# Patient Record
Sex: MALE | Race: WHITE | NOT HISPANIC OR LATINO | Employment: OTHER | URBAN - METROPOLITAN AREA
[De-identification: names, ages, dates, MRNs, and addresses within clinical notes are randomized per-mention and may not be internally consistent; named-entity substitution may affect disease eponyms.]

---

## 2017-01-11 DIAGNOSIS — Z00.01 ENCOUNTER FOR ROUTINE ADULT HEALTH EXAMINATION WITH ABNORMAL FINDINGS: ICD-10-CM

## 2017-01-11 DIAGNOSIS — Z12.11 ENCOUNTER FOR SCREENING FOR MALIGNANT NEOPLASM OF COLON: ICD-10-CM

## 2017-01-11 PROCEDURE — G0328 FECAL BLOOD SCRN IMMUNOASSAY: HCPCS | Performed by: FAMILY MEDICINE

## 2017-01-12 ENCOUNTER — TELEPHONE (OUTPATIENT)
Dept: FAMILY MEDICINE | Facility: CLINIC | Age: 72
End: 2017-01-12
Payer: COMMERCIAL

## 2017-01-12 DIAGNOSIS — Z79.01 LONG TERM CURRENT USE OF ANTICOAGULANT THERAPY: Primary | ICD-10-CM

## 2017-01-12 LAB
HEMOCCULT STL QL IA: NEGATIVE
INR POINT OF CARE: 1.9 (ref 0.86–1.14)

## 2017-01-12 PROCEDURE — 85610 PROTHROMBIN TIME: CPT | Mod: QW | Performed by: FAMILY MEDICINE

## 2017-01-12 PROCEDURE — 99207 ZZC NO CHARGE NURSE ONLY: CPT | Performed by: FAMILY MEDICINE

## 2017-01-12 PROCEDURE — 36416 COLLJ CAPILLARY BLOOD SPEC: CPT | Performed by: FAMILY MEDICINE

## 2017-01-12 NOTE — TELEPHONE ENCOUNTER
"  ANTICOAGULATION FOLLOW-UP CLINIC VISIT    Patient Name:  Feng Wynne  Date:  1/12/2017  Contact Type:  Telephone    SUBJECTIVE:  Bleeding Signs/Symptoms: None  Thromboembolic Signs/Symptoms: None    Medication Changes:  No  Dietary Changes:  Started drinking \"Red Zinger Tea\" since last INR.  Not clear if this is cause for low INR today. Patient will stop drinking the tea and are recheck his INR in 2 week.  Bacterial/Viral Infection:  No    Missed Coumadin Doses:  None  Other Concerns:  No      ASSESSMENT/PLAN:  See: ANTICOAGULATION QIC flow sheet.    INR: 1.9  Plan: 10 mg x 1 (Thurs. 1/12) then resume 10 mg MWF and 5 mg rest of week.  Recheck INR in 2 weeks.  Meli Whitten RN      Dosage adjustment made based on physician directed care plan.    SHAR JAMES            "

## 2017-02-02 ENCOUNTER — TELEPHONE (OUTPATIENT)
Dept: FAMILY MEDICINE | Facility: CLINIC | Age: 72
End: 2017-02-02
Payer: COMMERCIAL

## 2017-02-02 ENCOUNTER — TELEPHONE (OUTPATIENT)
Dept: FAMILY MEDICINE | Facility: CLINIC | Age: 72
End: 2017-02-02

## 2017-02-02 DIAGNOSIS — Z79.01 LONG TERM CURRENT USE OF ANTICOAGULANT THERAPY: Primary | ICD-10-CM

## 2017-02-02 LAB — INR POINT OF CARE: 3.8 (ref 0.86–1.14)

## 2017-02-02 PROCEDURE — 99207 ZZC NO CHARGE NURSE ONLY: CPT | Performed by: FAMILY MEDICINE

## 2017-02-02 PROCEDURE — 85610 PROTHROMBIN TIME: CPT | Mod: QW | Performed by: FAMILY MEDICINE

## 2017-02-02 PROCEDURE — 36416 COLLJ CAPILLARY BLOOD SPEC: CPT | Performed by: FAMILY MEDICINE

## 2017-02-02 NOTE — TELEPHONE ENCOUNTER
ANTICOAGULATION FOLLOW-UP CLINIC VISIT    Patient Name:  Feng Wynne  Date:  2/2/2017  Contact Type:  Telephone    SUBJECTIVE:  Bleeding Signs/Symptoms: None  Thromboembolic Signs/Symptoms: None    Medication Changes:  No  Dietary Changes:  Eating less greens. Will slightly increase intake   Bacterial/Viral Infection:  No    Missed Coumadin Doses:  None  Other Concerns:  No      ASSESSMENT/PLAN:  See: ANTICOAGULATION QIC flow sheet.    INR 3.8  Plan: Continue 10 mg MWF and 5mg ROW = 50 mg/wk. Recheck INR in 3 weeks (going to [place]).  Meli Whitten RN      Dosage adjustment made based on physician directed care plan.    SHAR JAMES

## 2017-02-02 NOTE — TELEPHONE ENCOUNTER
Spoke with patient earlier about today's INR.  Patient interested in Home INR Monitor.  Advised patient to call his insurance ([employer]) to see if his plan will cover machine, supplies.    Patient called back.  [employer] will cover.    Informed patient will fill out order/form for monitor.  Patient leaving for [place] x 3 weeks. He will mely back and let me know when he is back home then will fax order to [facility].  They will call him after they receive order.  Meli Whitten RN

## 2017-02-27 ENCOUNTER — TELEPHONE (OUTPATIENT)
Dept: FAMILY MEDICINE | Facility: CLINIC | Age: 72
End: 2017-02-27

## 2017-02-27 NOTE — TELEPHONE ENCOUNTER
MP  Patient is requesting a home INR Monitor.  Form filled out.  Need your signature.  Please give to me when done.  Thanks, Meli Whitten RN

## 2017-02-27 NOTE — TELEPHONE ENCOUNTER
Reason for Call:  Other call back    Detailed comments: Please call patient about a home test for INR  Was left a message by [employer]    Phone Number Patient can be reached at: Cell number on file:    Telephone Information:   Mobile [phone]       Best Time: anytime    Can we leave a detailed message on this number? YES    Call taken on 2/27/2017 at 8:54 AM by Fausto Sharma

## 2017-02-28 ENCOUNTER — TELEPHONE (OUTPATIENT)
Dept: FAMILY MEDICINE | Facility: CLINIC | Age: 72
End: 2017-02-28

## 2017-02-28 NOTE — TELEPHONE ENCOUNTER
Spoke with Jennifer from [facility].  Gave her patient insurance information.  Meli Whitten RN

## 2017-03-02 ENCOUNTER — TELEPHONE (OUTPATIENT)
Dept: FAMILY MEDICINE | Facility: CLINIC | Age: 72
End: 2017-03-02
Payer: COMMERCIAL

## 2017-03-02 DIAGNOSIS — I48.20 CHRONIC ATRIAL FIBRILLATION (H): ICD-10-CM

## 2017-03-02 DIAGNOSIS — Z79.01 LONG TERM CURRENT USE OF ANTICOAGULANT THERAPY: Primary | ICD-10-CM

## 2017-03-02 DIAGNOSIS — Z95.2 S/P MITRAL VALVE REPLACEMENT: ICD-10-CM

## 2017-03-02 LAB — INR POINT OF CARE: 4.8 (ref 0.86–1.14)

## 2017-03-02 PROCEDURE — 99207 ZZC NO CHARGE NURSE ONLY: CPT | Performed by: FAMILY MEDICINE

## 2017-03-02 PROCEDURE — 36416 COLLJ CAPILLARY BLOOD SPEC: CPT | Performed by: FAMILY MEDICINE

## 2017-03-02 PROCEDURE — 85610 PROTHROMBIN TIME: CPT | Mod: QW | Performed by: FAMILY MEDICINE

## 2017-03-02 NOTE — TELEPHONE ENCOUNTER
ANTICOAGULATION FOLLOW-UP CLINIC VISIT    Patient Name:  Feng Wynne  Date:  3/2/2017  Contact Type:  Telephone. Dose instructions given to patient    SUBJECTIVE:  Bleeding Signs/Symptoms: None  Thromboembolic Signs/Symptoms: None    Medication Changes:  No  Dietary Changes:  On vacation in [place] x 3 weeks. Increased ETOH.  Advised he increase his greens.  Bacterial/Viral Infection:  No    Missed Coumadin Doses:  None  Other Concerns:  No      ASSESSMENT/PLAN:  See: ANTICOAGULATION QIC flow sheet.    INR 4.8  Plan:  Hold x 1 (Th. 3/2) then resume 10 mg MWF and 5 mg rest of week. Recheck INR in 1 week  Meli Whitten RN      Dosage adjustment made based on physician directed care plan.    SHAR JAMES

## 2017-03-09 ENCOUNTER — TELEPHONE (OUTPATIENT)
Dept: FAMILY MEDICINE | Facility: CLINIC | Age: 72
End: 2017-03-09

## 2017-03-09 ENCOUNTER — TELEPHONE (OUTPATIENT)
Dept: FAMILY MEDICINE | Facility: CLINIC | Age: 72
End: 2017-03-09
Payer: COMMERCIAL

## 2017-03-09 DIAGNOSIS — I48.20 CHRONIC ATRIAL FIBRILLATION (H): ICD-10-CM

## 2017-03-09 DIAGNOSIS — Z79.01 LONG TERM CURRENT USE OF ANTICOAGULANT THERAPY: Primary | ICD-10-CM

## 2017-03-09 DIAGNOSIS — Z95.2 S/P MITRAL VALVE REPLACEMENT: ICD-10-CM

## 2017-03-09 LAB — INR POINT OF CARE: 3.4 (ref 0.86–1.14)

## 2017-03-09 PROCEDURE — 36416 COLLJ CAPILLARY BLOOD SPEC: CPT | Performed by: FAMILY MEDICINE

## 2017-03-09 PROCEDURE — 99207 ZZC NO CHARGE NURSE ONLY: CPT | Performed by: FAMILY MEDICINE

## 2017-03-09 PROCEDURE — 85610 PROTHROMBIN TIME: CPT | Mod: QW | Performed by: FAMILY MEDICINE

## 2017-03-09 NOTE — TELEPHONE ENCOUNTER
Reason for Call:  Other appointment    Detailed comments: Patient would like to see if Trevor would take him on  As new patient as well his partner, Jose Washburn  As they both see Dr. Duran but were recommended by bhanu nurse.     Phone Number Patient can be reached at: Home number on file [phone] (home)    Best Time: anytime     Can we leave a detailed message on this number? YES    Call taken on 3/9/2017 at 1:13 PM by Moose Salcido

## 2017-03-09 NOTE — TELEPHONE ENCOUNTER
ANTICOAGULATION FOLLOW-UP CLINIC VISIT    Patient Name:  Feng Wynne  Date:  3/9/2017  Contact Type:  Telephone. Dose instructions given to patient.     SUBJECTIVE:  Bleeding Signs/Symptoms: None  Thromboembolic Signs/Symptoms: None    Medication Changes:  No  Dietary Changes:  No  Bacterial/Viral Infection:  No    Missed Coumadin Doses:  None  Other Concerns:  No      ASSESSMENT/PLAN:  See: ANTICOAGULATION QIC flow sheet.    INR 3.4  Plan: Continue 10 mg MWF and 5 mg rest of week = 50 mg/wk. Recheck INR in 3 weeks.  Meli Whitten RN      Dosage adjustment made based on physician directed care plan.    SHAR JAMES

## 2017-03-24 ENCOUNTER — TRANSFERRED RECORDS (OUTPATIENT)
Dept: HEALTH INFORMATION MANAGEMENT | Facility: CLINIC | Age: 72
End: 2017-03-24

## 2017-03-24 LAB — INR PPP: 3 (ref 2.5–3.5)

## 2017-04-07 ENCOUNTER — TELEPHONE (OUTPATIENT)
Dept: FAMILY MEDICINE | Facility: CLINIC | Age: 72
End: 2017-04-07
Payer: COMMERCIAL

## 2017-04-07 DIAGNOSIS — Z79.01 LONG TERM CURRENT USE OF ANTICOAGULANT THERAPY: Primary | ICD-10-CM

## 2017-04-07 PROCEDURE — 36416 COLLJ CAPILLARY BLOOD SPEC: CPT | Performed by: FAMILY MEDICINE

## 2017-04-07 PROCEDURE — 85610 PROTHROMBIN TIME: CPT | Mod: QW | Performed by: FAMILY MEDICINE

## 2017-04-07 PROCEDURE — 99207 ZZC NO CHARGE NURSE ONLY: CPT | Performed by: FAMILY MEDICINE

## 2017-04-07 NOTE — TELEPHONE ENCOUNTER
INR faxed from [facility] = 3.7  VM left asking patient to call back.  Meli Whitten RN

## 2017-04-10 LAB — INR POINT OF CARE: 3.7 (ref 0.86–1.14)

## 2017-04-10 NOTE — TELEPHONE ENCOUNTER
ANTICOAGULATION FOLLOW-UP CLINIC VISIT    Patient Name:  Feng Wynne  Date:  4/10/2017  Contact Type:  Telephone    SUBJECTIVE:  Bleeding Signs/Symptoms: None  Thromboembolic Signs/Symptoms: None    Medication Changes:  No  Dietary Changes:  had kale then back to spinage  Bacterial/Viral Infection:  No    Missed Coumadin Doses:  None  Other Concerns:  No      ASSESSMENT/PLAN:  See: ANTICOAGULATION QIC flow sheet.    MEDICATION MANAGEMENT  Education Provided:    INR: 3.7  Dose: same 10 MWF and 5 ROW and recheck in 2 weeks   Dose instructions given to pt via phone.  He will call in 2 weeks also with the results    Dosage adjustment made based on physician directed care plan. YESI/SHAR TOM

## 2017-04-11 DIAGNOSIS — I48.20 CHRONIC ATRIAL FIBRILLATION (H): ICD-10-CM

## 2017-04-11 NOTE — TELEPHONE ENCOUNTER
Warfarin     Last Written Prescription Date: 10/04/2016  Last Fill Qty: 180, # refills: 3  Last Office Visit with G, P or [facility] prescribing provider: 10/04/2016  Next 5 appointments (look out 90 days)     May 03, 2017 10:30 AM CDT   [facility] Visit with Enrique [facility] MD   [facility]    [address] [facility] [id number] [phone]                   Date and Result of Last PT/INR:   Lab Results   Component Value Date    INR 3.7 04/10/2017    INR 3 03/24/2017    INR 3.4 03/09/2017    INR 3.00 06/02/2014

## 2017-04-12 RX ORDER — WARFARIN SODIUM 5 MG/1
TABLET ORAL
Qty: 180 TABLET | Refills: 0 | Status: SHIPPED | OUTPATIENT
Start: 2017-04-12 | End: 2017-12-01

## 2017-04-17 ENCOUNTER — TELEPHONE (OUTPATIENT)
Dept: FAMILY MEDICINE | Facility: CLINIC | Age: 72
End: 2017-04-17

## 2017-04-17 NOTE — TELEPHONE ENCOUNTER
Reason for call:  Patient reporting a symptom    Symptom or request: rash on leg right side, has gotten bigger  Thinks it might be ring worm.    Duration (how long have symptoms been present): 3 weeks    Have you been treated for this before? No    Additional comments: wants something for it over the phone.      Phone Number patient can be reached at:  Home number on file [phone] (home)    Best Time:  anytime    Can we leave a detailed message on this number:  YES    Call taken on 4/17/2017 at 7:59 AM by Ritu Beltran

## 2017-04-18 ENCOUNTER — OFFICE VISIT (OUTPATIENT)
Dept: FAMILY MEDICINE | Facility: CLINIC | Age: 72
End: 2017-04-18
Payer: COMMERCIAL

## 2017-04-18 VITALS
HEART RATE: 75 BPM | BODY MASS INDEX: 44.1 KG/M2 | RESPIRATION RATE: 16 BRPM | OXYGEN SATURATION: 97 % | DIASTOLIC BLOOD PRESSURE: 80 MMHG | WEIGHT: 315 LBS | TEMPERATURE: 97.7 F | SYSTOLIC BLOOD PRESSURE: 120 MMHG | HEIGHT: 71 IN

## 2017-04-18 DIAGNOSIS — B36.9 FUNGAL DERMATITIS: Primary | ICD-10-CM

## 2017-04-18 PROCEDURE — 99213 OFFICE O/P EST LOW 20 MIN: CPT | Performed by: FAMILY MEDICINE

## 2017-04-18 NOTE — MR AVS SNAPSHOT
After Visit Summary   4/18/2017    Feng Wynne    MRN: [id number]           Patient Information     Date Of Birth          1945        Visit Information        Provider Department      4/18/2017 11:30 AM Carrillo Duran MD [facility]        Today's Diagnoses     Fungal dermatitis    -  1       Follow-ups after your visit        Your next 10 appointments already scheduled     May 03, 2017 10:30 AM CDT   Office Visit with Enrique Walker MD   [facility] ([facility])    [address] [facility] [id number] [phone]           Bring a current list of meds and any records pertaining to this visit.  For Physicals, please bring immunization records and any forms needing to be filled out.  Please arrive 10 minutes early to complete paperwork.            Jun 06, 2017  7:30 AM CDT   LAB with  LAB   [facility] ([facility])    [address] [place] [id number] [phone]           Patient must bring picture ID.  Patient should be prepared to give a urine specimen  Please do not eat 10-12 hours before your appointment if you are coming in fasting for labs on lipids, cholesterol, or glucose (sugar).  Pregnant women should follow their Care Team instructions. Water with medications is okay. Do not drink coffee or other fluids.   If you have concerns about taking  your medications, please ask at office or if scheduling via Smart Voicemailt, send a message by clicking on Secure Messaging, Message Your Care Team.            Jun 06, 2017  8:00 AM CDT   Ech Complete with UCECHCR2    [facility] ([facility])    [address] [place] [address]  3rd [facility] [id number] [phone]           1.  Please bring or wear a comfortable two-piece outfit. 2.  You may eat, drink and take your normal medicines. 3.  For any questions that cannot be answered, please contact the  "ordering physician            Jun 13, 2017  7:30 AM CDT   (Arrive by 7:15 AM)   Return Visit with Armani Marin MD   [facility] ([facility])    [address] [address] [phone]              Who to contact     If you have questions or need follow up information about today's clinic visit or your schedule please contact [facility] directly at [phone].  Normal or non-critical lab and imaging results will be communicated to you by [employer], letter or phone within 4 business days after the clinic has received the results. If you do not hear from us within 7 days, please contact the clinic through [email or web address] or phone. If you have a critical or abnormal lab result, we will notify you by phone as soon as possible.  Submit refill requests through [employer] or call your pharmacy and they will forward the refill request to us. Please allow 3 business days for your refill to be completed.          Additional Information About Your Visit        [employer] Information     [employer] gives you secure access to your electronic health record. If you see a primary care provider, you can also send messages to your care team and make appointments. If you have questions, please call your primary care clinic.  If you do not have a primary care provider, please call [phone] and they will assist you.        Care EveryWhere ID     This is your Care EveryWhere ID. This could be used by other organizations to access your [facility] medical records  [id number]        Your Vitals Were     Pulse Temperature Respirations Height Pulse Oximetry BMI (Body Mass Index)    75 97.7  F (36.5  C) (Oral) 16 5' 10.5\" (1.791 m) 97% 56.87 kg/m2       Blood Pressure from Last 3 Encounters:   04/18/17 120/80   10/04/16 124/90   11/04/15 124/78    Weight from Last 3 Encounters:   04/18/17 (!) 402 lb (182.3 kg)   10/04/16 (!) 402 lb (182.3 kg)   11/04/15 (!) 413 lb 4.8 oz " (187.5 kg)              Today, you had the following     No orders found for display         Today's Medication Changes          These changes are accurate as of: 4/18/17 12:28 PM.  If you have any questions, ask your nurse or doctor.               These medicines have changed or have updated prescriptions.        Dose/Directions    metroNIDAZOLE 1 % gel   Commonly known as:  METROGEL   This may have changed:    - when to take this  - reasons to take this   Used for:  Rosacea        Apply  topically daily.   Quantity:  60 g   Refills:  11                Primary Care Provider Office Phone # Fax #    Carrillo Duran [phone]       [facility] [address] [address]        Thank you!     Thank you for choosing [facility]  for your care. Our goal is always to provide you with excellent care. Hearing back from our patients is one way we can continue to improve our services. Please take a few minutes to complete the written survey that you may receive in the mail after your visit with us. Thank you!             Your Updated Medication List - Protect others around you: Learn how to safely use, store and throw away your medicines at www.disposemymeds.org.          This list is accurate as of: 4/18/17 12:28 PM.  Always use your most recent med list.                   Brand Name Dispense Instructions for use    * ASPIRIN NOT PRESCRIBED    INTENTIONAL     by Other route continuous prn.       atenolol 25 MG tablet    TENORMIN    180 tablet    Take 1 tablet (25 mg) by mouth 2 times daily       CENTRUM SILVER PO      Take 1 tablet by mouth daily.       clindamycin 300 MG capsule    CLEOCIN    4 Cap    2 pills one hour before dental work       * COMPRESSION STOCKINGS     1 each    1 each daily       * COMPRESSION STOCKINGS     1 each    1 each daily       FISH OIL CONCENTRATE PO      Take  by mouth daily.       lisinopril 40 MG tablet    PRINIVIL/ZESTRIL    90  tablet    Take 1 tablet (40 mg) by mouth daily       metroNIDAZOLE 1 % gel    METROGEL    60 g    Apply  topically daily.       * order for DME     1 Units    SLEEP MATTRESS FOR CHRONIC BACK PAIN DUE TO DISC PROBLEMS       order for DME     1 each    Equipment being ordered: COMPRESSION STOCKINGS, 20-30 mmHg       * warfarin 5 MG tablet    COUMADIN    180 tablet    Take 1-2 tablets daily, varies.       * warfarin 5 MG tablet    COUMADIN    180 tablet    TAKE 1 TO 2 TABLETS BY MOUTH DAILY       * Notice:  This list has 6 medication(s) that are the same as other medications prescribed for you. Read the directions carefully, and ask your doctor or other care provider to review them with you.

## 2017-04-18 NOTE — PROGRESS NOTES
Subjective: Last weekhe noticed a small brown spot on his right shin, a little bit itchy, and it has gradually gotten bigger. He's never had anything like it before .he tried some antibacterial ointment but it seemed to make it worse. He has some venous insufficiency on that side but it's mild.    Objective: There is a very superficial oval lesion about 3 cm around with central clearing, fairly typical for fungus    Assessment and plan: Likely fungal dermatitis, try over-the-counter antifungal, could take 6 weeks to go away. If that fails to work he should see his dermatologist and he has one.

## 2017-04-24 ENCOUNTER — TELEPHONE (OUTPATIENT)
Dept: FAMILY MEDICINE | Facility: CLINIC | Age: 72
End: 2017-04-24
Payer: COMMERCIAL

## 2017-04-24 ENCOUNTER — TELEPHONE (OUTPATIENT)
Dept: FAMILY MEDICINE | Facility: CLINIC | Age: 72
End: 2017-04-24

## 2017-04-24 DIAGNOSIS — Z79.01 LONG TERM CURRENT USE OF ANTICOAGULANT THERAPY: Primary | ICD-10-CM

## 2017-04-24 LAB — INR POINT OF CARE: 3 (ref 0.86–1.14)

## 2017-04-24 PROCEDURE — 99207 ZZC NO CHARGE NURSE ONLY: CPT | Performed by: FAMILY MEDICINE

## 2017-04-24 NOTE — TELEPHONE ENCOUNTER
ANTICOAGULATION FOLLOW-UP CLINIC VISIT    Patient Name:  Feng Wynne  Date:  4/24/2017  Contact Type:  Telephone. Dose instructions left on patient's voice mail.  (Home INR)    SUBJECTIVE:  Bleeding Signs/Symptoms: None  Thromboembolic Signs/Symptoms: None    Medication Changes:  No  Dietary Changes:  No  Bacterial/Viral Infection:  No    Missed Coumadin Doses:  None  Other Concerns:  No      ASSESSMENT/PLAN:  See: ANTICOAGULATION QIC flow sheet.    INR 3.0  Plan: Continue 10 mg MWF and 5 mg rest of week = 50 mg/wk. Recheck INR in 2 weeks.  Meli Whitten RN      Dosage adjustment made based on physician directed care plan.    SHAR JAMES

## 2017-04-24 NOTE — TELEPHONE ENCOUNTER
Reason for Call:  INR    Who is calling?  Pt is calling    Phone number:  Pt had the idea that he had to call you back.    Fax number:      Name of caller: pt is calling    INR Value:  3.0    Are there any other concerns:  No    Can we leave a detailed message on this number? YES    Phone number patient can be reached at: Home number on file [phone] (home)      Call taken on 4/24/2017 at 10:20 AM by Ritu Beltran

## 2017-05-03 ENCOUNTER — OFFICE VISIT (OUTPATIENT)
Dept: FAMILY MEDICINE | Facility: CLINIC | Age: 72
End: 2017-05-03
Payer: COMMERCIAL

## 2017-05-03 VITALS
WEIGHT: 315 LBS | DIASTOLIC BLOOD PRESSURE: 78 MMHG | BODY MASS INDEX: 44.1 KG/M2 | HEIGHT: 71 IN | OXYGEN SATURATION: 99 % | HEART RATE: 74 BPM | TEMPERATURE: 97.2 F | SYSTOLIC BLOOD PRESSURE: 130 MMHG

## 2017-05-03 DIAGNOSIS — E66.01 MORBID OBESITY, UNSPECIFIED OBESITY TYPE (H): ICD-10-CM

## 2017-05-03 DIAGNOSIS — Z95.2 S/P MITRAL VALVE REPLACEMENT: ICD-10-CM

## 2017-05-03 DIAGNOSIS — I48.0 PAROXYSMAL ATRIAL FIBRILLATION (H): Primary | ICD-10-CM

## 2017-05-03 PROCEDURE — 99214 OFFICE O/P EST MOD 30 MIN: CPT | Performed by: INTERNAL MEDICINE

## 2017-05-03 ASSESSMENT — ENCOUNTER SYMPTOMS
BLOOD IN STOOL: 0
COUGH: 0
NAUSEA: 0
FOCAL WEAKNESS: 0
VOMITING: 0
DIZZINESS: 0
HEMATURIA: 0
PND: 0
SENSORY CHANGE: 0
CLAUDICATION: 0
PALPITATIONS: 0
ORTHOPNEA: 0
ABDOMINAL PAIN: 0
SHORTNESS OF BREATH: 0
HEADACHES: 0

## 2017-05-03 NOTE — PATIENT INSTRUCTIONS
Maintain the following meal plan:    Entree - 250 calories per serving  Shake - 150 calories per serving  Fruit or Vegetable - 100 calories per serving    Breakfast - Entree + Fruit or Vegetable  Snack - Shake   Lunch - Entree + Fruit or Vegetable  Snack - Shake   Dinner - Entree + Fruit or Vegetable  Snack - Chocolate    Follow up in 3 months.

## 2017-05-03 NOTE — PROGRESS NOTES
"HPI Comments:   Patient comes in today to establish care.    Overall, his feeling well and does not have any subjective complaints. He reports that he has been doing well with losing weight by changing his eating habits. So far, he said that he has lost 50 pounds and would like to continue losing more.    He has a history of coronary artery disease and atrial fibrillation and underwent mitral valve replacement with a mechanical valve. He is currently on anticoagulation treatment with warfarin. He checks his INR at home. No overt bleeding episodes noted. He denies having chest pain, palpitations, or shortness of breath (other than exertional dyspnea secondary to his morbid obesity).      Past Medical History:   Diagnosis Date     Atrial fibrillation (H)      Coronary artery disease      Hypercholesteremia      Morbid obesity (H)      Unspecified essential hypertension      Past Surgical History:   Procedure Laterality Date     MITRAL VALVE REPLACEMENT  8-    Mitral valve replacement with 31-mm [employer]. Raffi mechanical valve.        Review of Systems   Constitutional: Negative for malaise/fatigue.   Respiratory: Negative for cough and shortness of breath.    Cardiovascular: Negative for chest pain, palpitations, orthopnea, claudication and PND.   Gastrointestinal: Negative for abdominal pain, blood in stool, melena, nausea and vomiting.   Genitourinary: Negative for hematuria.   Neurological: Negative for dizziness, sensory change, focal weakness and headaches.       /78 (BP Location: Left arm, Patient Position: Chair, Cuff Size: Adult Large)  Pulse 74  Temp 97.2  F (36.2  C) (Oral)  Ht 5' 10.5\" (1.791 m)  Wt (!) 393 lb (178.3 kg)  SpO2 99%  BMI 55.59 kg/m2      Physical Exam   Constitutional: He is oriented to person, place, and time. No distress.   Neck: No thyromegaly present.   Cardiovascular: Normal rate and regular rhythm.    (+) Mechanical heart valve click heard left lower parasternal border "   Pulmonary/Chest: Effort normal and breath sounds normal. No respiratory distress.   Neurological: He is alert and oriented to person, place, and time. He has normal reflexes. Coordination normal. GCS score is 15.   Nursing note and vitals reviewed.        ICD-10-CM    1. Paroxysmal atrial fibrillation (H) I48.0  continue current medications    2. S/P mitral valve replacement Z95.2  continue anticoagulation treatment with warfarin    3. Morbid obesity, unspecified obesity type (H) E66.01  see below          Patient Instructions   Maintain the following meal plan:    Entree - 250 calories per serving  Shake - 150 calories per serving  Fruit or Vegetable - 100 calories per serving    Breakfast - Entree + Fruit or Vegetable  Snack - Shake   Lunch - Entree + Fruit or Vegetable  Snack - Shake   Dinner - Entree + Fruit or Vegetable  Snack - Chocolate    Follow up in 3 months.

## 2017-05-03 NOTE — MR AVS SNAPSHOT
After Visit Summary   5/3/2017    Feng Wynne    MRN: [id number]           Patient Information     Date Of Birth          1945        Visit Information        Provider Department      5/3/2017 10:30 AM Enrique Walker MD [facility]        Care Instructions    Maintain the following meal plan:    Entree - 250 calories per serving  Shake - 150 calories per serving  Fruit or Vegetable - 100 calories per serving    Breakfast - Entree + Fruit or Vegetable  Snack - Shake   Lunch - Entree + Fruit or Vegetable  Snack - Shake   Dinner - Entree + Fruit or Vegetable  Snack - Chocolate    Follow up in 3 months.          Follow-ups after your visit        Your next 10 appointments already scheduled     Jun 06, 2017  7:30 AM CDT   LAB with  LAB   [facility] ([place] [facility])    [address] [place] [address] [phone]           Patient must bring picture ID.  Patient should be prepared to give a urine specimen  Please do not eat 10-12 hours before your appointment if you are coming in fasting for labs on lipids, cholesterol, or glucose (sugar).  Pregnant women should follow their Care Team instructions. Water with medications is okay. Do not drink coffee or other fluids.   If you have concerns about taking  your medications, please ask at office or if scheduling via testhub, send a message by clicking on Secure Messaging, Message Your Care Team.            Jun 06, 2017  8:00 AM CDT   Ech Complete with [id number]    [facility] ([place] [facility])    [address] [place] [address] [phone]           1.  Please bring or wear a comfortable two-piece outfit. 2.  You may eat, drink and take your normal medicines. 3.  For any questions that cannot be answered, please contact the ordering physician            Jun 13, 2017  7:30 AM CDT   (Arrive by 7:15 AM)   Return Visit with  "Armani Marin MD   [facility] ([facility] [place]    [address]  3rd Floor  [facility] [address] [phone]              Who to contact     If you have questions or need follow up information about today's clinic visit or your schedule please contact [facility] directly at [phone].  Normal or non-critical lab and imaging results will be communicated to you by MyChart, letter or phone within 4 business days after the clinic has received the results. If you do not hear from us within 7 days, please contact the clinic through [employer] or phone. If you have a critical or abnormal lab result, we will notify you by phone as soon as possible.  Submit refill requests through Sprinklr or call your pharmacy and they will forward the refill request to us. Please allow 3 business days for your refill to be completed.          Additional Information About Your Visit        [employer] Information     Spr[employer] gives you secure access to your electronic health record. If you see a primary care provider, you can also send messages to your care team and make appointments. If you have questions, please call your primary care clinic.  If you do not have a primary care provider, please call [phone] and they will assist you.        Care EveryWhere ID     This is your Care EveryWhere ID. This could be used by other organizations to access your [facility] medical records  [id number]        Your Vitals Were     Pulse Temperature Height Pulse Oximetry BMI (Body Mass Index)       74 97.2  F (36.2  C) (Oral) 5' 10.5\" (1.791 m) 99% 55.59 kg/m2        Blood Pressure from Last 3 Encounters:   05/03/17 130/78   04/18/17 120/80   10/04/16 124/90    Weight from Last 3 Encounters:   05/03/17 (!) 393 lb (178.3 kg)   04/18/17 (!) 402 lb (182.3 kg)   10/04/16 (!) 402 lb (182.3 kg)              Today, you had the following     No orders found for display         Today's Medication Changes "          These changes are accurate as of: 5/3/17 11:01 AM.  If you have any questions, ask your nurse or doctor.               These medicines have changed or have updated prescriptions.        Dose/Directions    metroNIDAZOLE 1 % gel   Commonly known as:  METROGEL   This may have changed:    - when to take this  - reasons to take this   Used for:  Rosacea        Apply  topically daily.   Quantity:  60 g   Refills:  11                Primary Care Provider Office Phone # Fax #    Carrillo Duran [phone]       [facility] [address]  [facility] [address]        Thank you!     Thank you for choosing [facility]  for your care. Our goal is always to provide you with excellent care. Hearing back from our patients is one way we can continue to improve our services. Please take a few minutes to complete the written survey that you may receive in the mail after your visit with us. Thank you!             Your Updated Medication List - Protect others around you: Learn how to safely use, store and throw away your medicines at www.disposemymeds.org.          This list is accurate as of: 5/3/17 11:01 AM.  Always use your most recent med list.                   Brand Name Dispense Instructions for use    * ASPIRIN NOT PRESCRIBED    INTENTIONAL     by Other route continuous prn.       atenolol 25 MG tablet    TENORMIN    180 tablet    Take 1 tablet (25 mg) by mouth 2 times daily       CENTRUM SILVER PO      Take 1 tablet by mouth daily.       clindamycin 300 MG capsule    CLEOCIN    4 Cap    2 pills one hour before dental work       * COMPRESSION STOCKINGS     1 each    1 each daily       * COMPRESSION STOCKINGS     1 each    1 each daily       FISH OIL CONCENTRATE PO      Take  by mouth daily.       lisinopril 40 MG tablet    PRINIVIL/ZESTRIL    90 tablet    Take 1 tablet (40 mg) by mouth daily       metroNIDAZOLE 1 % gel    METROGEL    60 g    Apply  topically daily.        * order for DME     1 Units    SLEEP MATTRESS FOR CHRONIC BACK PAIN DUE TO DISC PROBLEMS       order for DME     1 each    Equipment being ordered: COMPRESSION STOCKINGS, 20-30 mmHg       * warfarin 5 MG tablet    COUMADIN    180 tablet    Take 1-2 tablets daily, varies.       * warfarin 5 MG tablet    COUMADIN    180 tablet    TAKE 1 TO 2 TABLETS BY MOUTH DAILY       * Notice:  This list has 6 medication(s) that are the same as other medications prescribed for you. Read the directions carefully, and ask your doctor or other care provider to review them with you.

## 2017-05-03 NOTE — NURSING NOTE
"Chief Complaint   Patient presents with     Establish Care       Initial /78 (BP Location: Left arm, Patient Position: Chair, Cuff Size: Adult Large)  Pulse 74  Temp 97.2  F (36.2  C) (Oral)  Ht 5' 10.5\" (1.791 m)  Wt (!) 393 lb (178.3 kg)  SpO2 99%  BMI 55.59 kg/m2 Estimated body mass index is 55.59 kg/(m^2) as calculated from the following:    Height as of this encounter: 5' 10.5\" (1.791 m).    Weight as of this encounter: 393 lb (178.3 kg).  Medication Reconciliation: complete   Deirdre Santo- GAMALIEL      "

## 2017-05-05 ENCOUNTER — TELEPHONE (OUTPATIENT)
Dept: FAMILY MEDICINE | Facility: CLINIC | Age: 72
End: 2017-05-05

## 2017-05-05 NOTE — TELEPHONE ENCOUNTER
Patient wanting to know what he can take OTC for allergies.  Has sore throat  Yesterday was driving home with windows down  Felt allergy symptoms when he got home  Is on Warfarin, Lisinopril, and Atenolol  Would like to try Claritin  Reviewed Micromedex, no med-med interactions  Advised pt could try Flonase or Saline Nasal rinse as well  Will try these things and callback if needed.  Neyda CHANDLER RN

## 2017-05-05 NOTE — TELEPHONE ENCOUNTER
Reason for call:  Patient reporting a symptom    Symptom or request: Allergies     Duration (how long have symptoms been present): 05/04/2017    Have you been treated for this before? No    Additional comments: Patient is having sore throat would like medical advice over appt     Phone Number patient can be reached at:  Home number on file [phone] (home)    Best Time:  Anytime     Can we leave a detailed message on this number:  YES    Call taken on 5/5/2017 at 9:24 AM by Moose Salcido

## 2017-05-08 ENCOUNTER — TELEPHONE (OUTPATIENT)
Dept: FAMILY MEDICINE | Facility: CLINIC | Age: 72
End: 2017-05-08

## 2017-05-08 ENCOUNTER — ANTICOAGULATION THERAPY VISIT (OUTPATIENT)
Dept: FAMILY MEDICINE | Facility: CLINIC | Age: 72
End: 2017-05-08
Payer: COMMERCIAL

## 2017-05-08 ENCOUNTER — TRANSFERRED RECORDS (OUTPATIENT)
Dept: HEALTH INFORMATION MANAGEMENT | Facility: CLINIC | Age: 72
End: 2017-05-08

## 2017-05-08 DIAGNOSIS — Z79.01 LONG-TERM (CURRENT) USE OF ANTICOAGULANTS: ICD-10-CM

## 2017-05-08 DIAGNOSIS — Z95.2 HEART VALVE REPLACED: ICD-10-CM

## 2017-05-08 LAB — INR PPP: 2.6 (ref 2.5–3.5)

## 2017-05-08 PROCEDURE — 99207 ZZC NO CHARGE NURSE ONLY: CPT | Performed by: INTERNAL MEDICINE

## 2017-05-08 NOTE — PROGRESS NOTES
ANTICOAGULATION FOLLOW-UP CLINIC VISIT    Patient Name:  Feng Wynne  Date:  5/8/2017  Contact Type:  Telephone    SUBJECTIVE:        OBJECTIVE    INR   Date Value Ref Range Status   05/08/2017 2.6  Final       ASSESSMENT / PLAN  INR assessment THER    Recheck INR In: 2 WEEKS    INR Location Home INR      Anticoagulation Summary as of 5/8/2017     INR goal 2.5-3.0   Today's INR 2.6   Maintenance plan 10 mg (5 mg x 2) on Mon, Wed, Fri; 5 mg (5 mg x 1) all other days   Full instructions 10 mg on Mon, Wed, Fri; 5 mg all other days   Weekly total 50 mg   Plan last modified Love Elliott RN (5/8/2017)   Next INR check 5/22/2017   Priority INR   Target end date     Indications   Long-term (current) use of anticoagulants [Z79.01] [Z79.01]  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 Transferred from [facility] to [place]. Dr. Duran retired and patient is now seeing Dr. Walker.  Patient confirms and agrees to dosing instructions.  Dosing based on [facility] Protocol and Provider directed care plan.      Love Elliott, RN

## 2017-05-08 NOTE — TELEPHONE ENCOUNTER
Spoke with Patient.  He will follow up with Letitia to make sure correct office fax is noted.  INR is 2.6.  See anticoagulation flow sheet for details.  Love Elliott RN

## 2017-05-08 NOTE — TELEPHONE ENCOUNTER
Domenica has not received the Home INR result either.  Left message for patient to give us a call back.  Love Elliott RN

## 2017-05-08 NOTE — TELEPHONE ENCOUNTER
Hi,  Patient established care with Dr. Walker on 5/3 due to Dr. Duran retiring.  He has home monitor and is transferring care to [facility].  Have you guys received his results? .  Meli Whitten RN

## 2017-05-08 NOTE — MR AVS SNAPSHOT
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  Transferred from [facility].  Was seeing Dr. Duran who retired.       May 2017 Details    Sun Mon Tue Wed Thu Fri Sat      1               2               3               4               5               6                 7               8      10 mg   See details      9      5 mg         10      10 mg         11      5 mg         12      10 mg         13      5 mg           14      5 mg         15      10 mg         16      5 mg         17      10 mg         18      5 mg         19      10 mg         20      5 mg           21      5 mg         22            23               24               25               26               27                 28               29               30               31                   Date Details   05/08 This INR check       Date of next INR:  5/22/2017         How to take your warfarin dose     To take:  5 mg Take 1 of the 5 mg tablets.    To take:  10 mg Take 2 of the 5 mg tablets.

## 2017-05-26 LAB — INR PPP: 2.3 (ref 2.5–3.5)

## 2017-05-30 ENCOUNTER — ANTICOAGULATION THERAPY VISIT (OUTPATIENT)
Dept: NURSING | Facility: CLINIC | Age: 72
End: 2017-05-30
Payer: COMMERCIAL

## 2017-05-30 ENCOUNTER — TELEPHONE (OUTPATIENT)
Dept: FAMILY MEDICINE | Facility: CLINIC | Age: 72
End: 2017-05-30

## 2017-05-30 DIAGNOSIS — Z79.01 LONG-TERM (CURRENT) USE OF ANTICOAGULANTS: ICD-10-CM

## 2017-05-30 DIAGNOSIS — Z95.2 HEART VALVE REPLACED: ICD-10-CM

## 2017-05-30 PROCEDURE — 99207 ZZC NO CHARGE NURSE ONLY: CPT | Performed by: INTERNAL MEDICINE

## 2017-05-30 NOTE — TELEPHONE ENCOUNTER
I called patient and spoke with him.   See anticoagulation encounter from 5/30/17    Clara Ybarra RN

## 2017-05-30 NOTE — MR AVS SNAPSHOT
Feng Tomjoleen Wynne   5/30/2017   Anticoagulation Therapy Visit    Description:  72 year old male   Provider:  Enrique Walker MD   Department:  [facility]           INR as of 5/30/2017     Today's INR 2.3! (5/26/2017)      Anticoagulation Summary as of 5/30/2017     INR goal 2.5-3.5   Prior goal 2.5-3.0   Today's INR 2.3! 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]         Contact Numbers     Clinic Number:         May 2017 Details    Sun Mon Tue Wed Thu Fri Sat      1               2               3               4               5               6                 7               8               9               10               11               12               13                 14               15               16               17               18               19               20                 21               22               23               24               25               26               27                 28               29               30      5 mg   See details      31      10 mg             Date Details   05/30 This INR check               How to take your warfarin dose     To take:  5 mg Take 1 of the 5 mg tablets.    To take:  10 mg Take 2 of the 5 mg tablets.           June 2017 Details    Sun Mon Tue Wed Thu Fri Sat         1      5 mg         2      10 mg         3      5 mg           4      5 mg         5            6               7               8               9               10                 11               12               13               14               15               16               17                 18               19               20               21               22               23               24                 25               26               27               28               29                30                 Date Details   No additional details    Date of next INR:  6/5/2017         How to take your warfarin dose     To take:  5 mg Take 1 of the 5 mg tablets.    To take:  10 mg Take 2 of the 5 mg tablets.

## 2017-05-30 NOTE — TELEPHONE ENCOUNTER
Reason for Call:  INR    Who is calling?  Pt is calling. Did not receive anything from Letitia who did  The blood test    Phone number:      Fax number:      Name of caller: Feng the pt    INR Value:  2.3 taken last Friday by Letitia    Are there any other concerns:  No    Can we leave a detailed message on this number? YES    Phone number patient can be reached at: Home number on file [phone] (home)      Call taken on 5/30/2017 at 9:20 AM by Ritu Beltran

## 2017-06-06 ENCOUNTER — TELEPHONE (OUTPATIENT)
Dept: FAMILY MEDICINE | Facility: CLINIC | Age: 72
End: 2017-06-06

## 2017-06-06 ENCOUNTER — RADIANT APPOINTMENT (OUTPATIENT)
Dept: CARDIOLOGY | Facility: CLINIC | Age: 72
End: 2017-06-06

## 2017-06-06 ENCOUNTER — ANTICOAGULATION THERAPY VISIT (OUTPATIENT)
Dept: NURSING | Facility: CLINIC | Age: 72
End: 2017-06-06
Payer: COMMERCIAL

## 2017-06-06 DIAGNOSIS — E78.5 HYPERLIPIDEMIA LDL GOAL <130: ICD-10-CM

## 2017-06-06 DIAGNOSIS — Z95.2 HEART VALVE REPLACED: ICD-10-CM

## 2017-06-06 DIAGNOSIS — Z79.01 LONG-TERM (CURRENT) USE OF ANTICOAGULANTS: ICD-10-CM

## 2017-06-06 DIAGNOSIS — Z95.2 H/O MITRAL VALVE REPLACEMENT: ICD-10-CM

## 2017-06-06 DIAGNOSIS — I48.20 CHRONIC ATRIAL FIBRILLATION (H): Primary | ICD-10-CM

## 2017-06-06 LAB
CHOLEST SERPL-MCNC: 147 MG/DL
HDLC SERPL-MCNC: 47 MG/DL
INR PPP: 2.22 (ref 0.86–1.14)
LDLC SERPL CALC-MCNC: 77 MG/DL
NONHDLC SERPL-MCNC: 100 MG/DL
TRIGL SERPL-MCNC: 115 MG/DL

## 2017-06-06 PROCEDURE — 99207 ZZC NO CHARGE NURSE ONLY: CPT | Performed by: INTERNAL MEDICINE

## 2017-06-06 RX ADMIN — Medication 4 ML: at 08:15

## 2017-06-06 NOTE — TELEPHONE ENCOUNTER
Reason for Call:  Other INR follow up      Detailed comments: Patient called to report INR was low this morning... INR 2.2 done at the [facility] . Should be 2.5    Phone Number Patient can be reached at: Home number on file [phone]  Best Time: anytime     Can we leave a detailed message on this number? YES    Call taken on 6/6/2017 at 12:25 PM by Moose Salcido

## 2017-06-06 NOTE — TELEPHONE ENCOUNTER
Spoke with patient on the phone and provided dosing instruction  See Anticoagulation Encounter 6/6/17.     Clara Ybarra RN

## 2017-06-06 NOTE — MR AVS SNAPSHOT
Feng Wynne   6/6/2017   Anticoagulation Therapy Visit    Description:  72 year old male   Provider:  Enrique Walker MD   Department:  [facility]           INR as of 6/6/2017     Today's INR 2.22!      Anticoagulation Summary as of 6/6/2017     INR goal 2.5-3.5   Today's INR 2.22!   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]         Contact Numbers     Clinic Number:         June 2017 Details    Sun Mon Tue Wed Thu Fri Sat         1               2               3                 4               5               6      10 mg   See details      7      10 mg         8      5 mg         9      10 mg         10      5 mg           11      5 mg         12      10 mg         13            14               15               16               17                 18               19               20               21               22               23               24                 25               26               27               28               29               30                 Date Details   06/06 This INR check       Date of next INR:  6/13/2017         How to take your warfarin dose     To take:  5 mg Take 1 of the 5 mg tablets.    To take:  10 mg Take 2 of the 5 mg tablets.

## 2017-06-06 NOTE — PROGRESS NOTES
ANTICOAGULATION FOLLOW-UP CLINIC VISIT    Patient Name:  Feng Wynne  Date:  6/6/2017  Contact Type:  Telephone/ Spoke with patient on the phone and provided dosing instruction and recheck date.     SUBJECTIVE:     Patient Findings     Positives Change in diet/appetite    Comments Patient reports that he has been eating more greens lately. I explained that if he intends to eat more greens regularly; then an increase in dose would be appropriate. Patient states that he is eating healthier and trying to lose weight as well. Will have patient try doing 4 days of taking 10 mg and then all other days 5 mg. Recheck in 1 week. If INR hits in middle of his range of 2.5-3.5, then may need to change maintenance plan.            OBJECTIVE    INR   Date Value Ref Range Status   06/06/2017 2.22 (H) 0.86 - 1.14 Final       ASSESSMENT / PLAN  INR assessment SUB    Recheck INR In: 1 WEEK    INR Location Outside lab      Anticoagulation Summary as of 6/6/2017     INR goal 2.5-3.5   Today's INR 2.22!   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  Patient reports that he has been eating more greens lately. I explained that if he intends to eat more greens regularly; then an increase in dose would be appropriate.   Patient states that he is eating healthier and trying to lose weight as well.   Will have patient try doing 4 days of taking 10 mg and then all other days 5 mg. Recheck in 1 week. If INR hits in middle of his range of 2.5-3.5, then may need to change maintenance plan.     Clara Ybarra RN

## 2017-06-12 ENCOUNTER — PRE VISIT (OUTPATIENT)
Dept: CARDIOLOGY | Facility: CLINIC | Age: 72
End: 2017-06-12

## 2017-06-12 DIAGNOSIS — Z95.2 HEART VALVE REPLACED: Primary | ICD-10-CM

## 2017-06-12 DIAGNOSIS — I48.91 ATRIAL FIBRILLATION (H): ICD-10-CM

## 2017-06-12 ASSESSMENT — ENCOUNTER SYMPTOMS
NECK PAIN: 0
POOR WOUND HEALING: 0
NAIL CHANGES: 0
MUSCLE CRAMPS: 0
STIFFNESS: 1
BACK PAIN: 0
MUSCLE WEAKNESS: 0
MYALGIAS: 0
SKIN CHANGES: 0
JOINT SWELLING: 0
ARTHRALGIAS: 1

## 2017-06-13 ENCOUNTER — OFFICE VISIT (OUTPATIENT)
Dept: CARDIOLOGY | Facility: CLINIC | Age: 72
End: 2017-06-13
Attending: INTERNAL MEDICINE
Payer: MEDICARE

## 2017-06-13 VITALS
SYSTOLIC BLOOD PRESSURE: 121 MMHG | HEART RATE: 70 BPM | WEIGHT: 315 LBS | HEIGHT: 72 IN | DIASTOLIC BLOOD PRESSURE: 85 MMHG | OXYGEN SATURATION: 93 % | BODY MASS INDEX: 42.66 KG/M2

## 2017-06-13 DIAGNOSIS — R60.9 EDEMA, UNSPECIFIED TYPE: Primary | ICD-10-CM

## 2017-06-13 DIAGNOSIS — I48.21 PERMANENT ATRIAL FIBRILLATION (H): ICD-10-CM

## 2017-06-13 LAB — INR PPP: 2.41 (ref 0.86–1.14)

## 2017-06-13 PROCEDURE — 99214 OFFICE O/P EST MOD 30 MIN: CPT | Mod: ZP | Performed by: INTERNAL MEDICINE

## 2017-06-13 PROCEDURE — 36415 COLL VENOUS BLD VENIPUNCTURE: CPT | Performed by: INTERNAL MEDICINE

## 2017-06-13 PROCEDURE — 93005 ELECTROCARDIOGRAM TRACING: CPT | Mod: ZF

## 2017-06-13 PROCEDURE — 99213 OFFICE O/P EST LOW 20 MIN: CPT

## 2017-06-13 PROCEDURE — 93010 ELECTROCARDIOGRAM REPORT: CPT | Mod: ZP | Performed by: INTERNAL MEDICINE

## 2017-06-13 PROCEDURE — 85610 PROTHROMBIN TIME: CPT | Performed by: INTERNAL MEDICINE

## 2017-06-13 ASSESSMENT — PAIN SCALES - GENERAL: PAINLEVEL: NO PAIN (0)

## 2017-06-13 NOTE — NURSING NOTE
Chief Complaint   Patient presents with     Follow Up For     manage AF & S/P MVR/last visit November 2015/EKG

## 2017-06-13 NOTE — PROGRESS NOTES
CARDIOLOGY CLINIC FOLLOW UP    HPI: Feng Wynne is a 71 yo gentleman, Hx AF and severe MR, s/p MV replacement and MAZE procedure (September 2008), returns to the cardiology clinic for a routine visit.   He had been in good state of health until 04/2007. He noticed increased exertional shortness of breath. He was initially treated for presumed asthma. In 03/2008, he had an episode of left leg pain and he went to [facility] Emergency Room and had an ultrasound which showed no evidence of DVT. While in the ER, he was noted to have an irregular heart rate and EKG showed atrial fibrillation with a ventricular rate of 80 beats per minute. Coronary angiography (June 2008) showed normal coronary arteries. He underwent mitral valve replacement (St. Raffi 31 mm) with MAZE on 8/25/08 at [facility] (Dr. Olivo) without complications. He was transiently in NSR following the surgery, but subsequently reverted to AF. He was discharged on warfarin and atenolol for anticoagulation and rhythm control. He was placed on Amiodarone but developed severe dyspnea and pedal edema, both of which resolved with cessation of the medication. He was subsequently placed on Sotalol and underwent cardioversion without complication (Dec 2008).   The patient has been doing quite well since his last visit. He denies chest pain, dyspnea, ZUNIGA, PND, orthopnea, pedal edema, palpitations, lightheadedness, and syncope.  Weight remains a major issue. His right knee is a problem now and with lots of pain, has sciatica, which limits his walking, plans to start biking. He is a vegetarian,and has been spending time working on weight loss.  He is compliant with his coumadin and other medications.   The patient continues to work as a .    PAST MEDICAL HISTORY:  Past Medical History:   Diagnosis Date     Atrial fibrillation (H)      Coronary artery disease      Hypercholesteremia      Morbid obesity (H)      Unspecified essential hypertension         CURRENT MEDICATIONS:  Current Outpatient Prescriptions   Medication Sig Dispense Refill     warfarin (COUMADIN) 5 MG tablet TAKE 1 TO 2 TABLETS BY MOUTH DAILY 180 tablet 0     lisinopril (PRINIVIL,ZESTRIL) 40 MG tablet Take 1 tablet (40 mg) by mouth daily 90 tablet 3     warfarin (COUMADIN) 5 MG tablet Take 1-2 tablets daily, varies. 180 tablet 3     atenolol (TENORMIN) 25 MG tablet Take 1 tablet (25 mg) by mouth 2 times daily 180 tablet prn     order for DME Equipment being ordered: COMPRESSION STOCKINGS, 20-30 mmHg 1 each 1     COMPRESSION STOCKINGS 1 each daily 1 each 2     COMPRESSION STOCKINGS 1 each daily 1 each 2     Omega-3 Fatty Acids (FISH OIL CONCENTRATE PO) Take  by mouth daily.       metroNIDAZOLE (METROGEL) 1 % gel Apply  topically daily. (Patient taking differently: Apply topically as needed ) 60 g 11     ORDER FOR DME SLEEP MATTRESS FOR CHRONIC BACK PAIN DUE TO DISC PROBLEMS 1 Units 0     Multiple Vitamins-Minerals (CENTRUM SILVER PO) Take 1 tablet by mouth daily.       ASPIRIN NOT PRESCRIBED, INTENTIONAL, by Other route continuous prn.  0     CLINDAMYCIN  MG PO CAPS 2 pills one hour before dental work 4 Cap 2       PAST SURGICAL HISTORY:  Past Surgical History:   Procedure Laterality Date     MITRAL VALVE REPLACEMENT  8-    Mitral valve replacement with 31-mm St. Raffi mechanical valve.        ALLERGIES  Amiodarone; Latex; Penicillins; and Sulfites    FAMILY HX:  Family History   Problem Relation Age of Onset     CEREBROVASCULAR DISEASE Father      DIABETES Paternal Grandmother      CHADACHRISTAL. Brother      CABG X 3 at age 51       SOCIAL HX:  History     Social History     Marital Status: Single     Spouse Name: N/A     Number of Children: 1     Years of Education: N/A     Occupational History     Self employed             Social History Main Topics     Smoking status: Former Smoker     Smokeless tobacco: Never Used      Comment: quit 20+ yrs ago     Alcohol Use: Yes       "Comment: 2 drinks/month     Drug Use: No     Sexual Activity:     Partners: Male     Other Topics Concern     None     Social History Narrative       Answers for HPI/ROS submitted by the patient on 6/12/2017   General Symptoms: No  Skin Symptoms: Yes  HENT Symptoms: No  EYE SYMPTOMS: No  HEART SYMPTOMS: No  LUNG SYMPTOMS: No  INTESTINAL SYMPTOMS: No  URINARY SYMPTOMS: No  REPRODUCTIVE SYMPTOMS: No  SKELETAL SYMPTOMS: Yes  BLOOD SYMPTOMS: No  NERVOUS SYSTEM SYMPTOMS: No  MENTAL HEALTH SYMPTOMS: No  Changes in hair: No  Changes in moles/birth marks: No  Itching: Yes  Rashes: No  Changes in nails: No  Acne: No  Change in facial hair: No  Warts: No  Non-healing sores: No  Scarring: No  Flaking of skin: Yes  Color changes of hands/feet in cold : No  Sun sensitivity: No  Skin thickening: No  Back pain: No  Muscle aches: No  Neck pain: No  Swollen joints: No  Joint pain: Yes  Bone pain: No  Muscle cramps: No  Muscle weakness: No  Joint stiffness: Yes  Bone fracture: No  I reviewed the ROS above.    VITAL SIGNS:  /85 (BP Location: Left arm, Patient Position: Chair, Cuff Size: Adult Large)  Pulse 70  Ht 1.816 m (5' 11.5\")  Wt (!) 179.8 kg (396 lb 6.4 oz)  SpO2 93%  BMI 54.52 kg/m2  Body mass index is 54.52 kg/(m^2).  Wt Readings from Last 2 Encounters:   06/13/17 (!) 179.8 kg (396 lb 6.4 oz)   05/03/17 (!) 178.3 kg (393 lb)       PHYSICAL EXAM  Feng Wynne is a 70 year old male.in no acute distress.  HEENT: Eyes Nonicteric.  Neck: JVP normal.  Carotids +3/3 bilaterally without bruits.  Chest: Ulceration upper sternum, where ECHO probe was used, no evidence of infection.  Lungs: CTA.  Cor: regular. Metallic S1, crisp.  S2 splits physiologically, widened.  No rub or gallop.  PMI in Lf 5th ICS.  Abd: Morbid obesity.  Extremities: +3 edema. Neuro: Grossly intact.  Psych: A&O x 3.  Skin: Brawny changes BLE.    LABS  Recent Labs   Lab Test  05/17/13   1222  12/15/08   0835  10/27/08   0810   WBC  5.1   --   5.6 "   HGB  12.4*  12.6*  10.7*   HCT  38.5*   --   34.6*   PLT  180   --   212     Recent Labs   Lab Test  10/14/13   0930  05/17/13   1222   NA  138  140   POTASSIUM  4.8  4.2   CHLORIDE  104  101   CO2  23  26   GLC  104*  102*   BUN  16  20   CR  0.78  0.71   JOSEPH  9.5  9.1     Recent Labs   Lab Test  06/06/17   0658  11/04/15   0739  09/17/14   0733   CHOL  147  141  152   HDL  47  41  43   LDL  77  85  85   TRIG  115  77  118   CHOLHDLRATIO   --   3.4  3.5     Procedures:  ECG: (6/13/17)   AF with ventricular rate 69.  Incomplete RBBB.  Loss of R waves across the precordial leads suggestive of prior anterolateral MI.  ECG: (9/17/14)   AF with ventricular rate 64.  Incomplete RBBB.  Loss of R waves across the precordial leads suggestive of prior anterolateral MI.    ECHO (6/6/17):  Left ventricular function is normal.The EF is 55-60%.  Global right ventricular function is normal.  A mechanical mitral valve is present.  Doppler interrogation of the mitral valve is normal.   The mean gradient across the mitral valve is 6 mmHg.  Estimated pulmonary artery systolic pressure is 36 mmHg plus right atrial pressure.  The inferior vena cava was dilated at 2.6 cm without respiratory variability,  consistent with increased right atrial pressure of 15 mmHg.  No pericardial effusion is present.    ECHO (9/17/2014)  Interpretation Summary  A bileaflet (32 mm St Raffi) mitral valve is present.Doppler interrogation of the mitral valve is normal. PA systolic pressure 50 mmHg, RA pressure 5-10 mmHg Global right ventricular function is mildly reduced. 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 The absence of IVC dilatation suggests that if present, constriction is mild    ECHO (2/16/2009)  Global and regional left ventricular function is normal with an EF of 55-60%. Mild left ventricular dilation is present. Global right ventricular function is normal. Moderate to severe left atrial  enlargement is present. A mechanical mitral valve is present. Right ventricular systolic pressure is 34mmHg above the right atrial pressure. No pericardial effusion is present.   Coronary angiogram (6/11/2008)  Mitral regurgitation, severe.   Moderate pulmonary hypertension.   Normal coronary arteries.   Mitral Valve Replacement (8/25/2008)  1. Mitral valve replacement with 31 mm St. Raffi mechanical valve.   2. Maze procedure with radiofrequency ablation to treat atrial fibrillation.   3. Placement of left ventricular epicardial pacing leads for postoperative pacing backup.   Cardioversion (10/28/2008)   DC Cardioversion. No KAYE was performed due to adequate anticoagulation. After informed consent was obtained and adequate anticoagulation level was confirmed, the patient was prepped in the usual fashion. Brevitol was used by the Staff Anesthesiologist. The patient failed to return to normal sinus rhythm despite three consecutive attemps with up to 200 J synchronized shock. Oral loading with amiodarone is recommended prior to attempting another cardioversion. Dr. Marin was paged with these results.   Cardioversion (12/15/2008)   PROCEDURE: The patient was brought to the Echocardiography Laboratory in the fasting, nonsedated state. After obtaining informed consent, general anesthesia was provided by members of the Anesthesia Service. Atrial fibrillation was converted to normal sinus rhythm with a single, synchronized, 360-joule biphasic shock via anterior-posterior patches.     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  -- Continue coumadin  -- Recent ECHO shows minimal gradient across mitral valve   -- Repeat ECHO in 2 years  # Atrial fibrillation, Permanent   -- s/p MAZE, remains in afib, on coumadin, rate controlled  # Hypertension  -- he will bring his cuff to calibrate   -- well controlled on lisinopril   # Hypercholesterolemia   -- last LDL  77  -- Hx myalgia and rash with prior statin exposure (multiple)    # LE edema  -- Refer to lymphedema clinic for leg wraps with education      FOLLOW UP:  1 year    Armani Marin MD     Cardiovascular Division    CC  Patient Care Team:  Enrique Walker MD as PCP - General (Internal Medicine)  Armani Marin MD as MD (Cardiology)  SHAR JAMES

## 2017-06-13 NOTE — LETTER
6/13/2017      RE: Feng Wynne  [address]    [facility] [id number]       Dear Colleague,    Thank you for the opportunity to participate in the care of your patient, Feng Wynne, at the [facility] HE[facility]. Please see a copy of my visit note below.    CARDIOLOGY CLINIC FOLLOW UP    HPI: Feng Wynne is a 71 yo gentleman, Hx AF and severe MR, s/p MV replacement and MAZE procedure (September 2008), returns to the cardiology clinic for a routine visit.   He had been in good state of health until 04/2007. He noticed increased exertional shortness of breath. He was initially treated for presumed asthma. In 03/2008, he had an episode of left leg pain and he went to [facility] Emergency Room and had an ultrasound which showed no evidence of DVT. While in the ER, he was noted to have an irregular heart rate and EKG showed atrial fibrillation with a ventricular rate of 80 beats per minute. Coronary angiography (June 2008) showed normal coronary arteries. He underwent mitral valve replacement (St. Raffi 31 mm) with MAZE on 8/25/08 at [facility] (Dr. Olivo) without complications. He was transiently in NSR following the surgery, but subsequently reverted to AF. He was discharged on warfarin and atenolol for anticoagulation and rhythm control. He was placed on Amiodarone but developed severe dyspnea and pedal edema, both of which resolved with cessation of the medication. He was subsequently placed on Sotalol and underwent cardioversion without complication (Dec 2008).   The patient has been doing quite well since his last visit. He denies chest pain, dyspnea, ZUNIGA, PND, orthopnea, pedal edema, palpitations, lightheadedness, and syncope.  Weight remains a major issue. His right knee is a problem now and with lots of pain, has sciatica, which limits his walking, plans to start biking. He is a vegetarian,and has been spending time  working on weight loss.  He is compliant with his coumadin and other medications.   The patient continues to work as a .    PAST MEDICAL HISTORY:  Past Medical History:   Diagnosis Date     Atrial fibrillation (H)      Coronary artery disease      Hypercholesteremia      Morbid obesity (H)      Unspecified essential hypertension        CURRENT MEDICATIONS:  Current Outpatient Prescriptions   Medication Sig Dispense Refill     warfarin (COUMADIN) 5 MG tablet TAKE 1 TO 2 TABLETS BY MOUTH DAILY 180 tablet 0     lisinopril (PRINIVIL,ZESTRIL) 40 MG tablet Take 1 tablet (40 mg) by mouth daily 90 tablet 3     warfarin (COUMADIN) 5 MG tablet Take 1-2 tablets daily, varies. 180 tablet 3     atenolol (TENORMIN) 25 MG tablet Take 1 tablet (25 mg) by mouth 2 times daily 180 tablet prn     order for DME Equipment being ordered: COMPRESSION STOCKINGS, 20-30 mmHg 1 each 1     COMPRESSION STOCKINGS 1 each daily 1 each 2     COMPRESSION STOCKINGS 1 each daily 1 each 2     Omega-3 Fatty Acids (FISH OIL CONCENTRATE PO) Take  by mouth daily.       metroNIDAZOLE (METROGEL) 1 % gel Apply  topically daily. (Patient taking differently: Apply topically as needed ) 60 g 11     ORDER FOR DME SLEEP MATTRESS FOR CHRONIC BACK PAIN DUE TO DISC PROBLEMS 1 Units 0     Multiple Vitamins-Minerals (CENTRUM SILVER PO) Take 1 tablet by mouth daily.       ASPIRIN NOT PRESCRIBED, INTENTIONAL, by Other route continuous prn.  0     CLINDAMYCIN  MG PO CAPS 2 pills one hour before dental work 4 Cap 2       PAST SURGICAL HISTORY:  Past Surgical History:   Procedure Laterality Date     MITRAL VALVE REPLACEMENT  8-    Mitral valve replacement with 31-mm St. Raffi mechanical valve.        ALLERGIES  Amiodarone; Latex; Penicillins; and Sulfites    FAMILY HX:  Family History   Problem Relation Age of Onset     CEREBROVASCULAR DISEASE Father      DIABETES Paternal Grandmother      SINAN. Brother      CABG X 3 at age 51       SOCIAL  "HX:  History     Social History     Marital Status: Single     Spouse Name: N/A     Number of Children: 1     Years of Education: N/A     Occupational History     Self employed             Social History Main Topics     Smoking status: Former Smoker     Smokeless tobacco: Never Used      Comment: quit 20+ yrs ago     Alcohol Use: Yes      Comment: 2 drinks/month     Drug Use: No     Sexual Activity:     Partners: Male     Other Topics Concern     None     Social History Narrative       VITAL SIGNS:  /85 (BP Location: Left arm, Patient Position: Chair, Cuff Size: Adult Large)  Pulse 70  Ht 1.816 m (5' 11.5\")  Wt (!) 179.8 kg (396 lb 6.4 oz)  SpO2 93%  BMI 54.52 kg/m2  Body mass index is 54.52 kg/(m^2).  Wt Readings from Last 2 Encounters:   06/13/17 (!) 179.8 kg (396 lb 6.4 oz)   05/03/17 (!) 178.3 kg (393 lb)       PHYSICAL EXAM  Feng Wynne is a 70 year old male.in no acute distress.  HEENT: Eyes Nonicteric.  Neck: JVP normal.  Carotids +3/3 bilaterally without bruits.  Chest: Ulceration upper sternum, where ECHO probe was used, no evidence of infection.  Lungs: CTA.  Cor: regular. Metallic S1, crisp.  S2 splits physiologically, widened.  No rub or gallop.  PMI in Lf 5th ICS.  Abd: Morbid obesity.  Extremities: +3 edema. Neuro: Grossly intact.  Psych: A&O x 3.  Skin: Brawny changes BLE.    LABS  Recent Labs   Lab Test  05/17/13   1222  12/15/08   0835  10/27/08   0810   WBC  5.1   --   5.6   HGB  12.4*  12.6*  10.7*   HCT  38.5*   --   34.6*   PLT  180   --   212     Recent Labs   Lab Test  10/14/13   0930  05/17/13   1222   NA  138  140   POTASSIUM  4.8  4.2   CHLORIDE  104  101   CO2  23  26   GLC  104*  102*   BUN  16  20   CR  0.78  0.71   JOSEPH  9.5  9.1     Recent Labs   Lab Test  06/06/17   0658  11/04/15   0739  09/17/14   0733   CHOL  147  141  152   HDL  47  41  43   LDL  77  85  85   TRIG  115  77  118   CHOLHDLRATIO   --   3.4  3.5     Procedures:  ECG: (6/13/17)   AF with " ventricular rate 69.  Incomplete RBBB.  Loss of R waves across the precordial leads suggestive of prior anterolateral MI.  ECG: (9/17/14)   AF with ventricular rate 64.  Incomplete RBBB.  Loss of R waves across the precordial leads suggestive of prior anterolateral MI.    ECHO (6/6/17):  Left ventricular function is normal.The EF is 55-60%.  Global right ventricular function is normal.  A mechanical mitral valve is present.  Doppler interrogation of the mitral valve is normal.   The mean gradient across the mitral valve is 6 mmHg.  Estimated pulmonary artery systolic pressure is 36 mmHg plus right atrial pressure.  The inferior vena cava was dilated at 2.6 cm without respiratory variability,  consistent with increased right atrial pressure of 15 mmHg.  No pericardial effusion is present.    ECHO (9/17/2014)  Interpretation Summary  A bileaflet (32 mm St Raffi) mitral valve is present.Doppler interrogation of the mitral valve is normal. PA systolic pressure 50 mmHg, RA pressure 5-10 mmHg Global right ventricular function is mildly reduced. 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 The absence of IVC dilatation suggests that if present, constriction is mild    ECHO (2/16/2009)  Global and regional left ventricular function is normal with an EF of 55-60%. Mild left ventricular dilation is present. Global right ventricular function is normal. Moderate to severe left atrial enlargement is present. A mechanical mitral valve is present. Right ventricular systolic pressure is 34mmHg above the right atrial pressure. No pericardial effusion is present.   Coronary angiogram (6/11/2008)  Mitral regurgitation, severe.   Moderate pulmonary hypertension.   Normal coronary arteries.   Mitral Valve Replacement (8/25/2008)  1. Mitral valve replacement with 31 mm St. Raffi mechanical valve.   2. Maze procedure with radiofrequency ablation to treat atrial fibrillation.   3. Placement  of left ventricular epicardial pacing leads for postoperative pacing backup.   Cardioversion (10/28/2008)   DC Cardioversion. No KAYE was performed due to adequate anticoagulation. After informed consent was obtained and adequate anticoagulation level was confirmed, the patient was prepped in the usual fashion. Brevitol was used by the Staff Anesthesiologist. The patient failed to return to normal sinus rhythm despite three consecutive attemps with up to 200 J synchronized shock. Oral loading with amiodarone is recommended prior to attempting another cardioversion. Dr. Marin was paged with these results.   Cardioversion (12/15/2008)   PROCEDURE: The patient was brought to the Echocardiography Laboratory in the fasting, nonsedated state. After obtaining informed consent, general anesthesia was provided by members of the Anesthesia Service. Atrial fibrillation was converted to normal sinus rhythm with a single, synchronized, 360-joule biphasic shock via anterior-posterior patches.     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  -- Continue coumadin  -- Recent ECHO shows minimal gradient across mitral valve   -- Repeat ECHO in 2 years  # Atrial fibrillation, Permanent   -- s/p MAZE, remains in afib, on coumadin, rate controlled  # Hypertension  -- he will bring his cuff to calibrate   -- well controlled on lisinopril   # Hypercholesterolemia   -- last LDL 77  -- Hx myalgia and rash with prior statin exposure (multiple)    # LE edema  -- Refer to lymphedema clinic for leg wraps with education      FOLLOW UP:  1 year    Armani Marin MD     Cardiovascular Division    CC  Patient Care Team:  Enrique Walker MD as PCP - General (Internal Medicine)  Armani Marin MD as MD (Cardiology)  SHAR JAMES

## 2017-06-13 NOTE — PATIENT INSTRUCTIONS
It was a pleasure to see you in the cardiology clinic today.    If you have any questions, you can reach my nurse, Olivia Roth, at [phone].  Press Option #1 for the [facility], and then press Option #3 for nursing.    Note the new medications: None  Stop the following medications: None    The results from today include: None    Referrals: Lymphedema clinic for swelling in legs      I would like you to follow up with Dr. Marin in 1 year.    Sincerely,      Armani Marin MD

## 2017-06-13 NOTE — MR AVS SNAPSHOT
"              After Visit Summary   6/13/2017    Feng Wynne    MRN: [id number]           Patient Information     Date Of Birth          1945        Visit Information        Provider Department      6/13/2017 7:30 AM Armani Marin MD [facility]        Today's Diagnoses     Edema, unspecified type    -  1    Atrial fibrillation (H)          Care Instructions      It was a pleasure to see you in the cardiology clinic today.    If you have any questions, you can reach my nurse, Olivia Roth, at [phone].  Press Option #1 for the [facility], and then press Option #3 for nursing.    Note the new medications: None  Stop the following medications: None    The results from today include: None    Referrals: Lymphedema clinic for swelling in legs      I would like you to follow up with Dr. Marin in 1 year.    Sincerely,      Armani Marin MD               Follow-ups after your visit        Additional Services     LYMPHEDEMA THERAPY REFERRAL       *This therapy referral will be filtered to a centralized scheduling office at [facility] and the patient will receive a call to schedule an appointment at a [place] location most convenient for them. *   If you have not heard from the scheduling office within 2 business days, please call [phone] for all locations, with the exception of [place], please call [phone].     Treatment: PT or OT Evaluation & Treatment  Special Instructions: Patient needs lower extremity wraps with education.  PT/OT Treatment Diagnosis: Edema    Please be aware that coverage of these services is subject to the terms and limitations of your health insurance plan.  Call member services at your health plan with any benefit or coverage questions.      **Note to Provider:  If you are referring outside of [place] for the therapy appointment, please list the name of the location in the \"special instructions\" above, " "print the referral and give to the patient to schedule the appointment.                  Who to contact     If you have questions or need follow up information about today's clinic visit or your schedule please contact [facility] directly at [phone].  Normal or non-critical lab and imaging results will be communicated to you by [employer], letter or phone within 4 business days after the clinic has received the results. If you do not hear from us within 7 days, please contact the clinic through [employer] or phone. If you have a critical or abnormal lab result, we will notify you by phone as soon as possible.  Submit refill requests through IES or call your pharmacy and they will forward the refill request to us. Please allow 3 business days for your refill to be completed.          Additional Information About Your Visit        IES Information     IES gives you secure access to your electronic health record. If you see a primary care provider, you can also send messages to your care team and make appointments. If you have questions, please call your primary care clinic.  If you do not have a primary care provider, please call [phone] and they will assist you.        Care EveryWhere ID     This is your Care EveryWhere ID. This could be used by other organizations to access your [facility] medical records  [id number]        Your Vitals Were     Pulse Height Pulse Oximetry BMI (Body Mass Index)          70 1.816 m (5' 11.5\") 93% 54.52 kg/m2         Blood Pressure from Last 3 Encounters:   06/13/17 121/85   05/03/17 130/78   04/18/17 120/80    Weight from Last 3 Encounters:   06/13/17 (!) 179.8 kg (396 lb 6.4 oz)   05/03/17 (!) 178.3 kg (393 lb)   04/18/17 (!) 182.3 kg (402 lb)              We Performed the Following     EKG 12-lead, tracing only (Future)     INR     LYMPHEDEMA THERAPY REFERRAL          Today's Medication Changes          These changes are accurate as of: 6/13/17  8:13 AM.  If " you have any questions, ask your nurse or doctor.               These medicines have changed or have updated prescriptions.        Dose/Directions    metroNIDAZOLE 1 % gel   Commonly known as:  METROGEL   This may have changed:    - when to take this  - reasons to take this   Used for:  Rosacea        Apply  topically daily.   Quantity:  60 g   Refills:  11                Primary Care Provider Office Phone # Fax #    Enrique Abhishek Walker [phone]       [address]  [facility] [address]        Thank you!     Thank you for choosing [facility]  for your care. Our goal is always to provide you with excellent care. Hearing back from our patients is one way we can continue to improve our services. Please take a few minutes to complete the written survey that you may receive in the mail after your visit with us. Thank you!             Your Updated Medication List - Protect others around you: Learn how to safely use, store and throw away your medicines at www.disposemymeds.org.          This list is accurate as of: 6/13/17  8:13 AM.  Always use your most recent med list.                   Brand Name Dispense Instructions for use    * ASPIRIN NOT PRESCRIBED    INTENTIONAL     by Other route continuous prn.       atenolol 25 MG tablet    TENORMIN    180 tablet    Take 1 tablet (25 mg) by mouth 2 times daily       CENTRUM SILVER PO      Take 1 tablet by mouth daily.       clindamycin 300 MG capsule    CLEOCIN    4 Cap    2 pills one hour before dental work       * COMPRESSION STOCKINGS     1 each    1 each daily       * COMPRESSION STOCKINGS     1 each    1 each daily       FISH OIL CONCENTRATE PO      Take  by mouth daily.       lisinopril 40 MG tablet    PRINIVIL/ZESTRIL    90 tablet    Take 1 tablet (40 mg) by mouth daily       metroNIDAZOLE 1 % gel    METROGEL    60 g    Apply  topically daily.       * order for DME     1 Units    SLEEP MATTRESS FOR  CHRONIC BACK PAIN DUE TO DISC PROBLEMS       order for DME     1 each    Equipment being ordered: COMPRESSION STOCKINGS, 20-30 mmHg       * warfarin 5 MG tablet    COUMADIN    180 tablet    Take 1-2 tablets daily, varies.       * warfarin 5 MG tablet    COUMADIN    180 tablet    TAKE 1 TO 2 TABLETS BY MOUTH DAILY       * Notice:  This list has 6 medication(s) that are the same as other medications prescribed for you. Read the directions carefully, and ask your doctor or other care provider to review them with you.

## 2017-06-14 LAB — INTERPRETATION ECG - MUSE: NORMAL

## 2017-06-19 ENCOUNTER — TELEPHONE (OUTPATIENT)
Dept: FAMILY MEDICINE | Facility: CLINIC | Age: 72
End: 2017-06-19

## 2017-06-27 ENCOUNTER — ANTICOAGULATION THERAPY VISIT (OUTPATIENT)
Dept: FAMILY MEDICINE | Facility: CLINIC | Age: 72
End: 2017-06-27
Payer: COMMERCIAL

## 2017-06-27 ENCOUNTER — TELEPHONE (OUTPATIENT)
Dept: FAMILY MEDICINE | Facility: CLINIC | Age: 72
End: 2017-06-27

## 2017-06-27 ENCOUNTER — HOSPITAL ENCOUNTER (OUTPATIENT)
Dept: PHYSICAL THERAPY | Facility: CLINIC | Age: 72
Setting detail: THERAPIES SERIES
End: 2017-06-27
Attending: INTERNAL MEDICINE
Payer: MEDICARE

## 2017-06-27 ENCOUNTER — TRANSFERRED RECORDS (OUTPATIENT)
Dept: HEALTH INFORMATION MANAGEMENT | Facility: CLINIC | Age: 72
End: 2017-06-27

## 2017-06-27 DIAGNOSIS — Z79.01 LONG-TERM (CURRENT) USE OF ANTICOAGULANTS: ICD-10-CM

## 2017-06-27 DIAGNOSIS — Z95.2 HEART VALVE REPLACED: ICD-10-CM

## 2017-06-27 LAB — INR PPP: 3.3 (ref 2.5–3.5)

## 2017-06-27 PROCEDURE — G8987 SELF CARE CURRENT STATUS: HCPCS | Mod: GP,CK | Performed by: PHYSICAL THERAPIST

## 2017-06-27 PROCEDURE — 40000449 ZZHC STATISTIC PT VISIT, LYMPHEDEMA: Performed by: PHYSICAL THERAPIST

## 2017-06-27 PROCEDURE — 99207 ZZC NO CHARGE NURSE ONLY: CPT | Performed by: INTERNAL MEDICINE

## 2017-06-27 PROCEDURE — 97535 SELF CARE MNGMENT TRAINING: CPT | Mod: GP | Performed by: PHYSICAL THERAPIST

## 2017-06-27 PROCEDURE — G8988 SELF CARE GOAL STATUS: HCPCS | Mod: GP,CI | Performed by: PHYSICAL THERAPIST

## 2017-06-27 PROCEDURE — 97162 PT EVAL MOD COMPLEX 30 MIN: CPT | Mod: GP | Performed by: PHYSICAL THERAPIST

## 2017-06-27 NOTE — MR AVS SNAPSHOT
Feng Tomjoleen Wynne   6/27/2017   Anticoagulation Therapy Visit    Description:  72 year old male   Provider:  Enrique Walker MD   Department:  [facility]           INR as of 6/27/2017     Today's INR 3.3      Anticoagulation Summary as of 6/27/2017     INR goal 2.5-3.5   Today's INR 3.3   Full instructions 5 mg on Tue, Thu, Sat; 10 mg all other days   Next INR check 7/11/2017    Indications   Long-term (current) use of anticoagulants [Z79.01] [Z79.01]  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] [Z95.2]         Description     [facility] home monitoring      June 2017 Details    Sun Mon Tue Wed Thu Fri Sat         1               2               3                 4               5               6               7               8               9               10                 11               12               13               14               15               16               17                 18               19               20               21               22               23               24                 25               26               27      5 mg   See details      28      10 mg         29      5 mg         30      10 mg           Date Details   06/27 This INR check               How to take your warfarin dose     To take:  5 mg Take 1 of the 5 mg tablets.    To take:  10 mg Take 2 of the 5 mg tablets.           July 2017 Details    Sun Mon Tue Wed Thu Fri Sat           1      5 mg           2      10 mg         3      10 mg         4      5 mg         5      10 mg         6      5 mg         7      10 mg         8      5 mg           9      10 mg         10      10 mg         11            12               13               14               15                 16               17               18               19               20               21               22                 23               24               25               26               27               28               29                  30               31                     Date Details   No additional details    Date of next INR:  7/11/2017         How to take your warfarin dose     To take:  5 mg Take 1 of the 5 mg tablets.    To take:  10 mg Take 2 of the 5 mg tablets.

## 2017-06-27 NOTE — TELEPHONE ENCOUNTER
Left message for patient to return a call to the clinic.  Patient to continue same coumadin dosing and recheck INR in 2 weeks.    See anticoagulation flow sheet for details. Encounter not closed.  Love Elliott RN

## 2017-06-27 NOTE — TELEPHONE ENCOUNTER
Reason for Call:  INR    Who is calling?  Patient calling with his results from home monitor    Phone number:  [phone]     Fax number:  no    Name of caller: Feng Wynne    INR Value:  3.3    Are there any other concerns:  No    Can we leave a detailed message on this number? YES    Phone number patient can be reached at: Home number on file [phone] (home)      Call taken on 6/27/2017 at 11:13 AM by Milton Davis

## 2017-06-27 NOTE — PROGRESS NOTES
ANTICOAGULATION FOLLOW-UP CLINIC VISIT    Patient Name:  Feng Wynne  Date:  6/27/2017  Contact Type:  Telephone/ Patient    SUBJECTIVE:     Patient Findings     Positives No Problem Findings           OBJECTIVE    INR   Date Value Ref [place] Status   06/27/2017 3.3  Final       ASSESSMENT / PLAN  INR assessment THER    Recheck INR In: 2 WEEKS    INR Location Home INR      Anticoagulation Summary as of 6/27/2017     INR goal 2.5-3.5   Today's INR 3.3   Maintenance plan 5 mg (5 mg x 1) on Tue, Thu, Sat; 10 mg (5 mg x 2) all other days   Full instructions 5 mg on Tue, Thu, Sat; 10 mg all other days   Weekly total 55 mg   Plan last modified Love Elliott RN (6/27/2017)   Next INR check 7/11/2017   Priority INR   Target end date     Indications   Long-term (current) use of anticoagulants [Z79.01] [Z79.01]  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  He also states he is a vegetarian. So has increased his dose to 10 mg x 4 days per week.  Maintenance dose schedule updated.  Dosing based on FM Protocol and Provider directed care plan.      Love Elliott, RN

## 2017-06-28 ENCOUNTER — TELEPHONE (OUTPATIENT)
Dept: FAMILY MEDICINE | Facility: CLINIC | Age: 72
End: 2017-06-28

## 2017-06-28 ENCOUNTER — TELEPHONE (OUTPATIENT)
Dept: CARDIOLOGY | Facility: CLINIC | Age: 72
End: 2017-06-28

## 2017-06-28 NOTE — TELEPHONE ENCOUNTER
Patient called need a RX sent to [facility] for support stockings.  Please fax RX to [phone].  If any questions you can reach him @ [phone].

## 2017-06-28 NOTE — TELEPHONE ENCOUNTER
Reason for Call:  Form faxed in     Detailed comments: John from [facility] stated  He faxed the form ( [facility] home INR monitoring physician form) on 6/21  He has not received. He is going to re fax form and Dr. Walker will need to   Review sign and fax completed form back to   [phone]      Spoke with John calling from [facility] home monitoring  ph. [phone] option 4    Call taken on 6/28/2017 at 11:22 AM by Rose Hinds

## 2017-06-29 NOTE — PROGRESS NOTES
[facility]        OUTPATIENT PHYSICAL THERAPY EDEMA EVALUATION  PLAN OF TREATMENT FOR OUTPATIENT REHABILITATION  (COMPLETE FOR INITIAL CLAIMS ONLY)  Patient's Last Name, First Name, Feng Alvarez                           Provider s Name:   [facility] Medical Record No.  [id number]     Start of Care Date:  06/27/17   Onset Date:  6/13/2017   Type:  PT   Medical Diagnosis:  B L/E Lymphedema    Therapy Diagnosis:  B L/E Lymphedema Visits from SOC:  1                                     __________________________________________________________________________________   Plan of Treatment/Functional Goals:    Manual lymph drainage, Gradient compression bandaging, Exercises, Precautions to prevent infection / exacerbation, Education, Home management program development, Other (recommendation for velcro compress garments)        GOALS  1. Goal description: Pt to have reduced swelling B legs 500-750+ml and 1-4cm reduction foot/ankle for easier don of socks/shoes       Target date: 08/25/17  2. Goal description: Pt to be independent w/ daily use of bandaging or alternative to bandage garments and segue to appropriate comrpession socks; pt will need to have lifel long mgmt of Sx to improve swelling , maintain vascular health and reduce risk of infection        Target date: 08/25/17  3.            4.            5.            6.               7.             8.              Treatment frequency: 1 time / week, 2 times / week   Treatment duration: 60 days    Juanita Kaufman, PT                                    I CERTIFY THE NEED FOR THESE SERVICES FURNISHED UNDER        THIS PLAN OF TREATMENT AND WHILE UNDER MY CARE     (Physician co-signature of this document indicates review and certification of the therapy plan).                   Certification date from:  06/27/17       Certification date to: 08/25/17           Referring physician: Armani Marin MD   Initial Assessment  See Epic Evaluation- Start of care: 06/27/17                 Staff Cardiologist: I appreciate the assistance of Juanita Kaufman.  I agree with the plan as documented above.    Armani Marin MD

## 2017-06-29 NOTE — PROGRESS NOTES
06/27/17 1200   Quick Adds   Quick Adds Certification   Rehab Discipline   Discipline PT   Type of Visit   Type of visit Initial Edema Evaluation       present No   General Information   Start of care 06/27/17   Referring physician Armani Marin MD   Orders Evaluate and treat as indicated   Order date 06/13/17   Medical diagnosis Edema    Onset of illness / date of surgery 08/25/08  (mitrap valve replacement- titanim)   Edema onset (pt notes long standing swell of legs)   Affected body parts LLE;RLE   Edema etiology Insidious;Chronic Venous Insufficiency   Edema etiology comments Pt w/ medical Hx including A-fib, severe MR s/p mitral valve replacement 2008, morbid obesity,   Pertinent history of current problem (PT: include personal factors and/or comorbidities that impact the POC; OT: include additional occupational profile info) Pt notes long standing Hx variable swell of legs; notes family Hx of larger persons on fathers side of family. Pt has OA R knee that causes pain w/ difficulty walking. Has medical Hx related to CAD, HTN, obesity, A-fib. 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   Prior treatment Compression garments   Community support Family / friend caregiver   Patient role / employment history Retired  (haridresser)   Psychosocial concerns Other  (unsure how to care for condition)   Living environment Apartment / condo   Current assistive devices 4 wheeled walker;Standard cane   Fall Screening   Fall screen completed by PT   Per patient, fall 2 or more times in past year? No   Per patient, fall with injury in past year? No   Is patient a fall risk? No   Fall screen comments  pt uses rollator for distance walking and is careful w/ ambualtion   System Outcome Measures   Outcome Measures Lymphedema   Lymphedema Life Impact Scale (score range 0-72). A higher score indicates greater impairment. 9   Subjective Report   Patient report of symptoms Pt notes long standing Hx variable swell of legs; notes family Hx of larger persons on fathers side of family. Pt has OA R knee that causes pain w/ difficulty walking. Has medical Hx related to CAD, HTN, obesity, A-fib. 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 Pt notes difficult w/ sock /shoe wear ; unable to get current compression socks on,   Precautions   Precautions Other   Precautions comments mitral valve replacement    Patient / Family Goals   Patient / family goals statement reduce swelling    Pain   Patient currently in pain Yes   Pain location B L/E   Pain rating 2/4 LLIS   Pain description Heaviness;Ache  (skin tightness)   Vital Signs   Vital signs BMI   BMI 54.52   Cognitive Status   Orientation Orientation to person, place and time   Level of consciousness Alert   Follows commands and answers questions 100% of the time   Personal safety and judgement Intact   Memory Intact   Edema Exam / Assessment   Skin condition Pitting;Non-pitting;Venous distention;Hemosiderin deposits;Intact   Skin condition comments 2-3+ swell B L/E diffuse in legs, global hemosiderin stains feet/legs, thicker skin around ankles, pitting adjacent to lower tibial ridge w/ longer rebound time 20+sec   Pitting 2+;3+   Pitting location see above   Scar No   Capillary refill Symmetrical   Dorsal pedal pulse comments difficult to assess due to swelling   Stemmer sign Negative   Ulceration No   Girth Measurements   Girth Measurements Refer to separate girth measurement flowsheet   Volume LE   Right LE (mL) 6828ml  ankle to knee----girth msmt RMTP 28cm, arch 29cm, around malleolus 30cm   Left LE (mL) 6009ml   --girth MTP 30cm, arch 31cm, maleolus 34.5cm   LE volume comparison RLE volume greater than LLE volume  (foot/ ankle L larger thatn R)   % difference 819ml  --B legs swollen   Range of Motion   ROM Other   ROM comments R knee limiti w/ flexion approx 45-60deg due to OA pain   Strength   Strength Other   Strength comments 3+/5 R quad --all otehr mm groups 5/5   Posture   Posture Normal   Bed Mobility   Bed mobility independent--  (pt does need assist w/ shoe /sock donning )   Transfers   Transfers independent   Gait / Locomotion   Gait / Locomotion wide AIDEN w/ reduce WB R L/E   Sensory   Sensory perception Light touch   Vascular Assessment   Vascular Assessment Vascular concerns   Vascular Assessment Comments possible venous insufficiency    Coordination   Coordination Gross motor coordination appropriate   Planned Edema Interventions   Planned edema interventions Manual lymph drainage;Gradient compression bandaging;Exercises;Precautions to prevent infection / exacerbation;Education;Home management program development;Other  (recommendation for velcro compress garments)   Clinical Impression   Criteria for skilled therapeutic intervention met Yes   Therapy diagnosis B L/E Lymphedema   Influenced by the following impairments / conditions Edema;Phlebolymphedema;Stage 3;Stage 2   Functional limitations due to impairments / conditions difficulty walking , donning shoes/socks, at high risk for infection due to swelling    Clinical Presentation Evolving/Changing   Clinical Presentation Rationale Pt notes long standing Hx variable swell of legs; notes family Hx of larger persons on fathers side of family. Pt has OA R knee that causes pain w/ difficulty walking. Has medical Hx related to CAD, HTN, obesity, A-fib. 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 Pt co-morbididtes may contribute to lympehdema proegression; if uncared for - he may be at risk for  infection and tissue changes w/ further functional problems w/ deressing and mobility   Clinical Decision Making (Complexity) Moderate complexity   Treatment frequency 1 time / week;2 times / week   Treatment duration 60 days   Patient / family and/or staff in agreement with plan of care Yes   Risks and benefits of therapy have been explained Yes   Education Assessment   Preferred learning style Listening;Reading;Demonstration;Pictures / video;Other  (participation)   Barriers to learning No barriers   Goals   Edema Eval Goals 1;2   Goal 1   Goal identifier Swelling    Goal description Pt to have reduced swelling B legs 500-750+ml and 1-4cm reduction foot/ankle for easier don of socks/shoes   Target date 08/25/17   Goal 2   Goal identifier Use of compression    Goal description Pt to be independent w/ daily use of bandaging or alternative to bandage garments and segue to appropriate comrpession socks; pt will need to have lifel long mgmt of Sx to improve swelling , maintain vascular health and reduce risk of infection    Target date 08/25/17   Total Evaluation Time   Total evaluation time 50   Certification   Certification date from 06/27/17   Certification date to 08/25/17   Medical Diagnosis B L/E Lymphedema

## 2017-06-30 ENCOUNTER — MEDICAL CORRESPONDENCE (OUTPATIENT)
Dept: HEALTH INFORMATION MANAGEMENT | Facility: CLINIC | Age: 72
End: 2017-06-30

## 2017-07-06 ENCOUNTER — TELEPHONE (OUTPATIENT)
Dept: FAMILY MEDICINE | Facility: CLINIC | Age: 72
End: 2017-07-06

## 2017-07-06 NOTE — TELEPHONE ENCOUNTER
"New completed form received from [facility] for Dr Walker.    Dr Walker signed and dated form.   Form faxed back to [facility] and place in INR file drawer \"Pts with home monitoring\".      Ashlie Tubbs RN, BSN    "

## 2017-07-06 NOTE — TELEPHONE ENCOUNTER
Reason for Call:  Needs a form faxed back     Detailed comments: This company provides the INR home monitoring  Says they have faxed a form for the doctor to review, sign and fax   Back on 06/21 and 06/28.  If another form is needed please call  [phone] select option 4 for John   Otherwise fax form to [phone]      Best Time: anytime    Can we leave a detailed message on this number? YES    Call taken on 7/6/2017 at 9:16 AM by Reyna Cruz

## 2017-07-06 NOTE — TELEPHONE ENCOUNTER
Form received, however listed provider is Dr Duran--previous PCP.    Called [employer] with updated info.   New form to be faxed today to INR clinic--needing signature of PCP.     Ashlie Tubsb RN, BSN

## 2017-07-06 NOTE — TELEPHONE ENCOUNTER
Returned call lto Seferino with [facility] INR monitoring.      He will refax form (has been partially filled out by Letitia)---attn: Ashlie.   INR nurse will review form and have Dr Walker sign and date.      Then will fax back to Letitia today.     Ashlie Tubbs RN, BSN

## 2017-07-11 ENCOUNTER — TRANSFERRED RECORDS (OUTPATIENT)
Dept: HEALTH INFORMATION MANAGEMENT | Facility: CLINIC | Age: 72
End: 2017-07-11

## 2017-07-11 LAB — INR PPP: 3.9 (ref 2.5–3.5)

## 2017-07-11 NOTE — TELEPHONE ENCOUNTER
Reason for Call:  Other call back    Detailed comments: [facility] calling they received our fax the date is wrong on it. They   Need a new one with the proper date on it. Any questions please call.  Phone Number Patient can be reached at: Other phone number:  [phone]    Best Time: anytime     Can we leave a detailed message on this number? YES    Call taken on 7/11/2017 at 4:09 PM by Anahi Kinney

## 2017-07-12 ENCOUNTER — HOSPITAL ENCOUNTER (OUTPATIENT)
Dept: PHYSICAL THERAPY | Facility: CLINIC | Age: 72
Setting detail: THERAPIES SERIES
End: 2017-07-12
Attending: INTERNAL MEDICINE
Payer: MEDICARE

## 2017-07-12 PROCEDURE — 97140 MANUAL THERAPY 1/> REGIONS: CPT | Mod: GP | Performed by: PHYSICAL THERAPIST

## 2017-07-12 PROCEDURE — 40000449 ZZHC STATISTIC PT VISIT, LYMPHEDEMA: Performed by: PHYSICAL THERAPIST

## 2017-07-13 ENCOUNTER — TELEPHONE (OUTPATIENT)
Dept: FAMILY MEDICINE | Facility: CLINIC | Age: 72
End: 2017-07-13

## 2017-07-13 DIAGNOSIS — I48.20 CHRONIC ATRIAL FIBRILLATION (H): ICD-10-CM

## 2017-07-13 RX ORDER — ATENOLOL 50 MG/1
50 TABLET ORAL DAILY
Qty: 30 TABLET | Refills: 1 | Status: SHIPPED | OUTPATIENT
Start: 2017-07-13 | End: 2017-09-15 | Stop reason: ALTCHOICE

## 2017-07-13 NOTE — TELEPHONE ENCOUNTER
Called and Left detailed message with the below information.   Advised callback if needed.     Elaina Stevens RN

## 2017-07-13 NOTE — TELEPHONE ENCOUNTER
Reason for Call:  Medication or medication refill:    Do you use a [facility]?  Name of the pharmacy and phone number for the current request:         [facility] [id number] [place] [address]      Name of the medication requested: atenolol (TENORMIN) 50 MG tablet    Other request: please add to instructions to cut the pill in half.  Pt was told that they do have the medication but only have the 50mg  Tablets.    Can we leave a detailed message on this number? YES    Phone number patient can be reached at: Home number on file [phone] (home)    Best Time: any    Call taken on 7/13/2017 at 9:20 AM by Ritu Beltran

## 2017-07-13 NOTE — TELEPHONE ENCOUNTER
"Note in epic from 7-6-17   Will need to phone Letitia for more information about what is needed.          New completed form received from [facility] for Dr Walker.    Dr Walker signed and dated form.   Form faxed back to [facility] and place in INR file drawer \"Pts with home monitoring\".       Ashlie Tubbs RN, BSN             "

## 2017-07-13 NOTE — TELEPHONE ENCOUNTER
ERx for 50mg Atenolol tablets sent to pharmacy.  Please instruct patient to take the whole tablet once a day in the morning rather than half a tablet twice a day.

## 2017-07-14 NOTE — TELEPHONE ENCOUNTER
This has been resolved, [facility] to move forward with the home monitoring process.  Cata Villafuerte RN

## 2017-07-18 NOTE — TELEPHONE ENCOUNTER
We received another form from [facility] stating that the wrong year is written on Home Monitoring Form  I had Dr. Aaron nash out and hand sign the form again with the corrected year of 2017 instead of 2015.   I faxed back to [facility] at [phone]    I called Jennifer at [facility] regarding another patient's forms and asked that she keep her eye out for the newly faxed form from today for this patient.     Clara Ybarra RN

## 2017-07-25 ENCOUNTER — TELEPHONE (OUTPATIENT)
Dept: FAMILY MEDICINE | Facility: CLINIC | Age: 72
End: 2017-07-25

## 2017-07-25 ENCOUNTER — ANTICOAGULATION THERAPY VISIT (OUTPATIENT)
Dept: FAMILY MEDICINE | Facility: CLINIC | Age: 72
End: 2017-07-25
Payer: COMMERCIAL

## 2017-07-25 ENCOUNTER — TRANSFERRED RECORDS (OUTPATIENT)
Dept: HEALTH INFORMATION MANAGEMENT | Facility: CLINIC | Age: 72
End: 2017-07-25

## 2017-07-25 DIAGNOSIS — Z95.2 HEART VALVE REPLACED: ICD-10-CM

## 2017-07-25 DIAGNOSIS — Z79.01 LONG-TERM (CURRENT) USE OF ANTICOAGULANTS: ICD-10-CM

## 2017-07-25 LAB — INR PPP: 2.8 (ref 2.5–3.5)

## 2017-07-25 PROCEDURE — 99207 ZZC NO CHARGE NURSE ONLY: CPT | Performed by: INTERNAL MEDICINE

## 2017-07-25 NOTE — TELEPHONE ENCOUNTER
Reason for Call:  INR    Who is calling?  laurence    Phone number:  [phone]    Fax number:      Name of caller: laurence    INR Value:  2.8    Are there any other concerns:  Yes:     Can we leave a detailed message on this number? YES    Phone number patient can be reached at: Home number on file [phone] (home)      Call taken on 7/25/2017 at 9:35 AM by Tha Ruth

## 2017-07-25 NOTE — PROGRESS NOTES
ANTICOAGULATION FOLLOW-UP CLINIC VISIT    Patient Name:  Feng Wynne  Date:  7/25/2017  Contact Type:  Telephone/ Feng    SUBJECTIVE:     Patient Findings     Positives No Problem Findings           OBJECTIVE    INR   Date Value Ref Range Status   07/25/2017 2.8  Final       ASSESSMENT / PLAN  INR assessment THER    Recheck INR In: 3 WEEKS    INR Location Home INR      Anticoagulation Summary as of 7/25/2017     INR goal 2.5-3.5   Today's INR 2.8   Maintenance plan 5 mg (5 mg x 1) on Tue, Thu, Sat; 10 mg (5 mg x 2) all other days   Full instructions 5 mg on Tue, Thu, Sat; 10 mg all other days   Weekly total 55 mg   No change documented Love Elliott RN   Plan last modified Love Elliott RN (6/27/2017)   Next INR check 8/15/2017   Priority INR   Target end date     Indications   Long-term (current) use of anticoagulants [Z79.01] [Z79.01]  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  Left detailed message with dosing instructions per his permission. Dosing based on [facility] Protocol and Provider directed care plan.      Love Elliott RN

## 2017-07-25 NOTE — MR AVS SNAPSHOT
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] [Z95.2]         Description     [facility] home monitoring      July 2017 Details    Sun Mon Tue Wed Thu Fri Sat           1                 2               3               4               5               6               7               8                 9               10               11               12               13               14               15                 16               17               18               19               20               21               22                 23               24               25      5 mg   See details      26      10 mg         27      5 mg         28      10 mg         29      5 mg           30      10 mg         31      10 mg               Date Details   07/25 This INR check               How to take your warfarin dose     To take:  5 mg Take 1 of the 5 mg tablets.    To take:  10 mg Take 2 of the 5 mg tablets.           August 2017 Details    Sun Mon Tue Wed Thu Fri Sat       1      5 mg         2      10 mg         3      5 mg         4      10 mg         5      5 mg           6      10 mg         7      10 mg         8      5 mg         9      10 mg         10      5 mg         11      10 mg         12      5 mg           13      10 mg         14      10 mg         15            16               17               18               19                 20               21               22               23               24               25               26                  27               28               29               30               31                  Date Details   No additional details    Date of next INR:  8/15/2017         How to take your warfarin dose     To take:  5 mg Take 1 of the 5 mg tablets.    To take:  10 mg Take 2 of the 5 mg tablets.

## 2017-08-08 ENCOUNTER — HOSPITAL ENCOUNTER (OUTPATIENT)
Dept: PHYSICAL THERAPY | Facility: CLINIC | Age: 72
Setting detail: THERAPIES SERIES
End: 2017-08-08
Attending: INTERNAL MEDICINE
Payer: MEDICARE

## 2017-08-08 PROCEDURE — 40000449 ZZHC STATISTIC PT VISIT, LYMPHEDEMA: Performed by: PHYSICAL THERAPIST

## 2017-08-08 PROCEDURE — 97140 MANUAL THERAPY 1/> REGIONS: CPT | Mod: GP | Performed by: PHYSICAL THERAPIST

## 2017-08-15 ENCOUNTER — HOSPITAL ENCOUNTER (OUTPATIENT)
Dept: PHYSICAL THERAPY | Facility: CLINIC | Age: 72
Setting detail: THERAPIES SERIES
End: 2017-08-15
Attending: INTERNAL MEDICINE
Payer: MEDICARE

## 2017-08-15 PROCEDURE — 40000449 ZZHC STATISTIC PT VISIT, LYMPHEDEMA: Performed by: PHYSICAL THERAPIST

## 2017-08-15 PROCEDURE — 97140 MANUAL THERAPY 1/> REGIONS: CPT | Mod: GP | Performed by: PHYSICAL THERAPIST

## 2017-08-16 ENCOUNTER — TELEPHONE (OUTPATIENT)
Dept: FAMILY MEDICINE | Facility: CLINIC | Age: 72
End: 2017-08-16

## 2017-08-16 NOTE — TELEPHONE ENCOUNTER
Reason for Call:  Medication or medication refill:        Name of the medication requested: Capilary tubes for use with INR home testing    Other request: please call to let pt. Know how he can get these.    Can we leave a detailed message on this number? YES    Phone number patient can be reached at: Home number on file [phone] (home)    Best Time: any time    Call taken on 8/16/2017 at 11:48 AM by Veronique Anderson    .

## 2017-08-16 NOTE — TELEPHONE ENCOUNTER
We do not use capillary tubes of any kind for INR testing, patient does home monitoring so all of the testing supplies he needs are to be from [facility].  LM on his private VM to call [facility] for all testing supplies needed.    Cata Villafuerte RN

## 2017-08-17 ENCOUNTER — ANTICOAGULATION THERAPY VISIT (OUTPATIENT)
Dept: NURSING | Facility: CLINIC | Age: 72
End: 2017-08-17
Payer: COMMERCIAL

## 2017-08-17 ENCOUNTER — TELEPHONE (OUTPATIENT)
Dept: FAMILY MEDICINE | Facility: CLINIC | Age: 72
End: 2017-08-17

## 2017-08-17 DIAGNOSIS — Z95.2 HEART VALVE REPLACED: ICD-10-CM

## 2017-08-17 DIAGNOSIS — Z79.01 LONG-TERM (CURRENT) USE OF ANTICOAGULANTS: ICD-10-CM

## 2017-08-17 LAB — INR PPP: 3.1

## 2017-08-17 PROCEDURE — 99207 ZZC NO CHARGE NURSE ONLY: CPT | Performed by: INTERNAL MEDICINE

## 2017-08-17 NOTE — NURSING NOTE
"Chief Complaint   Patient presents with     Derm Problem     rash right leg     initial /80 (BP Location: Left arm, Cuff Size: Adult Large)  Pulse 75  Temp 97.7  F (36.5  C) (Oral)  Resp 16  Ht 5' 10.5\" (1.791 m)  Wt (!) 402 lb (182.3 kg)  SpO2 97%  BMI 56.87 kg/m2 Estimated body mass index is 56.87 kg/(m^2) as calculated from the following:    Height as of this encounter: 5' 10.5\" (1.791 m).    Weight as of this encounter: 402 lb (182.3 kg).  BP completed using cuff size: large.  L arm      Health Maintenance that is potentially due pending provider review:  NONE    n/a    Aneudy De Jesus ma  " "Chief Complaint   Patient presents with     Pain       Initial BP (!) 135/92  Pulse 102  LMP 07/17/2009 Estimated body mass index is 31.95 kg/(m^2) as calculated from the following:    Height as of 3/31/17: 1.702 m (5' 7\").    Weight as of 3/31/17: 92.5 kg (204 lb).  Medication Reconciliation: denis Singleton CMA (AAMA)      "

## 2017-08-17 NOTE — TELEPHONE ENCOUNTER
Reason for Call:  INR    Who is calling?  laurence    Phone number:  [phone]    Fax number:      Name of caller: laurence     INR Value:  3.1    Are there any other concerns:  Please call with instructions    Can we leave a detailed message on this number? YES    Phone number patient can be reached at: Home number on file [phone] (home)      Call taken on 8/17/2017 at 10:37 AM by Tha Ruth

## 2017-08-17 NOTE — PROGRESS NOTES
ANTICOAGULATION FOLLOW-UP CLINIC VISIT    Patient Name:  Feng Wynne  Date:  8/17/2017  Contact Type:  Telephone/ Call to patient, left detailed message on personal VM with instructions per flow sheet.    SUBJECTIVE:     Patient Findings     Positives No Problem Findings           OBJECTIVE    INR   Date Value Ref Range Status   08/17/2017 3.1  Final       ASSESSMENT / PLAN  INR assessment THER    Recheck INR In: 6 WEEKS    INR Location Clinic      Anticoagulation Summary as of 8/17/2017     INR goal 2.5-3.5   Today's INR 3.1   Maintenance plan 5 mg (5 mg x 1) on Tue, Thu, Sat; 10 mg (5 mg x 2) all other days   Full instructions 5 mg on Tue, Thu, Sat; 10 mg all other days   Weekly total 55 mg   No change documented Cata Villafuerte RN   Plan last modified Love Elliott RN (6/27/2017)   Next INR check 9/28/2017   Priority INR   Target end date     Indications   Long-term (current) use of anticoagulants [Z79.01] [Z79.01]  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    Cata Villafuerte RN

## 2017-08-17 NOTE — MR AVS SNAPSHOT
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      Contact Numbers     Clinic Number:         August 2017 Details    Sun Mon Tue Wed Thu Fri Sat       1               2               3               4               5                 6               7               8               9               10               11               12                 13               14               15               16               17      5 mg   See details      18      10 mg         19      5 mg           20      10 mg         21      10 mg         22      5 mg         23      10 mg         24      5 mg         25      10 mg         26      5 mg           27      10 mg         28      10 mg         29      5 mg         30      10 mg         31      5 mg            Date Details   08/17 This INR check               How to take your warfarin dose     To take:  5 mg Take 1 of the 5 mg tablets.    To take:  10 mg Take 2 of the 5 mg tablets.           September 2017 Details    Sun Mon Tue Wed Thu Fri Sat          1      10 mg         2      5 mg           3      10 mg         4      10 mg         5      5 mg         6      10 mg         7      5 mg         8      10 mg         9      5 mg           10      10 mg         11      10 mg         12      5 mg         13      10 mg         14      5 mg         15      10 mg         16      5 mg           17      10 mg         18      10 mg         19      5 mg         20      10  mg         21      5 mg         22      10 mg         23      5 mg           24      10 mg         25      10 mg         26      5 mg         27      10 mg         28            29               30                Date Details   No additional details    Date of next INR:  9/28/2017         How to take your warfarin dose     To take:  5 mg Take 1 of the 5 mg tablets.    To take:  10 mg Take 2 of the 5 mg tablets.

## 2017-08-22 ENCOUNTER — HOSPITAL ENCOUNTER (OUTPATIENT)
Dept: PHYSICAL THERAPY | Facility: CLINIC | Age: 72
Setting detail: THERAPIES SERIES
End: 2017-08-22
Attending: INTERNAL MEDICINE
Payer: MEDICARE

## 2017-08-22 PROCEDURE — 40000449 ZZHC STATISTIC PT VISIT, LYMPHEDEMA: Performed by: PHYSICAL THERAPIST

## 2017-08-22 PROCEDURE — 97140 MANUAL THERAPY 1/> REGIONS: CPT | Mod: GP | Performed by: PHYSICAL THERAPIST

## 2017-08-29 ENCOUNTER — HOSPITAL ENCOUNTER (OUTPATIENT)
Dept: PHYSICAL THERAPY | Facility: CLINIC | Age: 72
Setting detail: THERAPIES SERIES
End: 2017-08-29
Attending: INTERNAL MEDICINE
Payer: MEDICARE

## 2017-08-29 PROCEDURE — 97140 MANUAL THERAPY 1/> REGIONS: CPT | Mod: GP | Performed by: PHYSICAL THERAPIST

## 2017-08-29 PROCEDURE — 40000449 ZZHC STATISTIC PT VISIT, LYMPHEDEMA: Performed by: PHYSICAL THERAPIST

## 2017-09-01 NOTE — PROGRESS NOTES
[facility]      OUTPATIENT PHYSICAL THERAPY  PLAN OF TREATMENT FOR OUTPATIENT REHABILITATION    Patient's Last Name, First Name, M.I.                YOB: 1945  Feng Wynne                        Provider's Name  [facility] Medical Record No.  [id number]                               Onset Date: 6/13.2017   Start of Care Date: 6/27/2017   Type:     _X_PT   ___OT   ___SLP Medical Diagnosis: lymphedeam B L/E                       PT Diagnosis: lymphedema B L/E      _________________________________________________________________________________  Plan of Treatment: MLD, GCB, develop home prgrogram    Frequency/Duration: 1x/wk for 90 day (pt unable to schedule in Sept due to other appts)     Goals:  Goal Identifier Swelling    Goal Description Pt to have reduced swelling B legs 500-750+ml and 1-4cm reduction foot/ankle for easier don of socks/shoes   Target Date 11/22/17   Date Met      Progress:     Goal Identifier Use of compression    Goal Description Pt to be independent w/ daily use of bandaging or alternative to bandage garments and segue to appropriate comrpession socks; pt will need to have lifel long mgmt of Sx to improve swelling , maintain vascular health and reduce risk of infection    Target Date 11/22/17   Date Met      Progress:     Goal Identifier     Goal Description     Target Date     Date Met      Progress:     Goal Identifier     Goal Description     Target Date     Date Met      Progress:     Goal Identifier     Goal Description     Target Date     Date Met      Progress:     Goal Identifier     Goal Description     Target Date     Date Met      Progress:     Goal Identifier     Goal Description     Target Date     Date Met      Progress:     Goal Identifier     Goal Description     Target Date     Date Met      Progress:     Progress Toward Goals:    Progress this reporting period: see note         Certification date from 8/26/2017 to 11/24/2017.    Juanita Kaufman, PT          I CERTIFY THE NEED FOR THESE SERVICES FURNISHED UNDER        THIS PLAN OF TREATMENT AND WHILE UNDER MY CARE     (Physician co-signature of this document indicates review and certification of the therapy plan).                  Referring Provider: KARL MONTILLA MD
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
## 2017-09-12 ENCOUNTER — ANTICOAGULATION THERAPY VISIT (OUTPATIENT)
Dept: NURSING | Facility: CLINIC | Age: 72
End: 2017-09-12
Payer: COMMERCIAL

## 2017-09-12 ENCOUNTER — TELEPHONE (OUTPATIENT)
Dept: FAMILY MEDICINE | Facility: CLINIC | Age: 72
End: 2017-09-12

## 2017-09-12 DIAGNOSIS — Z95.2 HEART VALVE REPLACED: ICD-10-CM

## 2017-09-12 DIAGNOSIS — Z79.01 LONG-TERM (CURRENT) USE OF ANTICOAGULANTS: ICD-10-CM

## 2017-09-12 LAB — INR PPP: 3 (ref 2.5–3.5)

## 2017-09-12 PROCEDURE — 99207 ZZC NO CHARGE NURSE ONLY: CPT | Performed by: INTERNAL MEDICINE

## 2017-09-12 NOTE — TELEPHONE ENCOUNTER
Reason for Call:  INR    Who is calling? Patient    Phone number:  [phone]      Name of caller: Feng Wynne    INR Value:  3.0    Can we leave a detailed message on this number? YES    Phone number patient can be reached at: Home number on file [phone] (home)      Call taken on 9/12/2017 at 12:23 PM by Fausto Sharma

## 2017-09-12 NOTE — PROGRESS NOTES
ANTICOAGULATION FOLLOW-UP CLINIC VISIT    Patient Name:  Feng Wynne  Date:  9/12/2017  Contact Type:  Telephone/ Spoke with patient on the phone and provided dosing instruction and recheck date.     SUBJECTIVE:     Patient Findings     Positives No Problem Findings           OBJECTIVE    INR   Date Value Ref Range Status   09/12/2017 3.0  Final       ASSESSMENT / PLAN  INR assessment THER    Recheck INR In: 3 WEEKS    INR Location Home INR      Anticoagulation Summary as of 9/12/2017     INR goal 2.5-3.5   Today's INR 3.0   Maintenance plan 5 mg (5 mg x 1) on Tue, Thu, Sat; 10 mg (5 mg x 2) all other days   Full instructions 5 mg on Tue, Thu, Sat; 10 mg all other days   Weekly total 55 mg   No change documented Clara Ybarra RN   Plan last modified Love Elliott RN (6/27/2017)   Next INR check 10/3/2017   Priority INR   Target end date     Indications   Long-term (current) use of anticoagulants [Z79.01] [Z79.01]  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      Clara Ybarra RN

## 2017-09-12 NOTE — MR AVS SNAPSHOT
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]         Contact Numbers     Clinic Number:         September 2017 Details    Sun Mon Tue Wed Thu Fri Sat          1               2                 3               4               5               6               7               8               9                 10               11               12      5 mg   See details      13      10 mg         14      5 mg         15      10 mg         16      5 mg           17      10 mg         18      10 mg         19      5 mg         20      10 mg         21      5 mg         22      10 mg         23      5 mg           24      10 mg         25      10 mg         26      5 mg         27      10 mg         28      5 mg         29      10 mg         30      5 mg          Date Details   09/12 This INR check               How to take your warfarin dose     To take:  5 mg Take 1 of the 5 mg tablets.    To take:  10 mg Take 2 of the 5 mg tablets.           October 2017 Details    Sun Mon Tue Wed Thu Fri Sat     1      10 mg         2      10 mg         3            4               5               6               7                 8               9               10               11               12               13               14                 15               16               17               18               19               20               21                 22               23               24               25               26               27               28                  29               30               31                    Date Details   No additional details    Date of next INR:  10/3/2017         How to take your warfarin dose     To take:  5 mg Take 1 of the 5 mg tablets.    To take:  10 mg Take 2 of the 5 mg tablets.

## 2017-09-12 NOTE — TELEPHONE ENCOUNTER
See Anticoagulation Encounter from 9/12/17  Called patient and provided dosing instruction and recheck date.     Clara Ybarra RN

## 2017-09-15 ENCOUNTER — TELEPHONE (OUTPATIENT)
Dept: FAMILY MEDICINE | Facility: CLINIC | Age: 72
End: 2017-09-15

## 2017-09-15 DIAGNOSIS — I48.20 CHRONIC ATRIAL FIBRILLATION (H): ICD-10-CM

## 2017-09-15 DIAGNOSIS — I10 ESSENTIAL HYPERTENSION: ICD-10-CM

## 2017-09-15 DIAGNOSIS — I48.20 CHRONIC ATRIAL FIBRILLATION (H): Primary | ICD-10-CM

## 2017-09-15 RX ORDER — METOPROLOL SUCCINATE 100 MG/1
100 TABLET, EXTENDED RELEASE ORAL DAILY
Qty: 30 TABLET | Refills: 1 | Status: SHIPPED | OUTPATIENT
Start: 2017-09-15 | End: 2017-11-06

## 2017-09-15 RX ORDER — ATENOLOL 25 MG/1
TABLET ORAL
Start: 2017-09-15

## 2017-09-15 NOTE — TELEPHONE ENCOUNTER
Atenolol refill request  See previous TE  Denied Atenolol request  New Metoprolol Rx sent today 9/15/2017  Neyda CHANDLER RN

## 2017-09-15 NOTE — TELEPHONE ENCOUNTER
ERx for Metoprolol XL sent to patient's pharmacy.  Please schedule patient for follow up in 1-2 weeks since to evaluate response to new medication.  Call doctor if develop any side effects from the medication.

## 2017-09-19 ENCOUNTER — TELEPHONE (OUTPATIENT)
Dept: FAMILY MEDICINE | Facility: CLINIC | Age: 72
End: 2017-09-19

## 2017-09-19 NOTE — TELEPHONE ENCOUNTER
Reason for Call:  Other Medication question    Detailed comments: The patient was taking Atenolol 50 MG total for the day for the past 8 years   He takes 1 in the AM and 1 in the PM   Now he is taking metoprolol (TOPROL-XL) 100 MG 24 hr tablet  Wants to know if this dose is to high     Phone Number Patient can be reached at: Cell number on file:    Telephone Information:   Mobile [phone]       Best Time: anytime    Can we leave a detailed message on this number? YES    Call taken on 9/19/2017 at 9:54 AM by Gabriela Black

## 2017-09-19 NOTE — TELEPHONE ENCOUNTER
I called and left VM for Feng  Verified with Laina Erazo that Atenolol 50 mg daily is equivalent dosing to Toprol  mg 24 hour tablet daily.   Left details in VM to patient explaining this.    Clara Ybarra RN

## 2017-10-06 DIAGNOSIS — I48.20 CHRONIC ATRIAL FIBRILLATION (H): ICD-10-CM

## 2017-10-06 RX ORDER — WARFARIN SODIUM 5 MG/1
TABLET ORAL
Qty: 180 TABLET | Refills: 0 | Status: SHIPPED | OUTPATIENT
Start: 2017-10-06 | End: 2018-04-05

## 2017-10-06 NOTE — TELEPHONE ENCOUNTER
Routing to pt's new PCP Dr Walker  No longer seen here at [place] (Dr Duran retired)  Neyda CHANDLER RN

## 2017-10-06 NOTE — TELEPHONE ENCOUNTER
warfarin (COUMADIN) 5 MG tablet    Last Written Prescription Date: 04/12/17  Last Fill Qty: 180, # refills: 0  Last Office Visit with G, P or [facility] prescribing provider: 05/03/17  Next 5 appointments (look out 90 days)     Oct 19, 2017  9:30 AM CDT   [facility]    [address] [facility] [id number] [phone]                   Date and Result of Last PT/INR:   Lab Results   Component Value Date    INR 3.0 09/12/2017    INR 3.1 08/17/2017

## 2017-10-09 ENCOUNTER — TRANSFERRED RECORDS (OUTPATIENT)
Dept: HEALTH INFORMATION MANAGEMENT | Facility: CLINIC | Age: 72
End: 2017-10-09

## 2017-10-09 LAB — INR PPP: 3.7 (ref 2.5–3.5)

## 2017-10-11 ENCOUNTER — TELEPHONE (OUTPATIENT)
Dept: FAMILY MEDICINE | Facility: CLINIC | Age: 72
End: 2017-10-11

## 2017-10-11 ENCOUNTER — ANTICOAGULATION THERAPY VISIT (OUTPATIENT)
Dept: FAMILY MEDICINE | Facility: CLINIC | Age: 72
End: 2017-10-11
Payer: COMMERCIAL

## 2017-10-11 DIAGNOSIS — Z79.01 LONG-TERM (CURRENT) USE OF ANTICOAGULANTS: ICD-10-CM

## 2017-10-11 DIAGNOSIS — Z95.2 HEART VALVE REPLACED: ICD-10-CM

## 2017-10-11 LAB — INR PPP: 3.7

## 2017-10-11 PROCEDURE — 99207 ZZC NO CHARGE NURSE ONLY: CPT | Performed by: INTERNAL MEDICINE

## 2017-10-11 NOTE — TELEPHONE ENCOUNTER
Reason for Call:  INR    Who is calling?  Patient    Phone number:  [phone]    Name of caller: Feng    INR Value:  3.7    Can we leave a detailed message on this number? YES    Phone number patient can be reached at: Home number on file [phone] (home)      Call taken on 10/11/2017 at 8:47 AM by Fausto Sharma

## 2017-10-11 NOTE — PROGRESS NOTES
ANTICOAGULATION FOLLOW-UP CLINIC VISIT    Patient Name:  Feng Wynne  Date:  10/11/2017  Contact Type:  Telephone/ Left detailed VM for patient    SUBJECTIVE:     Patient Findings     Positives No Problem Findings           OBJECTIVE    INR   Date Value Ref Range Status   10/11/2017 3.7  Final       ASSESSMENT / PLAN  INR assessment SUPRA    Recheck INR In: 2 WEEKS    INR Location Home INR      Anticoagulation Summary as of 10/11/2017     INR goal 2.5-3.5   Today's INR 3.7!   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  Left detailed VM for patient with directions and next INR check, asked that he callback with questions/concerns    Neyda Davis RN

## 2017-10-11 NOTE — MR AVS SNAPSHOT
Feng Tomjoleen Wynne   10/11/2017   Anticoagulation Therapy Visit    Description:  72 year old male   Provider:  Enrique Walker MD   Department:  [facility]           INR as of 10/11/2017     Today's INR 3.7!      Anticoagulation Summary as of 10/11/2017     INR goal 2.5-3.5   Today's INR 3.7!   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] [Z95.2]         October 2017 Details    Sun Mon Tue Wed Thu Fri Sat     1               2               3               4               5               6               7                 8               9               10               11      10 mg   See details      12      5 mg         13      10 mg         14      5 mg           15      10 mg         16      10 mg         17      5 mg         18      10 mg         19      5 mg         20      10 mg         21      5 mg           22      10 mg         23      10 mg         24      5 mg         25            26               27               28                 29               30               31                    Date Details   10/11 This INR check       Date of next INR:  10/25/2017         How to take your warfarin dose     To take:  5 mg Take 1 of the 5 mg tablets.    To take:  10 mg Take 2 of the 5 mg tablets.

## 2017-10-11 NOTE — PROGRESS NOTES
[facility]      OUTPATIENT PHYSICAL THERAPY  PLAN OF TREATMENT FOR OUTPATIENT REHABILITATION    Patient's Last Name, First Name, M.I.                YOB: 1945  Feng Wynne                        Provider's Name  [facility] Medical Record No.  [id number]                               Onset Date: 6/13/2017   Start of Care Date: 6/27/2017   Type:     _X_PT   ___OT   ___SLP Medical Diagnosis: B L/E Lymphedema                       PT Diagnosis: b l/E Lymphedema       _________________________________________________________________________________  Plan of Treatment:  RUI, DAVID, recommendation for compression garments    Frequency/Duration: 1x/wk for 90days     Goals:  Goal Identifier Swelling    Goal Description Pt to have reduced swelling B legs 500-750+ml and 1-4cm reduction foot/ankle for easier don of socks/shoes   Target Date 11/22/17   Date Met      Progress:     Goal Identifier Use of compression    Goal Description Pt to be independent w/ daily use of bandaging or alternative to bandage garments and segue to appropriate comrpession socks; pt will need to have lifel long mgmt of Sx to improve swelling , maintain vascular health and reduce risk of infection    Target Date 11/22/17   Date Met      Progress:     Goal Identifier     Goal Description     Target Date     Date Met      Progress:     Goal Identifier     Goal Description     Target Date     Date Met      Progress:     Goal Identifier     Goal Description     Target Date     Date Met      Progress:     Goal Identifier     Goal Description     Target Date     Date Met      Progress:     Goal Identifier     Goal Description     Target Date     Date Met      Progress:     Goal Identifier     Goal Description     Target Date     Date Met      Progress:     Progress Toward Goals:   Progress this reporting period:  see note        Certification date from 8/26/2017 to 11/23/2017.    Juanita Kaufman, PT          I CERTIFY THE NEED FOR THESE SERVICES FURNISHED UNDER        THIS PLAN OF TREATMENT AND WHILE UNDER MY CARE     (Physician co-signature of this document indicates review and certification of the therapy plan).                  Referring Provider: Armani Marin MD

## 2017-10-19 ENCOUNTER — OFFICE VISIT (OUTPATIENT)
Dept: FAMILY MEDICINE | Facility: CLINIC | Age: 72
End: 2017-10-19
Payer: COMMERCIAL

## 2017-10-19 VITALS
TEMPERATURE: 96.9 F | HEART RATE: 61 BPM | OXYGEN SATURATION: 97 % | HEIGHT: 72 IN | BODY MASS INDEX: 42.66 KG/M2 | WEIGHT: 315 LBS

## 2017-10-19 PROCEDURE — 99214 OFFICE O/P EST MOD 30 MIN: CPT | Performed by: INTERNAL MEDICINE

## 2017-10-19 RX ORDER — TOPIRAMATE 50 MG/1
50 TABLET, FILM COATED ORAL 2 TIMES DAILY
Qty: 180 TABLET | Refills: 1 | Status: SHIPPED | OUTPATIENT
Start: 2017-10-19 | End: 2018-07-26

## 2017-10-19 NOTE — PATIENT INSTRUCTIONS
Strictly count/monitor your calories with every meal/snack every day.    Entree - 250 calories per serving  Shake - 150 calories per serving  Fruit or Vegetable - 100 calories per serving     Breakfast - Entree + Fruit or Vegetable  Snack - Shake   Lunch - Entree + Fruit or Vegetable  Snack - Shake   Dinner - Entree + Fruit or Vegetable  Snack - Chocolate protein bar    Call doctor if you develop any side effects from the medication.    Follow up in 1 month.

## 2017-10-19 NOTE — PROGRESS NOTES
"HPI      SUBJECTIVE:   Feng Wynne is a 72 year old male who presents to clinic today for the following health issues:      Patient is interested in weight management.  Not a candidate for phentermine due to HTN and chronic Afib.      Past Medical History:   Diagnosis Date     Atrial fibrillation (H)      Coronary artery disease      Hypercholesteremia      Morbid obesity (H)      Unspecified essential hypertension        Review of Systems   Constitutional: Negative for malaise/fatigue.   Respiratory: Negative for shortness of breath.    Cardiovascular: Negative for chest pain and palpitations.   Gastrointestinal: Negative for abdominal pain, constipation, diarrhea, nausea and vomiting.   Neurological: Negative for tremors and headaches.   Psychiatric/Behavioral: Negative for depression and substance abuse. The patient is not nervous/anxious and does not have insomnia.        Pulse 61  Temp 96.9  F (36.1  C) (Tympanic)  Ht 5' 11.5\" (1.816 m)  Wt (!) 394 lb (178.7 kg)  SpO2 97%  BMI 54.19 kg/m2      Physical Exam   Constitutional: He is oriented to person, place, and time. No distress.   Neck: No thyromegaly present.   Cardiovascular: Normal rate and regular rhythm.    (+) Mechanical heart valve click heard left lower parasternal border    Pulmonary/Chest: Effort normal and breath sounds normal. No respiratory distress.   Abdominal: Soft. There is no tenderness.   Neurological: He is alert and oriented to person, place, and time. Coordination normal. GCS score is 15.   No tremors   Psychiatric: Mood and affect normal.   Vitals reviewed.        ICD-10-CM    1. BMI 50.0-59.9, adult (H) Z68.43 topiramate (TOPAMAX) 50 MG tablet     **please refer to HPI for status of conditions      Patient Instructions   Strictly count/monitor your calories with every meal/snack every day.    Entree - 250 calories per serving  Shake - 150 calories per serving  Fruit or Vegetable - 100 calories per serving     Breakfast - " Entree + Fruit or Vegetable  Snack - Shake   Lunch - Entree + Fruit or Vegetable  Snack - Shake   Dinner - Entree + Fruit or Vegetable  Snack - Chocolate protein bar    Call doctor if you develop any side effects from the medication.    Follow up in 1 month.      *25 minutes was spent with the patient, more than half of which was spent on counseling on weight management

## 2017-10-19 NOTE — MR AVS SNAPSHOT
After Visit Summary   10/19/2017    Feng Wynne    MRN: [id number]           Patient Information     Date Of Birth          1945        Visit Information        Provider Department      10/19/2017 9:30 AM Enrique Walker MD [facility]        Today's Diagnoses     BMI 50.0-59.9, adult (H)    -  1      Care Instructions    Strictly count/monitor your calories with every meal/snack every day.    Entree - 250 calories per serving  Shake - 150 calories per serving  Fruit or Vegetable - 100 calories per serving     Breakfast - Entree + Fruit or Vegetable  Snack - Shake   Lunch - Entree + Fruit or Vegetable  Snack - Shake   Dinner - Entree + Fruit or Vegetable  Snack - Chocolate protein bar    Call doctor if you develop any side effects from the medication.    Follow up in 1 month.              Follow-ups after your visit        Your next 10 appointments already scheduled     Oct 31, 2017  9:00 AM CDT   Lymphedema Treatment with Juanita Kaufman PT   [facility]    [address] [place] [address] [address]  [facility] [address]   [phone]            Nov 07, 2017  9:00 AM CST   Lymphedema Treatment with Juanita Kaufman PT   [facility] ([facility])    [address] [place] [address] [address]  [facility] [address]   [phone]              Who to contact     If you have questions or need follow up information about today's clinic visit or your schedule please contact [facility] directly at [phone].  Normal or non-critical lab and imaging results will be communicated to you by MyChart, letter or phone within 4 business days after the clinic has received the results. If you do not hear from us within 7 days, please contact the clinic through MyChart or phone. If you have a critical or abnormal lab result, we will notify you by phone as soon as possible.  Submit refill requests through MyChart or call your  "pharmacy and they will forward the refill request to us. Please allow 3 business days for your refill to be completed.          Additional Information About Your Visit        XMPiehart Information     [employer] gives you secure access to your electronic health record. If you see a primary care provider, you can also send messages to your care team and make appointments. If you have questions, please call your primary care clinic.  If you do not have a primary care provider, please call [phone] and they will assist you.        Care EveryWhere ID     This is your Care EveryWhere ID. This could be used by other organizations to access your [place] medical records  [id number]        Your Vitals Were     Pulse Temperature Height Pulse Oximetry BMI (Body Mass Index)       61 96.9  F (36.1  C) (Tympanic) 5' 11.5\" (1.816 m) 97% 54.19 kg/m2        Blood Pressure from Last 3 Encounters:   06/13/17 121/85   05/03/17 130/78   04/18/17 120/80    Weight from Last 3 Encounters:   10/19/17 (!) 394 lb (178.7 kg)   06/13/17 (!) 396 lb 6.4 oz (179.8 kg)   05/03/17 (!) 393 lb (178.3 kg)              Today, you had the following     No orders found for display         Today's Medication Changes          These changes are accurate as of: 10/19/17 10:08 AM.  If you have any questions, ask your nurse or doctor.               Start taking these medicines.        Dose/Directions    topiramate 50 MG tablet   Commonly known as:  TOPAMAX   Used for:  BMI 50.0-59.9, adult (H)   Started by:  Enrique Walker MD        Dose:  50 mg   Take 1 tablet (50 mg) by mouth 2 times daily   Quantity:  180 tablet   Refills:  1         These medicines have changed or have updated prescriptions.        Dose/Directions    metroNIDAZOLE 1 % gel   Commonly known as:  METROGEL   This may have changed:    - when to take this  - reasons to take this   Used for:  Rosacea        Apply  topically daily.   Quantity:  60 g   Refills:  11          "   Where to get your medicines      These medications were sent to [facility] - [place], [place] - [address] [facility] [address]  [address], [place] [address]    Hours:  24-hours Phone:  [phone]     topiramate 50 MG tablet                Primary Care Provider Office Phone # Fax #    Enrique Abhishek Walker [phone] [address] [facility] [address]  [facility] [address]        Equal Access to Services     [facility]: Hadii aad ku hadasho Soomaali, waaxda luqadaha, qaybta kaalmada adeegyada, waxay idiin hayaan adeeg kharash la'aan ah. So [facility] [phone].    ATENCIÓN: Si habla español, tiene a pino disposición servicios gratuitos de asistencia lingüística. Ll[facility] [phone].    We comply with applicable federal civil rights laws and [place] laws. We do not discriminate on the basis of race, color, national origin, age, disability, sex, sexual orientation, or gender identity.            Thank you!     Thank you for choosing [facility]  for your care. Our goal is always to provide you with excellent care. Hearing back from our patients is one way we can continue to improve our services. Please take a few minutes to complete the written survey that you may receive in the mail after your visit with us. Thank you!             Your Updated Medication List - Protect others around you: Learn how to safely use, store and throw away your medicines at www.disposemymeds.org.          This list is accurate as of: 10/19/17 10:08 AM.  Always use your most recent med list.                   Brand Name Dispense Instructions for use Diagnosis    * ASPIRIN NOT PRESCRIBED    INTENTIONAL     by Other route continuous prn.        CENTRUM SILVER PO      Take 1 tablet by mouth daily.    Chronic atrial fibrillation (H), Hypertension goal BP (blood pressure) < 140/90, Hyperlipidemia LDL goal <130, Hypercholesteremia       clindamycin 300 MG capsule    CLEOCIN    4 Cap     2 pills one hour before dental work    S/P mitral valve replacement       * COMPRESSION STOCKINGS     1 each    1 each daily    Swelling of lower extremity       * COMPRESSION STOCKINGS     1 each    1 each daily    Swelling of lower extremity       FISH OIL CONCENTRATE PO      Take  by mouth daily.        lisinopril 40 MG tablet    PRINIVIL/ZESTRIL    90 tablet    Take 1 tablet (40 mg) by mouth daily    Essential hypertension with goal blood pressure less than 140/90       metoprolol 100 MG 24 hr tablet    TOPROL-XL    30 tablet    Take 1 tablet (100 mg) by mouth daily    Chronic atrial fibrillation (H), Essential hypertension       metroNIDAZOLE 1 % gel    METROGEL    60 g    Apply  topically daily.    Rosacea       * order for DME     1 Units    SLEEP MATTRESS FOR CHRONIC BACK PAIN DUE TO DISC PROBLEMS    Lumbar radiculopathy       order for DME     1 each    Equipment being ordered: COMPRESSION STOCKINGS, 20-30 mmHg    Lower extremity edema       topiramate 50 MG tablet    TOPAMAX    180 tablet    Take 1 tablet (50 mg) by mouth 2 times daily    BMI 50.0-59.9, adult (H)       * warfarin 5 MG tablet    COUMADIN    180 tablet    TAKE 1 TO 2 TABLETS BY MOUTH DAILY    Chronic atrial fibrillation (H)       * warfarin 5 MG tablet    COUMADIN    180 tablet    TAKE 1 TO 2 TABLETS BY MOUTH DAILY    Chronic atrial fibrillation (H)       * Notice:  This list has 6 medication(s) that are the same as other medications prescribed for you. Read the directions carefully, and ask your doctor or other care provider to review them with you.

## 2017-10-19 NOTE — NURSING NOTE
"Chief Complaint   Patient presents with     RECHECK     wt loss       Initial Pulse 61  Temp 96.9  F (36.1  C) (Tympanic)  Ht 5' 11.5\" (1.816 m)  Wt (!) 394 lb (178.7 kg)  SpO2 97%  BMI 54.19 kg/m2 Estimated body mass index is 54.19 kg/(m^2) as calculated from the following:    Height as of this encounter: 5' 11.5\" (1.816 m).    Weight as of this encounter: 394 lb (178.7 kg).  Medication Reconciliation: complete   Deirdre Sanchezing- CMA      "

## 2017-10-24 ENCOUNTER — TELEPHONE (OUTPATIENT)
Dept: FAMILY MEDICINE | Facility: CLINIC | Age: 72
End: 2017-10-24

## 2017-10-24 ENCOUNTER — ANTICOAGULATION THERAPY VISIT (OUTPATIENT)
Dept: FAMILY MEDICINE | Facility: CLINIC | Age: 72
End: 2017-10-24
Payer: COMMERCIAL

## 2017-10-24 DIAGNOSIS — Z79.01 LONG-TERM (CURRENT) USE OF ANTICOAGULANTS: ICD-10-CM

## 2017-10-24 DIAGNOSIS — Z95.2 HEART VALVE REPLACED: ICD-10-CM

## 2017-10-24 LAB — INR PPP: 3.1 (ref 2.5–3.5)

## 2017-10-24 PROCEDURE — 99207 ZZC NO CHARGE NURSE ONLY: CPT | Performed by: INTERNAL MEDICINE

## 2017-10-24 NOTE — MR AVS SNAPSHOT
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] [Z95.2]         October 2017 Details    Sun Mon Tue Wed Thu Fri Sat     1               2               3               4               5               6               7                 8               9               10               11               12               13               14                 15               16               17               18               19               20               21                 22               23               24      5 mg   See details      25      10 mg         26      5 mg         27      10 mg         28      5 mg           29      10 mg         30      10 mg         31      5 mg              Date Details   10/24 This INR check               How to take your warfarin dose     To take:  5 mg Take 1 of the 5 mg tablets.    To take:  10 mg Take 2 of the 5 mg tablets.           November 2017 Details    Sun Mon Tue Wed Thu Fri Sat        1      10 mg         2      5 mg         3      10 mg         4      5 mg           5      10 mg         6      10 mg         7      5 mg         8      10 mg         9      5 mg         10      10 mg         11      5 mg           12      10 mg         13            14               15               16               17               18                 19               20               21               22               23               24               25                 26               27               28               29                30                  Date Details   No additional details    Date of next INR:  11/13/2017         How to take your warfarin dose     To take:  5 mg Take 1 of the 5 mg tablets.    To take:  10 mg Take 2 of the 5 mg tablets.

## 2017-10-24 NOTE — PROGRESS NOTES
"  ANTICOAGULATION FOLLOW-UP CLINIC VISIT    Patient Name:  Feng Wynne  Date:  10/24/2017  Contact Type:  Telephone    SUBJECTIVE:     Patient Findings     Positives No Problem Findings           OBJECTIVE    INR   Date Value Ref Range Status   10/24/2017 3.1  Final       ASSESSMENT / PLAN  INR assessment THER    Recheck INR In: 2 WEEKS    INR Location Outside lab      Anticoagulation Summary as of 10/24/2017     INR goal 2.5-3.5   Today's INR 3.1   Maintenance plan 5 mg (5 mg x 1) on Tue, Thu, Sat; 10 mg (5 mg x 2) all other days   Full instructions 5 mg on Tue, Thu, Sat; 10 mg all other days   Weekly total 55 mg   Plan last modified Love Elilott RN (6/27/2017)   Next INR check 11/13/2017   Priority INR   Target end date     Indications   Long-term (current) use of anticoagulants [Z79.01] [Z79.01]  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] [Z95.2]         Anticoagulation Episode Summary     INR check location     Preferred lab     Send INR reminders to  ANTICOAGULATION    Comments [facility]      Anticoagulation Care Providers     Provider Role Specialty Phone number    Enrique Walker Abhishek Granados MD [facility] Internal Medicine [phone]            See the Encounter Report to view Anticoagulation Flowsheet and Dosing Calendar (Go to Encounters tab in chart review, and find the Anticoagulation Therapy Visit)    Dosage adjustment made based on physician directed care plan. Patient reported INR 3.1 today. (not found in Epic. Is this a \"home\" INR, or outside lab?)  Continue same dosing and recheck week of Nov 13.  He can call and make appt for INR nurse schedule at [place] if he wishes.    Late entry:  Fax received from [facility].  Left patient a message to recheck in 3 weeks.  Missy Hoskins RN               "

## 2017-10-24 NOTE — TELEPHONE ENCOUNTER
Detailed message left for patient to continue same dosing and recheck in 2-3 weeks. Where did he have his INR done?  He can come to  [place] if he wishes in future. Can make appt with INR nurse.  Missy Hoskins RN      See anticoagulation therapy encounter.

## 2017-10-24 NOTE — TELEPHONE ENCOUNTER
Is this patient coming to [facility] now? Previous INR's managed by [facility]. Awaiting message from [place] RN.  Missy Hoskins RN

## 2017-10-24 NOTE — TELEPHONE ENCOUNTER
Reason for Call:  INR    Who is calling?  Patient     Phone number:  [phone]    Fax number:  None    Name of caller: Feng    INR Value:  3.1    Are there any other concerns:  No:     Can we leave a detailed message on this number? YES    Phone number patient can be reached at: Home number on file [phone] (home)      Call taken on 10/24/2017 at 12:45 PM by Gabriela Black

## 2017-10-25 NOTE — TELEPHONE ENCOUNTER
Fax received from [facility]. Patient tests INRs per self at home.    Left message to disregard below.  Recheck INR in about 3 weeks.  Missy Hoskins RN

## 2017-10-31 ENCOUNTER — HOSPITAL ENCOUNTER (OUTPATIENT)
Dept: PHYSICAL THERAPY | Facility: CLINIC | Age: 72
Setting detail: THERAPIES SERIES
End: 2017-10-31
Attending: INTERNAL MEDICINE
Payer: MEDICARE

## 2017-10-31 PROCEDURE — 97140 MANUAL THERAPY 1/> REGIONS: CPT | Mod: GP | Performed by: PHYSICAL THERAPIST

## 2017-10-31 PROCEDURE — 40000449 ZZHC STATISTIC PT VISIT, LYMPHEDEMA: Performed by: PHYSICAL THERAPIST

## 2017-11-06 DIAGNOSIS — I10 ESSENTIAL HYPERTENSION: ICD-10-CM

## 2017-11-06 DIAGNOSIS — I48.20 CHRONIC ATRIAL FIBRILLATION (H): ICD-10-CM

## 2017-11-06 RX ORDER — ATENOLOL 25 MG/1
TABLET ORAL
Start: 2017-11-06

## 2017-11-07 ENCOUNTER — HOSPITAL ENCOUNTER (OUTPATIENT)
Dept: PHYSICAL THERAPY | Facility: CLINIC | Age: 72
Setting detail: THERAPIES SERIES
End: 2017-11-07
Attending: INTERNAL MEDICINE
Payer: MEDICARE

## 2017-11-07 PROCEDURE — 40000449 ZZHC STATISTIC PT VISIT, LYMPHEDEMA: Performed by: PHYSICAL THERAPIST

## 2017-11-07 PROCEDURE — 97140 MANUAL THERAPY 1/> REGIONS: CPT | Mod: GP | Performed by: PHYSICAL THERAPIST

## 2017-11-07 RX ORDER — METOPROLOL SUCCINATE 100 MG/1
100 TABLET, EXTENDED RELEASE ORAL DAILY
Qty: 90 TABLET | Refills: 1 | Status: SHIPPED | OUTPATIENT
Start: 2017-11-07 | End: 2018-05-04

## 2017-11-07 NOTE — TELEPHONE ENCOUNTER
Prescription approved per [facility] Refill Protocol.  Neyda CHANDLER RN    Requested Prescriptions   Pending Prescriptions Disp Refills     metoprolol (TOPROL-XL) 100 MG 24 hr tablet 30 tablet 1     Sig: Take 1 tablet (100 mg) by mouth daily    Beta-Blockers Protocol Passed    11/6/2017  6:57 PM       Passed - Blood pressure under 140/90    BP Readings from Last 3 Encounters:   06/13/17 121/85   05/03/17 130/78   04/18/17 120/80                Passed - Patient is age 6 or older       Passed - Recent or future visit with authorizing provider's specialty    Patient had office visit in the last year or has a visit in the next 30 days with authorizing provider.  See chart review.

## 2017-11-14 ENCOUNTER — TELEPHONE (OUTPATIENT)
Dept: FAMILY MEDICINE | Facility: CLINIC | Age: 72
End: 2017-11-14

## 2017-11-14 ENCOUNTER — ANTICOAGULATION THERAPY VISIT (OUTPATIENT)
Dept: FAMILY MEDICINE | Facility: CLINIC | Age: 72
End: 2017-11-14
Payer: COMMERCIAL

## 2017-11-14 DIAGNOSIS — Z95.2 HEART VALVE REPLACED: ICD-10-CM

## 2017-11-14 DIAGNOSIS — Z79.01 LONG-TERM (CURRENT) USE OF ANTICOAGULANTS: Primary | ICD-10-CM

## 2017-11-14 DIAGNOSIS — Z79.01 LONG-TERM (CURRENT) USE OF ANTICOAGULANTS: ICD-10-CM

## 2017-11-14 LAB — INR PPP: 3.6 (ref 2.5–3.5)

## 2017-11-14 PROCEDURE — 99207 ZZC NO CHARGE NURSE ONLY: CPT | Performed by: INTERNAL MEDICINE

## 2017-11-14 NOTE — PROGRESS NOTES
ANTICOAGULATION FOLLOW-UP CLINIC VISIT    Patient Name:  Feng Wynne  Date:  11/14/2017  Contact Type:  Telephone/ Patient - [facility]    SUBJECTIVE:        OBJECTIVE    INR   Date Value Ref Range Status   11/14/2017 3.6  Final       ASSESSMENT / PLAN  INR assessment THER    Recheck INR In: 2 WEEKS    INR Location Home INR      Anticoagulation Summary as of 11/14/2017     INR goal 2.5-3.5   Today's INR 3.6!   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  Dosing based on [facility] Protocol and Provider directed care plan.      Love Elliott RN

## 2017-11-14 NOTE — TELEPHONE ENCOUNTER
Reason for Call:  INR    Who is calling?  patient    Phone number:      Fax number:      Name of caller: patient    INR Value:  3.6     Are there any other concerns:  No    Can we leave a detailed message on this number? YES    Phone number patient can be reached at: Home number on file [phone] (home)      Call taken on 11/14/2017 at 2:38 PM by Milton Davis

## 2017-11-14 NOTE — MR AVS SNAPSHOT
Feng Sergiojoleen Wynne   11/14/2017   Anticoagulation Therapy Visit    Description:  72 year old male   Provider:  Enrique Walker MD   Department:  [facility]           INR as of 11/14/2017     Today's INR 3.6!      Anticoagulation Summary as of 11/14/2017     INR goal 2.5-3.5   Today's INR 3.6!   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] [Z95.2]         Description     [facility]      November 2017 Details    Sun Mon Tue Wed Thu Fri Sat        1               2               3               4                 5               6               7               8               9               10               11                 12               13               14      5 mg   See details      15      10 mg         16      5 mg         17      10 mg         18      5 mg           19      10 mg         20      10 mg         21      5 mg         22      10 mg         23      5 mg         24      10 mg         25      5 mg           26      10 mg         27      10 mg         28            29               30                  Date Details   11/14 This INR check       Date of next INR:  11/28/2017         How to take your warfarin dose     To take:  5 mg Take 1 of the 5 mg tablets.    To take:  10 mg Take 2 of the 5 mg tablets.

## 2017-11-14 NOTE — TELEPHONE ENCOUNTER
To PCP:  Patient's INR Clinic Referral has .  Please see new INR clinic referral above.  Please sign if appropriate.  Thank you.  Love Elliott RN

## 2017-11-16 ASSESSMENT — ENCOUNTER SYMPTOMS
HEADACHES: 0
NERVOUS/ANXIOUS: 0
NAUSEA: 0
INSOMNIA: 0
VOMITING: 0
ABDOMINAL PAIN: 0
CONSTIPATION: 0
DIARRHEA: 0
SHORTNESS OF BREATH: 0
DEPRESSION: 0
TREMORS: 0
PALPITATIONS: 0

## 2017-11-16 ASSESSMENT — LIFESTYLE VARIABLES: SUBSTANCE_ABUSE: 0

## 2017-11-24 DIAGNOSIS — I10 ESSENTIAL HYPERTENSION WITH GOAL BLOOD PRESSURE LESS THAN 140/90: ICD-10-CM

## 2017-11-27 RX ORDER — LISINOPRIL 40 MG/1
TABLET ORAL
Qty: 90 TABLET | Refills: 0 | Status: SHIPPED | OUTPATIENT
Start: 2017-11-27 | End: 2018-02-20

## 2017-11-27 NOTE — TELEPHONE ENCOUNTER
lisinopril      Last Written Prescription Date:  10/4/16  Last Fill Quantity: 90,   # refills: 3  Last Office Visit: 10/19/17  Future Office visit:    Next 5 appointments (look out 90 days)     Dec 01, 2017  9:00 AM CST   Office Visit with Enrique Walker MD   [facility] ([facility])    [address] [address]   [phone]                   Routing refill request to provider for review/approval because:  Last rxd by Dr. Duran who is retired.  Please authorize if appropriate.  Thanks,  Chloé Garrido RN

## 2017-11-28 ENCOUNTER — TRANSFERRED RECORDS (OUTPATIENT)
Dept: HEALTH INFORMATION MANAGEMENT | Facility: CLINIC | Age: 72
End: 2017-11-28

## 2017-11-29 ENCOUNTER — ANTICOAGULATION THERAPY VISIT (OUTPATIENT)
Dept: FAMILY MEDICINE | Facility: CLINIC | Age: 72
End: 2017-11-29
Payer: COMMERCIAL

## 2017-11-29 ENCOUNTER — TELEPHONE (OUTPATIENT)
Dept: FAMILY MEDICINE | Facility: CLINIC | Age: 72
End: 2017-11-29

## 2017-11-29 DIAGNOSIS — Z95.2 HEART VALVE REPLACED: ICD-10-CM

## 2017-11-29 DIAGNOSIS — Z79.01 LONG-TERM (CURRENT) USE OF ANTICOAGULANTS: ICD-10-CM

## 2017-11-29 LAB — INR PPP: 3.3 (ref 2.5–3.5)

## 2017-11-29 PROCEDURE — 99207 ZZC NO CHARGE NURSE ONLY: CPT | Performed by: INTERNAL MEDICINE

## 2017-11-29 NOTE — TELEPHONE ENCOUNTER
Reason for Call:  INR    Who is calling?  Patient    Phone number:  [phone]    Fax number:      Name of caller: Feng Wynne    INR Value:  3.3 by finger stick    Are there any other concerns:  No    Can we leave a detailed message on this number? YES    Phone number patient can be reached at: Home number on file [phone] (home)      Call taken on 11/29/2017 at 9:55 AM by Reyna Cruz

## 2017-11-29 NOTE — MR AVS SNAPSHOT
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] [Z95.2]         November 2017 Details    Sun Mon Tue Wed Thu Fri Sat        1               2               3               4                 5               6               7               8               9               10               11                 12               13               14               15               16               17               18                 19               20               21               22               23               24               25                 26               27               28               29      10 mg   See details      30      5 mg            Date Details   11/29 This INR check               How to take your warfarin dose     To take:  5 mg Take 1 of the 5 mg tablets.    To take:  10 mg Take 2 of the 5 mg tablets.           December 2017 Details    Sun Mon Tue Wed Thu Fri Sat          1      10 mg         2      5 mg           3      10 mg         4      10 mg         5      5 mg         6      10 mg         7      5 mg         8      10 mg         9      5 mg           10      10 mg         11      10 mg         12      5 mg         13      10 mg         14      5 mg         15      10 mg         16      5 mg           17      10 mg         18      10 mg         19      5 mg         20            21               22               23                 24               25               26               27               28               29                30                 31                      Date Details   No additional details    Date of next INR:  12/20/2017         How to take your warfarin dose     To take:  5 mg Take 1 of the 5 mg tablets.    To take:  10 mg Take 2 of the 5 mg tablets.

## 2017-11-29 NOTE — PROGRESS NOTES
ANTICOAGULATION FOLLOW-UP CLINIC VISIT    Patient Name:  Feng Wynne  Date:  11/29/2017  Contact Type:  Telephone    SUBJECTIVE:     Patient Findings     Positives No Problem Findings           OBJECTIVE    INR   Date Value Ref Range Status   11/29/2017 3.3  Final       ASSESSMENT / PLAN  INR assessment THER    Recheck INR In: 3 WEEKS    INR Location Home INR    Billed home INR No      Anticoagulation Summary as of 11/29/2017     INR goal 2.5-3.5   Today's INR 3.3   Maintenance plan 5 mg (5 mg x 1) on Tue, Thu, Sat; 10 mg (5 mg x 2) all other days   Full instructions 5 mg on Tue, Thu, Sat; 10 mg all other days   Weekly total 55 mg   No change documented Missy Hoskins RN   Plan last modified Love Elliott RN (6/27/2017)   Next INR check 12/20/2017   Priority INR   Target end date     Indications   Long-term (current) use of anticoagulants [Z79.01] [Z79.01]  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 Patient called to report his INR result of 3.3.  Continue same dosing and recheck INR in 3 weeks.  (will await fax from [facility])    Missy Hoskins RN

## 2017-12-01 ENCOUNTER — OFFICE VISIT (OUTPATIENT)
Dept: FAMILY MEDICINE | Facility: CLINIC | Age: 72
End: 2017-12-01
Payer: COMMERCIAL

## 2017-12-01 VITALS
BODY MASS INDEX: 42.66 KG/M2 | DIASTOLIC BLOOD PRESSURE: 78 MMHG | SYSTOLIC BLOOD PRESSURE: 132 MMHG | HEIGHT: 72 IN | HEART RATE: 66 BPM | TEMPERATURE: 97.5 F | WEIGHT: 315 LBS | OXYGEN SATURATION: 98 %

## 2017-12-01 DIAGNOSIS — E66.01 MORBID OBESITY DUE TO EXCESS CALORIES (H): Primary | ICD-10-CM

## 2017-12-01 PROCEDURE — 99213 OFFICE O/P EST LOW 20 MIN: CPT | Performed by: INTERNAL MEDICINE

## 2017-12-01 ASSESSMENT — ENCOUNTER SYMPTOMS
NAUSEA: 0
CONSTIPATION: 0
HEADACHES: 0
TREMORS: 0
INSOMNIA: 0
NERVOUS/ANXIOUS: 0
SHORTNESS OF BREATH: 0
ABDOMINAL PAIN: 0
DEPRESSION: 0
DIARRHEA: 0
VOMITING: 0
PALPITATIONS: 0

## 2017-12-01 ASSESSMENT — LIFESTYLE VARIABLES: SUBSTANCE_ABUSE: 0

## 2017-12-01 NOTE — PROGRESS NOTES
"HPI      SUBJECTIVE:   Feng Wynne is a 72 year old male who presents to clinic today for the following health issues:    Chief Complaint   Patient presents with     Weight Check       I last saw the patient at the clinic on 10/19/2017 for weight management.  Advised patient to start a low calorie meal plan and I prescribed him with Topamax 50 mg twice a day.    Since his last clinic visit, he has lost 12 pounds; bringing his total weight loss to 12 pounds since we started his weight management on 10/19/2017.  His BMI has decreased from 54 to 52.    Patient states that he has been consuming lower-calorie foods but is not consistently monitoring his caloric intake.  Consumes high-protein flavored water in between meals which he says definitely helps with controlling the hunger. He actually has not started the Topamax yet.      Past Medical History:   Diagnosis Date     Atrial fibrillation (H)      Coronary artery disease      Hypercholesteremia      Morbid obesity (H)      Unspecified essential hypertension        Review of Systems   Constitutional: Negative for malaise/fatigue.   Respiratory: Negative for shortness of breath.    Cardiovascular: Negative for chest pain and palpitations.   Gastrointestinal: Negative for abdominal pain, constipation, diarrhea, nausea and vomiting.   Neurological: Negative for tremors and headaches.   Psychiatric/Behavioral: Negative for depression and substance abuse. The patient is not nervous/anxious and does not have insomnia.        /78 (Cuff Size: Adult Large)  Pulse 66  Temp 97.5  F (36.4  C) (Oral)  Ht 5' 11.5\" (1.816 m)  Wt (!) 382 lb (173.3 kg)  SpO2 98%  BMI 52.54 kg/m2      Physical Exam   Constitutional: He is oriented to person, place, and time. No distress.   Neck: No thyromegaly present.   Cardiovascular: Normal rate, regular rhythm and normal heart sounds.    Pulmonary/Chest: Effort normal and breath sounds normal. No respiratory distress. "   Abdominal: Soft. There is no tenderness.   Neurological: He is alert and oriented to person, place, and time. Coordination normal. GCS score is 15.   No tremors   Psychiatric: Mood and affect normal.   Vitals reviewed.        ICD-10-CM    1. Morbid obesity due to excess calories (H) E66.01      **please refer to HPI for status of conditions      Patient Instructions   Maintain low fat/calorie diet.    Follow up in 1 month.

## 2017-12-01 NOTE — MR AVS SNAPSHOT
"              After Visit Summary   12/1/2017    Feng Wynne    MRN: [id number]           Patient Information     Date Of Birth          1945        Visit Information        Provider Department      12/1/2017 9:00 AM Enrique Walker MD [facility]        Care Instructions    Maintain low fat/calorie diet.    Follow up in 1 month.            Follow-ups after your visit        Who to contact     If you have questions or need follow up information about today's clinic visit or your schedule please contact [facility] directly at [phone].  Normal or non-critical lab and imaging results will be communicated to you by [facility], letter or phone within 4 business days after the clinic has received the results. If you do not hear from us within 7 days, please contact the clinic through [facility] or phone. If you have a critical or abnormal lab result, we will notify you by phone as soon as possible.  Submit refill requests through [facility] or call your pharmacy and they will forward the refill request to us. Please allow 3 business days for your refill to be completed.          Additional Information About Your Visit        MyChart Information     [facility] gives you secure access to your electronic health record. If you see a primary care provider, you can also send messages to your care team and make appointments. If you have questions, please call your primary care clinic.  If you do not have a primary care provider, please call [phone] and they will assist you.        Care EveryWhere ID     This is your Care EveryWhere ID. This could be used by other organizations to access your [place] medical records  [id number]        Your Vitals Were     Pulse Temperature Height Pulse Oximetry BMI (Body Mass Index)       66 97.5  F (36.4  C) (Oral) 5' 11.5\" (1.816 m) 98% 52.54 kg/m2        Blood Pressure from Last 3 Encounters:   12/01/17 132/78   06/13/17 121/85   05/03/17 " 130/78    Weight from Last 3 Encounters:   12/01/17 (!) 382 lb (173.3 kg)   10/19/17 (!) 394 lb (178.7 kg)   06/13/17 (!) 396 lb 6.4 oz (179.8 kg)              Today, you had the following     No orders found for display         Today's Medication Changes          These changes are accurate as of: 12/1/17  9:12 AM.  If you have any questions, ask your nurse or doctor.               These medicines have changed or have updated prescriptions.        Dose/Directions    ASPIRIN NOT PRESCRIBED   Commonly known as:  INTENTIONAL   This may have changed:  Another medication with the same name was removed. Continue taking this medication, and follow the directions you see here.   Changed by:  Carrillo Duran MD        by Other route continuous prn.   Refills:  0       metroNIDAZOLE 1 % gel   Commonly known as:  METROGEL   This may have changed:    - when to take this  - reasons to take this   Used for:  Rosacea        Apply  topically daily.   Quantity:  60 g   Refills:  11       warfarin 5 MG tablet   Commonly known as:  COUMADIN   This may have changed:  Another medication with the same name was removed. Continue taking this medication, and follow the directions you see here.   Used for:  Chronic atrial fibrillation (H)   Changed by:  Carrillo Duran MD        TAKE 1 TO 2 TABLETS BY MOUTH DAILY   Quantity:  180 tablet   Refills:  0         Stop taking these medicines if you haven't already. Please contact your care team if you have questions.     COMPRESSION STOCKINGS   Stopped by:  Enrique Walker MD                    Primary Care Provider Office Phone # Fax #    Enrique Walker [phone] [address] [facility] [address]  [facility] [address]        Equal Access to Services     [facility]: Hadii aad ku hadasho Soomaali, waaxda luqadaha, qaybta kaalmada lexi, crystal conway. So [facility] [phone].    ATENCIÓN: luiz Mcmahon  a pino disposición servicios gratuitos de asistencia lingüística. Hemalatha alexandra [phone].    We comply with applicable federal civil rights laws and [place] laws. We do not discriminate on the basis of race, color, national origin, age, disability, sex, sexual orientation, or gender identity.            Thank you!     Thank you for choosing [facility]  for your care. Our goal is always to provide you with excellent care. Hearing back from our patients is one way we can continue to improve our services. Please take a few minutes to complete the written survey that you may receive in the mail after your visit with us. Thank you!             Your Updated Medication List - Protect others around you: Learn how to safely use, store and throw away your medicines at www.disposemymeds.org.          This list is accurate as of: 12/1/17  9:12 AM.  Always use your most recent med list.                   Brand Name Dispense Instructions for use Diagnosis    ASPIRIN NOT PRESCRIBED    INTENTIONAL     by Other route continuous prn.        CENTRUM SILVER PO      Take 1 tablet by mouth daily.    Chronic atrial fibrillation (H), Hypertension goal BP (blood pressure) < 140/90, Hyperlipidemia LDL goal <130, Hypercholesteremia       clindamycin 300 MG capsule    CLEOCIN    4 Cap    2 pills one hour before dental work    S/P mitral valve replacement       FISH OIL CONCENTRATE PO      Take  by mouth daily.        lisinopril 40 MG tablet    PRINIVIL/ZESTRIL    90 tablet    TAKE 1 TABLET(40 MG) BY MOUTH DAILY    Essential hypertension with goal blood pressure less than 140/90       metoprolol 100 MG 24 hr tablet    TOPROL-XL    90 tablet    Take 1 tablet (100 mg) by mouth daily    Chronic atrial fibrillation (H), Essential hypertension       metroNIDAZOLE 1 % gel    METROGEL    60 g    Apply  topically daily.    Rosacea       order for DME     1 each    Equipment being ordered: COMPRESSION STOCKINGS, 20-30 mmHg    Lower extremity  edema       topiramate 50 MG tablet    TOPAMAX    180 tablet    Take 1 tablet (50 mg) by mouth 2 times daily    BMI 50.0-59.9, adult (H)       warfarin 5 MG tablet    COUMADIN    180 tablet    TAKE 1 TO 2 TABLETS BY MOUTH DAILY    Chronic atrial fibrillation (H)

## 2017-12-01 NOTE — NURSING NOTE
"Chief Complaint   Patient presents with     Weight Check       Initial /78 (Cuff Size: Adult Large)  Pulse 66  Temp 97.5  F (36.4  C) (Oral)  Ht 5' 11.5\" (1.816 m)  Wt (!) 382 lb (173.3 kg)  SpO2 98%  BMI 52.54 kg/m2 Estimated body mass index is 52.54 kg/(m^2) as calculated from the following:    Height as of this encounter: 5' 11.5\" (1.816 m).    Weight as of this encounter: 382 lb (173.3 kg).  Medication Reconciliation: complete    "

## 2017-12-20 LAB — INR PPP: 3.4

## 2017-12-21 ENCOUNTER — TELEPHONE (OUTPATIENT)
Dept: FAMILY MEDICINE | Facility: CLINIC | Age: 72
End: 2017-12-21

## 2017-12-21 ENCOUNTER — ANTICOAGULATION THERAPY VISIT (OUTPATIENT)
Dept: FAMILY MEDICINE | Facility: CLINIC | Age: 72
End: 2017-12-21
Payer: COMMERCIAL

## 2017-12-21 DIAGNOSIS — Z79.01 LONG-TERM (CURRENT) USE OF ANTICOAGULANTS: ICD-10-CM

## 2017-12-21 DIAGNOSIS — Z95.2 HEART VALVE REPLACED: ICD-10-CM

## 2017-12-21 PROCEDURE — 99207 ZZC NO CHARGE NURSE ONLY: CPT | Performed by: INTERNAL MEDICINE

## 2017-12-21 NOTE — PROGRESS NOTES
ANTICOAGULATION FOLLOW-UP CLINIC VISIT    Patient Name:  Feng Wynne  Date:  12/21/2017  Contact Type:  Telephone/ Pt phoned with Home INR results.     SUBJECTIVE:     Patient Findings     Positives No Problem Findings           OBJECTIVE    INR   Date Value Ref Range Status   12/20/2017 3.4  Final       ASSESSMENT / PLAN  INR assessment THER    Recheck INR In: 3 WEEKS    INR Location Home INR      Anticoagulation Summary as of 12/21/2017     INR goal 2.5-3.5   Today's INR 3.4 (12/20/2017)   Maintenance plan 5 mg (5 mg x 1) on Tue, Thu, Sat; 10 mg (5 mg x 2) all other days   Full instructions 5 mg on Tue, Thu, Sat; 10 mg all other days   Weekly total 55 mg   No change documented Ashlie Tubbs RN   Plan last modified Love Elliott RN (6/27/2017)   Next INR check 1/10/2018   Priority INR   Target end date     Indications   Long-term (current) use of anticoagulants [Z79.01] [Z79.01]  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  Called patient regarding Home INR results.   Patient states he sent yesterday's results to [facility], however fax not yet received in office.  Patient states there has been an ongoing problem with [facility] getting results to correct PCP.  Nurse will contact [facility] today to make sure their records are updated and that patient's current PCP is Dr Walker.      Ashlie Tubbs, RN

## 2017-12-21 NOTE — TELEPHONE ENCOUNTER
Reason for Call:  INR    Who is calling?  Patient called     Phone number:  [phone]    Fax number:  None    Name of caller: Feng    INR Value:  3.4 Yesterday  Are there any other concerns:  No    Can we leave a detailed message on this number? YES    Phone number patient can be reached at: Home number on file [phone] (home)      Call taken on 12/21/2017 at 11:08 AM by Gabriela Black

## 2017-12-21 NOTE — MR AVS SNAPSHOT
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] [Z95.2]         December 2017 Details    Sun Mon Tue Wed Thu Fri Sat          1               2                 3               4               5               6               7               8               9                 10               11               12               13               14               15               16                 17               18               19               20               21      5 mg   See details      22      10 mg         23      5 mg           24      10 mg         25      10 mg         26      5 mg         27      10 mg         28      5 mg         29      10 mg         30      5 mg           31      10 mg                Date Details   12/21 This INR check               How to take your warfarin dose     To take:  5 mg Take 1 of the 5 mg tablets.    To take:  10 mg Take 2 of the 5 mg tablets.           January 2018 Details    Sun Mon Tue Wed Thu Fri Sat      1      10 mg         2      5 mg         3      10 mg         4      5 mg         5      10 mg         6      5 mg           7      10 mg         8      10 mg         9      5 mg         10            11               12               13                 14               15               16               17               18               19               20                 21               22               23               24               25               26               27                  28               29               30               31                   Date Details   No additional details    Date of next INR:  1/10/2018         How to take your warfarin dose     To take:  5 mg Take 1 of the 5 mg tablets.    To take:  10 mg Take 2 of the 5 mg tablets.

## 2018-01-09 ENCOUNTER — ANTICOAGULATION THERAPY VISIT (OUTPATIENT)
Dept: FAMILY MEDICINE | Facility: CLINIC | Age: 73
End: 2018-01-09
Payer: COMMERCIAL

## 2018-01-09 DIAGNOSIS — Z95.2 HEART VALVE REPLACED: ICD-10-CM

## 2018-01-09 DIAGNOSIS — Z79.01 LONG-TERM (CURRENT) USE OF ANTICOAGULANTS: ICD-10-CM

## 2018-01-09 LAB — INR PPP: 3.8

## 2018-01-09 PROCEDURE — 99207 ZZC NO CHARGE NURSE ONLY: CPT | Performed by: INTERNAL MEDICINE

## 2018-01-09 NOTE — MR AVS SNAPSHOT
Feng Sergiojoleen Wynne   1/9/2018   Anticoagulation Therapy Visit    Description:  73 year old male   Provider:  Enrique Walker MD   Department:  [facility]           INR as of 1/9/2018     Today's INR 3.8!      Anticoagulation Summary as of 1/9/2018     INR goal 2.5-3.5   Today's INR 3.8!   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] [Z95.2]         Description     [facility] home monitoring      January 2018 Details    Sun Mon Tue Wed Thu Fri Sat      1               2               3               4               5               6                 7               8               9      5 mg   See details      10      10 mg         11      5 mg         12      10 mg         13      5 mg           14      10 mg         15      10 mg         16      5 mg         17      10 mg         18      5 mg         19      10 mg         20      5 mg           21      10 mg         22      10 mg         23      5 mg         24      10 mg         25      5 mg         26      10 mg         27      5 mg           28      10 mg         29      10 mg         30            31                   Date Details   01/09 This INR check       Date of next INR:  1/30/2018         How to take your warfarin dose     To take:  5 mg Take 1 of the 5 mg tablets.    To take:  10 mg Take 2 of the 5 mg tablets.

## 2018-01-09 NOTE — PROGRESS NOTES
ANTICOAGULATION FOLLOW-UP CLINIC VISIT    Patient Name:  Feng Wynne  Date:  1/9/2018  Contact Type:  Telephone    SUBJECTIVE:        OBJECTIVE    INR   Date Value Ref Range Status   01/09/2018 3.8  Final       ASSESSMENT / PLAN  INR assessment SUPRA    Recheck INR In: 3 WEEKS    INR Location Home INR      Anticoagulation Summary as of 1/9/2018     INR goal 2.5-3.5   Today's INR 3.8!   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 Patient informed of dosing instructions via Mychart.  Dosing based on [facility] Protocol and Provider directed care plan.      Love Elliott, RN

## 2018-01-12 ENCOUNTER — TELEPHONE (OUTPATIENT)
Dept: FAMILY MEDICINE | Facility: CLINIC | Age: 73
End: 2018-01-12

## 2018-01-12 NOTE — TELEPHONE ENCOUNTER
Pt on day 10 of URI sx.   Is improving with homecare measures.  Denies nasal secretions, denies fever.  Has some ongoing cough, congestion, slight wheezing when lying down at night, but this is improving, and not causing SOB.       As no nasal secretions, sx on mend, did advise could monitor at home with warm fluids, sinus rinse/neti pot, otc cough syrup.  Pt will be seen in urgent care if any worsening of wheeze or nasal sx, or will call for OV.  If not improving, will call for visit.    INR: Pt home monitors INR, if sx not improved, will recheck INR next week and call into clinic.    Liana Owens RN

## 2018-01-12 NOTE — TELEPHONE ENCOUNTER
"Reason for call:  Patient reporting a symptom    Symptom or request: Cough, Congestion, \"stuffed head\", no taste, no smell, exhaustion     Duration (how long have symptoms been present): 10    Have you been treated for this before? No    Additional comments: Pt states no fever    Phone Number patient can be reached at:  Cell number on file:    Telephone Information:   Mobile [phone]       Best Time:  any    Can we leave a detailed message on this number:  YES    Call taken on 1/12/2018 at 9:26 AM by Naye Perry  "

## 2018-01-22 ENCOUNTER — TELEPHONE (OUTPATIENT)
Dept: FAMILY MEDICINE | Facility: CLINIC | Age: 73
End: 2018-01-22

## 2018-01-22 NOTE — TELEPHONE ENCOUNTER
Reason for Call:  Question regarding INR results     Detailed comments: Marta is calling to verify Dr. Walker does NOT want any of the new  INR results faxed to him.     Marta states that their chart currently indicates that Dr. Walker does not want new INR results as of now they are only reporting critical value INR results     Spoke with Marta from [facility]   Ph. [phone] Option 4 NO VM          Call taken on 1/22/2018 at 4:23 PM by Rose Hinds

## 2018-01-23 ENCOUNTER — ANTICOAGULATION THERAPY VISIT (OUTPATIENT)
Dept: NURSING | Facility: CLINIC | Age: 73
End: 2018-01-23
Payer: COMMERCIAL

## 2018-01-23 ENCOUNTER — TELEPHONE (OUTPATIENT)
Dept: FAMILY MEDICINE | Facility: CLINIC | Age: 73
End: 2018-01-23

## 2018-01-23 DIAGNOSIS — Z79.01 LONG-TERM (CURRENT) USE OF ANTICOAGULANTS: ICD-10-CM

## 2018-01-23 DIAGNOSIS — Z95.2 HEART VALVE REPLACED: ICD-10-CM

## 2018-01-23 LAB — INR PPP: 4

## 2018-01-23 NOTE — TELEPHONE ENCOUNTER
See anticoagulation encounter 1/23/18  I called patient and left VM to call clinic back to assess if there has been any changes that could be causing INR to elevate.  In my VM I did leave detail on dosing instruction and that I want him to recheck INR in 1 week.    Clara Ybarra RN

## 2018-01-23 NOTE — TELEPHONE ENCOUNTER
Reason for Call:  INR    Who is calling?  Patient    Phone number:  [phone]    Fax number:  n/a    Name of caller: Feng    INR Value:  4.0    Are there any other concerns:  No    Can we leave a detailed message on this number? YES    Phone number patient can be reached at: Home number on file [phone] (home)      Call taken on 1/23/2018 at 11:38 AM by Dawn Joyce

## 2018-01-23 NOTE — PROGRESS NOTES
ANTICOAGULATION FOLLOW-UP CLINIC VISIT    Patient Name:  Feng Wynne  Date:  1/23/2018  Contact Type:  Telephone/ Left detailed VM for patient with dosing instruction and recheck date. Asked that he call clinic back to see if there is any changes that could be causing INR to elevate.     SUBJECTIVE:     Patient Findings     Positives Unexplained INR or factor level change           OBJECTIVE    INR   Date Value Ref Range Status   01/23/2018 4.0  Final       ASSESSMENT / PLAN  INR assessment SUPRA    Recheck INR In: 1 WEEK    INR Location Home INR      Anticoagulation Summary as of 1/23/2018     INR goal 2.5-3.5   Today's INR 4.0!   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  Advised 5 mg dose on Wednesday instead of 10 mg. Then resume normal dose after that.   Recheck in 1 week as INR is SUPRA.     Clara Ybarra, RN

## 2018-01-23 NOTE — TELEPHONE ENCOUNTER
Called Letitia and provided fax number to always fax INR results to [facility].     Clara Ybarra RN

## 2018-01-23 NOTE — PROGRESS NOTES
ANTICOAGULATION FOLLOW-UP CLINIC VISIT    Patient Name:  Feng Wynne  Date:  1/23/2018  Contact Type:  Telephone/ Spoke with patient on the phone and provided dosing instruction and recheck date.     SUBJECTIVE:     Patient Findings     Positives Inflammation    Comments Cold symptoms that started 2 weeks ago.   Was taking Robitussin and had decreased food intake.  He was using netti pot as well.  Cold symptoms are getting better. This could be causing INR to be elevated.           OBJECTIVE    INR   Date Value Ref Range Status   01/23/2018 4.0  Final       ASSESSMENT / PLAN  INR assessment SUPRA    Recheck INR In: 1 WEEK    INR Location Home INR      Anticoagulation Summary as of 1/23/2018     INR goal 2.5-3.5   Today's INR 4.0!   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      Clara Ybarra, RN

## 2018-01-23 NOTE — MR AVS SNAPSHOT
Feng Wynne   1/23/2018   Anticoagulation Therapy Visit    Description:  73 year old male   Provider:  Enrique Walker MD   Department:  [facility]           INR as of 1/23/2018     Today's INR 4.0!      Anticoagulation Summary as of 1/23/2018     INR goal 2.5-3.5   Today's INR 4.0!   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] [Z95.2]         Contact Numbers     Clinic Number:         January 2018 Details    Sun Mon Tue Wed Thu Fri Sat      1               2               3               4               5               6                 7               8               9               10               11               12               13                 14               15               16               17               18               19               20                 21               22               23      5 mg   See details      24      5 mg         25      5 mg         26      10 mg         27      5 mg           28      10 mg         29      10 mg         30            31                   Date Details   01/23 This INR check       Date of next INR:  1/30/2018         How to take your warfarin dose     To take:  5 mg Take 1 of the 5 mg tablets.    To take:  10 mg Take 2 of the 5 mg tablets.

## 2018-01-23 NOTE — TELEPHONE ENCOUNTER
Called patient and spoke with him.   Updated Anticoagulation Encounter.   Patient recovering from cold symptoms.    Clara Ybarra RN

## 2018-01-30 ENCOUNTER — ANTICOAGULATION THERAPY VISIT (OUTPATIENT)
Dept: NURSING | Facility: CLINIC | Age: 73
End: 2018-01-30
Payer: COMMERCIAL

## 2018-01-30 DIAGNOSIS — Z95.2 HEART VALVE REPLACED: ICD-10-CM

## 2018-01-30 DIAGNOSIS — Z79.01 LONG-TERM (CURRENT) USE OF ANTICOAGULANTS: ICD-10-CM

## 2018-01-30 LAB — INR PPP: 3.1

## 2018-01-30 NOTE — PROGRESS NOTES
ANTICOAGULATION FOLLOW-UP CLINIC VISIT    Patient Name:  Feng Wynne  Date:  1/30/2018  Contact Type:  Telephone/ Left detailed VM for patient with dosing instruction and recheck date. Will recheck in 2 week as patient was instructed to resume maintenance plan.     SUBJECTIVE:        OBJECTIVE    INR   Date Value Ref Range Status   01/30/2018 3.1  Final       ASSESSMENT / PLAN  INR assessment THER    Recheck INR In: 1 WEEK    INR Location Home INR      Anticoagulation Summary as of 1/30/2018     INR goal 2.5-3.5   Today's INR 3.1   Maintenance plan 5 mg (5 mg x 1) on Tue, Thu, Sat; 10 mg (5 mg x 2) all other days   Full instructions 5 mg on Tue, Thu, Sat; 10 mg all other days   Weekly total 55 mg   No change documented Clara Ybarra RN   Plan last modified Love Elliott RN (6/27/2017)   Next INR check 2/6/2018   Priority INR   Target end date     Indications   Long-term (current) use of anticoagulants [Z79.01] [Z79.01]  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      Clara Ybarra RN

## 2018-01-30 NOTE — MR AVS SNAPSHOT
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] [Z95.2]         Contact Numbers     Clinic Number:         January 2018 Details    Sun Mon Tue Wed Thu Fri Sat      1               2               3               4               5               6                 7               8               9               10               11               12               13                 14               15               16               17               18               19               20                 21               22               23               24               25               26               27                 28               29               30      5 mg   See details      31      10 mg             Date Details   01/30 This INR check               How to take your warfarin dose     To take:  5 mg Take 1 of the 5 mg tablets.    To take:  10 mg Take 2 of the 5 mg tablets.           February 2018 Details    Sun Mon Tue Wed Thu Fri Sat         1      5 mg         2      10 mg         3      5 mg           4      10 mg         5      10 mg         6            7               8               9               10                 11               12               13               14               15               16               17                 18               19               20               21               22               23               24                 25               26               27               28                   Date Details   No additional  details    Date of next INR:  2/6/2018         How to take your warfarin dose     To take:  5 mg Take 1 of the 5 mg tablets.    To take:  10 mg Take 2 of the 5 mg tablets.

## 2018-02-05 ENCOUNTER — TELEPHONE (OUTPATIENT)
Dept: FAMILY MEDICINE | Facility: CLINIC | Age: 73
End: 2018-02-05

## 2018-02-05 NOTE — TELEPHONE ENCOUNTER
Reason for Call:  Other call back and prescription    Detailed comments: pt is wondering if a nurse can call her back about appt. States not having symptoms but doesn't want to risk coming in on 2/09 for a follow up.. Concerned with Dr. Walker being upset but wants to speak with nurse. I advised that it was his decision and that he can always just reschedule    Phone Number Patient can be reached at: Cell number on file:    Telephone Information:   Mobile [phone]       Best Time: anytime    Can we leave a detailed message on this number? YES    Call taken on 2/5/2018 at 9:32 AM by Christy Yarbrough

## 2018-02-06 NOTE — TELEPHONE ENCOUNTER
Getting over 3 week cold no Fever but a lot of congestion  He has a heart valve and he is very concerned about coming into the clinic   He would like to do a Tele visit if possible but he has no way of weighing his self at home   If a Tele visit is possible the patient can be reached at this number   If he can postpone the weight Management F/U until next month he would feel better about it   Please call with what Dr Walker wants to do    Phone [phone]

## 2018-02-06 NOTE — TELEPHONE ENCOUNTER
To PCP:     Avail to do an Telephone Visit with patient?     Please advise and we can route to  to schedule.     Thank you,     Soo NAILS RN

## 2018-02-07 ENCOUNTER — TELEPHONE (OUTPATIENT)
Dept: FAMILY MEDICINE | Facility: CLINIC | Age: 73
End: 2018-02-07

## 2018-02-07 ENCOUNTER — ANTICOAGULATION THERAPY VISIT (OUTPATIENT)
Dept: FAMILY MEDICINE | Facility: CLINIC | Age: 73
End: 2018-02-07
Payer: COMMERCIAL

## 2018-02-07 DIAGNOSIS — Z95.2 HEART VALVE REPLACED: ICD-10-CM

## 2018-02-07 DIAGNOSIS — Z79.01 LONG-TERM (CURRENT) USE OF ANTICOAGULANTS: ICD-10-CM

## 2018-02-07 LAB — INR PPP: 3.3

## 2018-02-07 PROCEDURE — 99207 ZZC NO CHARGE NURSE ONLY: CPT | Performed by: INTERNAL MEDICINE

## 2018-02-07 NOTE — TELEPHONE ENCOUNTER
Reason for Call:  INR    Who is calling?  Patient: Self Tested.    Phone number:  [phone]      Name of caller:  Feng    INR Value:  3.3    Are there any other concerns:  No    Can we leave a detailed message on this number? Not Applicable    Phone number patient can be reached at: Home number on file [phone] (home)      Call taken on 2/7/2018 at 12:06 PM by Jessie Gates

## 2018-02-07 NOTE — PROGRESS NOTES
ANTICOAGULATION FOLLOW-UP CLINIC VISIT    Patient Name:  Feng Wynne  Date:  2/7/2018  Contact Type:  Telephone/ Left detailed VM for patient with instructions, recheck, etc    SUBJECTIVE:     Patient Findings     Comments See TE from 2/5/2018 - patient currently has URI - waiting to see if PCP will do phone visit           OBJECTIVE    INR   Date Value Ref Range Status   02/07/2018 3.3  Final       ASSESSMENT / PLAN  INR assessment THER    Recheck INR In: 2 WEEKS    INR Location Home INR      Anticoagulation Summary as of 2/7/2018     INR goal 2.5-3.5   Today's INR 3.3   Maintenance plan 5 mg (5 mg x 1) on Tue, Thu, Sat; 10 mg (5 mg x 2) all other days   Full instructions 5 mg on Tue, Thu, Sat; 10 mg all other days   Weekly total 55 mg   No change documented Neyda Davis RN   Plan last modified Love Elliott RN (6/27/2017)   Next INR check 2/21/2018   Priority INR   Target end date     Indications   Long-term (current) use of anticoagulants [Z79.01] [Z79.01]  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    Neyda Davis, ONOFRE

## 2018-02-07 NOTE — MR AVS SNAPSHOT
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] [Z95.2]         February 2018 Details    Sun Mon Tue Wed Thu Fri Sat         1               2               3                 4               5               6               7      10 mg   See details      8      5 mg         9      10 mg         10      5 mg           11      10 mg         12      10 mg         13      5 mg         14      10 mg         15      5 mg         16      10 mg         17      5 mg           18      10 mg         19      10 mg         20      5 mg         21            22               23               24                 25               26               27               28                   Date Details   02/07 This INR check       Date of next INR:  2/21/2018         How to take your warfarin dose     To take:  5 mg Take 1 of the 5 mg tablets.    To take:  10 mg Take 2 of the 5 mg tablets.

## 2018-02-09 ENCOUNTER — VIRTUAL VISIT (OUTPATIENT)
Dept: FAMILY MEDICINE | Facility: CLINIC | Age: 73
End: 2018-02-09
Payer: COMMERCIAL

## 2018-02-09 VITALS — BODY MASS INDEX: 51.57 KG/M2 | WEIGHT: 315 LBS

## 2018-02-09 PROCEDURE — 99441 ZZC PHYSICIAN TELEPHONE EVALUATION 5-10 MIN: CPT | Performed by: INTERNAL MEDICINE

## 2018-02-09 NOTE — MR AVS SNAPSHOT
After Visit Summary   2/9/2018    Feng Wynne    MRN: [id number]           Patient Information     Date Of Birth          1945        Visit Information        Provider Department      2/9/2018 1:00 PM Enrique Walker MD [facility]        Today's Diagnoses     Class 3 obesity with body mass index (BMI) of 50.0 to 59.9 in adult, unspecified obesity type, unspecified whether serious comorbidity present (H)    -  1       Follow-ups after your visit        Who to contact     If you have questions or need follow up information about today's clinic visit or your schedule please contact [facility] directly at [phone].  Normal or non-critical lab and imaging results will be communicated to you by MyChart, letter or phone within 4 business days after the clinic has received the results. If you do not hear from us within 7 days, please contact the clinic through GetThishart or phone. If you have a critical or abnormal lab result, we will notify you by phone as soon as possible.  Submit refill requests through HubPages or call your pharmacy and they will forward the refill request to us. Please allow 3 business days for your refill to be completed.          Additional Information About Your Visit        MyChart Information     HubPages gives you secure access to your electronic health record. If you see a primary care provider, you can also send messages to your care team and make appointments. If you have questions, please call your primary care clinic.  If you do not have a primary care provider, please call [phone] and they will assist you.        Care EveryWhere ID     This is your Care EveryWhere ID. This could be used by other organizations to access your [place] medical records  [id number]        Your Vitals Were     BMI (Body Mass Index)                   51.57 kg/m2            Blood Pressure from Last 3 Encounters:   12/01/17 132/78   06/13/17  121/85   05/03/17 130/78    Weight from Last 3 Encounters:   02/09/18 (!) 375 lb (170.1 kg)   12/01/17 (!) 382 lb (173.3 kg)   10/19/17 (!) 394 lb (178.7 kg)              Today, you had the following     No orders found for display         Today's Medication Changes          These changes are accurate as of 2/9/18  1:13 PM.  If you have any questions, ask your nurse or doctor.               These medicines have changed or have updated prescriptions.        Dose/Directions    metroNIDAZOLE 1 % gel   Commonly known as:  METROGEL   This may have changed:    - when to take this  - reasons to take this   Used for:  Rosacea        Apply  topically daily.   Quantity:  60 g   Refills:  11                Primary Care Provider Office Phone # Fax #    Enrique Abhishek Walker [phone] [address] [facility] [address]  [facility] [address]        Equal Access to Services     ALANA BARNARD AH: Hadii benedict ku hadasho Soomaali, waaxda luqadaha, qaybta kaalmada adeegyada, crystal agosto . So [facility] [phone].    ATENCIÓN: Si sergiola espbrijesh, tiene a pino disposición servicios gratuitos de asistencia lingüística. Llame al [phone].    We comply with applicable federal civil rights laws and [place] laws. We do not discriminate on the basis of race, color, national origin, age, disability, sex, sexual orientation, or gender identity.            Thank you!     Thank you for choosing [facility]  for your care. Our goal is always to provide you with excellent care. Hearing back from our patients is one way we can continue to improve our services. Please take a few minutes to complete the written survey that you may receive in the mail after your visit with us. Thank you!             Your Updated Medication List - Protect others around you: Learn how to safely use, store and throw away your medicines at www.disposemymeds.org.          This list is accurate as of 2/9/18  1:13 PM.   Always use your most recent med list.                   Brand Name Dispense Instructions for use Diagnosis    ASPIRIN NOT PRESCRIBED    INTENTIONAL     by Other route continuous prn.        CENTRUM SILVER PO      Take 1 tablet by mouth daily.    Chronic atrial fibrillation (H), Hypertension goal BP (blood pressure) < 140/90, Hyperlipidemia LDL goal <130, Hypercholesteremia       clindamycin 300 MG capsule    CLEOCIN    4 Cap    2 pills one hour before dental work    S/P mitral valve replacement       FISH OIL CONCENTRATE PO      Take  by mouth daily.        lisinopril 40 MG tablet    PRINIVIL/ZESTRIL    90 tablet    TAKE 1 TABLET(40 MG) BY MOUTH DAILY    Essential hypertension with goal blood pressure less than 140/90       metoprolol succinate 100 MG 24 hr tablet    TOPROL-XL    90 tablet    Take 1 tablet (100 mg) by mouth daily    Chronic atrial fibrillation (H), Essential hypertension       metroNIDAZOLE 1 % gel    METROGEL    60 g    Apply  topically daily.    Rosacea       order for DME     1 each    Equipment being ordered: COMPRESSION STOCKINGS, 20-30 mmHg    Lower extremity edema       topiramate 50 MG tablet    TOPAMAX    180 tablet    Take 1 tablet (50 mg) by mouth 2 times daily    BMI 50.0-59.9, adult (H)       warfarin 5 MG tablet    COUMADIN    180 tablet    TAKE 1 TO 2 TABLETS BY MOUTH DAILY    Chronic atrial fibrillation (H)

## 2018-02-09 NOTE — PROGRESS NOTES
This note documents my telephone visit with Mr. Feng Wynne.  He is hesitant to return to the clinic for follow-up due to widespread community respiratory tract infections.    I last saw the patient at the clinic on 12/01/2017 for weight management.  Advised patient to continue his low calorie meal plan.  He was hesitant to try the Topamax due to listed side effects and still has not taken the medication.     Since his last clinic visit, he has lost 8 pounds; bringing his total weight loss to 20 pounds since we started his weight management on 10/19/2017.  Patient feels that he would've done better were it not for the high calorie holiday meals and going out to celebrate his birthday. He monitors his calorie intake more diligently and weighs himself regularly.    Had 3 weeks of URI but it has resolved.  No fever/chills, persistent cough, or shortness of breath.    Patient advised to continue his low calorie meal plan and calorie monitoring. We have decided to follow up at the clinic once the flu season is over.    Total time spent with the patient over the phone was 6 minutes.      Dr. Enrique Walker

## 2018-02-20 DIAGNOSIS — I10 ESSENTIAL HYPERTENSION WITH GOAL BLOOD PRESSURE LESS THAN 140/90: ICD-10-CM

## 2018-02-21 ENCOUNTER — TELEPHONE (OUTPATIENT)
Dept: FAMILY MEDICINE | Facility: CLINIC | Age: 73
End: 2018-02-21

## 2018-02-21 ENCOUNTER — ANTICOAGULATION THERAPY VISIT (OUTPATIENT)
Dept: FAMILY MEDICINE | Facility: CLINIC | Age: 73
End: 2018-02-21
Payer: COMMERCIAL

## 2018-02-21 DIAGNOSIS — Z79.01 LONG-TERM (CURRENT) USE OF ANTICOAGULANTS: ICD-10-CM

## 2018-02-21 DIAGNOSIS — Z95.2 HEART VALVE REPLACED: ICD-10-CM

## 2018-02-21 LAB — INR PPP: 3.2

## 2018-02-21 PROCEDURE — 99207 ZZC NO CHARGE NURSE ONLY: CPT | Performed by: INTERNAL MEDICINE

## 2018-02-21 RX ORDER — LISINOPRIL 40 MG/1
TABLET ORAL
Qty: 90 TABLET | Refills: 0 | Status: SHIPPED | OUTPATIENT
Start: 2018-02-21 | End: 2018-05-21

## 2018-02-21 NOTE — PROGRESS NOTES
ANTICOAGULATION FOLLOW-UP CLINIC VISIT    Patient Name:  Feng Wynne  Date:  2/21/2018  Contact Type:  Telephone    SUBJECTIVE:     Patient Findings     Positives No Problem Findings           OBJECTIVE    INR   Date Value Ref Range Status   02/21/2018 3.2  Final       ASSESSMENT / PLAN  INR assessment THER    Recheck INR In: 2 WEEKS    INR Location Home INR      Anticoagulation Summary as of 2/21/2018     INR goal 2.5-3.5   Today's INR 3.2   Maintenance plan 5 mg (5 mg x 1) on Tue, Thu, Sat; 10 mg (5 mg x 2) all other days   Full instructions 5 mg on Tue, Thu, Sat; 10 mg all other days   Weekly total 55 mg   No change documented Neyda Davis RN   Plan last modified Love Elliott RN (6/27/2017)   Next INR check 3/7/2018   Priority INR   Target end date     Indications   Long-term (current) use of anticoagulants [Z79.01] [Z79.01]  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  Left detailed VM for patient - continue same dose    Neyda Davis, ONOFRE

## 2018-02-21 NOTE — TELEPHONE ENCOUNTER
"Requested Prescriptions   Pending Prescriptions Disp Refills     lisinopril (PRINIVIL/ZESTRIL) 40 MG tablet  Last Written Prescription Date:  11/27/17  Last Fill Quantity: 90 tablet,  # refills: 0   Last office visit: 12/1/17 with prescribing provider:  Aaron   Future Office Visit:   90 tablet 0     Sig: TAKE 1 TABLET(40 MG) BY MOUTH DAILY    ACE Inhibitors (Including Combos) Protocol Failed    2/20/2018  8:34 PM       Failed - Normal serum creatinine on file in past 12 months    Recent Labs   Lab Test  10/04/16   0937   CR  0.78          Failed - Normal serum potassium on file in past 12 months    Recent Labs   Lab Test  10/04/16   0937   POTASSIUM  4.4          Passed - Blood pressure under 140/90 in past 12 months    BP Readings from Last 3 Encounters:   12/01/17 132/78   06/13/17 121/85   05/03/17 130/78          Passed - Recent or future visit with authorizing provider's specialty    Patient had office visit in the last year or has a visit in the next 30 days with authorizing provider.  See \"Patient Info\" tab in inbasket, or \"Choose Columns\" in Meds & Orders section of the refill encounter.        Passed - Patient is age 18 or older          "

## 2018-02-21 NOTE — TELEPHONE ENCOUNTER
Reason for Call:  INR    Who is calling?  Patient    Phone number:  [phone]    INR Value:  3.2    Are there any other concerns:  No    Can we leave a detailed message on this number? YES    Phone number patient can be reached at: Home number on file [phone] (home)      Call taken on 2/21/2018 at 2:56 PM by Jenny Karimi

## 2018-02-21 NOTE — MR AVS SNAPSHOT
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]         February 2018 Details    Sun Mon Tue Wed Thu Fri Sat         1               2               3                 4               5               6               7               8               9               10                 11               12               13               14               15               16               17                 18               19               20               21      10 mg   See details      22      5 mg         23      10 mg         24      5 mg           25      10 mg         26      10 mg         27      5 mg         28      10 mg             Date Details   02/21 This INR check               How to take your warfarin dose     To take:  5 mg Take 1 of the 5 mg tablets.    To take:  10 mg Take 2 of the 5 mg tablets.           March 2018 Details    Sun Mon Tue Wed Thu Fri Sat         1      5 mg         2      10 mg         3      5 mg           4      10 mg         5      10 mg         6      5 mg         7            8               9               10                 11               12               13               14               15               16               17                 18               19               20               21               22               23               24                 25               26               27               28               29               30               31                Date Details   No additional  details    Date of next INR:  3/7/2018         How to take your warfarin dose     To take:  5 mg Take 1 of the 5 mg tablets.    To take:  10 mg Take 2 of the 5 mg tablets.

## 2018-02-21 NOTE — TELEPHONE ENCOUNTER
Routing refill request to provider for review/approval because:  Labs not current:  Last labs 2016    Please advise as necessary    Magnolia Buckley, RN  [employer]

## 2018-03-07 ENCOUNTER — TELEPHONE (OUTPATIENT)
Dept: FAMILY MEDICINE | Facility: CLINIC | Age: 73
End: 2018-03-07

## 2018-03-07 ENCOUNTER — ANTICOAGULATION THERAPY VISIT (OUTPATIENT)
Dept: FAMILY MEDICINE | Facility: CLINIC | Age: 73
End: 2018-03-07
Payer: COMMERCIAL

## 2018-03-07 LAB — INR PPP: 4.2

## 2018-03-07 NOTE — TELEPHONE ENCOUNTER
Reason for Call:  INR    Who is calling?  Self     Phone number:  [phone]    Name of caller: Feng    INR Value:  4.2    Are there any other concerns:  No    Can we leave a detailed message on this number? YES    Phone number patient can be reached at: Home number on file [phone] (home)      Call taken on 3/7/2018 at 8:37 AM by Rhett Haro

## 2018-03-07 NOTE — PROGRESS NOTES
ANTICOAGULATION FOLLOW-UP CLINIC VISIT    Patient Name:  Feng Wynne  Date:  3/7/2018  Contact Type:  Telephone    SUBJECTIVE:     Patient Findings     Positives No Problem Findings           OBJECTIVE    INR   Date Value Ref Range Status   03/07/2018 4.2  Final       ASSESSMENT / PLAN  INR assessment SUPRA    Recheck INR In: 8 DAYS    INR Location Home INR    Billed home INR No      Anticoagulation Summary as of 3/7/2018     INR goal 2.5-3.5   Today's INR 4.2!   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 Patient reports INR 4.2 via [facility] home monitoring. His range is 2.5-3.5.  Slight decrease today only (5 mg) and recheck in 8 days.  Called patient with dosing.    Missy Hoskins RN

## 2018-03-08 ENCOUNTER — TRANSFERRED RECORDS (OUTPATIENT)
Dept: HEALTH INFORMATION MANAGEMENT | Facility: CLINIC | Age: 73
End: 2018-03-08

## 2018-03-14 ENCOUNTER — ANTICOAGULATION THERAPY VISIT (OUTPATIENT)
Dept: FAMILY MEDICINE | Facility: CLINIC | Age: 73
End: 2018-03-14
Payer: COMMERCIAL

## 2018-03-14 DIAGNOSIS — Z95.2 HEART VALVE REPLACED: ICD-10-CM

## 2018-03-14 DIAGNOSIS — Z79.01 LONG-TERM (CURRENT) USE OF ANTICOAGULANTS: ICD-10-CM

## 2018-03-14 LAB — INR PPP: 3.2

## 2018-03-14 PROCEDURE — 99207 ZZC NO CHARGE NURSE ONLY: CPT | Performed by: INTERNAL MEDICINE

## 2018-03-14 NOTE — PROGRESS NOTES
ANTICOAGULATION FOLLOW-UP CLINIC VISIT    Patient Name:  Feng Wynne  Date:  3/14/2018  Contact Type:  Telephone/ Dose instructions left on patient's home voice mail    SUBJECTIVE:     Patient Findings     Positives No Problem Findings           OBJECTIVE    INR   Date Value Ref Range Status   03/14/2018 3.2  Final       ASSESSMENT / PLAN  INR assessment THER    Recheck INR In: 2 WEEKS    INR Location Home INR      Anticoagulation Summary as of 3/14/2018     INR goal 2.5-3.5   Today's INR 3.2   Maintenance plan 5 mg (5 mg x 1) on Tue, Thu, Sat; 10 mg (5 mg x 2) all other days   Full instructions 5 mg on Tue, Thu, Sat; 10 mg all other days   Weekly total 55 mg   No change documented Martha Whitten RN   Plan last modified Love Elliott RN (6/27/2017)   Next INR check 3/28/2018   Priority INR   Target end date     Indications   Long-term (current) use of anticoagulants [Z79.01] [Z79.01]  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    Martha Whitten RN

## 2018-03-28 ENCOUNTER — TRANSFERRED RECORDS (OUTPATIENT)
Dept: HEALTH INFORMATION MANAGEMENT | Facility: CLINIC | Age: 73
End: 2018-03-28

## 2018-03-28 LAB — INR PPP: 3.4 (ref 2.5–3.5)

## 2018-03-30 ENCOUNTER — ANTICOAGULATION THERAPY VISIT (OUTPATIENT)
Dept: FAMILY MEDICINE | Facility: CLINIC | Age: 73
End: 2018-03-30
Payer: COMMERCIAL

## 2018-03-30 ENCOUNTER — TELEPHONE (OUTPATIENT)
Dept: FAMILY MEDICINE | Facility: CLINIC | Age: 73
End: 2018-03-30

## 2018-03-30 DIAGNOSIS — Z95.2 HEART VALVE REPLACED: ICD-10-CM

## 2018-03-30 DIAGNOSIS — Z79.01 LONG-TERM (CURRENT) USE OF ANTICOAGULANTS: ICD-10-CM

## 2018-03-30 LAB — INR PPP: 3.4

## 2018-03-30 PROCEDURE — 99207 ZZC NO CHARGE NURSE ONLY: CPT | Performed by: INTERNAL MEDICINE

## 2018-03-30 NOTE — TELEPHONE ENCOUNTER
Reason for Call:  INR    Who is calling?  Pt     Phone number:  [phone]    Fax number:      Name of caller: laurence    INR Value:  3.4    Are there any other concerns:  Pt just wanted to report value today no call back needed unless there questions.    Can we leave a detailed message on this number? YES    Phone number patient can be reached at: Home number on file [phone] (home)      Call taken on 3/30/2018 at 8:43 AM by Tha Ruth

## 2018-03-30 NOTE — MR AVS SNAPSHOT
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]         March 2018 Details    Sun Mon Tue Wed Thu Fri Sat         1               2               3                 4               5               6               7               8               9               10                 11               12               13               14               15               16               17                 18               19               20               21               22               23               24                 25               26               27               28               29               30      10 mg   See details      31      5 mg          Date Details   03/30 This INR check               How to take your warfarin dose     To take:  5 mg Take 1 of the 5 mg tablets.    To take:  10 mg Take 2 of the 5 mg tablets.           April 2018 Details    Sun Mon Tue Wed Thu Fri Sat     1      10 mg         2      10 mg         3      5 mg         4      10 mg         5      5 mg         6      10 mg         7      5 mg           8      10 mg         9      10 mg         10      5 mg         11      10 mg         12      5 mg         13            14                 15               16               17               18               19               20               21                 22               23               24               25               26               27               28                 29               30                     Date  Details   No additional details    Date of next INR:  4/13/2018         How to take your warfarin dose     To take:  5 mg Take 1 of the 5 mg tablets.    To take:  10 mg Take 2 of the 5 mg tablets.

## 2018-03-30 NOTE — TELEPHONE ENCOUNTER
See anticoagulation encounter, INR therapeutic.   Recommend continuing same dose and rechecking INR in 2 weeks  Irena ARCOS RN

## 2018-03-30 NOTE — PROGRESS NOTES
ANTICOAGULATION FOLLOW-UP CLINIC VISIT    Patient Name:  Feng Wynne  Date:  3/30/2018  Contact Type:  Telephone    SUBJECTIVE:     Patient Findings     Positives No Problem Findings           OBJECTIVE    INR   Date Value Ref Range Status   03/30/2018 3.4  Final       ASSESSMENT / PLAN  INR assessment THER    Recheck INR In: 2 WEEKS    INR Location Home INR    Billed home INR Yes      Anticoagulation Summary as of 3/30/2018     INR goal 2.5-3.5   Today's INR 3.4   Maintenance plan 5 mg (5 mg x 1) on Tue, Thu, Sat; 10 mg (5 mg x 2) all other days   Full instructions 5 mg on Tue, Thu, Sat; 10 mg all other days   Weekly total 55 mg   No change documented Irena Jaramillo RN   Plan last modified Love Elliott RN (6/27/2017)   Next INR check 4/13/2018   Priority INR   Target end date     Indications   Long-term (current) use of anticoagulants [Z79.01] [Z79.01]  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 No missed/extra warfarin doses. No unusual bruising/bleeding or other changes. Pt will continue same warfarin dose and recheck INR in 2weeks.     Irena Jaramillo RN

## 2018-04-05 DIAGNOSIS — I48.20 CHRONIC ATRIAL FIBRILLATION (H): ICD-10-CM

## 2018-04-05 RX ORDER — WARFARIN SODIUM 5 MG/1
5-10 TABLET ORAL DAILY
Qty: 180 TABLET | Refills: 0 | Status: SHIPPED | OUTPATIENT
Start: 2018-04-05 | End: 2018-11-08

## 2018-04-05 RX ORDER — WARFARIN SODIUM 5 MG/1
TABLET ORAL
Qty: 60 TABLET | Refills: 0 | Status: SHIPPED | OUTPATIENT
Start: 2018-04-05 | End: 2019-11-05

## 2018-04-05 RX ORDER — WARFARIN SODIUM 5 MG/1
TABLET ORAL
Qty: 60 TABLET | Refills: 0 | Status: CANCELLED | OUTPATIENT
Start: 2018-04-05

## 2018-04-05 NOTE — TELEPHONE ENCOUNTER
"Requested Prescriptions   Pending Prescriptions Disp Refills     warfarin (COUMADIN) 5 MG tablet [Pharmacy Med Name: WARFARIN SOD 5MG TABLETS (PEACH)] 180 tablet 0     Sig: TAKE 1 TO 2 TABLETS BY MOUTH DAILY    Vitamin K Antagonists Failed    4/5/2018  3:57 AM       Failed - INR is within goal in the past 6 weeks    Confirm INR is within goal in the past 6 weeks.     Recent Labs   Lab Test 03/30/18   INR  3.4                      Passed - Recent (12 mo) or future (30 days) visit within the authorizing provider's specialty    Patient had office visit in the last 12 months or has a visit in the next 30 days with authorizing provider or within the authorizing provider's specialty.  See \"Patient Info\" tab in inbasket, or \"Choose Columns\" in Meds & Orders section of the refill encounter.           Passed - Patient is 18 years of age or older        "

## 2018-04-05 NOTE — TELEPHONE ENCOUNTER
Prescription approved per [facility] Refill Protocol.  Due for physical May 2018  Neyda CHANDLER RN

## 2018-04-05 NOTE — TELEPHONE ENCOUNTER
warfarin (COUMADIN) 5 MG tablet 60 tablet 0 4/5/2018       Last Written Prescription Date:  4-5-2018  Last Fill Quantity: 60,  # refills: 0   Last office visit: 2/9/2018 with prescribing provider:  Dr. Walker   Future Office Visit:  Unknown    Fax from [facility] stating: Patient requesting 90 day supply    Roya Berry MA

## 2018-04-05 NOTE — TELEPHONE ENCOUNTER
Prescription approved per [facility] Refill Protocol.  5 mg on Tue, Thu, Sat; 10 mg all other days.  Love Elliott RN

## 2018-04-11 ENCOUNTER — TELEPHONE (OUTPATIENT)
Dept: FAMILY MEDICINE | Facility: CLINIC | Age: 73
End: 2018-04-11

## 2018-04-11 ENCOUNTER — ANTICOAGULATION THERAPY VISIT (OUTPATIENT)
Dept: FAMILY MEDICINE | Facility: CLINIC | Age: 73
End: 2018-04-11
Payer: COMMERCIAL

## 2018-04-11 DIAGNOSIS — Z79.01 LONG-TERM (CURRENT) USE OF ANTICOAGULANTS: ICD-10-CM

## 2018-04-11 DIAGNOSIS — Z95.2 HEART VALVE REPLACED: ICD-10-CM

## 2018-04-11 LAB — INR PPP: 1.8

## 2018-04-11 PROCEDURE — 99207 ZZC NO CHARGE NURSE ONLY: CPT | Performed by: INTERNAL MEDICINE

## 2018-04-11 NOTE — MR AVS SNAPSHOT
Feng Tomjoleen Wynne   4/11/2018   Anticoagulation Therapy Visit    Description:  73 year old male   Provider:  Enrique Walker MD   Department:  [facility]           INR as of 4/11/2018     Today's INR 1.8!      Anticoagulation Summary as of 4/11/2018     INR goal 2.5-3.5   Today's INR 1.8!   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]         April 2018 Details    Sun Mon Tue Wed Thu Fri Sat     1               2               3               4               5               6               7                 8               9               10               11      12.5 mg   See details      12      10 mg         13      10 mg         14      5 mg           15      10 mg         16      10 mg         17      5 mg         18            19               20               21                 22               23               24               25               26               27               28                 29               30                     Date Details   04/11 This INR check       Date of next INR:  4/18/2018         How to take your warfarin dose     To take:  5 mg Take 1 of the 5 mg tablets.    To take:  10 mg Take 2 of the 5 mg tablets.    To take:  12.5 mg Take 2.5 of the 5 mg tablets.

## 2018-04-11 NOTE — TELEPHONE ENCOUNTER
Noted; spoke with patient.  Forgot a warfarin dose on Sunday also.  He will follow INR dosing instructions that were given earlier today and recheck one week.  Missy Hoskins RN

## 2018-04-11 NOTE — TELEPHONE ENCOUNTER
Pt calling wanting to make sure we are aware that he took 4 antibiotics this morning     Clindamycin 150 MG

## 2018-04-11 NOTE — TELEPHONE ENCOUNTER
Reason for Call:  INR    Who is calling?  Patient    INR Value:  1.8    Are there any other concerns:  No    Can we leave a detailed message on this number? YES    Phone number patient can be reached at: Home number on file [phone] (home)      Call taken on 4/11/2018 at 2:23 PM by Rhett Haro

## 2018-04-18 ENCOUNTER — ANTICOAGULATION THERAPY VISIT (OUTPATIENT)
Dept: FAMILY MEDICINE | Facility: CLINIC | Age: 73
End: 2018-04-18
Payer: COMMERCIAL

## 2018-04-18 ENCOUNTER — TELEPHONE (OUTPATIENT)
Dept: FAMILY MEDICINE | Facility: CLINIC | Age: 73
End: 2018-04-18

## 2018-04-18 DIAGNOSIS — Z95.2 HEART VALVE REPLACED: ICD-10-CM

## 2018-04-18 DIAGNOSIS — Z79.01 LONG-TERM (CURRENT) USE OF ANTICOAGULANTS: ICD-10-CM

## 2018-04-18 LAB — INR PPP: 3.2

## 2018-04-18 PROCEDURE — 99207 ZZC NO CHARGE NURSE ONLY: CPT | Performed by: INTERNAL MEDICINE

## 2018-04-18 NOTE — TELEPHONE ENCOUNTER
Reason for Call:  INR    Who is calling?  Patient    Phone number:  [phone]    Name of caller: Feng    INR Value:  3.2    Are there any other concerns:  No    Can we leave a detailed message on this number? YES    Phone number patient can be reached at: Home number on file [phone] (home)      Call taken on 4/18/2018 at 8:40 AM by Fausto Sharma

## 2018-04-18 NOTE — PROGRESS NOTES
ANTICOAGULATION FOLLOW-UP CLINIC VISIT    Patient Name:  Feng Wynne  Date:  4/18/2018  Contact Type:  Telephone    SUBJECTIVE:     Patient Findings     Positives No Problem Findings           OBJECTIVE    INR   Date Value Ref Range Status   04/18/2018 3.2  Final       ASSESSMENT / PLAN  INR assessment THER    Recheck INR In: 1 WEEK    INR Location Home INR      Anticoagulation Summary as of 4/18/2018     INR goal 2.5-3.5   Today's INR 3.2   Maintenance plan 5 mg (5 mg x 1) on Tue, Thu, Sat; 10 mg (5 mg x 2) all other days   Full instructions 4/19: 10 mg; Otherwise 5 mg on Tue, Thu, Sat; 10 mg all other days   Weekly total 55 mg   Plan last modified Love Elliott RN (6/27/2017)   Next INR check 4/25/2018   Priority INR   Target end date     Indications   Long-term (current) use of anticoagulants [Z79.01] [Z79.01]  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  Left detailed VM for patient - see calendar for dosing info    Neyda Davis RN

## 2018-04-18 NOTE — MR AVS SNAPSHOT
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]         April 2018 Details    Sun Mon Tue Wed Thu Fri Sat     1               2               3               4               5               6               7                 8               9               10               11               12               13               14                 15               16               17               18      10 mg   See details      19      10 mg         20      10 mg         21      5 mg           22      10 mg         23      10 mg         24      5 mg         25            26               27               28                 29               30                     Date Details   04/18 This INR check       Date of next INR:  4/25/2018         How to take your warfarin dose     To take:  5 mg Take 1 of the 5 mg tablets.    To take:  10 mg Take 2 of the 5 mg tablets.

## 2018-04-25 ENCOUNTER — TELEPHONE (OUTPATIENT)
Dept: FAMILY MEDICINE | Facility: CLINIC | Age: 73
End: 2018-04-25

## 2018-04-25 ENCOUNTER — ANTICOAGULATION THERAPY VISIT (OUTPATIENT)
Dept: FAMILY MEDICINE | Facility: CLINIC | Age: 73
End: 2018-04-25
Payer: COMMERCIAL

## 2018-04-25 DIAGNOSIS — Z95.2 HEART VALVE REPLACED: ICD-10-CM

## 2018-04-25 DIAGNOSIS — Z79.01 LONG-TERM (CURRENT) USE OF ANTICOAGULANTS: ICD-10-CM

## 2018-04-25 LAB — INR PPP: 2.9

## 2018-04-25 PROCEDURE — 99207 ZZC NO CHARGE NURSE ONLY: CPT | Performed by: INTERNAL MEDICINE

## 2018-04-25 NOTE — MR AVS SNAPSHOT
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]         April 2018 Details    Sun Mon Tue Wed Thu Fri Sat     1               2               3               4               5               6               7                 8               9               10               11               12               13               14                 15               16               17               18               19               20               21                 22               23               24               25      10 mg   See details      26      10 mg         27      10 mg         28      5 mg           29      10 mg         30      10 mg               Date Details   04/25 This INR check               How to take your warfarin dose     To take:  5 mg Take 1 of the 5 mg tablets.    To take:  10 mg Take 2 of the 5 mg tablets.           May 2018 Details    Sun Mon Tue Wed Thu Fri Sat       1      5 mg         2            3               4               5                 6               7               8               9               10               11               12                 13               14               15               16               17               18               19                 20               21               22               23               24               25               26                 27               28               29               30               31                  Date Details   No additional details    Date  of next INR:  5/2/2018         How to take your warfarin dose     To take:  5 mg Take 1 of the 5 mg tablets.    To take:  10 mg Take 2 of the 5 mg tablets.

## 2018-04-25 NOTE — TELEPHONE ENCOUNTER
Reason for Call:  INR    Who is calling?  Pt calling    Phone number: [phone]    Name of caller: Feng Wynne    INR Value:  2.9 fingerstick    Are there any other concerns:  No    Can we leave a detailed message on this number? YES    Phone number patient can be reached at: Cell number on file:    Telephone Information:   Mobile [phone]         Call taken on 4/25/2018 at 12:54 PM by Annette Banegas

## 2018-04-25 NOTE — PROGRESS NOTES
ANTICOAGULATION FOLLOW-UP CLINIC VISIT    Patient Name:  Feng Wynne  Date:  4/25/2018  Contact Type:  Telephone/ Dose instructions left on patient's voice mail    SUBJECTIVE:     Patient Findings     Positives No Problem Findings           OBJECTIVE    INR   Date Value Ref Range Status   04/25/2018 2.9  Final       ASSESSMENT / PLAN  INR assessment THER    Recheck INR In: 1 WEEK    INR Location Home INR      Anticoagulation Summary as of 4/25/2018     INR goal 2.5-3.5   Today's INR 2.9   Maintenance plan 5 mg (5 mg x 1) on Tue, Sat; 10 mg (5 mg x 2) all other days   Full instructions 5 mg on Tue, Sat; 10 mg all other days   Weekly total 60 mg   Plan last modified Martha Whitten RN (4/25/2018)   Next INR check 5/2/2018   Priority INR   Target end date     Indications   Long-term (current) use of anticoagulants [Z79.01] [Z79.01]  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    Martha Whitten RN

## 2018-04-26 ENCOUNTER — TELEPHONE (OUTPATIENT)
Dept: FAMILY MEDICINE | Facility: CLINIC | Age: 73
End: 2018-04-26

## 2018-04-26 NOTE — TELEPHONE ENCOUNTER
Returned call to patient.    Reviewed patient's recent INR results and Warfarin dosing recommendations.  Advised patient to take Warfarin as directed yesterday--which is the same dosing as the previous week (INR=2.9).  Will have next INR recheck x 1 wk and will adjust dosing if needed at that time.    Patient stated understanding and agreement with advise/plan.    Ashlie GALE RN,BSN

## 2018-04-26 NOTE — TELEPHONE ENCOUNTER
Reason for Call:  Other call back    Detailed comments: patient got off schedule with INR last week.  He has received 2 messages with different dosing instructions for his INR.  Patient would like a call back with the correct dose.    Please call land line listed/not cell.    Phone Number Patient can be reached at: Home number on file [phone] (home)    Best Time: asap    Can we leave a detailed message on this number? YES    Call taken on 4/26/2018 at 8:51 AM by Veronique Anderson  .

## 2018-05-02 ENCOUNTER — ANTICOAGULATION THERAPY VISIT (OUTPATIENT)
Dept: FAMILY MEDICINE | Facility: CLINIC | Age: 73
End: 2018-05-02
Payer: COMMERCIAL

## 2018-05-02 ENCOUNTER — TELEPHONE (OUTPATIENT)
Dept: FAMILY MEDICINE | Facility: CLINIC | Age: 73
End: 2018-05-02

## 2018-05-02 DIAGNOSIS — Z95.2 HEART VALVE REPLACED: ICD-10-CM

## 2018-05-02 DIAGNOSIS — Z79.01 LONG-TERM (CURRENT) USE OF ANTICOAGULANTS: ICD-10-CM

## 2018-05-02 LAB — INR PPP: 3.9

## 2018-05-02 PROCEDURE — 99207 ZZC NO CHARGE NURSE ONLY: CPT | Performed by: INTERNAL MEDICINE

## 2018-05-02 NOTE — PROGRESS NOTES
ANTICOAGULATION FOLLOW-UP CLINIC VISIT    Patient Name:  Feng Wynne  Date:  5/2/2018  Contact Type:  Telephone    SUBJECTIVE:     Patient Findings     Positives No Problem Findings           OBJECTIVE    INR   Date Value Ref Range Status   05/02/2018 3.9  Final       ASSESSMENT / PLAN  INR assessment SUPRA    Recheck INR In: 1 WEEK    INR Location Home INR      Anticoagulation Summary as of 5/2/2018     INR goal 2.5-3.5   Today's INR 3.9!   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  Left detailed VM for patient that we adjust today's dose only - take 5mg today instead of 10mg   Recheck INR 1 week  Advised he callback if anything going on that caused SUPRA INR    Neyda Davis RN

## 2018-05-02 NOTE — TELEPHONE ENCOUNTER
Reason for Call:  INR    Who is calling?  patient    Phone number:  [phone]    Fax number:      Name of caller: Feng    INR Value:  3.9    Are there any other concerns:  Yes: patient is thinking he may need to change coumadin.  He may not be available by phone much today, so please leave a message at this number.    Can we leave a detailed message on this number? YES    Phone number patient can be reached at: Cell number on file:    Telephone Information:   Mobile [phone]         Call taken on 5/2/2018 at 8:50 AM by Veronique Anderson  .

## 2018-05-02 NOTE — MR AVS SNAPSHOT
Feng Sergiojoleen Wynne   5/2/2018   Anticoagulation Therapy Visit    Description:  73 year old male   Provider:  Enrique Walker MD   Department:  [facility]           INR as of 5/2/2018     Today's INR 3.9!      Anticoagulation Summary as of 5/2/2018     INR goal 2.5-3.5   Today's INR 3.9!   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]         May 2018 Details    Sun Mon Tue Wed Thu Fri Sat       1               2      5 mg   See details      3      10 mg         4      10 mg         5      5 mg           6      10 mg         7      10 mg         8      5 mg         9            10               11               12                 13               14               15               16               17               18               19                 20               21               22               23               24               25               26                 27               28               29               30               31                  Date Details   05/02 This INR check       Date of next INR:  5/9/2018         How to take your warfarin dose     To take:  5 mg Take 1 of the 5 mg tablets.    To take:  10 mg Take 2 of the 5 mg tablets.

## 2018-05-04 DIAGNOSIS — I10 ESSENTIAL HYPERTENSION: ICD-10-CM

## 2018-05-04 DIAGNOSIS — I48.20 CHRONIC ATRIAL FIBRILLATION (H): ICD-10-CM

## 2018-05-04 RX ORDER — METOPROLOL SUCCINATE 100 MG/1
100 TABLET, EXTENDED RELEASE ORAL DAILY
Qty: 90 TABLET | Refills: 1 | Status: SHIPPED | OUTPATIENT
Start: 2018-05-04 | End: 2018-10-29

## 2018-05-04 NOTE — TELEPHONE ENCOUNTER
Prescription approved per [facility] Refill Protocol.  Love Elliott RN

## 2018-05-09 ENCOUNTER — ANTICOAGULATION THERAPY VISIT (OUTPATIENT)
Dept: FAMILY MEDICINE | Facility: CLINIC | Age: 73
End: 2018-05-09
Payer: COMMERCIAL

## 2018-05-09 DIAGNOSIS — Z79.01 LONG-TERM (CURRENT) USE OF ANTICOAGULANTS: ICD-10-CM

## 2018-05-09 DIAGNOSIS — Z95.2 HEART VALVE REPLACED: ICD-10-CM

## 2018-05-09 LAB — INR PPP: 3.4

## 2018-05-09 PROCEDURE — 99207 ZZC NO CHARGE NURSE ONLY: CPT | Performed by: INTERNAL MEDICINE

## 2018-05-09 NOTE — PROGRESS NOTES
ANTICOAGULATION FOLLOW-UP CLINIC VISIT    Patient Name:  Feng Wynne  Date:  5/9/2018  Contact Type:  Telephone/ with pt    SUBJECTIVE:     Patient Findings     Positives No Problem Findings           OBJECTIVE    INR   Date Value Ref Range Status   05/09/2018 3.4  Final       ASSESSMENT / PLAN  INR assessment THER    Recheck INR In: 1 WEEK    INR Location Home INR      Anticoagulation Summary as of 5/9/2018     INR goal 2.5-3.5   Today's INR 3.4   Maintenance plan 5 mg (5 mg x 1) on Mon, Wed, Fri; 10 mg (5 mg x 2) all other days   Full instructions 5 mg on Mon, Wed, Fri; 10 mg all other days   Weekly total 55 mg   Plan last modified Liana Owens RN (5/9/2018)   Next INR check 5/16/2018   Priority INR   Target end date     Indications   Long-term (current) use of anticoagulants [Z79.01] [Z79.01]  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  Pt requested to go back to 5mg tid, which did work this last week, but to have it MWF, already adjusted one 5 mg dose from Tues to Monday.  Pt states this works better and easier to remember.  Scheduled pt for this dosing to encourage consistency, recheck 1 week.    Liana Owens, RN

## 2018-05-09 NOTE — MR AVS SNAPSHOT
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]         May 2018 Details    Sun Mon Tue Wed Thu Fri Sat       1               2               3               4               5                 6               7               8               9      5 mg   See details      10      10 mg         11      5 mg         12      10 mg           13      10 mg         14      5 mg         15      10 mg         16            17               18               19                 20               21               22               23               24               25               26                 27               28               29               30               31                  Date Details   05/09 This INR check       Date of next INR:  5/16/2018         How to take your warfarin dose     To take:  5 mg Take 1 of the 5 mg tablets.    To take:  10 mg Take 2 of the 5 mg tablets.

## 2018-05-11 ENCOUNTER — MYC MEDICAL ADVICE (OUTPATIENT)
Dept: FAMILY MEDICINE | Facility: CLINIC | Age: 73
End: 2018-05-11

## 2018-05-17 ENCOUNTER — TELEPHONE (OUTPATIENT)
Dept: FAMILY MEDICINE | Facility: CLINIC | Age: 73
End: 2018-05-17

## 2018-05-17 ENCOUNTER — ANTICOAGULATION THERAPY VISIT (OUTPATIENT)
Dept: FAMILY MEDICINE | Facility: CLINIC | Age: 73
End: 2018-05-17
Payer: COMMERCIAL

## 2018-05-17 DIAGNOSIS — Z95.2 HEART VALVE REPLACED: ICD-10-CM

## 2018-05-17 DIAGNOSIS — Z79.01 LONG-TERM (CURRENT) USE OF ANTICOAGULANTS: ICD-10-CM

## 2018-05-17 LAB — INR PPP: 3.1

## 2018-05-17 PROCEDURE — 99207 ZZC NO CHARGE NURSE ONLY: CPT | Performed by: INTERNAL MEDICINE

## 2018-05-17 NOTE — TELEPHONE ENCOUNTER
Reason for Call:  INR    Who is calling?  Patient Feng     Phone number:  [phone]    Fax number:  None    Name of caller: Feng    INR Value:  3.1    Are there any other concerns:  No  He said we can just take it because he was driving and we can LVM because he will be driving around all day     Can we leave a detailed message on this number? YES     Phone number patient can be reached at: Home number on file [phone] (home)      Call taken on 5/17/2018 at 10:32 AM by Gabriela Black

## 2018-05-17 NOTE — PROGRESS NOTES
ANTICOAGULATION FOLLOW-UP CLINIC VISIT    Patient Name:  Feng Wynne  Date:  5/17/2018  Contact Type:  Telephone    SUBJECTIVE:     Patient Findings     Positives No Problem Findings           OBJECTIVE    INR   Date Value Ref Range Status   05/17/2018 3.1  Final       ASSESSMENT / PLAN  INR assessment THER    Recheck INR In: 2 WEEKS    INR Location Home INR      Anticoagulation Summary as of 5/17/2018     INR goal 2.5-3.5   Today's INR 3.1   Maintenance plan 5 mg (5 mg x 1) on Mon, Wed, Fri; 10 mg (5 mg x 2) all other days   Full instructions 5 mg on Mon, Wed, Fri; 10 mg all other days   Weekly total 55 mg   Plan last modified Liana Owens RN (5/9/2018)   Next INR check 5/31/2018   Priority INR   Target end date     Indications   Long-term (current) use of anticoagulants [Z79.01] [Z79.01]  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    Liana Owens RN

## 2018-05-21 DIAGNOSIS — I10 ESSENTIAL HYPERTENSION WITH GOAL BLOOD PRESSURE LESS THAN 140/90: ICD-10-CM

## 2018-05-21 NOTE — TELEPHONE ENCOUNTER
"Last Written Prescription Date:  2/21/2018  Last Fill Quantity: 90,  # refills: 0   Last office visit: 2/9/2018 with prescribing provider:     Future Office Visit:      Requested Prescriptions   Pending Prescriptions Disp Refills     lisinopril (PRINIVIL/ZESTRIL) 40 MG tablet 90 tablet 0     Sig: TAKE 1 TABLET(40 MG) BY MOUTH DAILY    ACE Inhibitors (Including Combos) Protocol Failed    5/21/2018 12:06 PM       Failed - Normal serum creatinine on file in past 12 months    Recent Labs   Lab Test  10/04/16   0937   CR  0.78            Failed - Normal serum potassium on file in past 12 months    Recent Labs   Lab Test  10/04/16   0937   POTASSIUM  4.4            Passed - Blood pressure under 140/90 in past 12 months    BP Readings from Last 3 Encounters:   12/01/17 132/78   06/13/17 121/85   05/03/17 130/78                Passed - Recent (12 mo) or future (30 days) visit within the authorizing provider's specialty    Patient had office visit in the last 12 months or has a visit in the next 30 days with authorizing provider or within the authorizing provider's specialty.  See \"Patient Info\" tab in inbasket, or \"Choose Columns\" in Meds & Orders section of the refill encounter.           Passed - Patient is age 18 or older          "

## 2018-05-21 NOTE — TELEPHONE ENCOUNTER
Reason for Call:  Medication or medication refill:    Do you use a [facility]?  Name of the pharmacy and phone number for the current request:  [facility] [address] [facility], [place] - [address] AT [facility] [address]      Name of the medication requested: lisinopril (PRINIVIL/ZESTRIL) 40 MG tablet    Other request: PT has about 5 days remaining     Can we leave a detailed message on this number? YES    Phone number patient can be reached at: Home number on file [phone] (home)    Best Time: any     Call taken on 5/21/2018 at 7:56 AM by Rose Hinds

## 2018-05-22 RX ORDER — LISINOPRIL 40 MG/1
TABLET ORAL
Qty: 90 TABLET | Refills: 0 | Status: SHIPPED | OUTPATIENT
Start: 2018-05-22 | End: 2018-08-17

## 2018-05-31 ENCOUNTER — TELEPHONE (OUTPATIENT)
Dept: FAMILY MEDICINE | Facility: CLINIC | Age: 73
End: 2018-05-31

## 2018-05-31 ENCOUNTER — ANTICOAGULATION THERAPY VISIT (OUTPATIENT)
Dept: FAMILY MEDICINE | Facility: CLINIC | Age: 73
End: 2018-05-31
Payer: COMMERCIAL

## 2018-05-31 DIAGNOSIS — Z95.2 HEART VALVE REPLACED: ICD-10-CM

## 2018-05-31 DIAGNOSIS — Z79.01 LONG-TERM (CURRENT) USE OF ANTICOAGULANTS: ICD-10-CM

## 2018-05-31 LAB — INR PPP: 3.4

## 2018-05-31 PROCEDURE — 99207 ZZC NO CHARGE NURSE ONLY: CPT | Performed by: INTERNAL MEDICINE

## 2018-05-31 NOTE — TELEPHONE ENCOUNTER
Reason for Call:  INR    Who is calling?  Patient    INR Value:  3.4    Are there any other concerns:  No    Can we leave a detailed message on this number? YES    Phone number patient can be reached at: Home number on file [phone] (home)      Call taken on 5/31/2018 at 8:47 AM by Rhett Haro

## 2018-05-31 NOTE — MR AVS SNAPSHOT
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]         May 2018 Details    Sun Mon Tue Wed Thu Fri Sat       1               2               3               4               5                 6               7               8               9               10               11               12                 13               14               15               16               17               18               19                 20               21               22               23               24               25               26                 27               28               29               30               31      10 mg   See details         Date Details   05/31 This INR check               How to take your warfarin dose     To take:  10 mg Take 2 of the 5 mg tablets.           June 2018 Details    Sun Mon Tue Wed Thu Fri Sat          1      5 mg         2      10 mg           3      10 mg         4      5 mg         5      10 mg         6      5 mg         7      10 mg         8      5 mg         9      10 mg           10      10 mg         11      5 mg         12      10 mg         13            14               15               16                 17               18               19               20               21               22               23                 24               25               26               27               28               29               30                Date Details   No additional details    Date of  next INR:  6/13/2018         How to take your warfarin dose     To take:  5 mg Take 1 of the 5 mg tablets.    To take:  10 mg Take 2 of the 5 mg tablets.

## 2018-05-31 NOTE — PROGRESS NOTES
ANTICOAGULATION FOLLOW-UP CLINIC VISIT    Patient Name:  Feng Wynne  Date:  5/31/2018  Contact Type:  Telephone/ Patient called with home INR result    SUBJECTIVE:     Patient Findings     Comments Reason for Call:  INR    Who is calling?  Patient    INR Value:  3.4    Are there any other concerns:  No    Can we leave a detailed message on this number? YES    Phone number patient can be reached at: Home number on file [phone] (home)      Call taken on 5/31/2018 at 8:47 AM by Rhett Haro               OBJECTIVE    INR   Date Value Ref Range Status   05/31/2018 3.4  Final       ASSESSMENT / PLAN  INR assessment THER    Recheck INR In: 2 WEEKS    INR Location Home INR      Anticoagulation Summary as of 5/31/2018     INR goal 2.5-3.5   Today's INR 3.4   Warfarin maintenance plan 5 mg (5 mg x 1) on Mon, Wed, Fri; 10 mg (5 mg x 2) all other days   Full warfarin instructions 5 mg on Mon, Wed, Fri; 10 mg all other days   Weekly warfarin total 55 mg   No change documented Ashlie Tubbs RN   Plan last modified Liana Owens RN (5/9/2018)   Next INR check 6/13/2018   Priority INR   Target end date     Indications   Long-term (current) use of anticoagulants [Z79.01] [Z79.01]  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  Called patient and left detailed message: Warfarin dosing and next INR date.  See flowsheet.       Ashlie Tubbs RN

## 2018-06-13 ENCOUNTER — TELEPHONE (OUTPATIENT)
Dept: FAMILY MEDICINE | Facility: CLINIC | Age: 73
End: 2018-06-13

## 2018-06-13 ENCOUNTER — ANTICOAGULATION THERAPY VISIT (OUTPATIENT)
Dept: FAMILY MEDICINE | Facility: CLINIC | Age: 73
End: 2018-06-13
Payer: COMMERCIAL

## 2018-06-13 DIAGNOSIS — Z79.01 LONG-TERM (CURRENT) USE OF ANTICOAGULANTS: ICD-10-CM

## 2018-06-13 DIAGNOSIS — Z95.2 HEART VALVE REPLACED: ICD-10-CM

## 2018-06-13 LAB — INR PPP: 3.8

## 2018-06-13 PROCEDURE — 99207 ZZC NO CHARGE NURSE ONLY: CPT | Performed by: INTERNAL MEDICINE

## 2018-06-13 NOTE — MR AVS SNAPSHOT
Feng Tomjoleen Wynne   6/13/2018   Anticoagulation Therapy Visit    Description:  73 year old male   Provider:  Enrique Walker MD   Department:  [facility]           INR as of 6/13/2018     Today's INR 3.8!      Anticoagulation Summary as of 6/13/2018     INR goal 2.5-3.5   Today's INR 3.8!   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]         Description     [facility] home monitoring      June 2018 Details    Sun Mon Tue Wed Thu Fri Sat          1               2                 3               4               5               6               7               8               9                 10               11               12               13      5 mg   See details      14      10 mg         15      5 mg         16      10 mg           17      10 mg         18      5 mg         19      10 mg         20      5 mg         21      10 mg         22      5 mg         23      10 mg           24      10 mg         25      5 mg         26      10 mg         27            28               29               30                Date Details   06/13 This INR check       Date of next INR:  6/27/2018         How to take your warfarin dose     To take:  5 mg Take 1 of the 5 mg tablets.    To take:  10 mg Take 2 of the 5 mg tablets.

## 2018-06-13 NOTE — PROGRESS NOTES
ANTICOAGULATION FOLLOW-UP CLINIC VISIT    Patient Name:  Feng Wynne  Date:  6/13/2018  Contact Type:  Telephone    SUBJECTIVE:        OBJECTIVE    INR   Date Value Ref Range Status   06/13/2018 3.8  Final       ASSESSMENT / PLAN  INR assessment SUPRA    Recheck INR In: 2 WEEKS    INR Location Home INR      Anticoagulation Summary as of 6/13/2018     INR goal 2.5-3.5   Today's INR 3.8!   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  No changes overall with Diet, exercise, etc.  Recommended he continue normal dosing and recheck in 2 weeks. Patient agrees with plan. Dosing based on [facility] Protocol and Provider directed care plan.      Love Elliott, RN

## 2018-06-13 NOTE — TELEPHONE ENCOUNTER
Reason for Call:  INR    Who is calling?  Patient    INR Value:  3.8    Are there any other concerns:  No    Can we leave a detailed message on this number? YES    Phone number patient can be reached at: Home number on file [phone] (home)      Call taken on 6/13/2018 at 4:04 PM by Jenny Karimi

## 2018-06-27 ENCOUNTER — TELEPHONE (OUTPATIENT)
Dept: FAMILY MEDICINE | Facility: CLINIC | Age: 73
End: 2018-06-27

## 2018-06-27 ENCOUNTER — ANTICOAGULATION THERAPY VISIT (OUTPATIENT)
Dept: FAMILY MEDICINE | Facility: CLINIC | Age: 73
End: 2018-06-27
Payer: COMMERCIAL

## 2018-06-27 DIAGNOSIS — Z79.01 LONG-TERM (CURRENT) USE OF ANTICOAGULANTS: ICD-10-CM

## 2018-06-27 DIAGNOSIS — Z95.2 HEART VALVE REPLACED: ICD-10-CM

## 2018-06-27 LAB — INR PPP: 3.6

## 2018-06-27 PROCEDURE — 99207 ZZC NO CHARGE NURSE ONLY: CPT | Performed by: INTERNAL MEDICINE

## 2018-06-27 NOTE — MR AVS SNAPSHOT
Feng Tomjoleen Wynne   6/27/2018   Anticoagulation Therapy Visit    Description:  73 year old male   Provider:  Enrique Walker MD   Department:  [facility]           INR as of 6/27/2018     Today's INR 3.6!      Anticoagulation Summary as of 6/27/2018     INR goal 2.5-3.5   Today's INR 3.6!   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]         June 2018 Details    Sun Mon Tue Wed Thu Fri Sat          1               2                 3               4               5               6               7               8               9                 10               11               12               13               14               15               16                 17               18               19               20               21               22               23                 24               25               26               27      5 mg   See details      28      10 mg         29      5 mg         30      10 mg          Date Details   06/27 This INR check               How to take your warfarin dose     To take:  5 mg Take 1 of the 5 mg tablets.    To take:  10 mg Take 2 of the 5 mg tablets.           July 2018 Details    Sun Mon Tue Wed Thu Fri Sat     1      10 mg         2      5 mg         3      10 mg         4      5 mg         5      10 mg         6      5 mg         7      10 mg           8      10 mg         9      5 mg         10      10 mg         11            12               13               14                 15               16               17               18               19               20               21                 22               23               24               25               26               27               28                 29               30               31                     Date Details   No additional details    Date of next INR:  7/11/2018         How to take your warfarin dose     To take:  5 mg Take 1 of the 5 mg tablets.    To take:  10 mg Take 2 of the 5 mg tablets.

## 2018-06-27 NOTE — TELEPHONE ENCOUNTER
Reason for Call:  INR    Who is calling?  Patient     Phone number:  [phone]    Name of caller:  Feng Wynne    INR Value:  3.6    Are there any other concerns:  No    Can we leave a detailed message on this number? YES    Phone number patient can be reached at: Home number on file [phone] (home)    Call taken on 6/27/2018 at 10:39 AM by Iqra Guzmán

## 2018-06-27 NOTE — PROGRESS NOTES
ANTICOAGULATION FOLLOW-UP CLINIC VISIT    Patient Name:  Feng Wynne  Date:  6/27/2018  Contact Type:  Telephone/ Dose instructions given to patient    SUBJECTIVE:     Patient Findings     Positives No Problem Findings           OBJECTIVE    INR   Date Value Ref Range Status   06/27/2018 3.6  Final       ASSESSMENT / PLAN  INR assessment THER    Recheck INR In: 2 WEEKS    INR Location Home INR      Anticoagulation Summary as of 6/27/2018     INR goal 2.5-3.5   Today's INR 3.6!   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    Martha Whitten RN

## 2018-07-11 ENCOUNTER — ANTICOAGULATION THERAPY VISIT (OUTPATIENT)
Dept: FAMILY MEDICINE | Facility: CLINIC | Age: 73
End: 2018-07-11
Payer: COMMERCIAL

## 2018-07-11 ENCOUNTER — TELEPHONE (OUTPATIENT)
Dept: FAMILY MEDICINE | Facility: CLINIC | Age: 73
End: 2018-07-11

## 2018-07-11 DIAGNOSIS — Z79.01 LONG-TERM (CURRENT) USE OF ANTICOAGULANTS: ICD-10-CM

## 2018-07-11 DIAGNOSIS — Z95.2 HEART VALVE REPLACED: ICD-10-CM

## 2018-07-11 LAB — INR PPP: 3.5

## 2018-07-11 PROCEDURE — 99207 ZZC NO CHARGE NURSE ONLY: CPT | Performed by: INTERNAL MEDICINE

## 2018-07-11 NOTE — PROGRESS NOTES
ANTICOAGULATION FOLLOW-UP CLINIC VISIT    Patient Name:  Feng Wynne  Date:  7/11/2018  Contact Type:  Telephone/ Dose instuctions left on patient's voice mail    SUBJECTIVE:     Patient Findings     Positives No Problem Findings           OBJECTIVE    INR   Date Value Ref Range Status   07/11/2018 3.5  Final       ASSESSMENT / PLAN  INR assessment THER    Recheck INR In: 2 WEEKS    INR Location Home INR      Anticoagulation Summary as of 7/11/2018     INR goal 2.5-3.5   Today's INR 3.5   Warfarin maintenance plan 5 mg (5 mg x 1) on Mon, Wed, Fri; 10 mg (5 mg x 2) all other days   Full warfarin instructions 5 mg on Mon, Wed, Fri; 10 mg all other days   Weekly warfarin total 55 mg   No change documented Martha Whitten RN   Plan last modified Liana Owens RN (5/9/2018)   Next INR check 7/25/2018   Priority INR   Target end date     Indications   Long-term (current) use of anticoagulants [Z79.01] [Z79.01]  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    Martha Whitten RN

## 2018-07-11 NOTE — TELEPHONE ENCOUNTER
Reason for Call:  INR    Who is calling?  Pt    Phone number:  [phone]    INR Value:  3.5    Are there any other concerns:  No    Can we leave a detailed message on this number? YES    Phone number patient can be reached at: Home number on file [phone] (home)      Call taken on 7/11/2018 at 9:32 AM by Jenny Karimi

## 2018-07-11 NOTE — MR AVS SNAPSHOT
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] [Z95.2]         July 2018 Details    Sun Mon Tue Wed Thu Fri Sat     1               2               3               4               5               6               7                 8               9               10               11      5 mg   See details      12      10 mg         13      5 mg         14      10 mg           15      10 mg         16      5 mg         17      10 mg         18      5 mg         19      10 mg         20      5 mg         21      10 mg           22      10 mg         23      5 mg         24      10 mg         25            26               27               28                 29               30               31                    Date Details   07/11 This INR check       Date of next INR:  7/25/2018         How to take your warfarin dose     To take:  5 mg Take 1 of the 5 mg tablets.    To take:  10 mg Take 2 of the 5 mg tablets.

## 2018-07-25 ENCOUNTER — TELEPHONE (OUTPATIENT)
Dept: FAMILY MEDICINE | Facility: CLINIC | Age: 73
End: 2018-07-25

## 2018-07-25 ENCOUNTER — ANTICOAGULATION THERAPY VISIT (OUTPATIENT)
Dept: FAMILY MEDICINE | Facility: CLINIC | Age: 73
End: 2018-07-25
Payer: COMMERCIAL

## 2018-07-25 LAB — INR PPP: 3.4

## 2018-07-25 PROCEDURE — 99207 ZZC NO CHARGE NURSE ONLY: CPT | Performed by: INTERNAL MEDICINE

## 2018-07-25 NOTE — PROGRESS NOTES
ANTICOAGULATION FOLLOW-UP CLINIC VISIT    Patient Name:  Feng Wynne  Date:  7/25/2018  Contact Type:  Telephone    SUBJECTIVE:     Patient Findings     Positives No Problem Findings           OBJECTIVE    INR   Date Value Ref Range Status   07/25/2018 3.4  Final       ASSESSMENT / PLAN  INR assessment THER    Recheck INR In: 2 WEEKS    INR Location Home INR    Billed home INR No      Anticoagulation Summary as of 7/25/2018     INR goal 2.5-3.5   Today's INR 3.4   Warfarin maintenance plan 5 mg (5 mg x 1) on Mon, Wed, Fri; 10 mg (5 mg x 2) all other days   Full warfarin instructions 5 mg on Mon, Wed, Fri; 10 mg all other days   Weekly warfarin total 55 mg   Plan last modified Laina Owens RN (5/9/2018)   Next INR check 8/8/2018   Priority INR   Target end date     Indications   Long-term (current) use of anticoagulants [Z79.01] [Z79.01]  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 INR 3.4 per phone message from patient.  Same dosing and recheck in about 2 weeks.    Missy Hoskins RN

## 2018-07-25 NOTE — TELEPHONE ENCOUNTER
See anticoagulation therapy encounter.  Left message for patient to continue same warfarin dosing and recheck in 2 weeks.  Missy Hoskins RN

## 2018-07-25 NOTE — TELEPHONE ENCOUNTER
Reason for Call:  INR    Who is calling?  Patient    Phone number:  [phone]    Name of caller: Feng    INR Value:  3.4    Are there any other concerns:  No    Can we leave a detailed message on this number? YES    Phone number patient can be reached at: Home number on file [phone] (home)      Call taken on 7/25/2018 at 8:53 AM by Fausto Sharma

## 2018-07-25 NOTE — MR AVS SNAPSHOT
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] [Z95.2]         July 2018 Details    Sun Mon Tue Wed Thu Fri Sat     1               2               3               4               5               6               7                 8               9               10               11               12               13               14                 15               16               17               18               19               20               21                 22               23               24               25      5 mg   See details      26      10 mg         27      5 mg         28      10 mg           29      10 mg         30      5 mg         31      10 mg              Date Details   07/25 This INR check               How to take your warfarin dose     To take:  5 mg Take 1 of the 5 mg tablets.    To take:  10 mg Take 2 of the 5 mg tablets.           August 2018 Details    Sun Mon Tue Wed Thu Fri Sat        1      5 mg         2      10 mg         3      5 mg         4      10 mg           5      10 mg         6      5 mg         7      10 mg         8            9               10               11                 12               13               14               15               16               17               18                 19               20               21               22               23               24               25                 26               27               28               29               30                31                 Date Details   No additional details    Date of next INR:  8/8/2018         How to take your warfarin dose     To take:  5 mg Take 1 of the 5 mg tablets.    To take:  10 mg Take 2 of the 5 mg tablets.

## 2018-07-26 ENCOUNTER — OFFICE VISIT (OUTPATIENT)
Dept: FAMILY MEDICINE | Facility: CLINIC | Age: 73
End: 2018-07-26
Payer: COMMERCIAL

## 2018-07-26 VITALS
HEART RATE: 62 BPM | OXYGEN SATURATION: 94 % | DIASTOLIC BLOOD PRESSURE: 80 MMHG | HEIGHT: 72 IN | SYSTOLIC BLOOD PRESSURE: 129 MMHG | WEIGHT: 315 LBS | TEMPERATURE: 98.5 F | BODY MASS INDEX: 42.66 KG/M2

## 2018-07-26 DIAGNOSIS — M25.511 CHRONIC RIGHT SHOULDER PAIN: ICD-10-CM

## 2018-07-26 DIAGNOSIS — Z12.11 COLON CANCER SCREENING: ICD-10-CM

## 2018-07-26 DIAGNOSIS — E66.01 MORBID OBESITY DUE TO EXCESS CALORIES (H): Primary | ICD-10-CM

## 2018-07-26 DIAGNOSIS — G89.29 CHRONIC RIGHT SHOULDER PAIN: ICD-10-CM

## 2018-07-26 PROCEDURE — 99213 OFFICE O/P EST LOW 20 MIN: CPT | Performed by: INTERNAL MEDICINE

## 2018-07-26 ASSESSMENT — ENCOUNTER SYMPTOMS
TREMORS: 0
HEADACHES: 0
PALPITATIONS: 0
DEPRESSION: 0
CONSTIPATION: 0
VOMITING: 0
ABDOMINAL PAIN: 0
NAUSEA: 0
DIARRHEA: 0
SHORTNESS OF BREATH: 0
NERVOUS/ANXIOUS: 0
INSOMNIA: 0

## 2018-07-26 ASSESSMENT — LIFESTYLE VARIABLES: SUBSTANCE_ABUSE: 0

## 2018-07-26 NOTE — PROGRESS NOTES
"HPI    SUBJECTIVE:   Feng Wynne is a 73 year old male who presents to clinic today for the following health issues:      Follow up weight   Shoulder pain -- 2.5 weeks; loss of motion, clicking; has been doing stretches and today it's a bit better, no trauma      I last saw the patient at the clinic on 12/1/2017 for weight management.  Advised patient to continue his low calorie meal plan.     Since his last clinic visit, he has lost 20 pounds; bringing his total weight loss to 32 pounds since we started his weight management on 10/19/2017.  His BMI has decreased from 54 to 49.  Has not tried the Topamax.      Past Medical History:   Diagnosis Date     Atrial fibrillation (H)      Coronary artery disease      Hypercholesteremia      Morbid obesity (H)      Unspecified essential hypertension        Review of Systems   Constitutional: Negative for malaise/fatigue.   Respiratory: Negative for shortness of breath.    Cardiovascular: Negative for chest pain and palpitations.   Gastrointestinal: Negative for abdominal pain, constipation, diarrhea, nausea and vomiting.   Neurological: Negative for tremors and headaches.   Psychiatric/Behavioral: Negative for depression and substance abuse. The patient is not nervous/anxious and does not have insomnia.        /80 (BP Location: Right arm, Cuff Size: Adult Large)  Pulse 62  Temp 98.5  F (36.9  C) (Tympanic)  Ht 5' 11.5\" (1.816 m)  Wt (!) 362 lb (164.2 kg)  SpO2 94%  BMI 49.79 kg/m2      Physical Exam   Constitutional: He is oriented to person, place, and time. No distress.   Neck: No thyromegaly present.   Cardiovascular: Normal rate, regular rhythm and normal heart sounds.    Pulmonary/Chest: Effort normal and breath sounds normal. No respiratory distress.   Abdominal: Soft. There is no tenderness.   Musculoskeletal: He exhibits no tenderness.   Mild to moderate reduction with active abduction of right arm at right shoulder joint due to pain "   Neurological: He is alert and oriented to person, place, and time. Coordination normal. GCS score is 15.   No tremors   Psychiatric: Mood and affect normal.   Vitals reviewed.        ICD-10-CM    1. Morbid obesity due to excess calories (H) E66.01    2. Chronic right shoulder pain M25.511 [facility] PT, HAND, AND CHIROPRACTIC REFERRAL    G89.29    3. Colon cancer screening Z12.11 Fecal colorectal cancer screen FIT       Patient Instructions   Proceed with Physical Therapy if your right shoulder pain persists/worsens.    Strictly count/monitor your calories with every meal/snack every day.    Maintain your low-calorie meal plan.    Monitor your weight once a week and set a weight-loss goal of at least 1-2 pounds per week.    Follow up in January 2019 for your full physical (please come in fasting).

## 2018-07-26 NOTE — PATIENT INSTRUCTIONS
Proceed with Physical Therapy if your right shoulder pain persists/worsens.    Strictly count/monitor your calories with every meal/snack every day.    Maintain your low-calorie meal plan.    Monitor your weight once a week and set a weight-loss goal of at least 1-2 pounds per week.    Follow up in January 2019 for your full physical (please come in fasting).

## 2018-07-26 NOTE — MR AVS SNAPSHOT
After Visit Summary   7/26/2018    Feng Wynne    MRN: [id number]           Patient Information     Date Of Birth          1945        Visit Information        Provider Department      7/26/2018 11:00 AM Enrique Walker MD [facility]        Today's Diagnoses     Chronic right shoulder pain    -  1      Care Instructions    Proceed with Physical Therapy if your right shoulder pain persists/worsens.    Strictly count/monitor your calories with every meal/snack every day.    Maintain your low-calorie meal plan.    Monitor your weight once a week and set a weight-loss goal of at least 1-2 pounds per week.    Follow up in January 2019 for your full physical (please come in fasting).            Follow-ups after your visit        Additional Services     [facility] PT, HAND, AND CHIROPRACTIC REFERRAL       **This order will print in the [facility] Scheduling Office**    Physical Therapy, Hand Therapy and Chiropractic Care are available through:    *[facility]  *[facility]  *[facility]    Call one number to schedule at any of the above locations: [phone].    Your provider has referred you to: Physical Therapy at [facility] or [facility]    Indication/Reason for Referral: Shoulder Pain  Onset of Illness: 3 weeks  Therapy Orders: Evaluate and Treat  Special Programs: as recommended by therapist  Special Request:     Aniceto [facility]      Additional Comments for the Therapist or Chiropractor:     Please be aware that coverage of these services is subject to the terms and limitations of your health insurance plan.  Call member services at your health plan with any benefit or coverage questions.      Please bring the following to your appointment:    *Your personal calendar for scheduling future appointments  *Comfortable clothing                  Who to contact     If you have questions or need follow up information about today's clinic  "visit or your schedule please contact [facility] directly at [phone].  Normal or non-critical lab and imaging results will be communicated to you by [facility] [employer], letter or phone within 4 business days after the clinic has received the results. If you do not hear from us within 7 days, please contact the clinic through [employer] or phone. If you have a critical or abnormal lab result, we will notify you by phone as soon as possible.  Submit refill requests through [email or web address] or call your pharmacy and they will forward the refill request to us. Please allow 3 business days for your refill to be completed.          Additional Information About Your Visit        [facility] Information     [email or web address] gives you secure access to your electronic health record. If you see a primary care provider, you can also send messages to your care team and make appointments. If you have questions, please call your primary care clinic.  If you do not have a primary care provider, please call [phone] and they will assist you.        Care EveryWhere ID     This is your Care EveryWhere ID. This could be used by other organizations to access your [facility] medical records  [id number]        Your Vitals Were     Pulse Temperature Height Pulse Oximetry BMI (Body Mass Index)       62 98.5  F (36.9  C) (Tympanic) 5' 11.5\" (1.816 m) 94% 49.79 kg/m2        Blood Pressure from Last 3 Encounters:   07/26/18 129/80   12/01/17 132/78   06/13/17 121/85    Weight from Last 3 Encounters:   07/26/18 (!) 362 lb (164.2 kg)   02/09/18 (!) 375 lb (170.1 kg)   12/01/17 (!) 382 lb (173.3 kg)              We Performed the Following     [facility] HAND, AND CH[facility]RACTIC REFERRAL          Today's Medication Changes          These changes are accurate as of 7/26/18 11:23 AM.  If you have any questions, ask your nurse or doctor.               These medicines have changed or have updated prescriptions.        Dose/Directions    metroNIDAZOLE 1 % gel   Commonly " known as:  METROGEL   This may have changed:    - when to take this  - reasons to take this   Used for:  Rosacea        Apply  topically daily.   Quantity:  60 g   Refills:  11         Stop taking these medicines if you haven't already. Please contact your care team if you have questions.     topiramate 50 MG tablet   Commonly known as:  TOPAMAX   Stopped by:  Enrique Walker MD                    Primary Care Provider Office Phone # Fax #    Enrique Walker [phone] [address]  [facility] [address]        Equal Access to Services     [facility]: Hadii aad ku hadasho Soomaali, waaxda luqadaha, qaybta kaalmada adeegyada, waxay bradleyin hayaan adearnulfo agosto . So [facility] [phone].    ATENCIÓN: Si habla español, tiene a pino disposición servicios gratuitos de asistencia lingüística. [facility] [phone].    We comply with applicable federal civil rights laws and [place] laws. We do not discriminate on the basis of race, color, national origin, age, disability, sex, sexual orientation, or gender identity.            Thank you!     Thank you for choosing [facility]  for your care. Our goal is always to provide you with excellent care. Hearing back from our patients is one way we can continue to improve our services. Please take a few minutes to complete the written survey that you may receive in the mail after your visit with us. Thank you!             Your Updated Medication List - Protect others around you: Learn how to safely use, store and throw away your medicines at www.disposemymeds.org.          This list is accurate as of 7/26/18 11:23 AM.  Always use your most recent med list.                   Brand Name Dispense Instructions for use Diagnosis    ASPIRIN NOT PRESCRIBED    INTENTIONAL     by Other route continuous prn.        CENTRUM SILVER PO      Take 1 tablet by mouth daily.    Chronic atrial fibrillation (H),  Hypertension goal BP (blood pressure) < 140/90, Hyperlipidemia LDL goal <130, Hypercholesteremia       clindamycin 300 MG capsule    CLEOCIN    4 Cap    2 pills one hour before dental work    S/P mitral valve replacement       FISH OIL CONCENTRATE PO      Take  by mouth daily.        lisinopril 40 MG tablet    PRINIVIL/ZESTRIL    90 tablet    TAKE 1 TABLET(40 MG) BY MOUTH DAILY    Essential hypertension with goal blood pressure less than 140/90       metoprolol succinate 100 MG 24 hr tablet    TOPROL-XL    90 tablet    Take 1 tablet (100 mg) by mouth daily    Chronic atrial fibrillation (H), Essential hypertension       metroNIDAZOLE 1 % gel    METROGEL    60 g    Apply  topically daily.    Rosacea       order for DME     1 each    Equipment being ordered: COMPRESSION STOCKINGS, 20-30 mmHg    Lower extremity edema       * warfarin 5 MG tablet    COUMADIN    60 tablet    TAKE 1 TO 2 TABLETS BY MOUTH DAILY    Chronic atrial fibrillation (H)       * warfarin 5 MG tablet    COUMADIN    180 tablet    Take 1-2 tablets (5-10 mg) by mouth daily (5 mg on Tue, Thu, Sat; 10 mg all other days) or as directed by INR clinic    Chronic atrial fibrillation (H)       * Notice:  This list has 2 medication(s) that are the same as other medications prescribed for you. Read the directions carefully, and ask your doctor or other care provider to review them with you.

## 2018-07-31 PROCEDURE — G0328 FECAL BLOOD SCRN IMMUNOASSAY: HCPCS | Performed by: INTERNAL MEDICINE

## 2018-08-02 DIAGNOSIS — I10 ESSENTIAL HYPERTENSION: ICD-10-CM

## 2018-08-02 DIAGNOSIS — I48.20 CHRONIC ATRIAL FIBRILLATION (H): ICD-10-CM

## 2018-08-02 DIAGNOSIS — Z12.11 COLON CANCER SCREENING: ICD-10-CM

## 2018-08-02 LAB — HEMOCCULT STL QL IA: NEGATIVE

## 2018-08-02 RX ORDER — METOPROLOL SUCCINATE 100 MG/1
100 TABLET, EXTENDED RELEASE ORAL DAILY
Qty: 90 TABLET | Refills: 1 | Status: CANCELLED | OUTPATIENT
Start: 2018-08-02

## 2018-08-02 NOTE — TELEPHONE ENCOUNTER
Last Written Prescription Date:  5/4/18  Last Fill Quantity: 90,  # refills: 1   Last office visit: 7/26/2018 with prescribing provider:     Future Office Visit:    Requested Prescriptions   Pending Prescriptions Disp Refills     metoprolol succinate (TOPROL-XL) 100 MG 24 hr tablet 90 tablet 1     Sig: Take 1 tablet (100 mg) by mouth daily    There is no refill protocol information for this order

## 2018-08-08 ENCOUNTER — TELEPHONE (OUTPATIENT)
Dept: FAMILY MEDICINE | Facility: CLINIC | Age: 73
End: 2018-08-08

## 2018-08-08 ENCOUNTER — ANTICOAGULATION THERAPY VISIT (OUTPATIENT)
Dept: FAMILY MEDICINE | Facility: CLINIC | Age: 73
End: 2018-08-08
Payer: COMMERCIAL

## 2018-08-08 LAB — INR PPP: 4.4

## 2018-08-08 PROCEDURE — 99207 ZZC NO CHARGE NURSE ONLY: CPT | Performed by: INTERNAL MEDICINE

## 2018-08-08 NOTE — MR AVS SNAPSHOT
Feng Tomjoleen Wynne   8/8/2018   Anticoagulation Therapy Visit    Description:  73 year old male   Provider:  Enrique Walker MD   Department:  [facility]           INR as of 8/8/2018     Today's INR 4.4!      Anticoagulation Summary as of 8/8/2018     INR goal 2.5-3.5   Today's INR 4.4!   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] [Z95.2]         August 2018 Details    Sun Mon Tue Wed Thu Fri Sat        1               2               3               4                 5               6               7               8      2.5 mg   See details      9      10 mg         10      5 mg         11      5 mg           12      10 mg         13      5 mg         14      10 mg         15      5 mg         16      10 mg         17      5 mg         18      5 mg           19      10 mg         20      5 mg         21      10 mg         22            23               24               25                 26               27               28               29               30               31                 Date Details   08/08 This INR check       Date of next INR:  8/22/2018         How to take your warfarin dose     To take:  2.5 mg Take 0.5 of a 5 mg tablet.    To take:  5 mg Take 1 of the 5 mg tablets.    To take:  10 mg Take 2 of the 5 mg tablets.

## 2018-08-08 NOTE — TELEPHONE ENCOUNTER
Reason for Call:  INR    Who is calling?  Patient    Phone number:  [phone]      Name of caller: Feng    INR Value:  4.4    Are there any other concerns:  Yes: it has stayed high for quite some time, feels dosage should be changed    Can we leave a detailed message on this number? NO    Phone number patient can be reached at: Home number on file [phone] (home)      Call taken on 8/8/2018 at 12:45 PM by Jessie Gates

## 2018-08-08 NOTE — TELEPHONE ENCOUNTER
Reason for Call:  Other prescription    Detailed comments: pt wants to know if he should lower his Warfarin dose based on recent INR results and the amount of greens that he is eating. He wants to know if he can do 4 days of 5mg or 3 days of 10mg.     Phone Number Patient can be reached at: Cell number on file:    Telephone Information:   Mobile [phone]       Best Time: anytime    Can we leave a detailed message on this number? YES    Call taken on 8/8/2018 at 9:48 AM by Elicia Hernández

## 2018-08-08 NOTE — PROGRESS NOTES
ANTICOAGULATION FOLLOW-UP CLINIC VISIT    Patient Name:  Feng Wynne  Date:  8/8/2018  Contact Type:  Telephone    SUBJECTIVE:        OBJECTIVE    INR   Date Value Ref Range Status   08/08/2018 4.4  Final       ASSESSMENT / PLAN  INR assessment SUPRA    Recheck INR In: 2 WEEKS    INR Location Home INR    Billed home INR No      Anticoagulation Summary as of 8/8/2018     INR goal 2.5-3.5   Today's INR 4.4!   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 INR 4.4 reported by patient/[facility] home monitoring.  He has been consistently on high end of range and wishes to try a weekly dose decrease.  Agree. 2.5 mg today only, then begin 5 mg MWFSa, 10 mg ROW.  Recheck 2 weeks or slightly sooner.  Patient verbalizes understanding.    Missy Hoskins, RN

## 2018-08-09 ENCOUNTER — THERAPY VISIT (OUTPATIENT)
Dept: PHYSICAL THERAPY | Facility: CLINIC | Age: 73
End: 2018-08-09
Payer: MEDICARE

## 2018-08-09 DIAGNOSIS — G89.29 CHRONIC RIGHT SHOULDER PAIN: ICD-10-CM

## 2018-08-09 DIAGNOSIS — M25.511 CHRONIC RIGHT SHOULDER PAIN: ICD-10-CM

## 2018-08-09 PROCEDURE — 97161 PT EVAL LOW COMPLEX 20 MIN: CPT | Mod: GP | Performed by: PHYSICAL THERAPIST

## 2018-08-09 PROCEDURE — 97110 THERAPEUTIC EXERCISES: CPT | Mod: GP | Performed by: PHYSICAL THERAPIST

## 2018-08-09 PROCEDURE — G8987 SELF CARE CURRENT STATUS: HCPCS | Mod: GP | Performed by: PHYSICAL THERAPIST

## 2018-08-09 PROCEDURE — G8988 SELF CARE GOAL STATUS: HCPCS | Mod: GP | Performed by: PHYSICAL THERAPIST

## 2018-08-09 NOTE — LETTER
DEPARTMENT OF HEALTH AND HUMAN SERVICES  [facility] FOR [facility] & MEDICA[facility] SERVICES    PLAN/UPDATED PLAN OF PROGRESS FOR OUTPATIENT REHABILITATION    PATIENTS NAME:  Feng Wynne   : 1945  PROVIDER NUMBER:    [id number]  HICN: [id number]   PROVIDER NAME: [facility]  MEDICAL RECORD NUMBER: [id number]   START OF CARE DATE:  SOC Date: 18   TYPE:  PT  PRIMARY/TREATMENT DIAGNOSIS: (Pertinent Medical Diagnosis)  Chronic right shoulder pain  VISITS FROM START OF CARE:  Rxs Used: 1     [facility] Initial Evaluation  Subjective:  Feng Wynne is a 73 year old male.    Patient s chief complaints: R shoulder pain.    Condition occurred due to no specific cause, notes long history of working as .  Date of Onset: around 18  Location of symptoms is R shoulder over lateral deltoid .  Symptoms other than pain include: weakness, clicking   Quality of pain is aching and frequency is intermittent.    Pain dependence on time of day is: worse in the pm.   Pain rating is: 4/10.    Symptoms are exacerbated by: reaching up, out or behind the back.    Symptoms are relieved by:  none.    Progression of symptoms is that symptoms are:  Overall slightly better than they were.  Imaging/Special tests include: none   Previous treatments include: none.   Patient reports that general health is: good.   Pertinent medical history includes:  OA, overweight, heart problems, implanted device (valve, knee).    Medical allergies includes: latex, penicillin.   Surgical history includes: mitral valve replacement.  Current medications include: cardiac, HBP  Current occupation is: retired  Work status is: retired  Primary job tasks include: none  Barriers include: none  Red flags include: none    Patient's expectations for therapy include: decrease pain, improve strength for use of R arm with reaching.    Objective:  SHOULDER:  Cervical Screen:  WNL          Shoulder:   PROM L PROM R AROM L AROM R MMT L MMT R   Flex  full WNL Pain at 120 5 3+   Abd   WNL Pain at 90 5 3+   Full Can     5 3+   Empty Can     5 3+   IR   WNL WNL 5 5   ER   WNL Pain at 30 4 3+   Ext/IR   T4 T7       Scapulothoraic Rhythm: R scap hike with elevation    Assessment/Plan:    Patient is a 73 year old male with right side shoulder complaints.    Patient has the following significant findings with corresponding treatment plan.                Diagnosis 1:  R shoulder, suspect rotator cuff tear vs tendinopathy (notable weakness, no loss of passive ROM)    Pain -  hot/cold therapy, manual therapy and home program  Decreased ROM/flexibility - manual therapy and therapeutic exercise  Decreased strength - therapeutic exercise and therapeutic activities  Decreased proprioception - neuro re-education and therapeutic activities  Decreased function - therapeutic activities  Impaired posture - neuro re-education    Therapy Evaluation Codes:   1) History comprised of:   Personal factors that impact the plan of care:      None.    Comorbidity factors that impact the plan of care are:      None.     Medications impacting care: None.  2) Examination of Body Systems comprised of:   Body structures and functions that impact the plan of care:      Shoulder.   Activity limitations that impact the plan of care are:      Bathing, Dressing, Lifting, Laying down and reaching.  3) Clinical presentation characteristics are:   Stable/Uncomplicated.  4) Decision-Making    Low complexity using standardized patient assessment instrument and/or measureable assessment of functional outcome.  Cumulative Therapy Evaluation is: Low complexity.    Previous and current functional limitations:  (See Goal Flow Sheet for this information)    Short term and Long term goals: (See Goal Flow Sheet for this information)     Communication ability:  Patient appears to be able to clearly communicate and understand verbal and  "written communication and follow directions correctly.  Treatment Explanation - The following has been discussed with the patient:   RX ordered/plan of care  Anticipated outcomes  Possible risks and side effects  This patient would benefit from PT intervention to resume normal activities.   Rehab potential is good.    Frequency:  1 X week, once daily  Duration:  for 6 weeks  Discharge Plan:  Achieve all LTG.  Independent in home treatment program.  Reach maximal therapeutic benefit.    Please refer to the daily flowsheet for treatment today, total treatment time and time spent performing 1:1 timed codes.     Caregiver Signature/Credentials _____________________________ Date ________       Treating Provider: Jeremiah Steward DPT   I have reviewed and certified the need for these services and plan of treatment while under my care.      PHYSICIAN'S SIGNATURE:   _________________________________________  Date___________   Enrique Lipscomb MD    Certification period:  Beginning of Cert date period: 08/09/18 to  End of Cert period date: 11/06/18     Functional Level Progress Report: Please see attached \"Goal Flow sheet for Functional level.\"    ____X____ Continue Services or       ________ DC Services                Service dates: From  SOC Date: 08/09/18 date to present                         "

## 2018-08-10 NOTE — PROGRESS NOTES
[facility] Initial Evaluation  Subjective:  Feng Wynne is a 73 year old male.    Patient s chief complaints: R shoulder pain.    Condition occurred due to no specific cause, notes long history of working as .  Date of Onset: around 5/9/18  Location of symptoms is R shoulder over lateral deltoid .  Symptoms other than pain include: weakness, clicking   Quality of pain is aching and frequency is intermittent.    Pain dependence on time of day is: worse in the pm.   Pain rating is: 4/10.    Symptoms are exacerbated by: reaching up, out or behind the back.    Symptoms are relieved by:  none.    Progression of symptoms is that symptoms are:  Overall slightly better than they were.  Imaging/Special tests include: none   Previous treatments include: none.   Patient reports that general health is: good.   Pertinent medical history includes:  OA, overweight, heart problems, implanted device (valve, knee).    Medical allergies includes: latex, penicillin.   Surgical history includes: mitral valve replacement.  Current medications include: cardiac, HBP  Current occupation is: retired  Work status is: retired  Primary job tasks include: none  Barriers include: none  Red flags include: none    Patient's expectations for therapy include: decrease pain, improve strength for use of R arm with reaching.    HPI                    Objective:  SHOULDER:    Cervical Screen: WNL    Shoulder:   PROM L PROM R AROM L AROM R MMT L MMT R   Flex  full WNL Pain at 120 5 3+   Abd   WNL Pain at 90 5 3+   Full Can     5 3+   Empty Can     5 3+   IR   WNL WNL 5 5   ER   WNL Pain at 30 4 3+   Ext/IR   T4 T7       Scapulothoraic Rhythm: R scap hike with elevation      System    Physical Exam    General     ROS    Assessment/Plan:    Patient is a 73 year old male with right side shoulder complaints.    Patient has the following significant findings with corresponding treatment plan.                Diagnosis 1:   R shoulder, suspect rotator cuff tear vs tendinopathy (notable weakness, no loss of passive ROM)    Pain -  hot/cold therapy, manual therapy and home program  Decreased ROM/flexibility - manual therapy and therapeutic exercise  Decreased strength - therapeutic exercise and therapeutic activities  Decreased proprioception - neuro re-education and therapeutic activities  Decreased function - therapeutic activities  Impaired posture - neuro re-education    Therapy Evaluation Codes:   1) History comprised of:   Personal factors that impact the plan of care:      None.    Comorbidity factors that impact the plan of care are:      None.     Medications impacting care: None.  2) Examination of Body Systems comprised of:   Body structures and functions that impact the plan of care:      Shoulder.   Activity limitations that impact the plan of care are:      Bathing, Dressing, Lifting, Laying down and reaching.  3) Clinical presentation characteristics are:   Stable/Uncomplicated.  4) Decision-Making    Low complexity using standardized patient assessment instrument and/or measureable assessment of functional outcome.  Cumulative Therapy Evaluation is: Low complexity.    Previous and current functional limitations:  (See Goal Flow Sheet for this information)    Short term and Long term goals: (See Goal Flow Sheet for this information)     Communication ability:  Patient appears to be able to clearly communicate and understand verbal and written communication and follow directions correctly.  Treatment Explanation - The following has been discussed with the patient:   RX ordered/plan of care  Anticipated outcomes  Possible risks and side effects  This patient would benefit from PT intervention to resume normal activities.   Rehab potential is good.    Frequency:  1 X week, once daily  Duration:  for 6 weeks  Discharge Plan:  Achieve all LTG.  Independent in home treatment program.  Reach maximal therapeutic benefit.    Please  refer to the daily flowsheet for treatment today, total treatment time and time spent performing 1:1 timed codes.

## 2018-08-15 ENCOUNTER — TELEPHONE (OUTPATIENT)
Dept: FAMILY MEDICINE | Facility: CLINIC | Age: 73
End: 2018-08-15

## 2018-08-15 ENCOUNTER — ANTICOAGULATION THERAPY VISIT (OUTPATIENT)
Dept: FAMILY MEDICINE | Facility: CLINIC | Age: 73
End: 2018-08-15
Payer: COMMERCIAL

## 2018-08-15 LAB — INR PPP: 2.9

## 2018-08-15 PROCEDURE — 99207 ZZC NO CHARGE NURSE ONLY: CPT | Performed by: INTERNAL MEDICINE

## 2018-08-15 NOTE — TELEPHONE ENCOUNTER
Reason for Call:  Request for results:    Name of test or procedure: INR results 2.9    Date of test of procedure: 8/15/18    Location of the test or procedure: Self tested    OK to leave the result message on voice mail or with a family member? YES    Phone number Patient can be reached at:  Cell number on file:    Telephone Information:   Mobile [phone]       Additional comments: anytime    Call taken on 8/15/2018 at 1:51 PM by Jessie Gates

## 2018-08-15 NOTE — PROGRESS NOTES
ANTICOAGULATION FOLLOW-UP CLINIC VISIT    Patient Name:  Feng Wynne  Date:  8/15/2018  Contact Type:  Telephone    SUBJECTIVE:        OBJECTIVE    INR   Date Value Ref Range Status   08/15/2018 2.9  Final       ASSESSMENT / PLAN  INR assessment THER    Recheck INR In: 2 WEEKS    INR Location Home INR    Billed home INR No      Anticoagulation Summary as of 8/15/2018     INR goal 2.5-3.5   Today's INR 2.9   Warfarin maintenance plan 10 mg (5 mg x 2) on Sun, Tue, Thu; 5 mg (5 mg x 1) all other days   Full warfarin instructions 10 mg on Sun, Tue, Thu; 5 mg all other days   Weekly warfarin total 50 mg   Plan last modified Missy Hoskins RN (8/8/2018)   Next INR check    Priority INR   Target end date     Indications   Long-term (current) use of anticoagulants [Z79.01] [Z79.01]  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] [Z95.2]         Anticoagulation Episode Summary     INR check location     Preferred lab     Send INR reminders to [facility] ANTICOAGULATION    Comments [facility]      Anticoagulation Care Providers     Provider Role Specialty Phone number    Aaron Enriquenorman Granados MD [facility] Internal Medicine [phone]            See the Encounter Report to view Anticoagulation Flowsheet and Dosing Calendar (Go to Encounters tab in chart review, and find the Anticoagulation Therapy Visit)    Dosage adjustment made based on physician directed care plan. INR 2.9 reported by patient/[facility] home monitoring.  See flow sheet for dosing. Called patient with instructions and recheck date in 2 weeks. (he may wish to recheck in one week, which is fine) Left VM with the above information.    Missy Hoskins, ONOFRE

## 2018-08-15 NOTE — MR AVS SNAPSHOT
Feng Tomjoleen Wynne   8/15/2018   Anticoagulation Therapy Visit    Description:  73 year old male   Provider:  Enrique Walker MD   Department:  [facility]           INR as of 8/15/2018     Today's INR 2.9      Anticoagulation Summary as of 8/15/2018     INR goal 2.5-3.5   Today's INR 2.9   Full warfarin instructions 10 mg on Sun, Tue, Thu; 5 mg all other days   Next INR check 8/29/2018    Indications   Long-term (current) use of anticoagulants [Z79.01] [Z79.01]  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] [Z95.2]         August 2018 Details    Sun Mon Tue Wed Thu Fri Sat        1               2               3               4                 5               6               7               8               9               10               11                 12               13               14               15      5 mg   See details      16      10 mg         17      5 mg         18      5 mg           19      10 mg         20      5 mg         21      10 mg         22      5 mg         23      10 mg         24      5 mg         25      5 mg           26      10 mg         27      5 mg         28      10 mg         29            30               31                 Date Details   08/15 This INR check       Date of next INR:  8/29/2018         How to take your warfarin dose     To take:  5 mg Take 1 of the 5 mg tablets.    To take:  10 mg Take 2 of the 5 mg tablets.

## 2018-08-17 DIAGNOSIS — I10 ESSENTIAL HYPERTENSION WITH GOAL BLOOD PRESSURE LESS THAN 140/90: ICD-10-CM

## 2018-08-17 NOTE — TELEPHONE ENCOUNTER
"Lisinopril 40 mg    Last Written Prescription Date:  05/22/18  Last Fill Quantity: 90 tablets,  # refills: 0   Last office visit: 7/26/2018 with prescribing provider:  Aaron   Future Office Visit:      Requested Prescriptions   Pending Prescriptions Disp Refills     lisinopril (PRINIVIL/ZESTRIL) 40 MG tablet 90 tablet 0     Sig: TAKE 1 TABLET(40 MG) BY MOUTH DAILY    ACE Inhibitors (Including Combos) Protocol Failed    8/17/2018  4:10 PM       Failed - Normal serum creatinine on file in past 12 months    Recent Labs   Lab Test  10/04/16   0937   CR  0.78            Failed - Normal serum potassium on file in past 12 months    Recent Labs   Lab Test  10/04/16   0937   POTASSIUM  4.4            Passed - Blood pressure under 140/90 in past 12 months    BP Readings from Last 3 Encounters:   07/26/18 129/80   12/01/17 132/78   06/13/17 121/85                Passed - Recent (12 mo) or future (30 days) visit within the authorizing provider's specialty    Patient had office visit in the last 12 months or has a visit in the next 30 days with authorizing provider or within the authorizing provider's specialty.  See \"Patient Info\" tab in inbasket, or \"Choose Columns\" in Meds & Orders section of the refill encounter.           Passed - Patient is age 18 or older          "

## 2018-08-20 ENCOUNTER — THERAPY VISIT (OUTPATIENT)
Dept: PHYSICAL THERAPY | Facility: CLINIC | Age: 73
End: 2018-08-20
Payer: MEDICARE

## 2018-08-20 DIAGNOSIS — G89.29 CHRONIC RIGHT SHOULDER PAIN: ICD-10-CM

## 2018-08-20 DIAGNOSIS — M25.511 CHRONIC RIGHT SHOULDER PAIN: ICD-10-CM

## 2018-08-20 PROCEDURE — 97110 THERAPEUTIC EXERCISES: CPT | Mod: GP | Performed by: PHYSICAL THERAPIST

## 2018-08-20 RX ORDER — LISINOPRIL 40 MG/1
TABLET ORAL
Qty: 90 TABLET | Refills: 1 | Status: SHIPPED | OUTPATIENT
Start: 2018-08-20 | End: 2019-02-01

## 2018-08-20 NOTE — TELEPHONE ENCOUNTER
Per pcp notes at [facility] this past month, next fasting labs due in  in January.  Prescription approved per [facility] Refill Protocol/pcp notes.  Liana Owens RN

## 2018-08-29 ENCOUNTER — ANTICOAGULATION THERAPY VISIT (OUTPATIENT)
Dept: FAMILY MEDICINE | Facility: CLINIC | Age: 73
End: 2018-08-29
Payer: COMMERCIAL

## 2018-08-29 ENCOUNTER — TELEPHONE (OUTPATIENT)
Dept: FAMILY MEDICINE | Facility: CLINIC | Age: 73
End: 2018-08-29

## 2018-08-29 DIAGNOSIS — Z95.2 HEART VALVE REPLACED: ICD-10-CM

## 2018-08-29 DIAGNOSIS — Z79.01 LONG-TERM (CURRENT) USE OF ANTICOAGULANTS: ICD-10-CM

## 2018-08-29 LAB — INR PPP: 2.8

## 2018-08-29 PROCEDURE — 99207 ZZC NO CHARGE NURSE ONLY: CPT | Performed by: INTERNAL MEDICINE

## 2018-08-29 NOTE — TELEPHONE ENCOUNTER
Reason for Call:  INR    Who is calling?  Patient    Phone number:  [phone]        Name of caller:  Feng    INR Value:  2.8    Are there any other concerns:  No    Can we leave a detailed message on this number? NO    Phone number patient can be reached at: Home number on file [phone] (home)      Call taken on 8/29/2018 at 8:23 AM by Jessie Gates

## 2018-08-29 NOTE — PROGRESS NOTES
ANTICOAGULATION FOLLOW-UP CLINIC VISIT    Patient Name:  Feng Wynne  Date:  8/29/2018  Contact Type:  Telephone    SUBJECTIVE:     Patient Findings     Positives No Problem Findings           OBJECTIVE    INR   Date Value Ref Range Status   08/29/2018 2.8  Final       ASSESSMENT / PLAN  INR assessment THER    Recheck INR In: 2 WEEKS    INR Location Home INR      Anticoagulation Summary as of 8/29/2018     INR goal 2.5-3.5   Today's INR 2.8   Warfarin maintenance plan 10 mg (5 mg x 2) on Sun, Tue, Thu; 5 mg (5 mg x 1) all other days   Full warfarin instructions 10 mg on Sun, Tue, Thu; 5 mg all other days   Weekly warfarin total 50 mg   No change documented Neyda Davis RN   Plan last modified Missy Hoskins RN (8/8/2018)   Next INR check 9/12/2018   Priority INR   Target end date     Indications   Long-term (current) use of anticoagulants [Z79.01] [Z79.01]  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] [Z95.2]         Anticoagulation Episode Summary     INR check location     Preferred lab     Send INR reminders to  ANTICOAGULATION    Comments [facility]      Anticoagulation Care Providers     Provider Role Specialty Phone number    Walker Enrique Granados MD [facility] Internal Medicine [phone]            See the Encounter Report to view Anticoagulation Flowsheet and Dosing Calendar (Go to Encounters tab in chart review, and find the Anticoagulation Therapy Visit)    Dosage adjustment made based on physician directed care plan.    Neyda Davis, ONOFRE

## 2018-08-29 NOTE — MR AVS SNAPSHOT
Feng Tomjoleen Wynne   8/29/2018   Anticoagulation Therapy Visit    Description:  73 year old male   Provider:  Enrique Walker MD   Department:  [facility]           INR as of 8/29/2018     Today's INR 2.8      Anticoagulation Summary as of 8/29/2018     INR goal 2.5-3.5   Today's INR 2.8   Full warfarin instructions 10 mg on Sun, Tue, Thu; 5 mg all other days   Next INR check 9/12/2018    Indications   Long-term (current) use of anticoagulants [Z79.01] [Z79.01]  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] [Z95.2]         August 2018 Details    Sun Mon Tue Wed Thu Fri Sat        1               2               3               4                 5               6               7               8               9               10               11                 12               13               14               15               16               17               18                 19               20               21               22               23               24               25                 26               27               28               29      5 mg   See details      30      10 mg         31      5 mg           Date Details   08/29 This INR check               How to take your warfarin dose     To take:  5 mg Take 1 of the 5 mg tablets.    To take:  10 mg Take 2 of the 5 mg tablets.           September 2018 Details    Sun Mon Tue Wed Thu Fri Sat           1      5 mg           2      10 mg         3      5 mg         4      10 mg         5      5 mg         6      10 mg         7      5 mg         8      5 mg           9      10 mg         10      5 mg         11      10 mg         12            13               14               15                 16               17               18               19               20               21               22                 23               24               25               26               27               28               29                 30                       Date Details   No additional details    Date of next INR:  9/12/2018         How to take your warfarin dose     To take:  5 mg Take 1 of the 5 mg tablets.    To take:  10 mg Take 2 of the 5 mg tablets.

## 2018-09-11 ENCOUNTER — THERAPY VISIT (OUTPATIENT)
Dept: PHYSICAL THERAPY | Facility: CLINIC | Age: 73
End: 2018-09-11
Payer: MEDICARE

## 2018-09-11 DIAGNOSIS — G89.29 CHRONIC RIGHT SHOULDER PAIN: ICD-10-CM

## 2018-09-11 DIAGNOSIS — M25.511 CHRONIC RIGHT SHOULDER PAIN: ICD-10-CM

## 2018-09-11 PROCEDURE — 97110 THERAPEUTIC EXERCISES: CPT | Mod: GP | Performed by: PHYSICAL THERAPIST

## 2018-09-11 PROCEDURE — 97140 MANUAL THERAPY 1/> REGIONS: CPT | Mod: GP | Performed by: PHYSICAL THERAPIST

## 2018-09-12 ENCOUNTER — ANTICOAGULATION THERAPY VISIT (OUTPATIENT)
Dept: NURSING | Facility: CLINIC | Age: 73
End: 2018-09-12
Payer: COMMERCIAL

## 2018-09-12 ENCOUNTER — TELEPHONE (OUTPATIENT)
Dept: FAMILY MEDICINE | Facility: CLINIC | Age: 73
End: 2018-09-12

## 2018-09-12 LAB — INR PPP: 3.6

## 2018-09-12 PROCEDURE — 99207 ZZC NO CHARGE NURSE ONLY: CPT | Performed by: INTERNAL MEDICINE

## 2018-09-12 NOTE — PROGRESS NOTES
ANTICOAGULATION FOLLOW-UP CLINIC VISIT    Patient Name:  Feng Wynne  Date:  9/12/2018  Contact Type:  Telephone/ Left detailed voice mail with direction    SUBJECTIVE:     Patient Findings     Positives No Problem Findings           OBJECTIVE    INR   Date Value Ref Range Status   09/12/2018 3.6  Final       ASSESSMENT / PLAN  INR assessment THER    Recheck INR In: 2 WEEKS    INR Location Home INR      Anticoagulation Summary as of 9/12/2018     INR goal 2.5-3.5   Today's INR 3.6!   Warfarin maintenance plan 10 mg (5 mg x 2) on Sun, Tue, Thu; 5 mg (5 mg x 1) all other days   Full warfarin instructions 10 mg on Sun, Tue, Thu; 5 mg all other days   Weekly warfarin total 50 mg   Plan last modified Missy Hoskins RN (8/8/2018)   Next INR check    Priority INR   Target end date     Indications   Long-term (current) use of anticoagulants [Z79.01] [Z79.01]  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] [Z95.2]         Anticoagulation Episode Summary     INR check location     Preferred lab     Send INR reminders to  ANTICOAGULATION    Comments [facility]      Anticoagulation Care Providers     Provider Role Specialty Phone number    Enrique Walker Abhishek Granados MD Responsible Internal Medicine [phone]            See the Encounter Report to view Anticoagulation Flowsheet and Dosing Calendar (Go to Encounters tab in chart review, and find the Anticoagulation Therapy Visit)    Pt was 3.6 per self test. Pt is therapeutic. Will continue at same dosing and recheck in 2 weeks. Pt advised to contact our clinic if he has any diet,health or activity changes. Pt also advised to let us know if he has increased signs of bleeding or bruising.    Elen Mcdonough RN

## 2018-09-12 NOTE — TELEPHONE ENCOUNTER
Reason for Call:  INR    Who is calling?  Self Report    Phone number:  [phone]    Name of caller: Feng Wynne     INR Value:  3.6 fingerprick     Are there any other concerns:  No    Can we leave a detailed message on this number? YES    Phone number patient can be reached at: Home number on file [phone] (home)      Call taken on 9/12/2018 at 9:13 AM by Annette Banegas

## 2018-09-12 NOTE — MR AVS SNAPSHOT
Feng Tomjoleen Wynne   9/12/2018   Anticoagulation Therapy Visit    Description:  73 year old male   Provider:  Enrique Walker MD   Department:  [facility]           INR as of 9/12/2018     Today's INR 3.6!      Anticoagulation Summary as of 9/12/2018     INR goal 2.5-3.5   Today's INR 3.6!   Full warfarin instructions 10 mg on Sun, Tue, Thu; 5 mg all other days   Next INR check 9/26/2018    Indications   Long-term (current) use of anticoagulants [Z79.01] [Z79.01]  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] [Z95.2]         Contact Numbers     Clinic Number:         September 2018 Details    Sun Mon Tue Wed Thu Fri Sat           1                 2               3               4               5               6               7               8                 9               10               11               12      5 mg   See details      13      10 mg         14      5 mg         15      5 mg           16      10 mg         17      5 mg         18      10 mg         19      5 mg         20      10 mg         21      5 mg         22      5 mg           23      10 mg         24      5 mg         25      10 mg         26            27               28               29                 30                      Date Details   09/12 This INR check       Date of next INR:  9/26/2018         How to take your warfarin dose     To take:  5 mg Take 1 of the 5 mg tablets.    To take:  10 mg Take 2 of the 5 mg tablets.

## 2018-09-21 NOTE — PROGRESS NOTES
"Outpatient Physical Therapy Progress Note and Discharge Note     Patient: Feng Wynne  : 1945    Beginning/End Dates of Reporting Period:  2017 to 2017  Pt was seen in Lymphedema Therapy for 8 Tx consisting of manual lymph drainage, compression bandaging w/ education in home bandaging, education in need for elevation and gentle exercise to reduce Sx, recommendation for compression stockings    Referring Provider: Armani Marin MD    Therapy Diagnosis: Lymphedema B L/E     Client Self Report: Pt continues to have sores drying on legs-- has been abtl ot use velcro wraps again --\"did have such a setback\"    Objective Measurements:  Objective Measure: Swelling   Details: Boggy swell diffuse in leg and ankle w/ improving landmarks B ankles. Hemosiderin stains B legs increased form mid to low leg w/ drying scabs at B ANT lower legs. Softening of leg skin; Volume msmt R leg 5838ml (was 6828ml )  L leg 6075ml (was 6009ml). Pt appears tohave reduction around lower 1/3 leg and ankles; still has diffuse swell in upper calf area. Pt did have swelling around R knee and lower quad  due to injury sustained at [place] .                                    Outcome Measures (most recent score):      Goals:  Goal Identifier Swelling    Goal Description Pt to have reduced swelling B legs 500-750+ml and 1-4cm reduction foot/ankle for easier don of socks/shoes   Target Date 17   Date Met      Progress:Pt has had some reduction of swelling and may continue to make gains w/ use of compression     Goal Identifier Use of compression    Goal Description Pt to be independent w/ daily use of bandaging or alternative to bandage garments and segue to appropriate comrpession socks; pt will need to have lifel long mgmt of Sx to improve swelling , maintain vascular health and reduce risk of infection    Target Date 17   Date Met      Progress:pt appears compliant      Goal Identifier     Goal Description   "   Target Date     Date Met      Progress:     Goal Identifier     Goal Description     Target Date     Date Met      Progress:     Goal Identifier     Goal Description     Target Date     Date Met      Progress:     Goal Identifier     Goal Description     Target Date     Date Met      Progress:     Goal Identifier     Goal Description     Target Date     Date Met      Progress:     Goal Identifier     Goal Description     Target Date     Date Met      Progress:     Progress Toward Goals:   Progress this reporting period: see above      Plan:  Discharge from therapy.    Discharge:    Reason for Discharge: Patient chooses to discontinue therapy.  Pt did not schedule additional therapy     Equipment Issued: bandage supplies    Discharge Plan: Patient to continue home program.

## 2018-09-26 ENCOUNTER — TRANSFERRED RECORDS (OUTPATIENT)
Dept: HEALTH INFORMATION MANAGEMENT | Facility: CLINIC | Age: 73
End: 2018-09-26

## 2018-09-26 ENCOUNTER — TELEPHONE (OUTPATIENT)
Dept: FAMILY MEDICINE | Facility: CLINIC | Age: 73
End: 2018-09-26

## 2018-09-26 NOTE — TELEPHONE ENCOUNTER
Reason for Call:  INR    Who is calling?  PatientFeng    Phone number:  [phone]    Fax number:  na    Name of caller: Feng    INR Value:  2.8    Are there any other concerns:  No    Can we leave a detailed message on this number? YES    Phone number patient can be reached at: Cell number on file:    Telephone Information:   Mobile [phone]         Call taken on 9/26/2018 at 8:40 AM by Gabby Das

## 2018-10-04 ENCOUNTER — TELEPHONE (OUTPATIENT)
Dept: CARDIOLOGY | Facility: CLINIC | Age: 73
End: 2018-10-04

## 2018-10-04 DIAGNOSIS — Z95.2 S/P MITRAL VALVE REPLACEMENT: ICD-10-CM

## 2018-10-04 RX ORDER — CLINDAMYCIN HCL 300 MG
600 CAPSULE ORAL SEE ADMIN INSTRUCTIONS
Qty: 6 CAPSULE | Refills: 1 | Status: SHIPPED | OUTPATIENT
Start: 2018-10-04 | End: 2022-07-15

## 2018-10-04 NOTE — TELEPHONE ENCOUNTER
[facility] Call Center    Phone Message    May a detailed message be left on voicemail: yes    Reason for Call: Medication Refill Request    Has the patient contacted the pharmacy for the refill? Yes   Name of medication being requested: CLINDAMYCIN  MG  Provider who prescribed the medication: Carrillo Duran  Pharmacy: [facility] [address] [place] [address] [address] [place] [facility] [address]  Date medication is needed: As soon as possible    Other: Pt is going to have 3 dental procedures that requires him to take 600 mg of CLINDAMYCIN  MG one hour prior so he would need a refill of 6 tabs.    Action Taken: Message routed to:  [facility] ([facility]

## 2018-10-08 ENCOUNTER — THERAPY VISIT (OUTPATIENT)
Dept: PHYSICAL THERAPY | Facility: CLINIC | Age: 73
End: 2018-10-08
Payer: MEDICARE

## 2018-10-08 DIAGNOSIS — G89.29 CHRONIC RIGHT SHOULDER PAIN: ICD-10-CM

## 2018-10-08 DIAGNOSIS — M25.511 CHRONIC RIGHT SHOULDER PAIN: ICD-10-CM

## 2018-10-08 PROCEDURE — 97110 THERAPEUTIC EXERCISES: CPT | Mod: GP | Performed by: PHYSICAL THERAPIST

## 2018-10-08 PROCEDURE — G8987 SELF CARE CURRENT STATUS: HCPCS | Mod: GP | Performed by: PHYSICAL THERAPIST

## 2018-10-08 PROCEDURE — G8988 SELF CARE GOAL STATUS: HCPCS | Mod: GP | Performed by: PHYSICAL THERAPIST

## 2018-10-08 NOTE — PROGRESS NOTES
Subjective:  HPI                    Objective:  System    Physical Exam    General     ROS    Assessment/Plan:    PROGRESS  REPORT    Progress reporting period is from 8/10/2018 to 10/8/2018.       SUBJECTIVE  Subjective: He reports that his shoulder is doing very well.  Still some pain in certain positions.  He reports functionally he is doing much better.   He feels he is ready to transition to an independent maintenance program.  Current Pain level: 0/10 (7/10 when he occasionally feels it).     Initial Pain level: 4/10.   Changes in function:  Yes (See Goal flowsheet attached for changes in current functional level)  Adverse reaction to treatment or activity: None    OBJECTIVE  Objective: Right Shoulder AROM: flexion 158, Abduction 108, IR to T8, ER 42.    Strength (MMT) R: Flexion: 4/5, Abduction: 3+/5, ER: 4/5, IR: 5/5.       ASSESSMENT/PLAN  Updated problem list and treatment plan: Diagnosis 1:  Right Shoulder Pain  Pain -  hot/cold therapy, self management and home program  Decreased ROM/flexibility - therapeutic exercise and home program  Decreased strength - therapeutic exercise, therapeutic activities and home program  Decreased function - therapeutic activities and home program  Impaired posture - neuro re-education and home program  STG/LTGs have been met or progress has been made towards goals:  Yes (See Goal flow sheet completed today.)  Assessment of Progress: The patient's condition is improving.  Self Management Plans:  Patient has been instructed in a home treatment program.  Patient  has been instructed in self management of symptoms.  I have re-evaluated this patient and find that the nature, scope, duration and intensity of the therapy is appropriate for the medical condition of the patient.  Feng continues to require the following intervention to meet STG and LTG's:  PT    Recommendations:  Patient's function and strength have improved significantly, still has not met long term goal.  Patient  will continue with home program and re-assess in one month's time whether or not to return to physical therapy.      Please refer to the daily flowsheet for treatment today, total treatment time and time spent performing 1:1 timed codes.

## 2018-10-10 ENCOUNTER — TELEPHONE (OUTPATIENT)
Dept: FAMILY MEDICINE | Facility: CLINIC | Age: 73
End: 2018-10-10

## 2018-10-10 ENCOUNTER — ANTICOAGULATION THERAPY VISIT (OUTPATIENT)
Dept: FAMILY MEDICINE | Facility: CLINIC | Age: 73
End: 2018-10-10
Payer: COMMERCIAL

## 2018-10-10 DIAGNOSIS — Z95.2 HEART VALVE REPLACED: ICD-10-CM

## 2018-10-10 LAB — INR PPP: 3.3

## 2018-10-10 PROCEDURE — 99207 ZZC NO CHARGE NURSE ONLY: CPT | Performed by: INTERNAL MEDICINE

## 2018-10-10 NOTE — MR AVS SNAPSHOT
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]         Your next Anticoagulation Clinic appointment(s)     Oct 24, 2018 12:00 PM CDT   Anticoagulation Visit with  [facility] CLINIC   [facility]    [address]  [place] [address]   [phone]              October 2018 Details    Sun Mon Tue Wed Thu Fri Sat      1               2               3               4               5               6                 7               8               9               10      5 mg   See details      11      10 mg         12      5 mg         13      5 mg           14      10 mg         15      5 mg         16      10 mg         17      5 mg         18      10 mg         19      5 mg         20      5 mg           21      10 mg         22      5 mg         23      10 mg         24            25               26               27                 28               29               30               31                   Date Details   10/10 This INR check       Date of next INR:  10/24/2018         How to take your warfarin dose     To take:  5 mg Take 1 of the 5 mg tablets.    To take:  10 mg Take 2 of the 5 mg tablets.

## 2018-10-10 NOTE — TELEPHONE ENCOUNTER
Reason for Call:  INR    Who is calling?  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 YES  OK TO LEAVE DETAILED MESSAGE    Phone number patient can be reached at: Home number on file [phone] (home)      Call taken on 10/10/2018 at 9:02 AM by Iqra Guzmán

## 2018-10-10 NOTE — PROGRESS NOTES
ANTICOAGULATION FOLLOW-UP CLINIC VISIT    Patient Name:  Feng Wynne  Date:  10/10/2018  Contact Type:  Telephone/ Home INR/[facility]    SUBJECTIVE:     Patient Findings     Positives No Problem Findings    Comments Reason for Call:  INR     Who is calling?  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 YES  OK TO LEAVE DETAILED MESSAGE     Phone number patient can be reached at: Home number on file [phone] (home)                 OBJECTIVE    INR   Date Value Ref Range Status   10/10/2018 3.3  Final       ASSESSMENT / PLAN  INR assessment THER    Recheck INR In: 2 WEEKS    INR Location Home INR      Anticoagulation Summary as of 10/10/2018     INR goal 2.5-3.5   Today's INR 3.3   Warfarin maintenance plan 10 mg (5 mg x 2) on Sun, Tue, Thu; 5 mg (5 mg x 1) all other days   Full warfarin instructions 10 mg on Sun, Tue, Thu; 5 mg all other days   Weekly warfarin total 50 mg   Plan last modified Missy Hoskins, RN (8/8/2018)   Next INR check 10/24/2018   Priority INR   Target end date     Indications   Long-term (current) use of anticoagulants [Z79.01] [Z79.01]  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 Will continue same dosing with 10 mg on Sunday, Tuesday, Thursday and 5 mg all the other days. Recheck in 2 weeks. Left detailed voice mail message with  instructions as pt requested. Pt to call if questions or concerns.    Elen Mcdonough RN

## 2018-10-24 ENCOUNTER — TELEPHONE (OUTPATIENT)
Dept: FAMILY MEDICINE | Facility: CLINIC | Age: 73
End: 2018-10-24

## 2018-10-24 ENCOUNTER — ANTICOAGULATION THERAPY VISIT (OUTPATIENT)
Dept: NURSING | Facility: CLINIC | Age: 73
End: 2018-10-24
Payer: COMMERCIAL

## 2018-10-24 DIAGNOSIS — Z95.2 HEART VALVE REPLACED: ICD-10-CM

## 2018-10-24 LAB — INR PPP: 2.5

## 2018-10-24 PROCEDURE — 99207 ZZC NO CHARGE NURSE ONLY: CPT

## 2018-10-24 NOTE — TELEPHONE ENCOUNTER
Reason for Call:  INR    Who is calling?  Nursing Home: pt    Phone number:  [phone]    Fax number:      Name of caller: laurence    INR Value:  2.5    Are there any other concerns:  no    Can we leave a detailed message on this number? NO    Phone number patient can be reached at: Home number on file [phone] (home)      Call taken on 10/24/2018 at 9:11 AM by Tha Ruth

## 2018-10-24 NOTE — PROGRESS NOTES
ANTICOAGULATION FOLLOW-UP CLINIC VISIT    Patient Name:  Feng Wynne  Date:  10/24/2018  Contact Type:  Telephone    SUBJECTIVE:     Patient Findings     Positives No Problem Findings    Comments Reason for Call:  INR     Who is calling?  Nursing Home: pt     Phone number:  [phone]     Fax number:       Name of caller: feng     INR Value:  2.5     Are there any other concerns:  no     Can we leave a detailed message on this number? NO     Phone number patient can be reached at: Home number on file [phone] (home)        Call taken on 10/24/2018 at 9:11 AM by Tha Ruth           OBJECTIVE    INR   Date Value Ref Range Status   10/24/2018 2.5  Final       ASSESSMENT / PLAN  INR assessment THER    Recheck INR In: 2 WEEKS    INR Location Home INR      Anticoagulation Summary as of 10/24/2018     INR goal 2.5-3.5   Today's INR 2.5   Warfarin maintenance plan 10 mg (5 mg x 2) on Sun, Tue, Thu; 5 mg (5 mg x 1) all other days   Full warfarin instructions 10 mg on Sun, Tue, Thu; 5 mg all other days   Weekly warfarin total 50 mg   No change documented Irena Jaramillo, RN   Plan last modified Missy Hoskins, RN (8/8/2018)   Next INR check 11/7/2018   Priority INR   Target end date     Indications   Long-term (current) use of anticoagulants [Z79.01] [Z79.01]  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 No missed/extra warfarin doses. No unusual  bruising/bleeding or other changes. Pt will continue same warfarin dose and recheck INR in 2 weeks, or sooner if concerns.     Pt aware if signs of clotting (pain, tenderness, swelling, color change in leg or arm, SOB) and bleeding occur (blood in stool, urine, large bruising, bleeding gums, nosebleeds) to have INR check sooner. If sx severe report to ER or concerned for stroke call 911. If general questions or concerns arise, call clinic.    Irena Jaramillo RN

## 2018-10-24 NOTE — TELEPHONE ENCOUNTER
Left message asking patient to call back     See [facility] encounter - if no changes would advise continue same warfarin dosing and recheck INR in 2 weeks    Irena ARCOS RN

## 2018-10-24 NOTE — MR AVS SNAPSHOT
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]         Your next Anticoagulation Clinic appointment(s)     Oct 24, 2018 12:00 PM CDT   Anticoagulation Visit with  [facility]UL[facility] CLINIC   [facility]    [address]  [place] [address]   [phone]            Nov 07, 2018 12:00 PM CST   Anticoagulation Visit with  ANTICOAGULATION CLINIC   [facility]    [address]  [place] [address]   [phone]              Contact Numbers     Clinic Number:         October 2018 Details    Sun Mon Tue Wed Thu Fri Sat      1               2               3               4               5               6                 7               8               9               10               11               12               13                 14               15               16               17               18               19               20                 21               22               23               24      5 mg   See details      25      10 mg         26      5 mg         27      5 mg           28      10 mg         29      5 mg         30      10 mg         31      5 mg             Date Details   10/24 This INR check               How to take your warfarin dose     To take:  5 mg Take 1 of the 5 mg tablets.    To take:  10 mg Take 2 of the 5 mg tablets.           November 2018 Details    Sun Mon Tue Wed Thu Fri Sat         1      10 mg         2      5 mg         3       5 mg           4      10 mg         5      5 mg         6      10 mg         7            8               9               10                 11               12               13               14               15               16               17                 18               19               20               21               22               23               24                 25               26               27               28               29               30                 Date Details   No additional details    Date of next INR:  11/7/2018         How to take your warfarin dose     To take:  5 mg Take 1 of the 5 mg tablets.    To take:  10 mg Take 2 of the 5 mg tablets.

## 2018-10-29 DIAGNOSIS — I10 ESSENTIAL HYPERTENSION: ICD-10-CM

## 2018-10-29 DIAGNOSIS — I48.20 CHRONIC ATRIAL FIBRILLATION (H): ICD-10-CM

## 2018-10-30 RX ORDER — METOPROLOL SUCCINATE 100 MG/1
100 TABLET, EXTENDED RELEASE ORAL DAILY
Qty: 90 TABLET | Refills: 1 | Status: SHIPPED | OUTPATIENT
Start: 2018-10-30 | End: 2019-05-02 | Stop reason: ALTCHOICE

## 2018-10-30 NOTE — TELEPHONE ENCOUNTER
"Last Written Prescription Date:  5/04/18  Last Fill Quantity: 90 tablet,  # refills: 1   Last office visit: 7/26/2018 with prescribing provider:  Aaron   Future Office Visit:      Requested Prescriptions   Pending Prescriptions Disp Refills     metoprolol succinate (TOPROL-XL) 100 MG 24 hr tablet 90 tablet 1     Sig: Take 1 tablet (100 mg) by mouth daily    Beta-Blockers Protocol Passed    10/29/2018  7:02 PM       Passed - Blood pressure under 140/90 in past 12 months    BP Readings from Last 3 Encounters:   07/26/18 129/80   12/01/17 132/78   06/13/17 121/85                Passed - Patient is age 6 or older       Passed - Recent (12 mo) or future (30 days) visit within the authorizing provider's specialty    Patient had office visit in the last 12 months or has a visit in the next 30 days with authorizing provider or within the authorizing provider's specialty.  See \"Patient Info\" tab in inbasket, or \"Choose Columns\" in Meds & Orders section of the refill encounter.                "

## 2018-11-08 ENCOUNTER — TELEPHONE (OUTPATIENT)
Dept: FAMILY MEDICINE | Facility: CLINIC | Age: 73
End: 2018-11-08

## 2018-11-08 ENCOUNTER — ANTICOAGULATION THERAPY VISIT (OUTPATIENT)
Dept: FAMILY MEDICINE | Facility: CLINIC | Age: 73
End: 2018-11-08
Payer: COMMERCIAL

## 2018-11-08 DIAGNOSIS — I48.20 CHRONIC ATRIAL FIBRILLATION (H): ICD-10-CM

## 2018-11-08 DIAGNOSIS — Z95.2 HEART VALVE REPLACED: ICD-10-CM

## 2018-11-08 LAB — INR PPP: 2.9

## 2018-11-08 PROCEDURE — 99207 ZZC NO CHARGE NURSE ONLY: CPT | Performed by: INTERNAL MEDICINE

## 2018-11-08 RX ORDER — WARFARIN SODIUM 5 MG/1
TABLET ORAL
Qty: 60 TABLET | Refills: 0 | Status: CANCELLED | OUTPATIENT
Start: 2018-11-08

## 2018-11-08 RX ORDER — WARFARIN SODIUM 5 MG/1
5-10 TABLET ORAL DAILY
Qty: 180 TABLET | Refills: 0 | Status: SHIPPED | OUTPATIENT
Start: 2018-11-08 | End: 2019-03-20

## 2018-11-08 NOTE — TELEPHONE ENCOUNTER
Reason for Call:  INR    Who is calling?  patient    Phone number:  [phone]    Fax number:      Name of caller: Feng    INR Value:  2.9    Are there any other concerns:  No    Can we leave a detailed message on this number? YES    Phone number patient can be reached at: Home number on file [phone] (home)      Call taken on 11/8/2018 at 8:23 AM by Veronique Anderson  .

## 2018-11-08 NOTE — TELEPHONE ENCOUNTER
"Requested Prescriptions   Pending Prescriptions Disp Refills     warfarin (COUMADIN) 5 MG tablet 60 tablet 0    Vitamin K Antagonists Failed    11/8/2018  9:35 AM       Failed - INR is within goal in the past 6 weeks    Confirm INR is within goal in the past 6 weeks.     Recent Labs   Lab Test 11/08/18   INR  2.9                      Passed - Recent (12 mo) or future (30 days) visit within the authorizing provider's specialty    Patient had office visit in the last 12 months or has a visit in the next 30 days with authorizing provider or within the authorizing provider's specialty.  See \"Patient Info\" tab in inbasket, or \"Choose Columns\" in Meds & Orders section of the refill encounter.             Passed - Patient is 18 years of age or older       Last Written Prescription Date:  4/05/18  Last Fill Quantity: 60 tablet,  # refills: 0   Last office visit: 7/26/2018 with prescribing provider:  Aaron   Future Office Visit:        Sig: TAKE 1 TO 2 TABLETS BY MOUTH DAILY   Class: E-Prescribe   Notes to Pharmacy: Due for physical May 2018            warfarin (COUMADIN) 5 MG tablet 180 tablet 0     Sig: Take 1-2 tablets (5-10 mg) by mouth daily (5 mg on Tue, Thu, Sat; 10 mg all other days) or as directed by INR clinic    Vitamin K Antagonists Failed    11/8/2018  9:35 AM       Failed - INR is within goal in the past 6 weeks    Confirm INR is within goal in the past 6 weeks.     Recent Labs   Lab Test 11/08/18   INR  2.9                      Passed - Recent (12 mo) or future (30 days) visit within the authorizing provider's specialty    Patient had office visit in the last 12 months or has a visit in the next 30 days with authorizing provider or within the authorizing provider's specialty.  See \"Patient Info\" tab in inbasket, or \"Choose Columns\" in Meds & Orders section of the refill encounter.             Passed - Patient is 18 years of age or older       Last Written Prescription Date:  4/05/18  Last Fill Quantity: 180 " tablet,  # refills: 0   Last office visit: 7/26/2018 with prescribing provider:  Aaron   Future Office Visit:        Sig - Route: Take 1-2 tablets (5-10 mg) by mouth daily (5 mg on Tue, Thu, Sat; 10 mg all other days) or as directed by INR clinic - Oral   Class: E-Prescribe   Notes to Pharmacy: 5 mg on Tue, Thu, Sat; 10 mg all other days

## 2018-11-08 NOTE — PROGRESS NOTES
ANTICOAGULATION FOLLOW-UP CLINIC VISIT    Patient Name:  Feng Wynne  Date:  11/8/2018  Contact Type:  Telephone/ Pt called with [facility] INR result.  Will confirm with [facility] fax    SUBJECTIVE:     Patient Findings     Comments Reason for Call:  INR    Who is calling?  patient    Phone number:  [phone]    Fax number:      Name of caller: Feng    INR Value:  2.9    Are there any other concerns:  No    Can we leave a detailed message on this number? YES    Phone number patient can be reached at: Home number on file [phone] (home)      Call taken on 11/8/2018 at 8:23 AM by Veronique Anderson  .             OBJECTIVE    INR   Date Value Ref Range Status   11/08/2018 2.9  Final       ASSESSMENT / PLAN  No question data found.  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  Called and left detailed message for patient with Warfarin dosing and next INR date.  See  flowsheet.       Ashlie Tubbs RN

## 2018-11-08 NOTE — MR AVS SNAPSHOT
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]         November 2018 Details    Sun Mon Tue Wed Thu Fri Sat         1               2               3                 4               5               6               7               8      10 mg   See details      9      5 mg         10      5 mg           11      10 mg         12      5 mg         13      10 mg         14      5 mg         15      10 mg         16      5 mg         17      5 mg           18      10 mg         19      5 mg         20      10 mg         21            22               23               24                 25               26               27               28               29               30                 Date Details   11/08 This INR check       Date of next INR:  11/21/2018         How to take your warfarin dose     To take:  5 mg Take 1 of the 5 mg tablets.    To take:  10 mg Take 2 of the 5 mg tablets.

## 2018-11-21 ENCOUNTER — TELEPHONE (OUTPATIENT)
Dept: FAMILY MEDICINE | Facility: CLINIC | Age: 73
End: 2018-11-21

## 2018-11-21 ENCOUNTER — ANTICOAGULATION THERAPY VISIT (OUTPATIENT)
Dept: FAMILY MEDICINE | Facility: CLINIC | Age: 73
End: 2018-11-21
Payer: COMMERCIAL

## 2018-11-21 DIAGNOSIS — Z95.2 S/P MITRAL VALVE REPLACEMENT: Primary | ICD-10-CM

## 2018-11-21 DIAGNOSIS — I48.91 ATRIAL FIBRILLATION (H): ICD-10-CM

## 2018-11-21 DIAGNOSIS — Z95.2 HEART VALVE REPLACED: ICD-10-CM

## 2018-11-21 LAB — INR PPP: 2.7

## 2018-11-21 PROCEDURE — 99207 ZZC NO CHARGE NURSE ONLY: CPT | Performed by: INTERNAL MEDICINE

## 2018-11-21 NOTE — TELEPHONE ENCOUNTER
OV and INR checks up to date. Patient performs INR via home testing.    Annual INR referral pended for PCP review and sign. Please edit/change as needed.    Close when done.    Thank you,  Missy Hoskins RN

## 2018-11-21 NOTE — PROGRESS NOTES
ANTICOAGULATION FOLLOW-UP CLINIC VISIT    Patient Name:  Feng Wynne  Date:  11/21/2018  Contact Type:  Telephone    SUBJECTIVE:     Patient Findings     Positives No Problem Findings           OBJECTIVE    INR   Date Value Ref Range Status   11/21/2018 2.7  Final       ASSESSMENT / PLAN  INR assessment THER    Recheck INR In: 2 WEEKS    INR Location Home INR    Billed home INR No      Anticoagulation Summary as of 11/21/2018     INR goal 2.5-3.5   Today's INR 2.7   Warfarin maintenance plan 10 mg (5 mg x 2) on Sun, Tue, Thu; 5 mg (5 mg x 1) all other days   Full warfarin instructions 10 mg on Sun, Tue, Thu; 5 mg all other days   Weekly warfarin total 50 mg   No change documented Missy Hoskins, ONOFRE   Plan last modified Missy Hoskins RN (8/8/2018)   Next INR check 12/5/2018   Priority INR   Target end date     Indications   Long-term (current) use of anticoagulants [Z79.01] [Z79.01]  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  [facility] INR 2.7.  Left VM for patient to continue same dosing and recheck in 2 weeks.    Missy Hoskins RN

## 2018-11-21 NOTE — MR AVS SNAPSHOT
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]         November 2018 Details    Sun Mon Tue Wed Thu Fri Sat         1               2               3                 4               5               6               7               8               9               10                 11               12               13               14               15               16               17                 18               19               20               21      5 mg   See details      22      10 mg         23      5 mg         24      5 mg           25      10 mg         26      5 mg         27      10 mg         28      5 mg         29      10 mg         30      5 mg           Date Details   11/21 This INR check               How to take your warfarin dose     To take:  5 mg Take 1 of the 5 mg tablets.    To take:  10 mg Take 2 of the 5 mg tablets.           December 2018 Details    Sun Mon Tue Wed Thu Fri Sat           1      5 mg           2      10 mg         3      5 mg         4      10 mg         5      5 mg         6      10 mg         7            8                 9               10               11               12               13               14               15                 16               17               18               19               20               21               22                 23               24               25               26               27               28               29                 30                31                     Date Details   No additional details    Date of next INR:  12/7/2018         How to take your warfarin dose     To take:  5 mg Take 1 of the 5 mg tablets.    To take:  10 mg Take 2 of the 5 mg tablets.

## 2018-11-21 NOTE — TELEPHONE ENCOUNTER
Reason for Call:  INR    Who is calling?  Feng (Patient)    Phone number:  [phone]    Fax number:  n/a    Name of caller: Feng    INR Value:  2.7    Are there any other concerns:  No    Can we leave a detailed message on this number? YES    Phone number patient can be reached at: Home number on file [phone] (home)      Call taken on 11/21/2018 at 8:40 AM by Fausto Sharma

## 2018-12-05 ENCOUNTER — ANTICOAGULATION THERAPY VISIT (OUTPATIENT)
Dept: FAMILY MEDICINE | Facility: CLINIC | Age: 73
End: 2018-12-05
Payer: COMMERCIAL

## 2018-12-05 ENCOUNTER — TELEPHONE (OUTPATIENT)
Dept: FAMILY MEDICINE | Facility: CLINIC | Age: 73
End: 2018-12-05

## 2018-12-05 DIAGNOSIS — Z95.2 HEART VALVE REPLACED: ICD-10-CM

## 2018-12-05 LAB — INR PPP: 3.2

## 2018-12-05 PROCEDURE — 99207 ZZC NO CHARGE NURSE ONLY: CPT | Performed by: INTERNAL MEDICINE

## 2018-12-05 NOTE — PROGRESS NOTES
ANTICOAGULATION FOLLOW-UP CLINIC VISIT    Patient Name:  Feng Wynne  Date:  12/5/2018  Contact Type:  Telephone    SUBJECTIVE:     Patient Findings     Positives No Problem Findings    Comments Reason for Call:  INR     Who is calling?  The pt     Phone number:  [phone]     Fax number:       Name of caller: feng     INR Value:  3.2      Are there any other concerns:  Pt friend wants to know if [place] accepts [employer] cards ?      Can we leave a detailed message on this number? YES     Phone number patient can be reached at: Home number on file [phone] (home)         Call taken on 12/5/2018 at 9:57 AM by Tha Ruth           OBJECTIVE    INR   Date Value Ref Range Status   12/05/2018 3.2  Final       ASSESSMENT / PLAN  INR assessment THER    Recheck INR In: 2 WEEKS    INR Location Home INR      Anticoagulation Summary as of 12/5/2018     INR goal 2.5-3.5   Today's INR 3.2   Warfarin maintenance plan 10 mg (5 mg x 2) on Sun, Tue, Thu; 5 mg (5 mg x 1) all other days   Full warfarin instructions 10 mg on Sun, Tue, Thu; 5 mg all other days   Weekly warfarin total 50 mg   No change documented Irena Jaramillo, ONOFRE   Plan last modified Missy Hoskins, RN (8/8/2018)   Next INR check 12/19/2018   Priority INR   Target end date     Indications   Long-term (current) use of anticoagulants [Z79.01] [Z79.01]  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 Dosage  adjustment made based on physician directed care plan - Pt to continue same warfarin dose and recheck INR in 2 weeks, or sooner if concerns. Pt to let us know if any changes regarding meds, diet, or health.     Pt aware if signs of clotting (pain, tenderness, swelling, color change in leg or arm, SOB) and bleeding occur (blood in stool, urine, large bruising, bleeding gums, nosebleeds) to have INR check sooner. If sx severe report to ER or concerned for stroke call 911. If general questions or concerns arise, call clinic.    Irena Jaramillo RN

## 2018-12-05 NOTE — TELEPHONE ENCOUNTER
Reason for Call:  INR    Who is calling?  The pt    Phone number:  [phone]    Fax number:      Name of caller: laurence    INR Value:  3.2     Are there any other concerns:  Pt friend wants to know if [place] accepts [employer] cards ?     Can we leave a detailed message on this number? YES    Phone number patient can be reached at: Home number on file [phone] (home)      Call taken on 12/5/2018 at 9:57 AM by Tha Ruth

## 2018-12-05 NOTE — TELEPHONE ENCOUNTER
See [facility] encounter   Detailed message left for patient regarding warfarin dosing/INR recheck date  Also [employer] message sent with this information    Irena ARCOS RN

## 2018-12-19 ENCOUNTER — ANTICOAGULATION THERAPY VISIT (OUTPATIENT)
Dept: FAMILY MEDICINE | Facility: CLINIC | Age: 73
End: 2018-12-19
Payer: COMMERCIAL

## 2018-12-19 ENCOUNTER — TELEPHONE (OUTPATIENT)
Dept: FAMILY MEDICINE | Facility: CLINIC | Age: 73
End: 2018-12-19

## 2018-12-19 DIAGNOSIS — Z95.2 HEART VALVE REPLACED: ICD-10-CM

## 2018-12-19 LAB — INR PPP: 3.3

## 2018-12-19 PROCEDURE — 99207 ZZC NO CHARGE NURSE ONLY: CPT | Performed by: INTERNAL MEDICINE

## 2018-12-19 NOTE — TELEPHONE ENCOUNTER
Reason for Call:  INR    Who is calling?  patient    Name of caller: Feng    INR Value:  3.3 fingerstick     Are there any other concerns:  No    Can we leave a detailed message on this number? YES    Phone number patient can be reached at: Home number on file [phone] (home)      Call taken on 12/19/2018 at 8:28 AM by Annette Banegas

## 2018-12-19 NOTE — PROGRESS NOTES
ANTICOAGULATION FOLLOW-UP CLINIC VISIT    Patient Name:  Feng Wynne  Date:  12/19/2018  Contact Type:  Face to Face    SUBJECTIVE:     Patient Findings     Positives:   No Problem Findings    Comments:   Reason for Call:  INR     Who is calling?  patient     Name of caller: Feng     INR Value:  3.3 fingerstick      Are there any other concerns:  No     Can we leave a detailed message on this number? YES     Phone number patient can be reached at: Home number on file [phone] (home)        Call taken on 12/19/2018 at 8:28 AM by Annette Banegas           OBJECTIVE    INR   Date Value Ref Range Status   12/19/2018 3.3  Final       ASSESSMENT / PLAN  INR assessment THER    Recheck INR In: 2 WEEKS    INR Location Home INR      Anticoagulation Summary  As of 12/19/2018    INR goal:   2.5-3.5   TTR:   67.6 % (1.6 y)   INR used for dosing:   3.3 (12/19/2018)   Warfarin maintenance plan:   10 mg (5 mg x 2) every Sun, Tue, Thu; 5 mg (5 mg x 1) all other days   Full warfarin instructions:   10 mg every Sun, Tue, Thu; 5 mg all other days   Weekly warfarin total:   50 mg   No change documented:   Irena Jaramillo RN   Plan last modified:   Missy Hoskins RN (8/8/2018)   Next INR check:   1/2/2019   Priority:   INR   Target end date:       Indications    Long-term (current) use of anticoagulants [Z79.01] [Z79.01]  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 Called and LVM (detailed) and  [facility] and asking patient to call back IF any changes with meds, diet, or health. Pt to continue same warfarin dose and recheck INR in 2 weeks, or sooner if concerns.     Pt aware if signs of clotting (pain, tenderness, swelling, color change in leg or arm, SOB) and bleeding occur (blood in stool, urine, large bruising, bleeding gums, nosebleeds) to have INR check sooner. If sx severe report to ER or concerned for stroke call 911. If general questions or concerns arise, call clinic.    Irena Jaramillo RN

## 2018-12-19 NOTE — TELEPHONE ENCOUNTER
See [facility] encounter     LVM (detailed) with INR/warfarin plan and also sent a [employer] message    Irena ARCOS RN

## 2019-01-02 ENCOUNTER — TELEPHONE (OUTPATIENT)
Dept: FAMILY MEDICINE | Facility: CLINIC | Age: 74
End: 2019-01-02

## 2019-01-02 ENCOUNTER — ANTICOAGULATION THERAPY VISIT (OUTPATIENT)
Dept: FAMILY MEDICINE | Facility: CLINIC | Age: 74
End: 2019-01-02
Payer: COMMERCIAL

## 2019-01-02 DIAGNOSIS — Z95.2 HEART VALVE REPLACED: ICD-10-CM

## 2019-01-02 LAB — INR PPP: 3.7

## 2019-01-02 PROCEDURE — 99207 ZZC NO CHARGE NURSE ONLY: CPT | Performed by: INTERNAL MEDICINE

## 2019-01-02 NOTE — TELEPHONE ENCOUNTER
Reason for Call:  INR    Who is calling?  Patient    Phone number:  [phone]    Fax number:  -    Name of caller: Feng    INR Value:  3.7    Are there any other concerns:  No    Can we leave a detailed message on this number? YES    Phone number patient can be reached at: Home number on file [phone] (home)      Call taken on 1/2/2019 at 11:28 AM by Fausto Sharma

## 2019-01-02 NOTE — PROGRESS NOTES
ANTICOAGULATION FOLLOW-UP CLINIC VISIT    Patient Name:  Feng Wynne  Date:  1/2/2019  Contact Type:  Telephone    SUBJECTIVE:     Patient Findings     Positives:   No Problem Findings    Comments:   Reason for Call:  INR     Who is calling?  Patient     Phone number:  [phone]     Fax number:  -     Name of caller: Feng     INR Value:  3.7     Are there any other concerns:  No     Can we leave a detailed message on this number? YES     Phone number patient can be reached at: Home number on file [phone] (home)        Call taken on 1/2/2019 at 11:28 AM by Fausto Sharma           OBJECTIVE    INR   Date Value Ref Range Status   01/02/2019 3.7  Final       ASSESSMENT / PLAN  INR assessment SUPRA    Recheck INR In: 2 WEEKS    INR Location Home INR      Anticoagulation Summary  As of 1/2/2019    INR goal:   2.5-3.5   TTR:   67.2 % (1.6 y)   INR used for dosing:   3.7! 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 Recheck INR 2 weeks, sooner if concerns. Detailed message left for patient and also sent him a MyChart message regarding INR/warfarin.     Irena Jaramillo RN

## 2019-01-02 NOTE — TELEPHONE ENCOUNTER
See [facility] encounter  Detailed VM left for patient regarding warfarin/INR - also sent him a [employer] message    Irena ARCOS RN

## 2019-01-16 ENCOUNTER — ANTICOAGULATION THERAPY VISIT (OUTPATIENT)
Dept: FAMILY MEDICINE | Facility: CLINIC | Age: 74
End: 2019-01-16

## 2019-01-16 ENCOUNTER — TELEPHONE (OUTPATIENT)
Dept: FAMILY MEDICINE | Facility: CLINIC | Age: 74
End: 2019-01-16

## 2019-01-16 DIAGNOSIS — Z95.2 HEART VALVE REPLACED: ICD-10-CM

## 2019-01-16 LAB — INR PPP: 2.7

## 2019-01-16 PROCEDURE — 99207 ZZC NO CHARGE NURSE ONLY: CPT | Performed by: INTERNAL MEDICINE

## 2019-01-16 NOTE — TELEPHONE ENCOUNTER
Reason for Call:  INR    Who is calling?  pt    Phone number:  [phone]     Fax number:      Name of caller: Feng    INR Value:  2.7    Are there any other concerns:  No    Can we leave a detailed message on this number? YES    Phone number patient can be reached at: Home number on file [phone] (home)      Call taken on 1/16/2019 at 11:26 AM by Milton Davis

## 2019-01-16 NOTE — PROGRESS NOTES
ANTICOAGULATION FOLLOW-UP CLINIC VISIT    Patient Name:  Feng Wynne  Date:  2019  Contact Type:  Telephone    SUBJECTIVE:        OBJECTIVE    INR   Date Value Ref Range Status   2019 2.7  Final       ASSESSMENT / PLAN  INR assessment THER    Recheck INR In: 2 WEEKS    INR Location Home INR      Anticoagulation Summary  As of 2019    INR goal:   2.5-3.5   TTR:   67.5 % (1.7 y)   INR used for dosin.7 (2019)   Warfarin maintenance plan:   10 mg (5 mg x 2) every Sun, Tue, Thu; 5 mg (5 mg x 1) all other days   Full warfarin instructions:   10 mg every Sun, Tue, Thu; 5 mg all other days   Weekly warfarin total:   50 mg   No change documented:   Liana Owens RN   Plan last modified:   Missy Hoskins RN (2018)   Next INR check:   2019   Priority:   INR   Target end date:       Indications    Long-term (current) use of anticoagulants [Z79.01] [Z79.01]  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  Pt aware if signs of clotting (pain, tenderness, swelling, color change in leg or arm, SOB) and bleeding occur (blood in stool, urine, large bruising, bleeding gums, nosebleeds) to have INR check sooner. If sx severe report to ER or concerned for stroke call 911. If general questions or concerns arise, call clinic.         Liana Owens RN

## 2019-01-30 ENCOUNTER — ANTICOAGULATION THERAPY VISIT (OUTPATIENT)
Dept: FAMILY MEDICINE | Facility: CLINIC | Age: 74
End: 2019-01-30

## 2019-01-30 ENCOUNTER — TELEPHONE (OUTPATIENT)
Dept: FAMILY MEDICINE | Facility: CLINIC | Age: 74
End: 2019-01-30

## 2019-01-30 DIAGNOSIS — Z95.2 HEART VALVE REPLACED: ICD-10-CM

## 2019-01-30 LAB — INR PPP: 3

## 2019-01-30 PROCEDURE — 99207 ZZC NO CHARGE NURSE ONLY: CPT | Performed by: INTERNAL MEDICINE

## 2019-01-30 NOTE — TELEPHONE ENCOUNTER
See [facility] encounter  Sent MyChart message and left detailed VM with warfarin/INR instructions    Irena ARCOS RN

## 2019-01-30 NOTE — TELEPHONE ENCOUNTER
Reason for Call:  INR    Who is calling?  Patient    Phone number:  [phone]    Fax number:  -    Name of caller: Fneg    INR Value:  3.0    Are there any other concerns:  No    Can we leave a detailed message on this number? YES    Phone number patient can be reached at: Home number on file [phone] (home)      Call taken on 1/30/2019 at 8:52 AM by Fausto Sharma

## 2019-02-01 DIAGNOSIS — I10 ESSENTIAL HYPERTENSION WITH GOAL BLOOD PRESSURE LESS THAN 140/90: ICD-10-CM

## 2019-02-01 RX ORDER — LISINOPRIL 40 MG/1
TABLET ORAL
Qty: 30 TABLET | Refills: 0 | Status: SHIPPED | OUTPATIENT
Start: 2019-02-01 | End: 2019-03-18

## 2019-02-01 NOTE — TELEPHONE ENCOUNTER
Will refill for 1 month.  Patient was due for physical on January 2019.  Please call and schedule and remind patient to come in fasting.

## 2019-02-01 NOTE — TELEPHONE ENCOUNTER
Routing refill request to provider for review/approval because:  Labs not current:  Cr, K are from 2016.    MARYJANE BanerjeeN, RN  [employer] [facility]

## 2019-02-01 NOTE — TELEPHONE ENCOUNTER
"Pending Prescriptions:                       Disp   Refills    lisinopril (PRINIVIL/ZESTRIL) 40 MG [id number] tab*1            Sig: TAKE 1 TABLET(40 MG) BY MOUTH DAILY    Last Written Prescription Date:  08/20/2018  Last Fill Quantity: 90,  # refills: 1   Last office visit: 7/26/2018 with prescribing provider:     Future Office Visit:    Requested Prescriptions   Pending Prescriptions Disp Refills     lisinopril (PRINIVIL/ZESTRIL) 40 MG tablet 90 tablet 1     Sig: TAKE 1 TABLET(40 MG) BY MOUTH DAILY    ACE Inhibitors (Including Combos) Protocol Failed - 2/1/2019  2:58 PM       Failed - Normal serum creatinine on file in past 12 months    Recent Labs   Lab Test 10/04/16  0937   CR 0.78            Failed - Normal serum potassium on file in past 12 months    Recent Labs   Lab Test 10/04/16  0937   POTASSIUM 4.4            Passed - Blood pressure under 140/90 in past 12 months    BP Readings from Last 3 Encounters:   07/26/18 129/80   12/01/17 132/78   06/13/17 121/85                Passed - Recent (12 mo) or future (30 days) visit within the authorizing provider's specialty    Patient had office visit in the last 12 months or has a visit in the next 30 days with authorizing provider or within the authorizing provider's specialty.  See \"Patient Info\" tab in inbasket, or \"Choose Columns\" in Meds & Orders section of the refill encounter.             Passed - Medication is active on med list       Passed - Patient is age 18 or older          "

## 2019-02-13 ENCOUNTER — ANTICOAGULATION THERAPY VISIT (OUTPATIENT)
Dept: FAMILY MEDICINE | Facility: CLINIC | Age: 74
End: 2019-02-13

## 2019-02-13 ENCOUNTER — TELEPHONE (OUTPATIENT)
Dept: FAMILY MEDICINE | Facility: CLINIC | Age: 74
End: 2019-02-13

## 2019-02-13 DIAGNOSIS — Z95.2 HEART VALVE REPLACED: ICD-10-CM

## 2019-02-13 LAB — INR PPP: 3.1

## 2019-02-13 PROCEDURE — 99207 ZZC NO CHARGE NURSE ONLY: CPT | Performed by: INTERNAL MEDICINE

## 2019-02-13 NOTE — TELEPHONE ENCOUNTER
Reason for Call:  INR    Who is calling?  PT    Phone number:      Fax number:      Name of caller: JESSE    INR Value:  3.1    Are there any other concerns:  No    Can we leave a detailed message on this number? YES    Phone number patient can be reached at: Home number on file [phone] (home)      Call taken on 2/13/2019 at 9:55 AM by Milton Davis

## 2019-02-13 NOTE — PROGRESS NOTES
ANTICOAGULATION FOLLOW-UP CLINIC VISIT    Patient Name:  Feng Wynne  Date:  2/13/2019  Contact Type:  Telephone    SUBJECTIVE:     Patient Findings     Positives:   No Problem Findings           OBJECTIVE    INR   Date Value Ref Range Status   02/13/2019 3.1  Final       ASSESSMENT / PLAN  INR assessment THER    Recheck INR In: 2 WEEKS    INR Location Home INR      Anticoagulation Summary  As of 2/13/2019    INR goal:   2.5-3.5   TTR:   68.9 % (1.7 y)   INR used for dosing:   3.1 (2/13/2019)   Warfarin maintenance plan:   10 mg (5 mg x 2) every Sun, Tue, Thu; 5 mg (5 mg x 1) all other days   Full warfarin instructions:   10 mg every Sun, Tue, Thu; 5 mg all other days   Weekly warfarin total:   50 mg   No change documented:   Liana Owens RN   Plan last modified:   Missy Hoskins RN (8/8/2018)   Next INR check:   2/27/2019   Priority:   INR   Target end date:       Indications    Long-term (current) use of anticoagulants [Z79.01] [Z79.01]  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    Pt aware if signs of clotting (pain, tenderness, swelling, color change in leg or arm, SOB) and bleeding occur (blood in stool, urine, large bruising, bleeding gums, nosebleeds) to have INR check sooner. If sx severe report to ER or concerned for stroke call 911. If general questions or concerns arise, call clinic.         Liana Owens RN

## 2019-02-27 ENCOUNTER — TELEPHONE (OUTPATIENT)
Dept: FAMILY MEDICINE | Facility: CLINIC | Age: 74
End: 2019-02-27

## 2019-02-27 ENCOUNTER — ANTICOAGULATION THERAPY VISIT (OUTPATIENT)
Dept: FAMILY MEDICINE | Facility: CLINIC | Age: 74
End: 2019-02-27

## 2019-02-27 DIAGNOSIS — Z95.2 HEART VALVE REPLACED: ICD-10-CM

## 2019-02-27 LAB — INR PPP: 3.1 (ref 0.9–1.1)

## 2019-02-27 PROCEDURE — 99207 ZZC NO CHARGE NURSE ONLY: CPT | Performed by: INTERNAL MEDICINE

## 2019-02-27 NOTE — PROGRESS NOTES
ANTICOAGULATION FOLLOW-UP CLINIC VISIT    Patient Name:  Feng Wynne  Date:  2/27/2019  Contact Type:  Telephone    SUBJECTIVE:        OBJECTIVE    INR   Date Value Ref Range Status   02/27/2019 3.1  Final       ASSESSMENT / PLAN  INR assessment THER    Recheck INR In: 2 WEEKS    INR Location Home INR      Anticoagulation Summary  As of 2/27/2019    INR goal:   2.5-3.5   TTR:   69.6 % (1.8 y)   INR used for dosing:   3.1 (2/27/2019)   Warfarin maintenance plan:   10 mg (5 mg x 2) every Sun, Tue, Thu; 5 mg (5 mg x 1) all other days   Full warfarin instructions:   10 mg every Sun, Tue, Thu; 5 mg all other days   Weekly warfarin total:   50 mg   No change documented:   Irena Jaramillo RN   Plan last modified:   Missy Hoskins RN (8/8/2018)   Next INR check:   3/13/2019   Priority:   INR   Target end date:       Indications    Long-term (current) use of anticoagulants [Z79.01] [Z79.01]  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 No changes in meds/diet. No missed/extra warfarin doses. No unusual bruising/bleeding or other changes. Pt will continue same warfarin dose and recheck INR in 2 week(s), or sooner if concerns.     Pt aware if signs of clotting (pain, tenderness, swelling, color change in leg or arm, SOB) and bleeding occur (blood in stool, urine, large bruising, bleeding gums, nosebleeds) to have INR check sooner. If sx severe report to ER or concerned for  stroke call 911. If general questions or concerns arise, call clinic.    Irena Jaramillo RN

## 2019-03-11 ASSESSMENT — ACTIVITIES OF DAILY LIVING (ADL): CURRENT_FUNCTION: NO ASSISTANCE NEEDED

## 2019-03-13 ENCOUNTER — ANTICOAGULATION THERAPY VISIT (OUTPATIENT)
Dept: FAMILY MEDICINE | Facility: CLINIC | Age: 74
End: 2019-03-13

## 2019-03-13 ENCOUNTER — TELEPHONE (OUTPATIENT)
Dept: FAMILY MEDICINE | Facility: CLINIC | Age: 74
End: 2019-03-13

## 2019-03-13 LAB — INR PPP: 2.6 (ref 0.9–1.1)

## 2019-03-13 PROCEDURE — 99207 ZZC NO CHARGE NURSE ONLY: CPT | Performed by: INTERNAL MEDICINE

## 2019-03-13 NOTE — TELEPHONE ENCOUNTER
Reason for Call:  INR    Who is calling? Pt    Phone number:  [phone]    Fax number:      Name of caller: Feng    INR Value:  2.6    Are there any other concerns:  No    Can we leave a detailed message on this number? YES    Phone number patient can be reached at: Cell number on file:    Telephone Information:   Mobile [phone]       all taken on 3/13/2019 at 12:49 PM by Galina Hicks

## 2019-03-13 NOTE — PROGRESS NOTES
ANTICOAGULATION FOLLOW-UP CLINIC VISIT    Patient Name:  Feng Wynne  Date:  3/13/2019  Contact Type:  Telephone/ Home INR    SUBJECTIVE:     Patient Findings     Comments:   Reason for Call:  INR     Who is calling? Pt     Phone number:  [phone]     Fax number:       Name of caller: Feng     INR Value:  2.6     Are there any other concerns:  No     Can we leave a detailed message on this number? YES     Phone number patient can be reached at: Cell number on file:    Telephone Information:  Mobile [phone]        all taken on 3/13/2019 at 12:49 PM by Galina Hicks                             OBJECTIVE    INR   Date Value Ref Range Status   2019 2.6  Final       ASSESSMENT / PLAN  INR assessment THER    Recheck INR In: 2 WEEKS    INR Location Home INR      Anticoagulation Summary  As of 3/13/2019    INR goal:   2.5-3.5   TTR:   70.2 % (1.8 y)   INR used for dosin.6 (3/13/2019)   Warfarin maintenance plan:   10 mg (5 mg x 2) every Sun, Tue, Thu; 5 mg (5 mg x 1) all other days   Full warfarin instructions:   10 mg every Sun, Tue, Thu; 5 mg all other days   Weekly warfarin total:   50 mg   No change documented:   Elen Mcdonough RN   Plan last modified:   Missy Hoskins RN (2018)   Next INR check:   3/27/2019   Priority:   INR   Target end date:       Indications    Long-term (current) use of anticoagulants [Z79.01] [Z79.01]  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 Pt contacted by phone and advised to continue current maintenance dose of 10 mg on Sundays, Tuesdays, Thursdays and 5 mg all the other days. Recheck in 2 weeks on or around 3/27/19. Pt denies any health changes. Feng aware if signs of clotting (pain, tenderness, swelling, color change in leg or arm, SOB) and bleeding occur (blood in stool, urine, large bruising, bleeding gums, nosebleeds) to have INR check sooner. If sx severe report to ER or concerned for stroke call 911. If general questions or concerns arise, call clinic.         Elen Mcdonough RN

## 2019-03-14 ENCOUNTER — OFFICE VISIT (OUTPATIENT)
Dept: FAMILY MEDICINE | Facility: CLINIC | Age: 74
End: 2019-03-14
Payer: COMMERCIAL

## 2019-03-14 VITALS
SYSTOLIC BLOOD PRESSURE: 130 MMHG | DIASTOLIC BLOOD PRESSURE: 82 MMHG | TEMPERATURE: 97.4 F | WEIGHT: 315 LBS | OXYGEN SATURATION: 97 % | HEIGHT: 72 IN | HEART RATE: 55 BPM | BODY MASS INDEX: 42.66 KG/M2

## 2019-03-14 DIAGNOSIS — E55.9 VITAMIN D DEFICIENCY: ICD-10-CM

## 2019-03-14 DIAGNOSIS — E78.5 HYPERLIPIDEMIA LDL GOAL <130: ICD-10-CM

## 2019-03-14 DIAGNOSIS — I48.21 PERMANENT ATRIAL FIBRILLATION (H): ICD-10-CM

## 2019-03-14 DIAGNOSIS — R19.09 ABDOMINAL MASS OF OTHER SITE: ICD-10-CM

## 2019-03-14 DIAGNOSIS — Z13.6 SCREENING FOR AAA (ABDOMINAL AORTIC ANEURYSM): ICD-10-CM

## 2019-03-14 DIAGNOSIS — Z00.00 MEDICARE ANNUAL WELLNESS VISIT, SUBSEQUENT: Primary | ICD-10-CM

## 2019-03-14 DIAGNOSIS — Z86.2 HISTORY OF ANEMIA: ICD-10-CM

## 2019-03-14 DIAGNOSIS — E66.01 MORBID OBESITY DUE TO EXCESS CALORIES (H): ICD-10-CM

## 2019-03-14 DIAGNOSIS — I10 ESSENTIAL HYPERTENSION: ICD-10-CM

## 2019-03-14 LAB
ANION GAP SERPL CALCULATED.3IONS-SCNC: 7 MMOL/L (ref 3–14)
BASOPHILS # BLD AUTO: 0 10E9/L (ref 0–0.2)
BASOPHILS NFR BLD AUTO: 0.2 %
BUN SERPL-MCNC: 25 MG/DL (ref 7–30)
CALCIUM SERPL-MCNC: 9.6 MG/DL (ref 8.5–10.1)
CHLORIDE SERPL-SCNC: 108 MMOL/L (ref 94–109)
CHOLEST SERPL-MCNC: 154 MG/DL
CO2 SERPL-SCNC: 25 MMOL/L (ref 20–32)
CREAT SERPL-MCNC: 0.85 MG/DL (ref 0.66–1.25)
DEPRECATED CALCIDIOL+CALCIFEROL SERPL-MC: 41 UG/L (ref 20–75)
DIFFERENTIAL METHOD BLD: ABNORMAL
EOSINOPHIL # BLD AUTO: 0.1 10E9/L (ref 0–0.7)
EOSINOPHIL NFR BLD AUTO: 2.4 %
ERYTHROCYTE [DISTWIDTH] IN BLOOD BY AUTOMATED COUNT: 13.1 % (ref 10–15)
FERRITIN SERPL-MCNC: 65 NG/ML (ref 26–388)
FOLATE SERPL-MCNC: 46 NG/ML
GFR SERPL CREATININE-BSD FRML MDRD: 86 ML/MIN/{1.73_M2}
GLUCOSE SERPL-MCNC: 93 MG/DL (ref 70–99)
HCT VFR BLD AUTO: 39.6 % (ref 40–53)
HDLC SERPL-MCNC: 42 MG/DL
HGB BLD-MCNC: 12.8 G/DL (ref 13.3–17.7)
IRON SATN MFR SERPL: 26 % (ref 15–46)
IRON SERPL-MCNC: 95 UG/DL (ref 35–180)
LDLC SERPL CALC-MCNC: 91 MG/DL
LYMPHOCYTES # BLD AUTO: 1 10E9/L (ref 0.8–5.3)
LYMPHOCYTES NFR BLD AUTO: 21.4 %
MCH RBC QN AUTO: 32.6 PG (ref 26.5–33)
MCHC RBC AUTO-ENTMCNC: 32.3 G/DL (ref 31.5–36.5)
MCV RBC AUTO: 101 FL (ref 78–100)
MONOCYTES # BLD AUTO: 0.5 10E9/L (ref 0–1.3)
MONOCYTES NFR BLD AUTO: 9.8 %
NEUTROPHILS # BLD AUTO: 3.1 10E9/L (ref 1.6–8.3)
NEUTROPHILS NFR BLD AUTO: 66.2 %
NONHDLC SERPL-MCNC: 112 MG/DL
PLATELET # BLD AUTO: 140 10E9/L (ref 150–450)
POTASSIUM SERPL-SCNC: 4.5 MMOL/L (ref 3.4–5.3)
RBC # BLD AUTO: 3.93 10E12/L (ref 4.4–5.9)
SODIUM SERPL-SCNC: 140 MMOL/L (ref 133–144)
TIBC SERPL-MCNC: 360 UG/DL (ref 240–430)
TRIGL SERPL-MCNC: 107 MG/DL
TSH SERPL DL<=0.005 MIU/L-ACNC: 2.39 MU/L (ref 0.4–4)
VIT B12 SERPL-MCNC: 359 PG/ML (ref 193–986)
WBC # BLD AUTO: 4.7 10E9/L (ref 4–11)

## 2019-03-14 PROCEDURE — 82746 ASSAY OF FOLIC ACID SERUM: CPT | Performed by: INTERNAL MEDICINE

## 2019-03-14 PROCEDURE — 36415 COLL VENOUS BLD VENIPUNCTURE: CPT | Performed by: INTERNAL MEDICINE

## 2019-03-14 PROCEDURE — 85025 COMPLETE CBC W/AUTO DIFF WBC: CPT | Performed by: INTERNAL MEDICINE

## 2019-03-14 PROCEDURE — 80061 LIPID PANEL: CPT | Performed by: INTERNAL MEDICINE

## 2019-03-14 PROCEDURE — 99397 PER PM REEVAL EST PAT 65+ YR: CPT | Performed by: INTERNAL MEDICINE

## 2019-03-14 PROCEDURE — 83550 IRON BINDING TEST: CPT | Performed by: INTERNAL MEDICINE

## 2019-03-14 PROCEDURE — 84443 ASSAY THYROID STIM HORMONE: CPT | Performed by: INTERNAL MEDICINE

## 2019-03-14 PROCEDURE — 80048 BASIC METABOLIC PNL TOTAL CA: CPT | Performed by: INTERNAL MEDICINE

## 2019-03-14 PROCEDURE — 83540 ASSAY OF IRON: CPT | Performed by: INTERNAL MEDICINE

## 2019-03-14 PROCEDURE — 82728 ASSAY OF FERRITIN: CPT | Performed by: INTERNAL MEDICINE

## 2019-03-14 PROCEDURE — 82306 VITAMIN D 25 HYDROXY: CPT | Performed by: INTERNAL MEDICINE

## 2019-03-14 PROCEDURE — 82607 VITAMIN B-12: CPT | Performed by: INTERNAL MEDICINE

## 2019-03-14 ASSESSMENT — MIFFLIN-ST. JEOR: SCORE: 2326.36

## 2019-03-14 NOTE — PATIENT INSTRUCTIONS
Proceed with the ultrasound of your abdomen.  [phone]    Monitor your blood pressure once a week and call doctor if:  -- your blood pressure for the top/upper number is greater than 140 or less than 90  -- your blood pressure for the bottom/lower number is greater than 90 or less than 60    Write your blood pressure readings on a small notebook and bring this notebook along with your blood pressure machine to your clinic visits.    Maintain low fat/calorie diet.    Follow up in 6 months.        Preventive Health Recommendations:     See your health care provider every year to    Review health changes.     Discuss preventive care.      Review your medicines if your doctor has prescribed any.    Talk with your health care provider about whether you should have a test to screen for prostate cancer (PSA).    Every 3 years, have a diabetes test (fasting glucose). If you are at risk for diabetes, you should have this test more often.    Every 5 years, have a cholesterol test. Have this test more often if you are at risk for high cholesterol or heart disease.     Every 10 years, have a colonoscopy. Or, have a yearly FIT test (stool test). These exams will check for colon cancer.    Talk to with your health care provider about screening for Abdominal Aortic Aneurysm if you have a family history of AAA or have a history of smoking.  Shots:     Get a flu shot each year.     Get a tetanus shot every 10 years.     Talk to your doctor about your pneumonia vaccines. There are now two you should receive - Pneumovax (PPSV 23) and Prevnar (PCV 13).    Talk to your pharmacist about a shingles vaccine.     Talk to your doctor about the hepatitis B vaccine.  Nutrition:     Eat at least 5 servings of fruits and vegetables each day.     Eat whole-grain bread, whole-wheat pasta and brown rice instead of white grains and rice.     Get adequate Calcium and Vitamin D.   Lifestyle    Exercise for at least 150 minutes a week (30 minutes  a day, 5 days a week). This will help you control your weight and prevent disease.     Limit alcohol to one drink per day.     No smoking.     Wear sunscreen to prevent skin cancer.     See your dentist every six months for an exam and cleaning.     See your eye doctor every 1 to 2 years to screen for conditions such as glaucoma, macular degeneration and cataracts.    Personalized Prevention Plan  You are due for the preventive services outlined below.  Your care team is available to assist you in scheduling these services.  If you have already completed any of these items, please share that information with your care team to update in your medical record.    Health Maintenance Due   Topic Date Due     MARILIA QUESTIONNAIRE 1 YEAR  01/07/1963     Depression Assessment - yearly  01[birth year]07/1963     Discuss Advance Directive Planning  01/07/2001     AORTIC ANEURYSM SCREENING (SYSTEM ASSIGNED)  01/07/2011     Zoster (Shingles) Vaccine (2 of 3) 06/03/2013     Annual Wellness Visit  10/04/2017     INR CLINIC REFERRAL - yearly  10/27/2017     FALL RISK ASSESSMENT  05/03/2018

## 2019-03-14 NOTE — PROGRESS NOTES
"  SUBJECTIVE:   Feng Wynne is a 74 year old male who presents for Preventive Visit.    Patient says that he is feeling great.  He has lost another 20 pounds since our last clinic visit in July of last year.  Total weight loss has been 50 pounds since we started his weight management on 10/19/2017.    Are you in the first 12 months of your Medicare Part B coverage?  No    Physical Health:Answers for HPI/ROS submitted by the patient on 3/11/2019   Annual Exam:  In general, how would you rate your overall physical health?: good  Frequency of exercise:: 4-5 days/week  Do you usually eat at least 4 servings of fruit and vegetables a day, include whole grains & fiber, and avoid regularly eating high fat or \"junk\" foods? : Yes  Taking medications regularly:: Yes  Medication side effects:: None  Activities of Daily Living: no assistance needed  Home safety: no safety concerns identified  Hearing Impairment:: no hearing concerns  In the past 6 months, have you been bothered by leaking of urine?: No  In general, how would you rate your overall mental or emotional health?: excellent  Additional concerns today:: No  Duration of exercise:: 15-30 minutes  PHQ-2 Score: (P) 0    Do you feel safe in your environment? Yes    Do you have a Health Care Directive? Yes: Advance Directive has been received and scanned.    Fall risk: low  Fallen 2 or more times in the past year?: No  Any fall with injury in the past year?: No    Cognitive Screenin) Repeat 3 items (Leader, Season, Table)    2) Clock draw: NORMAL  3) 3 item recall: Recalls 3 objects  Results: 3 items recalled: COGNITIVE IMPAIRMENT LESS LIKELY    Mini-CogTM Copyright [employer]. Licensed by the author for use in [employer]; reprinted with permission (harper@.[facility]). All rights reserved.      Do you have sleep apnea, excessive snoring or daytime drowsiness?: no      Reviewed and updated as needed this visit by clinical staff  Tobacco  Allergies  Meds "  Problems  Med Hx  Surg Hx       Reviewed and updated as needed this visit by Provider  Allergies  Meds  Problems  Med Hx  Surg Hx        Social History     Tobacco Use     Smoking status: Former Smoker     Packs/day: 0.50     Years: 5.00     Pack years: 2.50     Types: Cigarettes     Smokeless tobacco: Never Used     Tobacco comment: quit 20+ yrs ago   Substance Use Topics     Alcohol use: Yes     Alcohol/week: 0.0 oz     Comment: 2 drinks/month                           Current providers sharing in care for this patient include:   Patient Care Team:  Enrique Walker MD as PCP - General (Internal Medicine)  Armani Marin MD as MD (Cardiology)  Enrique Walker MD as Assigned PCP    The following health maintenance items are reviewed in Epic and correct as of today:  Health Maintenance   Topic Date Due     ADVANCE DIRECTIVE PLANNING Q5 YRS  01/07/2001     AORTIC ANEURYSM SCREENING (SYSTEM ASSIGNED)  01/07/2011     ZOSTER IMMUNIZATION (2 of 3) 06/03/2013     MEDICARE ANNUAL WELLNESS VISIT  10/04/2017     OP ANNUAL INR REFERRAL  10/27/2017     FIT Q1 YR  07/31/2019     FALL RISK ASSESSMENT  03/14/2020     MARILIA QUESTIONNAIRE 1 YEAR  03/14/2020     PHQ-9 Q1YR  03/14/2020     DTAP/TDAP/TD IMMUNIZATION (2 - Td) 04/08/2023     LIPID SCREEN Q5 YR MALE (SYSTEM ASSIGNED)  03/14/2024     INFLUENZA VACCINE  Completed     PNEUMOCOCCAL IMMUNIZATION 65+ LOW/MEDIUM RISK  Completed     HEPATITIS C SCREENING  Completed     IPV IMMUNIZATION  Aged Out     MENINGITIS IMMUNIZATION  Aged Out       ROS:  Review of Systems   Constitutional: Negative for chills, fatigue and fever.   HENT: Negative for congestion, ear pain, hearing loss, sore throat and trouble swallowing.    Eyes: Negative for pain and visual disturbance.   Respiratory: Negative for cough and shortness of breath.    Cardiovascular: Negative for chest pain and palpitations.   Gastrointestinal: Negative for abdominal pain, blood  "in stool, constipation, diarrhea, nausea and vomiting.   Genitourinary: Negative for difficulty urinating and testicular pain.   Musculoskeletal: Negative for arthralgias, back pain, myalgias and neck pain.   Skin: Negative for rash.   Neurological: Negative for dizziness, light-headedness, numbness and headaches.   Psychiatric/Behavioral: Negative for sleep disturbance. The patient is not nervous/anxious.        OBJECTIVE:   /82 (BP Location: Right arm, Patient Position: Chair, Cuff Size: Adult Large)   Pulse 55   Temp 97.4  F (36.3  C) (Tympanic)   Ht 1.816 m (5' 11.5\")   Wt (!) 155.1 kg (342 lb)   SpO2 97%   BMI 47.03 kg/m   Estimated body mass index is 47.03 kg/m  as calculated from the following:    Height as of this encounter: 1.816 m (5' 11.5\").    Weight as of this encounter: 155.1 kg (342 lb).  EXAM:   Physical Exam   Constitutional: He is oriented to person, place, and time. No distress.   HENT:   Right Ear: External ear normal.   Left Ear: External ear normal.   Mouth/Throat: Oropharynx is clear and moist.   Eyes: Conjunctivae are normal. Pupils are equal, round, and reactive to light.   Neck: No thyromegaly present.   Cardiovascular: Normal rate, regular rhythm and normal heart sounds.   Pulmonary/Chest: Effort normal and breath sounds normal. No respiratory distress.   Abdominal: Soft. There is no tenderness.   (+) firm mass adjacent/left of the surgical incision at the midline of the lower abdomen   Genitourinary: Penis normal. No discharge found.   Musculoskeletal: Normal range of motion. He exhibits no edema or tenderness.   Lymphadenopathy:     He has no cervical adenopathy.   Neurological: He is alert and oriented to person, place, and time. Coordination normal.   Skin: No rash noted.   Nursing note and vitals reviewed.      ASSESSMENT / PLAN:       ICD-10-CM    1. Medicare annual wellness visit, subsequent Z00.00    2. Abdominal mass -- palpated during exam R19.09 US Abdomen Limited "   3. Morbid obesity due to excess calories (H) E66.01    4. Permanent atrial fibrillation (H) I48.2 TSH with free T4 reflex   5. Essential hypertension I10 Basic metabolic panel   6. Hyperlipidemia LDL goal <130 E78.5 Lipid panel reflex to direct LDL Fasting   7. History of anemia Z86.2 CBC with platelets differential     Ferritin     Folate     Iron and iron binding capacity     Vitamin B12   8. Vitamin D deficiency E55.9 Vitamin D Deficiency   9. Screening for AAA (abdominal aortic aneurysm) Z13.6 US Aorta Medicare AAA Screening       Patient Instructions     Proceed with the ultrasound of your abdomen.  [phone]    Monitor your blood pressure once a week and call doctor if:  -- your blood pressure for the top/upper number is greater than 140 or less than 90  -- your blood pressure for the bottom/lower number is greater than 90 or less than 60    Write your blood pressure readings on a small notebook and bring this notebook along with your blood pressure machine to your clinic visits.    Maintain low fat/calorie diet.    Follow up in 6 months.        Preventive Health Recommendations:     See your health care provider every year to    Review health changes.     Discuss preventive care.      Review your medicines if your doctor has prescribed any.    Talk with your health care provider about whether you should have a test to screen for prostate cancer (PSA).    Every 3 years, have a diabetes test (fasting glucose). If you are at risk for diabetes, you should have this test more often.    Every 5 years, have a cholesterol test. Have this test more often if you are at risk for high cholesterol or heart disease.     Every 10 years, have a colonoscopy. Or, have a yearly FIT test (stool test). These exams will check for colon cancer.    Talk to with your health care provider about screening for Abdominal Aortic Aneurysm if you have a family history of AAA or have a history of smoking.  Shots:     Get a flu shot  each year.     Get a tetanus shot every 10 years.     Talk to your doctor about your pneumonia vaccines. There are now two you should receive - Pneumovax (PPSV 23) and Prevnar (PCV 13).    Talk to your pharmacist about a shingles vaccine.     Talk to your doctor about the hepatitis B vaccine.  Nutrition:     Eat at least 5 servings of fruits and vegetables each day.     Eat whole-grain bread, whole-wheat pasta and brown rice instead of white grains and rice.     Get adequate Calcium and Vitamin D.   Lifestyle    Exercise for at least 150 minutes a week (30 minutes a day, 5 days a week). This will help you control your weight and prevent disease.     Limit alcohol to one drink per day.     No smoking.     Wear sunscreen to prevent skin cancer.     See your dentist every six months for an exam and cleaning.     See your eye doctor every 1 to 2 years to screen for conditions such as glaucoma, macular degeneration and cataracts.    Personalized Prevention Plan  You are due for the preventive services outlined below.  Your care team is available to assist you in scheduling these services.  If you have already completed any of these items, please share that information with your care team to update in your medical record.    Health Maintenance Due   Topic Date Due     MARILIA QUESTIONNAIRE 1 YEAR  01/07/1963     Depression Assessment - yearly  01/07/1963     Discuss Advance Directive Planning  01/07/2001     AORTIC ANEURYSM SCREENING (SYSTEM ASSIGNED)  01/07/2011     Zoster (Shingles) Vaccine (2 of 3) 06/03/2013     Annual Wellness Visit  10/04/2017     INR CLINIC REFERRAL - yearly  10/27/2017     FALL RISK ASSESSMENT  05/03/2018         End of Life Planning:  Patient currently has an advanced directive: No.  I have verified the patient's ablity to prepare an advanced directive/make health care decisions.  Literature was provided to assist patient in preparing an advanced directive.    COUNSELING:  Reviewed preventive health  "counseling, as reflected in patient instructions    BP Readings from Last 1 Encounters:   03/14/19 130/82     Estimated body mass index is 47.03 kg/m  as calculated from the following:    Height as of this encounter: 1.816 m (5' 11.5\").    Weight as of this encounter: 155.1 kg (342 lb).      Weight management plan: Discussed healthy diet and exercise guidelines     reports that he has quit smoking. His smoking use included cigarettes. He has a 2.50 pack-year smoking history. he has never used smokeless tobacco.      Appropriate preventive services were discussed with this patient, including applicable screening as appropriate for cardiovascular disease, diabetes, osteopenia/osteoporosis, and glaucoma.  As appropriate for age/gender, discussed screening for colorectal cancer, prostate cancer, breast cancer, and cervical cancer. Checklist reviewing preventive services available has been given to the patient.    Reviewed patients plan of care and provided an AVS. The Basic Care Plan (routine screening as documented in Health Maintenance) for Feng meets the Care Plan requirement. This Care Plan has been established and reviewed with the Patient.    Counseling Resources:  ATP IV Guidelines  Pooled Cohorts Equation Calculator  Breast Cancer Risk Calculator  FRAX Risk Assessment  ICSI Preventive Guidelines  Dietary Guidelines for Americans, 2010  [employer] MyPlate  ASA Prophylaxis  Lung CA Screening    Enrique Walker MD  [facility]    "

## 2019-03-15 DIAGNOSIS — I10 ESSENTIAL HYPERTENSION WITH GOAL BLOOD PRESSURE LESS THAN 140/90: ICD-10-CM

## 2019-03-16 NOTE — TELEPHONE ENCOUNTER
"Last Written Prescription Date:  2/01/19  Last Fill Quantity: 30 tablet,  # refills: 0   Last office visit: 3/14/2019 with prescribing provider:  Aaron   Future Office Visit:      Requested Prescriptions   Pending Prescriptions Disp Refills     lisinopril (PRINIVIL/ZESTRIL) 40 MG tablet 30 tablet 0     Sig: TAKE 1 TABLET(40 MG) BY MOUTH DAILY.  Please schedule physical exam with Dr. Walker    ACE Inhibitors (Including Combos) Protocol Passed - 3/15/2019  8:08 PM       Passed - Blood pressure under 140/90 in past 12 months    BP Readings from Last 3 Encounters:   03/14/19 130/82   07/26/18 129/80   12/01/17 132/78                Passed - Recent (12 mo) or future (30 days) visit within the authorizing provider's specialty    Patient had office visit in the last 12 months or has a visit in the next 30 days with authorizing provider or within the authorizing provider's specialty.  See \"Patient Info\" tab in inbasket, or \"Choose Columns\" in Meds & Orders section of the refill encounter.             Passed - Medication is active on med list       Passed - Patient is age 18 or older       Passed - Normal serum creatinine on file in past 12 months    Recent Labs   Lab Test 03/14/19  0959   CR 0.85            Passed - Normal serum potassium on file in past 12 months    Recent Labs   Lab Test 03/14/19  0959   POTASSIUM 4.5               "

## 2019-03-18 DIAGNOSIS — I48.20 CHRONIC ATRIAL FIBRILLATION (H): ICD-10-CM

## 2019-03-18 RX ORDER — LISINOPRIL 40 MG/1
TABLET ORAL
Qty: 90 TABLET | Refills: 1 | Status: SHIPPED | OUTPATIENT
Start: 2019-03-18 | End: 2019-09-11

## 2019-03-19 NOTE — TELEPHONE ENCOUNTER
"Last Written Prescription Date:  11/08/18  Last Fill Quantity: 180 tablet,  # refills: 0   Last office visit: 3/14/2019 with prescribing provider:  Aaron   Future Office Visit:      Requested Prescriptions   Pending Prescriptions Disp Refills     warfarin (COUMADIN) 5 MG tablet 180 tablet 0     Sig: Take 1-2 tablets (5-10 mg) by mouth daily (5 mg on Tue, Thu, Sat; 10 mg all other days) or as directed by INR clinic    Vitamin K Antagonists Failed - 3/18/2019  6:11 PM       Failed - INR is within goal in the past 6 weeks    Confirm INR is within goal in the past 6 weeks.     Recent Labs   Lab Test 03/13/19   INR 2.6                      Passed - Recent (12 mo) or future (30 days) visit within the authorizing provider's specialty    Patient had office visit in the last 12 months or has a visit in the next 30 days with authorizing provider or within the authorizing provider's specialty.  See \"Patient Info\" tab in inbasket, or \"Choose Columns\" in Meds & Orders section of the refill encounter.             Passed - Medication is active on med list       Passed - Patient is 18 years of age or older          "

## 2019-03-20 RX ORDER — WARFARIN SODIUM 5 MG/1
5-10 TABLET ORAL DAILY
Qty: 180 TABLET | Refills: 0 | Status: SHIPPED | OUTPATIENT
Start: 2019-03-20 | End: 2019-06-17

## 2019-03-20 NOTE — TELEPHONE ENCOUNTER
Prescription approved per [facility] Refill Protocol.  Neyda CHANDLER RN

## 2019-03-26 ASSESSMENT — ENCOUNTER SYMPTOMS
VOMITING: 0
HEADACHES: 0
MYALGIAS: 0
COUGH: 0
TROUBLE SWALLOWING: 0
SORE THROAT: 0
DIFFICULTY URINATING: 0
SLEEP DISTURBANCE: 0
CONSTIPATION: 0
NERVOUS/ANXIOUS: 0
BACK PAIN: 0
NECK PAIN: 0
NAUSEA: 0
BLOOD IN STOOL: 0
EYE PAIN: 0
PALPITATIONS: 0
FEVER: 0
NUMBNESS: 0
ABDOMINAL PAIN: 0
DIZZINESS: 0
SHORTNESS OF BREATH: 0
FATIGUE: 0
ARTHRALGIAS: 0
LIGHT-HEADEDNESS: 0
DIARRHEA: 0
CHILLS: 0

## 2019-03-28 ENCOUNTER — ANTICOAGULATION THERAPY VISIT (OUTPATIENT)
Dept: FAMILY MEDICINE | Facility: CLINIC | Age: 74
End: 2019-03-28
Payer: COMMERCIAL

## 2019-03-28 DIAGNOSIS — Z95.2 HEART VALVE REPLACED: ICD-10-CM

## 2019-03-28 LAB — INR PPP: 3.1 (ref 0.9–1.1)

## 2019-03-28 PROCEDURE — 99207 ZZC NO CHARGE NURSE ONLY: CPT | Performed by: INTERNAL MEDICINE

## 2019-03-28 NOTE — PROGRESS NOTES
ANTICOAGULATION FOLLOW-UP CLINIC VISIT    Patient Name:  Feng Wynne  Date:  3/28/2019  Contact Type:  Fax recieved from [facility]    SUBJECTIVE:        OBJECTIVE    INR   Date Value Ref Range Status   03/28/2019 3.1  Final       ASSESSMENT / PLAN  INR assessment THER    Recheck INR In: 2 WEEKS    INR Location Home INR      Anticoagulation Summary  As of 3/28/2019    INR goal:   2.5-3.5   TTR:   70.9 % (1.9 y)   INR used for dosing:   3.1 (3/28/2019)   Warfarin maintenance plan:   10 mg (5 mg x 2) every Sun, Tue, Thu; 5 mg (5 mg x 1) all other days   Full warfarin instructions:   10 mg every Sun, Tue, Thu; 5 mg all other days   Weekly warfarin total:   50 mg   No change documented:   Ashlie Tubbs RN   Plan last modified:   Missy Hoskins RN (8/8/2018)   Next INR check:   4/10/2019   Priority:   INR   Target end date:       Indications    Long-term (current) use of anticoagulants [Z79.01] [Z79.01]  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  Left message on secure phone line with  Warfarin dosing and next INR date.  See flowsheet.     Ashlie GALE RN,BSN

## 2019-04-08 ENCOUNTER — HOSPITAL ENCOUNTER (OUTPATIENT)
Dept: ULTRASOUND IMAGING | Facility: CLINIC | Age: 74
Discharge: HOME | End: 2019-04-08
Attending: INTERNAL MEDICINE | Admitting: INTERNAL MEDICINE
Payer: COMMERCIAL

## 2019-04-08 DIAGNOSIS — R19.09 ABDOMINAL MASS OF OTHER SITE: ICD-10-CM

## 2019-04-08 PROCEDURE — 76705 ECHO EXAM OF ABDOMEN: CPT

## 2019-04-10 ENCOUNTER — ANTICOAGULATION THERAPY VISIT (OUTPATIENT)
Dept: FAMILY MEDICINE | Facility: CLINIC | Age: 74
End: 2019-04-10
Payer: COMMERCIAL

## 2019-04-10 LAB — INR PPP: 2.9 (ref 0.9–1.1)

## 2019-04-10 NOTE — PROGRESS NOTES
ANTICOAGULATION FOLLOW-UP CLINIC VISIT    Patient Name:  Feng Wynne  Date:  4/10/2019  Contact Type:  Telephone/ Home INR    SUBJECTIVE:     Patient Findings            OBJECTIVE    INR   Date Value Ref Range Status   04/10/2019 2.9  Final       ASSESSMENT / PLAN  INR assessment THER    Recheck INR In: 2 WEEKS    INR Location Home INR      Anticoagulation Summary  As of 4/10/2019    INR goal:   2.5-3.5   TTR:   71.4 % (1.9 y)   INR used for dosin.9 (4/10/2019)   Warfarin maintenance plan:   10 mg (5 mg x 2) every Sun, Tue, Thu; 5 mg (5 mg x 1) all other days   Full warfarin instructions:   10 mg every Sun, Tue, Thu; 5 mg all other days   Weekly warfarin total:   50 mg   No change documented:   Elen Mcdonough RN   Plan last modified:   Missy Hoskins RN (2018)   Next INR check:   2019   Priority:   INR   Target end date:       Indications    Long-term (current) use of anticoagulants [Z79.01] [Z79.01]  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 Left a detailed voicemail advising pt to continue maintenance dose of 10 mg on Sundays,,  and 5 mg all the other days. Recheck in 2 weeks on or around 19. Pt to call clinic at [phone] if questions or concerns.    Elen Mcdonough, ONOFRE

## 2019-04-24 LAB — INR PPP: 3.1 (ref 0.9–1.1)

## 2019-04-25 ENCOUNTER — ANTICOAGULATION THERAPY VISIT (OUTPATIENT)
Dept: FAMILY MEDICINE | Facility: CLINIC | Age: 74
End: 2019-04-25
Payer: COMMERCIAL

## 2019-04-25 DIAGNOSIS — Z95.2 HEART VALVE REPLACED: ICD-10-CM

## 2019-04-25 PROCEDURE — 99207 ZZC NO CHARGE NURSE ONLY: CPT | Performed by: INTERNAL MEDICINE

## 2019-04-25 NOTE — PROGRESS NOTES
ANTICOAGULATION FOLLOW-UP CLINIC VISIT    Patient Name:  Feng Wynne  Date:  4/25/2019  Contact Type:  Fax from [employer]    SUBJECTIVE:        OBJECTIVE    INR   Date Value Ref Range Status   04/24/2019 3.1  Final       ASSESSMENT / PLAN  INR assessment THER    Recheck INR In: 2 WEEKS    INR Location Home INR      Anticoagulation Summary  As of 4/25/2019    INR goal:   2.5-3.5   TTR:   72.0 % (1.9 y)   INR used for dosing:   3.1 (4/24/2019)   Warfarin maintenance plan:   10 mg (5 mg x 2) every Sun, Tue, Thu; 5 mg (5 mg x 1) all other days   Full warfarin instructions:   10 mg every Sun, Tue, Thu; 5 mg all other days   Weekly warfarin total:   50 mg   No change documented:   Ashlie Tubbs RN   Plan last modified:   Missy Hoskins RN (8/8/2018)   Next INR check:   5/8/2019   Priority:   INR   Target end date:       Indications    Long-term (current) use of anticoagulants [Z79.01] [Z79.01]  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    Called patient with Warfarin dosing and next INR date.  See flowsheet.         Ashlie Tubbs RN

## 2019-04-30 DIAGNOSIS — I48.20 CHRONIC ATRIAL FIBRILLATION (H): ICD-10-CM

## 2019-04-30 NOTE — TELEPHONE ENCOUNTER
Spoke w/ patient:     He used to be on Atenolol 25mg BID, until the shortage- switched to Metoprolol and was never switched back     He would not like to switch BACK to atenolol 25mg BID.     Discontinue Metoprolol Succinate (Toprol-XL) 100mg     To PCP: OK to switch back to Atenolol 25mg BID? (Pended). Please sign, if appropriate and DISCONTINUE Metoprolol off of Medication List.     Thank you,   Soo NAILS RN

## 2019-04-30 NOTE — TELEPHONE ENCOUNTER
Reason for Call:  Medication or medication refill:    Do you use a [facility]?  Name of the pharmacy and phone number for the current request:   Would also like to switch pharma's to:  [facility]   [address]  Ph: [phone]     Name of the medication requested: atenolol (TENORMIN) 25 MG tablet     Other request: Pt is currently taking metoprolol succinate (TOPROL-XL) 100 MG 24 hr tablet. He used to be on atenolol, but it's more expensive. Would like to switch back to atenolol    Can we leave a detailed message on this number? YES    Phone number patient can be reached at: Cell number on file:    Telephone Information:   Mobile [phone]     Best Time: after 11:30am    Call taken on 4/30/2019 at 10:41 AM by Galina Hicks

## 2019-04-30 NOTE — TELEPHONE ENCOUNTER
Rx for Atenolol listed as discontinued 9/15/17, 50mg 1 tab daily    Per below pt requesting Rx for Atenolol 25mg (noted prior to 50mg tabs, pt was taking 25mg BID)     Called to clarify   1) reason for request - did he tolerate Atenolol better or have better BP readings on this?   2) is he requesting 50mg or 25mg tabs?    Irena ARCOS RN

## 2019-05-02 RX ORDER — ATENOLOL 25 MG/1
25 TABLET ORAL
Qty: 180 TABLET | Refills: 1 | Status: SHIPPED | OUTPATIENT
Start: 2019-05-02 | End: 2019-10-11

## 2019-05-08 ENCOUNTER — ANTICOAGULATION THERAPY VISIT (OUTPATIENT)
Dept: FAMILY MEDICINE | Facility: CLINIC | Age: 74
End: 2019-05-08
Payer: COMMERCIAL

## 2019-05-08 DIAGNOSIS — Z95.2 HEART VALVE REPLACED: ICD-10-CM

## 2019-05-08 LAB — INR PPP: 2.9 (ref 0.9–1.1)

## 2019-05-08 PROCEDURE — 99207 ZZC NO CHARGE NURSE ONLY: CPT | Performed by: INTERNAL MEDICINE

## 2019-05-08 NOTE — PROGRESS NOTES
ANTICOAGULATION FOLLOW-UP CLINIC VISIT    Patient Name:  Feng Wynne  Date:  2019  Contact Type:  Telephone/ left vm    SUBJECTIVE:         OBJECTIVE    INR   Date Value Ref Range Status   2019 2.9  Final       ASSESSMENT / PLAN  INR assessment THER    Recheck INR In: 2 WEEKS    [facility]      Anticoagulation Summary  As of 2019    INR goal:   2.5-3.5   TTR:   72.5 % (2 y)   INR used for dosin.9 (2019)   Warfarin maintenance plan:   10 mg (5 mg x 2) every Sun, Tue, Thu; 5 mg (5 mg x 1) all other days   Full warfarin instructions:   10 mg every Sun, Tue, Thu; 5 mg all other days   Weekly warfarin total:   50 mg   No change documented:   Liana Owens RN   Plan last modified:   Missy Hoskins RN (2018)   Next INR check:   2019   Priority:   INR   Target end date:       Indications    Long-term (current) use of anticoagulants [Z79.01] [Z79.01]  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    Liana Owens RN

## 2019-05-10 ENCOUNTER — TELEPHONE (OUTPATIENT)
Dept: FAMILY MEDICINE | Facility: CLINIC | Age: 74
End: 2019-05-10

## 2019-05-10 NOTE — TELEPHONE ENCOUNTER
Patient wondering what Aortic Ultrasound order is checking for. Is this similar to the US Abdomen and what was being checked? Please advise.    Hermes Feliciano CMA on 5/10/2019 at 2:41 PM

## 2019-05-10 NOTE — TELEPHONE ENCOUNTER
Reason for Call:  Other question about US imaging order    Detailed comments:   On 3/14 at Medicare wellness visit  there was imaging order place for US Aorta Medicare AAA Screening.   In the meantime he got a  ULTRASOUND ABDOMEN LIMITED on 4/8.   Now imaging dept is calling him to schedule the US Aorta Medicare AAA Screening.   Patient is asking what this is for, would it be for the same thing that the abdominal ultrasound was for?  Is this something he should be scheduling?   Please advise.     Phone Number Patient can be reached at: Home number on file [phone] (home)    Best Time: any    Can we leave a detailed message on this number? YES  Please leave detailed message    Call taken on 5/10/2019 at 2:32 PM by Iqra Guzmán

## 2019-05-22 ENCOUNTER — TRANSFERRED RECORDS (OUTPATIENT)
Dept: HEALTH INFORMATION MANAGEMENT | Facility: CLINIC | Age: 74
End: 2019-05-22

## 2019-05-22 LAB — INR PPP: 2.8 (ref 0.9–1.1)

## 2019-05-29 ENCOUNTER — HOSPITAL ENCOUNTER (OUTPATIENT)
Dept: ULTRASOUND IMAGING | Facility: CLINIC | Age: 74
Discharge: HOME | End: 2019-05-29
Attending: INTERNAL MEDICINE | Admitting: INTERNAL MEDICINE
Payer: COMMERCIAL

## 2019-05-29 DIAGNOSIS — Z13.6 SCREENING FOR AAA (ABDOMINAL AORTIC ANEURYSM): ICD-10-CM

## 2019-05-29 PROCEDURE — 76706 US ABDL AORTA SCREEN AAA: CPT

## 2019-06-03 ENCOUNTER — TELEPHONE (OUTPATIENT)
Dept: FAMILY MEDICINE | Facility: CLINIC | Age: 74
End: 2019-06-03

## 2019-06-03 NOTE — TELEPHONE ENCOUNTER
Reason for Call:  Other FYI    Detailed comments: patient is changing pharmacies.  He would like his pharmacy on file changed to:    [facility]  [address] [place] [id number]    [phone]    Phone Number Patient can be reached at: Home number on file [phone] (home)    Best Time: anytime    Can we leave a detailed message on this number? YES    Call taken on 6/3/2019 at 10:04 AM by Veronique Anderson  .

## 2019-06-05 ENCOUNTER — ANTICOAGULATION THERAPY VISIT (OUTPATIENT)
Dept: FAMILY MEDICINE | Facility: CLINIC | Age: 74
End: 2019-06-05
Payer: COMMERCIAL

## 2019-06-05 DIAGNOSIS — Z95.2 HEART VALVE REPLACED: ICD-10-CM

## 2019-06-05 LAB — INR PPP: 2.9 (ref 0.8–1.1)

## 2019-06-05 PROCEDURE — 99207 ZZC NO CHARGE NURSE ONLY: CPT | Performed by: INTERNAL MEDICINE

## 2019-06-05 NOTE — PROGRESS NOTES
ANTICOAGULATION FOLLOW-UP CLINIC VISIT    Patient Name:  Feng Wynne  Date:  2019  Contact Type:  Telephone    SUBJECTIVE:  Patient Findings     Positives:   Change in medications (switched from metropolol to atenolol about 2 weeks ago)    Comments:   Pt will continue current dosing.  Discussed management by exception and pt is on board with this, he will call with any changes.        Clinical Outcomes     Negatives:   Major bleeding event, Thromboembolic event, Anticoagulation-related hospital admission, Anticoagulation-related ED visit, Anticoagulation-related fatality    Comments:   Pt will continue current dosing.  Discussed management by exception and pt is on board with this, he will call with any changes.           OBJECTIVE    INR   Date Value Ref Range Status   2019 2.9 (A) 0.8 - 1.1 Final       ASSESSMENT / PLAN  INR assessment THER    Recheck INR In: 2 WEEKS    INR Location Home [facility]R      Anticoagulation Summary  As of 2019    INR goal:   2.5-3.5   TTR:   73.6 % (2 y)   INR used for dosin.9 (2019)   Warfarin maintenance plan:   10 mg (5 mg x 2) every Sun, Tue, Thu; 5 mg (5 mg x 1) all other days   Full warfarin instructions:   10 mg every Sun, Tue, Thu; 5 mg all other days   Weekly warfarin total:   50 mg   Plan last modified:   Missy Hoskins RN (2018)   Next INR check:   2019   Priority:   INR   Target end date:       Indications    Long-term (current) use of anticoagulants [Z79.01] [Z79.01]  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

## 2019-06-17 DIAGNOSIS — I48.20 CHRONIC ATRIAL FIBRILLATION (H): ICD-10-CM

## 2019-06-17 NOTE — TELEPHONE ENCOUNTER
Patient switching to [facility] pharmacy, new Rx for Warfarin 5mg is needed.    Please send with updated dosing instructions.    RT Yanira (R)

## 2019-06-18 RX ORDER — WARFARIN SODIUM 5 MG/1
5-10 TABLET ORAL DAILY
Qty: 180 TABLET | Refills: 0 | Status: SHIPPED | OUTPATIENT
Start: 2019-06-18 | End: 2019-09-11

## 2019-06-18 NOTE — TELEPHONE ENCOUNTER
"warfarin (COUMADIN) 5 MG tablet  Last Written Prescription Date:  3/20/19  Last Fill Quantity: 180,  # refills: 0   Last office visit: 3/14/2019 with prescribing provider:  Aaron    Future Office Visit:      Requested Prescriptions   Pending Prescriptions Disp Refills     warfarin (COUMADIN) 5 MG tablet 180 tablet 0       Vitamin K Antagonists Failed - 6/17/2019  5:33 PM        Failed - INR is within goal in the past 6 weeks     Confirm INR is within goal in the past 6 weeks.     Recent Labs   Lab Test 06/05/19   INR 2.9*                       Passed - Recent (12 mo) or future (30 days) visit within the authorizing provider's specialty     Patient had office visit in the last 12 months or has a visit in the next 30 days with authorizing provider or within the authorizing provider's specialty.  See \"Patient Info\" tab in inbasket, or \"Choose Columns\" in Meds & Orders section of the refill encounter.              Passed - Medication is active on med list        Passed - Patient is 18 years of age or older          "

## 2019-06-19 ENCOUNTER — TELEPHONE (OUTPATIENT)
Dept: FAMILY MEDICINE | Facility: CLINIC | Age: 74
End: 2019-06-19

## 2019-06-19 DIAGNOSIS — Z79.01 LONG TERM CURRENT USE OF ANTICOAGULANT THERAPY: ICD-10-CM

## 2019-06-19 DIAGNOSIS — Z95.2 HEART VALVE REPLACED: ICD-10-CM

## 2019-06-19 LAB — INR PPP: 2.7 (ref 0.8–1.1)

## 2019-06-19 NOTE — TELEPHONE ENCOUNTER
ANTICOAGULATION  MANAGEMENT-Patient Home Monitoring Result    Assessment     Therapeutic INR result of 2.7 . Goal range 2.5-3.5 . Received via fax from [employer] home INR monitoring company.        Previous INR was therapeutic    Feng was last contacted by phone: 6/5/19    Plan     Per home monitoring agreement with patient, patient was NOT contacted regarding therapeutic result today.  Patient is to continue current dose and continue to check INR with home monitor per protocol.  ?

## 2019-07-03 ENCOUNTER — TELEPHONE (OUTPATIENT)
Dept: FAMILY MEDICINE | Facility: CLINIC | Age: 74
End: 2019-07-03

## 2019-07-03 DIAGNOSIS — Z95.2 HEART VALVE REPLACED: ICD-10-CM

## 2019-07-03 DIAGNOSIS — Z79.01 LONG TERM CURRENT USE OF ANTICOAGULANT THERAPY: ICD-10-CM

## 2019-07-03 LAB — INR PPP: 2.6 (ref 0.8–1.1)

## 2019-07-03 NOTE — TELEPHONE ENCOUNTER
ANTICOAGULATION  MANAGEMENT-Patient Home Monitoring Result    Assessment     Therapeutic INR result of 2.6 . Goal range 2.5-3.5. Received via fax from [employer] home INR monitoring company.        Previous INR was therapeutic    Fneg was last contacted by phone: 19    Plan     Per home monitoring agreement with patient, patient was NOT contacted regarding therapeutic result today.  Patient is to continue current dose and continue to check INR with home monitor per protocol.  ?       OBJECTIVE    INR   Date Value Ref Range Status   2019 2.6 (A) 0.8 - 1.1 Final       ASSESSMENT / PLAN      Anticoagulation Summary  As of 7/3/2019    INR goal:   2.5-3.5   TTR:   74.5 % (2.1 y)   INR used for dosin.6 (7/3/2019)   Warfarin maintenance plan:   10 mg (5 mg x 2) every Sun, Tue, Thu; 5 mg (5 mg x 1) all other days   Full warfarin instructions:   10 mg every Sun, Tue, Thu; 5 mg all other days   Weekly warfarin total:   50 mg   Plan last modified:   Missy Hoskins RN (2018)   Next INR check:   2019   Priority:   INR   Target end date:       Indications    Long-term (current) use of anticoagulants [Z79.01] [Z79.01]  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

## 2019-07-17 ENCOUNTER — TELEPHONE (OUTPATIENT)
Dept: FAMILY MEDICINE | Facility: CLINIC | Age: 74
End: 2019-07-17
Payer: COMMERCIAL

## 2019-07-17 DIAGNOSIS — Z95.2 HEART VALVE REPLACED: ICD-10-CM

## 2019-07-17 DIAGNOSIS — Z79.01 LONG TERM CURRENT USE OF ANTICOAGULANT THERAPY: ICD-10-CM

## 2019-07-17 LAB — INR PPP: 2.4 (ref 0.8–1.1)

## 2019-07-17 PROCEDURE — 99207 ZZC NO CHARGE NURSE ONLY: CPT | Performed by: INTERNAL MEDICINE

## 2019-07-17 NOTE — TELEPHONE ENCOUNTER
ANTICOAGULATION FOLLOW-UP CLINIC VISIT    Patient Name:  Feng Wynne  Date:  2019  Contact Type:  Telephone    SUBJECTIVE:  Patient Findings     Positives:   Change in diet/appetite (eating more greens, had a green smoothy with kale, spinach and avacado )    Comments:   Eating larger salads than normal.  Denies S/S bleeding, bruising, clotting.  No extra or missed doses, negative for health and medication changes.  Counseled on portions with greens, and will give a bump in warfarin today since vitamin K load from smoothie not fully peaked yet.        Clinical Outcomes     Comments:   Eating larger salads than normal.  Denies S/S bleeding, bruising, clotting.  No extra or missed doses, negative for health and medication changes.  Counseled on portions with greens, and will give a bump in warfarin today since vitamin K load from smoothie not fully peaked yet.           OBJECTIVE    INR   Date Value Ref Range Status   2019 2.4 (A) 0.8 - 1.1 Final       ASSESSMENT / PLAN      Anticoagulation Summary  As of 2019    INR goal:   2.5-3.5   TTR:   74.1 % (2.2 y)   INR used for dosin.4! 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]             Anticoagulation Episode Summary     INR check location:       Preferred lab:       Send INR reminders to:   DIA LONG    Comments:   [employer] [facility]      Anticoagulation Care Providers     Provider Role Specialty Phone number    Enrique Walker MD Responsible Internal Medicine  [phone]            See the Encounter Report to view Anticoagulation Flowsheet and Dosing Calendar (Go to Encounters tab in chart review, and find the Anticoagulation Therapy Visit)    Discussed food affect on warfarin, Feng verbalized understanding that his food choice/portion size yesterday probably was too much K, discussed going easy on coleslaw he just made yesterday, and giving a bump of warfarin to offset the K load still peaking from diet intake.  Retest in 2 weeks, unless has a large K load in food, then recheck in 1 week.    Petty Inman, [facility]

## 2019-07-31 ENCOUNTER — TELEPHONE (OUTPATIENT)
Dept: FAMILY MEDICINE | Facility: CLINIC | Age: 74
End: 2019-07-31

## 2019-07-31 DIAGNOSIS — Z79.01 LONG TERM CURRENT USE OF ANTICOAGULANT THERAPY: ICD-10-CM

## 2019-07-31 DIAGNOSIS — Z95.2 HEART VALVE REPLACED: ICD-10-CM

## 2019-07-31 LAB — INR PPP: 2.9 (ref 0.8–1.1)

## 2019-07-31 NOTE — TELEPHONE ENCOUNTER
ANTICOAGULATION MANAGEMENT     Patient Name:  Feng Wynne  Date:  2019  Contact Type:  Telephone    SUBJECTIVE:  Missed doses: No     Medication changes:   No     S/S of bleeding or thromboembolism:  No     New Injury or illness:   No     Changes in diet or alcohol consumption:  No     Upcoming surgery, procedure or cardioversion:  No    Additional findings: None     OBJECTIVE    INR   Date Value Ref Range Status   2019 2.9 (A) 0.8 - 1.1 Final       ASSESSMENT / PLAN      Anticoagulation Summary  As of 2019    INR goal:   2.5-3.5   TTR:   74.2 % (2.2 y)   INR used for dosin.9 (2019)   Warfarin maintenance plan:   10 mg (5 mg x 2) every Sun, Tue, Thu; 5 mg (5 mg x 1) all other days   Full warfarin instructions:   10 mg every Sun, Tue, Thu; 5 mg all other days   Weekly warfarin total:   50 mg   No change documented:   Love Elliott RN   Plan last modified:   Missy Hoskins RN (2018)   Next INR check:   2019   Priority:   INR   Target end date:       Indications    Long-term (current) use of anticoagulants [Z79.01] [Z79.01]  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 Goal INR of 2.5-3.5        Warfarin Dosing Instructions: CONTINUE YOUR CURRENT DOSE; 10 mg Sun, Tue, Thur, and 5 mg all other days of the week.      Instructed patient to follow up no later than: 2 weeks; Home INR    Education provided: Kim Toney,  verbalizes understanding and agrees to warfarin dosing plan.    Instructed to call the Anticoagulation Clinic for any changes, questions or concerns.  (#[phone])     Love Elliott, RN  Anticoagulation Nurse - [facility]  ?

## 2019-08-14 ENCOUNTER — TELEPHONE (OUTPATIENT)
Dept: FAMILY MEDICINE | Facility: CLINIC | Age: 74
End: 2019-08-14

## 2019-08-14 DIAGNOSIS — Z79.01 LONG TERM CURRENT USE OF ANTICOAGULANT THERAPY: ICD-10-CM

## 2019-08-14 DIAGNOSIS — Z95.2 HEART VALVE REPLACED: ICD-10-CM

## 2019-08-14 LAB — INR PPP: 2.9 (ref 0.8–1.1)

## 2019-08-14 NOTE — TELEPHONE ENCOUNTER
ANTICOAGULATION MANAGEMENT     Patient Name:  Feng Wynne  Date:  2019  Contact Type:  Telephone    SUBJECTIVE:  Missed doses: No     Medication changes:   No     S/S of bleeding or thromboembolism:  No     New Injury or illness:   No     Changes in diet or alcohol consumption:  No     Upcoming surgery, procedure or cardioversion:  No    Additional findings: NOne     OBJECTIVE    INR   Date Value Ref Range Status   2019 2.9 (A) 0.8 - 1.1 Final       ASSESSMENT / PLAN      Anticoagulation Summary  As of 2019    INR goal:   2.5-3.5   TTR:   74.6 % (2.2 y)   INR used for dosin.9 (2019)   Warfarin maintenance plan:   10 mg (5 mg x 2) every Sun, Tue, Thu; 5 mg (5 mg x 1) all other days   Full warfarin instructions:   10 mg every Sun, Tue, Thu; 5 mg all other days   Weekly warfarin total:   50 mg   No change documented:   Love Elliott RN   Plan last modified:   Missy Hoskins RN (2018)   Next INR check:   2019   Priority:   INR   Target end date:       Indications    Long-term (current) use of anticoagulants [Z79.01] [Z79.01]  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 Goal INR of 2.5-3.5        Warfarin Dosing Instructions: CONTINUE YOUR CURRENT DOSE; 10 mg every Sun, Tue, Thu; 5 mg all other days      Instructed patient to follow up no later than: 2 weeks    Education provided: No    [email or web address] message sent to patient with dosing instructions.     Instructed to call the Anticoagulation Clinic for any changes, questions or concerns. (#[phone])   Love Elliott  RN  Anticoagulation Nurse - [facility]      ?

## 2019-08-28 ENCOUNTER — TELEPHONE (OUTPATIENT)
Dept: FAMILY MEDICINE | Facility: CLINIC | Age: 74
End: 2019-08-28

## 2019-08-28 DIAGNOSIS — Z79.01 LONG TERM CURRENT USE OF ANTICOAGULANT THERAPY: ICD-10-CM

## 2019-08-28 DIAGNOSIS — Z95.2 HEART VALVE REPLACED: ICD-10-CM

## 2019-08-28 LAB — INR PPP: 2.5 (ref 0.8–1.1)

## 2019-08-28 NOTE — TELEPHONE ENCOUNTER
ANTICOAGULATION  MANAGEMENT-Patient Home Monitoring Result    Assessment     Therapeutic INR result of 2.5 . Goal range 2.5-3.5. Received via fax from [employer] home INR monitoring company.        Previous INR was therapeutic    Feng was last contacted by phone: 19    Plan     Per home monitoring agreement with patient, patient was NOT contacted regarding therapeutic result today.  Patient is to continue current dose and continue to check INR with home monitor per protocol.  ?       OBJECTIVE    INR   Date Value Ref Range Status   2019 2.5 (A) 0.8 - 1.1 Final       ASSESSMENT / PLAN      Anticoagulation Summary  As of 2019    INR goal:   2.5-3.5   TTR:   75.1 % (2.3 y)   INR used for dosin.5 (2019)   Warfarin maintenance plan:   10 mg (5 mg x 2) every Sun, Tue, Thu; 5 mg (5 mg x 1) all other days   Full warfarin instructions:   10 mg every Sun, Tue, Thu; 5 mg all other days   Weekly warfarin total:   50 mg   Plan last modified:   Missy Hoskins RN (2018)   Next INR check:   2019   Priority:   INR   Target end date:       Indications    Long-term (current) use of anticoagulants [Z79.01] [Z79.01]  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

## 2019-09-10 DIAGNOSIS — I10 ESSENTIAL HYPERTENSION WITH GOAL BLOOD PRESSURE LESS THAN 140/90: ICD-10-CM

## 2019-09-10 DIAGNOSIS — I48.20 CHRONIC ATRIAL FIBRILLATION (H): ICD-10-CM

## 2019-09-10 NOTE — TELEPHONE ENCOUNTER
"lisinopril (PRINIVIL/ZESTRIL) 40 MG tablet 90 tablet 1 3/18/2019  No   Sig: TAKE 1 TABLET(40 MG) BY MOUTH DAILY     Last Written Prescription Date:  03/18/2019  Last Fill Quantity: 90,  # refills: 1   Last office visit: 3/14/2019 with prescribing provider:     Future Office Visit:      ~~~~~~~~~~~~~~~~~~~~~~~~~~~~~~~~~~~~~    warfarin (COUMADIN) 5 MG tablet 60 tablet 0 4/5/2018  No   Sig: TAKE 1 TO 2 TABLETS BY MOUTH DAILY   Sent to pharmacy as: warfarin (COUMADIN) 5      Last Written Prescription Date:  04/05/2018  Last Fill Quantity: 60,  # refills: 0   Last office visit: 3/14/2019 with prescribing provider:     Future Office Visit:      Requested Prescriptions   Pending Prescriptions Disp Refills     lisinopril (PRINIVIL/ZESTRIL) 40 MG tablet 90 tablet 1     Sig: TAKE 1 TABLET(40 MG) BY MOUTH DAILY.       ACE Inhibitors (Including Combos) Protocol Passed - 9/10/2019 10:59 AM        Passed - Blood pressure under 140/90 in past 12 months     BP Readings from Last 3 Encounters:   03/14/19 130/82   07/26/18 129/80   12/01/17 132/78                 Passed - Recent (12 mo) or future (30 days) visit within the authorizing provider's specialty     Patient had office visit in the last 12 months or has a visit in the next 30 days with authorizing provider or within the authorizing provider's specialty.  See \"Patient Info\" tab in inbasket, or \"Choose Columns\" in Meds & Orders section of the refill encounter.              Passed - Medication is active on med list        Passed - Patient is age 18 or older        Passed - Normal serum creatinine on file in past 12 months     Recent Labs   Lab Test 03/14/19  0959   CR 0.85             Passed - Normal serum potassium on file in past 12 months     Recent Labs   Lab Test 03/14/19  0959   POTASSIUM 4.5             warfarin ANTICOAGULANT (COUMADIN) 5 MG tablet 180 tablet 0     Sig: Take 1-2 tablets (5-10 mg) by mouth daily Or as directed by INR clinic       Vitamin K Antagonists " "Failed - 9/10/2019 10:59 AM        Failed - INR is within goal in the past 6 weeks     Confirm INR is within goal in the past 6 weeks.     Recent Labs   Lab Test 08/28/19   INR 2.5*                       Passed - Recent (12 mo) or future (30 days) visit within the authorizing provider's specialty     Patient had office visit in the last 12 months or has a visit in the next 30 days with authorizing provider or within the authorizing provider's specialty.  See \"Patient Info\" tab in inbasket, or \"Choose Columns\" in Meds & Orders section of the refill encounter.              Passed - Medication is active on med list        Passed - Patient is 18 years of age or older          "

## 2019-09-11 ENCOUNTER — TELEPHONE (OUTPATIENT)
Dept: FAMILY MEDICINE | Facility: CLINIC | Age: 74
End: 2019-09-11

## 2019-09-11 DIAGNOSIS — Z95.2 HEART VALVE REPLACED: ICD-10-CM

## 2019-09-11 DIAGNOSIS — Z79.01 LONG TERM CURRENT USE OF ANTICOAGULANT THERAPY: ICD-10-CM

## 2019-09-11 LAB — INR PPP: 2.6 (ref 0.8–1.1)

## 2019-09-11 RX ORDER — LISINOPRIL 40 MG/1
TABLET ORAL
Qty: 90 TABLET | Refills: 1 | Status: SHIPPED | OUTPATIENT
Start: 2019-09-11 | End: 2020-03-04

## 2019-09-11 RX ORDER — WARFARIN SODIUM 5 MG/1
5-10 TABLET ORAL DAILY
Qty: 180 TABLET | Refills: 1 | Status: SHIPPED | OUTPATIENT
Start: 2019-09-11 | End: 2020-06-30

## 2019-09-11 NOTE — TELEPHONE ENCOUNTER
ANTICOAGULATION  MANAGEMENT-Patient Home Monitoring Result    Assessment     Therapeutic INR result of 2.6 . Goal range 2.0-3.0. Received via fax from [employer] home INR monitoring company.        Previous INR was therapeutic    Feng was last contacted by phone: 2019    Plan     Per home monitoring agreement with patient, patient was NOT contacted regarding therapeutic result today.  Patient is to continue current dose and continue to check INR with home monitor per protocol.  ?       OBJECTIVE    INR   Date Value Ref Range Status   2019 2.6 (A) 0.8 - 1.1 Final       ASSESSMENT / PLAN      Anticoagulation Summary  As of 2019    INR goal:   2.5-3.5   TTR:   75.5 % (2.3 y)   INR used for dosin.6 (2019)   Warfarin maintenance plan:   10 mg (5 mg x 2) every Sun, Tue, Thu; 5 mg (5 mg x 1) all other days   Full warfarin instructions:   10 mg every Sun, Tue, Thu; 5 mg all other days   Weekly warfarin total:   50 mg   No change documented:   Love Elliott RN   Plan last modified:   Missy Hoskins RN (2018)   Next INR check:   2019   Priority:   INR   Target end date:       Indications    Long-term (current) use of anticoagulants [Z79.01] [Z79.01]  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

## 2019-09-13 ENCOUNTER — TELEPHONE (OUTPATIENT)
Dept: FAMILY MEDICINE | Facility: CLINIC | Age: 74
End: 2019-09-13

## 2019-09-13 DIAGNOSIS — Z12.11 COLON CANCER SCREENING: Primary | ICD-10-CM

## 2019-09-13 NOTE — TELEPHONE ENCOUNTER
Reason for Call: Request for an order or referral:    Order or referral being requested: Fit Kit     Date needed: as soon as possible    Has the patient been seen by the PCP for this problem? YES    Additional comments: Pt says that he is due for this test     Phone number Patient can be reached at:  Cell number on file:    Telephone Information:   Mobile [phone]       Best Time:  Any    Can we leave a detailed message on this number?  YES    Call taken on 9/13/2019 at 12:36 PM by Shanice Love

## 2019-09-13 NOTE — TELEPHONE ENCOUNTER
PCP see below - per labs last FIT screen was 7/31/18    Pt asking for orders for another FIT screen    Please advise     Irena ARCOS RN

## 2019-09-18 ENCOUNTER — APPOINTMENT (OUTPATIENT)
Dept: LAB | Facility: CLINIC | Age: 74
End: 2019-09-18
Payer: COMMERCIAL

## 2019-09-20 DIAGNOSIS — Z12.11 COLON CANCER SCREENING: ICD-10-CM

## 2019-09-20 PROCEDURE — 82274 ASSAY TEST FOR BLOOD FECAL: CPT | Performed by: INTERNAL MEDICINE

## 2019-09-25 ENCOUNTER — TELEPHONE (OUTPATIENT)
Dept: FAMILY MEDICINE | Facility: CLINIC | Age: 74
End: 2019-09-25

## 2019-09-25 DIAGNOSIS — Z95.2 HEART VALVE REPLACED: ICD-10-CM

## 2019-09-25 DIAGNOSIS — Z79.01 LONG TERM CURRENT USE OF ANTICOAGULANT THERAPY: ICD-10-CM

## 2019-09-25 LAB — INR PPP: 3.1 (ref 0.8–1.1)

## 2019-09-25 NOTE — TELEPHONE ENCOUNTER
ANTICOAGULATION  MANAGEMENT-Patient Home Monitoring Result    Assessment     Therapeutic INR result of 3.1 . Goal range 2.5-3.5. Received via fax from [employer] home INR monitoring company.        Previous INR was therapeutic    Feng was last contacted by phone: 8/14/2019    Plan     Per home monitoring agreement with patient, patient was NOT contacted regarding therapeutic result today.  Patient is to continue current dose and continue to check INR with home monitor per protocol.  ?       OBJECTIVE    INR   Date Value Ref Range Status   09/25/2019 3.1 (A) 0.8 - 1.1 Final       ASSESSMENT / PLAN      Anticoagulation Summary  As of 9/25/2019    INR goal:   2.5-3.5   TTR:   75.9 % (2.4 y)   INR used for dosing:   3.1 (9/25/2019)   Warfarin maintenance plan:   10 mg (5 mg x 2) every Sun, Tue, Thu; 5 mg (5 mg x 1) all other days   Full warfarin instructions:   10 mg every Sun, Tue, Thu; 5 mg all other days   Weekly warfarin total:   50 mg   No change documented:   Love Elliott RN   Plan last modified:   Missy Hoskins RN (8/8/2018)   Next INR check:   10/9/2019   Priority:   INR   Target end date:       Indications    Long-term (current) use of anticoagulants [Z79.01] [Z79.01]  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

## 2019-09-28 LAB — HEMOCCULT STL QL IA: NEGATIVE

## 2019-10-09 ENCOUNTER — TELEPHONE (OUTPATIENT)
Dept: FAMILY MEDICINE | Facility: CLINIC | Age: 74
End: 2019-10-09

## 2019-10-09 LAB — INR PPP: 3 (ref 0.8–1.1)

## 2019-10-09 NOTE — TELEPHONE ENCOUNTER
ANTICOAGULATION  MANAGEMENT-Patient Home Monitoring Result    Assessment     Therapeutic INR result of 3.0 . Goal range 2.5-3.5. Received via fax from [employer] home INR monitoring company.        Previous INR was therapeutic    Feng was last contacted by phone: 8/14/19    Plan     Per home monitoring agreement with patient, patient was NOT contacted regarding therapeutic result today.  Patient is to continue current dose and continue to check INR with home monitor per protocol.  ?       OBJECTIVE    INR   Date Value Ref Range Status   10/09/2019 3.0 (A) 0.8 - 1.1 Final       ASSESSMENT / PLAN      Anticoagulation Summary  As of 10/9/2019    INR goal:   2.5-3.5   TTR:   76.3 % (2.4 y)   INR used for dosing:   3.0 (10/9/2019)   Warfarin maintenance plan:   10 mg (5 mg x 2) every Sun, Tue, Thu; 5 mg (5 mg x 1) all other days   Full warfarin instructions:   10 mg every Sun, Tue, Thu; 5 mg all other days   Weekly warfarin total:   50 mg   Plan last modified:   Missy Hoskins RN (8/8/2018)   Next INR check:   10/23/2019   Priority:   INR   Target end date:       Indications    Long-term (current) use of anticoagulants [Z79.01] [Z79.01]  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

## 2019-10-10 DIAGNOSIS — I48.20 CHRONIC ATRIAL FIBRILLATION (H): ICD-10-CM

## 2019-10-10 NOTE — TELEPHONE ENCOUNTER
"  atenolol (TENORMIN) 25 MG tablet 180 tablet 1 5/2/2019       Last Written Prescription Date:  05/02/2019  Last Fill Quantity: 180,  # refills: 1   Last office visit: 3/14/2019 with prescribing provider:     Future Office Visit:  Unknown    Requested Prescriptions   Pending Prescriptions Disp Refills     atenolol (TENORMIN) 25 MG tablet 180 tablet 1     Sig: Take 1 tablet (25 mg) by mouth 2 times daily Please schedule follow-up with Dr. Walker for September/October 2019       Beta-Blockers Protocol Passed - 10/10/2019  5:51 PM        Passed - Blood pressure under 140/90 in past 12 months     BP Readings from Last 3 Encounters:   03/14/19 130/82   07/26/18 129/80   12/01/17 132/78                 Passed - Patient is age 6 or older        Passed - Recent (12 mo) or future (30 days) visit within the authorizing provider's specialty     Patient has had an office visit with the authorizing provider or a provider within the authorizing providers department within the previous 12 mos or has a future within next 30 days. See \"Patient Info\" tab in inbasket, or \"Choose Columns\" in Meds & Orders section of the refill encounter.              Passed - Medication is active on med list          "

## 2019-10-11 RX ORDER — ATENOLOL 25 MG/1
25 TABLET ORAL
Qty: 60 TABLET | Refills: 0 | Status: SHIPPED | OUTPATIENT
Start: 2019-10-11 | End: 2019-11-05

## 2019-10-11 NOTE — TELEPHONE ENCOUNTER
Medication is being filled for 1 time refill only due to:  due for apt. Chloé Garrido RN      Return in about 6 months (around 9/14/2019) for Routine/Follow-up Visit

## 2019-10-23 ENCOUNTER — TELEPHONE (OUTPATIENT)
Dept: FAMILY MEDICINE | Facility: CLINIC | Age: 74
End: 2019-10-23

## 2019-10-23 DIAGNOSIS — Z79.01 LONG TERM CURRENT USE OF ANTICOAGULANT THERAPY: ICD-10-CM

## 2019-10-23 DIAGNOSIS — Z95.2 HEART VALVE REPLACED: ICD-10-CM

## 2019-10-23 LAB — INR PPP: 3.3 (ref 0.8–1.1)

## 2019-10-23 NOTE — TELEPHONE ENCOUNTER
ANTICOAGULATION  MANAGEMENT-Patient Home Monitoring Result    Assessment     Therapeutic INR result of 3.3 . Goal range 2.5-3.5. Received via fax from [employer] home INR monitoring company.        Previous INR was therapeutic    Feng was last contacted by phone: 8/14/19    Plan     Per home monitoring agreement with patient, patient was NOT contacted regarding therapeutic result today.  Patient is to continue current dose and continue to check INR with home monitor per protocol.  ?       OBJECTIVE    INR   Date Value Ref Range Status   10/23/2019 3.3 (A) 0.8 - 1.1 Final       ASSESSMENT / PLAN      Anticoagulation Summary  As of 10/23/2019    INR goal:   2.5-3.5   TTR:   76.6 % (2.4 y)   INR used for dosing:   3.3 (10/23/2019)   Warfarin maintenance plan:   10 mg (5 mg x 2) every Sun, Tue, Thu; 5 mg (5 mg x 1) all other days   Full warfarin instructions:   10 mg every Sun, Tue, Thu; 5 mg all other days   Weekly warfarin total:   50 mg   Plan last modified:   Missy Hoskins RN (8/8/2018)   Next INR check:   11/6/2019   Priority:   INR   Target end date:       Indications    Long-term (current) use of anticoagulants [Z79.01] [Z79.01]  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

## 2019-11-05 ENCOUNTER — HEALTH MAINTENANCE LETTER (OUTPATIENT)
Age: 74
End: 2019-11-05

## 2019-11-05 ENCOUNTER — OFFICE VISIT (OUTPATIENT)
Dept: FAMILY MEDICINE | Facility: CLINIC | Age: 74
End: 2019-11-05
Payer: COMMERCIAL

## 2019-11-05 VITALS
DIASTOLIC BLOOD PRESSURE: 74 MMHG | TEMPERATURE: 98.5 F | BODY MASS INDEX: 42.66 KG/M2 | OXYGEN SATURATION: 95 % | HEIGHT: 72 IN | SYSTOLIC BLOOD PRESSURE: 128 MMHG | HEART RATE: 66 BPM | WEIGHT: 315 LBS

## 2019-11-05 DIAGNOSIS — I10 ESSENTIAL HYPERTENSION: ICD-10-CM

## 2019-11-05 DIAGNOSIS — E66.01 MORBID OBESITY (H): Primary | ICD-10-CM

## 2019-11-05 DIAGNOSIS — I48.20 CHRONIC ATRIAL FIBRILLATION (H): ICD-10-CM

## 2019-11-05 DIAGNOSIS — M17.11 PRIMARY OSTEOARTHRITIS OF RIGHT KNEE: ICD-10-CM

## 2019-11-05 PROCEDURE — 99214 OFFICE O/P EST MOD 30 MIN: CPT | Performed by: INTERNAL MEDICINE

## 2019-11-05 ASSESSMENT — ENCOUNTER SYMPTOMS
BLOOD IN STOOL: 0
VOMITING: 0
ABDOMINAL PAIN: 0
DIARRHEA: 0
CONSTIPATION: 0
WEAKNESS: 0
SHORTNESS OF BREATH: 0
LIGHT-HEADEDNESS: 0
NAUSEA: 0
FATIGUE: 0
HEMATURIA: 0
PALPITATIONS: 0
HEADACHES: 0

## 2019-11-05 ASSESSMENT — MIFFLIN-ST. JEOR: SCORE: 2312.75

## 2019-11-05 NOTE — PROGRESS NOTES
"  Patient has lost 3 pounds since our last clinic visit on 3/14/2019.  Total weight loss has been 53 pounds since we started his weight management on 10/19/2017.  Patient admits that he has not been monitoring his calories closely and has been eating out more (although he says he only finishes half of the meal and brings the other half at home for dinner).    Hypertension remains well controlled on atenolol and lisinopril.  He periodically checks his blood pressure at home and it is typically 100-120 systolic and 60-70 diastolic.  He was just recently at his dentist and his blood pressure there was reportedly 110/60s.    He is on anticoagulation treatment with warfarin for atrial fibrillation.  He checks his INR at home.  States that he is typically within range.  No overt bleeding episodes observed.    Complains of chronic right knee pain.  Not adequately relieved by as needed acetaminophen.  Remembers that ibuprofen provided adequate relief but he could not take it anymore due to his warfarin.      Past Medical History:   Diagnosis Date     Arthritis      Atrial fibrillation (H)      Coronary artery disease      Hypercholesteremia      Morbid obesity (H)      Unspecified essential hypertension        Review of Systems   Constitutional: Negative for fatigue.   HENT: Negative for nosebleeds.    Eyes: Negative for visual disturbance.   Respiratory: Negative for shortness of breath.    Cardiovascular: Negative for chest pain and palpitations.   Gastrointestinal: Negative for abdominal pain, blood in stool, constipation, diarrhea, nausea and vomiting.   Genitourinary: Negative for hematuria.   Neurological: Negative for weakness, light-headedness and headaches.       /74 (BP Location: Right arm, Patient Position: Chair, Cuff Size: Adult Regular)   Pulse 66   Temp 98.5  F (36.9  C) (Tympanic)   Ht 1.816 m (5' 11.5\")   Wt (!) 153.8 kg (339 lb)   SpO2 95%   BMI 46.62 kg/m        Physical Exam  Vitals signs " reviewed.   Constitutional:       General: He is not in acute distress.  Neck:      Thyroid: No thyromegaly.   Cardiovascular:      Rate and Rhythm: Normal rate and regular rhythm.      Heart sounds: Normal heart sounds.   Pulmonary:      Effort: Pulmonary effort is normal. No respiratory distress.      Breath sounds: Normal breath sounds.   Neurological:      Mental Status: He is alert and oriented to person, place, and time.      Coordination: Coordination normal.      Comments: No tremors           ICD-10-CM    1. Morbid obesity (H) E66.01 See patient instructions below     2. Chronic atrial fibrillation I48.20 continue anticoagulation treatment   3. Essential hypertension I10 continue lisinopril and atenolol   4. Primary osteoarthritis of right knee M17.11 diclofenac (VOLTAREN) 1 % topical gel       Patient Instructions   Monitor your blood pressure once a week.  Call doctor if:  -- your blood pressure for the top/upper number is greater than 140 or less than 90  -- your blood pressure for the bottom/lower number is greater than 90 or less than 60    Maintain low fat/calorie diet and regular exercise.    Follow up in 6 months for your full physical exam (please come in fasting).

## 2019-11-05 NOTE — PATIENT INSTRUCTIONS
Monitor your blood pressure once a week.  Call doctor if:  -- your blood pressure for the top/upper number is greater than 140 or less than 90  -- your blood pressure for the bottom/lower number is greater than 90 or less than 60    Maintain low fat/calorie diet and regular exercise.    Follow up in 6 months for your full physical exam (please come in fasting).

## 2019-11-05 NOTE — TELEPHONE ENCOUNTER
Patient seen TODAY 11-5-19, medication not refilled  Follow up 6 months, fasting    Veda Dutta, RT (R)

## 2019-11-06 ENCOUNTER — TELEPHONE (OUTPATIENT)
Dept: FAMILY MEDICINE | Facility: CLINIC | Age: 74
End: 2019-11-06

## 2019-11-06 DIAGNOSIS — Z95.2 HEART VALVE REPLACED: ICD-10-CM

## 2019-11-06 DIAGNOSIS — Z79.01 LONG TERM CURRENT USE OF ANTICOAGULANT THERAPY: ICD-10-CM

## 2019-11-06 LAB — INR PPP: 2.9 (ref 0.8–1.1)

## 2019-11-06 RX ORDER — ATENOLOL 25 MG/1
25 TABLET ORAL
Qty: 180 TABLET | Refills: 1 | Status: SHIPPED | OUTPATIENT
Start: 2019-11-06 | End: 2020-04-29

## 2019-11-06 NOTE — TELEPHONE ENCOUNTER
"Requested Prescriptions   Pending Prescriptions Disp Refills     atenolol (TENORMIN) 25 MG tablet 180 tablet 1     Sig: Take 1 tablet (25 mg) by mouth 2 times daily - Fasting physical due May 2020   Last Written Prescription Date:  10/11/2019  Last Fill Quantity: 60,  # refills: 0   Last office visit: 11/5/2019 with prescribing provider:  Aaron   Future Office Visit:        Beta-Blockers Protocol Passed - 11/5/2019  2:09 PM        Passed - Blood pressure under 140/90 in past 12 months     BP Readings from Last 3 Encounters:   11/05/19 128/74   03/14/19 130/82   07/26/18 129/80                 Passed - Patient is age 6 or older        Passed - Recent (12 mo) or future (30 days) visit within the authorizing provider's specialty     Patient has had an office visit with the authorizing provider or a provider within the authorizing providers department within the previous 12 mos or has a future within next 30 days. See \"Patient Info\" tab in inbasket, or \"Choose Columns\" in Meds & Orders section of the refill encounter.              Passed - Medication is active on med list        Filled per [facility] protocol.     SALOME Oneil, RN  [employer] [facility]    "

## 2019-11-06 NOTE — TELEPHONE ENCOUNTER
ANTICOAGULATION  MANAGEMENT-Patient Home Monitoring Result    Assessment     Therapeutic INR result of 2.9 . Goal range 2.5-3.5. Received via fax from [employer] home INR monitoring company.        Previous INR was therapeutic    Feng was last contacted by phone: 19    Plan     Per home monitoring agreement with patient, patient was NOT contacted regarding therapeutic result today.  Patient is to continue current dose and continue to check INR with home monitor per protocol.  ?       OBJECTIVE    INR   Date Value Ref Range Status   2019 2.9 (A) 0.8 - 1.1 Final       ASSESSMENT / PLAN      Anticoagulation Summary  As of 2019    INR goal:   2.5-3.5   TTR:   77.0 % (2.5 y)   INR used for dosin.9 (2019)   Warfarin maintenance plan:   10 mg (5 mg x 2) every Sun, Tue, Thu; 5 mg (5 mg x 1) all other days   Full warfarin instructions:   10 mg every Sun, Tue, Thu; 5 mg all other days   Weekly warfarin total:   50 mg   Plan last modified:   Missy Hoskins RN (2018)   Next INR check:   2019   Priority:   INR   Target end date:       Indications    Long-term (current) use of anticoagulants [Z79.01] [Z79.01]  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

## 2019-11-15 ENCOUNTER — TELEPHONE (OUTPATIENT)
Dept: FAMILY MEDICINE | Facility: CLINIC | Age: 74
End: 2019-11-15

## 2019-11-15 NOTE — TELEPHONE ENCOUNTER
Prior Authorization Retail Medication Request    Medication/Dose: Diclofenac 1% gel  ICD code (if different than what is on RX):  M17.11  Previously Tried and Failed:  None  Rationale:  Patient with osteoarthritis of the right knee requesting medication to help with resolution of pain    [facility]  [facility]  Insurance ID:  [id number]      Pharmacy Information (if different than what is on RX)  Name:  [id number]  Phone:  [phone]

## 2019-11-18 NOTE — TELEPHONE ENCOUNTER
Central Prior Authorization Team   Phone: [phone]    Prior Authorization Approval    Authorization Effective Date: 8/20/2019  Authorization Expiration Date: 11/18/2020  Medication: Diclofenac 1% gel  Approved Dose/Quantity:   Reference #: [id number]   Insurance Company: [employer] [place] - Phone [phone] Fax [phone]  Expected CoPay:       CoPay Card Available:      Foundation Assistance Needed:    Which Pharmacy is filling the prescription (Not needed for infusion/clinic administered): [id number] [address] [place] [address]  Pharmacy Notified: Yes  Patient Notified: Yes, **Instructed pharmacy to notify patient when script is ready to /ship.**

## 2019-11-18 NOTE — TELEPHONE ENCOUNTER
Central Prior Authorization Team   Phone: [phone]    PA Initiation    Medication: Diclofenac 1% gel  Insurance Company: [employer] [place] - Phone [phone] Fax [phone]  Pharmacy Filling the Rx: [id number] - [place], [place] - [address] [place] [address]  Filling Pharmacy Phone: [phone]  Filling Pharmacy Fax: [phone]  Start Date: 11/18/2019

## 2019-11-20 ENCOUNTER — TELEPHONE (OUTPATIENT)
Dept: FAMILY MEDICINE | Facility: CLINIC | Age: 74
End: 2019-11-20

## 2019-11-20 DIAGNOSIS — Z95.2 HEART VALVE REPLACED: ICD-10-CM

## 2019-11-20 DIAGNOSIS — Z79.01 LONG TERM CURRENT USE OF ANTICOAGULANT THERAPY: ICD-10-CM

## 2019-11-20 LAB — INR PPP: 2.7 (ref 0.8–1.1)

## 2019-11-20 NOTE — TELEPHONE ENCOUNTER
ANTICOAGULATION MANAGEMENT     Patient Name:  Feng Wynne  Date:  2019    ASSESSMENT /SUBJECTIVE:      Today's INR result of 2.7 is therapeutic. Goal INR of 2.5-3.5      Previous INR: Therapeutic     PLAN:    Left detailed message for Feng regarding INR result and instructed:     Warfarin Dosing Instructions: Continue your current warfarin dose    Instructed patient to follow up no later than: 2 weeks    Education provided: No    Instructed to call the Anticoagulation Clinic for any changes, questions or concerns. (#[phone])        OBJECTIVE:  INR   Date Value Ref Range Status   2019 2.7 (A) 0.8 - 1.1 Final             Anticoagulation Summary  As of 2019    INR goal:   2.5-3.5   TTR:   77.3 % (2.5 y)   INR used for dosin.7 (2019)   Warfarin maintenance plan:   10 mg (5 mg x 2) every Sun, Tue, Thu; 5 mg (5 mg x 1) all other days   Full warfarin instructions:   10 mg every Sun, Tue, Thu; 5 mg all other days   Weekly warfarin total:   50 mg   Plan last modified:   Missy Hoskins RN (2018)   Next INR check:   2019   Priority:   Maintenance   Target end date:       Indications    Long-term (current) use of anticoagulants [Z79.01] [Z79.01]  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

## 2019-12-04 ENCOUNTER — TELEPHONE (OUTPATIENT)
Dept: FAMILY MEDICINE | Facility: CLINIC | Age: 74
End: 2019-12-04

## 2019-12-04 DIAGNOSIS — Z95.2 HEART VALVE REPLACED: ICD-10-CM

## 2019-12-04 DIAGNOSIS — Z79.01 LONG TERM CURRENT USE OF ANTICOAGULANT THERAPY: ICD-10-CM

## 2019-12-04 LAB — INR PPP: 2.7 (ref 0.8–1.1)

## 2019-12-04 NOTE — TELEPHONE ENCOUNTER
ANTICOAGULATION  MANAGEMENT-Patient Home Monitoring Result    Assessment     Therapeutic INR result of 2.7 . Goal range 2.0-3.0. Received via fax from [employer] home INR monitoring company.        Previous INR was therapeutic    Feng was last contacted by phone: 2019     Plan     Per home monitoring agreement with patient, patient was NOT contacted regarding therapeutic result today.  Patient is to continue current dose and continue to check INR with home monitor per protocol.  ?       OBJECTIVE    INR   Date Value Ref Range Status   2019 2.7 (A) 0.8 - 1.1 Final       ASSESSMENT / PLAN      Anticoagulation Summary  As of 2019    INR goal:   2.5-3.5   TTR:   94.6 % (1 y)   INR used for dosin.7 (2019)   Warfarin maintenance plan:   10 mg (5 mg x 2) every Sun, Tue, Thu; 5 mg (5 mg x 1) all other days   Full warfarin instructions:   10 mg every Sun, Tue, Thu; 5 mg all other days   Weekly warfarin total:   50 mg   Plan last modified:   Missy Hoskins RN (2018)   Next INR check:      Priority:   Maintenance   Target end date:       Indications    Long-term (current) use of anticoagulants [Z79.01] [Z79.01]  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

## 2019-12-19 ENCOUNTER — TELEPHONE (OUTPATIENT)
Dept: FAMILY MEDICINE | Facility: CLINIC | Age: 74
End: 2019-12-19

## 2019-12-19 DIAGNOSIS — Z95.2 HEART VALVE REPLACED: ICD-10-CM

## 2019-12-19 DIAGNOSIS — Z79.01 LONG TERM CURRENT USE OF ANTICOAGULANT THERAPY: ICD-10-CM

## 2019-12-19 LAB — INR PPP: 3.8 (ref 0.9–1.1)

## 2019-12-19 NOTE — TELEPHONE ENCOUNTER
ANTICOAGULATION MANAGEMENT     Patient Name:  Feng Wynne  Date:  12/19/2019    ASSESSMENT /SUBJECTIVE:      Today's INR result of 3.8 is supratherapeutic. Goal INR of 2.5-3.5      Warfarin dose taken: Warfarin taken as previously instructed    Diet: Decreased greens/vitamin K intake may be affecting INR    Medication changes/ interactions: No new medications/supplements affecting INR    Previous INR: Therapeutic     S/S of bleeding or thromboembolism: No    New injury or illness:  No    Upcoming surgery, procedure or cardioversion:  No    Additional findings: Patient changed his Vit K intake from no salads to frozen veggies.  He will go back to more salads or increase veggie intake.  Also  Minor cold symptoms that have cleared.      PLAN:    Spoke with Feng regarding INR result and instructed:     Warfarin Dosing Instructions: Reduce dose of 7.5 mg today then resume normal dosing.    Instructed patient to follow up no later than: 2 weeks    Education provided: Yes: Please watch for increased bruising or bleeding and if noted to be seen right away.      Feng,  verbalizes understanding and agrees to warfarin dosing plan.    Instructed to call the Anticoagulation Clinic for any changes, questions or concerns. (#[phone])        OBJECTIVE:  INR   Date Value Ref Range Status   12/19/2019 3.8 (A) 0.90 - 1.10 Final             Anticoagulation Summary  As of 12/19/2019    INR goal:   2.5-3.5   TTR:   93.5 % (1 y)   INR used for dosing:   3.8! (12/19/2019)   Warfarin maintenance plan:   10 mg (5 mg x 2) every Sun, Tue, Thu; 5 mg (5 mg x 1) all other days   Full warfarin instructions:   12/19: 7.5 mg; Otherwise 10 mg every Sun, Tue, Thu; 5 mg all other days   Weekly warfarin total:   50 mg   Plan last modified:   Missy Hoskins RN (8/8/2018)   Next INR check:   1/2/2020   Priority:   Maintenance   Target end date:       Indications    Long-term (current) use of anticoagulants [Z79.01] [Z79.01]  Heart valve  replaced [Z95.2] [Z95.2]             Anticoagulation Episode Summary     INR check location:       Preferred lab:   EXTERNAL LAB    Send INR reminders to:   DIA MARK    Comments:   [employer] HOME MONITORING Patient      Anticoagulation Care Providers     Provider Role Specialty Phone number    Enrique Walker MD [facility] Internal Medicine [phone]

## 2020-01-01 ENCOUNTER — TRANSFERRED RECORDS (OUTPATIENT)
Dept: HEALTH INFORMATION MANAGEMENT | Facility: CLINIC | Age: 75
End: 2020-01-01

## 2020-01-02 ENCOUNTER — TELEPHONE (OUTPATIENT)
Dept: FAMILY MEDICINE | Facility: CLINIC | Age: 75
End: 2020-01-02

## 2020-01-02 DIAGNOSIS — Z95.2 HEART VALVE REPLACED: ICD-10-CM

## 2020-01-02 DIAGNOSIS — Z79.01 LONG TERM CURRENT USE OF ANTICOAGULANT THERAPY: ICD-10-CM

## 2020-01-02 LAB — INR PPP: 3.5 (ref 0.9–1.1)

## 2020-01-02 NOTE — TELEPHONE ENCOUNTER
Incoming fax from [employer] home monitoring company    Date of INR 1/1/20    INR result 3.5

## 2020-01-02 NOTE — TELEPHONE ENCOUNTER
ANTICOAGULATION MANAGEMENT     Patient Name:  Feng Wynne  Date:  1/2/2020    ASSESSMENT /SUBJECTIVE:      Today's INR result of 3.5 is therapeutic. Goal INR of 2.5-3.5      Warfarin dose taken: Warfarin taken as previously instructed    Diet: No new diet changes affecting INR    Medication changes/ interactions: No new medications/supplements affecting INR    Previous INR: Supratherapeutic     S/S of bleeding or thromboembolism: No    New injury or illness:  No    Upcoming surgery, procedure or cardioversion:  No    Additional findings: None      PLAN:    Left detailed message for Feng regarding INR result and instructed:     Warfarin Dosing Instructions: Continue your current warfarin dose    Instructed patient to follow up no later than: 2 weeks    Education provided: Yes: Watch for increased signs of bruising or bleeding and if noted to be seen right away; notify [facility] of any changes overall such as Diet, Medications, OTCs, etc.    Instructed to call the Anticoagulation Clinic for any changes, questions or concerns. (#[phone])        OBJECTIVE:  INR   Date Value Ref Range Status   01/01/2020 3.5 (A) 0.90 - 1.10 Final             Anticoagulation Summary  As of 1/2/2020    INR goal:   2.5-3.5   TTR:   91.8 % (1 y)   INR used for dosing:   3.5 (1/1/2020)   Warfarin maintenance plan:   10 mg (5 mg x 2) every Sun, Tue, Thu; 5 mg (5 mg x 1) all other days   Full warfarin instructions:   10 mg every Sun, Tue, Thu; 5 mg all other days   Weekly warfarin total:   50 mg   No change documented:   Love Elliott RN   Plan last modified:   Missy Hoskins RN (8/8/2018)   Next INR check:   1/16/2020   Priority:   Maintenance   Target end date:       Indications    Long-term (current) use of anticoagulants [Z79.01] [Z79.01]  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

## 2020-01-16 ENCOUNTER — TRANSFERRED RECORDS (OUTPATIENT)
Dept: HEALTH INFORMATION MANAGEMENT | Facility: CLINIC | Age: 75
End: 2020-01-16

## 2020-01-16 ENCOUNTER — TELEPHONE (OUTPATIENT)
Dept: FAMILY MEDICINE | Facility: CLINIC | Age: 75
End: 2020-01-16

## 2020-01-16 DIAGNOSIS — Z95.2 HEART VALVE REPLACED: ICD-10-CM

## 2020-01-16 DIAGNOSIS — Z79.01 LONG TERM CURRENT USE OF ANTICOAGULANT THERAPY: ICD-10-CM

## 2020-01-16 LAB — INR PPP: 3.6 (ref 0.9–1.1)

## 2020-01-16 NOTE — TELEPHONE ENCOUNTER
ANTICOAGULATION MANAGEMENT     Patient Name:  Feng Wynne  Date:  1/16/2020    ASSESSMENT /SUBJECTIVE:      Today's INR result of 3.6 is supratherapeutic. Goal INR of 2.5-3.5      Warfarin dose taken: Warfarin taken as previously instructed    Diet: increased use of cinnamon and garlic may be affecting diet and INR    Medication changes/ interactions: No new medications/supplements affecting INR    Previous INR: Therapeutic     S/S of bleeding or thromboembolism: No    New injury or illness:  No    Upcoming surgery, procedure or cardioversion:  No    Additional findings: None      PLAN:    Spoke with Feng regarding INR result and instructed:     Warfarin Dosing Instructions: Continue your current warfarin dose    Instructed patient to follow up no later than: 2 weeks    Education provided: Yes: INR significance      Feng verbalizes understanding and agrees to warfarin dosing plan.    Instructed to call the Anticoagulation Clinic for any changes, questions or concerns. (#[phone])        OBJECTIVE:  INR   Date Value Ref Range Status   01/16/2020 3.6 (A) 0.90 - 1.10 Final             Anticoagulation Summary  As of 1/16/2020    INR goal:   2.5-3.5   TTR:   88.5 % (1 y)   INR used for dosing:   3.6! 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

## 2020-01-16 NOTE — TELEPHONE ENCOUNTER
Incoming fax from [email or web address] home monitoring company    Date of INR 01/16/2020    INR result 3.6

## 2020-01-16 NOTE — TELEPHONE ENCOUNTER
Anticoagulation Management    Unable to reach Feng today.    Today's INR result of 3.6 is supratherapeutic (goal INR of 2.5-3.5).  Result received from: Home [employer]    Follow up required to confirm warfarin dose taken and assess for changes    Left message to take 10 mg tonight if no changes and receives message after hours and to call back in the morning, if received during business hours then call back for assessment of changes      Anticoagulation clinic to follow up    Natali Mondragon RN

## 2020-01-29 ENCOUNTER — TRANSFERRED RECORDS (OUTPATIENT)
Dept: HEALTH INFORMATION MANAGEMENT | Facility: CLINIC | Age: 75
End: 2020-01-29

## 2020-01-29 ENCOUNTER — TELEPHONE (OUTPATIENT)
Dept: FAMILY MEDICINE | Facility: CLINIC | Age: 75
End: 2020-01-29

## 2020-01-29 DIAGNOSIS — Z79.01 LONG TERM CURRENT USE OF ANTICOAGULANT THERAPY: ICD-10-CM

## 2020-01-29 DIAGNOSIS — Z95.2 HEART VALVE REPLACED: ICD-10-CM

## 2020-01-29 LAB — INR PPP: 3.2 (ref 0.9–1.1)

## 2020-01-29 NOTE — TELEPHONE ENCOUNTER
ANTICOAGULATION MANAGEMENT     Patient Name:  Feng Wynne  Date:  1/29/2020    ASSESSMENT /SUBJECTIVE:      Today's INR result of 3.2 is therapeutic. Goal INR of 2.5-3.5      Warfarin dose taken: Warfarin taken as previously instructed    Diet: No new diet changes affecting INR    Medication changes/ interactions: No new medications/supplements affecting INR    Previous INR: Supratherapeutic     S/S of bleeding or thromboembolism: No    New injury or illness:  No    Upcoming surgery, procedure or cardioversion:  No    Additional findings: None      PLAN:    Left detailed message for Feng regarding INR result and instructed:     Warfarin Dosing Instructions: Continue your current warfarin dose    Instructed patient to follow up no later than: 2 weeks  Patient to recheck with home meter    Education provided: Yes: Watch for increased signs of bruising or bleeding and if noted to be seen right away      Detailed message left for patient with dosing information.     Instructed to call the Anticoagulation Clinic for any changes, questions or concerns. (#[phone])        OBJECTIVE:  INR   Date Value Ref Range Status   01/29/2020 3.5 (A) 0.90 - 1.10 Final             Anticoagulation Summary  As of 1/29/2020    INR goal:   2.5-3.5   TTR:   84.9 % (1 y)   INR used for dosing:   3.5 (1/29/2020)   Warfarin maintenance plan:   10 mg (5 mg x 2) every Sun, Tue, Thu; 5 mg (5 mg x 1) all other days   Full warfarin instructions:   10 mg every Sun, Tue, Thu; 5 mg all other days   Weekly warfarin total:   50 mg   No change documented:   Love Elliott RN   Plan last modified:   Missy Hoskins RN (8/8/2018)   Next INR check:   2/12/2020   Priority:   Maintenance   Target end date:       Indications    Long-term (current) use of anticoagulants [Z79.01] [Z79.01]  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

## 2020-02-06 ENCOUNTER — TELEPHONE (OUTPATIENT)
Dept: FAMILY MEDICINE | Facility: CLINIC | Age: 75
End: 2020-02-06

## 2020-02-06 DIAGNOSIS — Z79.01 LONG TERM CURRENT USE OF ANTICOAGULANT THERAPY: ICD-10-CM

## 2020-02-06 DIAGNOSIS — Z95.2 HEART VALVE REPLACED: ICD-10-CM

## 2020-02-06 NOTE — TELEPHONE ENCOUNTER
Reason for Call:  Medication Question    Other request: Patient is going to have a Dental Procedure done and would like a  Call back  To discuss how he should be taking his Warfarin     Can we leave a detailed message on this number? YES    Phone number patient can be reached at: Home number on file [phone] (home)    Best Time: anytime    Call taken on 2/6/2020 at 12:57 PM by Fausto Sharma

## 2020-02-06 NOTE — TELEPHONE ENCOUNTER
Patient has been informed of below and agreeable to plan.  He will hold coumadin 3 days and resume dosing night of dental procedure if okay with dentist.  He will check INR on Friday of next week.  Love Elliott RN  Anticoagulation Nurse - [facility] [place]

## 2020-02-06 NOTE — TELEPHONE ENCOUNTER
Patient does not have high risk for venous thromboembolism (SBB7QW1 VASc score is 3) -- no need for bridging.  Can hold Warfarin as requested and resume the day after procedure.

## 2020-02-06 NOTE — TELEPHONE ENCOUNTER
To PCP:  Patient is having 2 teeth pulled on Monday 2/10 (eye teeth with both with prior root canals).    Dentist recommended 2-3 coumadin hold prior.    Are you okay with 2 or 3 day coumadin hold?  Any bridging recommended?    Please advise.  Thank you.  Love Elliott, RN  Anticoagulation Nurse - [facility]

## 2020-02-10 ENCOUNTER — TELEPHONE (OUTPATIENT)
Dept: FAMILY MEDICINE | Facility: CLINIC | Age: 75
End: 2020-02-10

## 2020-02-10 DIAGNOSIS — Z95.2 HEART VALVE REPLACED: ICD-10-CM

## 2020-02-10 DIAGNOSIS — Z79.01 LONG TERM CURRENT USE OF ANTICOAGULANT THERAPY: ICD-10-CM

## 2020-02-10 LAB — INR PPP: 1.6 (ref 0.9–1.1)

## 2020-02-10 NOTE — TELEPHONE ENCOUNTER
Reason for Call:  Other INR    Detailed comments: Feng calling in concerned with his INR--it's at 1.6. He said that he just had surgery for teeth extractions.     Please call back.    Phone Number Patient can be reached at: Cell number on file:    Telephone Information:   Mobile [phone]       Best Time: any    Can we leave a detailed message on this number? YES    Call taken on 2/10/2020 at 3:05 PM by David Emanuel

## 2020-02-10 NOTE — TELEPHONE ENCOUNTER
ANTICOAGULATION MANAGEMENT     Patient Name:  Feng Wynne  Date:  2/10/2020    ASSESSMENT /SUBJECTIVE:      Today's INR result of 1.6 is subtherapeutic. Goal INR of 2.5-3.5      Warfarin dose taken: Warfarin recently held as instructed which may be affecting INR    Diet: No new diet changes affecting INR    Medication changes/ interactions: No new medications/supplements affecting INR    Previous INR: Therapeutic     S/S of bleeding or thromboembolism: No    New injury or illness:  No    Upcoming surgery, procedure or cardioversion:  No    Additional findings: tooth extraction today; intentional hold.  Minor bleeding with procedure. Will resume coumadin tonight.      PLAN:    Spoke with Feng regarding INR result and instructed:     Warfarin Dosing Instructions: 10 mg x 3 days    Instructed patient to follow up no later than: 4 days  Patient to recheck with home meter    Education provided: Yes: watch foe increased signs of bruising or bleeding and if noted to be seen right away.      feng verbalizes understanding and agrees to warfarin dosing plan.    Instructed to call the Anticoagulation Clinic for any changes, questions or concerns. (#[phone])        OBJECTIVE:  INR   Date Value Ref Range Status   02/10/2020 1.6 (A) 0.90 - 1.10 Final             Anticoagulation Summary  As of 2/10/2020    INR goal:   2.5-3.5   TTR:   85.8 % (1 y)   INR used for dosin.6! 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

## 2020-02-13 ENCOUNTER — TRANSFERRED RECORDS (OUTPATIENT)
Dept: HEALTH INFORMATION MANAGEMENT | Facility: CLINIC | Age: 75
End: 2020-02-13

## 2020-02-13 ENCOUNTER — TELEPHONE (OUTPATIENT)
Dept: FAMILY MEDICINE | Facility: CLINIC | Age: 75
End: 2020-02-13

## 2020-02-13 DIAGNOSIS — Z95.2 HEART VALVE REPLACED: ICD-10-CM

## 2020-02-13 DIAGNOSIS — Z79.01 LONG TERM CURRENT USE OF ANTICOAGULANT THERAPY: ICD-10-CM

## 2020-02-13 LAB — INR PPP: 2.3 (ref 0.9–1.1)

## 2020-02-13 NOTE — TELEPHONE ENCOUNTER
ANTICOAGULATION MANAGEMENT     Patient Name:  Feng Wynne  Date:  2020    ASSESSMENT /SUBJECTIVE:      Today's INR result of 2.3 is subtherapeutic. Goal INR of 2.5-3.5      Warfarin dose taken: Warfarin taken as previously instructed    Diet: No new diet changes affecting INR    Medication changes/ interactions: No new medications/supplements affecting INR    Previous INR: Subtherapeutic     S/S of bleeding or thromboembolism: No    New injury or illness:  No    Upcoming surgery, procedure or cardioversion:  No    Additional findings: patient had tooth extraction on Monday with intentional hold, boost dosing for 3 days      PLAN:    Spoke with Feng regarding INR result and instructed:     Warfarin Dosing Instructions: Continue your current warfarin dose of 10 mg on sun/tues/thurs and 5 mg all other days    Instructed patient to follow up no later than: 20  Patient to recheck with home meter    Education provided: Yes: monitor for signs of bleeding      feng verbalizes understanding and agrees to warfarin dosing plan.    Instructed to call the Anticoagulation Clinic for any changes, questions or concerns. (#[phone])        OBJECTIVE:  INR   Date Value Ref Range Status   2020 2.3 (A) 0.90 - 1.10 Final             Anticoagulation Summary  As of 2020    INR goal:   2.5-3.5   TTR:   84.9 % (1 y)   INR used for dosin.3! 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

## 2020-02-13 NOTE — TELEPHONE ENCOUNTER
Patient was called.  See today's encounter 2/13/20 for dosing instruction given.    Natali Mondragon RN  Anticoagulation Clinic

## 2020-02-19 ENCOUNTER — TRANSFERRED RECORDS (OUTPATIENT)
Dept: HEALTH INFORMATION MANAGEMENT | Facility: CLINIC | Age: 75
End: 2020-02-19

## 2020-02-19 ENCOUNTER — TELEPHONE (OUTPATIENT)
Dept: FAMILY MEDICINE | Facility: CLINIC | Age: 75
End: 2020-02-19

## 2020-02-19 DIAGNOSIS — Z95.2 HEART VALVE REPLACED: ICD-10-CM

## 2020-02-19 DIAGNOSIS — Z79.01 LONG TERM CURRENT USE OF ANTICOAGULANT THERAPY: ICD-10-CM

## 2020-02-19 LAB — INR PPP: 2.4 (ref 0.9–1.1)

## 2020-02-19 NOTE — TELEPHONE ENCOUNTER
ANTICOAGULATION MANAGEMENT     Patient Name:  Feng Wynne  Date:  2020    ASSESSMENT /SUBJECTIVE:      Today's INR result of 2.4 is subtherapeutic. Goal INR of 2.5-3.5      Warfarin dose taken: Warfarin taken as previously instructed    Diet: Increased greens/vitamin K intake may be affecting INR    Medication changes/ interactions: No new medications/supplements affecting INR    Previous INR: Subtherapeutic     S/S of bleeding or thromboembolism: No    New injury or illness:  No    Upcoming surgery, procedure or cardioversion:  No    Additional findings: None      PLAN:    Spoke with Feng regarding INR result and instructed:     Warfarin Dosing Instructions: 10 mg today then continue your current warfarin dose of 10 mg on sun/tues/thurs and 5 mg all other days    Instructed patient to follow up no later than: 1 week  Patient to recheck with home meter    Education provided: Yes: consistant with greens      Feng verbalizes understanding and agrees to warfarin dosing plan.    Instructed to call the Anticoagulation Clinic for any changes, questions or concerns. (#[phone])        OBJECTIVE:  INR   Date Value Ref Range Status   2020 2.4 (A) 0.90 - 1.10 Final             Anticoagulation Summary  As of 2020    INR goal:   2.5-3.5   TTR:   83.3 % (1 y)   INR used for dosin.4! 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

## 2020-02-26 ENCOUNTER — TELEPHONE (OUTPATIENT)
Dept: FAMILY MEDICINE | Facility: CLINIC | Age: 75
End: 2020-02-26

## 2020-02-26 DIAGNOSIS — Z95.2 HEART VALVE REPLACED: ICD-10-CM

## 2020-02-26 DIAGNOSIS — Z79.01 LONG TERM CURRENT USE OF ANTICOAGULANT THERAPY: ICD-10-CM

## 2020-02-26 LAB — INR PPP: 4.5 (ref 0.9–1.1)

## 2020-02-26 NOTE — TELEPHONE ENCOUNTER
ANTICOAGULATION MANAGEMENT     Patient Name:  Feng Wynne  Date:  2/26/2020    ASSESSMENT /SUBJECTIVE:      Today's INR result of 4.5 is supratherapeutic. Goal INR of 2.5-3.5      Warfarin dose taken: Warfarin taken as previously instructed    Diet: Decreased greens/vitamin K intake may be affecting INR, usually eats greens every 2-3 days, broccoli and spinach cut out recently due to low INR levels    Medication changes/ interactions: No new medications/supplements affecting INR    Previous INR: Subtherapeutic     S/S of bleeding or thromboembolism: Yes: slight bruising    New injury or illness:  No    Upcoming surgery, procedure or cardioversion:  No    Additional findings: Dentral surgery areas have healed, no inflammation       PLAN:    Spoke with Feng regarding INR result and instructed:     Warfarin Dosing Instructions: take 2.5mg today then continue your current warfarin dose of 10mg Sat/Tue/Thurs, 5mg ROW  Only doing 1/2 hold outside or protocol because Feng plans on having a large spinach salad, ~3 cups = approx 500mcg vitamin K.  Discussed not being overly aggressive in warfarin hold since home meter, plan to consume heavier vitamin K load, and resume previous higher vitamin K diet.       Instructed patient to follow up no later than: 1 week  Patient to recheck with home meter    Education provided: Yes: discussed inflammation and diet effect on INR      Feng verbalizes understanding and agrees to warfarin dosing plan.    Instructed to call the Anticoagulation Clinic for any changes, questions or concerns. (#[phone])      Petty Inman, PharmD [facility]  Anticoagulation Clinical Pharmacist      OBJECTIVE:  INR   Date Value Ref Range Status   02/19/2020 2.4 (A) 0.90 - 1.10 Final

## 2020-03-03 DIAGNOSIS — I10 ESSENTIAL HYPERTENSION WITH GOAL BLOOD PRESSURE LESS THAN 140/90: ICD-10-CM

## 2020-03-03 NOTE — TELEPHONE ENCOUNTER
"Requested Prescriptions   Pending Prescriptions Disp Refills     lisinopril (ZESTRIL) 40 MG tablet 90 tablet 1     Sig: TAKE 1 TABLET(40 MG) BY MOUTH DAILY.  Last Written Prescription Date:  09/11/2019  Last Fill Quantity: 90 Tablet,  # refills: 1   Last office visit: 11/5/2019 with prescribing provider:  Aaron   Future Office Visit:           ACE Inhibitors (Including Combos) Protocol Passed - 3/3/2020 10:43 AM        Passed - Blood pressure under 140/90 in past 12 months     BP Readings from Last 3 Encounters:   11/05/19 128/74   03/14/19 130/82   07/26/18 129/80                 Passed - Recent (12 mo) or future (30 days) visit within the authorizing provider's specialty     Patient has had an office visit with the authorizing provider or a provider within the authorizing providers department within the previous 12 mos or has a future within next 30 days. See \"Patient Info\" tab in inbasket, or \"Choose Columns\" in Meds & Orders section of the refill encounter.              Passed - Medication is active on med list        Passed - Patient is age 18 or older        Passed - Normal serum creatinine on file in past 12 months     Recent Labs   Lab Test 03/14/19  0959   CR 0.85             Passed - Normal serum potassium on file in past 12 months     Recent Labs   Lab Test 03/14/19  0959   POTASSIUM 4.5               "

## 2020-03-04 ENCOUNTER — TRANSFERRED RECORDS (OUTPATIENT)
Dept: HEALTH INFORMATION MANAGEMENT | Facility: CLINIC | Age: 75
End: 2020-03-04

## 2020-03-04 ENCOUNTER — TELEPHONE (OUTPATIENT)
Dept: FAMILY MEDICINE | Facility: CLINIC | Age: 75
End: 2020-03-04

## 2020-03-04 DIAGNOSIS — Z95.2 HEART VALVE REPLACED: ICD-10-CM

## 2020-03-04 DIAGNOSIS — Z79.01 LONG TERM CURRENT USE OF ANTICOAGULANT THERAPY: ICD-10-CM

## 2020-03-04 LAB — INR PPP: 3.9 (ref 0.9–1.1)

## 2020-03-04 RX ORDER — LISINOPRIL 40 MG/1
TABLET ORAL
Qty: 90 TABLET | Refills: 0 | Status: SHIPPED | OUTPATIENT
Start: 2020-03-04 | End: 2020-05-27

## 2020-03-04 NOTE — TELEPHONE ENCOUNTER
Prescription approved per [facility] Refill Protocol.  Neyda CHANDLER RN

## 2020-03-04 NOTE — TELEPHONE ENCOUNTER
ANTICOAGULATION MANAGEMENT     Patient Name:  Feng Wynne  Date:  3/4/2020    ASSESSMENT /SUBJECTIVE:      Today's INR result of 3.9 is supratherapeutic. Goal INR of 2.5-3.5      Warfarin dose taken: Warfarin taken as previously instructed    Diet: Decreased greens/vitamin K intake may be affecting INR, patient is being more consistant with greens. Plans to have dark greens every other day with spinach incorporated into diet    Medication changes/ interactions: No new medications/supplements affecting INR    Previous INR: Supratherapeutic     S/S of bleeding or thromboembolism: No    New injury or illness:  No    Upcoming surgery, procedure or cardioversion:  No    Additional findings: None      PLAN:    Spoke with Feng regarding INR result and instructed:     Warfarin Dosing Instructions: 2.5 mg today then continue your current warfarin dose of 10 mg on Sun/Tues/Thurs and 5 mg all other days    Instructed patient to follow up no later than: 1 week  Patient to recheck with home meter    Education provided: Yes: consistency with greens      Feng verbalizes understanding and agrees to warfarin dosing plan.    Instructed to call the Anticoagulation Clinic for any changes, questions or concerns. (#[phone])        OBJECTIVE:  INR   Date Value Ref Range Status   03/04/2020 3.9 (A) 0.90 - 1.10 Final             Anticoagulation Summary  As of 3/4/2020    INR goal:   2.5-3.5   TTR:   80.4 % (1 y)   INR used for dosing:   3.9! 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

## 2020-03-06 ENCOUNTER — NURSE TRIAGE (OUTPATIENT)
Dept: NURSING | Facility: CLINIC | Age: 75
End: 2020-03-06

## 2020-03-07 NOTE — TELEPHONE ENCOUNTER
He is going to travel to [place] in a few weeks .  He has a 2 big events in the next 2 weeks and is wondering if he should go to those events. Handwashing is the best to protect yourself.  You can also check the CDC website to get the latest up to date information.     Hiral De La Paz RN/ [place] [facility]        Reason for Disposition    Health Information question, no triage required and triager able to answer question    Protocols used: INFORMATION ONLY CALL-A-AH

## 2020-03-11 ENCOUNTER — TRANSFERRED RECORDS (OUTPATIENT)
Dept: HEALTH INFORMATION MANAGEMENT | Facility: CLINIC | Age: 75
End: 2020-03-11

## 2020-03-11 ENCOUNTER — TELEPHONE (OUTPATIENT)
Dept: FAMILY MEDICINE | Facility: CLINIC | Age: 75
End: 2020-03-11

## 2020-03-11 DIAGNOSIS — Z79.01 LONG TERM CURRENT USE OF ANTICOAGULANT THERAPY: ICD-10-CM

## 2020-03-11 DIAGNOSIS — Z95.2 HEART VALVE REPLACED: ICD-10-CM

## 2020-03-11 LAB — INR PPP: 3.5 (ref 0.9–1.1)

## 2020-03-11 NOTE — TELEPHONE ENCOUNTER
ANTICOAGULATION MANAGEMENT     Patient Name:  Feng Wynne  Date:  3/11/2020    ASSESSMENT /SUBJECTIVE:      Today's INR result of 3.5 is therapeutic. Goal INR of 2.5-3.5      Warfarin dose taken: Warfarin taken as previously instructed    Diet: No new diet changes affecting INR    Medication changes/ interactions: No new medications/supplements affecting INR    Previous INR: Supratherapeutic     S/S of bleeding or thromboembolism: No    New injury or illness:  No    Upcoming surgery, procedure or cardioversion:  No    Additional findings: Patient leaving for [place] on Friday 3/13/20 and gone for 10 days      PLAN:    Spoke with Feng regarding INR result and instructed:     Warfarin Dosing Instructions: Change your warfarin dose to 10 mg on Sun/Thurs and 5 mg all other days,  Patient has been high the last 3 checks and still on higher end of goal range, patient seems to be lowering with the half doses on Wednesday so decreased 10% due to this pattern.  Instructed patient to follow up no later than: 2 weeks  Patient to recheck with home meter    Education provided: Yes: leg/foot exercises on plane, get up and walk during flight, drink water during flight.  Patient educated on s/s of bleeding/bruising to monitor for while on vacation.      Feng verbalizes understanding and agrees to warfarin dosing plan.    Instructed to call the Anticoagulation Clinic for any changes, questions or concerns. (#[phone])        OBJECTIVE:  INR   Date Value Ref Range Status   03/11/2020 3.5 (A) 0.90 - 1.10 Final             Anticoagulation Summary  As of 3/11/2020    INR goal:   2.5-3.5   TTR:   78.5 % (1 y)   INR used for dosing:   3.5 (3/11/2020)   Warfarin maintenance plan:   10 mg (5 mg x 2) every Sun, Thu; 5 mg (5 mg x 1) all other days   Full warfarin instructions:   10 mg every Sun, Thu; 5 mg all other days   Weekly warfarin total:   45 mg   Plan last modified:   Natali Mondragon RN (3/11/2020)   Next INR check:    3/25/2020   Priority:   High   Target end date:       Indications    Long-term (current) use of anticoagulants [Z79.01] [Z79.01]  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

## 2020-03-26 ENCOUNTER — TRANSFERRED RECORDS (OUTPATIENT)
Dept: HEALTH INFORMATION MANAGEMENT | Facility: CLINIC | Age: 75
End: 2020-03-26

## 2020-03-26 ENCOUNTER — TELEPHONE (OUTPATIENT)
Dept: FAMILY MEDICINE | Facility: CLINIC | Age: 75
End: 2020-03-26

## 2020-03-26 DIAGNOSIS — Z95.2 HEART VALVE REPLACED: ICD-10-CM

## 2020-03-26 DIAGNOSIS — Z79.01 LONG TERM CURRENT USE OF ANTICOAGULANT THERAPY: ICD-10-CM

## 2020-03-26 LAB — INR PPP: 2.1 (ref 0.9–1.1)

## 2020-03-26 NOTE — TELEPHONE ENCOUNTER
ANTICOAGULATION MANAGEMENT     Patient Name:  Feng Wynne  Date:  3/26/2020    ASSESSMENT /SUBJECTIVE:      Today's INR result of 2.1 is subtherapeutic. Goal INR of 2.0-3.0      Warfarin dose taken: Warfarin taken as previously instructed    Diet: No new diet changes affecting INR    Medication changes/ interactions: No new medications/supplements affecting INR    Previous INR: Therapeutic     S/S of bleeding or thromboembolism: No    New injury or illness:  No    Upcoming surgery, procedure or cardioversion:  No    Additional findings: none      PLAN:    Spoke with Feng regarding INR result and instructed:     Warfarin Dosing Instructions: Change your warfarin dose to 10 mg every Tue, Thu, Sat; 5 mg all other days    Instructed patient to follow up no later than: 2 weeks  Patient to recheck with home meter    Education provided: Yes: signficance of INR result      Feng verbalizes understanding and agrees to warfarin dosing plan.    Instructed to call the Anticoagulation Clinic for any changes, questions or concerns. (#[phone])        OBJECTIVE:  INR   Date Value Ref Range Status   2020 2.1 (A) 0.90 - 1.10 Final             Anticoagulation Summary  As of 3/26/2020    INR goal:   2.5-3.5   TTR:   77.3 % (1 y)   INR used for dosin.1! 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

## 2020-04-08 ENCOUNTER — TELEPHONE (OUTPATIENT)
Dept: FAMILY MEDICINE | Facility: CLINIC | Age: 75
End: 2020-04-08

## 2020-04-08 DIAGNOSIS — Z79.01 LONG TERM CURRENT USE OF ANTICOAGULANT THERAPY: ICD-10-CM

## 2020-04-08 DIAGNOSIS — Z95.2 HEART VALVE REPLACED: ICD-10-CM

## 2020-04-08 LAB — INR PPP: 3.2 (ref 0.9–1.1)

## 2020-04-08 NOTE — TELEPHONE ENCOUNTER
ANTICOAGULATION MANAGEMENT     Patient Name:  Feng Wynne  Date:  4/8/2020    ASSESSMENT /SUBJECTIVE:    Today's INR result of 3.2 is therapeutic. Goal INR of 2.5-3.5      Warfarin dose taken: Warfarin taken differently than instructed, but no impact to total weekly dose    Diet: No new diet changes affecting INR    Medication changes/ interactions: No new medications/supplements affecting INR    Previous INR: Subtherapeutic     S/S of bleeding or thromboembolism: No    New injury or illness: No    Upcoming surgery, procedure or cardioversion: No    Additional findings: None      PLAN:    Spoke with Feng regarding INR result and instructed:     Warfarin Dosing Instructions: Continue your current warfarin dose 10 mg Tue, Thu, Sat, and 5 mg all other days    Instructed patient to follow up no later than: 2 weeks  Patient to recheck with home meter    Education provided: None required      Feng,  verbalizes understanding and agrees to warfarin dosing plan.    Instructed to call the Anticoagulation Clinic for any changes, questions or concerns. (#[phone])        OBJECTIVE:  INR   Date Value Ref Range Status   04/08/2020 3.2 (A) 0.90 - 1.10 Final             Anticoagulation Summary  As of 4/8/2020    INR goal:   2.5-3.5   TTR:   76.0 % (1 y)   INR used for dosing:   3.2 (4/8/2020)   Warfarin maintenance plan:   10 mg (5 mg x 2) every Tue, Thu, Sat; 5 mg (5 mg x 1) all other days   Full warfarin instructions:   10 mg every Tue, Thu, Sat; 5 mg all other days   Weekly warfarin total:   50 mg   No change documented:   Love Elliott RN   Plan last modified:   Huma Cabrera RN (3/26/2020)   Next INR check:   4/22/2020   Priority:   High   Target end date:       Indications    Long-term (current) use of anticoagulants [Z79.01] [Z79.01]  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

## 2020-04-22 ENCOUNTER — TELEPHONE (OUTPATIENT)
Dept: FAMILY MEDICINE | Facility: CLINIC | Age: 75
End: 2020-04-22

## 2020-04-22 ENCOUNTER — TRANSFERRED RECORDS (OUTPATIENT)
Dept: HEALTH INFORMATION MANAGEMENT | Facility: CLINIC | Age: 75
End: 2020-04-22

## 2020-04-22 DIAGNOSIS — Z79.01 LONG TERM CURRENT USE OF ANTICOAGULANT THERAPY: ICD-10-CM

## 2020-04-22 DIAGNOSIS — Z95.2 HEART VALVE REPLACED: ICD-10-CM

## 2020-04-22 LAB — INR PPP: 2.9 (ref 0.9–1.1)

## 2020-04-22 NOTE — TELEPHONE ENCOUNTER
ANTICOAGULATION MANAGEMENT     Patient Name:  Feng Wynne  Date:  2020    ASSESSMENT /SUBJECTIVE:    Today's INR result of 2.9 is therapeutic. Goal INR of 2.5-3.5      Warfarin dose taken: Warfarin taken as previously instructed    Diet: No new diet changes affecting INR    Medication changes/ interactions: No new medications/supplements affecting INR    Previous INR: Therapeutic     S/S of bleeding or thromboembolism: No    New injury or illness: No    Upcoming surgery, procedure or cardioversion: No    Additional findings: None      PLAN:    Spoke with Feng regarding INR result and instructed:     Warfarin Dosing Instructions: Continue your current warfarin dose 10 mg Tue, Thu, Sat, 5 mg all other days    Instructed patient to follow up no later than: 2 weeks  Patient to recheck with home meter    Education provided: Monitoring for bleeding signs and symptoms and Monitoring for clotting signs and symptoms      Feng,  verbalizes understanding and agrees to warfarin dosing plan.    Instructed to call the Anticoagulation Clinic for any changes, questions or concerns. (#[phone])        OBJECTIVE:  INR   Date Value Ref Range Status   2020 2.9 (A) 0.90 - 1.10 Final             Anticoagulation Summary  As of 2020    INR goal:   2.5-3.5   TTR:   76.0 % (1 y)   INR used for dosin.9 (2020)   Warfarin maintenance plan:   10 mg (5 mg x 2) every Tue, Thu, Sat; 5 mg (5 mg x 1) all other days   Full warfarin instructions:   10 mg every Tue, Thu, Sat; 5 mg all other days   Weekly warfarin total:   50 mg   No change documented:   Love Elliott RN   Plan last modified:   Huma Cabrera RN (3/26/2020)   Next INR check:   2020   Priority:   High   Target end date:       Indications    Long-term (current) use of anticoagulants [Z79.01] [Z79.01]  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

## 2020-04-28 DIAGNOSIS — I48.20 CHRONIC ATRIAL FIBRILLATION (H): ICD-10-CM

## 2020-04-29 RX ORDER — ATENOLOL 25 MG/1
25 TABLET ORAL
Qty: 180 TABLET | Refills: 0 | Status: SHIPPED | OUTPATIENT
Start: 2020-04-29 | End: 2020-07-28

## 2020-05-06 ENCOUNTER — TRANSFERRED RECORDS (OUTPATIENT)
Dept: HEALTH INFORMATION MANAGEMENT | Facility: CLINIC | Age: 75
End: 2020-05-06

## 2020-05-06 ENCOUNTER — TELEPHONE (OUTPATIENT)
Dept: FAMILY MEDICINE | Facility: CLINIC | Age: 75
End: 2020-05-06

## 2020-05-06 DIAGNOSIS — Z79.01 LONG TERM CURRENT USE OF ANTICOAGULANT THERAPY: ICD-10-CM

## 2020-05-06 DIAGNOSIS — Z95.2 HEART VALVE REPLACED: ICD-10-CM

## 2020-05-06 LAB — INR PPP: 3.6 (ref 0.9–1.1)

## 2020-05-06 NOTE — TELEPHONE ENCOUNTER
ANTICOAGULATION MANAGEMENT     Patient Name:  Feng Wynne  Date:  5/6/2020    ASSESSMENT /SUBJECTIVE:    Today's INR result of 3.6 is therapeutic. Goal INR of 2.5-3.5      Warfarin dose taken: Warfarin taken as previously instructed    Diet: No new diet changes affecting INR    Medication changes/ interactions: No new medications/supplements affecting INR    Previous INR: Therapeutic     S/S of bleeding or thromboembolism: No    New injury or illness: No    Upcoming surgery, procedure or cardioversion: No    Additional findings: None      PLAN:    Spoke with Feng regarding INR result and instructed:     Warfarin Dosing Instructions: Continue your current warfarin dose 10mg Tuesday, Thursday, Saturday, 5mg AOD    Instructed patient to follow up no later than: 2 weeks  Patient to recheck with home meter    Education provided: Importance of consistent vitamin K intake and Impact of vitamin K foods on INR      Feng verbalizes understanding and agrees to warfarin dosing plan.    Instructed to call the Anticoagulation Clinic for any changes, questions or concerns. (#[phone])        OBJECTIVE:  INR   Date Value Ref Range Status   04/22/2020 2.9 (A) 0.90 - 1.10 Final             Anticoagulation Summary  As of 5/6/2020    INR goal:   2.5-3.5   TTR:   75.0 % (11.7 mo)   INR used for dosing:      Warfarin maintenance plan:   10 mg (5 mg x 2) every Tue, Thu, Sat; 5 mg (5 mg x 1) all other days   Full warfarin instructions:   10 mg every Tue, Thu, Sat; 5 mg all other days   Weekly warfarin total:   50 mg   Plan last modified:   Huma Cabrera RN (3/26/2020)   Next INR check:      Target end date:       Indications    Long-term (current) use of anticoagulants [Z79.01] [Z79.01]  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

## 2020-05-20 ENCOUNTER — TRANSFERRED RECORDS (OUTPATIENT)
Dept: HEALTH INFORMATION MANAGEMENT | Facility: CLINIC | Age: 75
End: 2020-05-20

## 2020-05-20 ENCOUNTER — TELEPHONE (OUTPATIENT)
Dept: FAMILY MEDICINE | Facility: CLINIC | Age: 75
End: 2020-05-20

## 2020-05-20 DIAGNOSIS — Z95.2 HEART VALVE REPLACED: ICD-10-CM

## 2020-05-20 DIAGNOSIS — Z79.01 LONG TERM CURRENT USE OF ANTICOAGULANT THERAPY: ICD-10-CM

## 2020-05-20 LAB — INR PPP: 2.8 (ref 0.9–1.1)

## 2020-05-20 NOTE — TELEPHONE ENCOUNTER
ANTICOAGULATION MANAGEMENT     Patient Name:  Feng Wynne  Date:  2020    ASSESSMENT /SUBJECTIVE:    Today's INR result of 2.8 is therapeutic. Goal INR of 2.5-3.5      Warfarin dose taken: Warfarin taken as previously instructed    Diet: No new diet changes affecting INR    Medication changes/ interactions: No new medications/supplements affecting INR    Previous INR: Therapeutic     S/S of bleeding or thromboembolism: No    New injury or illness: No    Upcoming surgery, procedure or cardioversion: No    Additional findings: None      PLAN:    Left detailed message for Feng regarding INR result and instructed:     Warfarin Dosing Instructions: Continue your current warfarin dose 10 mg Tue, Thu, Sat, 5 mg all other days    Instructed patient to follow up no later than: 2 weeks  Patient to recheck with home meter    Education provided: Monitoring for bleeding signs and symptoms and Monitoring for clotting signs and symptoms      Feng,  verbalizes understanding and agrees to warfarin dosing plan.    Instructed to call the Anticoagulation Clinic for any changes, questions or concerns. (#[phone])        OBJECTIVE:  INR   Date Value Ref Range Status   2020 2.8 (A) 0.90 - 1.10 Final             Anticoagulation Summary  As of 2020    INR goal:   2.5-3.5   TTR:   75.0 % (1 y)   INR used for dosin.8 (2020)   Warfarin maintenance plan:   10 mg (5 mg x 2) every Tue, Thu, Sat; 5 mg (5 mg x 1) all other days   Full warfarin instructions:   10 mg every Tue, Thu, Sat; 5 mg all other days   Weekly warfarin total:   50 mg   Plan last modified:   Huma Cabrera RN (3/26/2020)   Next INR check:   6/3/2020   Priority:   High   Target end date:       Indications    Long-term (current) use of anticoagulants [Z79.01] [Z79.01]  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

## 2020-05-26 DIAGNOSIS — I10 ESSENTIAL HYPERTENSION WITH GOAL BLOOD PRESSURE LESS THAN 140/90: ICD-10-CM

## 2020-05-26 NOTE — TELEPHONE ENCOUNTER
lisinopril (ZESTRIL) 40 MG tablet  90 tablet  0  3/4/2020     Last Written Prescription Date:  03/04/2020  Last Fill Quantity: 90,  # refills: 0   Last office visit: 11/5/2019 with prescribing provider:  aAron   Future Office Visit:  Unknown

## 2020-05-27 RX ORDER — LISINOPRIL 40 MG/1
TABLET ORAL
Qty: 30 TABLET | Refills: 0 | Status: SHIPPED | OUTPATIENT
Start: 2020-05-27 | End: 2020-06-29

## 2020-05-27 NOTE — TELEPHONE ENCOUNTER
"Requested Prescriptions   Pending Prescriptions Disp Refills     lisinopril (ZESTRIL) 40 MG tablet 90 tablet 0     Sig: TAKE 1 TABLET(40 MG) BY MOUTH DAILY.       ACE Inhibitors (Including Combos) Protocol Failed - 5/26/2020 10:17 AM        Failed - Normal serum creatinine on file in past 12 months     Recent Labs   Lab Test 03/14/19  0959   CR 0.85       Ok to refill medication if creatinine is low          Failed - Normal serum potassium on file in past 12 months     Recent Labs   Lab Test 03/14/19  0959   POTASSIUM 4.5             Passed - Blood pressure under 140/90 in past 12 months     BP Readings from Last 3 Encounters:   11/05/19 128/74   03/14/19 130/82   07/26/18 129/80                 Passed - Recent (12 mo) or future (30 days) visit within the authorizing provider's specialty     Patient has had an office visit with the authorizing provider or a provider within the authorizing providers department within the previous 12 mos or has a future within next 30 days. See \"Patient Info\" tab in inbasket, or \"Choose Columns\" in Meds & Orders section of the refill encounter.              Passed - Medication is active on med list        Passed - Patient is age 18 or older             Due for OV, sent [facility] [employer] reminder. Sent 30 day supplly.    "

## 2020-06-02 ENCOUNTER — VIRTUAL VISIT (OUTPATIENT)
Dept: FAMILY MEDICINE | Facility: CLINIC | Age: 75
End: 2020-06-02
Payer: COMMERCIAL

## 2020-06-02 DIAGNOSIS — N48.1 BALANITIS: Primary | ICD-10-CM

## 2020-06-02 PROCEDURE — 99214 OFFICE O/P EST MOD 30 MIN: CPT | Mod: 95 | Performed by: INTERNAL MEDICINE

## 2020-06-02 RX ORDER — KETOCONAZOLE 20 MG/G
CREAM TOPICAL 2 TIMES DAILY
Qty: 15 G | Refills: 1 | Status: SHIPPED | OUTPATIENT
Start: 2020-06-02 | End: 2023-01-01

## 2020-06-02 RX ORDER — TRIAMCINOLONE ACETONIDE 0.25 MG/G
OINTMENT TOPICAL 2 TIMES DAILY
Qty: 15 G | Refills: 0 | Status: SHIPPED | OUTPATIENT
Start: 2020-06-02 | End: 2020-06-15

## 2020-06-02 NOTE — PROGRESS NOTES
"Feng Wynne is a 75 year old male who is being evaluated via a billable video visit.      The patient has been notified of following:     \"This video visit will be conducted via a call between you and your physician/provider. We have found that certain health care needs can be provided without the need for an in-person physical exam.  This service lets us provide the care you need with a video conversation.  If a prescription is necessary we can send it directly to your pharmacy.  If lab work is needed we can place an order for that and you can then stop by our lab to have the test done at a later time.    Video visits are billed at different rates depending on your insurance coverage.  Please reach out to your insurance provider with any questions.    If during the course of the call the physician/provider feels a video visit is not appropriate, you will not be charged for this service.\"    Patient has given verbal consent for Video visit? Yes    How would you like to obtain your AVS? Papihart    Patient would like the video invitation sent by: Text to cell phone: [phone]    Will anyone else be joining your video visit? No     Video-Visit Details    Video Start Time: 9:00pm  Video End Time: 9:25pm    Originating Location (pt. Location): home    Distant Location (provider location):  [facility]     Platform used for Video Visit: [employer] (Am.Well)      Subjective     Feng Wynne is a 75 year old male who presents today via video visit for the following health issues:    HPI     Patient is uncircumcised and has noted that over the past several weeks he is not able to retract his foreskin which is mildly swollen.  Denies severe pain.  No penile discharge or urination symptoms.  He has scheduled a consultation with urology for 6/24/2020.      Review of Systems   Constitutional: Negative for fever.   Gastrointestinal: Negative for abdominal pain, nausea and vomiting. "   Genitourinary: Negative for decreased urine volume, difficulty urinating, discharge, dysuria, hematuria, penile pain, scrotal swelling and testicular pain.         ICD-10-CM    1. Balanitis  N48.1 ketoconazole (NIZORAL) 2 % external cream     triamcinolone (KENALOG) 0.025 % external ointment       Dr. Enrique Walker

## 2020-06-03 ENCOUNTER — ANTICOAGULATION THERAPY VISIT (OUTPATIENT)
Dept: FAMILY MEDICINE | Facility: CLINIC | Age: 75
End: 2020-06-03

## 2020-06-03 ENCOUNTER — TRANSFERRED RECORDS (OUTPATIENT)
Dept: HEALTH INFORMATION MANAGEMENT | Facility: CLINIC | Age: 75
End: 2020-06-03

## 2020-06-03 ENCOUNTER — TELEPHONE (OUTPATIENT)
Dept: FAMILY MEDICINE | Facility: CLINIC | Age: 75
End: 2020-06-03

## 2020-06-03 DIAGNOSIS — Z95.2 HEART VALVE REPLACED: ICD-10-CM

## 2020-06-03 DIAGNOSIS — Z79.01 LONG TERM CURRENT USE OF ANTICOAGULANT THERAPY: ICD-10-CM

## 2020-06-03 LAB — INR PPP: 3.4 (ref 0.9–1.1)

## 2020-06-03 NOTE — TELEPHONE ENCOUNTER
I would suggest that he keeps his appointment until we have tried the topical medications for 1 week -- if with improvement, may cancel apt; if w/o improvement then see Urology

## 2020-06-03 NOTE — TELEPHONE ENCOUNTER
Reason for Call:  Other appointment    Detailed comments: Pt called after having a virtual visit with Dr. Walker yesterday. He wanted to know that since he had this appointment does he need to keep the appointment on 6/24/2020 with his urologist. He is wanting to know this before he cancels it.      Phone Number Patient can be reached at: Cell number on file:    Telephone Information:   Mobile [phone]       Best Time: Any    Can we leave a detailed message on this number? YES    Call taken on 6/3/2020 at 9:06 AM by Shanice Love

## 2020-06-03 NOTE — PROGRESS NOTES
ANTICOAGULATION MANAGEMENT     Patient Name:  Feng Wynne  Date:  6/3/2020    ASSESSMENT /SUBJECTIVE:    Today's INR result of 3.4 is therapeutic. Goal INR of 2.5-3.5      Warfarin dose taken: Warfarin taken as previously instructed    Diet: No new diet changes affecting INR    Medication changes/ interactions: No new medications/supplements affecting INR    Previous INR: Therapeutic     S/S of bleeding or thromboembolism: No    New injury or illness: No    Upcoming surgery, procedure or cardioversion: No    Additional findings: None      PLAN:    Spoke with Feng regarding INR result and instructed:     Warfarin Dosing Instructions: Continue your current warfarin dose 10mg Tue,Thur, Sat & 5mg AOD    Instructed patient to follow up no later than: 2 weeks - patient to check with home meter    Education provided: Importance of notifying clinic for changes in medications, Monitoring for bleeding signs and symptoms, Monitoring for clotting signs and symptoms and When to seek medical attention/emergency care      Feng verbalizes understanding and agrees to warfarin dosing plan.    Instructed to call the Anticoagulation Clinic for any changes, questions or concerns. (#[phone])        OBJECTIVE:  INR   Date Value Ref Range Status   06/03/2020 3.4 (A) 0.90 - 1.10 Final         No question data found.  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

## 2020-06-04 ASSESSMENT — ENCOUNTER SYMPTOMS
ABDOMINAL PAIN: 0
DIFFICULTY URINATING: 0
HEMATURIA: 0
VOMITING: 0
NAUSEA: 0
DYSURIA: 0
FEVER: 0

## 2020-06-04 NOTE — TELEPHONE ENCOUNTER
Dr. Walker.  Informed pt below.  Pt says he is fine with that.    He is wanting to know how to apply the ointment and the cream as they are both bid.  Ok to apply together and do you want cream and then ointment over the top?    Please advise.  Thanks,  Chloé Garrido RN

## 2020-06-15 ENCOUNTER — VIRTUAL VISIT (OUTPATIENT)
Dept: FAMILY MEDICINE | Facility: CLINIC | Age: 75
End: 2020-06-15
Payer: COMMERCIAL

## 2020-06-15 ENCOUNTER — MYC MEDICAL ADVICE (OUTPATIENT)
Dept: FAMILY MEDICINE | Facility: CLINIC | Age: 75
End: 2020-06-15

## 2020-06-15 DIAGNOSIS — N48.1 BALANITIS: Primary | ICD-10-CM

## 2020-06-15 PROCEDURE — 99213 OFFICE O/P EST LOW 20 MIN: CPT | Mod: 95 | Performed by: INTERNAL MEDICINE

## 2020-06-15 RX ORDER — FLUCONAZOLE 150 MG/1
150 TABLET ORAL ONCE
Qty: 1 TABLET | Refills: 0 | Status: SHIPPED | OUTPATIENT
Start: 2020-06-15 | End: 2020-06-15

## 2020-06-15 RX ORDER — MUPIROCIN CALCIUM 20 MG/G
CREAM TOPICAL 3 TIMES DAILY
Qty: 15 G | Refills: 0 | Status: SHIPPED | OUTPATIENT
Start: 2020-06-15 | End: 2020-06-25

## 2020-06-15 NOTE — PROGRESS NOTES
"Feng Wynne is a 75 year old male who is being evaluated via a billable telephone visit.      The patient has been notified of following:     \"This telephone visit will be conducted via a call between you and your physician/provider. We have found that certain health care needs can be provided without the need for a physical exam.  This service lets us provide the care you need with a short phone conversation.  If a prescription is necessary we can send it directly to your pharmacy.  If lab work is needed we can place an order for that and you can then stop by our lab to have the test done at a later time.    Telephone visits are billed at different rates depending on your insurance coverage. During this emergency period, for some insurers they may be billed the same as an in-person visit.  Please reach out to your insurance provider with any questions.    If during the course of the call the physician/provider feels a telephone visit is not appropriate, you will not be charged for this service.\"    Patient has given verbal consent for Telephone visit?  Yes    What phone number would you like to be contacted at? [phone]    How would you like to obtain your AVS? Chelsey Alvarez     Feng Wynne is a 75 year old male who presents via phone visit today for the following health issues:    HPI     Patient visited with me on 6/2/2020 and was given a prescription for triamcinolone cream and ketoconazole cream for treatment of balanitis.    Since his last visit, he has noted improvement of the swelling of the foreskin and can now retract the foreskin up to about half of the glans.  There continues to be residual swelling which has not been improving.  Denies pain or urination symptoms.      Review of Systems   Constitutional: Negative for chills and fever.   Genitourinary: Negative for difficulty urinating, discharge, dysuria, hematuria and penile pain.         ICD-10-CM    1. Balanitis  N48.1 " fluconazole (DIFLUCAN) 150 MG tablet     mupirocin (BACTROBAN) 2 % external cream       Total time spent with the patient over the phone was 11 minutes.      Dr. Enrique Walker

## 2020-06-18 ENCOUNTER — ANTICOAGULATION THERAPY VISIT (OUTPATIENT)
Dept: FAMILY MEDICINE | Facility: CLINIC | Age: 75
End: 2020-06-18

## 2020-06-18 ENCOUNTER — TRANSFERRED RECORDS (OUTPATIENT)
Dept: HEALTH INFORMATION MANAGEMENT | Facility: CLINIC | Age: 75
End: 2020-06-18

## 2020-06-18 DIAGNOSIS — Z79.01 LONG TERM CURRENT USE OF ANTICOAGULANT THERAPY: ICD-10-CM

## 2020-06-18 DIAGNOSIS — Z95.2 HEART VALVE REPLACED: ICD-10-CM

## 2020-06-18 LAB — INR PPP: 3.9 (ref 0.9–1.1)

## 2020-06-18 ASSESSMENT — ENCOUNTER SYMPTOMS
DIFFICULTY URINATING: 0
CHILLS: 0
HEMATURIA: 0
DYSURIA: 0
FEVER: 0

## 2020-06-18 NOTE — PROGRESS NOTES
ANTICOAGULATION MANAGEMENT     Patient Name:  Feng Wynne  Date:  6/18/2020    ASSESSMENT /SUBJECTIVE:    Today's INR result of 3.9 is supratherapeutic. Goal INR of 2.5-3.5      Warfarin dose taken: Warfarin taken as previously instructed    Diet: No new diet changes affecting INR    Medication changes/ interactions: Interaction between fluconazole and warfarin may be affecting INR. He took one dose PO on Mon 6/15 and now is using topical    Previous INR: Therapeutic     S/S of bleeding or thromboembolism: No    New injury or illness: Yes: being treated for balanitis    Upcoming surgery, procedure or cardioversion: No    Additional findings: None      PLAN:    Spoke with Feng regarding INR result and instructed:     Warfarin Dosing Instructions: partial hold today Thur 6/18 then continue your current warfarin dose of      10 mg every Tue, Thu, Sat; 5 mg all other days         Instructed patient to follow up no later than: 2 weeks  Patient to recheck with home meter    Education provided: Target INR goal and significance of current INR result, Potential interaction between warfarin and fluconazole and Monitoring for bleeding signs and symptoms      Feng verbalizes understanding and agrees to warfarin dosing plan.    Instructed to call the Anticoagulation Clinic for any changes, questions or concerns. (#[phone])        OBJECTIVE:  INR   Date Value Ref Range Status   06/18/2020 3.9 (A) 0.90 - 1.10 Final         No question data found.  Anticoagulation Summary  As of 6/18/2020    INR goal:   2.5-3.5   TTR:   71.7 % (1 y)   INR used for dosing:   3.9! 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

## 2020-06-21 LAB — INR PPP: 3.3 (ref 0.9–1.1)

## 2020-06-22 ENCOUNTER — ANTICOAGULATION THERAPY VISIT (OUTPATIENT)
Dept: NURSING | Facility: CLINIC | Age: 75
End: 2020-06-22

## 2020-06-22 ENCOUNTER — TELEPHONE (OUTPATIENT)
Dept: FAMILY MEDICINE | Facility: CLINIC | Age: 75
End: 2020-06-22

## 2020-06-22 DIAGNOSIS — Z95.2 HEART VALVE REPLACED: ICD-10-CM

## 2020-06-22 DIAGNOSIS — Z79.01 LONG TERM CURRENT USE OF ANTICOAGULANT THERAPY: ICD-10-CM

## 2020-06-22 DIAGNOSIS — Z95.2 S/P MITRAL VALVE REPLACEMENT: ICD-10-CM

## 2020-06-22 DIAGNOSIS — Z79.01 LONG TERM CURRENT USE OF ANTICOAGULANT THERAPY: Primary | ICD-10-CM

## 2020-06-22 NOTE — TELEPHONE ENCOUNTER
Has the patient previously taken warfarin? yes  If yes, for what indication? Mitral Valve Replacement    Does the patient have any of the following indications for a higher range of 2.5-3.5:    Mitral position mechanical valve? yes    Palak-Shiley, Ball and Cage or Monoleaflet valve (regardless of position) no    Other (if yes, please explain) no      Shanice Maria RN - BC  [facility]

## 2020-06-22 NOTE — PROGRESS NOTES
Anticoagulation Management    Unable to reach Feng today.    Today's INR result of 3.3 is therapeutic (goal INR of 2.5-3.5).  Result received from: Home Monitor    Follow up required to confirm warfarin dose taken and assess for changes    Left message to continue maintenance dose of 10 mg Tue/Thu/Sat and 5 mg ROW, recheck INR on 7/6/2020     Advised to call back with any questions or if he has had any changes in eating habits, changes, in medications, signs/symptoms of bleeding/clotting, missed warfarin doses or any new infections or injuries at [phone]      Anticoagulation clinic to follow up    Shanice Maria RN

## 2020-06-27 ENCOUNTER — NURSE TRIAGE (OUTPATIENT)
Dept: NURSING | Facility: CLINIC | Age: 75
End: 2020-06-27

## 2020-06-27 DIAGNOSIS — I10 ESSENTIAL HYPERTENSION: Primary | ICD-10-CM

## 2020-06-27 DIAGNOSIS — I10 ESSENTIAL HYPERTENSION WITH GOAL BLOOD PRESSURE LESS THAN 140/90: ICD-10-CM

## 2020-06-27 RX ORDER — LISINOPRIL 40 MG/1
TABLET ORAL
Qty: 30 TABLET | Refills: 0 | Status: CANCELLED | OUTPATIENT
Start: 2020-06-27

## 2020-06-27 NOTE — TELEPHONE ENCOUNTER
"Needs a refill for lisinopril 40 mg daily - Unable to fill per protocol due to over-due labs. He has 6 days of medication left. Please advise - would you like patient to come in for labwork? He had a virtual appointment on 6/15 with Dr. Walker.    Pharmacy: [facility] in [place] -     Capri Washington RN on 6/27/2020 at 4:37 PM    Reason for Disposition    Caller requesting a NON-URGENT new prescription or refill and triager unable to refill per unit policy    Additional Information    Negative: Drug overdose and nurse unable to answer question    Negative: Caller requesting information not related to medicine    Negative: Caller requesting a prescription for Strep throat and has a positive culture result    Negative: Rash while taking a medication or within 3 days of stopping it    Negative: Immunization reaction suspected    Negative: [1] Asthma AND [2] having symptoms of asthma (cough, wheezing, etc)    Negative: MORE THAN A DOUBLE DOSE of a prescription or over-the-counter (OTC) drug    Negative: [1] DOUBLE DOSE (an extra dose or lesser amount) of over-the-counter (OTC) drug AND [2] any symptoms (e.g., dizziness, nausea, pain, sleepiness)    Negative: [1] DOUBLE DOSE (an extra dose or lesser amount) of prescription drug AND [2] any symptoms (e.g., dizziness, nausea, pain, sleepiness)    Negative: Took another person's prescription drug    Negative: [1] DOUBLE DOSE (an extra dose or lesser amount) of prescription drug AND [2] NO symptoms (Exception: a double dose of antibiotics)    Negative: Diabetes drug error or overdose (e.g., insulin or extra dose)    Negative: [1] Request for URGENT new prescription or refill of \"essential\" medication (i.e., likelihood of harm to patient if not taken) AND [2] triager unable to fill per unit policy    Negative: [1] Prescription not at pharmacy AND [2] was prescribed today by PCP    Negative: Pharmacy calling with prescription questions and triager unable to answer question    Negative: " Caller has urgent medication question about med that PCP prescribed and triager unable to answer question    Negative: Caller has NON-URGENT medication question about med that PCP prescribed and triager unable to answer question    Protocols used: MEDICATION QUESTION CALL-A-AH

## 2020-06-29 ENCOUNTER — TELEPHONE (OUTPATIENT)
Dept: FAMILY MEDICINE | Facility: CLINIC | Age: 75
End: 2020-06-29

## 2020-06-29 RX ORDER — LISINOPRIL 40 MG/1
40 TABLET ORAL DAILY
Qty: 90 TABLET | Refills: 0 | Status: SHIPPED | OUTPATIENT
Start: 2020-06-29 | End: 2020-09-29

## 2020-06-29 NOTE — MR AVS SNAPSHOT
Feng Sergiojoleen Wynne   3/14/2017   Anticoagulation Therapy Visit    Description:  73 year old male   Provider:  Enrique Walker MD   Department:  [facility]           INR as of 3/14/2018     Today's INR 3.2      Anticoagulation Summary as of 3/14/2018     INR goal 2.5-3.5   Today's INR 3.2   Full instructions 5 mg on Tue, Thu, Sat; 10 mg all other days   Next INR check 3/28/2018    Indications   Long-term (current) use of anticoagulants [Z79.01] [Z79.01]  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] [Z95.2]         March 2018 Details    Sun Mon Tue Wed Thu Fri Sat         1               2               3                 4               5               6               7               8               9               10                 11               12               13               14      10 mg   See details      15      5 mg         16      10 mg         17      5 mg           18      10 mg         19      10 mg         20      5 mg         21      10 mg         22      5 mg         23      10 mg         24      5 mg           25      10 mg         26      10 mg         27      5 mg         28            29               30               31                Date Details   03/14 This INR check       Date of next INR:  3/28/2018         How to take your warfarin dose     To take:  5 mg Take 1 of the 5 mg tablets.    To take:  10 mg Take 2 of the 5 mg tablets.            Psychological condition is improving with treatment.  Continue current treatment regimen.  Psychological condition  will be reassessed at the next regular appointment.   nausea, tingling

## 2020-06-30 DIAGNOSIS — I48.20 CHRONIC ATRIAL FIBRILLATION (H): ICD-10-CM

## 2020-06-30 RX ORDER — WARFARIN SODIUM 5 MG/1
TABLET ORAL
Qty: 180 TABLET | Refills: 1 | Status: SHIPPED | OUTPATIENT
Start: 2020-06-30 | End: 2020-12-18

## 2020-07-08 ENCOUNTER — TRANSFERRED RECORDS (OUTPATIENT)
Dept: HEALTH INFORMATION MANAGEMENT | Facility: CLINIC | Age: 75
End: 2020-07-08

## 2020-07-08 ENCOUNTER — ANTICOAGULATION THERAPY VISIT (OUTPATIENT)
Dept: FAMILY MEDICINE | Facility: CLINIC | Age: 75
End: 2020-07-08

## 2020-07-08 DIAGNOSIS — Z95.2 HEART VALVE REPLACED: ICD-10-CM

## 2020-07-08 DIAGNOSIS — Z79.01 LONG TERM CURRENT USE OF ANTICOAGULANT THERAPY: ICD-10-CM

## 2020-07-08 DIAGNOSIS — Z95.2 S/P MITRAL VALVE REPLACEMENT: ICD-10-CM

## 2020-07-08 LAB — INR PPP: 3.4 (ref 0.9–1.1)

## 2020-07-08 NOTE — PROGRESS NOTES
ANTICOAGULATION MANAGEMENT     Patient Name:  Feng Wynne  Date:  7/8/2020    ASSESSMENT /SUBJECTIVE:    Today's INR result of 3.4 is therapeutic. Goal INR of 2.5-3.5      Warfarin dose taken: Warfarin taken as previously instructed    Diet: No new diet changes affecting INR    Medication changes/ interactions: No new medications/supplements affecting INR    Previous INR: Therapeutic     S/S of bleeding or thromboembolism: No    New injury or illness: No    Upcoming surgery, procedure or cardioversion: No    Additional findings: None      PLAN:    Spoke with Feng regarding INR result and instructed:     Warfarin Dosing Instructions: Continue your current warfarin dose 10 mg on Tues/Thurs/Sat and 5 mg all other days    Instructed patient to follow up no later than: 2 weeks  Patient to recheck with home meter    Education provided: Patient was educated on manage by  Exception, patient agrees to this. Educated to call if ever has changes with medications (examples given to call with antibiotics or steroid, etc), diet changes, or health.  Patient educated to continue maintenance dose and recheck in two weeks when in range with home meter.      Feng verbalizes understanding and agrees to warfarin dosing plan.    Instructed to call the Anticoagulation Clinic for any changes, questions or concerns. (#[phone])        OBJECTIVE:  INR   Date Value Ref Range Status   07/08/2020 3.4 (A) 0.90 - 1.10 Final         No question data found.  Anticoagulation Summary  As of 7/8/2020    INR goal:   2.5-3.5   TTR:   71.1 % (1 y)   INR used for dosing:   3.4 (7/8/2020)   Warfarin maintenance plan:   10 mg (5 mg x 2) every Tue, Thu, Sat; 5 mg (5 mg x 1) all other days   Full warfarin instructions:   10 mg every Tue, Thu, Sat; 5 mg all other days   Weekly warfarin total:   50 mg   Plan last modified:   Huma Cabrera RN (3/26/2020)   Next INR check:   7/22/2020   Priority:   High   Target end date:   6/22/2021    Indications     Long-term (current) use of anticoagulants [Z79.01] [Z79.01]  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] [Z95.2]  S/P mitral valve replacement [Z95.2]             Anticoagulation Episode Summary     INR check location:       Preferred lab:   EXTERNAL LAB    Send INR reminders to:   DIA MARK    Comments:   [employer] HOME MONITORING Patient, okay  to manage by exception on 7/8/20      Anticoagulation Care Providers     Provider Role Specialty Phone number    Enrique Walker MD Referring Internal Medicine [phone]

## 2020-07-11 NOTE — TELEPHONE ENCOUNTER
Form/order faxed.  EDELMIRA left informing patient.  Letitia will be calling him to set up training.  May take few weeks to get all the paperwork done/machine ordered etc.  Meli Whitten RN     Declined

## 2020-07-22 ENCOUNTER — TELEPHONE (OUTPATIENT)
Dept: FAMILY MEDICINE | Facility: CLINIC | Age: 75
End: 2020-07-22

## 2020-07-22 ENCOUNTER — ANTICOAGULATION THERAPY VISIT (OUTPATIENT)
Dept: NURSING | Facility: CLINIC | Age: 75
End: 2020-07-22
Payer: COMMERCIAL

## 2020-07-22 LAB — INR PPP: 4.2 (ref 0.9–1.1)

## 2020-07-22 PROCEDURE — 99207 ZZC NO CHARGE NURSE ONLY: CPT

## 2020-07-22 NOTE — TELEPHONE ENCOUNTER
Reason for Call:  Request for results:    Name of test or procedure: INR    Date of test of procedure: 07/22  Location of the test or procedure: Patient self tested    OK to leave the result message on voice mail or with a family member? YES    Phone number Patient can be reached at:  Home number on file [phone] (home)    Additional comments: Patient stated INR results 4.2.    Call taken on 7/22/2020 at 3:44 PM by Gabbi Buckley    Transferred to Anticoagulation nurse

## 2020-07-22 NOTE — PROGRESS NOTES
"ANTICOAGULATION FOLLOW-UP CLINIC VISIT    Patient Name:  Feng Wynne  Date:  2020  Contact Type:  Telephone/ Patient    SUBJECTIVE:  Patient Findings     Positives:   Other complaints    Comments:   Pt called and reports that his INR is elevated today on his home monitor. Pt reports fatigue and states that he has been eating greens like \"Rigo\" and requests a reduction in his maintenance dose.        Clinical Outcomes     Comments:   Pt called and reports that his INR is elevated today on his home monitor. Pt reports fatigue and states that he has been eating greens like \"Rigo\" and requests a reduction in his maintenance dose.           OBJECTIVE    Recent labs: (last 7 days)     20   INR 4.2*       ASSESSMENT / PLAN  INR assessment SUPRA    Recheck INR In: 10 DAYS    INR Location Home INR      Anticoagulation Summary  As of 2020    INR goal:   2.5-3.5   TTR:   70.5 % (1 y)   INR used for dosin.2! (2020)   Warfarin maintenance plan:   10 mg (5 mg x 2) every Tue, Sat; 5 mg (5 mg x 1) all other days   Full warfarin instructions:   : 2.5 mg; Otherwise 10 mg every Tue, Sat; 5 mg all other days   Weekly warfarin total:   45 mg   Plan last modified:   Elen Mcdonough RN (2020)   Next INR check:   2020   Priority:   High   Target end date:   2021    Indications    Long-term (current) use of anticoagulants [Z79.01] [Z79.01]  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] [Z95.2]  S/P mitral valve replacement [Z95.2]             Anticoagulation Episode Summary     INR check location:       Preferred lab:   EXTERNAL LAB    Send INR reminders to:   DIA MARK    Comments:   [employer] HOME MONITORING Patient, okay  to manage by exception on 20      Anticoagulation Care Providers     Provider Role Specialty Phone number    Enrqiue Walker MD Referring Internal Medicine [phone]            See the Encounter Report to view Anticoagulation " Flowsheet and Dosing Calendar (Go to Encounters tab in chart review, and find the Anticoagulation Therapy Visit)    Pt INR is 4.2 today. See findings. Pt advised to take 2.5 mg today 7/22/20. Maintenance decreased by 10%. Pt advised to take 10 mg on Tuesdays, Saturdays and 5 mg all the other days. Recheck INR on home monitor in 10 days on or around 7/31/20.  Feng aware if signs of clotting (pain, tenderness, swelling, color change in leg or arm, SOB) and bleeding occur (blood in stool, urine, large bruising, bleeding gums, nosebleeds) to have INR check sooner. If sx severe report to ER or concerned for stroke call 911. If general questions or concerns arise, call clinic.         Elen Mcdonough RN

## 2020-07-27 DIAGNOSIS — I48.20 CHRONIC ATRIAL FIBRILLATION (H): ICD-10-CM

## 2020-07-27 NOTE — TELEPHONE ENCOUNTER
Refill request:    ATENOLOL 25 MG TABLET    Summary: Take 1 tablet (25 mg) by mouth 2 times daily - Fasting physical due July, Disp-180 tablet,R-0, E-Prescribe   Dose, Route, Frequency: 25 mg, Oral, 2 TIMES DAILY.  Start: 4/29/2020  Ord/Sold: 4/29/2020

## 2020-07-28 RX ORDER — ATENOLOL 25 MG/1
25 TABLET ORAL
Qty: 180 TABLET | Refills: 1 | Status: SHIPPED | OUTPATIENT
Start: 2020-07-28 | End: 2020-12-18

## 2020-08-02 ENCOUNTER — TRANSFERRED RECORDS (OUTPATIENT)
Dept: HEALTH INFORMATION MANAGEMENT | Facility: CLINIC | Age: 75
End: 2020-08-02

## 2020-08-02 LAB — INR PPP: 2.9 (ref 0.9–1.1)

## 2020-08-03 ENCOUNTER — ANTICOAGULATION THERAPY VISIT (OUTPATIENT)
Dept: FAMILY MEDICINE | Facility: CLINIC | Age: 75
End: 2020-08-03

## 2020-08-03 DIAGNOSIS — Z95.2 HEART VALVE REPLACED: ICD-10-CM

## 2020-08-03 DIAGNOSIS — Z79.01 LONG TERM CURRENT USE OF ANTICOAGULANT THERAPY: ICD-10-CM

## 2020-08-03 DIAGNOSIS — Z95.2 S/P MITRAL VALVE REPLACEMENT: ICD-10-CM

## 2020-08-03 NOTE — PROGRESS NOTES
Left message for patient to call back anticoag nurse to verify dosing and any changes in regards to INR result from yesterday.

## 2020-08-03 NOTE — PROGRESS NOTES
ANTICOAGULATION MANAGEMENT     Patient Name:  Feng Wynne  Date:  8/3/2020    ASSESSMENT /SUBJECTIVE:    Today's INR result of 2.9 is therapeutic. Goal INR of 2.5-3.5      Warfarin dose taken: Warfarin taken as previously instructed    Diet: No new diet changes affecting INR    Medication changes/ interactions: No new medications/supplements affecting INR    Previous INR: Supratherapeutic     S/S of bleeding or thromboembolism: No    New injury or illness: No    Upcoming surgery, procedure or cardioversion: No    Additional findings: None      PLAN:    Spoke with Feng regarding INR result and instructed:     Warfarin Dosing Instructions: Continue your current warfarin dose 10mg tues/sat and 5mg rest of week    Instructed patient to follow up no later than: 2 weeks  Patient to recheck with home meter    Education provided: Target INR goal and significance of current INR result      Feng verbalizes understanding and agrees to warfarin dosing plan.    Instructed to call the Anticoagulation Clinic for any changes, questions or concerns. (#[phone])        Jeannine Mitchell RN      OBJECTIVE:  Recent labs: (last 7 days)     20   INR 2.9*         No question data found.  Anticoagulation Summary  As of 8/3/2020    INR goal:   2.5-3.5   TTR:   68.8 % (1 y)   INR used for dosin.9 (2020)   Warfarin maintenance plan:   10 mg (5 mg x 2) every Tue, Sat; 5 mg (5 mg x 1) all other days   Full warfarin instructions:   10 mg every Tue, Sat; 5 mg all other days   Weekly warfarin total:   45 mg   Plan last modified:   Elen Mcdonough RN (2020)   Next INR check:   2020   Priority:   High   Target end date:   2021    Indications    Long-term (current) use of anticoagulants [Z79.01] [Z79.01]  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] [Z95.2]  S/P mitral valve replacement [Z95.2]             Anticoagulation Episode Summary     INR check location:       Preferred lab:   EXTERNAL LAB    Send INR reminders  to:   DIA MARK    Comments:   [employer] HOME MONITORING Patient, okay  to manage by exception on 7/8/20      Anticoagulation Care Providers     Provider Role Specialty Phone number    Enrique Walker MD Referring Internal Medicine [phone]

## 2020-08-15 ENCOUNTER — TRANSFERRED RECORDS (OUTPATIENT)
Dept: HEALTH INFORMATION MANAGEMENT | Facility: CLINIC | Age: 75
End: 2020-08-15

## 2020-08-17 ENCOUNTER — DOCUMENTATION ONLY (OUTPATIENT)
Dept: FAMILY MEDICINE | Facility: CLINIC | Age: 75
End: 2020-08-17

## 2020-08-17 DIAGNOSIS — Z95.2 S/P MITRAL VALVE REPLACEMENT: ICD-10-CM

## 2020-08-17 DIAGNOSIS — Z79.01 LONG TERM CURRENT USE OF ANTICOAGULANT THERAPY: ICD-10-CM

## 2020-08-17 DIAGNOSIS — Z95.2 HEART VALVE REPLACED: ICD-10-CM

## 2020-08-17 LAB — INR PPP: 2.8 (ref 0.9–1.1)

## 2020-08-17 NOTE — PROGRESS NOTES
ANTICOAGULATION  MANAGEMENT-Patient Home Monitoring Result    Assessment     Therapeutic INR result of 2.8 . Goal range 2.5-3.5. Received via fax from [employer] home INR monitoring company.        Previous INR was therapeutic    Feng was last contacted by phone: [phone]    Plan     Per home monitoring agreement with patient, patient was NOT contacted regarding therapeutic result today.  Patient is to continue current dose and continue to check INR with home monitor per protocol.  ?       OBJECTIVE    INR   Date Value Ref Range Status   08/15/2020 2.8 (A) 0.90 - 1.10 Final       ASSESSMENT / PLAN  No question data found.  Anticoagulation Summary  As of 2020    INR goal:   2.5-3.5   TTR:   68.7 % (1 y)   INR used for dosin.8 (8/15/2020)   Warfarin maintenance plan:   10 mg (5 mg x 2) every Tue, Sat; 5 mg (5 mg x 1) all other days   Full warfarin instructions:   10 mg every Tue, Sat; 5 mg all other days   Weekly warfarin total:   45 mg   Plan last modified:   Elen Mcdonough RN (2020)   Next INR check:      Priority:   High   Target end date:   2021    Indications    Long-term (current) use of anticoagulants [Z79.01] [Z79.01]  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] [Z95.2]  S/P mitral valve replacement [Z95.2]             Anticoagulation Episode Summary     INR check location:       Preferred lab:   EXTERNAL LAB    Send INR reminders to:   DIA MARK    Comments:   [employer] HOME MONITORING Patient, okay  to manage by exception on 20      Anticoagulation Care Providers     Provider Role Specialty Phone number    Enrique Walker MD Referring Internal Medicine [phone]

## 2020-09-01 ENCOUNTER — ANTICOAGULATION THERAPY VISIT (OUTPATIENT)
Dept: NURSING | Facility: CLINIC | Age: 75
End: 2020-09-01

## 2020-09-01 DIAGNOSIS — Z79.01 LONG TERM CURRENT USE OF ANTICOAGULANT THERAPY: ICD-10-CM

## 2020-09-01 DIAGNOSIS — Z95.2 HEART VALVE REPLACED: ICD-10-CM

## 2020-09-01 DIAGNOSIS — Z95.2 S/P MITRAL VALVE REPLACEMENT: ICD-10-CM

## 2020-09-01 LAB — INR PPP: 2.8 (ref 0.9–1.1)

## 2020-09-01 NOTE — PROGRESS NOTES
ANTICOAGULATION  MANAGEMENT-Patient Home Monitoring Result    Assessment     Therapeutic INR result of 2.8 . Goal range 2.5-3.5. Received via fax from [employer] home INR monitoring company.        Previous INR was therapeutic    Feng was last contacted by phone: 2020    Plan     Per home monitoring agreement with patient, patient was NOT contacted regarding therapeutic result today.  Patient is to continue current dose and continue to check INR with home monitor per protocol.  ?       OBJECTIVE    INR   Date Value Ref Range Status   2020 2.8 (A) 0.90 - 1.10 Final       ASSESSMENT / PLAN  No question data found.  Anticoagulation Summary  As of 2020    INR goal:   2.5-3.5   TTR:   68.9 % (1 y)   INR used for dosin.8 (2020)   Warfarin maintenance plan:   10 mg (5 mg x 2) every Tue, Sat; 5 mg (5 mg x 1) all other days   Full warfarin instructions:   10 mg every Tue, Sat; 5 mg all other days   Weekly warfarin total:   45 mg   No change documented:   Shanice Maria RN   Plan last modified:   Elen Mcdonough RN (2020)   Next INR check:   9/15/2020   Priority:   High   Target end date:   2021    Indications    Long-term (current) use of anticoagulants [Z79.01] [Z79.01]  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] [Z95.2]  S/P mitral valve replacement [Z95.2]             Anticoagulation Episode Summary     INR check location:       Preferred lab:   EXTERNAL LAB    Send INR reminders to:   DIA MARK    Comments:   [employer] HOME MONITORING Patient, okay  to manage by exception on 20      Anticoagulation Care Providers     Provider Role Specialty Phone number    Enrique Walker MD Referring Internal Medicine [phone]          Shanice Maria, RN - [facility]

## 2020-09-16 ENCOUNTER — DOCUMENTATION ONLY (OUTPATIENT)
Dept: FAMILY MEDICINE | Facility: CLINIC | Age: 75
End: 2020-09-16

## 2020-09-16 DIAGNOSIS — Z79.01 LONG TERM CURRENT USE OF ANTICOAGULANT THERAPY: ICD-10-CM

## 2020-09-16 DIAGNOSIS — Z95.2 S/P MITRAL VALVE REPLACEMENT: ICD-10-CM

## 2020-09-16 DIAGNOSIS — Z95.2 HEART VALVE REPLACED: ICD-10-CM

## 2020-09-16 LAB — INR PPP: 2.6 (ref 0.9–1.1)

## 2020-09-16 NOTE — PROGRESS NOTES
ANTICOAGULATION  MANAGEMENT-Patient Home Monitoring Result    Assessment     Therapeutic INR result of 2.6 . Goal range 2.5-3.5. Received via fax from [employer] home INR monitoring company.        Previous INR was therapeutic    Feng was last contacted by phone: 7/2/2020    Plan     Per home monitoring agreement with patient, patient was NOT contacted regarding therapeutic result today.  Patient is to continue current dose and continue to check INR with home monitor per protocol.  ?       OBJECTIVE    INR   Date Value Ref Range Status   09/01/2020 2.8 (A) 0.90 - 1.10 Final       ASSESSMENT / PLAN  No question data found.      Mee Toledo, RN, BSN, PHN

## 2020-09-25 DIAGNOSIS — I10 ESSENTIAL HYPERTENSION WITH GOAL BLOOD PRESSURE LESS THAN 140/90: ICD-10-CM

## 2020-09-28 NOTE — TELEPHONE ENCOUNTER
MyChart response sent to patient to advise due for appt/labs and of Dr. Walker's absence. Recommending to establish with Dr. Lazaro or Dr. Carmine NAILS RN

## 2020-09-29 ENCOUNTER — ANTICOAGULATION THERAPY VISIT (OUTPATIENT)
Dept: FAMILY MEDICINE | Facility: CLINIC | Age: 75
End: 2020-09-29

## 2020-09-29 DIAGNOSIS — Z79.01 LONG TERM CURRENT USE OF ANTICOAGULANT THERAPY: ICD-10-CM

## 2020-09-29 DIAGNOSIS — Z95.2 HEART VALVE REPLACED: ICD-10-CM

## 2020-09-29 DIAGNOSIS — Z95.2 S/P MITRAL VALVE REPLACEMENT: ICD-10-CM

## 2020-09-29 LAB — INR PPP: 2.9 (ref 0.9–1.1)

## 2020-09-29 RX ORDER — LISINOPRIL 40 MG/1
40 TABLET ORAL DAILY
Qty: 90 TABLET | Refills: 0 | Status: SHIPPED | OUTPATIENT
Start: 2020-09-29 | End: 2020-12-16

## 2020-09-29 NOTE — PROGRESS NOTES
ANTICOAGULATION  MANAGEMENT-Patient Home Monitoring Result    Assessment     Therapeutic INR result of 2.9 . Goal range 2.5-3.5. Received via fax from [employer] home INR monitoring company.        Previous INR was therapeutic    Feng was last contacted by phone: 2020    Plan     Per home monitoring agreement with patient, patient was NOT contacted regarding therapeutic result today.  Patient is to continue current dose and continue to check INR with home monitor per protocol.  ?       OBJECTIVE    INR   Date Value Ref Range Status   2020 2.9 (A) 0.90 - 1.10 Final       ASSESSMENT / PLAN  No question data found.  Anticoagulation Summary  As of 2020    INR goal:   2.5-3.5   TTR:   68.9 % (1 y)   INR used for dosin.9 (2020)   Warfarin maintenance plan:   10 mg (5 mg x 2) every Tue, Sat; 5 mg (5 mg x 1) all other days   Full warfarin instructions:   10 mg every Tue, Sat; 5 mg all other days   Weekly warfarin total:   45 mg   Plan last modified:   Elen Mcdonough RN (2020)   Next INR check:   10/13/2020   Priority:   High   Target end date:   2021    Indications    Long-term (current) use of anticoagulants [Z79.01] [Z79.01]  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] [Z95.2]  S/P mitral valve replacement [Z95.2]             Anticoagulation Episode Summary     INR check location:       Preferred lab:   EXTERNAL LAB    Send INR reminders to:   DIA MARK    Comments:   [employer] HOME MONITORING Patient, okay  to manage by exception on 20      Anticoagulation Care Providers     Provider Role Specialty Phone number    Enrique Walker MD Referring Internal Medicine [phone]

## 2020-09-29 NOTE — TELEPHONE ENCOUNTER
Reason for Call:  Other prescription    Detailed comments: pt called in response to [employer] message, He scheduled 10/16 with Dr diaz but does need refill of lisinopril now as he is out.     Phone Number Patient can be reached at: Home number on file [phone] (home)  No call back asked for, unless problem    Best Time:     Can we leave a detailed message on this number? YES    Call taken on 9/29/2020 at 8:39 AM by Iqra Guzmán

## 2020-09-29 NOTE — TELEPHONE ENCOUNTER
Prescription approved per [facility] Refill Protocol.  Neyda CHANDLER RN

## 2020-10-13 ENCOUNTER — ANTICOAGULATION THERAPY VISIT (OUTPATIENT)
Dept: FAMILY MEDICINE | Facility: CLINIC | Age: 75
End: 2020-10-13

## 2020-10-13 DIAGNOSIS — Z95.2 HEART VALVE REPLACED: ICD-10-CM

## 2020-10-13 DIAGNOSIS — Z95.2 S/P MITRAL VALVE REPLACEMENT: ICD-10-CM

## 2020-10-13 DIAGNOSIS — Z79.01 LONG TERM CURRENT USE OF ANTICOAGULANT THERAPY: ICD-10-CM

## 2020-10-13 LAB — INR PPP: 2.5 (ref 0.9–1.1)

## 2020-10-13 NOTE — PROGRESS NOTES
ANTICOAGULATION MANAGEMENT     Patient Name:  Feng Wynne  Date:  10/13/2020    ASSESSMENT /SUBJECTIVE:    Today's INR result of 2.5 is therapeutic. Goal INR of 2.5-3.5      Warfarin dose taken: Warfarin taken as instructed    Diet: No new diet changes affecting INR    Medication changes/ interactions: No new medications/supplements affecting INR    Previous INR: Therapeutic     S/S of bleeding or thromboembolism: No    New injury or illness: No    Upcoming surgery, procedure or cardioversion: No    Additional findings: None      PLAN:    Telephone call with Feng regarding INR result and instructed:     Warfarin Dosing Instructions: Continue your current warfarin dose 10 mg on tues/sat and 5 mg all other days    Instructed patient to follow up no later than: 2 weeks  Patient to recheck with home meter    Education provided: None required      Feng verbalizes understanding and agrees to warfarin dosing plan.    Instructed to call the Anticoagulation Clinic for any changes, questions or concerns. (#[phone])        Natali Mondragon RN      OBJECTIVE:  Recent labs: (last 7 days)     10/13/20   INR 2.5*         No question data found.  Anticoagulation Summary  As of 10/13/2020    INR goal:  2.5-3.5   TTR:  68.9 % (1 y)   INR used for dosin.5 (10/13/2020)   Warfarin maintenance plan:  10 mg (5 mg x 2) every Tue, Sat; 5 mg (5 mg x 1) all other days   Full warfarin instructions:  10 mg every Tue, Sat; 5 mg all other days   Weekly warfarin total:  45 mg   Plan last modified:  Elen Mcdonough RN (2020)   Next INR check:  10/27/2020   Priority:  Maintenance   Target end date:  2021    Indications    Long-term (current) use of anticoagulants [Z79.01] [Z79.01]  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] [Z95.2]  S/P mitral valve replacement [Z95.2]             Anticoagulation Episode Summary     INR check location:      Preferred lab:  EXTERNAL LAB    Send INR reminders to:  DIA MARK    Comments:  [facility]  (formerly [facility] HOME MONITORING Patient, okay  to manage by exception on 7/8/20      Anticoagulation Care Providers     Provider Role Specialty Phone number    Enrique Walker MD Referring Internal Medicine [phone]

## 2020-10-15 NOTE — MR AVS SNAPSHOT
Feng Sergiojoleen Wynne   12/5/2018   Anticoagulation Therapy Visit    Description:  73 year old male   Provider:  Enrique Walker MD   Department:  [facility]           INR as of 12/5/2018     Today's INR 3.2      Anticoagulation Summary as of 12/5/2018     INR goal 2.5-3.5   Today's INR 3.2   Full warfarin instructions 10 mg on Sun, Tue, Thu; 5 mg all other days   Next INR check 12/19/2018    Indications   Long-term (current) use of anticoagulants [Z79.01] [Z79.01]  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] [Z95.2]         December 2018 Details    Sun Mon Tue Wed Thu Fri Sat           1                 2               3               4               5      5 mg   See details      6      10 mg         7      5 mg         8      5 mg           9      10 mg         10      5 mg         11      10 mg         12      5 mg         13      10 mg         14      5 mg         15      5 mg           16      10 mg         17      5 mg         18      10 mg         19            20               21               22                 23               24               25               26               27               28               29                 30               31                     Date Details   12/05 This INR check       Date of next INR:  12/19/2018         How to take your warfarin dose     To take:  5 mg Take 1 of the 5 mg tablets.    To take:  10 mg Take 2 of the 5 mg tablets.            Benefits, risks, and possible complications of procedure explained to patient/caregiver who verbalized understanding and gave written consent. Anticipated Plan (Based On Presumed Biopsy Results): If positive, mohs

## 2020-10-16 ENCOUNTER — OFFICE VISIT (OUTPATIENT)
Dept: FAMILY MEDICINE | Facility: CLINIC | Age: 75
End: 2020-10-16
Payer: COMMERCIAL

## 2020-10-16 VITALS
OXYGEN SATURATION: 97 % | WEIGHT: 315 LBS | DIASTOLIC BLOOD PRESSURE: 78 MMHG | TEMPERATURE: 97.5 F | HEART RATE: 66 BPM | BODY MASS INDEX: 42.66 KG/M2 | SYSTOLIC BLOOD PRESSURE: 133 MMHG | HEIGHT: 72 IN

## 2020-10-16 DIAGNOSIS — I48.20 CHRONIC ATRIAL FIBRILLATION (H): ICD-10-CM

## 2020-10-16 DIAGNOSIS — Z12.11 COLON CANCER SCREENING: ICD-10-CM

## 2020-10-16 DIAGNOSIS — Z95.2 S/P MITRAL VALVE REPLACEMENT: ICD-10-CM

## 2020-10-16 DIAGNOSIS — G89.29 CHRONIC PAIN OF BOTH KNEES: ICD-10-CM

## 2020-10-16 DIAGNOSIS — E78.5 HYPERLIPIDEMIA LDL GOAL <130: ICD-10-CM

## 2020-10-16 DIAGNOSIS — Z76.89 ENCOUNTER TO ESTABLISH CARE WITH NEW DOCTOR: Primary | ICD-10-CM

## 2020-10-16 DIAGNOSIS — E66.01 MORBID OBESITY (H): ICD-10-CM

## 2020-10-16 DIAGNOSIS — M25.561 CHRONIC PAIN OF BOTH KNEES: ICD-10-CM

## 2020-10-16 DIAGNOSIS — M25.562 CHRONIC PAIN OF BOTH KNEES: ICD-10-CM

## 2020-10-16 DIAGNOSIS — Z01.83 BLOOD TYPING ENCOUNTER: ICD-10-CM

## 2020-10-16 DIAGNOSIS — Z79.01 LONG TERM CURRENT USE OF ANTICOAGULANT THERAPY: ICD-10-CM

## 2020-10-16 DIAGNOSIS — I10 ESSENTIAL HYPERTENSION: ICD-10-CM

## 2020-10-16 LAB
ABO + RH BLD: NORMAL
ABO + RH BLD: NORMAL
ALBUMIN SERPL-MCNC: 3.8 G/DL (ref 3.4–5)
ALP SERPL-CCNC: 65 U/L (ref 40–150)
ALT SERPL W P-5'-P-CCNC: 21 U/L (ref 0–70)
ANION GAP SERPL CALCULATED.3IONS-SCNC: 7 MMOL/L (ref 3–14)
AST SERPL W P-5'-P-CCNC: 19 U/L (ref 0–45)
BILIRUB SERPL-MCNC: 0.7 MG/DL (ref 0.2–1.3)
BUN SERPL-MCNC: 24 MG/DL (ref 7–30)
CALCIUM SERPL-MCNC: 9.4 MG/DL (ref 8.5–10.1)
CHLORIDE SERPL-SCNC: 109 MMOL/L (ref 94–109)
CHOLEST SERPL-MCNC: 147 MG/DL
CO2 SERPL-SCNC: 23 MMOL/L (ref 20–32)
CREAT SERPL-MCNC: 0.85 MG/DL (ref 0.66–1.25)
ERYTHROCYTE [DISTWIDTH] IN BLOOD BY AUTOMATED COUNT: 13.3 % (ref 10–15)
GFR SERPL CREATININE-BSD FRML MDRD: 85 ML/MIN/{1.73_M2}
GLUCOSE SERPL-MCNC: 93 MG/DL (ref 70–99)
HCT VFR BLD AUTO: 38.2 % (ref 40–53)
HDLC SERPL-MCNC: 48 MG/DL
HGB BLD-MCNC: 12.4 G/DL (ref 13.3–17.7)
LDLC SERPL CALC-MCNC: 79 MG/DL
MCH RBC QN AUTO: 32.7 PG (ref 26.5–33)
MCHC RBC AUTO-ENTMCNC: 32.5 G/DL (ref 31.5–36.5)
MCV RBC AUTO: 101 FL (ref 78–100)
NONHDLC SERPL-MCNC: 99 MG/DL
PLATELET # BLD AUTO: 142 10E9/L (ref 150–450)
POTASSIUM SERPL-SCNC: 4.6 MMOL/L (ref 3.4–5.3)
PROT SERPL-MCNC: 7.6 G/DL (ref 6.8–8.8)
RBC # BLD AUTO: 3.79 10E12/L (ref 4.4–5.9)
SODIUM SERPL-SCNC: 139 MMOL/L (ref 133–144)
SPECIMEN EXP DATE BLD: NORMAL
TRIGL SERPL-MCNC: 102 MG/DL
WBC # BLD AUTO: 4.3 10E9/L (ref 4–11)

## 2020-10-16 PROCEDURE — 80061 LIPID PANEL: CPT | Performed by: INTERNAL MEDICINE

## 2020-10-16 PROCEDURE — 36415 COLL VENOUS BLD VENIPUNCTURE: CPT | Performed by: INTERNAL MEDICINE

## 2020-10-16 PROCEDURE — 80053 COMPREHEN METABOLIC PANEL: CPT | Performed by: INTERNAL MEDICINE

## 2020-10-16 PROCEDURE — 86901 BLOOD TYPING SEROLOGIC RH(D): CPT | Performed by: INTERNAL MEDICINE

## 2020-10-16 PROCEDURE — 85027 COMPLETE CBC AUTOMATED: CPT | Performed by: INTERNAL MEDICINE

## 2020-10-16 PROCEDURE — 86900 BLOOD TYPING SEROLOGIC ABO: CPT | Performed by: INTERNAL MEDICINE

## 2020-10-16 PROCEDURE — 99215 OFFICE O/P EST HI 40 MIN: CPT | Performed by: INTERNAL MEDICINE

## 2020-10-16 ASSESSMENT — MIFFLIN-ST. JEOR: SCORE: 2262.39

## 2020-10-16 NOTE — PROGRESS NOTES
"Subjective     Feng Wynne is a 75 year old male who presents to clinic today for the following health issues:    HPI         New Patient/Transfer of Care  Patient presenting to [facility] care.  History of mechanical mitral valve replacement; he attributes to \"obesity drugs\" that he used in the past.  Also history of chronic A. fib is maintained on chronic anticoagulation, follows with the Coumadin clinic.  He has history of chronic of morbid obesity was 450 pounds, now he is in the mid 300 range.  He has knee arthritis, he was told he needs knee replacement and he uses a walker for ambulation, he walks twice a day 20 minutes each.  He has history of chronic lymphedema had undergone physical therapy in the past and reports that is not a problem for him anymore.  Denies any chest pain, shortness of breath, orthopnea, difficulty breathing or cough.  Denies any dizziness, lightheadedness, palpitations presyncope or syncope.  He gets a FIT test every year.  Denies any lethargy or fatigue.  No other systemic complaints; he states his blood pressure at home runs in the mid 100 systolic.  He is intolerant to many statins in the past caused rash and myalgias and continues to refuse to take statins.  He is compliant with rest of medications.  Denies any urinary symptoms or prostate problems.  Denies any GI symptoms, he has 2 large abdominal ventral hernias as he attributes to the \"rehab\".   no skin rashes or skin ulcerations. No other systemic complaints. He is on Vegeterian diet.    Review of Systems   Constitutional, HEENT, cardiovascular, pulmonary, GI, , musculoskeletal, neuro, skin, endocrine and psych systems are negative, except as otherwise noted.      Objective    /78 (BP Location: Right arm, Patient Position: Chair, Cuff Size: Adult Large)   Pulse 66   Temp 97.5  F (36.4  C) (Temporal)   Ht 1.816 m (5' 11.5\")   Wt 149.2 kg (329 lb)   SpO2 97%   BMI 45.25 kg/m    Body mass index is 45.25 " kg/m .  Physical Exam   GENERAL: healthy, alert and no distress, obese, pleasant  EYES: Eyes grossly normal to inspection, PERRL and conjunctivae and sclerae normal  NECK: no adenopathy, no asymmetry, masses, or scars and thyroid normal to palpation, no bruits  RESP: lungs clear to auscultation - no rales, rhonchi or wheezes  CV: regular rate and rhythm, normal S1 S2, no S3 or S4, no murmur, positive click, negative rub, no peripheral edema and peripheral pulses +1-2  ABDOMEN: soft, obese nontender, no hepatosplenomegaly, large 2 ventral hernia; mid abdomen and left upper quadrant around 10 cm each, nontender non- incarcerated., no masses and bowel sounds normal  MS: no gross musculoskeletal defects noted, no edema,  Uses walker for ambulation and special boots  SKIN: no suspicious lesions or rashes  NEURO: Normal strength and tone, mentation intact and speech normal  PSYCH: mentation appears normal, affect normal/bright    No results found for this or any previous visit (from the past 24 hour(s)).        Assessment & Plan   Problem List Items Addressed This Visit        Nervous and Auditory    Knee pain       Endocrine    Hyperlipidemia LDL goal <130    Relevant Orders    Lipid panel reflex to direct LDL Fasting (Completed)       Circulatory    Chronic atrial fibrillation (H)    Essential hypertension    Relevant Orders    Comprehensive metabolic panel (BMP + Alb, Alk Phos, ALT, AST, Total. Bili, TP) (Completed)    CBC with platelets (Completed)       Other    S/P mitral valve replacement    Long-term (current) use of anticoagulants [Z79.01]    Relevant Orders    CBC with platelets (Completed)      Other Visit Diagnoses     Encounter to establish care with new doctor    -  Primary    Morbid obesity (H)        Blood typing encounter        Relevant Orders    ABO and Rh (Completed)    Colon cancer screening        Relevant Orders    Fecal colorectal cancer screen FIT      patient advised follow-up with cardiology is  "due for repeat echo, last echo in 2017, good LVF   Continue with Coumadin and follow up with Coumadin clinic  Discussed his ASCVD 10-year score is 24% and it is recommendation that he start on statin which he was refusing and adamant not to take any statins, advised him we can offer ezetimibe 10 mg once daily, would consider, also check his lipid panel today.  Wants to know his blood type group we will check today.  He refuses colonoscopy wants to do the FIT test.  And refused any PSA testing, he wants to check only lipid ,CBC and chemistry and liver enzymes.  He does not want an HbA1c checked.  Hearing and vision is good.  He would consider getting the Shingrix vaccine next week.  He had the flu vaccine last week at [facility].  Rest of immunizations are up-to-date.  All questions answered patient discharged from clinic in stable condition.    BMI:   Estimated body mass index is 45.25 kg/m  as calculated from the following:    Height as of this encounter: 1.816 m (5' 11.5\").    Weight as of this encounter: 149.2 kg (329 lb).   Weight management plan: Discussed healthy diet and exercise guidelines           MEDICATIONS:  Continue current medications without change  CONSULTATION/REFERRAL to follow-up with cardiology  Work on weight loss  Regular exercise  See Patient Instructions  No follow-ups on file.    Paula Lou MD  [facility]    "

## 2020-10-23 ENCOUNTER — TELEPHONE (OUTPATIENT)
Dept: CARDIOLOGY | Facility: CLINIC | Age: 75
End: 2020-10-23

## 2020-10-23 DIAGNOSIS — Z95.2 HEART VALVE REPLACED: Primary | ICD-10-CM

## 2020-10-23 NOTE — TELEPHONE ENCOUNTER
[facility] Call Center    Phone Message    May a detailed message be left on voicemail: no     Reason for Call: Other: Pt, Feng looking for Olivia to call him.  Please call Feng about upcoming appt.  Please call: [phone]     Action Taken: Message routed to:  [facility] ([facility] URology    Travel Screening: Not Applicable

## 2020-10-26 ENCOUNTER — VIRTUAL VISIT (OUTPATIENT)
Dept: CARDIOLOGY | Facility: CLINIC | Age: 75
End: 2020-10-26
Payer: COMMERCIAL

## 2020-10-26 DIAGNOSIS — Z95.2 HEART VALVE REPLACED: Primary | ICD-10-CM

## 2020-10-26 DIAGNOSIS — E78.00 HYPERCHOLESTEROLEMIA: ICD-10-CM

## 2020-10-26 PROCEDURE — 99214 OFFICE O/P EST MOD 30 MIN: CPT | Mod: 95 | Performed by: INTERNAL MEDICINE

## 2020-10-26 RX ORDER — EZETIMIBE 10 MG/1
10 TABLET ORAL DAILY
Qty: 90 TABLET | Refills: 3 | Status: SHIPPED | OUTPATIENT
Start: 2020-10-26 | End: 2022-07-15

## 2020-10-26 NOTE — PROGRESS NOTES
"[facility] CARDIOLOGY FOLLOW UP VIDEO ENCOUNTER:    Feng Wynne is a 75 year old male who is being evaluated via a billable video visit.      The patient has been notified of following:   \"This video visit will be conducted via a call between you and your physician/provider. We have found that certain health care needs can be provided without the need for an in-person physical exam.  This service lets us provide the care you need with a video conversation.  If a prescription is necessary we can send it directly to your pharmacy.  If lab work is needed we can place an order for that and you can then stop by our lab to have the test done at a later time. Video visits are billed at different rates depending on your insurance coverage.  Please reach out to your insurance provider with any questions. If during the course of the call the physician/provider feels a video visit is not appropriate, you will not be charged for this service.\"    Patient has given verbal consent for Video visit? Yes      Video-Visit Details  Type of service:  Video Visit  Video start time: 1:38 PM  Video end time: 2:00 PM  Total video time: 22 min  Originating Location (pt. Location): Home  Distant Location (provider location):  Provider's home  Mode of Communication: Video Conference via Doxy[employer]      HPI: Feng Wynne is a 76 yo gentleman, Hx AF and severe MR, s/p MV replacement and MAZE procedure (September 2008), returns to the cardiology clinic for a routine visit.   He had been in good state of health until 04/2007. He noticed increased exertional shortness of breath. He was initially treated for presumed asthma. In 03/2008, he had an episode of left leg pain and he went to [facility] Emergency Room and had an ultrasound which showed no evidence of DVT. While in the ER, he was noted to have an irregular heart rate and EKG showed atrial fibrillation with a ventricular rate of 80 beats per minute. Coronary angiography (June " 2008) showed normal coronary arteries. He underwent mitral valve replacement (St. Raffi 31 mm) with MAZE on 8/25/08 at [facility] (Dr. Olivo) without complications. He was transiently in NSR following the surgery, but subsequently reverted to AF. He was discharged on warfarin and atenolol for anticoagulation and rhythm control. He was placed on Amiodarone but developed severe dyspnea and pedal edema, both of which resolved with cessation of the medication. He was subsequently placed on Sotalol and underwent cardioversion without complication (Dec 2008).   It has been three years since his last clinic visit.  He retired from being a .  The patient has been doing quite well since his last visit. He denies chest pain, dyspnea, ZUNIGA, PND, orthopnea, palpitations, lightheadedness, and syncope.  The patient has chronic pedal edema for which he uses compression stockings.  Weight remains a major issue. His right knee remains a problem now and he deals with sciatica which limits his walking. He is considering surgery.  He is a vegetarian,and has been spending time working on weight loss.  He is compliant with his coumadin and other medications.     He has not had any bleeding.    PAST MEDICAL HISTORY:  Past Medical History:   Diagnosis Date     Arthritis      Atrial fibrillation (H)      Coronary artery disease      Hypercholesteremia      Morbid obesity (H)      Unspecified essential hypertension        CURRENT MEDICATIONS:  Current Outpatient Medications   Medication Sig     ASPIRIN NOT PRESCRIBED, INTENTIONAL,      atenolol (TENORMIN) 25 MG tablet Take 1 tablet (25 mg) by mouth 2 times daily - Fasting physical due July     clindamycin (CLEOCIN) 300 MG capsule Take 2 capsules (600 mg) by mouth See Admin Instructions 600 mg one hour prior to dental procedure     diclofenac (VOLTAREN) 1 % topical gel Place 4 g onto the skin 4 times daily as needed for moderate pain (knee pain)     ketoconazole (NIZORAL) 2 % external  cream Apply topically 2 times daily     lisinopril (ZESTRIL) 40 MG tablet Take 1 tablet (40 mg) by mouth daily Follow-up with Dr. Walker in summer for your fasting full examination     Multiple Vitamins-Minerals (CENTRUM SILVER PO) Take 1 tablet by mouth daily.     Omega-3 Fatty Acids (FISH OIL CONCENTRATE PO) Take  by mouth daily.     order for DME Equipment being ordered: COMPRESSION STOCKINGS, 20-30 mmHg     warfarin ANTICOAGULANT (COUMADIN) 5 MG tablet Take 2 tablets (10mg) by mouth Tues,Thurs,Sat & 1 tablet (5mg) all other days of the week     No current facility-administered medications for this visit.      PAST SURGICAL HISTORY:  Past Surgical History:   Procedure Laterality Date     MITRAL VALVE REPLACEMENT  8-    Mitral valve replacement with 31-mm St. Raffi mechanical valve.        ALLERGIES  Amiodarone, Latex, Penicillins, and Sulfites    FAMILY HX:  Family History   Problem Relation Age of Onset     Cerebrovascular Disease Father      Diabetes Paternal Grandmother      C.A.D. Brother         CABG X 3 at age 51       SOCIAL HX:  History     Social History     Marital Status: Single     Spouse Name: N/A     Number of Children: 1     Years of Education: N/A     Occupational History     Self employed             Social History Main Topics     Smoking status: Former Smoker     Smokeless tobacco: Never Used      Comment: quit 20+ yrs ago     Alcohol Use: Yes      Comment: 2 drinks/month     Drug Use: No     Sexual Activity:     Partners: Male     Other Topics Concern     None     Social History Narrative       Answers for HPI/ROS submitted by the patient on 6/12/2017   General Symptoms: No  Skin Symptoms: Yes  HENT Symptoms: No  EYE SYMPTOMS: No  HEART SYMPTOMS: No  LUNG SYMPTOMS: No  INTESTINAL SYMPTOMS: No  URINARY SYMPTOMS: No  REPRODUCTIVE SYMPTOMS: No  SKELETAL SYMPTOMS: Yes  BLOOD SYMPTOMS: No  NERVOUS SYSTEM SYMPTOMS: No  MENTAL HEALTH SYMPTOMS: No  Changes in hair: No  Changes in  moles/birth marks: No  Itching: Yes  Rashes: No  Changes in nails: No  Acne: No  Change in facial hair: No  Warts: No  Non-healing sores: No  Scarring: No  Flaking of skin: Yes  Color changes of hands/feet in cold : No  Sun sensitivity: No  Skin thickening: No  Back pain: No  Muscle aches: No  Neck pain: No  Swollen joints: No  Joint pain: Yes  Bone pain: No  Muscle cramps: No  Muscle weakness: No  Joint stiffness: Yes  Bone fracture: No  I reviewed the ROS above.    VITAL SIGNS:  There were no vitals taken for this visit.  There is no height or weight on file to calculate BMI.  Wt Readings from Last 2 Encounters:   10/16/20 149.2 kg (329 lb)   11/05/19 (!) 153.8 kg (339 lb)       PHYSICAL EXAM  GENERAL: Healthy, alert and no distress  EYES: Eyes grossly normal to inspection.  No discharge or erythema, or obvious scleral/conjunctival abnormalities.  RESP: No audible wheeze, cough, or visible cyanosis.  No visible retractions or increased work of breathing.    SKIN: Visible skin clear. No significant rash, abnormal pigmentation or lesions.  NEURO: Cranial nerves grossly intact.  Mentation and speech appropriate for age.  PSYCH: Mentation appears normal, affect normal/bright, judgement and insight intact, normal speech and appearance well-groomed.  The rest of a comprehensive physical examination is deferred due to PHE (public health emergency) video visit restrictions.      LABS  Recent Labs   Lab Test 10/16/20  1058 03/14/19  0959   WBC 4.3 4.7   HGB 12.4* 12.8*   HCT 38.2* 39.6*   * 140*     Recent Labs   Lab Test 10/16/20  1058 03/14/19  0959    140   POTASSIUM 4.6 4.5   CHLORIDE 109 108   CO2 23 25   BUN 24 25   CR 0.85 0.85   GFRESTIMATED 85 86     Recent Labs   Lab Test 10/16/20  1058 03/14/19  0959 11/04/15  0739 11/04/15  0739 09/17/14  0733   CHOL 147 154   < > 141 152   HDL 48 42   < > 41 43   LDL 79 91   < > 85 85   TRIG 102 107   < > 77 118   CHOLHDLRATIO  --   --   --  3.4 3.5    < > =  values in this interval not displayed.         Procedures:  ECG: (6/13/17)   AF with ventricular rate 69.  Incomplete RBBB.  Loss of R waves across the precordial leads suggestive of prior anterolateral MI.  ECG: (9/17/14)   AF with ventricular rate 64.  Incomplete RBBB.  Loss of R waves across the precordial leads suggestive of prior anterolateral MI.    ECHO (6/6/17):  Left ventricular function is normal.The EF is 55-60%.  Global right ventricular function is normal.  A mechanical mitral valve is present.  Doppler interrogation of the mitral valve is normal.   The mean gradient across the mitral valve is 6 mmHg.  Estimated pulmonary artery systolic pressure is 36 mmHg plus right atrial pressure.  The inferior vena cava was dilated at 2.6 cm without respiratory variability, consistent with increased right atrial pressure of 15 mmHg.  No pericardial effusion is present.    ECHO (9/17/2014)  Interpretation Summary  A bileaflet (32 mm St Raffi) mitral valve is present.Doppler interrogation of the mitral valve is normal. PA systolic pressure 50 mmHg, RA pressure 5-10 mmHg Global right ventricular function is mildly reduced. 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 The absence of IVC dilatation suggests that if present, constriction is mild    ECHO (2/16/2009)  Global and regional left ventricular function is normal with an EF of 55-60%. Mild left ventricular dilation is present. Global right ventricular function is normal. Moderate to severe left atrial enlargement is present. A mechanical mitral valve is present. Right ventricular systolic pressure is 34mmHg above the right atrial pressure. No pericardial effusion is present.   Coronary angiogram (6/11/2008)  Mitral regurgitation, severe.   Moderate pulmonary hypertension.   Normal coronary arteries.   Mitral Valve Replacement (8/25/2008)  1. Mitral valve replacement with 31 mm St. Raffi mechanical valve.   2. Maze  procedure with radiofrequency ablation to treat atrial fibrillation.   3. Placement of left ventricular epicardial pacing leads for postoperative pacing backup.   Cardioversion (10/28/2008)   DC Cardioversion. No KAYE was performed due to adequate anticoagulation. After informed consent was obtained and adequate anticoagulation level was confirmed, the patient was prepped in the usual fashion. Brevitol was used by the Staff Anesthesiologist. The patient failed to return to normal sinus rhythm despite three consecutive attemps with up to 200 J synchronized shock. Oral loading with amiodarone is recommended prior to attempting another cardioversion. Dr. Marin was paged with these results.   Cardioversion (12/15/2008)   PROCEDURE: The patient was brought to the Echocardiography Laboratory in the fasting, nonsedated state. After obtaining informed consent, general anesthesia was provided by members of the Anesthesia Service. Atrial fibrillation was converted to normal sinus rhythm with a single, synchronized, 360-joule biphasic shock via anterior-posterior patches.     ASSESSMENT AND PLAN   1. 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 Atrial fibrillation, Permanent   -- s/p MAZE, remains in afib, on coumadin, rate controlled (60s)  3. Hypertension   -- he will bring his cuff to calibrate   -- well controlled on lisinopril   4. Hypercholesterolemia   -- last LDL 79  -- ACC/AHA Risk Score = 26% for CV event in the next 10 years.  -- Hx myalgia and rash with prior statin exposure (multiple)    5. LE edema  -- Referred to lymphedema clinic for leg wraps with education      FOLLOW UP:  1 year    ECHO (5/5/21):  The left ventricle is moderately dilated. LVIDd 6.4cm.  Left ventricular systolic function is normal. The visual ejection fraction is estimated at 55-60%.  Right ventricle is not well visualized,  however it is probably mildly dilated, global systolic function is probably normal.  History of mitral valve replacement with a 32 mm St Raffi mechanical mitral valve in place. Normal function on Doppler interrogation. Mean gradient 4 mmHg at 59 bpm.  The ascending aorta is Borderline dilated. Max diameter of the visualized portion 3.8 cm.  This study was compared to a TTE from 6/6/2017. There has been no significant change in biventricular function. Mean gradient across the prosthetic MV was previously 6 mmHg. LVIDd was previously 6.3cm.    Armani Marin MD     Cardiovascular Division    CC  Patient Care Team:  Enrique Walker MD as PCP - General (Internal Medicine)  Armani Marin MD as MD (Cardiology)  Enrique Walker MD as Assigned PCP  SHAR JAMES

## 2020-10-26 NOTE — PATIENT INSTRUCTIONS
It was a pleasure to see you in the cardiology clinic today.    If you have any questions, you can reach my nurse, Olivia Roth, at [phone].  Press Option #1 for the [facility], and then press Option #f 4 or nursing.    Note the new medications: Zetia 10 mg every day.    Stop the following medications: None    The results from today include: None    Tests ordered today: ECHO.      REPEAT ECHO in 2 YEARS.    I would like you to follow up with Dr. Marin in 1 year.    Sincerely,      Armani Marin MD     [facility]

## 2020-10-26 NOTE — PROGRESS NOTES
"Feng Wynne is a 75 year old male who is being evaluated via a billable video visit.      The patient has been notified of following:     \"This video visit will be conducted via a call between you and your physician/provider. We have found that certain health care needs can be provided without the need for an in-person physical exam.  This service lets us provide the care you need with a video conversation.  If a prescription is necessary we can send it directly to your pharmacy.  If lab work is needed we can place an order for that and you can then stop by our lab to have the test done at a later time.    Video visits are billed at different rates depending on your insurance coverage.  Please reach out to your insurance provider with any questions.    If during the course of the call the physician/provider feels a video visit is not appropriate, you will not be charged for this service.\"    Patient has given verbal consent for Video visit? Yes  How would you like to obtain your AVS? MyChart  If you are dropped from the video visit, the video invite should be resent to: Text to cell phone: [phone]  Will anyone else be joining your video visit? No      Video-Visit Details    Type of service:  Video Visit        "

## 2020-11-03 ENCOUNTER — DOCUMENTATION ONLY (OUTPATIENT)
Dept: NURSING | Facility: CLINIC | Age: 75
End: 2020-11-03

## 2020-11-03 DIAGNOSIS — Z95.2 HEART VALVE REPLACED: ICD-10-CM

## 2020-11-03 DIAGNOSIS — Z12.11 COLON CANCER SCREENING: ICD-10-CM

## 2020-11-03 DIAGNOSIS — Z79.01 LONG TERM CURRENT USE OF ANTICOAGULANT THERAPY: ICD-10-CM

## 2020-11-03 DIAGNOSIS — Z95.2 S/P MITRAL VALVE REPLACEMENT: ICD-10-CM

## 2020-11-03 LAB — INR PPP: 3.2 (ref 0.9–1.1)

## 2020-11-03 PROCEDURE — 82274 ASSAY TEST FOR BLOOD FECAL: CPT | Performed by: INTERNAL MEDICINE

## 2020-11-03 NOTE — PROGRESS NOTES
ANTICOAGULATION  MANAGEMENT-Patient Home Monitoring Result    Assessment     Therapeutic INR result of 3.2 . Goal range 2.5-3.5. Received via fax from [employer] home INR monitoring company.        Previous INR was therapeutic    Feng was last contacted by phone: 10/13    Plan     Per home monitoring agreement with patient, patient was NOT contacted regarding therapeutic result today.  Patient is to continue current dose and continue to check INR with home monitor per protocol.  ?       OBJECTIVE    INR   Date Value Ref Range Status   11/03/2020 3.2 (A) 0.90 - 1.10 Final       ASSESSMENT / PLAN  No question data found.  Anticoagulation Summary  As of 11/3/2020    INR goal:  2.5-3.5   TTR:  68.9 % (1 y)   INR used for dosing:  3.2 (11/3/2020)   Warfarin maintenance plan:  10 mg (5 mg x 2) every Tue, Sat; 5 mg (5 mg x 1) all other days   Full warfarin instructions:  10 mg every Tue, Sat; 5 mg all other days   Weekly warfarin total:  45 mg   No change documented:  Shanice Maria RN   Plan last modified:  Elen Mcdonough RN (7/22/2020)   Next INR check:  11/17/2020   Priority:  Maintenance   Target end date:  6/22/2021    Indications    Long-term (current) use of anticoagulants [Z79.01] [Z79.01]  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] [Z95.2]  S/P mitral valve replacement [Z95.2]             Anticoagulation Episode Summary     INR check location:      Preferred lab:  EXTERNAL LAB    Send INR reminders to:  DIA MARK    Comments:  [employer] HOME MONIT[employer]ING Patient, okay  to manage by exception on 7/8/20      Anticoagulation Care Providers     Provider Role Specialty Phone number    Enrique Walker MD Referring Internal Medicine [phone]          Shanice Maria, RN - [facility]

## 2020-11-06 LAB — HEMOCCULT STL QL IA: NEGATIVE

## 2020-11-17 ENCOUNTER — ANTICOAGULATION THERAPY VISIT (OUTPATIENT)
Dept: FAMILY MEDICINE | Facility: CLINIC | Age: 75
End: 2020-11-17

## 2020-11-17 DIAGNOSIS — Z95.2 HEART VALVE REPLACED: ICD-10-CM

## 2020-11-17 DIAGNOSIS — Z95.2 S/P MITRAL VALVE REPLACEMENT: ICD-10-CM

## 2020-11-17 DIAGNOSIS — Z79.01 LONG TERM CURRENT USE OF ANTICOAGULANT THERAPY: ICD-10-CM

## 2020-11-17 LAB — INR PPP: 3.4 (ref 0.9–1.1)

## 2020-11-17 NOTE — PROGRESS NOTES
ANTICOAGULATION  MANAGEMENT-Patient Home Monitoring Result    Assessment     Therapeutic INR result of 3.4 . Goal range 2.5-3.5. Received via fax from [employer] home INR monitoring company.        Previous INR was therapeutic    Feng was last contacted by phone: 10/13/20    Plan     Per home monitoring agreement with patient, patient was NOT contacted regarding therapeutic result today.  Patient is to continue current dose and continue to check INR with home monitor per protocol.  ?       OBJECTIVE    INR   Date Value Ref Range Status   11/17/2020 3.4 (A) 0.90 - 1.10 Final       ASSESSMENT / PLAN  No question data found.  Anticoagulation Summary  As of 11/17/2020    INR goal:  2.5-3.5   TTR:  68.9 % (1 y)   INR used for dosing:  3.4 (11/17/2020)   Warfarin maintenance plan:  10 mg (5 mg x 2) every Tue, Sat; 5 mg (5 mg x 1) all other days   Full warfarin instructions:  10 mg every Tue, Sat; 5 mg all other days   Weekly warfarin total:  45 mg   No change documented:  Love Elliott RN   Plan last modified:  Elen Mcdonough RN (7/22/2020)   Next INR check:  12/1/2020   Priority:  Maintenance   Target end date:  6/22/2021    Indications    Long-term (current) use of anticoagulants [Z79.01] [Z79.01]  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] [Z95.2]  S/P mitral valve replacement [Z95.2]             Anticoagulation Episode Summary     INR check location:      Preferred lab:  EXTERNAL LAB    Send INR reminders to:  DIA MARK    Comments:  [employer] HOME MONITORING Patient, okay  to manage by exception on 7/8/20      Anticoagulation Care Providers     Provider Role Specialty Phone number    Enrique Walker MD Referring Internal Medicine [phone]

## 2020-11-22 ENCOUNTER — HEALTH MAINTENANCE LETTER (OUTPATIENT)
Age: 75
End: 2020-11-22

## 2020-11-25 ENCOUNTER — TELEPHONE (OUTPATIENT)
Dept: FAMILY MEDICINE | Facility: CLINIC | Age: 75
End: 2020-11-25

## 2020-11-25 NOTE — TELEPHONE ENCOUNTER
Patient called wanting to inform Dr. Lou that he is going to stop taking the Zetia 10MG medication because he is experiencing side effects. Patient said that this has happened in the past with a different cholesterol medication but he taught with being such a small dose this will not happened but since he started medication he has been having itching on legs but mainly muscle weakness. Would like to know providers toughbetty and is willing to do a virtual appointment if needed but that at this time he will keep medication but will stop taking it.     Informed patient would route message to Triage team to address.

## 2020-11-30 ENCOUNTER — TELEPHONE (OUTPATIENT)
Dept: FAMILY MEDICINE | Facility: CLINIC | Age: 75
End: 2020-11-30

## 2020-11-30 NOTE — TELEPHONE ENCOUNTER
I forwarded patient's concerns of treatment of hyperlipidemia to cardiology Dr Marin whom patient had seen recently for any suggestions/advise, as patient's 10 year ASCVD score is elevated at 26% (>5%) which indicates treatment for hyperlipidemia. Patient seems intolerant to both statins and ezetimibe.

## 2020-12-01 ENCOUNTER — TRANSFERRED RECORDS (OUTPATIENT)
Dept: HEALTH INFORMATION MANAGEMENT | Facility: CLINIC | Age: 75
End: 2020-12-01

## 2020-12-01 ENCOUNTER — DOCUMENTATION ONLY (OUTPATIENT)
Dept: NURSING | Facility: CLINIC | Age: 75
End: 2020-12-01

## 2020-12-01 DIAGNOSIS — Z95.2 S/P MITRAL VALVE REPLACEMENT: ICD-10-CM

## 2020-12-01 DIAGNOSIS — Z79.01 LONG TERM CURRENT USE OF ANTICOAGULANT THERAPY: ICD-10-CM

## 2020-12-01 DIAGNOSIS — Z95.2 HEART VALVE REPLACED: ICD-10-CM

## 2020-12-01 LAB — INR PPP: 3 (ref 0.9–1.1)

## 2020-12-01 NOTE — PROGRESS NOTES
ANTICOAGULATION  MANAGEMENT-Patient Home Monitoring Result    Assessment     Therapeutic INR result of 3.0 . Goal range 2.5-3.5. Received via fax from [employer] home INR monitoring company.        Previous INR was therapeutic    Feng was last contacted by phone: 10/13    Plan     Per home monitoring agreement with patient, patient was NOT contacted regarding therapeutic result today.  Patient is to continue current dose and continue to check INR with home monitor per protocol.  ?       OBJECTIVE    INR   Date Value Ref Range Status   12/01/2020 3.0 (A) 0.90 - 1.10 Final       ASSESSMENT / PLAN  No question data found.  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

## 2020-12-01 NOTE — TELEPHONE ENCOUNTER
PCP,    Called pt and he's talked with Dr. Marin about this already. He does not want to be on Rapatha at this time.  He states Dr. Marin agrees that he doesn't need the Rapatha or an echo at this time.    Thank you,  Delvis LE RN

## 2020-12-01 NOTE — TELEPHONE ENCOUNTER
Please advise patient of below Cardiology recommendation regarding hyperlipidemia. DR Marin recommended Rapatha 140 mg subcutaneous q two weeks, but may be difficult to have it approved by insurance as per Dr Marin. Patient to discuss such treatment further with cardiology. Any further questions I am happy to address.  Dr Diaz        RE: common patient intoleratnt to statin ansd zetia  Received: Today  Message Contents   Armani Marin MD Sayegh, Kamil Nadim, MD; Martha Roth RN Kamil,     I would recommend Rapatha 140 mg subcutaneous q two weeks.  He is 74 yo so it may be a challenge to get it covered by insurance.  It will depend on his Medicare plan and secondary insurance.     Armani Marin MD    Previous Messages    ----- Message -----   From: Paula Diaz MD   Sent: 11/30/2020  10:09 AM CST   To: Armani Marin MD, Paula Diaz MD   Subject: common patient intoleratnt to statin ansd ze*     [facility], reviewed your notes  calculated is 10-year atherosclerotic cardiovascular disease score 26% .   Patient is intolerant to statin, I started him on Zetia recently he became intolerant with itching and some myalgia.  Any suggestions?  Thank you for follow-up  Paula diaz

## 2020-12-15 ENCOUNTER — ANTICOAGULATION THERAPY VISIT (OUTPATIENT)
Dept: FAMILY MEDICINE | Facility: CLINIC | Age: 75
End: 2020-12-15

## 2020-12-15 ENCOUNTER — TRANSFERRED RECORDS (OUTPATIENT)
Dept: HEALTH INFORMATION MANAGEMENT | Facility: CLINIC | Age: 75
End: 2020-12-15

## 2020-12-15 DIAGNOSIS — Z79.01 LONG TERM CURRENT USE OF ANTICOAGULANT THERAPY: ICD-10-CM

## 2020-12-15 DIAGNOSIS — Z95.2 S/P MITRAL VALVE REPLACEMENT: ICD-10-CM

## 2020-12-15 DIAGNOSIS — Z95.2 HEART VALVE REPLACED: ICD-10-CM

## 2020-12-15 LAB — INR PPP: 3.2 (ref 0.9–1.1)

## 2020-12-15 NOTE — PROGRESS NOTES
Incoming fax from [employer] home monitoring company     Date of INR 12/15     INR result 3.2

## 2020-12-15 NOTE — PROGRESS NOTES
ANTICOAGULATION  MANAGEMENT-Patient Home Monitoring Result    Assessment     Therapeutic INR result of 3.2 . Goal range 2.5-3.5. Received via fax from [employer] home INR monitoring company.        Previous INR was therapeutic    Feng was last contacted by phone: 10/13    Plan     Per home monitoring agreement with patient, patient was NOT contacted regarding therapeutic result today.  Patient is to continue current dose and continue to check INR with home monitor per protocol.  ?       OBJECTIVE    INR   Date Value Ref Range Status   12/15/2020 3.2 (A) 0.90 - 1.10 Final       ASSESSMENT / PLAN  No question data found.  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

## 2020-12-17 DIAGNOSIS — I48.20 CHRONIC ATRIAL FIBRILLATION (H): ICD-10-CM

## 2020-12-18 RX ORDER — ATENOLOL 25 MG/1
25 TABLET ORAL
Qty: 180 TABLET | Refills: 2 | Status: SHIPPED | OUTPATIENT
Start: 2020-12-18 | End: 2021-09-30

## 2020-12-18 RX ORDER — WARFARIN SODIUM 5 MG/1
TABLET ORAL
Qty: 135 TABLET | Refills: 0 | Status: SHIPPED | OUTPATIENT
Start: 2020-12-18 | End: 2021-03-11

## 2020-12-29 ENCOUNTER — TRANSFERRED RECORDS (OUTPATIENT)
Dept: HEALTH INFORMATION MANAGEMENT | Facility: CLINIC | Age: 75
End: 2020-12-29

## 2020-12-29 ENCOUNTER — DOCUMENTATION ONLY (OUTPATIENT)
Dept: FAMILY MEDICINE | Facility: CLINIC | Age: 75
End: 2020-12-29

## 2020-12-29 DIAGNOSIS — Z79.01 LONG TERM CURRENT USE OF ANTICOAGULANT THERAPY: ICD-10-CM

## 2020-12-29 DIAGNOSIS — Z95.2 HEART VALVE REPLACED: ICD-10-CM

## 2020-12-29 DIAGNOSIS — Z95.2 S/P MITRAL VALVE REPLACEMENT: ICD-10-CM

## 2020-12-29 LAB — INR PPP: 3.4 (ref 0.9–1.1)

## 2020-12-29 NOTE — PROGRESS NOTES
ANTICOAGULATION  MANAGEMENT-Patient Home Monitoring Result    Assessment     Therapeutic INR result of 3.4 . Goal range 2.5-3.5. Received via fax from [employer] home INR monitoring company.        Previous INR was therapeutic    Feng was last contacted by phone: 10/13    Plan     Per home monitoring agreement with patient, patient was NOT contacted regarding therapeutic result today.  Patient is to continue current dose and continue to check INR with home monitor per protocol.  ?       OBJECTIVE    INR   Date Value Ref Range Status   12/29/2020 3.4 (A) 0.90 - 1.10 Final       ASSESSMENT / PLAN  No question data found.  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

## 2021-01-01 NOTE — PROGRESS NOTES
ANTICOAGULATION FOLLOW-UP CLINIC VISIT    Patient Name:  Feng Wynne  Date:  1/30/2019  Contact Type:  Telephone    SUBJECTIVE:     Patient Findings     Positives:   No Problem Findings    Comments:   Reason for Call:  INR     Who is calling?  Patient     Phone number:  [phone]     Fax number:  -     Name of caller: Feng     INR Value:  3.0     Are there any other concerns:  No     Can we leave a detailed message on this number? YES     Phone number patient can be reached at: Home number on file [phone] (home)        Call taken on 1/30/2019 at 8:52 AM by Fausto Sharma             OBJECTIVE    INR   Date Value Ref Range Status   01/30/2019 3.0  Final       ASSESSMENT / PLAN  INR assessment THER    Recheck INR In: 2 WEEKS    INR Location Home INR      Anticoagulation Summary  As of 1/30/2019    INR goal:   2.5-3.5   TTR:   68.2 % (1.7 y)   INR used for dosing:   3.0 (1/30/2019)   Warfarin maintenance plan:   10 mg (5 mg x 2) every Sun, Tue, Thu; 5 mg (5 mg x 1) all other days   Full warfarin instructions:   10 mg every Sun, Tue, Thu; 5 mg all other days   Weekly warfarin total:   50 mg   No change documented:   Irena Jaramillo RN   Plan last modified:   Missy Hoskins RN (8/8/2018)   Next INR check:   2/13/2019   Priority:   INR   Target end date:       Indications    Long-term (current) use of anticoagulants [Z79.01] [Z79.01]  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 Recheck INR 2 weeks, sooner if concerns. Pt does self-monitoring through [employer]. Detailed message and MyChart sent with instructions.     Irena Jaramillo RN                  2021 13:19

## 2021-01-08 ENCOUNTER — TELEPHONE (OUTPATIENT)
Dept: FAMILY MEDICINE | Facility: CLINIC | Age: 76
End: 2021-01-08

## 2021-01-08 DIAGNOSIS — Z95.2 S/P MITRAL VALVE REPLACEMENT: ICD-10-CM

## 2021-01-08 DIAGNOSIS — I48.20 CHRONIC ATRIAL FIBRILLATION (H): Primary | ICD-10-CM

## 2021-01-08 NOTE — TELEPHONE ENCOUNTER
ANTICOAGULATION MANAGEMENT      Feng Wynne due for annual renewal of referral to anticoagulation monitoring. Order pended for your review and signature.      ANTICOAGULATION SUMMARY      Warfarin indication(s)     Atrial fibrillation  Heart Valve Replacement    Heart valve present?  Mechanical MVR        Current goal range   INR: 2.5-3.5     Goal appropriate for indication? Yes, INR 2.5-3.5 appropriate for hx  Mechanical MVR, Mechanical AVR with risk factors or older generation (Palak-Shiley (Tilting disc), Bethea-Dubon (Caged Ball) or Monoleaflet valve)     Current duration of therapy Indefinite/long term therapy   Time in Therapeutic Range (TTR)  (Goal > 60%) 73 %       Office visit with referring provider's group within last year yes on 10/16/2020       Petty Inman RPH

## 2021-01-12 ENCOUNTER — ANTICOAGULATION THERAPY VISIT (OUTPATIENT)
Dept: FAMILY MEDICINE | Facility: CLINIC | Age: 76
End: 2021-01-12

## 2021-01-12 ENCOUNTER — TRANSFERRED RECORDS (OUTPATIENT)
Dept: HEALTH INFORMATION MANAGEMENT | Facility: CLINIC | Age: 76
End: 2021-01-12

## 2021-01-12 DIAGNOSIS — Z95.2 HEART VALVE REPLACED: ICD-10-CM

## 2021-01-12 DIAGNOSIS — Z79.01 LONG TERM CURRENT USE OF ANTICOAGULANT THERAPY: ICD-10-CM

## 2021-01-12 DIAGNOSIS — I48.20 CHRONIC ATRIAL FIBRILLATION (H): ICD-10-CM

## 2021-01-12 DIAGNOSIS — Z95.2 S/P MITRAL VALVE REPLACEMENT: ICD-10-CM

## 2021-01-12 LAB — INR PPP: 2.6 (ref 0.9–1.1)

## 2021-01-12 NOTE — PROGRESS NOTES
ANTICOAGULATION  MANAGEMENT-Patient Home Monitoring Result    Assessment     Therapeutic INR result of 2.6 . Goal range 2.5-3.5. Received via fax from [employer] home INR monitoring company.        Previous INR was therapeutic    Feng was last contacted by phone: 10/13    Plan     Per home monitoring agreement with patient, patient was NOT contacted regarding therapeutic result today.  Patient is to continue current dose and continue to check INR with home monitor per protocol.  ?       OBJECTIVE    INR   Date Value Ref Range Status   2021 2.6 (A) 0.90 - 1.10 Final       ASSESSMENT / PLAN  No question data found.  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

## 2021-01-26 ENCOUNTER — ANTICOAGULATION THERAPY VISIT (OUTPATIENT)
Dept: FAMILY MEDICINE | Facility: CLINIC | Age: 76
End: 2021-01-26

## 2021-01-26 ENCOUNTER — TRANSFERRED RECORDS (OUTPATIENT)
Dept: HEALTH INFORMATION MANAGEMENT | Facility: CLINIC | Age: 76
End: 2021-01-26

## 2021-01-26 DIAGNOSIS — I48.20 CHRONIC ATRIAL FIBRILLATION (H): ICD-10-CM

## 2021-01-26 DIAGNOSIS — Z95.2 S/P MITRAL VALVE REPLACEMENT: ICD-10-CM

## 2021-01-26 DIAGNOSIS — Z79.01 LONG TERM CURRENT USE OF ANTICOAGULANT THERAPY: ICD-10-CM

## 2021-01-26 DIAGNOSIS — Z95.2 HEART VALVE REPLACED: ICD-10-CM

## 2021-01-26 LAB — INR PPP: 2.8 (ref 0.9–1.1)

## 2021-01-26 NOTE — PROGRESS NOTES
ANTICOAGULATION  MANAGEMENT-Patient Home Monitoring Result    Assessment     Therapeutic INR result of 2.8 . Goal range 2.5-3.5. Received via fax from [employer] home INR monitoring company.        Previous INR was therapeutic    Feng was last contacted by phone: 10/13/20    Plan     Per home monitoring agreement with patient, patient was NOT contacted regarding therapeutic result today.  Patient is to continue current dose and continue to check INR with home monitor per protocol.  ?       OBJECTIVE    INR   Date Value Ref Range Status   2021 2.8 (A) 0.90 - 1.10 Final       ASSESSMENT / PLAN  No question data found.  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

## 2021-02-02 ENCOUNTER — IMMUNIZATION (OUTPATIENT)
Dept: NURSING | Facility: CLINIC | Age: 76
End: 2021-02-02
Payer: COMMERCIAL

## 2021-02-02 PROCEDURE — 0001A PR COVID VAC PFIZER DIL RECON 30 MCG/0.3 ML IM: CPT

## 2021-02-02 PROCEDURE — 91300 PR COVID VAC PFIZER DIL RECON 30 MCG/0.3 ML IM: CPT

## 2021-02-09 ENCOUNTER — TRANSFERRED RECORDS (OUTPATIENT)
Dept: HEALTH INFORMATION MANAGEMENT | Facility: CLINIC | Age: 76
End: 2021-02-09

## 2021-02-09 ENCOUNTER — ANTICOAGULATION THERAPY VISIT (OUTPATIENT)
Dept: FAMILY MEDICINE | Facility: CLINIC | Age: 76
End: 2021-02-09

## 2021-02-09 DIAGNOSIS — I48.20 CHRONIC ATRIAL FIBRILLATION (H): ICD-10-CM

## 2021-02-09 DIAGNOSIS — Z95.2 HEART VALVE REPLACED: ICD-10-CM

## 2021-02-09 DIAGNOSIS — Z95.2 S/P MITRAL VALVE REPLACEMENT: ICD-10-CM

## 2021-02-09 DIAGNOSIS — Z79.01 LONG TERM CURRENT USE OF ANTICOAGULANT THERAPY: ICD-10-CM

## 2021-02-09 LAB — INR PPP: 2.7 (ref 0.9–1.1)

## 2021-02-09 NOTE — PROGRESS NOTES
ANTICOAGULATION  MANAGEMENT-Patient Home Monitoring Result    Assessment     Therapeutic INR result of 2.7 . Goal range 2.5-3.5. Received via fax from [employer] home INR monitoring company.        Previous INR was therapeutic    Feng was last contacted by phone: 10/13/20    Plan     Per home monitoring agreement with patient, patient was NOT contacted regarding therapeutic result today.  Patient is to continue current dose and continue to check INR with home monitor per protocol.  ?       OBJECTIVE    INR   Date Value Ref Range Status   2021 2.7 (A) 0.90 - 1.10 Final       ASSESSMENT / PLAN  No question data found.  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

## 2021-02-23 ENCOUNTER — IMMUNIZATION (OUTPATIENT)
Dept: NURSING | Facility: CLINIC | Age: 76
End: 2021-02-23
Attending: INTERNAL MEDICINE
Payer: COMMERCIAL

## 2021-02-23 ENCOUNTER — ANTICOAGULATION THERAPY VISIT (OUTPATIENT)
Dept: FAMILY MEDICINE | Facility: CLINIC | Age: 76
End: 2021-02-23

## 2021-02-23 ENCOUNTER — TRANSFERRED RECORDS (OUTPATIENT)
Dept: HEALTH INFORMATION MANAGEMENT | Facility: CLINIC | Age: 76
End: 2021-02-23

## 2021-02-23 DIAGNOSIS — Z95.2 S/P MITRAL VALVE REPLACEMENT: ICD-10-CM

## 2021-02-23 DIAGNOSIS — Z95.2 HEART VALVE REPLACED: ICD-10-CM

## 2021-02-23 DIAGNOSIS — Z79.01 LONG TERM CURRENT USE OF ANTICOAGULANT THERAPY: ICD-10-CM

## 2021-02-23 DIAGNOSIS — I48.20 CHRONIC ATRIAL FIBRILLATION (H): ICD-10-CM

## 2021-02-23 LAB — INR PPP: 3.1 (ref 0.9–1.1)

## 2021-02-23 PROCEDURE — 0002A PR COVID VAC PFIZER DIL RECON 30 MCG/0.3 ML IM: CPT

## 2021-02-23 PROCEDURE — 91300 PR COVID VAC PFIZER DIL RECON 30 MCG/0.3 ML IM: CPT

## 2021-02-23 NOTE — PROGRESS NOTES
ANTICOAGULATION  MANAGEMENT-Patient Home Monitoring Result    Assessment     Therapeutic INR result of 3.1 . Goal range 2.5-3.5. Received via fax from [employer] home INR monitoring company.        Previous INR was therapeutic    Feng was last contacted by phone: 10/13    Plan     Per home monitoring agreement with patient, patient was NOT contacted regarding therapeutic result today.  Patient is to continue current dose and continue to check INR with home monitor per protocol.  ?       OBJECTIVE    INR   Date Value Ref Range Status   02/23/2021 3.1 (A) 0.90 - 1.10 Final       ASSESSMENT / PLAN  No question data found.  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

## 2021-03-09 ENCOUNTER — ANTICOAGULATION THERAPY VISIT (OUTPATIENT)
Dept: FAMILY MEDICINE | Facility: CLINIC | Age: 76
End: 2021-03-09

## 2021-03-09 ENCOUNTER — TELEPHONE (OUTPATIENT)
Dept: CARDIOLOGY | Facility: CLINIC | Age: 76
End: 2021-03-09

## 2021-03-09 ENCOUNTER — TRANSFERRED RECORDS (OUTPATIENT)
Dept: HEALTH INFORMATION MANAGEMENT | Facility: CLINIC | Age: 76
End: 2021-03-09

## 2021-03-09 DIAGNOSIS — I48.20 CHRONIC ATRIAL FIBRILLATION (H): ICD-10-CM

## 2021-03-09 DIAGNOSIS — Z95.2 HEART VALVE REPLACED: ICD-10-CM

## 2021-03-09 DIAGNOSIS — Z79.01 LONG TERM CURRENT USE OF ANTICOAGULANT THERAPY: ICD-10-CM

## 2021-03-09 DIAGNOSIS — Z95.2 S/P MITRAL VALVE REPLACEMENT: ICD-10-CM

## 2021-03-09 LAB — INR PPP: 2.8 (ref 0.9–1.1)

## 2021-03-09 NOTE — PROGRESS NOTES
ANTICOAGULATION  MANAGEMENT-Patient Home Monitoring Result    Assessment     Therapeutic INR result of 2.8 . Goal range 2.5-3.5. Received via fax from [employer] home INR monitoring company.        Previous INR was therapeutic    Feng was last contacted by phone: 3/9/21    Plan     Per home monitoring agreement with patient, patient was NOT contacted regarding therapeutic result today.  Patient is to continue current dose and continue to check INR with home monitor per protocol.  ?       OBJECTIVE    INR   Date Value Ref Range Status   2021 2.8 (A) 0.90 - 1.10 Final       ASSESSMENT / PLAN  No question data found.  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

## 2021-03-09 NOTE — TELEPHONE ENCOUNTER
[facility] Call Center    Phone Message    May a detailed message be left on voicemail: yes     Reason for Call: Other: Pt would like to speak with Olivia regarding the echocardiogram.     Action Taken: Message routed to:  [facility] cardio    Travel Screening: Not Applicable

## 2021-03-10 DIAGNOSIS — I48.20 CHRONIC ATRIAL FIBRILLATION (H): ICD-10-CM

## 2021-03-10 NOTE — TELEPHONE ENCOUNTER
Warfarin 5 mg tablet    Summary: TAKE 10 MG ON TUES/SAT AND 5 MG ALL OTHER DAYS OR AS DIRECTED BY THE INR CLINIC, Disp-135 tablet, R-0, E-Prescribe  90 day supply accounting for possible boost dosing by INR clinic     Start: 12/18/2020  Ord/Sold: 12/18/2020

## 2021-03-11 RX ORDER — WARFARIN SODIUM 5 MG/1
TABLET ORAL
Qty: 135 TABLET | Refills: 0 | Status: SHIPPED | OUTPATIENT
Start: 2021-03-11 | End: 2021-05-28

## 2021-03-11 NOTE — TELEPHONE ENCOUNTER
[facility]   Thank you,  Ashlie GALE RN,BSN

## 2021-03-23 ENCOUNTER — ANTICOAGULATION THERAPY VISIT (OUTPATIENT)
Dept: FAMILY MEDICINE | Facility: CLINIC | Age: 76
End: 2021-03-23

## 2021-03-23 ENCOUNTER — TRANSFERRED RECORDS (OUTPATIENT)
Dept: HEALTH INFORMATION MANAGEMENT | Facility: CLINIC | Age: 76
End: 2021-03-23

## 2021-03-23 DIAGNOSIS — I48.20 CHRONIC ATRIAL FIBRILLATION (H): ICD-10-CM

## 2021-03-23 DIAGNOSIS — Z95.2 HEART VALVE REPLACED: ICD-10-CM

## 2021-03-23 DIAGNOSIS — Z95.2 S/P MITRAL VALVE REPLACEMENT: ICD-10-CM

## 2021-03-23 DIAGNOSIS — Z79.01 LONG TERM CURRENT USE OF ANTICOAGULANT THERAPY: ICD-10-CM

## 2021-03-23 LAB — INR PPP: 2.8 (ref 0.9–1.1)

## 2021-03-23 NOTE — PROGRESS NOTES
ANTICOAGULATION  MANAGEMENT-Patient Home Monitoring Result    Assessment     Therapeutic INR result of 2.8 . Goal range 2.5-3.5. Received via fax from [employer] home INR monitoring company.        Previous INR was therapeutic    Feng was last contacted by phone: 3/9/21    Plan     Per home monitoring agreement with patient, patient was NOT contacted regarding therapeutic result today.  Patient is to continue current dose and continue to check INR with home monitor per protocol.  ?       OBJECTIVE    INR   Date Value Ref Range Status   2021 2.8 (A) 0.90 - 1.10 Final       ASSESSMENT / PLAN  No question data found.  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

## 2021-04-06 ENCOUNTER — ANTICOAGULATION THERAPY VISIT (OUTPATIENT)
Dept: FAMILY MEDICINE | Facility: CLINIC | Age: 76
End: 2021-04-06

## 2021-04-06 ENCOUNTER — TRANSFERRED RECORDS (OUTPATIENT)
Dept: HEALTH INFORMATION MANAGEMENT | Facility: CLINIC | Age: 76
End: 2021-04-06

## 2021-04-06 DIAGNOSIS — Z95.2 HEART VALVE REPLACED: ICD-10-CM

## 2021-04-06 DIAGNOSIS — Z79.01 LONG TERM CURRENT USE OF ANTICOAGULANT THERAPY: ICD-10-CM

## 2021-04-06 DIAGNOSIS — Z95.2 S/P MITRAL VALVE REPLACEMENT: ICD-10-CM

## 2021-04-06 DIAGNOSIS — I48.20 CHRONIC ATRIAL FIBRILLATION (H): ICD-10-CM

## 2021-04-06 LAB — INR PPP: 3 (ref 0.9–1.1)

## 2021-04-06 NOTE — PROGRESS NOTES
ANTICOAGULATION  MANAGEMENT-Patient Home Monitoring Result    Assessment     Therapeutic INR result of 3.0 . Goal range 2.5-3.5. Received via fax from [employer] home INR monitoring company.        Previous INR was therapeutic    Feng was last contacted by phone: 3/9/21    Plan     Per home monitoring agreement with patient, patient was NOT contacted regarding therapeutic result today.  Patient is to continue current dose and continue to check INR with home monitor per protocol.  ?       OBJECTIVE    INR   Date Value Ref Range Status   04/06/2021 3.0 (A) 0.90 - 1.10 Final       ASSESSMENT / PLAN  No question data found.  Anticoagulation Summary  As of 4/6/2021    INR goal:  2.5-3.5   TTR:  90.2 % (1 y)   INR used for dosing:  3.0 (4/6/2021)   Warfarin maintenance plan:  10 mg (5 mg x 2) every Tue, Sat; 5 mg (5 mg x 1) all other days   Full warfarin instructions:  10 mg every Tue, Sat; 5 mg all other days   Weekly warfarin total:  45 mg   No change documented:  Natali Mondragon RN   Plan last modified:  Elen Mcdonough RN (7/22/2020)   Next INR check:  4/20/2021   Priority:  Maintenance   Target end date:  6/22/2021    Indications    Long-term (current) use of anticoagulants [Z79.01] [Z79.01]  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

## 2021-04-22 ENCOUNTER — ANTICOAGULATION THERAPY VISIT (OUTPATIENT)
Dept: FAMILY MEDICINE | Facility: CLINIC | Age: 76
End: 2021-04-22

## 2021-04-22 ENCOUNTER — TRANSFERRED RECORDS (OUTPATIENT)
Dept: HEALTH INFORMATION MANAGEMENT | Facility: CLINIC | Age: 76
End: 2021-04-22

## 2021-04-22 DIAGNOSIS — Z95.2 HEART VALVE REPLACED: ICD-10-CM

## 2021-04-22 DIAGNOSIS — Z95.2 S/P MITRAL VALVE REPLACEMENT: ICD-10-CM

## 2021-04-22 DIAGNOSIS — I48.20 CHRONIC ATRIAL FIBRILLATION (H): ICD-10-CM

## 2021-04-22 DIAGNOSIS — Z79.01 LONG TERM CURRENT USE OF ANTICOAGULANT THERAPY: ICD-10-CM

## 2021-04-22 LAB — INR PPP: 3.4 (ref 0.9–1.1)

## 2021-04-22 NOTE — PROGRESS NOTES
ANTICOAGULATION  MANAGEMENT-Patient Home Monitoring Result    Assessment     Therapeutic INR result of 3.4 . Goal range 2.5-3.5. Received via fax from [employer] home INR monitoring company.        Previous INR was therapeutic    Feng was last contacted by phone: 3/9/21    Plan     Per home monitoring agreement with patient, patient was NOT contacted regarding therapeutic result today.  Patient is to continue current dose and continue to check INR with home monitor per protocol.  ?       OBJECTIVE    INR   Date Value Ref Range Status   04/22/2021 3.4 (A) 0.90 - 1.10 Final       ASSESSMENT / PLAN  No question data found.  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

## 2021-05-04 ENCOUNTER — DOCUMENTATION ONLY (OUTPATIENT)
Dept: FAMILY MEDICINE | Facility: CLINIC | Age: 76
End: 2021-05-04

## 2021-05-04 ENCOUNTER — TRANSFERRED RECORDS (OUTPATIENT)
Dept: HEALTH INFORMATION MANAGEMENT | Facility: CLINIC | Age: 76
End: 2021-05-04

## 2021-05-04 DIAGNOSIS — Z95.2 HEART VALVE REPLACED: ICD-10-CM

## 2021-05-04 DIAGNOSIS — Z95.2 S/P MITRAL VALVE REPLACEMENT: ICD-10-CM

## 2021-05-04 DIAGNOSIS — I48.20 CHRONIC ATRIAL FIBRILLATION (H): ICD-10-CM

## 2021-05-04 DIAGNOSIS — Z79.01 LONG TERM CURRENT USE OF ANTICOAGULANT THERAPY: ICD-10-CM

## 2021-05-04 LAB — INR PPP: 2.9 (ref 0.9–1.1)

## 2021-05-04 NOTE — PROGRESS NOTES
ANTICOAGULATION  MANAGEMENT-Patient Home Monitoring Result    Assessment     Therapeutic INR result of 2.9 . Goal range 2.5-3.5. Received via fax from [employer] home INR monitoring company.        Previous INR was therapeutic    Feng was last contacted by phone: 3/9/21    Plan     Per home monitoring agreement with patient, patient was NOT contacted regarding therapeutic result today.  Patient is to continue current dose and continue to check INR with home monitor per protocol.  ?       OBJECTIVE    INR   Date Value Ref Range Status   2021 2.9 (A) 0.90 - 1.10 Final       ASSESSMENT / PLAN  No question data found.  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

## 2021-05-05 ENCOUNTER — HOSPITAL ENCOUNTER (OUTPATIENT)
Dept: CARDIOLOGY | Facility: CLINIC | Age: 76
Discharge: HOME | End: 2021-05-05
Attending: INTERNAL MEDICINE | Admitting: INTERNAL MEDICINE
Payer: COMMERCIAL

## 2021-05-05 DIAGNOSIS — M25.511 CHRONIC RIGHT SHOULDER PAIN: Primary | ICD-10-CM

## 2021-05-05 DIAGNOSIS — Z95.2 HEART VALVE REPLACED: ICD-10-CM

## 2021-05-05 DIAGNOSIS — G89.29 CHRONIC RIGHT SHOULDER PAIN: Primary | ICD-10-CM

## 2021-05-05 PROCEDURE — 255N000002 HC RX 255 OP 636: Performed by: INTERNAL MEDICINE

## 2021-05-05 PROCEDURE — 999N000208 ECHOCARDIOGRAM COMPLETE

## 2021-05-05 PROCEDURE — 93306 TTE W/DOPPLER COMPLETE: CPT | Mod: 26 | Performed by: INTERNAL MEDICINE

## 2021-05-05 RX ADMIN — HUMAN ALBUMIN MICROSPHERES AND PERFLUTREN 9 ML: 10; .22 INJECTION, SOLUTION INTRAVENOUS at 11:05

## 2021-05-18 ENCOUNTER — ANTICOAGULATION THERAPY VISIT (OUTPATIENT)
Dept: FAMILY MEDICINE | Facility: CLINIC | Age: 76
End: 2021-05-18

## 2021-05-18 ENCOUNTER — TRANSFERRED RECORDS (OUTPATIENT)
Dept: HEALTH INFORMATION MANAGEMENT | Facility: CLINIC | Age: 76
End: 2021-05-18

## 2021-05-18 DIAGNOSIS — I48.20 CHRONIC ATRIAL FIBRILLATION (H): ICD-10-CM

## 2021-05-18 DIAGNOSIS — Z95.2 S/P MITRAL VALVE REPLACEMENT: ICD-10-CM

## 2021-05-18 DIAGNOSIS — Z79.01 LONG TERM CURRENT USE OF ANTICOAGULANT THERAPY: ICD-10-CM

## 2021-05-18 DIAGNOSIS — Z95.2 HEART VALVE REPLACED: ICD-10-CM

## 2021-05-18 LAB — INR PPP: 3 (ref 0.9–1.1)

## 2021-05-18 NOTE — PROGRESS NOTES
ANTICOAGULATION  MANAGEMENT-Patient Home Monitoring Result    Assessment     Therapeutic INR result of 3.0 . Goal range 2.5-3.5. Received via fax from [employer] home INR monitoring company.        Previous INR was therapeutic    Feng was last contacted by phone: 3/9/21    Plan     Per home monitoring agreement with patient, patient was NOT contacted regarding therapeutic result today.  Patient is to continue current dose and continue to check INR with home monitor per protocol.  ?       OBJECTIVE    INR   Date Value Ref Range Status   05/18/2021 3.0 (A) 0.90 - 1.10 Final       ASSESSMENT / PLAN  No question data found.  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

## 2021-05-28 DIAGNOSIS — I48.20 CHRONIC ATRIAL FIBRILLATION (H): ICD-10-CM

## 2021-05-28 RX ORDER — WARFARIN SODIUM 5 MG/1
TABLET ORAL
Qty: 135 TABLET | Refills: 0 | Status: SHIPPED | OUTPATIENT
Start: 2021-05-28 | End: 2021-08-18

## 2021-06-01 ENCOUNTER — DOCUMENTATION ONLY (OUTPATIENT)
Dept: FAMILY MEDICINE | Facility: CLINIC | Age: 76
End: 2021-06-01

## 2021-06-01 ENCOUNTER — TRANSFERRED RECORDS (OUTPATIENT)
Dept: HEALTH INFORMATION MANAGEMENT | Facility: CLINIC | Age: 76
End: 2021-06-01

## 2021-06-01 DIAGNOSIS — Z95.2 S/P MITRAL VALVE REPLACEMENT: ICD-10-CM

## 2021-06-01 DIAGNOSIS — Z95.2 HEART VALVE REPLACED: ICD-10-CM

## 2021-06-01 DIAGNOSIS — Z79.01 LONG TERM CURRENT USE OF ANTICOAGULANT THERAPY: ICD-10-CM

## 2021-06-01 DIAGNOSIS — I48.20 CHRONIC ATRIAL FIBRILLATION (H): ICD-10-CM

## 2021-06-01 LAB — INR PPP: 2.8 (ref 0.9–1.1)

## 2021-06-01 NOTE — PROGRESS NOTES
ANTICOAGULATION  MANAGEMENT-Patient Home Monitoring Result    Assessment     Therapeutic INR result of 2.8 . Goal range 2.5-3.5. Received via fax from [employer] home INR monitoring company.        Previous INR was therapeutic    Feng was last contacted by phone: 3/9    Plan     Per home monitoring agreement with patient, patient was NOT contacted regarding therapeutic result today.  Patient is to continue current dose and continue to check INR with home monitor per protocol.  ?       OBJECTIVE    INR   Date Value Ref Range Status   2021 2.8 (A) 0.90 - 1.10 Final       ASSESSMENT / PLAN  No question data found.  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

## 2021-06-15 ENCOUNTER — ANTICOAGULATION THERAPY VISIT (OUTPATIENT)
Dept: FAMILY MEDICINE | Facility: CLINIC | Age: 76
End: 2021-06-15

## 2021-06-15 ENCOUNTER — TRANSFERRED RECORDS (OUTPATIENT)
Dept: HEALTH INFORMATION MANAGEMENT | Facility: CLINIC | Age: 76
End: 2021-06-15

## 2021-06-15 DIAGNOSIS — Z79.01 LONG TERM CURRENT USE OF ANTICOAGULANT THERAPY: ICD-10-CM

## 2021-06-15 DIAGNOSIS — Z95.2 HEART VALVE REPLACED: ICD-10-CM

## 2021-06-15 DIAGNOSIS — Z95.2 S/P MITRAL VALVE REPLACEMENT: ICD-10-CM

## 2021-06-15 DIAGNOSIS — I48.20 CHRONIC ATRIAL FIBRILLATION (H): ICD-10-CM

## 2021-06-15 LAB — INR PPP: 2.8 (ref 0.9–1.1)

## 2021-06-15 NOTE — PROGRESS NOTES
ANTICOAGULATION MANAGEMENT     Patient Name:  Feng Wynne  Date:  6/15/2021    ASSESSMENT /SUBJECTIVE:    Today's INR result of 2.8 is therapeutic. Goal INR of 2.5-3.5      Previous INR: Therapeutic       PLAN:    Warfarin Dosing Instructions: Continue your current warfarin dose 10 mg Tue/Sat and 5 mg ROW    Instructed patient to follow up no later than: 2 weeks  Patient to recheck with home meter    Education provided: Please call back if any changes to your diet, medications or how you've been taking warfarin, Target INR goal and significance of current INR result, Importance of notifying clinic for changes in medications; a sooner lab recheck maybe needed., Importance of notifying clinic for diarrhea, nausea/vomiting, reduced intake, and/or illness; a sooner lab recheck maybe needed., Importance of notifying clinic of upcoming surgeries and procedures 2 weeks in advance and Contact [facility]: [phone]  with any changes, questions or concerns.     Detailed voice message left for Feng with dosing instructions and follow up date.     Instructed to call the Anticoagulation Clinic for any changes, questions or concerns. (#[phone])        Shanice Maria RN      OBJECTIVE:  Recent labs: (last 7 days)     06/15/21   INR 2.8*         No question data found.  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

## 2021-06-15 NOTE — PROGRESS NOTES
Incoming fax from [employer] home monitoring company    Date of INR 6/15    INR result 2.8    Due for his 3 month call    Natali Mondragon RN

## 2021-06-29 ENCOUNTER — TRANSFERRED RECORDS (OUTPATIENT)
Dept: HEALTH INFORMATION MANAGEMENT | Facility: CLINIC | Age: 76
End: 2021-06-29

## 2021-06-29 ENCOUNTER — ANTICOAGULATION THERAPY VISIT (OUTPATIENT)
Dept: FAMILY MEDICINE | Facility: CLINIC | Age: 76
End: 2021-06-29

## 2021-06-29 DIAGNOSIS — I48.20 CHRONIC ATRIAL FIBRILLATION (H): ICD-10-CM

## 2021-06-29 DIAGNOSIS — Z79.01 LONG TERM CURRENT USE OF ANTICOAGULANT THERAPY: ICD-10-CM

## 2021-06-29 DIAGNOSIS — Z95.2 HEART VALVE REPLACED: ICD-10-CM

## 2021-06-29 DIAGNOSIS — Z95.2 S/P MITRAL VALVE REPLACEMENT: ICD-10-CM

## 2021-06-29 LAB — INR PPP: 3.2 (ref 0.9–1.1)

## 2021-06-29 NOTE — PROGRESS NOTES
ANTICOAGULATION  MANAGEMENT-Patient Home Monitoring Result    Assessment     Therapeutic INR result of 3.2 . Goal range 2.5-3.5. Received via fax from [employer] home INR monitoring company.        Previous INR was therapeutic    Feng was last contacted by phone: 6/15    Plan     Per home monitoring agreement with patient, patient was NOT contacted regarding therapeutic result today.  Patient is to continue current dose and continue to check INR with home monitor per protocol.  ?       OBJECTIVE    INR   Date Value Ref Range Status   06/29/2021 3.2 (A) 0.90 - 1.10 Final       ASSESSMENT / PLAN  No question data found.  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

## 2021-07-13 ENCOUNTER — ANTICOAGULATION THERAPY VISIT (OUTPATIENT)
Dept: FAMILY MEDICINE | Facility: CLINIC | Age: 76
End: 2021-07-13

## 2021-07-13 ENCOUNTER — TRANSFERRED RECORDS (OUTPATIENT)
Dept: HEALTH INFORMATION MANAGEMENT | Facility: CLINIC | Age: 76
End: 2021-07-13

## 2021-07-13 DIAGNOSIS — Z79.01 LONG TERM CURRENT USE OF ANTICOAGULANT THERAPY: Primary | ICD-10-CM

## 2021-07-13 DIAGNOSIS — Z95.2 S/P MITRAL VALVE REPLACEMENT: ICD-10-CM

## 2021-07-13 DIAGNOSIS — I48.20 CHRONIC ATRIAL FIBRILLATION (H): ICD-10-CM

## 2021-07-13 DIAGNOSIS — Z95.2 HEART VALVE REPLACED: ICD-10-CM

## 2021-07-13 LAB — INR PPP: 3.3 (ref 2–3)

## 2021-07-13 NOTE — PROGRESS NOTES
ANTICOAGULATION  MANAGEMENT-Patient Home Monitoring Result    Assessment     Therapeutic INR result of 3.3 . Goal range 2.5-3.5. Received via fax from [employer] home INR monitoring company.        Previous INR was therapeutic    Feng was last contacted by phone: 7/13    Plan     Per home monitoring agreement with patient, patient was NOT contacted regarding therapeutic result today.  Patient is to continue current dose and continue to check INR with home monitor per protocol.  ?       OBJECTIVE    INR   Date Value Ref Range Status   07/13/2021 3.3  Final       ASSESSMENT / PLAN  No question data found.  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

## 2021-07-27 ENCOUNTER — TRANSFERRED RECORDS (OUTPATIENT)
Dept: HEALTH INFORMATION MANAGEMENT | Facility: CLINIC | Age: 76
End: 2021-07-27

## 2021-07-27 ENCOUNTER — ANTICOAGULATION THERAPY VISIT (OUTPATIENT)
Dept: FAMILY MEDICINE | Facility: CLINIC | Age: 76
End: 2021-07-27

## 2021-07-27 DIAGNOSIS — Z95.2 HEART VALVE REPLACED: ICD-10-CM

## 2021-07-27 DIAGNOSIS — I48.20 CHRONIC ATRIAL FIBRILLATION (H): ICD-10-CM

## 2021-07-27 DIAGNOSIS — Z79.01 LONG TERM CURRENT USE OF ANTICOAGULANT THERAPY: Primary | ICD-10-CM

## 2021-07-27 DIAGNOSIS — Z95.2 S/P MITRAL VALVE REPLACEMENT: ICD-10-CM

## 2021-07-27 LAB — INR (EXTERNAL): 2.9 (ref 2.5–3.5)

## 2021-07-27 NOTE — PROGRESS NOTES
ANTICOAGULATION  MANAGEMENT-Patient Home Monitoring Result    Assessment     Therapeutic INR result of 2.9 . Goal range 2.5-3.5. Received via fax from [employer] home INR monitoring company.        Previous INR was therapeutic    Feng was last contacted by phone: 6/15    Plan     Per home monitoring agreement with patient, patient was NOT contacted regarding therapeutic result today.  Patient is to continue current dose and continue to check INR with home monitor per protocol.  ?       OBJECTIVE    INR (External)   Date Value Ref Range Status   2021 2.9 2.5 - 3.5 Final       ASSESSMENT / PLAN  No question data found.  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

## 2021-08-10 ENCOUNTER — ANTICOAGULATION THERAPY VISIT (OUTPATIENT)
Dept: FAMILY MEDICINE | Facility: CLINIC | Age: 76
End: 2021-08-10

## 2021-08-10 ENCOUNTER — TRANSFERRED RECORDS (OUTPATIENT)
Dept: HEALTH INFORMATION MANAGEMENT | Facility: CLINIC | Age: 76
End: 2021-08-10

## 2021-08-10 DIAGNOSIS — Z95.2 HEART VALVE REPLACED: ICD-10-CM

## 2021-08-10 DIAGNOSIS — I48.20 CHRONIC ATRIAL FIBRILLATION (H): ICD-10-CM

## 2021-08-10 DIAGNOSIS — Z79.01 LONG TERM CURRENT USE OF ANTICOAGULANT THERAPY: Primary | ICD-10-CM

## 2021-08-10 DIAGNOSIS — Z95.2 S/P MITRAL VALVE REPLACEMENT: ICD-10-CM

## 2021-08-10 LAB — INR (EXTERNAL): 2.5 (ref 0.9–1.1)

## 2021-08-10 NOTE — CONFIDENTIAL NOTE
ANTICOAGULATION  MANAGEMENT-Patient Home Monitoring Result    Assessment     Therapeutic INR result of 2.5 . Goal range 2.5-3.5. Received via fax from [employer] home INR monitoring company.        Previous INR was therapeutic    Feng was last contacted by phone: 06/15/2021    Plan     Per home monitoring agreement with patient, patient was NOT contacted regarding therapeutic result today.  Patient is to continue current dose and continue to check INR with home monitor per protocol.  ?       OBJECTIVE    INR (External)   Date Value Ref Range Status   08/10/2021 2.5 (A) 0.9 - 1.1 Final       ASSESSMENT / PLAN  No question data found.  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

## 2021-08-18 DIAGNOSIS — I48.20 CHRONIC ATRIAL FIBRILLATION (H): ICD-10-CM

## 2021-08-18 RX ORDER — WARFARIN SODIUM 5 MG/1
TABLET ORAL
Qty: 135 TABLET | Refills: 1 | Status: SHIPPED | OUTPATIENT
Start: 2021-08-18 | End: 2022-02-04

## 2021-08-18 NOTE — TELEPHONE ENCOUNTER
[facility]   Thank you,  Ashlie GALE RN,BSN

## 2021-08-24 ENCOUNTER — ANTICOAGULATION THERAPY VISIT (OUTPATIENT)
Dept: FAMILY MEDICINE | Facility: CLINIC | Age: 76
End: 2021-08-24

## 2021-08-24 ENCOUNTER — TRANSFERRED RECORDS (OUTPATIENT)
Dept: HEALTH INFORMATION MANAGEMENT | Facility: CLINIC | Age: 76
End: 2021-08-24

## 2021-08-24 DIAGNOSIS — Z95.2 HEART VALVE REPLACED: ICD-10-CM

## 2021-08-24 DIAGNOSIS — Z95.2 S/P MITRAL VALVE REPLACEMENT: ICD-10-CM

## 2021-08-24 DIAGNOSIS — I48.20 CHRONIC ATRIAL FIBRILLATION (H): ICD-10-CM

## 2021-08-24 DIAGNOSIS — Z79.01 LONG TERM CURRENT USE OF ANTICOAGULANT THERAPY: Primary | ICD-10-CM

## 2021-08-24 LAB — INR (EXTERNAL): 2.4 (ref 0.9–1.1)

## 2021-08-24 NOTE — PROGRESS NOTES
ANTICOAGULATION MANAGEMENT     Feng Tomn Sherrell 76 year old male is on warfarin with subtherapeutic INR result. (Goal INR 2.5-3.5)    Recent labs: (last 7 days)     08/24/21  0948   INR 2.4*       ASSESSMENT     Source(s): Patient/Caregiver Call       Warfarin doses taken: Warfarin taken as instructed    Diet: Increased greens/vitamin K in diet; plans to resume previous intake    New illness, injury, or hospitalization: No    Medication/supplement changes: None noted    Previous INR: Therapeutic last 2(+) visits    Additional findings: None     PLAN     Recommended plan for temporary change(s) affecting INR     Dosing Instructions: Continue your current warfarin dose with next INR in 2 weeks       Summary  As of 8/24/2021    Full warfarin instructions:  10 mg every Tue, Sat; 5 mg all other days   Next INR check:  9/7/2021             Telephone call with Feng who verbalizes understanding and agrees to plan    Patient to recheck with home meter    Education provided: Goal range and significance of current result    Plan made per [facility] anticoagulation protocol    Mo Galvez RN  Anticoagulation Clinic  8/24/2021    _______________________________________________________________________     Anticoagulation Episode Summary     Current INR goal:  2.5-3.5   TTR:  96.1 % (1 y)   Target end date:  6/22/2021   Send INR reminders to:  DIA MARK    Indications    Long-term (current) use of anticoagulants [Z79.01] [Z79.01]  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

## 2021-09-03 DIAGNOSIS — I10 ESSENTIAL HYPERTENSION WITH GOAL BLOOD PRESSURE LESS THAN 140/90: ICD-10-CM

## 2021-09-03 RX ORDER — LISINOPRIL 40 MG/1
40 TABLET ORAL DAILY
Qty: 90 TABLET | Refills: 0 | Status: SHIPPED | OUTPATIENT
Start: 2021-09-03 | End: 2021-12-01

## 2021-09-07 ENCOUNTER — ANTICOAGULATION THERAPY VISIT (OUTPATIENT)
Dept: ANTICOAGULATION | Facility: CLINIC | Age: 76
End: 2021-09-07

## 2021-09-07 ENCOUNTER — TRANSFERRED RECORDS (OUTPATIENT)
Dept: HEALTH INFORMATION MANAGEMENT | Facility: CLINIC | Age: 76
End: 2021-09-07

## 2021-09-07 DIAGNOSIS — I48.20 CHRONIC ATRIAL FIBRILLATION (H): ICD-10-CM

## 2021-09-07 DIAGNOSIS — Z95.2 HEART VALVE REPLACED: ICD-10-CM

## 2021-09-07 DIAGNOSIS — Z95.2 S/P MITRAL VALVE REPLACEMENT: ICD-10-CM

## 2021-09-07 DIAGNOSIS — Z79.01 LONG TERM CURRENT USE OF ANTICOAGULANT THERAPY: Primary | ICD-10-CM

## 2021-09-07 LAB — INR (EXTERNAL): 3.1 (ref 2.5–3.5)

## 2021-09-07 NOTE — PROGRESS NOTES
ANTICOAGULATION  MANAGEMENT-Patient Home Monitoring Result    Assessment     Therapeutic INR result of 3.1 . Goal range 2.5-3.5. Received via fax from [employer] home INR monitoring company.        Previous INR was therapeutic    Feng was last contacted by phone: 6/15    Plan     Per home monitoring agreement with patient, patient was NOT contacted regarding therapeutic result today.  Patient is to continue current dose and continue to check INR with home monitor per protocol.  ?       OBJECTIVE    INR (External)   Date Value Ref Range Status   09/07/2021 3.1 (A) 0.9 - 1.1 Final       ASSESSMENT / PLAN  No question data found.  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] [Z95.2]  S/P mitral valve replacement [Z95.2]  Chronic atrial fibrillation (H) [I48.20]             Anticoagulation Episode Summary     INR check location:      Preferred lab:  EXTERNAL LAB    Send INR reminders to:  DIA MARK    Comments:  [employer] HOME MONITORING Patient, okay  to manage by exception on 7/8/20      Anticoagulation Care Providers     Provider Role Specialty Phone number    Enrique Walker MD Referring Internal Medicine [phone]    Paula Lou MD Referring Internal Medicine [phone]          Shanice Maria, RN - [facility]

## 2021-09-07 NOTE — PROGRESS NOTES
Incoming fax from [employer] home monitoring company    Date of INR 9/7/21    INR result 3.1

## 2021-09-19 ENCOUNTER — HEALTH MAINTENANCE LETTER (OUTPATIENT)
Age: 76
End: 2021-09-19

## 2021-09-21 ENCOUNTER — ANTICOAGULATION THERAPY VISIT (OUTPATIENT)
Dept: FAMILY MEDICINE | Facility: CLINIC | Age: 76
End: 2021-09-21

## 2021-09-21 ENCOUNTER — TRANSFERRED RECORDS (OUTPATIENT)
Dept: HEALTH INFORMATION MANAGEMENT | Facility: CLINIC | Age: 76
End: 2021-09-21

## 2021-09-21 DIAGNOSIS — Z95.2 S/P MITRAL VALVE REPLACEMENT: ICD-10-CM

## 2021-09-21 DIAGNOSIS — Z79.01 LONG TERM CURRENT USE OF ANTICOAGULANT THERAPY: Primary | ICD-10-CM

## 2021-09-21 DIAGNOSIS — Z95.2 HEART VALVE REPLACED: ICD-10-CM

## 2021-09-21 DIAGNOSIS — I48.20 CHRONIC ATRIAL FIBRILLATION (H): ICD-10-CM

## 2021-09-21 LAB — INR (EXTERNAL): 2.8 (ref 0.9–1.1)

## 2021-09-21 NOTE — PROGRESS NOTES
ANTICOAGULATION MANAGEMENT     Feng Wynne 76 year old male is on warfarin with therapeutic INR result. (Goal INR 2.5-3.5)    Recent labs: (last 7 days)     09/21/21  1130   INR 2.8*       ASSESSMENT     Source(s): Chart Review and Patient/Caregiver Call       Warfarin doses taken: Warfarin taken as instructed    Diet: No new diet changes identified    New illness, injury, or hospitalization: No    Medication/supplement changes: None noted    Signs or symptoms of bleeding or clotting: No    Previous INR: Therapeutic last 2(+) visits    Additional findings: Manage by exception 3 month check in     PLAN     Recommended plan for no diet, medication or health factor changes affecting INR     Dosing Instructions: Continue your current warfarin dose with next INR in 2 weeks       Summary  As of 9/21/2021    Full warfarin instructions:  10 mg every Tue, Sat; 5 mg all other days   Next INR check:  10/5/2021             Detailed voice message left for Feng with dosing instructions and follow up date.     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

## 2021-09-21 NOTE — PROGRESS NOTES
Incoming fax from [employer] home monitoring company    Date of INR 9/21    INR result 2.8

## 2021-09-29 ENCOUNTER — DOCUMENTATION ONLY (OUTPATIENT)
Dept: FAMILY MEDICINE | Facility: CLINIC | Age: 76
End: 2021-09-29

## 2021-09-29 DIAGNOSIS — I48.20 CHRONIC ATRIAL FIBRILLATION (H): ICD-10-CM

## 2021-09-29 DIAGNOSIS — Z95.2 S/P MITRAL VALVE REPLACEMENT: ICD-10-CM

## 2021-09-29 DIAGNOSIS — I48.20 CHRONIC ATRIAL FIBRILLATION (H): Primary | ICD-10-CM

## 2021-09-29 DIAGNOSIS — Z79.01 LONG TERM CURRENT USE OF ANTICOAGULANT THERAPY: ICD-10-CM

## 2021-09-29 NOTE — PROGRESS NOTES
To PCP:    Please review and sign updated [facility] referral. Patient previous referral states 6/22/21 as therapy end date, this date has passed and is no longer valid.    If you'd like patient to discontinue warfarin at this time, please advise. If patient should continue on warfarin therapy please sign attached updated referral.     Thank you,  Natali Mondragon RN

## 2021-09-30 RX ORDER — ATENOLOL 25 MG/1
25 TABLET ORAL
Qty: 180 TABLET | Refills: 2 | Status: SHIPPED | OUTPATIENT
Start: 2021-09-30 | End: 2022-06-30

## 2021-09-30 NOTE — CONFIDENTIAL NOTE
Please advise patient, he will need to schedule follow up with this provider for continuity of care and further refills, Patient was seen once back in 10/2020. Thank you for follow up

## 2021-09-30 NOTE — PROGRESS NOTES
Natali, Patient has mechanical   Mitral heart  valve. Had heart valve replacement 10 years ago. Please contact the cardiology group Dr. Nicole.     [facility] Triage team   [facility]

## 2021-09-30 NOTE — TELEPHONE ENCOUNTER
Prescription approved per [facility] Refill Protocol.  Liana Huynh RN  [facility]

## 2021-10-05 ENCOUNTER — ANTICOAGULATION THERAPY VISIT (OUTPATIENT)
Dept: FAMILY MEDICINE | Facility: CLINIC | Age: 76
End: 2021-10-05

## 2021-10-05 ENCOUNTER — TRANSFERRED RECORDS (OUTPATIENT)
Dept: HEALTH INFORMATION MANAGEMENT | Facility: CLINIC | Age: 76
End: 2021-10-05

## 2021-10-05 DIAGNOSIS — I48.20 CHRONIC ATRIAL FIBRILLATION (H): ICD-10-CM

## 2021-10-05 DIAGNOSIS — Z95.2 HEART VALVE REPLACED: ICD-10-CM

## 2021-10-05 DIAGNOSIS — Z95.2 S/P MITRAL VALVE REPLACEMENT: ICD-10-CM

## 2021-10-05 DIAGNOSIS — Z79.01 LONG TERM CURRENT USE OF ANTICOAGULANT THERAPY: Primary | ICD-10-CM

## 2021-10-05 LAB — INR (EXTERNAL): 2.6 (ref 2.5–3.5)

## 2021-10-05 NOTE — PROGRESS NOTES
ANTICOAGULATION  MANAGEMENT-Home Monitor Managed by Exception    Feng Wynne 76 year old male is on warfarin with therapeutic INR result. (Goal INR 2.5-3.5)    Recent labs: (last 7 days)     10/05/21  0817   INR 2.6         Previous INR was Therapeutic    Medication, diet, health changes since last INR:chart reviewed; none idientified    Contacted within the last 12 weeks by phone on 9/21      COLTON Toney was NOT contacted regarding therapeutic result today per home monitoring policy manage by exception agreement.   Current warfarin dose is to be continued:     Summary  As of 10/5/2021    Full warfarin instructions:  10 mg every Tue, Sat; 5 mg all other days   Next INR check:             ?   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

## 2021-10-19 ENCOUNTER — ANTICOAGULATION THERAPY VISIT (OUTPATIENT)
Dept: FAMILY MEDICINE | Facility: CLINIC | Age: 76
End: 2021-10-19

## 2021-10-19 ENCOUNTER — TRANSFERRED RECORDS (OUTPATIENT)
Dept: HEALTH INFORMATION MANAGEMENT | Facility: CLINIC | Age: 76
End: 2021-10-19
Payer: COMMERCIAL

## 2021-10-19 DIAGNOSIS — Z95.2 S/P MITRAL VALVE REPLACEMENT: ICD-10-CM

## 2021-10-19 DIAGNOSIS — Z79.01 LONG TERM CURRENT USE OF ANTICOAGULANT THERAPY: Primary | ICD-10-CM

## 2021-10-19 DIAGNOSIS — I48.20 CHRONIC ATRIAL FIBRILLATION (H): ICD-10-CM

## 2021-10-19 DIAGNOSIS — Z95.2 HEART VALVE REPLACED: ICD-10-CM

## 2021-10-19 LAB — INR (EXTERNAL): 2.5 (ref 0.9–1.1)

## 2021-10-19 NOTE — PROGRESS NOTES
ANTICOAGULATION  MANAGEMENT-Home Monitor Managed by Exception    Feng Wynne 76 year old male is on warfarin with therapeutic INR result. (Goal INR 2.5-3.5)    Recent labs: (last 7 days)     10/19/21  1047   INR 2.5*         Previous INR was Therapeutic    Medication, diet, health changes since last INR:chart reviewed; none idientified    Contacted within the last 12 weeks by phone on 09/21/2021      PLAN     Feng was NOT contacted regarding therapeutic result today per home monitoring policy manage by exception agreement.   Current warfarin dose is to be continued:     Summary  As of 10/19/2021    Full warfarin instructions:  10 mg every Tue, Sat; 5 mg all other days   Next INR check:  11/2/2021           ?   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

## 2021-11-02 ENCOUNTER — TRANSFERRED RECORDS (OUTPATIENT)
Dept: HEALTH INFORMATION MANAGEMENT | Facility: CLINIC | Age: 76
End: 2021-11-02
Payer: COMMERCIAL

## 2021-11-02 ENCOUNTER — DOCUMENTATION ONLY (OUTPATIENT)
Dept: FAMILY MEDICINE | Facility: CLINIC | Age: 76
End: 2021-11-02

## 2021-11-02 DIAGNOSIS — I48.20 CHRONIC ATRIAL FIBRILLATION (H): ICD-10-CM

## 2021-11-02 DIAGNOSIS — Z95.2 HEART VALVE REPLACED: ICD-10-CM

## 2021-11-02 DIAGNOSIS — Z95.2 S/P MITRAL VALVE REPLACEMENT: ICD-10-CM

## 2021-11-02 DIAGNOSIS — Z79.01 LONG TERM CURRENT USE OF ANTICOAGULANT THERAPY: Primary | ICD-10-CM

## 2021-11-02 LAB — INR (EXTERNAL): 3 (ref 2.5–3.5)

## 2021-11-02 NOTE — PROGRESS NOTES
ANTICOAGULATION  MANAGEMENT-Home Monitor Managed by Exception    Feng Wynne 76 year old male is on warfarin with therapeutic INR result. (Goal INR 2.5-3.5)    Recent labs: (last 7 days)     11/02/21  0858   INR 3.0         Previous INR was Therapeutic    Medication, diet, health changes since last INR:chart reviewed; none idientified    Contacted within the last 12 weeks by phone on 9/21      COLTON Toney was NOT contacted regarding therapeutic result today per home monitoring policy manage by exception agreement.   Current warfarin dose is to be continued:     Summary  As of 11/2/2021    Full warfarin instructions:  10 mg every Tue, Sat; 5 mg all other days   Next INR check:             ?   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

## 2021-11-16 ENCOUNTER — TRANSFERRED RECORDS (OUTPATIENT)
Dept: HEALTH INFORMATION MANAGEMENT | Facility: CLINIC | Age: 76
End: 2021-11-16
Payer: COMMERCIAL

## 2021-11-16 ENCOUNTER — DOCUMENTATION ONLY (OUTPATIENT)
Dept: FAMILY MEDICINE | Facility: CLINIC | Age: 76
End: 2021-11-16
Payer: COMMERCIAL

## 2021-11-16 DIAGNOSIS — Z95.2 S/P MITRAL VALVE REPLACEMENT: ICD-10-CM

## 2021-11-16 DIAGNOSIS — Z79.01 LONG TERM CURRENT USE OF ANTICOAGULANT THERAPY: Primary | ICD-10-CM

## 2021-11-16 DIAGNOSIS — Z95.2 HEART VALVE REPLACED: ICD-10-CM

## 2021-11-16 DIAGNOSIS — I48.20 CHRONIC ATRIAL FIBRILLATION (H): ICD-10-CM

## 2021-11-16 LAB — INR (EXTERNAL): 2.8 (ref 0.9–1.1)

## 2021-11-16 NOTE — PROGRESS NOTES
ANTICOAGULATION  MANAGEMENT-Home Monitor Managed by Exception    Feng Wynne 76 year old male is on warfarin with therapeutic INR result. (Goal INR 2.5-3.5)    Recent labs: (last 7 days)     11/16/21  1249   INR 2.8*         Previous INR was Therapeutic    Medication, diet, health changes since last INR:chart reviewed; none identified    Contacted within the last 12 weeks by phone on 9/21/21      COLTON Toney was NOT contacted regarding therapeutic result today per home monitoring policy manage by exception agreement.   Current warfarin dose is to be continued:     Summary  As of 11/16/2021    Full warfarin instructions:  10 mg every Tue, Sat; 5 mg all other days   Next INR check:             ?   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

## 2021-11-30 ENCOUNTER — TRANSFERRED RECORDS (OUTPATIENT)
Dept: HEALTH INFORMATION MANAGEMENT | Facility: CLINIC | Age: 76
End: 2021-11-30
Payer: COMMERCIAL

## 2021-11-30 ENCOUNTER — DOCUMENTATION ONLY (OUTPATIENT)
Dept: FAMILY MEDICINE | Facility: CLINIC | Age: 76
End: 2021-11-30
Payer: COMMERCIAL

## 2021-11-30 DIAGNOSIS — I48.20 CHRONIC ATRIAL FIBRILLATION (H): ICD-10-CM

## 2021-11-30 DIAGNOSIS — Z95.2 HEART VALVE REPLACED: ICD-10-CM

## 2021-11-30 DIAGNOSIS — I10 ESSENTIAL HYPERTENSION WITH GOAL BLOOD PRESSURE LESS THAN 140/90: ICD-10-CM

## 2021-11-30 DIAGNOSIS — Z95.2 S/P MITRAL VALVE REPLACEMENT: ICD-10-CM

## 2021-11-30 DIAGNOSIS — Z79.01 LONG TERM CURRENT USE OF ANTICOAGULANT THERAPY: Primary | ICD-10-CM

## 2021-11-30 LAB — INR (EXTERNAL): 3.2 (ref 0.9–1.1)

## 2021-11-30 NOTE — PROGRESS NOTES
ANTICOAGULATION  MANAGEMENT-Home Monitor Managed by Exception    Feng Wynne 76 year old male is on warfarin with therapeutic INR result. (Goal INR 2.5-3.5)    Recent labs: (last 7 days)     11/30/21  0926   INR 3.2*         Previous INR was Therapeutic    Medication, diet, health changes since last INR:chart reviewed; none identified    Contacted within the last 12 weeks by phone on 9/21/21      COLTON Toney was NOT contacted regarding therapeutic result today per home monitoring policy manage by exception agreement.   Current warfarin dose is to be continued:     Summary  As of 11/30/2021    Full warfarin instructions:  10 mg every Tue, Sat; 5 mg all other days   Next INR check:  12/14/2021           ?   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

## 2021-12-01 DIAGNOSIS — E78.5 HYPERLIPIDEMIA LDL GOAL <130: Primary | ICD-10-CM

## 2021-12-01 DIAGNOSIS — I10 ESSENTIAL HYPERTENSION: ICD-10-CM

## 2021-12-01 DIAGNOSIS — I48.21 PERMANENT ATRIAL FIBRILLATION (H): ICD-10-CM

## 2021-12-01 RX ORDER — LISINOPRIL 40 MG/1
40 TABLET ORAL DAILY
Qty: 90 TABLET | Refills: 0 | Status: SHIPPED | OUTPATIENT
Start: 2021-12-01 | End: 2022-02-09

## 2021-12-01 NOTE — TELEPHONE ENCOUNTER
Routing refill request to provider for review/approval because:  Labs out of date:    Potassium   Date Value Ref Range Status   10/16/2020 4.6 3.4 - 5.3 mmol/L Final      Creatinine   Date Value Ref Range Status   10/16/2020 0.85 0.66 - 1.25 mg/dL Final     BP Readings from Last 3 Encounters:   10/16/20 133/78   11/05/19 128/74   03/14/19 130/82      Patient sent message in my chart to make appointment for medication refills.       Eugenia Elizabeth RN  [facility] Triage

## 2021-12-01 NOTE — TELEPHONE ENCOUNTER
Dr. Lou pt called for lisinopril as almost out.  Has not been seen in year, so scheduled for wellness visit in February  Pended 90 days for review to get pt through to visit as fails protocol.  Liana Huynh, ONOFRE  [facility] RN Triage Team

## 2021-12-14 ENCOUNTER — ANTICOAGULATION THERAPY VISIT (OUTPATIENT)
Dept: FAMILY MEDICINE | Facility: CLINIC | Age: 76
End: 2021-12-14
Payer: COMMERCIAL

## 2021-12-14 ENCOUNTER — TRANSFERRED RECORDS (OUTPATIENT)
Dept: HEALTH INFORMATION MANAGEMENT | Facility: CLINIC | Age: 76
End: 2021-12-14
Payer: COMMERCIAL

## 2021-12-14 DIAGNOSIS — Z95.2 HEART VALVE REPLACED: ICD-10-CM

## 2021-12-14 DIAGNOSIS — Z95.2 S/P MITRAL VALVE REPLACEMENT: ICD-10-CM

## 2021-12-14 DIAGNOSIS — Z79.01 LONG TERM CURRENT USE OF ANTICOAGULANT THERAPY: Primary | ICD-10-CM

## 2021-12-14 DIAGNOSIS — I48.20 CHRONIC ATRIAL FIBRILLATION (H): ICD-10-CM

## 2021-12-14 LAB — INR HOME MONITORING: 3.5 (ref 2.5–3.5)

## 2021-12-14 NOTE — PROGRESS NOTES
ANTICOAGULATION MANAGEMENT     Feng Wynne 76 year old male is on warfarin with therapeutic INR result. (Goal INR 2.5-3.5)    Recent labs: (last 7 days)     12/14/21  0000   INR 3.50       ASSESSMENT     Source(s): Chart Review and Patient/Caregiver Call       Warfarin doses taken: Warfarin taken as instructed    Diet: Decreased greens/vitamin K in diet; plans to resume previous intake    New illness, injury, or hospitalization: No    Medication/supplement changes: None noted    Signs or symptoms of bleeding or clotting: No    Previous INR: Therapeutic last 2(+) visits    Additional findings: None     PLAN     Recommended plan for temporary change(s) affecting INR     Dosing Instructions: Continue your current warfarin dose with next INR in 2 weeks       Summary  As of 12/14/2021    Full warfarin instructions:  10 mg every Tue, Sat; 5 mg all other days   Next INR check:               Telephone call with Feng who verbalizes understanding and agrees to plan    Patient to recheck with home meter    Education provided: Please call back if any changes to your diet, medications or how you've been taking warfarin, Importance of consistent vitamin K intake, Impact of vitamin K foods on INR, Vitamin K content of foods and Contact [phone]  with any changes, questions or concerns.     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

## 2021-12-15 DIAGNOSIS — I48.20 CHRONIC ATRIAL FIBRILLATION (H): Primary | ICD-10-CM

## 2021-12-28 ENCOUNTER — ANTICOAGULATION THERAPY VISIT (OUTPATIENT)
Dept: FAMILY MEDICINE | Facility: CLINIC | Age: 76
End: 2021-12-28
Payer: COMMERCIAL

## 2021-12-28 ENCOUNTER — TRANSFERRED RECORDS (OUTPATIENT)
Dept: HEALTH INFORMATION MANAGEMENT | Facility: CLINIC | Age: 76
End: 2021-12-28
Payer: COMMERCIAL

## 2021-12-28 DIAGNOSIS — Z95.2 HEART VALVE REPLACED: ICD-10-CM

## 2021-12-28 DIAGNOSIS — I48.20 CHRONIC ATRIAL FIBRILLATION (H): ICD-10-CM

## 2021-12-28 DIAGNOSIS — Z79.01 LONG TERM CURRENT USE OF ANTICOAGULANT THERAPY: Primary | ICD-10-CM

## 2021-12-28 DIAGNOSIS — Z95.2 S/P MITRAL VALVE REPLACEMENT: ICD-10-CM

## 2021-12-28 LAB — INR HOME MONITORING: 3.2 (ref 2.5–3.5)

## 2021-12-28 NOTE — PROGRESS NOTES
ANTICOAGULATION MANAGEMENT     Feng Wynne 76 year old male is on warfarin with therapeutic INR result. (Goal INR 2.5-3.5)    Recent labs: (last 7 days)     12/28/21  0000   INR 3.20       ASSESSMENT     Source(s): Chart Review and Patient/Caregiver Call       Warfarin doses taken: Warfarin taken as instructed    Diet: No new diet changes identified    New illness, injury, or hospitalization: No    Medication/supplement changes: None noted    Signs or symptoms of bleeding or clotting: No    Previous INR: Therapeutic last 2(+) visits    Additional findings: Resume manage by exception at next check if in range     PLAN     Recommended plan for no diet, medication or health factor changes affecting INR     Dosing Instructions: Continue your current warfarin dose with next INR in 2 weeks       Summary  As of 12/28/2021    Full warfarin instructions:  10 mg every Tue, Sat; 5 mg all other days   Next INR check:  1/11/2022             Telephone call with Feng who verbalizes understanding and agrees to plan    Patient to recheck with home meter    Education provided: Please call back if any changes to your diet, medications or how you've been taking warfarin and Resume manage by exception with home monitor. Continue to submit INR results to home monitor company.You will only be called when your result is out of range. Please call and notify [facility] if new medication started, dose missed, signs or symptoms of bleeding or clotting, or a surgery/procedure is scheduled.    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

## 2022-01-09 ENCOUNTER — HEALTH MAINTENANCE LETTER (OUTPATIENT)
Age: 77
End: 2022-01-09

## 2022-01-11 ENCOUNTER — DOCUMENTATION ONLY (OUTPATIENT)
Dept: FAMILY MEDICINE | Facility: CLINIC | Age: 77
End: 2022-01-11
Payer: COMMERCIAL

## 2022-01-11 ENCOUNTER — TRANSFERRED RECORDS (OUTPATIENT)
Dept: HEALTH INFORMATION MANAGEMENT | Facility: CLINIC | Age: 77
End: 2022-01-11
Payer: COMMERCIAL

## 2022-01-11 DIAGNOSIS — Z95.2 S/P MITRAL VALVE REPLACEMENT: ICD-10-CM

## 2022-01-11 DIAGNOSIS — Z95.2 HEART VALVE REPLACED: ICD-10-CM

## 2022-01-11 DIAGNOSIS — Z79.01 LONG TERM CURRENT USE OF ANTICOAGULANT THERAPY: Primary | ICD-10-CM

## 2022-01-11 DIAGNOSIS — I48.20 CHRONIC ATRIAL FIBRILLATION (H): ICD-10-CM

## 2022-01-11 LAB — INR HOME MONITORING: 3.5 (ref 2.5–3.5)

## 2022-01-11 NOTE — PROGRESS NOTES
ANTICOAGULATION  MANAGEMENT-Home Monitor Managed by Exception    Feng Sergio Wynne 77 year old male is on warfarin with therapeutic INR result. (Goal INR 2.5-3.5)    Recent labs: (last 7 days)     01/11/22  0000   INR 3.50         Previous INR was Therapeutic    Medication, diet, health changes since last INR:chart reviewed; none identified    Contacted within the last 12 weeks by phone on 12/28      PLAN     Feng was NOT contacted regarding therapeutic result today per home monitoring policy manage by exception agreement.   Current warfarin dose is to be continued:     Summary  As of 1/11/2022    Full warfarin instructions:  10 mg every Tue, Sat; 5 mg all other days   Next INR check:             ?   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

## 2022-01-25 ENCOUNTER — DOCUMENTATION ONLY (OUTPATIENT)
Dept: FAMILY MEDICINE | Facility: CLINIC | Age: 77
End: 2022-01-25
Payer: COMMERCIAL

## 2022-01-25 DIAGNOSIS — Z95.2 HEART VALVE REPLACED: ICD-10-CM

## 2022-01-25 DIAGNOSIS — Z79.01 LONG TERM CURRENT USE OF ANTICOAGULANT THERAPY: Primary | ICD-10-CM

## 2022-01-25 DIAGNOSIS — I48.20 CHRONIC ATRIAL FIBRILLATION (H): ICD-10-CM

## 2022-01-25 DIAGNOSIS — Z95.2 S/P MITRAL VALVE REPLACEMENT: ICD-10-CM

## 2022-01-25 LAB — INR HOME MONITORING: 3.1 (ref 2.5–3.5)

## 2022-01-25 NOTE — PROGRESS NOTES
ANTICOAGULATION  MANAGEMENT-Home Monitor Managed by Exception    Feng Wynne 77 year old male is on warfarin with therapeutic INR result. (Goal INR 2.5-3.5)    Recent labs: (last 7 days)     01/25/22  0000   INR 3.10         Previous INR was Therapeutic    Medication, diet, health changes since last INR:chart reviewed; none identified    Contacted within the last 12 weeks by phone on 12/28/21      COLTON Toney was NOT contacted regarding therapeutic result today per home monitoring policy manage by exception agreement.   Current warfarin dose is to be continued:     Summary  As of 1/25/2022    Full warfarin instructions:  10 mg every Tue, Sat; 5 mg all other days   Next INR check:  2/8/2022           ?   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

## 2022-02-04 DIAGNOSIS — I48.20 CHRONIC ATRIAL FIBRILLATION (H): ICD-10-CM

## 2022-02-04 RX ORDER — WARFARIN SODIUM 5 MG/1
TABLET ORAL
Qty: 135 TABLET | Refills: 1 | Status: SHIPPED | OUTPATIENT
Start: 2022-02-04 | End: 2022-04-05

## 2022-02-04 NOTE — TELEPHONE ENCOUNTER
Anticoagulation Monitoring Return Date Recheck   Latest Ref Rng & Units     1/25/2022 2/8/2022      Anticoagulation Monitoring Instructions   Latest Ref Rng & Units    1/25/2022 10 mg every Tue, Sat; 5 mg all other days   Prescription approved per [facility] Refill Protocol.  Love Elliott, RN  Anticoagulation Nurse - [facility]

## 2022-02-04 NOTE — TELEPHONE ENCOUNTER
Last INR 1-    LOV 10- Carmine - tayue for office visit    Appointments in Next Year    Feb 09, 2022 10:30 AM  (Arrive by 10:10 AM)  Annual Wellness Visit with Paula Lou MD  [facility] ([facility] ) [phone]   Mar 15, 2022  9:45 AM  Return Provider Change with Jeannine Gomez MD  [facility] ([facility] ) [phone]        Please update dosing instructions as needed    Veda Dutta RT (R)

## 2022-02-08 ENCOUNTER — DOCUMENTATION ONLY (OUTPATIENT)
Dept: FAMILY MEDICINE | Facility: CLINIC | Age: 77
End: 2022-02-08
Payer: COMMERCIAL

## 2022-02-08 DIAGNOSIS — Z79.01 LONG TERM CURRENT USE OF ANTICOAGULANT THERAPY: Primary | ICD-10-CM

## 2022-02-08 DIAGNOSIS — Z95.2 HEART VALVE REPLACED: ICD-10-CM

## 2022-02-08 DIAGNOSIS — I48.20 CHRONIC ATRIAL FIBRILLATION (H): ICD-10-CM

## 2022-02-08 DIAGNOSIS — Z95.2 S/P MITRAL VALVE REPLACEMENT: ICD-10-CM

## 2022-02-08 LAB — INR HOME MONITORING: 2.9 (ref 2.5–3.5)

## 2022-02-08 NOTE — PROGRESS NOTES
ANTICOAGULATION  MANAGEMENT-Home Monitor Managed by Exception    Feng Wynne 77 year old male is on warfarin with therapeutic INR result. (Goal INR 2.5-3.5)    Recent labs: (last 7 days)     02/08/22  0000   INR 2.90         Previous INR was Therapeutic    Medication, diet, health changes since last INR:chart reviewed; none identified    Contacted within the last 12 weeks by phone on 12/28/21      COLTON Toney was NOT contacted regarding therapeutic result today per home monitoring policy manage by exception agreement.   Current warfarin dose is to be continued:     Summary  As of 2/8/2022    Full warfarin instructions:  10 mg every Tue, Sat; 5 mg all other days   Next INR check:  2/22/2022           ?   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

## 2022-02-09 ENCOUNTER — OFFICE VISIT (OUTPATIENT)
Dept: FAMILY MEDICINE | Facility: CLINIC | Age: 77
End: 2022-02-09
Payer: COMMERCIAL

## 2022-02-09 VITALS
BODY MASS INDEX: 42.66 KG/M2 | RESPIRATION RATE: 19 BRPM | DIASTOLIC BLOOD PRESSURE: 78 MMHG | HEART RATE: 70 BPM | SYSTOLIC BLOOD PRESSURE: 128 MMHG | TEMPERATURE: 97.5 F | HEIGHT: 72 IN | WEIGHT: 315 LBS | OXYGEN SATURATION: 99 %

## 2022-02-09 DIAGNOSIS — I10 ESSENTIAL HYPERTENSION: ICD-10-CM

## 2022-02-09 DIAGNOSIS — G89.29 CHRONIC PAIN OF RIGHT KNEE: ICD-10-CM

## 2022-02-09 DIAGNOSIS — Z12.5 SCREENING FOR PROSTATE CANCER: ICD-10-CM

## 2022-02-09 DIAGNOSIS — E78.5 HYPERLIPIDEMIA LDL GOAL <130: ICD-10-CM

## 2022-02-09 DIAGNOSIS — E66.89 OTHER OBESITY: ICD-10-CM

## 2022-02-09 DIAGNOSIS — Z86.39 H/O VITAMIN D DEFICIENCY: ICD-10-CM

## 2022-02-09 DIAGNOSIS — E66.01 MORBID OBESITY DUE TO EXCESS CALORIES (H): ICD-10-CM

## 2022-02-09 DIAGNOSIS — D64.9 ANEMIA, UNSPECIFIED TYPE: ICD-10-CM

## 2022-02-09 DIAGNOSIS — Z00.00 WELLNESS EXAMINATION: Primary | ICD-10-CM

## 2022-02-09 DIAGNOSIS — M25.561 CHRONIC PAIN OF RIGHT KNEE: ICD-10-CM

## 2022-02-09 DIAGNOSIS — Z12.11 COLON CANCER SCREENING: ICD-10-CM

## 2022-02-09 DIAGNOSIS — I48.20 CHRONIC ATRIAL FIBRILLATION (H): ICD-10-CM

## 2022-02-09 LAB
ERYTHROCYTE [DISTWIDTH] IN BLOOD BY AUTOMATED COUNT: 13.6 % (ref 10–15)
HCT VFR BLD AUTO: 38.7 % (ref 40–53)
HGB BLD-MCNC: 12.3 G/DL (ref 13.3–17.7)
MCH RBC QN AUTO: 32.6 PG (ref 26.5–33)
MCHC RBC AUTO-ENTMCNC: 31.8 G/DL (ref 31.5–36.5)
MCV RBC AUTO: 103 FL (ref 78–100)
PLATELET # BLD AUTO: 138 10E3/UL (ref 150–450)
RBC # BLD AUTO: 3.77 10E6/UL (ref 4.4–5.9)
VIT B12 SERPL-MCNC: 389 PG/ML (ref 193–986)
WBC # BLD AUTO: 4.4 10E3/UL (ref 4–11)

## 2022-02-09 PROCEDURE — G0103 PSA SCREENING: HCPCS | Performed by: INTERNAL MEDICINE

## 2022-02-09 PROCEDURE — 82728 ASSAY OF FERRITIN: CPT | Performed by: INTERNAL MEDICINE

## 2022-02-09 PROCEDURE — 99397 PER PM REEVAL EST PAT 65+ YR: CPT | Performed by: INTERNAL MEDICINE

## 2022-02-09 PROCEDURE — 85027 COMPLETE CBC AUTOMATED: CPT | Performed by: INTERNAL MEDICINE

## 2022-02-09 PROCEDURE — 82306 VITAMIN D 25 HYDROXY: CPT | Performed by: INTERNAL MEDICINE

## 2022-02-09 PROCEDURE — 36415 COLL VENOUS BLD VENIPUNCTURE: CPT | Performed by: INTERNAL MEDICINE

## 2022-02-09 PROCEDURE — 99214 OFFICE O/P EST MOD 30 MIN: CPT | Mod: 25 | Performed by: INTERNAL MEDICINE

## 2022-02-09 PROCEDURE — 82607 VITAMIN B-12: CPT | Performed by: INTERNAL MEDICINE

## 2022-02-09 PROCEDURE — 80061 LIPID PANEL: CPT | Performed by: INTERNAL MEDICINE

## 2022-02-09 PROCEDURE — 80053 COMPREHEN METABOLIC PANEL: CPT | Performed by: INTERNAL MEDICINE

## 2022-02-09 RX ORDER — LISINOPRIL 40 MG/1
40 TABLET ORAL DAILY
Qty: 90 TABLET | Refills: 1 | Status: SHIPPED | OUTPATIENT
Start: 2022-02-09 | End: 2022-08-12

## 2022-02-09 ASSESSMENT — ENCOUNTER SYMPTOMS
FREQUENCY: 0
DYSURIA: 0
COUGH: 0
HEMATOCHEZIA: 0
JOINT SWELLING: 1
DIZZINESS: 0
DIARRHEA: 0
ARTHRALGIAS: 1
MYALGIAS: 1
PALPITATIONS: 0
SORE THROAT: 0
ABDOMINAL PAIN: 0
WEAKNESS: 0
NERVOUS/ANXIOUS: 0
HEARTBURN: 0
HEADACHES: 0
HEMATURIA: 0
FEVER: 0
CHILLS: 0
NAUSEA: 0
CONSTIPATION: 0
SHORTNESS OF BREATH: 0
EYE PAIN: 0
PARESTHESIAS: 0

## 2022-02-09 ASSESSMENT — PAIN SCALES - GENERAL: PAINLEVEL: MODERATE PAIN (5)

## 2022-02-09 ASSESSMENT — ACTIVITIES OF DAILY LIVING (ADL): CURRENT_FUNCTION: HOUSEWORK REQUIRES ASSISTANCE

## 2022-02-09 ASSESSMENT — MIFFLIN-ST. JEOR: SCORE: 2211.57

## 2022-02-09 NOTE — PROGRESS NOTES
"SUBJECTIVE:   Feng Wynne is a 77 year old male who presents for Preventive Visit.      Has myalgia from statin, and breaks, weakness,     Zetia, had tried a rash right away..    Eats raisin...    Stationary bike 30 minutes,     Has horrible sciatica,     Voiding, is good,  without circumcision,     Lost appetite,    No sleep apnea..tested     Like carbs's and spicy food..     Patient has been advised of split billing requirements and indicates understanding: Yes  Are you in the first 12 months of your Medicare coverage?  No    Healthy Habits:     In general, how would you rate your overall health?  Good    Frequency of exercise:  6-7 days/week    Duration of exercise:  30-45 minutes    Do you usually eat at least 4 servings of fruit and vegetables a day, include whole grains    & fiber and avoid regularly eating high fat or \"junk\" foods?  Yes    Taking medications regularly:  Yes    Medication side effects:  None    Ability to successfully perform activities of daily living:  Housework requires assistance    Home Safety:  No safety concerns identified    Hearing Impairment:  No hearing concerns    In the past 6 months, have you been bothered by leaking of urine?  No    In general, how would you rate your overall mental or emotional health?  Excellent      PHQ-2 Total Score: 0    Additional concerns today:  No    Do you feel safe in your environment? Yes    Have you ever done Advance Care Planning? (For example, a Health Directive, POLST, or a discussion with a medical provider or your loved ones about your wishes): Yes, advance care planning is on file.       Fall risk  Fallen 2 or more times in the past year?: No  Any fall with injury in the past year?: No    Cognitive Screening   1) Repeat 3 items (Leader, Season, Table)    2) Clock draw: NORMAL  3) 3 item recall: Recalls 3 objects  Results: 3 items recalled: COGNITIVE IMPAIRMENT LESS LIKELY    Mini-CogTM Copyright S Hayley. Licensed by the author for use " in [facility]; reprinted with permission (socarmen@[facility]). All rights reserved.      Do you have sleep apnea, excessive snoring or daytime drowsiness?: no    Reviewed and updated as needed this visit by clinical staff   Allergies   Problems            Reviewed and updated as needed this visit by Provider     Problems           Social History     Tobacco Use     Smoking status: Former Smoker     Packs/day: 0.50     Years: 5.00     Pack years: 2.50     Types: Cigarettes     Smokeless tobacco: Never Used     Tobacco comment: quit 20+ yrs ago   Substance Use Topics     Alcohol use: Yes     Alcohol/week: 0.0 standard drinks     Comment: 2 drinks/month     If you drink alcohol do you typically have >3 drinks per day or >7 drinks per week? No    Alcohol Use 2/9/2022   Prescreen: >3 drinks/day or >7 drinks/week? No   Prescreen: >3 drinks/day or >7 drinks/week? -           Current providers sharing in care for this patient include:     Patient Care Team:  Paula Lou MD as PCP - General (Internal Medicine)  Armani Marin MD as MD (Cardiology)  Armani Marin MD as Assigned Heart and Vascular Provider  Paula Lou MD as Assigned PCP    The following health maintenance items are reviewed in Epic and correct as of today:  Health Maintenance Due   Topic Date Due     ANNUAL REVIEW OF HM ORDERS  Never done     LUNG CANCER SCREENING  Never done     ZOSTER IMMUNIZATION (2 of 3) 06/03/2013     FALL RISK ASSESSMENT  06/15/2021     Lab work is in process  Labs reviewed in EPIC  BP Readings from Last 3 Encounters:   02/09/22 128/78   10/16/20 133/78   11/05/19 128/74    Wt Readings from Last 3 Encounters:   02/09/22 145.2 kg (320 lb)   10/16/20 149.2 kg (329 lb)   11/05/19 (!) 153.8 kg (339 lb)                  Patient Active Problem List   Diagnosis     Allergic rhinitis     Chronic atrial fibrillation (H)     S/P mitral valve replacement     Hyperlipidemia LDL goal <130     Atrial  fibrillation (H)     Lumbago     Knee pain     Rosacea     Proteinuria     Essential hypertension with goal blood pressure less than 140/90     Morbid obesity due to excess calories (H)     Long-term (current) use of anticoagulants [Z79.01]     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]     Essential hypertension     Chronic right shoulder pain     Past Surgical History:   Procedure Laterality Date     MITRAL VALVE REPLACEMENT  8-    Mitral valve replacement with 31-mm [employer]affi mechanical valve.        Social History     Tobacco Use     Smoking status: Former Smoker     Packs/day: 0.50     Years: 5.00     Pack years: 2.50     Types: Cigarettes     Smokeless tobacco: Never Used     Tobacco comment: quit 20+ yrs ago   Substance Use Topics     Alcohol use: Yes     Alcohol/week: 0.0 standard drinks     Comment: 2 drinks/month     Family History   Problem Relation Age of Onset     Cerebrovascular Disease Father      Diabetes Paternal Grandmother      C.A.D. Brother         CABG X 3 at age 51         Current Outpatient Medications   Medication Sig Dispense Refill     ASPIRIN NOT PRESCRIBED, INTENTIONAL,   0     atenolol (TENORMIN) 25 MG tablet Take 1 tablet (25 mg) by mouth 2 times daily 180 tablet 2     clindamycin (CLEOCIN) 300 MG capsule Take 2 capsules (600 mg) by mouth See Admin Instructions 600 mg one hour prior to dental procedure 6 capsule 1     diclofenac (VOLTAREN) 1 % topical gel Place 4 g onto the skin 4 times daily as needed for moderate pain (knee pain) 100 g 3     ketoconazole (NIZORAL) 2 % external cream Apply topically 2 times daily 15 g 1     lisinopril (ZESTRIL) 40 MG tablet Take 1 tablet (40 mg) by mouth daily 90 tablet 1     Multiple Vitamins-Minerals (CENTRUM SILVER PO) Take 1 tablet by mouth daily.       Omega-3 Fatty Acids (FISH OIL CONCENTRATE PO) Take  by mouth daily.       order for DME Equipment being ordered: COMPRESSION STOCKINGS, 20-30 mmHg 1 each 1     warfarin ANTICOAGULANT (COUMADIN) 5 MG tablet  Take 2 tablets (10 mg) on Tuesdays,Saturdays and take 1 tablet (5 mg) all other days or as directed by the INR clinic 135 tablet 1     ezetimibe (ZETIA) 10 MG tablet Take 1 tablet (10 mg) by mouth daily (Patient not taking: Reported on 2/9/2022) 90 tablet 3     Allergies   Allergen Reactions     Amiodarone Shortness Of Breath     Latex      Pcn [Penicillins]      Sulfites      Zetia [Ezetimibe] Rash     Recent Labs   Lab Test 10/16/20  1058 03/14/19  0959 06/06/17  0658   LDL 79 91 77   HDL 48 42 47   TRIG 102 107 115   ALT 21  --   --    CR 0.85 0.85  --    GFRESTIMATED 85 86  --    GFRESTBLACK >90 >90  --    POTASSIUM 4.6 4.5  --    TSH  --  2.39  --       Pneumonia Vaccine:Adults age 65+ who received Pneumovax (PPSV23) at 65 years or older: Should be given PCV13 > 1 year after their most recent PPSV23        Review of Systems   Constitutional: Negative for chills and fever.   HENT: Positive for congestion. Negative for ear pain, hearing loss and sore throat.    Eyes: Negative for pain and visual disturbance.   Respiratory: Negative for cough and shortness of breath.    Cardiovascular: Negative for chest pain, palpitations and peripheral edema.   Gastrointestinal: Negative for abdominal pain, constipation, diarrhea, heartburn, hematochezia and nausea.   Genitourinary: Negative for dysuria, frequency, genital sores, hematuria, impotence and urgency.   Musculoskeletal: Positive for arthralgias, joint swelling and myalgias.   Skin: Negative for rash.   Neurological: Negative for dizziness, weakness, headaches and paresthesias.   Psychiatric/Behavioral: Positive for mood changes. The patient is not nervous/anxious.      Constitutional, HEENT, cardiovascular, pulmonary, GI, , musculoskeletal, neuro, skin, endocrine and psych systems are negative, except as otherwise noted.    OBJECTIVE:   /78 (BP Location: Left arm, Patient Position: Chair, Cuff Size: Adult Large)   Pulse 70   Temp 97.5  F (36.4  C)  "(Temporal)   Resp 19   Ht 1.816 m (5' 11.5\")   Wt 145.2 kg (320 lb)   SpO2 99%   BMI 44.01 kg/m   Estimated body mass index is 44.01 kg/m  as calculated from the following:    Height as of this encounter: 1.816 m (5' 11.5\").    Weight as of this encounter: 145.2 kg (320 lb).  Physical Exam  GENERAL: healthy, alert and no distress  EYES: Eyes grossly normal to inspection, PERRL and conjunctivae and sclerae normal  HENT: ear canals and TM's normal, nose and mouth without ulcers or lesions  NECK: no adenopathy, no asymmetry, masses, or scars and thyroid normal to palpation  RESP: lungs clear to auscultation - no rales, rhonchi or wheezes  CV: regular rate and rhythm, normal S1 S2, no S3 or S4, no murmur, click or rub, no peripheral edema and peripheral pulses strong  ABDOMEN: soft, nontender, no hepatosplenomegaly, no masses and bowel sounds normal  MS: no gross musculoskeletal defects noted, no edema  SKIN: no suspicious lesions or rashes  NEURO: Normal strength and tone, mentation intact and speech normal  PSYCH: mentation appears normal, affect normal/bright    Diagnostic Test Results:  Labs reviewed in Epic    ASSESSMENT / PLAN:   Feng was seen today for physical.    Diagnoses and all orders for this visit:    Wellness examination    Hyperlipidemia LDL goal <130  -     Lipid panel reflex to direct LDL Fasting    Anemia, unspecified type  -     CBC with platelets  -     Ferritin  -     Vitamin B12    H/O vitamin D deficiency  -     Vitamin D Deficiency    Other obesity   -     Vitamin D Deficiency    Colon cancer screening  -     Fecal colorectal cancer screen (FIT); Future  -     Fecal colorectal cancer screen (FIT)    Chronic atrial fibrillation (H)    Essential hypertension  -     Comprehensive metabolic panel (BMP + Alb, Alk Phos, ALT, AST, Total. Bili, TP)    Chronic pain of right knee    Morbid obesity due to excess calories (H)    Screening for prostate cancer  -     PSA, screen    Other orders  -     " "lisinopril (ZESTRIL) 40 MG tablet; Take 1 tablet (40 mg) by mouth daily      Discussed multiple health concerns today.  Blood pressure seems well controlled.  Refill given for lisinopril.  ,  Remains on chronic anticoagulation.  He has chronic right knee pain bone-on-bone is looking into surgery in future.  His goal is to get below 300 pounds.  He is watching his diet he is more vegetarian.  Discussed healthy diet choices low carbs intake.  Repeat labs today using vitamin D3 supplements daily.  Chemistry panel CBC.  Has mild anemia we will check it B12 ferritin level as well.  Yearly fit test.  Has been always negative.  Declines colonoscopy.  Keep ambulatory as possible.  Uses a walker for ambulation.  He has an appointment upcoming with cardiology  He had concerns about circumcision advised on adequate hygiene and keep area moist with Vaseline using a clean close try to retract the skin over the penis as possible to prevent any adhesions.  Patient reiterated understanding.  Has good voiding stream no problems with the lower urinary tract obstructive symptoms.  We will check PSA screen.    Patient has been advised of split billing requirements and indicates understanding: Yes    COUNSELING:  Reviewed preventive health counseling, as reflected in patient instructions       Regular exercise       Healthy diet/nutrition       Vision screening       Hearing screening       Dental care       Bladder control       Fall risk prevention       Colon cancer screening       Prostate cancer screening    Estimated body mass index is 44.01 kg/m  as calculated from the following:    Height as of this encounter: 1.816 m (5' 11.5\").    Weight as of this encounter: 145.2 kg (320 lb).    Weight management plan: Discussed healthy diet and exercise guidelines    He reports that he has quit smoking. His smoking use included cigarettes. He has a 2.50 pack-year smoking history. He has never used smokeless tobacco.      Appropriate " preventive services were discussed with this patient, including applicable screening as appropriate for cardiovascular disease, diabetes, osteopenia/osteoporosis, and glaucoma.  As appropriate for age/gender, discussed screening for colorectal cancer, prostate cancer, breast cancer, and cervical cancer. Checklist reviewing preventive services available has been given to the patient.    Reviewed patients plan of care and provided an AVS. The Basic Care Plan (routine screening as documented in Health Maintenance) for Feng meets the Care Plan requirement. This Care Plan has been established and reviewed with the Patient.    Counseling Resources:  ATP IV Guidelines  Pooled Cohorts Equation Calculator  Breast Cancer Risk Calculator  Breast Cancer: Medication to Reduce Risk  FRAX Risk Assessment  ICSI Preventive Guidelines  Dietary Guidelines for Americans, 2010  USDA's MyPlate  ASA Prophylaxis  Lung CA Screening    Paula Lou MD  [facility]    Identified Health Risks:

## 2022-02-10 LAB
ALBUMIN SERPL-MCNC: 3.7 G/DL (ref 3.4–5)
ALP SERPL-CCNC: 61 U/L (ref 40–150)
ALT SERPL W P-5'-P-CCNC: 19 U/L (ref 0–70)
ANION GAP SERPL CALCULATED.3IONS-SCNC: 3 MMOL/L (ref 3–14)
AST SERPL W P-5'-P-CCNC: 17 U/L (ref 0–45)
BILIRUB SERPL-MCNC: 0.6 MG/DL (ref 0.2–1.3)
BUN SERPL-MCNC: 30 MG/DL (ref 7–30)
CALCIUM SERPL-MCNC: 9.2 MG/DL (ref 8.5–10.1)
CHLORIDE BLD-SCNC: 107 MMOL/L (ref 94–109)
CHOLEST SERPL-MCNC: 141 MG/DL
CO2 SERPL-SCNC: 27 MMOL/L (ref 20–32)
CREAT SERPL-MCNC: 0.9 MG/DL (ref 0.66–1.25)
DEPRECATED CALCIDIOL+CALCIFEROL SERPL-MC: 44 UG/L (ref 20–75)
FASTING STATUS PATIENT QL REPORTED: YES
FERRITIN SERPL-MCNC: 33 NG/ML (ref 26–388)
GFR SERPL CREATININE-BSD FRML MDRD: 89 ML/MIN/1.73M2
GLUCOSE BLD-MCNC: 96 MG/DL (ref 70–99)
HDLC SERPL-MCNC: 46 MG/DL
LDLC SERPL CALC-MCNC: 78 MG/DL
NONHDLC SERPL-MCNC: 95 MG/DL
POTASSIUM BLD-SCNC: 5 MMOL/L (ref 3.4–5.3)
PROT SERPL-MCNC: 7.3 G/DL (ref 6.8–8.8)
PSA SERPL-MCNC: 2.32 UG/L (ref 0–4)
SODIUM SERPL-SCNC: 137 MMOL/L (ref 133–144)
TRIGL SERPL-MCNC: 85 MG/DL

## 2022-02-11 ENCOUNTER — TELEPHONE (OUTPATIENT)
Dept: FAMILY MEDICINE | Facility: CLINIC | Age: 77
End: 2022-02-11
Payer: COMMERCIAL

## 2022-02-11 NOTE — TELEPHONE ENCOUNTER
Lab - patient called     He accidentally dropped FIT kit in toilet     Asking if new kit can be mailed to him?     Thank you     Irena ARCOS, Triage RN  [facility]

## 2022-02-22 ENCOUNTER — ANTICOAGULATION THERAPY VISIT (OUTPATIENT)
Dept: FAMILY MEDICINE | Facility: CLINIC | Age: 77
End: 2022-02-22
Payer: COMMERCIAL

## 2022-02-22 DIAGNOSIS — Z95.2 S/P MITRAL VALVE REPLACEMENT: ICD-10-CM

## 2022-02-22 DIAGNOSIS — Z79.01 LONG TERM CURRENT USE OF ANTICOAGULANT THERAPY: Primary | ICD-10-CM

## 2022-02-22 DIAGNOSIS — I48.20 CHRONIC ATRIAL FIBRILLATION (H): ICD-10-CM

## 2022-02-22 DIAGNOSIS — Z95.2 HEART VALVE REPLACED: ICD-10-CM

## 2022-02-22 LAB — INR HOME MONITORING: 3.1 (ref 2.5–3.5)

## 2022-02-22 NOTE — PROGRESS NOTES
ANTICOAGULATION  MANAGEMENT-Home Monitor Managed by Exception    Feng Sergio Wynne 77 year old male is on warfarin with therapeutic INR result. (Goal INR 2.5-3.5)    Recent labs: (last 7 days)     02/22/22  0000   INR 3.10         Previous INR was Therapeutic    Medication, diet, health changes since last INR:pt has mild anemia noted from 2/9 office visit/labs; will monitor    Contacted within the last 12 weeks by phone on 12/28      COLTON     Feng was NOT contacted regarding therapeutic result today per home monitoring policy manage by exception agreement.   Current warfarin dose is to be continued:     Summary  As of 2/22/2022    Full warfarin instructions:  10 mg every Tue, Sat; 5 mg all other days   Next INR check:  3/8/2022           ?   Marie Mirza RN  Anticoagulation Clinic  2/22/2022    _______________________________________________________________________     Anticoagulation Episode Summary     Current INR goal:  2.5-3.5   TTR:  95.6 % (1 y)   Target end date:  Indefinite   Send INR reminders to:  DIA MARK    Indications    Long-term (current) use of anticoagulants [Z79.01] [Z79.01]  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] [Z95.2]  S/P mitral valve replacement [Z95.2]  Chronic atrial fibrillation (H) [I48.20]           Comments:  [employer] HOME MONITORING Patient, okay  to manage by exception on 7/8/20         Anticoagulation Care Providers     Provider Role Specialty Phone number    Enrique Walker MD Referring Internal Medicine [phone]    Paula Lou MD Referring Internal Medicine [phone]

## 2022-03-08 ENCOUNTER — DOCUMENTATION ONLY (OUTPATIENT)
Dept: ANTICOAGULATION | Facility: CLINIC | Age: 77
End: 2022-03-08
Payer: COMMERCIAL

## 2022-03-08 DIAGNOSIS — I48.20 CHRONIC ATRIAL FIBRILLATION (H): ICD-10-CM

## 2022-03-08 DIAGNOSIS — Z95.2 S/P MITRAL VALVE REPLACEMENT: ICD-10-CM

## 2022-03-08 DIAGNOSIS — Z95.2 HEART VALVE REPLACED: ICD-10-CM

## 2022-03-08 DIAGNOSIS — Z79.01 LONG TERM CURRENT USE OF ANTICOAGULANT THERAPY: Primary | ICD-10-CM

## 2022-03-08 LAB — INR HOME MONITORING: 2.5 (ref 2.5–3.5)

## 2022-03-08 NOTE — PROGRESS NOTES
ANTICOAGULATION  MANAGEMENT-Home Monitor Managed by Exception    Feng Sergio Wynne 77 year old male is on warfarin with therapeutic INR result. (Goal INR 2.5-3.5)    Recent labs: (last 7 days)     03/08/22  0000   INR 2.50         Previous INR was Therapeutic    Medication, diet, health changes since last INR:chart reviewed; none identified    Contacted within the last 12 weeks by phone on 12/28/21      COLTON Toney was NOT contacted regarding therapeutic result today per home monitoring policy manage by exception agreement.   Current warfarin dose is to be continued:     Summary  As of 3/8/2022    Full warfarin instructions:  10 mg every Tue, Sat; 5 mg all other days   Next INR check:  3/22/2022           ?   Chikis Desir RN  Anticoagulation Clinic  3/8/2022    _______________________________________________________________________     Anticoagulation Episode Summary     Current INR goal:  2.5-3.5   TTR:  95.6 % (1 y)   Target end date:  Indefinite   Send INR reminders to:  DIA MARK    Indications    Long-term (current) use of anticoagulants [Z79.01] [Z79.01]  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] [Z95.2]  S/P mitral valve replacement [Z95.2]  Chronic atrial fibrillation (H) [I48.20]           Comments:  [employer] HOME MONITORING Patient, okay  to manage by exception on 7/8/20         Anticoagulation Care Providers     Provider Role Specialty Phone number    Enrique Walker MD Referring Internal Medicine [phone]    Paula Lou MD Referring Internal Medicine [phone]

## 2022-03-22 ENCOUNTER — DOCUMENTATION ONLY (OUTPATIENT)
Dept: ANTICOAGULATION | Facility: CLINIC | Age: 77
End: 2022-03-22
Payer: COMMERCIAL

## 2022-03-22 DIAGNOSIS — Z95.2 HEART VALVE REPLACED: ICD-10-CM

## 2022-03-22 DIAGNOSIS — Z79.01 LONG TERM CURRENT USE OF ANTICOAGULANT THERAPY: Primary | ICD-10-CM

## 2022-03-22 DIAGNOSIS — I48.20 CHRONIC ATRIAL FIBRILLATION (H): ICD-10-CM

## 2022-03-22 DIAGNOSIS — Z95.2 S/P MITRAL VALVE REPLACEMENT: ICD-10-CM

## 2022-03-22 LAB — INR HOME MONITORING: 3.3 (ref 2.5–3.5)

## 2022-03-22 NOTE — PROGRESS NOTES
ANTICOAGULATION  MANAGEMENT-Home Monitor Managed by Exception    Feng Wynne 77 year old male is on warfarin with therapeutic INR result. (Goal INR 2.5-3.5)    Recent labs: (last 7 days)     03/22/22  0000   INR 3.30         Previous INR was Therapeutic    Medication, diet, health changes since last INR:chart reviewed; none identified    Contacted within the last 12 weeks by phone on 12/28/21          COLTON Toney was NOT contacted regarding therapeutic result today per home monitoring policy manage by exception agreement.   Current warfarin dose is to be continued:     Summary  As of 3/22/2022    Full warfarin instructions:  10 mg every Tue, Sat; 5 mg all other days   Next INR check:  4/5/2022           ?   Natali Mondragon RN  Anticoagulation Clinic  3/22/2022    _______________________________________________________________________     Anticoagulation Episode Summary     Current INR goal:  2.5-3.5   TTR:  95.6 % (1 y)   Target end date:  Indefinite   Send INR reminders to:  DIA MARK    Indications    Long-term (current) use of anticoagulants [Z79.01] [Z79.01]  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] [Z95.2]  S/P mitral valve replacement [Z95.2]  Chronic atrial fibrillation (H) [I48.20]           Comments:  [employer] HOME MONITORING Patient, okay  to manage by exception on 7/8/20         Anticoagulation Care Providers     Provider Role Specialty Phone number    Enrique Walker MD Referring Internal Medicine [phone]    Paula Lou MD Referring Internal Medicine [phone]

## 2022-03-30 PROCEDURE — 82274 ASSAY TEST FOR BLOOD FECAL: CPT | Performed by: INTERNAL MEDICINE

## 2022-04-01 LAB — HEMOCCULT STL QL IA: NEGATIVE

## 2022-04-02 DIAGNOSIS — I48.20 CHRONIC ATRIAL FIBRILLATION (H): ICD-10-CM

## 2022-04-02 NOTE — TELEPHONE ENCOUNTER
Warfarin 5 mg tablet    Summary: Take 2 tablets (10 mg) on Tuesdays,Saturdays and take 1 tablet (5 mg) all other days or as directed by the INR clinic, Disp-135 tablet, R-1, E-Prescribe   Start: 2/4/2022  Ord/Sold: 2/4/2022

## 2022-04-05 ENCOUNTER — ANTICOAGULATION THERAPY VISIT (OUTPATIENT)
Dept: ANTICOAGULATION | Facility: CLINIC | Age: 77
End: 2022-04-05
Payer: COMMERCIAL

## 2022-04-05 DIAGNOSIS — Z95.2 HEART VALVE REPLACED: ICD-10-CM

## 2022-04-05 DIAGNOSIS — I48.20 CHRONIC ATRIAL FIBRILLATION (H): ICD-10-CM

## 2022-04-05 DIAGNOSIS — Z95.2 S/P MITRAL VALVE REPLACEMENT: ICD-10-CM

## 2022-04-05 DIAGNOSIS — Z79.01 LONG TERM CURRENT USE OF ANTICOAGULANT THERAPY: Primary | ICD-10-CM

## 2022-04-05 LAB — INR HOME MONITORING: 2.6 (ref 2.5–3.5)

## 2022-04-05 RX ORDER — WARFARIN SODIUM 5 MG/1
TABLET ORAL
Qty: 135 TABLET | Refills: 1 | Status: SHIPPED | OUTPATIENT
Start: 2022-04-05 | End: 2022-09-30

## 2022-04-05 NOTE — TELEPHONE ENCOUNTER
Last INR today 4/5/22, follow up 2 weeks (not scheduled at time of writing)    Of note: you have referring provider as Dr Walker - he is no longer with [facility] (since Sept 2020)    PCP Dr Paula Lou    LOV 2-9-2022 Carmine for wellness exam    Veda Dutta RT (R)

## 2022-04-05 NOTE — TELEPHONE ENCOUNTER
Anticoagulation Monitoring Return Date Recheck   Latest Ref Rng & Units     4/5/2022 4/19/2022      Anticoagulation Monitoring Instructions   Latest Ref Rng & Units    4/5/2022 10 mg every Tue, Sat; 5 mg all other days   Prescription approved per [facility] Refill Protocol.  Love Elliott, RN  Anticoagulation Nurse - [facility]

## 2022-04-05 NOTE — PROGRESS NOTES
ANTICOAGULATION MANAGEMENT     Feng Tomn Sherrell 77 year old male is on warfarin with therapeutic INR result. (Goal INR 2.5-3.5)    Recent labs: (last 7 days)     04/05/22  0000   INR 2.60       ASSESSMENT       Source(s): Chart Review and Patient/Caregiver Call       Warfarin doses taken: Warfarin taken as instructed    Diet: No new diet changes identified    New illness, injury, or hospitalization: No    Medication/supplement changes: None noted    Signs or symptoms of bleeding or clotting: No    Previous INR: Therapeutic last 2(+) visits    Additional findings: None       PLAN     Recommended plan for no diet, medication or health factor changes affecting INR     Dosing Instructions: continue your current warfarin dose with next INR in 2 weeks       Summary  As of 4/5/2022    Full warfarin instructions:  10 mg every Tue, Sat; 5 mg all other days   Next INR check:  4/19/2022             Telephone call with Feng who verbalizes understanding and agrees to plan    Patient to recheck with home meter    Education provided: Please call back if any changes to your diet, medications or how you've been taking warfarin and Resume manage by exception with home monitor. Continue to submit INR results to home monitor company.You will only be called when your result is out of range. Please call and notify [facility] if new medication started, dose missed, signs or symptoms of bleeding or clotting, or a surgery/procedure is scheduled.    Plan made per [facility] anticoagulation protocol    Gaviota Weston RN  Anticoagulation Clinic  4/5/2022    _______________________________________________________________________     Anticoagulation Episode Summary     Current INR goal:  2.5-3.5   TTR:  95.6 % (1 y)   Target end date:  Indefinite   Send INR reminders to:  DIA MARK    Indications    Long-term (current) use of anticoagulants [Z79.01] [Z79.01]  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] [Z95.2]  S/P mitral valve replacement [Z95.2]  Chronic atrial  fibrillation (H) [I48.20]           Comments:  [employer] HOME MONITORING Patient, okay  to manage by exception on 7/8/20         Anticoagulation Care Providers     Provider Role Specialty Phone number    Enrique Walker MD Referring Internal Medicine [phone]    Paula Lou MD Referring Internal Medicine [phone]

## 2022-04-13 ENCOUNTER — VIRTUAL VISIT (OUTPATIENT)
Dept: FAMILY MEDICINE | Facility: CLINIC | Age: 77
End: 2022-04-13
Payer: COMMERCIAL

## 2022-04-13 DIAGNOSIS — R20.0 NUMBNESS AND TINGLING IN RIGHT HAND: ICD-10-CM

## 2022-04-13 DIAGNOSIS — M54.41 CHRONIC RIGHT-SIDED LOW BACK PAIN WITH RIGHT-SIDED SCIATICA: Primary | ICD-10-CM

## 2022-04-13 DIAGNOSIS — G89.29 CHRONIC RIGHT-SIDED LOW BACK PAIN WITH RIGHT-SIDED SCIATICA: Primary | ICD-10-CM

## 2022-04-13 DIAGNOSIS — R20.2 NUMBNESS AND TINGLING IN RIGHT HAND: ICD-10-CM

## 2022-04-13 PROCEDURE — 99213 OFFICE O/P EST LOW 20 MIN: CPT | Mod: 95 | Performed by: INTERNAL MEDICINE

## 2022-04-13 RX ORDER — GABAPENTIN 300 MG/1
CAPSULE ORAL
Qty: 90 CAPSULE | Refills: 0 | Status: SHIPPED | OUTPATIENT
Start: 2022-04-13 | End: 2022-08-02

## 2022-04-13 NOTE — PROGRESS NOTES
Feng is a 77 year old who is being evaluated via a billable video visit.      How would you like to obtain your AVS? MyChart  If the video visit is dropped, the invitation should be resent by: Text to cell phone: [phone]  Will anyone else be joining your video visit? No      Video Start Time: 2:49 PM    Assessment & Plan   Problem List Items Addressed This Visit        Nervous and Auditory    Lumbago - Primary    Relevant Medications    gabapentin (NEURONTIN) 300 MG capsule    Other Relevant Orders    Pain Management Referral      Other Visit Diagnoses     Numbness and tingling in right hand             Discussed medication side effects.  For low back pain with right-sided sciatica start on gabapentin 300 mg at bedtime increase in 3 days to twice a day and then another 3 days to 3 times a day if well-tolerated.  Referral to pain clinic.  May need a steroid injection and further imaging per our pain clinic.  As for the right hand numbness tingling possible positional related causing some ulnar nerve compression, possible carpal tunnel or cubital tunnel symptoms.  Patient to keep us updated on his symptoms.  if persistent symptoms can order nerve conduction velocity testing          CONSULTATION/REFERRAL to PAIN CLINIC  Work on weight loss  Regular exercise  See Patient Instructions    No follow-ups on file.    Paula Lou MD  [facility]    Kim Toney is a 77 year old who presents for the following health issues     History of Present Illness       Back Pain:  He presents for follow up of back pain. Patient's back pain is a recurring problem.  Location of back pain:  Right lower back, right middle of back, right buttock, right hip and right side of waist  Description of back pain: gnawing and shooting  Back pain spreads: right buttocks, right thigh and right knee    Since patient first noticed back pain, pain is: always present, but gets better and worse  Does back pain interfere  with his job:  Not applicable      He eats 4 or more servings of fruits and vegetables daily.He consumes 0 sweetened beverage(s) daily.He exercises with enough effort to increase his heart rate 20 to 29 minutes per day.  He exercises with enough effort to increase his heart rate 5 days per week.   He is taking medications regularly.       Issue with sciatica,   Had this before,  Need a back xray, goes from hip down to foot, was in theatre, was in pain,   Agrees with Ref dr wen. Med marijuana,   Has been taking tylenol  Denies Any tingling or numbness, in legs,   Right side, gets little tingling Does puzzles sometimes, feels it goes to sleep   Yesterday was 8/10, likes to do stationary bike,   All day long, when sitting is fine, when he gets up has shooting.    Review of Systems   Constitutional, HEENT, cardiovascular, pulmonary, gi and gu systems are negative, except as otherwise noted.      Objective           Vitals:  No vitals were obtained today due to virtual visit.    Physical Exam   GENERAL: Healthy, alert and no distress  EYES: Eyes grossly normal to inspection.  No discharge or erythema, or obvious scleral/conjunctival abnormalities.  RESP: No audible wheeze, cough, or visible cyanosis.  No visible retractions or increased work of breathing.    SKIN: Visible skin clear. No significant rash, abnormal pigmentation or lesions.  NEURO: Cranial nerves grossly intact.  Mentation and speech appropriate for age.  PSYCH: Mentation appears normal, affect normal/bright, judgement and insight intact, normal speech and appearance well-groomed.    Orders Only on 04/05/2022   Component Date Value Ref Range Status     INR HOME MONITORING 04/05/2022 2.60  2.500 - 3.500 Final         Video-Visit Details    Type of service:  Video Visit    Video End Time:3:04 PM    Originating Location (pt. Location): Home    Distant Location (provider location):  [facility]     Platform used for Video Visit: Tom

## 2022-04-14 ENCOUNTER — TELEPHONE (OUTPATIENT)
Dept: PALLIATIVE MEDICINE | Facility: CLINIC | Age: 77
End: 2022-04-14
Payer: COMMERCIAL

## 2022-04-14 NOTE — LETTER
April 14, 2022    Feng Wynne  [address]    [facility] [address]    Dear Akira Toney to the [facility].  We are located at [address] [place] [address] [address] [place] [address]. Your appointment has been scheduled on 5/4/2022 at 9:00a with Joey Pastor MD .    At your first visit, you will meet your team of caregivers who will help you to develop pain management strategies that will last a lifetime. You will meet with our support staff to review your insurance information, and collect your co-payment if required by your insurance company. You will also meet with a medical pain specialist and care coordinator who will assess your pain and develop a plan of care for your successful pain rehabilitation. You should expect to spend approximately 1 hour at your first visit with us. Usually, patients work with us for a period of 6-12 months, and eventually return to their primary doctor once their pain management has stabilized.      To help us make your visit go as smoothly as possible, please bring the following items with you on your visit:       Completed Pain Questionnaire enclosed in this packet.  If you do not bring the completed questionnaire, we may have to reschedule your appointment.    List of any medicines that you are currently taking or have been prescribed    Important NON-[place] medical information such as medical records or tests results (X-rays, or laboratory tests)    Your health insurance card    Financial resources to cover your co-payment or balance due at the time of service (cash, personal check, Visa, and MasterCard are acceptable methods of payment)     Due to the demand for new patient evaluations, you must notify the scheduling department 48 hours (2 days) in advance if you are not able to keep this appointment. Failure to do so could affect your ability to reschedule with our clinic. Please be aware that we will not prescribe  any medications at your first visit.     Please call [phone] with any questions regarding your appointment. We look forward to meeting you and working to address your health care needs.     Sincerely,    [facility] [place]

## 2022-04-14 NOTE — TELEPHONE ENCOUNTER

## 2022-04-21 ENCOUNTER — DOCUMENTATION ONLY (OUTPATIENT)
Dept: ANTICOAGULATION | Facility: CLINIC | Age: 77
End: 2022-04-21
Payer: COMMERCIAL

## 2022-04-21 DIAGNOSIS — Z79.01 LONG TERM CURRENT USE OF ANTICOAGULANT THERAPY: Primary | ICD-10-CM

## 2022-04-21 DIAGNOSIS — Z95.2 S/P MITRAL VALVE REPLACEMENT: ICD-10-CM

## 2022-04-21 DIAGNOSIS — I48.20 CHRONIC ATRIAL FIBRILLATION (H): ICD-10-CM

## 2022-04-21 DIAGNOSIS — Z95.2 HEART VALVE REPLACED: ICD-10-CM

## 2022-04-21 LAB — INR HOME MONITORING: 2.7 (ref 2.5–3.5)

## 2022-04-21 NOTE — PROGRESS NOTES
ANTICOAGULATION  MANAGEMENT-Home Monitor Managed by Exception    Feng Wynne 77 year old male is on warfarin with therapeutic INR result. (Goal INR 2.5-3.5)    Recent labs: (last 7 days)     04/21/22  0000   INR 2.70         Previous INR was Therapeutic    Medication, diet, health changes since last INR:Yes: gabapentin, but not anticipated to affect INR    Contacted within the last 12 weeks by phone on 4/5/22      COLTON Toney was NOT contacted regarding therapeutic result today per home monitoring policy manage by exception agreement.   Current warfarin dose is to be continued:     Summary  As of 4/21/2022    Full warfarin instructions:  10 mg every Tue, Sat; 5 mg all other days   Next INR check:  5/5/2022           ?   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

## 2022-05-03 ENCOUNTER — ANTICOAGULATION THERAPY VISIT (OUTPATIENT)
Dept: ANTICOAGULATION | Facility: CLINIC | Age: 77
End: 2022-05-03
Payer: COMMERCIAL

## 2022-05-03 DIAGNOSIS — Z79.01 LONG TERM CURRENT USE OF ANTICOAGULANT THERAPY: Primary | ICD-10-CM

## 2022-05-03 DIAGNOSIS — Z95.2 S/P MITRAL VALVE REPLACEMENT: ICD-10-CM

## 2022-05-03 DIAGNOSIS — I48.20 CHRONIC ATRIAL FIBRILLATION (H): ICD-10-CM

## 2022-05-03 DIAGNOSIS — Z95.2 HEART VALVE REPLACED: ICD-10-CM

## 2022-05-03 LAB — INR HOME MONITORING: 2.8 (ref 2.5–3.5)

## 2022-05-03 NOTE — PROGRESS NOTES
ANTICOAGULATION  MANAGEMENT-Home Monitor Managed by Exception    Feng Sergio Wynne 77 year old male is on warfarin with therapeutic INR result. (Goal INR 2.5-3.5)    Recent labs: (last 7 days)     05/03/22  0000   INR 2.80         Previous INR was Therapeutic    Medication, diet, health changes since last INR:chart reviewed; none identified    Contacted within the last 12 weeks by phone on 4/5/22      COLTON Toney was NOT contacted regarding therapeutic result today per home monitoring policy manage by exception agreement.   Current warfarin dose is to be continued:     Summary  As of 5/3/2022    Full warfarin instructions:  10 mg every Tue, Sat; 5 mg all other days   Next INR check:  5/17/2022           ?   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

## 2022-05-04 ENCOUNTER — OFFICE VISIT (OUTPATIENT)
Dept: PALLIATIVE MEDICINE | Facility: CLINIC | Age: 77
End: 2022-05-04
Attending: INTERNAL MEDICINE
Payer: COMMERCIAL

## 2022-05-04 VITALS — SYSTOLIC BLOOD PRESSURE: 124 MMHG | HEART RATE: 62 BPM | DIASTOLIC BLOOD PRESSURE: 70 MMHG

## 2022-05-04 DIAGNOSIS — M70.61 TROCHANTERIC BURSITIS OF RIGHT HIP: Primary | ICD-10-CM

## 2022-05-04 DIAGNOSIS — M54.41 CHRONIC RIGHT-SIDED LOW BACK PAIN WITH RIGHT-SIDED SCIATICA: ICD-10-CM

## 2022-05-04 DIAGNOSIS — R29.898 RIGHT LEG WEAKNESS: ICD-10-CM

## 2022-05-04 DIAGNOSIS — M76.01 GLUTEAL TENDINITIS OF RIGHT BUTTOCK: ICD-10-CM

## 2022-05-04 DIAGNOSIS — G89.29 CHRONIC RIGHT-SIDED LOW BACK PAIN WITH RIGHT-SIDED SCIATICA: ICD-10-CM

## 2022-05-04 DIAGNOSIS — M76.31 IT BAND SYNDROME, RIGHT: ICD-10-CM

## 2022-05-04 PROCEDURE — 99204 OFFICE O/P NEW MOD 45 MIN: CPT | Performed by: PHYSICAL MEDICINE & REHABILITATION

## 2022-05-04 ASSESSMENT — PAIN SCALES - GENERAL: PAINLEVEL: SEVERE PAIN (6)

## 2022-05-04 NOTE — NURSING NOTE
PEG Score 5/4/2022   PEG Total Score 4.67           Fartun Irizarry MA  [facility] [place]

## 2022-05-04 NOTE — PATIENT INSTRUCTIONS
I ordered a hip bursa injection, you'll be called to schedule this.  I ordered Physical therapy, you'll be called to schedule this.    Take care,    Joey Pastor DO  [facility]      ----------------------------------------------------------------  Clinic Number:  [phone]   Call with any questions about your care and for scheduling assistance.   Calls are returned Monday through Friday between 8 AM and 4:30 PM. We usually get back to you within 2 business days depending on the issue/request.    If we are prescribing your medications:  For opioid medication refills, call the clinic or send a [employer] message 7 days in advance.  Please include:  Name of requested medication  Name of the pharmacy.  For non-opioid medications, call your pharmacy directly to request a refill. Please allow 3-4 days to be processed.   Per [place] State Law:  All controlled substance prescriptions must be filled within 30 days of being written.    For those controlled substances allowing refills, pickup must occur within 30 days of last fill.      We believe regular attendance is key to your success in our program!    Any time you are unable to keep your appointment we ask that you call us at least 24 hours in advance to cancel.This will allow us to offer the appointment time to another patient.   Multiple missed appointments may lead to dismissal from the clinic.

## 2022-05-04 NOTE — PROGRESS NOTES
[facility] Consultation    Date of visit: 5/3/2022      Assessment:  Feng Wynne is a 77 year old with past medical history including: A. Fib, HLD, CAD, mitral valve replacement, HTN, Obesity who presents for evaluation and treatment of the followin. Right hip and lateral thigh pain: Symptoms began 3 years ago, insidious onset. They have right sided greater trochanter tenderness, gluteal and IT band tenderness on the right. They have chronic right hip flexion weakness which they report has been present for over 10 years. Discussed their pain symptoms are likely due to right GT bursitis and gluteal tendonitis/weakness. Recommended the plan below and they were in agreement.    2. Impaired mobility: Feng reports a long standing history of chronic right sided weakness since injuries sustained in a faulty elevator over 15 years ago. They feel this may have been exacerbated by right knee OA causing them to compensate and place more weight on the left leg.      Plan:  The following recommendations were given to the patient. Diagnosis, treatment options, risks, benefits, and alternatives were discussed, and all questions were answered. The patient expressed understanding of the plan for management.     I am recommending a multidisciplinary treatment plan to help this patient better manage his pain.  This includes:     1. Physical Therapy: referral placed to [facility].  2. Clinical Health Pain Psychologist: coping well, defer.   1. Therapy can help reduce physical and psychosocial triggers or reinforcers of pain by adapting thoughts, feelings and behaviors to reduce symptoms and increase quality of life.  Evidence indicates that the practice of relaxation, meditation, and mindfulness techniques can significantly affect pain levels and overall well being.  3. Self Care Recommendations: Gentle progressive exercise that does not increase pain - gradually increase daily  walking program.  Take mini breaks - 5 minutes of mindfullness a couple times a day.   1. Continue bike exercise.  4. Diagnostic Studies: consider MRI if no improvement.  1. Medication Management: Tylenol 1000mg TID prn  5. Further procedures recommended: right hip bursa injection under XR  6. Follow up: 4 weeks after right hip bursa injection.      DO MIMI Lua [facility]          Reason for consultation:    Feng Wynne is a 77 year old male who is seen in consultation today at the request of his primary care physician, Paula Lou.     Consultation and Evaluation for: chronic pain    Review of [place] Prescription Monitoring Program (): Today I have also reviewed the patient's history of controlled substance use, as provided by [place] licensed pharmacies and prescriber dispensers.     Review of Pain Questionnaire: Please see the [facility] health questionnaire, which the patient completed and reviewed with me in detail.    Review of Electronic Chart: Today I have also reviewed available medical information in the patient's medical record at [facility] ([facility] including relevant provider notes, laboratory work, and imaging.     Feng Wynne has not been seen at a pain clinic in the past.      Chief Complaint:    No chief complaint on file.      Pain history:  Feng Wynne is a 77 year old male who presents for initial evaluation of chief pain complaint of right leg pain.     About 15 years ago he had an accident where he fell while entering an elevator and he caught himself against the elevator with his right arm. Within a couple weeks he began having right sided knee, hip and leg pain. He did PT at [facility] at that time and was doing well.    A year later he was walking out of the elevator and again had a similar incident where he caught himself but his body twisted around. He began having similar symptoms. He went to [facility] for  physical therapy but the pain never resolved but it got better.     He saw Dr. Bhatia in 2012 and had a hip joint injection which was helpful for a while.    He has had right leg pain off and on for about 3 years on the right side. There is pain in the low back that radiates into the right leg. The pain radiates down the side of the right thigh tot he knee. The symptoms have started to affect him more in the past few months. He has been guarding against the pain and putting more pressure on the left.    He was prescribed gabapentin by Dr. Lou a few weeks ago but hasn't tried it yet due to concern for side effects.      Onset: 3 years  Location/Radiation: right lateral thigh  Quality: aching  Severity/Intensity: 7/10 at worst, 4/10 at best, 5/10 on average  Aggravating factors include: walking, bending  Relieving factors include: sitting, sleeping  Red Flags: The patient denies bowel or bladder incontinence, parasthesias, saddle anesthesia, unintentional weight loss, or fever/chills/sweats.         Pain Treatments:  1. Medications:       Current pain medications:  -tylenol       Previous pain medications:  -none  2. Physical Therapy: helpful in the past     TENS unit: nt  3. Pain psychology: nt  4. Surgery: nt  5. Injections: hip injection in 2012 was helpful  6. Alternative Therapies:    Chiropractic: helpful in the past    Acupuncture: nt      Labs:   Lab Results   Component Value Date    WBC 4.4 02/09/2022    WBC 4.3 10/16/2020     Lab Results   Component Value Date    RBC 3.77 02/09/2022    RBC 3.79 10/16/2020     Lab Results   Component Value Date    HGB 12.3 02/09/2022    HGB 12.4 10/16/2020     Lab Results   Component Value Date    HCT 38.7 02/09/2022    HCT 38.2 10/16/2020     Lab Results   Component Value Date     02/09/2022     10/16/2020     Lab Results   Component Value Date    MCH 32.6 02/09/2022    MCH 32.7 10/16/2020     Lab Results   Component Value Date    MCHC 31.8 02/09/2022    MCHC  32.5 10/16/2020     Lab Results   Component Value Date    RDW 13.6 02/09/2022    RDW 13.3 10/16/2020     Lab Results   Component Value Date     02/09/2022     10/16/2020     Last Comprehensive Metabolic Panel:  Sodium   Date Value Ref Range Status   02/09/2022 137 133 - 144 mmol/L Final   10/16/2020 139 133 - 144 mmol/L Final     Potassium   Date Value Ref Range Status   02/09/2022 5.0 3.4 - 5.3 mmol/L Final   10/16/2020 4.6 3.4 - 5.3 mmol/L Final     Chloride   Date Value Ref Range Status   02/09/2022 107 94 - 109 mmol/L Final   10/16/2020 109 94 - 109 mmol/L Final     Carbon Dioxide   Date Value Ref Range Status   10/16/2020 23 20 - 32 mmol/L Final     Carbon Dioxide (CO2)   Date Value Ref Range Status   02/09/2022 27 20 - 32 mmol/L Final     Anion Gap   Date Value Ref Range Status   02/09/2022 3 3 - 14 mmol/L Final   10/16/2020 7 3 - 14 mmol/L Final     Glucose   Date Value Ref Range Status   02/09/2022 96 70 - 99 mg/dL Final   10/16/2020 93 70 - 99 mg/dL Final     Comment:     Fasting specimen     Urea Nitrogen   Date Value Ref Range Status   02/09/2022 30 7 - 30 mg/dL Final   10/16/2020 24 7 - 30 mg/dL Final     Creatinine   Date Value Ref Range Status   02/09/2022 0.90 0.66 - 1.25 mg/dL Final   10/16/2020 0.85 0.66 - 1.25 mg/dL Final     GFR Estimate   Date Value Ref Range Status   02/09/2022 89 >60 mL/min/1.73m2 Final     Comment:     Effective December 21, 2021 eGFRcr in adults is calculated using the 2021 CKD-EPI creatinine equation which includes age and gender (Vladislav et al., NEJM, DOI: 10.1056/JQSJvc0455996)   10/16/2020 85 >60 mL/min/[1.73_m2] Final     Comment:     Non  GFR Calc  Starting 12/18/2018, serum creatinine based estimated GFR (eGFR) will be   calculated using the Chronic Kidney Disease Epidemiology Collaboration   (CKD-EPI) equation.       Calcium   Date Value Ref Range Status   02/09/2022 9.2 8.5 - 10.1 mg/dL Final   10/16/2020 9.4 8.5 - 10.1 mg/dL Final      Bilirubin Total   Date Value Ref Range Status   02/09/2022 0.6 0.2 - 1.3 mg/dL Final   10/16/2020 0.7 0.2 - 1.3 mg/dL Final     Alkaline Phosphatase   Date Value Ref Range Status   02/09/2022 61 40 - 150 U/L Final   10/16/2020 65 40 - 150 U/L Final     ALT   Date Value Ref Range Status   02/09/2022 19 0 - 70 U/L Final   10/16/2020 21 0 - 70 U/L Final     AST   Date Value Ref Range Status   02/09/2022 17 0 - 45 U/L Final   10/16/2020 19 0 - 45 U/L Final                 Past Medical History:  Past Medical History:   Diagnosis Date     Arthritis      Atrial fibrillation (H)      Coronary artery disease      Hypercholesteremia      Morbid obesity (H)      Unspecified essential hypertension        Past Surgical History:  Past Surgical History:   Procedure Laterality Date     MITRAL VALVE REPLACEMENT  8-    Mitral valve replacement with 31-mm [employer]affi mechanical valve.        Medications:  Current Outpatient Medications   Medication Sig Dispense Refill     ASPIRIN NOT PRESCRIBED, INTENTIONAL,   0     atenolol (TENORMIN) 25 MG tablet Take 1 tablet (25 mg) by mouth 2 times daily 180 tablet 2     clindamycin (CLEOCIN) 300 MG capsule Take 2 capsules (600 mg) by mouth See Admin Instructions 600 mg one hour prior to dental procedure 6 capsule 1     diclofenac (VOLTAREN) 1 % topical gel Place 4 g onto the skin 4 times daily as needed for moderate pain (knee pain) 100 g 3     ezetimibe (ZETIA) 10 MG tablet Take 1 tablet (10 mg) by mouth daily (Patient not taking: No sig reported) 90 tablet 3     gabapentin (NEURONTIN) 300 MG capsule Start with one capsule at night, increase to 1 capsule twice a day in 3 days then increase to 1 capsule 3 times a day 90 capsule 0     ketoconazole (NIZORAL) 2 % external cream Apply topically 2 times daily 15 g 1     lisinopril (ZESTRIL) 40 MG tablet Take 1 tablet (40 mg) by mouth daily 90 tablet 1     Multiple Vitamins-Minerals (CENTRUM SILVER PO) Take 1 tablet by mouth daily.        Omega-3 Fatty Acids (FISH OIL CONCENTRATE PO) Take  by mouth daily.       order for DME Equipment being ordered: COMPRESSION STOCKINGS, 20-30 mmHg 1 each 1     warfarin ANTICOAGULANT (COUMADIN) 5 MG tablet Take 2 tablets (10 mg) on Tuesdays,Saturdays and take 1 tablet (5 mg) all other days or as directed by the INR clinic 135 tablet 1       Allergies:     Allergies   Allergen Reactions     Amiodarone Shortness Of Breath     Latex      Pcn [Penicillins]      Sulfites      Zetia [Ezetimibe] Muscle Pain (Myalgia)     Muscle weakness legs       Social History:  Home situation: lives in [facility]  Occupation/Schooling: not working  Tobacco use: denies  Drug use: denies  Alcohol use: occasional      Family history:  Family History   Problem Relation Age of Onset     Cerebrovascular Disease Father      Diabetes Paternal Grandmother      [place].A.SOLA. Brother         CABG X 3 at age 51       Review of Systems:    POSTIVE IN BOLD  GENERAL: fever/chills, fatigue, general unwell feeling, weight gain/loss.  HEAD/EYES:  headache, dizziness, or vision changes.    EARS/NOSE/THROAT:  Nosebleeds, hearing loss, sinus infection, earache, tinnitus.  IMMUNE:  Allergies, cancer, immune deficiency, or infections.  SKIN:  Urticaria, rash, hives  HEME/Lymphatic:   anemia, easy bruising, easy bleeding.  RESPIRATORY:  cough, wheezing, or shortness of breath  CARDIOVASCULAR/Circulation:  Extremity edema, syncope, hypertension, tachycardia, or angina.  GASTROINTESTINAL:  abdominal pain, nausea/emesis, diarrhea, constipation,  hematochezia, or melena.  ENDOCRINE:  Diabetes, steroid use,  thyroid disease or osteoporosis.  MUSCULOSKELETAL: neck pain, back pain, arthralgia, arthritis, or gout.  GENITOURINARY:  frequency, urgency, dysuria, difficulty voiding, hematuria or incontinence.  NEUROLOGIC:  weakness, numbness, paresthesias, seizure, tremor, stroke or memory loss.  PSYCHIATRIC:  depression, anxiety, stress, suicidal thoughts or mood swings.      Physical Exam:  Vitals:    05/04/22 0847   BP: 124/70   Pulse: 62     Exam:  Constitutional: Obese, appears stated age.  HEENT: Head atraumatic, normocephalic. Eyes without conjunctival injection or jaundice. Oropharynx clear.   Skin: No rash, lesions, or petechiae of exposed skin.   Extremities: Peripheral pulses intact.   Psychiatric/mental status: Alert, without lethargy or stupor. Speech fluent. Appropriate affect. Mood normal. Able to follow commands without difficulty.     Musculoskeletal exam:  Gait/Station/Posture: gait is slow and antalgic. Uses a wheeled walker for ambulation.      Lumbar spine:  Range of motion moderately reduced in all planes    Myofascial tenderness:  Right hip bursa, GT, IT band tenderness. Right gluteal tenderness.    SLR: neg  Hilario's maneuver: neg        Neurologic exam:  CN:  Cranial nerves 2-12 are grossly intact  Motor Strength:  5/5 and symmetric within their functional ROM with exception of right hip flexion and right knee extension which were 4/5. Difficult to assess further as patient has severely limited mobility and was unable to get on the exam table even with use of a step stool.        Reflexes:       Patella L4:  R:  1/4 L: 1/4   Achilles S1:  R:  1/4 L: 1/4        Sensory:  (upper and lower extremities):   Light touch: normal       BILLING TIME DOCUMENTATION:   The total TIME spent on this patient on the date of the encounter/appointment was 45 minutes.      TOTAL TIME includes:   Time spent preparing to see the patient (reviewing records and tests)   Time spent face to face (or over the phone) with the patient  Time spent ordering tests, medications, procedures and referrals  Time spent Referring and communicating with other healthcare professionals   Time spent documenting clinical information in Epic     Joey Pastor [facility]

## 2022-05-17 ENCOUNTER — DOCUMENTATION ONLY (OUTPATIENT)
Dept: ANTICOAGULATION | Facility: CLINIC | Age: 77
End: 2022-05-17
Payer: COMMERCIAL

## 2022-05-17 DIAGNOSIS — Z95.2 HEART VALVE REPLACED: ICD-10-CM

## 2022-05-17 DIAGNOSIS — Z79.01 LONG TERM CURRENT USE OF ANTICOAGULANT THERAPY: Primary | ICD-10-CM

## 2022-05-17 DIAGNOSIS — Z95.2 S/P MITRAL VALVE REPLACEMENT: ICD-10-CM

## 2022-05-17 DIAGNOSIS — I48.20 CHRONIC ATRIAL FIBRILLATION (H): ICD-10-CM

## 2022-05-17 LAB — INR HOME MONITORING: 3.1 (ref 2.5–3.5)

## 2022-05-17 NOTE — PROGRESS NOTES
ANTICOAGULATION  MANAGEMENT-Home Monitor Managed by Exception    Feng Wynne 77 year old male is on warfarin with therapeutic INR result. (Goal INR 2.5-3.5)    Recent labs: (last 7 days)     05/17/22  0000   INR 3.10         Previous INR was Therapeutic    Medication, diet, health changes since last INR:chart reviewed; none identified    Contacted within the last 12 weeks by phone on 4/5/22      COLTON Toney was NOT contacted regarding therapeutic result today per home monitoring policy manage by exception agreement.   Current warfarin dose is to be continued:     Summary  As of 5/17/2022    Full warfarin instructions:  10 mg every Tue, Sat; 5 mg all other days   Next INR check:  5/31/2022           ?   Love Elliott RN  Anticoagulation Clinic  5/17/2022    _______________________________________________________________________     Anticoagulation Episode Summary     Current INR goal:  2.5-3.5   TTR:  95.6 % (1 y)   Target end date:  Indefinite   Send INR reminders to:  DIA MARK    Indications    Long-term (current) use of anticoagulants [Z79.01] [Z79.01]  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] [Z95.2]  S/P mitral valve replacement [Z95.2]  Chronic atrial fibrillation (H) [I48.20]           Comments:  [facility] ([facility] HOME MONITORING Patient, okay  to manage by exception on 7/8/20         Anticoagulation Care Providers     Provider Role Specialty Phone number    Enrique Walker MD Referring Internal Medicine [phone]    Paula Lou MD Referring Internal Medicine [phone]

## 2022-05-18 ENCOUNTER — DOCUMENTATION ONLY (OUTPATIENT)
Dept: ANTICOAGULATION | Facility: CLINIC | Age: 77
End: 2022-05-18

## 2022-05-18 ENCOUNTER — HOSPITAL ENCOUNTER (OUTPATIENT)
Facility: CLINIC | Age: 77
Discharge: HOME | End: 2022-05-18
Admitting: PHYSICAL MEDICINE & REHABILITATION
Payer: COMMERCIAL

## 2022-05-18 ENCOUNTER — HOSPITAL ENCOUNTER (OUTPATIENT)
Dept: GENERAL RADIOLOGY | Facility: CLINIC | Age: 77
Discharge: HOME | End: 2022-05-18
Admitting: PHYSICAL MEDICINE & REHABILITATION
Payer: COMMERCIAL

## 2022-05-18 VITALS
DIASTOLIC BLOOD PRESSURE: 72 MMHG | OXYGEN SATURATION: 98 % | RESPIRATION RATE: 16 BRPM | HEART RATE: 55 BPM | SYSTOLIC BLOOD PRESSURE: 139 MMHG

## 2022-05-18 VITALS — SYSTOLIC BLOOD PRESSURE: 119 MMHG | HEART RATE: 50 BPM | DIASTOLIC BLOOD PRESSURE: 74 MMHG | OXYGEN SATURATION: 99 %

## 2022-05-18 DIAGNOSIS — M70.61 TROCHANTERIC BURSITIS OF RIGHT HIP: ICD-10-CM

## 2022-05-18 DIAGNOSIS — M70.71 BURSITIS OF RIGHT HIP, UNSPECIFIED BURSA: ICD-10-CM

## 2022-05-18 PROCEDURE — 250N000009 HC RX 250: Performed by: PHYSICAL MEDICINE & REHABILITATION

## 2022-05-18 PROCEDURE — 20605 DRAIN/INJ JOINT/BURSA W/O US: CPT | Mod: RT | Performed by: PHYSICAL MEDICINE & REHABILITATION

## 2022-05-18 PROCEDURE — 250N000011 HC RX IP 250 OP 636: Performed by: PHYSICAL MEDICINE & REHABILITATION

## 2022-05-18 PROCEDURE — 77002 NEEDLE LOCALIZATION BY XRAY: CPT | Mod: 26 | Performed by: PHYSICAL MEDICINE & REHABILITATION

## 2022-05-18 PROCEDURE — 999N000154 HC STATISTIC RADIOLOGY XRAY, US, CT, MAR, NM

## 2022-05-18 PROCEDURE — 255N000002 HC RX 255 OP 636: Performed by: PHYSICAL MEDICINE & REHABILITATION

## 2022-05-18 PROCEDURE — 77002 NEEDLE LOCALIZATION BY XRAY: CPT

## 2022-05-18 RX ORDER — IOPAMIDOL 408 MG/ML
10 INJECTION, SOLUTION INTRATHECAL ONCE
Status: COMPLETED | OUTPATIENT
Start: 2022-05-18 | End: 2022-05-18

## 2022-05-18 RX ORDER — LIDOCAINE HYDROCHLORIDE 10 MG/ML
5 INJECTION, SOLUTION EPIDURAL; INFILTRATION; INTRACAUDAL; PERINEURAL ONCE
Status: COMPLETED | OUTPATIENT
Start: 2022-05-18 | End: 2022-05-18

## 2022-05-18 RX ORDER — TRIAMCINOLONE ACETONIDE 40 MG/ML
40 INJECTION, SUSPENSION INTRA-ARTICULAR; INTRAMUSCULAR ONCE
Status: COMPLETED | OUTPATIENT
Start: 2022-05-18 | End: 2022-05-18

## 2022-05-18 RX ORDER — BUPIVACAINE HYDROCHLORIDE 5 MG/ML
30 INJECTION, SOLUTION EPIDURAL; INTRACAUDAL ONCE
Status: COMPLETED | OUTPATIENT
Start: 2022-05-18 | End: 2022-05-18

## 2022-05-18 RX ADMIN — LIDOCAINE HYDROCHLORIDE 2 ML: 10 INJECTION, SOLUTION EPIDURAL; INFILTRATION; INTRACAUDAL; PERINEURAL at 13:17

## 2022-05-18 RX ADMIN — BUPIVACAINE HYDROCHLORIDE 4 ML: 5 INJECTION, SOLUTION EPIDURAL; INTRACAUDAL; PERINEURAL at 13:20

## 2022-05-18 RX ADMIN — IOPAMIDOL 0.5 ML: 408 INJECTION, SOLUTION INTRATHECAL at 13:19

## 2022-05-18 RX ADMIN — TRIAMCINOLONE ACETONIDE 40 MG: 40 INJECTION, SUSPENSION INTRA-ARTICULAR; INTRAMUSCULAR at 13:21

## 2022-05-18 NOTE — DISCHARGE INSTRUCTIONS
[facility] Procedure Discharge Instructions    Today you saw:   Dr Joey Pastor    Procedure:  Right  Hip Bursa Injection      Medications used:  Lidocaine  -  Dexamethasone  -  Kenalog      After you go home:    You may resume your normal diet  It is recommended that a responsible adult stay with you for 6 hours if you received sedation       If you received sedation before, during or after your procedure:      For 24 hours -   Relax and take it easy  Do NOT make any important or legal decisions  Do NOT drive or operate machines at home or at work  Do NOT drink alcohol    Care of Puncture Site:    If you have a bandaid on your puncture site, you may remove it the next morning  You may shower   No bath tubs, whirlpools or swimming for 24 hours after your procedure     Activity:    You may go back to normal activity in 24 hours  Avoid strenuous activity for the first 24 hours.  Be cautious when walking. Numbness and/or weakness in the lower extremities may occur for up to 6-8 hours after the procedure due to effect of the numbing medication used.  Do not drive for 6 hours.  The effect of the numbing medication could slow your reflexes.    Medicines:    You may resume all medications  Resume your Warfarin/Coumadin at your regular dose tonight. Follow up with your provider to have your INR rechecked  Resume your Plavix/Clopidogrel and Aspirin in 12 hours  If you are taking a different blood thinner, follow the directions provided by the Pain Clinic RN for restarting the medication  For minor pain, you may use anti-inflammatory medications - (such as Ibuprofen, Aleve, Advil) or Acetaminophen (Tylenol) for pain control if necessary.    Pain:     You may have a mild to moderate increase in pain for several days following the injection.  It may take up to 14 days for the steroid medication to start working although you may feel the effect as early as a few days after the procedure.  You may use  ice packs for 10-15 minutes, 3 to 4 times a day at the injection site for comfort.  Do not use heat to painful areas for 6 to 8 hours.  This will give the numbing medication time to wear off and prevent you from accidentally burning your skin.    Call the Pain Clinic if you experience any of the following:    You have chills or a fever greater than 100 F   Swelling, bleeding, redness, drainage, warmth at the injection site  Progressive weakness or numbness in your legs or arms  If lumbar, call if you have a loss of bowel or bladder function  If cervical, call if you have any unusual headache that is not relieved by Tylenol or other pain medication  Unusual new onset of pain that is not improving    Other Instructions:    If you are diabetic, check your blood sugar more frequently than usual as your blood sugar may be higher than normal for 10-14 days following a steroid injection.  Contact the provider who manages your diabetes to help you control your blood sugar if needed.      If you have questions call:        Pain Clinic @ [phone] during business hours  Monday-Thursday 8 AM-5:30 PM and Friday 8 AM-4:30 PM    Provider Line @ [phone] after hours

## 2022-05-18 NOTE — PROGRESS NOTES
Care Suites Post Procedure Note    Patient Information  Name: Feng Wynne  Age: 77 year old    Post Procedure  Time patient returned to Care Suites: 1325  Concerns/abnormal assessment: None at this time.    Incision site CDI, no swelling.  Denies pain and or numbness at this time.  States pain prior to injection was 5-8/10.    If abnormal assessment, provider notified: N/A  Plan/Other: Per orders.    Gayle Billy RN

## 2022-05-18 NOTE — BRIEF OP NOTE
RADIOLOGY POST PROCEDURE NOTE    Patient name: Feng Wynne  MRN: [id number]  : 1945    Pre-procedure diagnosis: Right hip bursitis  Post-procedure diagnosis: Same    Procedure Date/Time: May 18, 2022  1:14 PM  Procedure: Right hip greater trochanter bursa injection  Estimated blood loss: None  Specimen(s) collected with description: none    The patient tolerated the procedure well with no immediate complications.  Significant findings:none    See imaging dictation for procedural details.    Provider name: Joey Pastor DO  Assistant(s):None

## 2022-05-18 NOTE — PROGRESS NOTES
ANTICOAGULATION  MANAGEMENT: Discharge Review    Feng Wynne chart reviewed for anticoagulation continuity of care    Outpatient surgery/procedure on 5/18/22 for Right hip bursa injection.    Discharge disposition: Home    Results:    Recent labs: (last 7 days)     05/17/22  0000   INR 3.10     Anticoagulation inpatient management:     home regimen continued    Anticoagulation discharge instructions:     Warfarin dosing: Okay to resume warfarin tonight per discharge paperwork   Bridging: No   INR goal change: No      Medication changes affecting anticoagulation: Yes: Administered lidocaine, dexamethasone, kenalog during injection today    Additional factors affecting anticoagulation: No    Plan     No adjustment to anticoagulation plan needed    Patient not contacted    No adjustment to Anticoagulation Calendar was required    Natali Mondragon RN

## 2022-05-18 NOTE — PROGRESS NOTES
"Care Suites Discharge Nursing Note    Patient Information  Name: Feng Wynne  Age: 77 year old    Discharge Education:  Discharge instructions reviewed: Yes  Additional education/resources provided: NA  Patient/patient representative verbalizes understanding: Yes  Patient discharging on new medications: No  Medication education completed: N/A    Discharge Plans:   Discharge location: home  Discharge ride contacted: Yes  Approximate discharge time: 1415    Discharge Criteria:  Discharge criteria met and vital signs stable: Yes.  Able to stand and ambulate with his wheeled walker, limps but states this is his baseline \"I have no knee.\"  Denies pain and or numbness/tingling post injection.     Patient Belongs:  Patient belongings returned to patient: Yes    Gayle Billy RN       "

## 2022-05-18 NOTE — PROGRESS NOTES
[facility] - Procedure Note    Date of Service: 5/18/2022  Procedure performed: Right Greater Trochanteric Bursa Injection  Diagnosis: Right Trochanteric Bursitis  : Joey Pastor DO  Anesthesia: none      Indications:   Feng Wynne is a 77 year old male who is seen for a right hip bursa injection. They have a history of chronic right sided hip pain.  Exam shows tenderness with palpation of the greater trochanter bursa and they have tried conservative treatment including medications and exercises.      Options/alternatives, benefits and risks were discussed with the patient. Questions were answered to his satisfaction and he agrees to proceed. Voluntary informed consent was obtained and signed.     Vitals were reviewed: Yes  /74 (BP Location: Right arm)   Pulse 50   SpO2 99%   Allergies were reviewed:  Yes   Medications were reviewed:  Yes   Pre-procedure pain score: 5-6/10    Procedure:  After getting informed consent, patient was brought into the procedure suite and was placed in a prone position on the procedure table.   A Pause for the Cause was performed.  Patient was prepped and draped in sterile fashion.     The area over the right greater trochanter was palpated, and the tender area was identified, corresponding to the area of the trochanteric bursa.  The area was cleaned with Chloroprep.  A 22-gauge, 1.5-inch needle was inserted, aiming towards the trochanter.  When bone was encountered, the needle was slightly drawn.  A solution containing local anesthesic and steroid was injected.  The needle was removed.  Hemostasis was achieved. Bandaids were placed as appropriate.      In total, 4 ml of 0.5% bupivacaine and 1 ml (40 mg) of kenalog was injected.    Hemostasis was achieved, the area was cleaned, and bandaids were placed when appropriate.  The patient tolerated the procedure well, and was taken to the recovery room.    Images were saved to  PACS.      Pre-procedure pain score: 5-6/10  Post-procedure pain score: 0/10     Assessment/Plan: Feng Wynne is a 77 year old male s/p right greater trochanteric bursa steroid injection today for hip pan.     1. Following today's procedure, the patient was advised to contact the [facility] for any of the following:   Fever, chills, or night sweats   New onset of pain, numbness, or weakness   Any questions/concerns regarding the procedure  If unable to contact the Pain Center, the patient was instructed to go to a local Emergency Room for any complications.   2. The patient should follow-up with the referring provider in 2-4 weeks for post-procedure evaluation.        Joey Pastor DO  [facility] [place]

## 2022-05-31 ENCOUNTER — ANTICOAGULATION THERAPY VISIT (OUTPATIENT)
Dept: ANTICOAGULATION | Facility: CLINIC | Age: 77
End: 2022-05-31
Payer: COMMERCIAL

## 2022-05-31 DIAGNOSIS — Z95.2 HEART VALVE REPLACED: ICD-10-CM

## 2022-05-31 DIAGNOSIS — I48.20 CHRONIC ATRIAL FIBRILLATION (H): ICD-10-CM

## 2022-05-31 DIAGNOSIS — Z79.01 LONG TERM CURRENT USE OF ANTICOAGULANT THERAPY: Primary | ICD-10-CM

## 2022-05-31 DIAGNOSIS — Z95.2 S/P MITRAL VALVE REPLACEMENT: ICD-10-CM

## 2022-05-31 LAB — INR HOME MONITORING: 3 (ref 2.5–3.5)

## 2022-05-31 NOTE — PROGRESS NOTES
ANTICOAGULATION  MANAGEMENT-Home Monitor Managed by Exception    Feng Wynne 77 year old male is on warfarin with therapeutic INR result. (Goal INR 2.5-3.5)    Recent labs: (last 7 days)     05/31/22  0000   INR 3.00         Previous INR was Therapeutic    Medication, diet, health changes since last INR:chart reviewed; none identified    Contacted within the last 12 weeks by phone on 4/5/22      COLTON Toney was NOT contacted regarding therapeutic result today per home monitoring policy manage by exception agreement.   Current warfarin dose is to be continued:     Summary  As of 5/31/2022    Full warfarin instructions:  10 mg every Tue, Sat; 5 mg all other days   Next INR check:  6/14/2022           ?   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

## 2022-06-16 ENCOUNTER — ANTICOAGULATION THERAPY VISIT (OUTPATIENT)
Dept: ANTICOAGULATION | Facility: CLINIC | Age: 77
End: 2022-06-16
Payer: COMMERCIAL

## 2022-06-16 DIAGNOSIS — Z79.01 LONG TERM CURRENT USE OF ANTICOAGULANT THERAPY: Primary | ICD-10-CM

## 2022-06-16 DIAGNOSIS — Z95.2 S/P MITRAL VALVE REPLACEMENT: ICD-10-CM

## 2022-06-16 DIAGNOSIS — I48.20 CHRONIC ATRIAL FIBRILLATION (H): ICD-10-CM

## 2022-06-16 DIAGNOSIS — Z95.2 HEART VALVE REPLACED: ICD-10-CM

## 2022-06-16 LAB — INR HOME MONITORING: 2.2 (ref 2.5–3.5)

## 2022-06-16 NOTE — PROGRESS NOTES
ANTICOAGULATION MANAGEMENT     Feng Wynne 77 year old male is on warfarin with subtherapeutic INR result. (Goal INR 2.5-3.5)    Recent labs: (last 7 days)     06/16/22  0000   INR 2.2*       ASSESSMENT       Source(s): Chart Review and Patient/Caregiver Call       Warfarin doses taken: Warfarin taken as instructed    Diet: Increased greens/vitamin K in diet; plans to resume previous intake    New illness, injury, or hospitalization: No    Medication/supplement changes: None noted    Signs or symptoms of bleeding or clotting: No    Previous INR: Therapeutic last 2(+) visits    Additional findings: None       PLAN     Recommended plan for temporary change(s) affecting INR     Dosing Instructions: booster dose then continue your current warfarin dose with next INR in 2 weeks       Summary  As of 6/16/2022    Full warfarin instructions:  6/16: 7.5 mg; Otherwise 10 mg every Tue, Sat; 5 mg all other days   Next INR check:  6/30/2022             Telephone call with Feng who verbalizes understanding and agrees to plan    Patient to recheck with home meter    Education provided: Importance of consistent vitamin K intake, Impact of vitamin K foods on INR, Goal range and significance of current result and Importance of therapeutic range    Plan made per [facility] anticoagulation protocol    Jean Miller RN  Anticoagulation Clinic  6/16/2022    _______________________________________________________________________     Anticoagulation Episode Summary     Current INR goal:  2.5-3.5   TTR:  93.9 % (1 y)   Target end date:  Indefinite   Send INR reminders to:  DIA MARK    Indications    Long-term (current) use of anticoagulants [Z79.01] [Z79.01]  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

## 2022-06-28 ENCOUNTER — ANTICOAGULATION THERAPY VISIT (OUTPATIENT)
Dept: ANTICOAGULATION | Facility: CLINIC | Age: 77
End: 2022-06-28

## 2022-06-28 DIAGNOSIS — Z95.2 S/P MITRAL VALVE REPLACEMENT: ICD-10-CM

## 2022-06-28 DIAGNOSIS — Z95.2 HEART VALVE REPLACED: ICD-10-CM

## 2022-06-28 DIAGNOSIS — I48.20 CHRONIC ATRIAL FIBRILLATION (H): ICD-10-CM

## 2022-06-28 DIAGNOSIS — Z79.01 LONG TERM CURRENT USE OF ANTICOAGULANT THERAPY: Primary | ICD-10-CM

## 2022-06-28 LAB — INR HOME MONITORING: 2.6 (ref 2.5–3.5)

## 2022-06-28 NOTE — PROGRESS NOTES
ANTICOAGULATION MANAGEMENT     Feng Wynne 77 year old male is on warfarin with therapeutic INR result. (Goal INR 2.5-3.5)    Recent labs: (last 7 days)     06/28/22  0000   INR 2.6       ASSESSMENT       Source(s): Chart Review and Patient/Caregiver Call       Warfarin doses taken: Warfarin taken as instructed    Diet: No new diet changes identified    New illness, injury, or hospitalization: No    Medication/supplement changes: None noted    Signs or symptoms of bleeding or clotting: No    Previous INR: Subtherapeutic    Additional findings: None       PLAN     Recommended plan for no diet, medication or health factor changes affecting INR     Dosing Instructions: continue your current warfarin dose with next INR in 2 weeks       Summary  As of 6/28/2022    Full warfarin instructions:  10 mg every Tue, Sat; 5 mg all other days   Next INR check:  7/12/2022             Telephone call with Feng who verbalizes understanding and agrees to plan    Patient to recheck with home meter    Education provided: Please call back if any changes to your diet, medications or how you've been taking warfarin and Resume manage by exception with home monitor. Continue to submit INR results to home monitor company.You will only be called when your result is out of range. Please call and notify [facility] if new medication started, dose missed, signs or symptoms of bleeding or clotting, or a surgery/procedure is scheduled.    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

## 2022-06-29 ENCOUNTER — OFFICE VISIT (OUTPATIENT)
Dept: PALLIATIVE MEDICINE | Facility: CLINIC | Age: 77
End: 2022-06-29
Payer: COMMERCIAL

## 2022-06-29 VITALS — HEART RATE: 60 BPM | DIASTOLIC BLOOD PRESSURE: 66 MMHG | SYSTOLIC BLOOD PRESSURE: 134 MMHG

## 2022-06-29 DIAGNOSIS — G89.29 CHRONIC PAIN OF RIGHT KNEE: ICD-10-CM

## 2022-06-29 DIAGNOSIS — M54.50 CHRONIC BILATERAL LOW BACK PAIN WITHOUT SCIATICA: Primary | ICD-10-CM

## 2022-06-29 DIAGNOSIS — I48.20 CHRONIC ATRIAL FIBRILLATION (H): ICD-10-CM

## 2022-06-29 DIAGNOSIS — M70.61 TROCHANTERIC BURSITIS OF RIGHT HIP: ICD-10-CM

## 2022-06-29 DIAGNOSIS — G89.29 CHRONIC BILATERAL LOW BACK PAIN WITHOUT SCIATICA: Primary | ICD-10-CM

## 2022-06-29 DIAGNOSIS — M25.561 CHRONIC PAIN OF RIGHT KNEE: ICD-10-CM

## 2022-06-29 DIAGNOSIS — R29.898 RIGHT LEG WEAKNESS: ICD-10-CM

## 2022-06-29 PROCEDURE — 99214 OFFICE O/P EST MOD 30 MIN: CPT | Performed by: PHYSICAL MEDICINE & REHABILITATION

## 2022-06-29 ASSESSMENT — PAIN SCALES - GENERAL: PAINLEVEL: MODERATE PAIN (5)

## 2022-06-29 NOTE — PROGRESS NOTES
[facility]    Date of visit: 2022    Assessment:  Feng Wynne is a 77 year old with past medical history including: A. Fib, HLD, CAD, mitral valve replacement, HTN, Obesity who presents for evaluation and treatment of the followin. Right hip and lateral thigh pain: Symptoms began 3 years ago, insidious onset. They have right sided greater trochanter tenderness, gluteal and IT band tenderness on the right. Significant improvement in pain with right GT bursa injection. They continue to have chronic right hip flexion weakness which they report has been present for over 10 years, unknown etiology, likely deconditioning in setting of prior injuries.    2. Impaired mobility: Feng reports a long standing history of chronic right sided weakness since injuries sustained in a faulty elevator over 15 years ago. They feel this may have been exacerbated by right knee OA causing them to compensate and place more weight on the left leg.    3. Chronic low back pain: Long standing chronic bilateral low back pain. Worse with extension/rotation and bilateral lumbar paraspinal tenderness. No significant radicular features. Unlikely that hip weakness is related to nerve compression. Lumbar MRI ordered today to evaluate further.      Plan:  The following recommendations were given to the patient. Diagnosis, treatment options, risks, benefits, and alternatives were discussed, and all questions were answered. The patient expressed understanding of the plan for management.      I am recommending a multidisciplinary treatment plan to help this patient better manage his pain.  This includes:      1. Physical Therapy: referral placed to [facility].  2. Clinical Health Pain Psychologist: coping well, defer.   3. Self Care Recommendations: Gentle progressive exercise that does not increase pain - gradually increase daily walking program.  Take mini breaks - 5 minutes of mindfullness a  couple times a day.   1. Continue bike exercise.  4. Diagnostic Studies: Lumbar MRI ordered  1. Consider emg to evaluate chronic right hip flexion weakness.  5. Medication Management: Tylenol 1000mg TID prn  6. Further procedures recommended: Right hip bursa injection can be repeated every 3+ months.  1. Consider lumbar facet injections based on MRI results.  7. Follow up: Will follow up with MRI results.        Joey Pastor DO  [facility]       Chief complaint: No chief complaint on file.      Interval history:  Feng Wynne is a 77 year old male last seen by me on 5/18/22.        Since his last visit, Feng Wynne reports:  -They had a hip bursa injection on 5/18/22 and this went well.  They have had significant improvement in their hip pain.    -They have been able to do 30 minutes daily on their bicycle peddle machine.    -Their back pain seems to be the most limiting factor at this time. The pain is across the low back at the waist line.    -They also have right sided knee pain that seems to limit their mobility.    -They also have chronic right sided hip flexion weakness, this has been stable for a long time. They do not remember having consistent radicular symptoms in the past.    Pain scores:  Pain intensity on average is 5 on a scale of 0-10.     Pain Treatments:  1. Medications:       Current pain medications:  -tylenol       Previous pain medications:  -none  2. Physical Therapy: helpful in the past                TENS unit: nt  3. Pain psychology: nt  4. Surgery: nt  5. Injections: hip injection in 2012 was helpful  6. Alternative Therapies:               Chiropractic: helpful in the past               Acupuncture: nt       Side Effects: no side effect    Medications:  Current Outpatient Medications   Medication Sig Dispense Refill     ASPIRIN NOT PRESCRIBED, INTENTIONAL,   0     atenolol (TENORMIN) 25 MG tablet Take 1 tablet (25 mg) by mouth 2 times daily 180  tablet 2     clindamycin (CLEOCIN) 300 MG capsule Take 2 capsules (600 mg) by mouth See Admin Instructions 600 mg one hour prior to dental procedure 6 capsule 1     diclofenac (VOLTAREN) 1 % topical gel Place 4 g onto the skin 4 times daily as needed for moderate pain (knee pain) 100 g 3     ezetimibe (ZETIA) 10 MG tablet Take 1 tablet (10 mg) by mouth daily (Patient not taking: Reported on 5/4/2022) 90 tablet 3     gabapentin (NEURONTIN) 300 MG capsule Start with one capsule at night, increase to 1 capsule twice a day in 3 days then increase to 1 capsule 3 times a day (Patient not taking: Reported on 5/4/2022) 90 capsule 0     ketoconazole (NIZORAL) 2 % external cream Apply topically 2 times daily 15 g 1     lisinopril (ZESTRIL) 40 MG tablet Take 1 tablet (40 mg) by mouth daily 90 tablet 1     Multiple Vitamins-Minerals (CENTRUM SILVER PO) Take 1 tablet by mouth daily.       Omega-3 Fatty Acids (FISH OIL CONCENTRATE PO) Take  by mouth daily.       order for DME Equipment being ordered: COMPRESSION STOCKINGS, 20-30 mmHg 1 each 1     warfarin ANTICOAGULANT (COUMADIN) 5 MG tablet Take 2 tablets (10 mg) on Tuesdays,Saturdays and take 1 tablet (5 mg) all other days or as directed by the INR clinic 135 tablet 1       Medical History: any changes in medical history since they were last seen? No    Review of Systems:  The 14 system ROS was reviewed from the intake questionnaire, and is positive for: back pain, knee pain, arthritis    Physical Exam:  Blood pressure 134/66, pulse 60.  General: NAD, pleasant  Gait: Antalgic, slow, uses a walker  MSK exam: Lumbar ROM is severely reduced in all planes. Pain with ext/rot bilaterally. Paraspinal tenderness in the lower lumbar spine bilaterally. Strength is 2/5 with hip flexion and knee extension on the right. 4/5 with hip flexion and knee extension within very limited range of motion. 5/5 and symmetric otherwise.  Sensation to light touch is smymetric and intact.    BILLING TIME  DOCUMENTATION:   The total TIME spent on this patient on the date of the encounter/appointment was 31 minutes.      TOTAL TIME includes:   Time spent preparing to see the patient (reviewing records and tests)  Time spent face to face (or over the phone) with the patient   Time spent ordering tests, medications, procedures and referrals   Time spent Referring and communicating with other healthcare professionals   Time spent documenting clinical information in Epic         Joey Pastor DO  [facility]  6/29/2022

## 2022-06-29 NOTE — NURSING NOTE
PEG Score 5/4/2022 6/29/2022   PEG Total Score 4.67 5         Fartun Irizarry MA  [facility] [place]

## 2022-06-29 NOTE — PATIENT INSTRUCTIONS
I ordered an MRI of your low back, you'll be called to schedule and me or one of my partners will reach out to you with the results.  Call the number below to schedule a follow up, you'll be scheduled with one of my partners.    Take care,    Joey Pastor,   [facility]      ----------------------------------------------------------------  Clinic Number:  [phone]   Call with any questions about your care and for scheduling assistance.   Calls are returned Monday through Friday between 8 AM and 4:30 PM. We usually get back to you within 2 business days depending on the issue/request.    If we are prescribing your medications:  For opioid medication refills, call the clinic or send a [employer] message 7 days in advance.  Please include:  Name of requested medication  Name of the pharmacy.  For non-opioid medications, call your pharmacy directly to request a refill. Please allow 3-4 days to be processed.   Per [place] State Law:  All controlled substance prescriptions must be filled within 30 days of being written.    For those controlled substances allowing refills, pickup must occur within 30 days of last fill.      We believe regular attendance is key to your success in our program!    Any time you are unable to keep your appointment we ask that you call us at least 24 hours in advance to cancel.This will allow us to offer the appointment time to another patient.   Multiple missed appointments may lead to dismissal from the clinic.

## 2022-06-30 RX ORDER — ATENOLOL 25 MG/1
25 TABLET ORAL
Qty: 180 TABLET | Refills: 0 | Status: SHIPPED | OUTPATIENT
Start: 2022-06-30 | End: 2022-09-26

## 2022-06-30 NOTE — TELEPHONE ENCOUNTER
Prescription approved per [facility] Refill Protocol.  [place] Triage team   -[facility]

## 2022-07-12 ENCOUNTER — DOCUMENTATION ONLY (OUTPATIENT)
Dept: ANTICOAGULATION | Facility: CLINIC | Age: 77
End: 2022-07-12

## 2022-07-12 DIAGNOSIS — Z95.2 S/P MITRAL VALVE REPLACEMENT: ICD-10-CM

## 2022-07-12 DIAGNOSIS — Z95.2 HEART VALVE REPLACED: ICD-10-CM

## 2022-07-12 DIAGNOSIS — Z79.01 LONG TERM CURRENT USE OF ANTICOAGULANT THERAPY: Primary | ICD-10-CM

## 2022-07-12 DIAGNOSIS — I48.20 CHRONIC ATRIAL FIBRILLATION (H): ICD-10-CM

## 2022-07-12 LAB — INR HOME MONITORING: 3.1 (ref 2.5–3.5)

## 2022-07-12 NOTE — PROGRESS NOTES
ANTICOAGULATION  MANAGEMENT-Home Monitor Managed by Exception    Feng Sergio Wynne 77 year old male is on warfarin with therapeutic INR result. (Goal INR 2.5-3.5)    Recent labs: (last 7 days)     07/12/22  0000   INR 3.1         Previous INR was Therapeutic    Medication, diet, health changes since last INR:chart reviewed; none identified    Contacted within the last 12 weeks by phone on 6/28/22      COLTON Toney was NOT contacted regarding therapeutic result today per home monitoring policy manage by exception agreement.   Current warfarin dose is to be continued:     Summary  As of 7/12/2022    Full warfarin instructions:  10 mg every Tue, Sat; 5 mg all other days   Next INR check:  7/26/2022           ?   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

## 2022-07-13 ENCOUNTER — TELEPHONE (OUTPATIENT)
Dept: FAMILY MEDICINE | Facility: CLINIC | Age: 77
End: 2022-07-13

## 2022-07-13 NOTE — TELEPHONE ENCOUNTER
Reason for Call:  Form, our goal is to have forms completed with 72 hours, however, some forms may require a visit or additional information.    Type of letter, form or note:  [employer]    Who is the form from?: Patient    Where did the form come from: form was mailed in    What clinic location was the form placed at?: [facility]    Where the form was placed: Given to physician    What number is listed as a contact on the form?: [phone] ([place])        Additional comments:    Call taken on 7/13/2022 at 7:42 AM by Deja Jorge

## 2022-07-15 ENCOUNTER — OFFICE VISIT (OUTPATIENT)
Dept: CARDIOLOGY | Facility: CLINIC | Age: 77
End: 2022-07-15
Payer: COMMERCIAL

## 2022-07-15 VITALS
DIASTOLIC BLOOD PRESSURE: 85 MMHG | HEART RATE: 64 BPM | HEIGHT: 72 IN | SYSTOLIC BLOOD PRESSURE: 134 MMHG | WEIGHT: 315 LBS | OXYGEN SATURATION: 95 % | BODY MASS INDEX: 42.66 KG/M2

## 2022-07-15 DIAGNOSIS — Z95.2 HEART VALVE REPLACED: Primary | ICD-10-CM

## 2022-07-15 PROCEDURE — 99204 OFFICE O/P NEW MOD 45 MIN: CPT | Performed by: INTERNAL MEDICINE

## 2022-07-15 PROCEDURE — 93000 ELECTROCARDIOGRAM COMPLETE: CPT | Performed by: INTERNAL MEDICINE

## 2022-07-15 NOTE — PATIENT INSTRUCTIONS
Echocardiogram in 1-2 months  If echocardiogram is abnormal then will obtain a cardiac MRI   Follow up with Dr. Gomez in one year

## 2022-07-15 NOTE — PROGRESS NOTES
HPI: This is a 78 yo gentleman, Hx AF and severe MR, s/p MV replacement and MAZE procedure (September 2008) here for cardiology establishment. He had shortness of breath in 2008 and underwent TTE showing severe MR. KAYE demonstrated dilated LV, reduced LV function, pulmonary hypertension and dilated annulus. However he was told possible MR related to weight loss drug. He has had ongoing dilated LV on subsequent echocardiogram. Last echo was in 2021 which showed normal MV prosthetic function. He is on warfarin with adequate INR control and no bleeding. Is vegetarian and lost 100 lbs since his MVR. Also of note needs knee replacement in future for which he is anticipating bridge for OAC. He denies chest pain, SOB. Has mild LEg edema. He is s/p MAZE with no recurrence noted. Coronary angiogram in 2008 was normal, no CAD.    He retired from being a . No smoking. No heavy etoh. Here with his partner. Prior cabin in [employer]. Lives nearby here.     ASSESSMENT/PLAN:    1. Mitral regurgitation s/p St. Raffi MVR on coumadin:  -continue warfarin indefinitely   -will need bridge for any surgery  -echocardiogram due     2. Dilated LV without evidence of congestive heart failure:  -repeat echocardiogram as above, with 3D volumes   -if still dilated then would consider cardiac MRI to evaluate cardiomyopathy. MR was noted to be secondary to dilated LV, and his LV was never formally evaluated beyond coronary angiogram. Unclear mechanism of cardiomyopathy at this time. S/p MAZE so not due to tachy mediated.     3. HTN: on atenolol and lisinopril     4. HLD: cannot tolerate zetia or statin    Follow up in one year, sooner if needed    Jeannine Gomez MD MSC  [facility]         PAST MEDICAL HISTORY  Past Medical History:   Diagnosis Date     Arthritis      Atrial fibrillation (H)      Coronary artery disease      Hypercholesteremia      Morbid obesity (H)      Unspecified essential hypertension        CURRENT  MEDICATIONS  Current Outpatient Medications   Medication Sig Dispense Refill     ASPIRIN NOT PRESCRIBED, INTENTIONAL,   0     atenolol (TENORMIN) 25 MG tablet Take 1 tablet (25 mg) by mouth 2 times daily 180 tablet 0     diclofenac (VOLTAREN) 1 % topical gel Place 4 g onto the skin 4 times daily as needed for moderate pain (knee pain) 100 g 3     ezetimibe (ZETIA) 10 MG tablet Take 1 tablet (10 mg) by mouth daily 90 tablet 3     gabapentin (NEURONTIN) 300 MG capsule Start with one capsule at night, increase to 1 capsule twice a day in 3 days then increase to 1 capsule 3 times a day 90 capsule 0     ketoconazole (NIZORAL) 2 % external cream Apply topically 2 times daily 15 g 1     lisinopril (ZESTRIL) 40 MG tablet Take 1 tablet (40 mg) by mouth daily 90 tablet 1     Multiple Vitamins-Minerals (CENTRUM SILVER PO) Take 1 tablet by mouth daily.       Omega-3 Fatty Acids (FISH OIL CONCENTRATE PO) Take  by mouth daily.       order for DME Equipment being ordered: COMPRESSION STOCKINGS, 20-30 mmHg 1 each 1     warfarin ANTICOAGULANT (COUMADIN) 5 MG tablet Take 2 tablets (10 mg) on Tuesdays,Saturdays and take 1 tablet (5 mg) all other days or as directed by the INR clinic 135 tablet 1     clindamycin (CLEOCIN) 300 MG capsule Take 2 capsules (600 mg) by mouth See Admin Instructions 600 mg one hour prior to dental procedure (Patient not taking: Reported on 7/15/2022) 6 capsule 1       PAST SURGICAL HISTORY:  Past Surgical History:   Procedure Laterality Date     MITRAL VALVE REPLACEMENT  8-    Mitral valve replacement with 31-mm St. Raffi mechanical valve.        ALLERGIES     Allergies   Allergen Reactions     Amiodarone Shortness Of Breath     Latex      Pcn [Penicillins]      Sulfites      Zetia [Ezetimibe] Muscle Pain (Myalgia)     Muscle weakness legs       FAMILY HISTORY  Family History   Problem Relation Age of Onset     Cerebrovascular Disease Father      Diabetes Paternal Grandmother      SINAN. Brother          "CABG X 3 at age 51       SOCIAL HISTORY  Social History     Socioeconomic History     Marital status: Single     Spouse name: Not on file     Number of children: 1     Years of education: Not on file     Highest education level: Not on file   Occupational History     Occupation: Self employed      Comment:     Tobacco Use     Smoking status: Former Smoker     Packs/day: 0.50     Years: 5.00     Pack years: 2.50     Types: Cigarettes     Smokeless tobacco: Never Used     Tobacco comment: quit 20+ yrs ago   Substance and Sexual Activity     Alcohol use: Yes     Alcohol/week: 0.0 standard drinks     Comment: 2 drinks/month     Drug use: No     Sexual activity: Yes     Partners: Male   Other Topics Concern     Parent/sibling w/ CABG, MI or angioplasty before 65F 55M? Yes   Social History Narrative     Not on file     Social Determinants of Health     Financial Resource Strain: Not on file   Food Insecurity: Not on file   Transportation Needs: Not on file   Physical Activity: Not on file   Stress: Not on file   Social Connections: Not on file   Intimate Partner Violence: Not on file   Housing Stability: Not on file       ROS:   Constitutional: No fever, chills, or sweats. No weight gain/loss   ENT: No visual disturbance, ear ache, epistaxis, sore throat  Allergies/Immunologic: Negative  Respiratory: No cough, hemoptysia  Cardiovascular: As per HPI  GI: No nausea, vomiting, hematemesis, melena, or hematochezia  : No urinary frequency, dysuria, or hematuria  Integument: Negative  Psychiatric: Negative  Neuro: Negative  Endocrinology: Negative   Musculoskeletal: Negative  Vascular: No walking impairment, claudication, ischemic rest pain or nonhealing wounds    EXAM:  /85   Pulse 64   Ht 1.816 m (5' 11.5\")   Wt 147.9 kg (326 lb)   SpO2 95%   BMI 44.83 kg/m    In general, the patient is a pleasant male in no apparent distress.    HEENT: NC/AT.  PERRLA.  EOMI.    Neck: No adenopathy.  No thyromegaly. " Carotids +2/2 bilaterally without bruits.  No jugular venous distension.   Heart: RRR. Normal S1, S2 splits physiologically. No murmur, rub, click, or gallop.   Lungs: CTA.  No ronchi, wheezes, rales.    Abdomen: Soft, nontender, nondistended.    Extremities: No clubbing, cyanosis, 1+edema.  Vascular: No bruits are noted.    Labs:  LIPID RESULTS:  Lab Results   Component Value Date    CHOL 141 02/09/2022    CHOL 147 10/16/2020    HDL 46 02/09/2022    HDL 48 10/16/2020    LDL 78 02/09/2022    LDL 79 10/16/2020    TRIG 85 02/09/2022    TRIG 102 10/16/2020    CHOLHDLRATIO 3.4 11/04/2015    NHDL 95 02/09/2022    NHDL 99 10/16/2020       LIVER ENZYME RESULTS:  Lab Results   Component Value Date    AST 17 02/09/2022    AST 19 10/16/2020    ALT 19 02/09/2022    ALT 21 10/16/2020       CBC RESULTS:  Lab Results   Component Value Date    WBC 4.4 02/09/2022    WBC 4.3 10/16/2020    RBC 3.77 (L) 02/09/2022    RBC 3.79 (L) 10/16/2020    HGB 12.3 (L) 02/09/2022    HGB 12.4 (L) 10/16/2020    HCT 38.7 (L) 02/09/2022    HCT 38.2 (L) 10/16/2020     (H) 02/09/2022     (H) 10/16/2020    MCH 32.6 02/09/2022    MCH 32.7 10/16/2020    MCHC 31.8 02/09/2022    MCHC 32.5 10/16/2020    RDW 13.6 02/09/2022    RDW 13.3 10/16/2020     (L) 02/09/2022     (L) 10/16/2020       BMP RESULTS:  Lab Results   Component Value Date     02/09/2022     10/16/2020    POTASSIUM 5.0 02/09/2022    POTASSIUM 4.6 10/16/2020    CHLORIDE 107 02/09/2022    CHLORIDE 109 10/16/2020    CO2 27 02/09/2022    CO2 23 10/16/2020    ANIONGAP 3 02/09/2022    ANIONGAP 7 10/16/2020    GLC 96 02/09/2022    GLC 93 10/16/2020    BUN 30 02/09/2022    BUN 24 10/16/2020    CR 0.90 02/09/2022    CR 0.85 10/16/2020    GFRESTIMATED 89 02/09/2022    GFRESTIMATED 85 10/16/2020    GFRESTBLACK >90 10/16/2020    JOSEPH 9.2 02/09/2022    JOSEPH 9.4 10/16/2020        A1C RESULTS:  No results found for: A1C

## 2022-07-15 NOTE — LETTER
7/15/2022    Paula Lou MD  [address]  [place] [address]    RE: Feng Wynne       Dear Colleague,     I had the pleasure of seeing Feng Wynne in the [facility].      HPI: This is a 76 yo gentleman, Hx AF and severe MR, s/p MV replacement and MAZE procedure (September 2008) here for cardiology establishment. He had shortness of breath in 2008 and underwent TTE showing severe MR. KAYE demonstrated dilated LV, reduced LV function, pulmonary hypertension and dilated annulus. However he was told possible MR related to weight loss drug. He has had ongoing dilated LV on subsequent echocardiogram. Last echo was in 2021 which showed normal MV prosthetic function. He is on warfarin with adequate INR control and no bleeding. Is vegetarian and lost 100 lbs since his MVR. Also of note needs knee replacement in future for which he is anticipating bridge for OAC. He denies chest pain, SOB. Has mild LEg edema. He is s/p MAZE with no recurrence noted. Coronary angiogram in 2008 was normal, no CAD.    He retired from being a . No smoking. No heavy etoh. Here with his partner. Prior cabin in [employer]. Lives nearby here.     ASSESSMENT/PLAN:    1. Mitral regurgitation s/p St. Raffi MVR on coumadin:  -continue warfarin indefinitely   -will need bridge for any surgery  -echocardiogram due     2. Dilated LV without evidence of congestive heart failure:  -repeat echocardiogram as above, with 3D volumes   -if still dilated then would consider cardiac MRI to evaluate cardiomyopathy. MR was noted to be secondary to dilated LV, and his LV was never formally evaluated beyond coronary angiogram. Unclear mechanism of cardiomyopathy at this time. S/p MAZE so not due to tachy mediated.     3. HTN: on atenolol and lisinopril     4. HLD: cannot tolerate zetia or statin    Follow up in one year, sooner if needed    Jeaninne Gomez MD MSC  [facility]         PAST MEDICAL  HISTORY  Past Medical History:   Diagnosis Date     Arthritis      Atrial fibrillation (H)      Coronary artery disease      Hypercholesteremia      Morbid obesity (H)      Unspecified essential hypertension        CURRENT MEDICATIONS  Current Outpatient Medications   Medication Sig Dispense Refill     ASPIRIN NOT PRESCRIBED, INTENTIONAL,   0     atenolol (TENORMIN) 25 MG tablet Take 1 tablet (25 mg) by mouth 2 times daily 180 tablet 0     diclofenac (VOLTAREN) 1 % topical gel Place 4 g onto the skin 4 times daily as needed for moderate pain (knee pain) 100 g 3     ezetimibe (ZETIA) 10 MG tablet Take 1 tablet (10 mg) by mouth daily 90 tablet 3     gabapentin (NEURONTIN) 300 MG capsule Start with one capsule at night, increase to 1 capsule twice a day in 3 days then increase to 1 capsule 3 times a day 90 capsule 0     ketoconazole (NIZORAL) 2 % external cream Apply topically 2 times daily 15 g 1     lisinopril (ZESTRIL) 40 MG tablet Take 1 tablet (40 mg) by mouth daily 90 tablet 1     Multiple Vitamins-Minerals (CENTRUM SILVER PO) Take 1 tablet by mouth daily.       Omega-3 Fatty Acids (FISH OIL CONCENTRATE PO) Take  by mouth daily.       order for DME Equipment being ordered: COMPRESSION STOCKINGS, 20-30 mmHg 1 each 1     warfarin ANTICOAGULANT (COUMADIN) 5 MG tablet Take 2 tablets (10 mg) on Tuesdays,Saturdays and take 1 tablet (5 mg) all other days or as directed by the INR clinic 135 tablet 1     clindamycin (CLEOCIN) 300 MG capsule Take 2 capsules (600 mg) by mouth See Admin Instructions 600 mg one hour prior to dental procedure (Patient not taking: Reported on 7/15/2022) 6 capsule 1       PAST SURGICAL HISTORY:  Past Surgical History:   Procedure Laterality Date     MITRAL VALVE REPLACEMENT  8-    Mitral valve replacement with 31-mm St. Raffi mechanical valve.        ALLERGIES     Allergies   Allergen Reactions     Amiodarone Shortness Of Breath     Latex      Pcn [Penicillins]      Sulfites      Zetia  [Ezetimibe] Muscle Pain (Myalgia)     Muscle weakness legs       FAMILY HISTORY  Family History   Problem Relation Age of Onset     Cerebrovascular Disease Father      Diabetes Paternal Grandmother      C.A.D. Brother         CABG X 3 at age 51       SOCIAL HISTORY  Social History     Socioeconomic History     Marital status: Single     Spouse name: Not on file     Number of children: 1     Years of education: Not on file     Highest education level: Not on file   Occupational History     Occupation: Self employed      Comment:     Tobacco Use     Smoking status: Former Smoker     Packs/day: 0.50     Years: 5.00     Pack years: 2.50     Types: Cigarettes     Smokeless tobacco: Never Used     Tobacco comment: quit 20+ yrs ago   Substance and Sexual Activity     Alcohol use: Yes     Alcohol/week: 0.0 standard drinks     Comment: 2 drinks/month     Drug use: No     Sexual activity: Yes     Partners: Male   Other Topics Concern     Parent/sibling w/ CABG, MI or angioplasty before 65F 55M? Yes   Social History Narrative     Not on file     Social Determinants of Health     Financial Resource Strain: Not on file   Food Insecurity: Not on file   Transportation Needs: Not on file   Physical Activity: Not on file   Stress: Not on file   Social Connections: Not on file   Intimate Partner Violence: Not on file   Housing Stability: Not on file       ROS:   Constitutional: No fever, chills, or sweats. No weight gain/loss   ENT: No visual disturbance, ear ache, epistaxis, sore throat  Allergies/Immunologic: Negative  Respiratory: No cough, hemoptysia  Cardiovascular: As per HPI  GI: No nausea, vomiting, hematemesis, melena, or hematochezia  : No urinary frequency, dysuria, or hematuria  Integument: Negative  Psychiatric: Negative  Neuro: Negative  Endocrinology: Negative   Musculoskeletal: Negative  Vascular: No walking impairment, claudication, ischemic rest pain or nonhealing wounds    EXAM:  /85   Pulse 64  "  Ht 1.816 m (5' 11.5\")   Wt 147.9 kg (326 lb)   SpO2 95%   BMI 44.83 kg/m    In general, the patient is a pleasant male in no apparent distress.    HEENT: NC/AT.  PERRLA.  EOMI.    Neck: No adenopathy.  No thyromegaly. Carotids +2/2 bilaterally without bruits.  No jugular venous distension.   Heart: RRR. Normal S1, S2 splits physiologically. No murmur, rub, click, or gallop.   Lungs: CTA.  No ronchi, wheezes, rales.    Abdomen: Soft, nontender, nondistended.    Extremities: No clubbing, cyanosis, 1+edema.  Vascular: No bruits are noted.    Labs:  LIPID RESULTS:  Lab Results   Component Value Date    CHOL 141 02/09/2022    CHOL 147 10/16/2020    HDL 46 02/09/2022    HDL 48 10/16/2020    LDL 78 02/09/2022    LDL 79 10/16/2020    TRIG 85 02/09/2022    TRIG 102 10/16/2020    CHOLHDLRATIO 3.4 11/04/2015    NHDL 95 02/09/2022    NHDL 99 10/16/2020       LIVER ENZYME RESULTS:  Lab Results   Component Value Date    AST 17 02/09/2022    AST 19 10/16/2020    ALT 19 02/09/2022    ALT 21 10/16/2020       CBC RESULTS:  Lab Results   Component Value Date    WBC 4.4 02/09/2022    WBC 4.3 10/16/2020    RBC 3.77 (L) 02/09/2022    RBC 3.79 (L) 10/16/2020    HGB 12.3 (L) 02/09/2022    HGB 12.4 (L) 10/16/2020    HCT 38.7 (L) 02/09/2022    HCT 38.2 (L) 10/16/2020     (H) 02/09/2022     (H) 10/16/2020    MCH 32.6 02/09/2022    MCH 32.7 10/16/2020    MCHC 31.8 02/09/2022    MCHC 32.5 10/16/2020    RDW 13.6 02/09/2022    RDW 13.3 10/16/2020     (L) 02/09/2022     (L) 10/16/2020       BMP RESULTS:  Lab Results   Component Value Date     02/09/2022     10/16/2020    POTASSIUM 5.0 02/09/2022    POTASSIUM 4.6 10/16/2020    CHLORIDE 107 02/09/2022    CHLORIDE 109 10/16/2020    CO2 27 02/09/2022    CO2 23 10/16/2020    ANIONGAP 3 02/09/2022    ANIONGAP 7 10/16/2020    GLC 96 02/09/2022    GLC 93 10/16/2020    BUN 30 02/09/2022    BUN 24 10/16/2020    CR 0.90 02/09/2022    CR 0.85 10/16/2020    " GFRESTIMATED 89 02/09/2022    GFRESTIMATED 85 10/16/2020    GFRESTBLACK >90 10/16/2020    JOSEPH 9.2 02/09/2022    JOSEPH 9.4 10/16/2020        A1C RESULTS:  No results found for: A1C    Thank you for allowing me to participate in the care of your patient.      Sincerely,     Jeannine Gomez MD   [facility]  cc:   Armani Marin MD  [address] [place], [place] [id number]

## 2022-07-28 ENCOUNTER — DOCUMENTATION ONLY (OUTPATIENT)
Dept: ANTICOAGULATION | Facility: CLINIC | Age: 77
End: 2022-07-28

## 2022-07-28 DIAGNOSIS — Z95.2 HEART VALVE REPLACED: ICD-10-CM

## 2022-07-28 DIAGNOSIS — Z95.2 S/P MITRAL VALVE REPLACEMENT: ICD-10-CM

## 2022-07-28 DIAGNOSIS — Z79.01 LONG TERM CURRENT USE OF ANTICOAGULANT THERAPY: Primary | ICD-10-CM

## 2022-07-28 DIAGNOSIS — I48.20 CHRONIC ATRIAL FIBRILLATION (H): ICD-10-CM

## 2022-07-28 LAB — INR HOME MONITORING: 3 (ref 2.5–3.5)

## 2022-07-28 NOTE — PROGRESS NOTES
ANTICOAGULATION  MANAGEMENT-Home Monitor Managed by Exception    Feng Sergio Wynne 77 year old male is on warfarin with therapeutic INR result. (Goal INR 2.5-3.5)    Recent labs: (last 7 days)     07/28/22  0000   INR 3.0         Previous INR was Therapeutic    Medication, diet, health changes since last INR:chart reviewed; none identified    Contacted within the last 12 weeks by phone on 6/28/22      COLTON Toney was NOT contacted regarding therapeutic result today per home monitoring policy manage by exception agreement.   Current warfarin dose is to be continued:     Summary  As of 7/28/2022    Full warfarin instructions:  10 mg every Tue, Sat; 5 mg all other days   Next INR check:  8/11/2022           ?   Gaviota Weston RN  Anticoagulation Clinic  7/28/2022    _______________________________________________________________________     Anticoagulation Episode Summary     Current INR goal:  2.5-3.5   TTR:  91.5 % (1 y)   Target end date:  Indefinite   Send INR reminders to:  DIA MARK    Indications    Long-term (current) use of anticoagulants [Z79.01] [Z79.01]  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] [Z95.2]  S/P mitral valve replacement [Z95.2]  Chronic atrial fibrillation (H) [I48.20]           Comments:  [employer] HOME MONITORING Patient, okay  to manage by exception on 7/8/20         Anticoagulation Care Providers     Provider Role Specialty Phone number    Enrique Walker MD Referring Internal Medicine [phone]    Paula Lou MD Referring Internal Medicine [phone]

## 2022-08-02 ENCOUNTER — VIRTUAL VISIT (OUTPATIENT)
Dept: FAMILY MEDICINE | Facility: CLINIC | Age: 77
End: 2022-08-02
Payer: COMMERCIAL

## 2022-08-02 DIAGNOSIS — M54.41 CHRONIC RIGHT-SIDED LOW BACK PAIN WITH RIGHT-SIDED SCIATICA: Primary | ICD-10-CM

## 2022-08-02 DIAGNOSIS — G89.29 CHRONIC RIGHT-SIDED LOW BACK PAIN WITH RIGHT-SIDED SCIATICA: Primary | ICD-10-CM

## 2022-08-02 PROCEDURE — 99213 OFFICE O/P EST LOW 20 MIN: CPT | Mod: 95 | Performed by: PHYSICIAN ASSISTANT

## 2022-08-02 NOTE — PROGRESS NOTES
Feng is a 77 year old who is being evaluated via a billable video visit.      How would you like to obtain your AVS? MyChart  If the video visit is dropped, the invitation should be resent by: Text to cell phone: [phone]  Will anyone else be joining your video visit? No      Patient would like to connect via [employer] @[phone]. Patient okay with 1030 or so.        Subjective   Feng is a 77 year old presenting for the following health issues:  Forms    Assessment and Plan:     (M54.41,  G89.29) Chronic right-sided low back pain with right-sided sciatica  (primary encounter diagnosis)  Comment: unable to get in/out of bus due to chronic right-sided weakness and paresthesia  Plan: forms for [employer] mobility filled out today    Juanita Kauffman PA-C        HPI     Forms need to be filled out.   Feng is here for forms to be filled out  He needs [employer] mobility for rides  He previously used public transportation but due to chronic right-sided weakness and paresthesias he cannot get in and out of a bus  He also has trouble getting to and from bus stop  He previously discussed this with his pcp     Review of Systems   See above      Objective           Vitals:  No vitals were obtained today due to virtual visit.    Physical Exam   GENERAL: Healthy, alert and no distress  EYES: Eyes grossly normal to inspection.  No discharge or erythema, or obvious scleral/conjunctival abnormalities.  RESP: No audible wheeze, cough, or visible cyanosis.  No visible retractions or increased work of breathing.    SKIN: Visible skin clear. No significant rash, abnormal pigmentation or lesions.  NEURO: Cranial nerves grossly intact.  Mentation and speech appropriate for age.  PSYCH: Mentation appears normal, affect normal/bright, judgement and insight intact, normal speech and appearance well-groomed.            Video-Visit Details    Video Start Time:11:42    Type of service:  Video Visit    Video End Time:11:59 AM    Originating  Location (pt. Location): Home    Distant Location (provider location):  [facility]     Platform used for Video Visit: Tom    .  Cassy.

## 2022-08-09 ENCOUNTER — DOCUMENTATION ONLY (OUTPATIENT)
Dept: ANTICOAGULATION | Facility: CLINIC | Age: 77
End: 2022-08-09

## 2022-08-09 DIAGNOSIS — Z79.01 LONG TERM CURRENT USE OF ANTICOAGULANT THERAPY: Primary | ICD-10-CM

## 2022-08-09 DIAGNOSIS — I48.20 CHRONIC ATRIAL FIBRILLATION (H): ICD-10-CM

## 2022-08-09 DIAGNOSIS — Z95.2 HEART VALVE REPLACED: ICD-10-CM

## 2022-08-09 DIAGNOSIS — Z95.2 S/P MITRAL VALVE REPLACEMENT: ICD-10-CM

## 2022-08-09 LAB — INR HOME MONITORING: 3.4 (ref 2.5–3.5)

## 2022-08-09 NOTE — PROGRESS NOTES
ANTICOAGULATION  MANAGEMENT-Home Monitor Managed by Exception    Feng Sergio Wynne 77 year old male is on warfarin with therapeutic INR result. (Goal INR 2.5-3.5)    Recent labs: (last 7 days)     08/09/22  0000   INR 3.4         Previous INR was Therapeutic    Medication, diet, health changes since last INR:chart reviewed; none identified    Contacted within the last 12 weeks by phone on 6/26/22      COLTON Toney was NOT contacted regarding therapeutic result today per home monitoring policy manage by exception agreement.   Current warfarin dose is to be continued:     Summary  As of 8/9/2022    Full warfarin instructions:  10 mg every Tue, Sat; 5 mg all other days   Next INR check:  8/23/2022           ?   Gaviota Weston RN  Anticoagulation Clinic  8/9/2022    _______________________________________________________________________     Anticoagulation Episode Summary     Current INR goal:  2.5-3.5   TTR:  91.5 % (1 y)   Target end date:  Indefinite   Send INR reminders to:  DIA MARK    Indications    Long-term (current) use of anticoagulants [Z79.01] [Z79.01]  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] [Z95.2]  S/P mitral valve replacement [Z95.2]  Chronic atrial fibrillation (H) [I48.20]           Comments:  [employer] HOME MONITORING Patient, okay  to manage by exception on 7/8/20         Anticoagulation Care Providers     Provider Role Specialty Phone number    Enrique Walker MD Referring Internal Medicine [phone]    Paula Lou MD Referring Internal Medicine [phone]

## 2022-08-11 DIAGNOSIS — I10 ESSENTIAL HYPERTENSION: Primary | ICD-10-CM

## 2022-08-12 RX ORDER — LISINOPRIL 40 MG/1
40 TABLET ORAL DAILY
Qty: 90 TABLET | Refills: 3 | Status: SHIPPED | OUTPATIENT
Start: 2022-08-12 | End: 2023-08-07

## 2022-08-12 NOTE — TELEPHONE ENCOUNTER
Prescription approved per [facility] Refill Protocol.    May Weinberg, RN BSN MSN  [facility]

## 2022-08-16 ENCOUNTER — ANCILLARY PROCEDURE (OUTPATIENT)
Dept: MRI IMAGING | Facility: CLINIC | Age: 77
End: 2022-08-16
Attending: PHYSICAL MEDICINE & REHABILITATION
Payer: COMMERCIAL

## 2022-08-16 DIAGNOSIS — M54.50 CHRONIC BILATERAL LOW BACK PAIN WITHOUT SCIATICA: ICD-10-CM

## 2022-08-16 DIAGNOSIS — G89.29 CHRONIC BILATERAL LOW BACK PAIN WITHOUT SCIATICA: ICD-10-CM

## 2022-08-16 PROCEDURE — 72148 MRI LUMBAR SPINE W/O DYE: CPT

## 2022-08-23 ENCOUNTER — DOCUMENTATION ONLY (OUTPATIENT)
Dept: ANTICOAGULATION | Facility: CLINIC | Age: 77
End: 2022-08-23

## 2022-08-23 DIAGNOSIS — Z79.01 LONG TERM CURRENT USE OF ANTICOAGULANT THERAPY: Primary | ICD-10-CM

## 2022-08-23 DIAGNOSIS — I48.20 CHRONIC ATRIAL FIBRILLATION (H): ICD-10-CM

## 2022-08-23 DIAGNOSIS — Z95.2 S/P MITRAL VALVE REPLACEMENT: ICD-10-CM

## 2022-08-23 DIAGNOSIS — Z95.2 HEART VALVE REPLACED: ICD-10-CM

## 2022-08-23 LAB — INR HOME MONITORING: 2.8 (ref 2.5–3.5)

## 2022-08-23 NOTE — PROGRESS NOTES
ANTICOAGULATION  MANAGEMENT-Home Monitor Managed by Exception    Feng Wynne 77 year old male is on warfarin with therapeutic INR result. (Goal INR 2.5-3.5)    Recent labs: (last 7 days)     08/23/22  0000   INR 2.8         Previous INR was Therapeutic    Medication, diet, health changes since last INR:chart reviewed; none identified    Contacted within the last 12 weeks by phone on 06/26/2022      COLTON Toney was NOT contacted regarding therapeutic result today per home monitoring policy manage by exception agreement.   Current warfarin dose is to be continued:     Summary  As of 8/23/2022    Full warfarin instructions:  10 mg every Tue, Sat; 5 mg all other days   Next INR check:  9/6/2022           ?   Ami Robertson RN  Anticoagulation Clinic  8/23/2022    _______________________________________________________________________     Anticoagulation Episode Summary     Current INR goal:  2.5-3.5   TTR:  95.0 % (1 y)   Target end date:  Indefinite   Send INR reminders to:  DIA MARK    Indications    Long-term (current) use of anticoagulants [Z79.01] [Z79.01]  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] [Z95.2]  S/P mitral valve replacement [Z95.2]  Chronic atrial fibrillation (H) [I48.20]           Comments:  [employer] HOME MONITORING Patient, okay  to manage by exception on 7/8/20         Anticoagulation Care Providers     Provider Role Specialty Phone number    Enrique Walker MD Referring Internal Medicine [phone]    Paula Lou MD Referring Internal Medicine [phone]

## 2022-08-31 ENCOUNTER — OFFICE VISIT (OUTPATIENT)
Dept: URGENT CARE | Facility: URGENT CARE | Age: 77
End: 2022-08-31
Payer: COMMERCIAL

## 2022-08-31 ENCOUNTER — NURSE TRIAGE (OUTPATIENT)
Dept: FAMILY MEDICINE | Facility: CLINIC | Age: 77
End: 2022-08-31

## 2022-08-31 VITALS
HEART RATE: 64 BPM | DIASTOLIC BLOOD PRESSURE: 85 MMHG | TEMPERATURE: 98.8 F | SYSTOLIC BLOOD PRESSURE: 134 MMHG | OXYGEN SATURATION: 94 %
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
## 2022-08-31 PROCEDURE — 99213 OFFICE O/P EST LOW 20 MIN: CPT | Performed by: PHYSICIAN ASSISTANT

## 2022-08-31 ASSESSMENT — ENCOUNTER SYMPTOMS
COLOR CHANGE: 1
FEVER: 0

## 2022-08-31 NOTE — PROGRESS NOTES
SUBJECTIVE:   Feng Wynne is a 77 year old male presenting with a chief complaint of   Chief Complaint   Patient presents with     Urgent Care     Bug bite swollen an itching left knee       He is an established patient of [place].  Patient presents with complaints of left knee bug bite.  [place]  No fevers, no drainage.  Patient did not actually witness the bite.  Itches.      Treating with ice.        Review of Systems   Constitutional: Negative for fever.   Skin: Positive for color change.   All other systems reviewed and are negative.      Past Medical History:   Diagnosis Date     Arthritis      Atrial fibrillation (H)      Coronary artery disease      Hypercholesteremia      Morbid obesity (H)      Unspecified essential hypertension      Family History   Problem Relation Age of Onset     Cerebrovascular Disease Father      Diabetes Paternal Grandmother      C.A.D. Brother         CABG X 3 at age 51     Current Outpatient Medications   Medication Sig Dispense Refill     ASPIRIN NOT PRESCRIBED, INTENTIONAL,   0     atenolol (TENORMIN) 25 MG tablet Take 1 tablet (25 mg) by mouth 2 times daily 180 tablet 0     diclofenac (VOLTAREN) 1 % topical gel Place 4 g onto the skin 4 times daily as needed for moderate pain (knee pain) 100 g 3     ketoconazole (NIZORAL) 2 % external cream Apply topically 2 times daily 15 g 1     lisinopril (ZESTRIL) 40 MG tablet Take 1 tablet (40 mg) by mouth daily 90 tablet 3     Multiple Vitamins-Minerals (CENTRUM SILVER PO) Take 1 tablet by mouth daily.       Omega-3 Fatty Acids (FISH OIL CONCENTRATE PO) Take  by mouth daily.       order for DME Equipment being ordered: COMPRESSION STOCKINGS, 20-30 mmHg 1 each 1     warfarin ANTICOAGULANT (COUMADIN) 5 MG tablet Take 2 tablets (10 mg) on Tuesdays,Saturdays and take 1 tablet (5 mg) all other days or as directed by the INR clinic 135 tablet 1     Social History     Tobacco Use      Smoking status: Former Smoker     Packs/day: 0.50     Years: 5.00     Pack years: 2.50     Types: Cigarettes     Smokeless tobacco: Never Used     Tobacco comment: quit 20+ yrs ago   Substance Use Topics     Alcohol use: Yes     Alcohol/week: 0.0 standard drinks     Comment: 2 drinks/month       OBJECTIVE  /85 (BP Location: Left arm, Patient Position: Sitting, Cuff Size: Adult Regular)   Pulse 64   Temp 98.8  F (37.1  C) (Oral)   SpO2 94%     Physical Exam  Vitals and nursing note reviewed.   Constitutional:       Appearance: Normal appearance. He is obese.   Eyes:      Extraocular Movements: Extraocular movements intact.      Conjunctiva/sclera: Conjunctivae normal.   Cardiovascular:      Rate and Rhythm: Normal rate.   Skin:     Comments: Left knee, just below patella - 1 cm x 3 cm erythematous spot, mild  Erythema with central scab.  No fluid collection.   Neurological:      General: No focal deficit present.      Mental Status: He is alert.   Psychiatric:         Mood and Affect: Mood normal.         Labs:  No results found for this or any previous visit (from the past 24 hour(s)).    X-Ray was not done.    ASSESSMENT:      ICD-10-CM    1. Bug bite, initial encounter  W57.XXXA         Medical Decision Making:    Differential Diagnosis:  Bug bite    Serious Comorbid Conditions:  Adult:  reviewed    PLAN:  Topical abx daily.  Ice prn.  Discussed reasons to seek immediate medical attention.      Followup:    If not improving or if condition worsens, follow up with your Primary Care Provider, If not improving or if conditions worsens over the next 12-24 hours, go to the Emergency Department    There are no Patient Instructions on file for this visit.

## 2022-08-31 NOTE — TELEPHONE ENCOUNTER
Patient will need to send us photos of are of concern and schedule e-visit to start. Also can schedule virtual video visit in next 24 hours with any available provider if persistent or worsening symptoms, if redness spreading or dark tissue surrounding the bites or any breathing difficulties will need to be seen immediately in UC (or ED with any breathing difficulties). Can he come to UC today to be seen today? He can try benadryl meanwhile.

## 2022-08-31 NOTE — TELEPHONE ENCOUNTER
"CC: Patient calling reporting multiple spider bites     TYPE of SPIDER: unknown, \"It was a small black spider, but I am not sure what kind\"  ONSET: over the weekend   LOCATION: left knee, 2 bites   REDNESS: yes, states \"large area of dark pink around the bites\"  PAIN: denies  ITCHING: moderate itching   SWELLING: \"a little bit of swelling\"   OTHER SYMPTOMS: DENIES difficulty breathing, hives    Triaged per Epic protocol, patient advised to see in office today. Patient is unable to have appointment today per his schedule. Patient states he would prefer a VV - PCP please advise if patient can proceed with VV or should be seen in office instead?     Next VV available on 9/1 and next in person appointment available 9/2 - please advise     Callback [phone] - ok to leave detailed VM     Patient expressed verbal understanding to go to [facility] with new/worsening symptoms in the interim.     Mitch Espitia RN  [facility]    Reason for Disposition    Spider bite(s)    Red or very tender (to touch) area, and started over 24 hours after the bite    Additional Information    Negative: Passed out (i.e., fainted, collapsed and was not responding)    Negative: Wheezing or difficulty breathing    Negative: Hoarseness, cough or tightness in the throat or chest    Negative: Swollen tongue or difficulty swallowing    Negative: Life-threatening reaction (anaphylaxis) in the past to bite from same insect and < 2 hours since bite    Negative: Sounds like a life-threatening emergency to the triager    Negative: Bee sting(s)    Negative: Difficulty breathing or swallowing    Negative: Difficult to awaken or acting confused (e.g., disoriented, slurred speech)    Negative: Pale cold skin and very weak (can't stand)    Negative: Sounds like a life-threatening emergency to the triager    Negative: Not a spider bite    Negative: Black  (or brown ) spider bite and local skin changes    Negative: Abdominal pain, " chest tightness, or other muscle cramps    Negative: Urine is brown, black, or red in color    Negative: Vomiting    Negative: Patient sounds very sick or weak to the triager    Negative: SEVERE bite pain and not improved after 2 hours of pain medicine    Negative: Rash elsewhere on body that developed after spider bite    Negative: Fever and red area    Negative: Fever and area is very tender to touch    Negative: Red streak or red line and length > 2 inches (5 cm)    Protocols used: INSECT BITE-A-OH, SPIDER BITE - NORTH [id number]

## 2022-08-31 NOTE — TELEPHONE ENCOUNTER
Patient Contact     Attempt #: 1     Was call answered?  yes, writer reviewed provider message below, patient states they will come into UC today at [facility] location.         Lydia La RN  [facility]

## 2022-09-08 ENCOUNTER — DOCUMENTATION ONLY (OUTPATIENT)
Dept: ANTICOAGULATION | Facility: CLINIC | Age: 77
End: 2022-09-08

## 2022-09-08 DIAGNOSIS — Z79.01 LONG TERM CURRENT USE OF ANTICOAGULANT THERAPY: Primary | ICD-10-CM

## 2022-09-08 DIAGNOSIS — I48.20 CHRONIC ATRIAL FIBRILLATION (H): ICD-10-CM

## 2022-09-08 DIAGNOSIS — Z95.2 S/P MITRAL VALVE REPLACEMENT: ICD-10-CM

## 2022-09-08 DIAGNOSIS — Z95.2 HEART VALVE REPLACED: ICD-10-CM

## 2022-09-08 LAB
INR HOME MONITORING: 2.4 (ref 2.5–3.5)
INR HOME MONITORING: 2.6 (ref 2.5–3.5)

## 2022-09-08 NOTE — PROGRESS NOTES
ANTICOAGULATION  MANAGEMENT-Home Monitor Managed by Exception    Feng Wynne 77 year old male is on warfarin with therapeutic INR result. (Goal INR 2.5-3.5)    Recent labs: (last 7 days)     09/08/22  0000   INR 2.6         Previous INR was Therapeutic    Medication, diet, health changes since last INR:chart reviewed; none identified    Contacted within the last 12 weeks by phone on 06/26/2022      COLTON Toney was NOT contacted regarding therapeutic result today per home monitoring policy manage by exception agreement.   Current warfarin dose is to be continued:     Summary  As of 9/8/2022    Full warfarin instructions:  10 mg every Tue, Sat; 5 mg all other days   Next INR check:  9/22/2022           ?   Ami Robertson RN  Anticoagulation Clinic  9/8/2022    _______________________________________________________________________     Anticoagulation Episode Summary     Current INR goal:  2.5-3.5   TTR:  95.9 % (1 y)   Target end date:  Indefinite   Send INR reminders to:  DIA MARK    Indications    Long-term (current) use of anticoagulants [Z79.01] [Z79.01]  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] [Z95.2]  S/P mitral valve replacement [Z95.2]  Chronic atrial fibrillation (H) [I48.20]           Comments:  [employer] HOME MONITORING Patient, okay  to manage by exception on 7/8/20         Anticoagulation Care Providers     Provider Role Specialty Phone number    Enrique Walker MD Referring Internal Medicine [phone]    Paula Lou MD Referring Internal Medicine [phone]

## 2022-09-20 ENCOUNTER — DOCUMENTATION ONLY (OUTPATIENT)
Dept: ANTICOAGULATION | Facility: CLINIC | Age: 77
End: 2022-09-20

## 2022-09-20 DIAGNOSIS — I48.20 CHRONIC ATRIAL FIBRILLATION (H): ICD-10-CM

## 2022-09-20 DIAGNOSIS — Z95.2 HEART VALVE REPLACED: ICD-10-CM

## 2022-09-20 DIAGNOSIS — Z95.2 S/P MITRAL VALVE REPLACEMENT: ICD-10-CM

## 2022-09-20 DIAGNOSIS — Z79.01 LONG TERM CURRENT USE OF ANTICOAGULANT THERAPY: Primary | ICD-10-CM

## 2022-09-20 LAB — INR HOME MONITORING: 3.5 (ref 2.5–3.5)

## 2022-09-20 NOTE — PROGRESS NOTES
ANTICOAGULATION  MANAGEMENT-Home Monitor Managed by Exception    Feng Sergio Wynne 77 year old male is on warfarin with therapeutic INR result. (Goal INR 2.5-3.5)    Recent labs: (last 7 days)     09/20/22  0000   INR 3.5         Previous INR was Therapeutic    Medication, diet, health changes since last INR:chart reviewed; none identified    Contacted within the last 12 weeks by phone on 6/26/22      COLTON Toney was NOT contacted regarding therapeutic result today per home monitoring policy manage by exception agreement.   Current warfarin dose is to be continued:     Summary  As of 9/20/2022    Full warfarin instructions:  10 mg every Tue, Sat; 5 mg all other days   Next INR check:  10/4/2022           ?   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

## 2022-09-25 DIAGNOSIS — I48.20 CHRONIC ATRIAL FIBRILLATION (H): ICD-10-CM

## 2022-09-26 RX ORDER — ATENOLOL 25 MG/1
TABLET ORAL
Qty: 180 TABLET | Refills: 0 | Status: SHIPPED | OUTPATIENT
Start: 2022-09-26 | End: 2022-12-29

## 2022-09-26 NOTE — TELEPHONE ENCOUNTER
Prescription approved per [facility] Refill Protocol.    May Weinberg, RN BSN MSN  [facility]

## 2022-09-30 DIAGNOSIS — I48.20 CHRONIC ATRIAL FIBRILLATION (H): ICD-10-CM

## 2022-09-30 RX ORDER — WARFARIN SODIUM 5 MG/1
TABLET ORAL
Qty: 120 TABLET | Refills: 1 | Status: SHIPPED | OUTPATIENT
Start: 2022-09-30 | End: 2023-04-04

## 2022-09-30 NOTE — TELEPHONE ENCOUNTER
Last INR 9-, follow up 10-4-2022    Appointments in Next Year    Oct 18, 2022  1:45 PM  ECHO COMPLETE with [id number]  [facility] ([facility] ) [phone]   Nov 09, 2022  9:00 AM  (Arrive by 8:45 AM)  Return Patient with Celestino Shay MD  [facility] ([facility] ) [phone]        No future INR scheduled, on home monitoring    Of note, care provider listed: Dr Enrique Walker has not been with [facility] since Sept 2020, current PCP is Dr Paula Lou    Please review Rx and update as needed for pharmacy/insurance    Veda Dutta RT (R)

## 2022-09-30 NOTE — TELEPHONE ENCOUNTER
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 Rx instructions and quantity supplied updated to match patient's current dosing plan. Warfarin 90 day supply with 1 refill granted per [facility] protocol     Love Elliott, RN  Anticoagulation Clinic

## 2022-10-05 ENCOUNTER — DOCUMENTATION ONLY (OUTPATIENT)
Dept: ANTICOAGULATION | Facility: CLINIC | Age: 77
End: 2022-10-05

## 2022-10-05 DIAGNOSIS — I48.20 CHRONIC ATRIAL FIBRILLATION (H): Primary | ICD-10-CM

## 2022-10-05 DIAGNOSIS — Z95.2 S/P MITRAL VALVE REPLACEMENT: ICD-10-CM

## 2022-10-06 ENCOUNTER — TELEPHONE (OUTPATIENT)
Dept: FAMILY MEDICINE | Facility: CLINIC | Age: 77
End: 2022-10-06

## 2022-10-06 ENCOUNTER — ANTICOAGULATION THERAPY VISIT (OUTPATIENT)
Dept: ANTICOAGULATION | Facility: CLINIC | Age: 77
End: 2022-10-06

## 2022-10-06 DIAGNOSIS — I48.20 CHRONIC ATRIAL FIBRILLATION (H): ICD-10-CM

## 2022-10-06 DIAGNOSIS — Z79.01 LONG TERM CURRENT USE OF ANTICOAGULANT THERAPY: Primary | ICD-10-CM

## 2022-10-06 DIAGNOSIS — Z95.2 S/P MITRAL VALVE REPLACEMENT: ICD-10-CM

## 2022-10-06 DIAGNOSIS — Z95.2 HEART VALVE REPLACED: ICD-10-CM

## 2022-10-06 LAB — INR HOME MONITORING: 3.1 (ref 2.5–3.5)

## 2022-10-06 NOTE — PROGRESS NOTES
ANTICOAGULATION MANAGEMENT     Feng Wynne 77 year old male is on warfarin with therapeutic INR result. (Goal INR 2.5-3.5)    Recent labs: (last 7 days)     10/06/22  0000   INR 3.1       ASSESSMENT       Source(s): Chart Review and Patient/Caregiver Call       Warfarin doses taken: Warfarin taken as instructed    Diet: No new diet changes identified    New illness, injury, or hospitalization: No    Medication/supplement changes: None noted    Signs or symptoms of bleeding or clotting: No    Previous INR: Therapeutic last 2(+) visits    Additional findings: None       PLAN     Recommended plan for no diet, medication or health factor changes affecting INR     Dosing Instructions: Continue your current warfarin dose with next INR in 2 weeks       Summary  As of 10/6/2022    Full warfarin instructions:  10 mg every Tue, Sat; 5 mg all other days   Next INR check:  10/20/2022             Telephone call with Feng who verbalizes understanding and agrees to plan    Patient to recheck with home meter    Education provided: Please call back if any changes to your diet, medications or how you've been taking warfarin and Resume manage by exception with home monitor. Continue to submit INR results to home monitor company.You will only be called when your result is out of range. Please call and notify [facility] if new medication started, dose missed, signs or symptoms of bleeding or clotting, or a surgery/procedure is scheduled.    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

## 2022-10-06 NOTE — TELEPHONE ENCOUNTER
General Call    Contacts       Type Contact Phone/Fax    10/06/2022 11:04 AM CDT Phone (Incoming) Feng Wynnen (Self) [phone] (H)        Reason for Call:  New provider    What are your questions or concerns:  Patient's provider for hip injections for bursitis moved and was not given a new provider before he left. Patient was scheduled with another provider but didn't like the reviews online and is interested in a recommendation for a new provider. Patient also wants his body looked over by this provider as he is concerned about his knee being bone on bone and possibly causing other issues.    Date of last appointment with provider: n/a    Could we send this information to you in [place] or would you prefer to receive a phone call?:   Patient would prefer a phone call   Okay to leave a detailed message?: Yes at Cell number on file:    Telephone Information:   Mobile [phone]

## 2022-10-10 NOTE — TELEPHONE ENCOUNTER
Patient called back and scheduled with Dr. Espinoza for consultation on right knee plan. Alexandra Bradford RN

## 2022-10-18 ENCOUNTER — ANTICOAGULATION THERAPY VISIT (OUTPATIENT)
Dept: ANTICOAGULATION | Facility: CLINIC | Age: 77
End: 2022-10-18

## 2022-10-18 DIAGNOSIS — Z79.01 LONG TERM CURRENT USE OF ANTICOAGULANT THERAPY: Primary | ICD-10-CM

## 2022-10-18 DIAGNOSIS — I48.20 CHRONIC ATRIAL FIBRILLATION (H): ICD-10-CM

## 2022-10-18 DIAGNOSIS — Z95.2 S/P MITRAL VALVE REPLACEMENT: ICD-10-CM

## 2022-10-18 DIAGNOSIS — Z95.2 HEART VALVE REPLACED: ICD-10-CM

## 2022-10-18 LAB — INR HOME MONITORING: 3.6 (ref 2.5–3.5)

## 2022-10-18 NOTE — PROGRESS NOTES
ANTICOAGULATION MANAGEMENT     Feng Wynne 77 year old male is on warfarin with supratherapeutic INR result. (Goal INR 2.5-3.5)    Recent labs: (last 7 days)     10/18/22  0000   INR 3.6*       ASSESSMENT       Source(s): Chart Review and Patient/Caregiver Call       Warfarin doses taken: Warfarin taken as instructed    Diet: No new diet changes identified    New illness, injury, or hospitalization: No    Medication/supplement changes: None noted    Signs or symptoms of bleeding or clotting: No    Previous INR: Therapeutic last 2(+) visits    Additional findings: None       PLAN     Recommended plan for no diet, medication or health factor changes affecting INR     Dosing Instructions: partial hold then continue your current warfarin dose with next INR in 2 weeks       Summary  As of 10/18/2022    Full warfarin instructions:  10/18: 5 mg; Otherwise 10 mg every Tue, Sat; 5 mg all other days   Next INR check:  11/1/2022             Telephone call with Feng who verbalizes understanding and agrees to plan    Patient to recheck with home meter    Education provided: None required    Plan made per [facility] anticoagulation protocol    Ami Robertson RN  Anticoagulation Clinic  10/18/2022    _______________________________________________________________________     Anticoagulation Episode Summary     Current INR goal:  2.5-3.5   TTR:  95.2 % (1 y)   Target end date:  Indefinite   Send INR reminders to:  [facility] HOME [facility]ITOR[facility]    Indications    Long-term (current) use of anticoagulants [Z79.01] [Z79.01]  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

## 2022-10-24 NOTE — PROGRESS NOTES
"Feng is a 77 year old who is being evaluated via a billable video visit.      How would you like to obtain your AVS? MyChart  If the video visit is dropped, the invitation should be resent by: Text to cell phone: [phone]  Will anyone else be joining your video visit? No        Assessment & Plan     Primary osteoarthritis of right knee  NEW PROBLEM:  -he plans to see [facility]. He also has chronic low back pain.  Unclear if/how much of the pain is primary from the knee vs radiating from the back.  He is on coumadin and thus can't do NSAID.    After discussion we agreed to prescribe a small quantity of hydrocodone.  Risks and side effects are discussed.  - HYDROcodone-acetaminophen (NORCO) 5-325 MG tablet  Dispense: 10 tablet; Refill: 0         BMI:   Estimated body mass index is 44.83 kg/m  as calculated from the following:    Height as of 7/15/22: 1.816 m (5' 11.5\").    Weight as of 7/15/22: 147.9 kg (326 lb).           Return in about 4 weeks (around 11/22/2022) for Follow up.    Chriss Espinoza MD  [facility]    Kim Toney is a 77 year old, presenting for the following health issues:  Knee Pain      HPI   Pt is new to me but not the clinic.   Has chronic pain, R hip and thigh.  Was being managed by the pain clinic but the clinician has left.     He has R knee pain. Was told 'bone on bone' on xrays around 2016.    He did have a low back injection (spinal stenosis), which seemed to help with the back pain but did not have any impact on the R knee.  Pain is worse with weight bearing.      He is on a stationary bike almost an hour a day.     Will be seeing [facility] 1st week of December.          Review of Systems         Objective           Vitals:  No vitals were obtained today due to virtual visit.    Physical Exam   GENERAL: Healthy, alert and no distress  EYES: Eyes grossly normal to inspection.  No discharge or erythema, or obvious scleral/conjunctival abnormalities.  RESP: No audible " wheeze, cough, or visible cyanosis.  No visible retractions or increased work of breathing.    SKIN: Visible skin clear. No significant rash, abnormal pigmentation or lesions.  NEURO: Cranial nerves grossly intact.  Mentation and speech appropriate for age.  PSYCH: Mentation appears normal, affect normal/bright, judgement and insight intact, normal speech and appearance well-groomed.                Video-Visit Details    Video Start Time: 5:02 PM    Type of service:  Video Visit    Video End Time:5:18 PM    Originating Location (pt. Location): Home    Distant Location (provider location):  On-site    Platform used for Video Visit: Tom

## 2022-10-25 ENCOUNTER — VIRTUAL VISIT (OUTPATIENT)
Dept: FAMILY MEDICINE | Facility: CLINIC | Age: 77
End: 2022-10-25
Payer: COMMERCIAL

## 2022-10-25 DIAGNOSIS — M17.11 PRIMARY OSTEOARTHRITIS OF RIGHT KNEE: Primary | ICD-10-CM

## 2022-10-25 PROCEDURE — 99214 OFFICE O/P EST MOD 30 MIN: CPT | Mod: 95 | Performed by: INTERNAL MEDICINE

## 2022-10-25 RX ORDER — HYDROCODONE BITARTRATE AND ACETAMINOPHEN 5; 325 MG/1; MG/1
1 TABLET ORAL EVERY 6 HOURS PRN
Qty: 10 TABLET | Refills: 0 | Status: SHIPPED | OUTPATIENT
Start: 2022-10-25 | End: 2022-10-28

## 2022-10-25 NOTE — PATIENT INSTRUCTIONS
HYDROCODONE:  Ok to use sparingly for knee pain  Watch for sleepiness and constipation.    Keep appointment with [facility]!

## 2022-11-01 ENCOUNTER — ANTICOAGULATION THERAPY VISIT (OUTPATIENT)
Dept: ANTICOAGULATION | Facility: CLINIC | Age: 77
End: 2022-11-01

## 2022-11-01 DIAGNOSIS — Z95.2 S/P MITRAL VALVE REPLACEMENT: ICD-10-CM

## 2022-11-01 DIAGNOSIS — Z79.01 LONG TERM CURRENT USE OF ANTICOAGULANT THERAPY: Primary | ICD-10-CM

## 2022-11-01 DIAGNOSIS — Z95.2 HEART VALVE REPLACED: ICD-10-CM

## 2022-11-01 DIAGNOSIS — I48.20 CHRONIC ATRIAL FIBRILLATION (H): ICD-10-CM

## 2022-11-01 LAB — INR HOME MONITORING: 2.8 (ref 2.5–3.5)

## 2022-11-01 NOTE — PROGRESS NOTES
ANTICOAGULATION MANAGEMENT     Feng Wynne 77 year old male is on warfarin with therapeutic INR result. (Goal INR 2.5-3.5)    Recent labs: (last 7 days)     11/01/22  0000   INR 2.8       ASSESSMENT       Source(s): Chart Review and Patient/Caregiver Call       Warfarin doses taken: Warfarin taken as instructed    Diet: No new diet changes identified    New illness, injury, or hospitalization: No    Medication/supplement changes: None noted    Signs or symptoms of bleeding or clotting: No    Previous INR: Supratherapeutic    Additional findings: None       PLAN     Recommended plan for no diet, medication or health factor changes affecting INR     Dosing Instructions: Continue your current warfarin dose with next INR in 2 weeks       Summary  As of 11/1/2022    Full warfarin instructions:  10 mg every Tue, Sat; 5 mg all other days; Starting 11/1/2022   Next INR check:  11/15/2022             Sent [employer] message with dosing and follow up instructions    Patient to recheck with home meter    Education provided:     Please call back if any changes to your diet, medications or how you've been taking warfarin    Resume manage by exception with home monitor. Continue to submit INR results to home monitor company.You will only be called when your result is out of range. Please call and notify [facility] if new medication started, dose missed, signs or symptoms of bleeding or clotting, or a surgery/procedure is scheduled.    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

## 2022-11-07 ENCOUNTER — TELEPHONE (OUTPATIENT)
Dept: CARDIOLOGY | Facility: CLINIC | Age: 77
End: 2022-11-07

## 2022-11-07 ENCOUNTER — HOSPITAL ENCOUNTER (OUTPATIENT)
Dept: CARDIOLOGY | Facility: CLINIC | Age: 77
Discharge: HOME | End: 2022-11-07
Attending: INTERNAL MEDICINE | Admitting: INTERNAL MEDICINE
Payer: COMMERCIAL

## 2022-11-07 DIAGNOSIS — Z95.2 HEART VALVE REPLACED: Primary | ICD-10-CM

## 2022-11-07 LAB — LVEF ECHO: NORMAL

## 2022-11-07 PROCEDURE — 999N000208 ECHOCARDIOGRAM COMPLETE

## 2022-11-07 PROCEDURE — 93306 TTE W/DOPPLER COMPLETE: CPT | Mod: 26 | Performed by: INTERNAL MEDICINE

## 2022-11-07 PROCEDURE — 255N000002 HC RX 255 OP 636: Performed by: INTERNAL MEDICINE

## 2022-11-07 RX ADMIN — HUMAN ALBUMIN MICROSPHERES AND PERFLUTREN 9 ML: 10; .22 INJECTION, SOLUTION INTRAVENOUS at 11:43

## 2022-11-07 NOTE — TELEPHONE ENCOUNTER
Echo 11/7/22   Interpretation Summary     Status post mechanical mitral valve replacement with 31-mm St Raffi valve for  severe degenerative mitral valve regurgitation, 9/2/2008.  The mechanical mitral valve is well-seated. No abnormal regurgitation.  Satisfactory mean diastolic gradient of 3-5 mmHg (heart rate 40-50 bpm, atrial  fibrillation).  Normal left ventricular size and systolic function. Visually estimated LVEF  55-60%.  The right ventricle is not well visualized. Appears mildly dilated with mildly  decreased systolic function.  Severe biatrial enlargement in the context of atrial fibrillation.  Dilated inferior vena cava.  Technically difficult study.     On the previous study dated 5/5/2021, LV reported as moderately dilated. No  other significant changes.    Will route to Dr. Gomez to review.

## 2022-11-14 NOTE — TELEPHONE ENCOUNTER
Jeannine Gomez MD Rossman, Jacqlyn, RN 12 minutes ago (4:15 PM)     CF  Echocardiogram looks unchanged and LV size improved. Mitral valve is normal. Good news!     Dr. Gomez      Updated patient via [employer] with Dr. Gomez's comments.

## 2022-11-15 ENCOUNTER — ANTICOAGULATION THERAPY VISIT (OUTPATIENT)
Dept: ANTICOAGULATION | Facility: CLINIC | Age: 77
End: 2022-11-15

## 2022-11-15 DIAGNOSIS — Z95.2 S/P MITRAL VALVE REPLACEMENT: ICD-10-CM

## 2022-11-15 DIAGNOSIS — I48.20 CHRONIC ATRIAL FIBRILLATION (H): ICD-10-CM

## 2022-11-15 DIAGNOSIS — Z79.01 LONG TERM CURRENT USE OF ANTICOAGULANT THERAPY: Primary | ICD-10-CM

## 2022-11-15 DIAGNOSIS — Z95.2 HEART VALVE REPLACED: ICD-10-CM

## 2022-11-15 LAB — INR HOME MONITORING: 3.7 (ref 2.5–3.5)

## 2022-11-15 NOTE — PROGRESS NOTES
ANTICOAGULATION MANAGEMENT     Feng Sergio dominic 77 year old male is on warfarin with supratherapeutic INR result. (Goal INR 2.5-3.5)    Recent labs: (last 7 days)     11/15/22  0000   INR 3.7*       ASSESSMENT       Source(s): Chart Review and Patient/Caregiver Call       Warfarin doses taken: Warfarin taken as instructed    Diet: possibly less Vit K foods. We discussed the difference between frozen spinach and fresh, and how an equivalent volume of cooked/frozen spinach would contain more Vit K    New illness, injury, or hospitalization: No    Medication/supplement changes: None noted    Signs or symptoms of bleeding or clotting: No    Previous INR: Therapeutic last visit; previously outside of goal range    Additional findings: None       PLAN     Recommended plan for temporary change(s) affecting INR     Dosing Instructions: Continue your current warfarin dose with next INR in 2 weeks       Summary  As of 11/15/2022    Full warfarin instructions:  11/15: 5 mg; Otherwise 10 mg every Tue, Sat; 5 mg all other days; Starting 11/15/2022   Next INR check:  11/29/2022             Telephone call with Feng who verbalizes understanding and agrees to plan    Patient to recheck with home meter    Education provided:     Goal range and lab monitoring: goal range and significance of current result    Dietary considerations: importance of consistent vitamin K intake, impact of vitamin K foods on INR and vitamin K content of foods    Plan made per [facility] anticoagulation protocol    Florencia Da Silva RN  Anticoagulation Clinic  11/15/2022    _______________________________________________________________________     Anticoagulation Episode Summary     Current INR goal:  2.5-3.5   TTR:  93.8 % (1 y)   Target end date:  Indefinite   Send INR reminders to:  [facility]    Indications    Long-term (current) use of anticoagulants [Z79.01] [Z79.01]  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

## 2022-11-29 ENCOUNTER — ANTICOAGULATION THERAPY VISIT (OUTPATIENT)
Dept: ANTICOAGULATION | Facility: CLINIC | Age: 77
End: 2022-11-29

## 2022-11-29 DIAGNOSIS — Z95.2 HEART VALVE REPLACED: ICD-10-CM

## 2022-11-29 DIAGNOSIS — Z79.01 LONG TERM CURRENT USE OF ANTICOAGULANT THERAPY: Primary | ICD-10-CM

## 2022-11-29 DIAGNOSIS — I48.20 CHRONIC ATRIAL FIBRILLATION (H): ICD-10-CM

## 2022-11-29 DIAGNOSIS — Z95.2 S/P MITRAL VALVE REPLACEMENT: ICD-10-CM

## 2022-11-29 LAB — INR HOME MONITORING: 4.1 (ref 2.5–3.5)

## 2022-11-29 NOTE — PROGRESS NOTES
ANTICOAGULATION MANAGEMENT     Feng Sergio Wynne 77 year old male is on warfarin with supratherapeutic INR result. (Goal INR 2.5-3.5)    Recent labs: (last 7 days)     11/29/22  0000   INR 4.1*       ASSESSMENT       Source(s): Chart Review and Patient/Caregiver Call       Warfarin doses taken: Warfarin taken as instructed    Diet: Pt reports that he stopped drinking coffee a few weeks ago and started drinking black tea. Per [employer] Caffeine and Caffeine Containing Products may diminish the anticoagulant effect of Warfarin.    New illness, injury, or hospitalization: No    Medication/supplement changes: None noted    Signs or symptoms of bleeding or clotting: No    Previous INR: Supratherapeutic    Additional findings: None       PLAN     Recommended plan for ongoing change(s) affecting INR     Dosing Instructions: partial hold then decrease your warfarin dose (11.1% change) with next INR in 1 week       Summary  As of 11/29/2022    Full warfarin instructions:  11/29: 5 mg; Otherwise 7.5 mg every Tue, Sat; 5 mg all other days; Starting 11/29/2022   Next INR check:  12/6/2022             Telephone call with Feng who verbalizes understanding and agrees to plan    Patient to recheck with home meter    Education provided:     Goal range and lab monitoring: goal range and significance of current result and Importance of therapeutic range    Plan made per [facility] anticoagulation protocol    Jean Miller RN  Anticoagulation Clinic  11/29/2022    _______________________________________________________________________     Anticoagulation Episode Summary     Current INR goal:  2.5-3.5   TTR:  90.0 % (1 y)   Target end date:  Indefinite   Send INR reminders to:  [facility]    Indications    Long-term (current) use of anticoagulants [Z79.01] [Z79.01]  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

## 2022-12-06 ENCOUNTER — ANTICOAGULATION THERAPY VISIT (OUTPATIENT)
Dept: ANTICOAGULATION | Facility: CLINIC | Age: 77
End: 2022-12-06

## 2022-12-06 DIAGNOSIS — I48.20 CHRONIC ATRIAL FIBRILLATION (H): ICD-10-CM

## 2022-12-06 DIAGNOSIS — Z95.2 S/P MITRAL VALVE REPLACEMENT: ICD-10-CM

## 2022-12-06 DIAGNOSIS — Z95.2 HEART VALVE REPLACED: ICD-10-CM

## 2022-12-06 DIAGNOSIS — Z79.01 LONG TERM CURRENT USE OF ANTICOAGULANT THERAPY: Primary | ICD-10-CM

## 2022-12-06 LAB — INR HOME MONITORING: 3.2 (ref 2.5–3.5)

## 2022-12-06 NOTE — PROGRESS NOTES
ANTICOAGULATION MANAGEMENT     Feng Wynne 77 year old male is on warfarin with therapeutic INR result. (Goal INR 2.5-3.5)    Recent labs: (last 7 days)     12/06/22  0000   INR 3.2       ASSESSMENT       Source(s): Chart Review and Patient/Caregiver Call       Warfarin doses taken: Warfarin taken as instructed    Diet: No new diet changes identified    New illness, injury, or hospitalization: No    Medication/supplement changes: None noted    Signs or symptoms of bleeding or clotting: No    Previous INR: Supratherapeutic    Additional findings: None       PLAN     Recommended plan for no diet, medication or health factor changes affecting INR     Dosing Instructions: Continue your current warfarin dose with next INR in 2 weeks       Summary  As of 12/6/2022    Full warfarin instructions:  7.5 mg every Tue, Sat; 5 mg all other days; Starting 12/6/2022   Next INR check:  12/20/2022             Telephone call with Feng who verbalizes understanding and agrees to plan    Patient to recheck with home meter    Education provided:     Please call back if any changes to your diet, medications or how you've been taking warfarin    Resume manage by exception with home monitor. Continue to submit INR results to home monitor company.You will only be called when your result is out of range. Please call and notify [facility] if new medication started, dose missed, signs or symptoms of bleeding or clotting, or a surgery/procedure is scheduled.    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

## 2022-12-08 ENCOUNTER — TRANSFERRED RECORDS (OUTPATIENT)
Dept: HEALTH INFORMATION MANAGEMENT | Facility: CLINIC | Age: 77
End: 2022-12-08

## 2022-12-20 ENCOUNTER — DOCUMENTATION ONLY (OUTPATIENT)
Dept: ANTICOAGULATION | Facility: CLINIC | Age: 77
End: 2022-12-20

## 2022-12-20 DIAGNOSIS — Z95.2 S/P MITRAL VALVE REPLACEMENT: ICD-10-CM

## 2022-12-20 DIAGNOSIS — Z95.2 HEART VALVE REPLACED: ICD-10-CM

## 2022-12-20 DIAGNOSIS — I48.20 CHRONIC ATRIAL FIBRILLATION (H): ICD-10-CM

## 2022-12-20 DIAGNOSIS — Z79.01 LONG TERM CURRENT USE OF ANTICOAGULANT THERAPY: Primary | ICD-10-CM

## 2022-12-20 LAB — INR HOME MONITORING: 2.8 (ref 2.5–3.5)

## 2022-12-20 NOTE — PROGRESS NOTES
ANTICOAGULATION  MANAGEMENT-Home Monitor Managed by Exception    Feng Wynne 77 year old male is on warfarin with therapeutic INR result. (Goal INR 2.5-3.5)    Recent labs: (last 7 days)     12/20/22  0000   INR 2.8         Previous INR was Therapeutic    Medication, diet, health changes since last INR:chart reviewed; none identified    Contacted within the last 12 weeks by phone on 12/6/22      [employer] was NOT contacted regarding therapeutic result today per home monitoring policy manage by exception agreement.   Current warfarin dose is to be continued:     Summary  As of 12/20/2022    Full warfarin instructions:  7.5 mg every Tue, Sat; 5 mg all other days; Starting 12/20/2022   Next INR check:  1/3/2023           ?   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

## 2022-12-29 DIAGNOSIS — I48.20 CHRONIC ATRIAL FIBRILLATION (H): ICD-10-CM

## 2022-12-29 RX ORDER — ATENOLOL 25 MG/1
TABLET ORAL
Qty: 180 TABLET | Refills: 0 | Status: SHIPPED | OUTPATIENT
Start: 2022-12-29 | End: 2023-04-04

## 2022-12-29 NOTE — TELEPHONE ENCOUNTER
Prescription approved per [facility] Refill Protocol.  Liana Huynh RN  [facility]

## 2023-01-01 ENCOUNTER — NURSE TRIAGE (OUTPATIENT)
Dept: NURSING | Facility: CLINIC | Age: 78
End: 2023-01-01
Payer: COMMERCIAL

## 2023-01-01 ENCOUNTER — DOCUMENTATION ONLY (OUTPATIENT)
Dept: ANTICOAGULATION | Facility: CLINIC | Age: 78
End: 2023-01-01

## 2023-01-01 ENCOUNTER — NURSE TRIAGE (OUTPATIENT)
Dept: FAMILY MEDICINE | Facility: CLINIC | Age: 78
End: 2023-01-01
Payer: COMMERCIAL

## 2023-01-01 ENCOUNTER — DOCUMENTATION ONLY (OUTPATIENT)
Dept: ANTICOAGULATION | Facility: CLINIC | Age: 78
End: 2023-01-01
Payer: COMMERCIAL

## 2023-01-01 ENCOUNTER — VIRTUAL VISIT (OUTPATIENT)
Dept: FAMILY MEDICINE | Facility: CLINIC | Age: 78
End: 2023-01-01
Payer: COMMERCIAL

## 2023-01-01 ENCOUNTER — ANTICOAGULATION THERAPY VISIT (OUTPATIENT)
Dept: ANTICOAGULATION | Facility: CLINIC | Age: 78
End: 2023-01-01
Payer: COMMERCIAL

## 2023-01-01 ENCOUNTER — OFFICE VISIT (OUTPATIENT)
Dept: URGENT CARE | Facility: URGENT CARE | Age: 78
End: 2023-01-01
Payer: COMMERCIAL

## 2023-01-01 VITALS
OXYGEN SATURATION: 99 % | SYSTOLIC BLOOD PRESSURE: 119 MMHG | HEART RATE: 62 BPM | DIASTOLIC BLOOD PRESSURE: 67 MMHG | TEMPERATURE: 97.6 F

## 2023-01-01 DIAGNOSIS — Z79.01 LONG TERM CURRENT USE OF ANTICOAGULANT THERAPY: Primary | ICD-10-CM

## 2023-01-01 DIAGNOSIS — R60.0 BILATERAL LOWER EXTREMITY EDEMA: ICD-10-CM

## 2023-01-01 DIAGNOSIS — G47.00 INSOMNIA, UNSPECIFIED TYPE: ICD-10-CM

## 2023-01-01 DIAGNOSIS — I48.21 PERMANENT ATRIAL FIBRILLATION (H): ICD-10-CM

## 2023-01-01 DIAGNOSIS — I48.20 CHRONIC ATRIAL FIBRILLATION (H): ICD-10-CM

## 2023-01-01 DIAGNOSIS — E78.5 HYPERLIPIDEMIA LDL GOAL <130: ICD-10-CM

## 2023-01-01 DIAGNOSIS — Z95.2 S/P MITRAL VALVE REPLACEMENT: ICD-10-CM

## 2023-01-01 DIAGNOSIS — Z95.2 HEART VALVE REPLACED: ICD-10-CM

## 2023-01-01 DIAGNOSIS — Z12.5 SCREENING FOR PROSTATE CANCER: ICD-10-CM

## 2023-01-01 DIAGNOSIS — R06.2 WHEEZING: ICD-10-CM

## 2023-01-01 DIAGNOSIS — D50.0 IRON DEFICIENCY ANEMIA DUE TO CHRONIC BLOOD LOSS: Primary | ICD-10-CM

## 2023-01-01 DIAGNOSIS — I10 ESSENTIAL HYPERTENSION WITH GOAL BLOOD PRESSURE LESS THAN 140/90: ICD-10-CM

## 2023-01-01 DIAGNOSIS — J06.9 VIRAL URI WITH COUGH: Primary | ICD-10-CM

## 2023-01-01 LAB
INR HOME MONITORING: 2.3 (ref 2.5–3.5)
INR HOME MONITORING: 3 (ref 2.5–3.5)
INR HOME MONITORING: 3.2 (ref 2.5–3.5)
INR HOME MONITORING: 3.2 (ref 2.5–3.5)

## 2023-01-01 PROCEDURE — 99214 OFFICE O/P EST MOD 30 MIN: CPT | Mod: 95 | Performed by: INTERNAL MEDICINE

## 2023-01-01 PROCEDURE — 99214 OFFICE O/P EST MOD 30 MIN: CPT | Performed by: NURSE PRACTITIONER

## 2023-01-01 RX ORDER — ALBUTEROL SULFATE 90 UG/1
1-2 AEROSOL, METERED RESPIRATORY (INHALATION) EVERY 4 HOURS PRN
Qty: 18 G | Refills: 3 | Status: SHIPPED | OUTPATIENT
Start: 2023-01-01 | End: 2024-01-01

## 2023-01-01 RX ORDER — MIRTAZAPINE 7.5 MG/1
7.5 TABLET, FILM COATED ORAL AT BEDTIME
Qty: 30 TABLET | Refills: 2 | Status: ON HOLD | OUTPATIENT
Start: 2023-01-01 | End: 2024-01-01

## 2023-01-01 RX ORDER — MIRTAZAPINE 7.5 MG/1
7.5 TABLET, FILM COATED ORAL AT BEDTIME
Qty: 90 TABLET | OUTPATIENT
Start: 2023-01-01

## 2023-01-03 ENCOUNTER — DOCUMENTATION ONLY (OUTPATIENT)
Dept: ANTICOAGULATION | Facility: CLINIC | Age: 78
End: 2023-01-03

## 2023-01-03 DIAGNOSIS — Z79.01 LONG TERM CURRENT USE OF ANTICOAGULANT THERAPY: Primary | ICD-10-CM

## 2023-01-03 DIAGNOSIS — Z95.2 HEART VALVE REPLACED: ICD-10-CM

## 2023-01-03 DIAGNOSIS — I48.20 CHRONIC ATRIAL FIBRILLATION (H): ICD-10-CM

## 2023-01-03 DIAGNOSIS — Z95.2 S/P MITRAL VALVE REPLACEMENT: ICD-10-CM

## 2023-01-03 LAB — INR HOME MONITORING: 3.4 (ref 2.5–3.5)

## 2023-01-03 NOTE — PROGRESS NOTES
ANTICOAGULATION  MANAGEMENT-Home Monitor Managed by Exception    Feng Wynne 77 year old male is on warfarin with therapeutic INR result. (Goal INR 2.5-3.5)    Recent labs: (last 7 days)     01/03/23  0000   INR 3.4         Previous INR was Therapeutic    Medication, diet, health changes since last INR:chart reviewed; none identified    Contacted within the last 12 weeks by phone on 12/6/22      [employer] was NOT contacted regarding therapeutic result today per home monitoring policy manage by exception agreement.   Current warfarin dose is to be continued:     Summary  As of 1/3/2023    Full warfarin instructions:  7.5 mg every Tue, Sat; 5 mg all other days   Next INR check:  1/17/2023           ?   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

## 2023-01-17 ENCOUNTER — DOCUMENTATION ONLY (OUTPATIENT)
Dept: ANTICOAGULATION | Facility: CLINIC | Age: 78
End: 2023-01-17

## 2023-01-17 DIAGNOSIS — Z95.2 HEART VALVE REPLACED: ICD-10-CM

## 2023-01-17 DIAGNOSIS — I48.20 CHRONIC ATRIAL FIBRILLATION (H): ICD-10-CM

## 2023-01-17 DIAGNOSIS — Z95.2 S/P MITRAL VALVE REPLACEMENT: ICD-10-CM

## 2023-01-17 DIAGNOSIS — Z79.01 LONG TERM CURRENT USE OF ANTICOAGULANT THERAPY: Primary | ICD-10-CM

## 2023-01-17 LAB — INR HOME MONITORING: 2.6 (ref 2.5–3.5)

## 2023-01-17 NOTE — PROGRESS NOTES
ANTICOAGULATION  MANAGEMENT-Home Monitor Managed by Exception    Feng Sergio Wynne 78 year old male is on warfarin with therapeutic INR result. (Goal INR 2.5-3.5)    Recent labs: (last 7 days)     01/17/23  0000   INR 2.6         Previous INR was Therapeutic    Medication, diet, health changes since last INR:chart reviewed; none identified    Contacted within the last 12 weeks by phone on 12/6/22      COLTON Toney was NOT contacted regarding therapeutic result today per home monitoring policy manage by exception agreement.   Current warfarin dose is to be continued:     Summary  As of 1/17/2023    Full warfarin instructions:  7.5 mg every Tue, Sat; 5 mg all other days   Next INR check:  1/31/2023           ?   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

## 2023-01-31 ENCOUNTER — DOCUMENTATION ONLY (OUTPATIENT)
Dept: ANTICOAGULATION | Facility: CLINIC | Age: 78
End: 2023-01-31
Payer: COMMERCIAL

## 2023-01-31 DIAGNOSIS — Z95.2 HEART VALVE REPLACED: ICD-10-CM

## 2023-01-31 DIAGNOSIS — I48.20 CHRONIC ATRIAL FIBRILLATION (H): ICD-10-CM

## 2023-01-31 DIAGNOSIS — Z95.2 S/P MITRAL VALVE REPLACEMENT: ICD-10-CM

## 2023-01-31 DIAGNOSIS — Z79.01 LONG TERM CURRENT USE OF ANTICOAGULANT THERAPY: Primary | ICD-10-CM

## 2023-01-31 LAB — INR HOME MONITORING: 2.5 (ref 2.5–3.5)

## 2023-01-31 NOTE — PROGRESS NOTES
ANTICOAGULATION  MANAGEMENT-Home Monitor Managed by Exception    Feng Wynne 78 year old male is on warfarin with therapeutic INR result. (Goal INR 2.5-3.5)    Recent labs: (last 7 days)     01/31/23  0000   INR 2.5         Previous INR was Therapeutic    Medication, diet, health changes since last INR:chart reviewed; none identified    Contacted within the last 12 weeks by phone on 12/6/22      [employer] Toney was NOT contacted regarding therapeutic result today per home monitoring policy manage by exception agreement.   Current warfarin dose is to be continued:     Summary  As of 1/31/2023    Full warfarin instructions:  7.5 mg every Tue, Sat; 5 mg all other days   Next INR check:  2/14/2023           ?   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

## 2023-02-14 ENCOUNTER — DOCUMENTATION ONLY (OUTPATIENT)
Dept: ANTICOAGULATION | Facility: CLINIC | Age: 78
End: 2023-02-14
Payer: COMMERCIAL

## 2023-02-14 DIAGNOSIS — Z79.01 LONG TERM CURRENT USE OF ANTICOAGULANT THERAPY: Primary | ICD-10-CM

## 2023-02-14 DIAGNOSIS — I48.20 CHRONIC ATRIAL FIBRILLATION (H): ICD-10-CM

## 2023-02-14 DIAGNOSIS — Z95.2 HEART VALVE REPLACED: ICD-10-CM

## 2023-02-14 DIAGNOSIS — Z95.2 S/P MITRAL VALVE REPLACEMENT: ICD-10-CM

## 2023-02-14 LAB — INR HOME MONITORING: 3 (ref 2.5–3.5)

## 2023-02-14 NOTE — PROGRESS NOTES
ANTICOAGULATION  MANAGEMENT-Home Monitor Managed by Exception    Feng Quezadaregulo Wynne 78 year old male is on warfarin with therapeutic INR result. (Goal INR 2.5-3.5)    Recent labs: (last 7 days)     02/14/23  0000   INR 3.0         Previous INR was Therapeutic    Medication, diet, health changes since last INR:chart reviewed; none identified    Contacted within the last 12 weeks by phone on 12/6/23      [employer]TON Toney was NOT contacted regarding therapeutic result today per home monitoring policy manage by exception agreement.   Current warfarin dose is to be continued:     Summary  As of 2/14/2023    Full warfarin instructions:  7.5 mg every Tue, Sat; 5 mg all other days   Next INR check:  2/28/2023           ?   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

## 2023-02-28 ENCOUNTER — DOCUMENTATION ONLY (OUTPATIENT)
Dept: ANTICOAGULATION | Facility: CLINIC | Age: 78
End: 2023-02-28
Payer: COMMERCIAL

## 2023-02-28 DIAGNOSIS — Z95.2 HEART VALVE REPLACED: ICD-10-CM

## 2023-02-28 DIAGNOSIS — Z79.01 LONG TERM CURRENT USE OF ANTICOAGULANT THERAPY: Primary | ICD-10-CM

## 2023-02-28 DIAGNOSIS — I48.20 CHRONIC ATRIAL FIBRILLATION (H): ICD-10-CM

## 2023-02-28 DIAGNOSIS — Z95.2 S/P MITRAL VALVE REPLACEMENT: ICD-10-CM

## 2023-02-28 LAB — INR HOME MONITORING: 2.5 (ref 2.5–3.5)

## 2023-02-28 NOTE — PROGRESS NOTES
ANTICOAGULATION  MANAGEMENT-Home Monitor Managed by Exception    Feng Sergio Wynne 78 year old male is on warfarin with therapeutic INR result. (Goal INR 2.5-3.5)    Recent labs: (last 7 days)     02/28/23  0000   INR 2.5         Previous INR was Therapeutic    Medication, diet, health changes since last INR:chart reviewed; none identified    Contacted within the last 12 weeks by phone on 12/6/22      [employer] was NOT contacted regarding therapeutic result today per home monitoring policy manage by exception agreement.   Current warfarin dose is to be continued:     Summary  As of 2/28/2023    Full warfarin instructions:  7.5 mg every Tue, Sat; 5 mg all other days   Next INR check:  3/14/2023           ?   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

## 2023-03-14 ENCOUNTER — ANTICOAGULATION THERAPY VISIT (OUTPATIENT)
Dept: ANTICOAGULATION | Facility: CLINIC | Age: 78
End: 2023-03-14
Payer: COMMERCIAL

## 2023-03-14 DIAGNOSIS — Z79.01 LONG TERM CURRENT USE OF ANTICOAGULANT THERAPY: Primary | ICD-10-CM

## 2023-03-14 DIAGNOSIS — Z95.2 HEART VALVE REPLACED: ICD-10-CM

## 2023-03-14 DIAGNOSIS — I48.20 CHRONIC ATRIAL FIBRILLATION (H): ICD-10-CM

## 2023-03-14 DIAGNOSIS — Z95.2 S/P MITRAL VALVE REPLACEMENT: ICD-10-CM

## 2023-03-14 LAB — INR HOME MONITORING: 2.7 (ref 2.5–3.5)

## 2023-03-14 NOTE — PROGRESS NOTES
ANTICOAGULATION MANAGEMENT     Feng Wynne 78 year old male is on warfarin with therapeutic INR result. (Goal INR 2.5-3.5)    Recent labs: (last 7 days)     03/14/23  0000   INR 2.7       ASSESSMENT       Source(s): Chart Review and Patient/Caregiver Call       Warfarin doses taken: Warfarin taken as instructed    Diet: No new diet changes identified    New illness, injury, or hospitalization: No    Medication/supplement changes: None noted    Signs or symptoms of bleeding or clotting: No    Previous INR: Therapeutic last 2(+) visits    Additional findings: Upcoming surgery/procedure knee injections on thursday.         PLAN     Recommended plan for no diet, medication or health factor changes affecting INR     Dosing Instructions: Continue your current warfarin dose with next INR in 2 weeks       Summary  As of 3/14/2023    Full warfarin instructions:  7.5 mg every Tue, Sat; 5 mg all other days   Next INR check:  3/28/2023             Telephone call with Feng who verbalizes understanding and agrees to plan    Patient to recheck with home meter    Education provided:     Please call back if any changes to your diet, medications or how you've been taking warfarin    Resume manage by exception with home monitor. Continue to submit INR results to home monitor company.You will only be called when your result is out of range. Please call and notify [facility] if new medication started, dose missed, signs or symptoms of bleeding or clotting, or a surgery/procedure is scheduled.    Contact [phone]  with any changes, questions or concerns.     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

## 2023-03-16 ENCOUNTER — TRANSFERRED RECORDS (OUTPATIENT)
Dept: HEALTH INFORMATION MANAGEMENT | Facility: CLINIC | Age: 78
End: 2023-03-16
Payer: COMMERCIAL

## 2023-03-29 ENCOUNTER — ANTICOAGULATION THERAPY VISIT (OUTPATIENT)
Dept: ANTICOAGULATION | Facility: CLINIC | Age: 78
End: 2023-03-29
Payer: COMMERCIAL

## 2023-03-29 DIAGNOSIS — I48.20 CHRONIC ATRIAL FIBRILLATION (H): ICD-10-CM

## 2023-03-29 DIAGNOSIS — Z95.2 S/P MITRAL VALVE REPLACEMENT: ICD-10-CM

## 2023-03-29 DIAGNOSIS — Z95.2 HEART VALVE REPLACED: ICD-10-CM

## 2023-03-29 DIAGNOSIS — Z79.01 LONG TERM CURRENT USE OF ANTICOAGULANT THERAPY: Primary | ICD-10-CM

## 2023-03-29 LAB — INR HOME MONITORING: 2.3 (ref 2.5–3.5)

## 2023-03-29 NOTE — PROGRESS NOTES
ANTICOAGULATION MANAGEMENT     Feng Wynne 78 year old male is on warfarin with subtherapeutic INR result. (Goal INR 2.5-3.5)    Recent labs: (last 7 days)     03/29/23  0000   INR 2.3*       ASSESSMENT       Source(s): Chart Review and Patient/Caregiver Call       Warfarin doses taken: Warfarin taken as instructed    Diet: spinach salad and green spaghetti, increased greens; plans to resume normal amount.    New illness, injury, or hospitalization: No    Medication/supplement changes: None noted    Signs or symptoms of bleeding or clotting: No    Previous INR: Therapeutic last 2(+) visits    Additional findings: None         PLAN     Recommended plan for temporary change(s) affecting INR     Dosing Instructions: booster dose then continue your current warfarin dose with next INR in 2 weeks       Summary  As of 3/29/2023    Full warfarin instructions:  3/29: 7.5 mg; Otherwise 7.5 mg every Tue, Sat; 5 mg all other days   Next INR check:  4/12/2023             Telephone call with Feng who verbalizes understanding and agrees to plan    Patient to recheck with home meter    Education provided:     Dietary considerations: importance of consistent vitamin K intake and vitamin K content of foods    Plan made per [facility] anticoagulation protocol    Ami Robertson, RN  Anticoagulation Clinic  3/29/2023    _______________________________________________________________________     Anticoagulation Episode Summary     Current INR goal:  2.5-3.5   TTR:  86.7 % (1 y)   Target end date:  Indefinite   Send INR reminders to:  [employer]    Indications    Long-term (current) use of anticoagulants [Z79.01] [Z79.01]  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

## 2023-04-02 DIAGNOSIS — I48.20 CHRONIC ATRIAL FIBRILLATION (H): ICD-10-CM

## 2023-04-04 RX ORDER — ATENOLOL 25 MG/1
TABLET ORAL
Qty: 180 TABLET | Refills: 0 | Status: SHIPPED | OUTPATIENT
Start: 2023-04-04 | End: 2023-07-03

## 2023-04-04 RX ORDER — WARFARIN SODIUM 5 MG/1
TABLET ORAL
Qty: 96 TABLET | Refills: 1 | Status: SHIPPED | OUTPATIENT
Start: 2023-04-04 | End: 2023-09-21

## 2023-04-04 NOTE — TELEPHONE ENCOUNTER
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]  S/P mitral valve replacement [Z95.2]  Chronic atrial fibrillation (H) [I48.20]             Anticoagulation Care Providers     Provider Role Specialty Phone number    Enrique Walker MD Referring Internal Medicine     Paula Lou MD Referring Internal Medicine [phone]    Leena Jeffery DO Referring Internal Medicine [phone]          Visit with referring provider/group within last year: Yes    [facility] referral signed within last year: Yes    Feng meets all criteria for refill (current [facility] referral, office visit with referring provider/group in last year, lab monitoring up to date or not exceeding 2 weeks overdue). Rx instructions and quantity supplied updated to match patient's current dosing plan. Warfarin 90 day supply with 1 refill granted per [facility] protocol     Gaviota Weston RN  Anticoagulation Clinic

## 2023-04-11 ENCOUNTER — ANTICOAGULATION THERAPY VISIT (OUTPATIENT)
Dept: ANTICOAGULATION | Facility: CLINIC | Age: 78
End: 2023-04-11
Payer: COMMERCIAL

## 2023-04-11 DIAGNOSIS — Z95.2 HEART VALVE REPLACED: ICD-10-CM

## 2023-04-11 DIAGNOSIS — Z95.2 S/P MITRAL VALVE REPLACEMENT: ICD-10-CM

## 2023-04-11 DIAGNOSIS — Z79.01 LONG TERM CURRENT USE OF ANTICOAGULANT THERAPY: Primary | ICD-10-CM

## 2023-04-11 DIAGNOSIS — I48.20 CHRONIC ATRIAL FIBRILLATION (H): ICD-10-CM

## 2023-04-11 LAB — INR HOME MONITORING: 2.5 (ref 2.5–3.5)

## 2023-04-11 NOTE — PROGRESS NOTES
ANTICOAGULATION MANAGEMENT     Feng Wynne 78 year old male is on warfarin with therapeutic INR result. (Goal INR 2.5-3.5)    Recent labs: (last 7 days)     04/11/23  0000   INR 2.5       ASSESSMENT       Source(s): Chart Review    Previous INR was Subtherapeutic    Medication, diet, health changes since last INR chart reviewed; none identified             PLAN     Recommended plan for no diet, medication or health factor changes affecting INR     Dosing Instructions: Continue your current warfarin dose with next INR in 2 weeks       Summary  As of 4/11/2023    Full warfarin instructions:  7.5 mg every Tue, Sat; 5 mg all other days   Next INR check:  4/25/2023             Detailed voice message left for Feng with dosing instructions and follow up date.   Sent [employer] message with dosing and follow up instructions    Patient to recheck with home meter    Education provided:     Please call back if any changes to your diet, medications or how you've been taking warfarin    Resume manage by exception with home monitor. Continue to submit INR results to home monitor company.You will only be called when your result is out of range. Please call and notify [facility] if new medication started, dose missed, signs or symptoms of bleeding or clotting, or a surgery/procedure is scheduled.    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

## 2023-04-15 ENCOUNTER — HEALTH MAINTENANCE LETTER (OUTPATIENT)
Age: 78
End: 2023-04-15

## 2023-04-26 ENCOUNTER — DOCUMENTATION ONLY (OUTPATIENT)
Dept: ANTICOAGULATION | Facility: CLINIC | Age: 78
End: 2023-04-26
Payer: COMMERCIAL

## 2023-04-26 DIAGNOSIS — Z95.2 S/P MITRAL VALVE REPLACEMENT: ICD-10-CM

## 2023-04-26 DIAGNOSIS — Z95.2 HEART VALVE REPLACED: ICD-10-CM

## 2023-04-26 DIAGNOSIS — Z79.01 LONG TERM CURRENT USE OF ANTICOAGULANT THERAPY: Primary | ICD-10-CM

## 2023-04-26 DIAGNOSIS — I48.20 CHRONIC ATRIAL FIBRILLATION (H): ICD-10-CM

## 2023-04-26 LAB — INR HOME MONITORING: 2.5 (ref 2.5–3.5)

## 2023-04-26 NOTE — PROGRESS NOTES
ANTICOAGULATION  MANAGEMENT-Home Monitor Managed by Exception    Feng Wynne 78 year old male is on warfarin with therapeutic INR result. (Goal INR 2.5-3.5)    Recent labs: (last 7 days)     04/26/23  0000   INR 2.5         Previous INR was Therapeutic    Medication, diet, health changes since last INR:chart reviewed; none identified    Contacted within the last 12 weeks by phone on 4/11/23      COLTON Toney was NOT contacted regarding therapeutic result today per home monitoring policy manage by exception agreement.   Current warfarin dose is to be continued:     Summary  As of 4/26/2023    Full warfarin instructions:  7.5 mg every Tue, Sat; 5 mg all other days   Next INR check:  5/10/2023           ?   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

## 2023-05-03 ENCOUNTER — TELEPHONE (OUTPATIENT)
Dept: FAMILY MEDICINE | Facility: CLINIC | Age: 78
End: 2023-05-03
Payer: COMMERCIAL

## 2023-05-03 NOTE — TELEPHONE ENCOUNTER
I spoke with Barbara from [facility]. They supply pt's home monitor supplies. Insurance needs a the form filled out by PCP (who was recently switched to Dr. Lou) and he most recent office visit.   This was sent to his old PCP who no longer works at the clinic, but since pt is technically still a patient through [facility], any procider could still sign for it.     Barbara did  Fax a new form to Dr Lou's office. She will await for the signed form and most recent OV.       Irena Whitaker RN

## 2023-05-03 NOTE — TELEPHONE ENCOUNTER
General Call    Contacts       Type Contact Phone/Fax    05/03/2023 10:45 AM CDT Phone (Incoming) Barbara [phone]     [id number]  [employer] [facility] [employer]        Reason for Call:  Anticoag    What are your questions or concerns:  aBrbara is calling regarding strip and lancets needed as soon as possible for pt for Warfarin home checking. A form was faxed to clinic fax 5/3/23, fax number was clarified with writer.Fax was received back there is no PCP so to refill. Please confirm at the number provided.    Date of last appointment with provider: 4.13.22    Could we send this information to you in [employer] or would you prefer to receive a phone call?:   Patient would prefer a phone call but is not needed  Okay to leave a detailed message?: Yes at Other phone number:  [phone]

## 2023-05-05 NOTE — PROGRESS NOTES
"SUBJECTIVE:   Feng is a 78 year old who presents for Preventive Visit.       Patient presenting for follow-up he is seen [facility] he had shots in his knees that really worked.  He has sciatica on and off is seeing physiatry.  Had little accident in [place] due to an abnormal stress involve, is still trying to exercise.  Biggest issue is congestion in the wintertime now it gets better during the night he feels he gets congestion \"stuffed up\" he is using nasal saline he got a bloody nose.  He is coughing up some phlegm during the day, does mention the congestion is better is more of sensitivity.  He does work on a stationary bike 30 minutes 2 times a day denies any associated chest pain or chest tightness or chest symptoms.  He is not taking statins he developed a rash with statins.  He reports his voiding is good.  He sleeps 6 to 7-hour \"every sleep\" denies history of obstructive sleep apnea  Memory is good.  Vision hearing is good.  He has decreased appetite decrease portion of meals.  Blood pressure seems well controlled.  He reports blood pressure less than 130/80 119/67-75 symptoms and 120 systolic remains on atenolol and lisinopril.  Patient has been advised of split billing requirements and indicates understanding: No  Are you in the first 12 months of your Medicare coverage?  No    Healthy Habits:     In general, how would you rate your overall health?  Fair    Frequency of exercise:  6-7 days/week    Duration of exercise:  45-60 minutes    Do you usually eat at least 4 servings of fruit and vegetables a day, include whole grains    & fiber and avoid regularly eating high fat or \"junk\" foods?  Yes    Taking medications regularly:  Yes (Incomplete)    Medication side effects:  None (Incomplete)    Ability to successfully perform activities of daily living:  No assistance needed (Incomplete)    Home Safety:  No safety concerns identified (Incomplete)    Hearing Impairment:  No hearing concerns (Incomplete)    In the " past 6 months, have you been bothered by leaking of urine?  No (Incomplete)    PHQ-2 Total Score: 0    The 10-year ASCVD risk score (Brenton DK, et al., 2019) is: 32%    Values used to calculate the score:      Age: 78 years      Sex: Male      Is Non- : No      Diabetic: No      Tobacco smoker: No      Systolic Blood Pressure: 134 mmHg      Is BP treated: Yes      HDL Cholesterol: 46 mg/dL      Total Cholesterol: 141 mg/dL    Have you ever done Advance Care Planning? (For example, a Health Directive, POLST, or a discussion with a medical provider or your loved ones about your wishes): Yes, advance care planning is on file.       Fall risk  Fallen 2 or more times in the past year?: No  Any fall with injury in the past year?: No    Cognitive Screening Unable to complete due to virtual visit; need for additional assessment in future face-to-face visit    Do you have sleep apnea, excessive snoring or daytime drowsiness?: no    Reviewed and updated as needed this visit by clinical staff   Tobacco  Allergies  Meds   Med Hx            Reviewed and updated as needed this visit by Provider    Allergies    Med Hx           Social History     Tobacco Use     Smoking status: Former     Packs/day: 0.50     Years: 5.00     Pack years: 2.50     Types: Cigarettes     Smokeless tobacco: Never     Tobacco comments:     quit 20+ yrs ago   Vaping Use     Vaping status: Not on file   Substance Use Topics     Alcohol use: Yes     Alcohol/week: 0.0 standard drinks of alcohol     Comment: 2 drinks/month             5/8/2023    11:17 AM   Alcohol Use   Prescreen: >3 drinks/day or >7 drinks/week? Not Applicable          View : No data to display.              Do you have a current opioid prescription? No  Do you use any other controlled substances or medications that are not prescribed by a provider? None      Current providers sharing in care for this patient include:   Patient Care Team:  Paula Lou,  MD as PCP - General (Internal Medicine)  Armani Marin MD as MD (Cardiology)  Paula Lou MD as Assigned PCP  Jeannien Gomez MD as MD (Cardiovascular Disease)  Jeannine Gomez MD as Assigned Heart and Vascular Provider    The following health maintenance items are reviewed in Epic and correct as of today:  Health Maintenance   Topic Date Due     URINE DRUG SCREEN  Never done     LUNG CANCER SCREENING  Never done     HEPATITIS B IMMUNIZATION (1 of 3 - Risk 3-dose series) Never done     ZOSTER IMMUNIZATION (2 of 3) 06/03/2013     MEDICARE ANNUAL WELLNESS VISIT  03/14/2020     DTAP/TDAP/TD IMMUNIZATION (2 - Td or Tdap) 04/08/2023     FALL RISK ASSESSMENT  05/08/2024     ANNUAL REVIEW OF HM ORDERS  05/09/2024     LIPID  02/09/2027     ADVANCE CARE PLANNING  05/08/2028     HEPATITIS C SCREENING  Completed     PHQ-2 (once per calendar year)  Completed     INFLUENZA VACCINE  Completed     Pneumococcal Vaccine: 65+ Years  Completed     COVID-19 Vaccine  Completed     IPV IMMUNIZATION  Aged Out     MENINGITIS IMMUNIZATION  Aged Out     COLORECTAL CANCER SCREENING  Discontinued     Lab work is in process  Labs reviewed in EPIC  BP Readings from Last 3 Encounters:   08/31/22 134/85   07/15/22 134/85   06/29/22 134/66    Wt Readings from Last 3 Encounters:   07/15/22 147.9 kg (326 lb)   02/09/22 145.2 kg (320 lb)   10/16/20 149.2 kg (329 lb)                  Patient Active Problem List   Diagnosis     Allergic rhinitis     Chronic atrial fibrillation (H)     S/P mitral valve replacement     Hyperlipidemia LDL goal <130     Atrial fibrillation (H)     Lumbago     Knee pain     Rosacea     Proteinuria     Essential hypertension with goal blood pressure less than 140/90     Morbid obesity due to excess calories (H)     Long-term (current) use of anticoagulants [Z79.01]     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]     Essential hypertension     Chronic right shoulder pain     Past Surgical History:   Procedure Laterality  Date     MITRAL VALVE REPLACEMENT  8-    Mitral valve replacement with 31-mm St. Raffi mechanical valve.        Social History     Tobacco Use     Smoking status: Former     Packs/day: 0.50     Years: 5.00     Pack years: 2.50     Types: Cigarettes     Smokeless tobacco: Never     Tobacco comments:     quit 20+ yrs ago   Vaping Use     Vaping status: Not on file   Substance Use Topics     Alcohol use: Yes     Alcohol/week: 0.0 standard drinks of alcohol     Comment: 2 drinks/month     Family History   Problem Relation Age of Onset     Cerebrovascular Disease Father      Diabetes Paternal Grandmother      C.A.D. Brother         CABG X 3 at age 51         Current Outpatient Medications   Medication Sig Dispense Refill     ASPIRIN NOT PRESCRIBED, INTENTIONAL,   0     atenolol (TENORMIN) 25 MG tablet TAKE 1 TABLET BY MOUTH TWICE A  tablet 0     clindamycin (CLEOCIN) 150 MG capsule TAKE 4 CAPS BY MOUTH 1 HOUR PRIOR TO DENTAL APPOINTMENT       diclofenac (VOLTAREN) 1 % topical gel Place 4 g onto the skin 4 times daily as needed for moderate pain (knee pain) 100 g 3     ketoconazole (NIZORAL) 2 % external cream Apply topically 2 times daily 15 g 1     lisinopril (ZESTRIL) 40 MG tablet Take 1 tablet (40 mg) by mouth daily 90 tablet 3     Multiple Vitamins-Minerals (CENTRUM SILVER PO) Take 1 tablet by mouth daily.       Omega-3 Fatty Acids (FISH OIL CONCENTRATE PO) Take  by mouth daily.       order for DME Equipment being ordered: COMPRESSION STOCKINGS, 20-30 mmHg 1 each 1     rosuvastatin (CRESTOR) 5 MG tablet 1 tablet every other day at bedtime, take with coenzyme Q10 100 mg 1 tablet once daily 90 tablet 0     warfarin ANTICOAGULANT (COUMADIN) 5 MG tablet Take 1.5 tablets (7.5 mg) every Tuesday & Saturday, take 1 tablet (5 mg) all other days, or as directed by INR clinic 96 tablet 1     Allergies   Allergen Reactions     Amiodarone Shortness Of Breath     Latex      Pcn [Penicillins]      Sulfites      Zetia  "[Ezetimibe] Muscle Pain (Myalgia)     Muscle weakness legs     Recent Labs   Lab Test 02/09/22  1117 10/16/20  1058 03/14/19  0959   LDL 78 79 91   HDL 46 48 42   TRIG 85 102 107   ALT 19 21  --    CR 0.90 0.85 0.85   GFRESTIMATED 89 85 86   GFRESTBLACK  --  >90 >90   POTASSIUM 5.0 4.6 4.5   TSH  --   --  2.39                Review of Systems  Constitutional, HEENT, cardiovascular, pulmonary, GI, , musculoskeletal, neuro, skin, endocrine and psych systems are negative, except as otherwise noted.    OBJECTIVE:   There were no vitals taken for this visit. Estimated body mass index is 44.83 kg/m  as calculated from the following:    Height as of 7/15/22: 1.816 m (5' 11.5\").    Weight as of 7/15/22: 147.9 kg (326 lb).  Physical Exam  GENERAL: healthy, alert and no distress  EYES: Eyes grossly normal to inspection, PERRL and conjunctivae and sclerae normal  NEURO: Normal strength and tone, mentation intact and speech normal  PSYCH: mentation appears normal, affect normal/bright    Diagnostic Test Results:  Labs reviewed in Epic    ASSESSMENT / PLAN:   Feng was seen today for physical.    Diagnoses and all orders for this visit:    Routine history and physical examination of adult  -     Cancel: Hemoglobin A1c; Future  -     Vitamin B12; Future    Hyperlipidemia LDL goal <70  Comments:  Intolerant to Zetia and statins consider small dose of statins may be a good candidate of PSC K9 inhibitors given his elevated 10-year ASCVD rsik score  Orders:  -     rosuvastatin (CRESTOR) 5 MG tablet; 1 tablet every other day at bedtime, take with coenzyme Q10 100 mg 1 tablet once daily  -     CT Coronary Calcium Scan; Future  -     Lipid panel reflex to direct LDL Fasting; Future    Morbid obesity due to excess calories (H)  Comments:  Refer to [facility], consider visit medications.  Orders:  -     Med Therapy Management Referral    Medically complex patient  Comments:  Refer to [facility]  Orders:  -     Med Therapy Management " Referral    Essential hypertension  Comments:  Blood pressure at goal less than 130/80  Orders:  -     Comprehensive metabolic panel (BMP + Alb, Alk Phos, ALT, AST, Total. Bili, TP); Future  -     CBC with platelets; Future  -     Albumin Random Urine Quantitative with Creat Ratio; Future    S/P mitral valve replacement  Comments:  Status post maze procedure    High risk of cardiac event  Comments:  Coronary calcium scan.  Orders:  -     CT Coronary Calcium Scan; Future    Chronic atrial fibrillation (H)  Comments:  Remains on chronic anticoagulation.  Orders:  -     Comprehensive metabolic panel (BMP + Alb, Alk Phos, ALT, AST, Total. Bili, TP); Future  -     CBC with platelets; Future  -     TSH with free T4 reflex; Future    Encounter for vitamin deficiency screening  -     Vitamin B12; Future    Screening for prostate cancer  -     PSA, screen; Future    Colon cancer screening  Comments:  Fit test  Orders:  -     Fecal colorectal cancer screen (FIT); Future    Low ferritin  -     Ferritin; Future    Screening for diabetes mellitus  -     Cancel: Hemoglobin A1c; Future    Other orders  -     REVIEW OF HEALTH MAINTENANCE PROTOCOL ORDERS      Discussed multiple health concerns today in addition to physical   Patient was last seen by cardiology Dr. PARIKH has history of mitral valve regurgitation, Repeat echo shows normal ejection fraction without evidence of congestive heart failure status post maze procedure.  Continue follow-up yearly with cardiology.  He is intolerant to Zetia and statin we will start low-dose of Crestor to see if he tolerates if not he would be a candidate for probably PSC K9 inhalers.  Hypertension continue to monitor blood pressure call us with blood pressure readings, repeat labs including chemistry panel kidney function test and urine microalbumin, repeat lipid panel  Follow-up with [facility] may benefit from getting GIP/ GLP-1's medication injection to help with weight loss  Continue with  "lifestyle changes, exercise as tolerated.  Continue follow-up with [facility].      Patient has been advised of split billing requirements and indicates understanding: Yes      COUNSELING:  Reviewed preventive health counseling, as reflected in patient instructions       Regular exercise       Healthy diet/nutrition       Vision screening       Hearing screening       Dental care       Bladder control       Fall risk prevention       Alcohol Use        Colon cancer screening       Prostate cancer screening      BMI:   Estimated body mass index is 44.83 kg/m  as calculated from the following:    Height as of 7/15/22: 1.816 m (5' 11.5\").    Weight as of 7/15/22: 147.9 kg (326 lb).   Weight management plan: Discussed healthy diet and exercise guidelines      He reports that he has quit smoking. His smoking use included cigarettes. He has a 2.50 pack-year smoking history. He has never used smokeless tobacco.    This was a virtual video visit  Appropriate preventive services were discussed with this patient, including applicable screening as appropriate for cardiovascular disease, diabetes, osteopenia/osteoporosis, and glaucoma.  As appropriate for age/gender, discussed screening for colorectal cancer, prostate cancer, breast cancer, and cervical cancer. Checklist reviewing preventive services available has been given to the patient.    Reviewed patients plan of care and provided an AVS. The Basic Care Plan (routine screening as documented in Health Maintenance) for Feng meets the Care Plan requirement. This Care Plan has been established and reviewed with the Patient.          Paula Lou MD  [facility]    Identified Health Risks:    I have reviewed Opioid Use Disorder and Substance Use Disorder risk factors and made any needed referrals.     "

## 2023-05-08 ENCOUNTER — VIRTUAL VISIT (OUTPATIENT)
Dept: FAMILY MEDICINE | Facility: CLINIC | Age: 78
End: 2023-05-08
Payer: COMMERCIAL

## 2023-05-08 DIAGNOSIS — I10 ESSENTIAL HYPERTENSION: ICD-10-CM

## 2023-05-08 DIAGNOSIS — R79.0 LOW FERRITIN: ICD-10-CM

## 2023-05-08 DIAGNOSIS — Z12.11 COLON CANCER SCREENING: ICD-10-CM

## 2023-05-08 DIAGNOSIS — Z95.2 S/P MITRAL VALVE REPLACEMENT: ICD-10-CM

## 2023-05-08 DIAGNOSIS — Z78.9 MEDICALLY COMPLEX PATIENT: ICD-10-CM

## 2023-05-08 DIAGNOSIS — Z13.1 SCREENING FOR DIABETES MELLITUS: ICD-10-CM

## 2023-05-08 DIAGNOSIS — E66.01 MORBID OBESITY DUE TO EXCESS CALORIES (H): ICD-10-CM

## 2023-05-08 DIAGNOSIS — Z13.21 ENCOUNTER FOR VITAMIN DEFICIENCY SCREENING: ICD-10-CM

## 2023-05-08 DIAGNOSIS — Z12.5 SCREENING FOR PROSTATE CANCER: ICD-10-CM

## 2023-05-08 DIAGNOSIS — Z91.89 HIGH RISK OF CARDIAC EVENT: ICD-10-CM

## 2023-05-08 DIAGNOSIS — I48.20 CHRONIC ATRIAL FIBRILLATION (H): ICD-10-CM

## 2023-05-08 DIAGNOSIS — Z00.00 ROUTINE HISTORY AND PHYSICAL EXAMINATION OF ADULT: Primary | ICD-10-CM

## 2023-05-08 DIAGNOSIS — E78.5 HYPERLIPIDEMIA LDL GOAL <70: ICD-10-CM

## 2023-05-08 PROCEDURE — G0439 PPPS, SUBSEQ VISIT: HCPCS | Mod: VID | Performed by: INTERNAL MEDICINE

## 2023-05-08 PROCEDURE — 99214 OFFICE O/P EST MOD 30 MIN: CPT | Mod: 25 | Performed by: INTERNAL MEDICINE

## 2023-05-08 RX ORDER — CLINDAMYCIN HCL 150 MG
CAPSULE ORAL
Status: ON HOLD | COMMUNITY
Start: 2022-05-31 | End: 2024-01-01

## 2023-05-08 ASSESSMENT — ACTIVITIES OF DAILY LIVING (ADL): CURRENT_FUNCTION: NO ASSISTANCE NEEDED

## 2023-05-09 RX ORDER — ROSUVASTATIN CALCIUM 5 MG/1
TABLET, COATED ORAL
Qty: 90 TABLET | Refills: 0 | Status: SHIPPED | OUTPATIENT
Start: 2023-05-09 | End: 2023-09-13

## 2023-05-09 NOTE — PATIENT INSTRUCTIONS
Has a history of morbid obesity may benefit from GLP/GLP-1 agonist refer to [facility] to discuss further weight management and obesity medications.  Current calcium scan given his history of hyperlipidemia history of coronary artery disease,  Advised to start on cholesterol medication with Crestor 5 mg every other day.  If he is intolerant to statins he will need probably PSC K9 inhibitors which

## 2023-05-10 ENCOUNTER — TELEPHONE (OUTPATIENT)
Dept: FAMILY MEDICINE | Facility: CLINIC | Age: 78
End: 2023-05-10
Payer: COMMERCIAL

## 2023-05-10 NOTE — TELEPHONE ENCOUNTER
[facility] referral from: [facility] visit (referral by provider)    [facility] referral outreach attempt #1 on May 10, 2023 at 2:44 PM      Outcome: Patient is not interested at this time because what he is doing is working and not wanting to do shots, will route to [facility] Pharmacist/Provider as an FYI. Thank you for the referral.     Use [employer] Part D MAP for the carrier/Plan on the flowsheet    Rosalva Lopez - [facility]

## 2023-05-11 NOTE — TELEPHONE ENCOUNTER
OV notes from the last 12 months (back to 04/2022) were printed and faxed to [employer] [phone]. Copy put in accordion file folder in blue pod

## 2023-05-12 ENCOUNTER — TELEPHONE (OUTPATIENT)
Dept: FAMILY MEDICINE | Facility: CLINIC | Age: 78
End: 2023-05-12
Payer: COMMERCIAL

## 2023-05-12 ENCOUNTER — ANTICOAGULATION THERAPY VISIT (OUTPATIENT)
Dept: ANTICOAGULATION | Facility: CLINIC | Age: 78
End: 2023-05-12
Payer: COMMERCIAL

## 2023-05-12 DIAGNOSIS — Z95.2 HEART VALVE REPLACED: ICD-10-CM

## 2023-05-12 DIAGNOSIS — I48.20 CHRONIC ATRIAL FIBRILLATION (H): ICD-10-CM

## 2023-05-12 DIAGNOSIS — Z95.2 S/P MITRAL VALVE REPLACEMENT: ICD-10-CM

## 2023-05-12 DIAGNOSIS — Z79.01 LONG TERM CURRENT USE OF ANTICOAGULANT THERAPY: Primary | ICD-10-CM

## 2023-05-12 LAB — INR HOME MONITORING: 2.9 (ref 2.5–3.5)

## 2023-05-12 NOTE — PROGRESS NOTES
ANTICOAGULATION MANAGEMENT     Feng Tomn Sherrell 78 year old male is on warfarin with therapeutic INR result. (Goal INR 2.5-3.5)    Recent labs: (last 7 days)     05/12/23  0000   INR 2.9       ASSESSMENT       Source(s): Chart Review and Patient/Caregiver Call       Warfarin doses taken: Warfarin taken as instructed    Diet: No new diet changes identified    Medication/supplement changes: Prescribed Rosuvastatin, has not started. Patient will await next cholesterol lab result and discuss furter with provider.    New illness, injury, or hospitalization: No    Signs or symptoms of bleeding or clotting: No    Previous result: Therapeutic last 2(+) visits    Additional findings: None         PLAN     Recommended plan for no diet, medication or health factor changes affecting INR     Dosing Instructions: Continue your current warfarin dose with next INR in 2 weeks       Summary  As of 5/12/2023    Full warfarin instructions:  7.5 mg every Tue, Sat; 5 mg all other days   Next INR check:  5/26/2023             Telephone call with Feng who agrees to plan and repeated back plan correctly    Patient to recheck with home meter    Education provided:     Please call back if any changes to your diet, medications or how you've been taking warfarin    Goal range and lab monitoring: goal range and significance of current result    Plan made per [facility] anticoagulation protocol    Chikis Desir RN  Anticoagulation Clinic  5/12/2023    _______________________________________________________________________     Anticoagulation Episode Summary     Current INR goal:  2.5-3.5   TTR:  83.2 % (1 y)   Target end date:  Indefinite   Send INR reminders to:  ANTICOAG HOME MONITORING    Indications    Long-term (current) use of anticoagulants [Z79.01] [Z79.01]  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] [Z95.2]  S/P mitral valve replacement [Z95.2]  Chronic atrial fibrillation (H) [I48.20]           Comments:  [employer] HOME MONITORING  Patient, okay  to manage by exception on 7/8/20         Anticoagulation Care Providers     Provider Role Specialty Phone number    WalkerEnrique norman Abhishek Granados MD Referring Internal Medicine     Paula Lou MD Referring Internal Medicine [phone]    Leena Jeffery DO Referring Internal Medicine [phone]

## 2023-05-12 NOTE — TELEPHONE ENCOUNTER
Reason for Call:  Other call back    Detailed comments: Pt is returning the call to the INR nurse. Pls call.    Phone Number Patient can be reached at: Home number on file [phone] (home)    Best Time: any    Can we leave a detailed message on this number? YES    Call taken on 5/12/2023 at 2:52 PM by Ashlie Dooley

## 2023-05-15 ENCOUNTER — TELEPHONE (OUTPATIENT)
Dept: CARDIOLOGY | Facility: CLINIC | Age: 78
End: 2023-05-15
Payer: COMMERCIAL

## 2023-05-15 NOTE — TELEPHONE ENCOUNTER
Health Call Center    Phone Message    May a detailed message be left on voicemail: yes     Reason for Call: Medication Question or concern regarding medication   Prescription Clarification  Name of Medication: rosuvastatin (CRESTOR) 5 MG tablet  Prescribing Provider: Carmine   Pharmacy:    [facility]/PHARMACY #[id number] [facility] [address] [place] [address]   What on the order needs clarification? Patient called requesting to speak with a member of his care team. Patient states he was recently placed on this medication and is a bit concerned. Patient would like to know if it is safe for him to be on, because in the past it has caused break-outs. Please call patient back to further discuss, thank you.    Action Taken: Message routed to:  Other: Cardiology    Travel Screening: Not Applicable     Thank you!  [facility]

## 2023-05-16 NOTE — TELEPHONE ENCOUNTER
Spoke to patient and he stated that his PCP started Rosuvastatin and he is unsure if he really needs it. He has tried other statins in the past and was not able to tolerate them as he got myalgias and leg rash. Pts PCP did order a CT Ca scan to be done as well. Currently patient does not have any diagnosis for CAD and FLP looks WNL. Recommended patient get CT Ca scan done and then can review the result and see what Dr. Gomez thinks but thatll give good information. Pt verbalized understanding and agreed to plan. Pt has team 4 direct number to call to let us know when he has completed the CT Ca scan.

## 2023-05-23 LAB — INR (EXTERNAL): 2.8 (ref 0.9–1.1)

## 2023-05-30 ENCOUNTER — TELEPHONE (OUTPATIENT)
Dept: PHYSICAL MEDICINE AND REHAB | Facility: CLINIC | Age: 78
End: 2023-05-30
Payer: COMMERCIAL

## 2023-05-30 NOTE — TELEPHONE ENCOUNTER
[facility] Call Center    Phone Message    May a detailed message be left on voicemail: yes     Reason for Call: Other: Pt calling to schedule with new PMR dr. because their dr is no longer with here. Pt hoping for [place] location but specified he needs a doctor that will do the injections. Please call back to determine who may be the best provider based on Pt current needs.      Action Taken: Message routed to:  [facility] PMR    Travel Screening: Not Applicable

## 2023-05-31 NOTE — TELEPHONE ENCOUNTER
LVM for pt. To get more clarification of the message. First we need a referral for PM&R so we can review and proceed with scheduling of appropriate.

## 2023-06-05 ENCOUNTER — DOCUMENTATION ONLY (OUTPATIENT)
Dept: ANTICOAGULATION | Facility: CLINIC | Age: 78
End: 2023-06-05
Payer: COMMERCIAL

## 2023-06-05 ENCOUNTER — PRE VISIT (OUTPATIENT)
Dept: NEUROSURGERY | Facility: CLINIC | Age: 78
End: 2023-06-05
Payer: COMMERCIAL

## 2023-06-05 ENCOUNTER — MYC MEDICAL ADVICE (OUTPATIENT)
Dept: ANTICOAGULATION | Facility: CLINIC | Age: 78
End: 2023-06-05
Payer: COMMERCIAL

## 2023-06-05 DIAGNOSIS — Z95.2 S/P MITRAL VALVE REPLACEMENT: ICD-10-CM

## 2023-06-05 DIAGNOSIS — Z79.01 LONG TERM CURRENT USE OF ANTICOAGULANT THERAPY: Primary | ICD-10-CM

## 2023-06-05 DIAGNOSIS — I48.20 CHRONIC ATRIAL FIBRILLATION (H): ICD-10-CM

## 2023-06-05 DIAGNOSIS — Z95.2 HEART VALVE REPLACED: ICD-10-CM

## 2023-06-05 NOTE — TELEPHONE ENCOUNTER
See Doc for ACC.  Love Elliott, RN  Anticoagulation Nurse - [facility], [place]

## 2023-06-05 NOTE — TELEPHONE ENCOUNTER
NEUROSURGERY- NEW PREVISIT PLANNING       Record Status/Location     Referring Provider Referral Paula Lou MD   Diagnosis Referral low back pain, right side sciatica   MRI (HEAD, NECK, SPINE) Pacs Lumbar 8/16/22 [facility]   CT Na    X-ray Na    INJECTION Na    PHYSICAL THERAPY Na    SURGERY Na

## 2023-06-05 NOTE — PROGRESS NOTES
ANTICOAGULATION     Feng Wynne is overdue for INR check.     [employer] message sent     Gaviota Weston RN

## 2023-06-05 NOTE — PROGRESS NOTES
ANTICOAGULATION  MANAGEMENT-Home Monitor Managed by Exception    Feng Wynne 78 year old male is on warfarin with therapeutic INR result. (Goal INR 2.5-3.5)    No results for input(s): INR in the last 168 hours.      Previous INR was Therapeutic    Medication, diet, health changes since last INR:chart reviewed; none identified    Contacted within the last 12 weeks by phone on 5/12/23      PLAN     Feng was NOT contacted regarding therapeutic result today per home monitoring policy manage by exception agreement.   Current warfarin dose is to be continued:     Summary  As of 6/5/2023    Full warfarin instructions:  7.5 mg every Tue, Sat; 5 mg all other days   Next INR check:  6/6/2023           ?   Love Elliott, RN  Anticoagulation Clinic  6/5/2023    _______________________________________________________________________     Anticoagulation Episode Summary     Current INR goal:  2.5-3.5   TTR:  82.5 % (11.7 mo)   Target end date:  Indefinite   Send INR reminders to:  [facility]    Indications    Long-term (current) use of anticoagulants [Z79.01] [Z79.01]  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] [Z95.2]  S/P mitral valve replacement [Z95.2]  Chronic atrial fibrillation (H) [I48.20]           Comments:  [employer] [facility] MONITORING Patient, okay  to manage by exception on 7/8/20         Anticoagulation Care Providers     Provider Role Specialty Phone number    Enrique Walker MD Referring Internal Medicine     Paula Lou MD Referring Internal Medicine [phone]    Leena Jeffery DO Referring Internal Medicine [phone]

## 2023-06-07 ENCOUNTER — DOCUMENTATION ONLY (OUTPATIENT)
Dept: ANTICOAGULATION | Facility: CLINIC | Age: 78
End: 2023-06-07
Payer: COMMERCIAL

## 2023-06-07 DIAGNOSIS — I48.20 CHRONIC ATRIAL FIBRILLATION (H): ICD-10-CM

## 2023-06-07 DIAGNOSIS — Z95.2 S/P MITRAL VALVE REPLACEMENT: ICD-10-CM

## 2023-06-07 DIAGNOSIS — Z95.2 HEART VALVE REPLACED: ICD-10-CM

## 2023-06-07 DIAGNOSIS — Z79.01 LONG TERM CURRENT USE OF ANTICOAGULANT THERAPY: Primary | ICD-10-CM

## 2023-06-07 LAB — INR HOME MONITORING: 2.7 (ref 2.5–3.5)

## 2023-06-07 NOTE — PROGRESS NOTES
ANTICOAGULATION  MANAGEMENT-Home Monitor Managed by Exception    Feng Wynne 78 year old male is on warfarin with therapeutic INR result. (Goal INR 2.5-3.5)    Recent labs: (last 7 days)     06/07/23  0000   INR 2.7         Previous INR was Therapeutic    Medication, diet, health changes since last INR:chart reviewed; none identified    Contacted within the last 12 weeks by phone on 5/12/23      COLTON Toney was NOT contacted regarding therapeutic result today per home monitoring policy manage by exception agreement.   Current warfarin dose is to be continued:     Summary  As of 6/7/2023    Full warfarin instructions:  7.5 mg every Tue, Sat; 5 mg all other days   Next INR check:  6/21/2023           ?   Natali Mondragon RN  Anticoagulation Clinic  6/7/2023    _______________________________________________________________________     Anticoagulation Episode Summary     Current INR goal:  2.5-3.5   TTR:  82.0 % (1 y)   Target end date:  Indefinite   Send INR reminders to:  [facility]    Indications    Long-term (current) use of anticoagulants [Z79.01] [Z79.01]  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] [Z95.2]  S/P mitral valve replacement [Z95.2]  Chronic atrial fibrillation (H) [I48.20]           Comments:  [employer] HOME MONITORING Patient, okay  to manage by exception on 7/8/20         Anticoagulation Care Providers     Provider Role Specialty Phone number    Enrique Walker MD Referring Internal Medicine     Paula Lou MD Referring Internal Medicine [phone]    Leena Jeffery DO Referring Internal Medicine [phone]

## 2023-06-19 ENCOUNTER — OFFICE VISIT (OUTPATIENT)
Dept: NEUROSURGERY | Facility: CLINIC | Age: 78
End: 2023-06-19
Payer: COMMERCIAL

## 2023-06-19 VITALS — DIASTOLIC BLOOD PRESSURE: 74 MMHG | HEART RATE: 63 BPM | SYSTOLIC BLOOD PRESSURE: 120 MMHG | OXYGEN SATURATION: 98 %

## 2023-06-19 DIAGNOSIS — M54.16 LUMBAR RADICULOPATHY: Primary | ICD-10-CM

## 2023-06-19 PROCEDURE — 99214 OFFICE O/P EST MOD 30 MIN: CPT | Performed by: NURSE PRACTITIONER

## 2023-06-19 ASSESSMENT — PAIN SCALES - GENERAL: PAINLEVEL: EXTREME PAIN (8)

## 2023-06-19 NOTE — PATIENT INSTRUCTIONS
Order for lumbar spine MRI. You can call [phone]. I will call with the results and next steps.    Referral to physical therapy. They will call you to schedule.     Please call our clinic if symptoms persist, change, or worsen at any time.    [facility]  [address] [address]  Tel [phone]

## 2023-06-19 NOTE — LETTER
"    6/19/2023         RE: Feng Wynne  [address]  [facility] [address]        Dear Colleague,    Thank you for referring your patient, Feng Wynne, to the [facility]. Please see a copy of my visit note below.    Feng Wynne is a 78 year old male who presents for:  Chief Complaint   Patient presents with     Consult     Low back pain        Initial Vitals:  /74   Pulse 63   SpO2 98%  Estimated body mass index is 44.83 kg/m  as calculated from the following:    Height as of 7/15/22: 5' 11.5\" (1.816 m).    Weight as of 7/15/22: 326 lb (147.9 kg).. There is no height or weight on file to calculate BSA. BP completed using cuff size: large  Extreme Pain (8)    Nursing Comments:     Ondina Sotelo    [facility] Clinic Visit      CC: low back pain, right leg pain     Primary Care Provider: Paula Lou    Reason For Visit:   Self referral       HPI: Feng Wynne is a 78 year old male who presents for evaluation of chronic low back and right leg pain. Symptoms started many years ago and worsened 6 weeks ago after an accident at [employer]. Today, patient reports right sided low back pain that radiates to right lateral leg. Describes the pain as sharp, burning, and aching. Pain is worsened with walking. Patient has tried PT in the past and takes tylenol for pain control. Denies any weakness, numbness, falls, foot drop, saddle anesthesia, or bladder/bowel incontinence. Patient is hoping to try an injection.     Current pain: 8/10     Past Medical History:   Diagnosis Date     Arthritis      Atrial fibrillation (H)      Coronary artery disease      Hypercholesteremia      Morbid obesity (H)      Unspecified essential hypertension        Past Medical History reviewed with patient during visit.    Past Surgical History:   Procedure Laterality Date     MITRAL VALVE REPLACEMENT  8-    Mitral valve replacement with 31-mm " St. Raffi mechanical valve.      Past Surgical History reviewed with patient during visit.    Current Outpatient Medications   Medication     atenolol (TENORMIN) 25 MG tablet     clindamycin (CLEOCIN) 150 MG capsule     lisinopril (ZESTRIL) 40 MG tablet     Multiple Vitamins-Minerals (CENTRUM SILVER PO)     Omega-3 Fatty Acids (FISH OIL CONCENTRATE PO)     warfarin ANTICOAGULANT (COUMADIN) 5 MG tablet     ASPIRIN NOT PRESCRIBED, INTENTIONAL,     diclofenac (VOLTAREN) 1 % topical gel     ketoconazole (NIZORAL) 2 % external cream     order for DME     rosuvastatin (CRESTOR) 5 MG tablet     No current facility-administered medications for this visit.       Allergies   Allergen Reactions     Amiodarone Shortness Of Breath     Latex      Pcn [Penicillins]      Sulfites      Zetia [Ezetimibe] Muscle Pain (Myalgia)     Muscle weakness legs       Social History     Socioeconomic History     Marital status: Single     Spouse name: None     Number of children: 1     Years of education: None     Highest education level: None   Occupational History     Occupation: Self employed      Comment:     Tobacco Use     Smoking status: Former     Packs/day: 0.50     Years: 5.00     Pack years: 2.50     Types: Cigarettes     Smokeless tobacco: Never     Tobacco comments:     quit 20+ yrs ago   Substance and Sexual Activity     Alcohol use: Yes     Alcohol/week: 0.0 standard drinks of alcohol     Comment: 2 drinks/month     Drug use: No     Sexual activity: Yes     Partners: Male   Other Topics Concern     Parent/sibling w/ CABG, MI or angioplasty before 65F 55M? Yes       Family History   Problem Relation Age of Onset     Cerebrovascular Disease Father      Diabetes Paternal Grandmother      C.A.D. Brother         CABG X 3 at age 51       ROS: 10 point ROS neg other than the symptoms noted above in the HPI.    Vital Signs: /74   Pulse 63   SpO2 98%     Neurological Examination:  Awake  Alert  Oriented x 3  Speech  clear    Motor exam:  LLE 5/5  RLE exam limited due to pain, hip flexion 4/5, knee flex/ext 4/5   Bilateral DF/PF 5/5     BLE sensation intact to light touch     Reflexes are 2+ in the patellar and Achilles. There is no clonus.     Musculoskeletal:  Gait: Able to stand from a seated position. Antalgic gait. Ambulates with walker.   Tenderness to palpation of right sided paraspinous muscles.  Negative SI joint tenderness bilaterally.  Negative straight leg raise bilaterally.      Imaging:   EXAM: MR LUMBAR SPINE W/O CONTRAST  LOCATION: [facility]  DATE/TIME: 8/16/2022 10:02 AM                                                                   IMPRESSION:  1.  Moderate spinal canal narrowing at L2-L3.  2.  Mild spinal canal narrowing and mild narrowing of the lateral recesses at L3-L4.  3.  Mild spinal canal narrowing at L1-L2.  4.  Moderate left and mild right neuroforaminal narrowing at L5-S1.  5.  Mild to moderate bilateral neuroforaminal narrowing at L4-L5.  6.  Mild to moderate right and mild left neuroforaminal narrowing at L1-L2.  7.  Modic type II changes at L5-S1.   8.  4 mm degenerative spondylolisthesis of L3 on L4.    Assessment/Plan:   78 year old male who presents for evaluation of chronic right sided low back pain that radiates to right lateral leg, symptoms worsened 6 weeks ago. Lumbar spine MRI from 2022 shows multilevel degenerative changes, L2-3 moderate spinal canal stenosis, multilevel mild to moderate bilateral foraminal stenosis, and Modic type II changes at L5-S1.     - Due to recent worsening symptoms, recommend an updated lumbar spine MRI   - Referral to PT  - Will call patient with results and next steps. Patient is hoping to try an injection pending MRI results.     Advised patient to call our clinic with any questions or concerns. Discussed red flag symptoms and advised to seek medical attention if these develop. Patient voiced understanding and agreement.       Griselda Velez, CNP  [facility]  [address]  [place] [address]  Tel [phone]  Pager [phone]          Again, thank you for allowing me to participate in the care of your patient.        Sincerely,        Griselda Velez, NP

## 2023-06-19 NOTE — PROGRESS NOTES
"Feng Wynne is a 78 year old male who presents for:  Chief Complaint   Patient presents with     Consult     Low back pain        Initial Vitals:  /74   Pulse 63   SpO2 98%  Estimated body mass index is 44.83 kg/m  as calculated from the following:    Height as of 7/15/22: 5' 11.5\" (1.816 m).    Weight as of 7/15/22: 326 lb (147.9 kg).. There is no height or weight on file to calculate BSA. BP completed using cuff size: large  Extreme Pain (8)    Nursing Comments:     Ondina Sotelo  "

## 2023-06-19 NOTE — PROGRESS NOTES
[facility]surgery Clinic Visit      CC: low back pain, right leg pain     Primary Care Provider: Paula Lou    Reason For Visit:   Self referral       HPI: Feng Wynne is a 78 year old male who presents for evaluation of chronic low back and right leg pain. Symptoms started many years ago and worsened 6 weeks ago after an accident at [employer]. Today, patient reports right sided low back pain that radiates to right lateral leg. Describes the pain as sharp, burning, and aching. Pain is worsened with walking. Patient has tried PT in the past and takes tylenol for pain control. Denies any weakness, numbness, falls, foot drop, saddle anesthesia, or bladder/bowel incontinence. Patient is hoping to try an injection.     Current pain: 8/10     Past Medical History:   Diagnosis Date     Arthritis      Atrial fibrillation (H)      Coronary artery disease      Hypercholesteremia      Morbid obesity (H)      Unspecified essential hypertension        Past Medical History reviewed with patient during visit.    Past Surgical History:   Procedure Laterality Date     MITRAL VALVE REPLACEMENT  8-    Mitral valve replacement with 31-mm St. Raffi mechanical valve.      Past Surgical History reviewed with patient during visit.    Current Outpatient Medications   Medication     atenolol (TENORMIN) 25 MG tablet     clindamycin (CLEOCIN) 150 MG capsule     lisinopril (ZESTRIL) 40 MG tablet     Multiple Vitamins-Minerals (CENTRUM SILVER PO)     Omega-3 Fatty Acids (FISH OIL CONCENTRATE PO)     warfarin ANTICOAGULANT (COUMADIN) 5 MG tablet     ASPIRIN NOT PRESCRIBED, INTENTIONAL,     diclofenac (VOLTAREN) 1 % topical gel     ketoconazole (NIZORAL) 2 % external cream     order for DME     rosuvastatin (CRESTOR) 5 MG tablet     No current facility-administered medications for this visit.       Allergies   Allergen Reactions     Amiodarone Shortness Of Breath     Latex      Pcn [Penicillins]      Sulfites       Zetia [Ezetimibe] Muscle Pain (Myalgia)     Muscle weakness legs       Social History     Socioeconomic History     Marital status: Single     Spouse name: None     Number of children: 1     Years of education: None     Highest education level: None   Occupational History     Occupation: Self employed      Comment:     Tobacco Use     Smoking status: Former     Packs/day: 0.50     Years: 5.00     Pack years: 2.50     Types: Cigarettes     Smokeless tobacco: Never     Tobacco comments:     quit 20+ yrs ago   Substance and Sexual Activity     Alcohol use: Yes     Alcohol/week: 0.0 standard drinks of alcohol     Comment: 2 drinks/month     Drug use: No     Sexual activity: Yes     Partners: Male   Other Topics Concern     Parent/sibling w/ CABG, MI or angioplasty before 65F 55M? Yes       Family History   Problem Relation Age of Onset     Cerebrovascular Disease Father      Diabetes Paternal Grandmother      C.A.D. Brother         CABG X 3 at age 51       ROS: 10 point ROS neg other than the symptoms noted above in the HPI.    Vital Signs: /74   Pulse 63   SpO2 98%     Neurological Examination:  Awake  Alert  Oriented x 3  Speech clear    Motor exam:  LLE 5/5  RLE exam limited due to pain, hip flexion 4/5, knee flex/ext 4/5   Bilateral DF/PF 5/5     BLE sensation intact to light touch     Reflexes are 2+ in the patellar and Achilles. There is no clonus.     Musculoskeletal:  Gait: Able to stand from a seated position. Antalgic gait. Ambulates with walker.   Tenderness to palpation of right sided paraspinous muscles.  Negative SI joint tenderness bilaterally.  Negative straight leg raise bilaterally.      Imaging:   EXAM: MR LUMBAR SPINE W/O CONTRAST  LOCATION: [facility]  DATE/TIME: 8/16/2022 10:02 AM                                                                   IMPRESSION:  1.  Moderate spinal canal narrowing at L2-L3.  2.  Mild spinal canal narrowing and mild  narrowing of the lateral recesses at L3-L4.  3.  Mild spinal canal narrowing at L1-L2.  4.  Moderate left and mild right neuroforaminal narrowing at L5-S1.  5.  Mild to moderate bilateral neuroforaminal narrowing at L4-L5.  6.  Mild to moderate right and mild left neuroforaminal narrowing at L1-L2.  7.  Modic type II changes at L5-S1.   8.  4 mm degenerative spondylolisthesis of L3 on L4.    Assessment/Plan:   78 year old male who presents for evaluation of chronic right sided low back pain that radiates to right lateral leg, symptoms worsened 6 weeks ago. Lumbar spine MRI from 2022 shows multilevel degenerative changes, L2-3 moderate spinal canal stenosis, multilevel mild to moderate bilateral foraminal stenosis, and Modic type II changes at L5-S1.     - Due to recent worsening symptoms, recommend an updated lumbar spine MRI   - Referral to PT  - Will call patient with results and next steps. Patient is hoping to try an injection pending MRI results.     Advised patient to call our clinic with any questions or concerns. Discussed red flag symptoms and advised to seek medical attention if these develop. Patient voiced understanding and agreement.      Griselda Velez [facility]  [address] [place] [address]  Tel [phone]  Pager [phone]

## 2023-06-20 ENCOUNTER — DOCUMENTATION ONLY (OUTPATIENT)
Dept: ANTICOAGULATION | Facility: CLINIC | Age: 78
End: 2023-06-20
Payer: COMMERCIAL

## 2023-06-20 DIAGNOSIS — Z79.01 LONG TERM CURRENT USE OF ANTICOAGULANT THERAPY: Primary | ICD-10-CM

## 2023-06-20 DIAGNOSIS — I48.20 CHRONIC ATRIAL FIBRILLATION (H): ICD-10-CM

## 2023-06-20 DIAGNOSIS — Z95.2 S/P MITRAL VALVE REPLACEMENT: ICD-10-CM

## 2023-06-20 DIAGNOSIS — Z95.2 HEART VALVE REPLACED: ICD-10-CM

## 2023-06-20 LAB — INR HOME MONITORING: 3.1 (ref 2.5–3.5)

## 2023-06-20 NOTE — PROGRESS NOTES
ANTICOAGULATION  MANAGEMENT-Home Monitor Managed by Exception    Feng Wynne 78 year old male is on warfarin with therapeutic INR result. (Goal INR 2.5-3.5)    Recent labs: (last 7 days)     06/20/23  0000   INR 3.1         Previous INR was Therapeutic    Medication, diet, health changes since last INR:chart reviewed; none identified    Contacted within the last 12 weeks by phone on 05/12/2023      COLTON Toney was NOT contacted regarding therapeutic result today per home monitoring policy manage by exception agreement.   Current warfarin dose is to be continued:     Summary  As of 6/20/2023    Full warfarin instructions:  7.5 mg every Tue, Sat; 5 mg all other days   Next INR check:  7/5/2023           ?   Ami Robertson RN  Anticoagulation Clinic  6/20/2023    _______________________________________________________________________     Anticoagulation Episode Summary     Current INR goal:  2.5-3.5   TTR:  85.9 % (1 y)   Target end date:  Indefinite   Send INR reminders to:  [facility]    Indications    Long-term (current) use of anticoagulants [Z79.01] [Z79.01]  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] [Z95.2]  S/P mitral valve replacement [Z95.2]  Chronic atrial fibrillation (H) [I48.20]           Comments:  [employer] [facility] MONITORING Patient, okay  to manage by exception on 7/8/20         Anticoagulation Care Providers     Provider Role Specialty Phone number    Enrique Walker MD Referring Internal Medicine     Paula Lou MD Referring Internal Medicine [phone]    Leena Jeffery DO Referring Internal Medicine [phone]

## 2023-06-28 ENCOUNTER — HOSPITAL ENCOUNTER (OUTPATIENT)
Dept: CARDIOLOGY | Facility: CLINIC | Age: 78
Discharge: HOME | End: 2023-06-28
Attending: INTERNAL MEDICINE | Admitting: INTERNAL MEDICINE
Payer: COMMERCIAL

## 2023-06-28 DIAGNOSIS — Z91.89 HIGH RISK OF CARDIAC EVENT: ICD-10-CM

## 2023-06-28 DIAGNOSIS — E78.5 HYPERLIPIDEMIA LDL GOAL <70: ICD-10-CM

## 2023-06-28 PROCEDURE — 75571 CT HRT W/O DYE W/CA TEST: CPT | Mod: 26 | Performed by: INTERNAL MEDICINE

## 2023-06-28 PROCEDURE — 75571 CT HRT W/O DYE W/CA TEST: CPT

## 2023-06-29 ENCOUNTER — TELEPHONE (OUTPATIENT)
Dept: OTHER | Facility: CLINIC | Age: 78
End: 2023-06-29
Payer: COMMERCIAL

## 2023-06-29 NOTE — TELEPHONE ENCOUNTER
[facility]    Who is the name of the provider?:  Self referral   What is the location you see this provider at/preferred location?: [place]  Person calling / Facility: [facility]  Phone number:  [phone]  Nurse call back needed:  ?     Reason for call:  Patient describes leg swelling concerns, leg sores/ulcers. No history of diabetes.  Wanting to be evaluated by vascular.     Pharmacy location:  n/a  Outside Imaging: n/a   Can we leave a detailed message on this number?  YES

## 2023-06-30 NOTE — TELEPHONE ENCOUNTER
Future Appointments   Date Time Provider Department Center   7/18/2023 10:10 AM Dragan Stubbs MD [facility]

## 2023-06-30 NOTE — TELEPHONE ENCOUNTER
Patient needs to be scheduled with any vascular medicine provider at next available.    Appt note: Self referral for leg swelling and pain.    Snow MCMAHON, ONOFRE    [facility]  Vascular [facility]  Office: [phone]  Fax: [phone]         2 weeks

## 2023-07-02 DIAGNOSIS — I48.20 CHRONIC ATRIAL FIBRILLATION (H): ICD-10-CM

## 2023-07-03 RX ORDER — ATENOLOL 25 MG/1
TABLET ORAL
Qty: 180 TABLET | Refills: 2 | Status: SHIPPED | OUTPATIENT
Start: 2023-07-03 | End: 2023-09-13

## 2023-07-04 LAB — INR HOME MONITORING: 2.8 (ref 2.5–3.5)

## 2023-07-05 ENCOUNTER — DOCUMENTATION ONLY (OUTPATIENT)
Dept: ANTICOAGULATION | Facility: CLINIC | Age: 78
End: 2023-07-05
Payer: COMMERCIAL

## 2023-07-05 DIAGNOSIS — Z95.2 S/P MITRAL VALVE REPLACEMENT: ICD-10-CM

## 2023-07-05 DIAGNOSIS — I48.20 CHRONIC ATRIAL FIBRILLATION (H): ICD-10-CM

## 2023-07-05 DIAGNOSIS — Z95.2 HEART VALVE REPLACED: ICD-10-CM

## 2023-07-05 DIAGNOSIS — Z79.01 LONG TERM CURRENT USE OF ANTICOAGULANT THERAPY: Primary | ICD-10-CM

## 2023-07-05 NOTE — PROGRESS NOTES
ANTICOAGULATION  MANAGEMENT-Home Monitor Managed by Exception    Feng Wynne 78 year old male is on warfarin with therapeutic INR result. (Goal INR 2.5-3.5)    Recent labs: (last 7 days)     07/04/23  0000   INR 2.8         Previous INR was Therapeutic    Medication, diet, health changes since last INR:chart reviewed; none identified    Contacted within the last 12 weeks by phone on 5/12/2023      COLTON Toney was NOT contacted regarding therapeutic result today per home monitoring policy manage by exception agreement.   Current warfarin dose is to be continued:     Summary  As of 7/5/2023    Full warfarin instructions:  7.5 mg every Tue, Sat; 5 mg all other days   Next INR check:  7/18/2023           ?   Mee Marshall RN  Anticoagulation Clinic  7/5/2023    _______________________________________________________________________     Anticoagulation Episode Summary     Current INR goal:  2.5-3.5   TTR:  87.2 % (1 y)   Target end date:  Indefinite   Send INR reminders to:  [facility]    Indications    Long-term (current) use of anticoagulants [Z79.01] [Z79.01]  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] [Z95.2]  S/P mitral valve replacement [Z95.2]  Chronic atrial fibrillation (H) [I48.20]           Comments:  [employer] [facility] MONITORING Patient, okay  to manage by exception on 7/8/20         Anticoagulation Care Providers     Provider Role Specialty Phone number    Enrique Walker MD Referring Internal Medicine     Paula Lou MD Referring Internal Medicine [phone]    Leena Jeffery DO Referring Internal Medicine [phone]

## 2023-07-06 ENCOUNTER — TELEPHONE (OUTPATIENT)
Dept: FAMILY MEDICINE | Facility: CLINIC | Age: 78
End: 2023-07-06
Payer: COMMERCIAL

## 2023-07-06 NOTE — TELEPHONE ENCOUNTER
Mailed result letter to patient for recent CT scan    Irena ARCOS, Triage RN  [facility]

## 2023-07-06 NOTE — LETTER
July 6, 2023      Feng Wynne  [address]   [facility] [address]        Dear Sherrell,    We are writing to inform you of your test results.    Attached is your recent CT     If you have any questions or concerns, please call the clinic at the number listed above.       Sincerely,

## 2023-07-13 ENCOUNTER — HOSPITAL ENCOUNTER (OUTPATIENT)
Dept: MRI IMAGING | Facility: CLINIC | Age: 78
Discharge: HOME | End: 2023-07-13
Attending: NURSE PRACTITIONER | Admitting: NURSE PRACTITIONER
Payer: COMMERCIAL

## 2023-07-13 DIAGNOSIS — M54.16 LUMBAR RADICULOPATHY: ICD-10-CM

## 2023-07-13 PROCEDURE — 72148 MRI LUMBAR SPINE W/O DYE: CPT

## 2023-07-17 ENCOUNTER — TRANSFERRED RECORDS (OUTPATIENT)
Dept: HEALTH INFORMATION MANAGEMENT | Facility: CLINIC | Age: 78
End: 2023-07-17
Payer: COMMERCIAL

## 2023-07-18 ENCOUNTER — DOCUMENTATION ONLY (OUTPATIENT)
Dept: ANTICOAGULATION | Facility: CLINIC | Age: 78
End: 2023-07-18
Payer: COMMERCIAL

## 2023-07-18 ENCOUNTER — TELEPHONE (OUTPATIENT)
Dept: NEUROSURGERY | Facility: CLINIC | Age: 78
End: 2023-07-18
Payer: COMMERCIAL

## 2023-07-18 DIAGNOSIS — Z95.2 HEART VALVE REPLACED: ICD-10-CM

## 2023-07-18 DIAGNOSIS — M54.16 LUMBAR RADICULOPATHY: Primary | ICD-10-CM

## 2023-07-18 DIAGNOSIS — Z95.2 S/P MITRAL VALVE REPLACEMENT: ICD-10-CM

## 2023-07-18 DIAGNOSIS — I48.20 CHRONIC ATRIAL FIBRILLATION (H): ICD-10-CM

## 2023-07-18 DIAGNOSIS — Z79.01 LONG TERM CURRENT USE OF ANTICOAGULANT THERAPY: Primary | ICD-10-CM

## 2023-07-18 LAB — INR HOME MONITORING: 3.1 (ref 2.5–3.5)

## 2023-07-18 NOTE — TELEPHONE ENCOUNTER
Called patient to review imaging results as stated below.     MRI OF THE LUMBAR SPINE WITHOUT CONTRAST 7/13/2023 10:38 AM   IMPRESSION:  1. Diffuse degenerative change of the lumbar spine as detailed above.  2. No high-grade spinal canal stenosis of the lumbar spine.  3. Moderate degenerative neural foraminal stenosis bilaterally at  T12-L1, on the left at L1-L2, and bilaterally at L5-S1.     Plan:   - Referral to [facility] for right L5-S1 SETH   - Patient plans to start PT and massage therapy as well   - Advised patient to call our clinic if symptoms persist, change, or worsen. Patient voiced understanding and agreement with this plan.       Griselda Velez, CNP  [facility]  [address] [address]  Tel [phone]  Pager [phone]

## 2023-07-18 NOTE — PROGRESS NOTES
ANTICOAGULATION  MANAGEMENT-Home Monitor Managed by Exception    Feng MADIE Wynne 78 year old male is on warfarin with therapeutic INR result. (Goal INR 2.5-3.5)    Recent labs: (last 7 days)     07/18/23  0000   INR 3.1         Previous INR was Therapeutic    Medication, diet, health changes since last INR:chart reviewed; none identified    Contacted within the last 12 weeks by phone on 5/12/23      COLTON     Feng was NOT contacted regarding therapeutic result today per home monitoring policy manage by exception agreement.   Current warfarin dose is to be continued:     Summary  As of 7/18/2023    Full warfarin instructions:  7.5 mg every Tue, Sat; 5 mg all other days   Next INR check:  8/1/2023           ?   Love Elliott RN  Anticoagulation Clinic  7/18/2023    _______________________________________________________________________     Anticoagulation Episode Summary     Current INR goal:  2.5-3.5   TTR:  87.3 % (1 y)   Target end date:  Indefinite   Send INR reminders to:  [facility] HOME MONITORING    Indications    Long-term (current) use of anticoagulants [Z79.01] [Z79.01]  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] [Z95.2]  S/P mitral valve replacement [Z95.2]  Chronic atrial fibrillation (H) [I48.20]           Comments:  [facility] HOME MONITORING Patient, okay  to manage by exception on 7/8/20         Anticoagulation Care Providers     Provider Role Specialty Phone number    Enrique Walker MD Referring Internal Medicine     Paula Lou MD Referring Internal Medicine [phone]    Leena Jeffery DO Referring Internal Medicine [phone]

## 2023-07-19 ENCOUNTER — TELEPHONE (OUTPATIENT)
Dept: ANTICOAGULATION | Facility: CLINIC | Age: 78
End: 2023-07-19

## 2023-07-19 ENCOUNTER — TELEPHONE (OUTPATIENT)
Dept: PALLIATIVE MEDICINE | Facility: CLINIC | Age: 78
End: 2023-07-19
Payer: COMMERCIAL

## 2023-07-19 DIAGNOSIS — Z95.2 S/P MITRAL VALVE REPLACEMENT: Primary | ICD-10-CM

## 2023-07-19 NOTE — TELEPHONE ENCOUNTER
Procedure plan 07/19/2023 TE sent for PCP review    Rey ClintonD BCACP  Anticoagulation Clinical Pharmacist

## 2023-07-19 NOTE — TELEPHONE ENCOUNTER
Screening Questions for Radiology Injections:    Injection to be done at which interventional clinic site? [facility]    Procedure ordered by     Procedure ordered? right L5-S1 SETH    Transforaminal Cervical SETH - Send to [facility] ([facility]) - No  Community Site providers perform this procedure    What insurance would patient like us to bill for this procedure? BC & Medicare     IF SCHEDULING IN [place] [facility] OR [facility] PLEASE SCHEDULE AT LEAST 7-10 BUSINESS DAYS OUT SO A PA CAN BE OBTAINED    Worker's comp or MVA (motor vehicle accident) -Any injection DO NOT SCHEDULE and route to Ariel Marcos.      [employer] insurance - For SI joint injections, DO NOT SCHEDULE and route to Gayle Kb.       ALL BCBS, [employer] and [employer] CIGNA - DO NOT SCHEDULE and route to Gayle Quiles    MEDICA- facet joint injections, route to Gayle Quiles    Is patient scheduled at [facility]?    If YES, route every encounter to [facility] CARE NAVIGATION POOL [[phone]]    Is an  needed? No     Patient has a  home? (Review Grid) YES: Informed     Any chance of pregnancy? Not Applicable   If YES, do NOT schedule and route to RN pool  - Dr. Cason route to Marie Asif and PM&R Nurse  [[id number]]      Is patient actively being treated for cancer or immunocompromised? No  If YES, do NOT schedule and route to RN pool/ Dr. Cason's Team    Does the patient have a bleeding or clotting disorder? No     If YES, okay to schedule AND route to RN nurse pool/ Dr. Cason's Team     (For any patients with platelet count <100, RN must forward to provider)    Is patient taking any Blood Thinners OR Antiplatelet medication?  Yes - Coumadin    If hold needed, do NOT schedule, route to RN pool/ Dr. Cason's Team    Examples:   o Blood Thinners: (Coumadin, Warfarin, Jantoven, Pradaxa, Xarelto, Eliquis, Edoxaban, Enoxaparin, Lovenox, Heparin, Arixtra, Fondaparinux or Fragmin)  o Antiplatelet Medications: (Plavix,  Brilinta or Effient)     Is patient taking any aspirin products (includes Excedrin and Fiorinal)? No     If more than 325mg/day, OK to schedule; Instruct Pt to decrease to less than 325 mg for 7 days AND route to RN pool/ Dr. Cason's Team     For CERVICAL procedures, hold all aspirin products for 6 days.     Tell Pt that if aspirin product is not held for 6 days, the procedure WILL BE cancelled.     Any allergies to contrast dye, iodine, shellfish, or numbing and steroid medications? No    If YES, schedule and add allergy information to appointment notes AND route to the RN pool/ Dr. Cason's Team    If SETH and Contrast Dye / Iodine Allergy? DO NOT SCHEDULE, route to RN pool/ Dr. Cason's Team    Allergies: Amiodarone, Latex, Pcn [penicillins], Sulfites, and Zetia [ezetimibe]     Does patient have an active infection or treated for one within the past week? No    Is patient currently taking any antibiotics or steroid medications?  No     For patients on chronic, preventative, or prophylactic antibiotics, procedures may be scheduled.     For patients on antibiotics for active or recent infection, schedule 4 days after completed.    For patients on steroid medications, schedule 4 days after completed.     Has the patient had a flu shot or any other vaccinations within the past 7 days? No  If yes, explain that for the vaccine to work best they need to:       wait 1 week before and 1 week after getting any Vaccine    wait 1 week before and 2 weeks after getting Covid Vaccine #2 or BOOSTER    If patient has concerns about the timing, send to RN pool/ Dr. Cason's Team    Does patient have an MRI/CT?  YES: 2023 Include Date and Check Procedure Scheduling Grid to see if required.    Was the MRI/CT done within the last 3 years?  Yes     If no route to RN Pool/ Dr. Cason's Team    If yes, where was the MRI/CT done? [facility]      Refer to PACS Transmissions list for approved external locations and route to RN Pool High  Priority/ Dr. Cason's Team    If MRI was not done at approved external location do NOT schedule and route to RN pool/ Dr. Cason's Team      If patient has an imaging disc, the injection MAY be scheduled but patient must bring disc to appt or appt will be cancelled.    Is patient able to transfer to a procedure table with minimal or no assistance? Yes     If no, do NOT schedule and route to RN Pool/ Dr. Cason's Team    Procedure Specific Instructions:    If celiac plexus block, informed patient NPO for 6 hours and that it is okay to take medications with sips of water, especially blood pressure medications Not Applicable         If this is for a cervical procedure, informed patient that aspirin needs to be held for 6 days.   Not Applicable      Sedation, If Sedation is ordered for any procedure, patient must be NPO for 6 hours prior to procedure Not Applicable      If IV needed:    Do not schedule procedures requiring IV placement in the first appointment of the day or first appointment after lunch. Do NOT schedule at 0745, 0815 or 1245.     Instructed patient to arrive 30 minutes early for IV start if required. (Check Procedure Scheduling Grid)  Not Applicable    Reminders:    If you are started on any steroids or antibiotics between now and your appointment, you must contact us because the procedure may need to be cancelled.  Yes      As a reminder, receiving steroids can decrease your body's ability to fight infection.   Would you still like to move forward with scheduling the injection?  Yes      IV Sedation is not provided for procedures. If oral anti-anxiety medication is needed, the patient should request this from their referring provider.      Instruct patient to arrive as directed prior to the scheduled appointment time:  If IV needed 30 minutes before appointment time       For patients 85 or older we recommend having an adult stay w/ them for the remainder of the day.       If the patient is Diabetic,  remind them to bring their glucometer.      Does the patient have any questions?  NO  Renée Solis  [facility] [place]

## 2023-07-19 NOTE — TELEPHONE ENCOUNTER
Patient is requesting to schedule an injection with the [facility].     This would require the patient to hold:                 Coumadin (Warfarin)         Hold 5 days prior to procedure.  Check INR prior to procedure.  Ok to resume night of procedure, unless directed otherwise by provider or anticoagulation clinic.      We are requesting your approval to hold the medication for this time frame.    Please keep call open and route back to the PAIN NURSE [[id number]] pool, Pt will be scheduled by Pain clinic after hold approved.    PROVIDER REMINDER:  For Coumadin, please also route to Pt's INR clinic     If hold approved, we will contact the patient for scheduling.    Thank you.    Routed to Dr Lou and Anticoagulation Clinic - Home Monitoring      Noreen GUTIÉRREZ RN Care Coordinator  [facility]

## 2023-07-20 RX ORDER — ENOXAPARIN SODIUM 150 MG/ML
0.75 INJECTION SUBCUTANEOUS EVERY 12 HOURS
Qty: 12.8 ML | Refills: 1 | Status: CANCELLED | OUTPATIENT
Start: 2023-07-20

## 2023-07-20 NOTE — TELEPHONE ENCOUNTER
Coumadin hold approved.  Requires lovenox bridge.  See the 7/19/23 [facility] telephone encounter for more information on this.     Nursing to schedule once insurance is approved.    Elizabeth RN-BSN  [facility]   [phone]

## 2023-07-21 NOTE — TELEPHONE ENCOUNTER
Authorization Number: [id number]  Review Date: 7/21/2023 2:05:16 PM  Expiration Date: 9/19/2023  Status: Your case has been Approved.    LISA TO SCHEDULE TFLESI WITH DR. RAMESH CAMEJO    [facility]

## 2023-07-21 NOTE — TELEPHONE ENCOUNTER
PA pending additional information - please specifyif this will be interlaminar or transforminal.        Gayle CAMEJO    [facility]

## 2023-07-24 NOTE — TELEPHONE ENCOUNTER
Called pt and left message to call back.    Note: PA is approved for Dr. Shay at the [facility] location.      ONOFRE Johnson-BSN  [facility]   [phone]

## 2023-08-03 ENCOUNTER — ANTICOAGULATION THERAPY VISIT (OUTPATIENT)
Dept: ANTICOAGULATION | Facility: CLINIC | Age: 78
End: 2023-08-03
Payer: COMMERCIAL

## 2023-08-03 DIAGNOSIS — Z79.01 LONG TERM CURRENT USE OF ANTICOAGULANT THERAPY: Primary | ICD-10-CM

## 2023-08-03 DIAGNOSIS — Z95.2 HEART VALVE REPLACED: ICD-10-CM

## 2023-08-03 DIAGNOSIS — Z95.2 S/P MITRAL VALVE REPLACEMENT: ICD-10-CM

## 2023-08-03 DIAGNOSIS — I48.20 CHRONIC ATRIAL FIBRILLATION (H): ICD-10-CM

## 2023-08-03 LAB — INR HOME MONITORING: 2.4 (ref 2.5–3.5)

## 2023-08-03 NOTE — PROGRESS NOTES
ANTICOAGULATION MANAGEMENT     Feng RAMACHANDRAN Sherrell 78 year old male is on warfarin with subtherapeutic INR result. (Goal INR 2.5-3.5)    Recent labs: (last 7 days)     08/03/23  0000   INR 2.4*       ASSESSMENT     Source(s): Chart Review and Patient/Caregiver Call     Warfarin doses taken: Warfarin taken as instructed  Diet: Increased greens/vitamin K in diet; plans to resume previous intake  Medication/supplement changes: None noted  New illness, injury, or hospitalization: No  Signs or symptoms of bleeding or clotting: No  Previous result: Therapeutic last 2(+) visits  Additional findings: None       PLAN     Recommended plan for temporary change(s) affecting INR     Dosing Instructions: booster dose then continue your current warfarin dose with next INR in 2 weeks       Summary  As of 8/3/2023      Full warfarin instructions:  8/3: 7.5 mg; Otherwise 7.5 mg every Tue, Sat; 5 mg all other days   Next INR check:  8/17/2023               Telephone call with Feng who verbalizes understanding and agrees to plan    Patient to recheck with home meter    Education provided:   Please call back if any changes to your diet, medications or how you've been taking warfarin    Plan made per [facility] anticoagulation protocol    Natali Mondragon RN  Anticoagulation Clinic  8/3/2023    _______________________________________________________________________     Anticoagulation Episode Summary       Current INR goal:  2.5-3.5   TTR:  86.7 % (1 y)   Target end date:  Indefinite   Send INR reminders to:  ANT[facility]AG HOME MONITORING    Indications    Long-term (current) use of anticoagulants [Z79.01] [Z79.01]  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

## 2023-08-06 DIAGNOSIS — I10 ESSENTIAL HYPERTENSION: ICD-10-CM

## 2023-08-07 RX ORDER — LISINOPRIL 40 MG/1
40 TABLET ORAL DAILY
Qty: 90 TABLET | Refills: 1 | Status: SHIPPED | OUTPATIENT
Start: 2023-08-07 | End: 2023-09-13

## 2023-08-16 ENCOUNTER — ANTICOAGULATION THERAPY VISIT (OUTPATIENT)
Dept: ANTICOAGULATION | Facility: CLINIC | Age: 78
End: 2023-08-16
Payer: COMMERCIAL

## 2023-08-16 DIAGNOSIS — Z95.2 S/P MITRAL VALVE REPLACEMENT: ICD-10-CM

## 2023-08-16 DIAGNOSIS — Z79.01 LONG TERM CURRENT USE OF ANTICOAGULANT THERAPY: Primary | ICD-10-CM

## 2023-08-16 DIAGNOSIS — Z95.2 HEART VALVE REPLACED: ICD-10-CM

## 2023-08-16 DIAGNOSIS — I48.20 CHRONIC ATRIAL FIBRILLATION (H): ICD-10-CM

## 2023-08-16 LAB — INR HOME MONITORING: 2.2 (ref 2.5–3.5)

## 2023-08-16 NOTE — TELEPHONE ENCOUNTER
"Spoke with patient who states he has \"cancelled\" injection and does not wish to go forward at this time but will contact when he does wish to proceed.    Magnolia Mckeon RN  [facility]  [phone]    "

## 2023-08-16 NOTE — PROGRESS NOTES
ANTICOAGULATION MANAGEMENT     Feng Wynne 78 year old male is on warfarin with subtherapeutic INR result. (Goal INR 2.5-3.5)    Recent labs: (last 7 days)     08/16/23  0000   INR 2.2*       ASSESSMENT     Source(s): Chart Review and Patient/Caregiver Call     Warfarin doses taken: Warfarin taken as instructed  Diet: Increased greens/vitamin K in diet; ongoing change / eating more diary ( cheese )  Medication/supplement changes: None noted  New illness, injury, or hospitalization: No  Signs or symptoms of bleeding or clotting: No  Previous result: Subtherapeutic  Additional findings: None       PLAN     Recommended plan for ongoing change(s) affecting INR     Dosing Instructions: Increase your warfarin dose (12.5% change) with next INR in 1-2 weeks       Summary  As of 8/16/2023      Full warfarin instructions:  10 mg every Wed, Sat; 5 mg all other days   Next INR check:  8/30/2023               Telephone call with Feng who verbalizes understanding and agrees to plan and who agrees to plan and repeated back plan correctly    Patient to recheck with home meter    Education provided:   Dietary considerations: impact of vitamin K foods on INR and Impact of protein intake on INR     Plan made per [facility] anticoagulation protocol    Gaviota Rivera, RN  Anticoagulation Clinic  8/16/2023    _______________________________________________________________________     Anticoagulation Episode Summary       Current INR goal:  2.5-3.5   TTR:  83.1 % (1 y)   Target end date:  Indefinite   Send INR reminders to:  ANTICOAG HOME MONITORING    Indications    Long-term (current) use of anticoagulants [Z79.01] [Z79.01]  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

## 2023-08-21 NOTE — TELEPHONE ENCOUNTER
Patient opted to cancel plan for SETH.      Petty Inman, PharmD BCACP  Anticoagulation Clinical Pharmacist

## 2023-08-30 ENCOUNTER — DOCUMENTATION ONLY (OUTPATIENT)
Dept: ANTICOAGULATION | Facility: CLINIC | Age: 78
End: 2023-08-30
Payer: COMMERCIAL

## 2023-08-30 ENCOUNTER — ANTICOAGULATION THERAPY VISIT (OUTPATIENT)
Dept: ANTICOAGULATION | Facility: CLINIC | Age: 78
End: 2023-08-30
Payer: COMMERCIAL

## 2023-08-30 DIAGNOSIS — I48.21 PERMANENT ATRIAL FIBRILLATION (H): Primary | ICD-10-CM

## 2023-08-30 DIAGNOSIS — I48.20 CHRONIC ATRIAL FIBRILLATION (H): ICD-10-CM

## 2023-08-30 DIAGNOSIS — Z95.2 HEART VALVE REPLACED: ICD-10-CM

## 2023-08-30 DIAGNOSIS — Z79.01 LONG TERM CURRENT USE OF ANTICOAGULANT THERAPY: Primary | ICD-10-CM

## 2023-08-30 DIAGNOSIS — Z95.2 S/P MITRAL VALVE REPLACEMENT: ICD-10-CM

## 2023-08-30 LAB — INR HOME MONITORING: 2.5 (ref 2.5–3.5)

## 2023-08-30 NOTE — PROGRESS NOTES
ANTICOAGULATION CLINIC REFERRAL RENEWAL REQUEST       An annual renewal order is required for all patients referred to [facility] Anticoagulation [facility].?  Please review and sign the pended referral order for Feng Wynne.       ANTICOAGULATION SUMMARY      Warfarin indication(s)   Atrial Fibrillation and Mechanical MVR    Mechanical heart valve present?  Mechanical MVR       Current goal range   INR: 2.5-3.5     Goal appropriate for indication? Goal INR 2.5-3.5 standard for indication(s) above     Time in Therapeutic Range (TTR)  (Goal > 60%) 79.3%       Office visit with referring provider's group within last year no on 2/9/22 - PCP overdue        Irena Whitaker, RN  [facility] Anticoagulation Clinic

## 2023-08-30 NOTE — PROGRESS NOTES
ANTICOAGULATION MANAGEMENT     Feng RAMACHANDRAN Sherrell 78 year old male is on warfarin with therapeutic INR result. (Goal INR 2.5-3.5)    Recent labs: (last 7 days)     08/30/23  0000   INR 2.5       ASSESSMENT     Source(s): Chart Review and Patient/Caregiver Call     Warfarin doses taken: Warfarin taken as instructed  Diet: No new diet changes identified  Medication/supplement changes: None noted  New illness, injury, or hospitalization: No  Signs or symptoms of bleeding or clotting: No  Previous result: Subtherapeutic  Additional findings: OK to go back to [facility] - (~12 weeks: 11/22/23)       PLAN     Recommended plan for no diet, medication or health factor changes affecting INR     Dosing Instructions: Continue your current warfarin dose with next INR in 2 weeks       Summary  As of 8/30/2023      Full warfarin instructions:  10 mg every Wed, Sat; 5 mg all other days   Next INR check:  9/13/2023               Telephone call with Feng who verbalizes understanding and agrees to plan    Patient to recheck with home meter    Education provided:   Please call back if any changes to your diet, medications or how you've been taking warfarin    Plan made per [facility] anticoagulation protocol      Irena Whitaker, RN  Anticoagulation Clinic  8/30/2023    _______________________________________________________________________     Anticoagulation Episode Summary       Current INR goal:  2.5-3.5   TTR:  79.3 % (1 y)   Target end date:  Indefinite   Send INR reminders to:  ANTICOAG HOME MONITORING    Indications    Long-term (current) use of anticoagulants [Z79.01] [Z79.01]  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

## 2023-09-11 DIAGNOSIS — Z95.2 S/P MITRAL VALVE REPLACEMENT: ICD-10-CM

## 2023-09-11 DIAGNOSIS — I48.20 CHRONIC ATRIAL FIBRILLATION (H): Primary | ICD-10-CM

## 2023-09-13 ENCOUNTER — DOCUMENTATION ONLY (OUTPATIENT)
Dept: ANTICOAGULATION | Facility: CLINIC | Age: 78
End: 2023-09-13

## 2023-09-13 ENCOUNTER — OFFICE VISIT (OUTPATIENT)
Dept: CARDIOLOGY | Facility: CLINIC | Age: 78
End: 2023-09-13
Payer: COMMERCIAL

## 2023-09-13 VITALS
BODY MASS INDEX: 40.5 KG/M2 | HEART RATE: 56 BPM | HEIGHT: 72 IN | SYSTOLIC BLOOD PRESSURE: 138 MMHG | DIASTOLIC BLOOD PRESSURE: 81 MMHG | OXYGEN SATURATION: 100 % | WEIGHT: 299 LBS

## 2023-09-13 DIAGNOSIS — Z95.2 S/P MITRAL VALVE REPLACEMENT: Primary | ICD-10-CM

## 2023-09-13 DIAGNOSIS — I48.20 CHRONIC ATRIAL FIBRILLATION (H): ICD-10-CM

## 2023-09-13 DIAGNOSIS — I48.21 PERMANENT ATRIAL FIBRILLATION (H): ICD-10-CM

## 2023-09-13 DIAGNOSIS — Z79.01 LONG TERM CURRENT USE OF ANTICOAGULANT THERAPY: Primary | ICD-10-CM

## 2023-09-13 DIAGNOSIS — Z95.2 S/P MITRAL VALVE REPLACEMENT: ICD-10-CM

## 2023-09-13 DIAGNOSIS — Z95.2 HEART VALVE REPLACED: ICD-10-CM

## 2023-09-13 DIAGNOSIS — I10 ESSENTIAL HYPERTENSION: ICD-10-CM

## 2023-09-13 LAB — INR HOME MONITORING: 3.1 (ref 2.5–3.5)

## 2023-09-13 PROCEDURE — 99214 OFFICE O/P EST MOD 30 MIN: CPT | Performed by: INTERNAL MEDICINE

## 2023-09-13 RX ORDER — ATENOLOL 25 MG/1
25 TABLET ORAL 2 TIMES DAILY
Qty: 180 TABLET | Refills: 3 | Status: ON HOLD | OUTPATIENT
Start: 2023-09-13 | End: 2024-01-01

## 2023-09-13 RX ORDER — LISINOPRIL 40 MG/1
40 TABLET ORAL DAILY
Qty: 90 TABLET | Refills: 3 | Status: ON HOLD | OUTPATIENT
Start: 2023-09-13 | End: 2024-01-01

## 2023-09-13 NOTE — PROGRESS NOTES
HPI:     This is a 77 yo gentleman, Hx AF and severe MR, s/p MV replacement and MAZE procedure (September 2008) here for cardiology establishment. He had shortness of breath in 2008 and underwent TTE showing severe MR. KAYE demonstrated dilated LV, reduced LV function, pulmonary hypertension and dilated annulus. However he was told possible MR related to weight loss drug. He has had ongoing dilated LV on subsequent echocardiogram. Last echo was in 2021 which showed normal MV prosthetic function. He is on warfarin with adequate INR control and no bleeding. Is vegetarian and lost 100 lbs since his MVR. Also of note needs knee replacement in future for which he is anticipating bridge for OAC. He denies chest pain, SOB. Has mild LEg edema. He is s/p MAZE with no recurrence noted. Coronary angiogram in 2008 was normal, no CAD.     He retired from being a . No smoking. No heavy etoh. Here with his partner. Prior cabin in [facility]. Lives nearby here.     Echocardiogram 11/2022.     Status post mechanical mitral valve replacement with 31-mm St Raffi valve for  severe degenerative mitral valve regurgitation, 9/2/2008.  The mechanical mitral valve is well-seated. No abnormal regurgitation.  Satisfactory mean diastolic gradient of 3-5 mmHg (heart rate 40-50 bpm, atrial  fibrillation).  Normal left ventricular size and systolic function. Visually estimated LVEF  55-60%.  The right ventricle is not well visualized. Appears mildly dilated with mildly  decreased systolic function.  Severe biatrial enlargement in the context of atrial fibrillation.  Dilated inferior vena cava.  Technically difficult study.    He had a calcium score CT that showed some mild calcium in LAD distribution  but only 27th % for age/sex. He has no chest pain or sob. His lipid panel not on statin was essentially normal. He unfortunately had side effects from statins in the past and prefers not to be on one. Denies chest pain or sob. Leg edema  being treated with biotab pumps.        ASSESSMENT/PLAN:     1. Mitral regurgitation s/p St. Raffi MVR on coumadin:  -continue warfarin indefinitely, INR under control  -will need bridge for any surgery  -echocardiogram due      2. HFpEF: leg edema, LV dilated but improved on ACEI/beta blocker  -we considered cardiac MRI to evaluate cardiomyopathy. MR was noted to be secondary to dilated LV, and his LV was never formally evaluated beyond coronary angiogram. Unclear mechanism of cardiomyopathy at this time. S/p MAZE so not due to tachy mediated.      3. HTN: on atenolol and lisinopril      4. HLD: cannot tolerate zetia or statin  -recheck in one year    Asking about changing PCP offices to nearby, referral placed.      Follow up in one year, sooner if needed      Jeannine Gomez MD MSC  [facility]    PAST MEDICAL HISTORY  Past Medical History:   Diagnosis Date    Arthritis     Atrial fibrillation (H)     Coronary artery disease     Hypercholesteremia     Morbid obesity (H)     Unspecified essential hypertension        CURRENT MEDICATIONS  Current Outpatient Medications   Medication Sig Dispense Refill    atenolol (TENORMIN) 25 MG tablet Take 1 tablet (25 mg) by mouth 2 times daily 180 tablet 3    clindamycin (CLEOCIN) 150 MG capsule TAKE 4 CAPS BY MOUTH 1 HOUR PRIOR TO DENTAL APPOINTMENT      lisinopril (ZESTRIL) 40 MG tablet Take 1 tablet (40 mg) by mouth daily 90 tablet 3    Multiple Vitamins-Minerals (CENTRUM SILVER PO) Take 1 tablet by mouth daily.      Omega-3 Fatty Acids (FISH OIL CONCENTRATE PO) Take  by mouth daily.      order for DME Equipment being ordered: COMPRESSION STOCKINGS, 20-30 mmHg 1 each 1    warfarin ANTICOAGULANT (COUMADIN) 5 MG tablet Take 1.5 tablets (7.5 mg) every Tuesday & Saturday, take 1 tablet (5 mg) all other days, or as directed by INR clinic 96 tablet 1    ASPIRIN NOT PRESCRIBED, INTENTIONAL,  (Patient not taking: Reported on 9/13/2023)  0    diclofenac (VOLTAREN) 1 % topical  gel Place 4 g onto the skin 4 times daily as needed for moderate pain (knee pain) (Patient not taking: Reported on 6/19/2023) 100 g 3    ketoconazole (NIZORAL) 2 % external cream Apply topically 2 times daily (Patient not taking: Reported on 6/19/2023) 15 g 1    rosuvastatin (CRESTOR) 5 MG tablet 1 tablet every other day at bedtime, take with coenzyme Q10 100 mg 1 tablet once daily (Patient not taking: Reported on 6/19/2023) 90 tablet 0       PAST SURGICAL HISTORY:  Past Surgical History:   Procedure Laterality Date    MITRAL VALVE REPLACEMENT  8-    Mitral valve replacement with 31-mm St. Raffi mechanical valve.        ALLERGIES     Allergies   Allergen Reactions    Amiodarone Shortness Of Breath    Latex     Pcn [Penicillins]     Sulfites     Zetia [Ezetimibe] Muscle Pain (Myalgia)     Muscle weakness legs       FAMILY HISTORY  Family History   Problem Relation Age of Onset    Cerebrovascular Disease Father     Diabetes Paternal Grandmother     C.A.D. Brother         CABG X 3 at age 51           SOCIAL HISTORY  Social History     Socioeconomic History    Marital status: Single     Spouse name: Not on file    Number of children: 1    Years of education: Not on file    Highest education level: Not on file   Occupational History    Occupation: Self employed      Comment:     Tobacco Use    Smoking status: Former     Packs/day: 0.50     Years: 5.00     Pack years: 2.50     Types: Cigarettes    Smokeless tobacco: Never    Tobacco comments:     quit 20+ yrs ago   Substance and Sexual Activity    Alcohol use: Yes     Alcohol/week: 0.0 standard drinks of alcohol     Comment: 2 drinks/month    Drug use: No    Sexual activity: Yes     Partners: Male   Other Topics Concern    Parent/sibling w/ CABG, MI or angioplasty before 65F 55M? Yes   Social History Narrative    Not on file     Social Determinants of Health     Financial Resource Strain: Not on file   Food Insecurity: Not on file   Transportation Needs:  "Not on file   Physical Activity: Not on file   Stress: Not on file   Social Connections: Not on file   Intimate Partner Violence: Not on file   Housing Stability: Not on file       ROS:   Constitutional: No fever, chills, or sweats. No weight gain/loss   ENT: No visual disturbance, ear ache, epistaxis, sore throat  Allergies/Immunologic: Negative  Respiratory: No cough, hemoptysia  Cardiovascular: As per HPI  GI: No nausea, vomiting, hematemesis, melena, or hematochezia  : No urinary frequency, dysuria, or hematuria  Integument: Negative  Psychiatric: Negative  Neuro: Negative  Endocrinology: Negative   Musculoskeletal: Negative  Vascular: No walking impairment, claudication, ischemic rest pain or nonhealing wounds    EXAM:  /81   Pulse 56   Ht 1.816 m (5' 11.5\")   Wt 135.6 kg (299 lb)   SpO2 100%   BMI 41.12 kg/m    In general, the patient is a pleasant male in no apparent distress.    HEENT: NC/AT.  PERRLA.  EOMI.  Sclerae white, not injected.  Nares clear.  Pharynx without erythema or exudate.  Dentition intact.    Neck: No adenopathy.  No thyromegaly. Carotids +2/2 bilaterally without bruits.  No jugular venous distension.   Heart: RRR. Normal S1, S2 splits physiologically. No murmur, rub, click, or gallop. The PMI is in the 5th ICS in the midclavicular line. There is no heave.    Lungs: CTA.  No ronchi, wheezes, rales.  No dullness to percussion.   Abdomen: Soft, nontender, nondistended. No organomegaly. No AAA.  No bruits.   Extremities: No clubbing, cyanosis, or edema.  No wounds. 3+ edema  Vascular: No bruits are noted.    Labs:  LIPID RESULTS:  Lab Results   Component Value Date    CHOL 141 02/09/2022    CHOL 147 10/16/2020    HDL 46 02/09/2022    HDL 48 10/16/2020    LDL 78 02/09/2022    LDL 79 10/16/2020    TRIG 85 02/09/2022    TRIG 102 10/16/2020    CHOLHDLRATIO 3.4 11/04/2015    NHDL 95 02/09/2022    NHDL 99 10/16/2020       LIVER ENZYME RESULTS:  Lab Results   Component Value Date    AST " 17 02/09/2022    AST 19 10/16/2020    ALT 19 02/09/2022    ALT 21 10/16/2020       CBC RESULTS:  Lab Results   Component Value Date    WBC 4.4 02/09/2022    WBC 4.3 10/16/2020    RBC 3.77 (L) 02/09/2022    RBC 3.79 (L) 10/16/2020    HGB 12.3 (L) 02/09/2022    HGB 12.4 (L) 10/16/2020    HCT 38.7 (L) 02/09/2022    HCT 38.2 (L) 10/16/2020     (H) 02/09/2022     (H) 10/16/2020    MCH 32.6 02/09/2022    MCH 32.7 10/16/2020    MCHC 31.8 02/09/2022    MCHC 32.5 10/16/2020    RDW 13.6 02/09/2022    RDW 13.3 10/16/2020     (L) 02/09/2022     (L) 10/16/2020       BMP RESULTS:  Lab Results   Component Value Date     02/09/2022     10/16/2020    POTASSIUM 5.0 02/09/2022    POTASSIUM 4.6 10/16/2020    CHLORIDE 107 02/09/2022    CHLORIDE 109 10/16/2020    CO2 27 02/09/2022    CO2 23 10/16/2020    ANIONGAP 3 02/09/2022    ANIONGAP 7 10/16/2020    GLC 96 02/09/2022    GLC 93 10/16/2020    BUN 30 02/09/2022    BUN 24 10/16/2020    CR 0.90 02/09/2022    CR 0.85 10/16/2020    GFRESTIMATED 89 02/09/2022    GFRESTIMATED 85 10/16/2020    GFRESTBLACK >90 10/16/2020    JOSEPH 9.2 02/09/2022    JOSEPH 9.4 10/16/2020        A1C RESULTS:  No results found for: A1C

## 2023-09-13 NOTE — PATIENT INSTRUCTIONS
Echocardiogram at earliest convenience   OK to defer statin  Your calcium score showed your score in the 27th percentile (well below average) for age/sex   Continue other medications  Follow up one year with Dr. Gomez

## 2023-09-13 NOTE — PROGRESS NOTES
ANTICOAGULATION  MANAGEMENT-Home Monitor Managed by Exception    Feng Wynne 78 year old male is on warfarin with therapeutic INR result. (Goal INR 2.5-3.5)    Recent labs: (last 7 days)     09/13/23  0000   INR 3.1       Previous INR was Therapeutic  Medication, diet, health changes since last INR:chart reviewed; none identified  Contacted within the last 12 weeks by phone on 08/30/2023      COLTON Toney was NOT contacted regarding therapeutic result today per home monitoring policy manage by exception agreement.   Current warfarin dose is to be continued:     Summary  As of 9/13/2023      Full warfarin instructions:  10 mg every Wed, Sat; 5 mg all other days   Next INR check:  9/27/2023             ?   Mo Galvez RN  Anticoagulation Clinic  9/13/2023    _______________________________________________________________________     Anticoagulation Episode Summary       Current INR goal:  2.5-3.5   TTR:  79.3 % (1 y)   Target end date:  Indefinite   Send INR reminders to:  [facility]    Indications    Long-term (current) use of anticoagulants [Z79.01] [Z79.01]  Heart valve replaced [Z95.2] [Z95.2]  S/P mitral valve replacement [Z95.2]  Chronic atrial fibrillation (H) [I48.20]  Permanent atrial fibrillation (H) [I48.21]             Comments:  [facility]IT[facility] Patient, okay  to manage by exception on 7/8/20             Anticoagulation Care Providers       Provider Role Specialty Phone number    Enrique Walker MD Referring Internal Medicine     CarminePaula MD Referring Internal Medicine [phone]    Leena Jeffery DO Referring Internal Medicine [phone]    Jayme Deng MD Referring Internal Medicine [phone]

## 2023-09-13 NOTE — LETTER
9/13/2023    Paula Lou MD  [address]  [place] [address]    RE: Feng Wynne       Dear Colleague,     I had the pleasure of seeing Feng MADIE Wynne in the [facility].      HPI:     This is a 79 yo gentleman, Hx AF and severe MR, s/p MV replacement and MAZE procedure (September 2008) here for cardiology establishment. He had shortness of breath in 2008 and underwent TTE showing severe MR. KAYE demonstrated dilated LV, reduced LV function, pulmonary hypertension and dilated annulus. However he was told possible MR related to weight loss drug. He has had ongoing dilated LV on subsequent echocardiogram. Last echo was in 2021 which showed normal MV prosthetic function. He is on warfarin with adequate INR control and no bleeding. Is vegetarian and lost 100 lbs since his MVR. Also of note needs knee replacement in future for which he is anticipating bridge for OAC. He denies chest pain, SOB. Has mild LEg edema. He is s/p MAZE with no recurrence noted. Coronary angiogram in 2008 was normal, no CAD.     He retired from being a . No smoking. No heavy etoh. Here with his partner. Prior cabin in [employer]. Lives nearby here.     Echocardiogram 11/2022.     Status post mechanical mitral valve replacement with 31-mm St Raffi valve for  severe degenerative mitral valve regurgitation, 9/2/2008.  The mechanical mitral valve is well-seated. No abnormal regurgitation.  Satisfactory mean diastolic gradient of 3-5 mmHg (heart rate 40-50 bpm, atrial  fibrillation).  Normal left ventricular size and systolic function. Visually estimated LVEF  55-60%.  The right ventricle is not well visualized. Appears mildly dilated with mildly  decreased systolic function.  Severe biatrial enlargement in the context of atrial fibrillation.  Dilated inferior vena cava.  Technically difficult study.    He had a calcium score CT that showed some mild calcium in LAD distribution  but only 27th % for age/sex. He  has no chest pain or sob. His lipid panel not on statin was essentially normal. He unfortunately had side effects from statins in the past and prefers not to be on one. Denies chest pain or sob. Leg edema being treated with biotab pumps.        ASSESSMENT/PLAN:     1. Mitral regurgitation s/p St. Raffi MVR on coumadin:  -continue warfarin indefinitely, INR under control  -will need bridge for any surgery  -echocardiogram due      2. HFpEF: leg edema, LV dilated but improved on ACEI/beta blocker  -we considered cardiac MRI to evaluate cardiomyopathy. MR was noted to be secondary to dilated LV, and his LV was never formally evaluated beyond coronary angiogram. Unclear mechanism of cardiomyopathy at this time. S/p MAZE so not due to tachy mediated.      3. HTN: on atenolol and lisinopril      4. HLD: cannot tolerate zetia or statin  -recheck in one year    Asking about changing PCP offices to nearby, referral placed.      Follow up in one year, sooner if needed      Jeannine Gomez MD MSC  [facility]    PAST MEDICAL HISTORY  Past Medical History:   Diagnosis Date    Arthritis     Atrial fibrillation (H)     Coronary artery disease     Hypercholesteremia     Morbid obesity (H)     Unspecified essential hypertension        CURRENT MEDICATIONS  Current Outpatient Medications   Medication Sig Dispense Refill    atenolol (TENORMIN) 25 MG tablet Take 1 tablet (25 mg) by mouth 2 times daily 180 tablet 3    clindamycin (CLEOCIN) 150 MG capsule TAKE 4 CAPS BY MOUTH 1 HOUR PRIOR TO DENTAL APPOINTMENT      lisinopril (ZESTRIL) 40 MG tablet Take 1 tablet (40 mg) by mouth daily 90 tablet 3    Multiple Vitamins-Minerals (CENTRUM SILVER PO) Take 1 tablet by mouth daily.      Omega-3 Fatty Acids (FISH OIL CONCENTRATE PO) Take  by mouth daily.      order for DME Equipment being ordered: COMPRESSION STOCKINGS, 20-30 mmHg 1 each 1    warfarin ANTICOAGULANT (COUMADIN) 5 MG tablet Take 1.5 tablets (7.5 mg) every Tuesday &  Saturday, take 1 tablet (5 mg) all other days, or as directed by INR clinic 96 tablet 1    ASPIRIN NOT PRESCRIBED, INTENTIONAL,  (Patient not taking: Reported on 9/13/2023)  0    diclofenac (VOLTAREN) 1 % topical gel Place 4 g onto the skin 4 times daily as needed for moderate pain (knee pain) (Patient not taking: Reported on 6/19/2023) 100 g 3    ketoconazole (NIZORAL) 2 % external cream Apply topically 2 times daily (Patient not taking: Reported on 6/19/2023) 15 g 1    rosuvastatin (CRESTOR) 5 MG tablet 1 tablet every other day at bedtime, take with coenzyme Q10 100 mg 1 tablet once daily (Patient not taking: Reported on 6/19/2023) 90 tablet 0       PAST SURGICAL HISTORY:  Past Surgical History:   Procedure Laterality Date    MITRAL VALVE REPLACEMENT  8-    Mitral valve replacement with 31-mm St. Raffi mechanical valve.        ALLERGIES     Allergies   Allergen Reactions    Amiodarone Shortness Of Breath    Latex     Pcn [Penicillins]     Sulfites     Zetia [Ezetimibe] Muscle Pain (Myalgia)     Muscle weakness legs       FAMILY HISTORY  Family History   Problem Relation Age of Onset    Cerebrovascular Disease Father     Diabetes Paternal Grandmother     C.A.D. Brother         CABG X 3 at age 51           SOCIAL HISTORY  Social History     Socioeconomic History    Marital status: Single     Spouse name: Not on file    Number of children: 1    Years of education: Not on file    Highest education level: Not on file   Occupational History    Occupation: Self employed      Comment:     Tobacco Use    Smoking status: Former     Packs/day: 0.50     Years: 5.00     Pack years: 2.50     Types: Cigarettes    Smokeless tobacco: Never    Tobacco comments:     quit 20+ yrs ago   Substance and Sexual Activity    Alcohol use: Yes     Alcohol/week: 0.0 standard drinks of alcohol     Comment: 2 drinks/month    Drug use: No    Sexual activity: Yes     Partners: Male   Other Topics Concern    Parent/sibling w/  "CABG, MI or angioplasty before 65F 55M? Yes   Social History Narrative    Not on file     Social Determinants of Health     Financial Resource Strain: Not on file   Food Insecurity: Not on file   Transportation Needs: Not on file   Physical Activity: Not on file   Stress: Not on file   Social Connections: Not on file   Intimate Partner Violence: Not on file   Housing Stability: Not on file       ROS:   Constitutional: No fever, chills, or sweats. No weight gain/loss   ENT: No visual disturbance, ear ache, epistaxis, sore throat  Allergies/Immunologic: Negative  Respiratory: No cough, hemoptysia  Cardiovascular: As per HPI  GI: No nausea, vomiting, hematemesis, melena, or hematochezia  : No urinary frequency, dysuria, or hematuria  Integument: Negative  Psychiatric: Negative  Neuro: Negative  Endocrinology: Negative   Musculoskeletal: Negative  Vascular: No walking impairment, claudication, ischemic rest pain or nonhealing wounds    EXAM:  /81   Pulse 56   Ht 1.816 m (5' 11.5\")   Wt 135.6 kg (299 lb)   SpO2 100%   BMI 41.12 kg/m    In general, the patient is a pleasant male in no apparent distress.    HEENT: NC/AT.  PERRLA.  EOMI.  Sclerae white, not injected.  Nares clear.  Pharynx without erythema or exudate.  Dentition intact.    Neck: No adenopathy.  No thyromegaly. Carotids +2/2 bilaterally without bruits.  No jugular venous distension.   Heart: RRR. Normal S1, S2 splits physiologically. No murmur, rub, click, or gallop. The PMI is in the 5th ICS in the midclavicular line. There is no heave.    Lungs: CTA.  No ronchi, wheezes, rales.  No dullness to percussion.   Abdomen: Soft, nontender, nondistended. No organomegaly. No AAA.  No bruits.   Extremities: No clubbing, cyanosis, or edema.  No wounds. 3+ edema  Vascular: No bruits are noted.    Labs:  LIPID RESULTS:  Lab Results   Component Value Date    CHOL 141 02/09/2022    CHOL 147 10/16/2020    HDL 46 02/09/2022    HDL 48 10/16/2020    LDL 78 " 02/09/2022    LDL 79 10/16/2020    TRIG 85 02/09/2022    TRIG 102 10/16/2020    CHOLHDLRATIO 3.4 11/04/2015    NHDL 95 02/09/2022    NHDL 99 10/16/2020       LIVER ENZYME RESULTS:  Lab Results   Component Value Date    AST 17 02/09/2022    AST 19 10/16/2020    ALT 19 02/09/2022    ALT 21 10/16/2020       CBC RESULTS:  Lab Results   Component Value Date    WBC 4.4 02/09/2022    WBC 4.3 10/16/2020    RBC 3.77 (L) 02/09/2022    RBC 3.79 (L) 10/16/2020    HGB 12.3 (L) 02/09/2022    HGB 12.4 (L) 10/16/2020    HCT 38.7 (L) 02/09/2022    HCT 38.2 (L) 10/16/2020     (H) 02/09/2022     (H) 10/16/2020    MCH 32.6 02/09/2022    MCH 32.7 10/16/2020    MCHC 31.8 02/09/2022    MCHC 32.5 10/16/2020    RDW 13.6 02/09/2022    RDW 13.3 10/16/2020     (L) 02/09/2022     (L) 10/16/2020       BMP RESULTS:  Lab Results   Component Value Date     02/09/2022     10/16/2020    POTASSIUM 5.0 02/09/2022    POTASSIUM 4.6 10/16/2020    CHLORIDE 107 02/09/2022    CHLORIDE 109 10/16/2020    CO2 27 02/09/2022    CO2 23 10/16/2020    ANIONGAP 3 02/09/2022    ANIONGAP 7 10/16/2020    GLC 96 02/09/2022    GLC 93 10/16/2020    BUN 30 02/09/2022    BUN 24 10/16/2020    CR 0.90 02/09/2022    CR 0.85 10/16/2020    GFRESTIMATED 89 02/09/2022    GFRESTIMATED 85 10/16/2020    GFRESTBLACK >90 10/16/2020    JOSEPH 9.2 02/09/2022    JOSEPH 9.4 10/16/2020        A1C RESULTS:  No results found for: A1C        Thank you for allowing me to participate in the care of your patient.      Sincerely,     Jeannine Gomez MD     [facility]  cc:   No referring provider defined for this encounter.

## 2023-09-21 DIAGNOSIS — I48.20 CHRONIC ATRIAL FIBRILLATION (H): ICD-10-CM

## 2023-09-21 RX ORDER — WARFARIN SODIUM 5 MG/1
TABLET ORAL
Qty: 96 TABLET | Refills: 1 | Status: ON HOLD | OUTPATIENT
Start: 2023-09-21 | End: 2024-01-01

## 2023-09-29 ENCOUNTER — ANTICOAGULATION THERAPY VISIT (OUTPATIENT)
Dept: ANTICOAGULATION | Facility: CLINIC | Age: 78
End: 2023-09-29
Payer: COMMERCIAL

## 2023-09-29 DIAGNOSIS — Z95.2 HEART VALVE REPLACED: ICD-10-CM

## 2023-09-29 DIAGNOSIS — Z79.01 LONG TERM CURRENT USE OF ANTICOAGULANT THERAPY: Primary | ICD-10-CM

## 2023-09-29 DIAGNOSIS — I48.20 CHRONIC ATRIAL FIBRILLATION (H): ICD-10-CM

## 2023-09-29 DIAGNOSIS — I48.21 PERMANENT ATRIAL FIBRILLATION (H): ICD-10-CM

## 2023-09-29 DIAGNOSIS — Z95.2 S/P MITRAL VALVE REPLACEMENT: ICD-10-CM

## 2023-09-29 LAB
INR HOME MONITORING: 2.4 (ref 2.5–3.5)
INR HOME MONITORING: 2.4 (ref 2.5–3.5)

## 2023-09-29 NOTE — PROGRESS NOTES
ANTICOAGULATION MANAGEMENT     Feng Wynne 78 year old male is on warfarin with subtherapeutic INR result. (Goal INR 2.5-3.5)    Recent labs: (last 7 days)     09/29/23  0000   INR 2.4*  2.4*       ASSESSMENT     Source(s): Chart Review and Patient/Caregiver Call     Warfarin doses taken: Missed dose(s) may be affecting INR  Diet: No new diet changes identified  Medication/supplement changes: None noted  New illness, injury, or hospitalization: No  Signs or symptoms of bleeding or clotting: No  Previous result: Therapeutic last 2(+) visits  Additional findings: None       PLAN     Recommended plan for temporary change(s) affecting INR     Dosing Instructions: booster dose then continue your current warfarin dose with next INR in 2 weeks       Summary  As of 9/29/2023      Full warfarin instructions:  9/29: 7.5 mg; Otherwise 10 mg every Wed, Sat; 5 mg all other days   Next INR check:  10/13/2023               Telephone call with Feng who verbalizes understanding and agrees to plan    Patient to recheck with home meter    Education provided:   Contact [phone]  with any changes, questions or concerns.     Plan made per [facility] anticoagulation protocol    Florencia Da Silva RN  Anticoagulation Clinic  9/29/2023    _______________________________________________________________________     Anticoagulation Episode Summary       Current INR goal:  2.5-3.5   TTR:  78.6 % (1 y)   Target end date:  Indefinite   Send INR reminders to:  [facility]    Indications    Long-term (current) use of anticoagulants [Z79.01] [Z79.01]  Heart valve replaced [Z95.2] [Z95.2]  S/P mitral valve replacement [Z95.2]  Chronic atrial fibrillation (H) [I48.20]  Permanent atrial fibrillation (H) [I48.21]             Comments:  [facility]IT[facility] Patient, okay  to manage by exception on 7/8/20             Anticoagulation Care Providers       Provider Role Specialty Phone number    Enrique Walker MD Referring  Internal Medicine     Paula Lou MD Referring Internal Medicine [phone]    Leena Jeffery DO Referring Internal Medicine [phone]    Jayme Deng MD Referring Internal Medicine [phone]

## 2023-10-05 NOTE — TELEPHONE ENCOUNTER
Allergy reviewed/ verified - Prescriber aware; Therapy continued LIZA-PROCEDURAL ANTICOAGULATION  MANAGEMENT    ASSESSMENT     Warfarin interruption plan for SETH on date TBD  .    Indication for Anticoagulation: Atrial Fibrillation and Mechanical MVR    [id number] = 3 (Hypertension and Age >= 75)  31mm [facility] Raffi MVR placed 2008      Liza-Procedure Risk stratification for thromboembolism: high (2022 Chest guidelines)    MVR: 2020 AHA/ACC Management of Valvular Heart disease guidelines suggests bridging is reasonable on individual basis with risk of bleeding weighed against risks of clotting for mechanical MVR patients  AVR/MVR: 2022 Chest Perioperative Management guideline suggests no bridging for mechanical heart valves except select patients at high thromboembolic risk such as with an older-generation mechanical heart valve, MVR with one more more risk factors for thromboembolism, a recent (within 3 months) thromboembolic event, or with prior perioperative thromboembolism     RECOMMENDATION     Pre-Procedure:  Hold warfarin for 5 days, until after procedure   Enoxaparin (Lovenox) 105 mg subq Q 12 hrs (0.75 mg/kg Q 12 hrs for BMI >= 40 kg/m2 per [facility] P&T approved dose adjustment protocol)   Start enoxaparin: day 3 of warfarin hold  Last dose of enoxaparin prior to procedure: AM day prior to procedure  (~24 hours prior to procedure)    Post-Procedure:  Resume warfarin dose if okay with provider doing procedure on night of procedure, PM: 15mg  Resume enoxaparin (Lovenox) ~ 24 hrs post procedure when okay with provider doing procedure. Continue until INR >= 2.0  Recheck INR ~5 days after resuming warfarin   ?     Plan routed to referring provider for approval  ?   Petty Inman [facility]    SUBJECTIVE/OBJECTIVE     Feng Wynne, a 78 year old male    Goal INR Range: 2.5-3.5     Patient bridged in past: Yes: with surgery 2008      Wt Readings from Last 3 Encounters:   07/15/22 147.9 kg (326 lb)   02/09/22 145.2 kg (320 lb)   10/16/20 149.2 kg (329 lb)      Patient  "weight not recorded     Estimated body mass index is 44.83 kg/m  as calculated from the following:    Height as of 7/15/22: 1.816 m (5' 11.5\").    Weight as of 7/15/22: 147.9 kg (326 lb).    Lab Results   Component Value Date    INR 3.1 07/18/2023    INR 2.8 07/04/2023    INR 3.1 06/20/2023     Lab Results   Component Value Date    HGB 12.3 (L) 02/09/2022    HCT 38.7 (L) 02/09/2022     (L) 02/09/2022     Lab Results   Component Value Date    CR 0.90 02/09/2022    CR 0.85 10/16/2020    CR 0.85 03/14/2019     CrCl cannot be calculated (Patient's most recent lab result is older than the maximum 365 days allowed.).  "

## 2023-10-06 ENCOUNTER — VIRTUAL VISIT (OUTPATIENT)
Dept: FAMILY MEDICINE | Facility: CLINIC | Age: 78
End: 2023-10-06
Payer: COMMERCIAL

## 2023-10-06 DIAGNOSIS — I10 ESSENTIAL HYPERTENSION WITH GOAL BLOOD PRESSURE LESS THAN 140/90: Primary | ICD-10-CM

## 2023-10-06 DIAGNOSIS — Z95.2 S/P MITRAL VALVE REPLACEMENT: ICD-10-CM

## 2023-10-06 PROCEDURE — 99213 OFFICE O/P EST LOW 20 MIN: CPT | Mod: VID | Performed by: INTERNAL MEDICINE

## 2023-10-06 NOTE — PROGRESS NOTES
Feng is a 78 year old who is being evaluated via a billable video visit.      How would you like to obtain your AVS? MyChart  If the video visit is dropped, the invitation should be resent by: Chelsey   Will anyone else be joining your video visit? No        Subjective   Feng is a 78 year old, presenting for the following health issues:  Follow Up      HPI     Establishment of primary care   Feng Wynne has been following in the cardiology clinic and doing well.  He would like to schedule a physical next spring.      Review of Systems   Constitutional, HEENT, cardiovascular, pulmonary, GI, , musculoskeletal - following with pain/palliative for right sided radicular pain, neuro, skin, endocrine and psych systems are negative, except as otherwise noted.      Objective    Vitals - Patient Reported  Systolic (Patient Reported): 120  Diastolic (Patient Reported): 82  Pain Score: No Pain (0)      Vitals:  No vitals were obtained today due to virtual visit.    Physical Exam   GENERAL: Healthy, alert and no distress  EYES: Eyes grossly normal to inspection.  No discharge or erythema, or obvious scleral/conjunctival abnormalities.  RESP: No audible wheeze, cough, or visible cyanosis.  No visible retractions or increased work of breathing.    SKIN: Visible skin clear. No significant rash, abnormal pigmentation or lesions.  NEURO: Cranial nerves grossly intact.  Mentation and speech appropriate for age.  PSYCH: Mentation appears normal, affect normal/bright, judgement and insight intact, normal speech and appearance well-groomed.    (I10) Essential hypertension with goal blood pressure less than 140/90  (primary encounter diagnosis)  Comment: Recommend schedule follow up in the spring for physical  Plan:     (Z95.2) S/P mitral valve replacement  Comment: as above   Plan:            Video-Visit Details    Type of service:  Video Visit   Video Start Time: 10:45  AM  Video End Time:10:59 AM    Originating Location (pt.  Location): Home    Distant Location (provider location):  On-site  Platform used for Video Visit: Tom

## 2023-10-11 ENCOUNTER — ANTICOAGULATION THERAPY VISIT (OUTPATIENT)
Dept: ANTICOAGULATION | Facility: CLINIC | Age: 78
End: 2023-10-11
Payer: COMMERCIAL

## 2023-10-11 DIAGNOSIS — Z79.01 LONG TERM CURRENT USE OF ANTICOAGULANT THERAPY: Primary | ICD-10-CM

## 2023-10-11 DIAGNOSIS — I48.20 CHRONIC ATRIAL FIBRILLATION (H): ICD-10-CM

## 2023-10-11 DIAGNOSIS — I48.21 PERMANENT ATRIAL FIBRILLATION (H): ICD-10-CM

## 2023-10-11 DIAGNOSIS — Z95.2 S/P MITRAL VALVE REPLACEMENT: ICD-10-CM

## 2023-10-11 DIAGNOSIS — Z95.2 HEART VALVE REPLACED: ICD-10-CM

## 2023-10-11 LAB — INR HOME MONITORING: 3.2 (ref 2.5–3.5)

## 2023-10-11 NOTE — PROGRESS NOTES
ANTICOAGULATION MANAGEMENT     Feng RAMACHANDRAN Sherrell 78 year old male is on warfarin with therapeutic INR result. (Goal INR 2.5-3.5)    Recent labs: (last 7 days)     10/11/23  0000   INR 3.2       ASSESSMENT     Source(s): Chart Review and Patient/Caregiver Call     Warfarin doses taken: Warfarin taken as instructed  Diet: No new diet changes identified  Medication/supplement changes: None noted  New illness, injury, or hospitalization: No  Signs or symptoms of bleeding or clotting: No  Previous result: Subtherapeutic  Additional findings: None       PLAN     Recommended plan for no diet, medication or health factor changes affecting INR     Dosing Instructions: Continue your current warfarin dose with next INR in 2 weeks       Summary  As of 10/11/2023      Full warfarin instructions:  10 mg every Wed, Sat; 5 mg all other days   Next INR check:  10/25/2023               Telephone call with Feng who verbalizes understanding and agrees to plan    Patient to recheck with home meter    Education provided:   Please call back if any changes to your diet, medications or how you've been taking warfarin    Plan made per [facility] anticoagulation protocol    Natali Mondragon RN  Anticoagulation Clinic  10/11/2023    _______________________________________________________________________     Anticoagulation Episode Summary       Current INR goal:  2.5-3.5   TTR:  78.2% (1 y)   Target end date:  Indefinite   Send INR reminders to:  [facility]    Indications    Long-term (current) use of anticoagulants [Z79.01] [Z79.01]  Heart valve replaced [Z95.2] [Z95.2]  S/P mitral valve replacement [Z95.2]  Chronic atrial fibrillation (H) [I48.20]  Permanent atrial fibrillation (H) [I48.21]             Comments:  [facility] MONITORING Patient, okay  to manage by exception on 7/8/20             Anticoagulation Care Providers       Provider Role Specialty Phone number    Enrique Walker MD Referring Internal Medicine      Paula Lou MD Referring Internal Medicine [phone]    Leena Jeffery MD Referring Internal Medicine [phone]    Jayme Deng MD Referring Internal Medicine [phone]

## 2023-10-24 ENCOUNTER — ANTICOAGULATION THERAPY VISIT (OUTPATIENT)
Dept: ANTICOAGULATION | Facility: CLINIC | Age: 78
End: 2023-10-24
Payer: COMMERCIAL

## 2023-10-24 DIAGNOSIS — Z79.01 LONG TERM CURRENT USE OF ANTICOAGULANT THERAPY: Primary | ICD-10-CM

## 2023-10-24 DIAGNOSIS — Z95.2 HEART VALVE REPLACED: ICD-10-CM

## 2023-10-24 DIAGNOSIS — Z95.2 S/P MITRAL VALVE REPLACEMENT: ICD-10-CM

## 2023-10-24 DIAGNOSIS — I48.20 CHRONIC ATRIAL FIBRILLATION (H): ICD-10-CM

## 2023-10-24 DIAGNOSIS — I48.21 PERMANENT ATRIAL FIBRILLATION (H): ICD-10-CM

## 2023-10-24 LAB — INR HOME MONITORING: 3.6 (ref 2.5–3.5)

## 2023-10-24 NOTE — PROGRESS NOTES
ANTICOAGULATION MANAGEMENT     Feng RAMACHANDRAN Sherrell 78 year old male is on warfarin with supratherapeutic INR result. (Goal INR 2.5-3.5)    Recent labs: (last 7 days)     10/24/23  0000   INR 3.6*       ASSESSMENT     Source(s): Chart Review and Patient/Caregiver Call     Warfarin doses taken: Warfarin taken as instructed  Diet: Decreased greens/vitamin K in diet; plans to resume previous intake  Medication/supplement changes: None noted  New illness, injury, or hospitalization: No  Signs or symptoms of bleeding or clotting: No  Previous result: Therapeutic last visit; previously outside of goal range  Additional findings: None       PLAN     Recommended plan for temporary change(s) affecting INR     Dosing Instructions: Continue your current warfarin dose with next INR in 2 weeks       Summary  As of 10/24/2023      Full warfarin instructions:  10 mg every Wed, Sat; 5 mg all other days   Next INR check:  11/7/2023               Telephone call with Feng who agrees to plan and repeated back plan correctly    Patient to recheck with home meter    Education provided:   Please call back if any changes to your diet, medications or how you've been taking warfarin    Plan made per [facility] anticoagulation protocol    Love Elliott, RN  Anticoagulation Clinic  10/24/2023    _______________________________________________________________________     Anticoagulation Episode Summary       Current INR goal:  2.5-3.5   TTR:  78.5% (1 y)   Target end date:  Indefinite   Send INR reminders to:  [facility]    Indications    Long-term (current) use of anticoagulants [Z79.01] [Z79.01]  Heart valve replaced [Z95.2] [Z95.2]  S/P mitral valve replacement [Z95.2]  Chronic atrial fibrillation (H) [I48.20]  Permanent atrial fibrillation (H) [I48.21]             Comments:  [facility] MONITORING Patient, okay  to manage by exception on 7/8/20             Anticoagulation Care Providers       Provider Role Specialty Phone number     Enrique Walker MD Referring Internal Medicine     CarminePaula MD Referring Internal Medicine [phone]    Leena Jeffery MD Referring Internal Medicine [phone]    Jayme Deng MD Referring Internal Medicine [phone]

## 2023-10-26 ENCOUNTER — TELEPHONE (OUTPATIENT)
Dept: FAMILY MEDICINE | Facility: CLINIC | Age: 78
End: 2023-10-26

## 2023-10-26 DIAGNOSIS — G47.00 INSOMNIA, UNSPECIFIED TYPE: Primary | ICD-10-CM

## 2023-10-26 RX ORDER — MIRTAZAPINE 7.5 MG/1
7.5 TABLET, FILM COATED ORAL AT BEDTIME
Qty: 30 TABLET | Refills: 11 | Status: SHIPPED | OUTPATIENT
Start: 2023-10-26 | End: 2023-01-01

## 2023-10-26 NOTE — TELEPHONE ENCOUNTER
Patient calling, on coumadin for many years, trouble sleeping. Patient wondering if he should take something for sleep? Doent want anything that interacts with counmadin. Any recommendations?     ONOFRE Campbell  [facility]

## 2023-10-26 NOTE — TELEPHONE ENCOUNTER
Would recommend a trial of mirtazapine 7.5 mg before bed.and refer to sleep psychology as best method for treating insomnia is non-pharmacologic   no cross reactivity with warfarin.      Prescription sent to pharmacy    Jayme Deng MD

## 2023-10-27 ENCOUNTER — NURSE TRIAGE (OUTPATIENT)
Dept: FAMILY MEDICINE | Facility: CLINIC | Age: 78
End: 2023-10-27
Payer: COMMERCIAL

## 2023-10-27 NOTE — TELEPHONE ENCOUNTER
"Patient states that he had the Pfizer Covid vaccine, not the flu vaccine, last week. Patient states that he has had no issues with insomnia in his life but states that he struggled with insomnia for two nights.    Patient states that, \"it was two nights, I was going side to side\". Patient states that he is no longer having insomnia.     PCP, please advise:  If PCP has seen insomnia-related vaccine reactions to the most recent Covid vaccine  If PCP recommends the RSV vaccine for the patient. Patient states, \"I have a heart valve, I've read a little about it, maybe that shot isn't good for that\".     Callback: [phone] ok to leave a detailed vm    Betzy Pena RN  -[facility]    "

## 2023-10-27 NOTE — TELEPHONE ENCOUNTER
It may be related to recent vaccination and he could hold off on follow up with sleep psychologist.  I did send in a prescription for mirtazapine as well which he is welcome to use if he prefers    Jayme Deng MD

## 2023-10-27 NOTE — TELEPHONE ENCOUNTER
Reason for Call:  Other call back    Detailed comments: patient called and had a flu shot 1 week ago at [facility].    He since then has had issues with insomnia.    He received a referral forester Knight but forgot to tell the provider Sowmya Cary this as well.    Wondering if this has anything to do with the flu shot?    Please contact patient.  Thank you.    Phone Number Patient can be reached at: Cell number on file:    Telephone Information:   Mobile [phone]       Best Time: any    Can we leave a detailed message on this number? YES    Call taken on 10/27/2023 at 11:36 AM by Chaya Sauer

## 2023-10-27 NOTE — TELEPHONE ENCOUNTER
Called patient regarding PCP message. He verbalized understanding.     Clover Gillis RN on 10/27/2023 at 3:05 PM

## 2023-11-09 NOTE — PROGRESS NOTES
ANTICOAGULATION MANAGEMENT     Feng Wynne 78 year old male is on warfarin with subtherapeutic INR result. (Goal INR 2.5-3.5)    Recent labs: (last 7 days)     11/09/23  0000   INR 2.3*       ASSESSMENT     Source(s): Chart Review and Patient/Caregiver Call     Warfarin doses taken: Warfarin taken differently, but did not change total weekly dose. Forgot to take his 10mg dose last night, intends to make it up tonight. We discussed that it's possible this caused his INR to dip out of range but not 100% certain this is the cause.   Diet:  Possibly increased Vit K a bit above baseline, when last INR was a bit elevated he did increase his intake and is now trying to get to a stable amount again  Medication/supplement changes: None noted  New illness, injury, or hospitalization: No  Signs or symptoms of bleeding or clotting: No  Previous result: Therapeutic last visit; previously outside of goal range  Additional findings: None       PLAN     Recommended plan for temporary change(s) affecting INR     Dosing Instructions: We discussed a possible dose adjustment but as INR is barely low and was recently high with possible temporary factors [facility] is reluctant to adjust. Feng would also like to try to stabilize his diet for the next couple of weeks and continue with same dose. Continue your current warfarin dose with next INR in 2 weeks       Summary  As of 11/9/2023      Full warfarin instructions:  11/9: 10 mg; Otherwise 10 mg every Wed, Sat; 5 mg all other days   Next INR check:  11/23/2023               Telephone call with Feng who verbalizes understanding and agrees to plan    Patient to recheck with home meter    Education provided:   Contact [phone]  with any changes, questions or concerns.     Plan made per [facility] anticoagulation protocol    Florencia Da Silva, RN  Anticoagulation Clinic  11/9/2023    _______________________________________________________________________     Anticoagulation Episode Summary        Current INR goal:  2.5-3.5   TTR:  77.5% (1 y)   Target end date:  Indefinite   Send INR reminders to:  [facility]    Indications    Long-term (current) use of anticoagulants [Z79.01] [Z79.01]  Heart valve replaced [Z95.2] [Z95.2]  S/P mitral valve replacement [Z95.2]  Chronic atrial fibrillation (H) [I48.20]  Permanent atrial fibrillation (H) [I48.21]             Comments:  [employer] [facility] MONITORING Patient, okay  to manage by exception on 7/8/20             Anticoagulation Care Providers       Provider Role Specialty Phone number    Enrique Walker MD Referring Internal Medicine     [facility] Referring Internal Medicine [phone]    Leena Jeffery MD Referring Internal Medicine [phone]    Jayme Deng MD Referring Internal Medicine [phone]

## 2023-11-22 NOTE — PROGRESS NOTES
ANTICOAGULATION MANAGEMENT     Feng Wynne 78 year old male is on warfarin with therapeutic INR result. (Goal INR 2.5-3.5)    Recent labs: (last 7 days)     11/22/23  0000   INR 3.0       ASSESSMENT     Source(s): Chart Review and Patient/Caregiver Call     Warfarin doses taken: Warfarin taken as instructed  Diet: No new diet changes identified  Medication/supplement changes: None noted  New illness, injury, or hospitalization: No  Signs or symptoms of bleeding or clotting: No  Previous result: Subtherapeutic  Additional findings: None       PLAN     Recommended plan for no diet, medication or health factor changes affecting INR     Dosing Instructions: Continue your current warfarin dose with next INR in 2 weeks       Summary  As of 11/22/2023      Full warfarin instructions:  10 mg every Wed, Sat; 5 mg all other days   Next INR check:  12/6/2023               Sent [email or web address] message with dosing and follow up instructions    Patient to recheck with home meter    Education provided:   Please call back if any changes to your diet, medications or how you've been taking warfarin  Resume manage by exception with home monitor. Continue to submit INR results to home monitor company.You will only be called when your result is out of range. Please call and notify [facility] if new medication started, dose missed, signs or symptoms of bleeding or clotting, or a surgery/procedure is scheduled.    Plan made per [facility] anticoagulation protocol    Love Elliott RN  Anticoagulation Clinic  11/22/2023    _______________________________________________________________________     Anticoagulation Episode Summary       Current INR goal:  2.5-3.5   TTR:  79.2% (1 y)   Target end date:  Indefinite   Send INR reminders to:  [facility] MONITOR[facility]    Indications    Long-term (current) use of anticoagulants [Z79.01] [Z79.01]  Heart valve replaced [Z95.2] [Z95.2]  S/P mitral valve replacement [Z95.2]  Chronic atrial fibrillation (H)  [I48.20]  Permanent atrial fibrillation (H) [I48.21]             Comments:  [employer] HOME MONITORING Patient, okay  to manage by exception on 7/8/20             Anticoagulation Care Providers       Provider Role Specialty Phone number    Enrique Walker MD Referring Internal Medicine     [facility] Referring Internal Medicine [phone]    Leena Jeffery MD Referring Internal Medicine [phone]    Jayme Deng MD Referring Internal Medicine [phone]

## 2023-11-27 NOTE — TELEPHONE ENCOUNTER
Nurse Triage SBAR    Is this a 2nd Level Triage? NO    Situation: Patient calling with head congestions and shortness of breath with exertion.  Consent: not needed    Background: Patient has had congestion for the last 2 weeks - phlegm off and on. Using Robitussin    Assessment:   Head and nasal congestion  Coughing phlegm  No sinus pain or pressure  Shortness of breath with exertion  Mild chest tightness  No fever  No sore throat  No ear pain  No headache    Protocol Recommended Disposition:   See HCP within 4 hours (or PCP triage)    Recommendation: Advised patient to go to urgent care. Patient verbalized understanding and agreed with plan. Will go to [facility].    Verónica Pedersen RN [facility] 11/27/2023 7:49 AM    Reason for Disposition   [1] MILD difficulty breathing (e.g., minimal/no SOB at rest, SOB with walking, pulse <100) AND [2] NEW-onset or WORSE than normal   [1] Sinus congestion (pressure, fullness) AND [2] present > 10 days    Additional Information   Negative: SEVERE difficulty breathing (e.g., struggling for each breath, speaks in single words)   Negative: Sounds like a life-threatening emergency to the triager   Negative: [1] Sinus infection AND [2] taking an antibiotic AND [3] symptoms continue   Negative: [1] Difficulty breathing AND [2] not from stuffy nose (e.g., not relieved by cleaning out the nose)   Negative: [1] SEVERE headache AND [2] fever   Negative: [1] Redness or swelling on the cheek, forehead or around the eye AND [2] fever   Negative: Fever > 104 F (40 C)   Negative: Patient sounds very sick or weak to the triager   Negative: [1] SEVERE pain AND [2] not improved 2 hours after pain medicine   Negative: [1] Redness or swelling on the cheek, forehead or around the eye AND [2] no fever   Negative: [1] Fever > 101 F (38.3 C) AND [2] age > 60 years   Negative: [1] Fever > 100.0 F (37.8 C) AND [2] bedridden (e.g., CVA, chronic illness, recovering from surgery)    "Negative: [1] Fever > 100.0 F (37.8 C) AND [2] diabetes mellitus or weak immune system (e.g., HIV positive, cancer chemo, splenectomy, organ transplant, chronic steroids)   Negative: Fever present > 3 days (72 hours)   Negative: [1] Fever returns after gone for over 24 hours AND [2] symptoms worse or not improved   Negative: [1] Sinus pain (not just congestion) AND [2] fever   Negative: Earache   Negative: SEVERE difficulty breathing (e.g., struggling for each breath, speaks in single words)   Negative: [1] Breathing stopped AND [2] hasn't returned   Negative: Choking on something   Negative: Bluish (or gray) lips or face now   Negative: Difficult to awaken or acting confused (e.g., disoriented, slurred speech)   Negative: Passed out (i.e., lost consciousness, collapsed and was not responding)   Negative: Wheezing started suddenly after medicine, an allergic food or bee sting   Negative: Stridor (harsh sound while breathing in)   Negative: Slow, shallow and weak breathing   Negative: Sounds like a life-threatening emergency to the triager   Negative: Chest pain   Negative: [1] Wheezing (high pitched whistling sound) AND [2] previous asthma attacks or use of asthma medicines   Negative: [1] Difficulty breathing AND [2] only present when coughing   Negative: [1] Difficulty breathing AND [2] only from stuffy or runny nose   Negative: [1] Difficulty breathing AND [2] within 14 days of COVID-19 Exposure   Negative: [1] MODERATE difficulty breathing (e.g., speaks in phrases, SOB even at rest, pulse 100-120) AND [2] NEW-onset or WORSE than normal   Negative: Oxygen level (e.g., pulse oximetry) 90 percent or lower   Negative: Wheezing can be heard across the room   Negative: Drooling or spitting out saliva (because can't swallow)   Negative: History of prior \"blood clot\" in leg or lungs (i.e., deep vein thrombosis, pulmonary embolism)   Negative: Fever > 103 F (39.4 C)   Negative: [1] Fever > 101 F (38.3 C) AND [2] age > 60 " "years   Negative: [1] Fever > 100.0 F (37.8 C) AND [2] bedridden (e.g., CVA, chronic illness, recovering from surgery)   Negative: [1] Fever > 100.0 F (37.8 C) AND [2] diabetes mellitus or weak immune system (e.g., HIV positive, cancer chemo, splenectomy, organ transplant, chronic steroids)   Negative: [1] Periods where breathing stops and then resumes normally AND [2] bedridden (e.g., CVA)   Negative: Pregnant or postpartum (from 0 to 6 weeks after delivery)   Negative: Patient sounds very sick or weak to the triager   Negative: History of inherited increased risk of blood clots (e.g., Factor 5 Leiden, Anti-thrombin 3, Protein C or Protein S deficiency, Prothrombin mutation)   Negative: Major surgery in the past month   Negative: Hip or leg fracture (broken bone) in past month (or had cast on leg or ankle in past month)   Negative: Illness requiring prolonged bedrest in past month (e.g., immobilization, long hospital stay)   Negative: Long-distance travel in past month (e.g., car, bus, train, plane; with trip lasting 6 or more hours)   Negative: Cancer treatment in past six months (or has cancer now)   Negative: Extra heartbeats, irregular heart beating, or heart is beating very fast  (i.e., \"palpitations\")    Protocols used: Sinus Pain or Congestion-A-AH, Breathing Difficulty-A-AH    "

## 2023-11-27 NOTE — PATIENT INSTRUCTIONS
Start using your machine for legs again.    Keep appointment for echocardiogram.    Mucinex as needed for cough.

## 2023-11-27 NOTE — PROGRESS NOTES
Chief Complaint   Patient presents with    Nasal Congestion     X 2 weeks, ?maybe lungs also, some SOB at times and feels like heart is beating fast, coughing up some phlegm with small steaks of blood once in a while, he's concerned about pneumonia          ICD-10-CM    1. Viral URI with cough  J06.9       2. Bilateral lower extremity edema  R60.0       Persistent cough but nothing to suggest bacterial infection.  He may continue to use Mucinex or Robitussin as needed for cough.  We did discuss the possibility of pulmonary embolus and completing a D-dimer.  His risk would be minimal as he is therapeutic on warfarin.  In shared decision-making we decided not to collect a D-dimer at this time.  If his shortness of breath worsens he is instructed to go to the emergency room immediately.    Edema is not new for him.  He normally wears a mechanical compression stocking but this broke last week and he has another 1 on order.  He is advised to keep his legs elevated at all times and keep his appointment for echocardiogram.  No suggestions of acute systolic heart failure today as his lungs are clear and no JVD is seen on exam.    33 minutes spent by me on the date of the encounter doing chart review, history and exam, documentation and further activities per the note    Subjective     Feng Wynne is an 78 year old male who presents to clinic today for cough, productive most of the time , comes in spells, sometimes streaked with blood, also nasal congestion for 2 weeks. He is also short of breath, when talking and walking.       ROS: 10 point ROS neg other than the symptoms noted above in the HPI.       Objective    /67 (BP Location: Left arm, Patient Position: Sitting, Cuff Size: Adult Large)   Pulse 62   Temp 97.6  F (36.4  C) (Tympanic)   SpO2 99%   Nurses notes and VS have been reviewed.    Physical Exam       GENERAL APPEARANCE: alert, morbidly obese     EYES: PERRL, EOMI, sclera non-icteric     HENT: oral  exam benign, mucus membranes intact, without ulcers or lesions     NECK: no adenopathy or asymmetry, thyroid normal to palpation     RESP: lungs clear to auscultation - no rales, rhonchi or wheezes, no evidence of increased work of breathing with speaking     CV: regular rates and rhythm, no murmurs, rubs, or gallop, 3+ pitting edema bilaterally from the knees to the toes     MS: Walks with a walker but moves all extremities     SKIN: no suspicious lesions or rashes     NEURO: Normal strength and tone, mentation intact and speech normal     PSYCH: normal thought process; no significant mood disturbance      MIKE Bernal, CNP  [facility] Provider    The use of [employer] dictation services may have been used to construct the content in this note; any grammatical or spelling errors are non-intentional. Please contact the author of this note directly if you are in need of any clarification.

## 2023-12-04 NOTE — PROGRESS NOTES
ANTICOAGULATION  MANAGEMENT-Home Monitor Managed by Exception    Feng Wynne 78 year old male is on warfarin with therapeutic INR result. (Goal INR 2.5-3.5)    Recent labs: (last 7 days)     12/03/23  0000   INR 3.2       Previous INR was Therapeutic  Medication, diet, health changes since last INR:chart reviewed; none identified  Contacted within the last 12 weeks by phone on 11/22/23  Last [facility] referral date: 08/30/2023      COLTON Toney was NOT contacted regarding therapeutic result today per home monitoring policy manage by exception agreement.   Current warfarin dose is to be continued:     Summary  As of 12/4/2023      Full warfarin instructions:  10 mg every Wed, Sat; 5 mg all other days   Next INR check:  12/18/2023             ?   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

## 2023-12-13 NOTE — TELEPHONE ENCOUNTER
Patient already scheduled for follow up having a lingering cough since  visit 11/27   Wanting to know what to do in mean time    Patient has not taken mucinex suggested at  so he will try that    Also recommend humidifier, elevate head at night, honey and cough drops and to call with worsening symptoms   Reason for Disposition   Cough lasts > 3 weeks    Additional Information   Negative: History of gastric reflux and intermittent symptoms of sour taste in mouth and dry cough   Negative: Dry lingering cough and recent cold symptoms (e.g., runny nose, fever)   Negative: Taking an ACE Inhibitor medicine (e.g., benazepril/LOTENSIN, captopril/CAPOTEN, enalapril/VASOTEC, lisinopril/ZESTRIL)   Negative: Chest or rib pain and only occurs while coughing   Negative: Sinus pain or pressure (around cheekbone or eye)   Negative: Nasal discharge and present > 10 days   Negative: Blood-tinged sputum has been coughed up and more than once   Negative: History of asthma or has mild wheezing   Negative: Exposure to TB (Tuberculosis)   Negative: Patient wants to be seen   Negative: SEVERE coughing spells (e.g., whooping sound after coughing, vomiting after coughing)   Negative: Continuous (nonstop) coughing interferes with work or school and no improvement using cough treatment per Care Advice   Negative: Fever present > 3 days (72 hours)   Negative: Coughing up jo-ann-colored sputum   Negative: Change in color of sputum (e.g., from white to yellow-green sputum)   Negative: Increase in amount of sputum   Negative: MILD difficulty breathing (e.g., minimal/no SOB at rest, SOB with walking, pulse < 100) and still present when not coughing  (Exception: No change from usual, chronic shortness of breath.)   Negative: Coughed up blood and > 1 tablespoon (15 ml)  (Exception: Blood-tinged sputum.)   Negative: Fever > 103 F (39.4 C)   Negative: Fever > 101 F (38.3 C) and age > 60 years   Negative: Fever > 100.0 F (37.8 C) and bedridden (e.g.,  CVA, chronic illness, recovering from surgery)   Negative: Fever > 100.0 F (37.8 C) and diabetes mellitus or weak immune system (e.g., HIV positive, cancer chemo, splenectomy, organ transplant, chronic steroids)   Negative: Chest pain is main symptom   Negative: Cough and < 3 weeks duration   Negative: Previous asthma attacks and this feels like an asthma attack   Negative: MODERATE difficulty breathing (e.g., speaks in phrases, SOB even at rest, pulse 100-120) and still present when not coughing   Negative: Chest pain  (Exception: MILD central chest pain, present only when coughing.)   Negative: Increasing difficulty breathing and always has some difficulty breathing   Negative: Patient sounds very sick or weak to the triager   Negative: SEVERE difficulty breathing (e.g., struggling for each breath, speaks in single words)   Negative: Lips or face are bluish now and persists when not coughing   Negative: Sounds like a life-threatening emergency to the triager    Protocols used: Cough - Chronic-A-OH

## 2023-12-20 NOTE — PROGRESS NOTES
Feng is a 78 year old who is being evaluated via a billable video visit.      How would you like to obtain your AVS? MyChart  If the video visit is dropped, the invitation should be resent by: Text to cell phone: [phone]  Will anyone else be joining your video visit? No              Subjective   Feng is a 78 year old, presenting for the following health issues:  ER F/U      HPI     ED/UC Followup:    Facility:  [facility]  Date of visit: 11/27/2023  Reason for visit:Viral URI with Cough   Current Status: Some days are better than other days.     Viral upper respiratory infection   Feng Wynne has had 3 weeks of low energy.  He has not had any fever, no cough, but some congestion.  He has noticed shortness of breath at random times associated with dry mouth.  Notes that symptoms occur after sitting for some time.  He notes that mucinex helped the symptoms of congestion.  Notes no chest pain.  Lung were clear at time of urgent clear.  Notes that son has history of asthma.  He may have some tightness       Review of Systems   Constitutional, HEENT, cardiovascular, pulmonary, GI, , musculoskeletal, neuro, skin, endocrine and psych systems are negative, except as otherwise noted.      Objective           Vitals:  No vitals were obtained today due to virtual visit.    Physical Exam   GENERAL: Healthy, alert and no distress  EYES: Eyes grossly normal to inspection.  No discharge or erythema, or obvious scleral/conjunctival abnormalities.  RESP: No audible wheeze, cough, or visible cyanosis.  No visible retractions or increased work of breathing.    SKIN: Visible skin clear. No significant rash, abnormal pigmentation or lesions.  NEURO: Cranial nerves grossly intact.  Mentation and speech appropriate for age.  PSYCH: Mentation appears normal, affect normal/bright, judgement and insight intact, normal speech and appearance well-groomed.      (D50.0) Iron deficiency anemia due to chronic blood loss  (primary  encounter diagnosis)  Comment: recommend recheck labs and also refer for both colonoscopy and upper endoscopy   Plan: albuterol (PROAIR HFA/PROVENTIL HFA/VENTOLIN         HFA) 108 (90 Base) MCG/ACT inhaler,         Comprehensive metabolic panel, CBC with         platelets, Ferritin, Iron & Iron Binding         Capacity, Adult GI  Referral -         Procedure Only            (I10) Essential hypertension with goal blood pressure less than 140/90  Comment: blood pressure has been table  Plan:     (R06.2) Wheezing  Comment: trial of albuterol sent to pharmacy   Plan: albuterol (PROAIR HFA/PROVENTIL HFA/VENTOLIN         HFA) 108 (90 Base) MCG/ACT inhaler            (E78.5) Hyperlipidemia LDL goal <130  Comment: check fasting lipid panel   Plan: Lipid panel reflex to direct LDL Non-fasting            (Z12.5) Screening for prostate cancer  Comment:   Plan: Prostate Specific Antigen Screen                   Video-Visit Details    Type of service:  Video Visit   Video Start Time:  12:22 PM   Video End Time:12:35 PM    Originating Location (pt. Location): Home    Distant Location (provider location):  On-site  Platform used for Video Visit: Tom

## 2023-12-20 NOTE — PROGRESS NOTES
ANTICOAGULATION  MANAGEMENT-Home Monitor Managed by Exception    Feng Wynne 78 year old male is on warfarin with therapeutic INR result. (Goal INR 2.5-3.5)    Recent labs: (last 7 days)     12/20/23  0000   INR 3.2       Previous INR was Therapeutic  Medication, diet, health changes since last INR:chart reviewed; none identified  Contacted within the last 12 weeks by phone on  11/22/23   Last [facility] referral date: 08/30/2023      COLTON Toney was NOT contacted regarding therapeutic result today per home monitoring policy manage by exception agreement.   Current warfarin dose is to be continued:     Summary  As of 12/20/2023      Full warfarin instructions:  10 mg every Wed, Sat; 5 mg all other days   Next INR check:  1/3/2024             ?   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

## 2024-01-01 ENCOUNTER — TRANSITIONAL CARE UNIT VISIT (OUTPATIENT)
Dept: GERIATRICS | Facility: CLINIC | Age: 79
End: 2024-01-01
Payer: COMMERCIAL

## 2024-01-01 ENCOUNTER — TELEPHONE (OUTPATIENT)
Dept: FAMILY MEDICINE | Facility: CLINIC | Age: 79
End: 2024-01-01
Payer: COMMERCIAL

## 2024-01-01 ENCOUNTER — LAB (OUTPATIENT)
Dept: LAB | Facility: CLINIC | Age: 79
End: 2024-01-01
Payer: COMMERCIAL

## 2024-01-01 ENCOUNTER — HOSPITAL ENCOUNTER (INPATIENT)
Facility: CLINIC | Age: 79
LOS: 9 days | Discharge: HOSPICE | DRG: 813 | End: 2024-10-25
Attending: STUDENT IN AN ORGANIZED HEALTH CARE EDUCATION/TRAINING PROGRAM | Admitting: STUDENT IN AN ORGANIZED HEALTH CARE EDUCATION/TRAINING PROGRAM
Payer: COMMERCIAL

## 2024-01-01 ENCOUNTER — HOSPITAL ENCOUNTER (OUTPATIENT)
Dept: WOUND CARE | Facility: CLINIC | Age: 79
Discharge: HOME | End: 2024-05-09
Attending: SURGERY | Admitting: SURGERY
Payer: COMMERCIAL

## 2024-01-01 ENCOUNTER — TELEPHONE (OUTPATIENT)
Dept: ANTICOAGULATION | Facility: CLINIC | Age: 79
End: 2024-01-01

## 2024-01-01 ENCOUNTER — TELEPHONE (OUTPATIENT)
Dept: WOUND CARE | Facility: CLINIC | Age: 79
End: 2024-01-01
Payer: COMMERCIAL

## 2024-01-01 ENCOUNTER — APPOINTMENT (OUTPATIENT)
Dept: LAB | Facility: CLINIC | Age: 79
End: 2024-01-01
Payer: COMMERCIAL

## 2024-01-01 ENCOUNTER — APPOINTMENT (OUTPATIENT)
Dept: PHYSICAL THERAPY | Facility: CLINIC | Age: 79
DRG: 280 | End: 2024-01-01
Payer: COMMERCIAL

## 2024-01-01 ENCOUNTER — APPOINTMENT (OUTPATIENT)
Dept: PHYSICAL THERAPY | Facility: CLINIC | Age: 79
DRG: 280 | End: 2024-01-01
Attending: INTERNAL MEDICINE
Payer: COMMERCIAL

## 2024-01-01 ENCOUNTER — DOCUMENTATION ONLY (OUTPATIENT)
Dept: ANTICOAGULATION | Facility: CLINIC | Age: 79
End: 2024-01-01

## 2024-01-01 ENCOUNTER — ANTICOAGULATION THERAPY VISIT (OUTPATIENT)
Dept: ANTICOAGULATION | Facility: CLINIC | Age: 79
End: 2024-01-01
Payer: COMMERCIAL

## 2024-01-01 ENCOUNTER — TELEPHONE (OUTPATIENT)
Dept: WOUND CARE | Facility: CLINIC | Age: 79
End: 2024-01-01

## 2024-01-01 ENCOUNTER — TELEPHONE (OUTPATIENT)
Dept: GERIATRICS | Facility: CLINIC | Age: 79
End: 2024-01-01
Payer: COMMERCIAL

## 2024-01-01 ENCOUNTER — HOSPITAL ENCOUNTER (INPATIENT)
Facility: CLINIC | Age: 79
LOS: 7 days | Discharge: HOME HEALTH | DRG: 698 | End: 2024-09-16
Attending: EMERGENCY MEDICINE | Admitting: STUDENT IN AN ORGANIZED HEALTH CARE EDUCATION/TRAINING PROGRAM
Payer: COMMERCIAL

## 2024-01-01 ENCOUNTER — VIRTUAL VISIT (OUTPATIENT)
Dept: FAMILY MEDICINE | Facility: CLINIC | Age: 79
End: 2024-01-01
Payer: COMMERCIAL

## 2024-01-01 ENCOUNTER — HOSPITAL ENCOUNTER (OUTPATIENT)
Dept: WOUND CARE | Facility: CLINIC | Age: 79
Discharge: HOME | End: 2024-05-07
Attending: FAMILY MEDICINE | Admitting: FAMILY MEDICINE
Payer: COMMERCIAL

## 2024-01-01 ENCOUNTER — PATIENT OUTREACH (OUTPATIENT)
Dept: CARE COORDINATION | Facility: CLINIC | Age: 79
End: 2024-01-01

## 2024-01-01 ENCOUNTER — TELEPHONE (OUTPATIENT)
Dept: UROLOGY | Facility: CLINIC | Age: 79
End: 2024-01-01

## 2024-01-01 ENCOUNTER — LAB REQUISITION (OUTPATIENT)
Dept: LAB | Facility: CLINIC | Age: 79
End: 2024-01-01

## 2024-01-01 ENCOUNTER — TELEPHONE (OUTPATIENT)
Dept: FAMILY MEDICINE | Facility: CLINIC | Age: 79
End: 2024-01-01

## 2024-01-01 ENCOUNTER — LAB REQUISITION (OUTPATIENT)
Dept: LAB | Facility: CLINIC | Age: 79
End: 2024-01-01
Payer: COMMERCIAL

## 2024-01-01 ENCOUNTER — NURSE TRIAGE (OUTPATIENT)
Dept: FAMILY MEDICINE | Facility: CLINIC | Age: 79
End: 2024-01-01
Payer: COMMERCIAL

## 2024-01-01 ENCOUNTER — ANTICOAGULATION THERAPY VISIT (OUTPATIENT)
Dept: ANTICOAGULATION | Facility: CLINIC | Age: 79
End: 2024-01-01

## 2024-01-01 ENCOUNTER — HOSPITAL ENCOUNTER (OUTPATIENT)
Age: 79
End: 2024-01-01
Payer: COMMERCIAL

## 2024-01-01 ENCOUNTER — APPOINTMENT (OUTPATIENT)
Dept: OCCUPATIONAL THERAPY | Facility: CLINIC | Age: 79
DRG: 463 | End: 2024-01-01
Payer: COMMERCIAL

## 2024-01-01 ENCOUNTER — APPOINTMENT (OUTPATIENT)
Dept: CT IMAGING | Facility: CLINIC | Age: 79
DRG: 463 | End: 2024-01-01
Attending: INTERNAL MEDICINE
Payer: COMMERCIAL

## 2024-01-01 ENCOUNTER — MEDICAL CORRESPONDENCE (OUTPATIENT)
Dept: HEALTH INFORMATION MANAGEMENT | Facility: CLINIC | Age: 79
End: 2024-01-01
Payer: COMMERCIAL

## 2024-01-01 ENCOUNTER — APPOINTMENT (OUTPATIENT)
Dept: OCCUPATIONAL THERAPY | Facility: CLINIC | Age: 79
DRG: 280 | End: 2024-01-01
Payer: COMMERCIAL

## 2024-01-01 ENCOUNTER — APPOINTMENT (OUTPATIENT)
Dept: SPEECH THERAPY | Facility: CLINIC | Age: 79
DRG: 380 | End: 2024-01-01
Payer: COMMERCIAL

## 2024-01-01 ENCOUNTER — PATIENT OUTREACH (OUTPATIENT)
Dept: CARE COORDINATION | Facility: CLINIC | Age: 79
End: 2024-01-01
Payer: COMMERCIAL

## 2024-01-01 ENCOUNTER — APPOINTMENT (OUTPATIENT)
Dept: SPEECH THERAPY | Facility: CLINIC | Age: 79
DRG: 380 | End: 2024-01-01
Attending: INTERNAL MEDICINE
Payer: COMMERCIAL

## 2024-01-01 ENCOUNTER — APPOINTMENT (OUTPATIENT)
Dept: PHYSICAL THERAPY | Facility: CLINIC | Age: 79
DRG: 380 | End: 2024-01-01
Attending: HOSPITALIST
Payer: COMMERCIAL

## 2024-01-01 ENCOUNTER — APPOINTMENT (OUTPATIENT)
Dept: PHYSICAL THERAPY | Facility: CLINIC | Age: 79
DRG: 380 | End: 2024-01-01
Payer: COMMERCIAL

## 2024-01-01 ENCOUNTER — TELEPHONE (OUTPATIENT)
Dept: ANTICOAGULATION | Facility: CLINIC | Age: 79
End: 2024-01-01
Payer: COMMERCIAL

## 2024-01-01 ENCOUNTER — HEALTH MAINTENANCE LETTER (OUTPATIENT)
Age: 79
End: 2024-01-01

## 2024-01-01 ENCOUNTER — HOSPITAL ENCOUNTER (OUTPATIENT)
Dept: WOUND CARE | Facility: CLINIC | Age: 79
Discharge: HOME | End: 2024-07-03
Attending: FAMILY MEDICINE
Payer: COMMERCIAL

## 2024-01-01 ENCOUNTER — DOCUMENTATION ONLY (OUTPATIENT)
Dept: GERIATRICS | Facility: CLINIC | Age: 79
End: 2024-01-01
Payer: COMMERCIAL

## 2024-01-01 ENCOUNTER — HOSPITAL ENCOUNTER (INPATIENT)
Facility: CLINIC | Age: 79
LOS: 10 days | Discharge: NURSING, REHAB OR LONG-TERM CARE | DRG: 280 | End: 2024-01-19
Attending: EMERGENCY MEDICINE | Admitting: INTERNAL MEDICINE
Payer: COMMERCIAL

## 2024-01-01 ENCOUNTER — HOSPITAL ENCOUNTER (OUTPATIENT)
Dept: WOUND CARE | Facility: CLINIC | Age: 79
Discharge: HOME | End: 2024-04-24
Attending: PHYSICIAN ASSISTANT
Payer: COMMERCIAL

## 2024-01-01 ENCOUNTER — APPOINTMENT (OUTPATIENT)
Dept: GENERAL RADIOLOGY | Facility: CLINIC | Age: 79
DRG: 280 | End: 2024-01-01
Attending: EMERGENCY MEDICINE
Payer: COMMERCIAL

## 2024-01-01 ENCOUNTER — APPOINTMENT (OUTPATIENT)
Dept: OCCUPATIONAL THERAPY | Facility: CLINIC | Age: 79
DRG: 380 | End: 2024-01-01
Attending: HOSPITALIST
Payer: COMMERCIAL

## 2024-01-01 ENCOUNTER — TRANSFERRED RECORDS (OUTPATIENT)
Dept: HEALTH INFORMATION MANAGEMENT | Facility: CLINIC | Age: 79
End: 2024-01-01

## 2024-01-01 ENCOUNTER — DOCUMENTATION ONLY (OUTPATIENT)
Dept: GERIATRICS | Facility: CLINIC | Age: 79
End: 2024-01-01

## 2024-01-01 ENCOUNTER — HOSPITAL ENCOUNTER (OUTPATIENT)
Dept: WOUND CARE | Facility: CLINIC | Age: 79
Discharge: HOME | End: 2024-05-23
Attending: REGISTERED NURSE
Payer: COMMERCIAL

## 2024-01-01 ENCOUNTER — APPOINTMENT (OUTPATIENT)
Dept: GENERAL RADIOLOGY | Facility: CLINIC | Age: 79
DRG: 698 | End: 2024-01-01
Attending: EMERGENCY MEDICINE
Payer: COMMERCIAL

## 2024-01-01 ENCOUNTER — APPOINTMENT (OUTPATIENT)
Dept: OCCUPATIONAL THERAPY | Facility: CLINIC | Age: 79
DRG: 371 | End: 2024-01-01
Payer: COMMERCIAL

## 2024-01-01 ENCOUNTER — APPOINTMENT (OUTPATIENT)
Dept: OCCUPATIONAL THERAPY | Facility: CLINIC | Age: 79
DRG: 280 | End: 2024-01-01
Attending: INTERNAL MEDICINE
Payer: COMMERCIAL

## 2024-01-01 ENCOUNTER — MEDICAL CORRESPONDENCE (OUTPATIENT)
Dept: HEALTH INFORMATION MANAGEMENT | Facility: CLINIC | Age: 79
End: 2024-01-01

## 2024-01-01 ENCOUNTER — APPOINTMENT (OUTPATIENT)
Dept: OCCUPATIONAL THERAPY | Facility: CLINIC | Age: 79
DRG: 380 | End: 2024-01-01
Payer: COMMERCIAL

## 2024-01-01 ENCOUNTER — HOSPITAL ENCOUNTER (INPATIENT)
Facility: CLINIC | Age: 79
LOS: 16 days | Discharge: NURSING, REHAB OR LONG-TERM CARE | DRG: 380 | End: 2024-02-15
Attending: EMERGENCY MEDICINE | Admitting: STUDENT IN AN ORGANIZED HEALTH CARE EDUCATION/TRAINING PROGRAM
Payer: COMMERCIAL

## 2024-01-01 ENCOUNTER — HOSPITAL ENCOUNTER (INPATIENT)
Facility: CLINIC | Age: 79
LOS: 11 days | Discharge: NURSING, REHAB OR LONG-TERM CARE | DRG: 463 | End: 2024-03-29
Attending: EMERGENCY MEDICINE | Admitting: INTERNAL MEDICINE
Payer: COMMERCIAL

## 2024-01-01 ENCOUNTER — APPOINTMENT (OUTPATIENT)
Dept: PHYSICAL THERAPY | Facility: CLINIC | Age: 79
DRG: 813 | End: 2024-01-01
Attending: INTERNAL MEDICINE
Payer: COMMERCIAL

## 2024-01-01 ENCOUNTER — DOCUMENTATION ONLY (OUTPATIENT)
Dept: OTHER | Facility: CLINIC | Age: 79
End: 2024-01-01

## 2024-01-01 ENCOUNTER — APPOINTMENT (OUTPATIENT)
Dept: GENERAL RADIOLOGY | Facility: CLINIC | Age: 79
DRG: 380 | End: 2024-01-01
Attending: INTERNAL MEDICINE
Payer: COMMERCIAL

## 2024-01-01 ENCOUNTER — DOCUMENTATION ONLY (OUTPATIENT)
Dept: ANTICOAGULATION | Facility: CLINIC | Age: 79
End: 2024-01-01
Payer: COMMERCIAL

## 2024-01-01 ENCOUNTER — TELEPHONE (OUTPATIENT)
Dept: INTERNAL MEDICINE | Facility: CLINIC | Age: 79
End: 2024-01-01
Payer: COMMERCIAL

## 2024-01-01 ENCOUNTER — HOSPITAL ENCOUNTER (OUTPATIENT)
Dept: WOUND CARE | Facility: CLINIC | Age: 79
Discharge: HOME | End: 2024-09-05
Attending: SURGERY | Admitting: SURGERY
Payer: COMMERCIAL

## 2024-01-01 ENCOUNTER — ANESTHESIA (OUTPATIENT)
Dept: SURGERY | Facility: CLINIC | Age: 79
DRG: 463 | End: 2024-01-01
Payer: COMMERCIAL

## 2024-01-01 ENCOUNTER — APPOINTMENT (OUTPATIENT)
Dept: PHYSICAL THERAPY | Facility: CLINIC | Age: 79
DRG: 463 | End: 2024-01-01
Attending: SURGERY
Payer: COMMERCIAL

## 2024-01-01 ENCOUNTER — HOSPITAL ENCOUNTER (INPATIENT)
Facility: CLINIC | Age: 79
LOS: 8 days | Discharge: HOME HEALTH | DRG: 371 | End: 2024-07-26
Attending: EMERGENCY MEDICINE | Admitting: INTERNAL MEDICINE
Payer: COMMERCIAL

## 2024-01-01 ENCOUNTER — APPOINTMENT (OUTPATIENT)
Dept: GENERAL RADIOLOGY | Facility: CLINIC | Age: 79
DRG: 280 | End: 2024-01-01
Attending: NURSE PRACTITIONER
Payer: COMMERCIAL

## 2024-01-01 ENCOUNTER — APPOINTMENT (OUTPATIENT)
Dept: CARDIOLOGY | Facility: CLINIC | Age: 79
DRG: 380 | End: 2024-01-01
Attending: PHYSICIAN ASSISTANT
Payer: COMMERCIAL

## 2024-01-01 ENCOUNTER — APPOINTMENT (OUTPATIENT)
Dept: OCCUPATIONAL THERAPY | Facility: CLINIC | Age: 79
DRG: 698 | End: 2024-01-01
Attending: NURSE PRACTITIONER
Payer: COMMERCIAL

## 2024-01-01 ENCOUNTER — APPOINTMENT (OUTPATIENT)
Dept: OCCUPATIONAL THERAPY | Facility: CLINIC | Age: 79
DRG: 371 | End: 2024-01-01
Attending: PHYSICIAN ASSISTANT
Payer: COMMERCIAL

## 2024-01-01 ENCOUNTER — APPOINTMENT (OUTPATIENT)
Dept: GENERAL RADIOLOGY | Facility: CLINIC | Age: 79
DRG: 380 | End: 2024-01-01
Attending: EMERGENCY MEDICINE
Payer: COMMERCIAL

## 2024-01-01 ENCOUNTER — APPOINTMENT (OUTPATIENT)
Dept: SURGERY | Facility: PHYSICIAN GROUP | Age: 79
End: 2024-01-01
Payer: COMMERCIAL

## 2024-01-01 ENCOUNTER — APPOINTMENT (OUTPATIENT)
Dept: MRI IMAGING | Facility: CLINIC | Age: 79
DRG: 463 | End: 2024-01-01
Attending: INTERNAL MEDICINE
Payer: COMMERCIAL

## 2024-01-01 ENCOUNTER — HOSPITAL ENCOUNTER (OUTPATIENT)
Dept: WOUND CARE | Facility: CLINIC | Age: 79
Discharge: HOME | End: 2024-09-26
Attending: SURGERY | Admitting: SURGERY
Payer: COMMERCIAL

## 2024-01-01 ENCOUNTER — APPOINTMENT (OUTPATIENT)
Dept: PHYSICAL THERAPY | Facility: CLINIC | Age: 79
DRG: 463 | End: 2024-01-01
Payer: COMMERCIAL

## 2024-01-01 ENCOUNTER — TELEPHONE (OUTPATIENT)
Dept: CARDIOLOGY | Facility: CLINIC | Age: 79
End: 2024-01-01

## 2024-01-01 ENCOUNTER — APPOINTMENT (OUTPATIENT)
Dept: CARDIOLOGY | Facility: CLINIC | Age: 79
DRG: 280 | End: 2024-01-01
Attending: INTERNAL MEDICINE
Payer: COMMERCIAL

## 2024-01-01 ENCOUNTER — ANESTHESIA EVENT (OUTPATIENT)
Dept: SURGERY | Facility: CLINIC | Age: 79
DRG: 463 | End: 2024-01-01
Payer: COMMERCIAL

## 2024-01-01 ENCOUNTER — HOSPITAL ENCOUNTER (OUTPATIENT)
Dept: WOUND CARE | Facility: CLINIC | Age: 79
Discharge: HOME | End: 2024-04-10
Attending: PHYSICIAN ASSISTANT
Payer: COMMERCIAL

## 2024-01-01 ENCOUNTER — APPOINTMENT (OUTPATIENT)
Dept: ULTRASOUND IMAGING | Facility: CLINIC | Age: 79
DRG: 698 | End: 2024-01-01
Attending: NURSE PRACTITIONER
Payer: COMMERCIAL

## 2024-01-01 ENCOUNTER — OFFICE VISIT (OUTPATIENT)
Dept: CARDIOLOGY | Facility: CLINIC | Age: 79
End: 2024-01-01
Payer: COMMERCIAL

## 2024-01-01 ENCOUNTER — APPOINTMENT (OUTPATIENT)
Dept: OCCUPATIONAL THERAPY | Facility: CLINIC | Age: 79
DRG: 463 | End: 2024-01-01
Attending: SURGERY
Payer: COMMERCIAL

## 2024-01-01 ENCOUNTER — MYC MEDICAL ADVICE (OUTPATIENT)
Dept: FAMILY MEDICINE | Facility: CLINIC | Age: 79
End: 2024-01-01

## 2024-01-01 ENCOUNTER — APPOINTMENT (OUTPATIENT)
Dept: GENERAL RADIOLOGY | Facility: CLINIC | Age: 79
DRG: 280 | End: 2024-01-01
Attending: HOSPITALIST
Payer: COMMERCIAL

## 2024-01-01 ENCOUNTER — APPOINTMENT (OUTPATIENT)
Dept: CARDIOLOGY | Facility: CLINIC | Age: 79
DRG: 698 | End: 2024-01-01
Attending: NURSE PRACTITIONER
Payer: COMMERCIAL

## 2024-01-01 ENCOUNTER — DISCHARGE SUMMARY NURSING HOME (OUTPATIENT)
Dept: GERIATRICS | Facility: CLINIC | Age: 79
End: 2024-01-01
Payer: COMMERCIAL

## 2024-01-01 ENCOUNTER — HOSPITAL ENCOUNTER (OUTPATIENT)
Dept: WOUND CARE | Facility: CLINIC | Age: 79
Discharge: HOME | End: 2024-08-01
Attending: FAMILY MEDICINE | Admitting: SURGERY
Payer: COMMERCIAL

## 2024-01-01 ENCOUNTER — APPOINTMENT (OUTPATIENT)
Dept: GENERAL RADIOLOGY | Facility: CLINIC | Age: 79
DRG: 280 | End: 2024-01-01
Attending: INTERNAL MEDICINE
Payer: COMMERCIAL

## 2024-01-01 ENCOUNTER — APPOINTMENT (OUTPATIENT)
Dept: CT IMAGING | Facility: CLINIC | Age: 79
DRG: 698 | End: 2024-01-01
Attending: NURSE PRACTITIONER
Payer: COMMERCIAL

## 2024-01-01 ENCOUNTER — APPOINTMENT (OUTPATIENT)
Dept: OCCUPATIONAL THERAPY | Facility: CLINIC | Age: 79
DRG: 813 | End: 2024-01-01
Attending: INTERNAL MEDICINE
Payer: COMMERCIAL

## 2024-01-01 ENCOUNTER — HOSPITAL ENCOUNTER (OUTPATIENT)
Dept: WOUND CARE | Facility: CLINIC | Age: 79
Discharge: HOME | End: 2024-04-02
Attending: FAMILY MEDICINE | Admitting: FAMILY MEDICINE
Payer: COMMERCIAL

## 2024-01-01 ENCOUNTER — APPOINTMENT (OUTPATIENT)
Dept: ULTRASOUND IMAGING | Facility: CLINIC | Age: 79
DRG: 280 | End: 2024-01-01
Attending: INTERNAL MEDICINE
Payer: COMMERCIAL

## 2024-01-01 VITALS
OXYGEN SATURATION: 95 % | HEIGHT: 71 IN | WEIGHT: 228.6 LBS | DIASTOLIC BLOOD PRESSURE: 71 MMHG | HEART RATE: 70 BPM | BODY MASS INDEX: 32 KG/M2 | TEMPERATURE: 98 F | RESPIRATION RATE: 18 BRPM | SYSTOLIC BLOOD PRESSURE: 119 MMHG

## 2024-01-01 VITALS
BODY MASS INDEX: 32.06 KG/M2 | DIASTOLIC BLOOD PRESSURE: 72 MMHG | HEIGHT: 71 IN | WEIGHT: 229 LBS | RESPIRATION RATE: 16 BRPM | TEMPERATURE: 97.7 F | HEART RATE: 80 BPM | OXYGEN SATURATION: 94 % | SYSTOLIC BLOOD PRESSURE: 103 MMHG

## 2024-01-01 VITALS
RESPIRATION RATE: 18 BRPM | HEART RATE: 68 BPM | SYSTOLIC BLOOD PRESSURE: 114 MMHG | DIASTOLIC BLOOD PRESSURE: 71 MMHG | HEIGHT: 71 IN | OXYGEN SATURATION: 95 % | TEMPERATURE: 97.6 F | BODY MASS INDEX: 34.41 KG/M2

## 2024-01-01 VITALS
HEIGHT: 74 IN | HEART RATE: 75 BPM | RESPIRATION RATE: 16 BRPM | DIASTOLIC BLOOD PRESSURE: 60 MMHG | WEIGHT: 189.6 LBS | SYSTOLIC BLOOD PRESSURE: 95 MMHG | SYSTOLIC BLOOD PRESSURE: 109 MMHG | HEART RATE: 76 BPM | TEMPERATURE: 97.1 F | TEMPERATURE: 98.6 F | DIASTOLIC BLOOD PRESSURE: 63 MMHG | OXYGEN SATURATION: 97 % | BODY MASS INDEX: 24.33 KG/M2

## 2024-01-01 VITALS
DIASTOLIC BLOOD PRESSURE: 75 MMHG | RESPIRATION RATE: 18 BRPM | WEIGHT: 248 LBS | TEMPERATURE: 98.4 F | OXYGEN SATURATION: 96 % | HEIGHT: 71 IN | SYSTOLIC BLOOD PRESSURE: 127 MMHG | BODY MASS INDEX: 34.72 KG/M2 | HEART RATE: 75 BPM

## 2024-01-01 VITALS
HEIGHT: 71 IN | OXYGEN SATURATION: 93 % | RESPIRATION RATE: 16 BRPM | HEART RATE: 77 BPM | BODY MASS INDEX: 32 KG/M2 | DIASTOLIC BLOOD PRESSURE: 65 MMHG | WEIGHT: 228.6 LBS | SYSTOLIC BLOOD PRESSURE: 112 MMHG | TEMPERATURE: 98 F

## 2024-01-01 VITALS
HEIGHT: 71 IN | OXYGEN SATURATION: 96 % | OXYGEN SATURATION: 99 % | HEART RATE: 73 BPM | DIASTOLIC BLOOD PRESSURE: 65 MMHG | SYSTOLIC BLOOD PRESSURE: 101 MMHG | WEIGHT: 240 LBS | WEIGHT: 228.6 LBS | TEMPERATURE: 97.4 F | BODY MASS INDEX: 32.51 KG/M2 | SYSTOLIC BLOOD PRESSURE: 124 MMHG | HEIGHT: 72 IN | BODY MASS INDEX: 32 KG/M2 | HEART RATE: 79 BPM | DIASTOLIC BLOOD PRESSURE: 64 MMHG | RESPIRATION RATE: 18 BRPM

## 2024-01-01 VITALS
TEMPERATURE: 97.4 F | BODY MASS INDEX: 32 KG/M2 | HEART RATE: 72 BPM | WEIGHT: 228.6 LBS | HEIGHT: 71 IN | OXYGEN SATURATION: 94 % | SYSTOLIC BLOOD PRESSURE: 117 MMHG | RESPIRATION RATE: 18 BRPM | DIASTOLIC BLOOD PRESSURE: 65 MMHG

## 2024-01-01 VITALS
WEIGHT: 259 LBS | TEMPERATURE: 98.2 F | HEART RATE: 61 BPM | DIASTOLIC BLOOD PRESSURE: 73 MMHG | BODY MASS INDEX: 36.26 KG/M2 | SYSTOLIC BLOOD PRESSURE: 118 MMHG | HEIGHT: 71 IN | OXYGEN SATURATION: 97 % | RESPIRATION RATE: 17 BRPM

## 2024-01-01 VITALS
HEART RATE: 77 BPM | BODY MASS INDEX: 37.78 KG/M2 | HEIGHT: 71 IN | RESPIRATION RATE: 18 BRPM | DIASTOLIC BLOOD PRESSURE: 52 MMHG | SYSTOLIC BLOOD PRESSURE: 108 MMHG | WEIGHT: 269.84 LBS | TEMPERATURE: 97.8 F | OXYGEN SATURATION: 97 %

## 2024-01-01 VITALS
RESPIRATION RATE: 18 BRPM | TEMPERATURE: 97.7 F | OXYGEN SATURATION: 94 % | DIASTOLIC BLOOD PRESSURE: 73 MMHG | HEIGHT: 71 IN | HEART RATE: 70 BPM | BODY MASS INDEX: 36.68 KG/M2 | WEIGHT: 262 LBS | SYSTOLIC BLOOD PRESSURE: 123 MMHG

## 2024-01-01 VITALS
HEIGHT: 71 IN | SYSTOLIC BLOOD PRESSURE: 127 MMHG | RESPIRATION RATE: 18 BRPM | BODY MASS INDEX: 35.14 KG/M2 | WEIGHT: 251 LBS | TEMPERATURE: 97.8 F | OXYGEN SATURATION: 91 % | HEART RATE: 63 BPM | DIASTOLIC BLOOD PRESSURE: 73 MMHG

## 2024-01-01 VITALS — WEIGHT: 268 LBS | BODY MASS INDEX: 37.52 KG/M2 | HEIGHT: 71 IN

## 2024-01-01 VITALS
WEIGHT: 259 LBS | DIASTOLIC BLOOD PRESSURE: 73 MMHG | RESPIRATION RATE: 17 BRPM | TEMPERATURE: 98.2 F | OXYGEN SATURATION: 97 % | SYSTOLIC BLOOD PRESSURE: 118 MMHG | BODY MASS INDEX: 36.26 KG/M2 | HEART RATE: 61 BPM | HEIGHT: 71 IN

## 2024-01-01 VITALS
TEMPERATURE: 97.4 F | BODY MASS INDEX: 34.41 KG/M2 | HEART RATE: 68 BPM | DIASTOLIC BLOOD PRESSURE: 69 MMHG | HEIGHT: 71 IN | OXYGEN SATURATION: 96 % | SYSTOLIC BLOOD PRESSURE: 110 MMHG | RESPIRATION RATE: 18 BRPM

## 2024-01-01 VITALS
SYSTOLIC BLOOD PRESSURE: 117 MMHG | RESPIRATION RATE: 18 BRPM | HEIGHT: 71 IN | HEART RATE: 68 BPM | WEIGHT: 268 LBS | OXYGEN SATURATION: 90 % | TEMPERATURE: 98 F | BODY MASS INDEX: 37.52 KG/M2 | DIASTOLIC BLOOD PRESSURE: 74 MMHG

## 2024-01-01 VITALS
DIASTOLIC BLOOD PRESSURE: 78 MMHG | WEIGHT: 315 LBS | OXYGEN SATURATION: 96 % | BODY MASS INDEX: 46.65 KG/M2 | SYSTOLIC BLOOD PRESSURE: 130 MMHG | HEART RATE: 98 BPM | HEIGHT: 69 IN | TEMPERATURE: 97.9 F | RESPIRATION RATE: 18 BRPM

## 2024-01-01 VITALS
TEMPERATURE: 97.7 F | HEART RATE: 72 BPM | RESPIRATION RATE: 16 BRPM | BODY MASS INDEX: 36.36 KG/M2 | HEIGHT: 71 IN | SYSTOLIC BLOOD PRESSURE: 111 MMHG | DIASTOLIC BLOOD PRESSURE: 72 MMHG | WEIGHT: 259.7 LBS | OXYGEN SATURATION: 98 %

## 2024-01-01 VITALS
OXYGEN SATURATION: 97 % | BODY MASS INDEX: 32 KG/M2 | SYSTOLIC BLOOD PRESSURE: 112 MMHG | TEMPERATURE: 97.2 F | DIASTOLIC BLOOD PRESSURE: 72 MMHG | WEIGHT: 228.6 LBS | HEIGHT: 71 IN | HEART RATE: 78 BPM | RESPIRATION RATE: 18 BRPM

## 2024-01-01 VITALS
HEIGHT: 71 IN | HEART RATE: 68 BPM | DIASTOLIC BLOOD PRESSURE: 68 MMHG | RESPIRATION RATE: 18 BRPM | TEMPERATURE: 97.2 F | SYSTOLIC BLOOD PRESSURE: 109 MMHG | BODY MASS INDEX: 37.52 KG/M2 | OXYGEN SATURATION: 91 % | WEIGHT: 268 LBS

## 2024-01-01 VITALS
TEMPERATURE: 98.2 F | DIASTOLIC BLOOD PRESSURE: 54 MMHG | BODY MASS INDEX: 34.58 KG/M2 | RESPIRATION RATE: 17 BRPM | SYSTOLIC BLOOD PRESSURE: 115 MMHG | OXYGEN SATURATION: 95 % | WEIGHT: 247 LBS | HEART RATE: 79 BPM | HEIGHT: 71 IN

## 2024-01-01 VITALS — SYSTOLIC BLOOD PRESSURE: 90 MMHG | TEMPERATURE: 97.7 F | HEART RATE: 74 BPM | DIASTOLIC BLOOD PRESSURE: 60 MMHG

## 2024-01-01 VITALS
BODY MASS INDEX: 32.75 KG/M2 | WEIGHT: 233.9 LBS | SYSTOLIC BLOOD PRESSURE: 130 MMHG | RESPIRATION RATE: 16 BRPM | DIASTOLIC BLOOD PRESSURE: 84 MMHG | HEART RATE: 65 BPM | OXYGEN SATURATION: 97 % | TEMPERATURE: 97.5 F | HEIGHT: 71 IN

## 2024-01-01 VITALS
RESPIRATION RATE: 16 BRPM | BODY MASS INDEX: 26.61 KG/M2 | WEIGHT: 207.23 LBS | DIASTOLIC BLOOD PRESSURE: 64 MMHG | SYSTOLIC BLOOD PRESSURE: 105 MMHG | HEART RATE: 77 BPM | TEMPERATURE: 97.6 F | OXYGEN SATURATION: 95 %

## 2024-01-01 VITALS
RESPIRATION RATE: 18 BRPM | HEART RATE: 76 BPM | DIASTOLIC BLOOD PRESSURE: 62 MMHG | SYSTOLIC BLOOD PRESSURE: 100 MMHG | WEIGHT: 315 LBS | HEIGHT: 71 IN | BODY MASS INDEX: 44.1 KG/M2 | OXYGEN SATURATION: 94 % | TEMPERATURE: 98 F

## 2024-01-01 VITALS
BODY MASS INDEX: 48.55 KG/M2 | SYSTOLIC BLOOD PRESSURE: 129 MMHG | HEART RATE: 81 BPM | DIASTOLIC BLOOD PRESSURE: 68 MMHG | WEIGHT: 315 LBS | RESPIRATION RATE: 18 BRPM | OXYGEN SATURATION: 92 % | TEMPERATURE: 98.3 F

## 2024-01-01 VITALS
HEIGHT: 71 IN | TEMPERATURE: 97.7 F | RESPIRATION RATE: 18 BRPM | SYSTOLIC BLOOD PRESSURE: 102 MMHG | OXYGEN SATURATION: 96 % | BODY MASS INDEX: 32.75 KG/M2 | HEART RATE: 56 BPM | WEIGHT: 233.9 LBS | DIASTOLIC BLOOD PRESSURE: 62 MMHG

## 2024-01-01 VITALS
TEMPERATURE: 97.5 F | OXYGEN SATURATION: 90 % | HEART RATE: 80 BPM | BODY MASS INDEX: 46.21 KG/M2 | WEIGHT: 315 LBS | DIASTOLIC BLOOD PRESSURE: 70 MMHG | RESPIRATION RATE: 18 BRPM | SYSTOLIC BLOOD PRESSURE: 108 MMHG

## 2024-01-01 VITALS
HEART RATE: 72 BPM | DIASTOLIC BLOOD PRESSURE: 76 MMHG | OXYGEN SATURATION: 94 % | TEMPERATURE: 97.6 F | RESPIRATION RATE: 16 BRPM | HEIGHT: 71 IN | SYSTOLIC BLOOD PRESSURE: 117 MMHG | WEIGHT: 233.9 LBS | BODY MASS INDEX: 32.75 KG/M2

## 2024-01-01 VITALS
HEIGHT: 71 IN | OXYGEN SATURATION: 93 % | BODY MASS INDEX: 32 KG/M2 | HEART RATE: 73 BPM | WEIGHT: 228.6 LBS | DIASTOLIC BLOOD PRESSURE: 77 MMHG | SYSTOLIC BLOOD PRESSURE: 129 MMHG | TEMPERATURE: 97.8 F | RESPIRATION RATE: 16 BRPM

## 2024-01-01 VITALS
WEIGHT: 193.12 LBS | BODY MASS INDEX: 26.16 KG/M2 | RESPIRATION RATE: 16 BRPM | HEIGHT: 72 IN | TEMPERATURE: 98.7 F | DIASTOLIC BLOOD PRESSURE: 62 MMHG | OXYGEN SATURATION: 97 % | SYSTOLIC BLOOD PRESSURE: 96 MMHG | HEART RATE: 74 BPM

## 2024-01-01 VITALS
HEART RATE: 75 BPM | SYSTOLIC BLOOD PRESSURE: 110 MMHG | OXYGEN SATURATION: 97 % | HEIGHT: 71 IN | RESPIRATION RATE: 18 BRPM | WEIGHT: 246.7 LBS | DIASTOLIC BLOOD PRESSURE: 69 MMHG | BODY MASS INDEX: 34.54 KG/M2 | TEMPERATURE: 97.8 F

## 2024-01-01 VITALS
BODY MASS INDEX: 33.41 KG/M2 | TEMPERATURE: 96.8 F | RESPIRATION RATE: 16 BRPM | HEIGHT: 72 IN | HEART RATE: 97 BPM | WEIGHT: 246.7 LBS | SYSTOLIC BLOOD PRESSURE: 116 MMHG | DIASTOLIC BLOOD PRESSURE: 68 MMHG

## 2024-01-01 VITALS
TEMPERATURE: 97.3 F | HEART RATE: 77 BPM | RESPIRATION RATE: 18 BRPM | DIASTOLIC BLOOD PRESSURE: 83 MMHG | SYSTOLIC BLOOD PRESSURE: 123 MMHG | HEIGHT: 71 IN | BODY MASS INDEX: 36.26 KG/M2 | OXYGEN SATURATION: 94 % | WEIGHT: 259 LBS

## 2024-01-01 VITALS
WEIGHT: 247.1 LBS | SYSTOLIC BLOOD PRESSURE: 131 MMHG | OXYGEN SATURATION: 97 % | DIASTOLIC BLOOD PRESSURE: 55 MMHG | HEIGHT: 71 IN | RESPIRATION RATE: 16 BRPM | TEMPERATURE: 97.6 F | HEART RATE: 80 BPM | BODY MASS INDEX: 34.59 KG/M2

## 2024-01-01 VITALS — DIASTOLIC BLOOD PRESSURE: 75 MMHG | SYSTOLIC BLOOD PRESSURE: 119 MMHG | HEART RATE: 75 BPM | TEMPERATURE: 98 F

## 2024-01-01 VITALS
HEIGHT: 71 IN | SYSTOLIC BLOOD PRESSURE: 112 MMHG | TEMPERATURE: 97.8 F | WEIGHT: 247 LBS | BODY MASS INDEX: 34.58 KG/M2 | RESPIRATION RATE: 18 BRPM | OXYGEN SATURATION: 96 % | HEART RATE: 85 BPM | DIASTOLIC BLOOD PRESSURE: 73 MMHG

## 2024-01-01 VITALS
HEART RATE: 77 BPM | TEMPERATURE: 96.8 F | SYSTOLIC BLOOD PRESSURE: 108 MMHG | RESPIRATION RATE: 16 BRPM | DIASTOLIC BLOOD PRESSURE: 67 MMHG

## 2024-01-01 VITALS — TEMPERATURE: 97.4 F | HEART RATE: 97 BPM | DIASTOLIC BLOOD PRESSURE: 87 MMHG | SYSTOLIC BLOOD PRESSURE: 127 MMHG

## 2024-01-01 VITALS — TEMPERATURE: 96.8 F | DIASTOLIC BLOOD PRESSURE: 74 MMHG | HEART RATE: 81 BPM | SYSTOLIC BLOOD PRESSURE: 110 MMHG

## 2024-01-01 VITALS
SYSTOLIC BLOOD PRESSURE: 93 MMHG | OXYGEN SATURATION: 95 % | DIASTOLIC BLOOD PRESSURE: 54 MMHG | BODY MASS INDEX: 44.1 KG/M2 | HEIGHT: 71 IN | TEMPERATURE: 98 F | WEIGHT: 315 LBS | HEART RATE: 76 BPM | RESPIRATION RATE: 18 BRPM

## 2024-01-01 VITALS
TEMPERATURE: 97.8 F | OXYGEN SATURATION: 96 % | RESPIRATION RATE: 18 BRPM | SYSTOLIC BLOOD PRESSURE: 109 MMHG | BODY MASS INDEX: 32 KG/M2 | DIASTOLIC BLOOD PRESSURE: 72 MMHG | HEIGHT: 71 IN | HEART RATE: 68 BPM | WEIGHT: 228.6 LBS

## 2024-01-01 VITALS
OXYGEN SATURATION: 94 % | HEIGHT: 71 IN | BODY MASS INDEX: 37.66 KG/M2 | SYSTOLIC BLOOD PRESSURE: 116 MMHG | TEMPERATURE: 98.3 F | RESPIRATION RATE: 18 BRPM | DIASTOLIC BLOOD PRESSURE: 62 MMHG | HEART RATE: 79 BPM | WEIGHT: 269 LBS

## 2024-01-01 DIAGNOSIS — Z95.2 H/O MITRAL VALVE REPLACEMENT WITH MECHANICAL VALVE: ICD-10-CM

## 2024-01-01 DIAGNOSIS — E87.6 HYPOKALEMIA: ICD-10-CM

## 2024-01-01 DIAGNOSIS — K22.11 ULCER OF ESOPHAGUS WITH BLEEDING: ICD-10-CM

## 2024-01-01 DIAGNOSIS — D64.9 ANEMIA, UNSPECIFIED TYPE: ICD-10-CM

## 2024-01-01 DIAGNOSIS — Z95.2 HX OF MITRAL VALVE REPLACEMENT WITH MECHANICAL VALVE: ICD-10-CM

## 2024-01-01 DIAGNOSIS — I50.9 ACUTE ON CHRONIC CONGESTIVE HEART FAILURE, UNSPECIFIED HEART FAILURE TYPE (H): ICD-10-CM

## 2024-01-01 DIAGNOSIS — R53.81 PHYSICAL DECONDITIONING: ICD-10-CM

## 2024-01-01 DIAGNOSIS — L89.154 STAGE IV PRESSURE ULCER OF SACRAL REGION (H): ICD-10-CM

## 2024-01-01 DIAGNOSIS — K59.01 SLOW TRANSIT CONSTIPATION: ICD-10-CM

## 2024-01-01 DIAGNOSIS — Z79.01 LONG TERM CURRENT USE OF ANTICOAGULANT THERAPY: Primary | Chronic | ICD-10-CM

## 2024-01-01 DIAGNOSIS — I48.20 CHRONIC ATRIAL FIBRILLATION (H): Chronic | ICD-10-CM

## 2024-01-01 DIAGNOSIS — A04.6: Primary | ICD-10-CM

## 2024-01-01 DIAGNOSIS — M86.9 OSTEOMYELITIS OF OTHER SITE, UNSPECIFIED TYPE (H): Primary | ICD-10-CM

## 2024-01-01 DIAGNOSIS — G89.29 CHRONIC PAIN OF BOTH KNEES: ICD-10-CM

## 2024-01-01 DIAGNOSIS — J18.9 PNEUMONIA OF RIGHT LUNG DUE TO INFECTIOUS ORGANISM, UNSPECIFIED PART OF LUNG: ICD-10-CM

## 2024-01-01 DIAGNOSIS — E66.09 CLASS 2 OBESITY DUE TO EXCESS CALORIES WITH BODY MASS INDEX (BMI) OF 36.0 TO 36.9 IN ADULT, UNSPECIFIED WHETHER SERIOUS COMORBIDITY PRESENT: ICD-10-CM

## 2024-01-01 DIAGNOSIS — I48.21 PERMANENT ATRIAL FIBRILLATION (H): ICD-10-CM

## 2024-01-01 DIAGNOSIS — E66.01 MORBID OBESITY DUE TO EXCESS CALORIES (H): ICD-10-CM

## 2024-01-01 DIAGNOSIS — Z95.2 HEART VALVE REPLACED: ICD-10-CM

## 2024-01-01 DIAGNOSIS — Z95.2 S/P MITRAL VALVE REPLACEMENT: Chronic | ICD-10-CM

## 2024-01-01 DIAGNOSIS — Z95.2 S/P MITRAL VALVE REPLACEMENT: ICD-10-CM

## 2024-01-01 DIAGNOSIS — I10 ESSENTIAL HYPERTENSION: ICD-10-CM

## 2024-01-01 DIAGNOSIS — T50.905A PILL ESOPHAGITIS: ICD-10-CM

## 2024-01-01 DIAGNOSIS — L89.153 PRESSURE INJURY OF COCCYGEAL REGION, STAGE 3 (H): ICD-10-CM

## 2024-01-01 DIAGNOSIS — J96.01 ACUTE RESPIRATORY FAILURE WITH HYPOXIA (H): ICD-10-CM

## 2024-01-01 DIAGNOSIS — L89.154 STAGE IV PRESSURE ULCER OF SACRAL REGION (H): Primary | ICD-10-CM

## 2024-01-01 DIAGNOSIS — T83.511A URINARY TRACT INFECTION ASSOCIATED WITH INDWELLING URETHRAL CATHETER, INITIAL ENCOUNTER (H): ICD-10-CM

## 2024-01-01 DIAGNOSIS — M46.28 OSTEOMYELITIS OF SACRUM (H): ICD-10-CM

## 2024-01-01 DIAGNOSIS — G93.40 ACUTE ENCEPHALOPATHY: ICD-10-CM

## 2024-01-01 DIAGNOSIS — N18.2 STAGE 2 CHRONIC KIDNEY DISEASE: ICD-10-CM

## 2024-01-01 DIAGNOSIS — G47.34 NOCTURNAL HYPOXIA: ICD-10-CM

## 2024-01-01 DIAGNOSIS — I11.0 HYPERTENSIVE HEART DISEASE WITH HEART FAILURE (H): Primary | ICD-10-CM

## 2024-01-01 DIAGNOSIS — E44.0 MODERATE MALNUTRITION (H): ICD-10-CM

## 2024-01-01 DIAGNOSIS — D62 ACUTE BLOOD LOSS ANEMIA: ICD-10-CM

## 2024-01-01 DIAGNOSIS — I27.20 PULMONARY HYPERTENSION (H): ICD-10-CM

## 2024-01-01 DIAGNOSIS — M86.9 OSTEOMYELITIS OF OTHER SITE, UNSPECIFIED TYPE (H): ICD-10-CM

## 2024-01-01 DIAGNOSIS — I11.0 HYPERTENSIVE HEART DISEASE WITH HEART FAILURE (H): ICD-10-CM

## 2024-01-01 DIAGNOSIS — R79.1 SUPRATHERAPEUTIC INR: ICD-10-CM

## 2024-01-01 DIAGNOSIS — U07.1 COVID-19: ICD-10-CM

## 2024-01-01 DIAGNOSIS — E78.5 DYSLIPIDEMIA: ICD-10-CM

## 2024-01-01 DIAGNOSIS — I50.42 CHRONIC COMBINED SYSTOLIC (CONGESTIVE) AND DIASTOLIC (CONGESTIVE) HEART FAILURE (H): ICD-10-CM

## 2024-01-01 DIAGNOSIS — Z78.9 IMPAIRED MOBILITY AND ACTIVITIES OF DAILY LIVING: ICD-10-CM

## 2024-01-01 DIAGNOSIS — K20.80 PILL ESOPHAGITIS: ICD-10-CM

## 2024-01-01 DIAGNOSIS — K21.9 GASTROESOPHAGEAL REFLUX DISEASE, UNSPECIFIED WHETHER ESOPHAGITIS PRESENT: ICD-10-CM

## 2024-01-01 DIAGNOSIS — D62 ANEMIA DUE TO BLOOD LOSS, ACUTE: ICD-10-CM

## 2024-01-01 DIAGNOSIS — R79.83 ABNORMAL FINDINGS OF BLOOD AMINO-ACID LEVEL: ICD-10-CM

## 2024-01-01 DIAGNOSIS — R60.9 EDEMA, UNSPECIFIED TYPE: ICD-10-CM

## 2024-01-01 DIAGNOSIS — Z79.01 LONG TERM (CURRENT) USE OF ANTICOAGULANTS: ICD-10-CM

## 2024-01-01 DIAGNOSIS — E61.1 IRON DEFICIENCY: ICD-10-CM

## 2024-01-01 DIAGNOSIS — K92.2 UPPER GI BLEED: Primary | ICD-10-CM

## 2024-01-01 DIAGNOSIS — Z51.81 ENCOUNTER FOR THERAPEUTIC DRUG MONITORING: Primary | ICD-10-CM

## 2024-01-01 DIAGNOSIS — Z79.01 LONG TERM CURRENT USE OF ANTICOAGULANT THERAPY: Primary | ICD-10-CM

## 2024-01-01 DIAGNOSIS — D72.819 LEUKOPENIA, UNSPECIFIED TYPE: ICD-10-CM

## 2024-01-01 DIAGNOSIS — L89.150 PRESSURE INJURY OF COCCYGEAL REGION, UNSTAGEABLE (H): Primary | ICD-10-CM

## 2024-01-01 DIAGNOSIS — I50.9 ACUTE ON CHRONIC CONGESTIVE HEART FAILURE, UNSPECIFIED HEART FAILURE TYPE (H): Primary | ICD-10-CM

## 2024-01-01 DIAGNOSIS — I50.33 ACUTE ON CHRONIC HEART FAILURE WITH PRESERVED EJECTION FRACTION (H): ICD-10-CM

## 2024-01-01 DIAGNOSIS — I50.32 CHRONIC HEART FAILURE WITH PRESERVED EJECTION FRACTION (H): ICD-10-CM

## 2024-01-01 DIAGNOSIS — R33.9 URINARY RETENTION: ICD-10-CM

## 2024-01-01 DIAGNOSIS — I50.32 CHRONIC HEART FAILURE WITH PRESERVED EJECTION FRACTION (HFPEF) (H): ICD-10-CM

## 2024-01-01 DIAGNOSIS — Z79.01 CHRONIC ANTICOAGULATION: ICD-10-CM

## 2024-01-01 DIAGNOSIS — Z09 HOSPITAL DISCHARGE FOLLOW-UP: ICD-10-CM

## 2024-01-01 DIAGNOSIS — Z00.01 ENCOUNTER FOR GENERAL ADULT MEDICAL EXAMINATION WITH ABNORMAL FINDINGS: ICD-10-CM

## 2024-01-01 DIAGNOSIS — R19.5 POSITIVE OCCULT STOOL BLOOD TEST: ICD-10-CM

## 2024-01-01 DIAGNOSIS — N18.9 CHRONIC KIDNEY DISEASE, UNSPECIFIED: ICD-10-CM

## 2024-01-01 DIAGNOSIS — R19.7 DIARRHEA, UNSPECIFIED TYPE: ICD-10-CM

## 2024-01-01 DIAGNOSIS — M46.28 OSTEOMYELITIS OF SACRUM (H): Primary | ICD-10-CM

## 2024-01-01 DIAGNOSIS — D64.9 ANEMIA, UNSPECIFIED: ICD-10-CM

## 2024-01-01 DIAGNOSIS — E66.812 CLASS 2 OBESITY DUE TO EXCESS CALORIES WITH BODY MASS INDEX (BMI) OF 36.0 TO 36.9 IN ADULT, UNSPECIFIED WHETHER SERIOUS COMORBIDITY PRESENT: ICD-10-CM

## 2024-01-01 DIAGNOSIS — A04.0 ENTERITIS, ENTEROPATHOGENIC E. COLI: Primary | ICD-10-CM

## 2024-01-01 DIAGNOSIS — M17.0 PRIMARY OSTEOARTHRITIS OF BOTH KNEES: Primary | ICD-10-CM

## 2024-01-01 DIAGNOSIS — N18.2 CHRONIC KIDNEY DISEASE, STAGE 2 (MILD): Chronic | ICD-10-CM

## 2024-01-01 DIAGNOSIS — E87.6 HYPOKALEMIA: Primary | ICD-10-CM

## 2024-01-01 DIAGNOSIS — E66.01 SEVERE OBESITY (BMI 35.0-39.9) WITH COMORBIDITY (H): ICD-10-CM

## 2024-01-01 DIAGNOSIS — R06.02 SHORTNESS OF BREATH: ICD-10-CM

## 2024-01-01 DIAGNOSIS — I50.32 CHRONIC DIASTOLIC (CONGESTIVE) HEART FAILURE (H): ICD-10-CM

## 2024-01-01 DIAGNOSIS — M17.0 PRIMARY OSTEOARTHRITIS OF BOTH KNEES: ICD-10-CM

## 2024-01-01 DIAGNOSIS — E66.01 CLASS 2 SEVERE OBESITY WITH SERIOUS COMORBIDITY AND BODY MASS INDEX (BMI) OF 35.0 TO 35.9 IN ADULT, UNSPECIFIED OBESITY TYPE (H): ICD-10-CM

## 2024-01-01 DIAGNOSIS — I07.1 SEVERE TRICUSPID REGURGITATION: ICD-10-CM

## 2024-01-01 DIAGNOSIS — I95.9 HYPOTENSION, UNSPECIFIED HYPOTENSION TYPE: ICD-10-CM

## 2024-01-01 DIAGNOSIS — Z79.01 LONG TERM CURRENT USE OF ANTICOAGULANT THERAPY: Chronic | ICD-10-CM

## 2024-01-01 DIAGNOSIS — S31.000A WOUND OF SACRAL REGION, INITIAL ENCOUNTER: ICD-10-CM

## 2024-01-01 DIAGNOSIS — H93.8X2 CONGESTION OF LEFT EAR: ICD-10-CM

## 2024-01-01 DIAGNOSIS — I50.9 HEART FAILURE, UNSPECIFIED (H): ICD-10-CM

## 2024-01-01 DIAGNOSIS — I10 ESSENTIAL (PRIMARY) HYPERTENSION: ICD-10-CM

## 2024-01-01 DIAGNOSIS — L89.154 PRESSURE INJURY OF SACRAL REGION, STAGE 4 (H): ICD-10-CM

## 2024-01-01 DIAGNOSIS — E66.811 OBESITY (BMI 30.0-34.9): ICD-10-CM

## 2024-01-01 DIAGNOSIS — L89.154 PRESSURE INJURY OF SACRAL REGION, STAGE 4 (H): Primary | ICD-10-CM

## 2024-01-01 DIAGNOSIS — I48.20 CHRONIC ATRIAL FIBRILLATION (H): ICD-10-CM

## 2024-01-01 DIAGNOSIS — I50.32 CHRONIC DIASTOLIC (CONGESTIVE) HEART FAILURE (H): Chronic | ICD-10-CM

## 2024-01-01 DIAGNOSIS — E44.0 MODERATE PROTEIN-CALORIE MALNUTRITION (H): ICD-10-CM

## 2024-01-01 DIAGNOSIS — R19.5 LOOSE STOOLS: Primary | ICD-10-CM

## 2024-01-01 DIAGNOSIS — N18.2 CKD (CHRONIC KIDNEY DISEASE) STAGE 2, GFR 60-89 ML/MIN: ICD-10-CM

## 2024-01-01 DIAGNOSIS — Z74.09 IMPAIRED MOBILITY AND ACTIVITIES OF DAILY LIVING: ICD-10-CM

## 2024-01-01 DIAGNOSIS — M62.81 MUSCLE WEAKNESS (GENERALIZED): ICD-10-CM

## 2024-01-01 DIAGNOSIS — R79.1 SUPRATHERAPEUTIC INR: Primary | ICD-10-CM

## 2024-01-01 DIAGNOSIS — K59.00 CONSTIPATION, UNSPECIFIED CONSTIPATION TYPE: ICD-10-CM

## 2024-01-01 DIAGNOSIS — E87.1 HYPONATREMIA: ICD-10-CM

## 2024-01-01 DIAGNOSIS — M25.562 CHRONIC PAIN OF BOTH KNEES: ICD-10-CM

## 2024-01-01 DIAGNOSIS — K21.9 GASTROESOPHAGEAL REFLUX DISEASE WITHOUT ESOPHAGITIS: Primary | ICD-10-CM

## 2024-01-01 DIAGNOSIS — I10 ESSENTIAL HYPERTENSION WITH GOAL BLOOD PRESSURE LESS THAN 140/90: ICD-10-CM

## 2024-01-01 DIAGNOSIS — Z11.1 ENCOUNTER FOR SCREENING FOR RESPIRATORY TUBERCULOSIS: ICD-10-CM

## 2024-01-01 DIAGNOSIS — R19.5 DARK STOOLS: ICD-10-CM

## 2024-01-01 DIAGNOSIS — D62 ABLA (ACUTE BLOOD LOSS ANEMIA): Primary | ICD-10-CM

## 2024-01-01 DIAGNOSIS — K92.2 UPPER GI BLEED: ICD-10-CM

## 2024-01-01 DIAGNOSIS — R06.2 WHEEZING: ICD-10-CM

## 2024-01-01 DIAGNOSIS — D69.1 QUALITATIVE PLATELET DEFECTS (H): ICD-10-CM

## 2024-01-01 DIAGNOSIS — I73.00 RAYNAUD'S SYNDROME WITHOUT GANGRENE: ICD-10-CM

## 2024-01-01 DIAGNOSIS — M17.10 ARTHRITIS OF KNEE: ICD-10-CM

## 2024-01-01 DIAGNOSIS — E78.5 HYPERLIPIDEMIA LDL GOAL <130: ICD-10-CM

## 2024-01-01 DIAGNOSIS — I50.33 ACUTE ON CHRONIC DIASTOLIC HEART FAILURE (H): Primary | ICD-10-CM

## 2024-01-01 DIAGNOSIS — E66.811 CLASS 1 OBESITY DUE TO EXCESS CALORIES WITH SERIOUS COMORBIDITY AND BODY MASS INDEX (BMI) OF 34.0 TO 34.9 IN ADULT: ICD-10-CM

## 2024-01-01 DIAGNOSIS — B37.2 CANDIDAL INTERTRIGO: ICD-10-CM

## 2024-01-01 DIAGNOSIS — L89.150 PRESSURE ULCER OF SACRAL REGION, UNSTAGEABLE (H): ICD-10-CM

## 2024-01-01 DIAGNOSIS — R41.89 COGNITIVE IMPAIRMENT: ICD-10-CM

## 2024-01-01 DIAGNOSIS — G89.29 CHRONIC LOW BACK PAIN WITHOUT SCIATICA, UNSPECIFIED BACK PAIN LATERALITY: Chronic | ICD-10-CM

## 2024-01-01 DIAGNOSIS — N17.9 AKI (ACUTE KIDNEY INJURY) (H): ICD-10-CM

## 2024-01-01 DIAGNOSIS — K92.2 GASTROINTESTINAL HEMORRHAGE, UNSPECIFIED GASTROINTESTINAL HEMORRHAGE TYPE: ICD-10-CM

## 2024-01-01 DIAGNOSIS — R00.1 BRADYCARDIA: ICD-10-CM

## 2024-01-01 DIAGNOSIS — I50.33 ACUTE ON CHRONIC DIASTOLIC (CONGESTIVE) HEART FAILURE (H): ICD-10-CM

## 2024-01-01 DIAGNOSIS — N39.0 URINARY TRACT INFECTION WITHOUT HEMATURIA, SITE UNSPECIFIED: Primary | ICD-10-CM

## 2024-01-01 DIAGNOSIS — R09.89 CHEST CONGESTION: ICD-10-CM

## 2024-01-01 DIAGNOSIS — D50.0 IRON DEFICIENCY ANEMIA DUE TO CHRONIC BLOOD LOSS: ICD-10-CM

## 2024-01-01 DIAGNOSIS — I48.91 ATRIAL FIBRILLATION (H): ICD-10-CM

## 2024-01-01 DIAGNOSIS — M25.561 CHRONIC PAIN OF BOTH KNEES: ICD-10-CM

## 2024-01-01 DIAGNOSIS — N39.0 URINARY TRACT INFECTION ASSOCIATED WITH INDWELLING URETHRAL CATHETER, INITIAL ENCOUNTER (H): ICD-10-CM

## 2024-01-01 DIAGNOSIS — I48.20 CHRONIC ATRIAL FIBRILLATION, UNSPECIFIED (H): ICD-10-CM

## 2024-01-01 DIAGNOSIS — E78.2 MIXED HYPERLIPIDEMIA: ICD-10-CM

## 2024-01-01 DIAGNOSIS — R15.9 INCONTINENCE OF FECES, UNSPECIFIED FECAL INCONTINENCE TYPE: ICD-10-CM

## 2024-01-01 DIAGNOSIS — R06.02 SOB (SHORTNESS OF BREATH): ICD-10-CM

## 2024-01-01 DIAGNOSIS — R79.1 ELEVATED INR: ICD-10-CM

## 2024-01-01 DIAGNOSIS — R33.8 BENIGN PROSTATIC HYPERPLASIA WITH URINARY RETENTION: ICD-10-CM

## 2024-01-01 DIAGNOSIS — R53.1 GENERALIZED WEAKNESS: ICD-10-CM

## 2024-01-01 DIAGNOSIS — M46.28 OSTEOMYELITIS OF VERTEBRA, SACRAL AND SACROCOCCYGEAL REGION (H): ICD-10-CM

## 2024-01-01 DIAGNOSIS — E87.3 ALKALOSIS: ICD-10-CM

## 2024-01-01 DIAGNOSIS — Z53.9 DIAGNOSIS NOT YET DEFINED: Primary | ICD-10-CM

## 2024-01-01 DIAGNOSIS — Z79.01 LONG TERM CURRENT USE OF ANTICOAGULANT THERAPY: ICD-10-CM

## 2024-01-01 DIAGNOSIS — Z95.2 S/P MITRAL VALVE REPLACEMENT: Primary | Chronic | ICD-10-CM

## 2024-01-01 DIAGNOSIS — N40.1 BENIGN PROSTATIC HYPERPLASIA WITH URINARY RETENTION: ICD-10-CM

## 2024-01-01 DIAGNOSIS — R19.7 DIARRHEA, UNSPECIFIED TYPE: Primary | ICD-10-CM

## 2024-01-01 DIAGNOSIS — E66.812 CLASS 2 SEVERE OBESITY WITH SERIOUS COMORBIDITY AND BODY MASS INDEX (BMI) OF 35.0 TO 35.9 IN ADULT, UNSPECIFIED OBESITY TYPE (H): ICD-10-CM

## 2024-01-01 DIAGNOSIS — L98.429 SKIN ULCER OF SACRUM, UNSPECIFIED ULCER STAGE (H): ICD-10-CM

## 2024-01-01 DIAGNOSIS — R09.02 HYPOXIA: Primary | ICD-10-CM

## 2024-01-01 DIAGNOSIS — R32 URINARY INCONTINENCE, UNSPECIFIED TYPE: ICD-10-CM

## 2024-01-01 DIAGNOSIS — I48.20 CHRONIC ATRIAL FIBRILLATION (H): Primary | ICD-10-CM

## 2024-01-01 DIAGNOSIS — N18.2 CHRONIC KIDNEY DISEASE, STAGE 2 (MILD): Primary | ICD-10-CM

## 2024-01-01 DIAGNOSIS — E66.09 CLASS 1 OBESITY DUE TO EXCESS CALORIES WITH SERIOUS COMORBIDITY AND BODY MASS INDEX (BMI) OF 34.0 TO 34.9 IN ADULT: ICD-10-CM

## 2024-01-01 DIAGNOSIS — Z12.5 SCREENING FOR PROSTATE CANCER: ICD-10-CM

## 2024-01-01 DIAGNOSIS — I95.1 ORTHOSTATIC HYPOTENSION: ICD-10-CM

## 2024-01-01 DIAGNOSIS — R79.1 ABNORMAL COAGULATION PROFILE: ICD-10-CM

## 2024-01-01 DIAGNOSIS — I25.10 ATHEROSCLEROTIC HEART DISEASE OF NATIVE CORONARY ARTERY WITHOUT ANGINA PECTORIS: ICD-10-CM

## 2024-01-01 DIAGNOSIS — Z51.5 PALLIATIVE CARE PATIENT: Primary | ICD-10-CM

## 2024-01-01 DIAGNOSIS — N17.9 ACUTE KIDNEY INJURY (H): ICD-10-CM

## 2024-01-01 DIAGNOSIS — M62.81 MUSCLE WEAKNESS (GENERALIZED): Chronic | ICD-10-CM

## 2024-01-01 DIAGNOSIS — M54.50 CHRONIC LOW BACK PAIN WITHOUT SCIATICA, UNSPECIFIED BACK PAIN LATERALITY: Chronic | ICD-10-CM

## 2024-01-01 DIAGNOSIS — R09.81 NASAL CONGESTION: ICD-10-CM

## 2024-01-01 LAB
ABO/RH(D): NORMAL
ACANTHOCYTES BLD QL SMEAR: ABNORMAL
ADV 40+41 DNA STL QL NAA+NON-PROBE: NEGATIVE
ADV 40+41 DNA STL QL NAA+NON-PROBE: NEGATIVE
ALBUMIN SERPL BCG-MCNC: 2.4 G/DL (ref 3.5–5.2)
ALBUMIN SERPL BCG-MCNC: 2.6 G/DL (ref 3.5–5.2)
ALBUMIN SERPL BCG-MCNC: 2.7 G/DL (ref 3.5–5.2)
ALBUMIN SERPL BCG-MCNC: 2.8 G/DL (ref 3.5–5.2)
ALBUMIN SERPL BCG-MCNC: 2.8 G/DL (ref 3.5–5.2)
ALBUMIN SERPL BCG-MCNC: 2.9 G/DL (ref 3.5–5.2)
ALBUMIN SERPL BCG-MCNC: 3.1 G/DL (ref 3.5–5.2)
ALBUMIN SERPL BCG-MCNC: 3.1 G/DL (ref 3.5–5.2)
ALBUMIN SERPL BCG-MCNC: 3.2 G/DL (ref 3.5–5.2)
ALBUMIN SERPL BCG-MCNC: 3.3 G/DL (ref 3.5–5.2)
ALBUMIN SERPL BCG-MCNC: 3.3 G/DL (ref 3.5–5.2)
ALBUMIN SERPL BCG-MCNC: 3.5 G/DL (ref 3.5–5.2)
ALBUMIN SERPL BCG-MCNC: 3.5 G/DL (ref 3.5–5.2)
ALBUMIN UR-MCNC: 20 MG/DL
ALBUMIN UR-MCNC: 200 MG/DL
ALBUMIN UR-MCNC: ABNORMAL G/DL
ALBUMIN UR-MCNC: ABNORMAL MG/DL
ALP SERPL-CCNC: 106 U/L (ref 40–150)
ALP SERPL-CCNC: 58 U/L (ref 40–150)
ALP SERPL-CCNC: 58 U/L (ref 40–150)
ALP SERPL-CCNC: 60 U/L (ref 40–150)
ALP SERPL-CCNC: 62 U/L (ref 40–150)
ALP SERPL-CCNC: 77 U/L (ref 40–150)
ALP SERPL-CCNC: 80 U/L (ref 40–150)
ALP SERPL-CCNC: 83 U/L (ref 40–150)
ALP SERPL-CCNC: 83 U/L (ref 40–150)
ALP SERPL-CCNC: 86 U/L (ref 40–150)
ALP SERPL-CCNC: 99 U/L (ref 40–150)
ALT SERPL W P-5'-P-CCNC: 13 U/L (ref 0–70)
ALT SERPL W P-5'-P-CCNC: 15 U/L (ref 0–70)
ALT SERPL W P-5'-P-CCNC: 22 U/L (ref 0–70)
ALT SERPL W P-5'-P-CCNC: 23 U/L (ref 0–70)
ALT SERPL W P-5'-P-CCNC: 5 U/L (ref 0–70)
ALT SERPL W P-5'-P-CCNC: 5 U/L (ref 0–70)
ALT SERPL W P-5'-P-CCNC: 6 U/L (ref 0–70)
ALT SERPL W P-5'-P-CCNC: 9 U/L (ref 0–70)
ALT SERPL W P-5'-P-CCNC: <5 U/L (ref 0–70)
AMMONIA PLAS-SCNC: 36 UMOL/L (ref 16–60)
ANION GAP SERPL CALCULATED.3IONS-SCNC: 10 MMOL/L (ref 7–15)
ANION GAP SERPL CALCULATED.3IONS-SCNC: 11 MMOL/L (ref 7–15)
ANION GAP SERPL CALCULATED.3IONS-SCNC: 12 MMOL/L (ref 7–15)
ANION GAP SERPL CALCULATED.3IONS-SCNC: 13 MMOL/L (ref 7–15)
ANION GAP SERPL CALCULATED.3IONS-SCNC: 13 MMOL/L (ref 7–15)
ANION GAP SERPL CALCULATED.3IONS-SCNC: 14 MMOL/L (ref 7–15)
ANION GAP SERPL CALCULATED.3IONS-SCNC: 16 MMOL/L (ref 7–15)
ANION GAP SERPL CALCULATED.3IONS-SCNC: 19 MMOL/L (ref 7–15)
ANION GAP SERPL CALCULATED.3IONS-SCNC: 2 MMOL/L (ref 7–15)
ANION GAP SERPL CALCULATED.3IONS-SCNC: 2 MMOL/L (ref 7–15)
ANION GAP SERPL CALCULATED.3IONS-SCNC: 24 MMOL/L (ref 7–15)
ANION GAP SERPL CALCULATED.3IONS-SCNC: 3 MMOL/L (ref 7–15)
ANION GAP SERPL CALCULATED.3IONS-SCNC: 4 MMOL/L (ref 7–15)
ANION GAP SERPL CALCULATED.3IONS-SCNC: 4 MMOL/L (ref 7–15)
ANION GAP SERPL CALCULATED.3IONS-SCNC: 5 MMOL/L (ref 7–15)
ANION GAP SERPL CALCULATED.3IONS-SCNC: 6 MMOL/L (ref 7–15)
ANION GAP SERPL CALCULATED.3IONS-SCNC: 7 MMOL/L (ref 7–15)
ANION GAP SERPL CALCULATED.3IONS-SCNC: 8 MMOL/L (ref 7–15)
ANION GAP SERPL CALCULATED.3IONS-SCNC: 9 MMOL/L (ref 7–15)
ANTIBODY SCREEN: NEGATIVE
APPEARANCE UR: ABNORMAL
APTT PPP: 85 SECONDS (ref 22–38)
AST SERPL W P-5'-P-CCNC: 17 U/L (ref 0–45)
AST SERPL W P-5'-P-CCNC: 17 U/L (ref 0–45)
AST SERPL W P-5'-P-CCNC: 18 U/L (ref 0–45)
AST SERPL W P-5'-P-CCNC: 19 U/L (ref 0–45)
AST SERPL W P-5'-P-CCNC: 21 U/L (ref 0–45)
AST SERPL W P-5'-P-CCNC: 21 U/L (ref 0–45)
AST SERPL W P-5'-P-CCNC: 27 U/L (ref 0–45)
AST SERPL W P-5'-P-CCNC: 29 U/L (ref 0–45)
AST SERPL W P-5'-P-CCNC: 36 U/L (ref 0–45)
AST SERPL W P-5'-P-CCNC: 37 U/L (ref 0–45)
AST SERPL W P-5'-P-CCNC: 47 U/L (ref 0–45)
ASTRO TYP 1-8 RNA STL QL NAA+NON-PROBE: NEGATIVE
ASTRO TYP 1-8 RNA STL QL NAA+NON-PROBE: NEGATIVE
ATRIAL RATE - MUSE: 77 BPM
ATRIAL RATE - MUSE: 85 BPM
ATRIAL RATE - MUSE: 88 BPM
ATRIAL RATE - MUSE: NORMAL BPM
AUER BODIES BLD QL SMEAR: ABNORMAL
BACTERIA #/AREA URNS HPF: ABNORMAL /HPF
BACTERIA #/AREA URNS HPF: ABNORMAL /HPF
BACTERIA ABSC ANAEROBE+AEROBE CULT: ABNORMAL
BACTERIA BLD CULT: NO GROWTH
BACTERIA TISS BX CULT: ABNORMAL
BACTERIA TISS BX CULT: ABNORMAL
BACTERIA TISS BX CULT: NORMAL
BACTERIA UR CULT: ABNORMAL
BACTERIA UR CULT: ABNORMAL
BACTERIA UR CULT: NO GROWTH
BACTERIA UR CULT: NO GROWTH
BASE EXCESS BLDV CALC-SCNC: -0.5 MMOL/L (ref -7.7–1.9)
BASE EXCESS BLDV CALC-SCNC: 19 MMOL/L (ref -3–3)
BASE EXCESS BLDV CALC-SCNC: 3.5 MMOL/L (ref -3–3)
BASE EXCESS BLDV CALC-SCNC: 4 MMOL/L (ref -3–3)
BASE EXCESS BLDV CALC-SCNC: 4.6 MMOL/L (ref -3–3)
BASE EXCESS BLDV CALC-SCNC: 7.5 MMOL/L (ref -3–3)
BASO STIPL BLD QL SMEAR: ABNORMAL
BASOPHILS # BLD AUTO: 0 10E3/UL (ref 0–0.2)
BASOPHILS NFR BLD AUTO: 0 %
BASOPHILS NFR BLD AUTO: 1 %
BILIRUB DIRECT SERPL-MCNC: 0.23 MG/DL (ref 0–0.3)
BILIRUB DIRECT SERPL-MCNC: 0.23 MG/DL (ref 0–0.3)
BILIRUB DIRECT SERPL-MCNC: 0.32 MG/DL (ref 0–0.3)
BILIRUB DIRECT SERPL-MCNC: 0.37 MG/DL (ref 0–0.3)
BILIRUB SERPL-MCNC: 0.5 MG/DL
BILIRUB SERPL-MCNC: 0.6 MG/DL
BILIRUB SERPL-MCNC: 0.7 MG/DL
BILIRUB SERPL-MCNC: 0.7 MG/DL
BILIRUB SERPL-MCNC: 1 MG/DL
BILIRUB UR QL STRIP: ABNORMAL
BILIRUB UR QL STRIP: NEGATIVE
BITE CELLS BLD QL SMEAR: ABNORMAL
BLD PROD TYP BPU: NORMAL
BLISTER CELLS BLD QL SMEAR: ABNORMAL
BLOOD COMPONENT TYPE: NORMAL
BUN SERPL-MCNC: 100.3 MG/DL (ref 8–23)
BUN SERPL-MCNC: 100.8 MG/DL (ref 8–23)
BUN SERPL-MCNC: 12.8 MG/DL (ref 8–23)
BUN SERPL-MCNC: 15 MG/DL (ref 8–23)
BUN SERPL-MCNC: 15.3 MG/DL (ref 8–23)
BUN SERPL-MCNC: 15.5 MG/DL (ref 8–23)
BUN SERPL-MCNC: 16.5 MG/DL (ref 8–23)
BUN SERPL-MCNC: 17 MG/DL (ref 8–23)
BUN SERPL-MCNC: 18.1 MG/DL (ref 8–23)
BUN SERPL-MCNC: 18.3 MG/DL (ref 8–23)
BUN SERPL-MCNC: 18.7 MG/DL (ref 8–23)
BUN SERPL-MCNC: 19.4 MG/DL (ref 8–23)
BUN SERPL-MCNC: 19.7 MG/DL (ref 8–23)
BUN SERPL-MCNC: 19.8 MG/DL (ref 8–23)
BUN SERPL-MCNC: 20.7 MG/DL (ref 8–23)
BUN SERPL-MCNC: 21.3 MG/DL (ref 8–23)
BUN SERPL-MCNC: 21.9 MG/DL (ref 8–23)
BUN SERPL-MCNC: 22.2 MG/DL (ref 8–23)
BUN SERPL-MCNC: 22.8 MG/DL (ref 8–23)
BUN SERPL-MCNC: 23.9 MG/DL (ref 8–23)
BUN SERPL-MCNC: 24.5 MG/DL (ref 8–23)
BUN SERPL-MCNC: 25.6 MG/DL (ref 8–23)
BUN SERPL-MCNC: 25.6 MG/DL (ref 8–23)
BUN SERPL-MCNC: 26.1 MG/DL (ref 8–23)
BUN SERPL-MCNC: 26.3 MG/DL (ref 8–23)
BUN SERPL-MCNC: 26.7 MG/DL (ref 8–23)
BUN SERPL-MCNC: 27 MG/DL (ref 8–23)
BUN SERPL-MCNC: 28.7 MG/DL (ref 8–23)
BUN SERPL-MCNC: 29.1 MG/DL (ref 8–23)
BUN SERPL-MCNC: 29.2 MG/DL (ref 8–23)
BUN SERPL-MCNC: 29.7 MG/DL (ref 8–23)
BUN SERPL-MCNC: 30.4 MG/DL (ref 8–23)
BUN SERPL-MCNC: 30.7 MG/DL (ref 8–23)
BUN SERPL-MCNC: 31.6 MG/DL (ref 8–23)
BUN SERPL-MCNC: 32.4 MG/DL (ref 8–23)
BUN SERPL-MCNC: 33.2 MG/DL (ref 8–23)
BUN SERPL-MCNC: 33.2 MG/DL (ref 8–23)
BUN SERPL-MCNC: 33.8 MG/DL (ref 8–23)
BUN SERPL-MCNC: 34.3 MG/DL (ref 8–23)
BUN SERPL-MCNC: 34.5 MG/DL (ref 8–23)
BUN SERPL-MCNC: 34.8 MG/DL (ref 8–23)
BUN SERPL-MCNC: 36.8 MG/DL (ref 8–23)
BUN SERPL-MCNC: 37.9 MG/DL (ref 8–23)
BUN SERPL-MCNC: 38.2 MG/DL (ref 8–23)
BUN SERPL-MCNC: 38.6 MG/DL (ref 8–23)
BUN SERPL-MCNC: 39.9 MG/DL (ref 8–23)
BUN SERPL-MCNC: 40.1 MG/DL (ref 8–23)
BUN SERPL-MCNC: 40.4 MG/DL (ref 8–23)
BUN SERPL-MCNC: 40.5 MG/DL (ref 8–23)
BUN SERPL-MCNC: 41.7 MG/DL (ref 8–23)
BUN SERPL-MCNC: 44.7 MG/DL (ref 8–23)
BUN SERPL-MCNC: 45.9 MG/DL (ref 8–23)
BUN SERPL-MCNC: 46 MG/DL (ref 8–23)
BUN SERPL-MCNC: 47.2 MG/DL (ref 8–23)
BUN SERPL-MCNC: 49.6 MG/DL (ref 8–23)
BUN SERPL-MCNC: 50.3 MG/DL (ref 8–23)
BUN SERPL-MCNC: 50.8 MG/DL (ref 8–23)
BUN SERPL-MCNC: 51.9 MG/DL (ref 8–23)
BUN SERPL-MCNC: 55 MG/DL (ref 8–23)
BUN SERPL-MCNC: 55.9 MG/DL (ref 8–23)
BUN SERPL-MCNC: 57.6 MG/DL (ref 8–23)
BUN SERPL-MCNC: 59.4 MG/DL (ref 8–23)
BUN SERPL-MCNC: 59.4 MG/DL (ref 8–23)
BUN SERPL-MCNC: 60.1 MG/DL (ref 8–23)
BUN SERPL-MCNC: 66.3 MG/DL (ref 8–23)
BUN SERPL-MCNC: 77 MG/DL (ref 8–23)
BUN SERPL-MCNC: 84.7 MG/DL (ref 8–23)
BUN SERPL-MCNC: 92.8 MG/DL (ref 8–23)
BUN SERPL-MCNC: 96.3 MG/DL (ref 8–23)
BUN SERPL-MCNC: 96.8 MG/DL (ref 8–23)
BURR CELLS BLD QL SMEAR: ABNORMAL
C CAYETANENSIS DNA STL QL NAA+NON-PROBE: NEGATIVE
C CAYETANENSIS DNA STL QL NAA+NON-PROBE: NEGATIVE
C DIFF TOX B STL QL: NEGATIVE
CA-I BLD-MCNC: 4.8 MG/DL (ref 4.4–5.2)
CALCIUM SERPL-MCNC: 7.7 MG/DL (ref 8.8–10.4)
CALCIUM SERPL-MCNC: 7.8 MG/DL (ref 8.8–10.4)
CALCIUM SERPL-MCNC: 7.9 MG/DL (ref 8.8–10.4)
CALCIUM SERPL-MCNC: 8.2 MG/DL (ref 8.8–10.2)
CALCIUM SERPL-MCNC: 8.3 MG/DL (ref 8.8–10.2)
CALCIUM SERPL-MCNC: 8.4 MG/DL (ref 8.8–10.2)
CALCIUM SERPL-MCNC: 8.4 MG/DL (ref 8.8–10.4)
CALCIUM SERPL-MCNC: 8.5 MG/DL (ref 8.8–10.2)
CALCIUM SERPL-MCNC: 8.5 MG/DL (ref 8.8–10.2)
CALCIUM SERPL-MCNC: 8.5 MG/DL (ref 8.8–10.4)
CALCIUM SERPL-MCNC: 8.6 MG/DL (ref 8.8–10.2)
CALCIUM SERPL-MCNC: 8.7 MG/DL (ref 8.8–10.2)
CALCIUM SERPL-MCNC: 8.7 MG/DL (ref 8.8–10.4)
CALCIUM SERPL-MCNC: 8.8 MG/DL (ref 8.8–10.2)
CALCIUM SERPL-MCNC: 8.8 MG/DL (ref 8.8–10.4)
CALCIUM SERPL-MCNC: 8.9 MG/DL (ref 8.8–10.2)
CALCIUM SERPL-MCNC: 9 MG/DL (ref 8.8–10.2)
CALCIUM SERPL-MCNC: 9 MG/DL (ref 8.8–10.2)
CALCIUM SERPL-MCNC: 9.1 MG/DL (ref 8.8–10.2)
CALCIUM SERPL-MCNC: 9.2 MG/DL (ref 8.8–10.2)
CALCIUM SERPL-MCNC: 9.2 MG/DL (ref 8.8–10.4)
CALCIUM SERPL-MCNC: 9.3 MG/DL (ref 8.8–10.2)
CALCIUM SERPL-MCNC: 9.4 MG/DL (ref 8.8–10.2)
CALCIUM SERPL-MCNC: 9.5 MG/DL (ref 8.8–10.2)
CALCIUM SERPL-MCNC: 9.5 MG/DL (ref 8.8–10.2)
CALCIUM SERPL-MCNC: 9.6 MG/DL (ref 8.8–10.2)
CAMPYLOBACTER DNA SPEC NAA+PROBE: NEGATIVE
CAMPYLOBACTER DNA SPEC NAA+PROBE: NEGATIVE
CHLORIDE SERPL-SCNC: 101 MMOL/L (ref 98–107)
CHLORIDE SERPL-SCNC: 102 MMOL/L (ref 98–107)
CHLORIDE SERPL-SCNC: 102 MMOL/L (ref 98–107)
CHLORIDE SERPL-SCNC: 104 MMOL/L (ref 98–107)
CHLORIDE SERPL-SCNC: 106 MMOL/L (ref 98–107)
CHLORIDE SERPL-SCNC: 106 MMOL/L (ref 98–107)
CHLORIDE SERPL-SCNC: 107 MMOL/L (ref 98–107)
CHLORIDE SERPL-SCNC: 107 MMOL/L (ref 98–107)
CHLORIDE SERPL-SCNC: 108 MMOL/L (ref 98–107)
CHLORIDE SERPL-SCNC: 108 MMOL/L (ref 98–107)
CHLORIDE SERPL-SCNC: 109 MMOL/L (ref 98–107)
CHLORIDE SERPL-SCNC: 109 MMOL/L (ref 98–107)
CHLORIDE SERPL-SCNC: 110 MMOL/L (ref 98–107)
CHLORIDE SERPL-SCNC: 75 MMOL/L (ref 98–107)
CHLORIDE SERPL-SCNC: 78 MMOL/L (ref 98–107)
CHLORIDE SERPL-SCNC: 82 MMOL/L (ref 98–107)
CHLORIDE SERPL-SCNC: 85 MMOL/L (ref 98–107)
CHLORIDE SERPL-SCNC: 86 MMOL/L (ref 98–107)
CHLORIDE SERPL-SCNC: 88 MMOL/L (ref 98–107)
CHLORIDE SERPL-SCNC: 89 MMOL/L (ref 98–107)
CHLORIDE SERPL-SCNC: 90 MMOL/L (ref 98–107)
CHLORIDE SERPL-SCNC: 91 MMOL/L (ref 98–107)
CHLORIDE SERPL-SCNC: 92 MMOL/L (ref 98–107)
CHLORIDE SERPL-SCNC: 93 MMOL/L (ref 98–107)
CHLORIDE SERPL-SCNC: 94 MMOL/L (ref 98–107)
CHLORIDE SERPL-SCNC: 95 MMOL/L (ref 98–107)
CHLORIDE SERPL-SCNC: 96 MMOL/L (ref 98–107)
CHLORIDE SERPL-SCNC: 97 MMOL/L (ref 98–107)
CHLORIDE SERPL-SCNC: 97 MMOL/L (ref 98–107)
CHLORIDE SERPL-SCNC: 98 MMOL/L (ref 98–107)
CHLORIDE SERPL-SCNC: 99 MMOL/L (ref 98–107)
CHLORIDE UR-SCNC: <20 MMOL/L
CHOLEST SERPL-MCNC: 53 MG/DL
CODING SYSTEM: NORMAL
COLONOSCOPY: NORMAL
COLOR UR AUTO: ABNORMAL
COLOR UR AUTO: ABNORMAL
COLOR UR AUTO: YELLOW
COLOR UR AUTO: YELLOW
CORTIS SERPL-MCNC: 16.4 UG/DL
CORTIS SERPL-MCNC: 26.1 UG/DL
CREAT SERPL-MCNC: 0.52 MG/DL (ref 0.67–1.17)
CREAT SERPL-MCNC: 0.55 MG/DL (ref 0.67–1.17)
CREAT SERPL-MCNC: 0.57 MG/DL (ref 0.67–1.17)
CREAT SERPL-MCNC: 0.57 MG/DL (ref 0.67–1.17)
CREAT SERPL-MCNC: 0.6 MG/DL (ref 0.67–1.17)
CREAT SERPL-MCNC: 0.61 MG/DL (ref 0.67–1.17)
CREAT SERPL-MCNC: 0.66 MG/DL (ref 0.67–1.17)
CREAT SERPL-MCNC: 0.76 MG/DL (ref 0.67–1.17)
CREAT SERPL-MCNC: 0.77 MG/DL (ref 0.67–1.17)
CREAT SERPL-MCNC: 0.77 MG/DL (ref 0.67–1.17)
CREAT SERPL-MCNC: 0.78 MG/DL (ref 0.67–1.17)
CREAT SERPL-MCNC: 0.78 MG/DL (ref 0.67–1.17)
CREAT SERPL-MCNC: 0.79 MG/DL (ref 0.67–1.17)
CREAT SERPL-MCNC: 0.8 MG/DL (ref 0.67–1.17)
CREAT SERPL-MCNC: 0.8 MG/DL (ref 0.67–1.17)
CREAT SERPL-MCNC: 0.82 MG/DL (ref 0.67–1.17)
CREAT SERPL-MCNC: 0.82 MG/DL (ref 0.67–1.17)
CREAT SERPL-MCNC: 0.84 MG/DL (ref 0.67–1.17)
CREAT SERPL-MCNC: 0.85 MG/DL (ref 0.67–1.17)
CREAT SERPL-MCNC: 0.87 MG/DL (ref 0.67–1.17)
CREAT SERPL-MCNC: 0.88 MG/DL (ref 0.67–1.17)
CREAT SERPL-MCNC: 0.89 MG/DL (ref 0.67–1.17)
CREAT SERPL-MCNC: 0.91 MG/DL (ref 0.67–1.17)
CREAT SERPL-MCNC: 0.92 MG/DL (ref 0.67–1.17)
CREAT SERPL-MCNC: 0.92 MG/DL (ref 0.67–1.17)
CREAT SERPL-MCNC: 0.94 MG/DL (ref 0.67–1.17)
CREAT SERPL-MCNC: 0.94 MG/DL (ref 0.67–1.17)
CREAT SERPL-MCNC: 0.95 MG/DL (ref 0.67–1.17)
CREAT SERPL-MCNC: 0.96 MG/DL (ref 0.67–1.17)
CREAT SERPL-MCNC: 0.96 MG/DL (ref 0.67–1.17)
CREAT SERPL-MCNC: 0.97 MG/DL (ref 0.67–1.17)
CREAT SERPL-MCNC: 0.98 MG/DL (ref 0.67–1.17)
CREAT SERPL-MCNC: 0.98 MG/DL (ref 0.67–1.17)
CREAT SERPL-MCNC: 0.99 MG/DL (ref 0.67–1.17)
CREAT SERPL-MCNC: 1 MG/DL (ref 0.67–1.17)
CREAT SERPL-MCNC: 1.01 MG/DL (ref 0.67–1.17)
CREAT SERPL-MCNC: 1.01 MG/DL (ref 0.67–1.17)
CREAT SERPL-MCNC: 1.02 MG/DL (ref 0.67–1.17)
CREAT SERPL-MCNC: 1.04 MG/DL (ref 0.67–1.17)
CREAT SERPL-MCNC: 1.06 MG/DL (ref 0.67–1.17)
CREAT SERPL-MCNC: 1.07 MG/DL (ref 0.67–1.17)
CREAT SERPL-MCNC: 1.11 MG/DL (ref 0.67–1.17)
CREAT SERPL-MCNC: 1.11 MG/DL (ref 0.67–1.17)
CREAT SERPL-MCNC: 1.12 MG/DL (ref 0.67–1.17)
CREAT SERPL-MCNC: 1.13 MG/DL (ref 0.67–1.17)
CREAT SERPL-MCNC: 1.14 MG/DL (ref 0.67–1.17)
CREAT SERPL-MCNC: 1.14 MG/DL (ref 0.67–1.17)
CREAT SERPL-MCNC: 1.15 MG/DL (ref 0.67–1.17)
CREAT SERPL-MCNC: 1.17 MG/DL (ref 0.67–1.17)
CREAT SERPL-MCNC: 1.18 MG/DL (ref 0.67–1.17)
CREAT SERPL-MCNC: 1.25 MG/DL (ref 0.67–1.17)
CREAT SERPL-MCNC: 1.25 MG/DL (ref 0.67–1.17)
CREAT SERPL-MCNC: 1.29 MG/DL (ref 0.67–1.17)
CREAT SERPL-MCNC: 1.37 MG/DL (ref 0.67–1.17)
CREAT SERPL-MCNC: 1.38 MG/DL (ref 0.67–1.17)
CREAT SERPL-MCNC: 1.5 MG/DL (ref 0.67–1.17)
CREAT SERPL-MCNC: 1.59 MG/DL (ref 0.67–1.17)
CREAT SERPL-MCNC: 1.71 MG/DL (ref 0.67–1.17)
CREAT SERPL-MCNC: 1.72 MG/DL (ref 0.67–1.17)
CREAT SERPL-MCNC: 1.76 MG/DL (ref 0.67–1.17)
CREAT SERPL-MCNC: 1.76 MG/DL (ref 0.67–1.17)
CREAT SERPL-MCNC: 1.82 MG/DL (ref 0.67–1.17)
CREAT SERPL-MCNC: 1.83 MG/DL (ref 0.67–1.17)
CREAT SERPL-MCNC: 1.96 MG/DL (ref 0.67–1.17)
CREAT SERPL-MCNC: 2.48 MG/DL (ref 0.67–1.17)
CREAT SERPL-MCNC: 2.83 MG/DL (ref 0.67–1.17)
CREAT SERPL-MCNC: 3.11 MG/DL (ref 0.67–1.17)
CREAT SERPL-MCNC: 3.14 MG/DL (ref 0.67–1.17)
CREAT UR-MCNC: 17.7 MG/DL
CREAT UR-MCNC: 36.4 MG/DL
CREAT UR-MCNC: 73.1 MG/DL
CROSSMATCH: NORMAL
CRP SERPL-MCNC: 103.16 MG/L
CRP SERPL-MCNC: 119.65 MG/L
CRP SERPL-MCNC: 132.55 MG/L
CRP SERPL-MCNC: 152.9 MG/L
CRP SERPL-MCNC: 158.89 MG/L
CRYPTOSP DNA STL QL NAA+NON-PROBE: NEGATIVE
CRYPTOSP DNA STL QL NAA+NON-PROBE: NEGATIVE
DACRYOCYTES BLD QL SMEAR: ABNORMAL
DEPRECATED HCO3 PLAS-SCNC: 18 MMOL/L (ref 22–29)
DEPRECATED HCO3 PLAS-SCNC: 18 MMOL/L (ref 22–29)
DEPRECATED HCO3 PLAS-SCNC: 20 MMOL/L (ref 22–29)
DEPRECATED HCO3 PLAS-SCNC: 21 MMOL/L (ref 22–29)
DEPRECATED HCO3 PLAS-SCNC: 22 MMOL/L (ref 22–29)
DEPRECATED HCO3 PLAS-SCNC: 23 MMOL/L (ref 22–29)
DEPRECATED HCO3 PLAS-SCNC: 23 MMOL/L (ref 22–29)
DEPRECATED HCO3 PLAS-SCNC: 24 MMOL/L (ref 22–29)
DEPRECATED HCO3 PLAS-SCNC: 27 MMOL/L (ref 22–29)
DEPRECATED HCO3 PLAS-SCNC: 28 MMOL/L (ref 22–29)
DEPRECATED HCO3 PLAS-SCNC: 29 MMOL/L (ref 22–29)
DEPRECATED HCO3 PLAS-SCNC: 30 MMOL/L (ref 22–29)
DEPRECATED HCO3 PLAS-SCNC: 31 MMOL/L (ref 22–29)
DEPRECATED HCO3 PLAS-SCNC: 32 MMOL/L (ref 22–29)
DEPRECATED HCO3 PLAS-SCNC: 32 MMOL/L (ref 22–29)
DEPRECATED HCO3 PLAS-SCNC: 33 MMOL/L (ref 22–29)
DEPRECATED HCO3 PLAS-SCNC: 34 MMOL/L (ref 22–29)
DEPRECATED HCO3 PLAS-SCNC: 34 MMOL/L (ref 22–29)
DEPRECATED HCO3 PLAS-SCNC: 35 MMOL/L (ref 22–29)
DEPRECATED HCO3 PLAS-SCNC: 38 MMOL/L (ref 22–29)
DEPRECATED HCO3 PLAS-SCNC: 38 MMOL/L (ref 22–29)
DEPRECATED HCO3 PLAS-SCNC: 39 MMOL/L (ref 22–29)
DEPRECATED HCO3 PLAS-SCNC: 40 MMOL/L (ref 22–29)
DEPRECATED HCO3 PLAS-SCNC: 41 MMOL/L (ref 22–29)
DEPRECATED HCO3 PLAS-SCNC: 42 MMOL/L (ref 22–29)
DEPRECATED HCO3 PLAS-SCNC: 43 MMOL/L (ref 22–29)
DIASTOLIC BLOOD PRESSURE - MUSE: NORMAL MMHG
E COLI O157 DNA STL QL NAA+NON-PROBE: ABNORMAL
E COLI O157 DNA STL QL NAA+NON-PROBE: ABNORMAL
E HISTOLYT DNA STL QL NAA+NON-PROBE: NEGATIVE
E HISTOLYT DNA STL QL NAA+NON-PROBE: NEGATIVE
EAEC ASTA GENE ISLT QL NAA+PROBE: NEGATIVE
EAEC ASTA GENE ISLT QL NAA+PROBE: NEGATIVE
EC STX1+STX2 GENES STL QL NAA+NON-PROBE: NEGATIVE
EC STX1+STX2 GENES STL QL NAA+NON-PROBE: NEGATIVE
EGFRCR SERPLBLD CKD-EPI 2021: 20 ML/MIN/1.73M2
EGFRCR SERPLBLD CKD-EPI 2021: 20 ML/MIN/1.73M2
EGFRCR SERPLBLD CKD-EPI 2021: 22 ML/MIN/1.73M2
EGFRCR SERPLBLD CKD-EPI 2021: 26 ML/MIN/1.73M2
EGFRCR SERPLBLD CKD-EPI 2021: 34 ML/MIN/1.73M2
EGFRCR SERPLBLD CKD-EPI 2021: 37 ML/MIN/1.73M2
EGFRCR SERPLBLD CKD-EPI 2021: 38 ML/MIN/1.73M2
EGFRCR SERPLBLD CKD-EPI 2021: 39 ML/MIN/1.73M2
EGFRCR SERPLBLD CKD-EPI 2021: 39 ML/MIN/1.73M2
EGFRCR SERPLBLD CKD-EPI 2021: 40 ML/MIN/1.73M2
EGFRCR SERPLBLD CKD-EPI 2021: 40 ML/MIN/1.73M2
EGFRCR SERPLBLD CKD-EPI 2021: 44 ML/MIN/1.73M2
EGFRCR SERPLBLD CKD-EPI 2021: 47 ML/MIN/1.73M2
EGFRCR SERPLBLD CKD-EPI 2021: 52 ML/MIN/1.73M2
EGFRCR SERPLBLD CKD-EPI 2021: 53 ML/MIN/1.73M2
EGFRCR SERPLBLD CKD-EPI 2021: 57 ML/MIN/1.73M2
EGFRCR SERPLBLD CKD-EPI 2021: 59 ML/MIN/1.73M2
EGFRCR SERPLBLD CKD-EPI 2021: 59 ML/MIN/1.73M2
EGFRCR SERPLBLD CKD-EPI 2021: 63 ML/MIN/1.73M2
EGFRCR SERPLBLD CKD-EPI 2021: 64 ML/MIN/1.73M2
EGFRCR SERPLBLD CKD-EPI 2021: 65 ML/MIN/1.73M2
EGFRCR SERPLBLD CKD-EPI 2021: 66 ML/MIN/1.73M2
EGFRCR SERPLBLD CKD-EPI 2021: 66 ML/MIN/1.73M2
EGFRCR SERPLBLD CKD-EPI 2021: 67 ML/MIN/1.73M2
EGFRCR SERPLBLD CKD-EPI 2021: 67 ML/MIN/1.73M2
EGFRCR SERPLBLD CKD-EPI 2021: 68 ML/MIN/1.73M2
EGFRCR SERPLBLD CKD-EPI 2021: 68 ML/MIN/1.73M2
EGFRCR SERPLBLD CKD-EPI 2021: 71 ML/MIN/1.73M2
EGFRCR SERPLBLD CKD-EPI 2021: 72 ML/MIN/1.73M2
EGFRCR SERPLBLD CKD-EPI 2021: 73 ML/MIN/1.73M2
EGFRCR SERPLBLD CKD-EPI 2021: 75 ML/MIN/1.73M2
EGFRCR SERPLBLD CKD-EPI 2021: 76 ML/MIN/1.73M2
EGFRCR SERPLBLD CKD-EPI 2021: 76 ML/MIN/1.73M2
EGFRCR SERPLBLD CKD-EPI 2021: 77 ML/MIN/1.73M2
EGFRCR SERPLBLD CKD-EPI 2021: 78 ML/MIN/1.73M2
EGFRCR SERPLBLD CKD-EPI 2021: 79 ML/MIN/1.73M2
EGFRCR SERPLBLD CKD-EPI 2021: 79 ML/MIN/1.73M2
EGFRCR SERPLBLD CKD-EPI 2021: 80 ML/MIN/1.73M2
EGFRCR SERPLBLD CKD-EPI 2021: 81 ML/MIN/1.73M2
EGFRCR SERPLBLD CKD-EPI 2021: 81 ML/MIN/1.73M2
EGFRCR SERPLBLD CKD-EPI 2021: 82 ML/MIN/1.73M2
EGFRCR SERPLBLD CKD-EPI 2021: 83 ML/MIN/1.73M2
EGFRCR SERPLBLD CKD-EPI 2021: 83 ML/MIN/1.73M2
EGFRCR SERPLBLD CKD-EPI 2021: 85 ML/MIN/1.73M2
EGFRCR SERPLBLD CKD-EPI 2021: 85 ML/MIN/1.73M2
EGFRCR SERPLBLD CKD-EPI 2021: 86 ML/MIN/1.73M2
EGFRCR SERPLBLD CKD-EPI 2021: 88 ML/MIN/1.73M2
EGFRCR SERPLBLD CKD-EPI 2021: 89 ML/MIN/1.73M2
EGFRCR SERPLBLD CKD-EPI 2021: 89 ML/MIN/1.73M2
EGFRCR SERPLBLD CKD-EPI 2021: 90 ML/MIN/1.73M2
EGFRCR SERPLBLD CKD-EPI 2021: 90 ML/MIN/1.73M2
EGFRCR SERPLBLD CKD-EPI 2021: >90 ML/MIN/1.73M2
ELLIPTOCYTES BLD QL SMEAR: ABNORMAL
EOSINOPHIL # BLD AUTO: 0 10E3/UL (ref 0–0.7)
EOSINOPHIL # BLD AUTO: 0.1 10E3/UL (ref 0–0.7)
EOSINOPHIL # BLD AUTO: 0.2 10E3/UL (ref 0–0.7)
EOSINOPHIL # BLD AUTO: 0.3 10E3/UL (ref 0–0.7)
EOSINOPHIL # BLD AUTO: 0.3 10E3/UL (ref 0–0.7)
EOSINOPHIL NFR BLD AUTO: 0 %
EOSINOPHIL NFR BLD AUTO: 1 %
EOSINOPHIL NFR BLD AUTO: 10 %
EOSINOPHIL NFR BLD AUTO: 2 %
EOSINOPHIL NFR BLD AUTO: 3 %
EOSINOPHIL NFR BLD AUTO: 4 %
EOSINOPHIL NFR BLD AUTO: 5 %
EOSINOPHIL NFR BLD AUTO: 5 %
EOSINOPHIL NFR BLD AUTO: 8 %
EPEC EAE GENE STL QL NAA+NON-PROBE: POSITIVE
EPEC EAE GENE STL QL NAA+NON-PROBE: POSITIVE
ERYTHROCYTE [DISTWIDTH] IN BLOOD BY AUTOMATED COUNT: 15.1 % (ref 10–15)
ERYTHROCYTE [DISTWIDTH] IN BLOOD BY AUTOMATED COUNT: 15.5 % (ref 10–15)
ERYTHROCYTE [DISTWIDTH] IN BLOOD BY AUTOMATED COUNT: 15.8 % (ref 10–15)
ERYTHROCYTE [DISTWIDTH] IN BLOOD BY AUTOMATED COUNT: 15.9 % (ref 10–15)
ERYTHROCYTE [DISTWIDTH] IN BLOOD BY AUTOMATED COUNT: 16 % (ref 10–15)
ERYTHROCYTE [DISTWIDTH] IN BLOOD BY AUTOMATED COUNT: 16 % (ref 10–15)
ERYTHROCYTE [DISTWIDTH] IN BLOOD BY AUTOMATED COUNT: 16.1 % (ref 10–15)
ERYTHROCYTE [DISTWIDTH] IN BLOOD BY AUTOMATED COUNT: 16.1 % (ref 10–15)
ERYTHROCYTE [DISTWIDTH] IN BLOOD BY AUTOMATED COUNT: 16.3 % (ref 10–15)
ERYTHROCYTE [DISTWIDTH] IN BLOOD BY AUTOMATED COUNT: 16.4 % (ref 10–15)
ERYTHROCYTE [DISTWIDTH] IN BLOOD BY AUTOMATED COUNT: 16.5 % (ref 10–15)
ERYTHROCYTE [DISTWIDTH] IN BLOOD BY AUTOMATED COUNT: 16.6 % (ref 10–15)
ERYTHROCYTE [DISTWIDTH] IN BLOOD BY AUTOMATED COUNT: 16.6 % (ref 10–15)
ERYTHROCYTE [DISTWIDTH] IN BLOOD BY AUTOMATED COUNT: 16.8 % (ref 10–15)
ERYTHROCYTE [DISTWIDTH] IN BLOOD BY AUTOMATED COUNT: 17.1 % (ref 10–15)
ERYTHROCYTE [DISTWIDTH] IN BLOOD BY AUTOMATED COUNT: 17.1 % (ref 10–15)
ERYTHROCYTE [DISTWIDTH] IN BLOOD BY AUTOMATED COUNT: 18.4 % (ref 10–15)
ERYTHROCYTE [DISTWIDTH] IN BLOOD BY AUTOMATED COUNT: 19.2 % (ref 10–15)
ERYTHROCYTE [DISTWIDTH] IN BLOOD BY AUTOMATED COUNT: 19.8 % (ref 10–15)
ERYTHROCYTE [DISTWIDTH] IN BLOOD BY AUTOMATED COUNT: 20.2 % (ref 10–15)
ERYTHROCYTE [DISTWIDTH] IN BLOOD BY AUTOMATED COUNT: 20.3 % (ref 10–15)
ERYTHROCYTE [DISTWIDTH] IN BLOOD BY AUTOMATED COUNT: 20.8 % (ref 10–15)
ERYTHROCYTE [DISTWIDTH] IN BLOOD BY AUTOMATED COUNT: 21.2 % (ref 10–15)
ERYTHROCYTE [DISTWIDTH] IN BLOOD BY AUTOMATED COUNT: 21.5 % (ref 10–15)
ERYTHROCYTE [DISTWIDTH] IN BLOOD BY AUTOMATED COUNT: 21.6 % (ref 10–15)
ERYTHROCYTE [DISTWIDTH] IN BLOOD BY AUTOMATED COUNT: 21.7 % (ref 10–15)
ERYTHROCYTE [DISTWIDTH] IN BLOOD BY AUTOMATED COUNT: 21.8 % (ref 10–15)
ERYTHROCYTE [DISTWIDTH] IN BLOOD BY AUTOMATED COUNT: 22.1 % (ref 10–15)
ERYTHROCYTE [DISTWIDTH] IN BLOOD BY AUTOMATED COUNT: 22.2 % (ref 10–15)
ERYTHROCYTE [DISTWIDTH] IN BLOOD BY AUTOMATED COUNT: 22.2 % (ref 10–15)
ERYTHROCYTE [DISTWIDTH] IN BLOOD BY AUTOMATED COUNT: 22.3 % (ref 10–15)
ERYTHROCYTE [DISTWIDTH] IN BLOOD BY AUTOMATED COUNT: 22.5 % (ref 10–15)
ERYTHROCYTE [DISTWIDTH] IN BLOOD BY AUTOMATED COUNT: 22.5 % (ref 10–15)
ERYTHROCYTE [DISTWIDTH] IN BLOOD BY AUTOMATED COUNT: 22.8 % (ref 10–15)
ERYTHROCYTE [DISTWIDTH] IN BLOOD BY AUTOMATED COUNT: 23.2 % (ref 10–15)
ERYTHROCYTE [DISTWIDTH] IN BLOOD BY AUTOMATED COUNT: 23.4 % (ref 10–15)
ERYTHROCYTE [DISTWIDTH] IN BLOOD BY AUTOMATED COUNT: 23.5 % (ref 10–15)
ERYTHROCYTE [DISTWIDTH] IN BLOOD BY AUTOMATED COUNT: 23.8 % (ref 10–15)
ERYTHROCYTE [DISTWIDTH] IN BLOOD BY AUTOMATED COUNT: 23.9 % (ref 10–15)
ERYTHROCYTE [DISTWIDTH] IN BLOOD BY AUTOMATED COUNT: 24 % (ref 10–15)
ERYTHROCYTE [DISTWIDTH] IN BLOOD BY AUTOMATED COUNT: 24.1 % (ref 10–15)
ERYTHROCYTE [DISTWIDTH] IN BLOOD BY AUTOMATED COUNT: 24.3 % (ref 10–15)
ERYTHROCYTE [DISTWIDTH] IN BLOOD BY AUTOMATED COUNT: 24.5 % (ref 10–15)
ERYTHROCYTE [DISTWIDTH] IN BLOOD BY AUTOMATED COUNT: 24.8 % (ref 10–15)
ERYTHROCYTE [DISTWIDTH] IN BLOOD BY AUTOMATED COUNT: 25.2 % (ref 10–15)
ERYTHROCYTE [DISTWIDTH] IN BLOOD BY AUTOMATED COUNT: 25.2 % (ref 10–15)
ERYTHROCYTE [DISTWIDTH] IN BLOOD BY AUTOMATED COUNT: 26.3 % (ref 10–15)
ERYTHROCYTE [DISTWIDTH] IN BLOOD BY AUTOMATED COUNT: 26.8 % (ref 10–15)
ERYTHROCYTE [DISTWIDTH] IN BLOOD BY AUTOMATED COUNT: 27.6 % (ref 10–15)
ERYTHROCYTE [DISTWIDTH] IN BLOOD BY AUTOMATED COUNT: 27.9 % (ref 10–15)
ERYTHROCYTE [SEDIMENTATION RATE] IN BLOOD BY WESTERGREN METHOD: >140 MM/HR (ref 0–20)
EST. AVERAGE GLUCOSE BLD GHB EST-MCNC: 85 MG/DL
ETEC LTA+ST1A+ST1B TOX ST NAA+NON-PROBE: NEGATIVE
ETEC LTA+ST1A+ST1B TOX ST NAA+NON-PROBE: NEGATIVE
FASTING STATUS PATIENT QL REPORTED: NO
FASTING STATUS PATIENT QL REPORTED: NO
FERRITIN SERPL-MCNC: 187 NG/ML (ref 31–409)
FERRITIN SERPL-MCNC: 21 NG/ML (ref 31–409)
FERRITIN SERPL-MCNC: 249 NG/ML (ref 31–409)
FERRITIN SERPL-MCNC: 351 NG/ML (ref 31–409)
FOLATE SERPL-MCNC: 21 NG/ML (ref 4.6–34.8)
FRACT EXCRET NA UR+SERPL-RTO: 0.4 %
FRACT EXCRET NA UR+SERPL-RTO: 4.1 %
FRAGMENTS BLD QL SMEAR: ABNORMAL
G LAMBLIA DNA STL QL NAA+NON-PROBE: NEGATIVE
G LAMBLIA DNA STL QL NAA+NON-PROBE: NEGATIVE
GAMMA INTERFERON BACKGROUND BLD IA-ACNC: 0.02 IU/ML
GAMMA INTERFERON BACKGROUND BLD IA-ACNC: 0.03 IU/ML
GAMMA INTERFERON BACKGROUND BLD IA-ACNC: 0.03 IU/ML
GLUCOSE BLDC GLUCOMTR-MCNC: 100 MG/DL (ref 70–99)
GLUCOSE BLDC GLUCOMTR-MCNC: 103 MG/DL (ref 70–99)
GLUCOSE BLDC GLUCOMTR-MCNC: 105 MG/DL (ref 70–99)
GLUCOSE BLDC GLUCOMTR-MCNC: 106 MG/DL (ref 70–99)
GLUCOSE BLDC GLUCOMTR-MCNC: 109 MG/DL (ref 70–99)
GLUCOSE BLDC GLUCOMTR-MCNC: 109 MG/DL (ref 70–99)
GLUCOSE BLDC GLUCOMTR-MCNC: 116 MG/DL (ref 70–99)
GLUCOSE BLDC GLUCOMTR-MCNC: 120 MG/DL (ref 70–99)
GLUCOSE BLDC GLUCOMTR-MCNC: 138 MG/DL (ref 70–99)
GLUCOSE BLDC GLUCOMTR-MCNC: 78 MG/DL (ref 70–99)
GLUCOSE BLDC GLUCOMTR-MCNC: 88 MG/DL (ref 70–99)
GLUCOSE BLDC GLUCOMTR-MCNC: 89 MG/DL (ref 70–99)
GLUCOSE BLDC GLUCOMTR-MCNC: 92 MG/DL (ref 70–99)
GLUCOSE BLDC GLUCOMTR-MCNC: 93 MG/DL (ref 70–99)
GLUCOSE BLDC GLUCOMTR-MCNC: 94 MG/DL (ref 70–99)
GLUCOSE BLDC GLUCOMTR-MCNC: 95 MG/DL (ref 70–99)
GLUCOSE BLDC GLUCOMTR-MCNC: 96 MG/DL (ref 70–99)
GLUCOSE BLDC GLUCOMTR-MCNC: 97 MG/DL (ref 70–99)
GLUCOSE BLDC GLUCOMTR-MCNC: 98 MG/DL (ref 70–99)
GLUCOSE BLDC GLUCOMTR-MCNC: 99 MG/DL (ref 70–99)
GLUCOSE BLDC GLUCOMTR-MCNC: 99 MG/DL (ref 70–99)
GLUCOSE SERPL-MCNC: 100 MG/DL (ref 70–99)
GLUCOSE SERPL-MCNC: 100 MG/DL (ref 70–99)
GLUCOSE SERPL-MCNC: 101 MG/DL (ref 70–99)
GLUCOSE SERPL-MCNC: 104 MG/DL (ref 70–99)
GLUCOSE SERPL-MCNC: 105 MG/DL (ref 70–99)
GLUCOSE SERPL-MCNC: 107 MG/DL (ref 70–99)
GLUCOSE SERPL-MCNC: 110 MG/DL (ref 70–99)
GLUCOSE SERPL-MCNC: 111 MG/DL (ref 70–99)
GLUCOSE SERPL-MCNC: 113 MG/DL (ref 70–99)
GLUCOSE SERPL-MCNC: 124 MG/DL (ref 70–99)
GLUCOSE SERPL-MCNC: 125 MG/DL (ref 70–99)
GLUCOSE SERPL-MCNC: 137 MG/DL (ref 70–99)
GLUCOSE SERPL-MCNC: 66 MG/DL (ref 70–99)
GLUCOSE SERPL-MCNC: 74 MG/DL (ref 70–99)
GLUCOSE SERPL-MCNC: 75 MG/DL (ref 70–99)
GLUCOSE SERPL-MCNC: 78 MG/DL (ref 70–99)
GLUCOSE SERPL-MCNC: 79 MG/DL (ref 70–99)
GLUCOSE SERPL-MCNC: 80 MG/DL (ref 70–99)
GLUCOSE SERPL-MCNC: 81 MG/DL (ref 70–99)
GLUCOSE SERPL-MCNC: 83 MG/DL (ref 70–99)
GLUCOSE SERPL-MCNC: 84 MG/DL (ref 70–99)
GLUCOSE SERPL-MCNC: 86 MG/DL (ref 70–99)
GLUCOSE SERPL-MCNC: 87 MG/DL (ref 70–99)
GLUCOSE SERPL-MCNC: 87 MG/DL (ref 70–99)
GLUCOSE SERPL-MCNC: 88 MG/DL (ref 70–99)
GLUCOSE SERPL-MCNC: 89 MG/DL (ref 70–99)
GLUCOSE SERPL-MCNC: 90 MG/DL (ref 70–99)
GLUCOSE SERPL-MCNC: 91 MG/DL (ref 70–99)
GLUCOSE SERPL-MCNC: 92 MG/DL (ref 70–99)
GLUCOSE SERPL-MCNC: 93 MG/DL (ref 70–99)
GLUCOSE SERPL-MCNC: 93 MG/DL (ref 70–99)
GLUCOSE SERPL-MCNC: 94 MG/DL (ref 70–99)
GLUCOSE SERPL-MCNC: 95 MG/DL (ref 70–99)
GLUCOSE SERPL-MCNC: 95 MG/DL (ref 70–99)
GLUCOSE SERPL-MCNC: 96 MG/DL (ref 70–99)
GLUCOSE SERPL-MCNC: 97 MG/DL (ref 70–99)
GLUCOSE SERPL-MCNC: 98 MG/DL (ref 70–99)
GLUCOSE SERPL-MCNC: 99 MG/DL (ref 70–99)
GLUCOSE UR STRIP-MCNC: ABNORMAL MG/DL
GLUCOSE UR STRIP-MCNC: NEGATIVE MG/DL
GRAM STAIN RESULT: ABNORMAL
HAPTOGLOB SERPL-MCNC: 42 MG/DL (ref 30–200)
HAPTOGLOB SERPL-MCNC: <10 MG/DL (ref 30–200)
HBA1C MFR BLD: 4.6 %
HCO3 BLDV-SCNC: 27 MMOL/L (ref 21–28)
HCO3 BLDV-SCNC: 29 MMOL/L (ref 21–28)
HCO3 BLDV-SCNC: 29 MMOL/L (ref 21–28)
HCO3 BLDV-SCNC: 30 MMOL/L (ref 21–28)
HCO3 BLDV-SCNC: 32 MMOL/L (ref 21–28)
HCO3 BLDV-SCNC: 44 MMOL/L (ref 21–28)
HCO3 SERPL-SCNC: 24 MMOL/L (ref 22–29)
HCO3 SERPL-SCNC: 25 MMOL/L (ref 22–29)
HCO3 SERPL-SCNC: 25 MMOL/L (ref 22–29)
HCO3 SERPL-SCNC: 26 MMOL/L (ref 22–29)
HCO3 SERPL-SCNC: 28 MMOL/L (ref 22–29)
HCO3 SERPL-SCNC: 28 MMOL/L (ref 22–29)
HCO3 SERPL-SCNC: 29 MMOL/L (ref 22–29)
HCO3 SERPL-SCNC: 30 MMOL/L (ref 22–29)
HCO3 SERPL-SCNC: 31 MMOL/L (ref 22–29)
HCO3 SERPL-SCNC: 32 MMOL/L (ref 22–29)
HCO3 SERPL-SCNC: 33 MMOL/L (ref 22–29)
HCO3 SERPL-SCNC: 35 MMOL/L (ref 22–29)
HCO3 SERPL-SCNC: 35 MMOL/L (ref 22–29)
HCO3 SERPL-SCNC: 36 MMOL/L (ref 22–29)
HCO3 SERPL-SCNC: 38 MMOL/L (ref 22–29)
HCO3 SERPL-SCNC: 39 MMOL/L (ref 22–29)
HCT VFR BLD AUTO: 16.4 % (ref 40–53)
HCT VFR BLD AUTO: 18 % (ref 40–53)
HCT VFR BLD AUTO: 20.4 % (ref 40–53)
HCT VFR BLD AUTO: 20.9 % (ref 40–53)
HCT VFR BLD AUTO: 21 % (ref 40–53)
HCT VFR BLD AUTO: 21.7 % (ref 40–53)
HCT VFR BLD AUTO: 23.4 % (ref 40–53)
HCT VFR BLD AUTO: 23.9 % (ref 40–53)
HCT VFR BLD AUTO: 23.9 % (ref 40–53)
HCT VFR BLD AUTO: 24 % (ref 40–53)
HCT VFR BLD AUTO: 24 % (ref 40–53)
HCT VFR BLD AUTO: 24.1 % (ref 40–53)
HCT VFR BLD AUTO: 24.2 % (ref 40–53)
HCT VFR BLD AUTO: 24.7 % (ref 40–53)
HCT VFR BLD AUTO: 24.9 % (ref 40–53)
HCT VFR BLD AUTO: 25 % (ref 40–53)
HCT VFR BLD AUTO: 25 % (ref 40–53)
HCT VFR BLD AUTO: 25.1 % (ref 40–53)
HCT VFR BLD AUTO: 25.2 % (ref 40–53)
HCT VFR BLD AUTO: 25.2 % (ref 40–53)
HCT VFR BLD AUTO: 25.3 % (ref 40–53)
HCT VFR BLD AUTO: 25.6 % (ref 40–53)
HCT VFR BLD AUTO: 25.7 % (ref 40–53)
HCT VFR BLD AUTO: 25.7 % (ref 40–53)
HCT VFR BLD AUTO: 25.8 % (ref 40–53)
HCT VFR BLD AUTO: 25.9 % (ref 40–53)
HCT VFR BLD AUTO: 26.2 % (ref 40–53)
HCT VFR BLD AUTO: 26.3 % (ref 40–53)
HCT VFR BLD AUTO: 26.4 % (ref 40–53)
HCT VFR BLD AUTO: 26.5 % (ref 40–53)
HCT VFR BLD AUTO: 26.6 % (ref 40–53)
HCT VFR BLD AUTO: 26.6 % (ref 40–53)
HCT VFR BLD AUTO: 26.8 % (ref 40–53)
HCT VFR BLD AUTO: 27.2 % (ref 40–53)
HCT VFR BLD AUTO: 27.3 % (ref 40–53)
HCT VFR BLD AUTO: 27.6 % (ref 40–53)
HCT VFR BLD AUTO: 28 % (ref 40–53)
HCT VFR BLD AUTO: 28.4 % (ref 40–53)
HCT VFR BLD AUTO: 28.4 % (ref 40–53)
HCT VFR BLD AUTO: 29 % (ref 40–53)
HCT VFR BLD AUTO: 29.1 % (ref 40–53)
HCT VFR BLD AUTO: 29.2 % (ref 40–53)
HCT VFR BLD AUTO: 29.3 % (ref 40–53)
HCT VFR BLD AUTO: 29.3 % (ref 40–53)
HCT VFR BLD AUTO: 29.4 % (ref 40–53)
HCT VFR BLD AUTO: 29.5 % (ref 40–53)
HCT VFR BLD AUTO: 29.6 % (ref 40–53)
HCT VFR BLD AUTO: 30 % (ref 40–53)
HCT VFR BLD AUTO: 30 % (ref 40–53)
HCT VFR BLD AUTO: 30.2 % (ref 40–53)
HCT VFR BLD AUTO: 30.3 % (ref 40–53)
HCT VFR BLD AUTO: 30.3 % (ref 40–53)
HCT VFR BLD AUTO: 30.6 % (ref 40–53)
HCT VFR BLD AUTO: 30.7 % (ref 40–53)
HCT VFR BLD AUTO: 30.8 % (ref 40–53)
HCT VFR BLD AUTO: 31.1 % (ref 40–53)
HCT VFR BLD AUTO: 31.2 % (ref 40–53)
HCT VFR BLD AUTO: 31.2 % (ref 40–53)
HCT VFR BLD AUTO: 32.8 % (ref 40–53)
HCT VFR BLD AUTO: 33 % (ref 40–53)
HCT VFR BLD AUTO: 38.2 % (ref 40–53)
HDLC SERPL-MCNC: 25 MG/DL
HEMOCCULT STL QL: POSITIVE
HEMOCCULT STL QL: POSITIVE
HGB BLD-MCNC: 10.2 G/DL (ref 13.3–17.7)
HGB BLD-MCNC: 10.2 G/DL (ref 13.3–17.7)
HGB BLD-MCNC: 11.2 G/DL (ref 13.3–17.7)
HGB BLD-MCNC: 4.4 G/DL (ref 13.3–17.7)
HGB BLD-MCNC: 4.8 G/DL (ref 13.3–17.7)
HGB BLD-MCNC: 5.8 G/DL (ref 13.3–17.7)
HGB BLD-MCNC: 6 G/DL (ref 13.3–17.7)
HGB BLD-MCNC: 6.1 G/DL (ref 13.3–17.7)
HGB BLD-MCNC: 6.3 G/DL (ref 13.3–17.7)
HGB BLD-MCNC: 6.4 G/DL (ref 13.3–17.7)
HGB BLD-MCNC: 6.7 G/DL (ref 13.3–17.7)
HGB BLD-MCNC: 6.7 G/DL (ref 13.3–17.7)
HGB BLD-MCNC: 6.8 G/DL (ref 13.3–17.7)
HGB BLD-MCNC: 6.9 G/DL (ref 13.3–17.7)
HGB BLD-MCNC: 6.9 G/DL (ref 13.3–17.7)
HGB BLD-MCNC: 7.1 G/DL (ref 13.3–17.7)
HGB BLD-MCNC: 7.2 G/DL (ref 13.3–17.7)
HGB BLD-MCNC: 7.3 G/DL (ref 13.3–17.7)
HGB BLD-MCNC: 7.4 G/DL (ref 13.3–17.7)
HGB BLD-MCNC: 7.5 G/DL (ref 13.3–17.7)
HGB BLD-MCNC: 7.6 G/DL (ref 13.3–17.7)
HGB BLD-MCNC: 7.7 G/DL (ref 13.3–17.7)
HGB BLD-MCNC: 7.8 G/DL (ref 13.3–17.7)
HGB BLD-MCNC: 8 G/DL (ref 13.3–17.7)
HGB BLD-MCNC: 8.1 G/DL (ref 13.3–17.7)
HGB BLD-MCNC: 8.2 G/DL (ref 13.3–17.7)
HGB BLD-MCNC: 8.3 G/DL (ref 13.3–17.7)
HGB BLD-MCNC: 8.4 G/DL (ref 13.3–17.7)
HGB BLD-MCNC: 8.5 G/DL (ref 13.3–17.7)
HGB BLD-MCNC: 8.5 G/DL (ref 13.3–17.7)
HGB BLD-MCNC: 8.6 G/DL (ref 13.3–17.7)
HGB BLD-MCNC: 8.6 G/DL (ref 13.3–17.7)
HGB BLD-MCNC: 8.7 G/DL (ref 13.3–17.7)
HGB BLD-MCNC: 8.8 G/DL (ref 13.3–17.7)
HGB BLD-MCNC: 8.8 G/DL (ref 13.3–17.7)
HGB BLD-MCNC: 9 G/DL (ref 13.3–17.7)
HGB BLD-MCNC: 9.1 G/DL (ref 13.3–17.7)
HGB BLD-MCNC: 9.2 G/DL (ref 13.3–17.7)
HGB BLD-MCNC: 9.2 G/DL (ref 13.3–17.7)
HGB BLD-MCNC: 9.3 G/DL (ref 13.3–17.7)
HGB BLD-MCNC: 9.3 G/DL (ref 13.3–17.7)
HGB BLD-MCNC: 9.4 G/DL (ref 13.3–17.7)
HGB BLD-MCNC: 9.5 G/DL (ref 13.3–17.7)
HGB BLD-MCNC: 9.5 G/DL (ref 13.3–17.7)
HGB BLD-MCNC: 9.6 G/DL (ref 13.3–17.7)
HGB BLD-MCNC: 9.6 G/DL (ref 13.3–17.7)
HGB BLD-MCNC: 9.7 G/DL (ref 13.3–17.7)
HGB BLD-MCNC: 9.7 G/DL (ref 13.3–17.7)
HGB BLD-MCNC: 9.9 G/DL (ref 13.3–17.7)
HGB C CRYSTALS: ABNORMAL
HGB UR QL STRIP: ABNORMAL
HOLD SPECIMEN: NORMAL
HOWELL-JOLLY BOD BLD QL SMEAR: ABNORMAL
HYALINE CASTS: 1 /LPF
HYALINE CASTS: 30 /LPF
IMM GRANULOCYTES # BLD: 0 10E3/UL
IMM GRANULOCYTES # BLD: 0.1 10E3/UL
IMM GRANULOCYTES NFR BLD: 0 %
IMM GRANULOCYTES NFR BLD: 1 %
IMM GRANULOCYTES NFR BLD: 2 %
INR (EXTERNAL): 1.1
INR (EXTERNAL): 1.3 (ref 0.9–1.1)
INR (EXTERNAL): 1.4 (ref 0.9–1.1)
INR (EXTERNAL): 1.6 (ref 0.9–1.1)
INR (EXTERNAL): 2 (ref 0.9–1.1)
INR (EXTERNAL): 2.2 (ref 0.9–1.1)
INR (EXTERNAL): 2.5 (ref 0.9–1.1)
INR (EXTERNAL): 2.7 (ref 0.9–1.1)
INR (EXTERNAL): 2.9 (ref 0.9–1.1)
INR (EXTERNAL): 3.1
INR (EXTERNAL): 3.1 (ref 0.9–1.1)
INR (EXTERNAL): 3.2 (ref 0.9–1.1)
INR (EXTERNAL): 3.3 (ref 0.9–1.1)
INR (EXTERNAL): 3.4 (ref 0.9–1.1)
INR (EXTERNAL): 3.5 (ref 0.9–1.1)
INR (EXTERNAL): 3.7 (ref 0.9–1.1)
INR (EXTERNAL): 3.9 (ref 0.9–1.1)
INR (EXTERNAL): 3.9 (ref 0.9–1.1)
INR (EXTERNAL): 4.1 (ref 0.9–1.1)
INR (EXTERNAL): 4.5 (ref 0.9–1.1)
INR (EXTERNAL): 4.6 (ref 0.9–1.1)
INR (EXTERNAL): 4.9 (ref 0.9–1.1)
INR (EXTERNAL): 4.9 (ref 0.9–1.1)
INR (EXTERNAL): 5.6 (ref 0.9–1.1)
INR (EXTERNAL): 6.2 (ref 0.9–1.1)
INR (EXTERNAL): 6.5 (ref 0.9–1.1)
INR (EXTERNAL): 6.9 (ref 0.9–1.1)
INR (EXTERNAL): 7.3 (ref 0.9–1.1)
INR (EXTERNAL): 7.7 (ref 0.9–1.1)
INR (EXTERNAL): >5 (ref 0.9–1.1)
INR (EXTERNAL): >8 (ref 0.9–1.1)
INR BLD: 3.9 (ref 0.9–1.1)
INR BLD: 7 (ref 0.9–1.1)
INR HOME MONITORING: 3.6 (ref 2.5–3.5)
INR HOME MONITORING: 3.6 (ref 2.5–3.5)
INR PPP: 1.56 (ref 0.85–1.15)
INR PPP: 1.62 (ref 0.85–1.15)
INR PPP: 1.69 (ref 0.85–1.15)
INR PPP: 1.78 (ref 0.85–1.15)
INR PPP: 1.78 (ref 0.85–1.15)
INR PPP: 1.79 (ref 0.85–1.15)
INR PPP: 1.8 (ref 0.85–1.15)
INR PPP: 1.82 (ref 0.85–1.15)
INR PPP: 1.84 (ref 0.85–1.15)
INR PPP: 1.86 (ref 0.85–1.15)
INR PPP: 1.86 (ref 0.85–1.15)
INR PPP: 1.88 (ref 0.85–1.15)
INR PPP: 1.89 (ref 0.85–1.15)
INR PPP: 1.91 (ref 0.85–1.15)
INR PPP: 1.93 (ref 0.85–1.15)
INR PPP: 1.95 (ref 0.85–1.15)
INR PPP: 1.95 (ref 0.85–1.15)
INR PPP: 1.96 (ref 0.85–1.15)
INR PPP: 1.98 (ref 0.85–1.15)
INR PPP: 2 (ref 0.85–1.15)
INR PPP: 2.01 (ref 0.85–1.15)
INR PPP: 2.05 (ref 0.85–1.15)
INR PPP: 2.09 (ref 0.85–1.15)
INR PPP: 2.18 (ref 0.85–1.15)
INR PPP: 2.25 (ref 0.85–1.15)
INR PPP: 2.25 (ref 0.85–1.15)
INR PPP: 2.26 (ref 0.85–1.15)
INR PPP: 2.46 (ref 0.85–1.15)
INR PPP: 2.49 (ref 0.85–1.15)
INR PPP: 2.49 (ref 0.85–1.15)
INR PPP: 2.5 (ref 0.85–1.15)
INR PPP: 2.52 (ref 0.85–1.15)
INR PPP: 2.53 (ref 0.85–1.15)
INR PPP: 2.55 (ref 0.85–1.15)
INR PPP: 2.62 (ref 0.85–1.15)
INR PPP: 2.62 (ref 0.85–1.15)
INR PPP: 2.65 (ref 0.85–1.15)
INR PPP: 2.65 (ref 0.85–1.15)
INR PPP: 2.66 (ref 0.85–1.15)
INR PPP: 2.7 (ref 0.85–1.15)
INR PPP: 2.72 (ref 0.85–1.15)
INR PPP: 2.74 (ref 0.85–1.15)
INR PPP: 2.82 (ref 0.85–1.15)
INR PPP: 2.82 (ref 0.85–1.15)
INR PPP: 2.93 (ref 0.85–1.15)
INR PPP: 2.98 (ref 0.85–1.15)
INR PPP: 3.09 (ref 0.85–1.15)
INR PPP: 3.1 (ref 0.85–1.15)
INR PPP: 3.12 (ref 0.85–1.15)
INR PPP: 3.15 (ref 0.85–1.15)
INR PPP: 3.19 (ref 0.85–1.15)
INR PPP: 3.23 (ref 0.85–1.15)
INR PPP: 3.49 (ref 0.85–1.15)
INR PPP: 3.61 (ref 0.85–1.15)
INR PPP: 3.71 (ref 0.85–1.15)
INR PPP: 3.84 (ref 0.85–1.15)
INR PPP: 3.86 (ref 0.85–1.15)
INR PPP: 3.93 (ref 0.85–1.15)
INR PPP: 4.15 (ref 0.85–1.15)
INR PPP: 4.17 (ref 0.85–1.15)
INR PPP: 4.19 (ref 0.85–1.15)
INR PPP: 4.21 (ref 0.85–1.15)
INR PPP: 4.3 (ref 0.85–1.15)
INR PPP: 4.37 (ref 0.85–1.15)
INR PPP: 4.52 (ref 0.85–1.15)
INR PPP: 4.56 (ref 0.85–1.15)
INR PPP: 4.59 (ref 0.85–1.15)
INR PPP: 4.7 (ref 0.85–1.15)
INR PPP: 4.76 (ref 0.85–1.15)
INR PPP: 5.03 (ref 0.85–1.15)
INR PPP: 5.09 (ref 0.85–1.15)
INR PPP: 5.29 (ref 0.85–1.15)
INR PPP: 5.36 (ref 0.85–1.15)
INR PPP: 5.37 (ref 0.85–1.15)
INR PPP: 6.01 (ref 0.85–1.15)
INR PPP: 6.11 (ref 0.85–1.15)
INR PPP: 6.24 (ref 0.85–1.15)
INR PPP: 6.46 (ref 0.85–1.15)
INR PPP: 6.63 (ref 0.85–1.15)
INR PPP: 7.19 (ref 0.85–1.15)
INR PPP: 7.33 (ref 0.85–1.15)
INR PPP: 7.7 (ref 0.85–1.15)
INR PPP: 8.33 (ref 0.85–1.15)
INTERPRETATION ECG - MUSE: NORMAL
IRON BINDING CAPACITY (ROCHE): 139 UG/DL (ref 240–430)
IRON BINDING CAPACITY (ROCHE): 191 UG/DL (ref 240–430)
IRON BINDING CAPACITY (ROCHE): 435 UG/DL (ref 240–430)
IRON SATN MFR SERPL: 17 % (ref 15–46)
IRON SATN MFR SERPL: 20 % (ref 15–46)
IRON SATN MFR SERPL: 4 % (ref 15–46)
IRON SERPL-MCNC: 17 UG/DL (ref 61–157)
IRON SERPL-MCNC: 23 UG/DL (ref 61–157)
IRON SERPL-MCNC: 39 UG/DL (ref 61–157)
ISSUE DATE AND TIME: NORMAL
KETONES UR STRIP-MCNC: ABNORMAL MG/DL
KETONES UR STRIP-MCNC: NEGATIVE MG/DL
LACTATE BLD-SCNC: 0.9 MMOL/L
LACTATE BLD-SCNC: 1.1 MMOL/L
LACTATE SERPL-SCNC: 0.8 MMOL/L (ref 0.7–2)
LACTATE SERPL-SCNC: 0.8 MMOL/L (ref 0.7–2)
LACTATE SERPL-SCNC: 1 MMOL/L (ref 0.7–2)
LACTATE SERPL-SCNC: 1.6 MMOL/L (ref 0.7–2)
LDH SERPL L TO P-CCNC: 222 U/L (ref 0–250)
LDH SERPL L TO P-CCNC: 296 U/L (ref 0–250)
LDLC SERPL CALC-MCNC: 22 MG/DL
LEUKOCYTE ESTERASE UR QL STRIP: ABNORMAL
LVEF ECHO: NORMAL
LYMPHOCYTES # BLD AUTO: 0.8 10E3/UL (ref 0.8–5.3)
LYMPHOCYTES # BLD AUTO: 0.9 10E3/UL (ref 0.8–5.3)
LYMPHOCYTES # BLD AUTO: 1 10E3/UL (ref 0.8–5.3)
LYMPHOCYTES # BLD AUTO: 1 10E3/UL (ref 0.8–5.3)
LYMPHOCYTES # BLD AUTO: 1.1 10E3/UL (ref 0.8–5.3)
LYMPHOCYTES # BLD AUTO: 1.3 10E3/UL (ref 0.8–5.3)
LYMPHOCYTES # BLD AUTO: 1.4 10E3/UL (ref 0.8–5.3)
LYMPHOCYTES # BLD AUTO: 1.4 10E3/UL (ref 0.8–5.3)
LYMPHOCYTES # BLD AUTO: 1.5 10E3/UL (ref 0.8–5.3)
LYMPHOCYTES NFR BLD AUTO: 15 %
LYMPHOCYTES NFR BLD AUTO: 15 %
LYMPHOCYTES NFR BLD AUTO: 16 %
LYMPHOCYTES NFR BLD AUTO: 17 %
LYMPHOCYTES NFR BLD AUTO: 17 %
LYMPHOCYTES NFR BLD AUTO: 18 %
LYMPHOCYTES NFR BLD AUTO: 18 %
LYMPHOCYTES NFR BLD AUTO: 21 %
LYMPHOCYTES NFR BLD AUTO: 21 %
LYMPHOCYTES NFR BLD AUTO: 22 %
LYMPHOCYTES NFR BLD AUTO: 23 %
LYMPHOCYTES NFR BLD AUTO: 23 %
LYMPHOCYTES NFR BLD AUTO: 24 %
LYMPHOCYTES NFR BLD AUTO: 25 %
LYMPHOCYTES NFR BLD AUTO: 28 %
LYMPHOCYTES NFR BLD AUTO: 31 %
LYMPHOCYTES NFR BLD AUTO: 31 %
M TB IFN-G BLD-IMP: ABNORMAL
M TB IFN-G BLD-IMP: NEGATIVE
M TB IFN-G BLD-IMP: NEGATIVE
M TB IFN-G CD4+ BCKGRND COR BLD-ACNC: 0.1 IU/ML
M TB IFN-G CD4+ BCKGRND COR BLD-ACNC: 1.09 IU/ML
M TB IFN-G CD4+ BCKGRND COR BLD-ACNC: 1.66 IU/ML
MAGNESIUM SERPL-MCNC: 1.4 MG/DL (ref 1.7–2.3)
MAGNESIUM SERPL-MCNC: 1.5 MG/DL (ref 1.7–2.3)
MAGNESIUM SERPL-MCNC: 1.6 MG/DL (ref 1.7–2.3)
MAGNESIUM SERPL-MCNC: 1.7 MG/DL (ref 1.7–2.3)
MAGNESIUM SERPL-MCNC: 1.8 MG/DL (ref 1.7–2.3)
MAGNESIUM SERPL-MCNC: 1.9 MG/DL (ref 1.7–2.3)
MAGNESIUM SERPL-MCNC: 2 MG/DL (ref 1.7–2.3)
MAGNESIUM SERPL-MCNC: 2.1 MG/DL (ref 1.7–2.3)
MAGNESIUM SERPL-MCNC: 2.1 MG/DL (ref 1.7–2.3)
MAGNESIUM SERPL-MCNC: 2.2 MG/DL (ref 1.7–2.3)
MAGNESIUM SERPL-MCNC: 2.3 MG/DL (ref 1.7–2.3)
MAGNESIUM SERPL-MCNC: 2.3 MG/DL (ref 1.7–2.3)
MAGNESIUM SERPL-MCNC: 2.5 MG/DL (ref 1.7–2.3)
MCH RBC QN AUTO: 21.3 PG (ref 26.5–33)
MCH RBC QN AUTO: 21.5 PG (ref 26.5–33)
MCH RBC QN AUTO: 23.6 PG (ref 26.5–33)
MCH RBC QN AUTO: 24.8 PG (ref 26.5–33)
MCH RBC QN AUTO: 24.8 PG (ref 26.5–33)
MCH RBC QN AUTO: 25 PG (ref 26.5–33)
MCH RBC QN AUTO: 25.1 PG (ref 26.5–33)
MCH RBC QN AUTO: 25.5 PG (ref 26.5–33)
MCH RBC QN AUTO: 25.5 PG (ref 26.5–33)
MCH RBC QN AUTO: 25.8 PG (ref 26.5–33)
MCH RBC QN AUTO: 25.9 PG (ref 26.5–33)
MCH RBC QN AUTO: 27.2 PG (ref 26.5–33)
MCH RBC QN AUTO: 27.2 PG (ref 26.5–33)
MCH RBC QN AUTO: 27.4 PG (ref 26.5–33)
MCH RBC QN AUTO: 27.5 PG (ref 26.5–33)
MCH RBC QN AUTO: 27.5 PG (ref 26.5–33)
MCH RBC QN AUTO: 27.6 PG (ref 26.5–33)
MCH RBC QN AUTO: 27.7 PG (ref 26.5–33)
MCH RBC QN AUTO: 27.8 PG (ref 26.5–33)
MCH RBC QN AUTO: 27.9 PG (ref 26.5–33)
MCH RBC QN AUTO: 28 PG (ref 26.5–33)
MCH RBC QN AUTO: 28.2 PG (ref 26.5–33)
MCH RBC QN AUTO: 28.2 PG (ref 26.5–33)
MCH RBC QN AUTO: 28.4 PG (ref 26.5–33)
MCH RBC QN AUTO: 28.4 PG (ref 26.5–33)
MCH RBC QN AUTO: 28.6 PG (ref 26.5–33)
MCH RBC QN AUTO: 28.7 PG (ref 26.5–33)
MCH RBC QN AUTO: 28.9 PG (ref 26.5–33)
MCH RBC QN AUTO: 29 PG (ref 26.5–33)
MCH RBC QN AUTO: 29.1 PG (ref 26.5–33)
MCH RBC QN AUTO: 29.2 PG (ref 26.5–33)
MCH RBC QN AUTO: 29.3 PG (ref 26.5–33)
MCH RBC QN AUTO: 29.3 PG (ref 26.5–33)
MCH RBC QN AUTO: 29.4 PG (ref 26.5–33)
MCH RBC QN AUTO: 29.5 PG (ref 26.5–33)
MCH RBC QN AUTO: 29.7 PG (ref 26.5–33)
MCH RBC QN AUTO: 29.8 PG (ref 26.5–33)
MCH RBC QN AUTO: 29.8 PG (ref 26.5–33)
MCH RBC QN AUTO: 29.9 PG (ref 26.5–33)
MCH RBC QN AUTO: 30 PG (ref 26.5–33)
MCH RBC QN AUTO: 30.4 PG (ref 26.5–33)
MCH RBC QN AUTO: 30.7 PG (ref 26.5–33)
MCH RBC QN AUTO: 30.7 PG (ref 26.5–33)
MCH RBC QN AUTO: 30.8 PG (ref 26.5–33)
MCH RBC QN AUTO: 30.9 PG (ref 26.5–33)
MCH RBC QN AUTO: 31.1 PG (ref 26.5–33)
MCH RBC QN AUTO: 31.3 PG (ref 26.5–33)
MCH RBC QN AUTO: 31.5 PG (ref 26.5–33)
MCH RBC QN AUTO: 31.6 PG (ref 26.5–33)
MCH RBC QN AUTO: 31.6 PG (ref 26.5–33)
MCHC RBC AUTO-ENTMCNC: 26.7 G/DL (ref 31.5–36.5)
MCHC RBC AUTO-ENTMCNC: 26.8 G/DL (ref 31.5–36.5)
MCHC RBC AUTO-ENTMCNC: 28.2 G/DL (ref 31.5–36.5)
MCHC RBC AUTO-ENTMCNC: 28.4 G/DL (ref 31.5–36.5)
MCHC RBC AUTO-ENTMCNC: 28.6 G/DL (ref 31.5–36.5)
MCHC RBC AUTO-ENTMCNC: 28.6 G/DL (ref 31.5–36.5)
MCHC RBC AUTO-ENTMCNC: 28.7 G/DL (ref 31.5–36.5)
MCHC RBC AUTO-ENTMCNC: 28.7 G/DL (ref 31.5–36.5)
MCHC RBC AUTO-ENTMCNC: 28.9 G/DL (ref 31.5–36.5)
MCHC RBC AUTO-ENTMCNC: 28.9 G/DL (ref 31.5–36.5)
MCHC RBC AUTO-ENTMCNC: 29 G/DL (ref 31.5–36.5)
MCHC RBC AUTO-ENTMCNC: 29.1 G/DL (ref 31.5–36.5)
MCHC RBC AUTO-ENTMCNC: 29.3 G/DL (ref 31.5–36.5)
MCHC RBC AUTO-ENTMCNC: 29.3 G/DL (ref 31.5–36.5)
MCHC RBC AUTO-ENTMCNC: 29.6 G/DL (ref 31.5–36.5)
MCHC RBC AUTO-ENTMCNC: 29.7 G/DL (ref 31.5–36.5)
MCHC RBC AUTO-ENTMCNC: 29.8 G/DL (ref 31.5–36.5)
MCHC RBC AUTO-ENTMCNC: 29.9 G/DL (ref 31.5–36.5)
MCHC RBC AUTO-ENTMCNC: 30 G/DL (ref 31.5–36.5)
MCHC RBC AUTO-ENTMCNC: 30.1 G/DL (ref 31.5–36.5)
MCHC RBC AUTO-ENTMCNC: 30.1 G/DL (ref 31.5–36.5)
MCHC RBC AUTO-ENTMCNC: 30.2 G/DL (ref 31.5–36.5)
MCHC RBC AUTO-ENTMCNC: 30.3 G/DL (ref 31.5–36.5)
MCHC RBC AUTO-ENTMCNC: 30.3 G/DL (ref 31.5–36.5)
MCHC RBC AUTO-ENTMCNC: 30.4 G/DL (ref 31.5–36.5)
MCHC RBC AUTO-ENTMCNC: 30.5 G/DL (ref 31.5–36.5)
MCHC RBC AUTO-ENTMCNC: 30.6 G/DL (ref 31.5–36.5)
MCHC RBC AUTO-ENTMCNC: 30.6 G/DL (ref 31.5–36.5)
MCHC RBC AUTO-ENTMCNC: 30.7 G/DL (ref 31.5–36.5)
MCHC RBC AUTO-ENTMCNC: 30.7 G/DL (ref 31.5–36.5)
MCHC RBC AUTO-ENTMCNC: 30.8 G/DL (ref 31.5–36.5)
MCHC RBC AUTO-ENTMCNC: 30.9 G/DL (ref 31.5–36.5)
MCHC RBC AUTO-ENTMCNC: 30.9 G/DL (ref 31.5–36.5)
MCHC RBC AUTO-ENTMCNC: 31 G/DL (ref 31.5–36.5)
MCHC RBC AUTO-ENTMCNC: 31.1 G/DL (ref 31.5–36.5)
MCHC RBC AUTO-ENTMCNC: 31.2 G/DL (ref 31.5–36.5)
MCHC RBC AUTO-ENTMCNC: 31.3 G/DL (ref 31.5–36.5)
MCHC RBC AUTO-ENTMCNC: 31.3 G/DL (ref 31.5–36.5)
MCHC RBC AUTO-ENTMCNC: 31.4 G/DL (ref 31.5–36.5)
MCHC RBC AUTO-ENTMCNC: 31.7 G/DL (ref 31.5–36.5)
MCHC RBC AUTO-ENTMCNC: 31.7 G/DL (ref 31.5–36.5)
MCHC RBC AUTO-ENTMCNC: 32 G/DL (ref 31.5–36.5)
MCHC RBC AUTO-ENTMCNC: 32.1 G/DL (ref 31.5–36.5)
MCHC RBC AUTO-ENTMCNC: 32.1 G/DL (ref 31.5–36.5)
MCHC RBC AUTO-ENTMCNC: 32.8 G/DL (ref 31.5–36.5)
MCHC RBC AUTO-ENTMCNC: 32.9 G/DL (ref 31.5–36.5)
MCHC RBC AUTO-ENTMCNC: 33 G/DL (ref 31.5–36.5)
MCHC RBC AUTO-ENTMCNC: 33.1 G/DL (ref 31.5–36.5)
MCHC RBC AUTO-ENTMCNC: 33.1 G/DL (ref 31.5–36.5)
MCHC RBC AUTO-ENTMCNC: 33.3 G/DL (ref 31.5–36.5)
MCV RBC AUTO: 101 FL (ref 78–100)
MCV RBC AUTO: 102 FL (ref 78–100)
MCV RBC AUTO: 79 FL (ref 78–100)
MCV RBC AUTO: 81 FL (ref 78–100)
MCV RBC AUTO: 81 FL (ref 78–100)
MCV RBC AUTO: 86 FL (ref 78–100)
MCV RBC AUTO: 86 FL (ref 78–100)
MCV RBC AUTO: 87 FL (ref 78–100)
MCV RBC AUTO: 88 FL (ref 78–100)
MCV RBC AUTO: 89 FL (ref 78–100)
MCV RBC AUTO: 90 FL (ref 78–100)
MCV RBC AUTO: 91 FL (ref 78–100)
MCV RBC AUTO: 93 FL (ref 78–100)
MCV RBC AUTO: 94 FL (ref 78–100)
MCV RBC AUTO: 95 FL (ref 78–100)
MCV RBC AUTO: 96 FL (ref 78–100)
MCV RBC AUTO: 97 FL (ref 78–100)
MCV RBC AUTO: 98 FL (ref 78–100)
MCV RBC AUTO: 99 FL (ref 78–100)
MICROALBUMIN UR-MCNC: <12 MG/L
MICROALBUMIN/CREAT UR: NORMAL MG/G{CREAT}
MITOGEN IGNF BCKGRD COR BLD-ACNC: -0.01 IU/ML
MITOGEN IGNF BCKGRD COR BLD-ACNC: 0 IU/ML
MITOGEN IGNF BCKGRD COR BLD-ACNC: 0.01 IU/ML
MONOCYTES # BLD AUTO: 0.3 10E3/UL (ref 0–1.3)
MONOCYTES # BLD AUTO: 0.4 10E3/UL (ref 0–1.3)
MONOCYTES # BLD AUTO: 0.5 10E3/UL (ref 0–1.3)
MONOCYTES # BLD AUTO: 0.6 10E3/UL (ref 0–1.3)
MONOCYTES # BLD AUTO: 0.7 10E3/UL (ref 0–1.3)
MONOCYTES # BLD AUTO: 0.7 10E3/UL (ref 0–1.3)
MONOCYTES NFR BLD AUTO: 10 %
MONOCYTES NFR BLD AUTO: 10 %
MONOCYTES NFR BLD AUTO: 11 %
MONOCYTES NFR BLD AUTO: 12 %
MONOCYTES NFR BLD AUTO: 13 %
MONOCYTES NFR BLD AUTO: 15 %
MONOCYTES NFR BLD AUTO: 15 %
MONOCYTES NFR BLD AUTO: 5 %
MONOCYTES NFR BLD AUTO: 6 %
MONOCYTES NFR BLD AUTO: 6 %
MONOCYTES NFR BLD AUTO: 7 %
MONOCYTES NFR BLD AUTO: 8 %
MONOCYTES NFR BLD AUTO: 8 %
MONOCYTES NFR BLD AUTO: 9 %
MUCOUS THREADS #/AREA URNS LPF: PRESENT /LPF
MUCOUS THREADS #/AREA URNS LPF: PRESENT /LPF
NEUTROPHILS # BLD AUTO: 1.6 10E3/UL (ref 1.6–8.3)
NEUTROPHILS # BLD AUTO: 1.7 10E3/UL (ref 1.6–8.3)
NEUTROPHILS # BLD AUTO: 1.8 10E3/UL (ref 1.6–8.3)
NEUTROPHILS # BLD AUTO: 2.3 10E3/UL (ref 1.6–8.3)
NEUTROPHILS # BLD AUTO: 2.6 10E3/UL (ref 1.6–8.3)
NEUTROPHILS # BLD AUTO: 2.7 10E3/UL (ref 1.6–8.3)
NEUTROPHILS # BLD AUTO: 2.8 10E3/UL (ref 1.6–8.3)
NEUTROPHILS # BLD AUTO: 3.1 10E3/UL (ref 1.6–8.3)
NEUTROPHILS # BLD AUTO: 3.2 10E3/UL (ref 1.6–8.3)
NEUTROPHILS # BLD AUTO: 3.4 10E3/UL (ref 1.6–8.3)
NEUTROPHILS # BLD AUTO: 3.6 10E3/UL (ref 1.6–8.3)
NEUTROPHILS # BLD AUTO: 4 10E3/UL (ref 1.6–8.3)
NEUTROPHILS # BLD AUTO: 4.2 10E3/UL (ref 1.6–8.3)
NEUTROPHILS # BLD AUTO: 4.4 10E3/UL (ref 1.6–8.3)
NEUTROPHILS # BLD AUTO: 4.9 10E3/UL (ref 1.6–8.3)
NEUTROPHILS # BLD AUTO: 5.3 10E3/UL (ref 1.6–8.3)
NEUTROPHILS # BLD AUTO: 6.8 10E3/UL (ref 1.6–8.3)
NEUTROPHILS # BLD AUTO: 6.8 10E3/UL (ref 1.6–8.3)
NEUTROPHILS # BLD AUTO: 7.7 10E3/UL (ref 1.6–8.3)
NEUTROPHILS NFR BLD AUTO: 45 %
NEUTROPHILS NFR BLD AUTO: 49 %
NEUTROPHILS NFR BLD AUTO: 51 %
NEUTROPHILS NFR BLD AUTO: 60 %
NEUTROPHILS NFR BLD AUTO: 60 %
NEUTROPHILS NFR BLD AUTO: 61 %
NEUTROPHILS NFR BLD AUTO: 61 %
NEUTROPHILS NFR BLD AUTO: 62 %
NEUTROPHILS NFR BLD AUTO: 63 %
NEUTROPHILS NFR BLD AUTO: 65 %
NEUTROPHILS NFR BLD AUTO: 66 %
NEUTROPHILS NFR BLD AUTO: 69 %
NEUTROPHILS NFR BLD AUTO: 69 %
NEUTROPHILS NFR BLD AUTO: 70 %
NEUTROPHILS NFR BLD AUTO: 72 %
NEUTROPHILS NFR BLD AUTO: 74 %
NEUTROPHILS NFR BLD AUTO: 77 %
NEUTROPHILS NFR BLD AUTO: 78 %
NEUTROPHILS NFR BLD AUTO: 79 %
NEUTS HYPERSEG BLD QL SMEAR: ABNORMAL
NITRATE UR QL: ABNORMAL
NITRATE UR QL: NEGATIVE
NITRATE UR QL: NEGATIVE
NITRATE UR QL: POSITIVE
NONHDLC SERPL-MCNC: 28 MG/DL
NOROVIRUS GI+II RNA STL QL NAA+NON-PROBE: NEGATIVE
NOROVIRUS GI+II RNA STL QL NAA+NON-PROBE: NEGATIVE
NRBC # BLD AUTO: 0 10E3/UL
NRBC # BLD AUTO: 0.1 10E3/UL
NRBC BLD AUTO-RTO: 0 /100
NRBC BLD AUTO-RTO: 1 /100
NT-PROBNP SERPL-MCNC: 1253 PG/ML (ref 0–1800)
NT-PROBNP SERPL-MCNC: 2149 PG/ML (ref 0–1800)
NT-PROBNP SERPL-MCNC: 8442 PG/ML (ref 0–1800)
O2/TOTAL GAS SETTING VFR VENT: 0 %
O2/TOTAL GAS SETTING VFR VENT: 2 %
O2/TOTAL GAS SETTING VFR VENT: 2 %
O2/TOTAL GAS SETTING VFR VENT: 30 %
OSMOLALITY SERPL: 274 MMOL/KG (ref 280–301)
OSMOLALITY SERPL: 293 MMOL/KG (ref 280–301)
OSMOLALITY UR: 232 MMOL/KG (ref 100–1200)
OSMOLALITY UR: 285 MMOL/KG (ref 100–1200)
OSMOLALITY UR: 296 MMOL/KG (ref 100–1200)
OXYHGB MFR BLDV: 63 % (ref 70–75)
OXYHGB MFR BLDV: 67 % (ref 70–75)
OXYHGB MFR BLDV: 96 % (ref 70–75)
P AXIS - MUSE: NORMAL DEGREES
P SHIGELLOIDES DNA STL QL NAA+NON-PROBE: NEGATIVE
P SHIGELLOIDES DNA STL QL NAA+NON-PROBE: NEGATIVE
PATH REPORT.COMMENTS IMP SPEC: NORMAL
PATH REPORT.FINAL DX SPEC: NORMAL
PATH REPORT.FINAL DX SPEC: NORMAL
PATH REPORT.GROSS SPEC: NORMAL
PATH REPORT.MICROSCOPIC SPEC OTHER STN: NORMAL
PATH REPORT.RELEVANT HX SPEC: NORMAL
PATH REPORT.RELEVANT HX SPEC: NORMAL
PCO2 BLDV: 40 MM HG (ref 40–50)
PCO2 BLDV: 43 MM HG (ref 40–50)
PCO2 BLDV: 44 MM HG (ref 40–50)
PCO2 BLDV: 53 MM HG (ref 40–50)
PCO2 BLDV: 56 MM HG (ref 40–50)
PCO2 BLDV: 56 MM HG (ref 40–50)
PH BLDV: 7.28 [PH] (ref 7.32–7.43)
PH BLDV: 7.34 [PH] (ref 7.32–7.43)
PH BLDV: 7.44 [PH] (ref 7.32–7.43)
PH BLDV: 7.47 [PH] (ref 7.32–7.43)
PH BLDV: 7.47 [PH] (ref 7.32–7.43)
PH BLDV: 7.52 [PH] (ref 7.32–7.43)
PH UR STRIP: 5.5 [PH] (ref 5–7)
PH UR STRIP: 5.5 [PH] (ref 5–7)
PH UR STRIP: 6 [PH] (ref 5–7)
PH UR STRIP: ABNORMAL [PH]
PHOSPHATE SERPL-MCNC: 2.1 MG/DL (ref 2.5–4.5)
PHOSPHATE SERPL-MCNC: 2.1 MG/DL (ref 2.5–4.5)
PHOSPHATE SERPL-MCNC: 2.3 MG/DL (ref 2.5–4.5)
PHOSPHATE SERPL-MCNC: 2.3 MG/DL (ref 2.5–4.5)
PHOSPHATE SERPL-MCNC: 2.4 MG/DL (ref 2.5–4.5)
PHOSPHATE SERPL-MCNC: 2.6 MG/DL (ref 2.5–4.5)
PHOSPHATE SERPL-MCNC: 2.7 MG/DL (ref 2.5–4.5)
PHOSPHATE SERPL-MCNC: 2.7 MG/DL (ref 2.5–4.5)
PHOSPHATE SERPL-MCNC: 2.9 MG/DL (ref 2.5–4.5)
PHOSPHATE SERPL-MCNC: 2.9 MG/DL (ref 2.5–4.5)
PHOSPHATE SERPL-MCNC: 3.7 MG/DL (ref 2.5–4.5)
PHOSPHATE SERPL-MCNC: 4.6 MG/DL (ref 2.5–4.5)
PHOSPHATE SERPL-MCNC: 5.5 MG/DL (ref 2.5–4.5)
PHOSPHATE SERPL-MCNC: 6.4 MG/DL (ref 2.5–4.5)
PLAT MORPH BLD: ABNORMAL
PLAT MORPH BLD: ABNORMAL
PLATELET # BLD AUTO: 139 10E3/UL (ref 150–450)
PLATELET # BLD AUTO: 140 10E3/UL (ref 150–450)
PLATELET # BLD AUTO: 140 10E3/UL (ref 150–450)
PLATELET # BLD AUTO: 142 10E3/UL (ref 150–450)
PLATELET # BLD AUTO: 142 10E3/UL (ref 150–450)
PLATELET # BLD AUTO: 143 10E3/UL (ref 150–450)
PLATELET # BLD AUTO: 144 10E3/UL (ref 150–450)
PLATELET # BLD AUTO: 144 10E3/UL (ref 150–450)
PLATELET # BLD AUTO: 146 10E3/UL (ref 150–450)
PLATELET # BLD AUTO: 146 10E3/UL (ref 150–450)
PLATELET # BLD AUTO: 147 10E3/UL (ref 150–450)
PLATELET # BLD AUTO: 149 10E3/UL (ref 150–450)
PLATELET # BLD AUTO: 152 10E3/UL (ref 150–450)
PLATELET # BLD AUTO: 155 10E3/UL (ref 150–450)
PLATELET # BLD AUTO: 155 10E3/UL (ref 150–450)
PLATELET # BLD AUTO: 157 10E3/UL (ref 150–450)
PLATELET # BLD AUTO: 158 10E3/UL (ref 150–450)
PLATELET # BLD AUTO: 160 10E3/UL (ref 150–450)
PLATELET # BLD AUTO: 162 10E3/UL (ref 150–450)
PLATELET # BLD AUTO: 165 10E3/UL (ref 150–450)
PLATELET # BLD AUTO: 165 10E3/UL (ref 150–450)
PLATELET # BLD AUTO: 166 10E3/UL (ref 150–450)
PLATELET # BLD AUTO: 169 10E3/UL (ref 150–450)
PLATELET # BLD AUTO: 170 10E3/UL (ref 150–450)
PLATELET # BLD AUTO: 172 10E3/UL (ref 150–450)
PLATELET # BLD AUTO: 174 10E3/UL (ref 150–450)
PLATELET # BLD AUTO: 175 10E3/UL (ref 150–450)
PLATELET # BLD AUTO: 177 10E3/UL (ref 150–450)
PLATELET # BLD AUTO: 180 10E3/UL (ref 150–450)
PLATELET # BLD AUTO: 182 10E3/UL (ref 150–450)
PLATELET # BLD AUTO: 186 10E3/UL (ref 150–450)
PLATELET # BLD AUTO: 186 10E3/UL (ref 150–450)
PLATELET # BLD AUTO: 187 10E3/UL (ref 150–450)
PLATELET # BLD AUTO: 188 10E3/UL (ref 150–450)
PLATELET # BLD AUTO: 190 10E3/UL (ref 150–450)
PLATELET # BLD AUTO: 190 10E3/UL (ref 150–450)
PLATELET # BLD AUTO: 191 10E3/UL (ref 150–450)
PLATELET # BLD AUTO: 192 10E3/UL (ref 150–450)
PLATELET # BLD AUTO: 198 10E3/UL (ref 150–450)
PLATELET # BLD AUTO: 202 10E3/UL (ref 150–450)
PLATELET # BLD AUTO: 206 10E3/UL (ref 150–450)
PLATELET # BLD AUTO: 214 10E3/UL (ref 150–450)
PLATELET # BLD AUTO: 216 10E3/UL (ref 150–450)
PLATELET # BLD AUTO: 217 10E3/UL (ref 150–450)
PLATELET # BLD AUTO: 225 10E3/UL (ref 150–450)
PLATELET # BLD AUTO: 225 10E3/UL (ref 150–450)
PLATELET # BLD AUTO: 236 10E3/UL (ref 150–450)
PLATELET # BLD AUTO: 240 10E3/UL (ref 150–450)
PLATELET # BLD AUTO: 245 10E3/UL (ref 150–450)
PLATELET # BLD AUTO: 245 10E3/UL (ref 150–450)
PLATELET # BLD AUTO: 249 10E3/UL (ref 150–450)
PLATELET # BLD AUTO: 254 10E3/UL (ref 150–450)
PLATELET # BLD AUTO: 254 10E3/UL (ref 150–450)
PLATELET # BLD AUTO: 259 10E3/UL (ref 150–450)
PLATELET # BLD AUTO: 264 10E3/UL (ref 150–450)
PLATELET # BLD AUTO: 264 10E3/UL (ref 150–450)
PLATELET # BLD AUTO: 290 10E3/UL (ref 150–450)
PLATELET # BLD AUTO: 303 10E3/UL (ref 150–450)
PLATELET # BLD AUTO: 310 10E3/UL (ref 150–450)
PLATELET # BLD AUTO: 313 10E3/UL (ref 150–450)
PLATELET # BLD AUTO: 313 10E3/UL (ref 150–450)
PLATELET # BLD AUTO: 321 10E3/UL (ref 150–450)
PLATELET # BLD AUTO: 333 10E3/UL (ref 150–450)
PLATELET # BLD AUTO: 337 10E3/UL (ref 150–450)
PLATELET # BLD AUTO: 358 10E3/UL (ref 150–450)
PO2 BLDV: 28 MM HG (ref 25–47)
PO2 BLDV: 32 MM HG (ref 25–47)
PO2 BLDV: 35 MM HG (ref 25–47)
PO2 BLDV: 42 MM HG (ref 25–47)
PO2 BLDV: 49 MM HG (ref 25–47)
PO2 BLDV: 84 MM HG (ref 25–47)
POLYCHROMASIA BLD QL SMEAR: SLIGHT
POTASSIUM SERPL-SCNC: 1.9 MMOL/L (ref 3.4–5.3)
POTASSIUM SERPL-SCNC: 2.9 MMOL/L (ref 3.4–5.3)
POTASSIUM SERPL-SCNC: 3 MMOL/L (ref 3.4–5.3)
POTASSIUM SERPL-SCNC: 3.1 MMOL/L (ref 3.4–5.3)
POTASSIUM SERPL-SCNC: 3.1 MMOL/L (ref 3.4–5.3)
POTASSIUM SERPL-SCNC: 3.2 MMOL/L (ref 3.4–5.3)
POTASSIUM SERPL-SCNC: 3.3 MMOL/L (ref 3.4–5.3)
POTASSIUM SERPL-SCNC: 3.4 MMOL/L (ref 3.4–5.3)
POTASSIUM SERPL-SCNC: 3.5 MMOL/L (ref 3.4–5.3)
POTASSIUM SERPL-SCNC: 3.6 MMOL/L (ref 3.4–5.3)
POTASSIUM SERPL-SCNC: 3.7 MMOL/L (ref 3.4–5.3)
POTASSIUM SERPL-SCNC: 3.8 MMOL/L (ref 3.4–5.3)
POTASSIUM SERPL-SCNC: 3.9 MMOL/L (ref 3.4–5.3)
POTASSIUM SERPL-SCNC: 4 MMOL/L (ref 3.4–5.3)
POTASSIUM SERPL-SCNC: 4.1 MMOL/L (ref 3.4–5.3)
POTASSIUM SERPL-SCNC: 4.2 MMOL/L (ref 3.4–5.3)
POTASSIUM SERPL-SCNC: 4.4 MMOL/L (ref 3.4–5.3)
POTASSIUM SERPL-SCNC: 4.5 MMOL/L (ref 3.4–5.3)
POTASSIUM SERPL-SCNC: 4.6 MMOL/L (ref 3.4–5.3)
POTASSIUM SERPL-SCNC: 4.7 MMOL/L (ref 3.4–5.3)
POTASSIUM SERPL-SCNC: 4.8 MMOL/L (ref 3.4–5.3)
POTASSIUM SERPL-SCNC: 5 MMOL/L (ref 3.4–5.3)
POTASSIUM SERPL-SCNC: 5.3 MMOL/L (ref 3.4–5.3)
POTASSIUM SERPL-SCNC: 5.3 MMOL/L (ref 3.4–5.3)
POTASSIUM SERPL-SCNC: 5.4 MMOL/L (ref 3.4–5.3)
POTASSIUM SERPL-SCNC: 5.5 MMOL/L (ref 3.4–5.3)
POTASSIUM SERPL-SCNC: 5.5 MMOL/L (ref 3.4–5.3)
PR INTERVAL - MUSE: NORMAL MS
PROT SERPL-MCNC: 5.1 G/DL (ref 6.4–8.3)
PROT SERPL-MCNC: 5.2 G/DL (ref 6.4–8.3)
PROT SERPL-MCNC: 5.5 G/DL (ref 6.4–8.3)
PROT SERPL-MCNC: 5.6 G/DL (ref 6.4–8.3)
PROT SERPL-MCNC: 5.7 G/DL (ref 6.4–8.3)
PROT SERPL-MCNC: 5.8 G/DL (ref 6.4–8.3)
PROT SERPL-MCNC: 5.8 G/DL (ref 6.4–8.3)
PROT SERPL-MCNC: 6.4 G/DL (ref 6.4–8.3)
PROT SERPL-MCNC: 6.7 G/DL (ref 6.4–8.3)
PSA SERPL DL<=0.01 NG/ML-MCNC: 1.18 NG/ML (ref 0–6.5)
QRS DURATION - MUSE: 106 MS
QRS DURATION - MUSE: 108 MS
QRS DURATION - MUSE: 108 MS
QRS DURATION - MUSE: 116 MS
QRS DURATION - MUSE: 120 MS
QRS DURATION - MUSE: 128 MS
QRS DURATION - MUSE: 130 MS
QT - MUSE: 390 MS
QT - MUSE: 402 MS
QT - MUSE: 414 MS
QT - MUSE: 414 MS
QT - MUSE: 458 MS
QT - MUSE: 514 MS
QT - MUSE: 530 MS
QTC - MUSE: 424 MS
QTC - MUSE: 438 MS
QTC - MUSE: 447 MS
QTC - MUSE: 449 MS
QTC - MUSE: 449 MS
QTC - MUSE: 466 MS
QTC - MUSE: 490 MS
QUANTIFERON MITOGEN: 0.13 IU/ML
QUANTIFERON MITOGEN: 1.12 IU/ML
QUANTIFERON MITOGEN: 1.68 IU/ML
QUANTIFERON NIL TUBE: 0.02 IU/ML
QUANTIFERON NIL TUBE: 0.03 IU/ML
QUANTIFERON NIL TUBE: 0.03 IU/ML
QUANTIFERON TB1 TUBE: 0.03 IU/ML
QUANTIFERON TB1 TUBE: 0.04 IU/ML
QUANTIFERON TB1 TUBE: 0.04 IU/ML
QUANTIFERON TB2 TUBE: 0.01
QUANTIFERON TB2 TUBE: 0.03
QUANTIFERON TB2 TUBE: 0.04
R AXIS - MUSE: -16 DEGREES
R AXIS - MUSE: -37 DEGREES
R AXIS - MUSE: -42 DEGREES
R AXIS - MUSE: -50 DEGREES
R AXIS - MUSE: -57 DEGREES
R AXIS - MUSE: -71 DEGREES
R AXIS - MUSE: -89 DEGREES
RBC # BLD AUTO: 2.07 10E6/UL (ref 4.4–5.9)
RBC # BLD AUTO: 2.16 10E6/UL (ref 4.4–5.9)
RBC # BLD AUTO: 2.18 10E6/UL (ref 4.4–5.9)
RBC # BLD AUTO: 2.19 10E6/UL (ref 4.4–5.9)
RBC # BLD AUTO: 2.23 10E6/UL (ref 4.4–5.9)
RBC # BLD AUTO: 2.47 10E6/UL (ref 4.4–5.9)
RBC # BLD AUTO: 2.49 10E6/UL (ref 4.4–5.9)
RBC # BLD AUTO: 2.51 10E6/UL (ref 4.4–5.9)
RBC # BLD AUTO: 2.54 10E6/UL (ref 4.4–5.9)
RBC # BLD AUTO: 2.57 10E6/UL (ref 4.4–5.9)
RBC # BLD AUTO: 2.58 10E6/UL (ref 4.4–5.9)
RBC # BLD AUTO: 2.58 10E6/UL (ref 4.4–5.9)
RBC # BLD AUTO: 2.59 10E6/UL (ref 4.4–5.9)
RBC # BLD AUTO: 2.6 10E6/UL (ref 4.4–5.9)
RBC # BLD AUTO: 2.62 10E6/UL (ref 4.4–5.9)
RBC # BLD AUTO: 2.64 10E6/UL (ref 4.4–5.9)
RBC # BLD AUTO: 2.67 10E6/UL (ref 4.4–5.9)
RBC # BLD AUTO: 2.67 10E6/UL (ref 4.4–5.9)
RBC # BLD AUTO: 2.68 10E6/UL (ref 4.4–5.9)
RBC # BLD AUTO: 2.69 10E6/UL (ref 4.4–5.9)
RBC # BLD AUTO: 2.72 10E6/UL (ref 4.4–5.9)
RBC # BLD AUTO: 2.73 10E6/UL (ref 4.4–5.9)
RBC # BLD AUTO: 2.74 10E6/UL (ref 4.4–5.9)
RBC # BLD AUTO: 2.75 10E6/UL (ref 4.4–5.9)
RBC # BLD AUTO: 2.76 10E6/UL (ref 4.4–5.9)
RBC # BLD AUTO: 2.78 10E6/UL (ref 4.4–5.9)
RBC # BLD AUTO: 2.78 10E6/UL (ref 4.4–5.9)
RBC # BLD AUTO: 2.8 10E6/UL (ref 4.4–5.9)
RBC # BLD AUTO: 2.83 10E6/UL (ref 4.4–5.9)
RBC # BLD AUTO: 2.85 10E6/UL (ref 4.4–5.9)
RBC # BLD AUTO: 2.91 10E6/UL (ref 4.4–5.9)
RBC # BLD AUTO: 2.92 10E6/UL (ref 4.4–5.9)
RBC # BLD AUTO: 2.96 10E6/UL (ref 4.4–5.9)
RBC # BLD AUTO: 2.96 10E6/UL (ref 4.4–5.9)
RBC # BLD AUTO: 2.97 10E6/UL (ref 4.4–5.9)
RBC # BLD AUTO: 2.97 10E6/UL (ref 4.4–5.9)
RBC # BLD AUTO: 2.99 10E6/UL (ref 4.4–5.9)
RBC # BLD AUTO: 3.01 10E6/UL (ref 4.4–5.9)
RBC # BLD AUTO: 3.02 10E6/UL (ref 4.4–5.9)
RBC # BLD AUTO: 3.05 10E6/UL (ref 4.4–5.9)
RBC # BLD AUTO: 3.05 10E6/UL (ref 4.4–5.9)
RBC # BLD AUTO: 3.08 10E6/UL (ref 4.4–5.9)
RBC # BLD AUTO: 3.09 10E6/UL (ref 4.4–5.9)
RBC # BLD AUTO: 3.09 10E6/UL (ref 4.4–5.9)
RBC # BLD AUTO: 3.1 10E6/UL (ref 4.4–5.9)
RBC # BLD AUTO: 3.1 10E6/UL (ref 4.4–5.9)
RBC # BLD AUTO: 3.12 10E6/UL (ref 4.4–5.9)
RBC # BLD AUTO: 3.16 10E6/UL (ref 4.4–5.9)
RBC # BLD AUTO: 3.19 10E6/UL (ref 4.4–5.9)
RBC # BLD AUTO: 3.22 10E6/UL (ref 4.4–5.9)
RBC # BLD AUTO: 3.26 10E6/UL (ref 4.4–5.9)
RBC # BLD AUTO: 3.28 10E6/UL (ref 4.4–5.9)
RBC # BLD AUTO: 3.32 10E6/UL (ref 4.4–5.9)
RBC # BLD AUTO: 3.36 10E6/UL (ref 4.4–5.9)
RBC # BLD AUTO: 3.37 10E6/UL (ref 4.4–5.9)
RBC # BLD AUTO: 3.46 10E6/UL (ref 4.4–5.9)
RBC # BLD AUTO: 3.51 10E6/UL (ref 4.4–5.9)
RBC # BLD AUTO: 3.66 10E6/UL (ref 4.4–5.9)
RBC # BLD AUTO: 4.09 10E6/UL (ref 4.4–5.9)
RBC AGGLUT BLD QL: ABNORMAL
RBC MORPH BLD: ABNORMAL
RBC MORPH BLD: ABNORMAL
RBC URINE: 12 /HPF
RBC URINE: 42 /HPF
RBC URINE: >182 /HPF
RBC URINE: >182 /HPF
RETICS # AUTO: 0.1 10E6/UL (ref 0.03–0.1)
RETICS # AUTO: 0.1 10E6/UL (ref 0.03–0.1)
RETICS # AUTO: 0.13 10E6/UL (ref 0.03–0.1)
RETICS/RBC NFR AUTO: 3.8 % (ref 0.5–2)
RETICS/RBC NFR AUTO: 4.1 % (ref 0.5–2)
RETICS/RBC NFR AUTO: 4.9 % (ref 0.5–2)
ROULEAUX BLD QL SMEAR: ABNORMAL
RVA RNA STL QL NAA+NON-PROBE: NEGATIVE
RVA RNA STL QL NAA+NON-PROBE: NEGATIVE
SALMONELLA SP RPOD STL QL NAA+PROBE: NEGATIVE
SALMONELLA SP RPOD STL QL NAA+PROBE: NEGATIVE
SAO2 % BLDV: 64.4 % (ref 70–75)
SAO2 % BLDV: 68.6 % (ref 70–75)
SAO2 % BLDV: 79 % (ref 70–75)
SAO2 % BLDV: 87 % (ref 70–75)
SAO2 % BLDV: 97.6 % (ref 70–75)
SAPO I+II+IV+V RNA STL QL NAA+NON-PROBE: NEGATIVE
SAPO I+II+IV+V RNA STL QL NAA+NON-PROBE: NEGATIVE
SARS-COV-2 RNA RESP QL NAA+PROBE: NEGATIVE
SHIGELLA SP+EIEC IPAH ST NAA+NON-PROBE: NEGATIVE
SHIGELLA SP+EIEC IPAH ST NAA+NON-PROBE: NEGATIVE
SICKLE CELLS BLD QL SMEAR: ABNORMAL
SMUDGE CELLS BLD QL SMEAR: ABNORMAL
SODIUM SERPL-SCNC: 118 MMOL/L (ref 135–145)
SODIUM SERPL-SCNC: 119 MMOL/L (ref 135–145)
SODIUM SERPL-SCNC: 120 MMOL/L (ref 135–145)
SODIUM SERPL-SCNC: 120 MMOL/L (ref 135–145)
SODIUM SERPL-SCNC: 121 MMOL/L (ref 135–145)
SODIUM SERPL-SCNC: 121 MMOL/L (ref 135–145)
SODIUM SERPL-SCNC: 122 MMOL/L (ref 135–145)
SODIUM SERPL-SCNC: 123 MMOL/L (ref 135–145)
SODIUM SERPL-SCNC: 125 MMOL/L (ref 135–145)
SODIUM SERPL-SCNC: 126 MMOL/L (ref 135–145)
SODIUM SERPL-SCNC: 127 MMOL/L (ref 135–145)
SODIUM SERPL-SCNC: 128 MMOL/L (ref 135–145)
SODIUM SERPL-SCNC: 129 MMOL/L (ref 135–145)
SODIUM SERPL-SCNC: 130 MMOL/L (ref 135–145)
SODIUM SERPL-SCNC: 131 MMOL/L (ref 135–145)
SODIUM SERPL-SCNC: 132 MMOL/L (ref 135–145)
SODIUM SERPL-SCNC: 132 MMOL/L (ref 135–145)
SODIUM SERPL-SCNC: 133 MMOL/L (ref 135–145)
SODIUM SERPL-SCNC: 134 MMOL/L (ref 135–145)
SODIUM SERPL-SCNC: 134 MMOL/L (ref 135–145)
SODIUM SERPL-SCNC: 135 MMOL/L (ref 135–145)
SODIUM SERPL-SCNC: 136 MMOL/L (ref 135–145)
SODIUM SERPL-SCNC: 137 MMOL/L (ref 135–145)
SODIUM SERPL-SCNC: 138 MMOL/L (ref 135–145)
SODIUM SERPL-SCNC: 139 MMOL/L (ref 135–145)
SODIUM SERPL-SCNC: 140 MMOL/L (ref 135–145)
SODIUM SERPL-SCNC: 141 MMOL/L (ref 135–145)
SODIUM UR-SCNC: 20 MMOL/L
SODIUM UR-SCNC: 25 MMOL/L
SODIUM UR-SCNC: 61 MMOL/L
SODIUM UR-SCNC: <20 MMOL/L
SODIUM UR-SCNC: <20 MMOL/L
SP GR UR STRIP: 1.01 (ref 1–1.03)
SP GR UR STRIP: ABNORMAL
SPECIMEN EXPIRATION DATE: NORMAL
SPHEROCYTES BLD QL SMEAR: ABNORMAL
SQUAMOUS EPITHELIAL: 15 /HPF
SQUAMOUS EPITHELIAL: <1 /HPF
STOMATOCYTES BLD QL SMEAR: ABNORMAL
SYSTOLIC BLOOD PRESSURE - MUSE: NORMAL MMHG
T AXIS - MUSE: -18 DEGREES
T AXIS - MUSE: -9 DEGREES
T AXIS - MUSE: 107 DEGREES
T AXIS - MUSE: 23 DEGREES
T AXIS - MUSE: 28 DEGREES
T AXIS - MUSE: 35 DEGREES
T AXIS - MUSE: 95 DEGREES
TARGETS BLD QL SMEAR: ABNORMAL
TOXIC GRANULES BLD QL SMEAR: ABNORMAL
TRANSFERRIN SERPL-MCNC: 112 MG/DL (ref 200–360)
TRIGL SERPL-MCNC: 32 MG/DL
TROPONIN T SERPL HS-MCNC: 102 NG/L
TROPONIN T SERPL HS-MCNC: 104 NG/L
TROPONIN T SERPL HS-MCNC: 58 NG/L
TROPONIN T SERPL HS-MCNC: 59 NG/L
TROPONIN T SERPL HS-MCNC: 69 NG/L
TROPONIN T SERPL HS-MCNC: 72 NG/L
TROPONIN T SERPL HS-MCNC: 73 NG/L
TROPONIN T SERPL HS-MCNC: 74 NG/L
TROPONIN T SERPL HS-MCNC: 74 NG/L
TSH SERPL DL<=0.005 MIU/L-ACNC: 2.54 UIU/ML (ref 0.3–4.2)
TSH SERPL DL<=0.005 MIU/L-ACNC: 2.56 UIU/ML (ref 0.3–4.2)
TSH SERPL DL<=0.005 MIU/L-ACNC: 3.27 UIU/ML (ref 0.3–4.2)
TSH SERPL DL<=0.005 MIU/L-ACNC: 3.52 UIU/ML (ref 0.3–4.2)
UFH PPP CHRO-ACNC: 0.15 IU/ML
UFH PPP CHRO-ACNC: 0.16 IU/ML
UFH PPP CHRO-ACNC: 0.16 IU/ML
UFH PPP CHRO-ACNC: 0.17 IU/ML
UFH PPP CHRO-ACNC: 0.19 IU/ML
UFH PPP CHRO-ACNC: 0.23 IU/ML
UFH PPP CHRO-ACNC: 0.24 IU/ML
UFH PPP CHRO-ACNC: 0.26 IU/ML
UFH PPP CHRO-ACNC: 0.3 IU/ML
UFH PPP CHRO-ACNC: 0.3 IU/ML
UFH PPP CHRO-ACNC: 0.31 IU/ML
UFH PPP CHRO-ACNC: 0.31 IU/ML
UFH PPP CHRO-ACNC: 0.32 IU/ML
UFH PPP CHRO-ACNC: 0.33 IU/ML
UFH PPP CHRO-ACNC: 0.34 IU/ML
UFH PPP CHRO-ACNC: 0.37 IU/ML
UFH PPP CHRO-ACNC: 0.38 IU/ML
UFH PPP CHRO-ACNC: 0.38 IU/ML
UFH PPP CHRO-ACNC: 0.4 IU/ML
UFH PPP CHRO-ACNC: 0.43 IU/ML
UFH PPP CHRO-ACNC: 0.43 IU/ML
UFH PPP CHRO-ACNC: 0.45 IU/ML
UFH PPP CHRO-ACNC: 0.46 IU/ML
UFH PPP CHRO-ACNC: 0.46 IU/ML
UFH PPP CHRO-ACNC: 0.48 IU/ML
UFH PPP CHRO-ACNC: 0.57 IU/ML
UFH PPP CHRO-ACNC: 0.69 IU/ML
UFH PPP CHRO-ACNC: <0.1 IU/ML
UNIT ABO/RH: NORMAL
UNIT NUMBER: NORMAL
UNIT STATUS: NORMAL
UNIT TYPE ISBT: 5100
UPPER GI ENDOSCOPY: NORMAL
UROBILINOGEN UR STRIP-MCNC: ABNORMAL MG/DL
UROBILINOGEN UR STRIP-MCNC: NORMAL MG/DL
V CHOLERAE DNA SPEC QL NAA+PROBE: NEGATIVE
V CHOLERAE DNA SPEC QL NAA+PROBE: NEGATIVE
VANCOMYCIN SERPL-MCNC: 18.9 UG/ML
VARIANT LYMPHS BLD QL SMEAR: ABNORMAL
VARIANT LYMPHS BLD QL SMEAR: PRESENT
VENTRICULAR RATE- MUSE: 41 BPM
VENTRICULAR RATE- MUSE: 43 BPM
VENTRICULAR RATE- MUSE: 69 BPM
VENTRICULAR RATE- MUSE: 71 BPM
VENTRICULAR RATE- MUSE: 71 BPM
VENTRICULAR RATE- MUSE: 76 BPM
VENTRICULAR RATE- MUSE: 81 BPM
VIBRIO DNA SPEC NAA+PROBE: NEGATIVE
VIBRIO DNA SPEC NAA+PROBE: NEGATIVE
VIT B12 SERPL-MCNC: 525 PG/ML (ref 232–1245)
VIT B12 SERPL-MCNC: 658 PG/ML (ref 232–1245)
WBC # BLD AUTO: 3 10E3/UL (ref 4–11)
WBC # BLD AUTO: 3 10E3/UL (ref 4–11)
WBC # BLD AUTO: 3.1 10E3/UL (ref 4–11)
WBC # BLD AUTO: 3.2 10E3/UL (ref 4–11)
WBC # BLD AUTO: 3.3 10E3/UL (ref 4–11)
WBC # BLD AUTO: 3.4 10E3/UL (ref 4–11)
WBC # BLD AUTO: 3.4 10E3/UL (ref 4–11)
WBC # BLD AUTO: 3.5 10E3/UL (ref 4–11)
WBC # BLD AUTO: 3.6 10E3/UL (ref 4–11)
WBC # BLD AUTO: 3.7 10E3/UL (ref 4–11)
WBC # BLD AUTO: 3.8 10E3/UL (ref 4–11)
WBC # BLD AUTO: 3.8 10E3/UL (ref 4–11)
WBC # BLD AUTO: 3.9 10E3/UL (ref 4–11)
WBC # BLD AUTO: 4 10E3/UL (ref 4–11)
WBC # BLD AUTO: 4.1 10E3/UL (ref 4–11)
WBC # BLD AUTO: 4.3 10E3/UL (ref 4–11)
WBC # BLD AUTO: 4.4 10E3/UL (ref 4–11)
WBC # BLD AUTO: 4.4 10E3/UL (ref 4–11)
WBC # BLD AUTO: 4.5 10E3/UL (ref 4–11)
WBC # BLD AUTO: 4.5 10E3/UL (ref 4–11)
WBC # BLD AUTO: 4.6 10E3/UL (ref 4–11)
WBC # BLD AUTO: 4.7 10E3/UL (ref 4–11)
WBC # BLD AUTO: 4.8 10E3/UL (ref 4–11)
WBC # BLD AUTO: 4.8 10E3/UL (ref 4–11)
WBC # BLD AUTO: 4.9 10E3/UL (ref 4–11)
WBC # BLD AUTO: 5 10E3/UL (ref 4–11)
WBC # BLD AUTO: 5.1 10E3/UL (ref 4–11)
WBC # BLD AUTO: 5.1 10E3/UL (ref 4–11)
WBC # BLD AUTO: 5.3 10E3/UL (ref 4–11)
WBC # BLD AUTO: 5.3 10E3/UL (ref 4–11)
WBC # BLD AUTO: 5.4 10E3/UL (ref 4–11)
WBC # BLD AUTO: 5.4 10E3/UL (ref 4–11)
WBC # BLD AUTO: 5.6 10E3/UL (ref 4–11)
WBC # BLD AUTO: 5.6 10E3/UL (ref 4–11)
WBC # BLD AUTO: 5.8 10E3/UL (ref 4–11)
WBC # BLD AUTO: 5.9 10E3/UL (ref 4–11)
WBC # BLD AUTO: 6.1 10E3/UL (ref 4–11)
WBC # BLD AUTO: 6.2 10E3/UL (ref 4–11)
WBC # BLD AUTO: 6.4 10E3/UL (ref 4–11)
WBC # BLD AUTO: 6.5 10E3/UL (ref 4–11)
WBC # BLD AUTO: 6.6 10E3/UL (ref 4–11)
WBC # BLD AUTO: 6.6 10E3/UL (ref 4–11)
WBC # BLD AUTO: 6.8 10E3/UL (ref 4–11)
WBC # BLD AUTO: 7.2 10E3/UL (ref 4–11)
WBC # BLD AUTO: 7.3 10E3/UL (ref 4–11)
WBC # BLD AUTO: 8.3 10E3/UL (ref 4–11)
WBC # BLD AUTO: 8.3 10E3/UL (ref 4–11)
WBC # BLD AUTO: 8.8 10E3/UL (ref 4–11)
WBC # BLD AUTO: 9 10E3/UL (ref 4–11)
WBC # BLD AUTO: 9.8 10E3/UL (ref 4–11)
WBC CLUMPS #/AREA URNS HPF: PRESENT /HPF
WBC CLUMPS #/AREA URNS HPF: PRESENT /HPF
WBC URINE: 28 /HPF
WBC URINE: 7 /HPF
WBC URINE: >182 /HPF
WBC URINE: >182 /HPF
Y ENTEROCOL DNA STL QL NAA+PROBE: NEGATIVE
Y ENTEROCOL DNA STL QL NAA+PROBE: POSITIVE
YEAST #/AREA URNS HPF: ABNORMAL /HPF

## 2024-01-01 PROCEDURE — 97165 OT EVAL LOW COMPLEX 30 MIN: CPT | Mod: GO | Performed by: OCCUPATIONAL THERAPIST

## 2024-01-01 PROCEDURE — 99233 SBSQ HOSP IP/OBS HIGH 50: CPT | Performed by: HOSPITALIST

## 2024-01-01 PROCEDURE — 999N000147 HC STATISTIC PT IP EVAL DEFER

## 2024-01-01 PROCEDURE — 250N000013 HC RX MED GY IP 250 OP 250 PS 637: Performed by: INTERNAL MEDICINE

## 2024-01-01 PROCEDURE — 71046 X-RAY EXAM CHEST 2 VIEWS: CPT

## 2024-01-01 PROCEDURE — 99222 1ST HOSP IP/OBS MODERATE 55: CPT | Performed by: INTERNAL MEDICINE

## 2024-01-01 PROCEDURE — 83880 ASSAY OF NATRIURETIC PEPTIDE: CPT | Performed by: EMERGENCY MEDICINE

## 2024-01-01 PROCEDURE — 36415 COLL VENOUS BLD VENIPUNCTURE: CPT | Performed by: INTERNAL MEDICINE

## 2024-01-01 PROCEDURE — 0DJ08ZZ INSPECTION OF UPPER INTESTINAL TRACT, VIA NATURAL OR ARTIFICIAL OPENING ENDOSCOPIC: ICD-10-PCS | Performed by: INTERNAL MEDICINE

## 2024-01-01 PROCEDURE — 250N000011 HC RX IP 250 OP 636: Performed by: INTERNAL MEDICINE

## 2024-01-01 PROCEDURE — 85025 COMPLETE CBC W/AUTO DIFF WBC: CPT | Performed by: HOSPITALIST

## 2024-01-01 PROCEDURE — 85610 PROTHROMBIN TIME: CPT | Performed by: EMERGENCY MEDICINE

## 2024-01-01 PROCEDURE — 258N000003 HC RX IP 258 OP 636: Performed by: INTERNAL MEDICINE

## 2024-01-01 PROCEDURE — 99233 SBSQ HOSP IP/OBS HIGH 50: CPT | Performed by: INTERNAL MEDICINE

## 2024-01-01 PROCEDURE — 99233 SBSQ HOSP IP/OBS HIGH 50: CPT | Mod: 25 | Performed by: INTERNAL MEDICINE

## 2024-01-01 PROCEDURE — 85610 PROTHROMBIN TIME: CPT | Performed by: INTERNAL MEDICINE

## 2024-01-01 PROCEDURE — 82570 ASSAY OF URINE CREATININE: CPT

## 2024-01-01 PROCEDURE — 83735 ASSAY OF MAGNESIUM: CPT | Performed by: STUDENT IN AN ORGANIZED HEALTH CARE EDUCATION/TRAINING PROGRAM

## 2024-01-01 PROCEDURE — 36415 COLL VENOUS BLD VENIPUNCTURE: CPT | Performed by: EMERGENCY MEDICINE

## 2024-01-01 PROCEDURE — 87635 SARS-COV-2 COVID-19 AMP PRB: CPT | Performed by: NURSE PRACTITIONER

## 2024-01-01 PROCEDURE — 250N000013 HC RX MED GY IP 250 OP 250 PS 637: Performed by: STUDENT IN AN ORGANIZED HEALTH CARE EDUCATION/TRAINING PROGRAM

## 2024-01-01 PROCEDURE — 82565 ASSAY OF CREATININE: CPT | Performed by: PHYSICIAN ASSISTANT

## 2024-01-01 PROCEDURE — 85018 HEMOGLOBIN: CPT | Performed by: INTERNAL MEDICINE

## 2024-01-01 PROCEDURE — 93005 ELECTROCARDIOGRAM TRACING: CPT

## 2024-01-01 PROCEDURE — 85027 COMPLETE CBC AUTOMATED: CPT | Performed by: INTERNAL MEDICINE

## 2024-01-01 PROCEDURE — 80048 BASIC METABOLIC PNL TOTAL CA: CPT | Performed by: INTERNAL MEDICINE

## 2024-01-01 PROCEDURE — 272N000063 HC CIRCUIT HUMID FACE/TRACH MSK

## 2024-01-01 PROCEDURE — 84155 ASSAY OF PROTEIN SERUM: CPT | Performed by: STUDENT IN AN ORGANIZED HEALTH CARE EDUCATION/TRAINING PROGRAM

## 2024-01-01 PROCEDURE — 82040 ASSAY OF SERUM ALBUMIN: CPT | Performed by: EMERGENCY MEDICINE

## 2024-01-01 PROCEDURE — 85025 COMPLETE CBC W/AUTO DIFF WBC: CPT | Performed by: INTERNAL MEDICINE

## 2024-01-01 PROCEDURE — 99309 SBSQ NF CARE MODERATE MDM 30: CPT | Performed by: PHYSICIAN ASSISTANT

## 2024-01-01 PROCEDURE — 120N000001 HC R&B MED SURG/OB

## 2024-01-01 PROCEDURE — 99204 OFFICE O/P NEW MOD 45 MIN: CPT | Performed by: FAMILY MEDICINE

## 2024-01-01 PROCEDURE — 85018 HEMOGLOBIN: CPT | Performed by: PHYSICIAN ASSISTANT

## 2024-01-01 PROCEDURE — P9604 ONE-WAY ALLOW PRORATED TRIP: HCPCS | Performed by: PHYSICIAN ASSISTANT

## 2024-01-01 PROCEDURE — 99233 SBSQ HOSP IP/OBS HIGH 50: CPT | Mod: FS | Performed by: PHYSICIAN ASSISTANT

## 2024-01-01 PROCEDURE — 80048 BASIC METABOLIC PNL TOTAL CA: CPT | Performed by: PHYSICIAN ASSISTANT

## 2024-01-01 PROCEDURE — 250N000013 HC RX MED GY IP 250 OP 250 PS 637: Performed by: SURGERY

## 2024-01-01 PROCEDURE — 85018 HEMOGLOBIN: CPT | Performed by: NURSE PRACTITIONER

## 2024-01-01 PROCEDURE — 36415 COLL VENOUS BLD VENIPUNCTURE: CPT | Performed by: NURSE PRACTITIONER

## 2024-01-01 PROCEDURE — 999N000208 ECHOCARDIOGRAM COMPLETE

## 2024-01-01 PROCEDURE — 80053 COMPREHEN METABOLIC PANEL: CPT | Performed by: HOSPITALIST

## 2024-01-01 PROCEDURE — 85610 PROTHROMBIN TIME: CPT | Mod: ORL | Performed by: INTERNAL MEDICINE

## 2024-01-01 PROCEDURE — 250N000011 HC RX IP 250 OP 636: Performed by: PHYSICIAN ASSISTANT

## 2024-01-01 PROCEDURE — 999N000198 HC STATISTIC WOC PT EDUCATION, 16-30 MIN

## 2024-01-01 PROCEDURE — 83735 ASSAY OF MAGNESIUM: CPT | Performed by: INTERNAL MEDICINE

## 2024-01-01 PROCEDURE — 999N000157 HC STATISTIC RCP TIME EA 10 MIN

## 2024-01-01 PROCEDURE — 83615 LACTATE (LD) (LDH) ENZYME: CPT | Performed by: HOSPITALIST

## 2024-01-01 PROCEDURE — 97530 THERAPEUTIC ACTIVITIES: CPT | Mod: GP

## 2024-01-01 PROCEDURE — 99442 PR PHYSICIAN TELEPHONE EVALUATION 11-20 MIN: CPT | Mod: 93 | Performed by: PHYSICIAN ASSISTANT

## 2024-01-01 PROCEDURE — P9016 RBC LEUKOCYTES REDUCED: HCPCS | Performed by: INTERNAL MEDICINE

## 2024-01-01 PROCEDURE — 250N000013 HC RX MED GY IP 250 OP 250 PS 637: Performed by: HOSPITALIST

## 2024-01-01 PROCEDURE — 97530 THERAPEUTIC ACTIVITIES: CPT | Mod: GO | Performed by: REHABILITATION PRACTITIONER

## 2024-01-01 PROCEDURE — 97535 SELF CARE MNGMENT TRAINING: CPT | Mod: GO

## 2024-01-01 PROCEDURE — C9113 INJ PANTOPRAZOLE SODIUM, VIA: HCPCS | Performed by: INTERNAL MEDICINE

## 2024-01-01 PROCEDURE — 85610 PROTHROMBIN TIME: CPT | Performed by: STUDENT IN AN ORGANIZED HEALTH CARE EDUCATION/TRAINING PROGRAM

## 2024-01-01 PROCEDURE — 84295 ASSAY OF SERUM SODIUM: CPT | Performed by: NURSE PRACTITIONER

## 2024-01-01 PROCEDURE — 250N000011 HC RX IP 250 OP 636: Mod: JZ | Performed by: INTERNAL MEDICINE

## 2024-01-01 PROCEDURE — 85014 HEMATOCRIT: CPT | Performed by: NURSE PRACTITIONER

## 2024-01-01 PROCEDURE — 84132 ASSAY OF SERUM POTASSIUM: CPT | Performed by: INTERNAL MEDICINE

## 2024-01-01 PROCEDURE — 85027 COMPLETE CBC AUTOMATED: CPT | Performed by: HOSPITALIST

## 2024-01-01 PROCEDURE — A9585 GADOBUTROL INJECTION: HCPCS | Performed by: INTERNAL MEDICINE

## 2024-01-01 PROCEDURE — 43255 EGD CONTROL BLEEDING ANY: CPT | Performed by: INTERNAL MEDICINE

## 2024-01-01 PROCEDURE — 94660 CPAP INITIATION&MGMT: CPT

## 2024-01-01 PROCEDURE — 99232 SBSQ HOSP IP/OBS MODERATE 35: CPT | Performed by: STUDENT IN AN ORGANIZED HEALTH CARE EDUCATION/TRAINING PROGRAM

## 2024-01-01 PROCEDURE — 99285 EMERGENCY DEPT VISIT HI MDM: CPT | Mod: 25

## 2024-01-01 PROCEDURE — 97602 WOUND(S) CARE NON-SELECTIVE: CPT

## 2024-01-01 PROCEDURE — 99213 OFFICE O/P EST LOW 20 MIN: CPT | Performed by: SURGERY

## 2024-01-01 PROCEDURE — 99223 1ST HOSP IP/OBS HIGH 75: CPT | Performed by: PHYSICIAN ASSISTANT

## 2024-01-01 PROCEDURE — 71045 X-RAY EXAM CHEST 1 VIEW: CPT

## 2024-01-01 PROCEDURE — 87086 URINE CULTURE/COLONY COUNT: CPT | Performed by: INTERNAL MEDICINE

## 2024-01-01 PROCEDURE — 99309 SBSQ NF CARE MODERATE MDM 30: CPT | Performed by: NURSE PRACTITIONER

## 2024-01-01 PROCEDURE — 84484 ASSAY OF TROPONIN QUANT: CPT | Performed by: HOSPITALIST

## 2024-01-01 PROCEDURE — P9603 ONE-WAY ALLOW PRORATED MILES: HCPCS | Performed by: NURSE PRACTITIONER

## 2024-01-01 PROCEDURE — 99214 OFFICE O/P EST MOD 30 MIN: CPT | Mod: 95 | Performed by: INTERNAL MEDICINE

## 2024-01-01 PROCEDURE — 97166 OT EVAL MOD COMPLEX 45 MIN: CPT | Mod: GO | Performed by: OCCUPATIONAL THERAPIST

## 2024-01-01 PROCEDURE — 36415 COLL VENOUS BLD VENIPUNCTURE: CPT | Performed by: PHYSICIAN ASSISTANT

## 2024-01-01 PROCEDURE — 36415 COLL VENOUS BLD VENIPUNCTURE: CPT | Mod: ORL | Performed by: PSYCHIATRY & NEUROLOGY

## 2024-01-01 PROCEDURE — 36415 COLL VENOUS BLD VENIPUNCTURE: CPT | Performed by: HOSPITALIST

## 2024-01-01 PROCEDURE — 83605 ASSAY OF LACTIC ACID: CPT | Performed by: NURSE PRACTITIONER

## 2024-01-01 PROCEDURE — 36415 COLL VENOUS BLD VENIPUNCTURE: CPT | Mod: ORL | Performed by: NURSE PRACTITIONER

## 2024-01-01 PROCEDURE — 85520 HEPARIN ASSAY: CPT | Performed by: INTERNAL MEDICINE

## 2024-01-01 PROCEDURE — 0DJD8ZZ INSPECTION OF LOWER INTESTINAL TRACT, VIA NATURAL OR ARTIFICIAL OPENING ENDOSCOPIC: ICD-10-PCS | Performed by: INTERNAL MEDICINE

## 2024-01-01 PROCEDURE — 250N000011 HC RX IP 250 OP 636: Mod: JZ | Performed by: EMERGENCY MEDICINE

## 2024-01-01 PROCEDURE — 99309 SBSQ NF CARE MODERATE MDM 30: CPT | Performed by: INTERNAL MEDICINE

## 2024-01-01 PROCEDURE — 85520 HEPARIN ASSAY: CPT | Performed by: HOSPITALIST

## 2024-01-01 PROCEDURE — 85610 PROTHROMBIN TIME: CPT | Performed by: SURGERY

## 2024-01-01 PROCEDURE — 83036 HEMOGLOBIN GLYCOSYLATED A1C: CPT | Mod: ORL | Performed by: PSYCHIATRY & NEUROLOGY

## 2024-01-01 PROCEDURE — 99307 SBSQ NF CARE SF MDM 10: CPT | Performed by: PHYSICIAN ASSISTANT

## 2024-01-01 PROCEDURE — 36415 COLL VENOUS BLD VENIPUNCTURE: CPT | Mod: ORL

## 2024-01-01 PROCEDURE — 85610 PROTHROMBIN TIME: CPT | Mod: ORL | Performed by: PSYCHIATRY & NEUROLOGY

## 2024-01-01 PROCEDURE — 84100 ASSAY OF PHOSPHORUS: CPT | Performed by: INTERNAL MEDICINE

## 2024-01-01 PROCEDURE — 85610 PROTHROMBIN TIME: CPT | Performed by: NURSE PRACTITIONER

## 2024-01-01 PROCEDURE — 250N000013 HC RX MED GY IP 250 OP 250 PS 637: Performed by: NURSE PRACTITIONER

## 2024-01-01 PROCEDURE — P9604 ONE-WAY ALLOW PRORATED TRIP: HCPCS | Performed by: NURSE PRACTITIONER

## 2024-01-01 PROCEDURE — 80048 BASIC METABOLIC PNL TOTAL CA: CPT | Performed by: HOSPITALIST

## 2024-01-01 PROCEDURE — 99239 HOSP IP/OBS DSCHRG MGMT >30: CPT | Performed by: HOSPITALIST

## 2024-01-01 PROCEDURE — 999N000141 HC STATISTIC PRE-PROCEDURE NURSING ASSESSMENT: Performed by: SURGERY

## 2024-01-01 PROCEDURE — 99310 SBSQ NF CARE HIGH MDM 45: CPT

## 2024-01-01 PROCEDURE — 99232 SBSQ HOSP IP/OBS MODERATE 35: CPT | Performed by: INTERNAL MEDICINE

## 2024-01-01 PROCEDURE — 999N000197 HC STATISTIC WOC PT EDUCATION, 0-15 MIN

## 2024-01-01 PROCEDURE — 83605 ASSAY OF LACTIC ACID: CPT | Performed by: EMERGENCY MEDICINE

## 2024-01-01 PROCEDURE — 86923 COMPATIBILITY TEST ELECTRIC: CPT | Performed by: EMERGENCY MEDICINE

## 2024-01-01 PROCEDURE — P9040 RBC LEUKOREDUCED IRRADIATED: HCPCS | Performed by: INTERNAL MEDICINE

## 2024-01-01 PROCEDURE — P9047 ALBUMIN (HUMAN), 25%, 50ML: HCPCS | Mod: JZ | Performed by: INTERNAL MEDICINE

## 2024-01-01 PROCEDURE — 99232 SBSQ HOSP IP/OBS MODERATE 35: CPT | Mod: FS | Performed by: PHYSICIAN ASSISTANT

## 2024-01-01 PROCEDURE — 250N000013 HC RX MED GY IP 250 OP 250 PS 637: Performed by: PHYSICIAN ASSISTANT

## 2024-01-01 PROCEDURE — 80069 RENAL FUNCTION PANEL: CPT | Performed by: NURSE PRACTITIONER

## 2024-01-01 PROCEDURE — 83010 ASSAY OF HAPTOGLOBIN QUANT: CPT | Performed by: HOSPITALIST

## 2024-01-01 PROCEDURE — 99214 OFFICE O/P EST MOD 30 MIN: CPT | Performed by: SURGERY

## 2024-01-01 PROCEDURE — 93306 TTE W/DOPPLER COMPLETE: CPT | Mod: 26 | Performed by: INTERNAL MEDICINE

## 2024-01-01 PROCEDURE — 250N000013 HC RX MED GY IP 250 OP 250 PS 637: Performed by: EMERGENCY MEDICINE

## 2024-01-01 PROCEDURE — 82803 BLOOD GASES ANY COMBINATION: CPT | Performed by: NURSE PRACTITIONER

## 2024-01-01 PROCEDURE — 84443 ASSAY THYROID STIM HORMONE: CPT | Performed by: PHYSICIAN ASSISTANT

## 2024-01-01 PROCEDURE — 99291 CRITICAL CARE FIRST HOUR: CPT | Performed by: PHYSICIAN ASSISTANT

## 2024-01-01 PROCEDURE — 86140 C-REACTIVE PROTEIN: CPT | Performed by: INTERNAL MEDICINE

## 2024-01-01 PROCEDURE — 97535 SELF CARE MNGMENT TRAINING: CPT | Mod: GO | Performed by: OCCUPATIONAL THERAPIST

## 2024-01-01 PROCEDURE — 92526 ORAL FUNCTION THERAPY: CPT | Mod: GN | Performed by: SPEECH-LANGUAGE PATHOLOGIST

## 2024-01-01 PROCEDURE — 210N000001 HC R&B IMCU HEART CARE

## 2024-01-01 PROCEDURE — 85610 PROTHROMBIN TIME: CPT | Performed by: HOSPITALIST

## 2024-01-01 PROCEDURE — G0463 HOSPITAL OUTPT CLINIC VISIT: HCPCS | Mod: 25

## 2024-01-01 PROCEDURE — 84132 ASSAY OF SERUM POTASSIUM: CPT | Performed by: STUDENT IN AN ORGANIZED HEALTH CARE EDUCATION/TRAINING PROGRAM

## 2024-01-01 PROCEDURE — 87086 URINE CULTURE/COLONY COUNT: CPT | Performed by: PHYSICIAN ASSISTANT

## 2024-01-01 PROCEDURE — 83550 IRON BINDING TEST: CPT | Performed by: INTERNAL MEDICINE

## 2024-01-01 PROCEDURE — 97530 THERAPEUTIC ACTIVITIES: CPT | Mod: GO | Performed by: OCCUPATIONAL THERAPIST

## 2024-01-01 PROCEDURE — 82607 VITAMIN B-12: CPT | Performed by: STUDENT IN AN ORGANIZED HEALTH CARE EDUCATION/TRAINING PROGRAM

## 2024-01-01 PROCEDURE — G0463 HOSPITAL OUTPT CLINIC VISIT: HCPCS

## 2024-01-01 PROCEDURE — 250N000009 HC RX 250: Performed by: INTERNAL MEDICINE

## 2024-01-01 PROCEDURE — 272N000064 HC CIRCUIT HUMIDITY W/CPAP BIPAP

## 2024-01-01 PROCEDURE — 85610 PROTHROMBIN TIME: CPT | Mod: ORL | Performed by: FAMILY MEDICINE

## 2024-01-01 PROCEDURE — 84100 ASSAY OF PHOSPHORUS: CPT | Performed by: HOSPITALIST

## 2024-01-01 PROCEDURE — 82272 OCCULT BLD FECES 1-3 TESTS: CPT | Performed by: INTERNAL MEDICINE

## 2024-01-01 PROCEDURE — 120N000013 HC R&B IMCU

## 2024-01-01 PROCEDURE — 87493 C DIFF AMPLIFIED PROBE: CPT | Performed by: PHYSICIAN ASSISTANT

## 2024-01-01 PROCEDURE — 80048 BASIC METABOLIC PNL TOTAL CA: CPT | Performed by: STUDENT IN AN ORGANIZED HEALTH CARE EDUCATION/TRAINING PROGRAM

## 2024-01-01 PROCEDURE — 250N000011 HC RX IP 250 OP 636: Performed by: HOSPITALIST

## 2024-01-01 PROCEDURE — 85027 COMPLETE CBC AUTOMATED: CPT | Performed by: STUDENT IN AN ORGANIZED HEALTH CARE EDUCATION/TRAINING PROGRAM

## 2024-01-01 PROCEDURE — 85004 AUTOMATED DIFF WBC COUNT: CPT | Performed by: INTERNAL MEDICINE

## 2024-01-01 PROCEDURE — 70553 MRI BRAIN STEM W/O & W/DYE: CPT

## 2024-01-01 PROCEDURE — 99231 SBSQ HOSP IP/OBS SF/LOW 25: CPT | Performed by: INTERNAL MEDICINE

## 2024-01-01 PROCEDURE — 84295 ASSAY OF SERUM SODIUM: CPT | Performed by: PHYSICIAN ASSISTANT

## 2024-01-01 PROCEDURE — 80048 BASIC METABOLIC PNL TOTAL CA: CPT

## 2024-01-01 PROCEDURE — 99153 MOD SED SAME PHYS/QHP EA: CPT | Performed by: INTERNAL MEDICINE

## 2024-01-01 PROCEDURE — 0JB70ZZ EXCISION OF BACK SUBCUTANEOUS TISSUE AND FASCIA, OPEN APPROACH: ICD-10-PCS | Performed by: SURGERY

## 2024-01-01 PROCEDURE — 82272 OCCULT BLD FECES 1-3 TESTS: CPT | Performed by: EMERGENCY MEDICINE

## 2024-01-01 PROCEDURE — 93010 ELECTROCARDIOGRAM REPORT: CPT | Performed by: INTERNAL MEDICINE

## 2024-01-01 PROCEDURE — 36415 COLL VENOUS BLD VENIPUNCTURE: CPT | Performed by: STUDENT IN AN ORGANIZED HEALTH CARE EDUCATION/TRAINING PROGRAM

## 2024-01-01 PROCEDURE — 82330 ASSAY OF CALCIUM: CPT | Performed by: INTERNAL MEDICINE

## 2024-01-01 PROCEDURE — 85048 AUTOMATED LEUKOCYTE COUNT: CPT | Performed by: INTERNAL MEDICINE

## 2024-01-01 PROCEDURE — 96374 THER/PROPH/DIAG INJ IV PUSH: CPT

## 2024-01-01 PROCEDURE — P9016 RBC LEUKOCYTES REDUCED: HCPCS

## 2024-01-01 PROCEDURE — 710N000009 HC RECOVERY PHASE 1, LEVEL 1, PER MIN: Performed by: SURGERY

## 2024-01-01 PROCEDURE — 72197 MRI PELVIS W/O & W/DYE: CPT

## 2024-01-01 PROCEDURE — 80069 RENAL FUNCTION PANEL: CPT | Performed by: INTERNAL MEDICINE

## 2024-01-01 PROCEDURE — 999N000099 HC STATISTIC MODERATE SEDATION < 10 MIN: Performed by: INTERNAL MEDICINE

## 2024-01-01 PROCEDURE — 82374 ASSAY BLOOD CARBON DIOXIDE: CPT | Performed by: PHYSICIAN ASSISTANT

## 2024-01-01 PROCEDURE — 250N000013 HC RX MED GY IP 250 OP 250 PS 637

## 2024-01-01 PROCEDURE — 85025 COMPLETE CBC W/AUTO DIFF WBC: CPT | Performed by: NURSE PRACTITIONER

## 2024-01-01 PROCEDURE — 258N000003 HC RX IP 258 OP 636: Performed by: PHYSICIAN ASSISTANT

## 2024-01-01 PROCEDURE — 36416 COLLJ CAPILLARY BLOOD SPEC: CPT

## 2024-01-01 PROCEDURE — 84484 ASSAY OF TROPONIN QUANT: CPT | Performed by: EMERGENCY MEDICINE

## 2024-01-01 PROCEDURE — 99310 SBSQ NF CARE HIGH MDM 45: CPT | Performed by: PHYSICIAN ASSISTANT

## 2024-01-01 PROCEDURE — 83735 ASSAY OF MAGNESIUM: CPT | Performed by: HOSPITALIST

## 2024-01-01 PROCEDURE — 99310 SBSQ NF CARE HIGH MDM 45: CPT | Performed by: NURSE PRACTITIONER

## 2024-01-01 PROCEDURE — 99307 SBSQ NF CARE SF MDM 10: CPT

## 2024-01-01 PROCEDURE — 87507 IADNA-DNA/RNA PROBE TQ 12-25: CPT | Mod: GZ

## 2024-01-01 PROCEDURE — 250N000011 HC RX IP 250 OP 636: Performed by: SURGERY

## 2024-01-01 PROCEDURE — 250N000009 HC RX 250: Performed by: STUDENT IN AN ORGANIZED HEALTH CARE EDUCATION/TRAINING PROGRAM

## 2024-01-01 PROCEDURE — 86923 COMPATIBILITY TEST ELECTRIC: CPT | Performed by: HOSPITALIST

## 2024-01-01 PROCEDURE — 99232 SBSQ HOSP IP/OBS MODERATE 35: CPT | Performed by: HOSPITALIST

## 2024-01-01 PROCEDURE — 84132 ASSAY OF SERUM POTASSIUM: CPT | Performed by: HOSPITALIST

## 2024-01-01 PROCEDURE — 272N000001 HC OR GENERAL SUPPLY STERILE: Performed by: SURGERY

## 2024-01-01 PROCEDURE — 250N000025 HC SEVOFLURANE, PER MIN: Performed by: SURGERY

## 2024-01-01 PROCEDURE — 85027 COMPLETE CBC AUTOMATED: CPT | Performed by: PHYSICIAN ASSISTANT

## 2024-01-01 PROCEDURE — 93321 DOPPLER ECHO F-UP/LMTD STD: CPT | Mod: 26 | Performed by: INTERNAL MEDICINE

## 2024-01-01 PROCEDURE — 83605 ASSAY OF LACTIC ACID: CPT | Performed by: STUDENT IN AN ORGANIZED HEALTH CARE EDUCATION/TRAINING PROGRAM

## 2024-01-01 PROCEDURE — 82803 BLOOD GASES ANY COMBINATION: CPT

## 2024-01-01 PROCEDURE — 86900 BLOOD TYPING SEROLOGIC ABO: CPT | Performed by: INTERNAL MEDICINE

## 2024-01-01 PROCEDURE — 87635 SARS-COV-2 COVID-19 AMP PRB: CPT | Performed by: PHYSICIAN ASSISTANT

## 2024-01-01 PROCEDURE — 80048 BASIC METABOLIC PNL TOTAL CA: CPT | Performed by: NURSE PRACTITIONER

## 2024-01-01 PROCEDURE — 85027 COMPLETE CBC AUTOMATED: CPT | Performed by: NURSE PRACTITIONER

## 2024-01-01 PROCEDURE — 87040 BLOOD CULTURE FOR BACTERIA: CPT | Performed by: INTERNAL MEDICINE

## 2024-01-01 PROCEDURE — 250N000011 HC RX IP 250 OP 636: Performed by: NURSE ANESTHETIST, CERTIFIED REGISTERED

## 2024-01-01 PROCEDURE — 99222 1ST HOSP IP/OBS MODERATE 55: CPT | Performed by: SURGERY

## 2024-01-01 PROCEDURE — 43236 UPPR GI SCOPE W/SUBMUC INJ: CPT | Performed by: INTERNAL MEDICINE

## 2024-01-01 PROCEDURE — 82805 BLOOD GASES W/O2 SATURATION: CPT | Performed by: INTERNAL MEDICINE

## 2024-01-01 PROCEDURE — G0500 MOD SEDAT ENDO SERVICE >5YRS: HCPCS | Performed by: INTERNAL MEDICINE

## 2024-01-01 PROCEDURE — 86923 COMPATIBILITY TEST ELECTRIC: CPT | Performed by: INTERNAL MEDICINE

## 2024-01-01 PROCEDURE — 84295 ASSAY OF SERUM SODIUM: CPT | Performed by: INTERNAL MEDICINE

## 2024-01-01 PROCEDURE — 99232 SBSQ HOSP IP/OBS MODERATE 35: CPT | Performed by: PHYSICIAN ASSISTANT

## 2024-01-01 PROCEDURE — 87070 CULTURE OTHR SPECIMN AEROBIC: CPT | Performed by: SURGERY

## 2024-01-01 PROCEDURE — 86900 BLOOD TYPING SEROLOGIC ABO: CPT | Performed by: EMERGENCY MEDICINE

## 2024-01-01 PROCEDURE — 210N000002 HC R&B HEART CARE

## 2024-01-01 PROCEDURE — 43235 EGD DIAGNOSTIC BRUSH WASH: CPT | Performed by: INTERNAL MEDICINE

## 2024-01-01 PROCEDURE — 82248 BILIRUBIN DIRECT: CPT | Performed by: INTERNAL MEDICINE

## 2024-01-01 PROCEDURE — 83735 ASSAY OF MAGNESIUM: CPT | Performed by: NURSE PRACTITIONER

## 2024-01-01 PROCEDURE — 99223 1ST HOSP IP/OBS HIGH 75: CPT | Mod: AI | Performed by: INTERNAL MEDICINE

## 2024-01-01 PROCEDURE — 85045 AUTOMATED RETICULOCYTE COUNT: CPT | Performed by: HOSPITALIST

## 2024-01-01 PROCEDURE — 999N000128 HC STATISTIC PERIPHERAL IV START W/O US GUIDANCE

## 2024-01-01 PROCEDURE — 88112 CYTOPATH CELL ENHANCE TECH: CPT | Mod: TC | Performed by: PHYSICIAN ASSISTANT

## 2024-01-01 PROCEDURE — 85610 PROTHROMBIN TIME: CPT | Performed by: PHYSICIAN ASSISTANT

## 2024-01-01 PROCEDURE — 999N000111 HC STATISTIC OT IP EVAL DEFER

## 2024-01-01 PROCEDURE — 92526 ORAL FUNCTION THERAPY: CPT | Mod: GN

## 2024-01-01 PROCEDURE — 84484 ASSAY OF TROPONIN QUANT: CPT | Performed by: NURSE PRACTITIONER

## 2024-01-01 PROCEDURE — 97161 PT EVAL LOW COMPLEX 20 MIN: CPT | Mod: GP

## 2024-01-01 PROCEDURE — 83935 ASSAY OF URINE OSMOLALITY: CPT | Performed by: STUDENT IN AN ORGANIZED HEALTH CARE EDUCATION/TRAINING PROGRAM

## 2024-01-01 PROCEDURE — 84075 ASSAY ALKALINE PHOSPHATASE: CPT

## 2024-01-01 PROCEDURE — 93325 DOPPLER ECHO COLOR FLOW MAPG: CPT | Mod: 26 | Performed by: INTERNAL MEDICINE

## 2024-01-01 PROCEDURE — 88112 CYTOPATH CELL ENHANCE TECH: CPT | Mod: 26

## 2024-01-01 PROCEDURE — 80048 BASIC METABOLIC PNL TOTAL CA: CPT | Performed by: EMERGENCY MEDICINE

## 2024-01-01 PROCEDURE — 85025 COMPLETE CBC W/AUTO DIFF WBC: CPT | Mod: ORL | Performed by: INTERNAL MEDICINE

## 2024-01-01 PROCEDURE — 85041 AUTOMATED RBC COUNT: CPT | Performed by: HOSPITALIST

## 2024-01-01 PROCEDURE — 85025 COMPLETE CBC W/AUTO DIFF WBC: CPT | Performed by: STUDENT IN AN ORGANIZED HEALTH CARE EDUCATION/TRAINING PROGRAM

## 2024-01-01 PROCEDURE — 99306 1ST NF CARE HIGH MDM 50: CPT | Performed by: INTERNAL MEDICINE

## 2024-01-01 PROCEDURE — 3E0G8GC INTRODUCTION OF OTHER THERAPEUTIC SUBSTANCE INTO UPPER GI, VIA NATURAL OR ARTIFICIAL OPENING ENDOSCOPIC: ICD-10-PCS | Performed by: INTERNAL MEDICINE

## 2024-01-01 PROCEDURE — 82247 BILIRUBIN TOTAL: CPT

## 2024-01-01 PROCEDURE — 80061 LIPID PANEL: CPT

## 2024-01-01 PROCEDURE — 76770 US EXAM ABDO BACK WALL COMP: CPT

## 2024-01-01 PROCEDURE — 85048 AUTOMATED LEUKOCYTE COUNT: CPT | Performed by: EMERGENCY MEDICINE

## 2024-01-01 PROCEDURE — 86481 TB AG RESPONSE T-CELL SUSP: CPT | Performed by: NURSE PRACTITIONER

## 2024-01-01 PROCEDURE — 45378 DIAGNOSTIC COLONOSCOPY: CPT | Performed by: INTERNAL MEDICINE

## 2024-01-01 PROCEDURE — 85041 AUTOMATED RBC COUNT: CPT | Performed by: EMERGENCY MEDICINE

## 2024-01-01 PROCEDURE — 51702 INSERT TEMP BLADDER CATH: CPT

## 2024-01-01 PROCEDURE — 99223 1ST HOSP IP/OBS HIGH 75: CPT | Performed by: STUDENT IN AN ORGANIZED HEALTH CARE EDUCATION/TRAINING PROGRAM

## 2024-01-01 PROCEDURE — C9113 INJ PANTOPRAZOLE SODIUM, VIA: HCPCS | Performed by: EMERGENCY MEDICINE

## 2024-01-01 PROCEDURE — 97530 THERAPEUTIC ACTIVITIES: CPT | Mod: GP | Performed by: PHYSICAL THERAPIST

## 2024-01-01 PROCEDURE — 85048 AUTOMATED LEUKOCYTE COUNT: CPT | Performed by: NURSE PRACTITIONER

## 2024-01-01 PROCEDURE — 94762 N-INVAS EAR/PLS OXIMTRY CONT: CPT

## 2024-01-01 PROCEDURE — 97110 THERAPEUTIC EXERCISES: CPT | Mod: GP | Performed by: PHYSICAL THERAPIST

## 2024-01-01 PROCEDURE — 250N000011 HC RX IP 250 OP 636: Performed by: STUDENT IN AN ORGANIZED HEALTH CARE EDUCATION/TRAINING PROGRAM

## 2024-01-01 PROCEDURE — 82140 ASSAY OF AMMONIA: CPT | Performed by: INTERNAL MEDICINE

## 2024-01-01 PROCEDURE — 250N000011 HC RX IP 250 OP 636: Performed by: EMERGENCY MEDICINE

## 2024-01-01 PROCEDURE — 82728 ASSAY OF FERRITIN: CPT

## 2024-01-01 PROCEDURE — 360N000077 HC SURGERY LEVEL 4, PER MIN: Performed by: SURGERY

## 2024-01-01 PROCEDURE — 97110 THERAPEUTIC EXERCISES: CPT | Mod: GP

## 2024-01-01 PROCEDURE — 85610 PROTHROMBIN TIME: CPT

## 2024-01-01 PROCEDURE — 84300 ASSAY OF URINE SODIUM: CPT | Performed by: STUDENT IN AN ORGANIZED HEALTH CARE EDUCATION/TRAINING PROGRAM

## 2024-01-01 PROCEDURE — 85041 AUTOMATED RBC COUNT: CPT | Performed by: INTERNAL MEDICINE

## 2024-01-01 PROCEDURE — C9113 INJ PANTOPRAZOLE SODIUM, VIA: HCPCS | Performed by: STUDENT IN AN ORGANIZED HEALTH CARE EDUCATION/TRAINING PROGRAM

## 2024-01-01 PROCEDURE — 97110 THERAPEUTIC EXERCISES: CPT | Mod: GO

## 2024-01-01 PROCEDURE — 83735 ASSAY OF MAGNESIUM: CPT | Performed by: PHYSICIAN ASSISTANT

## 2024-01-01 PROCEDURE — 82040 ASSAY OF SERUM ALBUMIN: CPT | Performed by: NURSE PRACTITIONER

## 2024-01-01 PROCEDURE — P9047 ALBUMIN (HUMAN), 25%, 50ML: HCPCS | Performed by: EMERGENCY MEDICINE

## 2024-01-01 PROCEDURE — 85018 HEMOGLOBIN: CPT | Performed by: HOSPITALIST

## 2024-01-01 PROCEDURE — 80048 BASIC METABOLIC PNL TOTAL CA: CPT | Performed by: SURGERY

## 2024-01-01 PROCEDURE — 85610 PROTHROMBIN TIME: CPT | Mod: ORL

## 2024-01-01 PROCEDURE — 87086 URINE CULTURE/COLONY COUNT: CPT | Performed by: NURSE PRACTITIONER

## 2024-01-01 PROCEDURE — 87070 CULTURE OTHR SPECIMN AEROBIC: CPT | Performed by: EMERGENCY MEDICINE

## 2024-01-01 PROCEDURE — 87040 BLOOD CULTURE FOR BACTERIA: CPT | Performed by: STUDENT IN AN ORGANIZED HEALTH CARE EDUCATION/TRAINING PROGRAM

## 2024-01-01 PROCEDURE — 97530 THERAPEUTIC ACTIVITIES: CPT | Mod: GO

## 2024-01-01 PROCEDURE — 258N000003 HC RX IP 258 OP 636: Performed by: EMERGENCY MEDICINE

## 2024-01-01 PROCEDURE — 85018 HEMOGLOBIN: CPT | Performed by: STUDENT IN AN ORGANIZED HEALTH CARE EDUCATION/TRAINING PROGRAM

## 2024-01-01 PROCEDURE — 99214 OFFICE O/P EST MOD 30 MIN: CPT | Performed by: FAMILY MEDICINE

## 2024-01-01 PROCEDURE — 36415 COLL VENOUS BLD VENIPUNCTURE: CPT

## 2024-01-01 PROCEDURE — 82570 ASSAY OF URINE CREATININE: CPT | Performed by: INTERNAL MEDICINE

## 2024-01-01 PROCEDURE — 86140 C-REACTIVE PROTEIN: CPT | Performed by: PHYSICIAN ASSISTANT

## 2024-01-01 PROCEDURE — 85027 COMPLETE CBC AUTOMATED: CPT

## 2024-01-01 PROCEDURE — 87186 SC STD MICRODIL/AGAR DIL: CPT | Performed by: NURSE PRACTITIONER

## 2024-01-01 PROCEDURE — 85027 COMPLETE CBC AUTOMATED: CPT | Performed by: SURGERY

## 2024-01-01 PROCEDURE — 36430 TRANSFUSION BLD/BLD COMPNT: CPT

## 2024-01-01 PROCEDURE — 84100 ASSAY OF PHOSPHORUS: CPT | Performed by: PHYSICIAN ASSISTANT

## 2024-01-01 PROCEDURE — 86140 C-REACTIVE PROTEIN: CPT | Performed by: EMERGENCY MEDICINE

## 2024-01-01 PROCEDURE — 82728 ASSAY OF FERRITIN: CPT | Performed by: INTERNAL MEDICINE

## 2024-01-01 PROCEDURE — 99231 SBSQ HOSP IP/OBS SF/LOW 25: CPT | Performed by: UROLOGY

## 2024-01-01 PROCEDURE — 99214 OFFICE O/P EST MOD 30 MIN: CPT | Performed by: NURSE PRACTITIONER

## 2024-01-01 PROCEDURE — 36415 COLL VENOUS BLD VENIPUNCTURE: CPT | Performed by: SURGERY

## 2024-01-01 PROCEDURE — 99239 HOSP IP/OBS DSCHRG MGMT >30: CPT | Performed by: INTERNAL MEDICINE

## 2024-01-01 PROCEDURE — 83540 ASSAY OF IRON: CPT

## 2024-01-01 PROCEDURE — P9603 ONE-WAY ALLOW PRORATED MILES: HCPCS | Mod: ORL | Performed by: PSYCHIATRY & NEUROLOGY

## 2024-01-01 PROCEDURE — 85610 PROTHROMBIN TIME: CPT | Mod: ORL | Performed by: NURSE PRACTITIONER

## 2024-01-01 PROCEDURE — G0179 MD RECERTIFICATION HHA PT: HCPCS | Performed by: SURGERY

## 2024-01-01 PROCEDURE — G0378 HOSPITAL OBSERVATION PER HR: HCPCS

## 2024-01-01 PROCEDURE — P9016 RBC LEUKOCYTES REDUCED: HCPCS | Performed by: HOSPITALIST

## 2024-01-01 PROCEDURE — 87186 SC STD MICRODIL/AGAR DIL: CPT | Performed by: STUDENT IN AN ORGANIZED HEALTH CARE EDUCATION/TRAINING PROGRAM

## 2024-01-01 PROCEDURE — 81001 URINALYSIS AUTO W/SCOPE: CPT | Performed by: INTERNAL MEDICINE

## 2024-01-01 PROCEDURE — 85027 COMPLETE CBC AUTOMATED: CPT | Mod: ORL | Performed by: NURSE PRACTITIONER

## 2024-01-01 PROCEDURE — 82962 GLUCOSE BLOOD TEST: CPT

## 2024-01-01 PROCEDURE — 272N000104 HC DEVICE CLIP RESOLUTION, EACH: Performed by: INTERNAL MEDICINE

## 2024-01-01 PROCEDURE — 250N000009 HC RX 250: Performed by: EMERGENCY MEDICINE

## 2024-01-01 PROCEDURE — 85730 THROMBOPLASTIN TIME PARTIAL: CPT | Mod: ORL | Performed by: INTERNAL MEDICINE

## 2024-01-01 PROCEDURE — 255N000002 HC RX 255 OP 636: Performed by: INTERNAL MEDICINE

## 2024-01-01 PROCEDURE — 99305 1ST NF CARE MODERATE MDM 35: CPT | Performed by: INTERNAL MEDICINE

## 2024-01-01 PROCEDURE — 258N000003 HC RX IP 258 OP 636: Performed by: STUDENT IN AN ORGANIZED HEALTH CARE EDUCATION/TRAINING PROGRAM

## 2024-01-01 PROCEDURE — P9604 ONE-WAY ALLOW PRORATED TRIP: HCPCS | Mod: ORL | Performed by: INTERNAL MEDICINE

## 2024-01-01 PROCEDURE — P9604 ONE-WAY ALLOW PRORATED TRIP: HCPCS | Mod: ORL | Performed by: NURSE PRACTITIONER

## 2024-01-01 PROCEDURE — 99223 1ST HOSP IP/OBS HIGH 75: CPT | Performed by: INTERNAL MEDICINE

## 2024-01-01 PROCEDURE — 11042 DBRDMT SUBQ TIS 1ST 20SQCM/<: CPT | Performed by: SURGERY

## 2024-01-01 PROCEDURE — 84100 ASSAY OF PHOSPHORUS: CPT | Performed by: STUDENT IN AN ORGANIZED HEALTH CARE EDUCATION/TRAINING PROGRAM

## 2024-01-01 PROCEDURE — 96360 HYDRATION IV INFUSION INIT: CPT

## 2024-01-01 PROCEDURE — 82533 TOTAL CORTISOL: CPT | Performed by: INTERNAL MEDICINE

## 2024-01-01 PROCEDURE — 250N000011 HC RX IP 250 OP 636: Mod: JZ | Performed by: SURGERY

## 2024-01-01 PROCEDURE — 84450 TRANSFERASE (AST) (SGOT): CPT

## 2024-01-01 PROCEDURE — 87507 IADNA-DNA/RNA PROBE TQ 12-25: CPT | Performed by: PHYSICIAN ASSISTANT

## 2024-01-01 PROCEDURE — 83010 ASSAY OF HAPTOGLOBIN QUANT: CPT | Performed by: INTERNAL MEDICINE

## 2024-01-01 PROCEDURE — 80053 COMPREHEN METABOLIC PANEL: CPT | Performed by: PHYSICIAN ASSISTANT

## 2024-01-01 PROCEDURE — 99207 PR NO BILLABLE SERVICE THIS VISIT: CPT | Performed by: NURSE PRACTITIONER

## 2024-01-01 PROCEDURE — 96361 HYDRATE IV INFUSION ADD-ON: CPT

## 2024-01-01 PROCEDURE — 82436 ASSAY OF URINE CHLORIDE: CPT | Performed by: STUDENT IN AN ORGANIZED HEALTH CARE EDUCATION/TRAINING PROGRAM

## 2024-01-01 PROCEDURE — 97165 OT EVAL LOW COMPLEX 30 MIN: CPT | Mod: GO

## 2024-01-01 PROCEDURE — 80053 COMPREHEN METABOLIC PANEL: CPT | Performed by: STUDENT IN AN ORGANIZED HEALTH CARE EDUCATION/TRAINING PROGRAM

## 2024-01-01 PROCEDURE — 36415 COLL VENOUS BLD VENIPUNCTURE: CPT | Mod: ORL | Performed by: INTERNAL MEDICINE

## 2024-01-01 PROCEDURE — 0W3P8ZZ CONTROL BLEEDING IN GASTROINTESTINAL TRACT, VIA NATURAL OR ARTIFICIAL OPENING ENDOSCOPIC: ICD-10-PCS | Performed by: INTERNAL MEDICINE

## 2024-01-01 PROCEDURE — 85045 AUTOMATED RETICULOCYTE COUNT: CPT | Performed by: INTERNAL MEDICINE

## 2024-01-01 PROCEDURE — 82043 UR ALBUMIN QUANTITATIVE: CPT

## 2024-01-01 PROCEDURE — 250N000009 HC RX 250: Performed by: NURSE PRACTITIONER

## 2024-01-01 PROCEDURE — 87205 SMEAR GRAM STAIN: CPT | Performed by: SURGERY

## 2024-01-01 PROCEDURE — 84484 ASSAY OF TROPONIN QUANT: CPT | Performed by: PHYSICIAN ASSISTANT

## 2024-01-01 PROCEDURE — 96375 TX/PRO/DX INJ NEW DRUG ADDON: CPT

## 2024-01-01 PROCEDURE — 80053 COMPREHEN METABOLIC PANEL: CPT | Performed by: EMERGENCY MEDICINE

## 2024-01-01 PROCEDURE — 999N000040 HC STATISTIC CONSULT NO CHARGE VASC ACCESS

## 2024-01-01 PROCEDURE — 85025 COMPLETE CBC W/AUTO DIFF WBC: CPT

## 2024-01-01 PROCEDURE — 84443 ASSAY THYROID STIM HORMONE: CPT | Performed by: INTERNAL MEDICINE

## 2024-01-01 PROCEDURE — 99223 1ST HOSP IP/OBS HIGH 75: CPT | Mod: AI | Performed by: STUDENT IN AN ORGANIZED HEALTH CARE EDUCATION/TRAINING PROGRAM

## 2024-01-01 PROCEDURE — 83010 ASSAY OF HAPTOGLOBIN QUANT: CPT | Performed by: PHYSICIAN ASSISTANT

## 2024-01-01 PROCEDURE — 250N000011 HC RX IP 250 OP 636: Mod: JZ

## 2024-01-01 PROCEDURE — 82607 VITAMIN B-12: CPT | Performed by: INTERNAL MEDICINE

## 2024-01-01 PROCEDURE — 87075 CULTR BACTERIA EXCEPT BLOOD: CPT | Performed by: SURGERY

## 2024-01-01 PROCEDURE — 84460 ALANINE AMINO (ALT) (SGPT): CPT

## 2024-01-01 PROCEDURE — 86481 TB AG RESPONSE T-CELL SUSP: CPT | Mod: ORL | Performed by: INTERNAL MEDICINE

## 2024-01-01 PROCEDURE — 84466 ASSAY OF TRANSFERRIN: CPT | Performed by: INTERNAL MEDICINE

## 2024-01-01 PROCEDURE — 83930 ASSAY OF BLOOD OSMOLALITY: CPT | Performed by: PHYSICIAN ASSISTANT

## 2024-01-01 PROCEDURE — 74176 CT ABD & PELVIS W/O CONTRAST: CPT

## 2024-01-01 PROCEDURE — 80076 HEPATIC FUNCTION PANEL: CPT | Performed by: EMERGENCY MEDICINE

## 2024-01-01 PROCEDURE — 96365 THER/PROPH/DIAG IV INF INIT: CPT

## 2024-01-01 PROCEDURE — P9603 ONE-WAY ALLOW PRORATED MILES: HCPCS | Mod: ORL

## 2024-01-01 PROCEDURE — 84300 ASSAY OF URINE SODIUM: CPT | Performed by: INTERNAL MEDICINE

## 2024-01-01 PROCEDURE — 81001 URINALYSIS AUTO W/SCOPE: CPT | Performed by: NURSE PRACTITIONER

## 2024-01-01 PROCEDURE — 84300 ASSAY OF URINE SODIUM: CPT | Performed by: PHYSICIAN ASSISTANT

## 2024-01-01 PROCEDURE — 258N000003 HC RX IP 258 OP 636: Performed by: SURGERY

## 2024-01-01 PROCEDURE — 99496 TRANSJ CARE MGMT HIGH F2F 7D: CPT | Mod: 95 | Performed by: INTERNAL MEDICINE

## 2024-01-01 PROCEDURE — 84300 ASSAY OF URINE SODIUM: CPT | Performed by: NURSE PRACTITIONER

## 2024-01-01 PROCEDURE — 92610 EVALUATE SWALLOWING FUNCTION: CPT | Mod: GN | Performed by: SPEECH-LANGUAGE PATHOLOGIST

## 2024-01-01 PROCEDURE — 83540 ASSAY OF IRON: CPT | Performed by: INTERNAL MEDICINE

## 2024-01-01 PROCEDURE — 85045 AUTOMATED RETICULOCYTE COUNT: CPT | Performed by: PHYSICIAN ASSISTANT

## 2024-01-01 PROCEDURE — 85060 BLOOD SMEAR INTERPRETATION: CPT | Performed by: PATHOLOGY

## 2024-01-01 PROCEDURE — 20610 DRAIN/INJ JOINT/BURSA W/O US: CPT | Mod: 50 | Performed by: NURSE PRACTITIONER

## 2024-01-01 PROCEDURE — 85652 RBC SED RATE AUTOMATED: CPT | Performed by: EMERGENCY MEDICINE

## 2024-01-01 PROCEDURE — 99233 SBSQ HOSP IP/OBS HIGH 50: CPT | Performed by: STUDENT IN AN ORGANIZED HEALTH CARE EDUCATION/TRAINING PROGRAM

## 2024-01-01 PROCEDURE — P9016 RBC LEUKOCYTES REDUCED: HCPCS | Performed by: EMERGENCY MEDICINE

## 2024-01-01 PROCEDURE — 87040 BLOOD CULTURE FOR BACTERIA: CPT | Performed by: NURSE PRACTITIONER

## 2024-01-01 PROCEDURE — 97166 OT EVAL MOD COMPLEX 45 MIN: CPT | Mod: GO

## 2024-01-01 PROCEDURE — 85520 HEPARIN ASSAY: CPT | Performed by: SURGERY

## 2024-01-01 PROCEDURE — 258N000003 HC RX IP 258 OP 636: Performed by: NURSE ANESTHETIST, CERTIFIED REGISTERED

## 2024-01-01 PROCEDURE — 255N000002 HC RX 255 OP 636: Performed by: NURSE PRACTITIONER

## 2024-01-01 PROCEDURE — 99222 1ST HOSP IP/OBS MODERATE 55: CPT | Performed by: PHYSICIAN ASSISTANT

## 2024-01-01 PROCEDURE — 70450 CT HEAD/BRAIN W/O DYE: CPT

## 2024-01-01 PROCEDURE — 93325 DOPPLER ECHO COLOR FLOW MAPG: CPT

## 2024-01-01 PROCEDURE — 84155 ASSAY OF PROTEIN SERUM: CPT

## 2024-01-01 PROCEDURE — 71045 X-RAY EXAM CHEST 1 VIEW: CPT | Mod: 77

## 2024-01-01 PROCEDURE — 83930 ASSAY OF BLOOD OSMOLALITY: CPT | Performed by: STUDENT IN AN ORGANIZED HEALTH CARE EDUCATION/TRAINING PROGRAM

## 2024-01-01 PROCEDURE — 250N000009 HC RX 250: Performed by: SURGERY

## 2024-01-01 PROCEDURE — 81001 URINALYSIS AUTO W/SCOPE: CPT | Performed by: STUDENT IN AN ORGANIZED HEALTH CARE EDUCATION/TRAINING PROGRAM

## 2024-01-01 PROCEDURE — 85027 COMPLETE CBC AUTOMATED: CPT | Performed by: EMERGENCY MEDICINE

## 2024-01-01 PROCEDURE — 87493 C DIFF AMPLIFIED PROBE: CPT | Performed by: STUDENT IN AN ORGANIZED HEALTH CARE EDUCATION/TRAINING PROGRAM

## 2024-01-01 PROCEDURE — 258N000003 HC RX IP 258 OP 636: Performed by: ANESTHESIOLOGY

## 2024-01-01 PROCEDURE — P9604 ONE-WAY ALLOW PRORATED TRIP: HCPCS | Mod: ORL | Performed by: FAMILY MEDICINE

## 2024-01-01 PROCEDURE — 97162 PT EVAL MOD COMPLEX 30 MIN: CPT | Mod: GP

## 2024-01-01 PROCEDURE — 250N000009 HC RX 250: Performed by: NURSE ANESTHETIST, CERTIFIED REGISTERED

## 2024-01-01 PROCEDURE — 370N000017 HC ANESTHESIA TECHNICAL FEE, PER MIN: Performed by: SURGERY

## 2024-01-01 PROCEDURE — 80053 COMPREHEN METABOLIC PANEL: CPT | Performed by: NURSE PRACTITIONER

## 2024-01-01 PROCEDURE — 99305 1ST NF CARE MODERATE MDM 35: CPT | Performed by: PHYSICIAN ASSISTANT

## 2024-01-01 PROCEDURE — 93308 TTE F-UP OR LMTD: CPT | Mod: 26 | Performed by: INTERNAL MEDICINE

## 2024-01-01 PROCEDURE — 83615 LACTATE (LD) (LDH) ENZYME: CPT | Performed by: INTERNAL MEDICINE

## 2024-01-01 PROCEDURE — 72197 MRI PELVIS W/O & W/DYE: CPT | Mod: 26 | Performed by: RADIOLOGY

## 2024-01-01 PROCEDURE — 83935 ASSAY OF URINE OSMOLALITY: CPT | Performed by: PHYSICIAN ASSISTANT

## 2024-01-01 PROCEDURE — 80053 COMPREHEN METABOLIC PANEL: CPT | Performed by: INTERNAL MEDICINE

## 2024-01-01 PROCEDURE — 99221 1ST HOSP IP/OBS SF/LOW 40: CPT | Performed by: SURGERY

## 2024-01-01 PROCEDURE — 85014 HEMATOCRIT: CPT | Performed by: INTERNAL MEDICINE

## 2024-01-01 PROCEDURE — 97140 MANUAL THERAPY 1/> REGIONS: CPT | Mod: GP

## 2024-01-01 PROCEDURE — 80202 ASSAY OF VANCOMYCIN: CPT | Performed by: INTERNAL MEDICINE

## 2024-01-01 PROCEDURE — 82040 ASSAY OF SERUM ALBUMIN: CPT | Performed by: INTERNAL MEDICINE

## 2024-01-01 PROCEDURE — 99207 PR MD CERTIFICATION HHA PATIENT: CPT | Performed by: INTERNAL MEDICINE

## 2024-01-01 PROCEDURE — 99309 SBSQ NF CARE MODERATE MDM 30: CPT

## 2024-01-01 PROCEDURE — 82374 ASSAY BLOOD CARBON DIOXIDE: CPT | Performed by: HOSPITALIST

## 2024-01-01 PROCEDURE — 87493 C DIFF AMPLIFIED PROBE: CPT | Mod: 59

## 2024-01-01 PROCEDURE — 99316 NF DSCHRG MGMT 30 MIN+: CPT | Performed by: PHYSICIAN ASSISTANT

## 2024-01-01 PROCEDURE — 82746 ASSAY OF FOLIC ACID SERUM: CPT | Performed by: INTERNAL MEDICINE

## 2024-01-01 PROCEDURE — 99207 PR NO CHARGE TRIAGED PS: CPT | Performed by: NURSE PRACTITIONER

## 2024-01-01 PROCEDURE — 83605 ASSAY OF LACTIC ACID: CPT | Performed by: INTERNAL MEDICINE

## 2024-01-01 PROCEDURE — 36415 COLL VENOUS BLD VENIPUNCTURE: CPT | Mod: ORL | Performed by: FAMILY MEDICINE

## 2024-01-01 PROCEDURE — 86140 C-REACTIVE PROTEIN: CPT | Performed by: NURSE PRACTITIONER

## 2024-01-01 PROCEDURE — P9047 ALBUMIN (HUMAN), 25%, 50ML: HCPCS | Performed by: INTERNAL MEDICINE

## 2024-01-01 PROCEDURE — 80048 BASIC METABOLIC PNL TOTAL CA: CPT | Mod: ORL | Performed by: NURSE PRACTITIONER

## 2024-01-01 PROCEDURE — 99291 CRITICAL CARE FIRST HOUR: CPT | Performed by: NURSE PRACTITIONER

## 2024-01-01 PROCEDURE — 83550 IRON BINDING TEST: CPT

## 2024-01-01 PROCEDURE — 0T9B70Z DRAINAGE OF BLADDER WITH DRAINAGE DEVICE, VIA NATURAL OR ARTIFICIAL OPENING: ICD-10-PCS | Performed by: UROLOGY

## 2024-01-01 PROCEDURE — 99223 1ST HOSP IP/OBS HIGH 75: CPT | Performed by: NURSE PRACTITIONER

## 2024-01-01 PROCEDURE — G0103 PSA SCREENING: HCPCS

## 2024-01-01 PROCEDURE — 93005 ELECTROCARDIOGRAM TRACING: CPT | Mod: 76

## 2024-01-01 RX ORDER — ALBUMIN (HUMAN) 12.5 G/50ML
25 SOLUTION INTRAVENOUS ONCE
Status: COMPLETED | OUTPATIENT
Start: 2024-01-01 | End: 2024-01-01

## 2024-01-01 RX ORDER — FUROSEMIDE 10 MG/ML
20 INJECTION INTRAMUSCULAR; INTRAVENOUS ONCE
Status: COMPLETED | OUTPATIENT
Start: 2024-01-01 | End: 2024-01-01

## 2024-01-01 RX ORDER — DEXAMETHASONE SODIUM PHOSPHATE 4 MG/ML
INJECTION, SOLUTION INTRA-ARTICULAR; INTRALESIONAL; INTRAMUSCULAR; INTRAVENOUS; SOFT TISSUE PRN
Status: DISCONTINUED | OUTPATIENT
Start: 2024-01-01 | End: 2024-01-01

## 2024-01-01 RX ORDER — PROCHLORPERAZINE 25 MG
12.5 SUPPOSITORY, RECTAL RECTAL EVERY 12 HOURS PRN
Status: DISCONTINUED | OUTPATIENT
Start: 2024-01-01 | End: 2024-01-01 | Stop reason: HOSPADM

## 2024-01-01 RX ORDER — WARFARIN SODIUM 2 MG/1
4 TABLET ORAL
Status: COMPLETED | OUTPATIENT
Start: 2024-01-01 | End: 2024-01-01

## 2024-01-01 RX ORDER — FUROSEMIDE 10 MG/ML
60 INJECTION INTRAMUSCULAR; INTRAVENOUS ONCE
Status: COMPLETED | OUTPATIENT
Start: 2024-01-01 | End: 2024-01-01

## 2024-01-01 RX ORDER — FUROSEMIDE 10 MG/ML
60 INJECTION INTRAMUSCULAR; INTRAVENOUS
Status: COMPLETED | OUTPATIENT
Start: 2024-01-01 | End: 2024-01-01

## 2024-01-01 RX ORDER — NITROGLYCERIN 0.4 MG/1
0.4 TABLET SUBLINGUAL EVERY 5 MIN PRN
Status: DISCONTINUED | OUTPATIENT
Start: 2024-01-01 | End: 2024-01-01 | Stop reason: HOSPADM

## 2024-01-01 RX ORDER — METHYLPREDNISOLONE SODIUM SUCCINATE 125 MG/2ML
125 INJECTION, POWDER, LYOPHILIZED, FOR SOLUTION INTRAMUSCULAR; INTRAVENOUS
Status: DISCONTINUED | OUTPATIENT
Start: 2024-01-01 | End: 2024-01-01 | Stop reason: HOSPADM

## 2024-01-01 RX ORDER — POTASSIUM CHLORIDE 1500 MG/1
20 TABLET, EXTENDED RELEASE ORAL ONCE
Status: DISCONTINUED | OUTPATIENT
Start: 2024-01-01 | End: 2024-01-01

## 2024-01-01 RX ORDER — HYDROMORPHONE HYDROCHLORIDE 4 MG/1
2 TABLET ORAL EVERY 6 HOURS PRN
Qty: 30 TABLET | Refills: 0 | Status: SHIPPED | OUTPATIENT
Start: 2024-01-01 | End: 2024-01-01

## 2024-01-01 RX ORDER — WARFARIN SODIUM 5 MG/1
5 TABLET ORAL
Status: COMPLETED | OUTPATIENT
Start: 2024-01-01 | End: 2024-01-01

## 2024-01-01 RX ORDER — ALBUTEROL SULFATE 90 UG/1
1-2 AEROSOL, METERED RESPIRATORY (INHALATION) EVERY 4 HOURS PRN
Qty: 18 G | Refills: 3 | Status: SHIPPED | OUTPATIENT
Start: 2024-01-01

## 2024-01-01 RX ORDER — LISINOPRIL 5 MG/1
5 TABLET ORAL DAILY
DISCHARGE
Start: 2024-01-01 | End: 2024-01-01

## 2024-01-01 RX ORDER — POTASSIUM CHLORIDE 1500 MG/1
40 TABLET, EXTENDED RELEASE ORAL ONCE
Status: COMPLETED | OUTPATIENT
Start: 2024-01-01 | End: 2024-01-01

## 2024-01-01 RX ORDER — NALOXONE HYDROCHLORIDE 0.4 MG/ML
0.1 INJECTION, SOLUTION INTRAMUSCULAR; INTRAVENOUS; SUBCUTANEOUS
Status: DISCONTINUED | OUTPATIENT
Start: 2024-01-01 | End: 2024-01-01 | Stop reason: HOSPADM

## 2024-01-01 RX ORDER — GUAIFENESIN 600 MG/1
600 TABLET, EXTENDED RELEASE ORAL 2 TIMES DAILY
Status: ON HOLD | COMMUNITY
End: 2024-01-01

## 2024-01-01 RX ORDER — OXYCODONE HYDROCHLORIDE 5 MG/1
5 TABLET ORAL EVERY 4 HOURS PRN
Status: DISCONTINUED | OUTPATIENT
Start: 2024-01-01 | End: 2024-01-01

## 2024-01-01 RX ORDER — HEPARIN SODIUM 10000 [USP'U]/100ML
0-5000 INJECTION, SOLUTION INTRAVENOUS CONTINUOUS
Status: DISCONTINUED | OUTPATIENT
Start: 2024-01-01 | End: 2024-01-01

## 2024-01-01 RX ORDER — MAGNESIUM SULFATE HEPTAHYDRATE 40 MG/ML
4 INJECTION, SOLUTION INTRAVENOUS ONCE
Qty: 100 ML | Refills: 0 | Status: COMPLETED | OUTPATIENT
Start: 2024-01-01 | End: 2024-01-01

## 2024-01-01 RX ORDER — CALCIUM CARBONATE 500 MG/1
500 TABLET, CHEWABLE ORAL 3 TIMES DAILY PRN
Status: DISCONTINUED | OUTPATIENT
Start: 2024-01-01 | End: 2024-01-01

## 2024-01-01 RX ORDER — LIDOCAINE 40 MG/G
CREAM TOPICAL
Status: DISCONTINUED | OUTPATIENT
Start: 2024-01-01 | End: 2024-01-01 | Stop reason: HOSPADM

## 2024-01-01 RX ORDER — FUROSEMIDE 40 MG
40 TABLET ORAL DAILY
Status: DISCONTINUED | OUTPATIENT
Start: 2024-01-01 | End: 2024-01-01 | Stop reason: HOSPADM

## 2024-01-01 RX ORDER — CALCIUM CARBONATE 500 MG/1
1000 TABLET, CHEWABLE ORAL 4 TIMES DAILY PRN
Status: DISCONTINUED | OUTPATIENT
Start: 2024-01-01 | End: 2024-01-01 | Stop reason: HOSPADM

## 2024-01-01 RX ORDER — POLYETHYLENE GLYCOL 3350 17 G/17G
238 POWDER, FOR SOLUTION ORAL ONCE
Status: COMPLETED | OUTPATIENT
Start: 2024-01-01 | End: 2024-01-01

## 2024-01-01 RX ORDER — POTASSIUM CHLORIDE 7.45 MG/ML
10 INJECTION INTRAVENOUS
Status: COMPLETED | OUTPATIENT
Start: 2024-01-01 | End: 2024-01-01

## 2024-01-01 RX ORDER — PROCHLORPERAZINE MALEATE 5 MG
5 TABLET ORAL EVERY 6 HOURS PRN
Status: DISCONTINUED | OUTPATIENT
Start: 2024-01-01 | End: 2024-01-01 | Stop reason: HOSPADM

## 2024-01-01 RX ORDER — FUROSEMIDE 10 MG/ML
60 INJECTION INTRAMUSCULAR; INTRAVENOUS ONCE
Status: DISCONTINUED | OUTPATIENT
Start: 2024-01-01 | End: 2024-01-01

## 2024-01-01 RX ORDER — BISACODYL 10 MG
10 SUPPOSITORY, RECTAL RECTAL DAILY PRN
COMMUNITY
Start: 2024-01-01

## 2024-01-01 RX ORDER — TORSEMIDE 20 MG/1
20 TABLET ORAL DAILY
Qty: 30 TABLET | Refills: 1 | OUTPATIENT
Start: 2024-01-01

## 2024-01-01 RX ORDER — ONDANSETRON 2 MG/ML
4 INJECTION INTRAMUSCULAR; INTRAVENOUS EVERY 6 HOURS PRN
Status: DISCONTINUED | OUTPATIENT
Start: 2024-01-01 | End: 2024-01-01 | Stop reason: HOSPADM

## 2024-01-01 RX ORDER — PANTOPRAZOLE SODIUM 40 MG/1
40 TABLET, DELAYED RELEASE ORAL
Status: DISCONTINUED | OUTPATIENT
Start: 2024-01-01 | End: 2024-01-01 | Stop reason: HOSPADM

## 2024-01-01 RX ORDER — AMOXICILLIN 250 MG
1 CAPSULE ORAL 2 TIMES DAILY PRN
Status: DISCONTINUED | OUTPATIENT
Start: 2024-01-01 | End: 2024-01-01 | Stop reason: HOSPADM

## 2024-01-01 RX ORDER — ONDANSETRON 4 MG/1
4 TABLET, ORALLY DISINTEGRATING ORAL EVERY 6 HOURS PRN
Status: DISCONTINUED | OUTPATIENT
Start: 2024-01-01 | End: 2024-01-01 | Stop reason: HOSPADM

## 2024-01-01 RX ORDER — NALOXONE HYDROCHLORIDE 0.4 MG/ML
0.2 INJECTION, SOLUTION INTRAMUSCULAR; INTRAVENOUS; SUBCUTANEOUS
Status: DISCONTINUED | OUTPATIENT
Start: 2024-01-01 | End: 2024-01-01 | Stop reason: HOSPADM

## 2024-01-01 RX ORDER — GADOBUTROL 604.72 MG/ML
11 INJECTION INTRAVENOUS ONCE
Status: COMPLETED | OUTPATIENT
Start: 2024-01-01 | End: 2024-01-01

## 2024-01-01 RX ORDER — WARFARIN SODIUM 2 MG/1
2 TABLET ORAL
Status: COMPLETED | OUTPATIENT
Start: 2024-01-01 | End: 2024-01-01

## 2024-01-01 RX ORDER — DIPHENHYDRAMINE HCL 12.5MG/5ML
12.5 LIQUID (ML) ORAL EVERY 6 HOURS PRN
Status: DISCONTINUED | OUTPATIENT
Start: 2024-01-01 | End: 2024-01-01 | Stop reason: HOSPADM

## 2024-01-01 RX ORDER — NALOXONE HYDROCHLORIDE 0.4 MG/ML
0.4 INJECTION, SOLUTION INTRAMUSCULAR; INTRAVENOUS; SUBCUTANEOUS
Status: DISCONTINUED | OUTPATIENT
Start: 2024-01-01 | End: 2024-01-01 | Stop reason: HOSPADM

## 2024-01-01 RX ORDER — WARFARIN SODIUM 2.5 MG/1
2.5 TABLET ORAL ONCE
Status: COMPLETED | OUTPATIENT
Start: 2024-01-01 | End: 2024-01-01

## 2024-01-01 RX ORDER — DUTASTERIDE 0.5 MG/1
0.5 CAPSULE, LIQUID FILLED ORAL DAILY
Qty: 30 CAPSULE | Refills: 0 | Status: SHIPPED | OUTPATIENT
Start: 2024-01-01

## 2024-01-01 RX ORDER — WARFARIN SODIUM 5 MG/1
5 TABLET ORAL DAILY
DISCHARGE
Start: 2024-01-01 | End: 2024-01-01 | Stop reason: DRUGHIGH

## 2024-01-01 RX ORDER — SODIUM CHLORIDE 9 MG/ML
1000 INJECTION, SOLUTION INTRAVENOUS CONTINUOUS
Status: ACTIVE | OUTPATIENT
Start: 2024-01-01 | End: 2024-01-01

## 2024-01-01 RX ORDER — BISACODYL 10 MG
10 SUPPOSITORY, RECTAL RECTAL DAILY PRN
Status: DISCONTINUED | OUTPATIENT
Start: 2024-01-01 | End: 2024-01-01 | Stop reason: HOSPADM

## 2024-01-01 RX ORDER — HYDROMORPHONE HCL IN WATER/PF 6 MG/30 ML
0.2 PATIENT CONTROLLED ANALGESIA SYRINGE INTRAVENOUS
Status: DISCONTINUED | OUTPATIENT
Start: 2024-01-01 | End: 2024-01-01 | Stop reason: HOSPADM

## 2024-01-01 RX ORDER — NYSTATIN 100000 U/G
CREAM TOPICAL 2 TIMES DAILY
COMMUNITY
Start: 2024-01-01 | End: 2024-01-01

## 2024-01-01 RX ORDER — TORSEMIDE 20 MG/1
20 TABLET ORAL DAILY
Status: DISCONTINUED | OUTPATIENT
Start: 2024-01-01 | End: 2024-01-01 | Stop reason: HOSPADM

## 2024-01-01 RX ORDER — LABETALOL HYDROCHLORIDE 5 MG/ML
10 INJECTION, SOLUTION INTRAVENOUS
Status: DISCONTINUED | OUTPATIENT
Start: 2024-01-01 | End: 2024-01-01 | Stop reason: HOSPADM

## 2024-01-01 RX ORDER — FERROUS SULFATE 325(65) MG
325 TABLET ORAL DAILY
Status: DISCONTINUED | OUTPATIENT
Start: 2024-01-01 | End: 2024-01-01 | Stop reason: HOSPADM

## 2024-01-01 RX ORDER — TORSEMIDE 20 MG/1
20 TABLET ORAL DAILY
Status: DISCONTINUED | OUTPATIENT
Start: 2024-01-01 | End: 2024-01-01

## 2024-01-01 RX ORDER — FUROSEMIDE 10 MG/ML
40 INJECTION INTRAMUSCULAR; INTRAVENOUS EVERY 8 HOURS
Status: COMPLETED | OUTPATIENT
Start: 2024-01-01 | End: 2024-01-01

## 2024-01-01 RX ORDER — WARFARIN SODIUM 4 MG/1
4 TABLET ORAL
Status: COMPLETED | OUTPATIENT
Start: 2024-01-01 | End: 2024-01-01

## 2024-01-01 RX ORDER — POTASSIUM CHLORIDE 20MEQ/15ML
40 LIQUID (ML) ORAL ONCE
Status: COMPLETED | OUTPATIENT
Start: 2024-01-01 | End: 2024-01-01

## 2024-01-01 RX ORDER — MAGNESIUM SULFATE HEPTAHYDRATE 40 MG/ML
2 INJECTION, SOLUTION INTRAVENOUS ONCE
Status: COMPLETED | OUTPATIENT
Start: 2024-01-01 | End: 2024-01-01

## 2024-01-01 RX ORDER — DIMENHYDRINATE 50 MG/ML
25 INJECTION, SOLUTION INTRAMUSCULAR; INTRAVENOUS
Status: DISCONTINUED | OUTPATIENT
Start: 2024-01-01 | End: 2024-01-01 | Stop reason: HOSPADM

## 2024-01-01 RX ORDER — ACETAMINOPHEN 650 MG/1
650 SUPPOSITORY RECTAL EVERY 4 HOURS PRN
Status: DISCONTINUED | OUTPATIENT
Start: 2024-01-01 | End: 2024-01-01 | Stop reason: HOSPADM

## 2024-01-01 RX ORDER — SENNOSIDES 25 MG/1
2 TABLET, FILM COATED ORAL DAILY
Status: DISCONTINUED | OUTPATIENT
Start: 2024-01-01 | End: 2024-01-01 | Stop reason: HOSPADM

## 2024-01-01 RX ORDER — CEFTRIAXONE 2 G/1
2 INJECTION, POWDER, FOR SOLUTION INTRAMUSCULAR; INTRAVENOUS ONCE
Status: COMPLETED | OUTPATIENT
Start: 2024-01-01 | End: 2024-01-01

## 2024-01-01 RX ORDER — PROPOFOL 10 MG/ML
INJECTION, EMULSION INTRAVENOUS PRN
Status: DISCONTINUED | OUTPATIENT
Start: 2024-01-01 | End: 2024-01-01

## 2024-01-01 RX ORDER — POLYETHYLENE GLYCOL 3350 17 G/17G
17 POWDER, FOR SOLUTION ORAL DAILY
Qty: 510 G | Refills: 0 | Status: SHIPPED | OUTPATIENT
Start: 2024-01-01

## 2024-01-01 RX ORDER — ACETAMINOPHEN 500 MG
1000 TABLET ORAL
Status: SHIPPED
Start: 2024-01-01

## 2024-01-01 RX ORDER — LORAZEPAM 0.5 MG/1
0.5 TABLET ORAL EVERY 4 HOURS PRN
Qty: 8 TABLET | Refills: 0 | Status: SHIPPED | OUTPATIENT
Start: 2024-01-01

## 2024-01-01 RX ORDER — POTASSIUM CHLORIDE 1500 MG/1
20 TABLET, EXTENDED RELEASE ORAL DAILY
Qty: 30 TABLET | Refills: 0 | Status: SHIPPED | OUTPATIENT
Start: 2024-01-01

## 2024-01-01 RX ORDER — SUCRALFATE ORAL 1 G/10ML
1 SUSPENSION ORAL
Status: DISCONTINUED | OUTPATIENT
Start: 2024-01-01 | End: 2024-01-01

## 2024-01-01 RX ORDER — ENOXAPARIN SODIUM 100 MG/ML
100 INJECTION SUBCUTANEOUS EVERY 12 HOURS
Qty: 28 ML | Refills: 1 | Status: SHIPPED | OUTPATIENT
Start: 2024-01-01 | End: 2024-01-01

## 2024-01-01 RX ORDER — HALOPERIDOL 5 MG/ML
1 INJECTION INTRAMUSCULAR
Status: DISCONTINUED | OUTPATIENT
Start: 2024-01-01 | End: 2024-01-01 | Stop reason: HOSPADM

## 2024-01-01 RX ORDER — HYDROMORPHONE HYDROCHLORIDE 1 MG/ML
0.3 INJECTION, SOLUTION INTRAMUSCULAR; INTRAVENOUS; SUBCUTANEOUS
Status: DISCONTINUED | OUTPATIENT
Start: 2024-01-01 | End: 2024-01-01 | Stop reason: HOSPADM

## 2024-01-01 RX ORDER — FERROUS SULFATE 325(65) MG
325 TABLET ORAL DAILY
Qty: 90 TABLET | Refills: 1 | OUTPATIENT
Start: 2024-01-01

## 2024-01-01 RX ORDER — AMOXICILLIN 250 MG
1 CAPSULE ORAL AT BEDTIME
Status: DISCONTINUED | OUTPATIENT
Start: 2024-01-01 | End: 2024-01-01 | Stop reason: HOSPADM

## 2024-01-01 RX ORDER — SODIUM CHLORIDE 9 MG/ML
INJECTION, SOLUTION INTRAVENOUS CONTINUOUS
Status: DISCONTINUED | OUTPATIENT
Start: 2024-01-01 | End: 2024-01-01

## 2024-01-01 RX ORDER — ACETAMINOPHEN 325 MG/1
650 TABLET ORAL EVERY 4 HOURS PRN
Status: DISCONTINUED | OUTPATIENT
Start: 2024-01-01 | End: 2024-01-01 | Stop reason: HOSPADM

## 2024-01-01 RX ORDER — NYSTATIN 100000 [USP'U]/G
POWDER TOPICAL 2 TIMES DAILY PRN
Status: DISCONTINUED | OUTPATIENT
Start: 2024-01-01 | End: 2024-01-01 | Stop reason: HOSPADM

## 2024-01-01 RX ORDER — POTASSIUM CHLORIDE 20MEQ/15ML
20 LIQUID (ML) ORAL ONCE
Status: COMPLETED | OUTPATIENT
Start: 2024-01-01 | End: 2024-01-01

## 2024-01-01 RX ORDER — FUROSEMIDE 10 MG/ML
40 INJECTION INTRAMUSCULAR; INTRAVENOUS 3 TIMES DAILY
Status: DISCONTINUED | OUTPATIENT
Start: 2024-01-01 | End: 2024-01-01

## 2024-01-01 RX ORDER — TORSEMIDE 20 MG/1
20 TABLET ORAL DAILY
Qty: 30 TABLET | Refills: 1 | Status: SHIPPED | OUTPATIENT
Start: 2024-01-01 | End: 2024-01-01

## 2024-01-01 RX ORDER — MIDODRINE HYDROCHLORIDE 10 MG/1
10 TABLET ORAL
Status: DISCONTINUED | OUTPATIENT
Start: 2024-01-01 | End: 2024-01-01

## 2024-01-01 RX ORDER — SUCRALFATE ORAL 1 G/10ML
1 SUSPENSION ORAL
Status: DISCONTINUED | OUTPATIENT
Start: 2024-01-01 | End: 2024-01-01 | Stop reason: HOSPADM

## 2024-01-01 RX ORDER — POLYETHYLENE GLYCOL 3350 17 G/17G
17 POWDER, FOR SOLUTION ORAL DAILY PRN
Status: DISCONTINUED | OUTPATIENT
Start: 2024-01-01 | End: 2024-01-01 | Stop reason: HOSPADM

## 2024-01-01 RX ORDER — MAGNESIUM SULFATE HEPTAHYDRATE 40 MG/ML
4 INJECTION, SOLUTION INTRAVENOUS ONCE
Status: COMPLETED | OUTPATIENT
Start: 2024-01-01 | End: 2024-01-01

## 2024-01-01 RX ORDER — HYDROXYZINE HYDROCHLORIDE 50 MG/1
50 TABLET, FILM COATED ORAL EVERY 6 HOURS PRN
Status: DISCONTINUED | OUTPATIENT
Start: 2024-01-01 | End: 2024-01-01 | Stop reason: HOSPADM

## 2024-01-01 RX ORDER — HYDROMORPHONE HYDROCHLORIDE 1 MG/ML
0.5 INJECTION, SOLUTION INTRAMUSCULAR; INTRAVENOUS; SUBCUTANEOUS EVERY 30 MIN PRN
Status: DISCONTINUED | OUTPATIENT
Start: 2024-01-01 | End: 2024-01-01

## 2024-01-01 RX ORDER — POTASSIUM CHLORIDE 1.5 G/1.58G
40 POWDER, FOR SOLUTION ORAL ONCE
Status: COMPLETED | OUTPATIENT
Start: 2024-01-01 | End: 2024-01-01

## 2024-01-01 RX ORDER — ONDANSETRON 2 MG/ML
INJECTION INTRAMUSCULAR; INTRAVENOUS PRN
Status: DISCONTINUED | OUTPATIENT
Start: 2024-01-01 | End: 2024-01-01

## 2024-01-01 RX ORDER — WARFARIN SODIUM 3 MG/1
TABLET ORAL DAILY
Status: ON HOLD | COMMUNITY
End: 2024-01-01

## 2024-01-01 RX ORDER — HYDROMORPHONE HYDROCHLORIDE 2 MG/1
2 TABLET ORAL EVERY 6 HOURS PRN
Status: DISCONTINUED | OUTPATIENT
Start: 2024-01-01 | End: 2024-01-01 | Stop reason: HOSPADM

## 2024-01-01 RX ORDER — PANTOPRAZOLE SODIUM 40 MG/1
40 TABLET, DELAYED RELEASE ORAL
Status: DISCONTINUED | OUTPATIENT
Start: 2024-01-01 | End: 2024-01-01

## 2024-01-01 RX ORDER — DIPHENHYDRAMINE HYDROCHLORIDE 50 MG/ML
50 INJECTION INTRAMUSCULAR; INTRAVENOUS
Status: DISCONTINUED | OUTPATIENT
Start: 2024-01-01 | End: 2024-01-01 | Stop reason: HOSPADM

## 2024-01-01 RX ORDER — WARFARIN SODIUM 1 MG/1
TABLET ORAL
Status: SHIPPED
Start: 2024-01-01 | End: 2024-01-01

## 2024-01-01 RX ORDER — WARFARIN SODIUM 3 MG/1
3 TABLET ORAL DAILY
Qty: 7 TABLET | Refills: 0 | DISCHARGE
Start: 2024-01-01 | End: 2024-01-01

## 2024-01-01 RX ORDER — FUROSEMIDE 10 MG/ML
40 INJECTION INTRAMUSCULAR; INTRAVENOUS EVERY 12 HOURS
Status: DISCONTINUED | OUTPATIENT
Start: 2024-01-01 | End: 2024-01-01

## 2024-01-01 RX ORDER — WARFARIN SODIUM 2.5 MG/1
2.5 TABLET ORAL
Status: COMPLETED | OUTPATIENT
Start: 2024-01-01 | End: 2024-01-01

## 2024-01-01 RX ORDER — MAGNESIUM OXIDE 400 MG/1
400 TABLET ORAL EVERY 4 HOURS
Status: COMPLETED | OUTPATIENT
Start: 2024-01-01 | End: 2024-01-01

## 2024-01-01 RX ORDER — POTASSIUM CHLORIDE 20MEQ/15ML
10 LIQUID (ML) ORAL ONCE
Status: COMPLETED | OUTPATIENT
Start: 2024-01-01 | End: 2024-01-01

## 2024-01-01 RX ORDER — AMOXICILLIN 250 MG/1
250 CAPSULE ORAL EVERY 8 HOURS SCHEDULED
Status: DISCONTINUED | OUTPATIENT
Start: 2024-01-01 | End: 2024-01-01

## 2024-01-01 RX ORDER — POTASSIUM CHLORIDE 1.5 G/1.58G
20 POWDER, FOR SOLUTION ORAL ONCE
Status: COMPLETED | OUTPATIENT
Start: 2024-01-01 | End: 2024-01-01

## 2024-01-01 RX ORDER — WARFARIN SODIUM 1 MG/1
1 TABLET ORAL
Status: COMPLETED | OUTPATIENT
Start: 2024-01-01 | End: 2024-01-01

## 2024-01-01 RX ORDER — ALBUTEROL SULFATE 90 UG/1
1 AEROSOL, METERED RESPIRATORY (INHALATION) EVERY 4 HOURS PRN
Status: DISCONTINUED | OUTPATIENT
Start: 2024-01-01 | End: 2024-01-01 | Stop reason: HOSPADM

## 2024-01-01 RX ORDER — LIDOCAINE HYDROCHLORIDE 20 MG/ML
JELLY TOPICAL ONCE
Status: COMPLETED | OUTPATIENT
Start: 2024-01-01 | End: 2024-01-01

## 2024-01-01 RX ORDER — ACETAMINOPHEN 325 MG/1
975 TABLET ORAL ONCE
Status: DISCONTINUED | OUTPATIENT
Start: 2024-01-01 | End: 2024-01-01 | Stop reason: HOSPADM

## 2024-01-01 RX ORDER — CEFUROXIME AXETIL 500 MG/1
500 TABLET ORAL 2 TIMES DAILY
Status: ON HOLD | COMMUNITY
Start: 2024-01-01 | End: 2024-01-01

## 2024-01-01 RX ORDER — HYDROMORPHONE HYDROCHLORIDE 1 MG/ML
0.3 INJECTION, SOLUTION INTRAMUSCULAR; INTRAVENOUS; SUBCUTANEOUS ONCE
Status: COMPLETED | OUTPATIENT
Start: 2024-01-01 | End: 2024-01-01

## 2024-01-01 RX ORDER — POTASSIUM CHLORIDE 20 MEQ/15ML
20 SOLUTION ORAL DAILY
DISCHARGE
Start: 2024-01-01 | End: 2024-01-01

## 2024-01-01 RX ORDER — SUCRALFATE ORAL 1 G/10ML
1 SUSPENSION ORAL
COMMUNITY
End: 2024-01-01

## 2024-01-01 RX ORDER — SODIUM CHLORIDE, SODIUM LACTATE, POTASSIUM CHLORIDE, CALCIUM CHLORIDE 600; 310; 30; 20 MG/100ML; MG/100ML; MG/100ML; MG/100ML
INJECTION, SOLUTION INTRAVENOUS CONTINUOUS
Status: DISCONTINUED | OUTPATIENT
Start: 2024-01-01 | End: 2024-01-01 | Stop reason: HOSPADM

## 2024-01-01 RX ORDER — SENNOSIDES 8.6 MG
1 TABLET ORAL 2 TIMES DAILY PRN
Status: DISCONTINUED | OUTPATIENT
Start: 2024-01-01 | End: 2024-01-01 | Stop reason: HOSPADM

## 2024-01-01 RX ORDER — MIDODRINE HYDROCHLORIDE 5 MG/1
5 TABLET ORAL ONCE
Status: COMPLETED | OUTPATIENT
Start: 2024-01-01 | End: 2024-01-01

## 2024-01-01 RX ORDER — POTASSIUM CHLORIDE 1500 MG/1
20 TABLET, EXTENDED RELEASE ORAL 2 TIMES DAILY
Qty: 30 TABLET | Refills: 0 | Status: SHIPPED | OUTPATIENT
Start: 2024-01-01 | End: 2024-01-01

## 2024-01-01 RX ORDER — HALOPERIDOL 0.5 MG/1
1 TABLET ORAL EVERY 4 HOURS PRN
Qty: 10 TABLET | Refills: 0 | Status: SHIPPED | OUTPATIENT
Start: 2024-01-01

## 2024-01-01 RX ORDER — WARFARIN SODIUM 5 MG/1
5 TABLET ORAL
Status: DISCONTINUED | OUTPATIENT
Start: 2024-01-01 | End: 2024-01-01

## 2024-01-01 RX ORDER — HYDROMORPHONE HCL IN WATER/PF 6 MG/30 ML
0.4 PATIENT CONTROLLED ANALGESIA SYRINGE INTRAVENOUS EVERY 5 MIN PRN
Status: DISCONTINUED | OUTPATIENT
Start: 2024-01-01 | End: 2024-01-01 | Stop reason: HOSPADM

## 2024-01-01 RX ORDER — METHYLDOPA/HYDROCHLOROTHIAZIDE 250MG-15MG
2 TABLET ORAL DAILY
COMMUNITY

## 2024-01-01 RX ORDER — HYDRALAZINE HYDROCHLORIDE 20 MG/ML
10 INJECTION INTRAMUSCULAR; INTRAVENOUS EVERY 4 HOURS PRN
Status: DISCONTINUED | OUTPATIENT
Start: 2024-01-01 | End: 2024-01-01 | Stop reason: HOSPADM

## 2024-01-01 RX ORDER — ALBUTEROL SULFATE 90 UG/1
2 AEROSOL, METERED RESPIRATORY (INHALATION) EVERY 4 HOURS PRN
Status: DISCONTINUED | OUTPATIENT
Start: 2024-01-01 | End: 2024-01-01 | Stop reason: HOSPADM

## 2024-01-01 RX ORDER — BIOTIN 10 MG
2 TABLET ORAL DAILY
Status: DISCONTINUED | OUTPATIENT
Start: 2024-01-01 | End: 2024-01-01 | Stop reason: HOSPADM

## 2024-01-01 RX ORDER — DIPHENHYDRAMINE HYDROCHLORIDE 50 MG/ML
50 INJECTION INTRAMUSCULAR; INTRAVENOUS
Status: DISCONTINUED | OUTPATIENT
Start: 2024-01-01 | End: 2024-01-01

## 2024-01-01 RX ORDER — DIPHENHYDRAMINE HCL 25 MG
25 CAPSULE ORAL ONCE
Status: COMPLETED | OUTPATIENT
Start: 2024-01-01 | End: 2024-01-01

## 2024-01-01 RX ORDER — GLYCOPYRROLATE 0.2 MG/ML
INJECTION, SOLUTION INTRAMUSCULAR; INTRAVENOUS PRN
Status: DISCONTINUED | OUTPATIENT
Start: 2024-01-01 | End: 2024-01-01

## 2024-01-01 RX ORDER — SODIUM CHLORIDE 9 MG/ML
1000 INJECTION, SOLUTION INTRAVENOUS CONTINUOUS
Status: DISCONTINUED | OUTPATIENT
Start: 2024-01-01 | End: 2024-01-01

## 2024-01-01 RX ORDER — CEFUROXIME AXETIL 500 MG/1
500 TABLET ORAL 2 TIMES DAILY
DISCHARGE
Start: 2024-01-01 | End: 2024-01-01

## 2024-01-01 RX ORDER — BISACODYL 5 MG
20 TABLET, DELAYED RELEASE (ENTERIC COATED) ORAL ONCE
Status: COMPLETED | OUTPATIENT
Start: 2024-01-01 | End: 2024-01-01

## 2024-01-01 RX ORDER — FLUTICASONE PROPIONATE 50 MCG
2 SPRAY, SUSPENSION (ML) NASAL DAILY
COMMUNITY
End: 2024-01-01

## 2024-01-01 RX ORDER — WARFARIN SODIUM 5 MG/1
5 TABLET ORAL
Status: DISCONTINUED | OUTPATIENT
Start: 2024-01-01 | End: 2024-01-01 | Stop reason: HOSPADM

## 2024-01-01 RX ORDER — ACETAMINOPHEN 500 MG
1000 TABLET ORAL 3 TIMES DAILY
Status: DISCONTINUED | OUTPATIENT
Start: 2024-01-01 | End: 2024-01-01 | Stop reason: HOSPADM

## 2024-01-01 RX ORDER — HYDRALAZINE HYDROCHLORIDE 20 MG/ML
2.5-5 INJECTION INTRAMUSCULAR; INTRAVENOUS EVERY 10 MIN PRN
Status: DISCONTINUED | OUTPATIENT
Start: 2024-01-01 | End: 2024-01-01 | Stop reason: HOSPADM

## 2024-01-01 RX ORDER — AMOXICILLIN 250 MG
2 CAPSULE ORAL 2 TIMES DAILY PRN
Status: DISCONTINUED | OUTPATIENT
Start: 2024-01-01 | End: 2024-01-01 | Stop reason: HOSPADM

## 2024-01-01 RX ORDER — MIRTAZAPINE 7.5 MG/1
7.5 TABLET, FILM COATED ORAL AT BEDTIME
Qty: 30 TABLET | Refills: 0 | DISCHARGE
Start: 2024-01-01

## 2024-01-01 RX ORDER — ONDANSETRON 4 MG/1
4 TABLET, ORALLY DISINTEGRATING ORAL EVERY 30 MIN PRN
Status: DISCONTINUED | OUTPATIENT
Start: 2024-01-01 | End: 2024-01-01 | Stop reason: HOSPADM

## 2024-01-01 RX ORDER — HYDRALAZINE HYDROCHLORIDE 10 MG/1
10 TABLET, FILM COATED ORAL EVERY 4 HOURS PRN
Status: DISCONTINUED | OUTPATIENT
Start: 2024-01-01 | End: 2024-01-01 | Stop reason: HOSPADM

## 2024-01-01 RX ORDER — LOPERAMIDE HYDROCHLORIDE 2 MG/1
2 CAPSULE ORAL 4 TIMES DAILY PRN
Status: DISCONTINUED | OUTPATIENT
Start: 2024-01-01 | End: 2024-01-01 | Stop reason: HOSPADM

## 2024-01-01 RX ORDER — POTASSIUM CHLORIDE 20MEQ/15ML
20 LIQUID (ML) ORAL 2 TIMES DAILY
COMMUNITY
End: 2024-01-01

## 2024-01-01 RX ORDER — LIDOCAINE 40 MG/G
CREAM TOPICAL
Status: DISCONTINUED | OUTPATIENT
Start: 2024-01-01 | End: 2024-01-01

## 2024-01-01 RX ORDER — LOPERAMIDE HYDROCHLORIDE 2 MG/1
2 CAPSULE ORAL 4 TIMES DAILY PRN
DISCHARGE
Start: 2024-01-01

## 2024-01-01 RX ORDER — CALCIUM CARBONATE 500 MG/1
1 TABLET, CHEWABLE ORAL 3 TIMES DAILY PRN
DISCHARGE
Start: 2024-01-01 | End: 2024-01-01

## 2024-01-01 RX ORDER — PHENOBARBITAL, HYOSCYAMINE SULFATE, ATROPINE SULFATE, SCOPOLAMINE HYDROBROMIDE .0194; .1037; 16.2; .0065 MG/5ML; MG/5ML; MG/5ML; MG/5ML
5 ELIXIR ORAL ONCE
Status: DISCONTINUED | OUTPATIENT
Start: 2024-01-01 | End: 2024-01-01

## 2024-01-01 RX ORDER — PANTOPRAZOLE SODIUM 40 MG/1
40 TABLET, DELAYED RELEASE ORAL
Qty: 60 TABLET | Refills: 1 | Status: SHIPPED | OUTPATIENT
Start: 2024-01-01

## 2024-01-01 RX ORDER — POTASSIUM CHLORIDE 20MEQ/15ML
20 LIQUID (ML) ORAL DAILY
Status: DISCONTINUED | OUTPATIENT
Start: 2024-01-01 | End: 2024-01-01 | Stop reason: HOSPADM

## 2024-01-01 RX ORDER — ACETAMINOPHEN 325 MG/1
650 TABLET ORAL EVERY 6 HOURS PRN
Status: SHIPPED
Start: 2024-01-01 | End: 2024-01-01

## 2024-01-01 RX ORDER — LACTULOSE 10 G/15ML
10 SOLUTION ORAL ONCE
Status: COMPLETED | OUTPATIENT
Start: 2024-01-01 | End: 2024-01-01

## 2024-01-01 RX ORDER — POTASSIUM CHLORIDE 1.5 G/1.58G
20 POWDER, FOR SOLUTION ORAL
Qty: 10 PACKET | Refills: 1 | Status: SHIPPED | OUTPATIENT
Start: 2024-01-01 | End: 2024-01-01

## 2024-01-01 RX ORDER — FUROSEMIDE 10 MG/ML
40 INJECTION INTRAMUSCULAR; INTRAVENOUS ONCE
Status: COMPLETED | OUTPATIENT
Start: 2024-01-01 | End: 2024-01-01

## 2024-01-01 RX ORDER — CEFUROXIME AXETIL 500 MG/1
500 TABLET ORAL 2 TIMES DAILY
Status: COMPLETED | OUTPATIENT
Start: 2024-01-01 | End: 2024-01-01

## 2024-01-01 RX ORDER — CIPROFLOXACIN 500 MG/1
500 TABLET, FILM COATED ORAL EVERY 12 HOURS SCHEDULED
Status: DISCONTINUED | OUTPATIENT
Start: 2024-01-01 | End: 2024-01-01 | Stop reason: HOSPADM

## 2024-01-01 RX ORDER — MULTIVITAMIN,THERAPEUTIC
1 TABLET ORAL DAILY
Status: DISCONTINUED | OUTPATIENT
Start: 2024-01-01 | End: 2024-01-01

## 2024-01-01 RX ORDER — MAGNESIUM CARB/ALUMINUM HYDROX 105-160MG
296 TABLET,CHEWABLE ORAL ONCE
Status: COMPLETED | OUTPATIENT
Start: 2024-01-01 | End: 2024-01-01

## 2024-01-01 RX ORDER — WARFARIN SODIUM 3 MG/1
3 TABLET ORAL
Status: DISCONTINUED | OUTPATIENT
Start: 2024-01-01 | End: 2024-01-01 | Stop reason: HOSPADM

## 2024-01-01 RX ORDER — NALOXONE HYDROCHLORIDE 0.4 MG/ML
INJECTION, SOLUTION INTRAMUSCULAR; INTRAVENOUS; SUBCUTANEOUS
Status: COMPLETED
Start: 2024-01-01 | End: 2024-01-01

## 2024-01-01 RX ORDER — METHYLPREDNISOLONE SODIUM SUCCINATE 125 MG/2ML
125 INJECTION, POWDER, LYOPHILIZED, FOR SOLUTION INTRAMUSCULAR; INTRAVENOUS
Status: DISCONTINUED | OUTPATIENT
Start: 2024-01-01 | End: 2024-01-01

## 2024-01-01 RX ORDER — CEFAZOLIN SODIUM 1 G/50ML
2500 SOLUTION INTRAVENOUS ONCE
Status: COMPLETED | OUTPATIENT
Start: 2024-01-01 | End: 2024-01-01

## 2024-01-01 RX ORDER — KETOROLAC TROMETHAMINE 15 MG/ML
15 INJECTION, SOLUTION INTRAMUSCULAR; INTRAVENOUS EVERY 6 HOURS PRN
Status: DISPENSED | OUTPATIENT
Start: 2024-01-01 | End: 2024-01-01

## 2024-01-01 RX ORDER — CARBOXYMETHYLCELLULOSE SODIUM 5 MG/ML
1 SOLUTION/ DROPS OPHTHALMIC 4 TIMES DAILY PRN
Status: DISCONTINUED | OUTPATIENT
Start: 2024-01-01 | End: 2024-01-01 | Stop reason: HOSPADM

## 2024-01-01 RX ORDER — NYSTATIN 100000 [USP'U]/G
POWDER TOPICAL 2 TIMES DAILY PRN
COMMUNITY

## 2024-01-01 RX ORDER — BUPIVACAINE HYDROCHLORIDE AND EPINEPHRINE 2.5; 5 MG/ML; UG/ML
30 INJECTION, SOLUTION EPIDURAL; INFILTRATION; INTRACAUDAL; PERINEURAL ONCE
Status: DISCONTINUED | OUTPATIENT
Start: 2024-01-01 | End: 2024-01-01 | Stop reason: HOSPADM

## 2024-01-01 RX ORDER — TORSEMIDE 20 MG/1
20 TABLET ORAL DAILY
Qty: 90 TABLET | Refills: 2 | Status: ON HOLD | OUTPATIENT
Start: 2024-01-01 | End: 2024-01-01

## 2024-01-01 RX ORDER — MULTIVITAMIN,THERAPEUTIC
1 TABLET ORAL DAILY
Status: DISCONTINUED | OUTPATIENT
Start: 2024-01-01 | End: 2024-01-01 | Stop reason: HOSPADM

## 2024-01-01 RX ORDER — FERROUS SULFATE 325(65) MG
325 TABLET ORAL
Status: DISCONTINUED | OUTPATIENT
Start: 2024-01-01 | End: 2024-01-01 | Stop reason: HOSPADM

## 2024-01-01 RX ORDER — METRONIDAZOLE 500 MG/100ML
500 INJECTION, SOLUTION INTRAVENOUS EVERY 12 HOURS SCHEDULED
Status: DISCONTINUED | OUTPATIENT
Start: 2024-01-01 | End: 2024-01-01

## 2024-01-01 RX ORDER — MIDODRINE HYDROCHLORIDE 5 MG/1
15 TABLET ORAL
Status: DISCONTINUED | OUTPATIENT
Start: 2024-01-01 | End: 2024-01-01 | Stop reason: HOSPADM

## 2024-01-01 RX ORDER — DOXYCYCLINE 100 MG/1
100 CAPSULE ORAL 2 TIMES DAILY
Status: COMPLETED | OUTPATIENT
Start: 2024-01-01 | End: 2024-01-01

## 2024-01-01 RX ORDER — FLUMAZENIL 0.1 MG/ML
0.2 INJECTION, SOLUTION INTRAVENOUS
Status: ACTIVE | OUTPATIENT
Start: 2024-01-01 | End: 2024-01-01

## 2024-01-01 RX ORDER — MIRTAZAPINE 7.5 MG/1
7.5 TABLET, FILM COATED ORAL AT BEDTIME
Status: DISCONTINUED | OUTPATIENT
Start: 2024-01-01 | End: 2024-01-01 | Stop reason: HOSPADM

## 2024-01-01 RX ORDER — MULTIPLE VITAMINS W/ MINERALS TAB 9MG-400MCG
1 TAB ORAL DAILY
Status: DISCONTINUED | OUTPATIENT
Start: 2024-01-01 | End: 2024-01-01 | Stop reason: HOSPADM

## 2024-01-01 RX ORDER — HYDROMORPHONE HYDROCHLORIDE 1 MG/ML
0.5 INJECTION, SOLUTION INTRAMUSCULAR; INTRAVENOUS; SUBCUTANEOUS ONCE
Status: COMPLETED | OUTPATIENT
Start: 2024-01-01 | End: 2024-01-01

## 2024-01-01 RX ORDER — LIDOCAINE HYDROCHLORIDE 40 MG/ML
SOLUTION TOPICAL 2 TIMES DAILY PRN
Qty: 100 ML | Refills: 2 | Status: SHIPPED | OUTPATIENT
Start: 2024-01-01

## 2024-01-01 RX ORDER — HYDROMORPHONE HYDROCHLORIDE 4 MG/1
2 TABLET ORAL EVERY 6 HOURS PRN
Qty: 30 TABLET | Refills: 0 | Status: SHIPPED | OUTPATIENT
Start: 2024-01-01

## 2024-01-01 RX ORDER — FUROSEMIDE 10 MG/ML
60 INJECTION INTRAMUSCULAR; INTRAVENOUS EVERY 12 HOURS
Status: DISCONTINUED | OUTPATIENT
Start: 2024-01-01 | End: 2024-01-01

## 2024-01-01 RX ORDER — BIOTIN 10 MG
2 TABLET ORAL DAILY
COMMUNITY

## 2024-01-01 RX ORDER — WARFARIN SODIUM 3 MG/1
3 TABLET ORAL
Status: COMPLETED | OUTPATIENT
Start: 2024-01-01 | End: 2024-01-01

## 2024-01-01 RX ORDER — ALBUTEROL SULFATE 0.83 MG/ML
2.5 SOLUTION RESPIRATORY (INHALATION)
Status: DISCONTINUED | OUTPATIENT
Start: 2024-01-01 | End: 2024-01-01 | Stop reason: HOSPADM

## 2024-01-01 RX ORDER — NYSTATIN 100000 [USP'U]/G
POWDER TOPICAL 2 TIMES DAILY
Status: DISCONTINUED | OUTPATIENT
Start: 2024-01-01 | End: 2024-01-01

## 2024-01-01 RX ORDER — CIPROFLOXACIN 500 MG/1
500 TABLET, FILM COATED ORAL EVERY 12 HOURS
DISCHARGE
Start: 2024-01-01 | End: 2024-01-01

## 2024-01-01 RX ORDER — ACETAMINOPHEN 325 MG/1
650 TABLET ORAL EVERY 6 HOURS PRN
Status: DISCONTINUED | OUTPATIENT
Start: 2024-01-01 | End: 2024-01-01

## 2024-01-01 RX ORDER — POTASSIUM CHLORIDE 1.5 G/1.58G
20 POWDER, FOR SOLUTION ORAL 2 TIMES DAILY
COMMUNITY
End: 2024-01-01

## 2024-01-01 RX ORDER — ONDANSETRON 2 MG/ML
4 INJECTION INTRAMUSCULAR; INTRAVENOUS EVERY 30 MIN PRN
Status: DISCONTINUED | OUTPATIENT
Start: 2024-01-01 | End: 2024-01-01 | Stop reason: HOSPADM

## 2024-01-01 RX ORDER — FERROUS SULFATE 325(65) MG
325 TABLET ORAL DAILY
DISCHARGE
Start: 2024-01-01 | End: 2024-01-01

## 2024-01-01 RX ORDER — WARFARIN SODIUM 5 MG/1
5 TABLET ORAL ONCE
Status: COMPLETED | OUTPATIENT
Start: 2024-01-01 | End: 2024-01-01

## 2024-01-01 RX ORDER — PANTOPRAZOLE SODIUM 40 MG/1
40 TABLET, DELAYED RELEASE ORAL
Qty: 60 TABLET | Refills: 1 | OUTPATIENT
Start: 2024-01-01

## 2024-01-01 RX ORDER — FENTANYL CITRATE 50 UG/ML
INJECTION, SOLUTION INTRAMUSCULAR; INTRAVENOUS PRN
Status: DISCONTINUED | OUTPATIENT
Start: 2024-01-01 | End: 2024-01-01 | Stop reason: HOSPADM

## 2024-01-01 RX ORDER — POLYETHYLENE GLYCOL 3350 17 G/17G
17 POWDER, FOR SOLUTION ORAL 2 TIMES DAILY
Status: DISCONTINUED | OUTPATIENT
Start: 2024-01-01 | End: 2024-01-01 | Stop reason: HOSPADM

## 2024-01-01 RX ORDER — HYDROMORPHONE HYDROCHLORIDE 1 MG/ML
0.5 INJECTION, SOLUTION INTRAMUSCULAR; INTRAVENOUS; SUBCUTANEOUS
Status: DISCONTINUED | OUTPATIENT
Start: 2024-01-01 | End: 2024-01-01 | Stop reason: HOSPADM

## 2024-01-01 RX ORDER — FUROSEMIDE 20 MG
20 TABLET ORAL ONCE
Status: COMPLETED | OUTPATIENT
Start: 2024-01-01 | End: 2024-01-01

## 2024-01-01 RX ORDER — KETOROLAC TROMETHAMINE 30 MG/ML
INJECTION, SOLUTION INTRAMUSCULAR; INTRAVENOUS PRN
Status: DISCONTINUED | OUTPATIENT
Start: 2024-01-01 | End: 2024-01-01

## 2024-01-01 RX ORDER — WARFARIN SODIUM 3 MG/1
TABLET ORAL
Qty: 90 TABLET | Refills: 1 | Status: ON HOLD | OUTPATIENT
Start: 2024-01-01 | End: 2024-01-01

## 2024-01-01 RX ORDER — DIAZEPAM 10 MG/2ML
2.5 INJECTION, SOLUTION INTRAMUSCULAR; INTRAVENOUS
Status: DISCONTINUED | OUTPATIENT
Start: 2024-01-01 | End: 2024-01-01 | Stop reason: HOSPADM

## 2024-01-01 RX ORDER — FENTANYL CITRATE 50 UG/ML
INJECTION, SOLUTION INTRAMUSCULAR; INTRAVENOUS PRN
Status: DISCONTINUED | OUTPATIENT
Start: 2024-01-01 | End: 2024-01-01

## 2024-01-01 RX ORDER — ACETAMINOPHEN 325 MG/1
975 TABLET ORAL EVERY 8 HOURS
Status: DISCONTINUED | OUTPATIENT
Start: 2024-01-01 | End: 2024-01-01

## 2024-01-01 RX ORDER — FUROSEMIDE 10 MG/ML
40 INJECTION INTRAMUSCULAR; INTRAVENOUS EVERY 8 HOURS
Status: DISCONTINUED | OUTPATIENT
Start: 2024-01-01 | End: 2024-01-01

## 2024-01-01 RX ORDER — HYDROMORPHONE HYDROCHLORIDE 2 MG/1
2 TABLET ORAL EVERY 4 HOURS PRN
Qty: 10 TABLET | Refills: 0 | Status: SHIPPED | OUTPATIENT
Start: 2024-01-01 | End: 2024-01-01

## 2024-01-01 RX ORDER — POTASSIUM CHLORIDE 1500 MG/1
40 TABLET, EXTENDED RELEASE ORAL
Status: COMPLETED | OUTPATIENT
Start: 2024-01-01 | End: 2024-01-01

## 2024-01-01 RX ORDER — MIDODRINE HYDROCHLORIDE 10 MG/1
10 TABLET ORAL
DISCHARGE
Start: 2024-01-01

## 2024-01-01 RX ORDER — FERROUS SULFATE 325(65) MG
325 TABLET ORAL
Qty: 90 TABLET | Refills: 3 | Status: SHIPPED | OUTPATIENT
Start: 2024-01-01

## 2024-01-01 RX ORDER — SODIUM CHLORIDE 9 MG/ML
INJECTION, SOLUTION INTRAVENOUS CONTINUOUS
Status: ACTIVE | OUTPATIENT
Start: 2024-01-01 | End: 2024-01-01

## 2024-01-01 RX ORDER — MIDODRINE HYDROCHLORIDE 5 MG/1
5 TABLET ORAL
Status: DISCONTINUED | OUTPATIENT
Start: 2024-01-01 | End: 2024-01-01

## 2024-01-01 RX ORDER — POTASSIUM CHLORIDE 7.45 MG/ML
10 INJECTION INTRAVENOUS ONCE
Status: COMPLETED | OUTPATIENT
Start: 2024-01-01 | End: 2024-01-01

## 2024-01-01 RX ORDER — CALCIUM CARBONATE 500 MG/1
1 TABLET, CHEWABLE ORAL 2 TIMES DAILY
COMMUNITY
Start: 2024-01-01

## 2024-01-01 RX ORDER — ALBUMIN (HUMAN) 12.5 G/50ML
12.5 SOLUTION INTRAVENOUS EVERY 8 HOURS
Status: COMPLETED | OUTPATIENT
Start: 2024-01-01 | End: 2024-01-01

## 2024-01-01 RX ORDER — FENTANYL CITRATE 50 UG/ML
25 INJECTION, SOLUTION INTRAMUSCULAR; INTRAVENOUS EVERY 5 MIN PRN
Status: DISCONTINUED | OUTPATIENT
Start: 2024-01-01 | End: 2024-01-01 | Stop reason: HOSPADM

## 2024-01-01 RX ORDER — CEFTRIAXONE 1 G/1
1 INJECTION, POWDER, FOR SOLUTION INTRAMUSCULAR; INTRAVENOUS EVERY 24 HOURS
Status: DISCONTINUED | OUTPATIENT
Start: 2024-01-01 | End: 2024-01-01

## 2024-01-01 RX ORDER — FUROSEMIDE 40 MG
40 TABLET ORAL ONCE
Status: COMPLETED | OUTPATIENT
Start: 2024-01-01 | End: 2024-01-01

## 2024-01-01 RX ORDER — BUPIVACAINE HYDROCHLORIDE AND EPINEPHRINE 2.5; 5 MG/ML; UG/ML
INJECTION, SOLUTION INFILTRATION; PERINEURAL PRN
Status: DISCONTINUED | OUTPATIENT
Start: 2024-01-01 | End: 2024-01-01

## 2024-01-01 RX ORDER — DOXYCYCLINE HYCLATE 20 MG
100 TABLET ORAL 2 TIMES DAILY
Status: DISCONTINUED | OUTPATIENT
Start: 2024-01-01 | End: 2024-01-01

## 2024-01-01 RX ORDER — TORSEMIDE 20 MG/1
20 TABLET ORAL DAILY
Qty: 30 TABLET | Refills: 0 | Status: SHIPPED | OUTPATIENT
Start: 2024-01-01

## 2024-01-01 RX ORDER — MIDODRINE HYDROCHLORIDE 5 MG/1
10 TABLET ORAL
Status: DISCONTINUED | OUTPATIENT
Start: 2024-01-01 | End: 2024-01-01 | Stop reason: HOSPADM

## 2024-01-01 RX ORDER — TAMSULOSIN HYDROCHLORIDE 0.4 MG/1
0.4 CAPSULE ORAL EVERY EVENING
Status: DISCONTINUED | OUTPATIENT
Start: 2024-01-01 | End: 2024-01-01

## 2024-01-01 RX ORDER — MORPHINE SULFATE 2 MG/ML
2 INJECTION, SOLUTION INTRAMUSCULAR; INTRAVENOUS ONCE
Status: COMPLETED | OUTPATIENT
Start: 2024-01-01 | End: 2024-01-01

## 2024-01-01 RX ORDER — LORAZEPAM 2 MG/ML
0.5 INJECTION INTRAMUSCULAR ONCE
Status: COMPLETED | OUTPATIENT
Start: 2024-01-01 | End: 2024-01-01

## 2024-01-01 RX ORDER — FERROUS SULFATE 325(65) MG
325 TABLET ORAL DAILY
Qty: 30 TABLET | Refills: 0 | Status: SHIPPED | OUTPATIENT
Start: 2024-01-01 | End: 2024-01-01

## 2024-01-01 RX ORDER — MAGNESIUM HYDROXIDE/ALUMINUM HYDROXICE/SIMETHICONE 120; 1200; 1200 MG/30ML; MG/30ML; MG/30ML
30 SUSPENSION ORAL ONCE
Status: COMPLETED | OUTPATIENT
Start: 2024-01-01 | End: 2024-01-01

## 2024-01-01 RX ORDER — POLYETHYLENE GLYCOL 3350 17 G/17G
17 POWDER, FOR SOLUTION ORAL DAILY PRN
COMMUNITY
End: 2024-01-01

## 2024-01-01 RX ORDER — WARFARIN SODIUM 3 MG/1
TABLET ORAL
Qty: 60 TABLET | Refills: 1 | Status: SHIPPED | OUTPATIENT
Start: 2024-01-01

## 2024-01-01 RX ORDER — AZITHROMYCIN 250 MG/1
500 TABLET, FILM COATED ORAL 2 TIMES DAILY
Status: COMPLETED | OUTPATIENT
Start: 2024-01-01 | End: 2024-01-01

## 2024-01-01 RX ORDER — CALCIUM CARBONATE 500 MG/1
1000 TABLET, CHEWABLE ORAL 4 TIMES DAILY PRN
Status: DISCONTINUED | OUTPATIENT
Start: 2024-01-01 | End: 2024-01-01 | Stop reason: DRUGHIGH

## 2024-01-01 RX ORDER — ALBUTEROL SULFATE 90 UG/1
1 AEROSOL, METERED RESPIRATORY (INHALATION) EVERY 4 HOURS PRN
COMMUNITY
End: 2024-01-01

## 2024-01-01 RX ORDER — FUROSEMIDE 10 MG/ML
20 INJECTION INTRAMUSCULAR; INTRAVENOUS ONCE
Status: DISCONTINUED | OUTPATIENT
Start: 2024-01-01 | End: 2024-01-01

## 2024-01-01 RX ORDER — MIDODRINE HYDROCHLORIDE 10 MG/1
10 TABLET ORAL
Qty: 90 TABLET | Refills: 0 | Status: SHIPPED | OUTPATIENT
Start: 2024-01-01 | End: 2024-01-01

## 2024-01-01 RX ORDER — WARFARIN SODIUM 3 MG/1
TABLET ORAL
Status: SHIPPED
Start: 2024-01-01 | End: 2024-01-01

## 2024-01-01 RX ORDER — PANTOPRAZOLE SODIUM 40 MG/1
40 TABLET, DELAYED RELEASE ORAL
DISCHARGE
Start: 2024-01-01 | End: 2024-01-01

## 2024-01-01 RX ORDER — POLYETHYLENE GLYCOL 3350 17 G/17G
17 POWDER, FOR SOLUTION ORAL 2 TIMES DAILY PRN
Status: DISCONTINUED | OUTPATIENT
Start: 2024-01-01 | End: 2024-01-01 | Stop reason: HOSPADM

## 2024-01-01 RX ORDER — SENNOSIDES 8.6 MG
1 TABLET ORAL 2 TIMES DAILY PRN
COMMUNITY
End: 2024-01-01

## 2024-01-01 RX ORDER — HYDROMORPHONE HYDROCHLORIDE 2 MG/1
2 TABLET ORAL EVERY 4 HOURS PRN
Status: DISCONTINUED | OUTPATIENT
Start: 2024-01-01 | End: 2024-01-01 | Stop reason: HOSPADM

## 2024-01-01 RX ORDER — HYDROMORPHONE HYDROCHLORIDE 2 MG/1
2 TABLET ORAL ONCE
Status: COMPLETED | OUTPATIENT
Start: 2024-01-01 | End: 2024-01-01

## 2024-01-01 RX ORDER — ACETAMINOPHEN 500 MG
1000 TABLET ORAL 3 TIMES DAILY
Status: ON HOLD
Start: 2024-01-01 | End: 2024-01-01

## 2024-01-01 RX ORDER — DUTASTERIDE 0.5 MG/1
0.5 CAPSULE, LIQUID FILLED ORAL DAILY
Status: DISCONTINUED | OUTPATIENT
Start: 2024-01-01 | End: 2024-01-01 | Stop reason: HOSPADM

## 2024-01-01 RX ORDER — FENTANYL CITRATE 50 UG/ML
50 INJECTION, SOLUTION INTRAMUSCULAR; INTRAVENOUS EVERY 5 MIN PRN
Status: DISCONTINUED | OUTPATIENT
Start: 2024-01-01 | End: 2024-01-01 | Stop reason: HOSPADM

## 2024-01-01 RX ORDER — HYDROMORPHONE HCL IN WATER/PF 6 MG/30 ML
0.2 PATIENT CONTROLLED ANALGESIA SYRINGE INTRAVENOUS EVERY 5 MIN PRN
Status: DISCONTINUED | OUTPATIENT
Start: 2024-01-01 | End: 2024-01-01 | Stop reason: HOSPADM

## 2024-01-01 RX ORDER — MORPHINE SULFATE 2 MG/ML
2 INJECTION, SOLUTION INTRAMUSCULAR; INTRAVENOUS EVERY 4 HOURS PRN
Status: DISCONTINUED | OUTPATIENT
Start: 2024-01-01 | End: 2024-01-01

## 2024-01-01 RX ORDER — HYDROMORPHONE HYDROCHLORIDE 4 MG/1
2 TABLET ORAL EVERY 6 HOURS PRN
Qty: 10 TABLET | Refills: 0 | Status: SHIPPED | OUTPATIENT
Start: 2024-01-01 | End: 2024-01-01

## 2024-01-01 RX ORDER — POTASSIUM CHLORIDE 1500 MG/1
20 TABLET, EXTENDED RELEASE ORAL DAILY
Status: ON HOLD | COMMUNITY
Start: 2024-01-01 | End: 2024-01-01

## 2024-01-01 RX ORDER — PANTOPRAZOLE SODIUM 40 MG/1
40 TABLET, DELAYED RELEASE ORAL
Qty: 180 TABLET | Refills: 1 | OUTPATIENT
Start: 2024-01-01

## 2024-01-01 RX ORDER — POTASSIUM CHLORIDE 1500 MG/1
20 TABLET, EXTENDED RELEASE ORAL ONCE
Status: COMPLETED | OUTPATIENT
Start: 2024-01-01 | End: 2024-01-01

## 2024-01-01 RX ORDER — SENNOSIDES 8.6 MG
1 TABLET ORAL AT BEDTIME
Status: ON HOLD | COMMUNITY
End: 2024-01-01

## 2024-01-01 RX ORDER — EPINEPHRINE 0.1 MG/ML
INJECTION INTRAVENOUS PRN
Status: DISCONTINUED | OUTPATIENT
Start: 2024-01-01 | End: 2024-01-01 | Stop reason: HOSPADM

## 2024-01-01 RX ORDER — HYDROXYZINE HYDROCHLORIDE 25 MG/1
25 TABLET, FILM COATED ORAL EVERY 6 HOURS PRN
Status: DISCONTINUED | OUTPATIENT
Start: 2024-01-01 | End: 2024-01-01 | Stop reason: HOSPADM

## 2024-01-01 RX ORDER — DOXYCYCLINE HYCLATE 100 MG
100 TABLET ORAL 2 TIMES DAILY
Status: ON HOLD | COMMUNITY
Start: 2024-01-01 | End: 2024-01-01

## 2024-01-01 RX ORDER — SUCRALFATE ORAL 1 G/10ML
1 SUSPENSION ORAL
DISCHARGE
Start: 2024-01-01 | End: 2024-01-01

## 2024-01-01 RX ORDER — ACETAMINOPHEN 325 MG/1
975 TABLET ORAL 3 TIMES DAILY
Status: DISCONTINUED | OUTPATIENT
Start: 2024-01-01 | End: 2024-01-01 | Stop reason: HOSPADM

## 2024-01-01 RX ORDER — DIPHENHYDRAMINE HYDROCHLORIDE 50 MG/ML
12.5 INJECTION INTRAMUSCULAR; INTRAVENOUS EVERY 6 HOURS PRN
Status: DISCONTINUED | OUTPATIENT
Start: 2024-01-01 | End: 2024-01-01 | Stop reason: HOSPADM

## 2024-01-01 RX ORDER — CEFTRIAXONE 2 G/1
2 INJECTION, POWDER, FOR SOLUTION INTRAMUSCULAR; INTRAVENOUS EVERY 24 HOURS
Status: DISCONTINUED | OUTPATIENT
Start: 2024-01-01 | End: 2024-01-01 | Stop reason: HOSPADM

## 2024-01-01 RX ORDER — FUROSEMIDE 10 MG/ML
40 INJECTION INTRAMUSCULAR; INTRAVENOUS
Status: DISCONTINUED | OUTPATIENT
Start: 2024-01-01 | End: 2024-01-01

## 2024-01-01 RX ORDER — FERROUS SULFATE 325(65) MG
325 TABLET ORAL DAILY
Qty: 30 TABLET | Refills: 0 | OUTPATIENT
Start: 2024-01-01

## 2024-01-01 RX ORDER — WARFARIN SODIUM 7.5 MG/1
7.5 TABLET ORAL
Status: COMPLETED | OUTPATIENT
Start: 2024-01-01 | End: 2024-01-01

## 2024-01-01 RX ORDER — CEFEPIME HYDROCHLORIDE 2 G/1
2 INJECTION, POWDER, FOR SOLUTION INTRAVENOUS EVERY 8 HOURS
Status: DISCONTINUED | OUTPATIENT
Start: 2024-01-01 | End: 2024-01-01

## 2024-01-01 RX ORDER — LIDOCAINE HYDROCHLORIDE 20 MG/ML
INJECTION, SOLUTION INFILTRATION; PERINEURAL PRN
Status: DISCONTINUED | OUTPATIENT
Start: 2024-01-01 | End: 2024-01-01

## 2024-01-01 RX ORDER — CALCIUM CARBONATE 500 MG/1
500 TABLET, CHEWABLE ORAL 3 TIMES DAILY PRN
Status: DISCONTINUED | OUTPATIENT
Start: 2024-01-01 | End: 2024-01-01 | Stop reason: HOSPADM

## 2024-01-01 RX ORDER — CARBOXYMETHYLCELLULOSE SODIUM 5 MG/ML
1 SOLUTION/ DROPS OPHTHALMIC
Status: DISCONTINUED | OUTPATIENT
Start: 2024-01-01 | End: 2024-01-01 | Stop reason: HOSPADM

## 2024-01-01 RX ORDER — HYDROMORPHONE HYDROCHLORIDE 2 MG/1
4 TABLET ORAL EVERY 4 HOURS PRN
Status: DISCONTINUED | OUTPATIENT
Start: 2024-01-01 | End: 2024-01-01 | Stop reason: HOSPADM

## 2024-01-01 RX ORDER — BISACODYL 10 MG
10 SUPPOSITORY, RECTAL RECTAL DAILY PRN
COMMUNITY
End: 2024-01-01

## 2024-01-01 RX ORDER — POTASSIUM CHLORIDE 1.5 G/1.58G
20 POWDER, FOR SOLUTION ORAL 2 TIMES DAILY
Status: SHIPPED
Start: 2024-01-01 | End: 2024-01-01

## 2024-01-01 RX ORDER — ETOMIDATE 2 MG/ML
INJECTION INTRAVENOUS PRN
Status: DISCONTINUED | OUTPATIENT
Start: 2024-01-01 | End: 2024-01-01

## 2024-01-01 RX ORDER — MEROPENEM 1 G/1
1 INJECTION, POWDER, FOR SOLUTION INTRAVENOUS EVERY 12 HOURS
Status: DISCONTINUED | OUTPATIENT
Start: 2024-01-01 | End: 2024-01-01

## 2024-01-01 RX ORDER — ALBUTEROL SULFATE 0.83 MG/ML
2.5 SOLUTION RESPIRATORY (INHALATION) EVERY 4 HOURS PRN
Status: DISCONTINUED | OUTPATIENT
Start: 2024-01-01 | End: 2024-01-01 | Stop reason: HOSPADM

## 2024-01-01 RX ORDER — POTASSIUM CHLORIDE 1500 MG/1
40 TABLET, EXTENDED RELEASE ORAL ONCE
Status: DISCONTINUED | OUTPATIENT
Start: 2024-01-01 | End: 2024-01-01

## 2024-01-01 RX ORDER — TORSEMIDE 20 MG/1
20 TABLET ORAL DAILY
DISCHARGE
Start: 2024-01-01 | End: 2024-01-01

## 2024-01-01 RX ORDER — CIPROFLOXACIN 500 MG/1
500 TABLET, FILM COATED ORAL 2 TIMES DAILY
Qty: 10 TABLET | Refills: 0 | Status: SHIPPED | OUTPATIENT
Start: 2024-01-01 | End: 2024-01-01

## 2024-01-01 RX ORDER — WARFARIN SODIUM 1 MG/1
1 TABLET ORAL
Status: DISCONTINUED | OUTPATIENT
Start: 2024-01-01 | End: 2024-01-01 | Stop reason: DRUGHIGH

## 2024-01-01 RX ORDER — ACETAMINOPHEN 500 MG
1000 TABLET ORAL EVERY 6 HOURS PRN
DISCHARGE
Start: 2024-01-01

## 2024-01-01 RX ORDER — FUROSEMIDE 40 MG
40 TABLET ORAL DAILY
Status: ON HOLD | COMMUNITY
Start: 2024-01-01 | End: 2024-01-01

## 2024-01-01 RX ORDER — WARFARIN SODIUM 3 MG/1
TABLET ORAL
Qty: 180 TABLET | Refills: 0 | Status: SHIPPED | OUTPATIENT
Start: 2024-01-01

## 2024-01-01 RX ORDER — WARFARIN SODIUM 1 MG/1
TABLET ORAL
Qty: 60 TABLET | Refills: 1 | Status: ON HOLD | OUTPATIENT
Start: 2024-01-01 | End: 2024-01-01

## 2024-01-01 RX ORDER — SODIUM CHLORIDE, SODIUM LACTATE, POTASSIUM CHLORIDE, CALCIUM CHLORIDE 600; 310; 30; 20 MG/100ML; MG/100ML; MG/100ML; MG/100ML
INJECTION, SOLUTION INTRAVENOUS CONTINUOUS
Status: DISCONTINUED | OUTPATIENT
Start: 2024-01-01 | End: 2024-01-01

## 2024-01-01 RX ORDER — EPHEDRINE SULFATE 50 MG/ML
INJECTION, SOLUTION INTRAMUSCULAR; INTRAVENOUS; SUBCUTANEOUS PRN
Status: DISCONTINUED | OUTPATIENT
Start: 2024-01-01 | End: 2024-01-01

## 2024-01-01 RX ORDER — MEROPENEM 1 G/1
1 INJECTION, POWDER, FOR SOLUTION INTRAVENOUS EVERY 8 HOURS
Status: DISCONTINUED | OUTPATIENT
Start: 2024-01-01 | End: 2024-01-01

## 2024-01-01 RX ORDER — CEFTRIAXONE 1 G/1
1 INJECTION, POWDER, FOR SOLUTION INTRAMUSCULAR; INTRAVENOUS ONCE
Status: COMPLETED | OUTPATIENT
Start: 2024-01-01 | End: 2024-01-01

## 2024-01-01 RX ADMIN — Medication 1 TABLET: at 13:46

## 2024-01-01 RX ADMIN — DOXYCYCLINE HYCLATE 100 MG: 100 CAPSULE ORAL at 08:45

## 2024-01-01 RX ADMIN — FERROUS SULFATE TAB 325 MG (65 MG ELEMENTAL FE) 325 MG: 325 (65 FE) TAB at 08:48

## 2024-01-01 RX ADMIN — WARFARIN SODIUM 4 MG: 2 TABLET ORAL at 17:53

## 2024-01-01 RX ADMIN — MULTIVITAMIN TABLET 1 TABLET: TABLET at 09:34

## 2024-01-01 RX ADMIN — MICONAZOLE NITRATE: 20 POWDER TOPICAL at 08:52

## 2024-01-01 RX ADMIN — PANTOPRAZOLE SODIUM 40 MG: 40 INJECTION, POWDER, FOR SOLUTION INTRAVENOUS at 10:53

## 2024-01-01 RX ADMIN — HEPARIN SODIUM 1350 UNITS/HR: 10000 INJECTION, SOLUTION INTRAVENOUS at 02:30

## 2024-01-01 RX ADMIN — ALBUMIN HUMAN 12.5 G: 0.25 SOLUTION INTRAVENOUS at 21:36

## 2024-01-01 RX ADMIN — CEFTRIAXONE SODIUM 1 G: 1 INJECTION, POWDER, FOR SOLUTION INTRAMUSCULAR; INTRAVENOUS at 14:07

## 2024-01-01 RX ADMIN — SUCRALFATE 1 G: 1 SUSPENSION ORAL at 17:26

## 2024-01-01 RX ADMIN — POTASSIUM CHLORIDE 20 MEQ: 20 SOLUTION ORAL at 15:53

## 2024-01-01 RX ADMIN — HEPARIN SODIUM 1300 UNITS/HR: 10000 INJECTION, SOLUTION INTRAVENOUS at 12:59

## 2024-01-01 RX ADMIN — MIDODRINE HYDROCHLORIDE 10 MG: 5 TABLET ORAL at 19:16

## 2024-01-01 RX ADMIN — HYDROMORPHONE HYDROCHLORIDE 0.3 MG: 1 INJECTION, SOLUTION INTRAMUSCULAR; INTRAVENOUS; SUBCUTANEOUS at 10:25

## 2024-01-01 RX ADMIN — MAGNESIUM SULFATE HEPTAHYDRATE 2 G: 40 INJECTION, SOLUTION INTRAVENOUS at 14:52

## 2024-01-01 RX ADMIN — MIDODRINE HYDROCHLORIDE 10 MG: 5 TABLET ORAL at 09:48

## 2024-01-01 RX ADMIN — WARFARIN SODIUM 1.5 MG: 3 TABLET ORAL at 19:16

## 2024-01-01 RX ADMIN — PANTOPRAZOLE SODIUM 40 MG: 40 TABLET, DELAYED RELEASE ORAL at 09:23

## 2024-01-01 RX ADMIN — KETOROLAC TROMETHAMINE 15 MG: 15 INJECTION, SOLUTION INTRAMUSCULAR; INTRAVENOUS at 12:35

## 2024-01-01 RX ADMIN — WARFARIN SODIUM 5 MG: 5 TABLET ORAL at 11:19

## 2024-01-01 RX ADMIN — BE HEALTH MAGNESIUM CITRATE ORAL SOLUTION - CHERRY 296 ML: 1.75 LIQUID ORAL at 11:05

## 2024-01-01 RX ADMIN — HEPARIN SODIUM 1300 UNITS/HR: 10000 INJECTION, SOLUTION INTRAVENOUS at 23:29

## 2024-01-01 RX ADMIN — ACETAMINOPHEN 975 MG: 325 TABLET, FILM COATED ORAL at 12:15

## 2024-01-01 RX ADMIN — ACETAMINOPHEN 1000 MG: 500 TABLET, FILM COATED ORAL at 14:04

## 2024-01-01 RX ADMIN — SUCRALFATE 1 G: 1 SUSPENSION ORAL at 22:34

## 2024-01-01 RX ADMIN — FUROSEMIDE 20 MG: 10 INJECTION, SOLUTION INTRAMUSCULAR; INTRAVENOUS at 12:57

## 2024-01-01 RX ADMIN — POTASSIUM CHLORIDE 10 MEQ: 7.46 INJECTION, SOLUTION INTRAVENOUS at 19:03

## 2024-01-01 RX ADMIN — HEPARIN SODIUM 1300 UNITS/HR: 10000 INJECTION, SOLUTION INTRAVENOUS at 16:38

## 2024-01-01 RX ADMIN — Medication 1 TABLET: at 08:09

## 2024-01-01 RX ADMIN — POTASSIUM CHLORIDE 20 MEQ: 20 SOLUTION ORAL at 16:43

## 2024-01-01 RX ADMIN — SUCRALFATE 1 G: 1 SUSPENSION ORAL at 08:51

## 2024-01-01 RX ADMIN — ACETAMINOPHEN 1000 MG: 500 TABLET, FILM COATED ORAL at 20:24

## 2024-01-01 RX ADMIN — FERROUS SULFATE TAB 325 MG (65 MG ELEMENTAL FE) 325 MG: 325 (65 FE) TAB at 09:49

## 2024-01-01 RX ADMIN — GLYCOPYRROLATE 0.2 MG: 0.2 INJECTION, SOLUTION INTRAMUSCULAR; INTRAVENOUS at 11:13

## 2024-01-01 RX ADMIN — POTASSIUM CHLORIDE 10 MEQ: 7.46 INJECTION, SOLUTION INTRAVENOUS at 02:12

## 2024-01-01 RX ADMIN — LACTULOSE 10 G: 10 SOLUTION ORAL at 22:50

## 2024-01-01 RX ADMIN — POTASSIUM CHLORIDE 40 MEQ: 1.5 POWDER, FOR SOLUTION ORAL at 03:21

## 2024-01-01 RX ADMIN — POTASSIUM CHLORIDE 10 MEQ: 7.46 INJECTION, SOLUTION INTRAVENOUS at 05:12

## 2024-01-01 RX ADMIN — PANTOPRAZOLE SODIUM 40 MG: 40 TABLET, DELAYED RELEASE ORAL at 08:00

## 2024-01-01 RX ADMIN — PANTOPRAZOLE SODIUM 40 MG: 40 INJECTION, POWDER, FOR SOLUTION INTRAVENOUS at 08:40

## 2024-01-01 RX ADMIN — SUCRALFATE 1 G: 1 SUSPENSION ORAL at 08:42

## 2024-01-01 RX ADMIN — PANTOPRAZOLE SODIUM 40 MG: 40 TABLET, DELAYED RELEASE ORAL at 06:36

## 2024-01-01 RX ADMIN — CEFUROXIME AXETIL 500 MG: 500 TABLET ORAL at 20:09

## 2024-01-01 RX ADMIN — HEPARIN SODIUM 1300 UNITS/HR: 10000 INJECTION, SOLUTION INTRAVENOUS at 20:42

## 2024-01-01 RX ADMIN — SENNOSIDES 2 CHEW TAB: 25 TABLET, FILM COATED ORAL at 15:08

## 2024-01-01 RX ADMIN — SUCRALFATE 1 G: 1 SUSPENSION ORAL at 21:12

## 2024-01-01 RX ADMIN — SODIUM CHLORIDE 1000 ML: 9 INJECTION, SOLUTION INTRAVENOUS at 11:11

## 2024-01-01 RX ADMIN — ACETAMINOPHEN 1000 MG: 500 TABLET, FILM COATED ORAL at 13:30

## 2024-01-01 RX ADMIN — PANTOPRAZOLE SODIUM 40 MG: 40 TABLET, DELAYED RELEASE ORAL at 06:52

## 2024-01-01 RX ADMIN — CEFUROXIME AXETIL 500 MG: 500 TABLET ORAL at 20:37

## 2024-01-01 RX ADMIN — SUCRALFATE 1 G: 1 SUSPENSION ORAL at 21:30

## 2024-01-01 RX ADMIN — PANTOPRAZOLE SODIUM 40 MG: 40 INJECTION, POWDER, FOR SOLUTION INTRAVENOUS at 20:10

## 2024-01-01 RX ADMIN — MULTIVITAMIN TABLET 1 TABLET: TABLET at 09:24

## 2024-01-01 RX ADMIN — PANTOPRAZOLE SODIUM 40 MG: 40 TABLET, DELAYED RELEASE ORAL at 05:51

## 2024-01-01 RX ADMIN — WARFARIN SODIUM 2.5 MG: 2.5 TABLET ORAL at 18:34

## 2024-01-01 RX ADMIN — AMOXICILLIN 250 MG: 250 CAPSULE ORAL at 06:00

## 2024-01-01 RX ADMIN — FERROUS SULFATE TAB 325 MG (65 MG ELEMENTAL FE) 325 MG: 325 (65 FE) TAB at 08:36

## 2024-01-01 RX ADMIN — FERROUS SULFATE TAB 325 MG (65 MG ELEMENTAL FE) 325 MG: 325 (65 FE) TAB at 08:46

## 2024-01-01 RX ADMIN — SENNOSIDES 2 CHEW TAB: 25 TABLET, FILM COATED ORAL at 08:47

## 2024-01-01 RX ADMIN — PANTOPRAZOLE SODIUM 40 MG: 40 INJECTION, POWDER, FOR SOLUTION INTRAVENOUS at 07:42

## 2024-01-01 RX ADMIN — METRONIDAZOLE 500 MG: 500 INJECTION, SOLUTION INTRAVENOUS at 20:48

## 2024-01-01 RX ADMIN — FUROSEMIDE 40 MG: 10 INJECTION, SOLUTION INTRAMUSCULAR; INTRAVENOUS at 21:00

## 2024-01-01 RX ADMIN — LIDOCAINE HYDROCHLORIDE: 20 JELLY TOPICAL at 15:00

## 2024-01-01 RX ADMIN — SUCRALFATE 1 G: 1 SUSPENSION ORAL at 08:44

## 2024-01-01 RX ADMIN — CEFTRIAXONE 2 G: 2 INJECTION, POWDER, FOR SOLUTION INTRAMUSCULAR; INTRAVENOUS at 19:26

## 2024-01-01 RX ADMIN — MEROPENEM 1 G: 1 INJECTION, POWDER, FOR SOLUTION INTRAVENOUS at 18:34

## 2024-01-01 RX ADMIN — THERA TABS 1 TABLET: TAB at 08:31

## 2024-01-01 RX ADMIN — PANTOPRAZOLE SODIUM 40 MG: 40 TABLET, DELAYED RELEASE ORAL at 16:55

## 2024-01-01 RX ADMIN — SENNOSIDES AND DOCUSATE SODIUM 1 TABLET: 50; 8.6 TABLET ORAL at 23:19

## 2024-01-01 RX ADMIN — TORSEMIDE 20 MG: 20 TABLET ORAL at 08:40

## 2024-01-01 RX ADMIN — MIDODRINE HYDROCHLORIDE 15 MG: 5 TABLET ORAL at 08:42

## 2024-01-01 RX ADMIN — HEPARIN SODIUM 1200 UNITS/HR: 10000 INJECTION, SOLUTION INTRAVENOUS at 10:07

## 2024-01-01 RX ADMIN — SODIUM CHLORIDE 500 ML: 9 INJECTION, SOLUTION INTRAVENOUS at 09:15

## 2024-01-01 RX ADMIN — MIDODRINE HYDROCHLORIDE 15 MG: 5 TABLET ORAL at 17:24

## 2024-01-01 RX ADMIN — WARFARIN SODIUM 3 MG: 3 TABLET ORAL at 17:30

## 2024-01-01 RX ADMIN — MICONAZOLE NITRATE: 20 POWDER TOPICAL at 20:42

## 2024-01-01 RX ADMIN — POTASSIUM CHLORIDE 40 MEQ: 1500 TABLET, EXTENDED RELEASE ORAL at 21:16

## 2024-01-01 RX ADMIN — ALBUMIN HUMAN 12.5 G: 0.25 SOLUTION INTRAVENOUS at 05:19

## 2024-01-01 RX ADMIN — MIDODRINE HYDROCHLORIDE 10 MG: 5 TABLET ORAL at 12:31

## 2024-01-01 RX ADMIN — MICONAZOLE NITRATE: 2 POWDER TOPICAL at 21:58

## 2024-01-01 RX ADMIN — POTASSIUM CHLORIDE 10 MEQ: 7.46 INJECTION, SOLUTION INTRAVENOUS at 03:06

## 2024-01-01 RX ADMIN — MICONAZOLE NITRATE: 20 POWDER TOPICAL at 12:02

## 2024-01-01 RX ADMIN — PANTOPRAZOLE SODIUM 40 MG: 40 TABLET, DELAYED RELEASE ORAL at 08:07

## 2024-01-01 RX ADMIN — ACETAMINOPHEN 650 MG: 325 TABLET, FILM COATED ORAL at 01:24

## 2024-01-01 RX ADMIN — PANTOPRAZOLE SODIUM 40 MG: 40 TABLET, DELAYED RELEASE ORAL at 05:45

## 2024-01-01 RX ADMIN — HYDROMORPHONE HYDROCHLORIDE 2 MG: 2 TABLET ORAL at 09:58

## 2024-01-01 RX ADMIN — DIPHENHYDRAMINE HYDROCHLORIDE 25 MG: 25 CAPSULE ORAL at 13:38

## 2024-01-01 RX ADMIN — PROPOFOL 50 MG: 10 INJECTION, EMULSION INTRAVENOUS at 11:13

## 2024-01-01 RX ADMIN — WARFARIN SODIUM 5 MG: 5 TABLET ORAL at 18:10

## 2024-01-01 RX ADMIN — MICONAZOLE NITRATE: 2 POWDER TOPICAL at 22:09

## 2024-01-01 RX ADMIN — POTASSIUM CHLORIDE 40 MEQ: 1.5 POWDER, FOR SOLUTION ORAL at 12:39

## 2024-01-01 RX ADMIN — DUTASTERIDE 0.5 MG: 0.5 CAPSULE, LIQUID FILLED ORAL at 09:49

## 2024-01-01 RX ADMIN — HUMAN ALBUMIN MICROSPHERES AND PERFLUTREN 9 ML: 10; .22 INJECTION, SOLUTION INTRAVENOUS at 08:26

## 2024-01-01 RX ADMIN — SUCRALFATE 1 G: 1 SUSPENSION ORAL at 22:38

## 2024-01-01 RX ADMIN — SODIUM CHLORIDE 500 ML: 9 INJECTION, SOLUTION INTRAVENOUS at 13:22

## 2024-01-01 RX ADMIN — PANTOPRAZOLE SODIUM 40 MG: 40 TABLET, DELAYED RELEASE ORAL at 16:43

## 2024-01-01 RX ADMIN — CALCIUM CARBONATE (ANTACID) CHEW TAB 500 MG 500 MG: 500 CHEW TAB at 11:08

## 2024-01-01 RX ADMIN — MICONAZOLE NITRATE: 2 POWDER TOPICAL at 20:49

## 2024-01-01 RX ADMIN — MIDODRINE HYDROCHLORIDE 10 MG: 5 TABLET ORAL at 08:01

## 2024-01-01 RX ADMIN — PANTOPRAZOLE SODIUM 40 MG: 40 TABLET, DELAYED RELEASE ORAL at 18:01

## 2024-01-01 RX ADMIN — SUCRALFATE 1 G: 1 SUSPENSION ORAL at 08:17

## 2024-01-01 RX ADMIN — CEFEPIME 2 G: 2 INJECTION, POWDER, FOR SOLUTION INTRAVENOUS at 01:40

## 2024-01-01 RX ADMIN — FERROUS SULFATE TAB 325 MG (65 MG ELEMENTAL FE) 325 MG: 325 (65 FE) TAB at 08:42

## 2024-01-01 RX ADMIN — PANTOPRAZOLE SODIUM 40 MG: 40 INJECTION, POWDER, FOR SOLUTION INTRAVENOUS at 08:41

## 2024-01-01 RX ADMIN — FUROSEMIDE 40 MG: 10 INJECTION, SOLUTION INTRAMUSCULAR; INTRAVENOUS at 12:17

## 2024-01-01 RX ADMIN — MAGNESIUM SULFATE IN WATER FOR 4 G: 40 INJECTION INTRAVENOUS at 10:50

## 2024-01-01 RX ADMIN — PANTOPRAZOLE SODIUM 40 MG: 40 TABLET, DELAYED RELEASE ORAL at 17:25

## 2024-01-01 RX ADMIN — POTASSIUM CHLORIDE 10 MEQ: 7.46 INJECTION, SOLUTION INTRAVENOUS at 12:41

## 2024-01-01 RX ADMIN — HEPARIN SODIUM 1200 UNITS/HR: 10000 INJECTION, SOLUTION INTRAVENOUS at 17:58

## 2024-01-01 RX ADMIN — MICONAZOLE NITRATE: 2 POWDER TOPICAL at 21:32

## 2024-01-01 RX ADMIN — HEPARIN SODIUM 1350 UNITS/HR: 10000 INJECTION, SOLUTION INTRAVENOUS at 10:48

## 2024-01-01 RX ADMIN — TORSEMIDE 20 MG: 20 TABLET ORAL at 16:31

## 2024-01-01 RX ADMIN — MAGNESIUM SULFATE HEPTAHYDRATE 4 G: 40 INJECTION, SOLUTION INTRAVENOUS at 14:50

## 2024-01-01 RX ADMIN — WARFARIN SODIUM 4 MG: 2 TABLET ORAL at 17:23

## 2024-01-01 RX ADMIN — MORPHINE SULFATE 2 MG: 2 INJECTION, SOLUTION INTRAMUSCULAR; INTRAVENOUS at 21:54

## 2024-01-01 RX ADMIN — ACETAMINOPHEN 650 MG: 325 TABLET, FILM COATED ORAL at 14:25

## 2024-01-01 RX ADMIN — IRON SUCROSE 300 MG: 20 INJECTION, SOLUTION INTRAVENOUS at 10:05

## 2024-01-01 RX ADMIN — SUCRALFATE 1 G: 1 SUSPENSION ORAL at 08:40

## 2024-01-01 RX ADMIN — POLYETHYLENE GLYCOL 3350 17 G: 17 POWDER, FOR SOLUTION ORAL at 08:19

## 2024-01-01 RX ADMIN — KETOROLAC TROMETHAMINE 15 MG: 15 INJECTION, SOLUTION INTRAMUSCULAR; INTRAVENOUS at 06:57

## 2024-01-01 RX ADMIN — PANTOPRAZOLE SODIUM 40 MG: 40 INJECTION, POWDER, FOR SOLUTION INTRAVENOUS at 20:39

## 2024-01-01 RX ADMIN — HEPARIN SODIUM 1200 UNITS/HR: 10000 INJECTION, SOLUTION INTRAVENOUS at 12:14

## 2024-01-01 RX ADMIN — IRON SUCROSE 300 MG: 20 INJECTION, SOLUTION INTRAVENOUS at 10:01

## 2024-01-01 RX ADMIN — WARFARIN SODIUM 5 MG: 5 TABLET ORAL at 17:53

## 2024-01-01 RX ADMIN — PANTOPRAZOLE SODIUM 40 MG: 40 INJECTION, POWDER, FOR SOLUTION INTRAVENOUS at 20:49

## 2024-01-01 RX ADMIN — FUROSEMIDE 40 MG: 10 INJECTION, SOLUTION INTRAMUSCULAR; INTRAVENOUS at 14:41

## 2024-01-01 RX ADMIN — IRON SUCROSE 300 MG: 20 INJECTION, SOLUTION INTRAVENOUS at 10:57

## 2024-01-01 RX ADMIN — DUTASTERIDE 0.5 MG: 0.5 CAPSULE, LIQUID FILLED ORAL at 14:25

## 2024-01-01 RX ADMIN — PANTOPRAZOLE SODIUM 40 MG: 40 TABLET, DELAYED RELEASE ORAL at 08:39

## 2024-01-01 RX ADMIN — SODIUM CHLORIDE: 9 INJECTION, SOLUTION INTRAVENOUS at 23:21

## 2024-01-01 RX ADMIN — MIRTAZAPINE 7.5 MG: 7.5 TABLET, FILM COATED ORAL at 21:59

## 2024-01-01 RX ADMIN — PHYTONADIONE 5 MG: 10 INJECTION, EMULSION INTRAMUSCULAR; INTRAVENOUS; SUBCUTANEOUS at 16:56

## 2024-01-01 RX ADMIN — IRON SUCROSE 300 MG: 20 INJECTION, SOLUTION INTRAVENOUS at 12:40

## 2024-01-01 RX ADMIN — FUROSEMIDE 40 MG: 10 INJECTION, SOLUTION INTRAMUSCULAR; INTRAVENOUS at 05:15

## 2024-01-01 RX ADMIN — HEPARIN SODIUM 1350 UNITS/HR: 10000 INJECTION, SOLUTION INTRAVENOUS at 20:47

## 2024-01-01 RX ADMIN — PANTOPRAZOLE SODIUM 40 MG: 40 TABLET, DELAYED RELEASE ORAL at 17:24

## 2024-01-01 RX ADMIN — PANTOPRAZOLE SODIUM 40 MG: 40 TABLET, DELAYED RELEASE ORAL at 06:31

## 2024-01-01 RX ADMIN — MIDODRINE HYDROCHLORIDE 10 MG: 5 TABLET ORAL at 08:28

## 2024-01-01 RX ADMIN — PANTOPRAZOLE SODIUM 40 MG: 40 TABLET, DELAYED RELEASE ORAL at 16:45

## 2024-01-01 RX ADMIN — PANTOPRAZOLE SODIUM 40 MG: 40 TABLET, DELAYED RELEASE ORAL at 16:36

## 2024-01-01 RX ADMIN — Medication 400 MG: at 09:48

## 2024-01-01 RX ADMIN — MIDODRINE HYDROCHLORIDE 5 MG: 5 TABLET ORAL at 15:10

## 2024-01-01 RX ADMIN — ROCURONIUM BROMIDE 5 MG: 50 INJECTION, SOLUTION INTRAVENOUS at 11:13

## 2024-01-01 RX ADMIN — KETOROLAC TROMETHAMINE 15 MG: 30 INJECTION, SOLUTION INTRAMUSCULAR at 11:13

## 2024-01-01 RX ADMIN — WARFARIN SODIUM 2.5 MG: 2.5 TABLET ORAL at 18:04

## 2024-01-01 RX ADMIN — FERROUS SULFATE TAB 325 MG (65 MG ELEMENTAL FE) 325 MG: 325 (65 FE) TAB at 09:24

## 2024-01-01 RX ADMIN — PANTOPRAZOLE SODIUM 40 MG: 40 TABLET, DELAYED RELEASE ORAL at 18:41

## 2024-01-01 RX ADMIN — HYDROMORPHONE HYDROCHLORIDE 2 MG: 2 TABLET ORAL at 19:11

## 2024-01-01 RX ADMIN — Medication 10 MG: at 11:27

## 2024-01-01 RX ADMIN — DUTASTERIDE 0.5 MG: 0.5 CAPSULE, LIQUID FILLED ORAL at 08:42

## 2024-01-01 RX ADMIN — MIDODRINE HYDROCHLORIDE 15 MG: 5 TABLET ORAL at 08:06

## 2024-01-01 RX ADMIN — SODIUM CHLORIDE, POTASSIUM CHLORIDE, SODIUM LACTATE AND CALCIUM CHLORIDE: 600; 310; 30; 20 INJECTION, SOLUTION INTRAVENOUS at 11:09

## 2024-01-01 RX ADMIN — FUROSEMIDE 40 MG: 40 TABLET ORAL at 09:19

## 2024-01-01 RX ADMIN — MIDODRINE HYDROCHLORIDE 10 MG: 5 TABLET ORAL at 09:17

## 2024-01-01 RX ADMIN — ACETAMINOPHEN 650 MG: 325 TABLET, FILM COATED ORAL at 05:54

## 2024-01-01 RX ADMIN — MIRTAZAPINE 7.5 MG: 7.5 TABLET, FILM COATED ORAL at 22:07

## 2024-01-01 RX ADMIN — MICONAZOLE NITRATE: 2 POWDER TOPICAL at 20:44

## 2024-01-01 RX ADMIN — Medication 1 TABLET: at 09:48

## 2024-01-01 RX ADMIN — PANTOPRAZOLE SODIUM 40 MG: 40 TABLET, DELAYED RELEASE ORAL at 08:31

## 2024-01-01 RX ADMIN — PANTOPRAZOLE SODIUM 40 MG: 40 TABLET, DELAYED RELEASE ORAL at 08:09

## 2024-01-01 RX ADMIN — MICONAZOLE NITRATE: 2 POWDER TOPICAL at 09:49

## 2024-01-01 RX ADMIN — WARFARIN SODIUM 1.5 MG: 3 TABLET ORAL at 17:57

## 2024-01-01 RX ADMIN — DUTASTERIDE 0.5 MG: 0.5 CAPSULE, LIQUID FILLED ORAL at 08:29

## 2024-01-01 RX ADMIN — CIPROFLOXACIN 500 MG: 500 TABLET ORAL at 20:08

## 2024-01-01 RX ADMIN — ALUMINUM HYDROXIDE, MAGNESIUM HYDROXIDE, AND SIMETHICONE 30 ML: 200; 200; 20 SUSPENSION ORAL at 00:27

## 2024-01-01 RX ADMIN — ALBUMIN HUMAN 12.5 G: 0.25 SOLUTION INTRAVENOUS at 13:19

## 2024-01-01 RX ADMIN — FUROSEMIDE 40 MG: 10 INJECTION, SOLUTION INTRAMUSCULAR; INTRAVENOUS at 05:51

## 2024-01-01 RX ADMIN — CEFEPIME 2 G: 2 INJECTION, POWDER, FOR SOLUTION INTRAVENOUS at 10:24

## 2024-01-01 RX ADMIN — HYDROMORPHONE HYDROCHLORIDE 2 MG: 2 TABLET ORAL at 12:35

## 2024-01-01 RX ADMIN — Medication 1 TABLET: at 10:09

## 2024-01-01 RX ADMIN — GADOBUTROL 11 ML: 604.72 INJECTION INTRAVENOUS at 15:08

## 2024-01-01 RX ADMIN — FUROSEMIDE 40 MG: 10 INJECTION, SOLUTION INTRAMUSCULAR; INTRAVENOUS at 04:51

## 2024-01-01 RX ADMIN — KETOROLAC TROMETHAMINE 15 MG: 15 INJECTION, SOLUTION INTRAMUSCULAR; INTRAVENOUS at 12:56

## 2024-01-01 RX ADMIN — SODIUM CHLORIDE: 9 INJECTION, SOLUTION INTRAVENOUS at 08:17

## 2024-01-01 RX ADMIN — TORSEMIDE 20 MG: 20 TABLET ORAL at 10:19

## 2024-01-01 RX ADMIN — PSYLLIUM HUSK 1 PACKET: 3.4 POWDER ORAL at 08:06

## 2024-01-01 RX ADMIN — PANTOPRAZOLE SODIUM 40 MG: 40 TABLET, DELAYED RELEASE ORAL at 16:19

## 2024-01-01 RX ADMIN — SUCRALFATE 1 G: 1 SUSPENSION ORAL at 21:20

## 2024-01-01 RX ADMIN — WARFARIN SODIUM 1 MG: 1 TABLET ORAL at 16:51

## 2024-01-01 RX ADMIN — ACETAMINOPHEN 975 MG: 325 TABLET, FILM COATED ORAL at 20:42

## 2024-01-01 RX ADMIN — PANTOPRAZOLE SODIUM 40 MG: 40 INJECTION, POWDER, FOR SOLUTION INTRAVENOUS at 09:42

## 2024-01-01 RX ADMIN — MIDODRINE HYDROCHLORIDE 15 MG: 5 TABLET ORAL at 09:26

## 2024-01-01 RX ADMIN — POLYETHYLENE GLYCOL 3350 17 G: 17 POWDER, FOR SOLUTION ORAL at 22:24

## 2024-01-01 RX ADMIN — WARFARIN SODIUM 2 MG: 2 TABLET ORAL at 17:45

## 2024-01-01 RX ADMIN — POLYETHYLENE GLYCOL 3350 17 G: 17 POWDER, FOR SOLUTION ORAL at 08:27

## 2024-01-01 RX ADMIN — FUROSEMIDE 40 MG: 10 INJECTION, SOLUTION INTRAMUSCULAR; INTRAVENOUS at 08:40

## 2024-01-01 RX ADMIN — SUCRALFATE 1 G: 1 SUSPENSION ORAL at 09:30

## 2024-01-01 RX ADMIN — PHYTONADIONE 2.5 MG: 10 INJECTION, EMULSION INTRAMUSCULAR; INTRAVENOUS; SUBCUTANEOUS at 15:55

## 2024-01-01 RX ADMIN — SODIUM CHLORIDE 1000 ML: 9 INJECTION, SOLUTION INTRAVENOUS at 16:40

## 2024-01-01 RX ADMIN — PANTOPRAZOLE SODIUM 40 MG: 40 TABLET, DELAYED RELEASE ORAL at 15:46

## 2024-01-01 RX ADMIN — FERROUS SULFATE TAB 325 MG (65 MG ELEMENTAL FE) 325 MG: 325 (65 FE) TAB at 11:49

## 2024-01-01 RX ADMIN — ALBUMIN HUMAN 12.5 G: 0.25 SOLUTION INTRAVENOUS at 13:04

## 2024-01-01 RX ADMIN — POTASSIUM CHLORIDE 40 MEQ: 1.5 POWDER, FOR SOLUTION ORAL at 08:11

## 2024-01-01 RX ADMIN — POLYETHYLENE GLYCOL 3350 17 G: 17 POWDER, FOR SOLUTION ORAL at 10:14

## 2024-01-01 RX ADMIN — Medication 200 MG: at 11:13

## 2024-01-01 RX ADMIN — POTASSIUM CHLORIDE 10 MEQ: 10 INJECTION, SOLUTION INTRAVENOUS at 15:28

## 2024-01-01 RX ADMIN — MICONAZOLE NITRATE: 2 POWDER TOPICAL at 09:16

## 2024-01-01 RX ADMIN — PANTOPRAZOLE SODIUM 40 MG: 40 TABLET, DELAYED RELEASE ORAL at 15:45

## 2024-01-01 RX ADMIN — SUCRALFATE 1 G: 1 SUSPENSION ORAL at 11:35

## 2024-01-01 RX ADMIN — MICONAZOLE NITRATE: 2 POWDER TOPICAL at 21:01

## 2024-01-01 RX ADMIN — PANTOPRAZOLE SODIUM 40 MG: 40 TABLET, DELAYED RELEASE ORAL at 15:53

## 2024-01-01 RX ADMIN — ACETAMINOPHEN 975 MG: 325 TABLET ORAL at 08:29

## 2024-01-01 RX ADMIN — PANTOPRAZOLE SODIUM 40 MG: 40 INJECTION, POWDER, FOR SOLUTION INTRAVENOUS at 08:34

## 2024-01-01 RX ADMIN — PANTOPRAZOLE SODIUM 40 MG: 40 INJECTION, POWDER, FOR SOLUTION INTRAVENOUS at 12:43

## 2024-01-01 RX ADMIN — CEFUROXIME AXETIL 500 MG: 500 TABLET ORAL at 08:45

## 2024-01-01 RX ADMIN — WARFARIN SODIUM 3 MG: 2 TABLET ORAL at 17:53

## 2024-01-01 RX ADMIN — MIDODRINE HYDROCHLORIDE 10 MG: 10 TABLET ORAL at 17:36

## 2024-01-01 RX ADMIN — PANTOPRAZOLE SODIUM 40 MG: 40 INJECTION, POWDER, FOR SOLUTION INTRAVENOUS at 20:18

## 2024-01-01 RX ADMIN — DUTASTERIDE 0.5 MG: 0.5 CAPSULE, LIQUID FILLED ORAL at 08:20

## 2024-01-01 RX ADMIN — PANTOPRAZOLE SODIUM 40 MG: 40 INJECTION, POWDER, FOR SOLUTION INTRAVENOUS at 08:18

## 2024-01-01 RX ADMIN — PANTOPRAZOLE SODIUM 40 MG: 40 INJECTION, POWDER, FOR SOLUTION INTRAVENOUS at 20:42

## 2024-01-01 RX ADMIN — PANTOPRAZOLE SODIUM 40 MG: 40 TABLET, DELAYED RELEASE ORAL at 06:49

## 2024-01-01 RX ADMIN — WARFARIN SODIUM 4 MG: 4 TABLET ORAL at 20:45

## 2024-01-01 RX ADMIN — PANTOPRAZOLE SODIUM 40 MG: 40 TABLET, DELAYED RELEASE ORAL at 17:53

## 2024-01-01 RX ADMIN — POTASSIUM CHLORIDE 10 MEQ: 7.46 INJECTION, SOLUTION INTRAVENOUS at 04:07

## 2024-01-01 RX ADMIN — Medication 10 MG: at 11:20

## 2024-01-01 RX ADMIN — PANTOPRAZOLE SODIUM 40 MG: 40 INJECTION, POWDER, FOR SOLUTION INTRAVENOUS at 21:04

## 2024-01-01 RX ADMIN — FUROSEMIDE 40 MG: 10 INJECTION, SOLUTION INTRAMUSCULAR; INTRAVENOUS at 21:23

## 2024-01-01 RX ADMIN — MIDODRINE HYDROCHLORIDE 10 MG: 5 TABLET ORAL at 13:12

## 2024-01-01 RX ADMIN — MICONAZOLE NITRATE: 2 POWDER TOPICAL at 08:26

## 2024-01-01 RX ADMIN — PANTOPRAZOLE SODIUM 40 MG: 40 INJECTION, POWDER, FOR SOLUTION INTRAVENOUS at 09:31

## 2024-01-01 RX ADMIN — CEFTRIAXONE SODIUM 1 G: 1 INJECTION, POWDER, FOR SOLUTION INTRAMUSCULAR; INTRAVENOUS at 22:13

## 2024-01-01 RX ADMIN — SODIUM CHLORIDE, POTASSIUM CHLORIDE, SODIUM LACTATE AND CALCIUM CHLORIDE 500 ML: 600; 310; 30; 20 INJECTION, SOLUTION INTRAVENOUS at 00:17

## 2024-01-01 RX ADMIN — CEFEPIME 2 G: 2 INJECTION, POWDER, FOR SOLUTION INTRAVENOUS at 17:50

## 2024-01-01 RX ADMIN — SUCRALFATE 1 G: 1 SUSPENSION ORAL at 11:51

## 2024-01-01 RX ADMIN — VANCOMYCIN HYDROCHLORIDE 1250 MG: 10 INJECTION, POWDER, LYOPHILIZED, FOR SOLUTION INTRAVENOUS at 22:35

## 2024-01-01 RX ADMIN — MEROPENEM 1 G: 1 INJECTION, POWDER, FOR SOLUTION INTRAVENOUS at 23:29

## 2024-01-01 RX ADMIN — PANTOPRAZOLE SODIUM 40 MG: 40 TABLET, DELAYED RELEASE ORAL at 17:23

## 2024-01-01 RX ADMIN — PANTOPRAZOLE SODIUM 40 MG: 40 INJECTION, POWDER, FOR SOLUTION INTRAVENOUS at 09:03

## 2024-01-01 RX ADMIN — SENNOSIDES 2 CHEW TAB: 25 TABLET, FILM COATED ORAL at 09:09

## 2024-01-01 RX ADMIN — METRONIDAZOLE 500 MG: 500 INJECTION, SOLUTION INTRAVENOUS at 20:34

## 2024-01-01 RX ADMIN — AMOXICILLIN AND CLAVULANATE POTASSIUM 1 TABLET: 875; 125 TABLET, FILM COATED ORAL at 08:59

## 2024-01-01 RX ADMIN — FUROSEMIDE 40 MG: 10 INJECTION, SOLUTION INTRAMUSCULAR; INTRAVENOUS at 10:13

## 2024-01-01 RX ADMIN — SUCRALFATE 1 G: 1 SUSPENSION ORAL at 21:28

## 2024-01-01 RX ADMIN — Medication 400 MG: at 13:04

## 2024-01-01 RX ADMIN — MICONAZOLE NITRATE: 2 POWDER TOPICAL at 20:53

## 2024-01-01 RX ADMIN — SUCRALFATE 1 G: 1 SUSPENSION ORAL at 17:23

## 2024-01-01 RX ADMIN — PANTOPRAZOLE SODIUM 40 MG: 40 TABLET, DELAYED RELEASE ORAL at 08:05

## 2024-01-01 RX ADMIN — SENNOSIDES AND DOCUSATE SODIUM 1 TABLET: 50; 8.6 TABLET ORAL at 22:24

## 2024-01-01 RX ADMIN — DOXYCYCLINE HYCLATE 100 MG: 100 CAPSULE ORAL at 21:00

## 2024-01-01 RX ADMIN — FERROUS SULFATE TAB 325 MG (65 MG ELEMENTAL FE) 325 MG: 325 (65 FE) TAB at 08:51

## 2024-01-01 RX ADMIN — SODIUM CHLORIDE 500 ML: 9 INJECTION, SOLUTION INTRAVENOUS at 16:52

## 2024-01-01 RX ADMIN — POTASSIUM & SODIUM PHOSPHATES POWDER PACK 280-160-250 MG 1 PACKET: 280-160-250 PACK at 10:59

## 2024-01-01 RX ADMIN — MICONAZOLE NITRATE: 2 POWDER TOPICAL at 08:51

## 2024-01-01 RX ADMIN — POTASSIUM CHLORIDE 20 MEQ: 1.5 POWDER, FOR SOLUTION ORAL at 09:48

## 2024-01-01 RX ADMIN — TORSEMIDE 20 MG: 20 TABLET ORAL at 08:31

## 2024-01-01 RX ADMIN — SUCRALFATE 1 G: 1 SUSPENSION ORAL at 10:55

## 2024-01-01 RX ADMIN — FUROSEMIDE 40 MG: 40 TABLET ORAL at 09:26

## 2024-01-01 RX ADMIN — MIDODRINE HYDROCHLORIDE 15 MG: 5 TABLET ORAL at 12:41

## 2024-01-01 RX ADMIN — PANTOPRAZOLE SODIUM 40 MG: 40 TABLET, DELAYED RELEASE ORAL at 09:42

## 2024-01-01 RX ADMIN — MIDODRINE HYDROCHLORIDE 15 MG: 5 TABLET ORAL at 13:09

## 2024-01-01 RX ADMIN — PANTOPRAZOLE SODIUM 40 MG: 40 TABLET, DELAYED RELEASE ORAL at 08:36

## 2024-01-01 RX ADMIN — CEFEPIME 2 G: 2 INJECTION, POWDER, FOR SOLUTION INTRAVENOUS at 16:48

## 2024-01-01 RX ADMIN — FUROSEMIDE 40 MG: 10 INJECTION, SOLUTION INTRAMUSCULAR; INTRAVENOUS at 20:38

## 2024-01-01 RX ADMIN — PANTOPRAZOLE SODIUM 40 MG: 40 TABLET, DELAYED RELEASE ORAL at 16:24

## 2024-01-01 RX ADMIN — SUCRALFATE 1 G: 1 SUSPENSION ORAL at 21:44

## 2024-01-01 RX ADMIN — MIDODRINE HYDROCHLORIDE 10 MG: 5 TABLET ORAL at 13:08

## 2024-01-01 RX ADMIN — PANTOPRAZOLE SODIUM 40 MG: 40 INJECTION, POWDER, FOR SOLUTION INTRAVENOUS at 09:50

## 2024-01-01 RX ADMIN — FUROSEMIDE 40 MG: 10 INJECTION, SOLUTION INTRAMUSCULAR; INTRAVENOUS at 17:51

## 2024-01-01 RX ADMIN — HYDROMORPHONE HYDROCHLORIDE 2 MG: 2 TABLET ORAL at 02:12

## 2024-01-01 RX ADMIN — MICONAZOLE NITRATE: 20 POWDER TOPICAL at 08:13

## 2024-01-01 RX ADMIN — ACETAMINOPHEN 1000 MG: 500 TABLET, FILM COATED ORAL at 20:32

## 2024-01-01 RX ADMIN — SUCRALFATE 1 G: 1 SUSPENSION ORAL at 16:25

## 2024-01-01 RX ADMIN — ALBUMIN HUMAN 12.5 G: 0.25 SOLUTION INTRAVENOUS at 04:00

## 2024-01-01 RX ADMIN — SODIUM CHLORIDE 1000 ML: 9 INJECTION, SOLUTION INTRAVENOUS at 21:57

## 2024-01-01 RX ADMIN — SUCRALFATE 1 G: 1 SUSPENSION ORAL at 12:48

## 2024-01-01 RX ADMIN — SUCRALFATE 1 G: 1 SUSPENSION ORAL at 10:00

## 2024-01-01 RX ADMIN — CEFEPIME 2 G: 2 INJECTION, POWDER, FOR SOLUTION INTRAVENOUS at 23:01

## 2024-01-01 RX ADMIN — MICONAZOLE NITRATE: 2 POWDER TOPICAL at 08:00

## 2024-01-01 RX ADMIN — POTASSIUM & SODIUM PHOSPHATES POWDER PACK 280-160-250 MG 1 PACKET: 280-160-250 PACK at 16:22

## 2024-01-01 RX ADMIN — MIDODRINE HYDROCHLORIDE 10 MG: 10 TABLET ORAL at 13:20

## 2024-01-01 RX ADMIN — POTASSIUM CHLORIDE 10 MEQ: 10 INJECTION, SOLUTION INTRAVENOUS at 13:02

## 2024-01-01 RX ADMIN — MIDODRINE HYDROCHLORIDE 5 MG: 5 TABLET ORAL at 09:07

## 2024-01-01 RX ADMIN — POTASSIUM CHLORIDE 20 MEQ: 1.5 POWDER, FOR SOLUTION ORAL at 13:12

## 2024-01-01 RX ADMIN — PANTOPRAZOLE SODIUM 40 MG: 40 INJECTION, POWDER, FOR SOLUTION INTRAVENOUS at 08:51

## 2024-01-01 RX ADMIN — HEPARIN SODIUM 1500 UNITS/HR: 10000 INJECTION, SOLUTION INTRAVENOUS at 08:04

## 2024-01-01 RX ADMIN — CEFUROXIME AXETIL 500 MG: 500 TABLET ORAL at 08:19

## 2024-01-01 RX ADMIN — SUCRALFATE 1 G: 1 SUSPENSION ORAL at 17:16

## 2024-01-01 RX ADMIN — Medication 400 MG: at 13:46

## 2024-01-01 RX ADMIN — Medication 5 MG: at 11:34

## 2024-01-01 RX ADMIN — MEROPENEM 1 G: 1 INJECTION, POWDER, FOR SOLUTION INTRAVENOUS at 23:25

## 2024-01-01 RX ADMIN — SUCRALFATE 1 G: 1 SUSPENSION ORAL at 11:02

## 2024-01-01 RX ADMIN — MICONAZOLE NITRATE: 2 POWDER TOPICAL at 21:30

## 2024-01-01 RX ADMIN — SODIUM CHLORIDE, POTASSIUM CHLORIDE, SODIUM LACTATE AND CALCIUM CHLORIDE 100 ML/HR: 600; 310; 30; 20 INJECTION, SOLUTION INTRAVENOUS at 08:08

## 2024-01-01 RX ADMIN — POTASSIUM & SODIUM PHOSPHATES POWDER PACK 280-160-250 MG 1 PACKET: 280-160-250 PACK at 17:53

## 2024-01-01 RX ADMIN — FUROSEMIDE 40 MG: 10 INJECTION, SOLUTION INTRAMUSCULAR; INTRAVENOUS at 05:47

## 2024-01-01 RX ADMIN — FERROUS SULFATE TAB 325 MG (65 MG ELEMENTAL FE) 325 MG: 325 (65 FE) TAB at 09:34

## 2024-01-01 RX ADMIN — SUCRALFATE 1 G: 1 SUSPENSION ORAL at 11:32

## 2024-01-01 RX ADMIN — Medication 400 MG: at 14:01

## 2024-01-01 RX ADMIN — LIDOCAINE HYDROCHLORIDE 30 MG: 20 INJECTION, SOLUTION INFILTRATION; PERINEURAL at 11:13

## 2024-01-01 RX ADMIN — FERROUS SULFATE TAB 325 MG (65 MG ELEMENTAL FE) 325 MG: 325 (65 FE) TAB at 07:59

## 2024-01-01 RX ADMIN — PANTOPRAZOLE SODIUM 40 MG: 40 TABLET, DELAYED RELEASE ORAL at 16:58

## 2024-01-01 RX ADMIN — SENNOSIDES AND DOCUSATE SODIUM 1 TABLET: 50; 8.6 TABLET ORAL at 14:59

## 2024-01-01 RX ADMIN — ACETAMINOPHEN 975 MG: 325 TABLET, FILM COATED ORAL at 04:24

## 2024-01-01 RX ADMIN — DOXYCYCLINE HYCLATE 100 MG: 100 CAPSULE ORAL at 20:09

## 2024-01-01 RX ADMIN — FENTANYL CITRATE 200 MCG: 50 INJECTION INTRAMUSCULAR; INTRAVENOUS at 11:13

## 2024-01-01 RX ADMIN — POTASSIUM CHLORIDE 40 MEQ: 1500 TABLET, EXTENDED RELEASE ORAL at 14:00

## 2024-01-01 RX ADMIN — PANTOPRAZOLE SODIUM 40 MG: 40 TABLET, DELAYED RELEASE ORAL at 06:57

## 2024-01-01 RX ADMIN — MEROPENEM 1 G: 1 INJECTION, POWDER, FOR SOLUTION INTRAVENOUS at 18:45

## 2024-01-01 RX ADMIN — PANTOPRAZOLE SODIUM 40 MG: 40 TABLET, DELAYED RELEASE ORAL at 06:30

## 2024-01-01 RX ADMIN — ACETAMINOPHEN 1000 MG: 500 TABLET, FILM COATED ORAL at 08:29

## 2024-01-01 RX ADMIN — PSYLLIUM HUSK 1 PACKET: 3.4 POWDER ORAL at 08:57

## 2024-01-01 RX ADMIN — MIDODRINE HYDROCHLORIDE 5 MG: 5 TABLET ORAL at 18:56

## 2024-01-01 RX ADMIN — ACETAMINOPHEN 1000 MG: 500 TABLET, FILM COATED ORAL at 15:05

## 2024-01-01 RX ADMIN — MICONAZOLE NITRATE: 2 POWDER TOPICAL at 21:22

## 2024-01-01 RX ADMIN — MAGNESIUM SULFATE HEPTAHYDRATE 2 G: 40 INJECTION, SOLUTION INTRAVENOUS at 21:16

## 2024-01-01 RX ADMIN — FUROSEMIDE 60 MG: 10 INJECTION, SOLUTION INTRAMUSCULAR; INTRAVENOUS at 21:03

## 2024-01-01 RX ADMIN — FUROSEMIDE 40 MG: 10 INJECTION, SOLUTION INTRAMUSCULAR; INTRAVENOUS at 14:34

## 2024-01-01 RX ADMIN — MEROPENEM 1 G: 1 INJECTION, POWDER, FOR SOLUTION INTRAVENOUS at 06:30

## 2024-01-01 RX ADMIN — PANTOPRAZOLE SODIUM 40 MG: 40 TABLET, DELAYED RELEASE ORAL at 06:32

## 2024-01-01 RX ADMIN — THERA TABS 1 TABLET: TAB at 08:07

## 2024-01-01 RX ADMIN — SUCRALFATE 1 G: 1 SUSPENSION ORAL at 16:34

## 2024-01-01 RX ADMIN — PANTOPRAZOLE SODIUM 40 MG: 40 INJECTION, POWDER, FOR SOLUTION INTRAVENOUS at 09:26

## 2024-01-01 RX ADMIN — CIPROFLOXACIN 500 MG: 500 TABLET ORAL at 08:59

## 2024-01-01 RX ADMIN — MIDODRINE HYDROCHLORIDE 15 MG: 5 TABLET ORAL at 08:29

## 2024-01-01 RX ADMIN — PANTOPRAZOLE SODIUM 40 MG: 40 TABLET, DELAYED RELEASE ORAL at 08:43

## 2024-01-01 RX ADMIN — SUCRALFATE 1 G: 1 SUSPENSION ORAL at 17:49

## 2024-01-01 RX ADMIN — HEPARIN SODIUM 1500 UNITS/HR: 10000 INJECTION, SOLUTION INTRAVENOUS at 05:35

## 2024-01-01 RX ADMIN — SUCRALFATE 1 G: 1 SUSPENSION ORAL at 08:46

## 2024-01-01 RX ADMIN — MIDODRINE HYDROCHLORIDE 10 MG: 5 TABLET ORAL at 13:46

## 2024-01-01 RX ADMIN — ACETAMINOPHEN 975 MG: 325 TABLET, FILM COATED ORAL at 11:31

## 2024-01-01 RX ADMIN — MICONAZOLE NITRATE: 20 POWDER TOPICAL at 20:34

## 2024-01-01 RX ADMIN — PANTOPRAZOLE SODIUM 40 MG: 40 INJECTION, POWDER, FOR SOLUTION INTRAVENOUS at 20:36

## 2024-01-01 RX ADMIN — SUCRALFATE 1 G: 1 SUSPENSION ORAL at 16:32

## 2024-01-01 RX ADMIN — POTASSIUM CHLORIDE 40 MEQ: 1.5 POWDER, FOR SOLUTION ORAL at 08:19

## 2024-01-01 RX ADMIN — Medication 1 TABLET: at 08:28

## 2024-01-01 RX ADMIN — SUCRALFATE 1 G: 1 SUSPENSION ORAL at 17:30

## 2024-01-01 RX ADMIN — FERROUS SULFATE TAB 325 MG (65 MG ELEMENTAL FE) 325 MG: 325 (65 FE) TAB at 08:31

## 2024-01-01 RX ADMIN — SUCRALFATE 1 G: 1 SUSPENSION ORAL at 20:37

## 2024-01-01 RX ADMIN — MEROPENEM 1 G: 1 INJECTION, POWDER, FOR SOLUTION INTRAVENOUS at 06:47

## 2024-01-01 RX ADMIN — HYDROMORPHONE HYDROCHLORIDE 0.5 MG: 1 INJECTION, SOLUTION INTRAMUSCULAR; INTRAVENOUS; SUBCUTANEOUS at 15:54

## 2024-01-01 RX ADMIN — MIDODRINE HYDROCHLORIDE 10 MG: 5 TABLET ORAL at 10:09

## 2024-01-01 RX ADMIN — AMOXICILLIN 250 MG: 250 CAPSULE ORAL at 22:19

## 2024-01-01 RX ADMIN — CEFEPIME 2 G: 2 INJECTION, POWDER, FOR SOLUTION INTRAVENOUS at 00:25

## 2024-01-01 RX ADMIN — CEFUROXIME AXETIL 500 MG: 500 TABLET ORAL at 21:00

## 2024-01-01 RX ADMIN — MIDODRINE HYDROCHLORIDE 10 MG: 5 TABLET ORAL at 18:16

## 2024-01-01 RX ADMIN — SENNOSIDES 2 CHEW TAB: 25 TABLET, FILM COATED ORAL at 08:52

## 2024-01-01 RX ADMIN — CEFTRIAXONE SODIUM 2 G: 2 INJECTION, POWDER, FOR SOLUTION INTRAMUSCULAR; INTRAVENOUS at 08:32

## 2024-01-01 RX ADMIN — PANTOPRAZOLE SODIUM 40 MG: 40 TABLET, DELAYED RELEASE ORAL at 16:31

## 2024-01-01 RX ADMIN — POTASSIUM CHLORIDE 20 MEQ: 1500 TABLET, EXTENDED RELEASE ORAL at 09:30

## 2024-01-01 RX ADMIN — PANTOPRAZOLE SODIUM 40 MG: 40 INJECTION, POWDER, FOR SOLUTION INTRAVENOUS at 22:25

## 2024-01-01 RX ADMIN — PANTOPRAZOLE SODIUM 40 MG: 40 INJECTION, POWDER, FOR SOLUTION INTRAVENOUS at 21:12

## 2024-01-01 RX ADMIN — PANTOPRAZOLE SODIUM 40 MG: 40 INJECTION, POWDER, FOR SOLUTION INTRAVENOUS at 09:51

## 2024-01-01 RX ADMIN — MEROPENEM 1 G: 1 INJECTION, POWDER, FOR SOLUTION INTRAVENOUS at 15:20

## 2024-01-01 RX ADMIN — WARFARIN SODIUM 1.5 MG: 3 TABLET ORAL at 18:10

## 2024-01-01 RX ADMIN — CEFEPIME 2 G: 2 INJECTION, POWDER, FOR SOLUTION INTRAVENOUS at 01:07

## 2024-01-01 RX ADMIN — MIDODRINE HYDROCHLORIDE 10 MG: 5 TABLET ORAL at 12:35

## 2024-01-01 RX ADMIN — ACETAMINOPHEN 975 MG: 325 TABLET, FILM COATED ORAL at 21:12

## 2024-01-01 RX ADMIN — CEFTRIAXONE SODIUM 2 G: 2 INJECTION, POWDER, FOR SOLUTION INTRAMUSCULAR; INTRAVENOUS at 08:39

## 2024-01-01 RX ADMIN — CEFEPIME 2 G: 2 INJECTION, POWDER, FOR SOLUTION INTRAVENOUS at 16:46

## 2024-01-01 RX ADMIN — MICONAZOLE NITRATE: 20 POWDER TOPICAL at 20:24

## 2024-01-01 RX ADMIN — SUCRALFATE 1 G: 1 SUSPENSION ORAL at 11:59

## 2024-01-01 RX ADMIN — ACETAMINOPHEN 975 MG: 325 TABLET, FILM COATED ORAL at 12:47

## 2024-01-01 RX ADMIN — IRON SUCROSE 300 MG: 20 INJECTION, SOLUTION INTRAVENOUS at 08:40

## 2024-01-01 RX ADMIN — HEPARIN SODIUM 1300 UNITS/HR: 10000 INJECTION, SOLUTION INTRAVENOUS at 00:17

## 2024-01-01 RX ADMIN — GADOBUTROL 11 ML: 604.72 INJECTION INTRAVENOUS at 08:47

## 2024-01-01 RX ADMIN — FERROUS SULFATE TAB 325 MG (65 MG ELEMENTAL FE) 325 MG: 325 (65 FE) TAB at 08:07

## 2024-01-01 RX ADMIN — WARFARIN SODIUM 7.5 MG: 7.5 TABLET ORAL at 18:09

## 2024-01-01 RX ADMIN — PANTOPRAZOLE SODIUM 40 MG: 40 INJECTION, POWDER, FOR SOLUTION INTRAVENOUS at 20:08

## 2024-01-01 RX ADMIN — ACETAMINOPHEN 650 MG: 325 TABLET, FILM COATED ORAL at 08:58

## 2024-01-01 RX ADMIN — PANTOPRAZOLE SODIUM 40 MG: 40 TABLET, DELAYED RELEASE ORAL at 16:38

## 2024-01-01 RX ADMIN — AMOXICILLIN 250 MG: 250 CAPSULE ORAL at 14:46

## 2024-01-01 RX ADMIN — MORPHINE SULFATE 2 MG: 2 INJECTION, SOLUTION INTRAMUSCULAR; INTRAVENOUS at 14:35

## 2024-01-01 RX ADMIN — ALBUMIN HUMAN 12.5 G: 0.25 SOLUTION INTRAVENOUS at 20:16

## 2024-01-01 RX ADMIN — Medication 400 MG: at 18:01

## 2024-01-01 RX ADMIN — HYDROXYZINE HYDROCHLORIDE 25 MG: 25 TABLET, FILM COATED ORAL at 14:25

## 2024-01-01 RX ADMIN — MULTIVITAMIN TABLET 1 TABLET: TABLET at 09:23

## 2024-01-01 RX ADMIN — POTASSIUM CHLORIDE 10 MEQ: 7.46 INJECTION, SOLUTION INTRAVENOUS at 16:40

## 2024-01-01 RX ADMIN — SUCRALFATE 1 G: 1 SUSPENSION ORAL at 08:13

## 2024-01-01 RX ADMIN — SUCRALFATE 1 G: 1 SUSPENSION ORAL at 18:57

## 2024-01-01 RX ADMIN — FUROSEMIDE 40 MG: 10 INJECTION, SOLUTION INTRAMUSCULAR; INTRAVENOUS at 17:45

## 2024-01-01 RX ADMIN — MICONAZOLE NITRATE: 20 POWDER TOPICAL at 08:47

## 2024-01-01 RX ADMIN — FERROUS SULFATE TAB 325 MG (65 MG ELEMENTAL FE) 325 MG: 325 (65 FE) TAB at 08:50

## 2024-01-01 RX ADMIN — MIDODRINE HYDROCHLORIDE 10 MG: 5 TABLET ORAL at 17:45

## 2024-01-01 RX ADMIN — FUROSEMIDE 40 MG: 40 TABLET ORAL at 12:41

## 2024-01-01 RX ADMIN — MIDODRINE HYDROCHLORIDE 10 MG: 5 TABLET ORAL at 17:52

## 2024-01-01 RX ADMIN — DUTASTERIDE 0.5 MG: 0.5 CAPSULE, LIQUID FILLED ORAL at 08:00

## 2024-01-01 RX ADMIN — DUTASTERIDE 0.5 MG: 0.5 CAPSULE, LIQUID FILLED ORAL at 08:31

## 2024-01-01 RX ADMIN — POLYETHYLENE GLYCOL 3350 17 G: 17 POWDER, FOR SOLUTION ORAL at 08:05

## 2024-01-01 RX ADMIN — HEPARIN SODIUM 1200 UNITS/HR: 10000 INJECTION, SOLUTION INTRAVENOUS at 13:54

## 2024-01-01 RX ADMIN — HEPARIN SODIUM 1300 UNITS/HR: 10000 INJECTION, SOLUTION INTRAVENOUS at 06:08

## 2024-01-01 RX ADMIN — PANTOPRAZOLE SODIUM 40 MG: 40 TABLET, DELAYED RELEASE ORAL at 06:34

## 2024-01-01 RX ADMIN — POTASSIUM CHLORIDE 40 MEQ: 20 SOLUTION ORAL at 12:25

## 2024-01-01 RX ADMIN — ACETAMINOPHEN 650 MG: 325 TABLET, FILM COATED ORAL at 20:08

## 2024-01-01 RX ADMIN — MICONAZOLE NITRATE: 20 POWDER TOPICAL at 20:14

## 2024-01-01 RX ADMIN — HEPARIN SODIUM 1150 UNITS/HR: 10000 INJECTION, SOLUTION INTRAVENOUS at 02:31

## 2024-01-01 RX ADMIN — WARFARIN SODIUM 1 MG: 1 TABLET ORAL at 17:10

## 2024-01-01 RX ADMIN — WARFARIN SODIUM 2.5 MG: 2.5 TABLET ORAL at 22:11

## 2024-01-01 RX ADMIN — PANTOPRAZOLE SODIUM 40 MG: 40 TABLET, DELAYED RELEASE ORAL at 16:29

## 2024-01-01 RX ADMIN — PANTOPRAZOLE SODIUM 40 MG: 40 TABLET, DELAYED RELEASE ORAL at 16:46

## 2024-01-01 RX ADMIN — WARFARIN SODIUM 1.5 MG: 3 TABLET ORAL at 18:41

## 2024-01-01 RX ADMIN — WARFARIN SODIUM 2 MG: 2 TABLET ORAL at 18:57

## 2024-01-01 RX ADMIN — CEFUROXIME AXETIL 500 MG: 500 TABLET ORAL at 10:59

## 2024-01-01 RX ADMIN — FERROUS SULFATE TAB 325 MG (65 MG ELEMENTAL FE) 325 MG: 325 (65 FE) TAB at 08:29

## 2024-01-01 RX ADMIN — MICONAZOLE NITRATE: 2 POWDER TOPICAL at 20:20

## 2024-01-01 RX ADMIN — LOPERAMIDE HYDROCHLORIDE 2 MG: 2 CAPSULE ORAL at 17:45

## 2024-01-01 RX ADMIN — MEROPENEM 1 G: 1 INJECTION, POWDER, FOR SOLUTION INTRAVENOUS at 18:15

## 2024-01-01 RX ADMIN — FUROSEMIDE 60 MG: 10 INJECTION, SOLUTION INTRAMUSCULAR; INTRAVENOUS at 21:21

## 2024-01-01 RX ADMIN — ACETAMINOPHEN 1000 MG: 500 TABLET, FILM COATED ORAL at 19:45

## 2024-01-01 RX ADMIN — HEPARIN SODIUM 1200 UNITS/HR: 10000 INJECTION, SOLUTION INTRAVENOUS at 06:09

## 2024-01-01 RX ADMIN — PANTOPRAZOLE SODIUM 40 MG: 40 TABLET, DELAYED RELEASE ORAL at 16:07

## 2024-01-01 RX ADMIN — PANTOPRAZOLE SODIUM 40 MG: 40 INJECTION, POWDER, FOR SOLUTION INTRAVENOUS at 19:47

## 2024-01-01 RX ADMIN — POTASSIUM CHLORIDE 20 MEQ: 20 SOLUTION ORAL at 01:11

## 2024-01-01 RX ADMIN — MIDODRINE HYDROCHLORIDE 15 MG: 5 TABLET ORAL at 12:08

## 2024-01-01 RX ADMIN — PANTOPRAZOLE SODIUM 40 MG: 40 INJECTION, POWDER, FOR SOLUTION INTRAVENOUS at 08:46

## 2024-01-01 RX ADMIN — FUROSEMIDE 60 MG: 10 INJECTION, SOLUTION INTRAMUSCULAR; INTRAVENOUS at 18:19

## 2024-01-01 RX ADMIN — METRONIDAZOLE 500 MG: 500 INJECTION, SOLUTION INTRAVENOUS at 13:18

## 2024-01-01 RX ADMIN — PANTOPRAZOLE SODIUM 40 MG: 40 INJECTION, POWDER, FOR SOLUTION INTRAVENOUS at 08:45

## 2024-01-01 RX ADMIN — SUCRALFATE 1 G: 1 SUSPENSION ORAL at 22:09

## 2024-01-01 RX ADMIN — NALOXONE HYDROCHLORIDE 0.16 MG: 0.4 INJECTION, SOLUTION INTRAMUSCULAR; INTRAVENOUS; SUBCUTANEOUS at 17:46

## 2024-01-01 RX ADMIN — MIRTAZAPINE 7.5 MG: 7.5 TABLET, FILM COATED ORAL at 20:45

## 2024-01-01 RX ADMIN — SUCRALFATE 1 G: 1 SUSPENSION ORAL at 00:16

## 2024-01-01 RX ADMIN — FERROUS SULFATE TAB 325 MG (65 MG ELEMENTAL FE) 325 MG: 325 (65 FE) TAB at 09:07

## 2024-01-01 RX ADMIN — DUTASTERIDE 0.5 MG: 0.5 CAPSULE, LIQUID FILLED ORAL at 08:12

## 2024-01-01 RX ADMIN — PANTOPRAZOLE SODIUM 40 MG: 40 INJECTION, POWDER, FOR SOLUTION INTRAVENOUS at 08:00

## 2024-01-01 RX ADMIN — PHYTONADIONE 1 MG: 10 INJECTION, EMULSION INTRAMUSCULAR; INTRAVENOUS; SUBCUTANEOUS at 12:43

## 2024-01-01 RX ADMIN — MEROPENEM 1 G: 1 INJECTION, POWDER, FOR SOLUTION INTRAVENOUS at 06:57

## 2024-01-01 RX ADMIN — TORSEMIDE 20 MG: 20 TABLET ORAL at 07:59

## 2024-01-01 RX ADMIN — ACETAMINOPHEN 1000 MG: 500 TABLET, FILM COATED ORAL at 09:34

## 2024-01-01 RX ADMIN — SUCRALFATE 1 G: 1 SUSPENSION ORAL at 21:23

## 2024-01-01 RX ADMIN — SENNOSIDES 2 CHEW TAB: 25 TABLET, FILM COATED ORAL at 10:01

## 2024-01-01 RX ADMIN — PSYLLIUM HUSK 1 PACKET: 3.4 POWDER ORAL at 08:51

## 2024-01-01 RX ADMIN — PANTOPRAZOLE SODIUM 40 MG: 40 TABLET, DELAYED RELEASE ORAL at 17:49

## 2024-01-01 RX ADMIN — WARFARIN SODIUM 4 MG: 2 TABLET ORAL at 18:45

## 2024-01-01 RX ADMIN — MICONAZOLE NITRATE: 20 POWDER TOPICAL at 08:42

## 2024-01-01 RX ADMIN — MIRTAZAPINE 7.5 MG: 7.5 TABLET, FILM COATED ORAL at 22:25

## 2024-01-01 RX ADMIN — SUCRALFATE 1 G: 1 SUSPENSION ORAL at 22:01

## 2024-01-01 RX ADMIN — PANTOPRAZOLE SODIUM 40 MG: 40 TABLET, DELAYED RELEASE ORAL at 09:53

## 2024-01-01 RX ADMIN — ALBUMIN HUMAN 12.5 G: 0.25 SOLUTION INTRAVENOUS at 20:29

## 2024-01-01 RX ADMIN — DEXAMETHASONE SODIUM PHOSPHATE 4 MG: 4 INJECTION, SOLUTION INTRA-ARTICULAR; INTRALESIONAL; INTRAMUSCULAR; INTRAVENOUS; SOFT TISSUE at 11:13

## 2024-01-01 RX ADMIN — PANTOPRAZOLE SODIUM 40 MG: 40 TABLET, DELAYED RELEASE ORAL at 11:48

## 2024-01-01 RX ADMIN — MIDODRINE HYDROCHLORIDE 10 MG: 5 TABLET ORAL at 18:01

## 2024-01-01 RX ADMIN — MIRTAZAPINE 7.5 MG: 7.5 TABLET, FILM COATED ORAL at 00:00

## 2024-01-01 RX ADMIN — SUCRALFATE 1 G: 1 SUSPENSION ORAL at 16:35

## 2024-01-01 RX ADMIN — SUCRALFATE 1 G: 1 SUSPENSION ORAL at 12:47

## 2024-01-01 RX ADMIN — POTASSIUM CHLORIDE 10 MEQ: 7.46 INJECTION, SOLUTION INTRAVENOUS at 14:49

## 2024-01-01 RX ADMIN — ETOMIDATE 12 MG: 2 INJECTION, SOLUTION INTRAVENOUS at 11:13

## 2024-01-01 RX ADMIN — MIRTAZAPINE 7.5 MG: 7.5 TABLET, FILM COATED ORAL at 21:20

## 2024-01-01 RX ADMIN — DOXYCYCLINE HYCLATE 100 MG: 100 CAPSULE ORAL at 08:19

## 2024-01-01 RX ADMIN — PANTOPRAZOLE SODIUM 40 MG: 40 TABLET, DELAYED RELEASE ORAL at 08:28

## 2024-01-01 RX ADMIN — HYDROMORPHONE HYDROCHLORIDE 2 MG: 2 TABLET ORAL at 19:50

## 2024-01-01 RX ADMIN — Medication 60 ML: at 16:44

## 2024-01-01 RX ADMIN — HYDROMORPHONE HYDROCHLORIDE 2 MG: 2 TABLET ORAL at 17:57

## 2024-01-01 RX ADMIN — MULTIVITAMIN TABLET 1 TABLET: TABLET at 11:48

## 2024-01-01 RX ADMIN — ACETAMINOPHEN 650 MG: 325 TABLET, FILM COATED ORAL at 04:33

## 2024-01-01 RX ADMIN — SODIUM CHLORIDE: 9 INJECTION, SOLUTION INTRAVENOUS at 10:53

## 2024-01-01 RX ADMIN — ACETAMINOPHEN 1000 MG: 500 TABLET, FILM COATED ORAL at 13:47

## 2024-01-01 RX ADMIN — PANTOPRAZOLE SODIUM 40 MG: 40 TABLET, DELAYED RELEASE ORAL at 05:54

## 2024-01-01 RX ADMIN — PANTOPRAZOLE SODIUM 40 MG: 40 INJECTION, POWDER, FOR SOLUTION INTRAVENOUS at 21:30

## 2024-01-01 RX ADMIN — MIDODRINE HYDROCHLORIDE 10 MG: 5 TABLET ORAL at 18:10

## 2024-01-01 RX ADMIN — MICONAZOLE NITRATE: 20 POWDER TOPICAL at 09:26

## 2024-01-01 RX ADMIN — IRON SUCROSE 300 MG: 20 INJECTION, SOLUTION INTRAVENOUS at 12:56

## 2024-01-01 RX ADMIN — CEFEPIME 2 G: 2 INJECTION, POWDER, FOR SOLUTION INTRAVENOUS at 01:21

## 2024-01-01 RX ADMIN — MICONAZOLE NITRATE: 2 POWDER TOPICAL at 21:20

## 2024-01-01 RX ADMIN — METRONIDAZOLE 500 MG: 500 INJECTION, SOLUTION INTRAVENOUS at 10:26

## 2024-01-01 RX ADMIN — POTASSIUM CHLORIDE 20 MEQ: 20 SOLUTION ORAL at 07:58

## 2024-01-01 RX ADMIN — POTASSIUM & SODIUM PHOSPHATES POWDER PACK 280-160-250 MG 1 PACKET: 280-160-250 PACK at 20:38

## 2024-01-01 RX ADMIN — MAGNESIUM SULFATE IN WATER FOR 4 G: 40 INJECTION INTRAVENOUS at 18:31

## 2024-01-01 RX ADMIN — PANTOPRAZOLE SODIUM 40 MG: 40 TABLET, DELAYED RELEASE ORAL at 05:52

## 2024-01-01 RX ADMIN — DUTASTERIDE 0.5 MG: 0.5 CAPSULE, LIQUID FILLED ORAL at 08:36

## 2024-01-01 RX ADMIN — ALBUMIN HUMAN 12.5 G: 0.25 SOLUTION INTRAVENOUS at 22:23

## 2024-01-01 RX ADMIN — PANTOPRAZOLE SODIUM 40 MG: 40 TABLET, DELAYED RELEASE ORAL at 06:58

## 2024-01-01 RX ADMIN — HYDROMORPHONE HYDROCHLORIDE 0.2 MG: 0.2 INJECTION, SOLUTION INTRAMUSCULAR; INTRAVENOUS; SUBCUTANEOUS at 09:50

## 2024-01-01 RX ADMIN — PANTOPRAZOLE SODIUM 40 MG: 40 INJECTION, POWDER, FOR SOLUTION INTRAVENOUS at 20:44

## 2024-01-01 RX ADMIN — FERROUS SULFATE TAB 325 MG (65 MG ELEMENTAL FE) 325 MG: 325 (65 FE) TAB at 08:40

## 2024-01-01 RX ADMIN — ACETAMINOPHEN 1000 MG: 500 TABLET, FILM COATED ORAL at 13:09

## 2024-01-01 RX ADMIN — SUCRALFATE 1 G: 1 SUSPENSION ORAL at 17:13

## 2024-01-01 RX ADMIN — POTASSIUM CHLORIDE 20 MEQ: 20 SOLUTION ORAL at 08:07

## 2024-01-01 RX ADMIN — POTASSIUM CHLORIDE 10 MEQ: 10 INJECTION, SOLUTION INTRAVENOUS at 14:13

## 2024-01-01 RX ADMIN — SUCRALFATE 1 G: 1 SUSPENSION ORAL at 16:48

## 2024-01-01 RX ADMIN — PANTOPRAZOLE SODIUM 40 MG: 40 INJECTION, POWDER, FOR SOLUTION INTRAVENOUS at 21:38

## 2024-01-01 RX ADMIN — CEFEPIME 2 G: 2 INJECTION, POWDER, FOR SOLUTION INTRAVENOUS at 08:40

## 2024-01-01 RX ADMIN — ONDANSETRON 4 MG: 2 INJECTION INTRAMUSCULAR; INTRAVENOUS at 11:13

## 2024-01-01 RX ADMIN — SUCRALFATE 1 G: 1 SUSPENSION ORAL at 16:50

## 2024-01-01 RX ADMIN — MEROPENEM 1 G: 1 INJECTION, POWDER, FOR SOLUTION INTRAVENOUS at 06:00

## 2024-01-01 RX ADMIN — ALBUMIN HUMAN 12.5 G: 0.25 SOLUTION INTRAVENOUS at 21:52

## 2024-01-01 RX ADMIN — MIDODRINE HYDROCHLORIDE 10 MG: 5 TABLET ORAL at 08:09

## 2024-01-01 RX ADMIN — METRONIDAZOLE 500 MG: 500 INJECTION, SOLUTION INTRAVENOUS at 09:52

## 2024-01-01 RX ADMIN — PANTOPRAZOLE SODIUM 40 MG: 40 INJECTION, POWDER, FOR SOLUTION INTRAVENOUS at 08:05

## 2024-01-01 RX ADMIN — ACETAMINOPHEN 1000 MG: 500 TABLET, FILM COATED ORAL at 10:31

## 2024-01-01 RX ADMIN — POTASSIUM CHLORIDE 40 MEQ: 1.5 POWDER, FOR SOLUTION ORAL at 20:36

## 2024-01-01 RX ADMIN — SUCRALFATE 1 G: 1 SUSPENSION ORAL at 21:13

## 2024-01-01 RX ADMIN — FAMOTIDINE 20 MG: 10 INJECTION, SOLUTION INTRAVENOUS at 22:22

## 2024-01-01 RX ADMIN — PSYLLIUM HUSK 1 PACKET: 3.4 POWDER ORAL at 08:58

## 2024-01-01 RX ADMIN — CEFTRIAXONE SODIUM 1 G: 1 INJECTION, POWDER, FOR SOLUTION INTRAMUSCULAR; INTRAVENOUS at 11:25

## 2024-01-01 RX ADMIN — HEPARIN SODIUM 1200 UNITS/HR: 10000 INJECTION, SOLUTION INTRAVENOUS at 04:09

## 2024-01-01 RX ADMIN — PANTOPRAZOLE SODIUM 40 MG: 40 TABLET, DELAYED RELEASE ORAL at 16:04

## 2024-01-01 RX ADMIN — FERROUS SULFATE TAB 325 MG (65 MG ELEMENTAL FE) 325 MG: 325 (65 FE) TAB at 09:17

## 2024-01-01 RX ADMIN — FUROSEMIDE 40 MG: 10 INJECTION, SOLUTION INTRAMUSCULAR; INTRAVENOUS at 12:00

## 2024-01-01 RX ADMIN — POTASSIUM & SODIUM PHOSPHATES POWDER PACK 280-160-250 MG 1 PACKET: 280-160-250 PACK at 20:06

## 2024-01-01 RX ADMIN — ACETAMINOPHEN 650 MG: 325 TABLET, FILM COATED ORAL at 20:36

## 2024-01-01 RX ADMIN — PANTOPRAZOLE SODIUM 40 MG: 40 INJECTION, POWDER, FOR SOLUTION INTRAVENOUS at 08:08

## 2024-01-01 RX ADMIN — FERROUS SULFATE TAB 325 MG (65 MG ELEMENTAL FE) 325 MG: 325 (65 FE) TAB at 08:13

## 2024-01-01 RX ADMIN — POTASSIUM CHLORIDE 40 MEQ: 20 SOLUTION ORAL at 13:05

## 2024-01-01 RX ADMIN — POLYETHYLENE GLYCOL 3350 238 G: 17 POWDER, FOR SOLUTION ORAL at 14:55

## 2024-01-01 RX ADMIN — PANTOPRAZOLE SODIUM 40 MG: 40 TABLET, DELAYED RELEASE ORAL at 16:21

## 2024-01-01 RX ADMIN — MICONAZOLE NITRATE: 2 POWDER TOPICAL at 20:19

## 2024-01-01 RX ADMIN — MICONAZOLE NITRATE: 20 POWDER TOPICAL at 20:15

## 2024-01-01 RX ADMIN — DUTASTERIDE 0.5 MG: 0.5 CAPSULE, LIQUID FILLED ORAL at 08:06

## 2024-01-01 RX ADMIN — SUCRALFATE 1 G: 1 SUSPENSION ORAL at 12:51

## 2024-01-01 RX ADMIN — CEFEPIME 2 G: 2 INJECTION, POWDER, FOR SOLUTION INTRAVENOUS at 08:23

## 2024-01-01 RX ADMIN — MIDODRINE HYDROCHLORIDE 10 MG: 5 TABLET ORAL at 14:12

## 2024-01-01 RX ADMIN — POTASSIUM CHLORIDE 10 MEQ: 7.46 INJECTION, SOLUTION INTRAVENOUS at 13:45

## 2024-01-01 RX ADMIN — PHYTONADIONE 1 MG: 10 INJECTION, EMULSION INTRAMUSCULAR; INTRAVENOUS; SUBCUTANEOUS at 00:12

## 2024-01-01 RX ADMIN — VANCOMYCIN HYDROCHLORIDE 1500 MG: 10 INJECTION, POWDER, LYOPHILIZED, FOR SOLUTION INTRAVENOUS at 23:16

## 2024-01-01 RX ADMIN — DOXYCYCLINE HYCLATE 100 MG: 100 CAPSULE ORAL at 21:06

## 2024-01-01 RX ADMIN — POTASSIUM CHLORIDE 40 MEQ: 1500 TABLET, EXTENDED RELEASE ORAL at 09:31

## 2024-01-01 RX ADMIN — PANTOPRAZOLE SODIUM 40 MG: 40 INJECTION, POWDER, FOR SOLUTION INTRAVENOUS at 08:59

## 2024-01-01 RX ADMIN — MIDODRINE HYDROCHLORIDE 15 MG: 5 TABLET ORAL at 12:02

## 2024-01-01 RX ADMIN — HYDROMORPHONE HYDROCHLORIDE 2 MG: 2 TABLET ORAL at 09:40

## 2024-01-01 RX ADMIN — ALBUMIN HUMAN 12.5 G: 0.25 SOLUTION INTRAVENOUS at 05:45

## 2024-01-01 RX ADMIN — Medication 400 MG: at 08:30

## 2024-01-01 RX ADMIN — FUROSEMIDE 40 MG: 10 INJECTION, SOLUTION INTRAMUSCULAR; INTRAVENOUS at 22:55

## 2024-01-01 RX ADMIN — FERROUS SULFATE TAB 325 MG (65 MG ELEMENTAL FE) 325 MG: 325 (65 FE) TAB at 10:31

## 2024-01-01 RX ADMIN — TAMSULOSIN HYDROCHLORIDE 0.4 MG: 0.4 CAPSULE ORAL at 23:19

## 2024-01-01 RX ADMIN — TORSEMIDE 20 MG: 20 TABLET ORAL at 08:07

## 2024-01-01 RX ADMIN — SUCRALFATE 1 G: 1 SUSPENSION ORAL at 07:58

## 2024-01-01 RX ADMIN — ONDANSETRON 4 MG: 2 INJECTION INTRAMUSCULAR; INTRAVENOUS at 23:36

## 2024-01-01 RX ADMIN — FUROSEMIDE 20 MG: 10 INJECTION, SOLUTION INTRAMUSCULAR; INTRAVENOUS at 09:22

## 2024-01-01 RX ADMIN — FUROSEMIDE 40 MG: 10 INJECTION, SOLUTION INTRAMUSCULAR; INTRAVENOUS at 17:57

## 2024-01-01 RX ADMIN — MAGNESIUM SULFATE IN WATER FOR 4 G: 40 INJECTION INTRAVENOUS at 00:44

## 2024-01-01 RX ADMIN — METRONIDAZOLE 500 MG: 500 INJECTION, SOLUTION INTRAVENOUS at 20:14

## 2024-01-01 RX ADMIN — SUCRALFATE 1 G: 1 SUSPENSION ORAL at 08:07

## 2024-01-01 RX ADMIN — ACETAMINOPHEN 650 MG: 325 TABLET, FILM COATED ORAL at 14:20

## 2024-01-01 RX ADMIN — SODIUM CHLORIDE: 9 INJECTION, SOLUTION INTRAVENOUS at 10:23

## 2024-01-01 RX ADMIN — Medication 1 TABLET: at 08:00

## 2024-01-01 RX ADMIN — DUTASTERIDE 0.5 MG: 0.5 CAPSULE, LIQUID FILLED ORAL at 08:09

## 2024-01-01 RX ADMIN — FUROSEMIDE 40 MG: 10 INJECTION, SOLUTION INTRAMUSCULAR; INTRAVENOUS at 22:05

## 2024-01-01 RX ADMIN — FUROSEMIDE 20 MG: 20 TABLET ORAL at 13:05

## 2024-01-01 RX ADMIN — FUROSEMIDE 40 MG: 10 INJECTION, SOLUTION INTRAMUSCULAR; INTRAVENOUS at 13:59

## 2024-01-01 RX ADMIN — MULTIVITAMIN TABLET: TABLET at 10:31

## 2024-01-01 RX ADMIN — WARFARIN SODIUM 1 MG: 1 TABLET ORAL at 18:43

## 2024-01-01 RX ADMIN — PANTOPRAZOLE SODIUM 40 MG: 40 INJECTION, POWDER, FOR SOLUTION INTRAVENOUS at 09:29

## 2024-01-01 RX ADMIN — DOXYCYCLINE HYCLATE 100 MG: 100 CAPSULE ORAL at 20:38

## 2024-01-01 RX ADMIN — FERROUS SULFATE TAB 325 MG (65 MG ELEMENTAL FE) 325 MG: 325 (65 FE) TAB at 10:00

## 2024-01-01 RX ADMIN — SODIUM CHLORIDE 250 ML: 9 INJECTION, SOLUTION INTRAVENOUS at 15:00

## 2024-01-01 RX ADMIN — FUROSEMIDE 40 MG: 10 INJECTION, SOLUTION INTRAMUSCULAR; INTRAVENOUS at 15:00

## 2024-01-01 RX ADMIN — HYDROMORPHONE HYDROCHLORIDE 0.5 MG: 1 INJECTION, SOLUTION INTRAMUSCULAR; INTRAVENOUS; SUBCUTANEOUS at 18:43

## 2024-01-01 RX ADMIN — IRON SUCROSE 300 MG: 20 INJECTION, SOLUTION INTRAVENOUS at 09:45

## 2024-01-01 RX ADMIN — FUROSEMIDE 40 MG: 10 INJECTION, SOLUTION INTRAMUSCULAR; INTRAVENOUS at 06:32

## 2024-01-01 RX ADMIN — POLYETHYLENE GLYCOL 3350 17 G: 17 POWDER, FOR SOLUTION ORAL at 23:20

## 2024-01-01 RX ADMIN — FERROUS SULFATE TAB 325 MG (65 MG ELEMENTAL FE) 325 MG: 325 (65 FE) TAB at 10:19

## 2024-01-01 RX ADMIN — SODIUM CHLORIDE 500 ML: 9 INJECTION, SOLUTION INTRAVENOUS at 11:30

## 2024-01-01 RX ADMIN — SENNOSIDES AND DOCUSATE SODIUM 1 TABLET: 50; 8.6 TABLET ORAL at 21:36

## 2024-01-01 RX ADMIN — FUROSEMIDE 40 MG: 10 INJECTION, SOLUTION INTRAMUSCULAR; INTRAVENOUS at 10:49

## 2024-01-01 RX ADMIN — FERROUS SULFATE TAB 325 MG (65 MG ELEMENTAL FE) 325 MG: 325 (65 FE) TAB at 08:59

## 2024-01-01 RX ADMIN — POTASSIUM CHLORIDE 40 MEQ: 1.5 POWDER, FOR SOLUTION ORAL at 10:04

## 2024-01-01 RX ADMIN — MICONAZOLE NITRATE: 20 POWDER TOPICAL at 08:22

## 2024-01-01 RX ADMIN — MIDODRINE HYDROCHLORIDE 10 MG: 5 TABLET ORAL at 08:05

## 2024-01-01 RX ADMIN — Medication 1 MG: at 01:24

## 2024-01-01 RX ADMIN — CEFTRIAXONE SODIUM 2 G: 2 INJECTION, POWDER, FOR SOLUTION INTRAMUSCULAR; INTRAVENOUS at 08:43

## 2024-01-01 RX ADMIN — THERA TABS 1 TABLET: TAB at 10:19

## 2024-01-01 RX ADMIN — PHYTONADIONE 2.5 MG: 10 INJECTION, EMULSION INTRAMUSCULAR; INTRAVENOUS; SUBCUTANEOUS at 17:57

## 2024-01-01 RX ADMIN — MIDODRINE HYDROCHLORIDE 10 MG: 5 TABLET ORAL at 17:53

## 2024-01-01 RX ADMIN — POTASSIUM CHLORIDE 10 MEQ: 7.46 INJECTION, SOLUTION INTRAVENOUS at 07:15

## 2024-01-01 RX ADMIN — PANTOPRAZOLE SODIUM 40 MG: 40 TABLET, DELAYED RELEASE ORAL at 06:48

## 2024-01-01 RX ADMIN — FERROUS SULFATE TAB 325 MG (65 MG ELEMENTAL FE) 325 MG: 325 (65 FE) TAB at 09:30

## 2024-01-01 RX ADMIN — SUCRALFATE 1 G: 1 SUSPENSION ORAL at 17:09

## 2024-01-01 RX ADMIN — MICONAZOLE NITRATE: 20 POWDER TOPICAL at 20:16

## 2024-01-01 RX ADMIN — MIDODRINE HYDROCHLORIDE 10 MG: 5 TABLET ORAL at 09:42

## 2024-01-01 RX ADMIN — PANTOPRAZOLE SODIUM 40 MG: 40 INJECTION, POWDER, FOR SOLUTION INTRAVENOUS at 21:19

## 2024-01-01 RX ADMIN — PANTOPRAZOLE SODIUM 40 MG: 40 INJECTION, POWDER, FOR SOLUTION INTRAVENOUS at 22:01

## 2024-01-01 RX ADMIN — MICONAZOLE NITRATE: 20 POWDER TOPICAL at 21:33

## 2024-01-01 RX ADMIN — MIDODRINE HYDROCHLORIDE 10 MG: 5 TABLET ORAL at 14:01

## 2024-01-01 RX ADMIN — MIDODRINE HYDROCHLORIDE 10 MG: 5 TABLET ORAL at 12:51

## 2024-01-01 RX ADMIN — POTASSIUM CHLORIDE 40 MEQ: 20 SOLUTION ORAL at 16:19

## 2024-01-01 RX ADMIN — CEFUROXIME AXETIL 500 MG: 500 TABLET ORAL at 21:06

## 2024-01-01 RX ADMIN — PANTOPRAZOLE SODIUM 40 MG: 40 TABLET, DELAYED RELEASE ORAL at 17:45

## 2024-01-01 RX ADMIN — POTASSIUM CHLORIDE 10 MEQ: 7.46 INJECTION, SOLUTION INTRAVENOUS at 17:56

## 2024-01-01 RX ADMIN — MICONAZOLE NITRATE: 2 POWDER TOPICAL at 21:09

## 2024-01-01 RX ADMIN — HYDROMORPHONE HYDROCHLORIDE 2 MG: 2 TABLET ORAL at 20:18

## 2024-01-01 RX ADMIN — ALBUMIN HUMAN 25 G: 0.25 SOLUTION INTRAVENOUS at 18:53

## 2024-01-01 RX ADMIN — FERROUS SULFATE TAB 325 MG (65 MG ELEMENTAL FE) 325 MG: 325 (65 FE) TAB at 08:00

## 2024-01-01 RX ADMIN — POTASSIUM CHLORIDE 40 MEQ: 1500 TABLET, EXTENDED RELEASE ORAL at 21:36

## 2024-01-01 RX ADMIN — PSYLLIUM HUSK 1 PACKET: 3.4 POWDER ORAL at 08:46

## 2024-01-01 RX ADMIN — MIDODRINE HYDROCHLORIDE 10 MG: 5 TABLET ORAL at 09:56

## 2024-01-01 RX ADMIN — DUTASTERIDE 0.5 MG: 0.5 CAPSULE, LIQUID FILLED ORAL at 09:46

## 2024-01-01 RX ADMIN — NOREPINEPHRINE BITARTRATE 12.8 MCG: 1 INJECTION, SOLUTION, CONCENTRATE INTRAVENOUS at 11:30

## 2024-01-01 RX ADMIN — FUROSEMIDE 20 MG: 10 INJECTION, SOLUTION INTRAMUSCULAR; INTRAVENOUS at 09:26

## 2024-01-01 RX ADMIN — PANTOPRAZOLE SODIUM 40 MG: 40 INJECTION, POWDER, FOR SOLUTION INTRAVENOUS at 21:22

## 2024-01-01 RX ADMIN — MICONAZOLE NITRATE: 2 POWDER TOPICAL at 10:14

## 2024-01-01 RX ADMIN — FUROSEMIDE 60 MG: 10 INJECTION, SOLUTION INTRAMUSCULAR; INTRAVENOUS at 10:52

## 2024-01-01 RX ADMIN — MICONAZOLE NITRATE: 20 POWDER TOPICAL at 21:12

## 2024-01-01 RX ADMIN — VANCOMYCIN HYDROCHLORIDE 1500 MG: 10 INJECTION, POWDER, LYOPHILIZED, FOR SOLUTION INTRAVENOUS at 20:54

## 2024-01-01 RX ADMIN — ALBUMIN HUMAN 12.5 G: 0.25 SOLUTION INTRAVENOUS at 14:23

## 2024-01-01 RX ADMIN — ACETAMINOPHEN 1000 MG: 500 TABLET, FILM COATED ORAL at 09:23

## 2024-01-01 RX ADMIN — Medication 1 TABLET: at 09:42

## 2024-01-01 RX ADMIN — HEPARIN SODIUM 1300 UNITS/HR: 10000 INJECTION, SOLUTION INTRAVENOUS at 00:38

## 2024-01-01 RX ADMIN — HEPARIN SODIUM 1350 UNITS/HR: 10000 INJECTION, SOLUTION INTRAVENOUS at 01:03

## 2024-01-01 RX ADMIN — SUCRALFATE 1 G: 1 SUSPENSION ORAL at 21:35

## 2024-01-01 RX ADMIN — MIDODRINE HYDROCHLORIDE 10 MG: 10 TABLET ORAL at 08:15

## 2024-01-01 RX ADMIN — POTASSIUM & SODIUM PHOSPHATES POWDER PACK 280-160-250 MG 1 PACKET: 280-160-250 PACK at 16:19

## 2024-01-01 RX ADMIN — PANTOPRAZOLE SODIUM 40 MG: 40 TABLET, DELAYED RELEASE ORAL at 16:02

## 2024-01-01 RX ADMIN — Medication 1 TABLET: at 09:16

## 2024-01-01 RX ADMIN — POTASSIUM CHLORIDE 40 MEQ: 20 SOLUTION ORAL at 14:48

## 2024-01-01 RX ADMIN — ACETAMINOPHEN 1000 MG: 500 TABLET, FILM COATED ORAL at 20:06

## 2024-01-01 RX ADMIN — HYDROMORPHONE HYDROCHLORIDE 0.2 MG: 0.2 INJECTION, SOLUTION INTRAMUSCULAR; INTRAVENOUS; SUBCUTANEOUS at 10:57

## 2024-01-01 RX ADMIN — POTASSIUM & SODIUM PHOSPHATES POWDER PACK 280-160-250 MG 1 PACKET: 280-160-250 PACK at 11:20

## 2024-01-01 RX ADMIN — SUCRALFATE 1 G: 1 SUSPENSION ORAL at 11:19

## 2024-01-01 RX ADMIN — Medication 60 ML: at 14:46

## 2024-01-01 RX ADMIN — WARFARIN SODIUM 1.5 MG: 3 TABLET ORAL at 18:16

## 2024-01-01 RX ADMIN — CEFTRIAXONE SODIUM 1 G: 1 INJECTION, POWDER, FOR SOLUTION INTRAMUSCULAR; INTRAVENOUS at 12:02

## 2024-01-01 RX ADMIN — ALBUMIN HUMAN 12.5 G: 0.25 SOLUTION INTRAVENOUS at 11:46

## 2024-01-01 RX ADMIN — MIDODRINE HYDROCHLORIDE 10 MG: 5 TABLET ORAL at 12:55

## 2024-01-01 RX ADMIN — WARFARIN SODIUM 5 MG: 5 TABLET ORAL at 17:32

## 2024-01-01 RX ADMIN — POTASSIUM & SODIUM PHOSPHATES POWDER PACK 280-160-250 MG 1 PACKET: 280-160-250 PACK at 13:47

## 2024-01-01 RX ADMIN — PANTOPRAZOLE SODIUM 40 MG: 40 TABLET, DELAYED RELEASE ORAL at 16:39

## 2024-01-01 RX ADMIN — ACETAMINOPHEN 1000 MG: 500 TABLET, FILM COATED ORAL at 11:48

## 2024-01-01 RX ADMIN — Medication 1 TABLET: at 09:56

## 2024-01-01 RX ADMIN — PSYLLIUM HUSK 1 PACKET: 3.4 POWDER ORAL at 10:03

## 2024-01-01 RX ADMIN — SUCRALFATE 1 G: 1 SUSPENSION ORAL at 22:19

## 2024-01-01 RX ADMIN — WARFARIN SODIUM 4 MG: 2 TABLET ORAL at 19:02

## 2024-01-01 RX ADMIN — HEPARIN SODIUM 1500 UNITS/HR: 10000 INJECTION, SOLUTION INTRAVENOUS at 08:55

## 2024-01-01 RX ADMIN — SUCRALFATE 1 G: 1 SUSPENSION ORAL at 08:58

## 2024-01-01 RX ADMIN — SUCRALFATE 1 G: 1 SUSPENSION ORAL at 12:15

## 2024-01-01 RX ADMIN — ALBUMIN HUMAN 12.5 G: 0.25 SOLUTION INTRAVENOUS at 05:44

## 2024-01-01 RX ADMIN — HYDROMORPHONE HYDROCHLORIDE 0.2 MG: 0.2 INJECTION, SOLUTION INTRAMUSCULAR; INTRAVENOUS; SUBCUTANEOUS at 06:52

## 2024-01-01 RX ADMIN — ALBUMIN HUMAN 12.5 G: 0.25 SOLUTION INTRAVENOUS at 12:41

## 2024-01-01 RX ADMIN — SUCRALFATE 1 G: 1 SUSPENSION ORAL at 06:45

## 2024-01-01 RX ADMIN — WARFARIN SODIUM 4 MG: 4 TABLET ORAL at 18:36

## 2024-01-01 RX ADMIN — SODIUM CHLORIDE 500 ML: 9 INJECTION, SOLUTION INTRAVENOUS at 21:08

## 2024-01-01 RX ADMIN — PANTOPRAZOLE SODIUM 40 MG: 40 TABLET, DELAYED RELEASE ORAL at 08:44

## 2024-01-01 RX ADMIN — PANTOPRAZOLE SODIUM 40 MG: 40 TABLET, DELAYED RELEASE ORAL at 09:16

## 2024-01-01 RX ADMIN — MEROPENEM 1 G: 1 INJECTION, POWDER, FOR SOLUTION INTRAVENOUS at 14:46

## 2024-01-01 RX ADMIN — POTASSIUM CHLORIDE 10 MEQ: 7.46 INJECTION, SOLUTION INTRAVENOUS at 06:14

## 2024-01-01 RX ADMIN — ACETAMINOPHEN 650 MG: 325 TABLET, FILM COATED ORAL at 20:16

## 2024-01-01 RX ADMIN — ACETAMINOPHEN 650 MG: 325 TABLET, FILM COATED ORAL at 11:54

## 2024-01-01 RX ADMIN — PANTOPRAZOLE SODIUM 40 MG: 40 TABLET, DELAYED RELEASE ORAL at 10:09

## 2024-01-01 RX ADMIN — FERROUS SULFATE TAB 325 MG (65 MG ELEMENTAL FE) 325 MG: 325 (65 FE) TAB at 10:09

## 2024-01-01 RX ADMIN — HEPARIN SODIUM 1500 UNITS/HR: 10000 INJECTION, SOLUTION INTRAVENOUS at 23:36

## 2024-01-01 RX ADMIN — PANTOPRAZOLE SODIUM 40 MG: 40 INJECTION, POWDER, FOR SOLUTION INTRAVENOUS at 20:45

## 2024-01-01 RX ADMIN — POTASSIUM CHLORIDE 10 MEQ: 10 INJECTION, SOLUTION INTRAVENOUS at 10:50

## 2024-01-01 RX ADMIN — MICONAZOLE NITRATE: 20 POWDER TOPICAL at 08:33

## 2024-01-01 RX ADMIN — SENNOSIDES 2 CHEW TAB: 25 TABLET, FILM COATED ORAL at 08:46

## 2024-01-01 RX ADMIN — METRONIDAZOLE 500 MG: 500 INJECTION, SOLUTION INTRAVENOUS at 10:29

## 2024-01-01 RX ADMIN — FUROSEMIDE 60 MG: 10 INJECTION, SOLUTION INTRAMUSCULAR; INTRAVENOUS at 21:47

## 2024-01-01 RX ADMIN — FERROUS SULFATE TAB 325 MG (65 MG ELEMENTAL FE) 325 MG: 325 (65 FE) TAB at 09:23

## 2024-01-01 RX ADMIN — AZITHROMYCIN DIHYDRATE 500 MG: 250 TABLET ORAL at 13:48

## 2024-01-01 RX ADMIN — HYDROMORPHONE HYDROCHLORIDE 2 MG: 2 TABLET ORAL at 12:46

## 2024-01-01 RX ADMIN — HYDROMORPHONE HYDROCHLORIDE 2 MG: 2 TABLET ORAL at 22:07

## 2024-01-01 RX ADMIN — MICONAZOLE NITRATE: 2 POWDER TOPICAL at 08:42

## 2024-01-01 RX ADMIN — MIRTAZAPINE 7.5 MG: 7.5 TABLET, FILM COATED ORAL at 21:50

## 2024-01-01 RX ADMIN — ACETAMINOPHEN 1000 MG: 500 TABLET, FILM COATED ORAL at 20:38

## 2024-01-01 RX ADMIN — FUROSEMIDE 40 MG: 10 INJECTION, SOLUTION INTRAMUSCULAR; INTRAVENOUS at 22:08

## 2024-01-01 RX ADMIN — SENNOSIDES AND DOCUSATE SODIUM 1 TABLET: 50; 8.6 TABLET ORAL at 02:13

## 2024-01-01 RX ADMIN — PANTOPRAZOLE SODIUM 40 MG: 40 TABLET, DELAYED RELEASE ORAL at 09:56

## 2024-01-01 RX ADMIN — PANTOPRAZOLE SODIUM 40 MG: 40 TABLET, DELAYED RELEASE ORAL at 06:33

## 2024-01-01 RX ADMIN — WARFARIN SODIUM 3.5 MG: 3 TABLET ORAL at 17:13

## 2024-01-01 RX ADMIN — ACETAMINOPHEN 975 MG: 325 TABLET ORAL at 13:05

## 2024-01-01 RX ADMIN — MAGNESIUM SULFATE IN WATER FOR 4 G: 40 INJECTION INTRAVENOUS at 14:24

## 2024-01-01 RX ADMIN — POTASSIUM CHLORIDE 10 MEQ: 10 INJECTION, SOLUTION INTRAVENOUS at 09:46

## 2024-01-01 RX ADMIN — POTASSIUM & SODIUM PHOSPHATES POWDER PACK 280-160-250 MG 1 PACKET: 280-160-250 PACK at 21:44

## 2024-01-01 RX ADMIN — DUTASTERIDE 0.5 MG: 0.5 CAPSULE, LIQUID FILLED ORAL at 10:09

## 2024-01-01 RX ADMIN — Medication 1 MG: at 21:41

## 2024-01-01 RX ADMIN — VANCOMYCIN HYDROCHLORIDE 2500 MG: 5 INJECTION, POWDER, LYOPHILIZED, FOR SOLUTION INTRAVENOUS at 20:40

## 2024-01-01 RX ADMIN — MIDODRINE HYDROCHLORIDE 15 MG: 5 TABLET ORAL at 11:43

## 2024-01-01 RX ADMIN — MICONAZOLE NITRATE: 2 POWDER TOPICAL at 11:54

## 2024-01-01 RX ADMIN — LIDOCAINE HYDROCHLORIDE: 20 JELLY TOPICAL at 20:15

## 2024-01-01 RX ADMIN — PANTOPRAZOLE SODIUM 40 MG: 40 INJECTION, POWDER, FOR SOLUTION INTRAVENOUS at 20:16

## 2024-01-01 RX ADMIN — PANTOPRAZOLE SODIUM 40 MG: 40 INJECTION, POWDER, FOR SOLUTION INTRAVENOUS at 09:16

## 2024-01-01 RX ADMIN — SODIUM CHLORIDE: 9 INJECTION, SOLUTION INTRAVENOUS at 13:49

## 2024-01-01 RX ADMIN — AZITHROMYCIN DIHYDRATE 500 MG: 250 TABLET ORAL at 20:24

## 2024-01-01 RX ADMIN — FERROUS SULFATE TAB 325 MG (65 MG ELEMENTAL FE) 325 MG: 325 (65 FE) TAB at 08:38

## 2024-01-01 RX ADMIN — ALBUMIN HUMAN 12.5 G: 0.25 SOLUTION INTRAVENOUS at 05:17

## 2024-01-01 RX ADMIN — WARFARIN SODIUM 5 MG: 5 TABLET ORAL at 17:24

## 2024-01-01 RX ADMIN — SUCRALFATE 1 G: 1 SUSPENSION ORAL at 07:55

## 2024-01-01 RX ADMIN — MIRTAZAPINE 7.5 MG: 7.5 TABLET, FILM COATED ORAL at 22:17

## 2024-01-01 RX ADMIN — MIDODRINE HYDROCHLORIDE 15 MG: 5 TABLET ORAL at 17:10

## 2024-01-01 RX ADMIN — POTASSIUM CHLORIDE 10 MEQ: 20 SOLUTION ORAL at 08:44

## 2024-01-01 RX ADMIN — MIDODRINE HYDROCHLORIDE 10 MG: 10 TABLET ORAL at 08:20

## 2024-01-01 RX ADMIN — PSYLLIUM HUSK 1 PACKET: 3.4 POWDER ORAL at 10:22

## 2024-01-01 RX ADMIN — MIDODRINE HYDROCHLORIDE 10 MG: 5 TABLET ORAL at 16:39

## 2024-01-01 RX ADMIN — MICONAZOLE NITRATE: 2 POWDER TOPICAL at 09:32

## 2024-01-01 RX ADMIN — DICLOFENAC 4 G: 10 GEL TOPICAL at 19:28

## 2024-01-01 RX ADMIN — BISACODYL 10 MG: 10 SUPPOSITORY RECTAL at 18:08

## 2024-01-01 RX ADMIN — SUCRALFATE 1 G: 1 SUSPENSION ORAL at 13:02

## 2024-01-01 RX ADMIN — PANTOPRAZOLE SODIUM 40 MG: 40 INJECTION, POWDER, FOR SOLUTION INTRAVENOUS at 08:13

## 2024-01-01 RX ADMIN — PANTOPRAZOLE SODIUM 40 MG: 40 INJECTION, POWDER, FOR SOLUTION INTRAVENOUS at 21:01

## 2024-01-01 RX ADMIN — SUCRALFATE 1 G: 1 SUSPENSION ORAL at 09:32

## 2024-01-01 RX ADMIN — HYDROMORPHONE HYDROCHLORIDE 2 MG: 2 TABLET ORAL at 16:55

## 2024-01-01 RX ADMIN — SODIUM CHLORIDE 500 ML: 9 INJECTION, SOLUTION INTRAVENOUS at 17:04

## 2024-01-01 RX ADMIN — POTASSIUM CHLORIDE 20 MEQ: 20 SOLUTION ORAL at 08:43

## 2024-01-01 RX ADMIN — POTASSIUM CHLORIDE 40 MEQ: 1500 TABLET, EXTENDED RELEASE ORAL at 08:40

## 2024-01-01 RX ADMIN — WARFARIN SODIUM 3 MG: 2 TABLET ORAL at 17:51

## 2024-01-01 RX ADMIN — MICONAZOLE NITRATE: 2 POWDER TOPICAL at 22:01

## 2024-01-01 RX ADMIN — POTASSIUM CHLORIDE 10 MEQ: 10 INJECTION, SOLUTION INTRAVENOUS at 11:49

## 2024-01-01 RX ADMIN — PANTOPRAZOLE SODIUM 40 MG: 40 INJECTION, POWDER, FOR SOLUTION INTRAVENOUS at 20:23

## 2024-01-01 RX ADMIN — DUTASTERIDE 0.5 MG: 0.5 CAPSULE, LIQUID FILLED ORAL at 09:17

## 2024-01-01 RX ADMIN — PANTOPRAZOLE SODIUM 40 MG: 40 TABLET, DELAYED RELEASE ORAL at 16:48

## 2024-01-01 RX ADMIN — FUROSEMIDE 60 MG: 10 INJECTION, SOLUTION INTRAMUSCULAR; INTRAVENOUS at 12:16

## 2024-01-01 RX ADMIN — CALCIUM CARBONATE (ANTACID) CHEW TAB 500 MG 500 MG: 500 CHEW TAB at 21:13

## 2024-01-01 RX ADMIN — CEFEPIME 2 G: 2 INJECTION, POWDER, FOR SOLUTION INTRAVENOUS at 05:54

## 2024-01-01 RX ADMIN — PANTOPRAZOLE SODIUM 40 MG: 40 INJECTION, POWDER, FOR SOLUTION INTRAVENOUS at 10:03

## 2024-01-01 RX ADMIN — MIDODRINE HYDROCHLORIDE 15 MG: 5 TABLET ORAL at 18:00

## 2024-01-01 RX ADMIN — PANTOPRAZOLE SODIUM 40 MG: 40 INJECTION, POWDER, FOR SOLUTION INTRAVENOUS at 20:15

## 2024-01-01 RX ADMIN — MICONAZOLE NITRATE: 2 POWDER TOPICAL at 12:00

## 2024-01-01 RX ADMIN — POTASSIUM CHLORIDE 20 MEQ: 20 SOLUTION ORAL at 08:17

## 2024-01-01 RX ADMIN — BISACODYL 20 MG: 5 TABLET, COATED ORAL at 14:54

## 2024-01-01 RX ADMIN — Medication 1 SPRAY: at 11:08

## 2024-01-01 RX ADMIN — MICONAZOLE NITRATE: 20 POWDER TOPICAL at 10:04

## 2024-01-01 RX ADMIN — MULTIVITAMIN TABLET 1 TABLET: TABLET at 08:48

## 2024-01-01 RX ADMIN — HEPARIN SODIUM 1500 UNITS/HR: 10000 INJECTION, SOLUTION INTRAVENOUS at 23:46

## 2024-01-01 RX ADMIN — FERROUS SULFATE TAB 325 MG (65 MG ELEMENTAL FE) 325 MG: 325 (65 FE) TAB at 09:32

## 2024-01-01 RX ADMIN — IRON SUCROSE 300 MG: 20 INJECTION, SOLUTION INTRAVENOUS at 13:49

## 2024-01-01 RX ADMIN — CEFTRIAXONE SODIUM 1 G: 1 INJECTION, POWDER, FOR SOLUTION INTRAMUSCULAR; INTRAVENOUS at 13:19

## 2024-01-01 RX ADMIN — ACETAMINOPHEN 650 MG: 325 TABLET, FILM COATED ORAL at 03:59

## 2024-01-01 RX ADMIN — HYDROMORPHONE HYDROCHLORIDE 0.2 MG: 0.2 INJECTION, SOLUTION INTRAMUSCULAR; INTRAVENOUS; SUBCUTANEOUS at 14:28

## 2024-01-01 RX ADMIN — HEPARIN SODIUM 1350 UNITS/HR: 10000 INJECTION, SOLUTION INTRAVENOUS at 05:45

## 2024-01-01 RX ADMIN — SUCRALFATE 1 G: 1 SUSPENSION ORAL at 14:00

## 2024-01-01 RX ADMIN — MULTIVITAMIN TABLET 1 TABLET: TABLET at 08:29

## 2024-01-01 RX ADMIN — LORAZEPAM 0.5 MG: 2 INJECTION, SOLUTION INTRAMUSCULAR; INTRAVENOUS at 14:25

## 2024-01-01 RX ADMIN — MEROPENEM 1 G: 1 INJECTION, POWDER, FOR SOLUTION INTRAVENOUS at 06:52

## 2024-01-01 RX ADMIN — POTASSIUM CHLORIDE 10 MEQ: 7.46 INJECTION, SOLUTION INTRAVENOUS at 20:12

## 2024-01-01 RX ADMIN — PANTOPRAZOLE SODIUM 40 MG: 40 INJECTION, POWDER, FOR SOLUTION INTRAVENOUS at 08:31

## 2024-01-01 RX ADMIN — AMOXICILLIN AND CLAVULANATE POTASSIUM 1 TABLET: 875; 125 TABLET, FILM COATED ORAL at 20:08

## 2024-01-01 RX ADMIN — WARFARIN SODIUM 1 MG: 1 TABLET ORAL at 18:00

## 2024-01-01 RX ADMIN — ACETAMINOPHEN 1000 MG: 500 TABLET, FILM COATED ORAL at 09:24

## 2024-01-01 RX ADMIN — VANCOMYCIN HYDROCHLORIDE 1250 MG: 10 INJECTION, POWDER, LYOPHILIZED, FOR SOLUTION INTRAVENOUS at 21:35

## 2024-01-01 RX ADMIN — DOXYCYCLINE HYCLATE 100 MG: 100 CAPSULE ORAL at 10:59

## 2024-01-01 RX ADMIN — PANTOPRAZOLE SODIUM 40 MG: 40 TABLET, DELAYED RELEASE ORAL at 08:20

## 2024-01-01 RX ADMIN — SODIUM CHLORIDE 1000 ML: 9 INJECTION, SOLUTION INTRAVENOUS at 14:59

## 2024-01-01 RX ADMIN — POTASSIUM & SODIUM PHOSPHATES POWDER PACK 280-160-250 MG 1 PACKET: 280-160-250 PACK at 14:37

## 2024-01-01 RX ADMIN — POTASSIUM CHLORIDE 40 MEQ: 1.5 POWDER, FOR SOLUTION ORAL at 00:53

## 2024-01-01 RX ADMIN — SUCRALFATE 1 G: 1 SUSPENSION ORAL at 16:51

## 2024-01-01 RX ADMIN — PANTOPRAZOLE SODIUM 40 MG: 40 INJECTION, POWDER, FOR SOLUTION INTRAVENOUS at 21:06

## 2024-01-01 RX ADMIN — DUTASTERIDE 0.5 MG: 0.5 CAPSULE, LIQUID FILLED ORAL at 10:00

## 2024-01-01 RX ADMIN — SODIUM CHLORIDE 500 ML: 9 INJECTION, SOLUTION INTRAVENOUS at 17:57

## 2024-01-01 RX ADMIN — POTASSIUM CHLORIDE 20 MEQ: 20 SOLUTION ORAL at 12:47

## 2024-01-01 RX ADMIN — HEPARIN SODIUM 1500 UNITS/HR: 10000 INJECTION, SOLUTION INTRAVENOUS at 15:06

## 2024-01-01 RX ADMIN — MICONAZOLE NITRATE: 20 POWDER TOPICAL at 08:46

## 2024-01-01 RX ADMIN — SUCRALFATE 1 G: 1 SUSPENSION ORAL at 22:05

## 2024-01-01 RX ADMIN — DUTASTERIDE 0.5 MG: 0.5 CAPSULE, LIQUID FILLED ORAL at 09:55

## 2024-01-01 RX ADMIN — PANTOPRAZOLE SODIUM 40 MG: 40 TABLET, DELAYED RELEASE ORAL at 06:50

## 2024-01-01 RX ADMIN — HUMAN ALBUMIN MICROSPHERES AND PERFLUTREN 9 ML: 10; .22 INJECTION, SOLUTION INTRAVENOUS at 08:51

## 2024-01-01 RX ADMIN — CEFEPIME 2 G: 2 INJECTION, POWDER, FOR SOLUTION INTRAVENOUS at 09:24

## 2024-01-01 RX ADMIN — HYDROMORPHONE HYDROCHLORIDE 2 MG: 2 TABLET ORAL at 08:36

## 2024-01-01 RX ADMIN — MULTIVITAMIN TABLET 1 TABLET: TABLET at 09:07

## 2024-01-01 RX ADMIN — WARFARIN SODIUM 1 MG: 1 TABLET ORAL at 17:45

## 2024-01-01 RX ADMIN — MICONAZOLE NITRATE: 2 POWDER TOPICAL at 08:36

## 2024-01-01 RX ADMIN — CEFTRIAXONE SODIUM 2 G: 2 INJECTION, POWDER, FOR SOLUTION INTRAMUSCULAR; INTRAVENOUS at 09:50

## 2024-01-01 RX ADMIN — ACETAMINOPHEN 1000 MG: 500 TABLET, FILM COATED ORAL at 16:08

## 2024-01-01 RX ADMIN — NOREPINEPHRINE BITARTRATE 6.4 MCG: 1 INJECTION, SOLUTION, CONCENTRATE INTRAVENOUS at 11:26

## 2024-01-01 SDOH — HEALTH STABILITY: PHYSICAL HEALTH: ON AVERAGE, HOW MANY MINUTES DO YOU ENGAGE IN EXERCISE AT THIS LEVEL?: 0 MIN

## 2024-01-01 SDOH — HEALTH STABILITY: PHYSICAL HEALTH: ON AVERAGE, HOW MANY DAYS PER WEEK DO YOU ENGAGE IN MODERATE TO STRENUOUS EXERCISE (LIKE A BRISK WALK)?: 0 DAYS

## 2024-01-01 ASSESSMENT — ACTIVITIES OF DAILY LIVING (ADL)
ADLS_ACUITY_SCORE: 59
ADLS_ACUITY_SCORE: 42
ADLS_ACUITY_SCORE: 37
ADLS_ACUITY_SCORE: 65
ADLS_ACUITY_SCORE: 56
ADLS_ACUITY_SCORE: 49
ADLS_ACUITY_SCORE: 55
ADLS_ACUITY_SCORE: 64
TOILETING: 0-->INDEPENDENT
ADLS_ACUITY_SCORE: 64
ADLS_ACUITY_SCORE: 39
ADLS_ACUITY_SCORE: 52
ADLS_ACUITY_SCORE: 49
ADLS_ACUITY_SCORE: 56
ADLS_ACUITY_SCORE: 58
ADLS_ACUITY_SCORE: 61
ADLS_ACUITY_SCORE: 51
ADLS_ACUITY_SCORE: 46
ADLS_ACUITY_SCORE: 38
ADLS_ACUITY_SCORE: 47
ADLS_ACUITY_SCORE: 56
ADLS_ACUITY_SCORE: 38
ADLS_ACUITY_SCORE: 43
ADLS_ACUITY_SCORE: 59
ADLS_ACUITY_SCORE: 56
ADLS_ACUITY_SCORE: 64
ADLS_ACUITY_SCORE: 56
ADLS_ACUITY_SCORE: 56
ADLS_ACUITY_SCORE: 39
ADLS_ACUITY_SCORE: 56
ADLS_ACUITY_SCORE: 60
ADLS_ACUITY_SCORE: 65
ADLS_ACUITY_SCORE: 66
ADLS_ACUITY_SCORE: 49
ADLS_ACUITY_SCORE: 54
ADLS_ACUITY_SCORE: 55
ADLS_ACUITY_SCORE: 64
ADLS_ACUITY_SCORE: 47
ADLS_ACUITY_SCORE: 52
ADLS_ACUITY_SCORE: 64
ADLS_ACUITY_SCORE: 47
ADLS_ACUITY_SCORE: 66
ADLS_ACUITY_SCORE: 54
ADLS_ACUITY_SCORE: 57
ADLS_ACUITY_SCORE: 56
ADLS_ACUITY_SCORE: 55
ADLS_ACUITY_SCORE: 56
ADLS_ACUITY_SCORE: 39
ADLS_ACUITY_SCORE: 45
ADLS_ACUITY_SCORE: 64
ADLS_ACUITY_SCORE: 64
ADLS_ACUITY_SCORE: 46
ADLS_ACUITY_SCORE: 42
ADLS_ACUITY_SCORE: 57
ADLS_ACUITY_SCORE: 48
ADLS_ACUITY_SCORE: 65
ADLS_ACUITY_SCORE: 48
ADLS_ACUITY_SCORE: 0
ADLS_ACUITY_SCORE: 63
ADLS_ACUITY_SCORE: 49
ADLS_ACUITY_SCORE: 51
ADLS_ACUITY_SCORE: 47
ADLS_ACUITY_SCORE: 59
ADLS_ACUITY_SCORE: 39
ADLS_ACUITY_SCORE: 65
ADLS_ACUITY_SCORE: 47
ADLS_ACUITY_SCORE: 39
ADLS_ACUITY_SCORE: 47
ADLS_ACUITY_SCORE: 58
ADLS_ACUITY_SCORE: 0
ADLS_ACUITY_SCORE: 42
ADLS_ACUITY_SCORE: 64
ADLS_ACUITY_SCORE: 47
ADLS_ACUITY_SCORE: 46
ADLS_ACUITY_SCORE: 46
ADLS_ACUITY_SCORE: 64
ADLS_ACUITY_SCORE: 51
ADLS_ACUITY_SCORE: 64
ADLS_ACUITY_SCORE: 50
ADLS_ACUITY_SCORE: 52
ADLS_ACUITY_SCORE: 39
ADLS_ACUITY_SCORE: 49
ADLS_ACUITY_SCORE: 58
ADLS_ACUITY_SCORE: 47
ADLS_ACUITY_SCORE: 54
ADLS_ACUITY_SCORE: 66
ADLS_ACUITY_SCORE: 40
ADLS_ACUITY_SCORE: 66
ADLS_ACUITY_SCORE: 58
ADLS_ACUITY_SCORE: 54
ADLS_ACUITY_SCORE: 38
ADLS_ACUITY_SCORE: 64
ADLS_ACUITY_SCORE: 65
ADLS_ACUITY_SCORE: 56
ADLS_ACUITY_SCORE: 47
ADLS_ACUITY_SCORE: 66
ADLS_ACUITY_SCORE: 56
ADLS_ACUITY_SCORE: 39
ADLS_ACUITY_SCORE: 65
ADLS_ACUITY_SCORE: 44
ADLS_ACUITY_SCORE: 52
ADLS_ACUITY_SCORE: 56
ADLS_ACUITY_SCORE: 0
ADLS_ACUITY_SCORE: 64
ADLS_ACUITY_SCORE: 46
ADLS_ACUITY_SCORE: 40
ADLS_ACUITY_SCORE: 48
ADLS_ACUITY_SCORE: 64
ADLS_ACUITY_SCORE: 58
ADLS_ACUITY_SCORE: 55
ADLS_ACUITY_SCORE: 0
ADLS_ACUITY_SCORE: 66
ADLS_ACUITY_SCORE: 67
ADLS_ACUITY_SCORE: 47
ADLS_ACUITY_SCORE: 0
ADLS_ACUITY_SCORE: 56
ADLS_ACUITY_SCORE: 61
ADLS_ACUITY_SCORE: 55
ADLS_ACUITY_SCORE: 39
ADLS_ACUITY_SCORE: 63
WEAR_GLASSES_OR_BLIND: NO
ADLS_ACUITY_SCORE: 60
ADLS_ACUITY_SCORE: 67
ADLS_ACUITY_SCORE: 45
ADLS_ACUITY_SCORE: 58
ADLS_ACUITY_SCORE: 47
ADLS_ACUITY_SCORE: 42
ADLS_ACUITY_SCORE: 0
ADLS_ACUITY_SCORE: 64
ADLS_ACUITY_SCORE: 66
ADLS_ACUITY_SCORE: 55
ADLS_ACUITY_SCORE: 46
ADLS_ACUITY_SCORE: 46
ADLS_ACUITY_SCORE: 65
ADLS_ACUITY_SCORE: 41
ADLS_ACUITY_SCORE: 44
ADLS_ACUITY_SCORE: 49
ADLS_ACUITY_SCORE: 56
ADLS_ACUITY_SCORE: 43
TOILETING: 2-->COMPLETELY DEPENDENT
DEPENDENT_IADLS:: CLEANING
ADLS_ACUITY_SCORE: 39
ADLS_ACUITY_SCORE: 30
ADLS_ACUITY_SCORE: 56
ADLS_ACUITY_SCORE: 64
ADLS_ACUITY_SCORE: 49
ADLS_ACUITY_SCORE: 0
ADLS_ACUITY_SCORE: 38
ADLS_ACUITY_SCORE: 58
ADLS_ACUITY_SCORE: 55
ADLS_ACUITY_SCORE: 39
ADLS_ACUITY_SCORE: 43
ADLS_ACUITY_SCORE: 66
ADLS_ACUITY_SCORE: 62
ADLS_ACUITY_SCORE: 56
ADLS_ACUITY_SCORE: 61
ADLS_ACUITY_SCORE: 47
ADLS_ACUITY_SCORE: 46
ADLS_ACUITY_SCORE: 56
ADLS_ACUITY_SCORE: 64
ADLS_ACUITY_SCORE: 62
ADLS_ACUITY_SCORE: 58
ADLS_ACUITY_SCORE: 55
DOING_ERRANDS_INDEPENDENTLY_DIFFICULTY: YES
ADLS_ACUITY_SCORE: 48
ADLS_ACUITY_SCORE: 55
ADLS_ACUITY_SCORE: 41
ADLS_ACUITY_SCORE: 47
ADLS_ACUITY_SCORE: 64
DEPENDENT_IADLS:: CLEANING;COOKING;LAUNDRY;SHOPPING;MEDICATION MANAGEMENT;TRANSPORTATION;MEAL PREPARATION
ADLS_ACUITY_SCORE: 47
ADLS_ACUITY_SCORE: 40
ADLS_ACUITY_SCORE: 35
ADLS_ACUITY_SCORE: 66
ADLS_ACUITY_SCORE: 55
ADLS_ACUITY_SCORE: 41
ADLS_ACUITY_SCORE: 55
ADLS_ACUITY_SCORE: 67
ADLS_ACUITY_SCORE: 64
ADLS_ACUITY_SCORE: 46
ADLS_ACUITY_SCORE: 46
ADLS_ACUITY_SCORE: 44
ADLS_ACUITY_SCORE: 65
ADLS_ACUITY_SCORE: 42
ADLS_ACUITY_SCORE: 43
ADLS_ACUITY_SCORE: 47
ADLS_ACUITY_SCORE: 0
ADLS_ACUITY_SCORE: 57
ADLS_ACUITY_SCORE: 65
ADLS_ACUITY_SCORE: 64
ADLS_ACUITY_SCORE: 48
ADLS_ACUITY_SCORE: 43
ADLS_ACUITY_SCORE: 0
ADLS_ACUITY_SCORE: 56
ADLS_ACUITY_SCORE: 65
TOILETING_ASSISTANCE: TOILETING DIFFICULTY, DEPENDENT
ADLS_ACUITY_SCORE: 45
ADLS_ACUITY_SCORE: 66
ADLS_ACUITY_SCORE: 46
ADLS_ACUITY_SCORE: 52
ADLS_ACUITY_SCORE: 43
ADLS_ACUITY_SCORE: 54
ADLS_ACUITY_SCORE: 55
ADLS_ACUITY_SCORE: 39
ADLS_ACUITY_SCORE: 59
ADLS_ACUITY_SCORE: 45
ADLS_ACUITY_SCORE: 40
ADLS_ACUITY_SCORE: 60
ADLS_ACUITY_SCORE: 43
ADLS_ACUITY_SCORE: 65
ADLS_ACUITY_SCORE: 44
ADLS_ACUITY_SCORE: 39
ADLS_ACUITY_SCORE: 0
ADLS_ACUITY_SCORE: 66
ADLS_ACUITY_SCORE: 41
ADLS_ACUITY_SCORE: 47
ADLS_ACUITY_SCORE: 55
ADLS_ACUITY_SCORE: 54
ADLS_ACUITY_SCORE: 50
ADLS_ACUITY_SCORE: 67
ADLS_ACUITY_SCORE: 64
ADLS_ACUITY_SCORE: 55
ADLS_ACUITY_SCORE: 68
ADLS_ACUITY_SCORE: 57
ADLS_ACUITY_SCORE: 57
ADLS_ACUITY_SCORE: 47
ADLS_ACUITY_SCORE: 58
ADLS_ACUITY_SCORE: 67
ADLS_ACUITY_SCORE: 41
ADLS_ACUITY_SCORE: 47
ADLS_ACUITY_SCORE: 56
ADLS_ACUITY_SCORE: 66
ADLS_ACUITY_SCORE: 66
ADLS_ACUITY_SCORE: 56
ADLS_ACUITY_SCORE: 67
ADLS_ACUITY_SCORE: 41
ADLS_ACUITY_SCORE: 49
ADLS_ACUITY_SCORE: 46
ADLS_ACUITY_SCORE: 39
ADLS_ACUITY_SCORE: 39
ADLS_ACUITY_SCORE: 58
ADLS_ACUITY_SCORE: 64
ADLS_ACUITY_SCORE: 51
ADLS_ACUITY_SCORE: 47
ADLS_ACUITY_SCORE: 47
ADLS_ACUITY_SCORE: 67
ADLS_ACUITY_SCORE: 41
ADLS_ACUITY_SCORE: 45
ADLS_ACUITY_SCORE: 52
ADLS_ACUITY_SCORE: 58
ADLS_ACUITY_SCORE: 47
ADLS_ACUITY_SCORE: 55
ADLS_ACUITY_SCORE: 57
ADLS_ACUITY_SCORE: 57
ADLS_ACUITY_SCORE: 58
ADLS_ACUITY_SCORE: 58
ADLS_ACUITY_SCORE: 57
ADLS_ACUITY_SCORE: 56
ADLS_ACUITY_SCORE: 48
ADLS_ACUITY_SCORE: 66
ADLS_ACUITY_SCORE: 64
ADLS_ACUITY_SCORE: 39
ADLS_ACUITY_SCORE: 39
ADLS_ACUITY_SCORE: 63
ADLS_ACUITY_SCORE: 59
ADLS_ACUITY_SCORE: 39
ADLS_ACUITY_SCORE: 0
ADLS_ACUITY_SCORE: 65
ADLS_ACUITY_SCORE: 39
ADLS_ACUITY_SCORE: 35
ADLS_ACUITY_SCORE: 39
ADLS_ACUITY_SCORE: 46
ADLS_ACUITY_SCORE: 68
ADLS_ACUITY_SCORE: 41
ADLS_ACUITY_SCORE: 54
ADLS_ACUITY_SCORE: 45
ADLS_ACUITY_SCORE: 41
ADLS_ACUITY_SCORE: 61
ADLS_ACUITY_SCORE: 68
ADLS_ACUITY_SCORE: 0
ADLS_ACUITY_SCORE: 55
ADLS_ACUITY_SCORE: 59
ADLS_ACUITY_SCORE: 46
ADLS_ACUITY_SCORE: 49
ADLS_ACUITY_SCORE: 0
ADLS_ACUITY_SCORE: 56
ADLS_ACUITY_SCORE: 64
ADLS_ACUITY_SCORE: 53
ADLS_ACUITY_SCORE: 54
ADLS_ACUITY_SCORE: 0
ADLS_ACUITY_SCORE: 55
ADLS_ACUITY_SCORE: 47
ADLS_ACUITY_SCORE: 40
ADLS_ACUITY_SCORE: 47
ADLS_ACUITY_SCORE: 60
ADLS_ACUITY_SCORE: 60
ADLS_ACUITY_SCORE: 63
ADLS_ACUITY_SCORE: 38
ADLS_ACUITY_SCORE: 64
ADLS_ACUITY_SCORE: 0
ADLS_ACUITY_SCORE: 41
ADLS_ACUITY_SCORE: 67
ADLS_ACUITY_SCORE: 43
ADLS_ACUITY_SCORE: 63
ADLS_ACUITY_SCORE: 55
ADLS_ACUITY_SCORE: 39
ADLS_ACUITY_SCORE: 64
ADLS_ACUITY_SCORE: 42
ADLS_ACUITY_SCORE: 43
ADLS_ACUITY_SCORE: 52
ADLS_ACUITY_SCORE: 54
ADLS_ACUITY_SCORE: 44
ADLS_ACUITY_SCORE: 64
ADLS_ACUITY_SCORE: 49
ADLS_ACUITY_SCORE: 39
ADLS_ACUITY_SCORE: 54
ADLS_ACUITY_SCORE: 48
ADLS_ACUITY_SCORE: 47
ADLS_ACUITY_SCORE: 0
ADLS_ACUITY_SCORE: 47
ADLS_ACUITY_SCORE: 47
ADLS_ACUITY_SCORE: 56
ADLS_ACUITY_SCORE: 55
ADLS_ACUITY_SCORE: 58
ADLS_ACUITY_SCORE: 57
ADLS_ACUITY_SCORE: 56
ADLS_ACUITY_SCORE: 52
ADLS_ACUITY_SCORE: 48
ADLS_ACUITY_SCORE: 57
ADLS_ACUITY_SCORE: 0
ADLS_ACUITY_SCORE: 56
ADLS_ACUITY_SCORE: 66
ADLS_ACUITY_SCORE: 40
DIFFICULTY_COMMUNICATING: NO
ADLS_ACUITY_SCORE: 68
ADLS_ACUITY_SCORE: 64
ADLS_ACUITY_SCORE: 42
ADLS_ACUITY_SCORE: 49
ADLS_ACUITY_SCORE: 51
ADLS_ACUITY_SCORE: 39
ADLS_ACUITY_SCORE: 64
ADLS_ACUITY_SCORE: 65
ADLS_ACUITY_SCORE: 47
ADLS_ACUITY_SCORE: 46
ADLS_ACUITY_SCORE: 40
DRESSING/BATHING: BATHING DIFFICULTY, ASSISTANCE 1 PERSON
ADLS_ACUITY_SCORE: 40
ADLS_ACUITY_SCORE: 43
ADLS_ACUITY_SCORE: 43
ADLS_ACUITY_SCORE: 53
ADLS_ACUITY_SCORE: 39
ADLS_ACUITY_SCORE: 39
ADLS_ACUITY_SCORE: 47
ADLS_ACUITY_SCORE: 42
ADLS_ACUITY_SCORE: 0
ADLS_ACUITY_SCORE: 64
ADLS_ACUITY_SCORE: 64
ADLS_ACUITY_SCORE: 55
ADLS_ACUITY_SCORE: 38
ADLS_ACUITY_SCORE: 39
ADLS_ACUITY_SCORE: 52
ADLS_ACUITY_SCORE: 40
ADLS_ACUITY_SCORE: 46
ADLS_ACUITY_SCORE: 39
ADLS_ACUITY_SCORE: 66
ADLS_ACUITY_SCORE: 57
ADLS_ACUITY_SCORE: 63
ADLS_ACUITY_SCORE: 0
ADLS_ACUITY_SCORE: 55
ADLS_ACUITY_SCORE: 47
ADLS_ACUITY_SCORE: 44
ADLS_ACUITY_SCORE: 46
ADLS_ACUITY_SCORE: 39
ADLS_ACUITY_SCORE: 47
ADLS_ACUITY_SCORE: 56
ADLS_ACUITY_SCORE: 49
ADLS_ACUITY_SCORE: 43
ADLS_ACUITY_SCORE: 44
ADLS_ACUITY_SCORE: 65
ADLS_ACUITY_SCORE: 47
ADLS_ACUITY_SCORE: 47
ADLS_ACUITY_SCORE: 35
ADLS_ACUITY_SCORE: 55
ADLS_ACUITY_SCORE: 46
ADLS_ACUITY_SCORE: 47
ADLS_ACUITY_SCORE: 48
ADLS_ACUITY_SCORE: 41
ADLS_ACUITY_SCORE: 47
ADLS_ACUITY_SCORE: 40
ADLS_ACUITY_SCORE: 69
ADLS_ACUITY_SCORE: 55
ADLS_ACUITY_SCORE: 39
ADLS_ACUITY_SCORE: 41
ADLS_ACUITY_SCORE: 56
ADLS_ACUITY_SCORE: 38
ADLS_ACUITY_SCORE: 56
ADLS_ACUITY_SCORE: 46
ADLS_ACUITY_SCORE: 59
ADLS_ACUITY_SCORE: 41
ADLS_ACUITY_SCORE: 38
ADLS_ACUITY_SCORE: 0
ADLS_ACUITY_SCORE: 48
ADLS_ACUITY_SCORE: 66
ADLS_ACUITY_SCORE: 46
ADLS_ACUITY_SCORE: 52
ADLS_ACUITY_SCORE: 56
ADLS_ACUITY_SCORE: 47
ADLS_ACUITY_SCORE: 59
ADLS_ACUITY_SCORE: 64
ADLS_ACUITY_SCORE: 56
ADLS_ACUITY_SCORE: 39
ADLS_ACUITY_SCORE: 50
ADLS_ACUITY_SCORE: 43
ADLS_ACUITY_SCORE: 59
ADLS_ACUITY_SCORE: 55
ADLS_ACUITY_SCORE: 63
ADLS_ACUITY_SCORE: 67
ADLS_ACUITY_SCORE: 55
ADLS_ACUITY_SCORE: 39
ADLS_ACUITY_SCORE: 63
ADLS_ACUITY_SCORE: 55
ADLS_ACUITY_SCORE: 57
ADLS_ACUITY_SCORE: 49
ADLS_ACUITY_SCORE: 46
ADLS_ACUITY_SCORE: 43
ADLS_ACUITY_SCORE: 55
ADLS_ACUITY_SCORE: 49
ADLS_ACUITY_SCORE: 0
ADLS_ACUITY_SCORE: 0
ADLS_ACUITY_SCORE: 67
ADLS_ACUITY_SCORE: 67
ADLS_ACUITY_SCORE: 55
ADLS_ACUITY_SCORE: 49
ADLS_ACUITY_SCORE: 59
ADLS_ACUITY_SCORE: 0
ADLS_ACUITY_SCORE: 66
ADLS_ACUITY_SCORE: 49
ADLS_ACUITY_SCORE: 43
ADLS_ACUITY_SCORE: 42
ADLS_ACUITY_SCORE: 48
ADLS_ACUITY_SCORE: 49
ADLS_ACUITY_SCORE: 42
ADLS_ACUITY_SCORE: 40
ADLS_ACUITY_SCORE: 54
ADLS_ACUITY_SCORE: 39
ADLS_ACUITY_SCORE: 66
ADLS_ACUITY_SCORE: 56
ADLS_ACUITY_SCORE: 55
ADLS_ACUITY_SCORE: 54
ADLS_ACUITY_SCORE: 66
ADLS_ACUITY_SCORE: 49
ADLS_ACUITY_SCORE: 57
DRESSING/BATHING_DIFFICULTY: YES
ADLS_ACUITY_SCORE: 55
ADLS_ACUITY_SCORE: 56
ADLS_ACUITY_SCORE: 65
CONCENTRATING,_REMEMBERING_OR_MAKING_DECISIONS_DIFFICULTY: YES
ADLS_ACUITY_SCORE: 64
ADLS_ACUITY_SCORE: 63
ADLS_ACUITY_SCORE: 43
ADLS_ACUITY_SCORE: 46
ADLS_ACUITY_SCORE: 67
ADLS_ACUITY_SCORE: 37
ADLS_ACUITY_SCORE: 66
TOILETING_ASSISTANCE: TOILETING DIFFICULTY, ASSISTANCE 1 PERSON
ADLS_ACUITY_SCORE: 56
ADLS_ACUITY_SCORE: 59
ADLS_ACUITY_SCORE: 63
ADLS_ACUITY_SCORE: 56
ADLS_ACUITY_SCORE: 55
ADLS_ACUITY_SCORE: 54
ADLS_ACUITY_SCORE: 55
ADLS_ACUITY_SCORE: 41
ADLS_ACUITY_SCORE: 52
ADLS_ACUITY_SCORE: 48
ADLS_ACUITY_SCORE: 48
ADLS_ACUITY_SCORE: 39
ADLS_ACUITY_SCORE: 49
ADLS_ACUITY_SCORE: 40
ADLS_ACUITY_SCORE: 66
ADLS_ACUITY_SCORE: 39
ADLS_ACUITY_SCORE: 39
ADLS_ACUITY_SCORE: 0
ADLS_ACUITY_SCORE: 65
ADLS_ACUITY_SCORE: 55
ADLS_ACUITY_SCORE: 45
ADLS_ACUITY_SCORE: 49
ADLS_ACUITY_SCORE: 0
ADLS_ACUITY_SCORE: 49
ADLS_ACUITY_SCORE: 52
ADLS_ACUITY_SCORE: 47
ADLS_ACUITY_SCORE: 0
ADLS_ACUITY_SCORE: 47
ADLS_ACUITY_SCORE: 64
ADLS_ACUITY_SCORE: 53
ADLS_ACUITY_SCORE: 61
ADLS_ACUITY_SCORE: 46
ADLS_ACUITY_SCORE: 59
ADLS_ACUITY_SCORE: 0
ADLS_ACUITY_SCORE: 52
ADLS_ACUITY_SCORE: 66
ADLS_ACUITY_SCORE: 68
ADLS_ACUITY_SCORE: 66
ADLS_ACUITY_SCORE: 53
ADLS_ACUITY_SCORE: 66
ADLS_ACUITY_SCORE: 39
ADLS_ACUITY_SCORE: 48
ADLS_ACUITY_SCORE: 55
ADLS_ACUITY_SCORE: 65
ADLS_ACUITY_SCORE: 52
ADLS_ACUITY_SCORE: 65
ADLS_ACUITY_SCORE: 65
ADLS_ACUITY_SCORE: 66
ADLS_ACUITY_SCORE: 64
ADLS_ACUITY_SCORE: 39
ADLS_ACUITY_SCORE: 61
ADLS_ACUITY_SCORE: 46
ADLS_ACUITY_SCORE: 60
ADLS_ACUITY_SCORE: 39
ADLS_ACUITY_SCORE: 56
ADLS_ACUITY_SCORE: 55
ADLS_ACUITY_SCORE: 55
ADLS_ACUITY_SCORE: 65
DRESSING/BATHING_DIFFICULTY: YES
ADLS_ACUITY_SCORE: 43
ADLS_ACUITY_SCORE: 58
ADLS_ACUITY_SCORE: 65
ADLS_ACUITY_SCORE: 64
DRESSING/BATHING: DRESSING DIFFICULTY, DEPENDENT;BATHING DIFFICULTY, DEPENDENT
ADLS_ACUITY_SCORE: 55
ADLS_ACUITY_SCORE: 57
ADLS_ACUITY_SCORE: 64
ADLS_ACUITY_SCORE: 47
ADLS_ACUITY_SCORE: 55
ADLS_ACUITY_SCORE: 64
ADLS_ACUITY_SCORE: 70
ADLS_ACUITY_SCORE: 45
ADLS_ACUITY_SCORE: 45
ADLS_ACUITY_SCORE: 54
ADLS_ACUITY_SCORE: 44
ADLS_ACUITY_SCORE: 56
ADLS_ACUITY_SCORE: 63
ADLS_ACUITY_SCORE: 66
ADLS_ACUITY_SCORE: 64
ADLS_ACUITY_SCORE: 55
ADLS_ACUITY_SCORE: 48
ADLS_ACUITY_SCORE: 57
ADLS_ACUITY_SCORE: 64
ADLS_ACUITY_SCORE: 0
ADLS_ACUITY_SCORE: 64
ADLS_ACUITY_SCORE: 51
ADLS_ACUITY_SCORE: 55
ADLS_ACUITY_SCORE: 52
ADLS_ACUITY_SCORE: 54
ADLS_ACUITY_SCORE: 53
ADLS_ACUITY_SCORE: 42
ADLS_ACUITY_SCORE: 56
ADLS_ACUITY_SCORE: 70
ADLS_ACUITY_SCORE: 66
ADLS_ACUITY_SCORE: 0
ADLS_ACUITY_SCORE: 60
ADLS_ACUITY_SCORE: 0
ADLS_ACUITY_SCORE: 66
DOING_ERRANDS_INDEPENDENTLY_DIFFICULTY: YES
ADLS_ACUITY_SCORE: 65
ADLS_ACUITY_SCORE: 35
ADLS_ACUITY_SCORE: 64
ADLS_ACUITY_SCORE: 47
ADLS_ACUITY_SCORE: 46
ADLS_ACUITY_SCORE: 40
ADLS_ACUITY_SCORE: 56
ADLS_ACUITY_SCORE: 64
ADLS_ACUITY_SCORE: 55
ADLS_ACUITY_SCORE: 57
ADLS_ACUITY_SCORE: 41
ADLS_ACUITY_SCORE: 46
ADLS_ACUITY_SCORE: 58
ADLS_ACUITY_SCORE: 55
ADLS_ACUITY_SCORE: 47
ADLS_ACUITY_SCORE: 39
ADLS_ACUITY_SCORE: 0
ADLS_ACUITY_SCORE: 54
ADLS_ACUITY_SCORE: 61
ADLS_ACUITY_SCORE: 53
ADLS_ACUITY_SCORE: 56
ADLS_ACUITY_SCORE: 0
ADLS_ACUITY_SCORE: 48
ADLS_ACUITY_SCORE: 57
ADLS_ACUITY_SCORE: 64
TOILETING: 2-->COMPLETELY DEPENDENT (NOT DEVELOPMENTALLY APPROPRIATE)
ADLS_ACUITY_SCORE: 43
ADLS_ACUITY_SCORE: 66
ADLS_ACUITY_SCORE: 49
ADLS_ACUITY_SCORE: 39
ADLS_ACUITY_SCORE: 39
ADLS_ACUITY_SCORE: 66
ADLS_ACUITY_SCORE: 39
ADLS_ACUITY_SCORE: 55
ADLS_ACUITY_SCORE: 39
ADLS_ACUITY_SCORE: 64
ADLS_ACUITY_SCORE: 57
ADLS_ACUITY_SCORE: 48
ADLS_ACUITY_SCORE: 37
ADLS_ACUITY_SCORE: 55
ADLS_ACUITY_SCORE: 66
ADLS_ACUITY_SCORE: 48
ADLS_ACUITY_SCORE: 46
ADLS_ACUITY_SCORE: 56
ADLS_ACUITY_SCORE: 64
ADLS_ACUITY_SCORE: 63
ADLS_ACUITY_SCORE: 46
ADLS_ACUITY_SCORE: 49
ADLS_ACUITY_SCORE: 47
ADLS_ACUITY_SCORE: 53
ADLS_ACUITY_SCORE: 47
ADLS_ACUITY_SCORE: 40
ADLS_ACUITY_SCORE: 54
ADLS_ACUITY_SCORE: 41
ADLS_ACUITY_SCORE: 0
ADLS_ACUITY_SCORE: 38
ADLS_ACUITY_SCORE: 47
ADLS_ACUITY_SCORE: 46
ADLS_ACUITY_SCORE: 0
ADLS_ACUITY_SCORE: 55
ADLS_ACUITY_SCORE: 46
ADLS_ACUITY_SCORE: 52
ADLS_ACUITY_SCORE: 49
ADLS_ACUITY_SCORE: 66
ADLS_ACUITY_SCORE: 46
ADLS_ACUITY_SCORE: 56
ADLS_ACUITY_SCORE: 43
ADLS_ACUITY_SCORE: 0
ADLS_ACUITY_SCORE: 47
ADLS_ACUITY_SCORE: 66
ADLS_ACUITY_SCORE: 52
ADLS_ACUITY_SCORE: 55
ADLS_ACUITY_SCORE: 40
ADLS_ACUITY_SCORE: 52
ADLS_ACUITY_SCORE: 43
ADLS_ACUITY_SCORE: 66
ADLS_ACUITY_SCORE: 54
ADLS_ACUITY_SCORE: 53
ADLS_ACUITY_SCORE: 54
ADLS_ACUITY_SCORE: 39
ADLS_ACUITY_SCORE: 56
ADLS_ACUITY_SCORE: 64
ADLS_ACUITY_SCORE: 54
ADLS_ACUITY_SCORE: 56
ADLS_ACUITY_SCORE: 46
ADLS_ACUITY_SCORE: 65
ADLS_ACUITY_SCORE: 43
ADLS_ACUITY_SCORE: 64
ADLS_ACUITY_SCORE: 47
ADLS_ACUITY_SCORE: 54
ADLS_ACUITY_SCORE: 66
ADLS_ACUITY_SCORE: 41
ADLS_ACUITY_SCORE: 38
ADLS_ACUITY_SCORE: 70
ADLS_ACUITY_SCORE: 66
ADLS_ACUITY_SCORE: 64
ADLS_ACUITY_SCORE: 56
ADLS_ACUITY_SCORE: 65
ADLS_ACUITY_SCORE: 42
ADLS_ACUITY_SCORE: 56
ADLS_ACUITY_SCORE: 51
ADLS_ACUITY_SCORE: 65
ADLS_ACUITY_SCORE: 54
ADLS_ACUITY_SCORE: 50
ADLS_ACUITY_SCORE: 54
ADLS_ACUITY_SCORE: 57
ADLS_ACUITY_SCORE: 39
ADLS_ACUITY_SCORE: 38
ADLS_ACUITY_SCORE: 49
ADLS_ACUITY_SCORE: 43
ADLS_ACUITY_SCORE: 66
ADLS_ACUITY_SCORE: 64
ADLS_ACUITY_SCORE: 58
ADLS_ACUITY_SCORE: 47
ADLS_ACUITY_SCORE: 47
ADLS_ACUITY_SCORE: 66
ADLS_ACUITY_SCORE: 46
ADLS_ACUITY_SCORE: 35
ADLS_ACUITY_SCORE: 40
ADLS_ACUITY_SCORE: 55
ADLS_ACUITY_SCORE: 0
ADLS_ACUITY_SCORE: 58
ADLS_ACUITY_SCORE: 0
ADLS_ACUITY_SCORE: 66
ADLS_ACUITY_SCORE: 37
ADLS_ACUITY_SCORE: 55
ADLS_ACUITY_SCORE: 56
ADLS_ACUITY_SCORE: 0
ADLS_ACUITY_SCORE: 64
ADLS_ACUITY_SCORE: 56
DIFFICULTY_EATING/SWALLOWING: YES
ADLS_ACUITY_SCORE: 43
ADLS_ACUITY_SCORE: 42
ADLS_ACUITY_SCORE: 56
ADLS_ACUITY_SCORE: 49
ADLS_ACUITY_SCORE: 46
ADLS_ACUITY_SCORE: 44
ADLS_ACUITY_SCORE: 70
ADLS_ACUITY_SCORE: 47
ADLS_ACUITY_SCORE: 69
ADLS_ACUITY_SCORE: 58
ADLS_ACUITY_SCORE: 47
ADLS_ACUITY_SCORE: 54
ADLS_ACUITY_SCORE: 65
ADLS_ACUITY_SCORE: 64
FALL_HISTORY_WITHIN_LAST_SIX_MONTHS: NO
ADLS_ACUITY_SCORE: 40
ADLS_ACUITY_SCORE: 66
ADLS_ACUITY_SCORE: 54
ADLS_ACUITY_SCORE: 65
ADLS_ACUITY_SCORE: 64
ADLS_ACUITY_SCORE: 39
ADLS_ACUITY_SCORE: 43
ADLS_ACUITY_SCORE: 47
ADLS_ACUITY_SCORE: 52
ADLS_ACUITY_SCORE: 60
ADLS_ACUITY_SCORE: 56
ADLS_ACUITY_SCORE: 39
ADLS_ACUITY_SCORE: 47
ADLS_ACUITY_SCORE: 56
ADLS_ACUITY_SCORE: 45
ADLS_ACUITY_SCORE: 47
ADLS_ACUITY_SCORE: 39
ADLS_ACUITY_SCORE: 63
ADLS_ACUITY_SCORE: 40
ADLS_ACUITY_SCORE: 49
ADLS_ACUITY_SCORE: 66
ADLS_ACUITY_SCORE: 0
ADLS_ACUITY_SCORE: 64
ADLS_ACUITY_SCORE: 43
ADLS_ACUITY_SCORE: 55
ADLS_ACUITY_SCORE: 56
ADLS_ACUITY_SCORE: 45
ADLS_ACUITY_SCORE: 64
ADLS_ACUITY_SCORE: 56
ADLS_ACUITY_SCORE: 66
ADLS_ACUITY_SCORE: 66
ADLS_ACUITY_SCORE: 51
ADLS_ACUITY_SCORE: 54
ADLS_ACUITY_SCORE: 64
EATING/SWALLOWING: SWALLOWING SOLID FOOD
ADLS_ACUITY_SCORE: 56
DEPENDENT_IADLS:: CLEANING
ADLS_ACUITY_SCORE: 66
ADLS_ACUITY_SCORE: 51
TOILETING_ISSUES: YES
ADLS_ACUITY_SCORE: 45
ADLS_ACUITY_SCORE: 64
ADLS_ACUITY_SCORE: 35
ADLS_ACUITY_SCORE: 35
ADLS_ACUITY_SCORE: 57
ADLS_ACUITY_SCORE: 64
ADLS_ACUITY_SCORE: 55
ADLS_ACUITY_SCORE: 55
ADLS_ACUITY_SCORE: 56
ADLS_ACUITY_SCORE: 65
ADLS_ACUITY_SCORE: 56
ADLS_ACUITY_SCORE: 65
ADLS_ACUITY_SCORE: 43
ADLS_ACUITY_SCORE: 50
ADLS_ACUITY_SCORE: 55
ADLS_ACUITY_SCORE: 64
ADLS_ACUITY_SCORE: 0
ADLS_ACUITY_SCORE: 56
ADLS_ACUITY_SCORE: 39
ADLS_ACUITY_SCORE: 47
ADLS_ACUITY_SCORE: 64
ADLS_ACUITY_SCORE: 64
ADLS_ACUITY_SCORE: 46
ADLS_ACUITY_SCORE: 40
ADLS_ACUITY_SCORE: 55
ADLS_ACUITY_SCORE: 49
ADLS_ACUITY_SCORE: 43
ADLS_ACUITY_SCORE: 56
ADLS_ACUITY_SCORE: 39
ADLS_ACUITY_SCORE: 39
ADLS_ACUITY_SCORE: 64
ADLS_ACUITY_SCORE: 60
ADLS_ACUITY_SCORE: 64
ADLS_ACUITY_SCORE: 53
ADLS_ACUITY_SCORE: 47
ADLS_ACUITY_SCORE: 44
ADLS_ACUITY_SCORE: 64
ADLS_ACUITY_SCORE: 35
ADLS_ACUITY_SCORE: 56
ADLS_ACUITY_SCORE: 53
ADLS_ACUITY_SCORE: 55
ADLS_ACUITY_SCORE: 62
ADLS_ACUITY_SCORE: 64
ADLS_ACUITY_SCORE: 0
ADLS_ACUITY_SCORE: 52
ADLS_ACUITY_SCORE: 0
TOILETING: 0-->INDEPENDENT
ADLS_ACUITY_SCORE: 47
ADLS_ACUITY_SCORE: 68
ADLS_ACUITY_SCORE: 47
ADLS_ACUITY_SCORE: 41
ADLS_ACUITY_SCORE: 47
ADLS_ACUITY_SCORE: 64
ADLS_ACUITY_SCORE: 64
ADLS_ACUITY_SCORE: 47
ADLS_ACUITY_SCORE: 46
ADLS_ACUITY_SCORE: 56
ADLS_ACUITY_SCORE: 61
ADLS_ACUITY_SCORE: 55
ADLS_ACUITY_SCORE: 39
ADLS_ACUITY_SCORE: 51
ADLS_ACUITY_SCORE: 60
ADLS_ACUITY_SCORE: 39
ADLS_ACUITY_SCORE: 61
CHANGE_IN_FUNCTIONAL_STATUS_SINCE_ONSET_OF_CURRENT_ILLNESS/INJURY: YES
ADLS_ACUITY_SCORE: 64
ADLS_ACUITY_SCORE: 43
ADLS_ACUITY_SCORE: 46
ADLS_ACUITY_SCORE: 67
ADLS_ACUITY_SCORE: 48
ADLS_ACUITY_SCORE: 55
ADLS_ACUITY_SCORE: 40
ADLS_ACUITY_SCORE: 49
ADLS_ACUITY_SCORE: 64
ADLS_ACUITY_SCORE: 52
ADLS_ACUITY_SCORE: 55
ADLS_ACUITY_SCORE: 40
ADLS_ACUITY_SCORE: 47
ADLS_ACUITY_SCORE: 68
ADLS_ACUITY_SCORE: 63
ADLS_ACUITY_SCORE: 57
ADLS_ACUITY_SCORE: 65
ADLS_ACUITY_SCORE: 55
ADLS_ACUITY_SCORE: 57
ADLS_ACUITY_SCORE: 65
ADLS_ACUITY_SCORE: 52
ADLS_ACUITY_SCORE: 43
ADLS_ACUITY_SCORE: 64
ADLS_ACUITY_SCORE: 58
ADLS_ACUITY_SCORE: 47
ADLS_ACUITY_SCORE: 66
ADLS_ACUITY_SCORE: 66
ADLS_ACUITY_SCORE: 63
ADLS_ACUITY_SCORE: 49
ADLS_ACUITY_SCORE: 42
ADLS_ACUITY_SCORE: 43
ADLS_ACUITY_SCORE: 49
ADLS_ACUITY_SCORE: 55
ADLS_ACUITY_SCORE: 38
ADLS_ACUITY_SCORE: 64
ADLS_ACUITY_SCORE: 61
ADLS_ACUITY_SCORE: 52
ADLS_ACUITY_SCORE: 64
ADLS_ACUITY_SCORE: 57
ADLS_ACUITY_SCORE: 70
WALKING_OR_CLIMBING_STAIRS_DIFFICULTY: YES
ADLS_ACUITY_SCORE: 66
ADLS_ACUITY_SCORE: 56
ADLS_ACUITY_SCORE: 45
ADLS_ACUITY_SCORE: 46
ADLS_ACUITY_SCORE: 64
ADLS_ACUITY_SCORE: 46
ADLS_ACUITY_SCORE: 64
ADLS_ACUITY_SCORE: 57
ADLS_ACUITY_SCORE: 52
ADLS_ACUITY_SCORE: 0
ADLS_ACUITY_SCORE: 66
ADLS_ACUITY_SCORE: 54
ADLS_ACUITY_SCORE: 54
ADLS_ACUITY_SCORE: 61
ADLS_ACUITY_SCORE: 40
ADLS_ACUITY_SCORE: 65
ADLS_ACUITY_SCORE: 45
ADLS_ACUITY_SCORE: 55
ADLS_ACUITY_SCORE: 43
ADLS_ACUITY_SCORE: 0
ADLS_ACUITY_SCORE: 0
ADLS_ACUITY_SCORE: 46
ADLS_ACUITY_SCORE: 0
ADLS_ACUITY_SCORE: 66
ADLS_ACUITY_SCORE: 64
ADLS_ACUITY_SCORE: 37
ADLS_ACUITY_SCORE: 35
ADLS_ACUITY_SCORE: 66
ADLS_ACUITY_SCORE: 35
ADLS_ACUITY_SCORE: 43
ADLS_ACUITY_SCORE: 47
ADLS_ACUITY_SCORE: 47
ADLS_ACUITY_SCORE: 55
ADLS_ACUITY_SCORE: 63
ADLS_ACUITY_SCORE: 65
ADLS_ACUITY_SCORE: 0
ADLS_ACUITY_SCORE: 52
ADLS_ACUITY_SCORE: 46
ADLS_ACUITY_SCORE: 61
ADLS_ACUITY_SCORE: 44
ADLS_ACUITY_SCORE: 67
ADLS_ACUITY_SCORE: 57
ADLS_ACUITY_SCORE: 54
ADLS_ACUITY_SCORE: 46
ADLS_ACUITY_SCORE: 0
ADLS_ACUITY_SCORE: 0
ADLS_ACUITY_SCORE: 46
ADLS_ACUITY_SCORE: 43
ADLS_ACUITY_SCORE: 51
ADLS_ACUITY_SCORE: 39
ADLS_ACUITY_SCORE: 60
ADLS_ACUITY_SCORE: 63
ADLS_ACUITY_SCORE: 55
ADLS_ACUITY_SCORE: 65
ADLS_ACUITY_SCORE: 52
ADLS_ACUITY_SCORE: 54
ADLS_ACUITY_SCORE: 46
ADLS_ACUITY_SCORE: 49
ADLS_ACUITY_SCORE: 0
ADLS_ACUITY_SCORE: 47
ADLS_ACUITY_SCORE: 49
ADLS_ACUITY_SCORE: 56
ADLS_ACUITY_SCORE: 57
ADLS_ACUITY_SCORE: 52
ADLS_ACUITY_SCORE: 56
ADLS_ACUITY_SCORE: 56
ADLS_ACUITY_SCORE: 47
ADLS_ACUITY_SCORE: 58
ADLS_ACUITY_SCORE: 47
ADLS_ACUITY_SCORE: 39
ADLS_ACUITY_SCORE: 49
TOILETING_ISSUES: YES
ADLS_ACUITY_SCORE: 49
ADLS_ACUITY_SCORE: 55
ADLS_ACUITY_SCORE: 52
ADLS_ACUITY_SCORE: 56
ADLS_ACUITY_SCORE: 47
DEPENDENT_IADLS:: CLEANING
ADLS_ACUITY_SCORE: 43
ADLS_ACUITY_SCORE: 39
ADLS_ACUITY_SCORE: 41
ADLS_ACUITY_SCORE: 48
ADLS_ACUITY_SCORE: 46
ADLS_ACUITY_SCORE: 64
ADLS_ACUITY_SCORE: 66
ADLS_ACUITY_SCORE: 39
ADLS_ACUITY_SCORE: 56
ADLS_ACUITY_SCORE: 37
ADLS_ACUITY_SCORE: 43
ADLS_ACUITY_SCORE: 56
ADLS_ACUITY_SCORE: 0
ADLS_ACUITY_SCORE: 39
ADLS_ACUITY_SCORE: 46
ADLS_ACUITY_SCORE: 56
ADLS_ACUITY_SCORE: 43
ADLS_ACUITY_SCORE: 61
ADLS_ACUITY_SCORE: 30
ADLS_ACUITY_SCORE: 47
ADLS_ACUITY_SCORE: 55
ADLS_ACUITY_SCORE: 0
ADLS_ACUITY_SCORE: 51
ADLS_ACUITY_SCORE: 64
ADLS_ACUITY_SCORE: 58
ADLS_ACUITY_SCORE: 57
ADLS_ACUITY_SCORE: 64
ADLS_ACUITY_SCORE: 55
ADLS_ACUITY_SCORE: 55
ADLS_ACUITY_SCORE: 56
ADLS_ACUITY_SCORE: 55
ADLS_ACUITY_SCORE: 37
ADLS_ACUITY_SCORE: 64
ADLS_ACUITY_SCORE: 39
ADLS_ACUITY_SCORE: 39
ADLS_ACUITY_SCORE: 58
ADLS_ACUITY_SCORE: 49
ADLS_ACUITY_SCORE: 47
ADLS_ACUITY_SCORE: 52
ADLS_ACUITY_SCORE: 61
ADLS_ACUITY_SCORE: 55
ADLS_ACUITY_SCORE: 47
ADLS_ACUITY_SCORE: 57
ADLS_ACUITY_SCORE: 41
ADLS_ACUITY_SCORE: 58
ADLS_ACUITY_SCORE: 56
ADLS_ACUITY_SCORE: 64
ADLS_ACUITY_SCORE: 41
ADLS_ACUITY_SCORE: 56
ADLS_ACUITY_SCORE: 49
ADLS_ACUITY_SCORE: 58
ADLS_ACUITY_SCORE: 39
ADLS_ACUITY_SCORE: 43
ADLS_ACUITY_SCORE: 64
ADLS_ACUITY_SCORE: 58
ADLS_ACUITY_SCORE: 55
ADLS_ACUITY_SCORE: 0
ADLS_ACUITY_SCORE: 52
ADLS_ACUITY_SCORE: 66
ADLS_ACUITY_SCORE: 51
ADLS_ACUITY_SCORE: 55
ADLS_ACUITY_SCORE: 49
ADLS_ACUITY_SCORE: 39
ADLS_ACUITY_SCORE: 47
ADLS_ACUITY_SCORE: 0
ADLS_ACUITY_SCORE: 65
ADLS_ACUITY_SCORE: 65
ADLS_ACUITY_SCORE: 52
ADLS_ACUITY_SCORE: 32
ADLS_ACUITY_SCORE: 46
ADLS_ACUITY_SCORE: 68
HEARING_DIFFICULTY_OR_DEAF: NO
ADLS_ACUITY_SCORE: 55
ADLS_ACUITY_SCORE: 64
ADLS_ACUITY_SCORE: 55
ADLS_ACUITY_SCORE: 0
ADLS_ACUITY_SCORE: 43
ADLS_ACUITY_SCORE: 46
ADLS_ACUITY_SCORE: 56
ADLS_ACUITY_SCORE: 66
ADLS_ACUITY_SCORE: 39
ADLS_ACUITY_SCORE: 65
ADLS_ACUITY_SCORE: 54
ADLS_ACUITY_SCORE: 64
ADLS_ACUITY_SCORE: 52
ADLS_ACUITY_SCORE: 40
ADLS_ACUITY_SCORE: 0
ADLS_ACUITY_SCORE: 38
ADLS_ACUITY_SCORE: 39
ADLS_ACUITY_SCORE: 66
ADLS_ACUITY_SCORE: 46
ADLS_ACUITY_SCORE: 44
ADLS_ACUITY_SCORE: 56
ADLS_ACUITY_SCORE: 46
ADLS_ACUITY_SCORE: 56
ADLS_ACUITY_SCORE: 65
ADLS_ACUITY_SCORE: 64
ADLS_ACUITY_SCORE: 40
ADLS_ACUITY_SCORE: 60
ADLS_ACUITY_SCORE: 42
ADLS_ACUITY_SCORE: 56
ADLS_ACUITY_SCORE: 58
ADLS_ACUITY_SCORE: 66
ADLS_ACUITY_SCORE: 56
ADLS_ACUITY_SCORE: 57
ADLS_ACUITY_SCORE: 39
ADLS_ACUITY_SCORE: 56
ADLS_ACUITY_SCORE: 0
ADLS_ACUITY_SCORE: 61
ADLS_ACUITY_SCORE: 64
ADLS_ACUITY_SCORE: 54
ADLS_ACUITY_SCORE: 41
ADLS_ACUITY_SCORE: 45
ADLS_ACUITY_SCORE: 66
ADLS_ACUITY_SCORE: 64
ADLS_ACUITY_SCORE: 63
ADLS_ACUITY_SCORE: 43
ADLS_ACUITY_SCORE: 47
ADLS_ACUITY_SCORE: 52
ADLS_ACUITY_SCORE: 48
ADLS_ACUITY_SCORE: 57
ADLS_ACUITY_SCORE: 41
ADLS_ACUITY_SCORE: 57
PREVIOUS_RESPONSIBILITIES: MEAL PREP;HOUSEKEEPING;MEDICATION MANAGEMENT;FINANCES;DRIVING
ADLS_ACUITY_SCORE: 46
ADLS_ACUITY_SCORE: 56
ADLS_ACUITY_SCORE: 0
ADLS_ACUITY_SCORE: 64
ADLS_ACUITY_SCORE: 48
ADLS_ACUITY_SCORE: 39
ADLS_ACUITY_SCORE: 56
ADLS_ACUITY_SCORE: 54
ADLS_ACUITY_SCORE: 56
ADLS_ACUITY_SCORE: 66
ADLS_ACUITY_SCORE: 43
ADLS_ACUITY_SCORE: 55
ADLS_ACUITY_SCORE: 64
ADLS_ACUITY_SCORE: 40
ADLS_ACUITY_SCORE: 56
ADLS_ACUITY_SCORE: 61
ADLS_ACUITY_SCORE: 56
ADLS_ACUITY_SCORE: 56
ADLS_ACUITY_SCORE: 59
ADLS_ACUITY_SCORE: 64
ADLS_ACUITY_SCORE: 43
ADLS_ACUITY_SCORE: 48
ADLS_ACUITY_SCORE: 56
ADLS_ACUITY_SCORE: 57
ADLS_ACUITY_SCORE: 47
ADLS_ACUITY_SCORE: 0
ADLS_ACUITY_SCORE: 52
ADLS_ACUITY_SCORE: 42
ADLS_ACUITY_SCORE: 64
ADLS_ACUITY_SCORE: 66
ADLS_ACUITY_SCORE: 47
ADLS_ACUITY_SCORE: 66
ADLS_ACUITY_SCORE: 43
ADLS_ACUITY_SCORE: 39
ADLS_ACUITY_SCORE: 46
ADLS_ACUITY_SCORE: 65
ADLS_ACUITY_SCORE: 56
ADLS_ACUITY_SCORE: 43
ADLS_ACUITY_SCORE: 48
ADLS_ACUITY_SCORE: 46
ADLS_ACUITY_SCORE: 46
ADLS_ACUITY_SCORE: 47
ADLS_ACUITY_SCORE: 54
ADLS_ACUITY_SCORE: 43
ADLS_ACUITY_SCORE: 54
WALKING_OR_CLIMBING_STAIRS: AMBULATION DIFFICULTY, DEPENDENT;TRANSFERRING DIFFICULTY, DEPENDENT;STAIR CLIMBING DIFFICULTY, DEPENDENT
ADLS_ACUITY_SCORE: 69
ADLS_ACUITY_SCORE: 46
ADLS_ACUITY_SCORE: 38
ADLS_ACUITY_SCORE: 50
ADLS_ACUITY_SCORE: 56
ADLS_ACUITY_SCORE: 66
ADLS_ACUITY_SCORE: 66
ADLS_ACUITY_SCORE: 56
ADLS_ACUITY_SCORE: 43
ADLS_ACUITY_SCORE: 56
ADLS_ACUITY_SCORE: 56
ADLS_ACUITY_SCORE: 51
ADLS_ACUITY_SCORE: 0
ADLS_ACUITY_SCORE: 46
ADLS_ACUITY_SCORE: 41
ADLS_ACUITY_SCORE: 53
ADLS_ACUITY_SCORE: 49
ADLS_ACUITY_SCORE: 53
ADLS_ACUITY_SCORE: 48
ADLS_ACUITY_SCORE: 46
ADLS_ACUITY_SCORE: 60
ADLS_ACUITY_SCORE: 59
ADLS_ACUITY_SCORE: 47
ADLS_ACUITY_SCORE: 58
ADLS_ACUITY_SCORE: 60
ADLS_ACUITY_SCORE: 52
ADLS_ACUITY_SCORE: 45
ADLS_ACUITY_SCORE: 39
ADLS_ACUITY_SCORE: 58
ADLS_ACUITY_SCORE: 47
ADLS_ACUITY_SCORE: 55
ADLS_ACUITY_SCORE: 63
ADLS_ACUITY_SCORE: 47
ADLS_ACUITY_SCORE: 44
ADLS_ACUITY_SCORE: 56
ADLS_ACUITY_SCORE: 65

## 2024-01-01 ASSESSMENT — NEW YORK HEART ASSOCIATION (NYHA) CLASSIFICATION: NYHA FUNCTIONAL CLASS: II

## 2024-01-01 ASSESSMENT — COLUMBIA-SUICIDE SEVERITY RATING SCALE - C-SSRS
1. IN THE PAST MONTH, HAVE YOU WISHED YOU WERE DEAD OR WISHED YOU COULD GO TO SLEEP AND NOT WAKE UP?: NO
2. HAVE YOU ACTUALLY HAD ANY THOUGHTS OF KILLING YOURSELF IN THE PAST MONTH?: NO
6. HAVE YOU EVER DONE ANYTHING, STARTED TO DO ANYTHING, OR PREPARED TO DO ANYTHING TO END YOUR LIFE?: NO

## 2024-01-05 NOTE — TELEPHONE ENCOUNTER
I would not recommend obtaining the RSV vaccination while still battling respiratory infection.  Symptoms have not improved I would recommend he come in for team office visit next week if available

## 2024-01-05 NOTE — TELEPHONE ENCOUNTER
Called patient regarding PCP message below. He verbalized understanding. Scheduled appointment. Writer did advise he go to UC if symptoms worsen over the weekend. Patient verbalized understanding.     Appointments in Next Year      Jan 09, 2024 10:00 AM  LAB with CS [facility]  [facility] ([facility] ) [phone]     Jan 09, 2024 10:30 AM  (Arrive by 10:10 AM)  Provider Visit with Juanita Kauffman PA-C  [facility] ([facility] ) [phone]     Mar 06, 2024 10:00 AM  (Arrive by 9:40 AM)  Annual Wellness Visit with Jayme Deng MD  [facility] ([facility] ) [phone]

## 2024-01-05 NOTE — PROGRESS NOTES
ANTICOAGULATION MANAGEMENT     Feng MADIE Wynne 78 year old male is on warfarin with supratherapeutic INR result. (Goal INR 2.5-3.5)    Recent labs: (last 7 days)     01/05/24  0000   INR 3.6*       ASSESSMENT     Source(s): Chart Review and Patient/Caregiver Call     Warfarin doses taken: Warfarin taken as instructed  Diet: Increased greens/vitamin K in diet; ongoing change  Medication/supplement changes: None noted  New illness, injury, or hospitalization: No  Signs or symptoms of bleeding or clotting: Yes: shortness of breath with exertion ( see TE with PCP on 1/5/24 encounter) per patient he is scheduled for OV on Tuesday - advised to go to ED for worsening of symptoms.  Previous result: Therapeutic last 2(+) visits  Additional findings: None       PLAN     Recommended plan for temporary change(s) affecting INR     Dosing Instructions: Continue your current warfarin dose with next INR in 1 week   per patient he will have inr checked on Tuesday with other labs to be drawn that day and will reassess INR and dose adjustment will be done if it is still high.    Summary  As of 1/5/2024      Full warfarin instructions:  10 mg every Wed, Sat; 5 mg all other days   Next INR check:  1/12/2024               Telephone call with Feng who verbalizes understanding and agrees to plan    Patient to recheck with home meter    Education provided:   Symptom monitoring: monitoring for bleeding signs and symptoms, monitoring for clotting signs and symptoms, and when to seek medical attention/emergency care  Contact [phone]  with any changes, questions or concerns.     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

## 2024-01-05 NOTE — TELEPHONE ENCOUNTER
"Patient calling reporting no changes in symptoms, particularly SOB with exertion, from VV with PCP on 12/20/23.     Patient wondering if SOB could be attributable to RSV, stating that \"it's the only thing I can think of\". Patient is also wondering if he can have RSV vaccine while still sick, or if he should wait.    Patient is planning to have labs done on 01/09/24 as recommended by PCP on 12/20/23 VV, but states that 01/09/24 is the soonest he can get a ride to the clinic.    PCP, please advise on patient's questions above. Patient has labs scheduled 01/09/24.    Callback: [phone] ok to leave a detailed vm    Betzy Pena RN  -[facility]  "

## 2024-01-09 PROBLEM — R00.1 BRADYCARDIA: Status: ACTIVE | Noted: 2024-01-01

## 2024-01-09 PROBLEM — R06.02 SOB (SHORTNESS OF BREATH): Status: ACTIVE | Noted: 2024-01-01

## 2024-01-09 PROBLEM — R79.1 SUPRATHERAPEUTIC INR: Status: ACTIVE | Noted: 2024-01-01

## 2024-01-09 PROBLEM — D64.9 ANEMIA, UNSPECIFIED TYPE: Status: ACTIVE | Noted: 2024-01-01

## 2024-01-09 NOTE — ED NOTES
Bed: ST02  Expected date:   Expected time:   Means of arrival:   Comments:  Domenica - 79 M jose hypotension now

## 2024-01-09 NOTE — ED NOTES
[facility]  ED Nurse Handoff Report    ED Chief complaint: Shortness of Breath, Hypotension, Bradycardia, and Abnormal Labs      ED Diagnosis:   Final diagnoses:   Anemia, unspecified type   Supratherapeutic INR   SOB (shortness of breath)   Bradycardia   Chronic atrial fibrillation (H)       Code Status: Not on file    Allergies:   Allergies   Allergen Reactions    Amiodarone Shortness Of Breath    Latex     Pcn [Penicillins]     Sulfites     Zetia [Ezetimibe] Muscle Pain (Myalgia)     Muscle weakness legs       Patient Story: Pt presents to the ED with hypotension and bradycardia, pt found to have an INR above 7 and a HGB of 4.8.    Focused Assessment:  Pt BPs 80/40.  HR in the 30s-40s.  Pt alert and oriented x4, reports ongoing SOB for several days but denies pain.  In house INR 5.37 Hgb 4.4.  Pt given 1 mg vitamin K PO.  Pt getting 1/2 unit of PRBC.  Pt also given 500 mls NS IV.   at bedside.  Plans for admission.     Treatments and/or interventions provided: See above  Patient's response to treatments and/or interventions: See above    To be done/followed up on inpatient unit:  NA    Does this patient have any cognitive concerns?:  None    Activity level - Baseline/Home:  Independent  Activity Level - Current:   Unknown    Patient's Preferred language: English   Needed?: No    Isolation: None  Infection: Not Applicable  Patient tested for COVID 19 prior to admission: NO  Bariatric?: Yes    Vital Signs:   Vitals:    01/09/24 1149 01/09/24 1230 01/09/24 1253 01/09/24 1305   BP:  92/72  (!) 88/43   Pulse:  (!) 40  (!) 45   Resp:  27  18   Temp: 96.8  F (36  C)  96.8  F (36  C) (!) 96.6  F (35.9  C)   TempSrc: Temporal   Temporal   SpO2:  98%  99%       Cardiac Rhythm:     Was the PSS-3 completed:   Yes  What interventions are required if any?               Family Comments:  at bedside  OBS brochure/video discussed/provided to patient/family: N/A              Name of person  given brochure if not patient: NA              Relationship to patient: NA    For the majority of the shift this patient's behavior was Green.   Behavioral interventions performed were NA.    ED NURSE PHONE NUMBER: RN 10 in ED

## 2024-01-09 NOTE — ED TRIAGE NOTES
Pt presents to the ED from clinic with hypotension and bradycardia.  Pt comes with low Hgb and high INR.     Triage Assessment (Adult)       Row Name 01/09/24 1145          Triage Assessment    Airway WDL WDL        Respiratory WDL    Respiratory WDL WDL        Skin Circulation/Temperature WDL    Skin Circulation/Temperature WDL WDL        Cardiac WDL    Cardiac WDL X  see note        Peripheral/Neurovascular WDL    Peripheral Neurovascular WDL WDL        Cognitive/Neuro/Behavioral WDL    Cognitive/Neuro/Behavioral WDL WDL

## 2024-01-09 NOTE — H&P
[facility]    History and Physical - Hospitalist Service       Date of Admission:  1/9/2024    Assessment & Plan      Feng Wynne is a 79 year old male with history of mechanical mitral valve replacement for severe mitral regurgitation and MAZE procedure in 2008, on chronic anticoagulation with warfarin, pulmonary hypertension, chronic diastolic CHF with preserved EF, previous dilated LV likely due to mitral regurgitation, subsequent improvement with ACE inhibitor and beta-blockers, hypertension, hyperlipidemia, intolerant to statins and Zetia, who presents to ER on 1/9/2024 due to 3 months of progressive shortness of breath and finding of severe anemia.    He is also noted to have sinus bradycardia with heart rate in 30s, ENRIQUE, mild hypokalemia    Subacute normocytic anemia, hemoglobin 4.4, likely GI bleed due to anticoagulation  -Has been symptomatic with dyspnea on exertion for 3 months.  Hemoglobin was 12.3 and will be 2022 and now down to 4.4.  Denies any melena, hematochezia, hematemesis.  He is on anticoagulation with Coumadin with elevated INR of 5.37.  -Vital signs okay.  -3 units of PRBCs transfused in ER  -Suspect GI bleed due to anticoagulation.  Undergoes FIT testing regularly and has been negative.  No previous EGDs or colonoscopies  -Monitor hemoglobin every 8 hours.  Transfuse to keep hemoglobin over 7  -clear liquid diet for now.  N.p.o. from 5 AM on 1/10  -Consult [facility]  -Was administered 1 mg vitamin K in ER  -Iron studies pending  -Platelets at baseline, bilirubin normal.  Do not suspect hemolysis      Dyspnea on exertion  -Most likely secondary to severe anemia.  Bradycardia may be contributing.  May have mild CHF as well.    Paroxysmal atrial fibrillation  Bradycardia, heart rate in 30s  -No history of syncope.    -Hold atenolol.  Consult cardiology.    - Monitor on telemetry  -obtain cardiac echo    Acute kidney injury, likely prerenal  Mild hyperkalemia,  potassium 5.5  - creatinine of 1.83, up from 0.9, elevated BUN of 60, mild hyperkalemia of 5.5  -Expected to improve after 2 units of PRBCs  -monitor.  Hold lisinopril    On chronic anticoagulation with warfarin for mechanical mitral valve  Supratherapeutic INR  -Received 1 mg vitamin K in ER.  Since he is not actively bleeding, INR was not fully reversed  -Hold Coumadin.  Monitor INR    Chronic diastolic CHF with preserved EF  - proBNP 2149.  - Chest x-ray showed mild enlargement of cardiac silhouette, prosthetic mitral valve, mild bibasilar atelectasis, no pulmonary edema or pleural effusion.  -Last echo 11/2022 showed well-seated mechanical mitral valve, no abnormal regurgitation, normal LV size and function with EF 55-60%, mildly dilated RV with mildly reduced systolic function, severe biatrial enlargement.  -Obtain cardiac echo  -Not on diuretics at baseline      Type II MI due to severe anemia  - High sensitive troponin mildly elevated at 73 with flat trend with subsequent troponin 69.    -No chest pain or concerning EKG changes  -Had clean coronaries on angiogram in 2008  -Likely type II MI due to severe anemia  -Monitor on telemetry and obtain echo    S/p mechanical mitral valve replacement for severe mitral regurgitation and MAZE procedure in 2008, on chronic anticoagulation with warfarin  -Previous echo 11/2022 showed well-seated mechanical mitral valve with no abnormal regurgitation   -Hold anticoagulation for now due to concern of GI bleed     Hyperlipidemia  Intolerant to statins and Zetia  -Lipid panel obtained in clinic, currently pending                Diet:  Clear liquid diet  DVT Prophylaxis: Warfarin  Stubbs Catheter: Not present  Lines: None     Cardiac Monitoring: None  Code Status:  DNR/DNI    Clinically Significant Risk Factors Present on Admission        # Hyperkalemia: Highest K = 5.5 mmol/L in last 2 days, will monitor as appropriate        # Drug Induced Coagulation Defect: home medication  "list includes an anticoagulant medication    # Hypertension: Noted on problem list      # Severe Obesity: Estimated body mass index is 41.12 kg/m  as calculated from the following:    Height as of an earlier encounter on 1/9/24: 1.816 m (5' 11.5\").    Weight as of an earlier encounter on 1/9/24: 135.6 kg (299 lb).              Disposition Plan      Expected Discharge Date: 01/11/2024                  Mary Rivers MD  Hospitalist Service  [facility]  Securely message with [employer] (more info)  Text page via [facility] Paging/Directory     ______________________________________________________________________    Chief Complaint     Shortness of breath    History is obtained from the patient    History of Present Illness     Feng Wynne is a 79 year old male with history of mechanical mitral valve replacement for severe mitral regurgitation and maze procedure in 2008, on chronic anticoagulation with warfarin, pulmonary hypertension, chronic diastolic CHF with preserved EF, previous dilated LV likely due to mitral regurgitation, with subsequent improvement with ACE inhibitor and beta-blockers, hypertension, hyperlipidemia, intolerant to statins and Zetia, who presents to ER on 1/9/2024 due to 3 months of progressive shortness of breath and finding of severe anemia.    Patient reports he has been feeling short of breath with exertion for 3 months.  He feels okay at rest but when he tries to ambulate he becomes short of breath to the point that he has now quit ambulating.  He denies any dizziness, denies any nausea or vomiting, no abdominal pain, denies any melena or hematochezia.    On arrival, initial blood pressure was normal but subsequently declined into 90s.  Oxygen saturation 97% on room air.    Heart rate in 30s with EKG showing sinus bradycardia.  No history of syncope    Labs showed- hemoglobin of 4.8, down from 12.3 (2/2022).     Iron studies-ferritin, iron and iron binding capacity were " obtained in clinic are pending.    BMP showed ENRIQUE with creatinine of 1.83, up from 0.9, elevated BUN of 60, mild hyperkalemia of 5.5    LFTs including bilirubin is normal    He was transfused 2 units of PRBCs in ER    High sensitive troponin mildly elevated at 73 with flat trend with subsequent troponin 69.  proBNP 2149.    Chest x-ray showed mild enlargement of cardiac silhouette, prosthetic mitral valve, mild bibasilar atelectasis, no pulmonary edema or pleural effusion.          Past Medical History    Past Medical History:   Diagnosis Date    Arthritis     Atrial fibrillation (H)     Coronary artery disease     Hypercholesteremia     Morbid obesity (H)     Unspecified essential hypertension        Past Surgical History   Past Surgical History:   Procedure Laterality Date    MITRAL VALVE REPLACEMENT  8-    Mitral valve replacement with 31-mm [employer] mechanical valve.        Prior to Admission Medications   Prior to Admission Medications   Prescriptions Last Dose Informant Patient Reported? Taking?   Multiple Vitamins-Minerals (CENTRUM SILVER PO) 1/8/2024 Self Yes Yes   Sig: Take 1 tablet by mouth daily.   Omega-3 Fatty Acids (FISH OIL CONCENTRATE PO) 1/8/2024 Self Yes Yes   Sig: Take 1 capsule by mouth daily   albuterol (PROAIR HFA/PROVENTIL HFA/VENTOLIN HFA) 108 (90 Base) MCG/ACT inhaler  at PRN Self No Yes   Sig: Inhale 1-2 puffs into the lungs every 4 hours as needed for shortness of breath, wheezing or cough   atenolol (TENORMIN) 25 MG tablet 1/9/2024 at AM Self No Yes   Sig: Take 1 tablet (25 mg) by mouth 2 times daily   clindamycin (CLEOCIN) 150 MG capsule  at Ppx Self Yes No   Sig: TAKE 4 CAPS BY MOUTH 1 HOUR PRIOR TO DENTAL APPOINTMENT   lisinopril (ZESTRIL) 40 MG tablet 1/8/2024 at PM Self No Yes   Sig: Take 1 tablet (40 mg) by mouth daily   mirtazapine (REMERON) 7.5 MG tablet  Self No No   Sig: Take 1 tablet (7.5 mg) by mouth at bedtime   order for DME  Self No No   Sig: Equipment being ordered:  COMPRESSION STOCKINGS, 20-30 mmHg   warfarin ANTICOAGULANT (COUMADIN) 5 MG tablet 1/8/2024 at PM Self No Yes   Sig: TAKE ONE & ONE-HALF TAB (7.5MG) EACH TUE & SAT. TAKE 1 TAB (5MG) ALL OTHER DAYS OR AS DIRECTED   Patient taking differently: -  For MVR  INR goal 2.5-3.5  10 mg on Wednesday and Saturday  5 mg on all other days      Facility-Administered Medications: None           Physical Exam   Vital Signs: Temp: 96.8  F (36  C) Temp src: Temporal BP: 95/55 Pulse: (!) 40   Resp: 20 SpO2: 99 %      Weight: 0 lbs 0 oz    Constitutional - alert, resting in bed, appears comfortable  Head - normocephalic, atraumatic  ENT - normal eye lids and lashes, no conjunctival hyperemia, no icterus, extraocular movements are normal, normal nose, no discharge, moist oral mucosa, no ulcers or exudates, normal external ear  Neck - no thyromegaly or lymphadenopathy.   CV -regular rhythm, bradycardia, mechanical mitral valve sound, 1+ edema   Pulmonary - lungs are clear to auscultation bilaterally, no wheezing or rhonchi  GI -large ventral hernia, abdomen is soft, non distended, non tender, bowel sounds are present, no organomegaly  Neurological - alert and oriented, normal speech, no focal deficits  Musculoskeletal - no joint erythema or swelling, ROM is ok          Medical Decision Making       75 MINUTES SPENT BY ME on the date of service doing chart review, history, exam, documentation & further activities per the note.      Data     I have personally reviewed the following data over the past 24 hrs:    4.4  \   4.4 (LL)   / 158     140 110 (H) 60.1 (H) /  100 (H)   5.5 (H) 24 1.83 (H) \     ALT: 23 AST: 36 AP: 62 TBILI: 0.7   ALB: 3.5 TOT PROTEIN: 5.7 (L) LIPASE: N/A     Trop: 69 (H) BNP: 2,149 (H)     INR:  5.37 (HH) PTT:  N/A   D-dimer:  N/A Fibrinogen:  N/A     Ferritin:  21 (L) % Retic:  N/A LDH:  N/A       Imaging results reviewed over the past 24 hrs:   Recent Results (from the past 24 hour(s))   XR Chest Port 1 View     Narrative    XR CHEST PORT 1 VIEW 1/9/2024 12:02 PM    HISTORY: SOB    COMPARISON: 6/28/2023      Impression    IMPRESSION: Marked enlargement of the cardiac silhouette. Median  sternotomy and mitral valvular prosthesis. Mild bibasilar atelectasis.  No pulmonary edema, pleural effusion or pneumothorax.    KESHA BROTHERS MD         SYSTEM ID:  [id number]

## 2024-01-09 NOTE — ED NOTES
DATE/TIME OF CALL RECEIVED FROM LAB:  01/09/24 at 12:44 PM   LAB TEST:  INR 5.37 Hgb 4.4  LAB VALUE:  See above  PROVIDER NOTIFIED?: Yes  PROVIDER NAME: Dr Reveles  DATE/TIME LAB VALUE REPORTED TO PROVIDER: 1244  MECHANISM OF PROVIDER NOTIFICATION: Face-To-Face  PROVIDER RESPONSE: MD aware, continue with blood and vitamin K.

## 2024-01-09 NOTE — PHARMACY-ADMISSION MEDICATION HISTORY
Pharmacist Admission Medication History    Admission medication history is complete. The information provided in this note is only as accurate as the sources available at the time of the update.    Information Source(s): Patient via in-person        Changes made to PTA medication list:  Added: None  Deleted: None  Changed: None    Medication Affordability:       Allergies reviewed with patient and updates made in EHR: yes    Medication History Completed By: Theron Caho RPH 1/9/2024 1:39 PM    PTA Med List   Medication Sig Last Dose    albuterol (PROAIR HFA/PROVENTIL HFA/VENTOLIN HFA) 108 (90 Base) MCG/ACT inhaler Inhale 1-2 puffs into the lungs every 4 hours as needed for shortness of breath, wheezing or cough  at PRN    atenolol (TENORMIN) 25 MG tablet Take 1 tablet (25 mg) by mouth 2 times daily 1/9/2024 at AM    lisinopril (ZESTRIL) 40 MG tablet Take 1 tablet (40 mg) by mouth daily 1/8/2024 at PM    Multiple Vitamins-Minerals (CENTRUM SILVER PO) Take 1 tablet by mouth daily. 1/8/2024    Omega-3 Fatty Acids (FISH OIL CONCENTRATE PO) Take 1 capsule by mouth daily 1/8/2024    warfarin ANTICOAGULANT (COUMADIN) 5 MG tablet TAKE ONE & ONE-HALF TAB (7.5MG) EACH TUE & SAT. TAKE 1 TAB (5MG) ALL OTHER DAYS OR AS DIRECTED (Patient taking differently: -  For MVR  INR goal 2.5-3.5  10 mg on Wednesday and Saturday  5 mg on all other days) 1/8/2024 at PM

## 2024-01-09 NOTE — ED PROVIDER NOTES
History     Chief Complaint:  Shortness of Breath, Hypotension, Bradycardia, and Abnormal Labs    HPI   Feng Wynne is a 79 year old male with a history of artrial fibrillation on Coumadin, hypertension, CAD, and iron deficiency anemia who presents via EMS from urgent care for evaluation of shortness of breath. Per EMS, the patient had an appointment for his shortness of breath at urgent care but was told to come to the emergency room when his heart rate was in the high 30's to low 40's and his systolic blood pressure was in the 80s. They also stated that his blood sugar was 143. The patient states he has had shortness of breath for the past 3 months. He claims he has had a productive cough that is occasionally bloody. He also reports that he does have a little bit of diarrhea. Today his legs are more swollen than normal.  He does not report any falls or injuries recently. He also denies fever, chills,  dizziness, nausea, vomiting, myalgia, abdominal pain, chest pain, and pleuritic pain. He reports he normally dose not have a low heart rate.     Independent Historian:    EMS - They report as noted above.    Review of External Notes:  I looked at the laboratory report form urgent care which shows hemoglobin for 4.8 and a INR of 7.0.     Medications:   Proair HFA    Tenormin   Cleocin   Zestril  Remeron   Coumadin     Past Medical History:    Arthritis   Arterial fibrillation   Coronary artery disease  Morbid obesity   Hypertension   Hypertension  Iron deficiency anemia   Insomnia    Past Surgical History:    Mitral Valve Replacement      Physical Exam   Patient Vitals for the past 24 hrs:   BP Temp Temp src Pulse Resp SpO2   01/09/24 1438 92/62 -- -- -- -- --   01/09/24 1437 -- 96.8  F (36  C) Temporal (!) 37 18 --   01/09/24 1430 102/56 -- -- (!) 35 17 98 %   01/09/24 1427 93/69 97.1  F (36.2  C) -- (!) 37 19 --   01/09/24 1415 93/69 96.8  F (36  C) -- 99 18 --   01/09/24 1410 -- -- -- (!) 39 17 100 %   01/09/24  1403 -- -- -- (!) 39 17 98 %   01/09/24 1355 -- -- -- (!) 36 24 100 %   01/09/24 1343 -- -- -- (!) 38 18 99 %   01/09/24 1340 -- -- -- (!) 37 15 99 %   01/09/24 1336 -- -- -- (!) 37 17 99 %   01/09/24 1334 -- -- -- (!) 34 17 --   01/09/24 1330 (!) 83/47 -- -- (!) 39 15 --   01/09/24 1305 (!) 88/43 (!) 96.6  F (35.9  C) Temporal (!) 45 18 99 %   01/09/24 1253 -- 96.8  F (36  C) -- -- -- --   01/09/24 1230 92/72 -- -- (!) 40 27 98 %   01/09/24 1149 -- 96.8  F (36  C) Temporal -- -- --   01/09/24 1144 (!) 138/111 -- -- (!) 39 18 100 %        Physical Exam  Constitutional: Vital signs reviewed as above  General: Alert, pleasant  HEENT: Moist mucous membranes  Eyes: Pupils are equal, round, and reactive to light.   Neck: Normal range of motion  Cardiovascular: bradycardic rate, Regular rhythm and normal heart sounds.  No MRG  Pulmonary/Chest: Effort normal and breath sounds normal. No respiratory distress. Patient has no wheezes. Patient has no rales.   Gastrointestinal: Soft. Positive bowel sounds. No MRG. Large ventral hernia that is easily reducible and non tender.  Musculoskeletal/Extremities: Full ROM. Lymphedema to the bilateral lower extremities    Endo: No pitting edema  Neurological: Alert, no focal deficits.  Skin: Skin is warm and dry.   Psychiatric: Pleasant     Emergency Department Course   ECG #1  ECG taken at 1141, ECG read at 1145  Atrial fibrillation with slow ventricular response with premature ventricular or aberrantly conducted complexes  Low voltage QRS  Possible anterolateral infarct, age undetermined    Rate 43 bpm. CA interval * ms. QRS duration 108 ms. QT/QTc 530/447 ms. P-R-T axes * -16 -9.    ECG #2  ECG taken at 1340, ECG read at 1350  Arterial fibrillation with slow ventricular response  Left axis deviation   Low voltage QRS   Inferior infract, age undetermined  Possible;le anterolateral infract, age undetermined   No change as compared to prior, dated 1/9/24.  Rate 41 bpm. CA interval * ms.  QRS duration 106 ms. QT/QTc 414/424 ms. P-R-T axes * -37 -18.     Imaging:  XR Chest Port 1 View   Final Result   IMPRESSION: Marked enlargement of the cardiac silhouette. Median   sternotomy and mitral valvular prosthesis. Mild bibasilar atelectasis.   No pulmonary edema, pleural effusion or pneumothorax.      KESHA BROTHERS MD            SYSTEM ID:  [id number]        Report per radiology    Laboratory:  Labs Ordered and Resulted from Time of ED Arrival to Time of ED Departure   INR - Abnormal       Result Value    INR 5.37 (*)    COMPREHENSIVE METABOLIC PANEL - Abnormal    Sodium 140      Potassium 5.5 (*)     Carbon Dioxide (CO2) 24      Anion Gap 6 (*)     Urea Nitrogen 60.1 (*)     Creatinine 1.83 (*)     GFR Estimate 37 (*)     Calcium 8.7 (*)     Chloride 110 (*)     Glucose 100 (*)     Alkaline Phosphatase 62      AST 36      ALT 23      Protein Total 5.7 (*)     Albumin 3.5      Bilirubin Total 0.7     TROPONIN T, HIGH SENSITIVITY - Abnormal    Troponin T, High Sensitivity 73 (*)    NT PROBNP INPATIENT - Abnormal    N terminal Pro BNP Inpatient 2,149 (*)    CBC WITH PLATELETS AND DIFFERENTIAL - Abnormal    WBC Count 4.4      RBC Count 2.07 (*)     Hemoglobin 4.4 (*)     Hematocrit 16.4 (*)     MCV 79      MCH 21.3 (*)     MCHC 26.8 (*)     RDW 20.3 (*)     Platelet Count 158      % Neutrophils 62      % Lymphocytes 23      % Monocytes 13      % Eosinophils 1      % Basophils 0      % Immature Granulocytes 1      NRBCs per 100 WBC 1 (*)     Absolute Neutrophils 2.8      Absolute Lymphocytes 1.0      Absolute Monocytes 0.6      Absolute Eosinophils 0.0      Absolute Basophils 0.0      Absolute Immature Granulocytes 0.0      Absolute NRBCs 0.1     TROPONIN T, HIGH SENSITIVITY - Abnormal    Troponin T, High Sensitivity 69 (*)    TYPE AND SCREEN, ADULT    ABO/RH(D) O POS      Antibody Screen Negative      SPECIMEN EXPIRATION DATE [id number]     PREPARE RED BLOOD CELLS (UNIT)    Blood Component Type Red Blood  Cells      Product Code X7638U66      Unit Status Transfused      Unit Number [id number]      CROSSMATCH Compatible      CODING SYSTEM [id number]      ISSUE DATE AND TIME [id number]      UNIT ABO/RH O+      UNIT TYPE ISBT 5100     PREPARE RED BLOOD CELLS (UNIT)    Blood Component Type Red Blood Cells      Product Code K5609U75      Unit Status Transfused      Unit Number [id number]      CROSSMATCH Compatible      CODING SYSTEM [id number]      ISSUE DATE AND TIME 93414479500888      UNIT ABO/RH O+      UNIT TYPE ISBT 5100     PREPARE RED BLOOD CELLS (UNIT)   TRANSFUSE RED BLOOD CELLS (UNIT)   TRANSFUSE RED BLOOD CELLS (UNIT)   ABO/RH TYPE AND SCREEN      Emergency Department Course & Assessments:     Interventions:  Medications   phytonadione (MEPHYTON/VITAMIN K) 1 MG/ML oral solution 1 mg (1 mg Oral $Given 1/9/24 1243)   sodium chloride 0.9% BOLUS 500 mL (500 mLs Intravenous $New Bag 1/9/24 1322)     Assessments:  1134 I obtained history and examined the patient as noted above.  1148 I rechecked and updated the patient.   1341 I obtained history and examined the patient as noted above.     Independent Interpretation (X-rays, CTs, rhythm strip):  Chest x-ray is consistent with enlarged cardiac silhouette but no obvious active CHF, infiltrate or other acute findings    Consultations/Discussion of Management or Tests:  1312 I spoke with Dr. Rivers of the hospitalitis service regarding admission        Social Determinants of Health affecting care:  None      Disposition:  The patient was admitted to the hospital under the care of Dr. Rivers.     Impression & Plan    Medical Decision Making:  Patient presents after being seen at clinic for longstanding shortness of breath.  He really has no other symptoms with this.  He was markedly bradycardic here and slightly hypotensive.  He had labs done earlier today which she had not yet seen the results of and his hemoglobin was found to be 4.8 and his INR is found to be 6.   He has not had any blood in his stools or urine.  In fact he has no bleeding anywhere.  He is in longstanding A-fib and his EKG shows A-fib with slow ventricular response around 40.  No signs of complete heart block.  We discussed transfusion as his hemoglobin is 4.8 and 2 units been ordered.  His hemoglobin here actually came back at 4.4.  We also discussed bringing the INR down to therapeutic range.  Given protocol he got 1 of oral vitamin K which helped again closer to his 2-1/2-3-1/2 goal.  The rest of his lab work here was essentially unremarkable.  His troponins went from 73-69.  Again this is likely demand ischemia given his anemia.  His BNP is slightly elevated but again no obvious effusion or sonia CHF on x-ray.  He did have some drops in his blood pressure to the 80s over 40s.  He was given half a liter of fluids and the blood work was started.  His pressures have now been in the 90s to 110s consistently.  His heart rate remains in the 35-45 range.  Repeat EKG continues to show A-fib with slow ventricular response and no other concerning findings.  He is asymptomatic with these heart rates and blood pressure readings.  He will be brought into the hospital service for continued hydration and transfusion.    Critical care time exclusive of procedures was 45 minutes    Diagnosis:    ICD-10-CM    1. Anemia, unspecified type  D64.9       2. Supratherapeutic INR  R79.1       3. SOB (shortness of breath)  R06.02       4. Bradycardia  R00.1       5. Chronic atrial fibrillation (H)  I48.20            Scribe Disclosure:  IMalena, am serving as a scribe at 1:14 PM on 1/9/2024 to document services personally performed by Yusuf Reveles MD based on my observations and the provider's statements to me.    Scribe Disclosure:  ITracey, am serving as a scribe  at 1:15 PM on 1/9/2024 to document services personally performed by Yusuf Reveles MD based on my observations and the  provider's statements to me.    1/9/2024   Yusuf Reveles MD Walters, Brent Aaron, MD  01/09/24 1533

## 2024-01-10 NOTE — PROGRESS NOTES
Orientation: A&O x4    Vitals/Tele: BP soft, Tele SB    IV Access/drains: 2 PIV SL    Diet: Clear liquid diet until 0500, now NPO    Mobility: Bedrest overnight, pt very weak    GI/: Continent    Wound/Skin: Skin intact, blanchable redness on sacrum, mepilex in place    Consults: GI, cardiology    Possibly EGD today      See Flow sheets for assessment

## 2024-01-10 NOTE — PLAN OF CARE
Goal Outcome Evaluation: Pt is A&Ox4, denies pain, HR in 30-40s slow A-Fib, and soft BP, INR and HGB results pending, two units of RBC given in ER, on clear liquid diet, Mepilex on coccyx for protection. Protonix 40 mg IV given. Plan to keep NPO at 0500 for GI work up.       Plan of Care Reviewed With: patient, family    Overall Patient Progress: no changeOverall Patient Progress: no change

## 2024-01-10 NOTE — CONSULTS
[facility] Inpatient Consultation Note    Feng Wynne MRN# [id number]   Age: 79 year old YOB: 1945     Date of Admission:  1/9/2024    Reason for consult: Urinary Retention, Difficult Stubbs catheter placement        Requesting physician: Dr. Rivers                 History of Present Illness:   80 yo male with PMH of MV replacement on chronic anticoagulation, diastolic CHF, PH, HTN admitted with shortness of breath and anemia (4.4 on admission).    The patient is currently admitted for chronic shortness of breath. Found to have a hemoglobin of 4.4 on admission with supratherapeutic INR of 5.37. Likely cause of anemia thought to be due to GI bleeding. Additionally with ENRIQUE on admission 1.83 (baseline 0.9). Some improvement of renal function with blood transfusion, to 1.76.     Noted while here to have some difficultly voiding and having small outputs. Bladder scanner for 580 ml today. Nursing attempted straight catheterization, however was unsuccessful. Renal ultrasound obtained today shows that the left kidney and bladder are unremarkable. Difficult to visulize right kidney due to body habitus.     The patient notes that at baseline her feels like he empties his bladder when he is at home.           Past Medical History:     Past Medical History:   Diagnosis Date    Arthritis     Atrial fibrillation (H)     Coronary artery disease     Hypercholesteremia     Morbid obesity (H)     Unspecified essential hypertension              Past Surgical History:     Past Surgical History:   Procedure Laterality Date    MITRAL VALVE REPLACEMENT  8-    Mitral valve replacement with 31-mm St. Raffi mechanical valve.              Social History:     Social History     Socioeconomic History    Marital status: Single     Spouse name: Not on file    Number of children: 1    Years of education: Not on file    Highest education level: Not on file   Occupational History    Occupation: Self employed       Comment:     Tobacco Use    Smoking status: Former     Packs/day: 0.50     Years: 5.00     Additional pack years: 0.00     Total pack years: 2.50     Types: Cigarettes    Smokeless tobacco: Never    Tobacco comments:     quit 20+ yrs ago   Vaping Use    Vaping Use: Never used   Substance and Sexual Activity    Alcohol use: Yes     Comment: 1 drink per month    Drug use: No    Sexual activity: Yes     Partners: Male   Other Topics Concern    Parent/sibling w/ CABG, MI or angioplasty before 65F 55M? Yes   Social History Narrative    Not on file     Social Determinants of Health     Financial Resource Strain: Not on file   Food Insecurity: Not on file   Transportation Needs: Not on file   Physical Activity: Not on file   Stress: Not on file   Social Connections: Not on file   Interpersonal Safety: Not on file   Housing Stability: Not on file             Family History:     Family History   Problem Relation Age of Onset    Cerebrovascular Disease Father     Diabetes Paternal Grandmother     C.A.D. Brother         CABG X 3 at age 51             Immunizations:     Immunization History   Administered Date(s) Administered    COVID-19 12+ (2023-24) (Pfizer) 10/24/2023    COVID-19 Bivalent 12+ (Pfizer) 10/31/2022    COVID-19 MONOVALENT 12+ (Pfizer) 02/02/2021, 02/23/2021, 10/16/2021    Influenza (H1N1) 12/07/2009    Influenza (High Dose) 3 valent vaccine 11/17/2014, 11/09/2015, 10/04/2016, 09/28/2017, 10/01/2018, 10/11/2019, 10/14/2022    Influenza (IIV3) PF 11/12/2007, 10/20/2008, 09/25/2009, 10/04/2010, 10/21/2011, 11/02/2012, 09/23/2013    Influenza Vaccine 65+ (FLUAD) 09/26/2020, 09/15/2021, 11/02/2022, 09/21/2023    Influenza Vaccine 65+ (Fluzone HD) 10/10/2020    Pneumo Conj 13-V (2010&after) 09/14/2015    Pneumococcal 23 valent 04/08/2013    TDAP Vaccine (Adacel) 04/08/2013    Zoster recombinant adjuvanted (SHINGRIX) 05/23/2023, 09/07/2023    Zoster vaccine, live 04/08/2013             Allergies:      Allergies   Allergen Reactions    Amiodarone Shortness Of Breath    Pcn [Penicillins] Shortness Of Breath     Rxn occurred in childhood     Latex     Sulfites     Zetia [Ezetimibe] Muscle Pain (Myalgia)     Muscle weakness legs             Medications:     Current Facility-Administered Medications   Medication    acetaminophen (TYLENOL) tablet 650 mg    Or    acetaminophen (TYLENOL) Suppository 650 mg    albuterol (PROVENTIL) neb solution 2.5 mg    calcium carbonate (TUMS) chewable tablet 1,000 mg    lidocaine (LMX4) cream    lidocaine 1 % 0.1-1 mL    mirtazapine (REMERON) tablet TABS 7.5 mg    nitroGLYcerin (NITROSTAT) sublingual tablet 0.4 mg    ondansetron (ZOFRAN ODT) ODT tab 4 mg    Or    ondansetron (ZOFRAN) injection 4 mg    pantoprazole (PROTONIX) IV push injection 40 mg    Patient is already receiving anticoagulation with heparin, enoxaparin (LOVENOX), warfarin (COUMADIN)  or other anticoagulant medication    prochlorperazine (COMPAZINE) injection 5 mg    Or    prochlorperazine (COMPAZINE) tablet 5 mg    Or    prochlorperazine (COMPAZINE) suppository 12.5 mg    senna-docusate (SENOKOT-S/PERICOLACE) 8.6-50 MG per tablet 1 tablet    Or    senna-docusate (SENOKOT-S/PERICOLACE) 8.6-50 MG per tablet 2 tablet    sodium chloride (PF) 0.9% PF flush 3 mL    sodium chloride (PF) 0.9% PF flush 3 mL    sodium chloride (PF) 0.9% PF flush 3 mL    sodium chloride (PF) 0.9% PF flush 3 mL             Review of Systems:   Comprehensive review of systems from the Admission note dated 1/10/24 at [facility] was reviewed with no changes except per HPI.     Examination:  /46 (BP Location: Left arm)   Pulse 50   Temp 98  F (36.7  C) (Oral)   Resp 19   Wt 135.9 kg (299 lb 8 oz)   SpO2 99%   BMI 41.19 kg/m    General: Alert and oriented, no distress  HEENT: Face symmetric, mucous membranes moist and pink  Eyes: No scleral icterus  Neck: Symmetric  Chest wall: Symmetric  Respiratory: Breathing  unlabored, no audible wheezing  Cardiac: Extremities warm and well perfused, no edema  Abdomen: soft, non tender, non distended; no rebound, guarding or peritoneal signs  Back: No CVA or flank tenderness  : mild scrotal edema, buried penis, uncircumcised. No scrotal erythema, no testicular tenderness bilaterally.               Data:     Lab Results   Component Value Date    WBC 4.4 01/10/2024    WBC 4.3 10/16/2020     Lab Results   Component Value Date    RBC 2.67 01/10/2024    RBC 3.79 10/16/2020     Lab Results   Component Value Date    HGB 7.2 01/10/2024    HGB 12.4 10/16/2020     Lab Results   Component Value Date    HCT 21.7 01/10/2024    HCT 38.2 10/16/2020     Lab Results   Component Value Date     01/10/2024     10/16/2020     Creatinine   Date Value Ref Range Status   01/10/2024 1.76 (H) 0.67 - 1.17 mg/dL Final   10/16/2020 0.85 0.66 - 1.25 mg/dL Final   ]  Lab Results   Component Value Date    BUN 59.4 01/10/2024    BUN 30 02/09/2022    BUN 24 10/16/2020     PROCEDURE:   Patient's penis was prepped with betadine solution. Lidocaine lubrication with Urojet was inserted into the urethra and allowed to take effect. A 16 Citizen of [place] coude was copiously lubricated then inserted and advanced without difficulty into the bladder. Yellow colored urine returned.  The catheter balloon was then inflated with 10 cc of sterile saline and pulled back where it seated well against the bladder wall.  The catheter was connected to gravity drainage.  Approximately 500 cc of yellow colored urine returned in total. The catheter was then secured to patient's thigh with stat-lock.  Patient appeared to tolerate procedure well.      Imaging:  Renal US:  IMPRESSION:  1.  Left kidney and urinary bladder are unremarkable. No left  collecting system dilatation.  2.  Significantly limited evaluation of the right kidney due to poor  acoustic windows/body habitus.     Impression:  78 yo male with urinary retention, difficult  branch catheter placement     Plan:  -Difficult branch catheter placement done at bedside with Dr. Light.  -Would discharge home with branch catheter and plan for follow up in office in 3 weeks once more ambulatory for trial of void in the office. Scheduled for 1/30/24 at 8:30 am at our [place] location. AVS updated.   -Nursing to provide branch education and materials to go home with      Patient seen today with Dr. Light.     Urology will signs off at this time.       Yo Draper PA-C  [facility] ([facility])  [phone]  [employer] ([email or web address])   Text Page (7:30am to 4:30pm)

## 2024-01-10 NOTE — PROGRESS NOTES
[facility]    Hospitalist Progress Note    Assessment & Plan     Date of Admission:  1/9/2024        Assessment & Plan  Feng Wynne is a 79 year old male with history of mechanical mitral valve replacement for severe mitral regurgitation and MAZE procedure in 2008, on chronic anticoagulation with warfarin, pulmonary hypertension, chronic diastolic CHF with preserved EF, previous dilated LV likely due to mitral regurgitation, subsequent improvement with ACE inhibitor and beta-blockers, hypertension, hyperlipidemia, intolerant to statins and Zetia, who presents to ER on 1/9/2024 due to 3 months of progressive shortness of breath and finding of severe anemia.     He is also noted to have sinus bradycardia with heart rate in 30s, ENRIQUE, mild hypokalemia     Subacute normocytic anemia, hemoglobin 4.4, likely GI bleed due to anticoagulation  Acute blood loss anemia,   Iron deficiency anemia  -Has been symptomatic with dyspnea on exertion for 3 months.  Hemoglobin was 12.3 and will be 2022 and now down to 4.4.  Denies any melena, hematochezia, hematemesis.   - He is on anticoagulation with Coumadin with elevated INR of 5.37.  -3 units of PRBCs transfused in ER  -given Vitamin K 1 mg po X 1 on 1/9 at noon and 1mg X 1 midnight/early am of 1/10.   -Suspect GI bleed due to anticoagulation.  Undergoes FIT testing regularly and has been negative.  No previous EGDs or colonoscopies    Today, sbp stable in 90/-. HR 30-40, asymptomatic.   Receiving 4th unit blood now for Hb 6 range.   Seen by GI and diet advanced to full liquids  INR down to 3.8 from 7 on admission   Iron sat 4%, tibc 435, ferritin 21. Iron level 17 consistent with AKIRA  Bilirubin nl  -troponin 73--> 69   Sbp up slightly with 4th unit blood. No orthostatic sxs  Having normal stools.   No hx bloody stools  Benign abd exam        Plan;   -let INR drift down to 1.5 or less and discuss bridging with Gi.   -iv protonix bid.   - transfuse to if  Hb <7.   -Monitor hemoglobin every 8 hours.  Transfuse to keep hemoglobin over 7  -full liquid diet for now.    -Consult [facility]  -unable to perform egd/colonoscopy until INR <1.5  -npo midnight.        Dyspnea on exertion  -Most likely secondary to severe anemia.  Bradycardia may be contributing.  May have mild CHF as well.  pCXR shows bilateral pleural effusions. No airspace disease   -nl sats. Not needing oxygen      Paroxysmal atrial fibrillation  Bradycardia, heart rate in 30s  -No history of syncope.        -Hold atenolol.  on bber for rate control, runs in 40s periodically.   -with ENRIQUE, atenolol may not yet be cleared.   -Consulted cardiology.  following    - Monitor on telemetry     Acute kidney injury, likely prerenal  Mild hyperkalemia, potassium 5.5  - creatinine of 1.83, up from 0.9, elevated BUN of 60, mild hyperkalemia of 5.5  -Expected to improve after 2 units of PRBCs  -monitor.   -Cr down slightly post 3 units blood on admisison  - 4th unit blood am 1/10.       Plan;    Hold lisinopril  -check K at noon, am bmp.   -renal US  - pvr now, 500cc. Will recheck pvr, straight cath X 1 if >400cc, then bladder management protocol     Follow up K 5.5.  had transfusion which may be keeping elevated. No acidosis.   Will follow overnight. Branch placed and may help with renal function      Urinary retention  Pvr 500 range.   RN unable to place straight cath  [facility] consulted.   Branch placed.   Difficult branch insertion bedside, placed by [facility]  - per urology, discharge with branch catheter. plan for follow up [facility] office in 3 weeks office for trial of void.  Scheduled for 1/30/24 at 8:30 am at our [place] location. AVS updated.        On chronic anticoagulation with warfarin for mechanical mitral valve  Supratherapeutic INR  -Received 1 mg vitamin K in ER.  Since he is not actively bleeding, INR was not fully reversed  -Echo this admission:   1. The left ventricle is mildly dilated. The visual  ejection fraction is  estimated at 55%.  2. The right ventricle is moderately dilated. The right ventricular systolic  function is borderline reduced.  3. s/p 31mm St Raffi mechanical MVR. Mean 5mmHg @ HR 50.  4. There is moderate to mod-severe (2-3+) tricuspid regurgitation. The right  ventricular systolic pressure is approximated at 37mmHg plus the right atrial  pressure.    -received 2 doses of vitamin K 1mg each on 1/9 and early am of 1/10. INR down to 3.8     Echo 11/2022 showed EF 55%, mild RV dysfunction, MVR with mean 4mmHg, trace  TR.    -cardiology following   -Hold Coumadin.  Monitor INR  -INR needs to be 1.5 or less for endoscopy      Chronic diastolic CHF with preserved EF  - proBNP 2149.  - Chest x-ray showed mild enlargement of cardiac silhouette, prosthetic mitral valve, mild bibasilar atelectasis, no pulmonary edema or pleural effusion.  -Last echo 11/2022 showed well-seated mechanical mitral valve, no abnormal regurgitation, normal LV size and function with EF 55-60%, mildly dilated RV with mildly reduced systolic function, severe biatrial enlargement.  -Obtain cardiac echo  -Not on diuretics at baseline        Type II MI due to severe anemia  - High sensitive troponin mildly elevated at 73 with flat trend with subsequent troponin 69.    -No chest pain or concerning EKG changes  -Had clean coronaries on angiogram in 2008  -Likely type II MI due to severe anemia  -Monitor on telemetry  - echo nl. No WMA     S/p mechanical mitral valve replacement for severe mitral regurgitation and MAZE procedure in 2008, on chronic anticoagulation with warfarin  -Previous echo 11/2022 showed well-seated mechanical mitral valve with no abnormal regurgitation   -Hold anticoagulation for now due to concern of GI bleed   -see discussion above.      Hyperlipidemia  Intolerant to statins and Zetia  -Lipid panel obtained in clinic,   TG 32, LDL 22,                     Diet:  full liquid diet per GI,   DVT Prophylaxis:  "Warfarin  Stubbs Catheter: Not present  Lines: None     Cardiac Monitoring: None  Code Status:  DNR/DNI        Clinically Significant Risk Factors Present on Admission []Expand by Default         # Hyperkalemia: Highest K = 5.5 mmol/L in last 2 days, will monitor as appropriate        # Drug Induced Coagulation Defect: home medication list includes an anticoagulant medication    # Hypertension: Noted on problem list      # Severe Obesity: Estimated body mass index is 41.12 kg/m  as calculated from the following:    Height as of an earlier encounter on 1/9/24: 1.816 m (5' 11.5\").    Weight as of an earlier encounter on 1/9/24: 135.6 kg (299 lb).                     Disposition Plan     Expected Discharge Date: 01/11/2024              Complex decision making, >55 min on care coordination with gi, rn, pt, chart review, exam, charting, order entyr,             Edu Castro MD, MD  Text Page  (7am to 6pm)  Interval History   Seen by GI and cardiology   HR 30s,   INR down   Diet advanced per GI.   Sbp 90s/- stable   No orthostatic sxs  Nl stool today. No bloody stool hx    No renal colic  Hx of weak urinary stream as in hospital      -Data reviewed today: I reviewed all new labs and imaging results over the last 24 hours. I personally reviewed labs and imaging since admisison      Physical Exam   Temp: 98.4  F (36.9  C) Temp src: Oral BP: 99/54 Pulse: (!) 37   Resp: 29 SpO2: 98 % O2 Device: None (Room air)    Vitals:    01/10/24 0600 01/10/24 0728   Weight: 135 kg (297 lb 9.9 oz) 135.9 kg (299 lb 8 oz)     Vital Signs with Ranges  Temp:  [96.6  F (35.9  C)-98.9  F (37.2  C)] 98.4  F (36.9  C)  Pulse:  [34-99] 37  Resp:  [10-32] 29  BP: ()/() 99/54  SpO2:  [93 %-100 %] 98 %  I/O last 3 completed shifts:  In: 585   Out: 200 [Urine:200]    Constitutional: Up in bed, nad  Respiratory: Slightly decreased bibasilar  Breathing easily   Cardiovascular: RRR no r/g/m. [place] prosthetic S1 apex  GI: large reducible " hernia, chronic. Central incision. NT, ND  Skin/Integumen: BLE chronic lymphedema.   Neuro: nl speech and mentation  Psych: nl affect      Medications    - MEDICATION INSTRUCTIONS -        mirtazapine  7.5 mg Oral At Bedtime    pantoprazole  40 mg Intravenous Q12H    sodium chloride (PF)  3 mL Intracatheter Q8H    sodium chloride (PF)  3 mL Intracatheter Q8H       Data   Recent Labs   Lab 01/10/24  0605 01/09/24  2225 01/09/24  2053 01/09/24  1157 01/09/24  1027   WBC 4.4  --   --  4.4 5.1   HGB 6.3* 5.8*  --  4.4* 4.8*   MCV 81  --   --  79 81   *  --   --  158 177   INR 3.84*  --  4.52* 5.37* 7.0*     --   --  140 141   POTASSIUM 5.4*  --   --  5.5* 5.4*   CHLORIDE 110*  --   --  110* 110*   CO2 24  --   --  24 18*   BUN 59.4*  --   --  60.1* 59.4*   CR 1.76*  --   --  1.83* 1.82*   ANIONGAP 5*  --   --  6* 13   JOSEPH 8.6*  --   --  8.7* 8.8   GLC 86  --   --  100* 98   ALBUMIN  --   --   --  3.5 3.5   PROTTOTAL  --   --   --  5.7* 5.8*   BILITOTAL  --   --   --  0.7 0.7   ALKPHOS  --   --   --  62 60   ALT  --   --   --  23 22   AST  --   --   --  36 37       Imaging:   Recent Results (from the past 24 hour(s))   XR Chest Port 1 View    Narrative    XR CHEST PORT 1 VIEW 1/9/2024 12:02 PM    HISTORY: SOB    COMPARISON: 6/28/2023      Impression    IMPRESSION: Marked enlargement of the cardiac silhouette. Median  sternotomy and mitral valvular prosthesis. Mild bibasilar atelectasis.  No pulmonary edema, pleural effusion or pneumothorax.    KSEHA BROTHERS MD         SYSTEM ID:  [id number]   XR Chest Port 1 View    Narrative    EXAM: XR CHEST PORT 1 VIEW  LOCATION: [facility]  DATE: 1/9/2024    INDICATION: Shortness of breath.  COMPARISON: 01/09/2024      Impression    IMPRESSION:     Redemonstrated median sternotomy wires and cardiac prosthetic valve.    Unchanged trace bilateral pleural effusions and bibasilar atelectasis. No new focal airspace disease. No pneumothorax.    Stable  cardiomegaly. Aortic calcifications.   Echocardiogram Complete   Result Value    LVEF  55%    Narrative    [id number]  [id number]  [id number]  [id number]^BRYAN^HERNANDEZ     [facility]  Echocardiography Laboratory  [address] [place] [id number]     Name: JESSE CABRAL  MRN: [id number]  : 1945  Study Date: 01/10/2024 07:55 AM  Age: 79 yrs  Gender: Male  Patient Location: [facility]  Reason For Study: Bradycardia - Sinus  Ordering Physician: HERNANDEZ ADAMS  Referring Physician: HERNANDEZ ADAMS  Performed By: Giovanny Coyle     BSA: 2.5 m2  Height: 71 in  Weight: 300 lb  HR: 45  BP: 95/40 mmHg  ______________________________________________________________________________  Procedure  Complete Echo Adult. Optison (NDC #[id number]) given intravenously.  ______________________________________________________________________________  Interpretation Summary     1. The left ventricle is mildly dilated. The visual ejection fraction is  estimated at 55%.  2. The right ventricle is moderately dilated. The right ventricular systolic  function is borderline reduced.  3. s/p 31mm St Raffi mechanical MVR. Mean 5mmHg @ HR 50.  4. There is moderate to mod-severe (2-3+) tricuspid regurgitation. The right  ventricular systolic pressure is approximated at 37mmHg plus the right atrial  pressure.     Echo 2022 showed EF 55%, mild RV dysfunction, MVR with mean 4mmHg, trace  TR.  ______________________________________________________________________________  Left Ventricle  The left ventricle is mildly dilated. There is normal left ventricular wall  thickness. The visual ejection fraction is estimated at 55%. Diastolic  function not assessed due to presence of prosthetic mitral valve. Septal  motion is consistent with post-operative state.     Right Ventricle  The right ventricle is moderately dilated. The right ventricular systolic  function is borderline reduced.     Atria  The left atrium is severely dilated.  There is no atrial shunt seen.     Mitral Valve  There is a mechanical mitral valve. s/p 31mm St Raffi mechanical MVR. Mean  5mmHg @ HR 50.     Tricuspid Valve  There is moderate to mod-severe (2-3+) tricuspid regurgitation. The right  ventricular systolic pressure is approximated at 37mmHg plus the right atrial  pressure.     Aortic Valve  The aortic valve is normal in structure and function.     Pulmonic Valve  The pulmonic valve is normal in structure and function.     Vessels  Mild dilation of ascending aorta. Dilation of the inferior vena cava is  present with abnormal respiratory variation in diameter.     Pericardium  There is no pericardial effusion.     Rhythm  The rhythm was atrial fibrillation.  ______________________________________________________________________________  MMode/2D Measurements & Calculations  IVSd: 0.91 cm     LVIDd: 6.2 cm  LVIDs: 3.7 cm  LVPWd: 0.95 cm  FS: 40.0 %  LV mass(C)d: 235.3 grams  LV mass(C)dI: 93.9 grams/m2  Ao root diam: 3.3 cm  LA dimension: 5.4 cm  asc Aorta Diam: 3.9 cm  LA/Ao: 1.7  LVOT diam: 2.2 cm  LVOT area: 3.6 cm2  Ao root diam index Ht(cm/m): 1.8  Ao root diam index BSA (cm/m2): 1.3  Asc Ao diam index BSA (cm/m2): 1.6  Asc Ao diam index Ht(cm/m): 2.2  LA Volume (BP): 154.0 ml     LA Volume Index (BP): 61.4 ml/m2  RV Base: 6.2 cm  RWT: 0.31  TAPSE: 2.0 cm     Doppler Measurements & Calculations  MV E max jose: 185.5 cm/sec  MV max P.3 mmHg  MV mean P.3 mmHg  MV V2 VTI: 79.1 cm  MVA(VTI): 1.2 cm2  MV dec slope: 445.5 cm/sec2  MV dec time: 0.42 sec  Ao V2 max: 179.0 cm/sec  Ao max P.0 mmHg  Ao V2 mean: 112.5 cm/sec  Ao mean P.3 mmHg  Ao V2 VTI: 42.2 cm  KEITH(I,D): 2.2 cm2  KEITH(V,D): 2.4 cm2  LV V1 max P.6 mmHg  LV V1 max: 116.3 cm/sec  LV V1 VTI: 25.5 cm  SV(LVOT): 93.1 ml  SI(LVOT): 37.2 ml/m2  PA acc time: 0.12 sec  TR max jose: 301.1 cm/sec  TR max P.7 mmHg  AV Jose Ratio (DI): 0.65     KEITH Index (cm2/m2): 0.88  E/E' av.1  Lateral E/e':  12.2  [facility] E/e': 20.1   S Jose: 10.8 cm/sec     ______________________________________________________________________________  Report approved by: Danyelle Mills 01/10/2024 09:11 AM

## 2024-01-10 NOTE — CONSULTS
Care Management Initial Consult    General Information  Assessment completed with: Patient, Spouse or significant other,    Type of CM/SW Visit: Initial Assessment    Primary Care Provider verified and updated as needed: Yes   Readmission within the last 30 days: no previous admission in last 30 days      Reason for Consult: discharge planning  Advance Care Planning: Advance Care Planning Reviewed: present on chart, verified with patient          Communication Assessment  Patient's communication style: spoken language (English or Bilingual)             Cognitive  Cognitive/Neuro/Behavioral: WDL                      Living Environment:   People in home: significant other     Current living Arrangements: apartment      Able to return to prior arrangements: yes       Family/Social Support:  Care provided by: self  Provides care for: no one  Marital Status: Lives with Significant Other  Significant Other, Children          Description of Support System: Supportive, Involved         Current Resources:   Patient receiving home care services: No     Community Resources: None  Equipment currently used at home:    Supplies currently used at home: None    Employment/Financial:  Employment Status: retired        Financial Concerns: none   Referral to Financial Worker: No       Does the patient's insurance plan have a 3 day qualifying hospital stay waiver?  Yes     Which insurance plan 3 day waiver is available? Alternative insurance waiver    Will the waiver be used for post-acute placement? No    Lifestyle & Psychosocial Needs:  Social Determinants of Health     Food Insecurity: Not on file   Depression: Not at risk (10/6/2023)    PHQ-2     PHQ-2 Score: 0   Housing Stability: Not on file   Tobacco Use: Medium Risk (1/9/2024)    Patient History     Smoking Tobacco Use: Former     Smokeless Tobacco Use: Never     Passive Exposure: Not on file   Financial Resource Strain: Not on file   Alcohol Use: Not on file   Transportation  Needs: Not on file   Physical Activity: Not on file   Interpersonal Safety: Not on file   Stress: Not on file   Social Connections: Not on file       Functional Status:  Prior to admission patient needed assistance:   Dependent ADLs:: Ambulation-walker  Dependent IADLs:: Cleaning       Mental Health Status:          Chemical Dependency Status:                Values/Beliefs:  Spiritual, Cultural Beliefs, Jehovah's witness Practices, Values that affect care: no               Additional Information:  Writer met with patient and his partner Jose and introduced self and role.  Patient lives in an apartment with Jose on the 1st floor with elevator in building and underground parking.  Pt uses a walker at baseline and is able to bathe and dress self.  Patient states that he does have a cleaning service that comes in, but he is able to do cleaning and cooking.  Patient states that he has some sort of pneumatic pump lymphedema sleeves that he says helps a lot with his lymphedema.      Patient is very interested in having homecare at discharge (RN/PT/OT).  Writer will make referral to [facility] hub and continue to follow and assist with a safe discharge plan.    Cecelia Decker RN Care Coordinator  [facility]  [phone]

## 2024-01-10 NOTE — CONSULTS
[facility]  Gastroenterology Consultation         Feng Wynne  [address]   [facility] [address]  79 year old male    Admission Date/Time: 1/9/2024  Primary Care Provider: Jayme Deng  Referring / Attending Physician:  Dr. Mary Rivers    We were asked to see the patient in consultation by Dr. Mary Rivers for evaluation of  anemia likely due to GI bleed.      CC: SOB and weakness    HPI:  Feng Wynne is a 79 year old male who has a history of mechanical mitral valve replacement for severe mitral regurgitation and maze procedure in 2008, on chronic anticoagulation with warfarin, pulmonary hypertension, chronic diastolic CHF with preserved EF, previous dilated LV likely due to mitral regurgitation, with subsequent improvement with ACE inhibitor and beta-blockers, hypertension, hyperlipidemia, intolerant to statins and Zetia, who presents to ER on 1/9/2024 due to 3 months of progressive shortness of breath and finding of severe anemia.     Patient reports he has been feeling short of breath with exertion for 3 months and had noted his INR was high in 5-7 range and reports unable to get a hold of PCP so called a friend whom is a MD and due to SOB was started on doxycycline around Thanksgiving time and also had them drop warfarin dose. This seemed to temporarily work but noted shortness of breath continued and ambulation became difficult due to weakness. His at home INR checks were around 7 - 3 weeks ago and patient self adjusted his warfarin with no significant improvement. He felt so week he presented to ED and found to have a hemoglobin of 4.8 and bradycardic with HR in 30s and pressures in 90s. He feels okay at rest but when he tries to ambulate he becomes short of breath to the point that he has now quit ambulating.  He denies any dizziness, denies any nausea or vomiting, no abdominal pain, denies any melena or hematochezia.    Denies prior EGD or colonoscopy  and does FIT testing due to use of warfarin. Has had no prior known GI bleed. Denies symptoms of GERD. Stools today were brown without blood or melena.    ROS: A comprehensive ten point review of systems was negative aside from those in mentioned in the HPI.      PAST MED HX:  I have reviewed this patient's medical history and updated it with pertinent information if needed.   Past Medical History:   Diagnosis Date    Arthritis     Atrial fibrillation (H)     Coronary artery disease     Hypercholesteremia     Morbid obesity (H)     Unspecified essential hypertension        MEDICATIONS:   Prior to Admission Medications   Prescriptions Last Dose Informant Patient Reported? Taking?   Multiple Vitamins-Minerals (CENTRUM SILVER PO) 1/8/2024 Self Yes Yes   Sig: Take 1 tablet by mouth daily.   Omega-3 Fatty Acids (FISH OIL CONCENTRATE PO) 1/8/2024 Self Yes Yes   Sig: Take 1 capsule by mouth daily   albuterol (PROAIR HFA/PROVENTIL HFA/VENTOLIN HFA) 108 (90 Base) MCG/ACT inhaler  at PRN Self No Yes   Sig: Inhale 1-2 puffs into the lungs every 4 hours as needed for shortness of breath, wheezing or cough   atenolol (TENORMIN) 25 MG tablet 1/9/2024 at AM Self No Yes   Sig: Take 1 tablet (25 mg) by mouth 2 times daily   clindamycin (CLEOCIN) 150 MG capsule  at Ppx Self Yes No   Sig: TAKE 4 CAPS BY MOUTH 1 HOUR PRIOR TO DENTAL APPOINTMENT   lisinopril (ZESTRIL) 40 MG tablet 1/8/2024 at PM Self No Yes   Sig: Take 1 tablet (40 mg) by mouth daily   mirtazapine (REMERON) 7.5 MG tablet  Self No No   Sig: Take 1 tablet (7.5 mg) by mouth at bedtime   order for DME  Self No No   Sig: Equipment being ordered: COMPRESSION STOCKINGS, 20-30 mmHg   warfarin ANTICOAGULANT (COUMADIN) 5 MG tablet 1/8/2024 at PM Self No Yes   Sig: TAKE ONE & ONE-HALF TAB (7.5MG) EACH TUE & SAT. TAKE 1 TAB (5MG) ALL OTHER DAYS OR AS DIRECTED   Patient taking differently: -  For MVR  INR goal 2.5-3.5  10 mg on Wednesday and Saturday  5 mg on all other days       Facility-Administered Medications: None       ALLERGIES:   Allergies   Allergen Reactions    Amiodarone Shortness Of Breath    Pcn [Penicillins] Shortness Of Breath     Rxn occurred in childhood     Latex     Sulfites     Zetia [Ezetimibe] Muscle Pain (Myalgia)     Muscle weakness legs       SOCIAL HISTORY:  Social History     Tobacco Use    Smoking status: Former     Packs/day: 0.50     Years: 5.00     Additional pack years: 0.00     Total pack years: 2.50     Types: Cigarettes    Smokeless tobacco: Never    Tobacco comments:     quit 20+ yrs ago   Vaping Use    Vaping Use: Never used   Substance Use Topics    Alcohol use: Yes     Comment: 1 drink per month    Drug use: No       FAMILY HISTORY:  Family History   Problem Relation Age of Onset    Cerebrovascular Disease Father     Diabetes Paternal Grandmother     C.A.D. Brother         CABG X 3 at age 51       PHYSICAL EXAM:   General  alert, oriented and comfortable  Vital Signs with Ranges  Temp: 97.8  F (36.6  C) Temp src: Axillary BP: 106/53 Pulse: (!) 44   Resp: 27 SpO2: 99 % O2 Device: None (Room air)    I/O last 3 completed shifts:  In: 585   Out: 200 [Urine:200]    Constitutional: healthy, alert, and no distress   Cardiovascular: negative, PMI normal. No lifts, heaves, or thrills. RRR. No murmurs, clicks gallops or rub  Respiratory: negative, Percussion normal. Good diaphragmatic excursion. Lungs clear  Abdomen: Abdomen soft, non-tender. BS normal. No masses, organomegaly          ADDITIONAL COMMENTS:   I reviewed the patient's new clinical lab test results.   Recent Labs   Lab Test 01/10/24  0605 01/09/24  2225 01/09/24 2053 01/09/24 1157 01/09/24  1027   WBC 4.4  --   --  4.4 5.1   HGB 6.3* 5.8*  --  4.4* 4.8*   MCV 81  --   --  79 81   *  --   --  158 177   INR 3.84*  --  4.52* 5.37* 7.0*     Recent Labs   Lab Test 01/10/24  0605 01/09/24 1157 01/09/24  1027   POTASSIUM 5.4* 5.5* 5.4*   CHLORIDE 110* 110* 110*   CO2 24 24 18*   BUN 59.4* 60.1*  59.4*   ANIONGAP 5* 6* 13     Recent Labs   Lab Test 01/09/24  1157 01/09/24  1027 02/09/22  1117   ALBUMIN 3.5 3.5 3.7   BILITOTAL 0.7 0.7 0.6   ALT 23 22 19   AST 36 37 17       I reviewed the patient's new imaging results.        CONSULTATION ASSESSMENT AND PLAN:    Feng Wynne is a 79 year old male with history of mechanical mitral valve replacement for severe mitral regurgitation and MAZE procedure in 2008, on chronic anticoagulation with warfarin amongst others whom presented with acute anemia and supratherapeutic INR.    Supratherapeutic INR  Acute blood loss anemia  Hemoglobin 4.8->5.8->6.3  INR 5.37->4.52->3.84  Likely has GI mucosal oozing due to supratherapeutic INR  No prior EGD or colonoscpoy noted in chart  Bradycardic with -110, on room air    - IV pantoprazole 40 mg BID  - INR in a.m. and check serial hemoglobin and transfuse to keep above 7  - Unable to perform EGD/colonoscopy until INR below 1.5  - start full liquid diet and advance as tolerated  - NPO at midnight        [facility].  Office: [phone]  Cell : [phone] (Dr. Olvera)  Cell: [phone] (Cheryl Waters PA-C)

## 2024-01-10 NOTE — PLAN OF CARE
Goal Outcome Evaluation:      Plan of Care Reviewed With: patient, family      Patient is total care 2 to 4 people assist with lift, tolerated full liquids, blood pressure is soft got 1 unit off blood this morning, denies chest pain, RA, Heart rate is slow A-fib 30 to 50'S, patient voiding small amounts bladder scan for over 500 cc, unable straight cath patient is not circumcised entry to penis is very small. Waiting for Urology.

## 2024-01-10 NOTE — PROGRESS NOTES
Lab called with Hgb 5.8, the writer released 1 unit of RBC per conditional order to give if HGB <7. Dr. Hampton paged via [facility], and ordered [facility] status.

## 2024-01-10 NOTE — CONSULTS
[facility]    Cardiology Consult Note- Cardiology    Date of Admission:  1/9/2024  Reason for Consult: Bradycardia and bleeding with mechanical mitral valve  Orders.  Note    History of Present Illness   Feng Wynne is a 79 year old male admitted on 1/9/2024. He is a patient my partner Dr. laura    He has a history mitral valve regurgitation and atrial fibrillation in 2018 he underwent mitral valve replacement and maze procedure with Dr. Olivo    He is on home INR program through Dr. Hydrocodone and an independent company he is in chronic atrial fibrillation..  He is on Tenormin for rate control with atrial fibs and is in relatively stable with his Coumadin and INR tests.  Looking back apparently has been running in the 40s periodically I asked him if they changed the medication he states that is different colors so it is possible that either he is getting 2 different generic versions of COVID and or perhaps the doses been wrong and should probably check those pills to make sure that he is getting the correct dose.  He states he been taking the Tenormin as scheduled with no mistakes.  Is no recent flu illnesses no antibiotics there is nothing in his clinical history that suggest why all of a sudden his INR would go out of control and why his heart rate would drop too much it sounds like too much medication but we do not know if it has something else or not    The issue here is he is now with blood hemoglobin of 4 he states he did not notice any change in the color of his stool no abdominal pain.  Of note he is a very large man is lost over 100 pounds and still overweight but he states his weight has been stable last several months     He notes no shortness of breath coming in the hospital hospital doctor notes it has been there for 3 months I do not get that history only that he is little short of breath recently he was noted to have hemoglobin of 4.4 he is currently being transfused  I did look at his iron levels that are consistent with iron deficiency anemia which would tend to rule out hemolysis as a cause for bone marrow problem as a cause of this marked anemia.  He has noted some weakness or tiredness that may be related to the heart rate he was not aware that his heart rate was running low however he told me that one of his doctors thought he should come off the Tenormin and patient did not know why that statement was made so I am suspicious that someone previously noted some relative bradycardia.  He said no syncope     Assessment & Plan: HVSL    at this time I will hold Tenormin  Will watch heart rate determine if he needs any Tenormin in the future or if she needs a pacemaker for low heart rates.  Atrial fibs chronic    Will get an echocardiogram to assess the murmur and hearing I think it is an aortic outflow murmur not a mechanical MR murmur.  He will need a GI consult and IV iron and blood transfusion    He would be interesting to have his pills brought in so we can actually look at them and make sure the pills he is taking as prescribed I will check TSH also because the bradycardia    Consult time 1hr           Jeremiah Watson MD  ______________________________________________________________________    Past Medical History    Past Medical History:   Diagnosis Date    Arthritis     Atrial fibrillation (H)     Coronary artery disease     Hypercholesteremia     Morbid obesity (H)     Unspecified essential hypertension    Regurgitation with severe MR, morbid obesity with significant weight loss in the last year or so has had a 4 cm drop    Past Surgical History   Past Surgical History:   Procedure Laterality Date    MITRAL VALVE REPLACEMENT  8-    Mitral valve replacement with 31-mm [employer]i mechanical valve.        Family History   Family History   Problem Relation Age of Onset    Cerebrovascular Disease Father     Diabetes Paternal Grandmother     C.A.SOLA. Brother         CABG  X 3 at age 51        Social History   Social History     Socioeconomic History    Marital status: Single     Spouse name: None    Number of children: 1    Years of education: None    Highest education level: None   Occupational History    Occupation: Self employed      Comment:     Tobacco Use    Smoking status: Former     Packs/day: 0.50     Years: 5.00     Additional pack years: 0.00     Total pack years: 2.50     Types: Cigarettes    Smokeless tobacco: Never    Tobacco comments:     quit 20+ yrs ago   Vaping Use    Vaping Use: Never used   Substance and Sexual Activity    Alcohol use: Yes     Comment: 1 drink per month    Drug use: No    Sexual activity: Yes     Partners: Male   Other Topics Concern    Parent/sibling w/ CABG, MI or angioplasty before 65F 55M? Yes        Medications   Medications Prior to Admission   Medication Sig Dispense Refill Last Dose    albuterol (PROAIR HFA/PROVENTIL HFA/VENTOLIN HFA) 108 (90 Base) MCG/ACT inhaler Inhale 1-2 puffs into the lungs every 4 hours as needed for shortness of breath, wheezing or cough 18 g 3  at PRN    atenolol (TENORMIN) 25 MG tablet Take 1 tablet (25 mg) by mouth 2 times daily 180 tablet 3 1/9/2024 at AM    lisinopril (ZESTRIL) 40 MG tablet Take 1 tablet (40 mg) by mouth daily 90 tablet 3 1/8/2024 at PM    Multiple Vitamins-Minerals (CENTRUM SILVER PO) Take 1 tablet by mouth daily.   1/8/2024    Omega-3 Fatty Acids (FISH OIL CONCENTRATE PO) Take 1 capsule by mouth daily   1/8/2024    warfarin ANTICOAGULANT (COUMADIN) 5 MG tablet TAKE ONE & ONE-HALF TAB (7.5MG) EACH TUE & SAT. TAKE 1 TAB (5MG) ALL OTHER DAYS OR AS DIRECTED (Patient taking differently: -  For MVR  INR goal 2.5-3.5  10 mg on Wednesday and Saturday  5 mg on all other days) 96 tablet 1 1/8/2024 at PM    clindamycin (CLEOCIN) 150 MG capsule TAKE 4 CAPS BY MOUTH 1 HOUR PRIOR TO DENTAL APPOINTMENT    at Ppx    mirtazapine (REMERON) 7.5 MG tablet Take 1 tablet (7.5 mg) by mouth at bedtime 30  tablet 2     order for DME Equipment being ordered: COMPRESSION STOCKINGS, 20-30 mmHg 1 each 1        Allergies    Allergies   Allergen Reactions    Amiodarone Shortness Of Breath    Pcn [Penicillins] Shortness Of Breath     Rxn occurred in childhood     Latex     Sulfites     Zetia [Ezetimibe] Muscle Pain (Myalgia)     Muscle weakness legs        Review of Systems    Skin: No new skin problems negative  Eyes: Negative  ENT: Respiratory:No prior respiratory problems    Cardiovascular: Previous Maze procedure atrial fibrillation mitral valve irritation  Gastroenterology: Significant weight loss volitional  Genitourinary: Negative  Musculoskeletal: Negative  Neurologic: No stroke negative  Psychiatric:   Heme/Lymph/Imm: Negative negative negative feels well no acute distress no rash normal  Endocrine:   Vitals: BP 97/52   Pulse (!) 45   Temp 97.7  F (36.5  C)   Resp 16   Wt 135.9 kg (299 lb 8 oz)   SpO2 98%   BMI 41.19 kg/m    Constitutional: Patient feels well no distress  Skin: No rash normal  Head: \nl  Eyes: Normal  Lymph: no adenopathy  ENT: normal  Neck: normal carotid upstroke no thyromegaly  Respiratory: clear bilat  Cardiac: gr 2-3 diego, good mech click, jose irreg  GI: ventral hernia from cvs incision-odd down to abd, hernia reducible  No hsm, nl bs  Extremities and Muscular Skeletal: mild edema  Neurological: non focal   Psych: nl    Medical Decision Making         Data     I have personally reviewed the following data over the past 24 hrs:    4.4  \   6.3 (LL)   / 144 (L)     139 110 (H) 59.4 (H) /  86   5.4 (H) 24 1.76 (H) \     ALT: 23 AST: 36 AP: 62 TBILI: 0.7   ALB: 3.5 TOT PROTEIN: 5.7 (L) LIPASE: N/A     Trop: 69 (H) BNP: 2,149 (H)     INR:  3.84 (H) PTT:  N/A   D-dimer:  N/A Fibrinogen:  N/A     Ferritin:  21 (L) % Retic:  N/A LDH:  N/A         Imaging results reviewed over the past 24 hrs:   Recent Results (from the past 24 hour(s))   XR Chest Port 1 View    Narrative    XR CHEST PORT 1 VIEW  1/9/2024 12:02 PM    HISTORY: SOB    COMPARISON: 6/28/2023      Impression    IMPRESSION: Marked enlargement of the cardiac silhouette. Median  sternotomy and mitral valvular prosthesis. Mild bibasilar atelectasis.  No pulmonary edema, pleural effusion or pneumothorax.    KESHA BROTHERS MD         SYSTEM ID:  [id number]   XR Chest Port 1 View    Narrative    EXAM: XR CHEST PORT 1 VIEW  LOCATION: [facility]  DATE: 1/9/2024    INDICATION: Shortness of breath.  COMPARISON: 01/09/2024      Impression    IMPRESSION:     Redemonstrated median sternotomy wires and cardiac prosthetic valve.    Unchanged trace bilateral pleural effusions and bibasilar atelectasis. No new focal airspace disease. No pneumothorax.    Stable cardiomegaly. Aortic calcifications.

## 2024-01-11 NOTE — PLAN OF CARE
Problem: Adult Inpatient Plan of Care  Goal: Plan of Care Review  Description: The Plan of Care Review/Shift note should be completed every shift.  The Outcome Evaluation is a brief statement about your assessment that the patient is improving, declining, or no change.  This information will be displayed automatically on your shift  note.  Outcome: Progressing  Flowsheets (Taken 1/11/2024 0615)  Outcome Evaluation: Alert and oriented. SR to SB on monitor. Hypotensive-improved with unit of PRBCs overnight. Urology placed branch remains in place.  Plan of Care Reviewed With: patient  Overall Patient Progress: improving  Goal: Absence of Hospital-Acquired Illness or Injury  Intervention: Identify and Manage Fall Risk  Recent Flowsheet Documentation  Taken 1/11/2024 0400 by Katie Putnam RN  Safety Promotion/Fall Prevention: activity supervised  Taken 1/11/2024 0000 by Katie Putnam RN  Safety Promotion/Fall Prevention: activity supervised  Intervention: Prevent Skin Injury  Recent Flowsheet Documentation  Taken 1/11/2024 0400 by Katie Putnam RN  Body Position: weight shifting  Skin Protection: incontinence pads utilized  Device Skin Pressure Protection: adhesive use limited  Taken 1/11/2024 0000 by Katie Putnam RN  Body Position: weight shifting  Skin Protection: incontinence pads utilized  Device Skin Pressure Protection: adhesive use limited  Intervention: Prevent and Manage VTE (Venous Thromboembolism) Risk  Recent Flowsheet Documentation  Taken 1/11/2024 0400 by Katie Putnam RN  VTE Prevention/Management: SCDs (sequential compression devices) off  Taken 1/11/2024 0000 by Katie Putnam RN  VTE Prevention/Management: SCDs (sequential compression devices) off  Intervention: Prevent Infection  Recent Flowsheet Documentation  Taken 1/11/2024 0400 by Katie Putnam RN  Infection Prevention: rest/sleep  promoted  Taken 1/11/2024 0000 by Katie Putnam RN  Infection Prevention: rest/sleep promoted  Goal: Optimal Comfort and Wellbeing  Intervention: Provide Person-Centered Care  Recent Flowsheet Documentation  Taken 1/11/2024 0400 by Katie Putnam RN  Trust Relationship/Rapport: care explained  Taken 1/11/2024 0000 by Katie Putnam RN  Trust Relationship/Rapport: care explained   Goal Outcome Evaluation:      Plan of Care Reviewed With: patient    Overall Patient Progress: improvingOverall Patient Progress: improving    Outcome Evaluation: Alert and oriented. SR to SB on monitor. Hypotensive-improved with unit of PRBCs overnight. Urology placed branch remains in place.

## 2024-01-11 NOTE — PROGRESS NOTES
[facility]  Gastroenterology Progress Note     Feng Wynne MRN# [id number]   YOB: 1945 Age: 79 year old          Assessment and Plan:   Feng Wynne is a 79 year old male with history of mechanical mitral valve replacement for severe mitral regurgitation and MAZE procedure in 2008, on chronic anticoagulation with warfarin amongst others whom presented with acute anemia and supratherapeutic INR.     Supratherapeutic INR  Acute blood loss anemia  Hemoglobin 4.8 on admission. 6.7 this a.m. has had multiple PRBC transfusions  INR 5.37->4.52->3.84->2.81  Likely has GI mucosal oozing due to supratherapeutic INR  No prior EGD or colonoscpoy noted in chart  Bradycardic with -110, on room air     - IV pantoprazole 40 mg BID  - INR in a.m. and check serial hemoglobin and transfuse to keep above 7  - Unable to perform EGD/colonoscopy until INR below 1.5  - soft diet  - NPO at midnight                 Interval History:     no new complaints and doing well; no cp, sob, n/v/d, or abd pain.              Review of Systems:     C: NEGATIVE for fever, chills, change in weight  E/M: NEGATIVE for ear, mouth and throat problems  R: NEGATIVE for significant cough or SOB  CV: NEGATIVE for chest pain, palpitations or peripheral edema             Medications:   I have reviewed this patient's current medications   mirtazapine  7.5 mg Oral At Bedtime    pantoprazole  40 mg Intravenous Q12H    sodium chloride (PF)  3 mL Intracatheter Q8H    sodium chloride (PF)  3 mL Intracatheter Q8H                  Physical Exam:   Vitals were reviewed  Vital Signs with Ranges  Temp:  [98  F (36.7  C)-99.6  F (37.6  C)] 99.6  F (37.6  C)  Pulse:  [37-63] 43  Resp:  [16-29] 16  BP: ()/(39-64) 109/64  SpO2:  [92 %-100 %] 98 %  I/O last 3 completed shifts:  In: 1381 [P.O.:690; I.V.:6]  Out: 1025 [Urine:1025]  Constitutional: healthy, alert, and no distress   Cardiovascular: negative, PMI normal. No lifts,  heaves, or thrills. RRR. No murmurs, clicks gallops or rub  Respiratory: negative, Percussion normal. Good diaphragmatic excursion. Lungs clear  Abdomen: Abdomen soft, non-tender. BS normal. No masses, organomegaly           Data:   I reviewed the patient's new clinical lab test results.   Recent Labs   Lab Test 01/11/24  0610 01/10/24  2244 01/10/24  1422 01/10/24  0605 01/09/24  2053 01/09/24  1157 01/09/24  1027   WBC  --   --   --  4.4  --  4.4 5.1   HGB 6.7* 6.7* 7.2* 6.3*   < > 4.4* 4.8*   MCV  --   --   --  81  --  79 81   PLT  --   --   --  144*  --  158 177   INR 2.82* 3.12*  --  3.84*   < > 5.37* 7.0*    < > = values in this interval not displayed.     Recent Labs   Lab Test 01/11/24  0610 01/10/24  2244 01/10/24  1422 01/10/24  0605 01/09/24  1157   POTASSIUM 5.4* 5.3 5.5* 5.4* 5.5*   CHLORIDE 110*  --   --  110* 110*   CO2 18*  --   --  24 24   BUN 57.6*  --   --  59.4* 60.1*   ANIONGAP 8  --   --  5* 6*     Recent Labs   Lab Test 01/09/24 1157 01/09/24 1027 02/09/22  1117   ALBUMIN 3.5 3.5 3.7   BILITOTAL 0.7 0.7 0.6   ALT 23 22 19   AST 36 37 17       I reviewed the patient's new imaging results.    All laboratory data reviewed  All imaging studies reviewed by me.    Cheryl Waters PA-C,  1/11/2024  [facility]  Office : [phone]  Cell: [phone] (Dr. Olvera)  Cell: [phone] (Cheryl Waters PA-C)

## 2024-01-11 NOTE — PROGRESS NOTES
01/11/24 1333   Appointment Info   Signing Clinician's Name / Credentials (OT) Claudio Ann, OTR/L   Living Environment   People in Home significant other   Current Living Arrangements apartment   Home Accessibility no concerns  (on the first floor; has elevator to garage)   Transportation Anticipated family or friend will provide  (Pt typically drives)   Living Environment Comments Pt reported chronic pain in RUE/RLE from an accident 20 years ago.   Self-Care   Usual Activity Tolerance good   Current Activity Tolerance poor   Equipment Currently Used at Home shower chair;raised toilet seat;walker, rolling;grab bar, tub/shower;grab bar, toilet;cane, straight  (Has an adjustable bed)   Fall history within last six months no   Activity/Exercise/Self-Care Comment independent in all ADLs at baseline. Uses a 4WW at baseline.   Instrumental Activities of Daily Living (IADL)   Previous Responsibilities meal prep;housekeeping;medication management;finances;driving   IADL Comments Has hired assist for home management tasks.   General Information   Onset of Illness/Injury or Date of Surgery 01/09/24   Referring Physician Edu Castro MD   Patient/Family Therapy Goal Statement (OT) Home   Additional Occupational Profile Info/Pertinent History of Current Problem PEr chart: Feng Wynne is a 79 year old male with history of mechanical mitral valve replacement for severe mitral regurgitation and MAZE procedure in 2008, on chronic anticoagulation with warfarin, pulmonary hypertension, chronic diastolic CHF with preserved EF, previous dilated LV likely due to mitral regurgitation, subsequent improvement with ACE inhibitor and beta-blockers, hypertension, hyperlipidemia, intolerant to statins and Zetia, who presents to ER on 1/9/2024 due to 3 months of progressive shortness of breath and finding of severe anemia. See chart for details.   Cognitive Status Examination   Orientation Status orientation to person, place and  time   Safety Deficit at risk behavior observed;awareness of need for assistance;insight into deficits/self-awareness   Sensory   Sensory Comments Pt reported decreased sensation in RUE And RLE at baseline.   Pain Assessment   Patient Currently in Pain Yes, see Vital Sign flowsheet   Range of Motion Comprehensive   Comment, General Range of Motion Limited ROM and strength in BUE's. RUE shoulder flexion ~35 degrees.   Bed Mobility   Bed Mobility sit-supine;supine-sit   Supine-Sit [place] (Bed Mobility) maximum assist (25% patient effort);2 person assist   Transfers   Transfers sit-stand transfer;toilet transfer   Sit-Stand Transfer   Sit-Stand [place] (Transfers) dependent (less than 25% patient effort)  (Per clinical judgement)   Toilet Transfer   Type (Toilet Transfer) sit-stand;stand-sit   [place] Level (Toilet Transfer) dependent (less than 25% patient effort)  (Per clinical judgement)   Activities of Daily Living   BADL Assessment/Intervention lower body dressing   Lower Body Dressing Assessment/Training   [place] Level (Lower Body Dressing) dependent (less than 25% patient effort)  (Per clinical judgement)   Clinical Impression   Criteria for Skilled Therapeutic Interventions Met (OT) Yes, treatment indicated   OT Diagnosis Decreased independence in I/ADLs   Influenced by the following impairments Decreased independence in I/ADLs   OT Problem List-Impairments impacting ADL problems related to;activity tolerance impaired;balance;cognition;range of motion (ROM);sensation;strength;pain   Assessment of Occupational Performance 3-5 Performance Deficits   Identified Performance Deficits Decreased independence in I/ADLs (Dressing, bathing, toileting, meal preparation)   Planned Therapy Interventions (OT) ADL retraining;IADL retraining;cognition;strengthening;ROM;transfer training;home program guidelines;progressive activity/exercise   Clinical Decision Making Complexity (OT) problem focused  assessment/low complexity   Risk & Benefits of therapy have been explained care plan/treatment goals reviewed;evaluation/treatment results reviewed;risks/benefits reviewed;patient   OT Total Evaluation Time   OT Eval, Moderate Complexity Minutes (14148) 7   OT Goals   Therapy Frequency (OT) 5 times/week   OT Predicted Duration/Target Date for Goal Attainment 01/17/24   OT Goals Hygiene/Grooming;Upper Body Dressing;Lower Body Dressing;Toilet Transfer/Toileting;Cognition;OT Goal 1   OT: Hygiene/Grooming minimal assist   OT: Upper Body Dressing Minimal assist   OT: Lower Body Dressing Minimal assist;using adaptive equipment   OT: Toilet Transfer/Toileting Minimal assist  (BSC/toilet)   OT: Cognitive Patient/caregiver will verbalize understanding of cognitive assessment results/recommendations as needed for safe discharge planning   OT: Goal 1 Pt will verbalize understanding of 3 learned energy conservation techniques, in order to increase IND  in I/ADLs.   Interventions   Interventions Quick Adds Self-Care/Home Management   Self-Care/Home Management   Self-Care/Home Mgmt/ADL, Compensatory, Meal Prep Minutes (52659) 38   Symptoms Noted During/After Treatment (Meal Preparation/Planning Training) fatigue;increased pain   Treatment Detail/Skilled Intervention Session included set up and monitoring vitals throughout session. Co-treated a portion of the session with IP PT. Supine to sit EOB with maximum assist of 2 and significant extra time, in preparation for EOB ADLs. Pt demonstrated significant difficulty transferring to the EOB and reported increase in pain in RLE. Unable to safely progress pt to sitting EOB for ADLs at this time. Pt requesting to return to bed. Assist of 2 to boost. Alarm on.   OT Discharge Planning   OT Plan Co treat with PT - EOB ADLS (may need overhead lift to EOB?).   OT Discharge Recommendation (DC Rec) Transitional Care Facility   OT Rationale for DC Rec At baseline, pt independent in all ADLs  and uses a 4WW for mobility. Pt is significantly below baseline in I/ADLs. Pt limited by decreased strength and activity tolerance, and pain. Skilled OT in TCU to address safety and IND in I/ADLs.   OT Brief overview of current status Ax2 to EOB   Total Session Time   Timed Code Treatment Minutes 38   Total Session Time (sum of timed and untimed services) 45

## 2024-01-11 NOTE — PROVIDER NOTIFICATION
Lab called with HGB 6.8, Dr Hampton was vocera texted with result and the writer need order to prepare RBC for HGB <7. Awaiting order.

## 2024-01-11 NOTE — PROGRESS NOTES
[facility]    Hospitalist Progress Note    Assessment & Plan     Date of Admission:  1/9/2024        Assessment & Plan  Feng yWnne is a 79 year old male with history of mechanical mitral valve replacement for severe mitral regurgitation and MAZE procedure in 2008, on chronic anticoagulation with warfarin, pulmonary hypertension, chronic diastolic CHF with preserved EF, previous dilated LV likely due to mitral regurgitation, subsequent improvement with ACE inhibitor and beta-blockers, hypertension, hyperlipidemia, intolerant to statins and Zetia, who presents to ER on 1/9/2024 due to 3 months of progressive shortness of breath and finding of severe anemia.     He is also noted to have sinus bradycardia with heart rate in 30s, ENRIQUE, mild hypokalemia     Subacute normocytic anemia, hemoglobin 4.4, likely GI bleed due to anticoagulation  Acute blood loss anemia,   Iron deficiency anemia  -Has been symptomatic with dyspnea on exertion for 3 months.  Hemoglobin was 12.3 and will be 2022 and now down to 4.4.  Denies any melena, hematochezia, hematemesis.   - He is on anticoagulation with Coumadin with elevated INR of 5.37.  -3 units of PRBCs transfused in ER  -given Vitamin K 1 mg po X 1 on 1/9 at noon and 1mg X 1 midnight/early am of 1/10.   -Suspect GI bleed due to anticoagulation.  Undergoes FIT testing regularly and has been negative.  No previous EGDs or colonoscopies    1/10: sbp stable in 90/-. HR 30-40, asymptomatic.   Receiving 4th unit blood now for Hb 6 range.   Seen by GI and diet advanced to full liquids  INR down to 3.8 from 7 on admission   Iron sat 4%, tibc 435, ferritin 21. Iron level 17 consistent with AKIRA  Bilirubin nl  -troponin 73--> 69   Sbp up slightly with 4th unit blood. No orthostatic sxs  Having normal stools.   No hx bloody stools  Benign abd exam    1/11: no symptoms  Hb again down into 6 range.   -receiving 5th unit blood today   Suspect slow oozing from gi tract  per GI.   INR down to 2.8.           Plan;   -avoid further Vitamin K per cardiology as may make pt hypercoaguable, allow INR to drift down per discussion with cardiology   -follow for need of repeat vit k level   -IV iron this stay  -let INR drift down to 1.5, npo midnight. Diet today per gi  -no need for bridging heparin gtt per cardiology once INR below usual inr goal  -iv protonix bid.   - transfuse to if Hb <7.   -Monitor hemoglobin every 8 hours.  Transfuse to keep hemoglobin over 7  -Consult [facility] follow up  -unable to perform egd/colonoscopy until INR <1.5  -npo midnight.        Dyspnea on exertion  -Most likely secondary to severe anemia.  Bradycardia may be contributing.  May have mild CHF as well.  pCXR shows bilateral pleural effusions. No airspace disease   -nl sats. Not needing oxygen      Paroxysmal atrial fibrillation  Bradycardia, heart rate in 30s  -No history of syncope.      -stop atenolol.  on bber for rate control, runs in 40s periodically.   -with ENRIQUE, atenolol may not yet be cleared.   -Consulted cardiology.  following  - Monitor on telemetry     Acute kidney injury, likely prerenal  Mild hyperkalemia, potassium 5.5  Nonagma 1/11  - creatinine of 1.83, up from 0.9 2 years ago, elevated BUN of 60, mild hyperkalemia of 5.5  -unclear what recent baseline Cr is.   -Expected to improve after 2 units of PRBCs  -Renal US  1.  Left kidney and urinary bladder are unremarkable. No left  collecting system dilatation.  2.  Significantly limited evaluation of the right kidney due to poor  acoustic windows/body habitus.   -Cr down slightly post 3 units blood on admisison  - 4th unit blood am 1/10.   -branch placed for urinary retention 500+ ml by urology   -needing another unit blood am 1/11- 5th unit blood   -Cr stable. Nonagma today. K 5.4   - elevated K may still be from blood transfusions  Now mild acidosis may be contributing         Plan;   -follow for need of bicarb fluids.   -daily bmp    -Holding lisinopril  -check K at 1400, am bmp.   -branch placed 1/10  -outpatient renal referral  - outpatient bmp  -pcp follow up   -will give 500cc NS bolus after blood    Addendum; 1900   500cc bolus given after afternoon bmp ,bmp reviewed  Hb is up to 7 range.   Will given 2nd 500cc fluid bolus over 4 hours.   pCXR without clear pneumonia or chf.       Urinary retention  Pvr 500 range.   RN unable to place straight cath  [facility] consulted.   Branch placed 1/10   Difficult branch insertion bedside, placed by [facility]    - per urology, discharge with branch catheter. plan for follow up [facility] office in 3 weeks office for trial of void.  Scheduled for 1/30/24 at 8:30 am at our [place] location. AVS updated.        On chronic anticoagulation with warfarin for mechanical mitral valve  Supratherapeutic INR  -Received 1 mg vitamin K in ER.  Since he is not actively bleeding, INR was not fully reversed  -Echo this admission:   1. The left ventricle is mildly dilated. The visual ejection fraction is  estimated at 55%.  2. The right ventricle is moderately dilated. The right ventricular systolic  function is borderline reduced.  3. s/p 31mm St Raffi mechanical MVR. Mean 5mmHg @ HR 50.  4. There is moderate to mod-severe (2-3+) tricuspid regurgitation. The right  ventricular systolic pressure is approximated at 37mmHg plus the right atrial  pressure.    -received 2 doses of vitamin K 1mg each on 1/9 and early am of 1/10. INR down to 3.8     Echo 11/2022 showed EF 55%, mild RV dysfunction, MVR with mean 4mmHg, trace  TR.    -cardiology following   -Hold Coumadin.  Monitor INR daily   -INR needs to be 1.5 or less for endoscopy   -no need for heparin gtt bridge per cardiology once INR below goal  -avoid further Vitamin K per cardiology as may make pt hypercoaguable      Chronic diastolic CHF with preserved EF  - proBNP 2149.  - Chest x-ray showed mild enlargement of cardiac silhouette, prosthetic mitral valve, mild  bibasilar atelectasis, no pulmonary edema or pleural effusion.  -Last echo 11/2022 showed well-seated mechanical mitral valve, no abnormal regurgitation, normal LV size and function with EF 55-60%, mildly dilated RV with mildly reduced systolic function, severe biatrial enlargement.  -Echo: this admission:   1. The left ventricle is mildly dilated. The visual ejection fraction is  estimated at 55%.  2. The right ventricle is moderately dilated. The right ventricular systolic  function is borderline reduced.  3. s/p 31mm St Raffi mechanical MVR. Mean 5mmHg @ HR 50.  4. There is moderate to mod-severe (2-3+) tricuspid regurgitation. The right  ventricular systolic pressure is approximated at 37mmHg plus the right atrial  pressure.     Echo 11/2022 showed EF 55%, mild RV dysfunction, MVR with mean 4mmHg, trace  TR.  -Not on diuretics at baseline        Type II MI due to severe anemia  - High sensitive troponin mildly elevated at 73 with flat trend with subsequent troponin 69.    -No chest pain or concerning EKG changes  -Had clean coronaries on angiogram in 2008  -Likely type II MI due to severe anemia  -Monitor on telemetry  - echo nl. No WMA     S/p mechanical mitral valve replacement for severe mitral regurgitation and MAZE procedure in 2008, on chronic anticoagulation with warfarin  -Previous echo 11/2022 showed well-seated mechanical mitral valve with no abnormal regurgitation   -Hold anticoagulation for now due to concern of GI bleed   -ok to allow INR to drift down to 1.5 without heparin gtt bridge per discussion with cardiology 1/11.   -see discussion above.   -avoid further Vitamin K per cardiology as may make pt hypercoaguable   Let INR drift down     Hyperlipidemia  Intolerant to statins and Zetia  -Lipid panel obtained in clinic,   TG 32, LDL 22,                     Diet:  soft diet per GI, npo midnight nightly until INR 1.5.   DVT Prophylaxis: Warfarin- holding   Stubbs Catheter: placed for urinary retention  "by urology 1/10. Outpatient urology follow up     Lines: None     Cardiac Monitoring: None  Code Status:  DNR/DNI        Clinically Significant Risk Factors Present on Admission []Expand by Default         # Hyperkalemia: Highest K = 5.5 mmol/L in last 2 days, will monitor as appropriate        # Drug Induced Coagulation Defect: home medication list includes an anticoagulant medication    # Hypertension: Noted on problem list      # Severe Obesity: Estimated body mass index is 41.12 kg/m  as calculated from the following:    Height as of an earlier encounter on 1/9/24: 1.816 m (5' 11.5\").    Weight as of an earlier encounter on 1/9/24: 135.6 kg (299 lb).                     Disposition Plan- anticipate discharge home but may need TCU.   PT, OT consult  >2 days in hospital. TBD, likely early next week pending INR and egd/colonoscopy       moderate decision making, >35 min on care coordination with gi, rn, cardiology pt, chart review, exam, charting,             Edu Castro MD, MD  Text Page  (7am to 6pm)  Interval History   HR stable 40-50s.   No complaints   Stubbs placed by urology yest afternoon  No cp or sob.       -Data reviewed today: I reviewed all new labs and imaging results over the last 24 hours. I personally reviewed labs and imaging since admisison      Physical Exam   Temp: 99.6  F (37.6  C) Temp src: Oral BP: 109/64 Pulse: (!) 43   Resp: 16 SpO2: 98 % O2 Device: None (Room air) Oxygen Delivery: 1 LPM  Vitals:    01/10/24 0600 01/10/24 0728 01/11/24 0400   Weight: 135 kg (297 lb 9.9 oz) 135.9 kg (299 lb 8 oz) 135.6 kg (298 lb 14.4 oz)     Vital Signs with Ranges  Temp:  [98  F (36.7  C)-99.6  F (37.6  C)] 99.6  F (37.6  C)  Pulse:  [37-63] 43  Resp:  [16-29] 16  BP: ()/(39-64) 109/64  SpO2:  [92 %-100 %] 98 %  I/O last 3 completed shifts:  In: 1381 [P.O.:690; I.V.:6]  Out: 1025 [Urine:1025]    Constitutional:  in bed, nad  Respiratory: Slightly decreased bibasilar with slightly " crackles   Breathing easily   Cardiovascular: RRR no r/g/m. [place] prosthetic S1 apex  GI: large reducible hernia, chronic. Central incision. NT, ND  - branch placed 1/10. Clear urine   Skin/Integumen: BLE chronic lymphedema.   Neuro: nl speech and mentation  Psych: nl affect      Medications    - MEDICATION INSTRUCTIONS -        mirtazapine  7.5 mg Oral At Bedtime    pantoprazole  40 mg Intravenous Q12H    sodium chloride (PF)  3 mL Intracatheter Q8H    sodium chloride (PF)  3 mL Intracatheter Q8H       Data   Recent Labs   Lab 01/11/24  0610 01/10/24  2244 01/10/24  1422 01/10/24  0605 01/09/24  2053 01/09/24  1157 01/09/24  1027   WBC  --   --   --  4.4  --  4.4 5.1   HGB 6.7* 6.7* 7.2* 6.3*   < > 4.4* 4.8*   MCV  --   --   --  81  --  79 81   PLT  --   --   --  144*  --  158 177   INR 2.82* 3.12*  --  3.84*   < > 5.37* 7.0*     --   --  139  --  140 141   POTASSIUM 5.4* 5.3 5.5* 5.4*  --  5.5* 5.4*   CHLORIDE 110*  --   --  110*  --  110* 110*   CO2 18*  --   --  24  --  24 18*   BUN 57.6*  --   --  59.4*  --  60.1* 59.4*   CR 1.71*  --   --  1.76*  --  1.83* 1.82*   ANIONGAP 8  --   --  5*  --  6* 13   JOSEPH 8.3*  --   --  8.6*  --  8.7* 8.8   GLC 89  --   --  86  --  100* 98   ALBUMIN  --   --   --   --   --  3.5 3.5   PROTTOTAL  --   --   --   --   --  5.7* 5.8*   BILITOTAL  --   --   --   --   --  0.7 0.7   ALKPHOS  --   --   --   --   --  62 60   ALT  --   --   --   --   --  23 22   AST  --   --   --   --   --  36 37    < > = values in this interval not displayed.       Imaging:   Recent Results (from the past 24 hour(s))   US Renal Complete Non-Vascular    Narrative    US RENAL COMPLETE NON-VASCULAR 1/10/2024 1:05 PM    CLINICAL HISTORY: ENRIQUE, urinary retention, rule out hydronephrosis.    TECHNIQUE: Routine bilateral renal and bladder ultrasound.    COMPARISON: Abdominal radiograph 4/8/2019    FINDINGS:    RIGHT KIDNEY: Significantly limited due to body habitus and poor  acoustic windows, approximately  measures 10.3 x 6.5 x 6.9 cm.     LEFT KIDNEY: 11.8 x 5.9 x 6.3 cm. Normal echogenicity without  hydronephrosis or masses.    BLADDER: Unremarkable.      Impression    IMPRESSION:  1.  Left kidney and urinary bladder are unremarkable. No left  collecting system dilatation.  2.  Significantly limited evaluation of the right kidney due to poor  acoustic windows/body habitus.     SHIELA BOLDEN MD         SYSTEM ID:  [id number]

## 2024-01-11 NOTE — PLAN OF CARE
Goal Outcome Evaluation:      Plan of Care Reviewed With: patient, family      Patient unable to turn or sit up or lift his legs on his own, need 2-3 assist with turns, Stubbs is patent low output dark pérez encored to drink more water. Family at bedside, Blood pressure is soft, gave one unit off blood waiting for hemoglobin results. Full liquid diet tolerated OK. Continue to monitor.

## 2024-01-11 NOTE — PROGRESS NOTES
[facility]  Cardiology Progress Note         Assessment and Plan:       Chronic atrial fibrillation (H)    Bradycardia    SOB (shortness of breath)    Supratherapeutic INR    Anemia, unspecified type  Hyperkalemia  MV surgery  TR      HR up to 50  off tenormin   will watch and see if needs pacer  K elevated    on off chance anemia is hemolysis I will check haptoglobin  (lysis could inc K)  Will need GI consult  New TR  will repeat echo after Hb issue resolved               Interval History:     doing well; no cp, sob, n/v/d, or abd pain.              Medications:   Scheduled Meds:    mirtazapine  7.5 mg Oral At Bedtime    pantoprazole  40 mg Intravenous Q12H    sodium chloride (PF)  3 mL Intracatheter Q8H    sodium chloride (PF)  3 mL Intracatheter Q8H     PRN Meds: acetaminophen **OR** acetaminophen, albuterol, calcium carbonate, lidocaine 4%, lidocaine (buffered or not buffered), nitroGLYcerin, ondansetron **OR** ondansetron, - MEDICATION INSTRUCTIONS -, prochlorperazine **OR** prochlorperazine **OR** prochlorperazine, senna-docusate **OR** senna-docusate, sodium chloride (PF), sodium chloride (PF)         Physical Exam:   Blood pressure 109/64, pulse (!) 43, temperature 99.6  F (37.6  C), temperature source Oral, resp. rate 16, weight 135.6 kg (298 lb 14.4 oz), SpO2 98%.  Vitals:    01/10/24 0600 01/10/24 0728 24 0400   Weight: 135 kg (297 lb 9.9 oz) 135.9 kg (299 lb 8 oz) 135.6 kg (298 lb 14.4 oz)       Intake/Output Summary (Last 24 hours) at 2024 0943  Last data filed at 2024 0805  Gross per 24 hour   Intake 1388 ml   Output 1025 ml   Net 363 ml           Vital Sign Ranges  Temperature Temp  Av.9  F (37.2  C)  Min: 98  F (36.7  C)  Max: 99.6  F (37.6  C)   Blood pressure Systolic (24hrs), Av , Min:85 , Max:109        Diastolic (24hrs), Av, Min:39, Max:64      Pulse Pulse  Av.6  Min: 37  Max: 63   Respirations Resp  Av.8  Min: 16  Max: 32   Pulse oximetry  "SpO2  Av.2 %  Min: 92 %  Max: 100 %             Constitutional: Awake, alert, cooperative, no apparent distress         Skin: No rash, petechia, cyanosis       Neck: No thyromegaly or adenopathy       Lungs: clear       Cardiovasc: Afib with syst murmur       Abdomen: Normal bowel sounds, soft, non-distended, non-tender, no hepatomegaly       Neuro: Alert and oriented x3, non focal exam       Extremities: No edema       Other:             Data:     Recent Labs   Lab Test 01/11/24  0610 01/10/24  2244 01/10/24  1422 01/10/24  0605 24  1157   WBC  --   --   --  4.4  --  4.4   HGB 6.7* 6.7*   < > 6.3*   < > 4.4*   MCV  --   --   --  81  --  79   PLT  --   --   --  144*  --  158   INR 2.82* 3.12*  --  3.84*   < > 5.37*    < > = values in this interval not displayed.      Recent Labs   Lab Test 01/11/24  0610 01/10/24  2244 01/10/24  1422 01/10/24  0605     --   --  139   POTASSIUM 5.4* 5.3   < > 5.4*   CHLORIDE 110*  --   --  110*   BUN 57.6*  --   --  59.4*   CR 1.71*  --   --  1.76*    < > = values in this interval not displayed.     Recent Labs   Lab 24  0610 01/10/24  0624  11524  1027   GLC 89 86 100* 98     Recent Labs   Lab Test 24  1157 24  1027   ALT 23 22   AST 36 37     No components found for: \"TROPONINIES\"    Lab Results   Component Value Date    CHOL 53 2024    CHOL 147 10/16/2020     Lab Results   Component Value Date    HDL 25 2024    HDL 48 10/16/2020     Lab Results   Component Value Date    LDL 22 2024    LDL 79 10/16/2020     Lab Results   Component Value Date    TRIG 32 2024    TRIG 102 10/16/2020     Lab Results   Component Value Date    CHOLHDLRATIO 3.4 2015          TSH   Date Value Ref Range Status   2019 2.39 0.40 - 4.00 mU/L Final       "

## 2024-01-12 NOTE — PLAN OF CARE
Goal Outcome Evaluation:  Neuro- A&OX4, forgetful   Most Recent Vitals- Temp: 99.2  F (37.3  C) Temp src: Oral BP: 97/51 Pulse: 51   Resp: 16 SpO2: 96 % O2 Device: None (Room air)   Tele/Cardiac- slow A-fib HR 30-50'S   Resp- RA  Activity- Lift assist of 3, refuses turning and repositioning   Pain- generalized aches with movement   Drips- NS 125cc/hr   Drains/Tubes- WDL  Skin- Brusing on bilateral arms   GI/- Stubbs with low urine out put   Aggression Color- Green  COVID status- Negative  Plan- con't to monitor Hgb & INR. GI following.  [facility] [place]-     Katt Sánchez RN

## 2024-01-12 NOTE — PLAN OF CARE
Goal Outcome Evaluation:  Neuro- A&OX4  Most Recent Vitals- Temp: 99.2  F (37.3  C) Temp src: Oral BP: 97/51 Pulse: 51   Resp: 16 SpO2: 96 % O2 Device: None (Room air)   Tele/Cardiac- SR  Resp- RA  Activity- Bed rest until 0100  Pain- Headache, prn tylenol and cold pack given  Drips- none   Drains/Tubes- [id number]  Skin- post angio Right groin site CDI, cms intact   GI/- Urinal   Aggression Color- Green  COVID status- Negative  Plan- Discharge home tomorrow.  [facility] [place]-     Katt Sánchez RN

## 2024-01-12 NOTE — PROGRESS NOTES
"[facility]  Cardiology Progress Note         Assessment and Plan:       Chronic atrial fibrillation (H)    Bradycardia    SOB (shortness of breath)    Supratherapeutic INR    Anemia, unspecified type  Hyperkalemia  MVR -sjm mech valve  TR      HR off atenolol is 50-70  likely wont need pacer  Weights are way off, mild edema, creat stable--will stop the iv NS 100cc/hr  pt is eating  I prefer we NOT reverse the warfarin due to afib and mech MVR--inr too high for GI scope today  Hb low but stable---haptoglobin normal so unlikely the anemia is hemolysis and is most likely GI    New TR  will repeat echo after Hb issue resolved    Soft wheeze today--pt says this is typical with his allergies--pt denies sob, cp  Weights unreliable, I stopped IV fluids but we will watch for \"cardiac asthma\" ie wheeze from chf  I will give iv lasix x1 due to NS and all the blood transfusions he had and will reweigh pt               Interval History:     doing well; no cp, sob, n/v/d, or abd pain.              Medications:   Scheduled Meds:    mirtazapine  7.5 mg Oral At Bedtime    pantoprazole  40 mg Intravenous Q12H    sodium chloride (PF)  3 mL Intracatheter Q8H    sodium chloride (PF)  3 mL Intracatheter Q8H     PRN Meds: acetaminophen **OR** acetaminophen, albuterol, calcium carbonate, lidocaine 4%, lidocaine (buffered or not buffered), nitroGLYcerin, ondansetron **OR** ondansetron, - MEDICATION INSTRUCTIONS -, prochlorperazine **OR** prochlorperazine **OR** prochlorperazine, senna-docusate **OR** senna-docusate, sodium chloride (PF), sodium chloride (PF)         Physical Exam:   Blood pressure 112/64, pulse 58, temperature 98.3  F (36.8  C), temperature source Axillary, resp. rate 16, weight 119.3 kg (263 lb), SpO2 94%.  Vitals:    01/10/24 0600 01/10/24 0728 01/11/24 0400 01/12/24 0600   Weight: 135 kg (297 lb 9.9 oz) 135.9 kg (299 lb 8 oz) 135.6 kg (298 lb 14.4 oz) 119.3 kg (263 lb)       Intake/Output Summary (Last " "24 hours) at 2024 0943  Last data filed at 2024 0805  Gross per 24 hour   Intake 1388 ml   Output 1025 ml   Net 363 ml           Vital Sign Ranges  Temperature Temp  Av.9  F (37.2  C)  Min: 98  F (36.7  C)  Max: 99.6  F (37.6  C)   Blood pressure Systolic (24hrs), Av , Min:85 , Max:109        Diastolic (24hrs), Av, Min:39, Max:64      Pulse Pulse  Av.6  Min: 37  Max: 63   Respirations Resp  Av.8  Min: 16  Max: 32   Pulse oximetry SpO2  Av.2 %  Min: 92 %  Max: 100 %             Constitutional: Awake, alert, cooperative, no apparent distress         Skin: No rash, petechia, cyanosis       Neck: No thyromegaly or adenopathy       Lungs: Soft wheeze       Cardiovasc: Afib with syst murmur mvr       Abdomen: Normal bowel sounds, soft, non-distended, non-tender, no hepatomegaly       Neuro: Alert and oriented x3, non focal exam       Extremities: Mild anemia       Other:             Data:     Recent Labs   Lab Test 24  0624  0019 24  1628 01/10/24  1422 01/10/24  0605 24  1157   WBC  --   --  5.4  --  4.4  --  4.4   HGB 7.5* 7.7* 7.7*   < > 6.3*   < > 4.4*   MCV  --   --   --   --  81  --  79   PLT  --   --   --   --  144*  --  158   INR 2.49*  --  2.50*   < > 3.84*   < > 5.37*    < > = values in this interval not displayed.      Recent Labs   Lab Test 24  162    137   POTASSIUM 5.3 5.4*   CHLORIDE 109* 108*   BUN 55.0* 55.9*   CR 1.72* 1.76*     Recent Labs   Lab 24  0619 24  1628 24  0610 01/10/24  0605   GLC 97 124* 89 86     Recent Labs   Lab Test 24  1157 24  1027   ALT 23 22   AST 36 37     No components found for: \"TROPONINIES\"    Lab Results   Component Value Date    CHOL 53 2024    CHOL 147 10/16/2020     Lab Results   Component Value Date    HDL 25 2024    HDL 48 10/16/2020     Lab Results   Component Value Date    LDL 22 2024    LDL 79 10/16/2020     Lab " Results   Component Value Date    TRIG 32 01/09/2024    TRIG 102 10/16/2020     Lab Results   Component Value Date    CHOLHDLRATIO 3.4 11/04/2015          TSH   Date Value Ref Range Status   03/14/2019 2.39 0.40 - 4.00 mU/L Final

## 2024-01-12 NOTE — PROGRESS NOTES
[facility]    Hospitalist Progress Note    Assessment & Plan     Date of Admission:  1/9/2024        Assessment & Plan  Feng Wynne is a 79 year old male with history of mechanical mitral valve replacement for severe mitral regurgitation and MAZE procedure in 2008, on chronic anticoagulation with warfarin, pulmonary hypertension, chronic diastolic CHF with preserved EF, previous dilated LV likely due to mitral regurgitation, subsequent improvement with ACE inhibitor and beta-blockers, hypertension, hyperlipidemia, intolerant to statins and Zetia, who presents to ER on 1/9/2024 due to 3 months of progressive shortness of breath and finding of severe anemia.     He is also noted to have sinus bradycardia with heart rate in 30s, ENRIQUE, mild hypokalemia     Subacute normocytic anemia, hemoglobin 4.4, likely GI bleed due to anticoagulation  Acute blood loss anemia,   Iron deficiency anemia  -Has been symptomatic with dyspnea on exertion for 3 months.  Hemoglobin was 12.3 and will be 2022 and now down to 4.4.  Denies any melena, hematochezia, hematemesis.   - He is on anticoagulation with Coumadin with elevated INR of 5.37.  -3 units of PRBCs transfused in ER  -given Vitamin K 1 mg po X 1 on 1/9 at noon and 1mg X 1 midnight/early am of 1/10.   -Suspect GI bleed due to anticoagulation.  Undergoes FIT testing regularly and has been negative.  No previous EGDs or colonoscopies    1/10: sbp stable in 90/-. HR 30-40, asymptomatic.   Receiving 4th unit blood now for Hb 6 range.   Seen by GI and diet advanced to full liquids  INR down to 3.8 from 7 on admission   Iron sat 4%, tibc 435, ferritin 21. Iron level 17 consistent with AKIRA  Bilirubin nl  -troponin 73--> 69   Sbp up slightly with 4th unit blood. No orthostatic sxs  Having normal stools.   No hx bloody stools  Benign abd exam    1/11: no symptoms  Hb again down into 6 range.   -receiving 5th unit blood   Suspect slow oozing from gi tract per GI.    INR down to 2.8.   Given fluid bolus for ENRIQUE and low nl sbp       1/12.   Hb up to 7 range and stable. First day not to drift down to 6 range  - sbp is up with fluids and blood  - INR remains 2.4 range.   -haptoglobin nl. Nl bili  -+ stool guaic  -Cr unchanged   - some mild wheezing on exam today, given lasix by cardiology  No sob.           Plan;   -IMC for another 1-2 days  -check ionized calcium am   - 1600Hb, am cbc  -avoid further Vitamin K per cardiology as may make pt hypercoaguable, allow INR to drift down per discussion with cardiology   -follow for need of repeat vit k level   -IV iron course started 1/12  -let INR drift down to 1.5, npo midnight. Diet today per gi  -no need for bridging heparin gtt per cardiology once INR below usual inr goal  -iv protonix bid.   - transfuse to if Hb <7.   -Monitor hemoglobin every 8 hours.  Transfuse to keep hemoglobin over 7  -Consult [facility] follow up  -unable to perform egd/colonoscopy until INR <1.5  -npo midnight.       S/p mechanical mitral valve replacement for severe mitral regurgitation and MAZE procedure in 2008, on chronic anticoagulation with warfarin  -Previous echo 11/2022 showed well-seated mechanical mitral valve with no abnormal regurgitation   -Hold anticoagulation for now due to concern of GI bleed   -ok to allow INR to drift down to 1.5 without heparin gtt bridge per discussion with cardiology 1/11.   -see discussion above.   -avoid further Vitamin K per cardiology as may make pt hypercoaguable   Let INR drift down     On chronic anticoagulation with warfarin for mechanical mitral valve  Supratherapeutic INR  -Received 1 mg vitamin K in ER.  Since he is not actively bleeding, INR was not fully reversed  -Echo this admission:   1. The left ventricle is mildly dilated. The visual ejection fraction is  estimated at 55%.  2. The right ventricle is moderately dilated. The right ventricular systolic  function is borderline reduced.  3. s/p 31mm St Raffi  mechanical MVR. Mean 5mmHg @ HR 50.  4. There is moderate to mod-severe (2-3+) tricuspid regurgitation. The right  ventricular systolic pressure is approximated at 37mmHg plus the right atrial  pressure.    -received 2 doses of vitamin K 1mg each on 1/9 and early am of 1/10.   -Echo 11/2022 showed EF 55%, mild RV dysfunction, MVR with mean 4mmHg, trace  TR.    INR 2.49 today.     Plan:   -daily INR, allowing to drift down per cardiology   -cardiology following   -Hold Coumadin.  Monitor INR daily   -INR needs to be 1.5 or less for endoscopy   -no need for heparin gtt bridge per cardiology once INR below goal  -avoid further Vitamin K per cardiology as may make pt hypercoaguable      Paroxysmal atrial fibrillation  Bradycardia, heart rate in 30s  -No history of syncope.      -stop atenolol.  on bber for rate control, runs in 40s periodically.   -with ENRIQUE, atenolol may not yet be cleared.   -HR 50-60s now  -Consulted cardiology.  following  - Monitor on telemetry     Acute kidney injury, likely prerenal  Mild hyperkalemia, potassium 5.5  Nonagma 1/11  - creatinine of 1.83, up from 0.9 on 2/6/22 with K of 5.0- last bmp prior to hospitalization  , elevated BUN of 60, mild hyperkalemia of 5.5  -unclear what recent baseline Cr is.   -Expected to improve after 2 units of PRBCs  -Renal US  1.  Left kidney and urinary bladder are unremarkable. No left  collecting system dilatation.  2.  Significantly limited evaluation of the right kidney due to poor  acoustic windows/body habitus.   -Cr down slightly post 3 units blood on admisison  - 4th unit blood am 1/10.   -branch placed for urinary retention 500+ ml by urology   -needing another unit blood am 1/11- 5th unit blood   - elevated K may still be from blood transfusions  -haptoglobin wnl   Now mild acidosis may be contributing   - 5th blood transfusion 1/11 with fluid bolus and maint fluids started.     1/12. Tolerating blood and fluids, 1L bolus, maint fluids,   - sbp is up  \  Cr stable 1.7. no improvement with bld transfusionas and fluid bolus, maint fluids.   K 5.3. fena 0.4.   625 ml UO yest.   425 UO today.  Before lasix.  Mild wheezing on exam this am with cardiology, given lasix 20mg IV    - suspect pt is at baseline renal function  -jvp seems up and lungs clear. No sob    Plan;   -stop fluids  - will check response to lasix today  - may need further lasix for edema  -daily bmp   -Holding lisinopril  -branch placed 1/10  -outpatient renal referral  - outpatient bmp  -pcp follow up       Urinary retention  Pvr 500 range.   RN unable to place straight cath  [facility] consulted.   Branch placed 1/10   Difficult branch insertion bedside, placed by [facility]    - per urology, discharge with branch catheter. plan for follow up [facility] office in 3 weeks office for trial of void.  Scheduled for 1/30/24 at 8:30 am at our [place] location. AVS updated.     Dyspnea on exertion  -Most likely secondary to severe anemia.  Bradycardia may be contributing.  May have mild CHF as well.  pCXR shows bilateral pleural effusions. No airspace disease   -nl sats. Not needing oxygen           Chronic diastolic CHF with preserved EF  - proBNP 2149.  - Chest x-ray showed mild enlargement of cardiac silhouette, prosthetic mitral valve, mild bibasilar atelectasis, no pulmonary edema or pleural effusion.  -Last echo 11/2022 showed well-seated mechanical mitral valve, no abnormal regurgitation, normal LV size and function with EF 55-60%, mildly dilated RV with mildly reduced systolic function, severe biatrial enlargement.  -Echo: this admission:   1. The left ventricle is mildly dilated. The visual ejection fraction is  estimated at 55%.  2. The right ventricle is moderately dilated. The right ventricular systolic  function is borderline reduced.  3. s/p 31mm St Raffi mechanical MVR. Mean 5mmHg @ HR 50.  4. There is moderate to mod-severe (2-3+) tricuspid regurgitation. The right  ventricular systolic pressure  "is approximated at 37mmHg plus the right atrial  pressure.     Echo 11/2022 showed EF 55%, mild RV dysfunction, MVR with mean 4mmHg, trace  TR.  -Not on diuretics at baseline  -tolerating bld transfusion and fluids   - 1/11, given 5 th unit blood, 1L fliud bolus over coursed of day for low Hb and low nl sbp and ENRIQUE  - no improvement in renal fx  -jvp up on 1/12  Suspect Cr is baseline  - mild wheezing on exam with cardiology, given lasix 20mg IV  - follow response  - am labs  - may now change to daily diuretic Rx if Hb remains stable          Type II MI due to severe anemia  - High sensitive troponin mildly elevated at 73 with flat trend with subsequent troponin 69.    -No chest pain or concerning EKG changes  -Had clean coronaries on angiogram in 2008  -Likely type II MI due to severe anemia  -Monitor on telemetry  - echo nl. No WMA        Hyperlipidemia  Intolerant to statins and Zetia  -Lipid panel obtained in clinic,   TG 32, LDL 22,       BLE edema  Chronic. Use lymphedema wraps at home  PT consult for lymphedema wraps  Now that pt appears clinically stable. Hb stable will likely start daily duiretic Rx.   Follow               Diet:  soft diet per GI, npo midnight nightly until INR 1.5.   DVT Prophylaxis: Warfarin- holding   Stubbs Catheter: placed for urinary retention by urology 1/10. Outpatient urology follow up     Lines: None     Cardiac Monitoring: None  Code Status:  DNR/DNI        Clinically Significant Risk Factors Present on Admission []Expand by Default         # Hyperkalemia: Highest K = 5.5 mmol/L in last 2 days, will monitor as appropriate        # Drug Induced Coagulation Defect: home medication list includes an anticoagulant medication    # Hypertension: Noted on problem list      # Severe Obesity: Estimated body mass index is 41.12 kg/m  as calculated from the following:    Height as of an earlier encounter on 1/9/24: 1.816 m (5' 11.5\").    Weight as of an earlier encounter on 1/9/24: 135.6 kg " (299 lb).                     Disposition Plan- anticipate discharge home but may need TCU.   PT, OT consult  >2 days in hospital. TBD, likely early next week pending INR and egd/colonoscopy       moderate decision making, >35 min on care coordination with rn, pt, chart review, exam, charting,             Edu Castro MD, MD  Text Page  (7am to 6pm)  Interval History   Given fluid bolus yest and maint fluids started given ENRIQUE   Sbp is up.   Hb up to 7 range and stable.   INR still elevated at 2.49.   Some mild desat this am  Wheezing per cardiology exam ths am. Given lasix  No sob overnight.           -Data reviewed today: I reviewed all new labs and imaging results over the last 24 hours. I personally reviewed labs and imaging since admisison      Physical Exam   Temp: 98.3  F (36.8  C) Temp src: Axillary BP: 112/64 Pulse: 58   Resp: 16 SpO2: 94 % O2 Device: None (Room air)    Vitals:    01/10/24 0728 01/11/24 0400 01/12/24 0600   Weight: 135.9 kg (299 lb 8 oz) 135.6 kg (298 lb 14.4 oz) 119.3 kg (263 lb)     Vital Signs with Ranges  Temp:  [98  F (36.7  C)-99.6  F (37.6  C)] 98.3  F (36.8  C)  Pulse:  [42-75] 58  Resp:  [16-18] 16  BP: ()/(36-71) 112/64  SpO2:  [81 %-100 %] 94 %  I/O last 3 completed shifts:  In: 1240 [P.O.:900; I.V.:20]  Out: 850 [Urine:850]    Constitutional:  in bed, nad  Neck: jvp seems up  Respiratory: Slightly decreased bibasilar with slightly crackles   Breathing easily   Cardiovascular: RRR no r/g/m. [place] prosthetic S1 apex  GI: large reducible hernia, chronic. Central incision. NT, ND  - branch placed 1/10. Clear urine   Skin/Integumen: BLE chronic lymphedema.   Neuro: nl speech and mentation  Psych: nl affect      Medications    lactated ringers 100 mL/hr (01/12/24 0808)    - MEDICATION INSTRUCTIONS -        mirtazapine  7.5 mg Oral At Bedtime    pantoprazole  40 mg Intravenous Q12H    sodium chloride (PF)  3 mL Intracatheter Q8H    sodium chloride (PF)  3 mL  Intracatheter Q8H       Data   Recent Labs   Lab 01/12/24  0619 01/12/24  0019 01/11/24  1628 01/11/24  0610 01/10/24  1422 01/10/24  0605 01/09/24  2053 01/09/24  1157 01/09/24  1027   WBC  --   --  5.4  --   --  4.4  --  4.4 5.1   HGB 7.5* 7.7* 7.7* 6.7*   < > 6.3*   < > 4.4* 4.8*   MCV  --   --   --   --   --  81  --  79 81   PLT  --   --   --   --   --  144*  --  158 177   INR 2.49*  --  2.50* 2.82*   < > 3.84*   < > 5.37* 7.0*     --  137 136  --  139  --  140 141   POTASSIUM 5.3  --  5.4* 5.4*   < > 5.4*  --  5.5* 5.4*   CHLORIDE 109*  --  108* 110*  --  110*  --  110* 110*   CO2 23  --  22 18*  --  24  --  24 18*   BUN 55.0*  --  55.9* 57.6*  --  59.4*  --  60.1* 59.4*   CR 1.72*  --  1.76* 1.71*  --  1.76*  --  1.83* 1.82*   ANIONGAP 5*  --  7 8  --  5*  --  6* 13   JOSEPH 8.4*  --  8.6* 8.3*  --  8.6*  --  8.7* 8.8   GLC 97  --  124* 89  --  86  --  100* 98   ALBUMIN  --   --   --   --   --   --   --  3.5 3.5   PROTTOTAL  --   --   --   --   --   --   --  5.7* 5.8*   BILITOTAL  --   --   --   --   --   --   --  0.7 0.7   ALKPHOS  --   --   --   --   --   --   --  62 60   ALT  --   --   --   --   --   --   --  23 22   AST  --   --   --   --   --   --   --  36 37    < > = values in this interval not displayed.       Imaging:   Recent Results (from the past 24 hour(s))   XR Chest Port 1 View    Narrative    XR CHEST PORT 1 VIEW   1/11/2024 4:12 PM     HISTORY: coughing up yellow bloody sputum. evaluate for infiltrate.    COMPARISON: Chest radiograph 1/9/2024      Impression    IMPRESSION: Stable enlargement of the cardiac silhouette status post  median sternotomy and valve replacement. Persistent pulmonary vascular  congestion without sonia interstitial edema or airspace consolidation.  Possible small left pleural effusion. No pneumothorax. Bones are  unchanged.    VERN GIL MD         SYSTEM ID:  [id number]

## 2024-01-12 NOTE — PLAN OF CARE
Goal Outcome Evaluation: Progressing     Reason for Admission: SOB    Evaluation of Goal:   Patient is A&Ox 4. Vitals are stable (patient's hypotension improved following second fluid bolus) on RA. Tele afib CVR/bradycardia. Patient denies pain. Patient is up with assist of 3-4 with lift; patient refusing repositioning overnight. Sleep Quality moderate. Patient scoring green on the Aggression Stoplight Tool. Pt calm and cooperative with cares, able to make needs known, call light within reach, bed alarm on for safety. Patient is NPO for EGD or colonoscopy. Discharge disposition is pending.     Dona Hernandez RN on 1/12/2024 at 6:56 AM

## 2024-01-12 NOTE — PLAN OF CARE
Goal Outcome Evaluation:      Plan of Care Reviewed With: patient, family      Patient is total care need 3 assist to turn, VSS, RA, denies pain, family at bedside, patient unable to stand on the scale. Patient is lifted off bed, bed was zeroed with green lift sheet and 4 pillows no blankets.  Patient is tolerating cardiac diet, got Iron IV infiltrated right lower arm continues cold packs for comfort, new IV placed per IV tear continue to monitor with infusions this is 4 IV infiltrated on the patient, edema wear was placed on by PT.

## 2024-01-12 NOTE — PROGRESS NOTES
Care Management Follow Up    Length of Stay (days): 3    Expected Discharge Date: 01/13/2024     Concerns to be Addressed: discharge planning     Patient plan of care discussed at interdisciplinary rounds: Yes    Anticipated Discharge Disposition: Home, Home Care     Anticipated Discharge Services: None  Anticipated Discharge DME: None    Patient/family educated on Medicare website which has current facility and service quality ratings:    Education Provided on the Discharge Plan: Yes  Patient/Family in Agreement with the Plan: yes    Referrals Placed by CM/SW: Homecare  Private pay costs discussed: Not applicable    Additional Information:  Writer spoke with patient regarding choices for transitional care. Patient stated that prior he could move independently with a walker. Writer stated that patient is not at that baseline and PT/OT have recommended patient does transitional care as it would not be a safe discharge home at this time. Patient was given list and writer will come back to the room later for choices.     Writer got a call back from patient's significant other regarding choices. Patient's top choice would be [facility].     Carlos Mcfarland [facility]  Care Management

## 2024-01-13 NOTE — PROGRESS NOTES
Chart Check (no charge):    1/13/24- INR still elevated at 2.25, hemoglobin stable at 7.6.   - NO plans for EGD until under INR under 1.5.     MARY Silverman PA-C  [facility]  Office: [phone]  Cell: [phone]

## 2024-01-13 NOTE — PROGRESS NOTES
[facility]    Cardiology Consultation - Progress Note     Date of Admission:  1/9/2024  Date of Service: 01/13/24    Reason for Consult   Reason for consult: mechanical valve    History of Present Illness   Feng Wynne is a 79 year old male who was admitted on 1/9/2024 for GI bleed in the setting of supra therapeutic INR. Cardiology is consulted for anticoagulation management given mechanical valves and atrial fibrillation.    No events overnight. Vital signs stable. Labs show improved creatinine of 1.5 (from 1.72), Hgb stable at 7.7. INR 2.25.     He received one dose of lasix yesterday, net negative 1.1L. Weight today pending.    Assessment & Plan   # Acute blood loss anemia secondary to GI bleed in the setting of supra therapeutic INR  # Status post mechanical mitral valve replacement and MAZE, 2008  # Chronic atrial fibrillation  # Chronic anticoagulation with warfarin  # Bradycardia, improved with stopping atenolol  # Preserved LV function, EF 55%  # Tricuspid regurgitation, moderate-severe by TTE 1/10/24    Mr. Wynne is doing well this morning. His breathing is much improved since receiving furosemide yesterday, but he still has wheezes on exam and mild peripheral edema. We agree to repeat the dose today. His INR continues to drift while awaiting endoscopy.     Plan:  Continue to allow INR to drift - avoid reversal if clinically stable given mechanical mitral valve.  Furosemide 20 mg IV x1.   Will continue to follow.    May Mendez MD    Primary Care Physician   Jayme Deng MD    Patient Active Problem List   Diagnosis    Allergic rhinitis    Chronic atrial fibrillation (H)    S/P mitral valve replacement    Hyperlipidemia LDL goal <130    Atrial fibrillation (H)    Lumbago    Knee pain    Rosacea    Proteinuria    Essential hypertension with goal blood pressure less than 140/90    Morbid obesity due to excess calories (H)    Long-term (current) use of  anticoagulants [Z79.01]    Heart valve replaced [Z95.2]    Essential hypertension    Chronic right shoulder pain    Permanent atrial fibrillation (H)    Bradycardia    SOB (shortness of breath)    Supratherapeutic INR    Anemia, unspecified type       Past Medical History   I have reviewed this patient's medical history and updated it with pertinent information if needed.   Past Medical History:   Diagnosis Date    Arthritis     Atrial fibrillation (H)     Coronary artery disease     Hypercholesteremia     Morbid obesity (H)     Unspecified essential hypertension        Past Surgical History   I have reviewed this patient's surgical history and updated it with pertinent information if needed.  Past Surgical History:   Procedure Laterality Date    MITRAL VALVE REPLACEMENT  8-    Mitral valve replacement with 31-mm [employer] mechanical valve.        Prior to Admission Medications   Prior to Admission Medications   Prescriptions Last Dose Informant Patient Reported? Taking?   Multiple Vitamins-Minerals (CENTRUM SILVER PO) 1/8/2024 Self Yes Yes   Sig: Take 1 tablet by mouth daily.   Omega-3 Fatty Acids (FISH OIL CONCENTRATE PO) 1/8/2024 Self Yes Yes   Sig: Take 1 capsule by mouth daily   albuterol (PROAIR HFA/PROVENTIL HFA/VENTOLIN HFA) 108 (90 Base) MCG/ACT inhaler  at PRN Self No Yes   Sig: Inhale 1-2 puffs into the lungs every 4 hours as needed for shortness of breath, wheezing or cough   atenolol (TENORMIN) 25 MG tablet 1/9/2024 at AM Self No Yes   Sig: Take 1 tablet (25 mg) by mouth 2 times daily   clindamycin (CLEOCIN) 150 MG capsule  at Ppx Self Yes No   Sig: TAKE 4 CAPS BY MOUTH 1 HOUR PRIOR TO DENTAL APPOINTMENT   lisinopril (ZESTRIL) 40 MG tablet 1/8/2024 at PM Self No Yes   Sig: Take 1 tablet (40 mg) by mouth daily   mirtazapine (REMERON) 7.5 MG tablet  Self No No   Sig: Take 1 tablet (7.5 mg) by mouth at bedtime   order for DME  Self No No   Sig: Equipment being ordered: COMPRESSION STOCKINGS, 20-30  mmHg   warfarin ANTICOAGULANT (COUMADIN) 5 MG tablet 1/8/2024 at PM Self No Yes   Sig: TAKE ONE & ONE-HALF TAB (7.5MG) EACH TUE & SAT. TAKE 1 TAB (5MG) ALL OTHER DAYS OR AS DIRECTED   Patient taking differently: -  For MVR  INR goal 2.5-3.5  10 mg on Wednesday and Saturday  5 mg on all other days      Facility-Administered Medications: None     Current Facility-Administered Medications   Medication Dose Route Frequency    iron sucrose  300 mg Intravenous Q72H    mirtazapine  7.5 mg Oral At Bedtime    pantoprazole  40 mg Intravenous Q12H    sodium chloride (PF)  3 mL Intracatheter Q8H     Current Facility-Administered Medications   Medication Last Rate    - MEDICATION INSTRUCTIONS -       Allergies   Allergies   Allergen Reactions    Amiodarone Shortness Of Breath    Pcn [Penicillins] Shortness Of Breath     Rxn occurred in childhood     Latex     Sulfites     Zetia [Ezetimibe] Muscle Pain (Myalgia)     Muscle weakness legs       Social History    reports that he has quit smoking. His smoking use included cigarettes. He has a 2.5 pack-year smoking history. He has never used smokeless tobacco. He reports current alcohol use. He reports that he does not use drugs.    Family History   Family History   Problem Relation Age of Onset    Cerebrovascular Disease Father     Diabetes Paternal Grandmother     C.A.D. Brother         CABG X 3 at age 51       Review of Systems   The comprehensive Review of Systems is negative other than noted in the HPI or here.     Physical Exam   Vital Signs with Ranges  Temp:  [97.2  F (36.2  C)-98.4  F (36.9  C)] 98.1  F (36.7  C)  Pulse:  [47-68] 68  Resp:  [15-18] 18  BP: ()/(42-67) 100/52  SpO2:  [84 %-97 %] 92 %  Wt Readings from Last 4 Encounters:   01/12/24 (!) 164.2 kg (362 lb)   01/09/24 135.6 kg (299 lb)   09/13/23 135.6 kg (299 lb)   07/15/22 147.9 kg (326 lb)     I/O last 3 completed shifts:  In: 1589 [P.O.:1200; I.V.:389]  Out: 2720 [Urine:2720]      Vitals: /52 (BP  Location: Left arm)   Pulse 68   Temp 98.1  F (36.7  C) (Oral)   Resp 18   Wt (!) 164.2 kg (362 lb)   SpO2 92%   BMI 49.79 kg/m      General: Alert, oriented, in no acute distress  HEENT: PERRLA, mucous membranes moist  Neck: Normal JVP  CV: Irregular, without murmur. Crisp click heard best in the axilla.   Respiratory: Soft wheezes throughout.  GI: Normoactive bowel sounds; soft, non-tender abdomen  Neuro: No focal deficits appreciated  Extremities: Mild peripheral edema bilaterally    Recent Labs   Lab 01/13/24  0607 01/12/24  1722 01/12/24  0619 01/12/24  0019 01/11/24  1628 01/10/24  1422 01/10/24  0605 01/09/24  2053 01/09/24  1157 01/09/24  1027   WBC 4.8  --   --   --  5.4  --  4.4  --  4.4 5.1   HGB 7.7* 7.6* 7.5*   < > 7.7*   < > 6.3*   < > 4.4* 4.8*   MCV 86  --   --   --   --   --  81  --  79 81   *  --   --   --   --   --  144*  --  158 177   INR 2.25*  --  2.49*  --  2.50*   < > 3.84*   < > 5.37* 7.0*     --  137  --  137   < > 139  --  140 141   POTASSIUM 5.0  --  5.3  --  5.4*   < > 5.4*  --  5.5* 5.4*   CHLORIDE 109*  --  109*  --  108*   < > 110*  --  110* 110*   CO2 20*  --  23  --  22   < > 24  --  24 18*   BUN 50.8*  --  55.0*  --  55.9*   < > 59.4*  --  60.1* 59.4*   CR 1.50*  --  1.72*  --  1.76*   < > 1.76*  --  1.83* 1.82*   GFRESTIMATED 47*  --  40*  --  39*   < > 39*  --  37* 38*   ANIONGAP 9  --  5*  --  7   < > 5*  --  6* 13   JOSEPH 8.5*  --  8.4*  --  8.6*   < > 8.6*  --  8.7* 8.8   GLC 96  --  97  --  124*   < > 86  --  100* 98   ALBUMIN  --   --   --   --   --   --   --   --  3.5 3.5   PROTTOTAL  --   --   --   --   --   --   --   --  5.7* 5.8*   BILITOTAL  --   --   --   --   --   --   --   --  0.7 0.7   ALKPHOS  --   --   --   --   --   --   --   --  62 60   ALT  --   --   --   --   --   --   --   --  23 22   AST  --   --   --   --   --   --   --   --  36 37    < > = values in this interval not displayed.     Recent Labs   Lab Test 01/09/24  1027 02/09/22  1117  "06/06/17  0658 11/04/15  0739   CHOL 53 141   < > 141   HDL 25* 46   < > 41   LDL 22 78   < > 85   TRIG 32 85   < > 77   CHOLHDLRATIO  --   --   --  3.4    < > = values in this interval not displayed.     Recent Labs   Lab 01/13/24  0607 01/12/24  1722 01/12/24  0619 01/12/24  0019 01/11/24  1628 01/10/24  1422 01/10/24  0605 01/09/24  2225 01/09/24  1157 01/09/24  1027   WBC 4.8  --   --   --  5.4  --  4.4  --  4.4 5.1   HGB 7.7* 7.6* 7.5*   < > 7.7*   < > 6.3*   < > 4.4* 4.8*   HCT 26.6*  --   --   --   --   --  21.7*  --  16.4* 18.0*   MCV 86  --   --   --   --   --  81  --  79 81   *  --   --   --   --   --  144*  --  158 177   IRON  --   --   --   --   --   --   --   --   --  17*   IRONSAT  --   --   --   --   --   --   --   --   --  4*   FEB  --   --   --   --   --   --   --   --   --  435*   COURTNEY  --   --   --   --   --   --   --   --   --  21*    < > = values in this interval not displayed.     No results for input(s): \"PH\", \"PHV\", \"PO2\", \"PO2V\", \"SAT\", \"PCO2\", \"PCO2V\", \"HCO3\", \"HCO3V\" in the last 168 hours.  Recent Labs   Lab 01/09/24  1157   NTBNPI 2,149*     No results for input(s): \"DD\" in the last 168 hours.  No results for input(s): \"SED\", \"CRP\" in the last 168 hours.  Recent Labs   Lab 01/13/24  0607 01/10/24  0605 01/09/24  1157   * 144* 158     No results for input(s): \"TSH\" in the last 168 hours.  No results for input(s): \"COLOR\", \"APPEARANCE\", \"URINEGLC\", \"URINEBILI\", \"URINEKETONE\", \"SG\", \"UBLD\", \"URINEPH\", \"PROTEIN\", \"UROBILINOGEN\", \"NITRITE\", \"LEUKEST\", \"RBCU\", \"WBCU\" in the last 168 hours.    Imaging:  Recent Results (from the past 48 hour(s))   XR Chest Port 1 View    Narrative    XR CHEST PORT 1 VIEW   1/11/2024 4:12 PM     HISTORY: coughing up yellow bloody sputum. evaluate for infiltrate.    COMPARISON: Chest radiograph 1/9/2024      Impression    IMPRESSION: Stable enlargement of the cardiac silhouette status post  median sternotomy and valve replacement. Persistent pulmonary " vascular  congestion without sonia interstitial edema or airspace consolidation.  Possible small left pleural effusion. No pneumothorax. Bones are  unchanged.    VERN GIL MD         SYSTEM ID:  [id number]         Medical Decision Making     60 MINUTES SPENT BY ME on the date of service doing chart review, history, exam, documentation & further activities per the note.

## 2024-01-13 NOTE — PROGRESS NOTES
Pt A&Ox3 is total care need 3 assist to turn, VSS, RA, sleeping most of the evening. Assist with 2-3 with lift. Denies pain at rest with movement is very sensitive. Patient is tolerating cardiac diet, got Iron IV infiltrated right lower arm, cold packs for comfort as need, new IV placed per IV tear continue to monitor with infusions this is 4 IV infiltrated on the patient, edema wear was placed on by PT.  LBM today. Stubbs in place. Hgb stable.

## 2024-01-13 NOTE — PLAN OF CARE
Vital signs stable on RA. Alert and oriented x 4. Lung sounds diminished. Bowel sounds audile, flatus yes. Denied pain. Up assist x 1. Tolerating low fat Na diet. CMS intact. Hgb is 7.6

## 2024-01-13 NOTE — PROGRESS NOTES
[facility]    Hospitalist Progress Note    Assessment & Plan     Date of Admission:  1/9/2024        Assessment & Plan  Feng Wynne is a 79 year old male with history of mechanical mitral valve replacement for severe mitral regurgitation and MAZE procedure in 2008, on chronic anticoagulation with warfarin, pulmonary hypertension, chronic diastolic CHF with preserved EF, previous dilated LV likely due to mitral regurgitation, subsequent improvement with ACE inhibitor and beta-blockers, hypertension, hyperlipidemia, intolerant to statins and Zetia, who presents to ER on 1/9/2024 due to 3 months of progressive shortness of breath and finding of severe anemia.     He is also noted to have sinus bradycardia with heart rate in 30s, ENRIQUE, mild hypokalemia     Subacute normocytic anemia, hemoglobin 4.4, likely GI bleed due to anticoagulation  Acute blood loss anemia,   Iron deficiency anemia  -Has been symptomatic with dyspnea on exertion for 3 months.  Hemoglobin was 12.3 and will be 2022 and now down to 4.4.  Denies any melena, hematochezia, hematemesis.   - He is on anticoagulation with Coumadin with elevated INR of 5.37.  -3 units of PRBCs transfused in ER  -given Vitamin K 1 mg po X 1 on 1/9 at noon and 1mg X 1 midnight/early am of 1/10.   -Suspect GI bleed due to anticoagulation.  Undergoes FIT testing regularly and has been negative.  No previous EGDs or colonoscopies    1/10: sbp stable in 90/-. HR 30-40, asymptomatic.   Receiving 4th unit blood now for Hb 6 range.   Seen by GI and diet advanced to full liquids  INR down to 3.8 from 7 on admission   Iron sat 4%, tibc 435, ferritin 21. Iron level 17 consistent with AKIRA  Bilirubin nl  -troponin 73--> 69   Sbp up slightly with 4th unit blood. No orthostatic sxs  Having normal stools.   No hx bloody stools  Benign abd exam    1/11: no symptoms  Hb again down into 6 range.   -receiving 5th unit blood   Suspect slow oozing from gi tract per GI.    INR down to 2.8.   Given fluid bolus for ENRIQUE and low nl sbp       1/12.   Hb up to 7 range and stable. First day not to drift down to 6 range  - sbp is up with fluids and blood  - INR remains 2.4 range.   -haptoglobin nl. Nl bili  -+ stool guaic  -Cr unchanged   - some mild wheezing on exam today, given lasix by cardiology  No sob.     1/13  Low nl sbp following lasix diureis. Cr improved  Hb stable 7 range  IV infusion q3 days X 3 started 1/12  Nl ionized calcium           Plan;   -daily INR   - am cbc  -avoid further Vitamin K per cardiology as may make pt hypercoaguable, allow INR to drift down per discussion with cardiology   -IV iron course started 1/12  -let INR drift down to 1.5,   -no need for bridging heparin gtt per cardiology once INR below usual inr goal  -iv protonix bid.   - transfuse to if Hb <7.   -Monitor hemoglobin every 8 hours.  Transfuse to keep hemoglobin over 7  -Consult [facility] follow up  -unable to perform egd/colonoscopy until INR <1.5      S/p mechanical mitral valve replacement for severe mitral regurgitation and MAZE procedure in 2008, on chronic anticoagulation with warfarin  -Previous echo 11/2022 showed well-seated mechanical mitral valve with no abnormal regurgitation   -Hold anticoagulation for now due to concern of GI bleed   -ok to allow INR to drift down to 1.5 without heparin gtt bridge per discussion with cardiology 1/11.   -see discussion above.   -avoid further Vitamin K per cardiology as may make pt hypercoaguable   Let INR drift down     On chronic anticoagulation with warfarin for mechanical mitral valve  Supratherapeutic INR  -Received 1 mg vitamin K in ER.  Since he is not actively bleeding, INR was not fully reversed  -Echo this admission:   1. The left ventricle is mildly dilated. The visual ejection fraction is  estimated at 55%.  2. The right ventricle is moderately dilated. The right ventricular systolic  function is borderline reduced.  3. s/p 31mm St Raffi  mechanical MVR. Mean 5mmHg @ HR 50.  4. There is moderate to mod-severe (2-3+) tricuspid regurgitation. The right  ventricular systolic pressure is approximated at 37mmHg plus the right atrial  pressure.    -received 2 doses of vitamin K 1mg each on 1/9 and early am of 1/10.   -Echo 11/2022 showed EF 55%, mild RV dysfunction, MVR with mean 4mmHg, trace  TR.    INR 2.2 today.     Anticipate INR will be at goal of <1.5 by Monday am     Plan:   -daily INR, allowing to drift down per cardiology   -cardiology following   -Hold Coumadin.  Monitor INR daily   -INR needs to be 1.5 or less for endoscopy   -no need for heparin gtt bridge per cardiology once INR below goal  -avoid further Vitamin K per cardiology as may make pt hypercoaguable      Paroxysmal atrial fibrillation  Bradycardia, heart rate in 30s  -No history of syncope.      -stop atenolol.  on bber for rate control, runs in 40s periodically.   -with ENRIQUE, atenolol may not yet be cleared.   -HR 50-60s now consistently. Was in 30-40s on admission as pt present on atenolol and ENRIQUE vs ckd   - atenolol renally cleared, suspect out of system at this point   -Consulted cardiology.  following  - Monitor on telemetry  -avoid AV node blocking agents     Acute kidney injury, likely prerenal  Mild hyperkalemia, potassium 5.5  Nonagma 1/11  - creatinine of 1.83, up from 0.9 on 2/6/22 with K of 5.0- last bmp prior to hospitalization  , elevated BUN of 60, mild hyperkalemia of 5.5  -unclear what recent baseline Cr is.   -Expected to improve after 2 units of PRBCs  -Renal US  1.  Left kidney and urinary bladder are unremarkable. No left  collecting system dilatation.  2.  Significantly limited evaluation of the right kidney due to poor  acoustic windows/body habitus.   -Cr down slightly post 3 units blood on admisison  - 4th unit blood am 1/10.   -branch placed for urinary retention 500+ ml by urology   -needing another unit blood am 1/11- 5th unit blood   - elevated K may still  be from blood transfusions  -haptoglobin wnl   Now mild acidosis may be contributing   - 5th blood transfusion 1/11 with fluid bolus and maint fluids started.     1/12. Tolerating blood and fluids, 1L bolus, maint fluids,   - sbp is up \  Cr stable 1.7. no improvement with bld transfusionas and fluid bolus, maint fluids.   K 5.3. fena 0.4.   625 ml UO yest.   425 UO today.  Before lasix.  Mild wheezing on exam this am with cardiology, given lasix 20mg IV    - suspect pt is at baseline renal function  -jvp seems up and lungs clear. No sob  -fluids stopped     1/13,   Cr down. Good UO.     Plan;   - will ultimately need further lasix for edema BLE  -daily bmp   -Holding lisinopril  -branch placed 1/10  -outpatient renal referral  - outpatient bmp  -pcp follow up       Urinary retention  Pvr 500 range.   RN unable to place straight cath  [facility] consulted.   Branch placed 1/10/24  Difficult branch insertion bedside, placed by [facility]     - per urology, discharge with branch catheter. plan for follow up [facility] office in 3 weeks office for trial of void.  Scheduled for 1/30/24 at 8:30 am at our [place] location. AVS updated.     Dyspnea on exertion  -Most likely secondary to severe anemia.  Bradycardia may be contributing.  May have mild CHF as well.  pCXR shows bilateral pleural effusions. No airspace disease   -nl sats. Not needing oxygen           Chronic diastolic CHF with preserved EF  - proBNP 2149.  - Chest x-ray showed mild enlargement of cardiac silhouette, prosthetic mitral valve, mild bibasilar atelectasis, no pulmonary edema or pleural effusion.  -Last echo 11/2022 showed well-seated mechanical mitral valve, no abnormal regurgitation, normal LV size and function with EF 55-60%, mildly dilated RV with mildly reduced systolic function, severe biatrial enlargement.  -Echo: this admission:   1. The left ventricle is mildly dilated. The visual ejection fraction is  estimated at 55%.  2. The right ventricle is  moderately dilated. The right ventricular systolic  function is borderline reduced.  3. s/p 31mm St Raffi mechanical MVR. Mean 5mmHg @ HR 50.  4. There is moderate to mod-severe (2-3+) tricuspid regurgitation. The right  ventricular systolic pressure is approximated at 37mmHg plus the right atrial  pressure.     Echo 11/2022 showed EF 55%, mild RV dysfunction, MVR with mean 4mmHg, trace  TR.  -Not on diuretics at baseline  -tolerating bld transfusion and fluids   - 1/11, given 5 th unit blood, 1L fliud bolus over coursed of day for low Hb and low nl sbp and ENRIQUE  - no improvement in renal fx  1/12  -jvp up on 1/12  Suspect Cr is baseline  - mild wheezing on exam with cardiology, given lasix 20mg IV  1700cc UO  yest  1400 ml UO today so for  I/O. 0    Today.   Breathing feels better today following diuresis    Plan; am bmp  Ultimately needs diuresis once bp allows given BLE edema  Daily wt as able  I/O            Type II MI due to severe anemia  - High sensitive troponin mildly elevated at 73 with flat trend with subsequent troponin 69.    -No chest pain or concerning EKG changes  -Had clean coronaries on angiogram in 2008  -Likely type II MI due to severe anemia  -Monitor on telemetry  - echo nl. No WMA        Hyperlipidemia  Intolerant to statins and Zetia  -Lipid panel obtained in clinic,   TG 32, LDL 22,       BLE edema  Chronic. Use lymphedema wraps at home  PT consult for lymphedema wraps  Now that pt appears clinically stable. Hb stable will likely start daily duiretic Rx.   Follow     Pulmonary  Cough with slight blood streaking, better  Afebrile.   Follow.               Diet:  soft diet per GI,    DVT Prophylaxis: Warfarin- holding   Stubbs Catheter: placed for urinary retention by urology 1/10. Outpatient urology follow up     Lines: None     Cardiac Monitoring: None  Code Status:  DNR/DNI        Clinically Significant Risk Factors Present on Admission []Expand by Default         # Hyperkalemia: Highest K =  "5.5 mmol/L in last 2 days, will monitor as appropriate        # Drug Induced Coagulation Defect: home medication list includes an anticoagulant medication    # Hypertension: Noted on problem list      # Severe Obesity: Estimated body mass index is 41.12 kg/m  as calculated from the following:    Height as of an earlier encounter on 1/9/24: 1.816 m (5' 11.5\").    Weight as of an earlier encounter on 1/9/24: 135.6 kg (299 lb).                     Disposition Plan- anticipate discharge home but may need TCU.   PT, OT consult  >2 days in hospital. TBD, likely early next week pending INR and egd/colonoscopy       moderate decision making, >35 min on care coordination with rn, pt, chart review, exam, charting,             Edu Castro MD, MD  Text Page  (7am to 6pm)  Interval History    Hb stable 7 range  Sbp low nl and stable   Accurate wt obtained yesterday  Cr is down today, did get lasix after some possibly cardiac wheezing yest following fluid bolus and transfusion day before for ENRIQUE, low nl sbp and gi bleed.   Inr down    Breathing feels better today.     Stood side of bed yesterday for wt.           -Data reviewed today: I reviewed all new labs and imaging results over the last 24 hours. I personally reviewed labs and imaging since admisison      Physical Exam   Temp: 98.1  F (36.7  C) Temp src: Oral BP: 100/52 Pulse: 68   Resp: 18 SpO2: 92 % O2 Device: None (Room air)    Vitals:    01/12/24 0600 01/12/24 0924 01/12/24 1030   Weight: 119.3 kg (263 lb) (!) 164.2 kg (362 lb) (!) 164.2 kg (362 lb)     Vital Signs with Ranges  Temp:  [97.2  F (36.2  C)-98.4  F (36.9  C)] 98.1  F (36.7  C)  Pulse:  [47-68] 68  Resp:  [15-18] 18  BP: ()/(42-67) 100/52  SpO2:  [84 %-97 %] 92 %  I/O last 3 completed shifts:  In: 1589 [P.O.:1200; I.V.:389]  Out: 2720 [Urine:2720]    Constitutional:  in bed, nad  Neck: jvp seems up  Respiratory: Slightly decreased bibasilar , no wheezing  Breathing easily "   Cardiovascular: RRR no r/g/m. Benson prosthetic S1 apex  GI: large reducible hernia, chronic. Central incision. NT, ND  - branch placed 1/10. Clear urine   Skin/Integumen: BLE chronic lymphedema.   Neuro: nl speech and mentation  Psych: nl affect      Medications    - MEDICATION INSTRUCTIONS -        iron sucrose  300 mg Intravenous Q72H    mirtazapine  7.5 mg Oral At Bedtime    pantoprazole  40 mg Intravenous Q12H    sodium chloride (PF)  3 mL Intracatheter Q8H       Data   Recent Labs   Lab 01/13/24  0607 01/12/24  1722 01/12/24  0619 01/12/24  0019 01/11/24  1628 01/10/24  1422 01/10/24  0605 01/09/24  2053 01/09/24  1157 01/09/24  1027   WBC 4.8  --   --   --  5.4  --  4.4  --  4.4 5.1   HGB 7.7* 7.6* 7.5*   < > 7.7*   < > 6.3*   < > 4.4* 4.8*   MCV 86  --   --   --   --   --  81  --  79 81   *  --   --   --   --   --  144*  --  158 177   INR 2.25*  --  2.49*  --  2.50*   < > 3.84*   < > 5.37* 7.0*     --  137  --  137   < > 139  --  140 141   POTASSIUM 5.0  --  5.3  --  5.4*   < > 5.4*  --  5.5* 5.4*   CHLORIDE 109*  --  109*  --  108*   < > 110*  --  110* 110*   CO2 20*  --  23  --  22   < > 24  --  24 18*   BUN 50.8*  --  55.0*  --  55.9*   < > 59.4*  --  60.1* 59.4*   CR 1.50*  --  1.72*  --  1.76*   < > 1.76*  --  1.83* 1.82*   ANIONGAP 9  --  5*  --  7   < > 5*  --  6* 13   JOSEPH 8.5*  --  8.4*  --  8.6*   < > 8.6*  --  8.7* 8.8   GLC 96  --  97  --  124*   < > 86  --  100* 98   ALBUMIN  --   --   --   --   --   --   --   --  3.5 3.5   PROTTOTAL  --   --   --   --   --   --   --   --  5.7* 5.8*   BILITOTAL  --   --   --   --   --   --   --   --  0.7 0.7   ALKPHOS  --   --   --   --   --   --   --   --  62 60   ALT  --   --   --   --   --   --   --   --  23 22   AST  --   --   --   --   --   --   --   --  36 37    < > = values in this interval not displayed.       Imaging:   No results found for this or any previous visit (from the past 24 hour(s)).

## 2024-01-14 NOTE — PLAN OF CARE
A&OX4, 2 L NC applied, other VSS, Tele- Afib CVR, assist of 2-3 with care, on cardiac diet. Stubbs in place, turned and repositioned as pt allowed.

## 2024-01-14 NOTE — PROGRESS NOTES
[facility]    Hospitalist Progress Note    Assessment & Plan     Date of Admission:  1/9/2024        Assessment & Plan  Feng Wynne is a 79 year old male with history of mechanical mitral valve replacement for severe mitral regurgitation and MAZE procedure in 2008, on chronic anticoagulation with warfarin, pulmonary hypertension, chronic diastolic CHF with preserved EF, previous dilated LV likely due to mitral regurgitation, subsequent improvement with ACE inhibitor and beta-blockers, hypertension, hyperlipidemia, intolerant to statins and Zetia, who presents to ER on 1/9/2024 due to 3 months of progressive shortness of breath and finding of severe anemia.     He is also noted to have sinus bradycardia with heart rate in 30s, ENRIQUE, mild hypokalemia     Subacute normocytic anemia, hemoglobin 4.4, likely GI bleed due to anticoagulation  Acute blood loss anemia,   Iron deficiency anemia  -Has been symptomatic with dyspnea on exertion for 3 months.  Hemoglobin was 12.3 and will be 2022 and now down to 4.4.  Denies any melena, hematochezia, hematemesis.   - He is on anticoagulation with Coumadin with elevated INR of 5.37.  -3 units of PRBCs transfused in ER, 2 units in hospital. Last transfusion 1/11,   -given Vitamin K 1 mg po X 1 on 1/9 at noon and 1mg X 1 midnight/early am of 1/10.   -Suspect GI bleed due to anticoagulation.  Undergoes FIT testing regularly and has been negative.  No previous EGDs or colonoscopies    -Iron sat 4%, tibc 435, ferritin 21. Iron level 17 consistent with AKIRA  Nl haptoglobin and bilirubin  -received IV iron Rx   Bilirubin nl  -troponin 73--> 69   -Having normal stools. No hx bloody stools  -Benign abd exam  -pt had low nl sbp during initial days of hospital stay. Treated with blood transfusions, 5 total, vitamin K X 2 and fluids for low nl sbp and ENRIQUE  -followed by cardiology and not recommend further vitamin K given risk for hypercoag event with  valve.    -INR drifting down daily  - [facility] GI following.   - EGD/Colonoscopy once INR <1.5   -Hb now consistently remaining above 7 range  -INR down to 1.9 today.           Plan;   -given INR now at 1.9 will start clears and GI prep with rectal tube. Npo midnight for EGD and colonosocopy Monday/1/15  -daily INR   - am cbc  -avoid further Vitamin K per cardiology as may make pt hypercoaguable, allow INR to drift down per discussion with cardiology   -IV iron course started 1/12  -let INR drift down to 1.5,   -no need for bridging heparin gtt per cardiology once INR below usual inr goal  -iv protonix bid.   - transfuse to if Hb <7.   --Consult [facility] GI follow up  -unable to perform egd/colonoscopy until INR <1.5    -resume anticoagulation per discussion with GI following endoscopy       S/p mechanical mitral valve replacement for severe mitral regurgitation and MAZE procedure in 2008, on chronic anticoagulation with warfarin  -Previous echo 11/2022 showed well-seated mechanical mitral valve with no abnormal regurgitation   -Hold anticoagulation for now due to concern of GI bleed   -ok to allow INR to drift down to 1.5 without heparin gtt bridge per discussion with cardiology 1/11.   -see discussion above.   -avoid further Vitamin K per cardiology as may make pt hypercoaguable   Let INR drift down     On chronic anticoagulation with warfarin for mechanical mitral valve  Supratherapeutic INR  -Received 1 mg vitamin K in ER.  Since he is not actively bleeding, INR was not fully reversed  -Echo this admission:   1. The left ventricle is mildly dilated. The visual ejection fraction is  estimated at 55%.  2. The right ventricle is moderately dilated. The right ventricular systolic  function is borderline reduced.  3. s/p 31mm St Raffi mechanical MVR. Mean 5mmHg @ HR 50.  4. There is moderate to mod-severe (2-3+) tricuspid regurgitation. The right  ventricular systolic pressure is approximated at 37mmHg plus the right  atrial  pressure.    -received 2 doses of vitamin K 1mg each on 1/9 and early am of 1/10.   -Echo 11/2022 showed EF 55%, mild RV dysfunction, MVR with mean 4mmHg, trace  TR.    INR 1.9 today.     Anticipate INR will be at goal of <1.5 by Monday am     Plan:   -endoscopy tomorrow. See plan above  -daily INR, allowing to drift down per cardiology   -cardiology following   -Hold Coumadin.  Monitor INR daily   -INR needs to be 1.5 or less for endoscopy   -no need for heparin gtt bridge per cardiology once INR below goal  -avoid further Vitamin K per cardiology as may make pt hypercoaguable      Paroxysmal atrial fibrillation  Bradycardia, heart rate in 30s  -No history of syncope.      -stop atenolol.  on bber for rate control, runs in 40s periodically.   -with ENRIQUE, atenolol may not yet be cleared.   -HR 50-60s now consistently. Was in 30-40s on admission as pt present on atenolol and ENRIQUE vs ckd   - atenolol renally cleared, suspect out of system at this point   -Consulted cardiology.  following  - Monitor on telemetry  -avoid AV node blocking agents     Acute kidney injury, likely prerenal  Mild hyperkalemia, potassium 5.5  Nonagma 1/11  - creatinine of 1.83, up from 0.9 on 2/6/22 with K of 5.0- last bmp prior to hospitalization  , elevated BUN of 60, mild hyperkalemia of 5.5  -unclear what recent baseline Cr is.   -Renal US  1.  Left kidney and urinary bladder are unremarkable. No left  collecting system dilatation.  2.  Significantly limited evaluation of the right kidney due to poor  acoustic windows/body habitus.     -branch placed for urinary retention 500+ ml by urology   -needing another unit blood am 1/11- 5th unit blood   -no improvement in renal function with blood transfusions and fluids  - blood transfusions may have contributed to hyperkalemia  - volume up overall but anemia and low nl sbp on presentation - treated for intravasc volume depletions  - pt developed some sob, cardiac wheezing and elevated jvp  on 1/12 and initiated daily IV lasix with excellent UO.   - subsequent improvement in renal function with diuresis    Today. Wheezing. Elevated jvp, normotensive. Hb is up.   5L UO with 20mg IV lasix on 1/13.   Cr down to 1.2 today.     Plan;   - lasix 20mg IV X 1 and reassess daily   -ongoing daily diuresis,  -cardiology following   -daily bmp   -Holding lisinopril  -branch placed 1/10  -outpatient renal referral  - outpatient bmp  -pcp follow up       Urinary retention  Pvr 500 range.   RN unable to place straight cath  [facility] consulted.   Branch placed 1/10/24  Difficult branch insertion bedside, placed by [facility]     - per urology, discharge with branch catheter. plan for follow up [facility] office in 3 weeks office for trial of void.  Scheduled for 1/30/24 at 8:30 am at our [place] location. AVS updated.     Dyspnea on exertion  -Most likely secondary to severe anemia.  Bradycardia may be contributing.  May have mild CHF as well.  pCXR shows bilateral pleural effusions. No airspace disease   -nl sats. Not needing oxygen   -bradycardia resolved with stopping bber            Chronic diastolic CHF with preserved EF  - proBNP 2149.  - Chest x-ray showed mild enlargement of cardiac silhouette, prosthetic mitral valve, mild bibasilar atelectasis, no pulmonary edema or pleural effusion.  -Last echo 11/2022 showed well-seated mechanical mitral valve, no abnormal regurgitation, normal LV size and function with EF 55-60%, mildly dilated RV with mildly reduced systolic function, severe biatrial enlargement.  -Echo: this admission:   1. The left ventricle is mildly dilated. The visual ejection fraction is  estimated at 55%.  2. The right ventricle is moderately dilated. The right ventricular systolic  function is borderline reduced.  3. s/p 31mm St Raffi mechanical MVR. Mean 5mmHg @ HR 50.  4. There is moderate to mod-severe (2-3+) tricuspid regurgitation. The right  ventricular systolic pressure is approximated at 37mmHg  "plus the right atrial  pressure.     Echo 11/2022 showed EF 55%, mild RV dysfunction, MVR with mean 4mmHg, trace  TR.  -Not on diuretics at baseline  -tolerating bld transfusion and fluids   -subsequently developed cardiac wheezing and signs chf.   - fluids stopped, initiated on daily IV low dose lasix with excellent UO.   - cardiology following   - breathing improved    Today. Wheezing. Elevated jvp 5L UO with 20mg iv lasix yest    Plan;   -lasix 20mg IV X 1 today, reassess daily   -cardiology following   -am bmp  -Daily wt as able  I/O            Type II MI due to severe anemia  - High sensitive troponin mildly elevated at 73 with flat trend with subsequent troponin 69.    -No chest pain or concerning EKG changes  -Had clean coronaries on angiogram in 2008  -Likely type II MI due to severe anemia  -Monitor on telemetry  - echo nl. No WMA        Hyperlipidemia  Intolerant to statins and Zetia  -Lipid panel obtained in clinic,   TG 32, LDL 22,       BLE edema  Chronic. Use lymphedema wraps at home  PT consult for lymphedema wraps  diuresis   Follow     Pulmonary  Cough with slight blood streaking, better  Afebrile.   Follow.   Seems resolved.               Diet:  soft diet per GI,--> change to clears today for endoscopy tomorrow/Monday    DVT Prophylaxis: Warfarin- holding     Stubbs Catheter: placed for urinary retention by urology 1/10. Outpatient urology follow up     Lines: None     Cardiac Monitoring: None  Code Status:  DNR/DNI        Clinically Significant Risk Factors Present on Admission []Expand by Default         # Hyperkalemia: Highest K = 5.5 mmol/L in last 2 days, will monitor as appropriate        # Drug Induced Coagulation Defect: home medication list includes an anticoagulant medication    # Hypertension: Noted on problem list      # Severe Obesity: Estimated body mass index is 41.12 kg/m  as calculated from the following:    Height as of an earlier encounter on 1/9/24: 1.816 m (5' 11.5\").    Weight " as of an earlier encounter on 1/9/24: 135.6 kg (299 lb).                     Disposition Plan- anticipate discharge home but may need TCU.   PT, OT consult  >2 days in hospital. TBD, likely early next week pending INR and egd/colonoscopy       moderate decision making, >35 min on care coordination with rn, GI and cardiology, pt, chart review, exam, charting,             Edu Castro MD, MD  Text Page  (7am to 6pm)  Interval History    Cr down with diuresis  5L UO with lasix 20mg IV  No cp or sob.   Hb up  INR down   Sbp wnl          -Data reviewed today: I reviewed all new labs and imaging results over the last 24 hours. I personally reviewed labs and imaging since admisison      Physical Exam   Temp: 98.6  F (37  C) Temp src: Oral BP: 108/57 Pulse: 69   Resp: 16 SpO2: 96 % O2 Device: Nasal cannula Oxygen Delivery: 2 LPM  Vitals:    01/12/24 0924 01/12/24 1030 01/14/24 0800   Weight: (!) 164.2 kg (362 lb) (!) 164.2 kg (362 lb) (!) 160.9 kg (354 lb 11.2 oz)     Vital Signs with Ranges  Temp:  [97.6  F (36.4  C)-98.6  F (37  C)] 98.6  F (37  C)  Pulse:  [64-82] 69  Resp:  [16-19] 16  BP: (106-137)/(45-82) 108/57  SpO2:  [83 %-99 %] 96 %  I/O last 3 completed shifts:  In: 450 [P.O.:450]  Out: 5950 [Urine:5950]    Constitutional:  in bed, nad  Neck: jvp elevated  Respiratory: decreased bibasilar , no wheezing  Breathing easily   Cardiovascular: RRR no r/g/m. Young prosthetic S1 apex  GI: large reducible hernia, chronic. Central incision. NT, ND  - branch placed 1/10. Clear urine   Skin/Integumen: BLE chronic lymphedema.   Neuro: nl speech and mentation  Psych: nl affect      Medications    - MEDICATION INSTRUCTIONS -        iron sucrose  300 mg Intravenous Q72H    mirtazapine  7.5 mg Oral At Bedtime    pantoprazole  40 mg Intravenous Q12H    sodium chloride (PF)  3 mL Intracatheter Q8H       Data   Recent Labs   Lab 01/14/24  0555 01/13/24  0607 01/12/24  1722 01/12/24  0619 01/12/24  0019  01/11/24  1628 01/10/24  1422 01/10/24  0605 01/09/24  2053 01/09/24  1157 01/09/24  1027   WBC 4.6 4.8  --   --   --  5.4  --  4.4  --  4.4 5.1   HGB 8.4* 7.7* 7.6* 7.5*   < > 7.7*   < > 6.3*   < > 4.4* 4.8*   MCV 86 86  --   --   --   --   --  81  --  79 81   * 140*  --   --   --   --   --  144*  --  158 177   INR 1.91* 2.25*  --  2.49*  --  2.50*   < > 3.84*   < > 5.37* 7.0*    138  --  137  --  137   < > 139  --  140 141   POTASSIUM 4.7 5.0  --  5.3  --  5.4*   < > 5.4*  --  5.5* 5.4*   CHLORIDE 107 109*  --  109*  --  108*   < > 110*  --  110* 110*   CO2 21* 20*  --  23  --  22   < > 24  --  24 18*   BUN 40.1* 50.8*  --  55.0*  --  55.9*   < > 59.4*  --  60.1* 59.4*   CR 1.29* 1.50*  --  1.72*  --  1.76*   < > 1.76*  --  1.83* 1.82*   ANIONGAP 9 9  --  5*  --  7   < > 5*  --  6* 13   JOSEPH 8.8 8.5*  --  8.4*  --  8.6*   < > 8.6*  --  8.7* 8.8   GLC 99 96  --  97  --  124*   < > 86  --  100* 98   ALBUMIN  --   --   --   --   --   --   --   --   --  3.5 3.5   PROTTOTAL  --   --   --   --   --   --   --   --   --  5.7* 5.8*   BILITOTAL  --   --   --   --   --   --   --   --   --  0.7 0.7   ALKPHOS  --   --   --   --   --   --   --   --   --  62 60   ALT  --   --   --   --   --   --   --   --   --  23 22   AST  --   --   --   --   --   --   --   --   --  36 37    < > = values in this interval not displayed.       Imaging:   No results found for this or any previous visit (from the past 24 hour(s)).

## 2024-01-14 NOTE — PROGRESS NOTES
[facility]    Cardiology Consultation - Progress Note     Date of Admission:  1/9/2024  Date of Service: 01/14/24    Reason for Consult   Reason for consult: mechanical valve     History of Present Illness   Feng Wynne is a 79 year old male who was admitted on 1/9/2024 for GI bleed in the setting of supra therapeutic INR. Cardiology is consulted for anticoagulation management given mechanical valves and atrial fibrillation.     No events overnight. Vital signs stable. He was net negative 5L yesterday with furosemide 20 mg IV x1. Labs today show improved creatinine 1.29 (from 1.5). Hemoglobin is stable at 8.4. INR is 1.91.    Assessment & Plan   # Acute blood loss anemia secondary to GI bleed in the setting of supra therapeutic INR  # Status post mechanical mitral valve replacement and MAZE, 2008  # Chronic atrial fibrillation  # Chronic anticoagulation with warfarin  # Bradycardia, improved with stopping atenolol  # Preserved LV function, EF 55%  # Tricuspid regurgitation, moderate-severe by TTE 1/10/24    Mr. Wynne is a very pleasant 79-year-old gentleman admitted with GI bleed. Cardiology is following for anticoagulation management in the setting of mechanical MVR.     His INR continues to drift and he may be below 1.5 tomorrow for EGD/colonoscopy. We will continue to let the INR drift, and resume anticoagulation as soon as feasible after his endoscopy. He remains wheezy on exam today and I discussed lasix dosing with the hospitalist today.     Plan:  Continue to allow INR to drift - avoid reversal if clinically stable given mechanical mitral valve.  Agree with furosemide 20 mg IV x1.   Will continue to follow.    May Mendez MD    Primary Care Physician   Jayme Deng MD    Patient Active Problem List   Diagnosis    Allergic rhinitis    Chronic atrial fibrillation (H)    S/P mitral valve replacement    Hyperlipidemia LDL goal <130    Atrial fibrillation (H)     Lumbago    Knee pain    Rosacea    Proteinuria    Essential hypertension with goal blood pressure less than 140/90    Morbid obesity due to excess calories (H)    Long-term (current) use of anticoagulants [Z79.01]    Heart valve replaced [Z95.2]    Essential hypertension    Chronic right shoulder pain    Permanent atrial fibrillation (H)    Bradycardia    SOB (shortness of breath)    Supratherapeutic INR    Anemia, unspecified type       Past Medical History   I have reviewed this patient's medical history and updated it with pertinent information if needed.   Past Medical History:   Diagnosis Date    Arthritis     Atrial fibrillation (H)     Coronary artery disease     Hypercholesteremia     Morbid obesity (H)     Unspecified essential hypertension        Past Surgical History   I have reviewed this patient's surgical history and updated it with pertinent information if needed.  Past Surgical History:   Procedure Laterality Date    MITRAL VALVE REPLACEMENT  8-    Mitral valve replacement with 31-mm St. Raffi mechanical valve.        Prior to Admission Medications   Prior to Admission Medications   Prescriptions Last Dose Informant Patient Reported? Taking?   Multiple Vitamins-Minerals (CENTRUM SILVER PO) 1/8/2024 Self Yes Yes   Sig: Take 1 tablet by mouth daily.   Omega-3 Fatty Acids (FISH OIL CONCENTRATE PO) 1/8/2024 Self Yes Yes   Sig: Take 1 capsule by mouth daily   albuterol (PROAIR HFA/PROVENTIL HFA/VENTOLIN HFA) 108 (90 Base) MCG/ACT inhaler  at PRN Self No Yes   Sig: Inhale 1-2 puffs into the lungs every 4 hours as needed for shortness of breath, wheezing or cough   atenolol (TENORMIN) 25 MG tablet 1/9/2024 at AM Self No Yes   Sig: Take 1 tablet (25 mg) by mouth 2 times daily   clindamycin (CLEOCIN) 150 MG capsule  at Ppx Self Yes No   Sig: TAKE 4 CAPS BY MOUTH 1 HOUR PRIOR TO DENTAL APPOINTMENT   lisinopril (ZESTRIL) 40 MG tablet 1/8/2024 at PM Self No Yes   Sig: Take 1 tablet (40 mg) by mouth daily    mirtazapine (REMERON) 7.5 MG tablet  Self No No   Sig: Take 1 tablet (7.5 mg) by mouth at bedtime   order for DME  Self No No   Sig: Equipment being ordered: COMPRESSION STOCKINGS, 20-30 mmHg   warfarin ANTICOAGULANT (COUMADIN) 5 MG tablet 1/8/2024 at PM Self No Yes   Sig: TAKE ONE & ONE-HALF TAB (7.5MG) EACH TUE & SAT. TAKE 1 TAB (5MG) ALL OTHER DAYS OR AS DIRECTED   Patient taking differently: -  For MVR  INR goal 2.5-3.5  10 mg on Wednesday and Saturday  5 mg on all other days      Facility-Administered Medications: None     Current Facility-Administered Medications   Medication Dose Route Frequency    furosemide  20 mg Intravenous Once    iron sucrose  300 mg Intravenous Q72H    mirtazapine  7.5 mg Oral At Bedtime    pantoprazole  40 mg Intravenous Q12H    sodium chloride (PF)  3 mL Intracatheter Q8H     Current Facility-Administered Medications   Medication Last Rate    - MEDICATION INSTRUCTIONS -       Allergies   Allergies   Allergen Reactions    Amiodarone Shortness Of Breath    Pcn [Penicillins] Shortness Of Breath     Rxn occurred in childhood     Latex     Sulfites     Zetia [Ezetimibe] Muscle Pain (Myalgia)     Muscle weakness legs       Social History    reports that he has quit smoking. His smoking use included cigarettes. He has a 2.5 pack-year smoking history. He has never used smokeless tobacco. He reports current alcohol use. He reports that he does not use drugs.    Family History   Family History   Problem Relation Age of Onset    Cerebrovascular Disease Father     Diabetes Paternal Grandmother     C.A.D. Brother         CABG X 3 at age 51       Review of Systems   The comprehensive Review of Systems is negative other than noted in the HPI or here.     Physical Exam   Vital Signs with Ranges  Temp:  [97.6  F (36.4  C)-98.6  F (37  C)] 98.6  F (37  C)  Pulse:  [59-82] 82  Resp:  [16-19] 16  BP: (106-137)/(45-82) 120/67  SpO2:  [83 %-99 %] 98 %  Wt Readings from Last 4 Encounters:   01/12/24 (!)  164.2 kg (362 lb)   01/09/24 135.6 kg (299 lb)   09/13/23 135.6 kg (299 lb)   07/15/22 147.9 kg (326 lb)     I/O last 3 completed shifts:  In: 450 [P.O.:450]  Out: 4250 [Urine:4250]      Vitals: /67 (BP Location: Left arm, Patient Position: Semi-Calderon's, Cuff Size: Adult Regular)   Pulse 82   Temp 98.6  F (37  C) (Oral)   Resp 16   Wt (!) 164.2 kg (362 lb)   SpO2 98%   BMI 49.79 kg/m      General: Alert, oriented, in no acute distress  HEENT: PERRLA, mucous membranes moist  Neck: JVP 3-4 cm above the clavicle when at 45 degrees.  CV: Regular rate and rhythm without murmur  Respiratory: Soft wheezes throughout.  GI: Normoactive bowel sounds; soft, non-tender abdomen  Neuro: No focal deficits appreciated  Extremities: Mild peripheral edema bilaterally    Recent Labs   Lab 01/14/24  0555 01/13/24  0607 01/12/24  1722 01/12/24  0619 01/12/24  0019 01/11/24  1628 01/10/24  1422 01/10/24  0605 01/09/24  2053 01/09/24  1157 01/09/24  1027   WBC 4.6 4.8  --   --   --  5.4  --  4.4  --  4.4 5.1   HGB 8.4* 7.7* 7.6* 7.5*   < > 7.7*   < > 6.3*   < > 4.4* 4.8*   MCV 86 86  --   --   --   --   --  81  --  79 81   * 140*  --   --   --   --   --  144*  --  158 177   INR 1.91* 2.25*  --  2.49*  --  2.50*   < > 3.84*   < > 5.37* 7.0*    138  --  137  --  137   < > 139  --  140 141   POTASSIUM 4.7 5.0  --  5.3  --  5.4*   < > 5.4*  --  5.5* 5.4*   CHLORIDE 107 109*  --  109*  --  108*   < > 110*  --  110* 110*   CO2 21* 20*  --  23  --  22   < > 24  --  24 18*   BUN 40.1* 50.8*  --  55.0*  --  55.9*   < > 59.4*  --  60.1* 59.4*   CR 1.29* 1.50*  --  1.72*  --  1.76*   < > 1.76*  --  1.83* 1.82*   GFRESTIMATED 57* 47*  --  40*  --  39*   < > 39*  --  37* 38*   ANIONGAP 9 9  --  5*  --  7   < > 5*  --  6* 13   JOSEPH 8.8 8.5*  --  8.4*  --  8.6*   < > 8.6*  --  8.7* 8.8   GLC 99 96  --  97  --  124*   < > 86  --  100* 98   ALBUMIN  --   --   --   --   --   --   --   --   --  3.5 3.5   PROTTOTAL  --   --   --   --  "  --   --   --   --   --  5.7* 5.8*   BILITOTAL  --   --   --   --   --   --   --   --   --  0.7 0.7   ALKPHOS  --   --   --   --   --   --   --   --   --  62 60   ALT  --   --   --   --   --   --   --   --   --  23 22   AST  --   --   --   --   --   --   --   --   --  36 37    < > = values in this interval not displayed.     Recent Labs   Lab Test 01/09/24  1027 02/09/22  1117 06/06/17  0658 11/04/15  0739   CHOL 53 141   < > 141   HDL 25* 46   < > 41   LDL 22 78   < > 85   TRIG 32 85   < > 77   CHOLHDLRATIO  --   --   --  3.4    < > = values in this interval not displayed.     Recent Labs   Lab 01/14/24  0555 01/13/24  0607 01/12/24  1722 01/12/24  0019 01/11/24  1628 01/10/24  1422 01/10/24  0605 01/09/24  1157 01/09/24  1027   WBC 4.6 4.8  --   --  5.4  --  4.4   < > 5.1   HGB 8.4* 7.7* 7.6*   < > 7.7*   < > 6.3*   < > 4.8*   HCT 29.0* 26.6*  --   --   --   --  21.7*   < > 18.0*   MCV 86 86  --   --   --   --  81   < > 81   * 140*  --   --   --   --  144*   < > 177   IRON  --   --   --   --   --   --   --   --  17*   IRONSAT  --   --   --   --   --   --   --   --  4*   FEB  --   --   --   --   --   --   --   --  435*   COURTNEY  --   --   --   --   --   --   --   --  21*    < > = values in this interval not displayed.     No results for input(s): \"PH\", \"PHV\", \"PO2\", \"PO2V\", \"SAT\", \"PCO2\", \"PCO2V\", \"HCO3\", \"HCO3V\" in the last 168 hours.  Recent Labs   Lab 01/09/24  1157   NTBNPI 2,149*     No results for input(s): \"DD\" in the last 168 hours.  No results for input(s): \"SED\", \"CRP\" in the last 168 hours.  Recent Labs   Lab 01/14/24  0555 01/13/24  0607 01/10/24  0605   * 140* 144*     No results for input(s): \"TSH\" in the last 168 hours.  No results for input(s): \"COLOR\", \"APPEARANCE\", \"URINEGLC\", \"URINEBILI\", \"URINEKETONE\", \"SG\", \"UBLD\", \"URINEPH\", \"PROTEIN\", \"UROBILINOGEN\", \"NITRITE\", \"LEUKEST\", \"RBCU\", \"WBCU\" in the last 168 hours.    Imaging:  No results found for this or any previous visit (from the " past 48 hour(s)).      Medical Decision Making     60 MINUTES SPENT BY ME on the date of service doing chart review, history, exam, documentation & further activities per the note.

## 2024-01-14 NOTE — PLAN OF CARE
Goal Outcome Evaluation:       No change, bp better, Lasix given 1 time dose, with good result.     Denies pain  T/R q2hrs, he refuses cares, refused sit in chair today,   VSS, he is on RA, denies any SOB.  BM today.- small.   EGD when INR <1.5.  Afib CVR.

## 2024-01-14 NOTE — PROGRESS NOTES
PROGRESS NOTE    ASSESSMENT AND PLAN: Feng Wynne is a 79 year old male with history of mechanical mitral valve replacement for severe mitral regurgitation and MAZE procedure in 2008, on chronic anticoagulation with warfarin amongst others whom presented with acute anemia and supratherapeutic INR.     Supratherapeutic INR  Acute blood loss anemia  Hemoglobin 4.8 on admission. 6.7 this a.m. has had multiple PRBC transfusions  INR 5.37->4.52->3.84->2.81  Likely has GI mucosal oozing due to supratherapeutic INR  No prior EGD or colonoscpoy noted in chart  Bradycardic with -110, on room air     - continue V pantoprazole 40 mg BID  - Daily INR   - Daily hemoglobin and transfuse to keep above 7  - Unable to perform EGD/colonoscopy until INR below 1.5  - clear liquid diet today  - Colon prep to start at noon  - NPO at midnight    INTERIM VISIT: (admission day 5): Patient denies nausea, vomiting, abdominal pain, chest pain, shortness of breath, melena or hematochezia overnight. I discussed with patient that we will plan to do EGD and colonoscopy tomorrow afternoon and he is agreeable. Hemoglobin is stable at 8.4 this AM.     PHYSICAL EXAM:  Vital Signs: Temp: 98.6  F (37  C) Temp src: Oral BP: 120/59 Pulse: 75   Resp: 16 SpO2: 98 % O2 Device: Nasal cannula Oxygen Delivery: 2 LPM  Weight: 362 lbs 0 oz    Constitutional:  Awake, alert, cooperative, no apparent distress  GI: Normal bowel sounds, soft, non-distended, non-tender    Recent Labs   Lab Test 01/14/24  0555 01/13/24  0607   POTASSIUM 4.7 5.0   CHLORIDE 107 109*   BUN 40.1* 50.8*       Recent Labs   Lab Test 01/14/24  0555 01/13/24  0607   INR 1.91* 2.25*   WBC 4.6 4.8   HGB 8.4* 7.7*   * 140*     Principal Problem:    Chronic atrial fibrillation (H)  Active Problems:    Bradycardia    SOB (shortness of breath)    Supratherapeutic INR    Anemia, unspecified type      ADDITIONAL COMMENTS:    I reviewed all medications, new labs and imaging results over  the last 24 hours.   I discussed the patient's progress with Dr. Olvera and attending RN.    MARY Silverman, [facility]   [phone] Office  [phone] Cell

## 2024-01-14 NOTE — PROGRESS NOTES
[id number]:  A&O pleasant cooperative. Bright personality. Encouraging repositioning, turning with bedside lift assist 2-3 staff. Pt c/o upper mid back pain and coccyx pain, mepilex applied and pt repositioned to side. Stubbs. Tylenol given for pain. Tele afib CVR. Had small BM at 1900.

## 2024-01-15 NOTE — CODE/RAPID RESPONSE
[facility]    RRT Note  1/15/2024   Time Called: 1711    Code Status: No CPR- Do NOT Intubate    I was called to evaluate Feng Wynne, who is a 79 year old male who was admitted on 1/9/2024 for dyspnea and severe anemia. PMH includes mechanical mitral valve replacement for severe mitral regurgitation and MAZE procedure in 2008, on chronic anticoagulation with warfarin, pulmonary hypertension, chronic diastolic CHF with preserved EF, previous dilated LV likely due to mitral regurgitation, subsequent improvement with ACE inhibitor and beta-blockers, hypertension, hyperlipidemia, intolerant to statins and Zetia.     Due to his severe anemia and anticoagulation, patient underwent EGD and colonoscopy 1/15/2024, return to  at approximately 1500, patient was asleep, in no distress at this time.  Remained hemodynamically stable.  At approximately 1700, the patient was persistently lethargic, hospitalist was called and recommended RRT for additional assessment.    Assessment & Plan     Acute Hypercapnic Respiratory Acidosis 2/2 Obesity Hypoventilation syndrome and recent sedation  Altered Mental Status: on my arrival, this is a gentleman who is semi upright (~45 deg), asleep, mouth breathing, does not respond to voice, moans to noxious stimuli or pain. Hemodynamically stable, /s70s, HR 83, NSR, 98% oln 2L NC, rectal temp 97  F.  Patient was satting 100% on 2 L, oxygen titrated off.  Appears to be abdominal breathing, respiratory rate approximately 12.  Given his sedated appearance and recent procedure including 100 mics of fentanyl, 3 mg IV Versed, the patient was reversed with Narcan 0.16 IV, without any response or improvement in mentation.  Glucose 88.    An ABG was attempted x 3, however unable to be obtained.  Labs obtained including VBG.  The patient appears to be slightly jaundiced on exam, will add on hepatic panel to labs.    INTERVENTIONS:  -rectal temp 97  -STAT ABG (will  switch to VBG due to collection issues) VBG 7.28, pC02 56, Hc03 27  -CXR portable   -labs:CMP, CBC, lactic   -Narcan 0.16 IV to assess response   - if blood gas is normal, would proceed with CTA, CT w/o contrast to eval for intracranial abnormality   - UA  - BiPAP as needed order set placed  - If patient becomes increasingly hypoxic, would favor lasix 20 mg IV x 1 given CXR findings, but will start with using NIPPV and resassess in 2 hours     Given the patient's clinical exam, working diagnosis is acute hypercapnic respiratory failure 2/2 obesity hypoventilation syndrome in the setting of recent sedation.  -While the patient is a poor BiPAP candidate, he is DNI and will likely improve/respond to NIPPV, will trial with close 1:1 RN care.    Goals of Care   Confirmed to be DNR/DNI    At the conclusion of this RRT patient is awake, alert, and back at baseline. Family and patient updated at the bedside. He is hemodynamically stable and will remain on current unit. Plan to rest on BiPAP x 1 hour, then sleep with it on if patient allows.       MIKE [facility]  Securely message with the Vocera Web Console (learn more here)  Text page via [employer] Paging/Directory        Physical Exam   Vital Signs with Ranges:  Temp:  [97.3  F (36.3  C)-98.3  F (36.8  C)] 97.3  F (36.3  C)  Pulse:  [60-82] 78  Resp:  [7-25] 15  BP: ()/(41-70) 104/62  SpO2:  [87 %-100 %] 96 %  I/O last 3 completed shifts:  In: 540 [P.O.:240; I.V.:300]  Out: 3350 [Urine:3350]              Physical Exam  Vitals and nursing note reviewed.   Constitutional:       Appearance: He is morbidly obese. He is ill-appearing.      Interventions: Face mask in place.   Cardiovascular:      Rate and Rhythm: Normal rate and regular rhythm.      Heart sounds: No murmur heard.  Pulmonary:      Effort: Bradypnea, accessory muscle usage and prolonged expiration present. No respiratory distress.      Breath sounds: Decreased air  "movement present.   Abdominal:      General: Abdomen is protuberant.      Palpations: Abdomen is soft.      Tenderness: There is no abdominal tenderness.      Hernia: A hernia is present.   Genitourinary:     Comments: Stubbs in place   Skin:     General: Skin is warm and dry.   Neurological:      Mental Status: He is lethargic.   Psychiatric:         Behavior: Behavior is cooperative.          Data     IMAGING: (X-ray/CT/MRI)   No results found for this or any previous visit (from the past 24 hour(s)).    CBC with Diff:  Recent Labs   Lab Test 01/15/24  0600   WBC 4.8   HGB 8.0*   MCV 87   *   INR 1.86*      No results found for: \"RETICABSCT\"  No results found for: \"RETP\"    Comprehensive Metabolic Panel:  Recent Labs   Lab 01/15/24  0600 01/10/24  0605 01/09/24  1157      < > 140   POTASSIUM 4.4   < > 5.5*   CHLORIDE 110*   < > 110*   CO2 24   < > 24   ANIONGAP 7   < > 6*   *   < > 100*   BUN 29.2*   < > 60.1*   CR 1.01   < > 1.83*   GFRESTIMATED 76   < > 37*   JOSEPH 8.6*   < > 8.7*   PROTTOTAL  --   --  5.7*   ALBUMIN  --   --  3.5   BILITOTAL  --   --  0.7   ALKPHOS  --   --  62   AST  --   --  36   ALT  --   --  23    < > = values in this interval not displayed.         Time Spent on this Encounter     Medical Decision Making              CRITICAL CARE TIME  I spent 70 minutes of critical care time on the unit/floor managing the care of Feng Wynne. Upon evaluation, this patient had a high probability of imminent or life-threatening deterioration due to acute hypercapnic respiratory failure which required my direct attention, intervention, and personal management. 100% of my time was spent at the bedside counseling the patient and/or coordinating care regarding services listed in this note.    "

## 2024-01-15 NOTE — PROGRESS NOTES
[facility]  Gastroenterology Progress Note     Feng Wynne MRN# [id number]   YOB: 1945 Age: 79 year old          Assessment and Plan:     Feng Wynne is a 79 year old male with history of mechanical mitral valve replacement for severe mitral regurgitation and MAZE procedure in 2008, on chronic anticoagulation with warfarin amongst others whom presented with acute anemia and supratherapeutic INR.     Supratherapeutic INR  Acute blood loss anemia  Hemoglobin 4.8 on admission. 8.9 this a.m. has had multiple PRBC transfusions  INR 5.37->4.52->3.84->2.810->1.86  Likely has GI mucosal oozing due to supratherapeutic INR  No prior EGD or colonoscpoy noted in chart  Bradycardic with -110, on 4 LPM overnight down to 2 this a.m.     - continue V pantoprazole 40 mg BID  - Daily INR   - Daily hemoglobin and transfuse to keep above 7  - EGD/colonoscopy today  -  Magnesium citrate as stools not clear yet           Interval History:     no new complaints, doing well, and doing well; no cp, sob, n/v/d, or abd pain. Finished colon prep stools are loose and brown.               Review of Systems:     C: NEGATIVE for fever, chills, change in weight  E/M: NEGATIVE for ear, mouth and throat problems  R: NEGATIVE for significant cough or SOB  CV: NEGATIVE for chest pain, palpitations or peripheral edema             Medications:   I have reviewed this patient's current medications   iron sucrose  300 mg Intravenous Q72H    mirtazapine  7.5 mg Oral At Bedtime    pantoprazole  40 mg Intravenous Q12H    sodium chloride (PF)  3 mL Intracatheter Q8H                  Physical Exam:   Vitals were reviewed  Vital Signs with Ranges  Temp:  [97.4  F (36.3  C)-98.3  F (36.8  C)] 97.4  F (36.3  C)  Pulse:  [69-82] 72  Resp:  [18-20] 18  BP: ()/(41-76) 103/48  SpO2:  [87 %-96 %] 90 %  I/O last 3 completed shifts:  In: 0   Out: 5050 [Urine:5050]  Constitutional: healthy, alert, and no distress    Cardiovascular: negative, PMI normal. No lifts, heaves, or thrills. RRR. No murmurs, clicks gallops or rub  Respiratory: negative, Percussion normal. Good diaphragmatic excursion. Lungs clear  Abdomen: Abdomen soft, non-tender. BS normal. No masses, organomegaly           Data:   I reviewed the patient's new clinical lab test results.   Recent Labs   Lab Test 01/15/24  0600 01/14/24  0555 01/13/24  0607   WBC 4.8 4.6 4.8   HGB 8.0* 8.4* 7.7*   MCV 87 86 86   * 147* 140*   INR 1.86* 1.91* 2.25*     Recent Labs   Lab Test 01/15/24  0600 01/14/24  0555 01/13/24  0607   POTASSIUM 4.4 4.7 5.0   CHLORIDE 110* 107 109*   CO2 24 21* 20*   BUN 29.2* 40.1* 50.8*   ANIONGAP 7 9 9     Recent Labs   Lab Test 01/09/24  1157 01/09/24  1027 02/09/22  1117   ALBUMIN 3.5 3.5 3.7   BILITOTAL 0.7 0.7 0.6   ALT 23 22 19   AST 36 37 17       I reviewed the patient's new imaging results.    All laboratory data reviewed  All imaging studies reviewed by me.    Cheryl Waters PA-C,  1/15/2024  [facility]  Office : [phone]  Cell: [phone] (Dr. Olvera)  Cell: [phone] (Cheryl Waters PA-C)

## 2024-01-15 NOTE — PROGRESS NOTES
[facility]  Hospitalist Progress Note        Blayne Ruano MD   01/15/2024        Interval History:        - G.I. following, plan for EGD/colonoscopy 1/15/24,  when INR<1.5 (letting INR drift down); on presentation INR supratherapeutic at 5.37; INR 1/15/24---->1.86 but GI okay to proceed  - getting intermittent IV diuresis per cardiology; plan to resume anticoagulation as soon as feasible after endoscopy  - rate controlled in 70s to 80s  - renal function improved with creatinine 1.83----> 1.01  - hemoglobin stable around 7 to 8 (last blood transfusion on 1/11/4); also ordered for IV Sucrose q 72 hrs X 3 doses (2nd dose given 1/15)      Addendum:  - discussed with Dr Olvera, EGD and colonoscopy revealed no active bleed; plan for outpatient capsule endoscopy  - GI okay to resume anticoagulation; will have pharmacy dose Warfarin (1/15)  - mechanical soft diet           Assessment and Plan:        Feng Wynne is a 79 year old morbidly obese male with PMH of mechanical mitral valve replacement for severe mitral regurgitation and MAZE procedure in 2008, on chronic anticoagulation with warfarin, pulmonary hypertension, chronic diastolic CHF with preserved EF, previous dilated LV likely due to mitral regurgitation, subsequent improvement with ACE inhibitor and beta-blockers, hypertension, hyperlipidemia, intolerant to statins and Zetia, who presented to ER on 1/9/2024 with 2- 3 months of progressive shortness of breath and noted with severe anemia. On admission, also noted with sinus bradycardia with heart rate in 30s, ENRIQUE, mild hypokalemia. Admitted inpatient 1/9/24.     Subacute normocytic anemia, hemoglobin 4.4, likely GI bleed due to anticoagulation  Acute blood loss anemia,   Iron deficiency anemia  - symptomatic severe anemia in the setting of supratherapeutic INR, chronic anticoagulation  - last hemoglobin PTA from 2022 was around 12  - on admission hemoglobin 4.8, INR 5.37; with no  suggestion of active bleed  - iron studies on admission noted with iron deficiency (Iron sat 4%, tibc 435, ferritin 21. Iron level 17)    - G.I. following, plan for EGD/colonoscopy 1/15/24,  when INR<1.5 (letting INR drift down); on presentation INR supratherapeutic at 5.37; INR 1/15/24---->1.86 but GI okay to proceed  - continue IV Protonix BID  - hemoglobin stabilizing around 7 to 8 (got about 5 units PRBC since admission; last blood transfusion on 1/11/4); also ordered for IV Sucrose q 72 hrs X 3 doses (2nd dose given 1/15)  -monitor hemoglobin and transfuse PRN for Hb<7    Supratherapeutic INR  H/o mechanical mitral valve replacement for severe mitral regurgitation  s/p MAZE procedure in 2008  Chronic anticoagulation with warfarin  likely type II non-STEMI, in the setting of severe anemia  pulmonary hypertension  chronic diastolic CHF with preserved EF  Hypertension  Hyperlipidemia (intolerant to statin)  - on presentation INR supratherapeutic at 5.37; was given 2 doses of vitamin K on presentation; cardiology subsequently suggested delete INR drift down without Vit K  - INR 1/15/24---->1.86  - will plan to resume Coumadin after EGD/colonoscopy if okay with G.I.  - no need for bridging heparin gtt per cardiology once INR below usual inr goal  - not on any diuretics PTA  - ECHO from 1/10/24 noted LVEF 55%; s/p 31mm [facility] [employer] mechanical MVR. Mean 5mmHg @ HR 50; moderate to mod-severe (2-3+) tricuspid regurgitation  -serial troponin with no significant trend 73---69  - getting intermittent IV diuresis per cardiology     Paroxysmal atrial fibrillation  Bradycardia, heart rate in 30s  - No history of syncope  - PTA atenolol discontinued ; HR stable in 70s--80s  - ECHO as noted above  - cardiology following; monitor on telemetry  - anticoagulation discussion as above     Acute kidney injury, likely prerenal  Mild hyperkalemia, potassium 5.5  Non-anion gap metabolic acidosis  - on admission creatinine of 1.83, up from  "0.9 on 2/6/22 with K of 5.0- last bmp prior to hospitalization; unclear recent baseline  - Renal US 1/10/24 Left kidney and urinary bladder are unremarkable. No left  collecting system dilatation. Significantly limited evaluation of the right kidney due to poor acoustic windows/body habitus.  - branch placed for urinary retention 500+ ml by urology   -renal function improved with intermittent IV diuresis (last given 1/14)  - creatinine 1.83----> 1.01; will monitor BMP intermittently  - holding PTA lisinopril      Urinary retention  - Pvr 500 range; RN unable to place straight cath  - [facility] consulted and branch placed 1/10/24  - per urology, discharge with branch catheter. plan for follow up [facility] office in 3 weeks office for trial of void.  Scheduled for 1/30/24 at 8:30 am at our [place] location. AVS updated.      Hyperlipidemia  Intolerant to statins and Zetia  -Lipid panel obtained in clinic,   TG 32, LDL 22,      BLE edema  Chronic. Use lymphedema wraps at home  PT consult for lymphedema wraps  diuresis   Follow       DVT Prophylaxis: Warfarin- holding; anticoagulation discussion as above     Branch Catheter: placed for urinary retention by urology 1/10. Outpatient urology follow up     Code Status:  DNR/DNI    Diet: NPO per Anesthesia Guidelines for Procedure/Surgery Except for: Meds      Disposition:   -likely 2 to 3 days pending clinical stability; endoscopy workup per G.I.; G.I./cardiology clearance  -will need to resume anticoagulation and assure stability  -PT/OT rec TCU placement  - following for disposition    Clinically Significant Risk Factors                 # Coagulation Defect: INR = 1.86 (Ref range: 0.85 - 1.15) and/or PTT = N/A, will monitor for bleeding        # Hypertension: Noted on problem list          # Severe Obesity: Estimated body mass index is 48.55 kg/m  as calculated from the following:    Height as of an earlier encounter on 1/9/24: 1.816 m (5' 11.5\").    Weight as of " this encounter: 160.1 kg (353 lb).      # Financial/Environmental Concerns: none            Page Me (7 am to 6 pm)    Cable discussed with patient, nursing and G.I.  High medical complexity  total time spent today 60 minutes              Physical Exam:      Blood pressure 103/48, pulse 80, temperature 98.3  F (36.8  C), temperature source Oral, resp. rate 20, weight (!) 160.1 kg (353 lb), SpO2 90%.  Vitals:    01/12/24 1030 01/14/24 0800 01/15/24 0638   Weight: (!) 164.2 kg (362 lb) (!) 160.9 kg (354 lb 11.2 oz) (!) 160.1 kg (353 lb)     Vital Signs with Ranges  Temp:  [98.3  F (36.8  C)] 98.3  F (36.8  C)  Pulse:  [69-82] 80  Resp:  [18-20] 20  BP: ()/(41-76) 103/48  SpO2:  [87 %-97 %] 90 %  I/O's Last 24 hours  I/O last 3 completed shifts:  In: -   Out: 5050 [Urine:5050]    Constitutional: Alert, awake and oriented X 3; resting comfortably in no apparent distress; morbidly obese       Oral cavity: Moist mucosa   Cardiovascular: Normal s1 s2, irregularly irregular rate and rhythm   Lungs: Mild b/l wheezes +; no crepitations   Abdomen: Soft, nt, nd, no guarding, rigidity or rebound; BS +   LE : Mild b/l chronic lypmhedema   Musculoskeletal/Neuro Power 5/5 in all extremities; No focal neurological deficits noted   Psychiatry: normal mood and affect                Medications:         iron sucrose  300 mg Intravenous Q72H    mirtazapine  7.5 mg Oral At Bedtime    pantoprazole  40 mg Intravenous Q12H    sodium chloride (PF)  3 mL Intracatheter Q8H     PRN Meds: acetaminophen **OR** acetaminophen, albuterol, calcium carbonate, diphenhydrAMINE, EPINEPHrine, famotidine, lidocaine 4%, lidocaine (buffered or not buffered), methylPREDNISolone, nitroGLYcerin, ondansetron **OR** ondansetron, - MEDICATION INSTRUCTIONS -, prochlorperazine **OR** prochlorperazine **OR** prochlorperazine, senna-docusate **OR** senna-docusate, sodium chloride (PF)         Data:      All new lab and imaging data was reviewed.   Recent Labs  "  Lab Test 01/15/24  0600 01/14/24  0555 01/13/24  0607   WBC 4.8 4.6 4.8   HGB 8.0* 8.4* 7.7*   MCV 87 86 86   * 147* 140*   INR 1.86* 1.91* 2.25*      Recent Labs   Lab Test 01/15/24  0600 01/14/24  0555 01/13/24  0607    137 138   POTASSIUM 4.4 4.7 5.0   CHLORIDE 110* 107 109*   CO2 24 21* 20*   BUN 29.2* 40.1* 50.8*   CR 1.01 1.29* 1.50*   ANIONGAP 7 9 9   JOSEPH 8.6* 8.8 8.5*   * 99 96     No lab results found.    Invalid input(s): \"TROP\", \"TROPONINIES\"      "

## 2024-01-15 NOTE — PLAN OF CARE
Goal Outcome Evaluation:  A&O x4, VSS on 2L/NC, desat to low 80s on RA. Tele Afib CVR. ZUNIGA, denies SOB at rest. Denies CP. Had a copious amount of loose brown stool this shift. Hgb 8.0 (yesterday 8.3). INR 1.86(yesterday 8.6) GI saw yesterday. NPO, goal is for INR < 1.5 for EGD & colonoscopy. Rectal tube not effective overnight-removed. Noted no skin breakdown. Turned & repositioned q 2hrs. 2 assisted with lift. Awaits rounding MD. Will continue to monitor.

## 2024-01-15 NOTE — PHARMACY-ANTICOAGULATION SERVICE
Clinical Pharmacy - Warfarin Dosing Consult     Pharmacy has been consulted to manage this patient s warfarin therapy.  Indication: Mechanical Mitral Valve Replacement  Therapy Goal: INR 2.5-3.5  Warfarin Prior to Admission: Yes  Warfarin PTA Regimen: 10 mg Wed/Sat, 5 mg ROW  Significant drug interactions: methylprednisolone  Recent documented change in oral intake/nutrition: Unknown  Dose Comments: 4 mg    INR   Date Value Ref Range Status   01/15/2024 1.86 (H) 0.85 - 1.15 Final   01/14/2024 1.91 (H) 0.85 - 1.15 Final     Recommend warfarin 4 mg today.  Pharmacy will monitor Feng Wynne daily and order warfarin doses to achieve specified goal.      Please contact pharmacy as soon as possible if the warfarin needs to be held for a procedure or if the warfarin goals change.      Raven Ferguson, PharmD, BCPS

## 2024-01-15 NOTE — PROGRESS NOTES
Care Management Follow Up    Length of Stay (days): 6    Expected Discharge Date: 01/17/2024     Concerns to be Addressed: discharge planning     Patient plan of care discussed at interdisciplinary rounds: Yes    Anticipated Discharge Disposition: TCU     Anticipated Discharge Services: None  Anticipated Discharge DME: None    Patient/family educated on Medicare website which has current facility and service quality ratings:    Education Provided on the Discharge Plan: Yes  Patient/Family in Agreement with the Plan: yes    Referrals Placed by CM/SW: Homecare  Private pay costs discussed: Not applicable    Additional Information:  [facility] Team is following up on pending referrals.     Referrals that have been sent have a central liaison ([facility]) [facility] Team emailed liaison asking if they can accept patient for placement. [facility] is reviewing referral.     GILL Ortiz, LGSW   Social Work   [facility]

## 2024-01-15 NOTE — PLAN OF CARE
Goal Outcome Evaluation:         AORX 3,v/s stable,has been needing O2 today, drops his sats to the mid 80s on RA.  Stubbs with good urine output.  Plan is EGD and colonoscopy in am. Bowel prep started. Rectal tube placed for ease of toileting, avoid skin breakdown and infection.   Afib CVR..

## 2024-01-15 NOTE — PLAN OF CARE
Neuro: A/O x4  CV/Rhythm: Afib w/ cvr  Resp/02: LS dim, few scattered crackles, intermittent exp wheeze when laying flat, on 1-4L NC.  Desats with sleep.  GI/Diet: Multiple loose BM's (stool more clear post mag citrate given today) - pt post bowel prep for procedure today, NPO   : branch patent, good UO  Skin/Incisions/Sites: blanchable redness on coccyx, and abd/groin folds. Turned and repositioned q2 hrs  Pulses/CMS: +2 radial, weak pedal  Edema: +2 LE up through hips/flank  Activity/Falls Risk: Turn/repo Q2 hrs  Lines/Drains/IVs: SL  Labs/BGM: K 4.4, Cr 1.01, Hgb 8.0, INR 1.86  Test/Procedures: plan for egd/colonoscopy today  VS/Pain: VSS, pt denies pain, but does c/o of leg tenderness when moving and rolling in bed  DC Plan: SW/PT/OT following may need TCU  Other:

## 2024-01-16 NOTE — PLAN OF CARE
Goal Outcome Evaluation:  [id number]  Neuro- x4  Most Recent Vitals- /61   Pulse 78   Temp 98.7  F (37.1  C) (Oral)   Resp 17   Wt (!) 160.1 kg (353 lb)   SpO2 97%   BMI 48.55 kg/m    Tele/Cardiac- Afib CVR   Resp- BiPAP overnight w/ sleep  Activity- 2A lift - turn/ reposition as pt tolerates   Pain- denies   Skin- scattered bruising and dependent edema  GI/- Indwelling branch in place   Labs pending   Diet: Mechanical/Dental Soft Diet    Plan- Capsule study outpatient, TCU at discharge- referrals sent

## 2024-01-16 NOTE — PROGRESS NOTES
[facility]  Gastroenterology Progress Note     Feng Wynne MRN# [id number]   YOB: 1945 Age: 79 year old          Assessment and Plan:     Feng Wynne is a 79 year old male with history of mechanical mitral valve replacement for severe mitral regurgitation and MAZE procedure in 2008, on chronic anticoagulation with warfarin amongst others whom presented with acute anemia and supratherapeutic INR.     Supratherapeutic INR  Acute blood loss anemia  Hemoglobin stable around 8-9  INR 5.37 on presentation  1/15 EGD unremarkable  1/15 colonoscopy noted extensive amounts of copious quantities of stool in entire colon. Lavage was performed resulting in fair visualization. Internal hemorrhoids  Likely had GI mucosal oozing due to supratherapeutic INR    - ok to restart warfarin  - outpatient capsule endoscopy    RRT  CHF  Chest xray new CHF suggested with cardiomegaly and central vascular congestion and subtle peripheral edema  On CPAP overnight         Interval History:     RRT last evening post sedation for hypercapnic respiratory failure.               Review of Systems:     C: NEGATIVE for fever, chills, change in weight  E/M: NEGATIVE for ear, mouth and throat problems  R: NEGATIVE for significant cough or SOB  CV: NEGATIVE for chest pain, palpitations or peripheral edema             Medications:   I have reviewed this patient's current medications   cefTRIAXone  2 g Intravenous Q24H    iron sucrose  300 mg Intravenous Q72H    mirtazapine  7.5 mg Oral At Bedtime    pantoprazole  40 mg Intravenous Q12H    sodium chloride (PF)  3 mL Intracatheter Q8H    Warfarin Therapy Reminder  1 each Oral See Admin Instructions                  Physical Exam:   Vitals were reviewed  Vital Signs with Ranges  Temp:  [97.3  F (36.3  C)-98.9  F (37.2  C)] 98.7  F (37.1  C)  Pulse:  [] 78  Resp:  [7-25] 17  BP: ()/(49-84) 116/61  FiO2 (%):  [30 %] 30 %  SpO2:  [87 %-100 %] 97 %  I/O  last 3 completed shifts:  In: 780 [P.O.:480; I.V.:300]  Out: 2200 [Urine:2200]  Constitutional: healthy, alert, and no distress   Cardiovascular: negative, PMI normal. No lifts, heaves, or thrills. RRR. No murmurs, clicks gallops or rub  Respiratory: negative, Percussion normal. Good diaphragmatic excursion. Lungs clear  Abdomen: Abdomen soft, non-tender. BS normal. No masses, organomegaly           Data:   I reviewed the patient's new clinical lab test results.   Recent Labs   Lab Test 01/16/24  0557 01/15/24  1815 01/15/24  0600 01/14/24  0555   WBC  --  4.9 4.8 4.6   HGB 8.0* 8.6* 8.0* 8.4*   MCV  --  89 87 86   PLT  --  142* 149* 147*   INR 1.95*  --  1.86* 1.91*     Recent Labs   Lab Test 01/15/24  1815 01/15/24  0600 01/14/24  0555   POTASSIUM 4.4 4.4 4.7   CHLORIDE 107 110* 107   CO2 23 24 21*   BUN 24.5* 29.2* 40.1*   ANIONGAP 8 7 9     Recent Labs   Lab Test 01/15/24  2210 01/15/24  1815 01/09/24  1157 01/09/24  1027   ALBUMIN  --  3.1* 3.5 3.5   BILITOTAL  --  1.0 0.7 0.7   ALT  --  13 23 22   AST  --  29 36 37   PROTEIN Trace*  --   --   --        I reviewed the patient's new imaging results.    All laboratory data reviewed  All imaging studies reviewed by me.    Cheryl Waters PA-C,  1/15/2024  [facility]  Office : [phone]  Cell: [phone] (Dr. Olvera)  Cell: [phone] (Cheryl Waters PA-C)

## 2024-01-16 NOTE — PLAN OF CARE
Neuro: A/O x4  CV/Rhythm: Afib w/ cvr  Resp/02: LS clear, dim at bases on RA  GI/Diet: incontinent of stool x1 today  : branch patent, good UO  Skin/Incisions/Sites: park LE, scattered bruises on anand arms, blanchable coccyx mepilex in place  Pulses/CMS: +2 radial  Edema: LE +2   Activity/Falls Risk: Turned and repo q2hrs in bed, did work with therapy today  Lines/Drains/IVs: SL, On IV abx  Labs/BGM: Cr .92, Hgb 8.0  VS/Pain: VSS, pt denies any c/o of pain.  Does periodically moan with repositioning  DC Plan: TCU at discharge.  Other:

## 2024-01-16 NOTE — PLAN OF CARE
RRT called for lethargy and difficulty to arouse s/p ~ 2 hrs after returning from EGD/Colonoscopy. See House SACHI note.

## 2024-01-16 NOTE — PROGRESS NOTES
RT called to bedside to place pt on BiPAP. Pt was placed on BiPAP 10/5 30%  with SpO2 in the mid 90's. Skin intact under oxygen device. Alarm volume set at 10 on the BiPAP.  Will cont to monitor.  1/15/2024  Yaneth Cardenas, RT

## 2024-01-16 NOTE — PROGRESS NOTES
Brief Cardiology Note Chart Review  Pt: Feng Wynne    1945     Assessment & Plan  # Acute blood loss anemia secondary to GI bleed in the setting of supra therapeutic INR  # Status post mechanical mitral valve replacement and MAZE,   # Chronic atrial fibrillation  # Chronic anticoagulation with warfarin  # Bradycardia, improved with stopping atenolol  # Preserved LV function, EF 55%  # Tricuspid regurgitation, moderate-severe by TTE 1/10/24    No further recommendations from a cardiac standpoint.  A/C resumption at the direction of GI team.  Cardiology will sign off.    MIKE Torres, CNP   3:42 PM 2024   [facility]  Pager: [phone]

## 2024-01-16 NOTE — PROVIDER NOTIFICATION
MD Notification    Notified Person: MD    Notified Person Name: Bindu    Notification Date/Time: 1/15/2024 17:05    Notification Interaction: [employer] messaging    Purpose of Notification: Pt is still very lethargic s/p EGD + colonoscopy. VSS, however very difficult to arouse. Can we get some blood gasses ordered?    Orders Received: Please call an RRT or have the [facility] come by to assess    Comments:

## 2024-01-16 NOTE — PROGRESS NOTES
[facility]  Hospitalist Progress Note        Blayne Ruano MD   01/16/2024        Interval History:        - Completed EGD/colonoscopy 1/15/24  - discussed with Dr Olvera, EGD and colonoscopy revealed no active bleed; had poor colonoscopy prep with note of coupious stool in entire colon, lavage was performed ; GI plan for outpatient capsule endoscopy  - GI okayed to resume anticoagulation; pharmacy to dose Warfarin (1/15)  - will switch IV Protonix to PO BID (1/16/24)  - post procedure was very lethargic and was evaluated by house SACHI (see notes); was given Naloxone without significant response; had a trial of BiPAP with significant improvement in mentation  -UA 1/15 noted grossly abnormal with positive nitrite, large leukocyte esterase and >182 WBC--- will start Rocephin daily (1/16/24) pending urine cultures  - SW following for disposition to TCU         Assessment and Plan:        Feng Wynne is a 79 year old morbidly obese male with PMH of mechanical mitral valve replacement for severe mitral regurgitation and MAZE procedure in 2008, on chronic anticoagulation with warfarin, pulmonary hypertension, chronic diastolic CHF with preserved EF, previous dilated LV likely due to mitral regurgitation, subsequent improvement with ACE inhibitor and beta-blockers, hypertension, hyperlipidemia, intolerant to statins and Zetia, who presented to ER on 1/9/2024 with 2- 3 months of progressive shortness of breath and noted with severe anemia. On admission, also noted with sinus bradycardia with heart rate in 30s, ENRIQUE, mild hypokalemia. Admitted inpatient 1/9/24.     Subacute normocytic anemia, hemoglobin 4.4, likely GI bleed due to anticoagulation, s/p EGD/colonoscopy 1/15/24  Acute blood loss anemia,   Iron deficiency anemia  - symptomatic severe anemia in the setting of supratherapeutic INR, chronic anticoagulation  - last hemoglobin PTA from 2022 was around 12  - on admission hemoglobin 4.8, INR  5.37; with no suggestion of active bleed  - iron studies on admission noted with iron deficiency (Iron sat 4%, tibc 435, ferritin 21. Iron level 17)    - G.I. following, had EGD/colonoscopy 1/15/24 at INR 1.86  - EGD and colonoscopy revealed no active bleed; had poor colonoscopy prep with note of coupious stool in entire colon, lavage was performed ; GI plan for outpatient capsule endoscopy  - GI okayed to resume anticoagulation; pharmacy to dose Warfarin (1/15)  - will switch IV Protonix to PO BID (1/16/24)  - hemoglobin stabilizing around 7 to 8 (got about 5 units PRBC since admission; last blood transfusion on 1/11/4); also ordered for IV Sucrose q 72 hrs X 3 doses (2nd dose given 1/15)  - monitor hemoglobin and transfuse PRN for Hb<7    Acute metabolic encephalopathy  likely UTI  - post procedure was very lethargic and was evaluated by house SACHI (see notes); was given Naloxone without significant response; had a trial of BiPAP with significant improvement in mentation  - UA 1/15 noted grossly abnormal with positive nitrite, large leukocyte esterase and >182 WBC--- will start Rocephin daily (1/16/24) pending urine cultures    Supratherapeutic INR  H/o mechanical mitral valve replacement for severe mitral regurgitation  s/p MAZE procedure in 2008  Chronic anticoagulation with warfarin  likely type II non-STEMI, in the setting of severe anemia  pulmonary hypertension  chronic diastolic CHF with preserved EF  Hypertension  Hyperlipidemia (intolerant to statin)  - on presentation INR supratherapeutic at 5.37; was given 2 doses of vitamin K on presentation; cardiology subsequently suggested delete INR drift down without Vit K  - INR 1/15/24---->1.86  - resumed Coumadin 1/15/24 after EGD/colonoscopy   - no need for bridging heparin gtt per cardiology once INR below usual inr goal  - not on any diuretics PTA  - ECHO from 1/10/24 noted LVEF 55%; s/p 31mm [facility] [employer] mechanical MVR. Mean 5mmHg @ HR 50; moderate to mod-severe  (2-3+) tricuspid regurgitation  - serial troponin with no significant trend 73---69  - getting intermittent IV diuresis per cardiology     Paroxysmal atrial fibrillation  Bradycardia, heart rate in 30s  - No history of syncope  - PTA atenolol discontinued ; HR stable in 70s--80s  - ECHO as noted above  - cardiology following; monitor on telemetry  - anticoagulation discussion as above     Acute kidney injury, likely prerenal  Mild hyperkalemia, potassium 5.5  Non-anion gap metabolic acidosis  - on admission creatinine of 1.83, up from 0.9 on 2/6/22 with K of 5.0- last bmp prior to hospitalization; unclear recent baseline  - Renal US 1/10/24 Left kidney and urinary bladder are unremarkable. No left  collecting system dilatation. Significantly limited evaluation of the right kidney due to poor acoustic windows/body habitus.  - branch placed for urinary retention 500+ ml by urology   -renal function improved with intermittent IV diuresis (last given 1/14)  - creatinine 1.83----> 1.01---0.92 (1/15); will monitor BMP intermittently  - holding PTA lisinopril      Urinary retention  - Pvr 500 range; RN unable to place straight cath  - [facility] consulted and branch placed 1/10/24  - per urology, discharge with branch catheter. plan for follow up [facility] office in 3 weeks office for trial of void.  Scheduled for 1/30/24 at 8:30 am at our [place] location. AVS updated.      Hyperlipidemia  Intolerant to statins and Zetia  -Lipid panel obtained in clinic,   TG 32, LDL 22,      BLE edema  Chronic. Use lymphedema wraps at home  PT consult for lymphedema wraps  diuresis   Follow       DVT Prophylaxis: resumed Warfarin 1/15/24     Branch Catheter: placed for urinary retention by urology 1/10. Outpatient urology follow up     Code Status:  DNR/DNI    Diet: Mechanical/Dental Soft Diet      Disposition:   - likely 1-2 days pending clinical stability on therapeutic INR  -PT/OT rec TCU placement  - following for  "disposition    Clinically Significant Risk Factors              # Hypoalbuminemia: Lowest albumin = 3.1 g/dL at 1/15/2024  6:15 PM, will monitor as appropriate           # Hypertension: Noted on problem list          # Severe Obesity: Estimated body mass index is 48.55 kg/m  as calculated from the following:    Height as of an earlier encounter on 1/9/24: 1.816 m (5' 11.5\").    Weight as of this encounter: 160.1 kg (353 lb).        # Financial/Environmental Concerns: none            Page Me (7 am to 6 pm)    Cable discussed with patient, nursing and G.I.; also discussed with house SACHI  Later also updated patient's family including son and partner.  High medical complexity    Total time spent today 55 minutes              Physical Exam:      Blood pressure 116/61, pulse 78, temperature 98.7  F (37.1  C), temperature source Oral, resp. rate 17, weight (!) 160.1 kg (353 lb), SpO2 97%.  Vitals:    01/12/24 1030 01/14/24 0800 01/15/24 0638   Weight: (!) 164.2 kg (362 lb) (!) 160.9 kg (354 lb 11.2 oz) (!) 160.1 kg (353 lb)     Vital Signs with Ranges  Temp:  [97.3  F (36.3  C)-98.9  F (37.2  C)] 98.7  F (37.1  C)  Pulse:  [] 78  Resp:  [7-25] 17  BP: ()/(49-84) 116/61  FiO2 (%):  [30 %] 30 %  SpO2:  [87 %-100 %] 97 %  I/O's Last 24 hours  I/O last 3 completed shifts:  In: 780 [P.O.:480; I.V.:300]  Out: 2200 [Urine:2200]    Constitutional: Alert, awake and oriented ; resting comfortably in no apparent distress; morbidly obese; Bipap on       Oral cavity: Moist mucosa   Cardiovascular: Normal s1 s2, irregularly irregular rate and rhythm   Lungs: Mild b/l wheezes +; no crepitations   Abdomen: Soft, nt, nd, no guarding, rigidity or rebound; BS +   LE : Mild b/l chronic lypmhedema   Musculoskeletal/Neuro Power 5/5 in all extremities; No focal neurological deficits noted   Psychiatry: normal mood and affect                Medications:         iron sucrose  300 mg Intravenous Q72H    mirtazapine  7.5 mg Oral At " "Bedtime    pantoprazole  40 mg Intravenous Q12H    sodium chloride (PF)  3 mL Intracatheter Q8H    Warfarin Therapy Reminder  1 each Oral See Admin Instructions     PRN Meds: acetaminophen **OR** acetaminophen, albuterol, calcium carbonate, artificial tears, diphenhydrAMINE, EPINEPHrine, famotidine, lidocaine 4%, lidocaine (buffered or not buffered), methylPREDNISolone, nitroGLYcerin, - MEDICATION INSTRUCTIONS -, ondansetron **OR** ondansetron, - MEDICATION INSTRUCTIONS -, - MEDICATION INSTRUCTIONS -, prochlorperazine **OR** prochlorperazine **OR** prochlorperazine, senna-docusate **OR** senna-docusate, sodium chloride (PF)         Data:      All new lab and imaging data was reviewed.   Recent Labs   Lab Test 01/16/24  0557 01/15/24  1815 01/15/24  0600 01/14/24  0555   WBC  --  4.9 4.8 4.6   HGB 8.0* 8.6* 8.0* 8.4*   MCV  --  89 87 86   PLT  --  142* 149* 147*   INR 1.95*  --  1.86* 1.91*      Recent Labs   Lab Test 01/15/24  1815 01/15/24  1718 01/15/24  0600 01/14/24  0555     --  141 137   POTASSIUM 4.4  --  4.4 4.7   CHLORIDE 107  --  110* 107   CO2 23  --  24 21*   BUN 24.5*  --  29.2* 40.1*   CR 0.92  --  1.01 1.29*   ANIONGAP 8  --  7 9   JOSEPH 9.0  --  8.6* 8.8   GLC 92 88 100* 99     No lab results found.    Invalid input(s): \"TROP\", \"TROPONINIES\"      "

## 2024-01-16 NOTE — PLAN OF CARE
A&O x 4. Patient denies pain. VSS, on RA/BiPAP while sleeping. Up with lift assist, turn and reposition q2hr. Tele: A fib CVR w/ PVCs. 2+ BLE dependent edema. New IV placed by vascular access team.     Pt had EGD/Colonoscopy done with unremarkable findings. Plan to do a video capsule endoscopy per GI.     RRT activated d/t persistent lethargy and minimal response with sternal rubbing. VBGs, Cxray, Narcan - done. BiPAP for at least one hour. Significant other updated on the situation and returned to the hospital. 1:1 RN care. Pt became responsive around 1900, see RRT note. Per House SACHI, low threshold to reach out to them again with any concerns.    BiPAP placed at 1800 and tolerated well for about 1.5 hours. Removed for pt to eat and take evening medications. BiPAP back in place at 2130.    Plan for BiPAP while sleeping as tolerated and TCU when clinically stable. Pt and significant other have been updated.

## 2024-01-17 NOTE — PROGRESS NOTES
Care Management Follow Up    Length of Stay (days): 8    Expected Discharge Date: 01/19/2024     Concerns to be Addressed: discharge planning     Patient plan of care discussed at interdisciplinary rounds: Yes    Anticipated Discharge Disposition: Transitional Care, Skilled Nursing Facility     Anticipated Discharge Services: None  Anticipated Discharge DME: None    Patient/family educated on Medicare website which has current facility and service quality ratings:    Education Provided on the Discharge Plan: Yes  Patient/Family in Agreement with the Plan: yes    Referrals Placed by CM/SW: Post Acute Facilities  Private pay costs discussed: Not applicable    Additional Information:  [facility] Team is following up on pending referrals.    Message sent to [facility] to inquire about bed availability at [facility], [facility], [facility], [facility].    Note in the chart from [facility] that they need updated notes. Writer sent updated notes to [facility] via [employer].     Addendum 1101: call received from Innocent with admissions at [facility]. Innocent stated after reviewing referral with nurse manager, they need to decline because they cannot meet patient's weight status and Bipap needs.     Addendum 1600: VM received from patient's partner asking for an update on discharge planning. Writer explained that [facility] has declined and explained she has not heard from other locations and writer needs to send out more referrals. Jose explained his hope for patient to be able to stay close to [facility] but confirmed ratings are more important then location. Writer discussed other options and referrals sent out to [facility] [place], [facility], [facility]     GILL Ortiz, DIONY   Social Work   [facility]

## 2024-01-17 NOTE — PLAN OF CARE
Neuro-  PT is alert and oriented times 4  Turns q 2.  MIR very weak  CV- A-fib controlled rate  70's  BP WNL  Resp- L/S are clear  on room air  SpO2 >90%  GI/Gu-  mech soft diet   branch intact  with good output  S/S following for discharge will need TCU                    Problem: Adult Inpatient Plan of Care  Goal: Plan of Care Review  Description: The Plan of Care Review/Shift note should be completed every shift.  The Outcome Evaluation is a brief statement about your assessment that the patient is improving, declining, or no change.  This information will be displayed automatically on your shift  note.  Flowsheets (Taken 1/16/2024 1841)  Plan of Care Reviewed With:   patient   spouse  Overall Patient Progress: improving   Goal Outcome Evaluation:      Plan of Care Reviewed With: patient, spouse    Overall Patient Progress: improvingOverall Patient Progress: improving

## 2024-01-17 NOTE — PROGRESS NOTES
[facility]  Hospitalist Progress Note        Ko Huber MD   01/17/2024        Interval History:          Patient seen and evaluated in his room today, partner at bedside.  Overall feeling well.  Denies any fever chills nausea vomiting abdominal pain dysuria materia constipation diarrhea.  No melena or hematochezia or hematemesis at this time.    Still requiring supplemental oxygen, does not use oxygen at baseline.    No other significant event overnight      - Completed EGD/colonoscopy 1/15/24  - discussed with Dr Olvera, EGD and colonoscopy revealed no active bleed; had poor colonoscopy prep with note of coupious stool in entire colon, lavage was performed ; GI plan for outpatient capsule endoscopy  - GI okayed to resume anticoagulation; pharmacy to dose Warfarin (1/15)  - will switch IV Protonix to PO BID (1/16/24)  - post procedure was very lethargic and was evaluated by house SACHI (see notes); was given Naloxone without significant response; had a trial of BiPAP with significant improvement in mentation  -UA 1/15 noted grossly abnormal with positive nitrite, large leukocyte esterase and >182 WBC--- will start Rocephin daily (1/16/24) pending urine cultures  - SW following for disposition to TCU         Assessment and Plan:        Feng Wynne is a 79 year old morbidly obese male with PMH of mechanical mitral valve replacement for severe mitral regurgitation and MAZE procedure in 2008, on chronic anticoagulation with warfarin, pulmonary hypertension, chronic diastolic CHF with preserved EF, previous dilated LV likely due to mitral regurgitation, subsequent improvement with ACE inhibitor and beta-blockers, hypertension, hyperlipidemia, intolerant to statins and Zetia, who presented to ER on 1/9/2024 with 2- 3 months of progressive shortness of breath and noted with severe anemia. On admission, also noted with sinus bradycardia with heart rate in 30s, ENRIQUE, mild hypokalemia. Admitted inpatient  1/9/24.     Subacute normocytic anemia, hemoglobin 4.4, likely GI bleed due to anticoagulation, s/p EGD/colonoscopy 1/15/24  Acute blood loss anemia,   Iron deficiency anemia  - symptomatic severe anemia in the setting of supratherapeutic INR, chronic anticoagulation  - last hemoglobin PTA from 2022 was around 12  - on admission hemoglobin 4.8, INR 5.37; with no suggestion of active bleed  - iron studies on admission noted with iron deficiency (Iron sat 4%, tibc 435, ferritin 21. Iron level 17)    - G.I. following, had EGD/colonoscopy 1/15/24 at INR 1.86  - EGD and colonoscopy revealed no active bleed; had poor colonoscopy prep with note of coupious stool in entire colon, lavage was performed ; GI plan for outpatient capsule endoscopy  - GI okayed to resume anticoagulation; pharmacy to dose Warfarin (1/15)  Target INR is 2.5-3.5, subtherapeutic at this time, will start him on IV heparin for bridging  -IV Protonix switch to oral Protonix p.o. twice daily.  Agree with  Hemoglobin has been stable at this time.  Status post about 5 units of packed RBC transfusion    Acute metabolic encephalopathy: Resolved  E. coli UTI  - post procedure was very lethargic and was evaluated by house SACHI (see notes); was given Naloxone without significant response; had a trial of BiPAP with significant improvement in mentation  - UA 1/15 noted grossly abnormal with positive nitrite, large leukocyte esterase and >182 WBC--- started on IV ceftriaxone 2 g daily on 1/16/2024.  Urine cultures positive for E. coli we will continue with that.    Supratherapeutic INR  H/o mechanical mitral valve replacement for severe mitral regurgitation  s/p MAZE procedure in 2008  Chronic anticoagulation with warfarin  likely type II non-STEMI, in the setting of severe anemia  pulmonary hypertension  chronic diastolic CHF with preserved EF  Hypertension  Hyperlipidemia (intolerant to statin)  - on presentation INR supratherapeutic at 5.37; was given 2 doses of  vitamin K on presentation; cardiology subsequently suggested delete INR drift down without Vit K  - INR 1/15/24---->1.86  - resumed Coumadin 1/15/24 after EGD/colonoscopy   -Patient has mechanical mitral valve, his INR is subtherapeutic for the last 2 days, I will start him on IV heparin for bridging till his INR 2.5 or above.  Pharmacy to dose his Coumadin  - not on any diuretics PTA  - ECHO from 1/10/24 noted LVEF 55%; s/p 31mm St Raffi mechanical MVR. Mean 5mmHg @ HR 50; moderate to mod-severe (2-3+) tricuspid regurgitation  - serial troponin with no significant trend 73---69  - getting intermittent IV diuresis per cardiology     Paroxysmal atrial fibrillation  Bradycardia, heart rate in 30s  - No history of syncope  - PTA atenolol discontinued ; HR stable in 70s--80s  - ECHO as noted above  -Cardiology were consulted and following now signed off  - anticoagulation discussion as above     Acute kidney injury, likely prerenal:  Mild hyperkalemia, potassium 5.5 on his  Non-anion gap metabolic acidosis  - on admission creatinine of 1.83, up from 0.9 on 2/6/22 with K of 5.0- last bmp prior to hospitalization; unclear recent baseline  - Renal US 1/10/24 Left kidney and urinary bladder are unremarkable. No left  collecting system dilatation. Significantly limited evaluation of the right kidney due to poor acoustic windows/body habitus.  - branch placed for urinary retention 500+ ml by urology   -renal function improved with intermittent IV diuresis (last given 1/14)  - creatinine 1.83----> 1.01---0.92 (1/15); will monitor BMP intermittently  - holding PTA lisinopril      Urinary retention  Status post Branch catheter placement  - Pvr 500 range; RN unable to place straight cath  - [facility] consulted and branch placed 1/10/24  - per urology, discharge with branch catheter. plan for follow up [facility] office in 3 weeks office for trial of void.  Scheduled for 1/30/24 at 8:30 am at our [place] location. AVS updated.     "  Hyperlipidemia  Intolerant to statins and Zetia  -Lipid panel obtained in clinic,   TG 32, LDL 22,      BLE edema  Chronic. Use lymphedema wraps at home  PT consult for lymphedema wraps  diuresis   Follow       DVT Prophylaxis: resumed Warfarin 1/15/24     Stubbs Catheter: placed for urinary retention by urology 1/10. Outpatient urology follow up     Code Status:  DNR/DNI    Diet: Low Saturated Fat Na <2400 mg      Disposition:   -In 1 to 2 days once INR is therapeutic and hemoglobin remained stable.  He will likely need TCU as well.  -PT/OT rec TCU placement  - following for disposition    Clinically Significant Risk Factors              # Hypoalbuminemia: Lowest albumin = 3.1 g/dL at 1/15/2024  6:15 PM, will monitor as appropriate           # Hypertension: Noted on problem list          # Severe Obesity: Estimated body mass index is 48.55 kg/m  as calculated from the following:    Height as of an earlier encounter on 1/9/24: 1.816 m (5' 11.5\").    Weight as of this encounter: 160.1 kg (353 lb).        # Financial/Environmental Concerns: none            Ko Huber MD  [facility]  Contact information available via [facility] Paging/Directory                Physical Exam:      Blood pressure 121/64, pulse 92, temperature 98.9  F (37.2  C), temperature source Axillary, resp. rate 16, weight (!) 160.1 kg (353 lb), SpO2 (!) 78%.  Vitals:    01/14/24 0800 01/15/24 0638 01/17/24 0430   Weight: (!) 160.9 kg (354 lb 11.2 oz) (!) 160.1 kg (353 lb) (!) 160.1 kg (353 lb)     Vital Signs with Ranges  Temp:  [98.2  F (36.8  C)-98.9  F (37.2  C)] 98.9  F (37.2  C)  Pulse:  [76-92] 92  Resp:  [16] 16  BP: (100-121)/(51-70) 121/64  SpO2:  [78 %-100 %] 78 %  I/O's Last 24 hours  I/O last 3 completed shifts:  In: 460 [P.O.:460]  Out: 1250 [Urine:650; Stool:600]    Constitutional: Alert, awake and oriented ; resting comfortably in no apparent distress; morbidly obese; Bipap on       Oral cavity: " Moist mucosa   Cardiovascular: Normal s1 s2, irregularly irregular rate and rhythm   Lungs: Mild b/l wheezes +; no crepitations   Abdomen: Soft, nt, nd, no guarding, rigidity or rebound; BS +   LE : Mild b/l chronic lypmhedema   Musculoskeletal/Neuro Power 5/5 in all extremities; No focal neurological deficits noted   Psychiatry: normal mood and affect                Medications:         cefTRIAXone  2 g Intravenous Q24H    iron sucrose  300 mg Intravenous Q72H    mirtazapine  7.5 mg Oral At Bedtime    pantoprazole  40 mg Oral BID AC    sodium chloride (PF)  3 mL Intracatheter Q8H    warfarin ANTICOAGULANT  5 mg Oral ONCE at 18:00    Warfarin Therapy Reminder  1 each Oral See Admin Instructions     PRN Meds: acetaminophen **OR** acetaminophen, albuterol, calcium carbonate, artificial tears, diphenhydrAMINE, EPINEPHrine, famotidine, lidocaine 4%, lidocaine (buffered or not buffered), methylPREDNISolone, nitroGLYcerin, - MEDICATION INSTRUCTIONS -, ondansetron **OR** ondansetron, - MEDICATION INSTRUCTIONS -, - MEDICATION INSTRUCTIONS -, prochlorperazine **OR** prochlorperazine **OR** prochlorperazine, senna-docusate **OR** senna-docusate, sodium chloride (PF)         Data:      All new lab and imaging data was reviewed.   Recent Labs   Lab Test 01/17/24  1004 01/17/24  0628 01/16/24  0557 01/15/24  1815 01/15/24  0600   WBC 4.5 5.0  --  4.9 4.8   HGB 8.5* 8.3* 8.0* 8.6* 8.0*   MCV 90 90  --  89 87   * 140*  --  142* 149*   INR  --  1.88* 1.95*  --  1.86*      Recent Labs   Lab Test 01/17/24  0628 01/16/24  1710 01/15/24  1815 01/15/24  1718 01/15/24  0600     --  138  --  141   POTASSIUM 4.6  --  4.4  --  4.4   CHLORIDE 108*  --  107  --  110*   CO2 30*  --  23  --  24   BUN 18.1  --  24.5*  --  29.2*   CR 0.82  --  0.92  --  1.01   ANIONGAP 2*  --  8  --  7   JOSEPH 8.7*  --  9.0  --  8.6*   * 105* 92   < > 100*    < > = values in this interval not displayed.     No lab results found.    Invalid  "input(s): \"TROP\", \"TROPONINIES\"      "

## 2024-01-17 NOTE — PLAN OF CARE
A&Ox4. VSS. Tele: afib CVR. 2-3L NC overnight, intermittent <88%. No CP/SOB reported. 1 loose stool - assist of 2+ with turns. Refusing repositioning. Stubbs cath ordered by urology. Adequate UO. [place] BLE.   Plan: continue TCU discharge plans

## 2024-01-18 NOTE — PROGRESS NOTES
Care Management Follow Up    Length of Stay (days): 9    Expected Discharge Date: 01/19/2024     Concerns to be Addressed: discharge planning     Patient plan of care discussed at interdisciplinary rounds: Yes    Anticipated Discharge Disposition: Transitional Care, Skilled Nursing Facility     Anticipated Discharge Services: None  Anticipated Discharge DME: None    Patient/family educated on Medicare website which has current facility and service quality ratings:    Education Provided on the Discharge Plan: Yes  Patient/Family in Agreement with the Plan: yes    Referrals Placed by CM/SW: Post Acute Facilities  Private pay costs discussed: Not applicable    Additional Information:  Call placed to Maria R at [place]. She will review referral again and determine if they can meet patient's needs.     Message received from admissions with [facility] confirming that they can meet patient's needs and maybe able to take patient pending bed availability.     Addendum 1515: Message received from [facility] that they can take patient tomorrow. Writer will follow up with  admissions tomorrow to determine when bed is available tomorrow      Jackie Zarate, GILL, LGSW   Social Work   [facility]

## 2024-01-18 NOTE — PROGRESS NOTES
[facility]  Hospitalist Progress Note        Ko Huber MD   01/18/2024        Interval History:          Patient seen and evaluated today, feeling much better, denies any fever chills chest pain shortness breath dizziness lightheadedness.  He did not have BM this morning which was not melanotic does not have any  blood.    He did use BiPAP overnight but only for couple of hours.    No other significant event overnight           Assessment and Plan:        Feng Wynne is a 79 year old morbidly obese male with PMH of mechanical mitral valve replacement for severe mitral regurgitation and MAZE procedure in 2008, on chronic anticoagulation with warfarin, pulmonary hypertension, chronic diastolic CHF with preserved EF, previous dilated LV likely due to mitral regurgitation, subsequent improvement with ACE inhibitor and beta-blockers, hypertension, hyperlipidemia, intolerant to statins and Zetia, who presented to ER on 1/9/2024 with 2- 3 months of progressive shortness of breath and noted with severe anemia. On admission, also noted with sinus bradycardia with heart rate in 30s, ENRIQUE, mild hypokalemia. Admitted inpatient 1/9/24.     Subacute normocytic anemia, hemoglobin 4.4, likely GI bleed due to anticoagulation, s/p EGD/colonoscopy 1/15/24  Acute blood loss anemia,   Iron deficiency anemia  - symptomatic severe anemia in the setting of supratherapeutic INR, chronic anticoagulation  - last hemoglobin PTA from 2022 was around 12  - on admission hemoglobin 4.8, INR 5.37; with no suggestion of active bleed  - iron studies on admission noted with iron deficiency (Iron sat 4%, tibc 435, ferritin 21. Iron level 17)    - G.I. following, had EGD/colonoscopy 1/15/24 at INR 1.86  - EGD and colonoscopy revealed no active bleed; had poor colonoscopy prep with note of coupious stool in entire colon, lavage was performed ; GI plan for outpatient capsule endoscopy  - GI okayed to resume anticoagulation; pharmacy  to dose Warfarin (1/15)  Target INR is 2.5-3.5, subtherapeutic at this time, will start him on IV heparin for bridging  -IV Protonix switch to oral Protonix p.o. twice daily.  Agree with  Hemoglobin has been stable at this time.  Status post about 5 units of packed RBC transfusion  Hemoglobin overall remained stable at this time    Acute metabolic encephalopathy: Resolved  E. coli UTI  - post procedure was very lethargic and was evaluated by house SACHI (see notes); was given Naloxone without significant response; had a trial of BiPAP with significant improvement in mentation  - UA 1/15 noted grossly abnormal with positive nitrite, large leukocyte esterase and >182 WBC--- started on IV ceftriaxone 2 g daily on 1/16/2024.  Urine cultures positive for E. coli we will continue with that.    Supratherapeutic INR: Resolved  H/o mechanical mitral valve replacement for severe mitral regurgitation  s/p MAZE procedure in 2008  Chronic anticoagulation with warfarin  likely type II non-STEMI, in the setting of severe anemia  pulmonary hypertension  chronic diastolic CHF with preserved EF  Acute hypoxic respiratory failure  Hypertension  Hyperlipidemia (intolerant to statin)  - on presentation INR supratherapeutic at 5.37; was given 2 doses of vitamin K on presentation; cardiology subsequently suggested delete INR drift down without Vit K  - INR 1/15/24---->1.86  - resumed Coumadin 1/15/24 after EGD/colonoscopy   -Patient has mechanical mitral valve, his INR is subtherapeutic for the last 2 days, I will start him on IV heparin for bridging till his INR 2.5 or above.  Pharmacy to dose his Coumadin  - not on any diuretics PTA  - ECHO from 1/10/24 noted LVEF 55%; s/p 31mm St Raffi mechanical MVR. Mean 5mmHg @ HR 50; moderate to mod-severe (2-3+) tricuspid regurgitation  - serial troponin with no significant trend 73---69    Most likely has underlying sleep apnea, will try to wean him off from oxygen today.  Will do overnight  desaturation study with VBG at bedtime and in the morning.       Paroxysmal atrial fibrillation  Bradycardia, heart rate in 30s  - No history of syncope  - PTA atenolol discontinued ; HR stable in 70s--80s  - ECHO as noted above  -Cardiology were consulted and following now signed off  - anticoagulation discussion as above     Acute kidney injury, likely prerenal: Resolved  Mild hyperkalemia, potassium 5.5: Resolved  Non-anion gap metabolic acidosis: Resolved  - on admission creatinine of 1.83, up from 0.9 on 2/6/22 with K of 5.0- last bmp prior to hospitalization; unclear recent baseline  - Renal US 1/10/24 Left kidney and urinary bladder are unremarkable. No left  collecting system dilatation. Significantly limited evaluation of the right kidney due to poor acoustic windows/body habitus.  - branch placed for urinary retention 500+ ml by urology   -renal function improved with intermittent IV diuresis (last given 1/14)  - creatinine 1.83----> 1.01---0.92 (1/15); will monitor BMP intermittently  - holding PTA lisinopril      Urinary retention  Status post Branch catheter placement  - Pvr 500 range; RN unable to place straight cath  - [facility] consulted and branch placed 1/10/24  - per urology, discharge with branch catheter. plan for follow up [facility] office in 3 weeks office for trial of void.  Scheduled for 1/30/24 at 8:30 am at our [place] location. AVS updated.      Hyperlipidemia  Intolerant to statins and Zetia  -Lipid panel obtained in clinic,   TG 32, LDL 22,      BLE edema: Improved  Chronic. Use lymphedema wraps at home  PT consult for lymphedema wraps  diuresis   Follow       DVT Prophylaxis: resumed Warfarin 1/15/24     Branch Catheter: placed for urinary retention by urology 1/10. Outpatient urology follow up     Code Status:  DNR/DNI    Diet: Low Saturated Fat Na <2400 mg      Disposition:   -In 1 to 2 days once INR is therapeutic and hemoglobin remained stable.  He will likely need TCU as well.  -PT/OT  "rec TCU placement  - following for disposition    Clinically Significant Risk Factors              # Hypoalbuminemia: Lowest albumin = 2.9 g/dL at 1/18/2024  6:25 AM, will monitor as appropriate           # Hypertension: Noted on problem list          # Severe Obesity: Estimated body mass index is 48.55 kg/m  as calculated from the following:    Height as of an earlier encounter on 1/9/24: 1.816 m (5' 11.5\").    Weight as of this encounter: 160.1 kg (352 lb 15.7 oz).        # Financial/Environmental Concerns: none            Ko Huber MD  [facility]  Contact information available via [facility] Paging/Directory                Physical Exam:      Blood pressure 126/67, pulse 76, temperature 97.8  F (36.6  C), temperature source Oral, resp. rate 16, weight (!) 160.1 kg (352 lb 15.7 oz), SpO2 98%.  Vitals:    01/15/24 0638 01/17/24 0430 01/18/24 0635   Weight: (!) 160.1 kg (353 lb) (!) 160.1 kg (353 lb) (!) 160.1 kg (352 lb 15.7 oz)     Vital Signs with Ranges  Temp:  [97.8  F (36.6  C)-98.6  F (37  C)] 97.8  F (36.6  C)  Pulse:  [71-84] 76  Resp:  [16-18] 16  BP: (110-126)/(53-82) 126/67  SpO2:  [92 %-100 %] 98 %  I/O's Last 24 hours  I/O last 3 completed shifts:  In: 120 [P.O.:120]  Out: 700 [Urine:700]    Constitutional: Alert, awake and oriented ; resting comfortably in no apparent distress; morbidly obese; Bipap on       Oral cavity: Moist mucosa   Cardiovascular: Normal s1 s2, irregularly irregular rate and rhythm   Lungs: Mild b/l wheezes +; no crepitations   Abdomen: Soft, nt, nd, no guarding, rigidity or rebound; BS +   LE : Mild b/l chronic lypmhedema   Musculoskeletal/Neuro Power 5/5 in all extremities; No focal neurological deficits noted   Psychiatry: normal mood and affect                Medications:         cefTRIAXone  2 g Intravenous Q24H    mirtazapine  7.5 mg Oral At Bedtime    pantoprazole  40 mg Oral BID AC    potassium & sodium phosphates  1 packet Oral 4x " "Daily    sodium chloride (PF)  3 mL Intracatheter Q8H    warfarin ANTICOAGULANT  5 mg Oral ONCE at 18:00    Warfarin Therapy Reminder  1 each Oral See Admin Instructions     PRN Meds: acetaminophen **OR** acetaminophen, albuterol, calcium carbonate, artificial tears, diphenhydrAMINE, EPINEPHrine, famotidine, lidocaine 4%, lidocaine (buffered or not buffered), methylPREDNISolone, nitroGLYcerin, - MEDICATION INSTRUCTIONS -, ondansetron **OR** ondansetron, - MEDICATION INSTRUCTIONS -, - MEDICATION INSTRUCTIONS -, prochlorperazine **OR** prochlorperazine **OR** prochlorperazine, senna-docusate **OR** senna-docusate, sodium chloride (PF)         Data:      All new lab and imaging data was reviewed.   Recent Labs   Lab Test 01/18/24  0625 01/17/24  1004 01/17/24  0628 01/16/24  0557   WBC 3.9* 4.5 5.0  --    HGB 8.0* 8.5* 8.3* 8.0*   MCV 88 90 90  --    * 144* 140*  --    INR 2.26*  --  1.88* 1.95*      Recent Labs   Lab Test 01/18/24  0625 01/17/24  0628 01/16/24  1710 01/15/24  1815    140  --  138   POTASSIUM 4.4 4.6  --  4.4   CHLORIDE 106 108*  --  107   CO2 28 30*  --  23   BUN 15.0 18.1  --  24.5*   CR 0.77 0.82  --  0.92   ANIONGAP 6* 2*  --  8   JOSEPH 8.9 8.7*  --  9.0   GLC 97 107* 105* 92     No lab results found.    Invalid input(s): \"TROP\", \"TROPONINIES\"      "

## 2024-01-18 NOTE — PLAN OF CARE
Neuro- A&Ox4  Most Recent Vitals- Temp: 98.6  F (37  C) Temp src: Oral BP: 121/67 Pulse: 84   Resp: 16 SpO2: 95 % O2 Device: Nasal cannula   Tele/Cardiac- A fib CVR, denies CP  Resp- Continues on 1L via NC while awake, BIPAP while sleeping, LS diminished, denies SOB  Activity- Ax2 turn and repo  Pain- denies  Drips- Heparin  Drains/Tubes- PIV, Stubbs  Skin- Protective Mepilex on Coccyx, +2 BLE edema,   GI/- WDL  Aggression Color- Green  Plan- Bridge Coumadin with Heparin gtt, planning to discharge to TCU when appropriate   Misc- NA    Tracy Taveras RN

## 2024-01-19 NOTE — PROGRESS NOTES
Overnight Oximtery     Overnight Oximtery Test was set-up on Feng Wynne  1945      Date and Time: 01/8/24 @ 2225    Patient was on RA    Patient's SpO2 was 93%    [place] overnight oximetry test was completed @ 0522 , 1/19/24    Patient was on 2 LNC    Patient's SpO2 was 98%.        RT Emy.........................1/19/2024

## 2024-01-19 NOTE — PROGRESS NOTES
Care Management Discharge Note    Discharge Date: 01/19/2024       Discharge Disposition: Transitional Care, Skilled Nursing Facility    Discharge Services: None    Discharge DME: None    Discharge Transportation: [facility]  Private pay costs discussed: transportation costs    Does the patient's insurance plan have a 3 day qualifying hospital stay waiver?  Yes     Which insurance plan 3 day waiver is available? Alternative insurance waiver    Will the waiver be used for post-acute placement? Yes    PAS Confirmation Code:  [id number]  Patient/family educated on Medicare website which has current facility and service quality ratings:      Education Provided on the Discharge Plan: Yes  Persons Notified of Discharge Plans: Patient, significant other, nurse, [facility]  Patient/Family in Agreement with the Plan: yes    Handoff Referral Completed: Yes    Additional Information:  Discharge to [facility] via [facility] at [phone]. Discharge orders have been faxed. Patient agreeable and bedside RN updated.     Jackie Zarate, GILL, LGSW   Social Work   [facility]

## 2024-01-19 NOTE — PROGRESS NOTES
Care Management Follow Up    Length of Stay (days): 10    Expected Discharge Date: 01/19/2024     Concerns to be Addressed: discharge planning     Patient plan of care discussed at interdisciplinary rounds: Yes    Anticipated Discharge Disposition: Transitional Care, Skilled Nursing Facility     Anticipated Discharge Services: None  Anticipated Discharge DME: None    Patient/family educated on Medicare website which has current facility and service quality ratings:    Education Provided on the Discharge Plan: Yes  Patient/Family in Agreement with the Plan: yes    Referrals Placed by CM/SW: Post Acute Facilities  Private pay costs discussed: Not applicable    Additional Information:  Message sent to [facility] admissions asking what time they can take patient. PAS completed.    PAS-RR    D: Per DHS regulation, SW completed and submitted PAS-RR to [place] [employer] Direct Connect via the [employer] Line.  PAS-RR confirmation # is : [id number]    I: SW spoke with patient and they are aware a PAS-RR has been submitted.  SW reviewed with patient that they may be contacted for a follow up appointment within 10 days of hospital discharge if their SNF stay is < 30 days.  Contact information for [facility] was also provided.    A: patient verbalized understanding.    P: Further questions may be directed to [facility] at #[phone], option #4 for PAS-RR staff.       Call received from Xiomara with [facility] confirming that they can accept patient today in a shared room or a private room of $40 a day. Writer met with patient at bedside to offer choice. Patient confirmed he would like the shared room at [facility] since it is close to his home and he knows people who work there. Patient shared he will call and update Jose himself. Writer paged MD for orders. Writer called Xiomara at [facility] and confirmed shared room for today. Writer explained she is waiting to hear from the MD on if patient is  medically ready but will keep her updated. Xiomara confirmed that if patient is not ready today they can accept patient tomorrow.     Addendum 1030: Orders received from MD and faxed via DOD to [facility]. Writer called and spoke with Xiomara. She confirmed as long as orders and auth is received by [facility] patient can come anytime. Writer called [email or web address] to complete prior auth.    Case Id: [id number]  Auth ID: [id number]  Auth Dates: 1/19/24-1/25/24      Spoke with bedside RN, Edel who confirmed patient needs a stretcher (total care, no trunk support). Writer updated patient at bedside regarding the plan. Patient aware stretcher will be set up for discharge and that we will update him on time. Patient updated on potential out of pocket cost for stretcher transport. Writer called [facility] and spoke to Savana. Stretcher ride arranged for [id number]. Writer called Xiomara with [facility] who confirmed ride. Xiomara confirmed that they have a Larry bed for patient. Writer updated bedside Rn who will update patient and significant other at bedside.     Jackie Zarate, MSW, SW   Social Work   [facility]

## 2024-01-19 NOTE — PLAN OF CARE
Neuro- A&Ox4  Most Recent Vitals- Temp: 97.8  F (36.6  C) Temp src: Oral BP: 125/60 Pulse: 81   Resp: 18 SpO2: 95 % O2 Device: None (Room air)   Tele/Cardiac- Afib CVR  Resp- LS dim on RA  Activity- lift for transfers  Pain- denies  Drips- hep  Drains/Tubes- PIV  Skin- scattered bruising  GI/- branch for voiding  Aggression Color- Green  COVID status- Negative  Plan- TCU at discharge  Misc- overnight oximetry study with abg's NOC and AM    Carlee Castellon, RN Goal Outcome Evaluation:

## 2024-01-19 NOTE — PLAN OF CARE
Neuro- A&Ox4  Most Recent Vitals- Temp: 97.8  F (36.6  C) Temp src: Oral BP: 122/70 Pulse: 89   Resp: 18 SpO2: 100 % O2 Device: Oxymask   Tele/Cardiac- A fib CVR, denies CP  Resp- Stable RA while awake, placed on 2L via oxymask overnight for apneic spells. Overnight O2 study done, denies SOB, LS diminihsed   Activity- AX2 turn and repo [id number] repositioning at times  Pain- denies  Drips- Heparin  Drains/Tubes- PIV, branch  Skin- +2 BE edema, protective mepilex on coccyx  GI/- WDLx Branch in place, loose watery stools  Aggression Color- Green  Plan- likely discharge to TCU today  Misc- NA    Tracy Taveras, RN

## 2024-01-19 NOTE — PLAN OF CARE
Occupational Therapy Discharge Summary    Reason for therapy discharge:    Discharged to transitional care facility.    Progress towards therapy goal(s). See goals on Care Plan in [facility] electronic health record for goal details.  Goals not met.  Barriers to achieving goals:   discharge from facility.    Therapy recommendation(s):    Continued therapy is recommended.  Rationale/Recommendations:  To address safety and IND in I/ADLs.

## 2024-01-19 NOTE — DISCHARGE SUMMARY
[facility]    Discharge Summary  Hospitalist    Date of Admission:  1/9/2024  Date of Discharge:  1/19/2024  Discharging Provider: Ko Huber MD, MD  Date of Service (when I saw the patient): 01/19/24    Discharge Diagnoses   Please refer below    History of Present Illness   Feng Wynne is an 79 year old male who presented with shortness of breath    Hospital Course   Feng Wynne is a 79 year old morbidly obese male with PMH of mechanical mitral valve replacement for severe mitral regurgitation and MAZE procedure in 2008, on chronic anticoagulation with warfarin, pulmonary hypertension, chronic diastolic CHF with preserved EF, previous dilated LV likely due to mitral regurgitation, subsequent improvement with ACE inhibitor and beta-blockers, hypertension, hyperlipidemia, intolerant to statins and Zetia, who presented to ER on 1/9/2024 with 2- 3 months of progressive shortness of breath and noted with severe anemia. On admission, also noted with sinus bradycardia with heart rate in 30s, ENRIQUE, mild hypokalemia. Admitted inpatient 1/9/24.    Final discharge diagnoses and hospital course     Profound anemia with hemoglobin 4.4  Possible GI bleed due to anticoagulation, s/p EGD/colonoscopy 1/15/24  Acute blood loss anemia,   Iron deficiency anemia  - symptomatic severe anemia in the setting of supratherapeutic INR, chronic anticoagulation  - last hemoglobin PTA from 2022 was around 12  - on admission hemoglobin 4.8, INR 5.37; with no suggestion of active bleed  - iron studies on admission noted with iron deficiency (Iron sat 4%, tibc 435, ferritin 21. Iron level 17)     - G.I. following, had EGD/colonoscopy 1/15/24 at INR 1.86  - EGD and colonoscopy revealed no active bleed; had poor colonoscopy prep with note of coupious stool in entire colon, lavage was performed ; GI plan for outpatient capsule endoscopy  - GI okayed to resume anticoagulation; pharmacy to dose Warfarin (1/15)  Target  INR is 2.5-3.5, subtherapeutic at this time, will start him on IV heparin for bridging  -IV Protonix switch to oral Protonix p.o. twice daily.  Agree with  Hemoglobin has been stable at this time.  Status post about 5 units of packed RBC transfusion  Hemoglobin overall remained stable at this time    At this time INR is therapeutic, no active bleeding hemoglobin remained stable.  Continue Protonix 40 twice daily, follow-up with GI as outpatient for capsule endoscopy.     Acute metabolic encephalopathy: Resolved  E. coli UTI  Nocturnal hypoxia  Possible obstructive sleep apnea  - post procedure was very lethargic and was evaluated by house SACHI (see notes); was given Naloxone without significant response; had a trial of BiPAP with significant improvement in mentation  - UA 1/15 noted grossly abnormal with positive nitrite, large leukocyte esterase and >182 WBC--- started on IV ceftriaxone 2 g daily on 1/16/2024.  Urine cultures positive for E. coli, switch ceftriaxone to oral cefuroxime to complete the course.  He most likely has underlying sleep apnea, we did a desaturation study overnight he did desaturated significantly up to 50% on room air  He will be benefit from 2 L of oxygen at nighttime when asleep, recommend to have outpatient sleep study done, likely have sleep apnea and will benefit from CPAP/BiPAP.     Supratherapeutic INR: Resolved  H/o mechanical mitral valve replacement for severe mitral regurgitation  s/p MAZE procedure in 2008  Chronic anticoagulation with warfarin  likely type II non-STEMI, in the setting of severe anemia  pulmonary hypertension  chronic diastolic CHF with preserved EF  Acute hypoxic respiratory failure: Resolved  Hypertension  Hyperlipidemia (intolerant to statin)  - on presentation INR supratherapeutic at 5.37; was given 2 doses of vitamin K on presentation; cardiology subsequently suggested delete INR drift down without Vit K  - INR 1/15/24---->1.86  - resumed Coumadin 1/15/24  after EGD/colonoscopy   -Patient has mechanical mitral valve, his INR is subtherapeutic for the last 2 days, I did start him on IV heparin for bridging till his INR 2.5 or above.  Pharmacy to dose his Coumadin  - not on any diuretics PTA  - ECHO from 1/10/24 noted LVEF 55%; s/p 31mm St Raffi mechanical MVR. Mean 5mmHg @ HR 50; moderate to mod-severe (2-3+) tricuspid regurgitation  - serial troponin with no significant trend 73---69    Stop IV heparin, INR is therapeutic at this time.  He is currently on room air during the day, he will need 2 L of oxygen at night as he does have desaturation while sleeping.  Most likely has underlying sleep apnea and need outpatient sleep study.  You will be discharged on 2 L of oxygen at night.        Paroxysmal atrial fibrillation  Bradycardia, heart rate in 30s  - No history of syncope  - PTA atenolol discontinued ; HR stable in 70s--80s  - ECHO as noted above  -Cardiology were consulted and following now signed off  - anticoagulation discussion as above    Discontinue atenolol on discharge.     Acute kidney injury, likely prerenal: Resolved  Mild hyperkalemia, potassium 5.5: Resolved  Non-anion gap metabolic acidosis: Resolved  - on admission creatinine of 1.83, up from 0.9 on 2/6/22 with K of 5.0- last bmp prior to hospitalization; unclear recent baseline  - Renal US 1/10/24 Left kidney and urinary bladder are unremarkable. No left  collecting system dilatation. Significantly limited evaluation of the right kidney due to poor acoustic windows/body habitus.  - branch placed for urinary retention 500+ ml by urology   -renal function improved with intermittent IV diuresis (last given 1/14)  - creatinine 1.83----> 1.01---0.92 (1/15); will monitor BMP intermittently  -He was on lisinopril 40 mg at home, his blood pressure is stable kidney functions improved, I will decrease the dose of lisinopril to 5 mg at discharge.     Urinary retention  Status post Branch catheter placement  - Pvr  500 range; RN unable to place straight cath  - [facility] consulted and branch placed 1/10/24  - per urology, discharge with branch catheter. plan for follow up [facility] office in 3 weeks office for trial of void.  Scheduled for 1/30/24 at 8:30 am at our [place] location. AVS updated.      Hyperlipidemia  Intolerant to statins and Zetia  -Lipid panel obtained in clinic,   TG 32, LDL 22,      BLE edema: Improved  Chronic. Use lymphedema wraps at home  PT consult for lymphedema wraps  diuresis   Follow       DVT Prophylaxis: resumed Warfarin 1/15/24     Branch Catheter: placed for urinary retention by urology  Outpatient urology follow up      Code Status:  DNR/DNI     Diet: Low Saturated Fat Na <2400 mg      Disposition:   He will be discharged to TCU in stable and improved condition.     Ko Huber MD, MD    Significant Results and Procedures       Pending Results   These results will be followed up by PCP  Unresulted Labs Ordered in the Past 30 Days of this Admission       Date and Time Order Name Status Description    1/10/2024 11:57 PM CONDITIONAL Prepare red blood cells (unit) Preliminary             Code Status   DNR / DNI       Primary Care Physician   Jayme eDng MD    Physical Exam   Temp: 98.3  F (36.8  C) Temp src: Oral BP: 117/66 Pulse: 84   Resp: 18 SpO2: 94 % O2 Device: Oxymask Oxygen Delivery: 2 LPM  Vitals:    01/15/24 0638 01/17/24 0430 01/18/24 0635   Weight: (!) 160.1 kg (353 lb) (!) 160.1 kg (353 lb) (!) 160.1 kg (352 lb 15.7 oz)     Vital Signs with Ranges  Temp:  [97.8  F (36.6  C)-98.3  F (36.8  C)] 98.3  F (36.8  C)  Pulse:  [76-89] 84  Resp:  [18] 18  BP: (113-129)/(56-70) 117/66  SpO2:  [90 %-100 %] 94 %  I/O last 3 completed shifts:  In: -   Out: 1150 [Urine:1150]    Constitutional: awake, alert, cooperative, no apparent distress, and appears stated age  Eyes: Lids and lashes normal, pupils equal, round and reactive to light, extra ocular muscles intact, sclera clear,  conjunctiva normal  Respiratory: No increased work of breathing, good air exchange, clear to auscultation bilaterally, no crackles or wheezing  Cardiovascular: Normal apical impulse, regular rate and rhythm, normal S1 and S2, no S3 or S4, and no murmur noted  GI: No scars, normal bowel sounds, soft, non-distended, non-tender, no masses palpated, no hepatosplenomegally  Neurologic: No focal deficit    Discharge Disposition   Discharged to short-term care facility  Condition at discharge: Stable    Consultations This Hospital Stay   PHARMACY TO DOSE PHYTONADIONE  PHARMACY IP CONSULT  PHARMACY IP CONSULT  CARDIOLOGY IP CONSULT  PHYSICAL THERAPY ADULT IP CONSULT  GASTROENTEROLOGY IP CONSULT  CARE MANAGEMENT / SOCIAL WORK IP CONSULT  PHYSICAL THERAPY ADULT IP CONSULT  OCCUPATIONAL THERAPY ADULT IP CONSULT  VASCULAR ACCESS ADULT IP CONSULT  UROLOGY IP CONSULT  VASCULAR ACCESS ADULT IP CONSULT  LYMPHEDEMA THERAPY IP CONSULT  VASCULAR ACCESS ADULT IP CONSULT  PHARMACY TO DOSE WARFARIN  VASCULAR ACCESS ADULT IP CONSULT  PHARMACY IP CONSULT  VASCULAR ACCESS ADULT IP CONSULT  PHYSICAL THERAPY ADULT IP CONSULT  OCCUPATIONAL THERAPY ADULT IP CONSULT    Time Spent on this Encounter   IKo MD, personally saw the patient today and spent greater than 30 minutes discharging this patient.    Discharge Orders      Follow Up ([facility])    Follow up with [facility] on 1/30/24 at 8:30 am at our [place] office to have your branch catheter removed and for trial of void.    [facility]  [address] [place] [address] [id number] [phone]     General info for SNF    Length of Stay Estimate: Short Term Care: Estimated # of Days <30  Condition at Discharge: Stable  Level of care:skilled   Rehabilitation Potential: Good  Admission H&P remains valid and up-to-date: Yes  Recent Chemotherapy: N/A  Use Nursing Home Standing Orders: Yes     Mantoux instructions    Give two-step Mantoux (PPD) Per Facility Policy Yes      Follow Up and recommended labs and tests    Follow up with [facility] physician.  The following labs/tests are recommended: cbc,bmp.  Follow up with Dr. Olvera in [facility] in 1-2  weeks.  Capsule endoscopy     Reason for your hospital stay    Anemia, supra therapeutic INR, ? GI bleed     Intake and output    Every shift     Glucose monitor nursing POCT    Before meals and at bedtime     Daily weights    Call Provider for weight gain of more than 2 pounds per day or 5 pounds per week.     Activity - Up with assistive device     Activity - Up with nursing assistance     No CPR- Do NOT Intubate     Physical Therapy Adult Consult    Evaluate and treat as clinically indicated.    Reason:  weakness     Occupational Therapy Adult Consult    Evaluate and treat as clinically indicated.    Reason:  weakness     Oxygen (SNF/TCU) Discharge     Fall precautions     Diet    Follow this diet upon discharge: Orders Placed This Encounter      Low Saturated Fat Na <2400 mg     Discharge Medications   Current Discharge Medication List        START taking these medications    Details   cefuroxime (CEFTIN) 500 MG tablet Take 1 tablet (500 mg) by mouth 2 times daily for 3 days    Associated Diagnoses: Urinary tract infection without hematuria, site unspecified      pantoprazole (PROTONIX) 40 MG EC tablet Take 1 tablet (40 mg) by mouth 2 times daily (before meals)    Associated Diagnoses: Gastrointestinal hemorrhage, unspecified gastrointestinal hemorrhage type           CONTINUE these medications which have CHANGED    Details   lisinopril (ZESTRIL) 5 MG tablet Take 1 tablet (5 mg) by mouth daily    Associated Diagnoses: Essential hypertension           CONTINUE these medications which have NOT CHANGED    Details   albuterol (PROAIR HFA/PROVENTIL HFA/VENTOLIN HFA) 108 (90 Base) MCG/ACT inhaler Inhale 1-2 puffs into the lungs every 4 hours as needed for shortness of breath, wheezing or cough  Qty: 18 g, Refills: 3    Comments:  Pharmacy may dispense brand covered by insurance (Proair, or proventil or ventolin or generic albuterol inhaler)  Associated Diagnoses: Iron deficiency anemia due to chronic blood loss; Wheezing      Multiple Vitamins-Minerals (CENTRUM SILVER PO) Take 1 tablet by mouth daily.    Associated Diagnoses: Chronic atrial fibrillation (H); Hypertension goal BP (blood pressure) < 140/90; Hyperlipidemia LDL goal <130; Hypercholesteremia      Omega-3 Fatty Acids (FISH OIL CONCENTRATE PO) Take 1 capsule by mouth daily      warfarin ANTICOAGULANT (COUMADIN) 5 MG tablet TAKE ONE & ONE-HALF TAB (7.5MG) EACH TUE & SAT. TAKE 1 TAB (5MG) ALL OTHER DAYS OR AS DIRECTED  Qty: 96 tablet, Refills: 1    Associated Diagnoses: Chronic atrial fibrillation (H)      clindamycin (CLEOCIN) 150 MG capsule TAKE 4 CAPS BY MOUTH 1 HOUR PRIOR TO DENTAL APPOINTMENT      mirtazapine (REMERON) 7.5 MG tablet Take 1 tablet (7.5 mg) by mouth at bedtime  Qty: 30 tablet, Refills: 2    Associated Diagnoses: Insomnia, unspecified type      order for DME Equipment being ordered: COMPRESSION STOCKINGS, 20-30 mmHg  Qty: 1 each, Refills: 1    Associated Diagnoses: Lower extremity edema           STOP taking these medications       atenolol (TENORMIN) 25 MG tablet Comments:   Reason for Stopping:             Allergies   Allergies   Allergen Reactions    Amiodarone Shortness Of Breath    Pcn [Penicillins] Shortness Of Breath     Rxn occurred in childhood     Statins Muscle Pain (Myalgia) and Hives    Latex     Zetia [Ezetimibe] Muscle Pain (Myalgia)     Muscle weakness legs     Data   Most Recent 3 CBC's:  Recent Labs   Lab Test 01/19/24  0607 01/18/24  0625 01/17/24  1004   WBC 3.5* 3.9* 4.5   HGB 8.0* 8.0* 8.5*   MCV 89 88 90   * 146* 144*      Most Recent 3 BMP's:  Recent Labs   Lab Test 01/19/24  0607 01/18/24  0625 01/17/24  0628    140 140   POTASSIUM 4.2 4.4 4.6   CHLORIDE 106 106 108*   CO2 29 28 30*   BUN 12.8 15.0 18.1   CR 0.77 0.77 0.82    ANIONGAP 6* 6* 2*   JOSEPH 8.8 8.9 8.7*   GLC 98 97 107*     Most Recent 2 LFT's:  Recent Labs   Lab Test 01/15/24  1815 01/09/24  1157   AST 29 36   ALT 13 23   ALKPHOS 58 62   BILITOTAL 1.0 0.7     Most Recent INR's and Anticoagulation Dosing History:  Anticoagulation Dose History  More data exists         Latest Ref Rng & Units 1/13/2024 1/14/2024 1/15/2024 1/16/2024 1/17/2024 1/18/2024 1/19/2024   Recent Dosing and Labs   warfarin ANTICOAGULANT (COUMADIN) 4 MG tablet - - - 4 mg, $Given       BiPAP trial 4 mg, $Given - - -   warfarin ANTICOAGULANT (COUMADIN) 5 MG tablet - - - - - 5 mg, $Given       scanner not working 5 mg, $Given -   INR 0.85 - 1.15 2.25  1.91  1.86  1.95  1.88  2.26  2.66      Most Recent 3 Troponin's:No lab results found.  Most Recent Cholesterol Panel:  Recent Labs   Lab Test 01/09/24  1027   CHOL 53   LDL 22   HDL 25*   TRIG 32     Most Recent 6 Bacteria Isolates From Any Culture (See EPIC Reports for Culture Details):No lab results found.  Most Recent TSH, T4 and A1c Labs:  Recent Labs   Lab Test 03/14/19  0959   TSH 2.39     Results for orders placed or performed during the hospital encounter of 01/09/24   XR Chest Port 1 View    Narrative    XR CHEST PORT 1 VIEW 1/9/2024 12:02 PM    HISTORY: SOB    COMPARISON: 6/28/2023      Impression    IMPRESSION: Marked enlargement of the cardiac silhouette. Median  sternotomy and mitral valvular prosthesis. Mild bibasilar atelectasis.  No pulmonary edema, pleural effusion or pneumothorax.    KESHA BROTHERS MD         SYSTEM ID:  [id number]   XR Chest Port 1 View    Narrative    EXAM: XR CHEST PORT 1 VIEW  LOCATION: [facility]  DATE: 1/9/2024    INDICATION: Shortness of breath.  COMPARISON: 01/09/2024      Impression    IMPRESSION:     Redemonstrated median sternotomy wires and cardiac prosthetic valve.    Unchanged trace bilateral pleural effusions and bibasilar atelectasis. No new focal airspace disease. No  pneumothorax.    Stable cardiomegaly. Aortic calcifications.   US Renal Complete Non-Vascular    Narrative    US RENAL COMPLETE NON-VASCULAR 1/10/2024 1:05 PM    CLINICAL HISTORY: ENRIQUE, urinary retention, rule out hydronephrosis.    TECHNIQUE: Routine bilateral renal and bladder ultrasound.    COMPARISON: Abdominal radiograph 4/8/2019    FINDINGS:    RIGHT KIDNEY: Significantly limited due to body habitus and poor  acoustic windows, approximately measures 10.3 x 6.5 x 6.9 cm.     LEFT KIDNEY: 11.8 x 5.9 x 6.3 cm. Normal echogenicity without  hydronephrosis or masses.    BLADDER: Unremarkable.      Impression    IMPRESSION:  1.  Left kidney and urinary bladder are unremarkable. No left  collecting system dilatation.  2.  Significantly limited evaluation of the right kidney due to poor  acoustic windows/body habitus.     SHIELA BOLDEN MD         SYSTEM ID:  [id number]   XR Chest Port 1 View    Narrative    XR CHEST PORT 1 VIEW   1/11/2024 4:12 PM     HISTORY: coughing up yellow bloody sputum. evaluate for infiltrate.    COMPARISON: Chest radiograph 1/9/2024      Impression    IMPRESSION: Stable enlargement of the cardiac silhouette status post  median sternotomy and valve replacement. Persistent pulmonary vascular  congestion without sonia interstitial edema or airspace consolidation.  Possible small left pleural effusion. No pneumothorax. Bones are  unchanged.    VERN GIL MD         SYSTEM ID:  [id number]   XR Chest Port 1 View    Narrative    EXAM: XR CHEST PORT 1 VIEW  LOCATION: [facility]  DATE: 1/15/2024    INDICATION: RRT possible aspiration, lethargy  COMPARISON: 01/11/2024      Impression    IMPRESSION: New CHF is suggested with cardiomegaly, central vascular congestion, and subtle peripheral edema. No pneumothorax. No definite pleural effusion. Prior sternotomy and mitral valve repair.   Echocardiogram Complete     Value    LVEF  55%    Narrative     [id number]  [id number]  [id number]^BRYAN^HERNANDEZ     [facility]  Echocardiography Laboratory  [address] [place] [address]     Name: JESSE CABRAL  MRN: [id number]  : 1945  Study Date: 01/10/2024 07:55 AM  Age: 79 yrs  Gender: Male  Patient Location: [facility]  Reason For Study: Bradycardia - Sinus  Ordering Physician: HERNANDEZ ADAMS  Referring Physician: HERNANDEZ ADAMS  Performed By: Giovanny Coyle     BSA: 2.5 m2  Height: 71 in  Weight: 300 lb  HR: 45  BP: 95/40 mmHg  ______________________________________________________________________________  Procedure  Complete Echo Adult. Optison (NDC #[id number]) given intravenously.  ______________________________________________________________________________  Interpretation Summary     1. The left ventricle is mildly dilated. The visual ejection fraction is  estimated at 55%.  2. The right ventricle is moderately dilated. The right ventricular systolic  function is borderline reduced.  3. s/p 31mm St Raffi mechanical MVR. Mean 5mmHg @ HR 50.  4. There is moderate to mod-severe (2-3+) tricuspid regurgitation. The right  ventricular systolic pressure is approximated at 37mmHg plus the right atrial  pressure.     Echo 2022 showed EF 55%, mild RV dysfunction, MVR with mean 4mmHg, trace  TR.  ______________________________________________________________________________  Left Ventricle  The left ventricle is mildly dilated. There is normal left ventricular wall  thickness. The visual ejection fraction is estimated at 55%. Diastolic  function not assessed due to presence of prosthetic mitral valve. Septal  motion is consistent with post-operative state.     Right Ventricle  The right ventricle is moderately dilated. The right ventricular systolic  function is borderline reduced.     Atria  The left atrium is severely dilated. There is no atrial shunt seen.     Mitral Valve  There is a mechanical mitral valve. s/p 31mm St Raffi  mechanical MVR. Mean  5mmHg @ HR 50.     Tricuspid Valve  There is moderate to mod-severe (2-3+) tricuspid regurgitation. The right  ventricular systolic pressure is approximated at 37mmHg plus the right atrial  pressure.     Aortic Valve  The aortic valve is normal in structure and function.     Pulmonic Valve  The pulmonic valve is normal in structure and function.     Vessels  Mild dilation of ascending aorta. Dilation of the inferior vena cava is  present with abnormal respiratory variation in diameter.     Pericardium  There is no pericardial effusion.     Rhythm  The rhythm was atrial fibrillation.  ______________________________________________________________________________  MMode/2D Measurements & Calculations  IVSd: 0.91 cm     LVIDd: 6.2 cm  LVIDs: 3.7 cm  LVPWd: 0.95 cm  FS: 40.0 %  LV mass(C)d: 235.3 grams  LV mass(C)dI: 93.9 grams/m2  Ao root diam: 3.3 cm  LA dimension: 5.4 cm  asc Aorta Diam: 3.9 cm  LA/Ao: 1.7  LVOT diam: 2.2 cm  LVOT area: 3.6 cm2  Ao root diam index Ht(cm/m): 1.8  Ao root diam index BSA (cm/m2): 1.3  Asc Ao diam index BSA (cm/m2): 1.6  Asc Ao diam index Ht(cm/m): 2.2  LA Volume (BP): 154.0 ml     LA Volume Index (BP): 61.4 ml/m2  RV Base: 6.2 cm  RWT: 0.31  TAPSE: 2.0 cm     Doppler Measurements & Calculations  MV E max jose: 185.5 cm/sec  MV max P.3 mmHg  MV mean P.3 mmHg  MV V2 VTI: 79.1 cm  MVA(VTI): 1.2 cm2  MV dec slope: 445.5 cm/sec2  MV dec time: 0.42 sec  Ao V2 max: 179.0 cm/sec  Ao max P.0 mmHg  Ao V2 mean: 112.5 cm/sec  Ao mean P.3 mmHg  Ao V2 VTI: 42.2 cm  KEITH(I,D): 2.2 cm2  KEITH(V,D): 2.4 cm2  LV V1 max P.6 mmHg  LV V1 max: 116.3 cm/sec  LV V1 VTI: 25.5 cm  SV(LVOT): 93.1 ml  SI(LVOT): 37.2 ml/m2  PA acc time: 0.12 sec  TR max jose: 301.1 cm/sec  TR max P.7 mmHg  AV Jose Ratio (DI): 0.65     KEITH Index (cm2/m2): 0.88  E/E' av.1  Lateral E/e': 12.2  Medial E/e': 20.1  RV S Jose: 10.8 cm/sec      ______________________________________________________________________________  Report approved by: Danyelle Mills 01/10/2024 09:11 AM           *Note: Due to a large number of results and/or encounters for the requested time period, some results have not been displayed. A complete set of results can be found in Results Review.     Most Recent 3 CBC's:  Recent Labs   Lab Test 01/19/24  0607 01/18/24  0625 01/17/24  1004   WBC 3.5* 3.9* 4.5   HGB 8.0* 8.0* 8.5*   MCV 89 88 90   * 146* 144*     Most Recent 3 BMP's:  Recent Labs   Lab Test 01/19/24  0607 01/18/24 0625 01/17/24 0628    140 140   POTASSIUM 4.2 4.4 4.6   CHLORIDE 106 106 108*   CO2 29 28 30*   BUN 12.8 15.0 18.1   CR 0.77 0.77 0.82   ANIONGAP 6* 6* 2*   JOSEPH 8.8 8.9 8.7*   GLC 98 97 107*     Most Recent 2 LFT's:  Recent Labs   Lab Test 01/15/24  1815 01/09/24  1157   AST 29 36   ALT 13 23   ALKPHOS 58 62   BILITOTAL 1.0 0.7     Most Recent 3 INR's:  Recent Labs   Lab Test 01/19/24  0607 01/18/24  0625 01/17/24 0628   INR 2.66* 2.26* 1.88*

## 2024-01-20 NOTE — PLAN OF CARE
VSS. Tele: A fib CVR. Denies any pain. Turn and repo Q 2 hrs, mepliex on coccyx. Enoc remains in for discharge. IVs out. Belongings packed. TCU packet sent with EMS. Pt left the floor around 1815.

## 2024-01-22 NOTE — PROGRESS NOTES
Medication correction for 1/15: Incorrect dose charted for fentanyl during procedure. The 1425 dose of Fentanyl was not given. Pt received a total of 100 mcg of fentanyl that day, not 150mcg.

## 2024-01-22 NOTE — LETTER
[facility]   To:   [facility]          Please give to facility    From:   Yaneth Davila RN  Care Coordinator   [facility]   P: [phone]  [email or web address]   Patient Name:  Feng Wynne YOB: 1945   Admit date: 1/19/2024      *Information Needed:  Please contact me when the patient will discharge (or if they will move to long term care)- include the discharge date, disposition, and main diagnosis   If the patient is discharged with home care services, please provide the name of the agency    Also- Please inform me if a care conference is being held.   Phone, Fax or Email with information      Yaneth Davila RN, BSN, PHN  Primary Care / Care Coordinator   [facility]  E-mail [email or web address]   [phone]                Thank you

## 2024-01-22 NOTE — PROGRESS NOTES
[facility] GERIATRICS    PRIMARY CARE PROVIDER AND CLINIC:  Jayme Deng MD, MD, [address] / [place] [address]  Chief Complaint   Patient presents with    Hospital F/U      [place] Medical Record Number:  [id number]  Place of Service where encounter took place:  [facility] ([facility]) [[id number]]    Feng Wynne is a 79 year old morbidly obese male with PMH of mechanical mitral valve replacement for severe mitral regurgitation and MAZE procedure in 2008, pulmonary hypertension, chronic diastolic CHF with preserved EF. He presented to [facility] ER on 1/9/2024 with 2- 3 months of progressive shortness of breath found to have severe anemia with HGB down to 4.4 in which he received 5 units of PRBC. EGD without bleed, colonoscopy unrevealing as there was copious stool in bowel so pill capsule study recommended. Low HGB thought to be due to elevated INR.  heart rate in 30s, beta blocker stopped and bradycardia resolved. E coli UTI which was treated prior to discharge. He was admitted to [facility] 1/9/24 to 1/19/24, admitted to [facility] for ongoing rehab and nursing needs.     Feng was visited today well in his room, partner present for the visit as well.  Today Feng reports that he has been feeling unwell for the past few months which started with upper respiratory illness with gradual weakening and increased shortness of breath.  He has not noted any bloody or black stool here however when he was coughing at home he noted that he had blood in his sputum.  He feels intermittently short of breath.  Continues to have a cough that is intermittently productive.  He has not been doing the incentive spirometer but we did that during her visit today which allowed him to cough up a moderate amount of sputum.  He denies wheezing.  He continues to have oxygen in place 2 L per nasal cannula.  At home he is on room air.  He is open to getting a CPAP or BiPAP after he has a sleep study.    He has had a  poor appetite throughout hospitalization and here.  He eats a vegetarian diet.  Has not had a bowel movement since Friday, he is passing gas no reflux or nausea.  Has a large hernia which she has had for 20 years which has never bothered him.    He currently lives in an apartment with his partner and is independent with all cares.  Does not use a walker or wheelchair for ambulation.  He is hopeful to get back to that but does feel weak at this time.    CODE STATUS/ADVANCE DIRECTIVES DISCUSSION:  No CPR- Do NOT Intubate    ALLERGIES:   Allergies   Allergen Reactions    Amiodarone Shortness Of Breath    Pcn [Penicillins] Shortness Of Breath     Rxn occurred in childhood     Statins Muscle Pain (Myalgia) and Hives    Amiodarone     Latex     Zetia [Ezetimibe] Muscle Pain (Myalgia)     Muscle weakness legs      PAST MEDICAL HISTORY:   Past Medical History:   Diagnosis Date    Arthritis     Atrial fibrillation (H)     Coronary artery disease     Hypercholesteremia     Morbid obesity (H)     Unspecified essential hypertension       PAST SURGICAL HISTORY:   has a past surgical history that includes mitral valve replacement (8-); Esophagoscopy, gastroscopy, duodenoscopy (EGD), combined (N/A, 1/15/2024); and Colonoscopy (N/A, 1/15/2024).  FAMILY HISTORY: family history includes C.A.D. in his brother; Cerebrovascular Disease in his father; Diabetes in his paternal grandmother.  SOCIAL HISTORY:   reports that he has quit smoking. His smoking use included cigarettes. He has a 2.5 pack-year smoking history. He has never used smokeless tobacco. He reports current alcohol use. He reports that he does not use drugs.  Patient's living condition: lives with partner    Post Discharge Medication Reconciliation Status:   MED REC REQUIRED  Post Medication Reconciliation Status: discharge medications reconciled, continue medications without change       Current Outpatient Medications   Medication Sig    albuterol (PROAIR  HFA/PROVENTIL HFA/VENTOLIN HFA) 108 (90 Base) MCG/ACT inhaler Inhale 1-2 puffs into the lungs every 4 hours as needed for shortness of breath, wheezing or cough    cefuroxime (CEFTIN) 500 MG tablet Take 1 tablet (500 mg) by mouth 2 times daily for 3 days    clindamycin (CLEOCIN) 150 MG capsule TAKE 4 CAPS BY MOUTH 1 HOUR PRIOR TO DENTAL APPOINTMENT    lisinopril (ZESTRIL) 5 MG tablet Take 1 tablet (5 mg) by mouth daily    Multiple Vitamins-Minerals (CENTRUM SILVER PO) Take 1 tablet by mouth daily.    Omega-3 Fatty Acids (FISH OIL CONCENTRATE PO) Take 1 capsule by mouth daily    order for DME Equipment being ordered: COMPRESSION STOCKINGS, 20-30 mmHg    pantoprazole (PROTONIX) 40 MG EC tablet Take 1 tablet (40 mg) by mouth 2 times daily (before meals)    warfarin ANTICOAGULANT (COUMADIN) 5 MG tablet TAKE ONE & ONE-HALF TAB (7.5MG) EACH TUE & SAT. TAKE 1 TAB (5MG) ALL OTHER DAYS OR AS DIRECTED (Patient taking differently: -  For MVR  INR goal 2.5-3.5  10 mg on Wednesday and Saturday  5 mg on all other days)     No current facility-administered medications for this visit.       ROS:  10 point ROS of systems including Constitutional, Eyes, Respiratory, Cardiovascular, Gastroenterology, Genitourinary, Integumentary, Musculoskeletal, Psychiatric were all negative except for pertinent positives noted in my HPI.    Vitals:  /70   Pulse 80   Temp 97.5  F (36.4  C)   Resp 18   Wt (!) 152.4 kg (336 lb)   SpO2 90%   BMI 46.21 kg/m    Exam:  GENERAL APPEARANCE:  Alert, in no distress, pleasant, cooperative, oriented x 4  EYES: no discharge or mattering on lids or lashes noted  ENT:  moist mucous membranes, hearing acuity intact, wears glasses  NECK: supple, symmetrical  RESP: no respiratory distress, Lung sounds clear, patient is on 2 Liters per nasal cannula  CV:  rate and rhythm regular, no murmur. Edema difficult to discern due to body habitus in bilateral lower extremities. VASCULAR: warm extremities without open  areas.  ABDOMEN: obese, large hernia, normal bowel sounds, soft, nontender.  M/S:   Gait and station: currently dependent with all cares, no tenderness or swelling of the joints; able to move all extremities   SKIN:  Inspection and palpation of skin and subcutaneous tissue: skin warm, dry and intact without rashes  NEURO: no facial asymmetry, no speech deficits and able to follow directions, moves all extremities symmetrically  PSYCH:  insight and judgement intact, memory intact, affect and mood normal    Lab/Diagnostic data:  CBC RESULTS:   Recent Labs   Lab Test 01/19/24  0607 01/18/24  0625   WBC 3.5* 3.9*   RBC 3.10* 3.22*   HGB 8.0* 8.0*   HCT 27.6* 28.4*   MCV 89 88   MCH 25.8* 24.8*   MCHC 29.0* 28.2*   RDW 24.5* 24.0*   * 146*       Last Basic Metabolic Panel:  Recent Labs   Lab Test 01/19/24  0607 01/18/24  0625    140   POTASSIUM 4.2 4.4   CHLORIDE 106 106   JOSEPH 8.8 8.9   CO2 29 28   BUN 12.8 15.0   CR 0.77 0.77   GLC 98 97       Liver Function Studies -   Recent Labs   Lab Test 01/19/24  0607 01/18/24  0625 01/15/24  1815 01/09/24  1157   PROTTOTAL  --   --  5.5* 5.7*   ALBUMIN 2.9* 2.9* 3.1* 3.5   BILITOTAL  --   --  1.0 0.7   ALKPHOS  --   --  58 62   AST  --   --  29 36   ALT  --   --  13 23       TSH   Date Value Ref Range Status   03/14/2019 2.39 0.40 - 4.00 mU/L Final   04/03/2008 2.76 0.4 - 5.0 mU/L Final       ASSESSMENT/PLAN:  Acute blood loss anemia,   Iron deficiency anemia  Symptomatic severe anemia in the setting of supratherapeutic INR, chronic anticoagulation. Received 5 units of PRBC, HGB 8.0 at discharge. EGD and colonoscopy revealed no active bleed; had poor colonoscopy prep with note of coupious stool in entire colon, lavage was performed   --GI plan for outpatient capsule endoscopy  --will check HGB later this week.   --Continue Protonix 40 twice daily       H/o mechanical mitral valve replacement for severe mitral regurgitation  s/p MAZE procedure in 2008. Chronic  anticoagulation with warfarin. Supratherapuetic INR while in the hospital.INR was 3.0 today which is in range.   --continue with coumadin 5 mg daily and recheck INR on Thursday.   --goal INR is 2.5-3.5    pulmonary hypertension  chronic diastolic CHF with preserved EF  ECHO from 1/10/24 noted LVEF 55%; s/p 31mm St Raffi mechanical MVR. Mean 5mmHg @ HR 50; moderate to mod-severe (2-3+) tricuspid regurgitation. Has lower extremity edema in which lymph should follow here at the TCU.   --monitor fluid status.     Nocturnal hypoxia  obesity  Most likely has underlying sleep apnea which is likely related to obesity. Had saturations down in the 50s at night while in the hospital.   --will needd need outpatient sleep study.  -- on 2 L of oxygen at night.     Paroxysmal atrial fibrillation  Previously on atenolol which was stopped while in the hospital. HR has been controlled here thus far.   --continue to monitor HR, restart BB if needed.      Urinary retention  Status post Branch catheter placement  - per urology, discharge with branch catheter. plan for follow up [place] [facility] office in 3 weeks office for trial of void.  Scheduled for 1/30/24. Will have staff call over to urology asking if we can do TOV here at the TCU.      Hyperlipidemia  Intolerant to statins and Zetia     BLE edema  Chronic. Use lymphedema wraps at home  --PT consult for lymphedema wraps    Physical deconditioning  Secondary to recent hospitalization and underlying medical conditions.   --ongoing PT/OT for strengthening.       Total time spent with patient visit at the skilled nursing facility was 65 minutes including patient visit and review of past records.     Electronically signed by:  MIKE Angulo CNP

## 2024-01-22 NOTE — PLAN OF CARE
Physical Therapy Discharge Summary    Reason for therapy discharge:    Discharged to transitional care facility.    Progress towards therapy goal(s). See goals on Care Plan in [facility] electronic health record for goal details.  Goals partially met.  Barriers to achieving goals:   discharge on same date as initial evaluation.    Therapy recommendation(s):    Continued therapy is recommended.  Rationale/Recommendations:  To further increase independence with mobility.

## 2024-01-22 NOTE — PROGRESS NOTES
ANTICOAGULATION  MANAGEMENT: Discharge Review    Feng Wynne chart reviewed for anticoagulation continuity of care    Hospital Admission on 1/9/24-1/19/24 for urinary tract infection.    Discharge disposition: TCU  [facility]  Skilled Nursing  [address] [address] [phone]    Results:    Recent labs: (last 7 days)     01/16/24  0557 01/17/24  0628 01/17/24  1554 01/18/24  0313 01/18/24  0625 01/18/24  1113 01/18/24  2036 01/19/24  0607   INR 1.95* 1.88*  --   --  2.26*  --   --  2.66*   AAUFH  --   --  0.16 0.19  --  0.38 0.46 0.46     Anticoagulation inpatient management:     Patient was reversed with Phytonadione 1 mg on 1/10/24, then warfarin was held through 1/14/24. Patient was bridged with IV heparin 1/17/24 and 1/18/24. Warfarin was restarted on 1/15/24. When warfarin was restarted, patient received less warfarin than maintenance dose.     Anticoagulation discharge instructions:     Warfarin dosing: home regimen continued   Bridging: No   INR goal change: No      Medication changes affecting anticoagulation: Yes: Protonix & Ceftin can increase INR    Additional factors affecting anticoagulation: No     PLAN     No adjustment to anticoagulation plan needed    ACC will resume monitoring upon discharge from TCU    Anticoagulation Calendar updated    Gaviota Weston RN

## 2024-01-22 NOTE — PROGRESS NOTES
Clinic Care Coordination Contact  Care Coordination Transition Communication    Clinical Data: Patient was hospitalized at [facility] from 1/9/2024 to 1/19/2024 with diagnosis of A. Fib, CHF, ENRIQUE, HTN.     Assessment: Patient has transitioned to TCU/ARU for short term rehabilitation:    Facility Name: [facility]   Transition Communication:  Notified facility of Primary Care- Care Coordination support via Epic fax.    Plan: Care Coordinator will await notification from facility staff informing of patient's discharge plans/needs. Care Coordinator will review chart and outreach to facility staff every 4 weeks and as needed.      Yaneth Davila RN, BSN, PHN  Primary Care / Care Coordinator   [facility]  E-mail [email or web address]   [phone]

## 2024-01-22 NOTE — LETTER
1/22/2024        RE: Feng Wynne  [address]  [place] [address]        [facility] GERIATRICS    PRIMARY CARE PROVIDER AND CLINIC:  Jayme Deng MD, MD, [address] / [place] [address]  Chief Complaint   Patient presents with     Hospital F/U      [place] Medical Record Number:  [id number]  Place of Service where encounter took place:  [facility] ([facility]) [[id number]]    Feng Wynne is a 79 year old morbidly obese male with PMH of mechanical mitral valve replacement for severe mitral regurgitation and MAZE procedure in 2008, pulmonary hypertension, chronic diastolic CHF with preserved EF. He presented to [facility] ER on 1/9/2024 with 2- 3 months of progressive shortness of breath found to have severe anemia with HGB down to 4.4 in which he received 5 units of PRBC. EGD without bleed, colonoscopy unrevealing as there was copious stool in bowel so pill capsule study recommended. Low HGB thought to be due to elevated INR.  heart rate in 30s, beta blocker stopped and bradycardia resolved. E coli UTI which was treated prior to discharge. He was admitted to [facility] 1/9/24 to 1/19/24, admitted to [facility] for ongoing rehab and nursing needs.     Feng was visited today well in his room, partner present for the visit as well.  Today Feng reports that he has been feeling unwell for the past few months which started with upper respiratory illness with gradual weakening and increased shortness of breath.  He has not noted any bloody or black stool here however when he was coughing at home he noted that he had blood in his sputum.  He feels intermittently short of breath.  Continues to have a cough that is intermittently productive.  He has not been doing the incentive spirometer but we did that during her visit today which allowed him to cough up a moderate amount of sputum.  He denies wheezing.  He continues to have oxygen in place 2 L per nasal cannula.  At home he is on  room air.  He is open to getting a CPAP or BiPAP after he has a sleep study.    He has had a poor appetite throughout hospitalization and here.  He eats a vegetarian diet.  Has not had a bowel movement since Friday, he is passing gas no reflux or nausea.  Has a large hernia which she has had for 20 years which has never bothered him.    He currently lives in an apartment with his partner and is independent with all cares.  Does not use a walker or wheelchair for ambulation.  He is hopeful to get back to that but does feel weak at this time.    CODE STATUS/ADVANCE DIRECTIVES DISCUSSION:  No CPR- Do NOT Intubate    ALLERGIES:   Allergies   Allergen Reactions     Amiodarone Shortness Of Breath     Pcn [Penicillins] Shortness Of Breath     Rxn occurred in childhood      Statins Muscle Pain (Myalgia) and Hives     Amiodarone      Latex      Zetia [Ezetimibe] Muscle Pain (Myalgia)     Muscle weakness legs      PAST MEDICAL HISTORY:   Past Medical History:   Diagnosis Date     Arthritis      Atrial fibrillation (H)      Coronary artery disease      Hypercholesteremia      Morbid obesity (H)      Unspecified essential hypertension       PAST SURGICAL HISTORY:   has a past surgical history that includes mitral valve replacement (8-); Esophagoscopy, gastroscopy, duodenoscopy (EGD), combined (N/A, 1/15/2024); and Colonoscopy (N/A, 1/15/2024).  FAMILY HISTORY: family history includes C.A.D. in his brother; Cerebrovascular Disease in his father; Diabetes in his paternal grandmother.  SOCIAL HISTORY:   reports that he has quit smoking. His smoking use included cigarettes. He has a 2.5 pack-year smoking history. He has never used smokeless tobacco. He reports current alcohol use. He reports that he does not use drugs.  Patient's living condition: lives with partner    Post Discharge Medication Reconciliation Status:   MED REC REQUIRED  Post Medication Reconciliation Status: discharge medications reconciled, continue  medications without change       Current Outpatient Medications   Medication Sig     albuterol (PROAIR HFA/PROVENTIL HFA/VENTOLIN HFA) 108 (90 Base) MCG/ACT inhaler Inhale 1-2 puffs into the lungs every 4 hours as needed for shortness of breath, wheezing or cough     cefuroxime (CEFTIN) 500 MG tablet Take 1 tablet (500 mg) by mouth 2 times daily for 3 days     clindamycin (CLEOCIN) 150 MG capsule TAKE 4 CAPS BY MOUTH 1 HOUR PRIOR TO DENTAL APPOINTMENT     lisinopril (ZESTRIL) 5 MG tablet Take 1 tablet (5 mg) by mouth daily     Multiple Vitamins-Minerals (CENTRUM SILVER PO) Take 1 tablet by mouth daily.     Omega-3 Fatty Acids (FISH OIL CONCENTRATE PO) Take 1 capsule by mouth daily     order for DME Equipment being ordered: COMPRESSION STOCKINGS, 20-30 mmHg     pantoprazole (PROTONIX) 40 MG EC tablet Take 1 tablet (40 mg) by mouth 2 times daily (before meals)     warfarin ANTICOAGULANT (COUMADIN) 5 MG tablet TAKE ONE & ONE-HALF TAB (7.5MG) EACH TUE & SAT. TAKE 1 TAB (5MG) ALL OTHER DAYS OR AS DIRECTED (Patient taking differently: -  For MVR  INR goal 2.5-3.5  10 mg on Wednesday and Saturday  5 mg on all other days)     No current facility-administered medications for this visit.       ROS:  10 point ROS of systems including Constitutional, Eyes, Respiratory, Cardiovascular, Gastroenterology, Genitourinary, Integumentary, Musculoskeletal, Psychiatric were all negative except for pertinent positives noted in my HPI.    Vitals:  /70   Pulse 80   Temp 97.5  F (36.4  C)   Resp 18   Wt (!) 152.4 kg (336 lb)   SpO2 90%   BMI 46.21 kg/m    Exam:  GENERAL APPEARANCE:  Alert, in no distress, pleasant, cooperative, oriented x 4  EYES: no discharge or mattering on lids or lashes noted  ENT:  moist mucous membranes, hearing acuity intact, wears glasses  NECK: supple, symmetrical  RESP: no respiratory distress, Lung sounds clear, patient is on 2 Liters per nasal cannula  CV:  rate and rhythm regular, no murmur. Edema  difficult to discern due to body habitus in bilateral lower extremities. VASCULAR: warm extremities without open areas.  ABDOMEN: obese, large hernia, normal bowel sounds, soft, nontender.  M/S:   Gait and station: currently dependent with all cares, no tenderness or swelling of the joints; able to move all extremities   SKIN:  Inspection and palpation of skin and subcutaneous tissue: skin warm, dry and intact without rashes  NEURO: no facial asymmetry, no speech deficits and able to follow directions, moves all extremities symmetrically  PSYCH:  insight and judgement intact, memory intact, affect and mood normal    Lab/Diagnostic data:  CBC RESULTS:   Recent Labs   Lab Test 01/19/24  0607 01/18/24  0625   WBC 3.5* 3.9*   RBC 3.10* 3.22*   HGB 8.0* 8.0*   HCT 27.6* 28.4*   MCV 89 88   MCH 25.8* 24.8*   MCHC 29.0* 28.2*   RDW 24.5* 24.0*   * 146*       Last Basic Metabolic Panel:  Recent Labs   Lab Test 01/19/24  0607 01/18/24  0625    140   POTASSIUM 4.2 4.4   CHLORIDE 106 106   JOSEPH 8.8 8.9   CO2 29 28   BUN 12.8 15.0   CR 0.77 0.77   GLC 98 97       Liver Function Studies -   Recent Labs   Lab Test 01/19/24  0607 01/18/24  0625 01/15/24  1815 01/09/24  1157   PROTTOTAL  --   --  5.5* 5.7*   ALBUMIN 2.9* 2.9* 3.1* 3.5   BILITOTAL  --   --  1.0 0.7   ALKPHOS  --   --  58 62   AST  --   --  29 36   ALT  --   --  13 23       TSH   Date Value Ref Range Status   03/14/2019 2.39 0.40 - 4.00 mU/L Final   04/03/2008 2.76 0.4 - 5.0 mU/L Final       ASSESSMENT/PLAN:  Acute blood loss anemia,   Iron deficiency anemia  Symptomatic severe anemia in the setting of supratherapeutic INR, chronic anticoagulation. Received 5 units of PRBC, HGB 8.0 at discharge. EGD and colonoscopy revealed no active bleed; had poor colonoscopy prep with note of coupious stool in entire colon, lavage was performed   --GI plan for outpatient capsule endoscopy  --will check HGB later this week.   --Continue Protonix 40 twice daily       H/o  mechanical mitral valve replacement for severe mitral regurgitation  s/p MAZE procedure in 2008. Chronic anticoagulation with warfarin. Supratherapuetic INR while in the hospital.INR was 3.0 today which is in range.   --continue with coumadin 5 mg daily and recheck INR on Thursday.   --goal INR is 2.5-3.5    pulmonary hypertension  chronic diastolic CHF with preserved EF  ECHO from 1/10/24 noted LVEF 55%; s/p 31mm St Raffi mechanical MVR. Mean 5mmHg @ HR 50; moderate to mod-severe (2-3+) tricuspid regurgitation. Has lower extremity edema in which lymph should follow here at the TCU.   --monitor fluid status.     Nocturnal hypoxia  obesity  Most likely has underlying sleep apnea which is likely related to obesity. Had saturations down in the 50s at night while in the hospital.   --will needd need outpatient sleep study.  -- on 2 L of oxygen at night.     Paroxysmal atrial fibrillation  Previously on atenolol which was stopped while in the hospital. HR has been controlled here thus far.   --continue to monitor HR, restart BB if needed.      Urinary retention  Status post Branch catheter placement  - per urology, discharge with branch catheter. plan for follow up [place] [facility] office in 3 weeks office for trial of void.  Scheduled for 1/30/24. Will have staff call over to urology asking if we can do TOV here at the TCU.      Hyperlipidemia  Intolerant to statins and Zetia     BLE edema  Chronic. Use lymphedema wraps at home  --PT consult for lymphedema wraps    Physical deconditioning  Secondary to recent hospitalization and underlying medical conditions.   --ongoing PT/OT for strengthening.       Total time spent with patient visit at the skilled nursing facility was 65 minutes including patient visit and review of past records.     Electronically signed by:  MIKE Angulo CNP                   Sincerely,        MIKE Angulo CNP

## 2024-01-23 PROBLEM — I11.0 HYPERTENSIVE HEART DISEASE WITH HEART FAILURE (H): Status: ACTIVE | Noted: 2024-01-01

## 2024-01-25 NOTE — PROGRESS NOTES
"[facility] GERIATRICS    Chief Complaint   Patient presents with    RECHECK     HPI:  Feng Wynne is a 79 year old  (1945), who is being seen today for an episodic care visit at: [facility] ([facility]) [[id number]].     Feng was visited today while in his room resting in bed.  He reports that he feels well but continues to have a cough with productive sputum.  No chest pain.  No fever, body aches or chills.  No discomfort related to the Stubbs catheter.  He is eating fair but appetite has not been present.  He has been sleeping well.  Unfortunately he has not been out of bed this week is or is not a wheelchair that was appropriate sizing for him, he is not strong enough to sit up at the edge of the bed.  Thankfully they were able to get a wheelchair for him so they can get him out of bed.    Allergies, and PMH/PSH reviewed in EPIC today.  REVIEW OF SYSTEMS:  10 point ROS of systems including Constitutional, Eyes, Respiratory, Cardiovascular, Gastroenterology, Genitourinary, Integumentary, Musculoskeletal, Psychiatric were all negative except for pertinent positives noted in my HPI.    Objective:   /62   Pulse 76   Temp 98  F (36.7  C)   Resp 18   Ht 1.803 m (5' 11\")   Wt (!) 153 kg (337 lb 6.4 oz)   SpO2 94%   BMI 47.06 kg/m    GENERAL APPEARANCE:  Alert, in no distress, pleasant, cooperative, oriented x 4  EYES: no discharge or mattering on lids or lashes noted  ENT:  moist mucous membranes, hearing acuity intact, wears glasses  NECK: supple, symmetrical  RESP: no respiratory distress, Lung sounds clear, patient is on 2 Liters per nasal cannula  CV:  rate and rhythm regular, no murmur. Edema difficult to discern due to body habitus in bilateral lower extremities. VASCULAR: warm extremities without open areas.  ABDOMEN: obese, large hernia, normal bowel sounds, soft, nontender.  M/S:   Gait and station: currently dependent with all cares, no tenderness or swelling of the joints; able to " move all extremities   SKIN:  Inspection and palpation of skin and subcutaneous tissue: redness in all abdominal and groin folds  NEURO: no facial asymmetry, no speech deficits and able to follow directions, moves all extremities symmetrically  PSYCH:  insight and judgement intact, memory intact, affect and mood normal    CBC RESULTS:   Recent Labs   Lab Test 01/19/24  0607 01/18/24  0625   WBC 3.5* 3.9*   RBC 3.10* 3.22*   HGB 8.0* 8.0*   HCT 27.6* 28.4*   MCV 89 88   MCH 25.8* 24.8*   MCHC 29.0* 28.2*   RDW 24.5* 24.0*   * 146*       Last Basic Metabolic Panel:  Recent Labs   Lab Test 01/26/24  0945 01/19/24  0607    141   POTASSIUM 4.8 4.2   CHLORIDE 104 106   JOSEPH 9.3 8.8   CO2 34* 29   BUN 18.1 12.8   CR 0.78 0.77   * 98       Liver Function Studies -   Recent Labs   Lab Test 01/19/24  0607 01/18/24  0625 01/15/24  1815 01/09/24  1157   PROTTOTAL  --   --  5.5* 5.7*   ALBUMIN 2.9* 2.9* 3.1* 3.5   BILITOTAL  --   --  1.0 0.7   ALKPHOS  --   --  58 62   AST  --   --  29 36   ALT  --   --  13 23       TSH   Date Value Ref Range Status   03/14/2019 2.39 0.40 - 4.00 mU/L Final   04/03/2008 2.76 0.4 - 5.0 mU/L Final       Assessment/Plan:  Acute blood loss anemia,   Iron deficiency anemia  Symptomatic severe anemia in the setting of supratherapeutic INR, chronic anticoagulation. Received 5 units of PRBC, HGB 8.0 at discharge. EGD and colonoscopy revealed no active bleed; had poor colonoscopy prep with note of coupious stool in entire colon, lavage was performed   --GI plan for outpatient capsule endoscopy  --will check HGB tomorrow.    --Continue Protonix 40 twice daily       H/o mechanical mitral valve replacement for severe mitral regurgitation  s/p MAZE procedure in 2008. Chronic anticoagulation with warfarin. Supratherapuetic INR while in the hospital.INR was 4.6 today which is out of goal but unclear if the handheld machine is accurate.   --will hold coumadin tonight and recheck INR tomorrow  via phlebotomy.   --goal INR is 2.5-3.5    pulmonary hypertension  chronic diastolic CHF with preserved EF  ECHO from 1/10/24 noted LVEF 55%; s/p 31mm St Raffi mechanical MVR. Mean 5mmHg @ HR 50; moderate to mod-severe (2-3+) tricuspid. regurgitation.   --monitor fluid status.     Nocturnal hypoxia  obesity  Most likely has underlying sleep apnea which is likely related to obesity. Had saturations down in the 50s at night while in the hospital.   --will need need outpatient sleep study.  -- on 2 L of oxygen at night.     Paroxysmal atrial fibrillation  Previously on atenolol which was stopped while in the hospital. HR has been controlled here thus far.   --continue to monitor HR, restart BB if needed.     Chest congestion  Has been on oxygen during the day since his admission.  He was able to be weaned to room air today.  He does have a lot of congestion so we will start Mucinex.   -- Mucinex 600 mg extended release by mouth twice daily  --Aggressive incentive spirometer use  --He is at risk for pneumonia given that he has not been out of bed     Candidal intertrigo  In groin folds, abdominal folds, and on thighs.  This is quite uncomfortable for call.  -- Will start nystatin cream to those areas twice daily until healed  -- Staff will also place Interdry in the folds    Urinary retention  Status post Branch catheter placement  - per urology, discharge with branch catheter. plan for follow up [facility] office in 3 weeks office for trial of void.  Scheduled for 1/30/24. Will have staff call over to urology asking if we can do TOV here at the TCU.      Hyperlipidemia  Intolerant to statins and Zetia     BLE edema  Chronic. Use lymphedema wraps at home  --PT consult for lymphedema wraps which they do not feel is appropriate at this time.  --partner brought in home compression device which he is looking forward to using.     Physical deconditioning  Secondary to recent hospitalization and underlying medical conditions.    --ongoing PT/OT for strengthening.     MED REC REQUIRED  Post Medication Reconciliation Status: discharge medications reconciled and changed, per note/orders      Electronically signed by: MIKE Angulo CNP

## 2024-01-25 NOTE — LETTER
"    1/25/2024        RE: Feng Wynne  [address]  [facility] [address]        [facility]    Chief Complaint   Patient presents with     RECHECK     HPI:  Feng Wynne is a 79 year old  (1945), who is being seen today for an episodic care visit at: [facility] ([facility]) [[id number]].     Feng was visited today while in his room resting in bed.  He reports that he feels well but continues to have a cough with productive sputum.  No chest pain.  No fever, body aches or chills.  No discomfort related to the Stubbs catheter.  He is eating fair but appetite has not been present.  He has been sleeping well.  Unfortunately he has not been out of bed this week is or is not a wheelchair that was appropriate sizing for him, he is not strong enough to sit up at the edge of the bed.  Thankfully they were able to get a wheelchair for him so they can get him out of bed.    Allergies, and PMH/PSH reviewed in [facility] today.  REVIEW OF SYSTEMS:  10 point ROS of systems including Constitutional, Eyes, Respiratory, Cardiovascular, Gastroenterology, Genitourinary, Integumentary, Musculoskeletal, Psychiatric were all negative except for pertinent positives noted in my HPI.    Objective:   /62   Pulse 76   Temp 98  F (36.7  C)   Resp 18   Ht 1.803 m (5' 11\")   Wt (!) 153 kg (337 lb 6.4 oz)   SpO2 94%   BMI 47.06 kg/m    GENERAL APPEARANCE:  Alert, in no distress, pleasant, cooperative, oriented x 4  EYES: no discharge or mattering on lids or lashes noted  ENT:  moist mucous membranes, hearing acuity intact, wears glasses  NECK: supple, symmetrical  RESP: no respiratory distress, Lung sounds clear, patient is on 2 Liters per nasal cannula  CV:  rate and rhythm regular, no murmur. Edema difficult to discern due to body habitus in bilateral lower extremities. VASCULAR: warm extremities without open areas.  ABDOMEN: obese, large hernia, normal bowel sounds, soft, nontender.  M/S:   Gait and " station: currently dependent with all cares, no tenderness or swelling of the joints; able to move all extremities   SKIN:  Inspection and palpation of skin and subcutaneous tissue: redness in all abdominal and groin folds  NEURO: no facial asymmetry, no speech deficits and able to follow directions, moves all extremities symmetrically  PSYCH:  insight and judgement intact, memory intact, affect and mood normal    CBC RESULTS:   Recent Labs   Lab Test 01/19/24  0607 01/18/24  0625   WBC 3.5* 3.9*   RBC 3.10* 3.22*   HGB 8.0* 8.0*   HCT 27.6* 28.4*   MCV 89 88   MCH 25.8* 24.8*   MCHC 29.0* 28.2*   RDW 24.5* 24.0*   * 146*       Last Basic Metabolic Panel:  Recent Labs   Lab Test 01/26/24  0945 01/19/24  0607    141   POTASSIUM 4.8 4.2   CHLORIDE 104 106   JOSEPH 9.3 8.8   CO2 34* 29   BUN 18.1 12.8   CR 0.78 0.77   * 98       Liver Function Studies -   Recent Labs   Lab Test 01/19/24  0607 01/18/24  0625 01/15/24  1815 01/09/24  1157   PROTTOTAL  --   --  5.5* 5.7*   ALBUMIN 2.9* 2.9* 3.1* 3.5   BILITOTAL  --   --  1.0 0.7   ALKPHOS  --   --  58 62   AST  --   --  29 36   ALT  --   --  13 23       TSH   Date Value Ref Range Status   03/14/2019 2.39 0.40 - 4.00 mU/L Final   04/03/2008 2.76 0.4 - 5.0 mU/L Final       Assessment/Plan:  Acute blood loss anemia,   Iron deficiency anemia  Symptomatic severe anemia in the setting of supratherapeutic INR, chronic anticoagulation. Received 5 units of PRBC, HGB 8.0 at discharge. EGD and colonoscopy revealed no active bleed; had poor colonoscopy prep with note of coupious stool in entire colon, lavage was performed   --GI plan for outpatient capsule endoscopy  --will check HGB tomorrow.    --Continue Protonix 40 twice daily       H/o mechanical mitral valve replacement for severe mitral regurgitation  s/p MAZE procedure in 2008. Chronic anticoagulation with warfarin. Supratherapuetic INR while in the hospital.INR was 4.6 today which is out of goal but unclear  if the handheld machine is accurate.   --will hold coumadin tonight and recheck INR tomorrow via phlebotomy.   --goal INR is 2.5-3.5    pulmonary hypertension  chronic diastolic CHF with preserved EF  ECHO from 1/10/24 noted LVEF 55%; s/p 31mm St Raffi mechanical MVR. Mean 5mmHg @ HR 50; moderate to mod-severe (2-3+) tricuspid. regurgitation.   --monitor fluid status.     Nocturnal hypoxia  obesity  Most likely has underlying sleep apnea which is likely related to obesity. Had saturations down in the 50s at night while in the hospital.   --will need need outpatient sleep study.  -- on 2 L of oxygen at night.     Paroxysmal atrial fibrillation  Previously on atenolol which was stopped while in the hospital. HR has been controlled here thus far.   --continue to monitor HR, restart BB if needed.     Chest congestion  Has been on oxygen during the day since his admission.  He was able to be weaned to room air today.  He does have a lot of congestion so we will start Mucinex.   -- Mucinex 600 mg extended release by mouth twice daily  --Aggressive incentive spirometer use  --He is at risk for pneumonia given that he has not been out of bed     Candidal intertrigo  In groin folds, abdominal folds, and on thighs.  This is quite uncomfortable for call.  -- Will start nystatin cream to those areas twice daily until healed  -- Staff will also place Interdry in the folds    Urinary retention  Status post Branch catheter placement  - per urology, discharge with branch catheter. plan for follow up [facility] office in 3 weeks office for trial of void.  Scheduled for 1/30/24. Will have staff call over to urology asking if we can do TOV here at the TCU.      Hyperlipidemia  Intolerant to statins and Zetia     BLE edema  Chronic. Use lymphedema wraps at home  --PT consult for lymphedema wraps which they do not feel is appropriate at this time.  --partner brought in home compression device which he is looking forward to using.      Physical deconditioning  Secondary to recent hospitalization and underlying medical conditions.   --ongoing PT/OT for strengthening.     MED REC REQUIRED  Post Medication Reconciliation Status: discharge medications reconciled and changed, per note/orders      Electronically signed by: MIKE Angulo CNP         Sincerely,        MIKE Anuglo CNP

## 2024-01-26 NOTE — PROGRESS NOTES
"Addendum 1630: had Dr. Washington review xray and looks like an extensive right sided pneumonia. Will give 2 g IM rocephin asap tonight followed by ceftin 500 mg BID x 7 days and doxycycline 100 mg PO BID x 7 days. Staff report that he is on 2 liter so of oxgyen 96%. Stable vitals. No resp distress, Feng reports he \"feels fine\". Will monitor closely and will need to do INRs on Saturday and Sunday as abx could increase his INR  MIKE Angulo CNP      [facility] GERIATRICS    Chief Complaint   Patient presents with    RECHECK     HPI:  Feng Wynne is a 79 year old  (1945), who is being seen today for an episodic care visit at: [facility] ([facility]) [[id number]]. Today's concern is:    Feng was visited today while in his room resting in bed.  Nurse reports that she attempted to wean his oxygen today and his sats went to 82% therefore she reapplied 2 L and he came back up to 92%.  Per Feng he does feel short of breath when he attempts to exert himself but in comparison to where he was prior to his hospitalization he feels that his breathing is improved.  Denies chest pain. He continues to cough and has thick secretions and reports that he had a little bit of blood in his sputum while working with therapy.  While on room air working with therapy his saturations were 82 to 84% and he appeared more confused to the therapist.      Allergies, and PMH/PSH reviewed in EPIC today.  REVIEW OF SYSTEMS:  10 point ROS of systems including Constitutional, Eyes, Respiratory, Cardiovascular, Gastroenterology, Genitourinary, Integumentary, Musculoskeletal, Psychiatric were all negative except for pertinent positives noted in my HPI.    Objective:   /78   Pulse 98   Temp 97.9  F (36.6  C)   Resp 18   Ht 1.753 m (5' 9\")   Wt 143.8 kg (317 lb)   SpO2 96%   BMI 46.81 kg/m    GENERAL APPEARANCE:  Alert, in no distress, pleasant, cooperative, oriented x 4  EYES: no discharge or mattering on lids or lashes " noted  ENT:  moist mucous membranes, hearing acuity intact, wears glasses  NECK: supple, symmetrical  RESP: no respiratory distress, Lung sounds clear, patient is on 1.5 Liters per nasal cannula  CV:  rate and rhythm regular, no murmur. Edema difficult to discern due to body habitus in bilateral lower extremities. VASCULAR: warm extremities without open areas.  ABDOMEN: obese, large hernia, normal bowel sounds, soft, nontender.  M/S:   Gait and station: currently dependent with all cares, no tenderness or swelling of the joints; able to move all extremities   : clear urine  SKIN:  Inspection and palpation of skin and subcutaneous tissue: skin warm, dry and intact without rashes  NEURO: no facial asymmetry, no speech deficits and able to follow directions, moves all extremities symmetrically  PSYCH:  insight and judgement intact, memory intact, affect and mood normal    CBC RESULTS:   Recent Labs   Lab Test 01/19/24  0607 01/18/24  0625   WBC 3.5* 3.9*   RBC 3.10* 3.22*   HGB 8.0* 8.0*   HCT 27.6* 28.4*   MCV 89 88   MCH 25.8* 24.8*   MCHC 29.0* 28.2*   RDW 24.5* 24.0*   * 146*       Last Basic Metabolic Panel:  Recent Labs   Lab Test 01/26/24  0945 01/19/24  0607    141   POTASSIUM 4.8 4.2   CHLORIDE 104 106   JOSEPH 9.3 8.8   CO2 34* 29   BUN 18.1 12.8   CR 0.78 0.77   * 98       Liver Function Studies -   Recent Labs   Lab Test 01/19/24  0607 01/18/24  0625 01/15/24  1815 01/09/24  1157   PROTTOTAL  --   --  5.5* 5.7*   ALBUMIN 2.9* 2.9* 3.1* 3.5   BILITOTAL  --   --  1.0 0.7   ALKPHOS  --   --  58 62   AST  --   --  29 36   ALT  --   --  13 23       TSH   Date Value Ref Range Status   03/14/2019 2.39 0.40 - 4.00 mU/L Final   04/03/2008 2.76 0.4 - 5.0 mU/L Final     Assessment/Plan:  Hypoxia  Saturations of 82% on room air today.  Looking back at facility charting it looks like he has been on oxygen during the day since he admitted here to [facility].  Yesterday he was on room air for  portion of the day and was saturating in the low 90s.  He could have developed a pneumonia as he spends the majority of his time in the bed, also may be atelectasis.  He has been completing the incentive spirometer regularly.  -- Will get a chest x-ray AP lateral view ASAP today to rule out pneumonia  -- A CBC was ordered yesterday but unfortunately not drawn therefore ordered again today but do not see results of this so we will reach out to staff to determine if the CBC was indeed drawn.  -- Consider IM Rocephin today if chest x-ray cannot be done until later this evening    Acute blood loss anemia,   Iron deficiency anemia  Symptomatic severe anemia in the setting of supratherapeutic INR, chronic anticoagulation. Received 5 units of PRBC, HGB 8.0 at discharge. EGD and colonoscopy revealed no active bleed; had poor colonoscopy prep with note of coupious stool in entire colon. CBC was ordered for 1.25 but not drawn and unfortunately it does not appear it was drawn today either.   --GI plan for outpatient capsule endoscopy  --will check HGB asap  --Continue Protonix 40 twice daily       H/o mechanical mitral valve replacement for severe mitral regurgitation  s/p MAZE procedure in 2008. Chronic anticoagulation with warfarin. Supratherapuetic INR while in the hospital. INR was 3.0 on 1/22 for which he received 5 mg of coumadin then was eleavted to 4.6 via fingerstick yesterday so he did not get coumadin last night. Today INR is 3.6 slighlty out of range so will hold tonight and recheck INR via fingerstick tomorrow since it is a weekend. Suspect that his INR increase if he has an underlying infection or if we need to start abx.    --goal INR is 2.5-3.5    pulmonary hypertension  chronic diastolic CHF with preserved EF  ECHO from 1/10/24 noted LVEF 55%; s/p 31mm St Raffi mechanical MVR. Mean 5mmHg, moderate to mod-severe (2-3+) tricuspid regurgitation. Weights are down 5 lbs, difficult to determine fluid status due to  body habitus. Chest xray will show if pulmonary edema is contributing to ongoing oxygen use.   --monitor fluid status.     Nocturnal hypoxia  obesity  Most likely has underlying sleep apnea which is likely related to obesity. Had saturations down in the 50s at night while in the hospital.  He is currently wearing 2 L of oxygen at nighttime but has slightly elevated CO2 level today so would really benefit from CPAP.  --will needd need outpatient sleep study.  -- on 2 L of oxygen at night.     Paroxysmal atrial fibrillation  Previously on atenolol which was stopped while in the hospital. HR has been controlled here thus far in the 70s  --continue to monitor HR, restart BB if needed.      Urinary retention  Status post Branch catheter placement  - per urology, discharge with branch catheter. plan for follow up [facility] office in 3 weeks office for trial of void.  Scheduled for 1/30/24. Will have staff call over to urology asking if we can do TOV here at the TCU.   -- No signs or symptoms of infected urine today    Physical deconditioning  Secondary to recent hospitalization and underlying medical conditions.   --ongoing PT/OT for strengthening.     MED REC REQUIRED  Post Medication Reconciliation Status: discharge medications reconciled, continue medications without change      Electronically signed by: MIKE Angulo CNP

## 2024-01-26 NOTE — LETTER
"    1/26/2024        RE: Feng Wynne  [address]  [facility] [address]        [facility]    Chief Complaint   Patient presents with     RECHECK     HPI:  Feng Wynne is a 79 year old  (1945), who is being seen today for an episodic care visit at: [facility] ([facility]) [[id number]]. Today's concern is:    Feng was visited today while in his room resting in bed.  Nurse reports that she attempted to wean his oxygen today and his sats went to 82% therefore she reapplied 2 L and he came back up to 92%.  Per Feng he does feel short of breath when he attempts to exert himself but in comparison to where he was prior to his hospitalization he feels that his breathing is improved.  Denies chest pain. He continues to cough and has thick secretions and reports that he had a little bit of blood in his sputum while working with therapy.  While on room air working with therapy his saturations were 82 to 84% and he appeared more confused to the therapist.      Allergies, and PMH/PSH reviewed in EPIC today.  REVIEW OF SYSTEMS:  10 point ROS of systems including Constitutional, Eyes, Respiratory, Cardiovascular, Gastroenterology, Genitourinary, Integumentary, Musculoskeletal, Psychiatric were all negative except for pertinent positives noted in my HPI.    Objective:   /78   Pulse 98   Temp 97.9  F (36.6  C)   Resp 18   Ht 1.753 m (5' 9\")   Wt 143.8 kg (317 lb)   SpO2 96%   BMI 46.81 kg/m    GENERAL APPEARANCE:  Alert, in no distress, pleasant, cooperative, oriented x 4  EYES: no discharge or mattering on lids or lashes noted  ENT:  moist mucous membranes, hearing acuity intact, wears glasses  NECK: supple, symmetrical  RESP: no respiratory distress, Lung sounds clear, patient is on 1.5 Liters per nasal cannula  CV:  rate and rhythm regular, no murmur. Edema difficult to discern due to body habitus in bilateral lower extremities. VASCULAR: warm extremities without open " areas.  ABDOMEN: obese, large hernia, normal bowel sounds, soft, nontender.  M/S:   Gait and station: currently dependent with all cares, no tenderness or swelling of the joints; able to move all extremities   : clear urine  SKIN:  Inspection and palpation of skin and subcutaneous tissue: skin warm, dry and intact without rashes  NEURO: no facial asymmetry, no speech deficits and able to follow directions, moves all extremities symmetrically  PSYCH:  insight and judgement intact, memory intact, affect and mood normal    CBC RESULTS:   Recent Labs   Lab Test 01/19/24  0607 01/18/24  0625   WBC 3.5* 3.9*   RBC 3.10* 3.22*   HGB 8.0* 8.0*   HCT 27.6* 28.4*   MCV 89 88   MCH 25.8* 24.8*   MCHC 29.0* 28.2*   RDW 24.5* 24.0*   * 146*       Last Basic Metabolic Panel:  Recent Labs   Lab Test 01/26/24  0945 01/19/24  0607    141   POTASSIUM 4.8 4.2   CHLORIDE 104 106   JOSEPH 9.3 8.8   CO2 34* 29   BUN 18.1 12.8   CR 0.78 0.77   * 98       Liver Function Studies -   Recent Labs   Lab Test 01/19/24  0607 01/18/24  0625 01/15/24  1815 01/09/24  1157   PROTTOTAL  --   --  5.5* 5.7*   ALBUMIN 2.9* 2.9* 3.1* 3.5   BILITOTAL  --   --  1.0 0.7   ALKPHOS  --   --  58 62   AST  --   --  29 36   ALT  --   --  13 23       TSH   Date Value Ref Range Status   03/14/2019 2.39 0.40 - 4.00 mU/L Final   04/03/2008 2.76 0.4 - 5.0 mU/L Final     Assessment/Plan:  Hypoxia  Saturations of 82% on room air today.  Looking back at facility charting it looks like he has been on oxygen during the day since he admitted here to [facility].  Yesterday he was on room air for portion of the day and was saturating in the low 90s.  He could have developed a pneumonia as he spends the majority of his time in the bed, also may be atelectasis.  He has been completing the incentive spirometer regularly.  -- Will get a chest x-ray AP lateral view ASAP today to rule out pneumonia  -- A CBC was ordered yesterday but unfortunately not drawn  therefore ordered again today but do not see results of this so we will reach out to staff to determine if the CBC was indeed drawn.  -- Consider IM Rocephin today if chest x-ray cannot be done until later this evening    Acute blood loss anemia,   Iron deficiency anemia  Symptomatic severe anemia in the setting of supratherapeutic INR, chronic anticoagulation. Received 5 units of PRBC, HGB 8.0 at discharge. EGD and colonoscopy revealed no active bleed; had poor colonoscopy prep with note of coupious stool in entire colon. CBC was ordered for 1.25 but not drawn and unfortunately it does not appear it was drawn today either.   --GI plan for outpatient capsule endoscopy  --will check HGB asap  --Continue Protonix 40 twice daily       H/o mechanical mitral valve replacement for severe mitral regurgitation  s/p MAZE procedure in 2008. Chronic anticoagulation with warfarin. Supratherapuetic INR while in the hospital. INR was 3.0 on 1/22 for which he received 5 mg of coumadin then was eleavted to 4.6 via fingerstick yesterday so he did not get coumadin last night. Today INR is 3.6 slighlty out of range so will hold tonight and recheck INR via fingerstick tomorrow since it is a weekend. Suspect that his INR increase if he has an underlying infection or if we need to start abx.    --goal INR is 2.5-3.5    pulmonary hypertension  chronic diastolic CHF with preserved EF  ECHO from 1/10/24 noted LVEF 55%; s/p 31mm St Raffi mechanical MVR. Mean 5mmHg, moderate to mod-severe (2-3+) tricuspid regurgitation. Weights are down 5 lbs, difficult to determine fluid status due to body habitus. Chest xray will show if pulmonary edema is contributing to ongoing oxygen use.   --monitor fluid status.     Nocturnal hypoxia  obesity  Most likely has underlying sleep apnea which is likely related to obesity. Had saturations down in the 50s at night while in the hospital.  He is currently wearing 2 L of oxygen at nighttime but has slightly  elevated CO2 level today so would really benefit from CPAP.  --will needd need outpatient sleep study.  -- on 2 L of oxygen at night.     Paroxysmal atrial fibrillation  Previously on atenolol which was stopped while in the hospital. HR has been controlled here thus far in the 70s  --continue to monitor HR, restart BB if needed.      Urinary retention  Status post Branch catheter placement  - per urology, discharge with branch catheter. plan for follow up [facility] office in 3 weeks office for trial of void.  Scheduled for 1/30/24. Will have staff call over to urology asking if we can do TOV here at the TCU.   -- No signs or symptoms of infected urine today    Physical deconditioning  Secondary to recent hospitalization and underlying medical conditions.   --ongoing PT/OT for strengthening.     MED REC REQUIRED  Post Medication Reconciliation Status: discharge medications reconciled, continue medications without change      Electronically signed by: MIKE Angulo CNP         Sincerely,        MIKE Angulo CNP

## 2024-01-28 NOTE — PROGRESS NOTES
Patients INR today 2.6. previously elevated and Coumadin held. Last noted dose I can see was 5mg on 1/22. Nursing can't locate any orders for Coumadin in patients TCU chart.    Orders  Coumadin 2.5mg today  INR tomorrow    Dr Shannon MCKEON DNP

## 2024-01-28 NOTE — TELEPHONE ENCOUNTER
[facility] TELEPHONE ENCOUNTER    Chief Complaint   Patient presents with    INR RESULTS       Feng Wynne is a 79 year old  (1945),Nurse called today to report: Originally called tonight after 2130 to report an INR reading, however they reported an INR of 3.61 that was done yesterday thinking it was today's instead. I asked them to complete a stat fingerstick INR now per orders and to call back with results for further orders.     Per chart review: Chronic anticoagulation with warfarin. Supratherapuetic INR while in the hospital. INR was 3.0 on 1/22 for which he received 5 mg of coumadin then was elevated to 4.6 via fingerstick 1/25/24 so he did not get coumadin. INR on 1/26/24 was 3.6 slighlty out of range so coumadin was held with plan to recheck 1/27/24.--goal INR is 2.5-3.5. Currently on antibiotic course.     INR via fingerstick by Registered Nurse tonight 2210=3.6    ASSESSMENT/PLAN    HOLD coumadin tonight and recheck INR tomorrow fingerstick 1/28/24    Electronically signed by:   MIKE Garay CNP

## 2024-01-29 NOTE — PROGRESS NOTES
"[facility]    Chief Complaint   Patient presents with    RECHECK     HPI:  Feng Wynne is a 79 year old  (1945), who is being seen today for an episodic care visit at: [facility] ([facility]) [[id number]]. Today's concern is:    Feng was visited today while in his room resting in bed.  He reports that his breathing does not feel different from last week.  However, he reports that his sputum is now becoming more clear.  No body aches, chest pain.  He does get quite short of breath when the head of bed is laid flat and he feels like he \"loses my voice\".  His appetite is poor and he asks for a nutritional supplement.  He is looking forward to getting up and out of bed and would like to sit in the wheelchair today.  Has a Stubbs catheter in without discomfort.  Having bowel movements which she reports are clear in color but did have a small regular BM today.    Allergies, and PMH/PSH reviewed in EPIC today.  REVIEW OF SYSTEMS:  10 point ROS of systems including Constitutional, Eyes, Respiratory, Cardiovascular, Gastroenterology, Genitourinary, Integumentary, Musculoskeletal, Psychiatric were all negative except for pertinent positives noted in my HPI.    Objective:   BP 93/54   Pulse 76   Temp 98  F (36.7  C)   Resp 18   Ht 1.803 m (5' 11\")   Wt (!) 155.3 kg (342 lb 6.4 oz)   SpO2 95%   BMI 47.76 kg/m    GENERAL APPEARANCE:  Alert, in no distress, pleasant, cooperative, oriented x 4  EYES: no discharge or mattering on lids or lashes noted  ENT:  moist mucous membranes, hearing acuity intact, wears glasses  NECK: supple, symmetrical  RESP: no respiratory distress, Lung sounds clear, patient is on 2 Liters per nasal cannula  CV:  rate and rhythm regular, no murmur. Edema 1-2+ in arms and in bilateral lower extremities. VASCULAR: warm extremities without open areas.  ABDOMEN: obese, large hernia, normal bowel sounds, soft, nontender.  M/S:   Gait and station: currently dependent with all " cares, no tenderness or swelling of the joints; able to move all extremities   : clear urine  SKIN:  Inspection and palpation of skin and subcutaneous tissue: skin warm, dry and intact without rashes  NEURO: no facial asymmetry, no speech deficits and able to follow directions, moves all extremities symmetrically  PSYCH:  insight and judgement intact, memory intact, affect and mood normal    CBC RESULTS:   Recent Labs   Lab Test 01/19/24  0607 01/18/24  0625   WBC 3.5* 3.9*   RBC 3.10* 3.22*   HGB 8.0* 8.0*   HCT 27.6* 28.4*   MCV 89 88   MCH 25.8* 24.8*   MCHC 29.0* 28.2*   RDW 24.5* 24.0*   * 146*       Last Basic Metabolic Panel:  Recent Labs   Lab Test 01/26/24  0945 01/19/24  0607    141   POTASSIUM 4.8 4.2   CHLORIDE 104 106   JOSEPH 9.3 8.8   CO2 34* 29   BUN 18.1 12.8   CR 0.78 0.77   * 98       Liver Function Studies -   Recent Labs   Lab Test 01/19/24  0607 01/18/24  0625 01/15/24  1815 01/09/24  1157   PROTTOTAL  --   --  5.5* 5.7*   ALBUMIN 2.9* 2.9* 3.1* 3.5   BILITOTAL  --   --  1.0 0.7   ALKPHOS  --   --  58 62   AST  --   --  29 36   ALT  --   --  13 23       TSH   Date Value Ref Range Status   03/14/2019 2.39 0.40 - 4.00 mU/L Final   04/03/2008 2.76 0.4 - 5.0 mU/L Final     Assessment/Plan:  pulmonary hypertension  chronic diastolic CHF with preserved EF  Acute exacerbation. ECHO from 1/10/24 noted LVEF 55%; s/p 31mm St Raffi mechanical MVR. Mean 5mmHg, moderate to mod-severe (2-3+) tricuspid regurgitation.  Chest x-ray with moderate heart failure therefore we will treat with Lasix.  He does have edema in his arms as well, difficult to trend weights but it looks like his weights are up by 4 pounds since admission.  --Start furosemide 40 mg daily for the next 3 mornings.  Will also give 20 mEq of potassium x 3 morning. Suspect that he will need to remain on lasix for longer than 3 days.   --BMP on 2/1/24.  --monitor fluid status and trend weights     Pneumonia  Saturations of 82% on  room air last week. Chest xray reviewed with Dr. Washington and appeared to have right sided pneumonia.  He received 2 g of Rocephin IM on Friday and was started on Ceftin and doxycycline Saturday 1/27.  He continues to be on oxygen during the day and feels short of breath with cares.  Mild confusion that he had last week has resolved and he reports that his sputum is becoming more clear.   --Continue with doxycycline and Ceftin for 7-day course may need to extend for a 10-day course  -- Continue with oxygen as needed to keep saturations over 90%.  Hopeful that his symptoms will continue to improve with the use of antibiotics and Lasix as noted above.    Acute blood loss anemia,   Iron deficiency anemia  Symptomatic severe anemia in the setting of supratherapeutic INR, chronic anticoagulation. Received 5 units of PRBC, HGB 8.0 at discharge. EGD and colonoscopy revealed no active bleed; had poor colonoscopy prep with note of coupious stool in entire colon. CBC was ordered for 1.25 but not drawn and unfortunately it does not appear it was drawn as ordered on 1/26 or 1/27.  --GI plan for outpatient capsule endoscopy  --will check HGB on 1/30/24  --Continue Protonix 40 twice daily       H/o mechanical mitral valve replacement for severe mitral regurgitation  s/p MAZE procedure in 2008. Chronic anticoagulation with warfarin. Supratherapuetic INR while in the hospital. INR was 2.7 today, He has only had 2.5 mg of coumadin since Wednesday evening.   --Coumadin 2.5 mg on Monday Tuesday with a recheck of INR on Wednesday  --goal INR is 2.5-3.5    Nocturnal hypoxia  obesity  Most likely has underlying sleep apnea which is likely related to obesity. Had saturations down in the 50s at night while in the hospital.  He is currently wearing 2 L of oxygen at nighttime but has slightly elevated CO2 level today so would really benefit from CPAP.  --will need need outpatient sleep study.  -- on 2 L of oxygen at night.     Paroxysmal atrial  fibrillation  Previously on atenolol which was stopped while in the hospital. HR has been controlled here thus far in the 70-80s  --continue to monitor HR, restart BB if needed.      Urinary retention  Status post Stubbs catheter placement  He was due for a urology appointment tomorrow, 1/30/24 but this was canceled as we can do the trial of void here.  Given that we are doing Lasix for the next few days will leave the catheter in place as he is quite deconditioned and it would be difficult for him to use the urinal and/or be changed frequently.    Physical deconditioning  Secondary to recent hospitalization and underlying medical conditions.   --ongoing PT/OT for strengthening.     MED REC REQUIRED  Post Medication Reconciliation Status: discharge medications reconciled, continue medications without change      Electronically signed by: MIKE Angulo CNP

## 2024-01-29 NOTE — LETTER
"    1/29/2024        RE: Feng Wynne  [address]  [facility] [id number]        M [facility]IATR[facility]    Chief Complaint   Patient presents with     RECHECK     HPI:  Feng Wynne is a 79 year old  (1945), who is being seen today for an episodic care visit at: [facility] ([facility]) [[id number]]. Today's concern is:    Fneg was visited today while in his room resting in bed.  He reports that his breathing does not feel different from last week.  However, he reports that his sputum is now becoming more clear.  No body aches, chest pain.  He does get quite short of breath when the head of bed is laid flat and he feels like he \"loses my voice\".  His appetite is poor and he asks for a nutritional supplement.  He is looking forward to getting up and out of bed and would like to sit in the wheelchair today.  Has a Stubbs catheter in without discomfort.  Having bowel movements which she reports are clear in color but did have a small regular BM today.    Allergies, and PMH/PSH reviewed in EPIC today.  REVIEW OF SYSTEMS:  10 point ROS of systems including Constitutional, Eyes, Respiratory, Cardiovascular, Gastroenterology, Genitourinary, Integumentary, Musculoskeletal, Psychiatric were all negative except for pertinent positives noted in my HPI.    Objective:   BP 93/54   Pulse 76   Temp 98  F (36.7  C)   Resp 18   Ht 1.803 m (5' 11\")   Wt (!) 155.3 kg (342 lb 6.4 oz)   SpO2 95%   BMI 47.76 kg/m    GENERAL APPEARANCE:  Alert, in no distress, pleasant, cooperative, oriented x 4  EYES: no discharge or mattering on lids or lashes noted  ENT:  moist mucous membranes, hearing acuity intact, wears glasses  NECK: supple, symmetrical  RESP: no respiratory distress, Lung sounds clear, patient is on 2 Liters per nasal cannula  CV:  rate and rhythm regular, no murmur. Edema 1-2+ in arms and in bilateral lower extremities. VASCULAR: warm extremities without open areas.  ABDOMEN: obese, large hernia, " normal bowel sounds, soft, nontender.  M/S:   Gait and station: currently dependent with all cares, no tenderness or swelling of the joints; able to move all extremities   : clear urine  SKIN:  Inspection and palpation of skin and subcutaneous tissue: skin warm, dry and intact without rashes  NEURO: no facial asymmetry, no speech deficits and able to follow directions, moves all extremities symmetrically  PSYCH:  insight and judgement intact, memory intact, affect and mood normal    CBC RESULTS:   Recent Labs   Lab Test 01/19/24  0607 01/18/24  0625   WBC 3.5* 3.9*   RBC 3.10* 3.22*   HGB 8.0* 8.0*   HCT 27.6* 28.4*   MCV 89 88   MCH 25.8* 24.8*   MCHC 29.0* 28.2*   RDW 24.5* 24.0*   * 146*       Last Basic Metabolic Panel:  Recent Labs   Lab Test 01/26/24  0945 01/19/24  0607    141   POTASSIUM 4.8 4.2   CHLORIDE 104 106   JOSEPH 9.3 8.8   CO2 34* 29   BUN 18.1 12.8   CR 0.78 0.77   * 98       Liver Function Studies -   Recent Labs   Lab Test 01/19/24  0607 01/18/24  0625 01/15/24  1815 01/09/24  1157   PROTTOTAL  --   --  5.5* 5.7*   ALBUMIN 2.9* 2.9* 3.1* 3.5   BILITOTAL  --   --  1.0 0.7   ALKPHOS  --   --  58 62   AST  --   --  29 36   ALT  --   --  13 23       TSH   Date Value Ref Range Status   03/14/2019 2.39 0.40 - 4.00 mU/L Final   04/03/2008 2.76 0.4 - 5.0 mU/L Final     Assessment/Plan:  pulmonary hypertension  chronic diastolic CHF with preserved EF  Acute exacerbation. ECHO from 1/10/24 noted LVEF 55%; s/p 31mm St Raffi mechanical MVR. Mean 5mmHg, moderate to mod-severe (2-3+) tricuspid regurgitation.  Chest x-ray with moderate heart failure therefore we will treat with Lasix.  He does have edema in his arms as well, difficult to trend weights but it looks like his weights are up by 4 pounds since admission.  --Start furosemide 40 mg daily for the next 3 mornings.  Will also give 20 mEq of potassium x 3 morning. Suspect that he will need to remain on lasix for longer than 3 days.    --BMP on 2/1/24.  --monitor fluid status and trend weights     Pneumonia  Saturations of 82% on room air last week. Chest xray reviewed with Dr. Washington and appeared to have right sided pneumonia.  He received 2 g of Rocephin IM on Friday and was started on Ceftin and doxycycline Saturday 1/27.  He continues to be on oxygen during the day and feels short of breath with cares.  Mild confusion that he had last week has resolved and he reports that his sputum is becoming more clear.   --Continue with doxycycline and Ceftin for 7-day course may need to extend for a 10-day course  -- Continue with oxygen as needed to keep saturations over 90%.  Hopeful that his symptoms will continue to improve with the use of antibiotics and Lasix as noted above.    Acute blood loss anemia,   Iron deficiency anemia  Symptomatic severe anemia in the setting of supratherapeutic INR, chronic anticoagulation. Received 5 units of PRBC, HGB 8.0 at discharge. EGD and colonoscopy revealed no active bleed; had poor colonoscopy prep with note of coupious stool in entire colon. CBC was ordered for 1.25 but not drawn and unfortunately it does not appear it was drawn as ordered on 1/26 or 1/27.  --GI plan for outpatient capsule endoscopy  --will check HGB on 1/30/24  --Continue Protonix 40 twice daily       H/o mechanical mitral valve replacement for severe mitral regurgitation  s/p MAZE procedure in 2008. Chronic anticoagulation with warfarin. Supratherapuetic INR while in the hospital. INR was 2.7 today, He has only had 2.5 mg of coumadin since Wednesday evening.   --Coumadin 2.5 mg on Monday Tuesday with a recheck of INR on Wednesday  --goal INR is 2.5-3.5    Nocturnal hypoxia  obesity  Most likely has underlying sleep apnea which is likely related to obesity. Had saturations down in the 50s at night while in the hospital.  He is currently wearing 2 L of oxygen at nighttime but has slightly elevated CO2 level today so would really benefit from  CPAP.  --will need need outpatient sleep study.  -- on 2 L of oxygen at night.     Paroxysmal atrial fibrillation  Previously on atenolol which was stopped while in the hospital. HR has been controlled here thus far in the 70-80s  --continue to monitor HR, restart BB if needed.      Urinary retention  Status post Stubbs catheter placement  He was due for a urology appointment tomorrow, 1/30/24 but this was canceled as we can do the trial of void here.  Given that we are doing Lasix for the next few days will leave the catheter in place as he is quite deconditioned and it would be difficult for him to use the urinal and/or be changed frequently.    Physical deconditioning  Secondary to recent hospitalization and underlying medical conditions.   --ongoing PT/OT for strengthening.     MED REC REQUIRED  Post Medication Reconciliation Status: discharge medications reconciled, continue medications without change      Electronically signed by: MIKE Angulo CNP           Sincerely,        MIKE Angulo CNP

## 2024-01-30 PROBLEM — I50.9 ACUTE ON CHRONIC CONGESTIVE HEART FAILURE, UNSPECIFIED HEART FAILURE TYPE (H): Status: ACTIVE | Noted: 2024-01-01

## 2024-01-30 PROBLEM — R19.5 POSITIVE OCCULT STOOL BLOOD TEST: Status: ACTIVE | Noted: 2024-01-01

## 2024-01-30 PROBLEM — D62 ANEMIA DUE TO BLOOD LOSS, ACUTE: Status: ACTIVE | Noted: 2024-01-01

## 2024-01-30 PROBLEM — R06.02 SHORTNESS OF BREATH: Status: ACTIVE | Noted: 2024-01-01

## 2024-01-30 NOTE — PLAN OF CARE
Orientation: A&Ox4  Activity: A2-3 turn and repo  Diet/BS Checks: clears  Tele:    IV Access/Drains: PIV SL  Pain Management: denies  Abnormal VS/Results: awaiting hemoglobin recheck  Bowel/Bladder: branch patent, uses bedpan for stool  Skin/Wounds: wound on coccyx  Consults: GI, cardiology, WOC  D/C Disposition: pending  Other Info:

## 2024-01-30 NOTE — PROGRESS NOTES
RECEIVING UNIT ED HANDOFF REVIEW    ED Nurse Handoff Report was reviewed by: Gaviota Lao RN on January 30, 2024 at 1:59 PM

## 2024-01-30 NOTE — ED NOTES
[facility]  ED Nurse Handoff Report    ED Chief complaint: Abnormal Labs      ED Diagnosis:   Final diagnoses:   Acute on chronic congestive heart failure, unspecified heart failure type (H)   Anemia due to blood loss, acute   Positive occult stool blood test   Shortness of breath       Code Status: DNR / DNI    Allergies:   Allergies   Allergen Reactions    Amiodarone Shortness Of Breath    Pcn [Penicillins] Shortness Of Breath     Rxn occurred in childhood     Statins Muscle Pain (Myalgia) and Hives    Amiodarone     Latex     Zetia [Ezetimibe] Muscle Pain (Myalgia)     Muscle weakness legs       Patient Story:   79 year old male who presents to the ED for a low hemoglobin value of 6. Patient reports he has had more trouble breathing since his pneumonia 3 months ago. Reports shortness of breath when ambulating and states he has not been very active recently. Reports recent weight gain and increased leg swelling. Reports he is usually on supplemental oxygen at night and yesterday his SpO2 was 81% last night. Patient reports he has had a Stubbs catheter for a while. Positive hemoccult today    Focused Assessment:    Neuro: Alert, oriented x 4  Respiratory:Clear lung sounds, on room air   Cardiology:  Hx of valve replacement, on coumadin   Gastrointestinal: soft, large, obese  Genitourinary/Renal:  Stubbs in place draining clear yellow urine- 40 mg lasix given  Musculoskeletal:   Skin: reports he has a bedsore over the sacral area which occurred while at his TCU  Lines: 18 left FA    Labs Ordered and Resulted from Time of ED Arrival to Time of ED Departure   INR - Abnormal       Result Value    INR 1.98 (*)    BASIC METABOLIC PANEL - Abnormal    Sodium 136      Potassium 4.5      Chloride 99      Carbon Dioxide (CO2) 33 (*)     Anion Gap 4 (*)     Urea Nitrogen 19.4      Creatinine 0.85      GFR Estimate 89      Calcium 9.5      Glucose 94     TROPONIN T, HIGH SENSITIVITY - Abnormal    Troponin T, High  Sensitivity 58 (*)    OCCULT BLOOD STOOL - Abnormal    Occult Blood Positive (*)    CBC WITH PLATELETS AND DIFFERENTIAL - Abnormal    WBC Count 3.4 (*)     RBC Count 2.51 (*)     Hemoglobin 6.9 (*)     Hematocrit 24.1 (*)     MCV 96      MCH 27.5      MCHC 28.6 (*)     RDW 27.9 (*)     Platelet Count 190      % Neutrophils 49      % Lymphocytes 28      % Monocytes 11      % Eosinophils 10      % Basophils 1      % Immature Granulocytes 1      NRBCs per 100 WBC 0      Absolute Neutrophils 1.7      Absolute Lymphocytes 0.9      Absolute Monocytes 0.4      Absolute Eosinophils 0.3      Absolute Basophils 0.0      Absolute Immature Granulocytes 0.0      Absolute NRBCs 0.0     NT PROBNP INPATIENT - Normal    N terminal Pro BNP Inpatient 1,253     TYPE AND SCREEN, ADULT    ABO/RH(D) O POS      Antibody Screen Negative      SPECIMEN EXPIRATION DATE [id number]     PREPARE RED BLOOD CELLS (UNIT)    Blood Component Type Red Blood Cells      Product Code [id number]      Unit Status Transfused      Unit Number [id number]      CROSSMATCH Compatible      CODING SYSTEM [id number]      ISSUE DATE AND TIME [id number]      UNIT ABO/RH O+      UNIT TYPE ISBT 5100     PREPARE RED BLOOD CELLS (UNIT)   TRANSFUSE RED BLOOD CELLS (UNIT)   ABO/RH TYPE AND SCREEN        XR Chest 2 Views   Final Result   IMPRESSION: Improved left lateral patchy opacity. Increased right   perihilar hazy opacity suggestive of worsening pulmonary edema.   Similar vascular congestion and cardiomegaly. No new focal   consolidation. Possible trace right pleural effusion, however the   lateral view is limited due to overlapping soft tissue. Median   sternotomy and cardiac valve prosthesis.      SHIELA BOLDEN MD            SYSTEM ID:  [id number]            Treatments and/or interventions provided:      Medications   furosemide (LASIX) injection 40 mg (40 mg Intravenous $Given 1/30/24 1217)   pantoprazole (PROTONIX) IV push injection 40 mg (40 mg  "Intravenous $Given 1/30/24 1243)        Patient's response to treatments and/or interventions:   Resting comfortably    To be done/followed up on inpatient unit:    See any in-patient orders    Does this patient have any cognitive concerns?: Short term memory loss    Activity level - Baseline/Home:    Stand with assist x2    Activity Level - Current:     Total Care    Patient's Preferred language: English     Needed?: No    Isolation: None  Infection: Not Applicable  Patient tested for COVID 19 prior to admission: NO    Bariatric?: Yes    Vital Signs:   Vitals:    01/30/24 1159 01/30/24 1245 01/30/24 1252 01/30/24 1300   BP: 100/54 102/42  98/57   Pulse: 70 74  71   Resp: 16 16  18   Temp:  97.8  F (36.6  C)  98.3  F (36.8  C)   TempSrc:  Oral  Oral   SpO2: 93% 94%  100%   Weight:   145.2 kg (320 lb)    Height:  1.803 m (5' 11\")         Cardiac Rhythm:     Was the PSS-3 completed:   Yes  What interventions are required if any?               Family Comments: at bedside, supportive  OBS brochure/video discussed/provided to patient/family: Yes              Name of person given brochure if not patient:              Relationship to patient:    For the majority of the shift this patient's behavior was Green.   Behavioral interventions performed were     ED NURSE PHONE NUMBER: *[phone]          "

## 2024-01-30 NOTE — H&P
[facility]    History and Physical - Hospitalist Service       Date of Admission:  1/30/2024    Assessment & Plan      Feng Wynne is a 79 year old male with past medical history significant for mechanical mitral valve replacement for severe mitral regurgitation and MAZE procedure in 2008, on chronic anticoagulation with warfarin, pulmonary hypertension, chronic diastolic CHF with preserved EF, previous dilated LV likely due to mitral regurgitation, subsequent improvement with ACE inhibitor and beta-blockers, hypertension, hyperlipidemia,and morbid obesity admitted on 1/30/2024 with acute on chronic anemia.     Acute on chronic anemia  Iron deficiency anemia   Presumed GI bleed   * Pt had an admission from 1/9-1/19 for similar. At that time hgb was down to 4.4. EGD and colonoscopy revealed no active bleed; had poor colonoscopy prep with note of coupious stool in entire colon, lavage was performed. GI recommended outpatient capsule endoscopy which has not occurred.   * Discharge hemoglobin was 8.0.   * He returns today for worsening anemia with hemoglobin of 6.0. Occult blood is positive.   * Transfused 1 unit of PRBCs in the ED (1/30)  * Of note, pt follows a vegetarian diet - discussed importance of iron and B12 supplementation going forward.   - Admit to inpatient   - Serial hemoglobin   - Transfuse for Hgb <7.   - Continue protonix   - GI consultation   - Hold anticoagulation for now     Chronic diastolic CHF with preserved EF, with possible acute exacerbation  Pulmonary Hypertension  ECHO from 1/10/24 noted LVEF 55%; s/p 31mm St Raffi mechanical MVR. Mean 5mmHg, moderate to mod-severe (2-3+) tricuspid regurgitation   * Pt reports Shortness of breath, increased cough, recent weight gain and increased leg swelling.   * He was seen by the NP at the TCU on 1/29 who planned to start lasix 40mg daily for three days - likely only received one dose.  * On admission BNP is within normal limits  1,253. CXR showed increased right perihilar hazy patchy opacities suggestive of worsening pulmonary edema. Vascular congestion and cardiomegaly. Trace R pleural effusion.   * He is notably very edematous in bilateral upper and lower extremities   * He was given 40mg IV lasix in the ED (1/30)  - Cardiac monitoring, continuous oxymetry   - Continue with 40mg IV lasix BID for now   - Monitor I/Os and daily weights   - Will defer repeat TTE at this time to cardiology   - Cardiology consult     Recent Pneumonia   CXR obtained on 1/26 which per the read suggested CHF, but ultimately the provider ended up treating for pneumonia. . Received 2g of IM Rocephin on 1/26 and was started on Ceftin and Doxycycline on 1/27.   * CXR on 1/30 does show a lateral patchy opacity on the left. And findings of CHF (see above)   - Continue with planned course of Ceftin and Doxycycline   - Incentive spirometry     Mild troponin elevation   Troponin is elevated to 58 on admission. Suspect demand ischemia from acute anemia, CHF, pneumonia etc. Looks like he had troponin elevation during his last admission as well (73-69).   - Trend troponin   - Cardiac monitoring   - Cardiology consult as noted above    Hx of mechanical mitral valve replacement for severe mitral regurgitation   S/p MAZE procedure (2008)   Chronic anticoagulation with warfarin   Subtherapeutic INR (1.98)   Will have to hold warfarin in the short term with concern for acute bleed.   - Once cleared from a bleed standpoint, would then recommend initiation of heparin as a bridge back to warfarin.   - Goal INR is 2.5-3.5   - Cardiology consult as noted above     Paroxysmal atrial fibrillation   Not currently on any rate controlling mediations   - Anticoagulation as noted above       HLD  Intolerant to statins and Zetia     Urinary retention s/p Branch Catheter   Pt was supposed to have an appointment with urology on 1/30 for TOV   - Continue branch for now while getting IV diuresis  "  - Recommend TOV prior to discharge from this hospitalization       Diet: Clears for now  DVT Prophylaxis: Warfarin  Stubbs Catheter: PRESENT, indication:    Lines: None     Cardiac Monitoring: None  Code Status: Full Code - discussed     Clinically Significant Risk Factors Present on Admission               # Drug Induced Coagulation Defect: home medication list includes an anticoagulant medication    # Hypertension: Noted on problem list  # Acute heart failure with preserved ejection fraction: heart failure noted on problem list, last echo with EF >50%, and receiving IV diuretics     # Severe Obesity: Estimated body mass index is 44.63 kg/m  as calculated from the following:    Height as of this encounter: 1.803 m (5' 11\").    Weight as of this encounter: 145.2 kg (320 lb).         # Financial/Environmental Concerns:           Disposition Plan      Expected Discharge Date: 02/01/2024                  Yolanda Roach MD  Hospitalist Service  [facility]  Securely message with [employer] (more info)  Text page via [employer] Paging/Directory     ______________________________________________________________________    Chief Complaint   Low hemoglobin    History is obtained from the patient    History of Present Illness   Feng Wynne is a 79 year old male who presents to the ED with low hemoglobin.       Pt had an admission from 1/9-1/19 for similar. At that time hgb was down to 4.4. EGD and colonoscopy revealed no active bleed; had poor colonoscopy prep with note of coupious stool in entire colon, lavage was performed. GI recommended outpatient capsule endoscopy which has not occurred.     Since that time, he has been having some shortness of breath, cough and sputum production. He ws diagnosed with pneumonia on 1/26. Review of the CXR read also suggests volume overload at that time. He was started on yadira diuretic on 1/29.    His hemoglobin was rechecked omn 1/29 and was found to be quite low " to   6.0 so was sent to the ED.     The patient denies any hematochezia or melena.     Past Medical History    Past Medical History:   Diagnosis Date    Acute on chronic congestive heart failure, unspecified heart failure type (H) 1/30/2024    Arthritis     Atrial fibrillation (H)     Coronary artery disease     Hypercholesteremia     Morbid obesity (H)     Unspecified essential hypertension        Past Surgical History   Past Surgical History:   Procedure Laterality Date    COLONOSCOPY N/A 1/15/2024    Procedure: Colonoscopy;  Surgeon: Osbaldo Olvera MD;  Location:  GI    ESOPHAGOSCOPY, GASTROSCOPY, DUODENOSCOPY (EGD), COMBINED N/A 1/15/2024    Procedure: Esophagoscopy, gastroscopy, duodenoscopy (EGD), combined;  Surgeon: Osbaldo Olvera MD;  Location:  GI    MITRAL VALVE REPLACEMENT  8-    Mitral valve replacement with 31-mm St. Raffi mechanical valve.        Prior to Admission Medications   Prior to Admission Medications   Prescriptions Last Dose Informant Patient Reported? Taking?   Multiple Vitamins-Minerals (CENTRUM SILVER PO) 1/30/2024 at 0800 [facility] Yes Yes   Sig: Take 1 tablet by mouth daily.   Omega-3 Fatty Acids (FISH OIL CONCENTRATE PO) ON HOLD at since 1/20 Nursing Home Yes No   Sig: Take 1 capsule by mouth daily   albuterol (PROAIR HFA/PROVENTIL HFA/VENTOLIN HFA) 108 (90 Base) MCG/ACT inhaler prn Nursing Home No Yes   Sig: Inhale 1-2 puffs into the lungs every 4 hours as needed for shortness of breath, wheezing or cough   Patient taking differently: Inhale 2 puffs into the lungs every 4 hours as needed for shortness of breath, wheezing or cough   bisacodyl (DULCOLAX) 10 MG suppository prn Nursing Home Yes Yes   Sig: Place 10 mg rectally daily as needed for constipation Every 3 days if no BM   cefuroxime (CEFTIN) 500 MG tablet 1/30/2024 at 0800 [facility] Yes Yes   Sig: Take 500 mg by mouth 2 times daily For 7 days; pneumonia   clindamycin (CLEOCIN) 150 MG capsule prn  Nursing Home Yes Yes   Sig: TAKE 4 CAPS BY MOUTH 1 HOUR PRIOR TO DENTAL APPOINTMENT   doxycycline hyclate (VIBRA-TABS) 100 MG tablet 1/29/2024 at 0800 Nursing Home Yes Yes   Sig: Take 100 mg by mouth 2 times daily For 7 days; pneumonia   furosemide (LASIX) 40 MG tablet 1/29/2024 at 1200 Nursing Home Yes Yes   Sig: Take 40 mg by mouth daily x3 days   guaiFENesin (MUCINEX) 600 MG 12 hr tablet 1/30/2024 at am Nursing Home Yes Yes   Sig: Take 600 mg by mouth 2 times daily   nystatin (MYCOSTATIN) 362360 UNIT/GM external powder 1/30/2024 at am Nursing Home Yes Yes   Sig: Apply topically 2 times daily Groin folds   order for DME   No No   Sig: Equipment being ordered: COMPRESSION STOCKINGS, 20-30 mmHg   pantoprazole (PROTONIX) 40 MG EC tablet 1/30/2024 at 0700 Nursing Home No Yes   Sig: Take 1 tablet (40 mg) by mouth 2 times daily (before meals)   polyethylene glycol (MIRALAX) 17 GM/Dose powder 1/29/2024 at am Nursing Home Yes Yes   Sig: Take 17 g by mouth daily   potassium chloride ER (K-TAB) 20 MEQ CR tablet 1/29/2024 at 1200 [facility] Yes Yes   Sig: Take 20 mEq by mouth daily x3 days; to be given with furosemide   sennosides (SENOKOT) 8.6 MG tablet prn Nursing Home Yes Yes   Sig: Take 1 tablet by mouth 2 times daily as needed for constipation   sennosides (SENOKOT) 8.6 MG tablet 1/29/2024 at  Nursing Home Yes Yes   Sig: Take 1 tablet by mouth at bedtime   warfarin ANTICOAGULANT (COUMADIN) 5 MG tablet 1/29/2024 at 2.5 mg Nursing Home No Yes   Sig: TAKE ONE & ONE-HALF TAB (7.5MG) EACH TUE & SAT. TAKE 1 TAB (5MG) ALL OTHER DAYS OR AS DIRECTED   Patient taking differently: For [facility] MVR  INR goal 2.5-3.5   -  1/19 5 mg  1/20 7.5 mg  1/21-24 5 mg  1/25-27 hold (no order placed in MAR on 1/26, clarified w/ facility)  1/28 2.5 mg  1/29 2.5 mg      Facility-Administered Medications: None        Review of Systems    The 10 point Review of Systems is negative other than noted in the HPI or here.     Physical Exam   Vital  Signs: Temp: 98.3  F (36.8  C) Temp src: Oral BP: 105/51 Pulse: 69   Resp: 17 SpO2: 96 % O2 Device: Nasal cannula Oxygen Delivery: 1 LPM  Weight: 320 lbs 0 oz    Constitutional: Awake, alert, cooperative, no apparent distress.  Eyes: Conjunctiva and pupils examined and normal.  HEENT: Moist mucous membranes, normal dentition.  Respiratory: Clear to auscultation bilaterally, no crackles or wheezing.  Cardiovascular: Regular rate and rhythm, normal S1 and S2,mechanical valve  GI: Soft, non-distended, non-tender, normal bowel sounds. Notable large ventral hernia   Skin: No rashes, no cyanosis, marked bilateral upper and lower extremity pitting edema.   Musculoskeletal: No joint swelling, erythema or tenderness.  Neurologic: Cranial nerves 2-12 intact, normal strength and sensation.  Psychiatric: Alert, oriented to person, place and time, no obvious anxiety or depression. Seems a little confused at time. Some word finding issues.     Medical Decision Making       75 MINUTES SPENT BY ME on the date of service doing chart review, history, exam, documentation & further activities per the note.      Data     I have personally reviewed the following data over the past 24 hrs:    3.4 (L)  \   6.9 (LL)   / 190     136 99 19.4 /  94   4.5 33 (H) 0.85 \     Trop: 58 (H) BNP: 1,253     INR:  1.98 (H) PTT:  N/A   D-dimer:  N/A Fibrinogen:  N/A       Imaging results reviewed over the past 24 hrs:   Recent Results (from the past 24 hour(s))   XR Chest 2 Views    Narrative    CHEST TWO VIEWS January 30, 2024 11:04 AM     HISTORY: Shortness of breath.    COMPARISON: Chest radiograph 1/15/2024.       Impression    IMPRESSION: Improved left lateral patchy opacity. Increased right  perihilar hazy opacity suggestive of worsening pulmonary edema.  Similar vascular congestion and cardiomegaly. No new focal  consolidation. Possible trace right pleural effusion, however the  lateral view is limited due to overlapping soft tissue.  Median  sternotomy and cardiac valve prosthesis.    SHIELA BOLDEN MD         SYSTEM ID:  [id number]

## 2024-01-30 NOTE — ED PROVIDER NOTES
"  History     Chief Complaint:  Abnormal Labs       The history is provided by the patient.      Feng Wynne is a 79 year old male with a history of coronary artery disease who presents to the ED for a low hemoglobin value of 6. Patient reports he has had more trouble breathing since his pneumonia 3 months ago. Reports no recent change in his shortness of breath. Reports productive cough with thick, bloody sputum. Reports shortness of breath when ambulating and states he has not been very active recently. Reports recent weight gain and increased leg swelling. Reports he is usually on supplemental oxygen at night and yesterday his SpO2 was 81% last night. Denies fevers, headache, abdominal pain, melena, hematochezia, hematemesis, lightheadedness or dizziness. Patient reports he has had a Stubbs catheter for a while. Reports his right side has always been more swollen than the left.      Independent Historian:   None - Patient Only    Review of External Notes:   I reviewed patient's TCU visit note from 01/29/24, his lab results as well as his chronic medical issues. Chronically anticoagulated.  Today his hemoglobin is 6.       Medications:    Clindamycin  Protonix  Warfarin  Ceftin  Vibra-tabs  Lasix    Past Medical History:    Arthritis  Afib  Coronary artery disease  Hyperlipidemia  Morbid obesity   Hypertension    Past Surgical History:    Colonoscopy  EGD  Mitral valve replacement    Physical Exam   Patient Vitals for the past 24 hrs:   BP Temp Temp src Pulse Resp SpO2 Height   01/30/24 1014 108/54 98.6  F (37  C) Oral 83 20 94 % 1.803 m (5' 11\")        Physical Exam  General: Resting on the bed.  Head: No obvious trauma to head.  Ears, Nose, Throat:  External ears normal.  Nose normal.    Eyes:  Conjunctivae clear.  Pupils are equal, round, and reactive.   Neck: Normal range of motion.  Neck supple.   CV: Regular rate and rhythm. +murmurs.      Respiratory: Effort normal and breath sounds normal.  No wheezing or " crackles.   Gastrointestinal: Soft.  No distension. There is no tenderness.    Musculoskeletal: Normal range of motion.  Non tender extremities to palpations.  Lower and upper extremity edema appreciable   Neuro: Alert. Moving all extremities appropriately.  Normal speech.    Skin: Skin is warm and dry.  No rash noted.     Emergency Department Course   ECG  ECG taken at 1032, ECG read at 1033  Atrial fibrillation. Low voltage QRS. Left anterior fascicular block. Possible lateral infarct, age undetermined. Cannot rule out inferior infarct (masked by fascicular block?), age undetermined. Abnormal ECG.    No significant change as compared to prior, dated 01/09/24.  Rate 76 bpm. AK interval * ms. QRS duration 108 ms. QT/QTc 390/438 ms. P-R-T axes * -50 28.     Imaging:  XR Chest 2 Views   Final Result   IMPRESSION: Improved left lateral patchy opacity. Increased right   perihilar hazy opacity suggestive of worsening pulmonary edema.   Similar vascular congestion and cardiomegaly. No new focal   consolidation. Possible trace right pleural effusion, however the   lateral view is limited due to overlapping soft tissue. Median   sternotomy and cardiac valve prosthesis.      SHIELA BOLDEN MD            SYSTEM ID:  [id number]         Laboratory:  Labs Ordered and Resulted from Time of ED Arrival to Time of ED Departure   INR - Abnormal       Result Value    INR 1.98 (*)    BASIC METABOLIC PANEL - Abnormal    Sodium 136      Potassium 4.5      Chloride 99      Carbon Dioxide (CO2) 33 (*)     Anion Gap 4 (*)     Urea Nitrogen 19.4      Creatinine 0.85      GFR Estimate 89      Calcium 9.5      Glucose 94     TROPONIN T, HIGH SENSITIVITY - Abnormal    Troponin T, High Sensitivity 58 (*)    OCCULT BLOOD STOOL - Abnormal    Occult Blood Positive (*)    CBC WITH PLATELETS AND DIFFERENTIAL - Abnormal    WBC Count 3.4 (*)     RBC Count 2.51 (*)     Hemoglobin 6.9 (*)     Hematocrit 24.1 (*)     MCV 96      MCH 27.5      MCHC  28.6 (*)     RDW 27.9 (*)     Platelet Count 190      % Neutrophils 49      % Lymphocytes 28      % Monocytes 11      % Eosinophils 10      % Basophils 1      % Immature Granulocytes 1      NRBCs per 100 WBC 0      Absolute Neutrophils 1.7      Absolute Lymphocytes 0.9      Absolute Monocytes 0.4      Absolute Eosinophils 0.3      Absolute Basophils 0.0      Absolute Immature Granulocytes 0.0      Absolute NRBCs 0.0     NT PROBNP INPATIENT - Normal    N terminal Pro BNP Inpatient 1,253     TYPE AND SCREEN, ADULT    ABO/RH(D) O POS      Antibody Screen Negative      SPECIMEN EXPIRATION DATE [id number]     ABO/RH TYPE AND SCREEN      Emergency Department Course & Assessments:       Interventions:  Medications - No data to display       Independent Interpretation (X-rays, CTs, rhythm strip):  Chest XR shows pulmonary edema.     Assessments/Consultations/Discussion of Management or Tests:     ED Course as of 24 1703   Tue 2024   1010 I obtained history and examined the patient as noted above.     1200 I rechecked the patient and explained findings. We discussed plan for admission and patient is in agreement with plan.        1214 I spoke with Dr. Roach, of the hospitalist team, regarding the patient. They will accept the patient for admission.      Social Determinants of Health affecting care:   None    Disposition:  The patient was admitted to the hospital under the care of Dr. Roach.     Impression & Plan        Medical Decision Makin-year-old male presents to the emergency department with abnormal labs.  Vitals are reassuring.  Broad differential was pursued including not limited to anemia, electrolyte, metabolic, renal dysfunction, GI bleed, ACS, arrhythmia, dehydration, acute and chronic disease, CHF, etc.  CBC does show marked anemia 6.9 this is acute on chronic process at this time.  No significant leukocytosis.  BMP without acute electrolyte, metabolic or renal dysfunction.  INR  slightly subtherapeutic at 1.98.  He has a mechanical valve.  Do not think that he is appropriate for reversal.  His BNP is mildly elevated suggestive of CHF especially with patient's chest x-ray showing pulmonary edema.  Patient also notably has extremity edema.  I think suspect acute CHF suspicion in setting of anemia.  Occult is positive although stool was not grossly melanotic on exam or bloody.  Protonix given.  Patient has had a recent workup for anemia without any concerning findings.  This may be acute on chronic disease.  Regardless plan for transfusion, diuresis and admission.  Discussed with hospitalist who graciously accepted      Diagnosis:    ICD-10-CM    1. Acute on chronic congestive heart failure, unspecified heart failure type (H)  I50.9       2. Anemia due to blood loss, acute  D62       3. Positive occult stool blood test  R19.5       4. Shortness of breath  R06.02            Discharge Medications:  New Prescriptions    No medications on file          Scribe Disclosure:  Mila GUTIÉRREZ, am serving as a scribe at 10:21 AM on 1/30/2024 to document services personally performed by Samantha Schultz MD based on my observations and the provider's statements to me.   1/30/2024   Samantha Schultz MD Bennett, Jennifer L, MD  01/30/24 8336

## 2024-01-30 NOTE — ED TRIAGE NOTES
Patient BIBA from [facility] TCU with low hgb. Hemoglobin was 6.0, patient denies any symptoms. BG for 187.

## 2024-01-30 NOTE — PHARMACY-ADMISSION MEDICATION HISTORY
Pharmacist Admission Medication History    Admission medication history is complete. The information provided in this note is only as accurate as the sources available at the time of the update.    Information Source(s): Facility ([facility]) medication list/MAR via phone (called to clarify warfarin dosing)    Pertinent Information:   Furosemide 40 mg daily x3 days plus potassium chloride 20 mEq daily x3 days ordered starting 24.  Lisinopril 5 mg daily ordered from -, then discontinued. Did not add to his list.  Fish oil is currently on hold since 24.    Recent antimicrobials:  On 24, patient was prescribed cefuroxime axetil 500 mg for UTI with instructions of 500 mg BID x3 days. Patient completed the course of antibiotics.   On 24, patient was prescribed ceftriaxone 2 g once, then cefuroxime axetil 500 mg and doxycycline 100 mg for pneumonia with instructions of ceftriaxone IM once, then cefuroxime 500 mg BID and doxycycline 100 mg BID x7 days. Patient is currently taking the course of antibiotics.     Warfarin:   Patient is taking warfarin for the indication of mechanical mitral valve replacement with an INR goal of 2.5-3.5.   Current dosin/19 5 mg   7.5 mg  - 5 mg  - doses held  - 2.5 mg  Last dose (2.5 mg) was taken 24 at .    Changes made to PTA medication list:  Added: bisacodyl, cefuroxime, doxycycline, furosemide, Miralax, guaifenesin, nystatin powder, potassium, senna (scheduled + prn)  Deleted: nystatin cream  Changed: updated warfarin (see above)    Allergies reviewed with patient and updates made in EHR: no    Medication History Completed By: Edel Alexander [facility] 2024 11:06 AM    Prior to Admission medications    Medication Sig Last Dose Taking? Auth Provider Long Term End Date   albuterol (PROAIR HFA/PROVENTIL HFA/VENTOLIN HFA) 108 (90 Base) MCG/ACT inhaler Inhale 1-2 puffs into the lungs every 4 hours as needed for  shortness of breath, wheezing or cough  Patient taking differently: Inhale 2 puffs into the lungs every 4 hours as needed for shortness of breath, wheezing or cough prn Yes Jayme Deng MD Yes    bisacodyl (DULCOLAX) 10 MG suppository Place 10 mg rectally daily as needed for constipation Every 3 days if no BM prn Yes Unknown, Entered By History     cefuroxime (CEFTIN) 500 MG tablet Take 500 mg by mouth 2 times daily For 7 days; pneumonia 1/30/2024 at 0800 Yes Unknown, Entered By History     clindamycin (CLEOCIN) 150 MG capsule TAKE 4 CAPS BY MOUTH 1 HOUR PRIOR TO DENTAL APPOINTMENT prn Yes Reported, Patient     doxycycline hyclate (VIBRA-TABS) 100 MG tablet Take 100 mg by mouth 2 times daily For 7 days; pneumonia 1/29/2024 at 0800 Yes Unknown, Entered By History     furosemide (LASIX) 40 MG tablet Take 40 mg by mouth daily x3 days 1/29/2024 at 1200 Yes Unknown, Entered By History  1/31/24   guaiFENesin (MUCINEX) 600 MG 12 hr tablet Take 600 mg by mouth 2 times daily 1/30/2024 at am Yes Unknown, Entered By History     Multiple Vitamins-Minerals (CENTRUM SILVER PO) Take 1 tablet by mouth daily. 1/30/2024 at 0800 Yes Reported, Patient     nystatin (MYCOSTATIN) 499612 UNIT/GM external powder Apply topically 2 times daily Groin folds 1/30/2024 at am Yes Unknown, Entered By History     pantoprazole (PROTONIX) 40 MG EC tablet Take 1 tablet (40 mg) by mouth 2 times daily (before meals) 1/30/2024 at 0700 Yes Ko Huber MD     polyethylene glycol (MIRALAX) 17 GM/Dose powder Take 17 g by mouth daily 1/29/2024 at am Yes Unknown, Entered By History     potassium chloride ER (K-TAB) 20 MEQ CR tablet Take 20 mEq by mouth daily x3 days; to be given with furosemide 1/29/2024 at 1200 Yes Unknown, Entered By History  1/31/24   sennosides (SENOKOT) 8.6 MG tablet Take 1 tablet by mouth 2 times daily as needed for constipation prn Yes Unknown, Entered By History     sennosides (SENOKOT) 8.6 MG tablet Take 1 tablet by  mouth at bedtime 1/29/2024 at hs Yes Unknown, Entered By History     warfarin ANTICOAGULANT (COUMADIN) 5 MG tablet TAKE ONE & ONE-HALF TAB (7.5MG) EACH TUE & SAT. TAKE 1 TAB (5MG) ALL OTHER DAYS OR AS DIRECTED  Patient taking differently: For [facility] MVR  INR goal 2.5-3.5   -  1/19 5 mg  1/20 7.5 mg  1/21-24 5 mg  1/25-27 hold (no order placed in MAR on 1/26, clarified w/ facility)  1/28 2.5 mg  1/29 2.5 mg 1/29/2024 at 2.5 mg Yes Crystal Erickson MD Yes    Omega-3 Fatty Acids (FISH OIL CONCENTRATE PO) Take 1 capsule by mouth daily ON HOLD at since 1/20  Reported, Patient     order for DME Equipment being ordered: COMPRESSION STOCKINGS, 20-30 mmHg   Armani Marin MD

## 2024-01-31 NOTE — PLAN OF CARE
Goal Outcome Evaluation:  1/30/24  2300-0730H    Orientation: A/Ox4  Activity: A2-3 lift T/R as pt allows  Diet/BS Checks: Clears  Tele:  Afib CVR  IV Access/Drains: L PIV SL  Pain Management: denies pain  Abnormal VS/Results: VSS, 02 at 2NC, K 4.5, Mag-1.4, Hgb 7.4  Bowel/Bladder: Stubbs in place with adequate UO, 1x BM this shift  Skin/Wounds: BUE/BLE edema,moist groin with mild rashes-interdry in place  Consults: GI/Cardio/WOC  D/C Disposition: pending  Other Info: On K, Mag protocol-Magnesium 1.4, replacement done, rechecks at 0623H, BMP/CBC/INR-AM draws

## 2024-01-31 NOTE — CONSULTS
[facility]    Cardiology Consultation     Date of Admission:  1/30/2024    Assessment & Plan   Feng Wynne is a 79 year old male who was admitted on 1/30/2024.    Severe symptomatic anemia, uncertain etiology, suspect GI bleed (though culprit not yet identified)  Acute on chronic HFpEF, moderate-severely volume overloaded at present  Status post mechanical MVR and maze in 2008  Chronic atrial fibrillation, previously bradycardic (improved since stopping beta-blocker)  Chronic anticoagulation with warfarin  Moderate to severe TR      It was a pleasure to speak with Feng and his partner at the bedside today.  From a cardiac perspective, we want him to be anticoagulated as soon as possible due to the significantly elevated risk of thrombosis with a mechanical mitral valve.  That being said, he clearly has ongoing severe anemia issues which do not appear to be related to hemolysis and are probably due to as yet unidentified GI bleeding source.  Accordingly, I would recommend holding his Coumadin for now but would strongly recommend an expeditious inpatient evaluation for GI bleed if at all possible, rather than a plan for outpatient capsule endoscopy, which would put him at an unacceptably high risk for potential catastrophic thrombosis if we need to hold his Coumadin for that long.    In the meantime, he clearly is significantly volume overloaded at this point, so we will continue to diurese him.  His current IV diuretics seem to be working well, so we will continue this for now.      Thank you for the interesting consult.  Cardiology will continue to follow.        High complexity     Modesto Nichols MD    Primary Care Physician   Jayme Deng MD    Reason for Consult   Reason for consult: I was asked by Dr. Rea to evaluate this patient for HF, mechanical MVR.    History of Present Illness   Feng Wynne is a 79 year old male who presents with acute on chronic anemia.  He  was recently hospitalized from 1/9 through 1/19 for severe anemia, and had undergone workup for this (see H&P) without definite culprit lesion identified.  His home Coumadin for his mechanical mitral valve was held during his prior admission but was then restarted.  Given the recurrent drop in hemoglobin this has once again been held.  In addition, he also has had increasing weight, shortness of breath, and lower extremity swelling, which has been slowly worsening for quite some time.    Chest x-ray shows bilateral opacities consistent with pulmonary edema.  NT proBNP was 1253.  Renal function normal.  Admission hemoglobin was 6.0.  I also reviewed the remainder of his labs since admission.  Admission EKG showed atrial fibrillation at 76 bpm.  Echo from 1/10/2024 showed low normal LVEF around 55% with borderline reduction in RV systolic function, 2-3+ TR, and a well-functioning mechanical mitral valve with a mean gradient of 5 mmHg at a heart rate of 50.    Past Medical History   Past Medical History:   Diagnosis Date    Acute on chronic congestive heart failure, unspecified heart failure type (H) 1/30/2024    Arthritis     Atrial fibrillation (H)     Coronary artery disease     Hypercholesteremia     Morbid obesity (H)     Unspecified essential hypertension          Past Surgical History   Past Surgical History:   Procedure Laterality Date    COLONOSCOPY N/A 1/15/2024    Procedure: Colonoscopy;  Surgeon: Osbaldo Olvera MD;  Location:  GI    ESOPHAGOSCOPY, GASTROSCOPY, DUODENOSCOPY (EGD), COMBINED N/A 1/15/2024    Procedure: Esophagoscopy, gastroscopy, duodenoscopy (EGD), combined;  Surgeon: Osbaldo Olvera MD;  Location:  GI    MITRAL VALVE REPLACEMENT  8-    Mitral valve replacement with 31-mm [employer] mechanical valve.          Prior to Admission Medications   Prior to Admission Medications   Prescriptions Last Dose Informant Patient Reported? Taking?   Multiple Vitamins-Minerals  (CENTRUM SILVER PO) 1/30/2024 at [facility] Yes Yes   Sig: Take 1 tablet by mouth daily.   Omega-3 Fatty Acids (FISH OIL CONCENTRATE PO) ON HOLD at since 1/20 Nursing Home Yes No   Sig: Take 1 capsule by mouth daily   albuterol (PROAIR HFA/PROVENTIL HFA/VENTOLIN HFA) 108 (90 Base) MCG/ACT inhaler prn Nursing Home No Yes   Sig: Inhale 1-2 puffs into the lungs every 4 hours as needed for shortness of breath, wheezing or cough   Patient taking differently: Inhale 2 puffs into the lungs every 4 hours as needed for shortness of breath, wheezing or cough   bisacodyl (DULCOLAX) 10 MG suppository prn Nursing Home Yes Yes   Sig: Place 10 mg rectally daily as needed for constipation Every 3 days if no BM   cefuroxime (CEFTIN) 500 MG tablet 1/30/2024 at [facility] Yes Yes   Sig: Take 500 mg by mouth 2 times daily For 7 days; pneumonia   clindamycin (CLEOCIN) 150 MG capsule prn Nursing Home Yes Yes   Sig: TAKE 4 CAPS BY MOUTH 1 HOUR PRIOR TO DENTAL APPOINTMENT   doxycycline hyclate (VIBRA-TABS) 100 MG tablet 1/29/2024 at 0800 [facility] Yes Yes   Sig: Take 100 mg by mouth 2 times daily For 7 days; pneumonia   furosemide (LASIX) 40 MG tablet 1/29/2024 at 1200 [facility] Yes Yes   Sig: Take 40 mg by mouth daily x3 days   guaiFENesin (MUCINEX) 600 MG 12 hr tablet 1/30/2024 at am [facility] Yes Yes   Sig: Take 600 mg by mouth 2 times daily   nystatin (MYCOSTATIN) 964123 UNIT/GM external powder 1/30/2024 at am Nursing Home Yes Yes   Sig: Apply topically 2 times daily Groin folds   order for DME   No No   Sig: Equipment being ordered: COMPRESSION STOCKINGS, 20-30 mmHg   pantoprazole (PROTONIX) 40 MG EC tablet 1/30/2024 at 0700 [facility] No Yes   Sig: Take 1 tablet (40 mg) by mouth 2 times daily (before meals)   polyethylene glycol (MIRALAX) 17 GM/Dose powder 1/29/2024 at am Nursing Home Yes Yes   Sig: Take 17 g by mouth daily   potassium chloride ER (K-TAB) 20 MEQ CR tablet 1/29/2024 at 1200 Nursing Home Yes  Yes   Sig: Take 20 mEq by mouth daily x3 days; to be given with furosemide   sennosides (SENOKOT) 8.6 MG tablet prn Nursing Home Yes Yes   Sig: Take 1 tablet by mouth 2 times daily as needed for constipation   sennosides (SENOKOT) 8.6 MG tablet 1/29/2024 at  Nursing Home Yes Yes   Sig: Take 1 tablet by mouth at bedtime   warfarin ANTICOAGULANT (COUMADIN) 5 MG tablet 1/29/2024 at 2.5 mg Nursing Home No Yes   Sig: TAKE ONE & ONE-HALF TAB (7.5MG) EACH TUE & SAT. TAKE 1 TAB (5MG) ALL OTHER DAYS OR AS DIRECTED   Patient taking differently: For [facility] MVR  INR goal 2.5-3.5   -  1/19 5 mg  1/20 7.5 mg  1/21-24 5 mg  1/25-27 hold (no order placed in MAR on 1/26, clarified w/ facility)  1/28 2.5 mg  1/29 2.5 mg      Facility-Administered Medications: None     Current Facility-Administered Medications   Medication Dose Route Frequency    cefuroxime  500 mg Oral BID    doxycycline hyclate  100 mg Oral BID    furosemide  40 mg Intravenous Q12H    miconazole   Topical BID    pantoprazole  40 mg Intravenous BID    sodium chloride (PF)  3 mL Intracatheter Q8H     Current Facility-Administered Medications   Medication Last Rate    - MEDICATION INSTRUCTIONS -       Allergies   Allergies   Allergen Reactions    Amiodarone Shortness Of Breath    Pcn [Penicillins] Shortness Of Breath     Rxn occurred in childhood     Statins Muscle Pain (Myalgia) and Hives    Amiodarone     Latex     Zetia [Ezetimibe] Muscle Pain (Myalgia)     Muscle weakness legs       Social History    reports that he has quit smoking. His smoking use included cigarettes. He has a 2.5 pack-year smoking history. He has never used smokeless tobacco. He reports current alcohol use. He reports that he does not use drugs.      Family History   I have reviewed this patient's family history and updated it with pertinent information if needed.  Family History   Problem Relation Age of Onset    Cerebrovascular Disease Father     Diabetes Paternal Grandmother     SHAYY  "Brother         CABG X 3 at age 51          Review of Systems   A comprehensive review of system was performed and is negative other than that noted in the HPI or here.     Physical Exam   Vital Signs with Ranges  Temp:  [98.3  F (36.8  C)-98.5  F (36.9  C)] 98.5  F (36.9  C)  Pulse:  [66-75] 67  Resp:  [18-20] 18  BP: (109-120)/(51-60) 119/51  SpO2:  [90 %-98 %] 97 %  Wt Readings from Last 4 Encounters:   01/30/24 145.2 kg (320 lb)   01/29/24 (!) 155.3 kg (342 lb 6.4 oz)   01/26/24 143.8 kg (317 lb)   01/25/24 (!) 153 kg (337 lb 6.4 oz)     I/O last 3 completed shifts:  In: 300   Out: 6050 [Urine:6050]      Vitals: /51 (BP Location: Right arm)   Pulse 67   Temp 98.5  F (36.9  C) (Oral)   Resp 18   Ht 1.803 m (5' 11\")   Wt 145.2 kg (320 lb)   SpO2 97%   BMI 44.63 kg/m      Physical Exam:   General - Alert and oriented to time place and person in no acute distress  Eyes - No scleral icterus  HEENT - Neck supple, moist mucous membranes  Cardiovascular -irregularly irregular, normal rate, mechanical S1, faint systolic murmur  Extremities - There is 2-3+ bilateral lower extremity edema  Respiratory -mild diffuse crackles  Skin - No pallor or cyanosis  Gastrointestinal - Non tender and non distended without rebound or guarding  Psych - Appropriate affect   Neurological - No gross motor neurological focal deficits    No lab results found in last 7 days.    Invalid input(s): \"TROPONINIES\"    Recent Labs   Lab 01/31/24  1620 01/31/24  0728 01/30/24  1850 01/30/24  1048 01/30/24  0545 01/30/24  0545 01/26/24  0945   WBC  --  3.2*  --  3.4*  --  3.4*  --    HGB 7.1* 7.3* 7.4* 6.9*   < > 6.0*  --    MCV  --  95  --  96  --  96  --    PLT  --  188  --  190  --  172  --    INR  --  1.84*  --  1.98*  --   --  3.61*   NA  --  137  --  136  --   --  140   POTASSIUM  --  4.2  --  4.5  --   --  4.8   CHLORIDE  --  98  --  99  --   --  104   CO2  --  35*  --  33*  --   --  34*   BUN  --  18.3  --  19.4  --   --  18.1   CR " " --  0.91  --  0.85  --   --  0.78   GFRESTIMATED  --  86  --  89  --   --  >90   ANIONGAP  --  4*  --  4*  --   --  2*   JOSEPH  --  9.4  --  9.5  --   --  9.3   GLC  --  88  --  94  --   --  110*    < > = values in this interval not displayed.     Recent Labs   Lab Test 01/09/24  1027 02/09/22  1117   CHOL 53 141   HDL 25* 46   LDL 22 78   TRIG 32 85     Recent Labs   Lab 01/31/24  1620 01/31/24  0728 01/30/24  1850 01/30/24  1048 01/30/24  0545   WBC  --  3.2*  --  3.4* 3.4*   HGB 7.1* 7.3* 7.4* 6.9* 6.0*   HCT  --  25.1*  --  24.1* 21.0*   MCV  --  95  --  96 96   PLT  --  188  --  190 172   B12  --   --  658  --   --      No results for input(s): \"PH\", \"PHV\", \"PO2\", \"PO2V\", \"SAT\", \"PCO2\", \"PCO2V\", \"HCO3\", \"HCO3V\" in the last 168 hours.  Recent Labs   Lab 01/30/24  1048   NTBNPI 1,253     No results for input(s): \"DD\" in the last 168 hours.  No results for input(s): \"SED\", \"CRP\" in the last 168 hours.  Recent Labs   Lab 01/31/24  0728 01/30/24  1048 01/30/24  0545    190 172     No results for input(s): \"TSH\" in the last 168 hours.  No results for input(s): \"COLOR\", \"APPEARANCE\", \"URINEGLC\", \"URINEBILI\", \"URINEKETONE\", \"SG\", \"UBLD\", \"URINEPH\", \"PROTEIN\", \"UROBILINOGEN\", \"NITRITE\", \"LEUKEST\", \"RBCU\", \"WBCU\" in the last 168 hours.    Imaging:  Recent Results (from the past 48 hour(s))   XR Chest 2 Views    Narrative    CHEST TWO VIEWS January 30, 2024 11:04 AM     HISTORY: Shortness of breath.    COMPARISON: Chest radiograph 1/15/2024.       Impression    IMPRESSION: Improved left lateral patchy opacity. Increased right  perihilar hazy opacity suggestive of worsening pulmonary edema.  Similar vascular congestion and cardiomegaly. No new focal  consolidation. Possible trace right pleural effusion, however the  lateral view is limited due to overlapping soft tissue. Median  sternotomy and cardiac valve prosthesis.    SHIELA BOLDEN MD         SYSTEM ID:  [id number]       Echo:  No results found for this or " "any previous visit (from the past 4320 hour(s)).    Clinically Significant Risk Factors            # Hypomagnesemia: Lowest Mg = 1.4 mg/dL in last 2 days, will replace as needed    # Coagulation Defect: INR = 1.84 (Ref range: 0.85 - 1.15) and/or PTT = N/A, will monitor for bleeding    # Hypertension: Noted on problem list  # Acute heart failure with preserved ejection fraction: heart failure noted on problem list, last echo with EF >50%, and receiving IV diuretics       # Severe Obesity: Estimated body mass index is 44.63 kg/m  as calculated from the following:    Height as of this encounter: 1.803 m (5' 11\").    Weight as of this encounter: 145.2 kg (320 lb)., PRESENT ON ADMISSION     # Financial/Environmental Concerns:          Cardiac Arrhythmia: Atrial fibrillation: Paroxysmal  Heart Valve Replacement  Diastolic acute                                Pulmonary Heart Disease (Pulmonary hypertension or Cor pulmonale): Pulmonary Hypertension, unspecified    Chronic Fatigue and Other Debilities: Age-related physical debility  Chronic fatigue, unspecified  Other reduced mobility    "

## 2024-01-31 NOTE — CONSULTS
[facility]  Gastroenterology Consultation         Feng Wynne  [address]   [facility] [address]  79 year old male    Admission Date/Time: 1/30/2024  Primary Care Provider: Jayme Deng  Referring / Attending Physician:  Dr. Rea    We were asked to see the patient in consultation by Dr. Rea for evaluation of acute on chronic anemia recurrent acute on chronic anemia.      CC: Recurrent anemia    HPI:  Feng Wynne is a 79 year old male who was recently hospitalized with history of significant anemia patient has been chronically anticoagulated due to mitral valve with Coumadin patient had supratherapeutic INR patient was monitored patient was evaluated with upper GI endoscopy and colonoscopy both of those were unremarkable on January 15.  Patient has past medical history significant for Maze procedure mechanical mitral valve congestive heart failure significant peripheral edema morbid obesity hyperlipidemia.  Patient was discharged with hemoglobin of 8 patient was sent back again with hemoglobin of 6 patient was transfused patient's hemoglobin has been in the range of 7 patient does not have any clinical signs of GI bleed denied any history of melena nausea vomiting or any other GI symptoms.  Patient was admitted again now patient anticoagulation was held patient was asked to be reevaluated by GI.  After last endoscopy patient was recommended to be evaluated further with capsule endoscopy which was never done.  Patient is laying in bed patient is having some shortness of breath patient has signs of some congestive heart failure otherwise no other significant systemic complaint patient is currently being transfused.  Patient is denying any other new significant systemic complaints.    ROS: A comprehensive ten point review of systems was negative aside from those in mentioned in the HPI.      PAST MED HX:  I have reviewed this patient's medical history and updated  it with pertinent information if needed.   Past Medical History:   Diagnosis Date    Acute on chronic congestive heart failure, unspecified heart failure type (H) 1/30/2024    Arthritis     Atrial fibrillation (H)     Coronary artery disease     Hypercholesteremia     Morbid obesity (H)     Unspecified essential hypertension        MEDICATIONS:   Prior to Admission Medications   Prescriptions Last Dose Informant Patient Reported? Taking?   Multiple Vitamins-Minerals (CENTRUM SILVER PO) 1/30/2024 at 0800 care home Yes Yes   Sig: Take 1 tablet by mouth daily.   Omega-3 Fatty Acids (FISH OIL CONCENTRATE PO) ON HOLD at since 1/20 [facility] [place] Yes No   Sig: Take 1 capsule by mouth daily   albuterol (PROAIR HFA/PROVENTIL HFA/VENTOLIN HFA) 108 (90 Base) MCG/ACT inhaler prn Nursing Home No Yes   Sig: Inhale 1-2 puffs into the lungs every 4 hours as needed for shortness of breath, wheezing or cough   Patient taking differently: Inhale 2 puffs into the lungs every 4 hours as needed for shortness of breath, wheezing or cough   bisacodyl (DULCOLAX) 10 MG suppository prn Nursing Home Yes Yes   Sig: Place 10 mg rectally daily as needed for constipation Every 3 days if no BM   cefuroxime (CEFTIN) 500 MG tablet 1/30/2024 at 0800 care home Yes Yes   Sig: Take 500 mg by mouth 2 times daily For 7 days; pneumonia   clindamycin (CLEOCIN) 150 MG capsule prn Nursing Home Yes Yes   Sig: TAKE 4 CAPS BY MOUTH 1 HOUR PRIOR TO DENTAL APPOINTMENT   doxycycline hyclate (VIBRA-TABS) 100 MG tablet 1/29/2024 at 0800 [facility] Home Yes Yes   Sig: Take 100 mg by mouth 2 times daily For 7 days; pneumonia   furosemide (LASIX) 40 MG tablet 1/29/2024 at 1200 Nursing Home Yes Yes   Sig: Take 40 mg by mouth daily x3 days   guaiFENesin (MUCINEX) 600 MG 12 hr tablet 1/30/2024 at am Nursing Home Yes Yes   Sig: Take 600 mg by mouth 2 times daily   nystatin (MYCOSTATIN) 121967 UNIT/GM external powder 1/30/2024 at am Nursing Home Yes Yes   Sig: Apply  topically 2 times daily Groin folds   order for DME   No No   Sig: Equipment being ordered: COMPRESSION STOCKINGS, 20-30 mmHg   pantoprazole (PROTONIX) 40 MG EC tablet 1/30/2024 at 0700 Nursing Home No Yes   Sig: Take 1 tablet (40 mg) by mouth 2 times daily (before meals)   polyethylene glycol (MIRALAX) 17 GM/Dose powder 1/29/2024 at am Nursing Home Yes Yes   Sig: Take 17 g by mouth daily   potassium chloride ER (K-TAB) 20 MEQ CR tablet 1/29/2024 at 1200 Nursing Home Yes Yes   Sig: Take 20 mEq by mouth daily x3 days; to be given with furosemide   sennosides (SENOKOT) 8.6 MG tablet prn Nursing Home Yes Yes   Sig: Take 1 tablet by mouth 2 times daily as needed for constipation   sennosides (SENOKOT) 8.6 MG tablet 1/29/2024 at hs Nursing Home Yes Yes   Sig: Take 1 tablet by mouth at bedtime   warfarin ANTICOAGULANT (COUMADIN) 5 MG tablet 1/29/2024 at 2.5 mg Nursing Home No Yes   Sig: TAKE ONE & ONE-HALF TAB (7.5MG) EACH TUE & SAT. TAKE 1 TAB (5MG) ALL OTHER DAYS OR AS DIRECTED   Patient taking differently: For [facility] MVR  INR goal 2.5-3.5   -  1/19 5 mg  1/20 7.5 mg  1/21-24 5 mg  1/25-27 hold (no order placed in MAR on 1/26, clarified w/ facility)  1/28 2.5 mg  1/29 2.5 mg      Facility-Administered Medications: None       ALLERGIES:   Allergies   Allergen Reactions    Amiodarone Shortness Of Breath    Pcn [Penicillins] Shortness Of Breath     Rxn occurred in childhood     Statins Muscle Pain (Myalgia) and Hives    Amiodarone     Latex     Zetia [Ezetimibe] Muscle Pain (Myalgia)     Muscle weakness legs       SOCIAL HISTORY:  Social History     Tobacco Use    Smoking status: Former     Packs/day: 0.50     Years: 5.00     Additional pack years: 0.00     Total pack years: 2.50     Types: Cigarettes    Smokeless tobacco: Never    Tobacco comments:     quit 20+ yrs ago   Vaping Use    Vaping Use: Never used   Substance Use Topics    Alcohol use: Yes     Comment: 1 drink per month    Drug use: No       FAMILY  HISTORY:  Family History   Problem Relation Age of Onset    Cerebrovascular Disease Father     Diabetes Paternal Grandmother     SINAN. Brother         CABG X 3 at age 51       PHYSICAL EXAM:   General awake alert oriented comfortable  Vital Signs with Ranges  Temp: 98.5  F (36.9  C) Temp src: Oral BP: 119/51 Pulse: 67   Resp: 18 SpO2: 97 % O2 Device: Nasal cannula Oxygen Delivery: 2.5 LPM  I/O last 3 completed shifts:  In: 300   Out: 6050 [Urine:6050]    Cardiovascular: negative, PMI normal. No lifts, heaves, or thrills. RRR. No murmurs, clicks gallops or rub  Respiratory: negative, Percussion normal. Good diaphragmatic excursion. Lungs clear  Neck: Neck supple. No adenopathy. Thyroid symmetric, normal size,, Carotids without bruits.  Abdomen: Abdomen soft, non-tender. BS normal. No masses, organomegaly  SKIN: no suspicious lesions or rashes          ADDITIONAL COMMENTS:   I reviewed the patient's new clinical lab test results.   Recent Labs   Lab Test 01/31/24  1620 01/31/24  0728 01/30/24  1850 01/30/24  1048 01/30/24  0545 01/26/24  0945   WBC  --  3.2*  --  3.4* 3.4*  --    HGB 7.1* 7.3* 7.4* 6.9* 6.0*  --    MCV  --  95  --  96 96  --    PLT  --  188  --  190 172  --    INR  --  1.84*  --  1.98*  --  3.61*     Recent Labs   Lab Test 01/31/24  0728 01/30/24  1048 01/26/24  0945   POTASSIUM 4.2 4.5 4.8   CHLORIDE 98 99 104   CO2 35* 33* 34*   BUN 18.3 19.4 18.1   ANIONGAP 4* 4* 2*     Recent Labs   Lab Test 01/19/24  0607 01/18/24  0625 01/15/24  2210 01/15/24  1815 01/09/24  1157 01/09/24  1027   ALBUMIN 2.9* 2.9*  --  3.1* 3.5 3.5   BILITOTAL  --   --   --  1.0 0.7 0.7   ALT  --   --   --  13 23 22   AST  --   --   --  29 36 37   PROTEIN  --   --  Trace*  --   --   --        I reviewed the patient's new imaging results.        CONSULTATION ASSESSMENT AND PLAN:    Principal Problem:  79-year-old gentleman with history of significant symptomatic anemia along with multiple cardiac problem congestive heart failure  peripheral edema fluid overload who is readmitted due to shortness of breath patient is noticed to have dropped hemoglobin to 6 from 8 patient is denying any GI signs or symptoms of blood loss.  At this point I will recommend the patient to continue on supportive care IV Protonix serial hemoglobin transfuse.  Patient would benefit from further evaluation with video capsule endoscopy there is no plan for any endoscopic intervention patient had a recent upper GI and colonoscopy was done those were both unremarkable there is no clinical signs of bleeding.  If further workup needed consider further evaluation and hematology regarding other causes of possible anemia.  Finding discussed in detail with hospitalist team I will encourage to continue on his anticoagulation as per cardiology or internal medicine team and schedule him for video capsule endoscopy.  Patient can continue on soft diet.  GI will continue to follow along.  If patient develop any signs of overt GI bleeding patient would benefit from repeat endoscopic intervention at that time.            Osbaldo Olvera MD, FACP  [facility].  Office: [phone]  Cell : [phone]      [facility]  Ph: [phone] Fax: [phone]                      Just received consult  Feng Wynne is a 79 year old male with past medical history significant for mechanical mitral valve replacement for severe mitral regurgitation and MAZE procedure in 2008, on chronic anticoagulation with warfarin, pulmonary hypertension, chronic diastolic CHF with preserved EF, previous dilated LV likely due to mitral regurgitation, subsequent improvement with ACE inhibitor and beta-blockers, hypertension, hyperlipidemia,and morbid obesity admitted on 1/30/2024 with acute on chronic anemia.     Acute on chronic anemia  Iron deficiency anemia   Presumed GI bleed   * Pt had an admission from 1/9-1/19 for similar. At that time hgb was down to 4.4. EGD  and colonoscopy revealed no active bleed; had poor colonoscopy prep with note of coupious stool in entire colon, lavage was performed. GI recommended outpatient capsule endoscopy  * Discharge hemoglobin was 8.0.   * He returns today for worsening anemia with hemoglobin of 6.0. Occult blood is positive.   Hemoglobin now stable in mid 7 range with no signs of active bleeding    - given recent negative EGD and colonoscopy no indication for repeat without obvious signs of bleeding  - Hemoglobin stable  - watch overnight  - regular diet  - outpatient capsule  - hold warfarin if ok with cardiology. Otherwise restart  - consider transfusing to get Hgb above 8 to allow for trend down in order to capsule endoscopy in next week.    [facility]  [phone]

## 2024-01-31 NOTE — PROGRESS NOTES
SW: Writer reached out to [facility] and talked with Slim. Patient does not have a bed hold.     KASSANDRA Jorgensen

## 2024-01-31 NOTE — CONSULTS
"[facility] Nurse Inpatient Assessment     Consulted for: Wound coccyx     Summary: patient with POA evolving deep tissue pressure injury to buttock, initial visit 1/31     Patient History (according to provider note(s):      \"79 year old male with past medical history significant for mechanical mitral valve replacement for severe mitral regurgitation and MAZE procedure in 2008, on chronic anticoagulation with warfarin, pulmonary hypertension, chronic diastolic CHF with preserved EF, previous dilated LV likely due to mitral regurgitation, subsequent improvement with ACE inhibitor and beta-blockers, hypertension, hyperlipidemia,and morbid obesity admitted on 1/30/2024 with acute on chronic anemia. \"    Assessment:      Areas visualized during today's visit: Focused: and Sacrum/coccyx    Wound location: buttock, coccyx    Last photo: 1/31 photo did not save   Wound due to: Pressure Injury POA deep tissue injury   Wound history/plan of care: found with sacral mepilex   Wound base:  non-blanchable, erythema, maroon, purple, and dermis     Palpation of the wound bed: boggy and textured        Drainage: small     Description of drainage: serosanguinous     Measurements (length x width x depth, in cm): 3  x 4  x  0.1 cm      Tunneling: N/A     Undermining: N/A  Periwound skin: Superficial erosion and fragile, distant periwound skin intact       Color: normal and consistent with surrounding tissue      Temperature: normal   Odor: none  Pain: moderate, pain related to right side more than wound itself   Pain interventions prior to dressing change: patient tolerated well  Treatment goal: Heal   STATUS: initial assessment and evolving  Supplies ordered: at bedside, supplies stored on unit, and discussed with patient        Treatment Plan:       Wound care  EVERY SHIFT        Comments: Location: buttock  Care: provided qshift by primary RN  1. Cleanse with each incontinence episode with evelio " "cleanser and soft dry wipes (patient has intermittent fecal incontinence and requires assistance, patient discussed at [facility] consult he is hesitant to ask for help)  2. Apply skin prep (sure prep or 3M no sting skin barrier wipes) to wound and periwound skin, let dry for ~30 seconds  3. Cover with sacral mepilex, or with two 4x4\" mepilex at the same time to cover all the damaged skin. Ok to use each mepilex up to 5 days. Ok to lift mepilex for routine assessments of the skin and reapply          Skin care precautions  EFFECTIVE NOW        Comments: Pressure Injury Prevention (PIP) Plan:  If patient is declining pressure injury prevention interventions: Explore reason why and address patient's concerns, Educate on pressure injury risk and prevention intervention(s), If patient is still declining, document \"informed refusal\" , and Ensure Care team is aware ( provider, charge nurse, etc)  Mattress: Follow bed algorithm, reassess daily and order specialty mattress, if indicated.  HOB: Maintain at or below 30 degrees, unless contraindicated  Repositioning in bed: Every 1-2 hours , Left/right positioning; avoid supine, and Raise foot of bed prior to raising head of bed, to reduce patient sliding down (shear)  Heels: Keep elevated off mattress and Pillows under calves  Protective Dressing: Sacral Mepilex for prevention (#380371),  especially for the agitated patient  Positioning Equipment: Z-Theo positioner (#830902-medium or #[id number]) to help maintain side lying position.  Chair positioning: Chair cushion (#[id number]) , Assist patient to reposition hourly, and Do NOT use a donut for sitting (this increases pressure to smaller area and creates a higher potential for injury)    If patient has a buttock pressure injury, or high risk for PI use chair cushion or SPS.  Moisture Management: Perineal cleansing /protection: Follow Incontinence Protocol, Avoid brief in bed, Clean and dry skin folds with bathing , and Moisturize dry " skin  Under Devices: Inspect skin under all medical devices during skin inspection , Ensure tubes are stabilized without tension, and Ensure patient is not lying on medical devices or equipment when repositioned  Ask provider to discontinue device when no longer needed.          Nutrition Services Adult IP Consult  ONE TIME     Complete     References:    Medical Nutrition Therapy policy and MNT protocol   Provider: (Not yet assigned)   Question Answer Comment   Reason for Consult: RN Consult - specify Reason    Reason for Consult: wound healing support needs with evolving pressure injury to buttock            Orders: Written    RECOMMEND PRIMARY TEAM ORDER: None, at this time  Education provided: importance of repositioning, plan of care, Infection prevention , Moisture management, Hygiene, and Off-loading pressure  Discussed plan of care with: Patient, Family, Nurse, and Physician  [facility] nurse follow-up plan: weekly  Notify WOC if wound(s) deteriorate.  Nursing to notify the Provider(s) and re-consult the [facility] Nurse if new skin concern.    DATA:     Current support surface: Standard  Low air loss (PRICILA pump, Isolibrium, Pulsate, skin guard, etc)  Containment of urine/stool: Incontinence Protocol, Incontinent pad in bed, and Indwelling catheter  BMI: Body mass index is 44.63 kg/m .   Active diet order: Orders Placed This Encounter      Regular Diet Adult     Output: I/O last 3 completed shifts:  In: 300   Out: 5250 [Urine:5250]     Labs:   Recent Labs   Lab 01/31/24  0728   HGB 7.3*   INR 1.84*   WBC 3.2*     Pressure injury risk assessment:   Sensory Perception: 4-->no impairment  Moisture: 3-->occasionally moist  Activity: 1-->bedfast  Mobility: 2-->very limited  Nutrition: 2-->probably inadequate  Friction and Shear: 1-->problem  Roscoe Score: 13    Jyoti CWOCN   1st choice: Securely message with [employer] ([employer] [facility] [employer])   (2nd option: [facility] Office Phone [phone], messages checked periodically Mon-Fri  8a-4p)

## 2024-01-31 NOTE — PROGRESS NOTES
Notified provider about indwelling branch catheter discussed removal or continued need.    Did provider choose to remove indwelling branch catheter? Yes    Provider's branch indication for keeping indwelling branch catheter: Retention.    Is there an order for indwelling branch catheter? Yes    *If there is a plan to keep branch catheter in place at discharge daily notification with provider is not necessary, but please add a notation in the treatment team sticky note that the patient will be discharging with the catheter.

## 2024-01-31 NOTE — PLAN OF CARE
Goal Outcome Evaluation:    Orientation: A&Ox4, some confusion/forgetfulness  Activity: A2-3 T/R  Diet/BS Checks: clear liquid  Tele: A-fib  IV Access/Drains: L PIV SL  Pain Management: denies  Abnormal VS/Results: VSS on O2 NC @ 1L. Hgb 7.4  Bowel/Bladder: Stubbs in place, adequate output. Bedpan for BM  Skin/Wounds: Wound on coccyx, WOC consult ordered  Consults: GI, Cardiology, WOC  D/C Disposition: Plan pending

## 2024-01-31 NOTE — DISCHARGE INSTRUCTIONS
"  Wound care  EVERY SHIFT        Comments: Location: buttock  Care: provided qshift   1. Cleanse with each incontinence episode with incontinence cleanser and soft dry wipes (patient has intermittent fecal incontinence and requires assistance, patient discussed at WOC consult he is hesitant to ask for help)  2. Apply skin prep (sure prep or 3M no sting skin barrier wipes, or like product) to wound and periwound skin, let dry for ~30 seconds  3. Cover with sacral mepilex, or with two 4x4\" mepilex at the same time to cover all the damaged skin. Ok to use each mepilex up to 5 days. Ok to lift mepilex for routine assessments of the skin and reapply (or like product, 5 layer silicone foam)           Skin care precautions  EFFECTIVE NOW        Comments: Pressure Injury Prevention (PIP) Plan:  If patient is declining pressure injury prevention interventions: Explore reason why and address patient's concerns, Educate on pressure injury risk and prevention intervention(s), If patient is still declining, document \"informed refusal\" , and Ensure Care team is aware ( provider, charge nurse, etc)  Mattress: patient with a partial thickness pressure injury to buttock, needs low air loss mattress rated for pressure injury treatment   Head of bed : Maintain at or below 30 degrees, unless contraindicated  Repositioning in bed: Every 1-2 hours , Left/right positioning; avoid supine, and Raise foot of bed prior to raising head of bed, to reduce patient sliding down (shear)  Heels: Keep elevated off mattress and Pillows under calves  Protective Dressing: Sacral Mepilex for prevention  Chair positioning: use a wheelchair Chair cushion when seated, Assist patient to reposition hourly, and Do NOT use a donut for sitting (this increases pressure to smaller area and creates a higher potential for injury)    Moisture Management: Perineal cleansing /protection: Follow Incontinence Protocol, Avoid brief in bed, Clean and dry skin folds with " bathing , and Moisturize dry skin  Under Devices: Inspect skin under all medical devices during skin inspection , Ensure tubes are stabilized without tension, and Ensure patient is not lying on medical devices or equipment when repositioned

## 2024-01-31 NOTE — PROVIDER NOTIFICATION
MD Notification    Notified Person: MD    Notified Person Name: Dr Angulo    Notification Date/Time: 1/31/2024 4:21 PM     Notification Interaction: am-com    Purpose of Notification:   Are you rounding for FSH? If so, the hospitalist is wondering if you are planning to see the pt today as there is a consult in for cardiology.    Orders Received: Dr. Angulo not consulted, try Dr. Nichols. Dr. Nichols paged at 4:23 PM     Comments:

## 2024-01-31 NOTE — PLAN OF CARE
Orientation: A/Ox4  Activity: A2 lift, turn and repo  Diet/BS Checks: Clears  Tele:  Afib  IV Access/Drains: L PIV SL  Pain Management: denies pain  Abnormal VS/Results: VSS on 2L  Bowel/Bladder: Stubbs in place with good UOP. BM  Skin/Wounds: BUE/BLE edema  Consults: GI/Cardio/WOC  D/C Disposition: pending  Other Info: [employer] protocols in for 2/1 AM

## 2024-01-31 NOTE — CONSULTS
Care Management Initial Consult    General Information  Assessment completed with: Patient, Spouse or significant other, Patient and partner Jose  Type of CM/SW Visit: Initial Assessment    Primary Care Provider verified and updated as needed: Yes   Readmission within the last 30 days:        Reason for Consult: discharge planning  Advance Care Planning: Advance Care Planning Reviewed: present on chart          Communication Assessment  Patient's communication style: spoken language (English or Bilingual)             Cognitive  Cognitive/Neuro/Behavioral: WDL  Level of Consciousness: alert  Arousal Level: opens eyes spontaneously  Orientation: oriented x 4  Mood/Behavior: calm, cooperative  Best Language: 0 - No aphasia  Speech: clear, spontaneous, logical    Living Environment:   People in home: spouse  Jose  Current living Arrangements: apartment      Able to return to prior arrangements: yes       Family/Social Support:  Care provided by: self, spouse/significant other  Provides care for: no one, unable/limited ability to care for self  Marital Status: Lives with Significant Other  Partner, Children    Jose     Description of Support System: Supportive, Involved    Support Assessment: Adequate family and caregiver support, Adequate social supports    Current Resources:   Patient receiving home care services:       Community Resources:    Equipment currently used at home:    Supplies currently used at home:      Employment/Financial:  Employment Status: disabled        Financial Concerns:             Does the patient's insurance plan have a 3 day qualifying hospital stay waiver?  No    Lifestyle & Psychosocial Needs:  Social Determinants of Health     Food Insecurity: Not on file   Depression: Not at risk (10/6/2023)    PHQ-2     PHQ-2 Score: 0   Housing Stability: Not on file   Tobacco Use: Medium Risk (1/9/2024)    Patient History     Smoking Tobacco Use: Former     Smokeless Tobacco Use: Never     Passive  Exposure: Not on file   Financial Resource Strain: Not on file   Alcohol Use: Not on file   Transportation Needs: Not on file   Physical Activity: Not on file   Interpersonal Safety: Not on file   Stress: Not on file   Social Connections: Not on file       Functional Status:  Prior to admission patient needed assistance:              Mental Health Status:          Chemical Dependency Status:                Values/Beliefs:  Spiritual, Cultural Beliefs, [employer] Practices, Values that affect care:                 Additional Information:  Writer received consult for discharge planning. Per H&P, patient  is a 79 year old male with past medical history significant for mechanical mitral valve replacement for severe mitral regurgitation and MAZE procedure in 2008, on chronic anticoagulation with warfarin, pulmonary hypertension, chronic diastolic CHF with preserved EF, previous dilated LV likely due to mitral regurgitation, subsequent improvement with ACE inhibitor and beta-blockers, hypertension, hyperlipidemia,and morbid obesity admitted on 1/30/2024 with acute on chronic anemia.     Writer met with patient and Partner Jose khan. Jose states that he  does not want patient returning to [facility] as they were not equipped to deal with his nerve damage. Jose states that they would like referrals sent to [facility], [facility], and [facility].     Writer sent referrals via [facility].     Writer talked with Slim at [facility] who states that they would be willing to re-look at patient but would need to make sure they have the proper equipment     KASSANDRA Jorgensen

## 2024-01-31 NOTE — UTILIZATION REVIEW
"[facility]     Admission Status; Secondary Review Determination     Admission Date: 1/30/2024 10:06 AM      Under the authority of the Utilization Management Committee, the utilization review process indicated a secondary review on the above patient.  The review outcome is based on review of the medical records, discussions with staff, and applying clinical experience noted on the date of the review.          (x) Observation Status Appropriate - This patient does not meet hospital inpatient criteria and is placed in observation status. If this patient's primary payer is Medicare and was admitted as an inpatient, Condition Code 44 should be used and patient status changed to \"observation\".     RATIONALE FOR DETERMINATION   79-year-old male with a history of mechanical mitral valve replacement, chronic anticoagulation, diastolic CHF, recent GI bleeding, and morbid obesity was admitted on 1/30 with worsening anemia.  Hemoglobin at the time of recent discharge was 8.0.  Hemoglobin at the time of admission yesterday was 6.0.  Hemoccult is positive.  The patient required a blood transfusion.  Serial hemoglobins have been ordered and GI has again been consulted.  There is concern for CHF exacerbation so he was given IV diuretics.  Anticoagulation is on hold for now.  Hemoglobin has now remained stable in the 7.0-7.5 range.  For now the patient will be observed for bleeding with serial labs.  At the time of this review the patient does not meet medical necessity for inpatient hospitalization and observation status is recommended.    The severity of illness, intensity of service provided, expected LOS and risk for adverse outcome make the care appropriate for further observation; however, doesn't meet criteria for hospital inpatient admission.  Dr Rea was paged and notified of this determination.        The information on this document is developed by the utilization review team in order for the business " office to ensure compliance.  This only denotes the appropriateness of proper admission status and does not reflect the quality of care rendered.         The definitions of Inpatient Status and Observation Status used in making the determination above are those provided in the CMS Coverage Manual, Chapter 1 and Chapter 6, section 70.4.      Sincerely,     Michael Hammer DO MPH   Physician Advisor  Utilization Review  [facility]

## 2024-01-31 NOTE — PHARMACY-ANTICOAGULATION SERVICE
Clinical Pharmacy - Warfarin Dosing Consult     Pharmacy has been consulted to manage this patient s warfarin therapy.  Indication: Mechanical Mitral Valve Replacement  Therapy Goal: INR 2.5-3.5  Warfarin Prior to Admission: Yes  Warfarin PTA Regimen: Dose variable since last discharge on 1/17 - Recent dosing between 2.5 and 5 mg and days when dose was held  Significant drug interactions: Doxycline  Recent documented change in oral intake/nutrition: Unknown    INR   Date Value Ref Range Status   01/31/2024 1.84 (H) 0.85 - 1.15 Final   01/30/2024 1.98 (H) 0.85 - 1.15 Final       Recommend warfarin 2.5 mg today.  Pharmacy will monitor [place] daily and order warfarin doses to achieve specified goal.      Please contact pharmacy as soon as possible if the warfarin needs to be held for a procedure or if the warfarin goals change.

## 2024-01-31 NOTE — PROGRESS NOTES
Medicine Progress Note - Hospitalist Service    Date of Admission:  1/30/2024    Assessment & Plan     Feng Wynne is a 79 year old male with past medical history significant for mechanical mitral valve replacement for severe mitral regurgitation and MAZE procedure in 2008, on chronic anticoagulation with warfarin, pulmonary hypertension, chronic diastolic CHF with preserved EF, previous dilated LV likely due to mitral regurgitation, subsequent improvement with ACE inhibitor and beta-blockers, hypertension, hyperlipidemia,and morbid obesity admitted on 1/30/2024 with acute on chronic anemia.      Acute on chronic anemia  Iron deficiency anemia   Presumed GI bleed   - Pt had an admission from 1/9-1/19 for similar. At that time hgb was down to 4.4. EGD and colonoscopy revealed no active bleed; had poor colonoscopy prep with note of coupious stool in entire colon, lavage was performed. GI recommended outpatient capsule endoscopy which has not occurred.   - Discharge hemoglobin was 8.0.  On 1/19/2024  -Hemoglobin on admit is 6 on 1/30/2020 and INR was 1.98  -FOBT positive  -Status one unit of blood on 1/30/2020  -Coumadin held on admission and GI team consulted  -Hemoglobin closely monitored and is 7.3   -continue Protonix  -Discussed with the GI team and they recommended that given negative recent EGD and colonoscopy there is no indication for repeat scans without obvious signs of bleeding and start him on a regular diet, monitor hemoglobin and outpatient capsule endoscopy and consider giving another unit of blood to get hemoglobin above 8 to allow for trend down in order to do capsule endoscopy in the next week  -Will check stat hemoglobin this evening and if it is less than 8 then we will give him a unit of blood  -They also recommended to hold warfarin if okay with cardiology but can be otherwise restarted     Chronic diastolic CHF with preserved EF, with possible acute  exacerbation  Pulmonary Hypertension  -ECHO from 1/10/24 noted LVEF 55%; s/p 31mm St Raffi mechanical MVR. Mean 5mmHg, moderate to mod-severe (2-3+) tricuspid regurgitation   -On admission pt reported Shortness of breath, increased cough, recent weight gain and increased leg swelling.   - He was seen by the NP at the TCU on 1/29 who planned to start lasix 40mg daily for three days - likely only received one dose.  - On admission BNP is within normal limits 1,253.   -CXR on 1/30/24 showed increased right perihilar hazy patchy opacities suggestive of worsening pulmonary edema. Vascular congestion and cardiomegaly. Trace R pleural effusion.   -As per admitting physician patient was significantly edematous on admission and was started on 40 IV twice daily  -Intake and output reviewed and he is -4.9 L  -Renal function seems to be stable  -Patient is currently on 2 L of oxygen  -Cardiology team consulted     Recent Pneumonia   -CXR obtained on 1/26 which per the read suggested CHF, but ultimately the provider ended up treating for pneumonia. . Received 2g of IM Rocephin on 1/26 and was started on Ceftin and Doxycycline on 1/27.   -CXR on 1/30 does show a lateral patchy opacity on the left. And findings of CHF (see above)   - Continue with planned course of Ceftin and Doxycycline   - Incentive spirometry      Mild troponin elevation   -Troponin is elevated to 58 on admission.   -Suspect demand ischemia from acute anemia, CHF, pneumonia etc  -he had troponin elevation during his last admission as well (73-69).   -Continue to monitor on telemetry and cardiology is consulted    Hx of mechanical mitral valve replacement for severe mitral regurgitation   S/p MAZE procedure (2008)   Chronic anticoagulation with warfarin   Subtherapeutic INR (1.98)   -Will have to hold warfarin in the short term with concern for acute bleed.   - Once cleared from a bleed standpoint, would then recommend initiation of heparin as a bridge back to  "warfarin.   - Goal INR is 2.5-3.5   --INR today is 1.84  - Cardiology consult as noted above     Hypomagnesemia-resolved       Paroxysmal atrial fibrillation   -Not currently on any rate controlling mediations   - Anticoagulation plan as noted above         HLD  -Intolerant to statins and Zetia      Urinary retention s/p Branch Catheter   -Pt was supposed to have an appointment with urology on 1/30 for TOV   - Continue branch for now while getting IV diuresis   - will TOV prior to discharge from this hospitalization           Diet: Combination Diet Clear Liquid    DVT Prophylaxis: Pneumatic Compression Devices  Branch Catheter: PRESENT, indication: Retention  Lines: None     Cardiac Monitoring: ACTIVE order. Indication: Acute decompensated heart failure (48 hours)  Code Status: Full Code      Clinically Significant Risk Factors Present on Admission            # Hypomagnesemia: Lowest Mg = 1.4 mg/dL in last 2 days, will replace as needed    # Drug Induced Coagulation Defect: home medication list includes an anticoagulant medication    # Hypertension: Noted on problem list  # Acute heart failure with preserved ejection fraction: heart failure noted on problem list, last echo with EF >50%, and receiving IV diuretics     # Severe Obesity: Estimated body mass index is 44.63 kg/m  as calculated from the following:    Height as of this encounter: 1.803 m (5' 11\").    Weight as of this encounter: 145.2 kg (320 lb).       # Financial/Environmental Concerns:           Disposition Plan     Expected Discharge Date: 02/01/2024                    Cindy Rea MD  Hospitalist Service  [facility]  Securely message with [employer] (more info)  Text page via [employer] Paging/Directory   ______________________________________________________________________    Interval History     Seen today and discussed with patient and he mentioned that he was on oxygen 2 L before he came to the hospital.  I also discussed with his " partner and son.  Patient denies any fever or chills.  He denies any nausea or vomiting.  He denies any melena or hematochezia.    Discussed with his nurse    Physical Exam   Vital Signs: Temp: 98.3  F (36.8  C) Temp src: Oral BP: 109/55 Pulse: 66   Resp: 18 SpO2: 98 % O2 Device: Nasal cannula Oxygen Delivery: 2 LPM  Weight: 320 lbs 0 oz        General: Patient appears comfortable and in no acute distress.   Respiratory: Lungs are clear to auscultation bilaterally with no wheeze or crackles   Cardiovascular: Regular rate , S1 and S2 normal with no murmer or rubs or gallops  Abdomen:   soft , non tender , non distended and bowel sound present   Skin: No skin rashes or lesions to inspection or palpation.  Neurologic: Higher functions are within normal limits. No obvious defects in speech, language and memory. No facial droop  Musculoskeletal: Normal Range of motion over upper and lower extremities bilaterally   Psychiatric: cooperative      Medical Decision Making       Time spent in care of patient is 50 minutes and I reviewed his labs including CBC, basic metabolic panel, troponin, BNP, x-ray of the chest, EKG and discussed plan of care in detail with the patient, nursing staff, discussed with GI team and treatment plan was formulated and patient had questions which were answered to satisfaction.  I also discussed with his son and partner      Data     I have personally reviewed the following data over the past 24 hrs:    3.2 (L)  \   7.3 (L)   / 188     137 98 18.3 /  88   4.2 35 (H) 0.91 \     Trop: 58 (H) BNP: 1,253     INR:  1.84 (H) PTT:  N/A   D-dimer:  N/A Fibrinogen:  N/A

## 2024-02-01 NOTE — PROGRESS NOTES
[facility]  Gastroenterology Progress Note     Feng Wynne MRN# [id number]   YOB: 1945 Age: 79 year old          Assessment and Plan:     Feng Wynne is a 79 year old male with past medical history significant for mechanical mitral valve replacement for severe mitral regurgitation and MAZE procedure in 2008, on chronic anticoagulation with warfarin, pulmonary hypertension, chronic diastolic CHF with preserved EF, previous dilated LV likely due to mitral regurgitation, subsequent improvement with ACE inhibitor and beta-blockers, hypertension, hyperlipidemia,and morbid obesity admitted on 1/30/2024 with acute on chronic anemia.     Anemia  Epistaxis  Pt had an admission from 1/9-1/19 for similar. At that time hgb was down to 4.4. EGD and colonoscopy revealed no active bleed; had poor colonoscopy prep with note of coupious stool in entire colon, lavage was performed. EGD was negative recommended outpatient capsule endoscopy  Discharge hemoglobin was 8.0.   He returns for worsening anemia with hemoglobin of 6.0. Occult blood is positive.   Hemoglobin now stable in mid 7 range with no signs of active GI bleed, no melena, hematemesis, or hematochezia  Does report at least 3 months for epistaxis with large clots before and now ongoing blood tinged and some small clots every few days- certainly a possible source of blood loss     - given recent negative EGD and colonoscopy- no indication for repeat without obvious signs of bleeding  - Hemoglobin stable and improved to 7.4  - regular diet  - outpatient capsule  - continue warfarin per cardiology or alternative  - consider ENT consult                  Interval History:     no new complaints and doing well; no cp, sob, n/v/d, or abd pain.              Review of Systems:     C: NEGATIVE for fever, chills, change in weight  E/M: NEGATIVE for ear, mouth and throat problems  R: NEGATIVE for significant cough or SOB  CV: NEGATIVE for  chest pain, palpitations or peripheral edema             Medications:   I have reviewed this patient's current medications   cefuroxime  500 mg Oral BID    doxycycline hyclate  100 mg Oral BID    furosemide  40 mg Intravenous Q12H    miconazole   Topical BID    pantoprazole  40 mg Intravenous BID    sodium chloride (PF)  3 mL Intracatheter Q8H    Warfarin Therapy Reminder  1 each Oral See Admin Instructions                  Physical Exam:   Vitals were reviewed  Vital Signs with Ranges  Temp:  [98.4  F (36.9  C)-99.2  F (37.3  C)] 98.4  F (36.9  C)  Pulse:  [67-84] 80  Resp:  [18-20] 18  BP: (101-124)/(48-62) 115/62  SpO2:  [95 %-98 %] 95 %  I/O last 3 completed shifts:  In: 433.33 [P.O.:120]  Out: 4275 [Urine:4275]  Constitutional: healthy, alert, and no distress   Cardiovascular: negative, PMI normal. No lifts, heaves, or thrills. RRR. No murmurs, clicks gallops or rub  Respiratory: negative, Percussion normal. Good diaphragmatic excursion. Lungs clear  Abdomen: Abdomen soft, non-tender. BS normal. No masses, organomegaly             Data:   I reviewed the patient's new clinical lab test results.   Recent Labs   Lab Test 02/01/24  0703 01/31/24  1853 01/31/24  1620 01/31/24  0728 01/30/24  1850 01/30/24  1048   WBC 3.6* 3.3*  --  3.2*  --  3.4*   HGB 7.4* 7.1* 7.1* 7.3*   < > 6.9*   MCV 95 95  --  95  --  96    169  --  188  --  190   INR 1.78*  --   --  1.84*  --  1.98*    < > = values in this interval not displayed.     Recent Labs   Lab Test 02/01/24  0703 01/31/24  0728 01/30/24  1048   POTASSIUM 4.5 4.2 4.5   CHLORIDE 98 98 99   CO2 38* 35* 33*   BUN 16.5 18.3 19.4   ANIONGAP 3* 4* 4*     Recent Labs   Lab Test 01/19/24  0607 01/18/24  0625 01/15/24  2210 01/15/24  1815 01/09/24  1157 01/09/24  1027   ALBUMIN 2.9* 2.9*  --  3.1* 3.5 3.5   BILITOTAL  --   --   --  1.0 0.7 0.7   ALT  --   --   --  13 23 22   AST  --   --   --  29 36 37   PROTEIN  --   --  Trace*  --   --   --        I reviewed the  patient's new imaging results.    All laboratory data reviewed  All imaging studies reviewed by me.    Cheryl Waters PA-C,  2/1/2024  [facility]  Office : [phone]  Cell: [phone] (Dr. Olvera)  Cell: [phone] (Cheryl Waters PA-C)

## 2024-02-01 NOTE — PLAN OF CARE
Orientation: A/Ox4  Activity: A2 lift, turn and repo  Diet/BS Checks: reg  Tele:  Afib  IV Access/Drains: L PIV running heparin @ 1350  Pain Management: denies pain  Abnormal VS/Results: VSS on 2L O2 NC  Bowel/Bladder: Stubbs in place with good UOP. 1 small BM  Skin/Wounds: BUE/BLE edema, wound on coccyx wound cares done 1/31  Consults: GI/Cardio/WOC  D/C Disposition: pending  Other Info: Mag [employer] K protocols in for 2/1 AM, heparin running at 1350. 1 unit of blood given, vitals stable, recheck @ 0600.

## 2024-02-01 NOTE — PROGRESS NOTES
[facility]    Medicine Progress Note - Hospitalist Service    Date of Admission:  1/30/2024    Assessment & Plan     Feng Wynne is a 79 year old male with past medical history significant for mechanical mitral valve replacement for severe mitral regurgitation and MAZE procedure in 2008, on chronic anticoagulation with warfarin, pulmonary hypertension, chronic diastolic CHF with preserved EF, previous dilated LV likely due to mitral regurgitation, subsequent improvement with ACE inhibitor and beta-blockers, hypertension, hyperlipidemia,and morbid obesity admitted on 1/30/2024 with acute on chronic anemia.      Acute on chronic anemia  Iron deficiency anemia   epistaxis  - Pt had an admission from 1/9-1/19 for similar. At that time hgb was down to 4.4. EGD and colonoscopy revealed no active bleed; had poor colonoscopy prep with note of coupious stool in entire colon, lavage was performed. GI recommended outpatient capsule endoscopy which has not occurred.   - Discharge hemoglobin was 8.0.  On 1/19/2024  -Hemoglobin on admit was 6 on 1/30/2020 and INR was 1.98  -FOBT positive  -Initially on admit Coumadin was held and GI and cardiology team was consulted  -Per GI team given that the recent EGD and colonoscopy were negative and no active bleeding they recommended outpatient capsule endoscopy and the patient received another unit of blood on p.m. of 1/31/2024  -Hemoglobin is 7.8   -Patient has clearly stated that he has been having episodes of nosebleed and he has noticed clotting of blood and has not told anyone  -Will continue to monitor hemoglobin daily and ENT is consulted  -Other differential diagnoses can include hemolysis and have ordered LDH which is mildly elevated and haptoglobin and peripheral smear is pending.  The rationale is that patient has mechanical mitral valve and he has been subtherapeutic for a while  -Monitor hemoglobin daily      Chronic diastolic CHF with preserved  EF, with possible acute exacerbation  Pulmonary Hypertension  -ECHO from 1/10/24 noted LVEF 55%; s/p 31mm St Raffi mechanical MVR. Mean 5mmHg, moderate to mod-severe (2-3+) tricuspid regurgitation   -On admission pt reported Shortness of breath, increased cough, recent weight gain and increased leg swelling.   - He was seen by the NP at the TCU on 1/29 who planned to start lasix 40mg daily for three days - likely only received one dose.  - On admission BNP is within normal limits 1,253.   -CXR on 1/30/24 showed increased right perihilar hazy patchy opacities suggestive of worsening pulmonary edema. Vascular congestion and cardiomegaly. Trace R pleural effusion.   -Patient was started on IV diuresis and cardiology is following the patient and continue with IV diuresis per cardiology  -Repeat echo per cardiology     Recent Pneumonia   -CXR obtained on 1/26 which per the read suggested CHF, but ultimately the provider ended up treating for pneumonia. . Received 2g of IM Rocephin on 1/26 and was started on Ceftin and Doxycycline on 1/27.   -CXR on 1/30 does show a lateral patchy opacity on the left. And findings of CHF (see above)   - Continue with planned course of Ceftin and Doxycycline and completed  - Incentive spirometry      Mild troponin elevation   -Troponin is elevated to 58 on admission.   -Suspect demand ischemia from acute anemia, CHF, pneumonia etc  -he had troponin elevation during his last admission as well (73-69).   -Continue to monitor on telemetry and cardiology is consulted    Hx of mechanical mitral valve replacement for severe mitral regurgitation   S/p MAZE procedure (2008)   Chronic anticoagulation with warfarin   Subtherapeutic INR (1.98)   -Initially on admission his INR was subtherapeutic at 1.98 and Coumadin was held but he was restarted back on heparin and Coumadin on p.m. of 1/31 .   - Goal INR is 2.5-3.5   --INR today is 1.78  - Cardiology consult as noted above  "    Hypomagnesemia  -Magnesium today is 1.5 and continue with replacement       Paroxysmal atrial fibrillation   -Not currently on any rate controlling mediations   - Anticoagulation plan as noted above         HLD  -Intolerant to statins and Zetia      Urinary retention s/p Branch Catheter   -Pt was supposed to have an appointment with urology on 1/30 for TOV   - Continue branch for now while getting IV diuresis   - will TOV prior to discharge from this hospitalization           Diet: Regular Diet Adult    DVT Prophylaxis: Pneumatic Compression Devices  Branch Catheter: PRESENT, indication: Retention  Lines: None     Cardiac Monitoring: ACTIVE order. Indication: Acute decompensated heart failure (48 hours)  Code Status: Full Code      Clinically Significant Risk Factors            # Hypomagnesemia: Lowest Mg = 1.4 mg/dL in last 2 days, will replace as needed         # Hypertension: Noted on problem list  # Acute heart failure with preserved ejection fraction: heart failure noted on problem list, last echo with EF >50%, and receiving IV diuretics       # Severe Obesity: Estimated body mass index is 45.81 kg/m  as calculated from the following:    Height as of this encounter: 1.803 m (5' 11\").    Weight as of this encounter: 149 kg (328 lb 7.8 oz)., PRESENT ON ADMISSION       # Financial/Environmental Concerns:           Disposition Plan      Expected Discharge Date: 02/03/2024      Destination: inpatient rehabilitation facility  Discharge Comments: Diuresis and pending hemoglobin stability            Cindy Rea MD  Hospitalist Service  [facility]  Securely message with [employer] (more info)  Text page via [employer] Paging/Directory   ______________________________________________________________________    Interval History      seen today and patient was in the room he mentioned that he has been having lots relatively daily basis.  He denies any other melena or hematochezia.  He feels that his " breathing is much better since we have started him on diuresis.  I did discuss the patient that we will have opinion from ENT and consult was placed.    Discussed with his nurse    Physical Exam   Vital Signs: Temp: 98.4  F (36.9  C) Temp src: Oral BP: 115/62 Pulse: 80   Resp: 18 SpO2: 95 % O2 Device: Nasal cannula Oxygen Delivery: 2 LPM  Weight: 328 lbs 7.77 oz        General: Patient appears comfortable and in no acute distress.   Respiratory: Lungs are clear to auscultation bilaterally with no wheeze or crackles   Cardiovascular: Regular rate , S1 and S2 normal with no murmer or rubs or gallops  Abdomen:   soft , non tender , non distended and bowel sound present   Skin: No skin rashes or lesions to inspection or palpation.  Neurologic:No facial droop  Musculoskeletal: Normal Range of motion over upper and lower extremities bilaterally   Psychiatric: cooperative      Medical Decision Making       Time spent in care of patient is 45 minutes and I reviewed his labs including CBC, basic metabolic panel and discussed plan of care in detail with the patient, nursing staff, discussed with GI team and reviewed note from cardiology      Data     I have personally reviewed the following data over the past 24 hrs:    3.6 (L)  \   7.4 (L)   / 170     139 98 16.5 /  99   4.5 38 (H) 0.92 \     Trop: N/A BNP: N/A     INR:  1.78 (H) PTT:  N/A   D-dimer:  N/A Fibrinogen:  N/A

## 2024-02-01 NOTE — CONSULTS
"BRIEF NUTRITION ASSESSMENT      REASON FOR ASSESSMENT:  Feng Wynne is a 79 year old male seen by Registered Dietitian for RN Consult - wound healing support needs with evolving pressure injury to buttock       CURRENT DIET AND INTAKE:  Diet:  Regular              Chart reviewed  1/31: WOCN  Consulted for: Wound coccyx   Summary: patient with POA evolving deep tissue pressure injury to buttock, initial visit 1/31     Visited with pt and spouse, Jose  Pt tells me that he is a vegetarian - loves nuts, beans, eggs, dairy  Tolerating po      ANTHROPOMETRICS:  Height: 5' 11\"  Weight:(2/1) 149 kg /  328 lbs 7.77 oz  Body mass index is 45.81 kg/m .   Weight Status: Obesity Grade III BMI >40  IBW:  78.2 kg    Estimated Needs: Dosing Wt 78.2 kg (IBW)  115-140 gm pro (1.5-1.8 gm/kg IBW) - wound healing    LABS:  Labs noted    MALNUTRITION:  Patient does not meet two of the criteria necessary for diagnosing malnutrition.       NUTRITION INTERVENTION:  Nutrition Diagnosis:  No nutrition diagnosis at this time.    Implementation:  Nutrition Education ---> Discussed importance of good nutrition, emphasizing protein, for wound healing. Reviewed daily protein needs.  Told pt goal is to order protein food at each meal  Sent pt samples of GelateinPLUS (20 gm pro) and Ensure MAX (30 gm pro) to try this afternoon  Strongly encouraged pt to consume at least 2 supplements/day - he is very receptive to this and motivated to get enough protein.  He will order supplements himself     FOLLOW UP/MONITORING:   Will re-evaluate in 7 - 10 days, or sooner, if re-consulted.        "

## 2024-02-01 NOTE — PROGRESS NOTES
ENT    We were consulted today for nosebleeds in this anticoagulated patient with history of mitral valve replacement. The patient is admitted with chronic anemia exacerbation.    Discussion with the staff reveals that the patient has not been witnessed to have any nosebleeds on this admission and reports a history of blowing out old clots on and off for the past four months, but no sonia nosebleeds reported.    ENT consultation is not deemed needed in this situation, as no packing or cautery is necessary. Recommend improved nasal hygiene with nasal saline gel/spray application, air humidification, avoidance of nasal cannula and reducing coagulopathy if medically safe. The patient can follow-up with ENT as an outpatient upon discharge if the problem continues. Call ENT if any additional questions or problems.

## 2024-02-01 NOTE — PROGRESS NOTES
MD Notification    Notified Person: MD    Notified Person Name: dr. le    Notification Date/Time: 1/31 2055    Notification Interaction:  pt has fluid overload with edematous legs, Lasix have been held today due to soft BP. Can I have some parameters to know if I should give the Lasix this evening? thanks!          Purpose of Notification:    Orders Received: give lasix, hold for SBP<100    Comments:

## 2024-02-01 NOTE — PROGRESS NOTES
Reason for admission: acute on chronic anemia.   Mental Status: Ox4  Activity: Ax2 CL  Diet: Regular  Pain: Denies  /GI: Incontinent  Tele/Restraints/Iso: NA  LDA: Indwelling FC to urine bag. PIV infusing with hep gtt at 1200.   Expected D/C Date: TBD  Other Info: Blood transfusion ongoing, plan to keep hgb >8mg/dl. Per GI, they recommended that given negative recent EGD and colonoscopy there is no indication for repeat scans without obvious signs of bleeding. Plan for OP capsule endoscopy. Cardiology consulted and ok with restarting warfarin. BLE edema, receiving IV lasix BID. Currently on 2L of O2. Furosemide this evening not given bybthe writer d/t BP went down to 104/58 and will reassess again later tonight. Foam dressing on the coccyx area w/ PI CDI.

## 2024-02-01 NOTE — PLAN OF CARE
"Nursing shift [id number]  Orientation: A/Ox4  Admitted with SOB, Increased weakness and acute on chronic anemia of unknown origin  Remains on Heparin for A-Fib  and past MV replacement. Has had 2 consecutive Heparin 10A levels now that are within goal range. Next Heparin 1OA level is tomorrow,  GI reportedly told Hospitalist that pt has had nasal  bleeds and recommended ENT consult. ENT phoned writer questioning the nasal bleeding of which writer did not know anything about since it wasn't mentioned at all in the H&P or previous nurse shift reports. There has been no witnessed visible bleeding since admission.. When I then questioned pt of his \"Nasal bleeding\" he told me that he \"needs to use kleenex only once a day to blow out a small blood clot\" and that this \"daily blood clot from nose\" issue has been going on for months. Pr denies and blood dripping from nose or any other orifice. Writer explained this all to ENT Dr. Genao and to Hospitalist Dr Rea    Activity; Needs turn and repo via assist or 2/Lift. Very poor mobility of low extremities - unable to turn self. Stayed in bed this shift.  Diet/BS Checks: Tolerating reg diet. Needs tray set up  Tele:  Afib CVR  IV Access/Drains: L PIV running Heparin @ 1350 units/hr  Lungs: Clear posteriorly and anteriorly. No SOB at rest. Has O2 at 2LPM via nc  Labs: Anemia Hgb 7.4 shift start then 7.8 this afternoon., Pt is on both Mag & K protocols. K+ WNL next lab draw is tomorrow am. Mg+ level low at 1.5. Magnesium replacement infusing.  Pain Management: denies pain exception RLE sore with movement- refused offer of pain meds  Abnormal VS/Results: VSS on 2L O2 NC  Bowel/Bladder: Chronic Stubbs in place with good UOP (1750 ml this shift). Inc of BM x2 blackish brown stools this shift of which writer asked next shift nurse to notify GI of.    Skin/Wounds: BUE/BLE edema, Has flat open abrasion appearing wounds on left buttuck and coccyx area. Wound care done and " dressing changed this am with exception that writer added a Mepilex border dressing to left buttock in addition to scheduled Sacral Mepilex over sacrum/coccyx area due to sacral mepilex was not large enough to all cover both wound areas. .   Consults: GI/Cardio/WOC/ENT  D/C Disposition: Discharge to TCU when medically stable. Was living at [facility] but pt/family requesting a different facility. SW following

## 2024-02-01 NOTE — PROGRESS NOTES
Chat update    Discuss with the Cardiology team in person and also with Dr. Olvera from the G.I[facility] this evening and as per G.I. prospective, they are OK with anticoagulation. They do plan to do outpatient capsule and endoscopy as according to them recent colonoscopy and EGD did not show any evidence of bleed. Will give him a unit of blood to keep him around eight. Discussed with Cardiology and G.I[facility] and as there is no indication from  G.I. team to hold Coumadin we will start the same. Also per Cardiology will start him on heparin Bridge. This was discussed with patient and partner, and the nurse and the are in agreement with plan of care.

## 2024-02-01 NOTE — PROGRESS NOTES
[facility]  Cardiology Progress Note    Date of Service (when I saw the patient): 02/01/2024  Primary Cardiologist: Dr. Gomez       Interval History:   No new issues.     ----------------------------------------------------------------------------------------    Assessment:  Feng Wynne is a 79 year old male who was admitted on 1/30/2024.     # Severe anemia   -- Hgb svitlana ~4, improved to 7-8  -- GI consulted and EGD/colonoscopy inconclusive; possible epistaxis history? - ENT consulted  -- Labs notable for low haptoglobin and high LDH raising suspicion for possible hemolysis in the setting of known mechanical aortic valve     # Acute on Chronic HFpEF, secondary to above   -- responding well to IV diuresis which he will likely require for several days; renal fxn remains nl     # S/P MVR in 2008 with mechanical valve  -- S/p 31 mm St Raffi   -- on heparin currently; pta regimen includes warfarin     # Chronic A Fib   -- hx of MAZE procedure at the time of AVR  -- hx of bradycardia which resolved with discontinuation of BB    # Moderate-Severe TR  -- likely functional in the setting of volume overload     ----------------------------------------------------------------------------------------    Plan:  -- Continue with heparin and IV diuresis   -- Limited echocardiogram today for assessment of aortic valve   -- Cardiology will follow      ----------------------------------------------------------------------------------------  Physical Exam   Temp: 98.4  F (36.9  C) Temp src: Oral BP: 115/62 Pulse: 80   Resp: 18 SpO2: 95 % O2 Device: Nasal cannula Oxygen Delivery: 2 LPM  Vitals:    01/30/24 1252 02/01/24 0625   Weight: 145.2 kg (320 lb) 149 kg (328 lb 7.8 oz)       GEN:  NAD. Oxygen per nasal cannula. Spouse at bedside.   HEENT: Mucous membranes moist.  NECK:  Unable to assess JVP.  C/V:  Regular rate and rhythm with 2/6 JE along LSB.   RESP: Crackles at the bases bilaterally.   GI:  Abdomen soft, nontender, nondistended.    EXTREM: 2+ pitting LE edema up to his thighs.   NEURO: Alert and oriented, cooperative.   PSYCH: Normal affect.  SKIN: Warm and dry.   VASC: 2+ radial and dorsalis pedis pulses bilaterally.      Medications    heparin 1,350 Units/hr (02/01/24 0103)    - MEDICATION INSTRUCTIONS -        cefuroxime  500 mg Oral BID    doxycycline hyclate  100 mg Oral BID    furosemide  40 mg Intravenous Q12H    magnesium sulfate  4 g Intravenous Once    miconazole   Topical BID    pantoprazole  40 mg Intravenous BID    sodium chloride (PF)  3 mL Intracatheter Q8H    Warfarin Therapy Reminder  1 each Oral See Admin Instructions       Data   Results for orders placed or performed during the hospital encounter of 01/30/24 (from the past 24 hour(s))   Hemoglobin   Result Value Ref Range    Hemoglobin 7.1 (L) 13.3 - 17.7 g/dL   Prepare red blood cells (unit)   Result Value Ref Range    Blood Component Type Red Blood Cells     Product Code [id number]     Unit Status Transfused     Unit Number [id number]     CROSSMATCH Compatible     CODING SYSTEM [id number]     ISSUE DATE AND TIME [id number]     UNIT ABO/RH O+     UNIT TYPE ISBT 5100    CBC with platelets   Result Value Ref Range    WBC Count 3.3 (L) 4.0 - 11.0 10e3/uL    RBC Count 2.47 (L) 4.40 - 5.90 10e6/uL    Hemoglobin 7.1 (L) 13.3 - 17.7 g/dL    Hematocrit 23.4 (L) 40.0 - 53.0 %    MCV 95 78 - 100 fL    MCH 28.7 26.5 - 33.0 pg    MCHC 30.3 (L) 31.5 - 36.5 g/dL    RDW 26.3 (H) 10.0 - 15.0 %    Platelet Count 169 150 - 450 10e3/uL   Heparin Unfractionated Anti Xa Level   Result Value Ref Range    Anti Xa Unfractionated Heparin 0.15 For Reference Range, See Comment IU/mL    Narrative    Therapeutic Range: UFH: 0.25-0.50 IU/mL for low intensity dosing,  0.30-0.70 IU/mL for high intensity dosing DVT and PE.  This test is not validated for other direct factor X inhibitors (e.g. rivaroxaban, apixaban, edoxaban, betrixaban, fondaparinux) and should  not be used for monitoring of other medications.   Magnesium   Result Value Ref Range    Magnesium 1.5 (L) 1.7 - 2.3 mg/dL   CBC with platelets   Result Value Ref Range    WBC Count 3.6 (L) 4.0 - 11.0 10e3/uL    RBC Count 2.62 (L) 4.40 - 5.90 10e6/uL    Hemoglobin 7.4 (L) 13.3 - 17.7 g/dL    Hematocrit 25.0 (L) 40.0 - 53.0 %    MCV 95 78 - 100 fL    MCH 28.2 26.5 - 33.0 pg    MCHC 29.6 (L) 31.5 - 36.5 g/dL    RDW 25.2 (H) 10.0 - 15.0 %    Platelet Count 170 150 - 450 10e3/uL   Basic metabolic panel   Result Value Ref Range    Sodium 139 135 - 145 mmol/L    Potassium 4.5 3.4 - 5.3 mmol/L    Chloride 98 98 - 107 mmol/L    Carbon Dioxide (CO2) 38 (H) 22 - 29 mmol/L    Anion Gap 3 (L) 7 - 15 mmol/L    Urea Nitrogen 16.5 8.0 - 23.0 mg/dL    Creatinine 0.92 0.67 - 1.17 mg/dL    GFR Estimate 85 >60 mL/min/1.73m2    Calcium 9.3 8.8 - 10.2 mg/dL    Glucose 99 70 - 99 mg/dL   INR   Result Value Ref Range    INR 1.78 (H) 0.85 - 1.15   Heparin Unfractionated Anti Xa Level   Result Value Ref Range    Anti Xa Unfractionated Heparin 0.40 For Reference Range, See Comment IU/mL    Narrative    Therapeutic Range: UFH: 0.25-0.50 IU/mL for low intensity dosing,  0.30-0.70 IU/mL for high intensity dosing DVT and PE.  This test is not validated for other direct factor X inhibitors (e.g. rivaroxaban, apixaban, edoxaban, betrixaban, fondaparinux) and should not be used for monitoring of other medications.   CBC with platelets differential *Canceled*    Narrative    The following orders were created for panel order CBC with platelets differential.  Procedure                               Abnormality         Status                     ---------                               -----------         ------                     CBC with platelets and d...[[id number]]                                                   Please view results for these tests on the individual orders.   CBC with platelets differential *Canceled*    Narrative    The following  orders were created for panel order CBC with platelets differential.  Procedure                               Abnormality         Status                     ---------                               -----------         ------                       Please view results for these tests on the individual orders.     *Note: Due to a large number of results and/or encounters for the requested time period, some results have not been displayed. A complete set of results can be found in Results Review.     Recent Labs   Lab 02/01/24  0703 01/31/24  1853 01/31/24  1620 01/31/24  0728 01/30/24  1850 01/30/24  1048   WBC 3.6* 3.3*  --  3.2*  --  3.4*   HGB 7.4* 7.1* 7.1* 7.3*   < > 6.9*   MCV 95 95  --  95  --  96    169  --  188  --  190   INR 1.78*  --   --  1.84*  --  1.98*     --   --  137  --  136   POTASSIUM 4.5  --   --  4.2  --  4.5   CHLORIDE 98  --   --  98  --  99   CO2 38*  --   --  35*  --  33*   BUN 16.5  --   --  18.3  --  19.4   CR 0.92  --   --  0.91  --  0.85   ANIONGAP 3*  --   --  4*  --  4*   JOSEPH 9.3  --   --  9.4  --  9.5   GLC 99  --   --  88  --  94    < > = values in this interval not displayed.       No results found for this or any previous visit (from the past 24 hour(s)).    Grace Hernandez PA-C   2/1/2024  Pager: [phone]

## 2024-02-01 NOTE — PROGRESS NOTES
Care Management Follow Up    Length of Stay (days): 1    Expected Discharge Date: 02/02/2024     Concerns to be Addressed: discharge planning     Patient plan of care discussed at interdisciplinary rounds: Yes    Anticipated Discharge Disposition: Skilled Nursing Facility     Anticipated Discharge Services: None  Anticipated Discharge DME: None    Patient/family educated on Medicare website which has current facility and service quality ratings: yes  Education Provided on the Discharge Plan: Yes  Patient/Family in Agreement with the Plan: yes    Referrals Placed by CM/SW: Post Acute Facilities  Private pay costs discussed: Not applicable    Additional Information:  SW spoke with [facility] and informed they are considering accepting patient but wouldn't be able to accept until Monday.     KASSANDRA Cortez    [facility]

## 2024-02-01 NOTE — UTILIZATION REVIEW
[facility] Utilization Review  Admission Status; Secondary Review Determination     Admission Date: 1/30/2024 10:06 AM      Under the authority of the Utilization Management Committee, the utilization review process indicated a secondary review on the above patient.  The review outcome is based on review of the medical records, discussions with staff, and applying clinical experience noted on the date of the review.        (X)      Inpatient Status Appropriate - This patient's medical care is consistent with medical management for inpatient care and reasonable inpatient medical practice.          RATIONALE FOR DETERMINATION   79-year-old male with history of chronic A-fib, moderate to severe TR, HFpEF, admitted with severe anemia with hemoglobin down to 4, improved to 7-8, EGD/colonoscopy inconclusive, concern for epistaxis, ENT team consulted.  Possible hemolysis with known mechanical aortic valve, patient also found to have acute on chronic HFpEF, responding well to IV diuresis, also started on heparin drip for aortic mechanical valve, plan for echocardiogram.  Patient requires ongoing hemoglobin checks, creatinine monitoring, transfusion with initiating heparin bridge to therapeutic INR, also needs ongoing IV diuresis for heart failure exacerbation, with anticipated length of stay more than 2 midnight and ongoing interventions requiring close monitoring in the hospital, recommend change to inpatient status, communicated to Dr. Rea      The severity of illness, intensity of service provided, expected LOS and risk for adverse outcome make the care complex, high risk and appropriate for hospital admission.The patient requires hospital based medical care which is anticipated to require a stay of 2 or more midnights.        The information on this document is developed by the utilization review team in order for the business office to ensure compliance.  This only denotes the appropriateness of proper  admission status and does not reflect the quality of care rendered.              Sincerely,       Chris Matos MD  Physician Advisor  Utilization Review-[place]    Phone: [phone]

## 2024-02-02 NOTE — PROGRESS NOTES
[facility]    Medicine Progress Note - Hospitalist Service    Date of Admission:  1/30/2024    Assessment & Plan     Feng Wynne is a 79 year old male with past medical history significant for mechanical mitral valve replacement for severe mitral regurgitation and MAZE procedure in 2008, on chronic anticoagulation with warfarin, pulmonary hypertension, chronic diastolic CHF with preserved EF, previous dilated LV likely due to mitral regurgitation, subsequent improvement with ACE inhibitor and beta-blockers, hypertension, hyperlipidemia,and morbid obesity admitted on 1/30/2024 with acute on chronic anemia.      Acute on chronic anemia  Iron deficiency anemia   epistaxis  - Pt had an admission from 1/9-1/19 for similar. At that time hgb was down to 4.4. EGD and colonoscopy revealed no active bleed; had poor colonoscopy prep with note of coupious stool in entire colon, lavage was performed. GI recommended outpatient capsule endoscopy which has not occurred.   - Discharge hemoglobin was 8.0.  On 1/19/2024  -Hemoglobin on admit was 6 on 1/30/2020 and INR was 1.98  -FOBT positive  -Initially on admit Coumadin was held and GI and cardiology team was consulted  -Hemoglobin has been monitored and he is status 2 units of blood since admission  -Patient did mention that he has history of epistaxis and some clots and I had put a consult to the ENT team and given that he is not actively bleeding they indicated that consult is not indicated which seems reasonable can try to improve humidification, reduce coagulopathy if medically possible  -Differentials are broad and can include GI bleed, epistaxis, hemolysis  -LDH is only mildly elevated at 296 and haptoglobin is less than 10  -Repeat echo on 2/1/2024 showed valve seems stable  -Hemoglobin on 2/2/2024 is 7.6  --Plan per GI is outpatient capsule endoscopy  -Continue to monitor hemoglobin daily    Chronic diastolic CHF with preserved EF, with possible  acute exacerbation  Moderate pulmonary Hypertension  -ECHO from 1/10/24 noted LVEF 55%; s/p 31mm St Raffi mechanical MVR. Mean 5mmHg, moderate to mod-severe (2-3+) tricuspid regurgitation   -On admission pt reported Shortness of breath, increased cough, recent weight gain and increased leg swelling.   - He was seen by the NP at the TCU on 1/29 who planned to start lasix 40mg daily for three days - likely only received one dose.  - On admission BNP is within normal limits 1,253.   -CXR on 1/30/24 showed increased right perihilar hazy patchy opacities suggestive of worsening pulmonary edema. Vascular congestion and cardiomegaly. Trace R pleural effusion.   -Echo on 2/1/2024 shows EF of 60 to 65%, RV is moderately dilated, moderately decreased RV systolic function and flattened septum consistent with volume overload, moderate pulmonary hypertension, severe tricuspid regurgitation and status 31 mm [place] Raffi mechanical mitral valve and no pericardial effusion  -Cardiology is on board and appreciate input and continue with IV diuresis     Recent Pneumonia   -CXR obtained on 1/26 which per the read suggested CHF, but ultimately the provider ended up treating for pneumonia. . Received 2g of IM Rocephin on 1/26 and was started on Ceftin and Doxycycline on 1/27.   -CXR on 1/30 does show a lateral patchy opacity on the left. And findings of CHF (see above)   - Continue with planned course of Ceftin and Doxycycline and completed  - Incentive spirometry      Mild troponin elevation   -Troponin is elevated to 58 on admission.   -Suspect demand ischemia from acute anemia, CHF, pneumonia etc  -he had troponin elevation during his last admission as well (73-69).   -Continue to monitor on telemetry and cardiology is consulted    Hx of mechanical mitral valve replacement for severe mitral regurgitation   S/p MAZE procedure (2008)   Chronic anticoagulation with warfarin   Subtherapeutic INR (1.98)   -Initially on admission his INR was  "subtherapeutic at 1.98 and Coumadin was held but he was restarted back on heparin and Coumadin on p.m. of 1/31 .   - Goal INR is 2.5-3.5   --INR today is 1. 6 9  -Continue with heparin drip and pharmacy to dose Coumadin  - Cardiology consult as noted above     Hypomagnesemia-resolved       Paroxysmal atrial fibrillation   -Not currently on any rate controlling mediations   - Anticoagulation plan as noted above         HLD  -Intolerant to statins and Zetia      Urinary retention s/p Branch Catheter   -Pt was supposed to have an appointment with urology on 1/30 for TOV   - Continue branch for now while getting IV diuresis   - will TOV prior to discharge from this hospitalization           Diet: Regular Diet Adult  Snacks/Supplements Adult: Other; may order supplements prn; With Meals    DVT Prophylaxis: Pneumatic Compression Devices  Branch Catheter: PRESENT, indication: Retention  Lines: None     Cardiac Monitoring: ACTIVE order. Indication: Acute decompensated heart failure (48 hours)  Code Status: Full Code      Clinically Significant Risk Factors            # Hypomagnesemia: Lowest Mg = 1.5 mg/dL in last 2 days, will replace as needed         # Hypertension: Noted on problem list  # Acute heart failure with preserved ejection fraction: heart failure noted on problem list, last echo with EF >50%, and receiving IV diuretics       # Severe Obesity: Estimated body mass index is 45.29 kg/m  as calculated from the following:    Height as of this encounter: 1.803 m (5' 11\").    Weight as of this encounter: 147.3 kg (324 lb 11.8 oz)., PRESENT ON ADMISSION       # Financial/Environmental Concerns:           Disposition Plan      Expected Discharge Date: 02/05/2024      Destination: inpatient rehabilitation facility  Discharge Comments: Diuresis and pending hemoglobin stability            Cindy Rea MD  Hospitalist Service  [facility]  Securely message with [employer] (more info)  Text page via [employer] " Roberto/Alexa   ______________________________________________________________________    Interval History     Seen today and he was laying in the bed and denied any evidence of epistaxis, nausea, vomiting and breathing is decent.  He does think that the diuresis is helping.  Discussed with nursing staff about holding parameters for diuretics.  Denies any melena or epistaxis.    Discussed with his nurse    Physical Exam   Vital Signs: Temp: 98.3  F (36.8  C) Temp src: Axillary BP: 105/50 Pulse: 68   Resp: 17 SpO2: 96 % O2 Device: Nasal cannula Oxygen Delivery: 3 LPM  Weight: 324 lbs 11.8 oz        General: Patient appears comfortable and in no acute distress.   Respiratory: Lungs are clear to auscultation bilaterally with no wheeze or crackles   Cardiovascular: Regular rate , S1 and S2 normal with no murmer or rubs or gallops  Abdomen:   soft , non tender , non distended and bowel sound present   Skin: No skin rashes or lesions to inspection or palpation.  Neurologic:No facial droop  Musculoskeletal: Normal Range of motion over upper and lower extremities bilaterally   Psychiatric: cooperative      Medical Decision Making       Time spent in care of patient is 46 minutes and I reviewed his labs including CBC, basic metabolic panel and discussed plan of care in detail with the patient, nursing staff,      Data     I have personally reviewed the following data over the past 24 hrs:    3.1 (L)  \   7.6 (L)   / 186     137 95 (L) 15.5 /  89   3.8 39 (H) 0.94 \     INR:  1.69 (H) PTT:  N/A   D-dimer:  N/A Fibrinogen:  N/A

## 2024-02-02 NOTE — PROGRESS NOTES
MD Notification    Notified Person: MD    Notified Person Name: dr. Chinchilla     Notification Date/Time: 2/2/24, 0520    Notification Interaction:     pt blood pressure 101/48, has scheduled Lasix's should I give or hold?       Purpose of Notification:    Orders Received: hold    Comments:

## 2024-02-02 NOTE — PLAN OF CARE
Goal Outcome Evaluation:     [id number]    Orientation: A/Ox4  Activity: A2 with lift, T/R Q2H  Diet/BS Checks: Regular  Tele:  Afib  IV Access/Drains: Heparin infusing at 1350 units/hr  Pain Management: denies  Abnormal VS/Results: VSS, K and Mag protocols WNL  Bowel/Bladder: Chronic branch  Skin/Wounds: Sacral wound, dressing changed  Consults: GI, Cardiology  D/C Disposition: pending  Other Info: Hep Xa in range, monitor AM check. Per MD will transfuse Hgb 7 and below

## 2024-02-02 NOTE — PROGRESS NOTES
[facility]  Gastroenterology Progress Note     Feng Wynne MRN# [id number]   YOB: 1945 Age: 79 year old          Assessment and Plan:     Feng Wynne is a 79 year old male with past medical history significant for mechanical mitral valve replacement for severe mitral regurgitation and MAZE procedure in 2008, on chronic anticoagulation with warfarin, pulmonary hypertension, chronic diastolic CHF with preserved EF, previous dilated LV likely due to mitral regurgitation, subsequent improvement with ACE inhibitor and beta-blockers, hypertension, hyperlipidemia,and morbid obesity admitted on 1/30/2024 with acute on chronic anemia.     Anemia  Epistaxis  Pt had an admission from 1/9-1/19 for similar. At that time hgb was down to 4.4. EGD and colonoscopy revealed no active bleed; had poor colonoscopy prep with note of coupious stool in entire colon, lavage was performed. EGD was negative recommended outpatient capsule endoscopy  Discharge hemoglobin was 8.0.   He returns for worsening anemia with hemoglobin of 6.0. Occult blood is positive.   Hemoglobin now stable in mid 7 range with no signs of active GI bleed, no melena, hematemesis, or hematochezia  Does report at least 3 months for epistaxis with large clots before and now ongoing blood tinged and some small clots every few days- certainly a possible source of blood loss     - given recent negative EGD and colonoscopy- no indication for repeat without obvious signs of bleeding  - Hemoglobin stable a at mid 7  - regular diet  - outpatient capsule  - continue warfarin per cardiology or alternative  - consider ENT consult                  Interval History:     no new complaints and doing well; no cp, sob, n/v/d, or abd pain.              Review of Systems:     C: NEGATIVE for fever, chills, change in weight  E/M: NEGATIVE for ear, mouth and throat problems  R: NEGATIVE for significant cough or SOB  CV: NEGATIVE for chest pain,  palpitations or peripheral edema             Medications:   I have reviewed this patient's current medications   cefuroxime  500 mg Oral BID    doxycycline hyclate  100 mg Oral BID    furosemide  40 mg Intravenous Q12H    miconazole   Topical BID    pantoprazole  40 mg Intravenous BID    sodium chloride (PF)  3 mL Intracatheter Q8H    Warfarin Therapy Reminder  1 each Oral See Admin Instructions                  Physical Exam:   Vitals were reviewed  Vital Signs with Ranges  Temp:  [98.3  F (36.8  C)-98.8  F (37.1  C)] 98.3  F (36.8  C)  Pulse:  [61-78] 68  Resp:  [16-18] 17  BP: (101-138)/(48-60) 105/50  SpO2:  [92 %-97 %] 96 %  I/O last 3 completed shifts:  In: 1000 [P.O.:1000]  Out: 3300 [Urine:3300]  Constitutional: healthy, alert, and no distress   Cardiovascular: negative, PMI normal. No lifts, heaves, or thrills. RRR. No murmurs, clicks gallops or rub  Respiratory: negative, Percussion normal. Good diaphragmatic excursion. Lungs clear  Abdomen: Abdomen soft, non-tender. BS normal. No masses, organomegaly             Data:   I reviewed the patient's new clinical lab test results.   Recent Labs   Lab Test 02/02/24  0805 02/01/24  1115 02/01/24  0703 01/31/24  1620 01/31/24  0728   WBC 3.1* 3.5* 3.6*   < > 3.2*   HGB 7.6* 7.8* 7.4*   < > 7.3*   MCV 95 95 95   < > 95    191 170   < > 188   INR 1.69*  --  1.78*  --  1.84*    < > = values in this interval not displayed.     Recent Labs   Lab Test 02/02/24  0805 02/01/24  0703 01/31/24  0728   POTASSIUM 3.8 4.5 4.2   CHLORIDE 95* 98 98   CO2 39* 38* 35*   BUN 15.5 16.5 18.3   ANIONGAP 3* 3* 4*     Recent Labs   Lab Test 01/19/24  0607 01/18/24  0625 01/15/24  2210 01/15/24  1815 01/09/24  1157 01/09/24  1027   ALBUMIN 2.9* 2.9*  --  3.1* 3.5 3.5   BILITOTAL  --   --   --  1.0 0.7 0.7   ALT  --   --   --  13 23 22   AST  --   --   --  29 36 37   PROTEIN  --   --  Trace*  --   --   --        I reviewed the patient's new imaging results.    All laboratory data  reviewed  All imaging studies reviewed by me.    Cheryl Waters PA-C,  2/1/2024  [facility]  Office : [phone]  Cell: [phone] (Dr. Olvera)  Cell: [phone] (Cheryl Waters PA-C)

## 2024-02-02 NOTE — PLAN OF CARE
Goal Outcome Evaluation:    Shift: 1174-3604   Summary: Primary Diagnosis: acute chronic anemia  Has past medical hx of: A-Fib. Also had  Mitral valve replacement 2008 -has been on anticoags    Orientation: AO X4  Pain: Reported no pain   Vitals/Tele: VSS 2L NC  IV Access/drains: 2x L PIV; 1x SL; 1x infusing heparain at 1350 units/hr   Diet: Regular   Mobility: A2 w/Lift  GI/: Chronic Stubbs in place; No BM this shift  Wound/Skin: BUE/BLE edema; Mepilex in place over Buttock and Sacrum/Coccyx   Consults: GI/Cardio/WOC/ENT  Discharge Plan: Discharge to TCU when medically stable. Was [facility] at [facility] but pt/family requesting a different facility. SW following  Additional Info: Morning RN Jessie called after shift change to inform writer that patient had two dark stools, and was concerned that GI was unaware. Writer called a left message with May PRYOR to pass along message.       See Flow sheets for assessment

## 2024-02-02 NOTE — PROGRESS NOTES
Care Management Follow Up    Length of Stay (days): 3    Expected Discharge Date: 02/05/2024     Concerns to be Addressed: discharge planning     Patient plan of care discussed at interdisciplinary rounds: Yes    Anticipated Discharge Disposition: Skilled Nursing Facility     Anticipated Discharge Services: None  Anticipated Discharge DME: None    Patient/family educated on Medicare website which has current facility and service quality ratings: yes  Education Provided on the Discharge Plan: Yes  Patient/Family in Agreement with the Plan: yes    Referrals Placed by CM/SW: Post Acute Facilities  Private pay costs discussed: Not applicable    Additional Information:  Writer called and spoke with Dianna at [facility]. They have declined patient due to being too clinically complex for their current census.Writer LM with Marni at [facility]

## 2024-02-02 NOTE — PROGRESS NOTES
[facility]  Cardiology Progress Note    Date of Service (when I saw the patient): 02/02/2024  Primary Cardiologist: Dr. Gomez       Interval History:   Reports 2 dark stools on current admission. No other concerns. Continues to have significant peripheral edema and still requires supplemental oxygen.     ----------------------------------------------------------------------------------------    Assessment:  Feng Wynne is a 79 year old male who was admitted on 1/30/2024.      # Severe anemia   -- Hgb svitlana ~4, improved to 7-8  -- GI consulted and EGD/colonoscopy inconclusive; possible epistaxis history? - ENT consulted  -- Labs notable for low haptoglobin and high LDH raising suspicion for possible hemolysis in the setting of known mechanical mitral valve - limited echo repeated which showed normal mitral mean gradient at HR in the 60's- regurgitation assessment could not be made due to acoustic shadowing  -- blood smear ordered and pending at this time  -- patient does report 2 new episodes of dark stools- nurse made aware      # Acute on Chronic HFpEF, secondary to above   -- responding well to IV diuresis which he will likely require for several days; renal fxn remains nl   -- echo showed significant RV volume overload  -- IV lasix was held this AM due to soft BP     # S/P MVR in 2008 with mechanical valve  -- S/p 31 mm St Raffi- repeat echo 2/1 showed normal mitral valve function but regurgitation assessment could not be made due to acoustic shadowing  -- on heparin currently; pta regimen includes warfarin      # Chronic A Fib   -- hx of MAZE procedure at the time of MVR  -- hx of bradycardia which resolved with discontinuation of BB     # Moderate-Severe TR  -- likely functional in the setting of volume overload     ----------------------------------------------------------------------------------------    Plan:  -- Continue with IV lasix therapy as patient is volume overloaded  (placed parameters to hold it only if SBP less than 100 mmHg)  -- Cardiology will follow    ----------------------------------------------------------------------------------------  Physical Exam   Temp: 98.3  F (36.8  C) Temp src: Axillary BP: 105/50 Pulse: 68   Resp: 17 SpO2: 96 % O2 Device: Nasal cannula Oxygen Delivery: 3 LPM  Vitals:    01/30/24 1252 02/01/24 0625 02/02/24 0615   Weight: 145.2 kg (320 lb) 149 kg (328 lb 7.8 oz) 147.3 kg (324 lb 11.8 oz)     GEN:  NAD. Morbid obesity. Spouse at bedside.  HEENT: Mucous membranes moist.  NECK:  Unable to assess JVP.  C/V:  Regular rate and rhythm with 2/6 holosystolic murmur along LSB.  RESP: CTA, anand  GI: Abdomen soft, nontender, nondistended.    EXTREM: 3+ pitting LE edema up to his thighs.   NEURO: Alert and oriented, cooperative.   PSYCH: Normal affect.  SKIN: Warm and dry.   VASC: 2+ radial and dorsalis pedis pulses bilaterally.      Medications    heparin 1,350 Units/hr (02/02/24 0230)    - MEDICATION INSTRUCTIONS -        cefuroxime  500 mg Oral BID    doxycycline hyclate  100 mg Oral BID    furosemide  40 mg Intravenous Q12H    miconazole   Topical BID    pantoprazole  40 mg Intravenous BID    sodium chloride (PF)  3 mL Intracatheter Q8H    warfarin ANTICOAGULANT  5 mg Oral ONCE at 18:00    Warfarin Therapy Reminder  1 each Oral See Admin Instructions       Data   Results for orders placed or performed during the hospital encounter of 01/30/24 (from the past 24 hour(s))   Heparin Unfractionated Anti Xa Level   Result Value Ref Range    Anti Xa Unfractionated Heparin 0.34 For Reference Range, See Comment IU/mL    Narrative    Therapeutic Range: UFH: 0.25-0.50 IU/mL for low intensity dosing,  0.30-0.70 IU/mL for high intensity dosing DVT and PE.  This test is not validated for other direct factor X inhibitors (e.g. rivaroxaban, apixaban, edoxaban, betrixaban, fondaparinux) and should not be used for monitoring of other medications.   Echocardiogram Limited    Result Value Ref Range    LVEF  60-65%     [facility]    [id number]  [id number]  [id number]  [id number]^PAULINE^MADDISON     [facility]  Echocardiography Laboratory  [address] [place] [address]     Name: JESSE CABRAL  MRN: [id number]  : 1945  Study Date: 2024 02:14 PM  Age: 79 yrs  Gender: Male  Patient Location: [facility]  Reason For Study: Mitral Valve Replacement  Ordering Physician: MADDISON CARPENTER  Referring Physician: Jayme Deng  Performed By: Sedrick Pappas RDCS     BSA: 2.6 m2  Height: 71 in  Weight: 328 lb  HR: 83  BP: 115/53 mmHg  ______________________________________________________________________________  Procedure  Limited Portable Echo Adult.  ______________________________________________________________________________  Interpretation Summary     Left ventricular systolic function is normal.  The visual ejection fraction is 60-65%.  The right ventricle is moderately dilated.  Moderately decreased right ventricular systolic function  Flattened septum is consistent with RV pressure/volume overload. Moderate (46-  55mmHg) pulmonary hypertension is present.  There is severe (4+) tricuspid regurgitation.  s/p 31mm St Raffi mechanical MVR. Mean 6.3 mmHg @ HR 64 bpm. Cannot visualize  MR due to acoustic shadowing.  There is no pericardial effusion.  Rhythm is atrial fibrillation.     Compared to prior study dated 1/10/2024, degree of TR is similar in  appearance. The mean gradient across the mitral prosthesis is slightly higher  but at higher heart rates. Overall findings appear similar.  ______________________________________________________________________________  Left Ventricle  The left ventricle is normal in size. There is normal left ventricular wall  thickness. Left ventricular systolic function is normal. The visual ejection  fraction is 60-65%. Diastolic function not assessed due to presence of  prosthetic mitral valve. Flattened septum is  consistent with RV  pressure/volume overload.     Right Ventricle  The right ventricle is moderately dilated. Moderately decreased right  ventricular systolic function.     Atria  The left atrium is severely dilated. The right atrium is severely dilated.     Mitral Valve  The mean mitral valve gradient is 6.3 mmHg. s/p 31mm St Raffi mechanical MVR.  Mean 6.3 mmHg @ HR 64 bpm.     Tricuspid Valve  The tricuspid valve is normal in structure and function. There is severe (4+)  tricuspid regurgitation. The right ventricular systolic pressure is  approximated at 40mmHg plus the right atrial pressure. Moderate (46-55mmHg)  pulmonary hypertension is present.     Aortic Valve  The aortic valve is trileaflet.     Pulmonic Valve  The pulmonic valve is not well visualized.     Vessels  The aortic root is normal size. Inferior vena cava not well visualized for  estimation of right atrial pressure.     Pericardium  There is no pericardial effusion.     Rhythm  The rhythm was atrial fibrillation.  ______________________________________________________________________________  Doppler Measurements & Calculations  MV max P.1 mmHg  MV mean P.3 mmHg  MV V2 VTI: 50.1 cm  TR max prema: 316.8 cm/sec  TR max P.2 mmHg     ______________________________________________________________________________  Report approved by: Danyelle Jhaveri 2024 04:04 PM         Magnesium   Result Value Ref Range    Magnesium 2.2 1.7 - 2.3 mg/dL   INR   Result Value Ref Range    INR 1.69 (H) 0.85 - 1.15   CBC with platelets   Result Value Ref Range    WBC Count 3.1 (L) 4.0 - 11.0 10e3/uL    RBC Count 2.73 (L) 4.40 - 5.90 10e6/uL    Hemoglobin 7.6 (L) 13.3 - 17.7 g/dL    Hematocrit 25.9 (L) 40.0 - 53.0 %    MCV 95 78 - 100 fL    MCH 27.8 26.5 - 33.0 pg    MCHC 29.3 (L) 31.5 - 36.5 g/dL    RDW 24.8 (H) 10.0 - 15.0 %    Platelet Count 186 150 - 450 10e3/uL   Basic metabolic panel   Result Value Ref Range    Sodium 137 135 - 145 mmol/L     Potassium 3.8 3.4 - 5.3 mmol/L    Chloride 95 (L) 98 - 107 mmol/L    Carbon Dioxide (CO2) 39 (H) 22 - 29 mmol/L    Anion Gap 3 (L) 7 - 15 mmol/L    Urea Nitrogen 15.5 8.0 - 23.0 mg/dL    Creatinine 0.94 0.67 - 1.17 mg/dL    GFR Estimate 83 >60 mL/min/1.73m2    Calcium 9.2 8.8 - 10.2 mg/dL    Glucose 89 70 - 99 mg/dL   Heparin Unfractionated Anti Xa Level   Result Value Ref Range    Anti Xa Unfractionated Heparin 0.31 For Reference Range, See Comment IU/mL    Narrative    Therapeutic Range: UFH: 0.25-0.50 IU/mL for low intensity dosing,  0.30-0.70 IU/mL for high intensity dosing DVT and PE.  This test is not validated for other direct factor X inhibitors (e.g. rivaroxaban, apixaban, edoxaban, betrixaban, fondaparinux) and should not be used for monitoring of other medications.   Magnesium   Result Value Ref Range    Magnesium 1.7 1.7 - 2.3 mg/dL   CONDITIONAL Prepare red blood cells (unit)   Result Value Ref Range    Blood Component Type Red Blood Cells     Product Code [id number]     Unit Status Not used     Unit Number [id number]     CROSSMATCH Compatible     CODING SYSTEM [id number]     UNIT ABO/RH O+     UNIT TYPE ISBT 5100      *Note: Due to a large number of results and/or encounters for the requested time period, some results have not been displayed. A complete set of results can be found in Results Review.         Grace Hernandez PA-C   2/2/2024  Pager: [phone]

## 2024-02-02 NOTE — PLAN OF CARE
Orientation: A/Ox4  Activity: A2 lift, turn and repo  Diet/BS Checks: reg  Tele:  Afib  IV Access/Drains: L PIV running heparin @ 1350  Pain Management: denies pain  Abnormal VS/Results: VSS on 2L O2 NC  Bowel/Bladder: Stubbs in place with good UOP.no BM this shift  Skin/Wounds: BUE/BLE edema, wound on coccyx wound cares done 2/1  Consults: GI/Cardio/WOC  D/C Disposition: pending  Other Info: Mag & K protocols in for 2/2 AM, heparin running at 1350. Morning lasix not given, paged out and told to hold due to soft bp. Plan for pt to discharge to TCU when medically stable. 2/1 nurse reported 2 dark stools and concerned GI was unaware. Message left with May PRYOR to pass along message.

## 2024-02-02 NOTE — PROGRESS NOTES
02/01/24 1635   Appointment Info   Signing Clinician's Name / Credentials (PT) Fahad Zamora, PT, DPT   Living Environment   People in Home significant other   Current Living Arrangements apartment   Home Accessibility no concerns   Transportation Anticipated health plan transportation   Living Environment Comments Pt lives in 1st floor apartment, has elevator access from basement garage. [facility] from [facility] to TCU.   Self-Care   Usual Activity Tolerance good   Current Activity Tolerance poor   Equipment Currently Used at Home shower chair;raised toilet seat;walker, rolling;grab bar, tub/shower;grab bar, toilet;cane, straight  (adustable bed)   Fall history within last six months no   Activity/Exercise/Self-Care Comment Pt reports he is independent at baseline, uses 4WW or cane for ambulation, has pneumatic pumps for lymphedema management, also exercises using pedal bike. Was at TCU prior to this hospitalization, was just getting to the point where he could stand at TCU.   General Information   Onset of Illness/Injury or Date of Surgery 01/30/24   Referring Physician Cindy Rea MD   Patient/Family Therapy Goals Statement (PT) Patient would like to get better   Pertinent History of Current Problem (include personal factors and/or comorbidities that impact the POC) 79 year old male with past medical history significant for mechanical mitral valve replacement for severe mitral regurgitation and MAZE procedure in 2008, on chronic anticoagulation with warfarin, pulmonary hypertension, chronic diastolic CHF with preserved EF, previous dilated LV likely due to mitral regurgitation, subsequent improvement with ACE inhibitor and beta-blockers, hypertension, hyperlipidemia,and morbid obesity admitted on 1/30/2024 with acute on chronic anemia   Existing Precautions/Restrictions fall;oxygen therapy device and L/min   General Observations 3L supplemental O2 via NC   Cognition   Affect/Mental Status  "(Cognition) WFL   Orientation Status (Cognition) oriented x 4   Follows Commands (Cognition) WFL   Pain Assessment   Patient Currently in Pain Yes, see Vital Sign flowsheet  (generalized pain throughout, especially on R side)   Range of Motion (ROM)   ROM Comment R shoulder ROM restricted, patient reports \"frozen shoulder\"   Strength (Manual Muscle Testing)   Strength Comments BLE weakness (RLE more impaired than LLE)   Bed Mobility   Comment, (Bed Mobility) max assist supine to sit transfer, unable to complete   Transfers   Comment, (Transfers) unable to perform   Gait/Stairs (Locomotion)   [place] Level (Gait) not tested   Comment, (Gait/Stairs) unable to perform   Balance   Balance Comments impaired balance   Sensory Examination   Sensory Perception patient reports no sensory changes   Clinical Impression   Criteria for Skilled Therapeutic Intervention Yes, treatment indicated   PT Diagnosis (PT) impaired mobility   Influenced by the following impairments weakness, reduced activity toloerance, impaired balance.   Functional limitations due to impairments impaired functional mobility, impaired gait, transfers, bed mobility, stair navigation   Clinical Presentation (PT Evaluation Complexity) stable   Clinical Presentation Rationale clinical judgement   Clinical Decision Making (Complexity) low complexity   Planned Therapy Interventions (PT) balance training;bed mobility training;gait training;home exercise program;patient/family education;strengthening;transfer training;progressive activity/exercise;neuromuscular re-education   Risk & Benefits of therapy have been explained evaluation/treatment results reviewed;care plan/treatment goals reviewed;risks/benefits reviewed;current/potential barriers reviewed;participants voiced agreement with care plan;participants included;patient;spouse/significant other   PT Total Evaluation Time   PT Eval, Low Complexity Minutes (46063) 12   Physical Therapy Goals   PT " Frequency 5x/week   PT Predicted Duration/Target Date for Goal Attainment 02/08/24   PT Goals Bed Mobility;Transfers;Gait   PT: Bed Mobility Minimal assist;Supine to/from sit;Rolling   PT: Transfers Minimal assist;Sit to/from stand;Bed to/from chair;Assistive device   PT: Gait Minimal assist;Rolling walker;50 feet   Interventions   Interventions Quick Adds Therapeutic Activity;Therapeutic Procedure   Therapeutic Procedure/Exercise   Ther. Procedure: strength, endurance, ROM, flexibillity Minutes (90742) 12   Symptoms Noted During/After Treatment increased pain;fatigue   Treatment Detail/Skilled Intervention Patient performing LE exercises with assistance from PT x5-8e including: ankle pumps, quad sets, heel slides, glute sets, hip abduction, SAQ. Verbal and tactile cues utilized throughout to ensure proper form and technique.   Therapeutic Activity   Therapeutic Activities: dynamic activities to improve functional performance Minutes (42292) 14   Symptoms Noted During/After Treatment Fatigue;Increased pain   Treatment Detail/Skilled Intervention Patient supine in bed at beginning of session, agreeable to participate in PT. On 3L supplemental O2 via NC throughout session. Reporting weakness and increased pain at baseline on RUE/RLE. HOB elevated, agreeable to attempt supine to sit transfer. Patient moving LE's to with max assist x1, reporting increased R sided pain. No rehab aides available at time of attempt, assist x2 required to safely mobilize with patient. Returning back to supine with max assist x1 to position LE's into bed. Patient agreeable to further mobility tomorrow. Supine in bed at end of session with call light next to him and bed alarm on.   PT Discharge Planning   PT Plan progress supine<>sit transfers with assist x2, sitting EOB, LE exercises   PT Discharge Recommendation (DC Rec) Transitional Care Facility   PT Rationale for DC Rec Patient significantly below baseline functinoal mobility. Requiring  assist x2 for mobility, ceiling lift for bed<>chair transfers. Unable to stand or ambulate at this time. Recommend discharge to TCU for improvement of strength, activity tolerance, and independence with functional mobility   PT Brief overview of current status assist x2 and ceiling lift for bed<>chair transfers   Total Session Time   Timed Code Treatment Minutes 26   Total Session Time (sum of timed and untimed services) 38

## 2024-02-02 NOTE — PLAN OF CARE
[id number]    Orientation: A&Ox4    Activity: Ax2 lift     Diet/BS Checks: Regular    Tele: Afib    IV Access/Drains: x2 RPIV CDI, upper SL, lower infusing heparin at 1350 unit(s)/hr. Next Hep 10 A in AM.     Pain Management: Repositioning     Abnormal VS/Results: VSS on 2 L O2 NC. Hgb 7.6, transfuse <7. K and Mag WDL, rechecks in AM.     Bowel/Bladder: Stubbs patent with good urine OP, no BM this shift     Skin/Wounds: Sacral dressing CDI     Consults: Cardiology/GI/PT/SW    D/C Disposition: Pending     Other Info: Pt on IV lasix

## 2024-02-03 NOTE — PROGRESS NOTES
Chat update  I saw him again later in the day, and Cardiology team was present and discussed with  and his haptoglobin is low, and LDH is elevated concerning for hemolysis and peripheral smear pending. echo reviewed with cardiology and we will continue to monitor if his hemoglobin continues to drop further then on Monday he may need or further work up. Will consult hematology. Transfers for hemoglobin less than seven. Ying t is OK with plan of care.

## 2024-02-03 NOTE — CONSULTS
[facility] PHYSICIANS  MEDICAL ONCOLOGY    Consult note    Reason for consultation: anemia    Referring Provider: Cindy Rea MD     HISTORY OF PRESENTING ILLNESS  Pleasant 80 yo man with hx of acute on chronic heart failure, MR replacement with mechanical valve in 2008, mod-severe TR and a fibb admitted with shortness of breath.    He had reported a hx of dark stools and had hem + occult. A EGD/colonoscopy was done that the EGD was unremarkable and colonoscopy was with a poor prep but no overt abnormalities noted. Pt reports a multi week hx of waking up in the morning with a blood clot in his nose the size of his index finger (ie 1 cm) but no sonia bleeding. No other obvious blood loss. He was found to be profoundly iron deficient during his January hospitalization and received 3 doses of venofer 300mg each. He otherwise does not recall any iron replacement. Peripheral smear was sent 2/1 and has not been read.     His friend present notes his legs are severely larger then his baseline.     PAST MEDICAL HISTORY  Active Ambulatory Problems     Diagnosis Date Noted    Allergic rhinitis 11/17/2007    Chronic atrial fibrillation (H) 12/29/2008    S/P mitral valve replacement 03/18/2009    Hyperlipidemia LDL goal <130 07/12/2011    Atrial fibrillation (H)     Lumbago 02/27/2012    Knee pain 02/27/2012    Rosacea 04/08/2013    Proteinuria 04/09/2013    Essential hypertension with goal blood pressure less than 140/90 10/04/2016    Morbid obesity due to excess calories (H) 10/04/2016    Long-term (current) use of anticoagulants [Z79.01] 05/08/2017    Heart valve replaced [Z95.2] 05/08/2017    Essential hypertension 09/15/2017    Chronic right shoulder pain 08/09/2018    Permanent atrial fibrillation (H) 08/30/2023    Bradycardia 01/09/2024    SOB (shortness of breath) 01/09/2024    Supratherapeutic INR 01/09/2024    Anemia, unspecified type 01/09/2024    Hypertensive heart disease with heart failure (H)  01/23/2024     Resolved Ambulatory Problems     Diagnosis Date Noted    Lumbar radiculopathy 03/09/2012    Hip pain 03/09/2012     Past Medical History:   Diagnosis Date    Acute on chronic congestive heart failure, unspecified heart failure type (H) 1/30/2024    Arthritis     Coronary artery disease     Hypercholesteremia     Morbid obesity (H)     Unspecified essential hypertension           CURRENT OUTPATIENT MEDICATIONS  Current Facility-Administered Medications   Medication    acetaminophen (TYLENOL) tablet 650 mg    Or    acetaminophen (TYLENOL) Suppository 650 mg    albuterol (PROVENTIL HFA/VENTOLIN HFA) inhaler    calcium carbonate (TUMS) chewable tablet 1,000 mg    ferrous sulfate (FEROSUL) tablet 325 mg    furosemide (LASIX) injection 40 mg    heparin infusion 25,000 units in D5W 250 mL ANTICOAGULANT    lidocaine (LMX4) cream    lidocaine 1 % 0.1-1 mL    magnesium sulfate 4 g in 100 mL sterile water intermittent infusion    miconazole (MICATIN) 2 % powder    ondansetron (ZOFRAN ODT) ODT tab 4 mg    Or    ondansetron (ZOFRAN) injection 4 mg    pantoprazole (PROTONIX) EC tablet 40 mg    Patient is already receiving anticoagulation with heparin, enoxaparin (LOVENOX), warfarin (COUMADIN)  or other anticoagulant medication    senna-docusate (SENOKOT-S/PERICOLACE) 8.6-50 MG per tablet 1 tablet    Or    senna-docusate (SENOKOT-S/PERICOLACE) 8.6-50 MG per tablet 2 tablet    sodium chloride (PF) 0.9% PF flush 3 mL    sodium chloride (PF) 0.9% PF flush 3 mL    warfarin ANTICOAGULANT (COUMADIN) tablet 5 mg    Warfarin Dose Required Daily - Pharmacist Managed        ALLERGIES     Allergies   Allergen Reactions    Amiodarone Shortness Of Breath    Pcn [Penicillins] Shortness Of Breath     Rxn occurred in childhood     Statins Muscle Pain (Myalgia) and Hives    Amiodarone     Latex     Zetia [Ezetimibe] Muscle Pain (Myalgia)     Muscle weakness legs        PHYSICAL EXAM  B/P: 102/47, T: 99.5, P: 77, R: 16  Wt Readings  from Last 3 Encounters:   02/03/24 143.4 kg (316 lb 2.2 oz)   01/29/24 (!) 155.3 kg (342 lb 6.4 oz)   01/26/24 143.8 kg (317 lb)       Physical Exam  Constitutional:       Appearance: Normal appearance.   Eyes:      Extraocular Movements: Extraocular movements intact.      Conjunctiva/sclera: Conjunctivae normal.      Pupils: Pupils are equal, round, and reactive to light.   Cardiovascular:      Rate and Rhythm: Rhythm irregular.   Pulmonary:      Effort: Pulmonary effort is normal.      Breath sounds: Normal breath sounds.   Abdominal:      Palpations: Abdomen is soft.   Skin:     General: Skin is warm.   Neurological:      General: No focal deficit present.      Mental Status: He is alert and oriented to person, place, and time. Mental status is at baseline.   Psychiatric:         Mood and Affect: Mood normal.         Behavior: Behavior normal.         Thought Content: Thought content normal.         Massive lower extremity edema.     LABORATORY AND IMAGING STUDIES  Recent Labs   Lab Test 02/03/24  0741 02/02/24  0805 02/01/24  0703 01/31/24  0728 01/30/24  1048   * 137 139 137 136   POTASSIUM 3.6 3.8 4.5 4.2 4.5   CHLORIDE 91* 95* 98 98 99   CO2 38* 39* 38* 35* 33*   ANIONGAP 5* 3* 3* 4* 4*   BUN 15.3 15.5 16.5 18.3 19.4   CR 0.95 0.94 0.92 0.91 0.85   GLC 95 89 99 88 94   JOSEPH 8.8 9.2 9.3 9.4 9.5     Recent Labs   Lab Test 02/03/24  0741 02/02/24  0805 02/01/24  1618 02/01/24  0703 01/31/24  0728 01/30/24  2341 01/19/24  0607 01/18/24  0625   MAG 1.4* 1.7 2.2 1.5* 1.8   < >  --   --    PHOS  --   --   --   --   --   --  2.6 2.1*    < > = values in this interval not displayed.     Recent Labs   Lab Test 02/03/24  0741 02/02/24  0805 02/01/24  1115 02/01/24  0703 01/31/24  1853 01/30/24  1850 01/30/24  1048 01/30/24  0545 01/19/24  0607 01/18/24  0625 01/17/24  1004 01/17/24  0628   WBC 3.2* 3.1* 3.5* 3.6* 3.3*   < > 3.4*   < > 3.5* 3.9*   < > 5.0   HGB 7.1* 7.6* 7.8* 7.4* 7.1*   < > 6.9*   < > 8.0* 8.0*   <  "> 8.3*    186 191 170 169   < > 190   < > 143* 146*   < > 140*   MCV 94 95 95 95 95   < > 96   < > 89 88   < > 90   NEUTROPHIL  --   --  45  --   --   --  49  --  51 60  --  69    < > = values in this interval not displayed.     Recent Labs   Lab Test 24  1850 24  0607 24  0625 01/15/24  1815 24  1157 24  1027   BILITOTAL  --   --   --  1.0 0.7 0.7   ALKPHOS  --   --   --  58 62 60   ALT  --   --   --  13 23 22   AST  --   --   --  29 36 37   ALBUMIN  --  2.9* 2.9* 3.1* 3.5 3.5   *  --   --   --   --   --      TSH   Date Value Ref Range Status   2019 2.39 0.40 - 4.00 mU/L Final   2008 2.76 0.4 - 5.0 mU/L Final     No results for input(s): \"CEA\" in the last 47782 hours.  Results for orders placed or performed during the hospital encounter of 24   XR Chest 2 Views    Narrative    CHEST TWO VIEWS 2024 11:04 AM     HISTORY: Shortness of breath.    COMPARISON: Chest radiograph 1/15/2024.       Impression    IMPRESSION: Improved left lateral patchy opacity. Increased right  perihilar hazy opacity suggestive of worsening pulmonary edema.  Similar vascular congestion and cardiomegaly. No new focal  consolidation. Possible trace right pleural effusion, however the  lateral view is limited due to overlapping soft tissue. Median  sternotomy and cardiac valve prosthesis.    SHIELA BOLDEN MD         SYSTEM ID:  [id number]   Echocardiogram Limited     Value    LVEF  60-65%    Narrative    [id number]  [id number]  [id number]  [id number]^PAULINE^[facility]  Echocardiography Laboratory  [address] [place] [id number]     Name: JESSE CABRAL  MRN: [id number]  : 1945  Study Date: 2024 02:14 PM  Age: 79 yrs  Gender: Male  Patient Location: [facility]  Reason For Study: Mitral Valve Replacement  Ordering Physician: MADDISON CARPENTER  Referring Physician: Jayme Deng  Performed By: Sedrick Pappas RDCS   "   BSA: 2.6 m2  Height: 71 in  Weight: 328 lb  HR: 83  BP: 115/53 mmHg  ______________________________________________________________________________  Procedure  Limited Portable Echo Adult.  ______________________________________________________________________________  Interpretation Summary     Left ventricular systolic function is normal.  The visual ejection fraction is 60-65%.  The right ventricle is moderately dilated.  Moderately decreased right ventricular systolic function  Flattened septum is consistent with RV pressure/volume overload. Moderate (46-  55mmHg) pulmonary hypertension is present.  There is severe (4+) tricuspid regurgitation.  s/p 31mm St Raffi mechanical MVR. Mean 6.3 mmHg @ HR 64 bpm. Cannot visualize  MR due to acoustic shadowing.  There is no pericardial effusion.  Rhythm is atrial fibrillation.     Compared to prior study dated 1/10/2024, degree of TR is similar in  appearance. The mean gradient across the mitral prosthesis is slightly higher  but at higher heart rates. Overall findings appear similar.  ______________________________________________________________________________  Left Ventricle  The left ventricle is normal in size. There is normal left ventricular wall  thickness. Left ventricular systolic function is normal. The visual ejection  fraction is 60-65%. Diastolic function not assessed due to presence of  prosthetic mitral valve. Flattened septum is consistent with RV  pressure/volume overload.     Right Ventricle  The right ventricle is moderately dilated. Moderately decreased right  ventricular systolic function.     Atria  The left atrium is severely dilated. The right atrium is severely dilated.     Mitral Valve  The mean mitral valve gradient is 6.3 mmHg. s/p 31mm St Raffi mechanical MVR.  Mean 6.3 mmHg @ HR 64 bpm.     Tricuspid Valve  The tricuspid valve is normal in structure and function. There is severe (4+)  tricuspid regurgitation. The right ventricular systolic  pressure is  approximated at 40mmHg plus the right atrial pressure. Moderate (46-55mmHg)  pulmonary hypertension is present.     Aortic Valve  The aortic valve is trileaflet.     Pulmonic Valve  The pulmonic valve is not well visualized.     Vessels  The aortic root is normal size. Inferior vena cava not well visualized for  estimation of right atrial pressure.     Pericardium  There is no pericardial effusion.     Rhythm  The rhythm was atrial fibrillation.  ______________________________________________________________________________  Doppler Measurements & Calculations  MV max P.1 mmHg  MV mean P.3 mmHg  MV V2 VTI: 50.1 cm  TR max prema: 316.8 cm/sec  TR max P.2 mmHg     ______________________________________________________________________________  Report approved by: Danyelel Jhaveri 2024 04:04 PM           *Note: Due to a large number of results and/or encounters for the requested time period, some results have not been displayed. A complete set of results can be found in Results Review.        ASSESSMENT  AND RECOMMENDATIONS:    Acute on chronic anemia  Iron deficiency anemia   epistaxis  - Pt had an admission from - for similar. At that time hgb was down to 4.4. EGD and colonoscopy revealed no active bleed; had poor colonoscopy prep with note of coupious stool in entire colon, lavage was performed. GI recommended outpatient capsule endoscopy which has not occurred.   - Discharge hemoglobin was 8.0.  On 2024  -Hemoglobin on admit was 6 on 2020 and INR was 1.98  -FOBT positive  -Initially on admit Coumadin was held and GI and cardiology team was consulted  -Hemoglobin has been monitored and he is status 2 units of blood since admission  -Patient did mention that he has history of epistaxis and some clots and seems reasonable can try to improve humidification, reduce coagulopathy if medically possible  -Differentials are broad and can include GI bleed, epistaxis,  hemolysis  -LDH is only mildly elevated at 296 and haptoglobin is less than 10  --Plan per GI is outpatient capsule endoscopy  -Continue to monitor hemoglobin daily  - I will check his ferritin but given his recent ( probably underdosed) iron infusion this may be be completely reliable.   Given his minimally elevated LDH and valve issues I suspect he has a low grade hemolysis. This along with what appears to be some degree of GI bleeding given hem+ stool and nasal clots is likely causing his iron def. We are still awaiting his peripheral smear read from pathology sent 2/1. This in addition to volume overload with some degree of CHF. I will check bilirubin/liver function today. He likely needs additional doses of venofer which I can write for tomorrow. Agree with outpt capsule study.    Acute on chronic heart failure per cardiology  -diuresis per cards    MR replacement with MR    TR severe to mod      Marie Martines MD on 2/3/2024 at 11:53 AM

## 2024-02-03 NOTE — PROGRESS NOTES
Notified provider about indwelling branch catheter discussed removal or continued need.    Did provider choose to remove indwelling branch catheter? No    Provider's branch indication for keeping indwelling branch catheter: Retention.    Is there an order for indwelling branch catheter? Yes    *If there is a plan to keep branch catheter in place at discharge daily notification with provider is not necessary, but please add a notation in the treatment team sticky note that the patient will be discharging with the catheter.

## 2024-02-03 NOTE — PLAN OF CARE
Goal Outcome Evaluation:    Orientation: AO x4, pleasant, lethargic but easy to arouse  Activity: A2+lift, T/R Q2hrs  Diet/BS Checks: Reg  Tele:  Afib CVR  IV Access/Drains: PIV inf Hep gtt @13.5ml/hr, next Hep Xa AM draw  Pain Management: Denies  Abnormal VS/Results: VSS on 2L NC. K and Mg protocol, redraws in AM.  Bowel/Bladder: Stubbs in place w/ good UOP, small BM x1 on bedpan  Skin/Wounds: PI to coccyx  Consults: GI, Card  D/C Disposition: Pending  Other Info: No acute events this shift.

## 2024-02-03 NOTE — PROGRESS NOTES
[facility]  Cardiology Progress Note    Date of Service (when I saw the patient): 02/03/2024  Primary Cardiologist: Dr. Gomez    Assessment:  Feng Wynne is a 79 year old male who was admitted on 1/30/2024 with symptomatic anemia of unclear etiology. He was subsequently found to be volume overloaded and intiatiated on IV diuresis. He has pulmonary hypertension with severe RV enlargement and remains overloaded on examination today.     Regarding his anemia it is unclear how much hemolysis from his mechanical valve is playing a role, but I think it would be unlikely to be the sole reason his hgb dropped to 4. Further evaluation for alternative etiologies is underway.      # Severe anemia   # Acute on Chronic HFpEF  # S/P MVR in 2008 with mechanical valve on chronic anticoagulation with INR goal 2.5-3.5  -- S/p 31 mm St Raffi- repeat echo 2/1 showed normal mitral valve function but regurgitation assessment could not be made due to acoustic shadowing  -- on heparin currently; pta regimen includes warfarin   # Chronic A Fib   -- hx of MAZE procedure at the time of MVR  -- hx of bradycardia which resolved with discontinuation of BB  # Moderate-Severe TR    Plan:  -Increase IV furosemide to 60mg IV BID, close monitoring of electrolytes  -Continue heparin until warfarin therapeutic (pharmacy to dose)  -Agree with hematology consult for further recommendations regarding anemia  ----------------------------------------------------------------------------------------  Physical Exam   Temp: 98.4  F (36.9  C) Temp src: Oral BP: 103/46 Pulse: 72   Resp: 16 SpO2: 94 % O2 Device: Nasal cannula Oxygen Delivery: 2 LPM  Vitals:    02/01/24 0625 02/02/24 0615 02/03/24 0639   Weight: 149 kg (328 lb 7.8 oz) 147.3 kg (324 lb 11.8 oz) 143.4 kg (316 lb 2.2 oz)     GEN:  NAD. Morbid obesity. Spouse at bedside.  NECK:  JVP elevated with large v wave  C/V: RV heave.  Regular rate and rhythm, crisp mechanical s1  with 2/6 holosystolic murmur along LLSB, not heard well at apex. No diastolic heart sounds.   EXTREM: pitting LE edema   NEURO: Alert and oriented, cooperative.   PSYCH: Normal affect.        Medications    heparin 1,350 Units/hr (02/02/24 2047)    - MEDICATION INSTRUCTIONS -        ferrous sulfate  325 mg Oral Daily    furosemide  40 mg Intravenous Q12H    miconazole   Topical BID    pantoprazole  40 mg Oral BID AC    sodium chloride (PF)  3 mL Intracatheter Q8H    Warfarin Therapy Reminder  1 each Oral See Admin Instructions       Data   Results for orders placed or performed during the hospital encounter of 01/30/24 (from the past 24 hour(s))   INR   Result Value Ref Range    INR 1.69 (H) 0.85 - 1.15   CBC with platelets   Result Value Ref Range    WBC Count 3.1 (L) 4.0 - 11.0 10e3/uL    RBC Count 2.73 (L) 4.40 - 5.90 10e6/uL    Hemoglobin 7.6 (L) 13.3 - 17.7 g/dL    Hematocrit 25.9 (L) 40.0 - 53.0 %    MCV 95 78 - 100 fL    MCH 27.8 26.5 - 33.0 pg    MCHC 29.3 (L) 31.5 - 36.5 g/dL    RDW 24.8 (H) 10.0 - 15.0 %    Platelet Count 186 150 - 450 10e3/uL   Basic metabolic panel   Result Value Ref Range    Sodium 137 135 - 145 mmol/L    Potassium 3.8 3.4 - 5.3 mmol/L    Chloride 95 (L) 98 - 107 mmol/L    Carbon Dioxide (CO2) 39 (H) 22 - 29 mmol/L    Anion Gap 3 (L) 7 - 15 mmol/L    Urea Nitrogen 15.5 8.0 - 23.0 mg/dL    Creatinine 0.94 0.67 - 1.17 mg/dL    GFR Estimate 83 >60 mL/min/1.73m2    Calcium 9.2 8.8 - 10.2 mg/dL    Glucose 89 70 - 99 mg/dL   Heparin Unfractionated Anti Xa Level   Result Value Ref Range    Anti Xa Unfractionated Heparin 0.31 For Reference Range, See Comment IU/mL    Narrative    Therapeutic Range: UFH: 0.25-0.50 IU/mL for low intensity dosing,  0.30-0.70 IU/mL for high intensity dosing DVT and PE.  This test is not validated for other direct factor X inhibitors (e.g. rivaroxaban, apixaban, edoxaban, betrixaban, fondaparinux) and should not be used for monitoring of other medications.    Magnesium   Result Value Ref Range    Magnesium 1.7 1.7 - 2.3 mg/dL   CONDITIONAL Prepare red blood cells (unit)   Result Value Ref Range    Blood Component Type Red Blood Cells     Product Code N3832G16     Unit Status Not used     Unit Number [id number]     CROSSMATCH Compatible     CODING SYSTEM [id number]     UNIT ABO/RH O+     UNIT TYPE ISBT 5100      *Note: Due to a large number of results and/or encounters for the requested time period, some results have not been displayed. A complete set of results can be found in Results Review.

## 2024-02-03 NOTE — PLAN OF CARE
2/2-2/3  [id number]  Orientation: AOX4  Activity: x2 with Lift, T/R q2h   Diet/BS Checks: Regular   Tele:  A Fib - CVR  IV Access/Drains: L PIV Infusing Heparin  @ 13.5 ml/hr, L PIV SL,   Pain Management: Denies  Abnormal VS/Results: VSS on 2L NC, K and Mg protocol, redraws in AM.  Bowel/Bladder: Continent Bowel, Stubbs in place, with Good UOP   Skin/Wounds: PI on Coccyx   Consults: GI, Card,   D/C Disposition: Pending,

## 2024-02-03 NOTE — PROGRESS NOTES
[facility]    Medicine Progress Note - Hospitalist Service    Date of Admission:  1/30/2024    Assessment & Plan     Feng Wynne is a 79 year old male with past medical history significant for mechanical mitral valve replacement for severe mitral regurgitation and MAZE procedure in 2008, on chronic anticoagulation with warfarin, pulmonary hypertension, chronic diastolic CHF with preserved EF, previous dilated LV likely due to mitral regurgitation, subsequent improvement with ACE inhibitor and beta-blockers, hypertension, hyperlipidemia,and morbid obesity admitted on 1/30/2024 with acute on chronic anemia.      Acute on chronic anemia  Iron deficiency anemia   epistaxis  - Pt had an admission from 1/9-1/19 for similar. At that time hgb was down to 4.4. EGD and colonoscopy revealed no active bleed; had poor colonoscopy prep with note of coupious stool in entire colon, lavage was performed. GI recommended outpatient capsule endoscopy which has not occurred.   - Discharge hemoglobin was 8.0.  On 1/19/2024  -Hemoglobin on admit was 6 on 1/30/2020 and INR was 1.98  -FOBT positive  -Initially on admit Coumadin was held and GI and cardiology team was consulted  -Hemoglobin has been monitored and he is status 2 units of blood since admission  -Patient did mention that he has history of epistaxis and some clots and I had put a consult to the ENT team and given that he is not actively bleeding they indicated that consult is not indicated which seems reasonable can try to improve humidification, reduce coagulopathy if medically possible  -Differentials are broad and can include GI bleed, epistaxis, hemolysis(given that he is a valve INR has been subtherapeutic)  -LDH is only mildly elevated at 296 and haptoglobin is less than 10  -Repeat echo on 2/1/2024 showed valve seems stable  -Hemoglobin on 2/3/2024 is 7.1  -Peripheral smear result is pending  --Plan per GI is outpatient capsule  endoscopy  -Hematology team will be consulted today  -Continue to monitor hemoglobin every 12 hours and transfuse for hemoglobin less than 7    Chronic diastolic CHF with preserved EF, with possible acute exacerbation  Moderate pulmonary Hypertension  -ECHO from 1/10/24 noted LVEF 55%; s/p 31mm [facility] Raffi mechanical MVR. Mean 5mmHg, moderate to mod-severe (2-3+) tricuspid regurgitation   -On admission pt reported Shortness of breath, increased cough, recent weight gain and increased leg swelling.   - He was seen by the NP at the TCU on 1/29 who planned to start lasix 40mg daily for three days - likely only received one dose.  - On admission BNP is within normal limits 1,253.   -CXR on 1/30/24 showed increased right perihilar hazy patchy opacities suggestive of worsening pulmonary edema. Vascular congestion and cardiomegaly. Trace R pleural effusion.   -Echo on 2/1/2024 shows EF of 60 to 65%, RV is moderately dilated, moderately decreased RV systolic function and flattened septum consistent with volume overload, moderate pulmonary hypertension, severe tricuspid regurgitation and status 31 mm [place] Raffi mechanical mitral valve and no pericardial effusion  -Continue with IV diuresis per cardiology  -Also as per discussion with cardiology if patient continues to have concerns for hemolysis then they may plan for valve fluoroscopy plus minus KAYE early next week when he approaches euvolemia  -Diuresis per cardiology     Recent Pneumonia   -CXR obtained on 1/26 which per the read suggested CHF, but ultimately the provider ended up treating for pneumonia. . Received 2g of IM Rocephin on 1/26 and was started on Ceftin and Doxycycline on 1/27.   -CXR on 1/30 does show a lateral patchy opacity on the left. And findings of CHF (see above)   - Continue with planned course of Ceftin and Doxycycline   - Incentive spirometry      Mild troponin elevation   -Troponin is elevated to 58 on admission.   -Suspect demand ischemia from acute  "anemia, CHF, pneumonia etc  -he had troponin elevation during his last admission as well (73-69).   -Continue to monitor on telemetry and cardiology is consulted    Hx of mechanical mitral valve replacement for severe mitral regurgitation   S/p MAZE procedure (2008)   Chronic anticoagulation with warfarin   Subtherapeutic INR (1.98)   -Initially on admission his INR was subtherapeutic at 1.98 and Coumadin was held but he was restarted back on heparin and Coumadin on p.m. of 1/31 .   - Goal INR is 2.5-3.5   --INR today is 1.80  -Continue with heparin drip and pharmacy to dose Coumadin  - Cardiology consult as noted above     Hypomagnesemia-resolved       Paroxysmal atrial fibrillation   -Not currently on any rate controlling mediations   - Anticoagulation plan as noted above         HLD  -Intolerant to statins and Zetia      Urinary retention s/p Branch Catheter   -Pt was supposed to have an appointment with urology on 1/30 for TOV   - Continue branch for now while getting IV diuresis   - will TOV prior to discharge from this hospitalization           Diet: Regular Diet Adult  Snacks/Supplements Adult: Other; may order supplements prn; With Meals    DVT Prophylaxis: Pneumatic Compression Devices  Branch Catheter: PRESENT, indication: Retention  Lines: None     Cardiac Monitoring: ACTIVE order. Indication: Acute decompensated heart failure (48 hours)  Code Status: Full Code      Clinically Significant Risk Factors            # Hypomagnesemia: Lowest Mg = 1.4 mg/dL in last 2 days, will replace as needed         # Hypertension: Noted on problem list  # Acute heart failure with preserved ejection fraction: heart failure noted on problem list, last echo with EF >50%, and receiving IV diuretics       # Severe Obesity: Estimated body mass index is 44.09 kg/m  as calculated from the following:    Height as of this encounter: 1.803 m (5' 11\").    Weight as of this encounter: 143.4 kg (316 lb 2.2 oz)., PRESENT ON ADMISSION   "     # Financial/Environmental Concerns:           Disposition Plan     Expected Discharge Date: 02/05/2024      Destination: inpatient rehabilitation facility  Discharge Comments: Diuresis and pending hemoglobin stability            Cindy Rea MD  Hospitalist Service  [facility]  Securely message with [employer] (more info)  Text page via [employer] Paging/Directory   ______________________________________________________________________    Interval History     Seen today and Feng was laying in the bed and mentioned that breathing is okay.  He did mention that he had very tiny small clot in his nose yesterday.  He denies any sonia epistaxis.  No evidence of any melena or hematemesis or hematochezia.    Discussed with his nurse and the patient    Physical Exam   Vital Signs: Temp: 99.5  F (37.5  C) Temp src: Oral BP: 102/47 Pulse: 77   Resp: 16 SpO2: 94 % O2 Device: Nasal cannula Oxygen Delivery: 2 LPM  Weight: 316 lbs 2.23 oz        General: Patient appears comfortable and in no acute distress.   Respiratory: Lungs are clear to auscultation bilaterally with no wheeze or crackles   Cardiovascular: Regular rate , S1 and S2 normal with no murmer or rubs or gallops  Abdomen:   soft , non tender , non distended and bowel sound present   Skin: No skin rashes or lesions to inspection or palpation.  Neurologic:No facial droop  Musculoskeletal: Normal Range of motion over upper and lower extremities bilaterally   Psychiatric: cooperative      Medical Decision Making       Time spent in care of patient is 46 minutes and I reviewed his labs including CBC, basic metabolic panel and discussed plan of care in detail with the patient, nursing staff,      Data     I have personally reviewed the following data over the past 24 hrs:    3.2 (L)  \   7.1 (L)   / 157     134 (L) 91 (L) 15.3 /  95   3.6 38 (H) 0.95 \     INR:  1.80 (H) PTT:  N/A   D-dimer:  N/A Fibrinogen:  N/A

## 2024-02-04 NOTE — PROGRESS NOTES
[facility]    Medicine Progress Note - Hospitalist Service    Date of Admission:  1/30/2024    Assessment & Plan     Feng Wynne is a 79 year old male with past medical history significant for mechanical mitral valve replacement for severe mitral regurgitation and MAZE procedure in 2008, on chronic anticoagulation with warfarin, pulmonary hypertension, chronic diastolic CHF with preserved EF, previous dilated LV likely due to mitral regurgitation, subsequent improvement with ACE inhibitor and beta-blockers, hypertension, hyperlipidemia,and morbid obesity admitted on 1/30/2024 with acute on chronic anemia.     Patient has been recently admitted to [facility] from 1/9/1/19 and at that hospital stay his hemoglobin dropped to 4.4 and EGD and colonoscopy did not reveal any active bleed and patient as outpatient has been at TCU and he was being treated for pneumonia and was on antibiotics when he came to the hospital and presented to the ED as his hemoglobin outpatient was found to be 6.  His anticoagulation including Coumadin was held on admit and GI and cardiology team were consulted.  FOBT was positive    Patient was being followed by GI and they recommended that he should have outpatient capsule endoscopy as there is no active bleeding.  Patient during the course of hospitalization has received 2 units of blood so far and did mention that he does get epistaxis(not much sonia blood but clots) and consult to ENT was placed but given that he has no active bleeding they have no role to play and recommended better nasal humidification and see if coagulopathy can be reversed.  Patient had LDH which is mildly elevated and haptoglobin which is low and given that he has mechanical mitral valve and has been subtherapeutic there is concern for low-grade hemolysis.  Hematology and oncology have been consulted and peripheral smear has been ordered but results are pending.  Over the weekend  cardiology did mention that if his hemoglobin continues to drop then they will try to have a look at his valves with fluoroscopy plus minus KAYE early next week.  Hematology team did order IV Venofer and agree with outpatient capsule endoscopy.    Also on admit patient was found to have acute exacerbation of his chronic diastolic heart failure and cardiology team has been diuresing him with good response.       Acute on chronic anemia  Iron deficiency anemia   Epistaxis( nasal clots)  - Pt had an admission from 1/9-1/19 for similar. At that time hgb was down to 4.4. EGD and colonoscopy revealed no active bleed; had poor colonoscopy prep with note of coupious stool in entire colon, lavage was performed. GI recommended outpatient capsule endoscopy which has not occurred.   - Discharge hemoglobin was 8.0.  On 1/19/2024  -Hemoglobin on admit was 6 on 1/30/2020 and INR was 1.98  -FOBT positive  -Initially on admit Coumadin was held and GI and cardiology team was consulted  -Hemoglobin has been monitored and he is status 2 units of blood since admission  -Patient did mention that he has history of epistaxis and some clots and I had put a consult to the ENT team and given that he is not actively bleeding they indicated that consult is not indicated which seems reasonable can try to improve humidification, reduce coagulopathy if medically possible  -Differentials are broad and can include GI bleed, epistaxis, hemolysis(given that he is a valve INR has been subtherapeutic)  -LDH is only mildly elevated at 296 and haptoglobin is less than 10 which does indicate hemolysis  -Repeat echo on 2/1/2024 showed valve seems stable  -Peripheral smear result is pending  --Plan per GI is outpatient capsule endoscopy  -Hematology team has been consulted and they do indicate that he has low-grade hemolysis and ferritin check on 2/3/2024 was 187 and hepatic panel was normal and they have ordered iron infusion for the patient and if his  workup for the GI bleed is negative and iron is replaced then bone marrow biopsy at some point of time  -Cardiology will likely pursue valve fluoroscopy plus minus KAYE early next week if he continues to drop per my discussion with Dr. Nichols on 2/2/2024  -Hemoglobin  is 7.2  -Continue to monitor hemoglobin every 12 hours and transfuse for hemoglobin less than 7    Acute on chronic diastolic CHF with preserved EF  Acute respiratory failure due to above  Moderate pulmonary Hypertension  -ECHO from 1/10/24 noted LVEF 55%; s/p 31mm [facility] Raffi mechanical MVR. Mean 5mmHg, moderate to mod-severe (2-3+) tricuspid regurgitation   -On admission pt reported Shortness of breath, increased cough, recent weight gain and increased leg swelling.   - He was seen by the NP at the TCU on 1/29 who planned to start lasix 40mg daily for three days - likely only received one dose.  - On admission BNP is within normal limits 1,253.   -CXR on 1/30/24 showed increased right perihilar hazy patchy opacities suggestive of worsening pulmonary edema. Vascular congestion and cardiomegaly. Trace R pleural effusion.   -Echo on 2/1/2024 shows EF of 60 to 65%, RV is moderately dilated, moderately decreased RV systolic function and flattened septum consistent with volume overload, moderate pulmonary hypertension, severe tricuspid regurgitation and status 31 mm [place] Raffi mechanical mitral valve and no pericardial effusion  -- [id number] L since admission  -Continue with IV diuresis per cardiology  -Also as per discussion with cardiology if patient continues to have concerns for hemolysis then they may plan for valve fluoroscopy plus minus KAYE early next week when he approaches euvolemia  -Diuresis per cardiology      Mild troponin elevation   -Troponin is elevated to 58 on admission.   -Suspect demand ischemia from acute anemia, CHF, pneumonia etc  -he had troponin elevation during his last admission as well (73-69).   -Continue to monitor on telemetry  and cardiology is on board and appreciated    Hx of mechanical mitral valve replacement for severe mitral regurgitation   S/p MAZE procedure (2008)   Chronic anticoagulation with warfarin   Subtherapeutic INR    -Initially on admission his INR was subtherapeutic at 1.98 and Coumadin was held but he was restarted back on heparin and Coumadin on p.m. of 1/31 .   - Goal INR is 2.5-3.5   --INR  is 1.82  -Continue with heparin drip and pharmacy to dose Coumadin      Recent Pneumonia   -CXR obtained on 1/26 which per the read suggested CHF, but ultimately the provider ended up treating for pneumonia. . Received 2g of IM Rocephin on 1/26 and was started on Ceftin and Doxycycline on 1/27.   -CXR on 1/30 does show a lateral patchy opacity on the left. And findings of CHF (see above)   -Patient during the hospital stay has completed the planned course of Ceftin and Doxycycline which was started as outpatient  - Incentive spirometry     Hypomagnesemia  -Magnesium levels do fluctuate and he is on replacement protocol and magnesium level is 1.6       Paroxysmal atrial fibrillation   -Not currently on any rate controlling mediations   - Anticoagulation plan as noted above         HLD  -Intolerant to statins and Zetia      Urinary retention s/p Branch Catheter   -Pt was supposed to have an appointment with urology on 1/30 for TOV   - Continue branch for now while getting IV diuresis   - will TOV prior to discharge from this hospitalization     Disposition  He will need to go back to transitional care facility when medically stable and will get to know early in the week          Diet: Regular Diet Adult  Snacks/Supplements Adult: Other; may order supplements prn; With Meals    DVT Prophylaxis: Pneumatic Compression Devices  Branch Catheter: PRESENT, indication: Retention  Lines: None     Cardiac Monitoring: ACTIVE order. Indication: Acute decompensated heart failure (48 hours)  Code Status: Full Code      Clinically Significant Risk  "Factors            # Hypomagnesemia: Lowest Mg = 1.4 mg/dL in last 2 days, will replace as needed   # Hypoalbuminemia: Lowest albumin = 2.7 g/dL at 2/3/2024  7:41 AM, will monitor as appropriate       # Hypertension: Noted on problem list  # Acute heart failure with preserved ejection fraction: heart failure noted on problem list, last echo with EF >50%, and receiving IV diuretics       # Severe Obesity: Estimated body mass index is 43.79 kg/m  as calculated from the following:    Height as of this encounter: 1.803 m (5' 11\").    Weight as of this encounter: 142.4 kg (313 lb 15 oz).        # Financial/Environmental Concerns:           Disposition Plan     Expected Discharge Date: 02/05/2024      Destination: inpatient rehabilitation facility  Discharge Comments: Diuresis and pending hemoglobin stability            Cindy Rea MD  Hospitalist Service  [facility]  Securely message with [employer] (more info)  Text page via [employer] Paging/Directory     I am off service from tomorrow morning and his care will be taken over by hospital medicine team  ______________________________________________________________________    Interval History     Seen today and he mentioned that he has not had any solid stools but liquid stool and wondering if he can get something for constipation and it was ordered.  No melena, hematemesis or hematochezia and no sonia epistaxis or any nasal clots.  Discussed with patient about plan from hematology regarding IV iron.  Does mention that he has stuffy nose and [place] was ordered and I discussed with nursing staff to see if nasal humidification can be done and they will reach out to respiratory team.  He did mention that he gets belching at times and Tums were ordered for him    Discussed with his nurse and the patient    Physical Exam   Vital Signs: Temp: 99  F (37.2  C) Temp src: Oral BP: 119/57 Pulse: 68   Resp: 16 SpO2: 97 % O2 Device: Nasal cannula Oxygen " Delivery: 2 LPM  Weight: 313 lbs 14.96 oz        General:aox3  Respiratory: ctla  Cardiovascular: Regular rate , S1 and S2 normal with no murmer or rubs or gallops and lateral lower extremity edema  Abdomen:   soft , non tender , non distended and bowel sound present   Neurologic:No facial droop  Musculoskeletal: Normal Range of motion over upper and lower extremities bilaterally   Psychiatric: cooperative      Medical Decision Making       Time spent in care of patient is 46 minutes and I reviewed his labs including CBC, basic metabolic panel and discussed plan of care in detail with the patient, nursing staff,      Data     I have personally reviewed the following data over the past 24 hrs:    3.8 (L)  \   7.2 (L)   / 174     136 91 (L) 18.1 /  98   3.6 40 (H) 0.97 \     ALT: N/A AST: N/A AP: N/A TBILI: N/A   ALB: N/A TOT PROTEIN: N/A LIPASE: N/A     INR:  1.82 (H) PTT:  N/A   D-dimer:  N/A Fibrinogen:  N/A     Ferritin:  N/A % Retic:  N/A LDH:  N/A

## 2024-02-04 NOTE — PLAN OF CARE
Goal Outcome Evaluation:    [id number]    Orientation: A/Ox4  Activity: A2 with lift, T/R Q2H  Diet/BS Checks: Regular   Tele:  Afib CVR  IV Access/Drains: 2 PIV, one infusing Heparin at 1500 units/hr. Second PIV SL  Pain Management: Denies  Abnormal VS/Results: VSS on 2L O2. HepXa 0.23 this AM - bolus given and rate increased. Afternoon HepXa within range at 0.34, next check @ 2230. Mag low at 1.4, replacement given and recheck WNL  Bowel/Bladder: Branch in place. PRN senna given 1x per request  Skin/Wounds: Scattered bruises. Sacral mepi changed today   Consults: Hematology, Cardiology  D/C Disposition: Pending  Other Info: Hem/Onc consulted, see note. Hgb 7.1 this morning - MD aware, per orders transfuse 7 and below (see MD note 2/2/24). Hgb recheck scheduled for 1900. Lasix increased per cardiology, good UOP from branch

## 2024-02-04 NOTE — PROGRESS NOTES
[facility]  Cardiology Progress Note    Date of Service (when I saw the patient): 02/04/2024  Primary Cardiologist: Dr. Gomez    Assessment:  Feng Wynne is a 79 year old male who was admitted on 1/30/2024 with symptomatic anemia of unclear etiology. His examination is challenging given his obesity, but his jugular venous pressure remains elevated which makes me suspect he remains volume overloaded. We will continue IV diuresis today and evaluate for transition to oral medications within the next 48 hrs.      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 on chronic anticoagulation with INR goal 2.5-3.5  -- S/p 31 mm St Raffi- repeat echo 2/1 showed normal mitral valve function but regurgitation assessment could not be made due to acoustic shadowing  -- on heparin currently; pta regimen includes warfarin   # Chronic A Fib   -- hx of MAZE procedure at the time of MVR  -- hx of bradycardia which resolved with discontinuation of BB  # Moderate-Severe TR    Plan:  -Increase IV furosemide to 60mg IV BID, close monitoring of electrolytes  -Continue heparin until warfarin therapeutic (pharmacy to dose)  -Agree with hematology consult for further recommendations regarding anemia  ----------------------------------------------------------------------------------------  Physical Exam   Temp: 99  F (37.2  C) Temp src: Oral BP: 119/57 Pulse: 68   Resp: 16 SpO2: 97 % O2 Device: Nasal cannula Oxygen Delivery: 2 LPM  Vitals:    02/02/24 0615 02/03/24 0639 02/04/24 0632   Weight: 147.3 kg (324 lb 11.8 oz) 143.4 kg (316 lb 2.2 oz) 142.4 kg (313 lb 15 oz)     GEN:  NAD. Morbid obesity. Spouse at bedside.  NECK:  JVP elevated with large v wave  C/V: RV heave.  Regular rate and rhythm, crisp mechanical s1 with 2/6 holosystolic murmur along LLSB, not heard well at apex. No  diastolic heart sounds.   EXTREM: pitting LE edema   NEURO: Alert and oriented, cooperative.   PSYCH: Normal affect.        Medications    heparin 1,500 Units/hr (02/04/24 0804)    - MEDICATION INSTRUCTIONS -        ferrous sulfate  325 mg Oral Daily    furosemide  60 mg Intravenous Q12H    iron sucrose  300 mg Intravenous Q72H    miconazole   Topical BID    pantoprazole  40 mg Oral BID AC    sodium chloride (PF)  3 mL Intracatheter Q8H    warfarin ANTICOAGULANT  7.5 mg Oral ONCE at 18:00    Warfarin Therapy Reminder  1 each Oral See Admin Instructions       Data   Results for orders placed or performed during the hospital encounter of 01/30/24 (from the past 24 hour(s))   Heparin Unfractionated Anti Xa Level   Result Value Ref Range    Anti Xa Unfractionated Heparin 0.34 For Reference Range, See Comment IU/mL    Narrative    Therapeutic Range: UFH: 0.25-0.50 IU/mL for low intensity dosing,  0.30-0.70 IU/mL for high intensity dosing DVT and PE.  This test is not validated for other direct factor X inhibitors (e.g. rivaroxaban, apixaban, edoxaban, betrixaban, fondaparinux) and should not be used for monitoring of other medications.   Magnesium   Result Value Ref Range    Magnesium 1.9 1.7 - 2.3 mg/dL   Heparin Unfractionated Anti Xa Level   Result Value Ref Range    Anti Xa Unfractionated Heparin 0.38 For Reference Range, See Comment IU/mL    Narrative    Therapeutic Range: UFH: 0.25-0.50 IU/mL for low intensity dosing,  0.30-0.70 IU/mL for high intensity dosing DVT and PE.  This test is not validated for other direct factor X inhibitors (e.g. rivaroxaban, apixaban, edoxaban, betrixaban, fondaparinux) and should not be used for monitoring of other medications.   Hemoglobin   Result Value Ref Range    Hemoglobin 7.2 (L) 13.3 - 17.7 g/dL   INR   Result Value Ref Range    INR 1.82 (H) 0.85 - 1.15   CBC with platelets   Result Value Ref Range    WBC Count 3.8 (L) 4.0 - 11.0 10e3/uL    RBC Count 2.49 (L) 4.40 - 5.90  10e6/uL    Hemoglobin 7.2 (L) 13.3 - 17.7 g/dL    Hematocrit 23.9 (L) 40.0 - 53.0 %    MCV 96 78 - 100 fL    MCH 28.9 26.5 - 33.0 pg    MCHC 30.1 (L) 31.5 - 36.5 g/dL    RDW 24.1 (H) 10.0 - 15.0 %    Platelet Count 174 150 - 450 10e3/uL   Basic metabolic panel   Result Value Ref Range    Sodium 136 135 - 145 mmol/L    Potassium 3.6 3.4 - 5.3 mmol/L    Chloride 91 (L) 98 - 107 mmol/L    Carbon Dioxide (CO2) 40 (H) 22 - 29 mmol/L    Anion Gap 5 (L) 7 - 15 mmol/L    Urea Nitrogen 18.1 8.0 - 23.0 mg/dL    Creatinine 0.97 0.67 - 1.17 mg/dL    GFR Estimate 80 >60 mL/min/1.73m2    Calcium 8.9 8.8 - 10.2 mg/dL    Glucose 98 70 - 99 mg/dL   Magnesium   Result Value Ref Range    Magnesium 1.6 (L) 1.7 - 2.3 mg/dL   Heparin Unfractionated Anti Xa Level   Result Value Ref Range    Anti Xa Unfractionated Heparin 0.30 For Reference Range, See Comment IU/mL    Narrative    Therapeutic Range: UFH: 0.25-0.50 IU/mL for low intensity dosing,  0.30-0.70 IU/mL for high intensity dosing DVT and PE.  This test is not validated for other direct factor X inhibitors (e.g. rivaroxaban, apixaban, edoxaban, betrixaban, fondaparinux) and should not be used for monitoring of other medications.     *Note: Due to a large number of results and/or encounters for the requested time period, some results have not been displayed. A complete set of results can be found in Results Review.

## 2024-02-04 NOTE — PLAN OF CARE
Goal Outcome Evaluation:    Orientation: AO x4, pleasant, lethargic but easy to arouse  Activity: A2+lift, T/R Q2hrs, refused at times  Diet/BS Checks: Reg  Tele:  Afib CVR  IV Access/Drains: PIV inf Hep gtt @ 15ml/hr, 2200 Hep Xa within range, next Hep Xa AM draw  Pain Management: Denies  Abnormal VS/Results: VSS on 2L NC. K and Mg protocol, redraws in AM. Hgb 7.2, transfuse <7  Bowel/Bladder: Stubbs in place w/ good UOP, clear/watery BM x2  Skin/Wounds: PI to coccyx, mepilex in place  Consults: GI, Card  D/C Disposition: Pending  Other Info: No acute events this shift

## 2024-02-04 NOTE — PROGRESS NOTES
[facility]YSICIANS  MEDICAL ONCOLOGY  Progress note    Reason for consultation: anemia  Todays date 02/04/24     HISTORY OF PRESENTING ILLNESS  Pleasant 78 yo man with hx of acute on chronic heart failure, MR replacement with mechanical valve in 2008, mod-severe TR and a fibb admitted with shortness of breath.    He had reported a hx of dark stools and had hem + occult. A EGD/colonoscopy was done that the EGD was unremarkable and colonoscopy was with a poor prep but no overt abnormalities noted. Pt reports a multi week hx of waking up in the morning with a blood clot in his nose the size of his index finger (ie 1 cm) but no sonia bleeding. No other obvious blood loss. He was found to be profoundly iron deficient during his January hospitalization and received 3 doses of venofer 300mg each. He otherwise does not recall any iron replacement. Peripheral smear was sent 2/1 and has not been read.   He also has some volume overload and is being diuresed.    Today  Doing well. A couple of liquid stools no blood or melena. No n/v/c. No new pains. No other complaints.     PAST MEDICAL HISTORY  Active Ambulatory Problems     Diagnosis Date Noted    Allergic rhinitis 11/17/2007    Chronic atrial fibrillation (H) 12/29/2008    S/P mitral valve replacement 03/18/2009    Hyperlipidemia LDL goal <130 07/12/2011    Atrial fibrillation (H)     Lumbago 02/27/2012    Knee pain 02/27/2012    Rosacea 04/08/2013    Proteinuria 04/09/2013    Essential hypertension with goal blood pressure less than 140/90 10/04/2016    Morbid obesity due to excess calories (H) 10/04/2016    Long-term (current) use of anticoagulants [Z79.01] 05/08/2017    Heart valve replaced [Z95.2] 05/08/2017    Essential hypertension 09/15/2017    Chronic right shoulder pain 08/09/2018    Permanent atrial fibrillation (H) 08/30/2023    Bradycardia 01/09/2024    SOB (shortness of breath) 01/09/2024    Supratherapeutic INR 01/09/2024    Anemia, unspecified  type 01/09/2024    Hypertensive heart disease with heart failure (H) 01/23/2024     Resolved Ambulatory Problems     Diagnosis Date Noted    Lumbar radiculopathy 03/09/2012    Hip pain 03/09/2012     Past Medical History:   Diagnosis Date    Acute on chronic congestive heart failure, unspecified heart failure type (H) 1/30/2024    Arthritis     Coronary artery disease     Hypercholesteremia     Morbid obesity (H)     Unspecified essential hypertension           CURRENT OUTPATIENT MEDICATIONS  Current Facility-Administered Medications   Medication    acetaminophen (TYLENOL) tablet 650 mg    Or    acetaminophen (TYLENOL) Suppository 650 mg    albuterol (PROVENTIL HFA/VENTOLIN HFA) inhaler    calcium carbonate (TUMS) chewable tablet 500 mg    ferrous sulfate (FEROSUL) tablet 325 mg    furosemide (LASIX) injection 60 mg    heparin infusion 25,000 units in D5W 250 mL ANTICOAGULANT    lidocaine (LMX4) cream    lidocaine 1 % 0.1-1 mL    miconazole (MICATIN) 2 % powder    ondansetron (ZOFRAN ODT) ODT tab 4 mg    Or    ondansetron (ZOFRAN) injection 4 mg    pantoprazole (PROTONIX) EC tablet 40 mg    Patient is already receiving anticoagulation with heparin, enoxaparin (LOVENOX), warfarin (COUMADIN)  or other anticoagulant medication    polyethylene glycol (MIRALAX) Packet 17 g    senna-docusate (SENOKOT-S/PERICOLACE) 8.6-50 MG per tablet 1 tablet    Or    senna-docusate (SENOKOT-S/PERICOLACE) 8.6-50 MG per tablet 2 tablet    sodium chloride (OCEAN) 0.65 % nasal spray 1 spray    sodium chloride (PF) 0.9% PF flush 3 mL    sodium chloride (PF) 0.9% PF flush 3 mL    warfarin ANTICOAGULANT (COUMADIN) tablet 7.5 mg    Warfarin Dose Required Daily - Pharmacist Managed        ALLERGIES     Allergies   Allergen Reactions    Amiodarone Shortness Of Breath    Pcn [Penicillins] Shortness Of Breath     Rxn occurred in childhood     Statins Muscle Pain (Myalgia) and Hives    Amiodarone     Latex     Zetia [Ezetimibe] Muscle Pain (Myalgia)      Muscle weakness legs        PHYSICAL EXAM  B/P: 102/47, T: 99.5, P: 77, R: 16  Wt Readings from Last 3 Encounters:   02/04/24 142.4 kg (313 lb 15 oz)   01/29/24 (!) 155.3 kg (342 lb 6.4 oz)   01/26/24 143.8 kg (317 lb)       Physical Exam  Constitutional:       Appearance: Normal appearance.   Eyes:      Extraocular Movements: Extraocular movements intact.      Conjunctiva/sclera: Conjunctivae normal.      Pupils: Pupils are equal, round, and reactive to light.   Pulmonary:      Effort: Pulmonary effort is normal.   Neurological:      General: No focal deficit present.      Mental Status: He is alert and oriented to person, place, and time. Mental status is at baseline.   Psychiatric:         Mood and Affect: Mood normal.         Behavior: Behavior normal.         Thought Content: Thought content normal.        lower extremity edema slightly improved.     LABORATORY AND IMAGING STUDIES  Recent Labs   Lab Test 02/04/24  0828 02/03/24  0741 02/02/24  0805 02/01/24  0703 01/31/24  0728    134* 137 139 137   POTASSIUM 3.6 3.6 3.8 4.5 4.2   CHLORIDE 91* 91* 95* 98 98   CO2 40* 38* 39* 38* 35*   ANIONGAP 5* 5* 3* 3* 4*   BUN 18.1 15.3 15.5 16.5 18.3   CR 0.97 0.95 0.94 0.92 0.91   GLC 98 95 89 99 88   JOSEPH 8.9 8.8 9.2 9.3 9.4     Recent Labs   Lab Test 02/04/24  0828 02/03/24  1519 02/03/24  0741 02/02/24  0805 02/01/24  1618 01/30/24  2341 01/19/24  0607 01/18/24  0625   MAG 1.6* 1.9 1.4* 1.7 2.2   < >  --   --    PHOS  --   --   --   --   --   --  2.6 2.1*    < > = values in this interval not displayed.     Recent Labs   Lab Test 02/04/24  0828 02/03/24  2314 02/03/24  0741 02/02/24  0805 02/01/24  1115 02/01/24  0703 01/30/24  1850 01/30/24  1048 01/30/24  0545 01/19/24  0607 01/18/24  0625 01/17/24  1004 01/17/24  0628   WBC 3.8*  --  3.2* 3.1* 3.5* 3.6*   < > 3.4*   < > 3.5* 3.9*   < > 5.0   HGB 7.2* 7.2* 7.1* 7.6* 7.8* 7.4*   < > 6.9*   < > 8.0* 8.0*   < > 8.3*     --  157 186 191 170   < > 190   < >  "143* 146*   < > 140*   MCV 96  --  94 95 95 95   < > 96   < > 89 88   < > 90   NEUTROPHIL  --   --   --   --  45  --   --  49  --  51 60  --  69    < > = values in this interval not displayed.     Recent Labs   Lab Test 24  0741 24  1850 24  0607 24  0625 01/15/24  1815 24  1157   BILITOTAL 0.6  --   --   --  1.0 0.7   ALKPHOS 58  --   --   --  58 62   ALT 9  --   --   --  13 23   AST 27  --   --   --  29 36   ALBUMIN 2.7*  --  2.9* 2.9* 3.1* 3.5   LDH  --  296*  --   --   --   --      TSH   Date Value Ref Range Status   2019 2.39 0.40 - 4.00 mU/L Final   2008 2.76 0.4 - 5.0 mU/L Final     No results for input(s): \"CEA\" in the last 03134 hours.  Results for orders placed or performed during the hospital encounter of 24   XR Chest 2 Views    Narrative    CHEST TWO VIEWS 2024 11:04 AM     HISTORY: Shortness of breath.    COMPARISON: Chest radiograph 1/15/2024.       Impression    IMPRESSION: Improved left lateral patchy opacity. Increased right  perihilar hazy opacity suggestive of worsening pulmonary edema.  Similar vascular congestion and cardiomegaly. No new focal  consolidation. Possible trace right pleural effusion, however the  lateral view is limited due to overlapping soft tissue. Median  sternotomy and cardiac valve prosthesis.    SHIELA BOLDEN MD         SYSTEM ID:  [id number]   Echocardiogram Limited     Value    LVEF  60-65%    Narrative    [id number]  [id number]  [id number]  [id number]^[email or web address]^MADDISON     [facility]  Echocardiography Laboratory  [address] [place], [place] [address]     Name: JESSE CABRAL  MRN: [id number]  : 1945  Study Date: 2024 02:14 PM  Age: 79 yrs  Gender: Male  Patient Location: [facility]  Reason For Study: Mitral Valve Replacement  Ordering Physician: MADDISON CARPENTER  Referring Physician: Jayme Deng  Performed By: Sedrick Pappas RDCS     BSA: 2.6 m2  Height: 71 in  Weight: " 328 lb  HR: 83  BP: 115/53 mmHg  ______________________________________________________________________________  Procedure  Limited Portable Echo Adult.  ______________________________________________________________________________  Interpretation Summary     Left ventricular systolic function is normal.  The visual ejection fraction is 60-65%.  The right ventricle is moderately dilated.  Moderately decreased right ventricular systolic function  Flattened septum is consistent with RV pressure/volume overload. Moderate (46-  55mmHg) pulmonary hypertension is present.  There is severe (4+) tricuspid regurgitation.  s/p 31mm St Raffi mechanical MVR. Mean 6.3 mmHg @ HR 64 bpm. Cannot visualize  MR due to acoustic shadowing.  There is no pericardial effusion.  Rhythm is atrial fibrillation.     Compared to prior study dated 1/10/2024, degree of TR is similar in  appearance. The mean gradient across the mitral prosthesis is slightly higher  but at higher heart rates. Overall findings appear similar.  ______________________________________________________________________________  Left Ventricle  The left ventricle is normal in size. There is normal left ventricular wall  thickness. Left ventricular systolic function is normal. The visual ejection  fraction is 60-65%. Diastolic function not assessed due to presence of  prosthetic mitral valve. Flattened septum is consistent with RV  pressure/volume overload.     Right Ventricle  The right ventricle is moderately dilated. Moderately decreased right  ventricular systolic function.     Atria  The left atrium is severely dilated. The right atrium is severely dilated.     Mitral Valve  The mean mitral valve gradient is 6.3 mmHg. s/p 31mm St Raffi mechanical MVR.  Mean 6.3 mmHg @ HR 64 bpm.     Tricuspid Valve  The tricuspid valve is normal in structure and function. There is severe (4+)  tricuspid regurgitation. The right ventricular systolic pressure is  approximated at 40mmHg  plus the right atrial pressure. Moderate (46-55mmHg)  pulmonary hypertension is present.     Aortic Valve  The aortic valve is trileaflet.     Pulmonic Valve  The pulmonic valve is not well visualized.     Vessels  The aortic root is normal size. Inferior vena cava not well visualized for  estimation of right atrial pressure.     Pericardium  There is no pericardial effusion.     Rhythm  The rhythm was atrial fibrillation.  ______________________________________________________________________________  Doppler Measurements & Calculations  MV max P.1 mmHg  MV mean P.3 mmHg  MV V2 VTI: 50.1 cm  TR max prema: 316.8 cm/sec  TR max P.2 mmHg     ______________________________________________________________________________  Report approved by: Danyelle Jhaveri 2024 04:04 PM           *Note: Due to a large number of results and/or encounters for the requested time period, some results have not been displayed. A complete set of results can be found in Results Review.     Ferritin 187 up from 21 3 weeks ago.       ASSESSMENT  AND RECOMMENDATIONS:    Acute on chronic anemia  Iron deficiency anemia   - Pt had an admission from - for similar. At that time hgb was down to 4.4. EGD and colonoscopy revealed no active bleed; had poor colonoscopy prep with note of coupious stool in entire colon, lavage was performed. GI recommended outpatient capsule endoscopy which has not occurred.   - Discharge hemoglobin was 8.0.  On 2024  -Hemoglobin on admit was 6 on 2020 and INR was 1.98  -FOBT positive  -Initially on admit Coumadin was held and GI and cardiology team was consulted  -Hemoglobin has been monitored and he is status 2 units of blood since admission  -Patient did mention that he has history of epistaxis and some small clots and seems reasonable can try to improve humidification, reduce coagulopathy if medically possible  -Differentials are broad and can include GI bleed, epistaxis,  hemolysis  -LDH is only mildly elevated at 296 and haptoglobin is less than 10  --Plan per GI is outpatient capsule endoscopy  -Continue to monitor hemoglobin daily  - his ferritin is proved after his 3 doses of venofer but is still early to be rechecking. I will order 2 more doses of venofer.  Given his minimally elevated LDH and valve issues I suspect he has a low grade hemolysis. This along with what appears to be some degree of GI bleeding given hem+ stool and nasal clots is likely causing his iron def. We are still awaiting his peripheral smear read from pathology sent 2/1. This in addition to volume overload with some degree of CHF. I will check bilirubin/liver function today. He likely needs additional doses of venofer which I can write for tomorrow. Agree with outpt capsule study.  If his iron becomes completely replaced, has no obvious bleeding he may eventually need an outpt bone marrow bx.    Acute on chronic heart failure per cardiology  -diuresis per cards    MR replacement with MR    TR severe to mod      Marie Martines MD on 2/4/2024 at 10:27 AM

## 2024-02-05 NOTE — SIGNIFICANT EVENT
Significant Event Note    Time of event: 12:13 AM February 5, 2024    Description of event:  Persistant Heart bearn with blood tinged spitting     Plan:  -EKG  -GI Cocktail      Jose Chinchilla MD

## 2024-02-05 NOTE — PROGRESS NOTES
[facility]  Cardiology Progress Note    Date of Service (when I saw the patient): 02/05/2024  Primary Cardiologist: Dr. Gomez       Interval History:   Feels tired today. Reports no lightheadedness.     ----------------------------------------------------------------------------------------    Assessment:  Feng Wynne is a 79 year old male who was admitted on 1/30/2024.      # Severe anemia   -- Hgb svitlana ~4 earlier this month; improved to 7-8 but recurrent dip to 6 today requiring tranfusion  -- GI consulted and EGD/colonoscopy inconclusive; possible epistaxis as well as recurrent black stool?   -- Labs notable for low haptoglobin and high LDH raising suspicion for possible hemolysis in the setting of known mechanical mitral valve - limited echo repeated which showed normal mitral mean gradient at HR in the 60's- regurgitation assessment could not be made due to acoustic shadowing  -- blood smear shows no morphology to suggest hemolysis  -- patient does report 2 new episodes of dark stools- nurse made aware      # Acute on Chronic HFpEF, secondary to above   -- responding well to IV diuresis which he will likely require for several days; renal fxn remains nl   -- echo showed significant RV volume overload  -- IV lasix was held this AM due to soft BP but he has no reports of lightheadedness      # S/P MVR in 2008 with mechanical valve  -- S/p 31 mm St Raffi- repeat echo 2/1 showed normal mitral valve function but regurgitation assessment could not be made due to acoustic shadowing  -- on heparin currently; pta regimen includes warfarin      # Chronic A Fib   -- hx of MAZE procedure at the time of MVR  -- hx of bradycardia which resolved with discontinuation of BB     # Moderate-Severe TR  -- likely functional in the setting of volume overload     ----------------------------------------------------------------------------------------    Plan:  -- Continue with IV diuresis   --  Echocardiogram images and blood morphology not concerning for hemolysis- agree with considering repeat GI work up given ongoing recurrent anemia; appreciate hematology's input as well   -- if no clear source on repeat GI work up and heme's findings are inconclusive, we can consider KAYE imaging for further evaluation  -- Cardiology will follow     ----------------------------------------------------------------------------------------  Physical Exam   Temp: 99  F (37.2  C) Temp src: Oral BP: 101/45 Pulse: 71   Resp: 16 SpO2: 98 % O2 Device: Nasal cannula Oxygen Delivery: 2 LPM  Vitals:    02/03/24 0639 02/04/24 0632 02/05/24 0610   Weight: 143.4 kg (316 lb 2.2 oz) 142.4 kg (313 lb 15 oz) 142.3 kg (313 lb 11.4 oz)       GEN:  NAD. Oxygen on nasal cannula.  HEENT: Mucous membranes moist.  NECK:  Unable to assess JVP.  C/V:  Regular rate and rhythm; crisp valve sound noted   RESP: CTA, anand  GI: Abdomen soft, nontender, nondistended.    EXTREM: 2+ pitting LE edema.   NEURO: Alert and oriented, cooperative.   PSYCH: Normal affect.  SKIN: Warm and dry.   VASC: 2+ radial and dorsalis pedis pulses bilaterally.      Medications    [Held by provider] heparin Stopped (02/05/24 1213)    - MEDICATION INSTRUCTIONS -        ferrous sulfate  325 mg Oral Daily    furosemide  60 mg Intravenous Q12H    iron sucrose  300 mg Intravenous Q72H    magnesium sulfate  4 g Intravenous Once    miconazole   Topical BID    pantoprazole  40 mg Oral BID AC    sodium chloride (PF)  3 mL Intracatheter Q8H    [Held by provider] warfarin ANTICOAGULANT  5 mg Oral ONCE at 18:00    Warfarin Therapy Reminder  1 each Oral See Admin Instructions       Data   Reviewed.      Grace Hernandez PA-C   2/5/2024  Pager: [phone]

## 2024-02-05 NOTE — PLAN OF CARE
Goal Outcome Evaluation:    [id number]    Orientation: A/Ox4  Activity: A2 with lift, T/R Q2H- up in chair for part of day  Diet/BS Checks: Regular   Tele:  Afib CVR  IV Access/Drains: 2 PIV, one infusing Heparin at 1500 units/hr. Second PIV SL  Pain Management: Denies  Abnormal VS/Results: VSS on 2L O2. HepXa within range, monitor AM recheck - Mag and K replacements WNL.  Bowel/Bladder: Branch in place.  Skin/Wounds: Scattered bruises. Sacral mepi in place  Consults: Hematology, Cardiology  D/C Disposition: Pending  Other Info: Continues with IV lasix - possible transition to oral in next few days. Hgb 7.1, per orders transfuse 7 and below (see MD note 2/2/24). Received dose of venofer this afternoon. Continues to have good UOP from branch. Pharmacy dosing warfarin

## 2024-02-05 NOTE — PROGRESS NOTES
Notified provider about indwelling branch catheter discussed removal or continued need.    Did provider choose to remove indwelling branch catheter? NO    Provider's branch indication for keeping indwelling branch catheter: Indication for continued use: Retention    Is there an order for indwelling branch catheter? YES    *If there is a plan to keep branch catheter in place at discharge daily notification with provider is not necessary, but please add a notation in the treatment team sticky note that the patient will be discharging with the catheter.

## 2024-02-05 NOTE — PROGRESS NOTES
"CLINICAL SWALLOW EVALUATION     02/05/24 1027   Appointment Info   Signing Clinician's Name / Credentials (SLP) [facility]   General Information   Onset of Illness/Injury or Date of Surgery 01/30/24   Referring Physician Anival Mohan MD   Patient/Family Therapy Goal Statement (SLP) To determine cause of pain and reflux after swallowing.   Pertinent History of Current Problem Per provider note: \"Feng Wynne is a 79 year old male with past medical history significant for mechanical mitral valve replacement for severe mitral regurgitation and MAZE procedure in 2008, on chronic anticoagulation with warfarin, pulmonary hypertension, chronic diastolic CHF with preserved EF, hypertension, hyperlipidemia,and morbid obesity admitted on 1/30/2024 with acute on chronic anemia.   Patient was recently admitted to [facility] from 1/9/24-1/19/24 with anemia with a hemoglobin of 4.4.  EGD and colonoscopy did not reveal any active bleeding.  He was discharged to TCU and sent back to the emergency room after his outpatient hemoglobin came back at 6. GI had recommended outpatient capsule endoscopy, which has not been completed yet. Patient did mention that he has history of epistaxis and some clots initiated.    -ENT consulted, however given that he was not actively bleeding definitively reasonable to to try humidification and monitor clinically. CXR on 1/30/24 showed increased right perihilar hazy patchy opacities suggestive of worsening pulmonary edema. Vascular congestion and cardiomegaly. Trace R pleural effusion. Patient had quite a rough night with persistent heartburn and belching  -Recent EGD did not show any evidence of esophagitis/gastritis.\"   General Observations Patient reported burning sensation in throat especially at night. Patient reported coughing with foods and liquids but not able to describe further. Patient reported elevates HOB at night.   Type of Evaluation   Type of Evaluation " Swallow Evaluation   Oral Motor   Oral Musculature generally intact   Structural Abnormalities none present   Mucosal Quality adequate   Dentition (Oral Motor)   Dentition (Oral Motor) natural dentition;adequate dentition;dental appliance/dentures   Dental Appliance/Denture (Oral Motor) upper;dentures, full   Facial Symmetry (Oral Motor)   Facial Symmetry (Oral Motor) WNL   Lip Function (Oral Motor)   Lip Range of Motion (Oral Motor) WNL   Lip Strength (Oral Motor) WNL   Tongue Function (Oral Motor)   Tongue Strength (Oral Motor) WNL   Tongue Coordination/Speed (Oral Motor) WNL   Tongue ROM (Oral Motor) WNL   Jaw Function (Oral Motor)   Jaw Function (Oral Motor) WNL   Cough/Swallow/Gag Reflex (Oral Motor)   Soft Palate/Velum (Oral Motor) WNL   Volitional Throat Clear/Cough (Oral Motor) WNL   Vocal Quality/Secretion Management (Oral Motor)   Vocal Quality (Oral Motor) WFL   Secretion Management (Oral Motor) WNL   General Swallowing Observations   Past History of Dysphagia None per EMR review. Patient not able to recall when symptoms started.   Respiratory Support nasal cannula  (2L)   Current Diet/Method of Nutritional Intake (General Swallowing Observations, NIS) regular diet;thin liquids (level 0)   Swallowing Evaluation Clinical swallow evaluation   Clinical Swallow Evaluation   Feeding Assistance set up only required   Clinical Swallow Evaluation Textures Trialed thin liquids;pureed;solid foods   Clinical Swallow Eval: Thin Liquid Texture Trial   Mode of Presentation, Thin Liquids spoon;cup;straw;fed by clinician;self-fed   Volume of Liquid or Food Presented 1 ice cip; 2-3 oz thin water   Oral Phase of Swallow delayed AP movement  (oral holding at times)   Pharyngeal Phase of Swallow regurgitation of food/liquids   Diagnostic Statement No overt aspiration signs when swallowing. Note odynophagia with wincing due to pain. Note delayed possible regurgitation after swallowing intermittently.   Clinical Swallow  "Evaluation: Puree Solid Texture Trial   Mode of Presentation, Puree spoon;self-fed   Volume of Puree Presented 3 bites applesauce   Oral Phase, Puree WFL   Pharyngeal Phase, Puree   (no overt aspiration signs)   Clinical Swallow Evaluation: Solid Food Texture Trial   Mode of Presentation self-fed   Volume Presented 1 bite irais cracker   Oral Phase WFL   Pharyngeal Phase   (no overt aspiration signs)   Diagnostic Statement Patient reported cracker sticking in \"teeth\" and his \"shoulders\".   Esophageal Phase of Swallow   Patient reports or presents with symptoms of esophageal dysphagia Yes   Esophageal comments Note frequent burping after oral intake and intermittent regurgitation.   Swallowing Recommendations   Diet Consistency Recommendations full liquid diet;thin liquids (level 0)   Supervision Level for Intake close supervision needed   Mode of Delivery Recommendations bolus size, small;slow rate of intake   Monitoring/Assistance Required (Eating/Swallowing) stop eating activities when fatigue is present;monitor for cough or change in vocal quality with intake   Recommended Feeding/Eating Techniques (Swallow Eval) maintain upright posture during/after eating for 30 minutes;provide 6 smaller meals throughout day;set-up and prepare tray   Medication Administration Recommendations, Swallowing (SLP) One at a time   Instrumental Assessment Recommendations instrumental evaluation not recommended at this time   Comment, Swallowing Recommendations Patient may benefit from esophagram to further assess esophageal motility when swallowing.   General Therapy Interventions   Planned Therapy Interventions Dysphagia Treatment   Dysphagia treatment Modified diet education;Instruction of safe swallow strategies   Clinical Impression   Criteria for Skilled Therapeutic Interventions Met (SLP Eval) Yes, treatment indicated   SLP Diagnosis Dysphagia   Risks & Benefits of therapy have been explained evaluation/treatment results " reviewed;care plan/treatment goals reviewed;risks/benefits reviewed;current/potential barriers reviewed;participants voiced agreement with care plan;participants included;patient   Clinical Impression Comments Suspected esophageal dysphagia based on clinical swallow evaluation in setting of generalized weakness and recent left pneumonia. Oral [facility] exam unremarkable except for upper dentures in place. Patient reports throat pain and acid reflux symptoms. Patient assessed with limited amount of thin water, applesauce and irais cracker. No overt aspiration signs occurred with limited intake. Note odynophagia and frequent burping and intermittent regurgitation after limited intake. Patient symptoms appear more esophageal in nature and patient may benefit from esophagram to further assess esophageal motility when swallowing if patient able to tolerate exam. Recommend change diet to full liquid diet (thin IDDSI 0) given swallow strategies and reflux precautions (fully upright position, small bites/sips, slow pace, smaller meals more often, remain upright after meals, elevate head of bed 4-6 inches). SLP to follow for short-course for dysphagia and strategy training.   SLP Total Evaluation Time   Eval: oral/pharyngeal swallow function, clinical swallow Minutes (72001) 12   SLP Goals   Therapy Frequency (SLP Eval) 5 times/week   SLP Predicted Duration/Target Date for Goal Attainment 03/11/24   SLP Goals Swallow       Interventions   Interventions Quick Adds Swallowing Dysfunction   Swallowing Dysfunction &/or Oral Function for Feeding   Treatment of Swallowing Dysfunction &/or Oral Function for Feeding Minutes (69129) 8   Symptoms Noted During/After Treatment Pain increased  (with swallowing)   Treatment Detail/Skilled Intervention Provided instruction about diet modifications, swallow strategies and reflux precautions. Patient practiced taking small single sips by straw.   SLP Discharge Planning       SLP Discharge  Recommendation Transitional Care Facility   SLP Rationale for DC Rec Below baseline for swallow function.   SLP Brief overview of current status  Patient symptoms appear more esophageal in nature and patient may benefit from esophagram to further assess esophageal motility when swallowing if patient able to tolerate exam. Recommend change diet to full liquid diet (thin IDDSI 0) given swallow strategies and reflux precautions (fully upright position, small bites/sips, slow pace, smaller meals more often, remain upright after meals, elevate head of bed 4-6 inches). SLP to follow for short-course for dysphagia and strategy training.   Total Session Time   Total Session Time (sum of timed and untimed services) 20

## 2024-02-05 NOTE — PROVIDER NOTIFICATION
MD Notification    Notified Person: MD    Notified Person Name: allen    Notification Date/Time: 2/5 1200    Notification Interaction: phone call    Purpose of Notification: 25 mL of bloody, bright throw up    Orders Received: n/a    Comments: yee bloom MD Notification    Notified Person: MD    Notified Person Name: merle    Notification Date/Time: 2/5 1205    Notification Interaction: vocera     Purpose of Notification: Hi, patient threw up 25 mL of bright red blood. Updated GI. Continue heparin drip? Would need to be stopped for four hours before a scope    Orders Received: hold heparin, keep NPO, call to see if they can scope him around 4    Comments:

## 2024-02-05 NOTE — PROGRESS NOTES
Orientation: A&Ox4  Activity: 2A lift, turn/repo q2h  Diet/BS Checks: NPO  Tele:  Afib CVR  IV Access/Drains: 2 piv's to left arm, both SL  Pain Management: reports discomfort to esophageal area  Abnormal VS/Results: Hgb 6.1, 1 unit of blood transfused. Soft BP's, 2L NC  Bowel/Bladder: branch in place for retention, no BM today  Skin/Wounds: wound to coccyx, mepilex in place. Mag 1.4, replace this evening. K+ recheck in AM, no replacement needed  Consults: hem/onc, GI, cards  D/C Disposition: pending clinical improvement  Other Info: EGD completed and patient transferred to [facility]      Transfer Note    Patient transferred to [facility] via cart accompanied by RN.  Report called to RN. Discussed plan of care with patient and family.  Reviewed belongings checklist - checklist and belongings sent with patient: Yes

## 2024-02-05 NOTE — PLAN OF CARE
Goal Outcome Evaluation:    Orientation: AO x4, pleasant  Activity: A2+lift, T/R Q2hrs, refuses at times  Diet/BS Checks: Reg  Tele:  Afib CVR  IV Access/Drains: PIV inf Hep gtt @ 15ml/hr, next Hep Xa AM draw  Pain Management: Denies  Abnormal VS/Results: VSS on 3L NC. K and Mg protocol, redraws in AM. Hgb 7.2, transfuse <7  Bowel/Bladder: Stubbs in place w/ good UOP, no BM overnight  Skin/Wounds: PI to coccyx, mepilex in place  Consults: GI, Card  D/C Disposition: Pending  Other Info: Pt c/o epigastric burning at HS, spitting up blood-tinged secretions d/t nausea and burning. PRN tums, pepcid and zofran given w/ no relief. MD ordered EKG and GI cocktail w/ ultimate relief. HOB elevated overnight.

## 2024-02-05 NOTE — PROVIDER NOTIFICATION
MD Notification    Notified Person: MD    Notified Person Name: lissa    Notification Date/Time: 2/5 0905    Notification Interaction: vocera    Purpose of Notification: Critical hgb 6.1. Looks like the conditional order for blood transfusion was cancelled. Night RN said patient was spitting up blood tinged sputum overnight.     Orders Received: infuse 1 unit PRBC    Comments:

## 2024-02-05 NOTE — PROGRESS NOTES
Heme      Patient seen and examined by me today.  Overall he is feeling quite well.  His hemoglobin however has dropped to 6.1 today with a white cell count of 4.7 and platelets of 165.  Creatinine is 1.04 and INR is 1.96.  He is on chronic anticoagulation with warfarin because of a mechanical mitral valve.    This morning he reports no bleeding episodes.  He did have a small nosebleed yesterday.  He also had some streaky blood in his mucus when he coughed today but very minor  I have reordered hemolysis labs on him today to see if that is getting worse  He has seen GI today and and has had a recent negative EGD and colonoscopy.  He was due to have an outpatient capsule study.  He could get another EGD done in the hospital if he continues to drop his hemoglobin.    I have spoken with the hospitalist today with regard to the complexity of his situation.    The plan is to do serial hemoglobin tests on him and transfuse for less than 7.      Review of systems is otherwise unremarkable.    On physical exam:    Vital signs are stable patient is afebrile  He is alert and orientated x 3 he is pale  Respiratory exam normal  Cardiovascular exam he is got a mechanical valve clicking no murmurs  GI exam normal  Legs without tenderness or edema      Data review:    I reviewed his CBC and his hemoglobin today is 6.1, INR 1.96  Potassium 3.8  Albumin 2.7  Liver function test normal      Impression and plan:      Acute on chronic anemia:  Patient was admitted for similar problems from 1/9-1/19 2024  There was no evidence of GI bleed found  The differential of his hemoglobin includes that of iron deficiency due to bleeding, mild hemolysis.  He is now on Venofer    His peripheral blood smear showed a marked normochromic normocytic anemia with increased Red cell regeneration.  There was no evidence of hemolysis on his peripheral smear    We will check his TSH as recommended by the blood bank to make sure that is not a contributing  factor and we will follow his hospital course.  Case has been discussed with the hospitalist Dr. Mohan    Time spent 40 minutes      Roque Short md

## 2024-02-05 NOTE — PROGRESS NOTES
[facility]    Medicine Progress Note - Hospitalist Service        Date of Admission:  1/30/2024 10:06 AM    Assessment & Plan:   Feng Wynne is a 79 year old male with past medical history significant for mechanical mitral valve replacement for severe mitral regurgitation and MAZE procedure in 2008, on chronic anticoagulation with warfarin, pulmonary hypertension, chronic diastolic CHF with preserved EF, hypertension, hyperlipidemia,and morbid obesity admitted on 1/30/2024 with acute on chronic anemia.   Patient was recently admitted to [facility] from 1/9/24-1/19/24 with anemia with a hemoglobin of 4.4.  EGD and colonoscopy did not reveal any active bleeding.  He was discharged to TCU and sent back to the emergency room after his outpatient hemoglobin came back at 6.     Acute on chronic anemia of unclear etiology  Iron deficiency anemia   -Previously hospitalized between 1/9/2024-1/19/2024 for similar drop in hemoglobin  -EGD and colonoscopy did not reveal any active bleeding at that time.    -GI had recommended outpatient capsule endoscopy, which has not been completed yet.  -Discharge hemoglobin on 1/19/2024 was 8.0, he presented with a hemoglobin of 6 on 1/30/2024  -FOBT positive  -Initially on admit Coumadin was held and GI and cardiology team was consulted  -he is status posttreatment of PRBC transfusion since admission.  -Differentials are broad and can include GI bleed, epistaxis, hemolysis(given that he is a valve INR has been subtherapeutic)  -Peripheral smear showed marked normochromic, normocytic anemia with increased red cell regeneration without morphologic evidence of hemolysis  -Hematology also following, they felt that he likely had a low-grade hemolysis  -Continue IV iron infusion per hematology  -Hemoglobin dropped to 6.1 again this morning  -Transfuse 1 unit of PRBC today  -Cardiology following, likely will proceed with KAYE early next week if he continues to drop  hemoglobin per previous hospitalist discussion with cardiology team.    Epistaxis  -Patient did mention that he has history of epistaxis and some clots initiated.    -ENT consulted, however given that he was not actively bleeding definitively reasonable to to try humidification and monitor clinically.      Acute on chronic diastolic CHF with preserved EF  Acute respiratory failure due to above  Moderate pulmonary Hypertension  -ECHO from 1/10/24 noted LVEF 55%; s/p 31mm [place] Raffi mechanical MVR. Mean 5mmHg, moderate to mod-severe (2-3+) tricuspid regurgitation   -On admission pt reported Shortness of breath, increased cough, recent weight gain and increased leg swelling.   -On admission BNP is within normal limits 1,253.   -CXR on 1/30/24 showed increased right perihilar hazy patchy opacities suggestive of worsening pulmonary edema. Vascular congestion and cardiomegaly. Trace R pleural effusion.   -Echo on 2/1/2024 shows EF of 60 to 65%, RV is moderately dilated, moderately decreased RV systolic function and flattened septum consistent with volume overload, moderate pulmonary hypertension, severe tricuspid regurgitation and status 31 mm [place]i mechanical mitral valve and no pericardial effusion  -Continues on Lasix 60 mg IV twice daily  -Defer diuretics to cardiology  -Net -19 L since admission  -As discussed above, if there is ongoing concerns for hemolysis hemolysis then they may plan for valve fluoroscopy plus minus KAYE early next week when he approaches euvolemia  -Await cardiology reevaluation today.      Mild troponin elevation   -Troponin  elevated to 58 on admission.   -Suspect demand ischemia from acute anemia, CHF, pneumonia etc  -he had troponin elevation during his last admission as well (73-69).   -Continue to monitor on telemetry     Hx of mechanical mitral valve replacement for severe mitral regurgitation   S/p MAZE procedure (2008)   Chronic anticoagulation with warfarin   Paroxysmal atrial  fibrillation  Subtherapeutic INR    -Initially on admission his INR was subtherapeutic at 1.98 and Coumadin was held but he was restarted back on heparin and Coumadin on p.m. of 1/31 .   -Goal INR is 2.5-3.5   -INR  is 1.96 today  -Continue with heparin drip and pharmacy to dose Coumadin     Epigastric pain with belching  -Patient had quite a rough night with persistent heartburn and belching  -Recent EGD did not show any evidence of esophagitis/gastritis  -Improved with GI cocktail  -Continues on Protonix 40 mg twice a day  -Will discuss with GI if we need to rescoped him, especially with his recent course of doxycycline      Recent Pneumonia   -CXR obtained on 1/26 as outpatient which per the read suggested CHF, but ultimately the provider ended up treating for pneumonia. Received 2g of IM Rocephin on 1/26 and was started on Ceftin and Doxycycline on 1/27.   -Patient completed, previously planned course of Ceftin and doxycycline this hospitalization on 2/2/2024     Hypomagnesemia  -Replace per protocol      HLD  -Intolerant to statins and Zetia      Urinary retention s/p Branch Catheter   -Pt was supposed to have an appointment with urology on 1/30 for TOV   -Continue branch for now while getting IV diuresis   -will TOV prior to discharge from this hospitalization once he is clinically more stable     Diet: Regular Diet Adult  Snacks/Supplements Adult: Other; may order supplements prn; With Meals     DVT Prophylaxis: Warfarin   Branch Catheter: PRESENT, indication: Retention  Code Status: Full Code     Disposition Plan      Expected Discharge Date: 02/06/2024      Destination: inpatient rehabilitation facility  Discharge Comments: Diuresis and pending hemoglobin stability      Entered: Anival Mohan MD 02/05/2024, 9:42 AM        Clinically Significant Risk Factors            # Hypomagnesemia: Lowest Mg = 1.6 mg/dL in last 2 days, will replace as needed   # Hypoalbuminemia: Lowest albumin = 2.7 g/dL at 2/3/2024   "7:41 AM, will monitor as appropriate     # Hypertension: Noted on problem list  # Acute heart failure with preserved ejection fraction: heart failure noted on problem list, last echo with EF >50%, and receiving IV diuretics       # Severe Obesity: Estimated body mass index is 43.75 kg/m  as calculated from the following:    Height as of this encounter: 1.803 m (5' 11\").    Weight as of this encounter: 142.3 kg (313 lb 11.4 oz).      # Financial/Environmental Concerns:            The patient's care was discussed with the Bedside Nurse and Patient.    Medical Decision Making       **CLEAR ALL SELECTIONS**      Labs/Imaging Reviewed:  See Information above and Data section below  Time SPENT BY ME on the date of service doing chart review, history, exam, documentation & further activities per the note:  52 MINUTES    Chart documentation was completed, in part, with [employer] voice-recognition software. Even though reviewed, some grammatical, spelling, and word errors may remain.    Anival Mohan MD  Hospitalist Service  [facility]  Text Page 7AM-6PM  Securely message with the Voc[employer] Web Console (learn more here)  Text page via [employer] Paging/Directory    ______________________________________________________________________    Interval History   Care assumed this morning.  Extensive chart review done.  Complicated medical history with second admission for acute anemia of unclear etiology.  Patient had a rough night overnight with heartburn and some blood-tinged sputum.  This subsequently improved with GI cocktail.  He denies nausea or vomiting.  Very good diuretic response to IV Lasix.  Hemoglobin dropped to 6.1 again today.      Data reviewed today: I reviewed all medications, new labs and imaging results over the last 24 hours. I personally reviewed no images or EKG's today.    Physical Exam   Vital signs:  Temp: 98.7  F (37.1  C) Temp src: Oral BP: 100/49 Pulse: 81   Resp: 18 SpO2: 93 % O2 " "Device: Nasal cannula Oxygen Delivery: 2 LPM Height: 180.3 cm (5' 11\") Weight: 142.3 kg (313 lb 11.4 oz)  Estimated body mass index is 43.75 kg/m  as calculated from the following:    Height as of this encounter: 1.803 m (5' 11\").    Weight as of this encounter: 142.3 kg (313 lb 11.4 oz).      Wt Readings from Last 2 Encounters:   02/05/24 142.3 kg (313 lb 11.4 oz)   01/29/24 (!) 155.3 kg (342 lb 6.4 oz)       Gen: AAOX3, NAD, comfortable  HEENT: Supple neck, moist oral mucosa, no pallor  Resp: Diminished air entry at the bases, normal effort of breathing.  CVS: RRR, no murmur  Abd/GI: Soft, non-tender. BS- normoactive.    Skin: Warm, dry no rashes  MSK: Lymphedema+  Neuro- CN- intact. No focal deficits.        Data   Recent Labs   Lab 02/05/24  0800 02/04/24  0828 02/03/24  2314 02/03/24  0741   WBC 4.7 3.8*  --  3.2*   HGB 6.1* 7.2* 7.2* 7.1*   MCV 94 96  --  94    174  --  157   INR 1.96* 1.82*  --  1.80*    136  --  134*   POTASSIUM 3.8 3.6  --  3.6   CHLORIDE 90* 91*  --  91*   CO2 40* 40*  --  38*   BUN 37.9* 18.1  --  15.3   CR 1.04 0.97  --  0.95   ANIONGAP 5* 5*  --  5*   JOSEPH 8.6* 8.9  --  8.8   * 98  --  95   ALBUMIN  --   --   --  2.7*   PROTTOTAL  --   --   --  5.1*   BILITOTAL  --   --   --  0.6   ALKPHOS  --   --   --  58   ALT  --   --   --  9   AST  --   --   --  27       No results found for this or any previous visit (from the past 24 hour(s)).  Medications    heparin 1,500 Units/hr (02/05/24 0744)    - MEDICATION INSTRUCTIONS -        ferrous sulfate  325 mg Oral Daily    furosemide  60 mg Intravenous Q12H    iron sucrose  300 mg Intravenous Q72H    miconazole   Topical BID    pantoprazole  40 mg Oral BID AC    sodium chloride (PF)  3 mL Intracatheter Q8H    Warfarin Therapy Reminder  1 each Oral See Admin Instructions                  "

## 2024-02-05 NOTE — PROGRESS NOTES
[facility]  Gastroenterology Progress Note     Feng Wynne MRN# [id number]   YOB: 1945 Age: 79 year old          Assessment and Plan:     Feng Wynne is a 79 year old male with past medical history significant for mechanical mitral valve replacement for severe mitral regurgitation and MAZE procedure in 2008, on chronic anticoagulation with warfarin, pulmonary hypertension, chronic diastolic CHF with preserved EF, previous dilated LV likely due to mitral regurgitation, subsequent improvement with ACE inhibitor and beta-blockers, hypertension, hyperlipidemia,and morbid obesity admitted on 1/30/2024 with acute on chronic anemia.     Anemia  Epistaxis  Pt had an admission from 1/9-1/19 for similar. At that time hgb was down to 4.4. EGD and colonoscopy revealed no active bleed; had poor colonoscopy prep with note of coupious stool in entire colon, lavage was performed. EGD was negative recommended outpatient capsule endoscopy  Discharge hemoglobin was 8.0.   He returns for worsening anemia with hemoglobin of 6.0. Occult blood is positive.   Hemoglobin now stable in mid 7 range with no signs of active GI bleed, no melena, hematemesis, or hematochezia  Does report at least 3 months for epistaxis with large clots before and now ongoing blood tinged and some small clots every few days- certainly a possible source of blood loss  Coughed or vomited up bright red blood overnight. History is poor but seems likely coughed up blood     - given recent negative EGD and colonoscopy- less likely chance of GI bleed  - Hemoglobin 6.1 from 7.2  - NPO  - on heparin and could consider scope if held for 4 hours  - Defer to cardiology on recommendations  - serial hemoglobin and transfuse <7  - outpatient capsule                    Interval History:     Feeling find. Coughed up blood              Review of Systems:     C: NEGATIVE for fever, chills, change in weight  E/M: NEGATIVE for ear, mouth and  throat problems  R: NEGATIVE for significant cough or SOB  CV: NEGATIVE for chest pain, palpitations or peripheral edema             Medications:   I have reviewed this patient's current medications   ferrous sulfate  325 mg Oral Daily    furosemide  60 mg Intravenous Q12H    iron sucrose  300 mg Intravenous Q72H    miconazole   Topical BID    pantoprazole  40 mg Oral BID AC    sodium chloride (PF)  3 mL Intracatheter Q8H    Warfarin Therapy Reminder  1 each Oral See Admin Instructions                  Physical Exam:   Vitals were reviewed  Vital Signs with Ranges  Temp:  [98.6  F (37  C)-100.3  F (37.9  C)] 98.7  F (37.1  C)  Pulse:  [70-81] 81  Resp:  [18] 18  BP: ()/(49-62) 100/49  SpO2:  [93 %-96 %] 93 %  I/O last 3 completed shifts:  In: -   Out: 3325 [Urine:3325]  Constitutional: healthy, alert, and no distress   Cardiovascular: negative, PMI normal. No lifts, heaves, or thrills. RRR. No murmurs, clicks gallops or rub  Respiratory: negative, Percussion normal. Good diaphragmatic excursion. Lungs clear  Abdomen: Abdomen soft, non-tender. BS normal. No masses, organomegaly             Data:   I reviewed the patient's new clinical lab test results.   Recent Labs   Lab Test 02/05/24  0800 02/04/24  0828 02/03/24  2314 02/03/24  0741   WBC 4.7 3.8*  --  3.2*   HGB 6.1* 7.2* 7.2* 7.1*   MCV 94 96  --  94    174  --  157   INR 1.96* 1.82*  --  1.80*     Recent Labs   Lab Test 02/05/24  0800 02/04/24  0828 02/03/24  0741   POTASSIUM 3.8 3.6 3.6   CHLORIDE 90* 91* 91*   CO2 40* 40* 38*   BUN 37.9* 18.1 15.3   ANIONGAP 5* 5* 5*     Recent Labs   Lab Test 02/03/24  0741 01/19/24  0607 01/18/24  0625 01/15/24  2210 01/15/24  1815 01/09/24  1157   ALBUMIN 2.7* 2.9* 2.9*  --  3.1* 3.5   BILITOTAL 0.6  --   --   --  1.0 0.7   ALT 9  --   --   --  13 23   AST 27  --   --   --  29 36   PROTEIN  --   --   --  Trace*  --   --        I reviewed the patient's new imaging results.    All laboratory data reviewed  All  imaging studies reviewed by me.    Cheryl Waters PA-C,  2/1/2024  [facility]  Office : [phone]  Cell: [phone] (Dr. Olvera)  Cell: [phone] (Cheryl Waters PA-C)

## 2024-02-05 NOTE — PROVIDER NOTIFICATION
MD Notification    Notified Person: MD    Notified Person Name: Darlyn    Notification Date/Time: 02/04/24 10:06 PM    Notification Interaction: voccherri page    Purpose of Notification: Hello, pt c/o of significant heartburn (burning, can't lay flat, nauseous). I gave him PRN tums and that hasn't helped. He has PRN pepcid, but the indication isn't for heartburn. Is it okay to try that?    Orders Received: Can use IV pepcid for heartburn    Notification:  Hi again, we tried the IV pepcid w/ no relief. He continues to have epigastric burning and reports an increase in saliva and is spitting up blood-tinged secretions bc he is nauseated if he swallows it. He's currently on a Hep gtt. Is there anything else you would recommend?    Order: Try IV zofran    Paged Dr Chinchilla @ 0010    Hello, pt has been having significant epigastric heartburn unrelieved by PRN tums, IV pepcid, and zofran. He reports an increase in saliva and is spitting up blood-tinged secretions d/t the burning. He's currently on a Hep gtt. Can we try a GI cocktail?    Order: EKG and GI cocktail

## 2024-02-05 NOTE — PROGRESS NOTES
Addendum:  Patient underwent EGD moments ago.  Findings updated to me by Dr. Olvera.  Patient was found to have ulcer with adherent clot in the mid esophagus, suspected due to pill esophagitis from recent doxycycline use.    Plan:  -Change Protonix to 40 mg IV twice daily  -Carafate 1 g 4 times daily  -N.p.o.  -Serial hemoglobin every 6 hours  -Transfer to [facility], low threshold to transfer to ICU with any recurrence of bleeding  -Dr. Olvera recommends holding anticoagulation at a minimum until tomorrow morning.  Reassess the situation after discussion with cardiology tomorrow.  -Hold heparin drip and Coumadin tonight.  -Signout given to house team and charge nurse at station 88.

## 2024-02-06 NOTE — PROGRESS NOTES
"[facility]  WO Nurse Inpatient Assessment     Consulted for: Wound coccyx     Summary: patient with POA evolving deep tissue pressure injury to buttock, initial visit 1/31     Attempted follow up assessement of buttock pressure injury, Patient involved in RRT at time of attempt, hubert with RN, [facility] to follow up 2/7/24    Patient History (according to provider note(s):      \"79 year old male with past medical history significant for mechanical mitral valve replacement for severe mitral regurgitation and MAZE procedure in 2008, on chronic anticoagulation with warfarin, pulmonary hypertension, chronic diastolic CHF with preserved EF, previous dilated LV likely due to mitral regurgitation, subsequent improvement with ACE inhibitor and beta-blockers, hypertension, hyperlipidemia,and morbid obesity admitted on 1/30/2024 with acute on chronic anemia. \"    Assessment:      Areas visualized during today's visit: Focused: and Sacrum/coccyx    Wound location: buttock, coccyx    Last photo: 1/31 photo did not save   Wound due to: Pressure Injury POA deep tissue injury   Wound history/plan of care: found with sacral mepilex   Wound base:  non-blanchable, erythema, maroon, purple, and dermis     Palpation of the wound bed: boggy and textured        Drainage: small     Description of drainage: serosanguinous     Measurements (length x width x depth, in cm): 3  x 4  x  0.1 cm      Tunneling: N/A     Undermining: N/A  Periwound skin: Superficial erosion and fragile, distant periwound skin intact       Color: normal and consistent with surrounding tissue      Temperature: normal   Odor: none  Pain: moderate, pain related to right side more than wound itself   Pain interventions prior to dressing change: patient tolerated well  Treatment goal: Heal   STATUS: initial assessment and evolving  Supplies ordered: at bedside, supplies stored on unit, and discussed with patient        Treatment Plan:       Wound " "care  EVERY SHIFT        Comments: Location: buttock  Care: provided qshift by primary RN  1. Cleanse with each incontinence episode with evelio cleanser and soft dry wipes (patient has intermittent fecal incontinence and requires assistance, patient discussed at [facility] consult he is hesitant to ask for help)  2. Apply skin prep (sure prep or 3M no sting skin barrier wipes) to wound and periwound skin, let dry for ~30 seconds  3. Cover with sacral mepilex, or with two 4x4\" mepilex at the same time to cover all the damaged skin. Ok to use each mepilex up to 5 days. Ok to lift mepilex for routine assessments of the skin and reapply          Skin care precautions  EFFECTIVE NOW        Comments: Pressure Injury Prevention (PIP) Plan:  If patient is declining pressure injury prevention interventions: Explore reason why and address patient's concerns, Educate on pressure injury risk and prevention intervention(s), If patient is still declining, document \"informed refusal\" , and Ensure Care team is aware ( provider, charge nurse, etc)  Mattress: Follow bed algorithm, reassess daily and order specialty mattress, if indicated.  HOB: Maintain at or below 30 degrees, unless contraindicated  Repositioning in bed: Every 1-2 hours , Left/right positioning; avoid supine, and Raise foot of bed prior to raising head of bed, to reduce patient sliding down (shear)  Heels: Keep elevated off mattress and Pillows under calves  Protective Dressing: Sacral Mepilex for prevention (#[id number]),  especially for the agitated patient  Positioning Equipment: Z-Theo positioner (#[id number] or #[id number]) to help maintain side lying position.  Chair positioning: Chair cushion (#[id number]) , Assist patient to reposition hourly, and Do NOT use a donut for sitting (this increases pressure to smaller area and creates a higher potential for injury)    If patient has a buttock pressure injury, or high risk for PI use chair cushion or SPS.  Moisture Management: " Perineal cleansing /protection: Follow Incontinence Protocol, Avoid brief in bed, Clean and dry skin folds with bathing , and Moisturize dry skin  Under Devices: Inspect skin under all medical devices during skin inspection , Ensure tubes are stabilized without tension, and Ensure patient is not lying on medical devices or equipment when repositioned  Ask provider to discontinue device when no longer needed.          Nutrition Services Adult IP Consult  ONE TIME     Complete     References:    Medical Nutrition Therapy policy and MNT protocol   Provider: (Not yet assigned)   Question Answer Comment   Reason for Consult: RN Consult - specify Reason    Reason for Consult: wound healing support needs with evolving pressure injury to buttock            Orders: Written    RECOMMEND PRIMARY TEAM ORDER: None, at this time  Education provided: importance of repositioning, plan of care, Infection prevention , Moisture management, Hygiene, and Off-loading pressure  Discussed plan of care with: Patient, Family, Nurse, and Physician  WOC nurse follow-up plan: weekly  Notify WOC if wound(s) deteriorate.  Nursing to notify the Provider(s) and re-consult the WOC Nurse if new skin concern.    DATA:     Current support surface: Standard  Low air loss (PRICILA pump, Isolibrium, Pulsate, skin guard, etc)  Containment of urine/stool: Incontinence Protocol, Incontinent pad in bed, and Indwelling catheter  BMI: Body mass index is 41.66 kg/m .   Active diet order: Orders Placed This Encounter      NPO for Medical/Clinical Reasons Except for: Meds, Ice Chips     Output: I/O last 3 completed shifts:  In: -   Out: 1875 [Urine:1875]     Labs:   Recent Labs   Lab 02/06/24  0521 02/03/24  2314 02/03/24  0741   ALBUMIN  --   --  2.7*   HGB 6.4*   < > 7.1*   INR 2.49*   < > 1.80*   WBC 7.2   < > 3.2*    < > = values in this interval not displayed.     Pressure injury risk assessment:   Sensory Perception: 4-->no impairment  Moisture: 3-->occasionally  moist  Activity: 1-->bedfast  Mobility: 2-->very limited  Nutrition: 3-->adequate  Friction and Shear: 1-->problem  Roscoe Score: 14    Jyoti BENJAMINOCN   1st choice: Securely message with [employer] ([facility] [employer] [facility])   (2nd option: [facility] Office Phone [phone], messages checked periodically Mon-Fri 8a-4p)       Yes

## 2024-02-06 NOTE — PROGRESS NOTES
Rapid Response Team Note    Assessment   In assessment a rapid response was called on Feng Wynne due to hypotension.      Tracy Carrero, RT    Hospital Course   Admission Diagnosis:   Shortness of breath [R06.02]  Positive occult stool blood test [R19.5]  Anemia due to blood loss, acute [D62]  Acute on chronic congestive heart failure, unspecified heart failure type (H) [I50.9]    Physical Exam   Temp: 99  F (37.2  C) Temp  Min: 98.5  F (36.9  C)  Max: 100.2  F (37.9  C)  Resp: 22 Resp  Min: 12  Max: 33  SpO2: 96 % SpO2  Min: 83 %  Max: 100 %  Pulse: 73 Pulse  Min: 60  Max: 100    No data recorded  BP: (!) 85/41 Systolic (24hrs), Av , Min:85 , Max:148   Diastolic (24hrs), Av, Min:30, Max:70         Exam:   General: SpO2 91% on 2L NC  Mental Status: baseline mental status.    No respiratory needs at this time.    show

## 2024-02-06 NOTE — PROGRESS NOTES
[facility]    Medicine Progress Note - Hospitalist Service        Date of Admission:  1/30/2024 10:06 AM    Assessment & Plan:   Feng Wynne is a 79 year old male with past medical history significant for mechanical mitral valve replacement for severe mitral regurgitation and MAZE procedure in 2008, on chronic anticoagulation with warfarin, pulmonary hypertension, chronic diastolic CHF with preserved EF, hypertension, hyperlipidemia,and morbid obesity admitted on 1/30/2024 with acute on chronic anemia.   Patient was recently admitted to [facility] from 1/9/24-1/19/24 with anemia with a hemoglobin of 4.4.  EGD and colonoscopy did not reveal any active bleeding.  He was discharged to TCU and sent back to the emergency room after his outpatient hemoglobin came back at 6.     Acute blood loss on chronic anemia.  Severe pill esophagitis with active bleeding ulcer and visible vessel s/p cauterization and endoclips placement on 2/5/2024.  Iron deficiency anemia.  -Previously hospitalized between 1/9/2024-1/19/2024 for similar drop in hemoglobin  -EGD and colonoscopy at that time did not reveal any active bleeding at that time.    -GI had recommended outpatient capsule endoscopy, which has not been completed yet.  -Discharge hemoglobin on 1/19/2024 was 8.0, he presented with a hemoglobin of 6 on 1/30/2024  -FOBT positive   -Peripheral smear showed marked normochromic, normocytic anemia with increased red cell regeneration without morphologic evidence of hemolysis  -Initially the etiology of his acute drop in hemoglobin was unclear however patient underwent upper GI endoscopy yesterday with Dr. Olvera which revealed severe pill esophagitis with active bleeding ulcer and visible vessels.  There was clotted blood in the middle third of the esophagus and the entire stomach.   -S/p 5 units of PRBC altogether thus far including 1 unit for hemoglobin of 6.4 earlier this morning  -Hematology also  following  -Continue Protonix 40 mg IV twice a day  -Carafate 1 g 4 times daily  -Continue IMC care  -blood pressure slightly soft this morning  -Normal saline 500 cc bolus x 1  -Will transfuse additional unit of PRBC this morning  -Discussed with cardiology team, given his mechanical valve, no plan to reverse anticoagulation thus far.  Hold further doses of heparin and Coumadin at least today.  Await formal evaluation by cardiology to see how long we could potentially hold anticoagulation if the clinical need arises.  -Low threshold to transfer to ICU.  -Serial hemoglobin every 6 hours  -Clear liquid diet with n.p.o. after midnight in case repeat endoscopy needed tomorrow.    Acute on chronic diastolic CHF with preserved EF  Acute respiratory failure due to above  Moderate pulmonary Hypertension  -ECHO from 1/10/24 noted LVEF 55%; s/p 31mm [place] Raffi mechanical MVR. Mean 5mmHg, moderate to mod-severe (2-3+) tricuspid regurgitation   -On admission pt reported Shortness of breath, increased cough, recent weight gain and increased leg swelling.   -On admission BNP is within normal limits 1,253.   -CXR on 1/30/24 showed increased right perihilar hazy patchy opacities suggestive of worsening pulmonary edema. Vascular congestion and cardiomegaly. Trace R pleural effusion.   -Echo on 2/1/2024 shows EF of 60 to 65%, RV is moderately dilated, moderately decreased RV systolic function and flattened septum consistent with volume overload, moderate pulmonary hypertension, severe tricuspid regurgitation and status 31 mm [place]i mechanical mitral valve and no pericardial effusion  -Initially was on IV Lasix until 2/5/2024  -Diuretics on hold now due to soft blood pressure and concern for GI bleed  -Net -20 L since admission  -Resume diuretics as clinically indicated.      Hx of mechanical mitral valve replacement for severe mitral regurgitation   S/p MAZE procedure (2008)   Chronic anticoagulation with warfarin   Paroxysmal  atrial fibrillation  Subtherapeutic INR    -Initially on admission his INR was subtherapeutic at 1.98 and Coumadin was held but he was restarted back on heparin and Coumadin on p.m. of 1/31 .   -Goal INR is 2.5-3.5   -INR  is 2.49 today  -Hold Coumadin and heparin drip.  As mentioned above, no plan for reversal of anticoagulation yet, monitor for ongoing evidence of bleeding very closely.    Mild troponin elevation   -Troponin  elevated to 58 on admission.   -Suspect demand ischemia from acute anemia, CHF, pneumonia etc  -he had troponin elevation during his last admission as well (73-69).   -Continue to monitor on telemetry    Epistaxis  -Patient did mention that he has history of epistaxis and some clots initiated.    -ENT consulted, however given that he was not actively bleeding definitively reasonable to to try humidification and monitor clinically.        Recent Pneumonia   -CXR obtained on 1/26 as outpatient which per the read suggested CHF, but ultimately the provider ended up treating for pneumonia. Received 2g of IM Rocephin on 1/26 and was started on Ceftin and Doxycycline on 1/27.   -Patient completed, previously planned course of Ceftin and doxycycline this hospitalization on 2/2/2024     Hypomagnesemia  -Replace per protocol      HLD  -Intolerant to statins and Zetia      Urinary retention s/p Branch Catheter   -Pt was supposed to have an appointment with urology on 1/30 for TOV   -Continue branch for now while getting IV diuresis   -will TOV prior to discharge from this hospitalization once he is clinically more stable     Diet: NPO for Medical/Clinical Reasons Except for: Meds, Ice Chips     DVT Prophylaxis: Warfarin   Branch Catheter: PRESENT, indication: Retention  Code Status: Full Code     Disposition Plan       Expected Discharge Date: 02/09/2024      Destination: inpatient rehabilitation facility  Discharge Comments: Diuresis and pending hemoglobin stability      Entered: Anival Mohan MD  "02/06/2024, 9:25 AM        Clinically Significant Risk Factors            # Hypomagnesemia: Lowest Mg = 1.4 mg/dL in last 2 days, will replace as needed   # Hypoalbuminemia: Lowest albumin = 2.7 g/dL at 2/3/2024  7:41 AM, will monitor as appropriate     # Hypertension: Noted on problem list    # Acute heart failure with preserved ejection fraction: heart failure noted on problem list, last echo with EF >50%, and receiving IV diuretics       # Severe Obesity: Estimated body mass index is 41.66 kg/m  as calculated from the following:    Height as of this encounter: 1.803 m (5' 11\").    Weight as of this encounter: 135.5 kg (298 lb 11.6 oz).        # Financial/Environmental Concerns:            The patient's care was discussed with the Bedside Nurse and Patient. Cardiology PA, house team and GI    Medical Decision Making       **CLEAR ALL SELECTIONS**        Labs/Imaging Reviewed:  See Information above and Data section below    Time SPENT BY ME on the date of service doing chart review, history, exam, documentation & further activities per the note:  55 MINUTES    Chart documentation was completed, in part, with [employer] voice-recognition software. Even though reviewed, some grammatical, spelling, and word errors may remain.    Anival Mohan MD  Hospitalist Service  [facility]  Text Page 7AM-6PM  Securely message with the [employer]oc[employer] Web Console (learn more here)  Text page via [employer] Paging/Directory    ______________________________________________________________________    Interval History   Patient had emesis of bright red blood yesterday and therefore underwent endoscopy yesterday evening which showed esophageal ulcer with evidence of active bleeding.  Anticoagulation subsequently has been on hold.  Hemoglobin dropped to 6.4 this morning, received 1 unit of PRBC with plans for additional second unit.  Denies lightheaded or dizziness however blood pressure slightly soft.  No abdominal pain.  " "No epigastric pain.  Diuretics remains on hold.      Data reviewed today: I reviewed all medications, new labs and imaging results over the last 24 hours. I personally reviewed no images or EKG's today.    Physical Exam   Vital signs:  Temp: 99.1  F (37.3  C) Temp src: Oral BP: 92/52 Pulse: 73   Resp: (!) 33 SpO2: 94 % O2 Device: Nasal cannula Oxygen Delivery: 3 LPM Height: 180.3 cm (5' 11\") Weight: 135.5 kg (298 lb 11.6 oz)  Estimated body mass index is 41.66 kg/m  as calculated from the following:    Height as of this encounter: 1.803 m (5' 11\").    Weight as of this encounter: 135.5 kg (298 lb 11.6 oz).      Wt Readings from Last 2 Encounters:   02/06/24 135.5 kg (298 lb 11.6 oz)   01/29/24 (!) 155.3 kg (342 lb 6.4 oz)       Gen: AAOX3, NAD, comfortable  HEENT: Supple neck, moist oral mucosa, no pallor  Resp: Diminished air entry at the bases, normal effort of breathing.  CVS: RRR, no murmur  Abd/GI: Soft, non-tender. BS- normoactive.    Skin: Warm, dry no rashes  MSK: Lymphedema+  Neuro- CN- intact. No focal deficits.        Data   Recent Labs   Lab 02/06/24  0521 02/05/24  2232 02/05/24  2133 02/05/24  2027 02/05/24  1742 02/05/24  0800 02/04/24  0828 02/03/24  2314 02/03/24  0741   WBC 7.2  --   --   --   --  4.7 3.8*  --  3.2*   HGB 6.4* 7.6*  --  7.7*  --  6.1* 7.2*   < > 7.1*   MCV 95  --   --   --   --  94 96  --  94     --   --   --   --  165 174  --  157   INR 2.49*  --   --   --   --  1.96* 1.82*  --  1.80*     --   --   --   --  135 136  --  134*   POTASSIUM 4.0  --   --   --   --  3.8 3.6  --  3.6   CHLORIDE 91*  --   --   --   --  90* 91*  --  91*   CO2 40*  --   --   --   --  40* 40*  --  38*   BUN 49.6*  --   --   --   --  37.9* 18.1  --  15.3   CR 1.11  --   --   --   --  1.04 0.97  --  0.95   ANIONGAP 6*  --   --   --   --  5* 5*  --  5*   JOSEPH 8.4*  --   --   --   --  8.6* 8.9  --  8.8   *  --  93  --  94 101* 98  --  95   ALBUMIN  --   --   --   --   --   --   --   --  2.7* "   PROTTOTAL  --   --   --   --   --   --   --   --  5.1*   BILITOTAL  --   --   --   --   --   --   --   --  0.6   ALKPHOS  --   --   --   --   --   --   --   --  58   ALT  --   --   --   --   --   --   --   --  9   AST  --   --   --   --   --   --   --   --  27    < > = values in this interval not displayed.       No results found for this or any previous visit (from the past 24 hour(s)).  Medications     [Held by provider] heparin Stopped (02/05/24 1213)     - MEDICATION INSTRUCTIONS -         ferrous sulfate  325 mg Oral Daily     [Held by provider] furosemide  60 mg Intravenous Q12H     iron sucrose  300 mg Intravenous Q72H     miconazole   Topical BID     pantoprazole  40 mg Intravenous BID     sodium chloride (PF)  3 mL Intracatheter Q8H     sodium chloride (PF)  3 mL Intracatheter Q8H     sucralfate  1 g Oral 4x Daily AC & HS     [Held by provider] warfarin ANTICOAGULANT  5 mg Oral ONCE at 18:00     Warfarin Therapy Reminder  1 each Oral See Admin Instructions

## 2024-02-06 NOTE — CODE/RAPID RESPONSE
[facility]   RRT/House Officer SACHI Progress Note  Date of Service: 2/6/2024   Time Called: 1203  RRT called for (per RN): persisting hypotension    IMPRESSION & PLAN:  Assessment & Plan     Feng Wynne is a 79 year old male w/ PMH of mitral valve replacement, PAF, chronic AC with warfarin, chronic diastolic HF, HTN, HLD, pulmonary hypertension, morbid obesity, recent admission 1/9-1/19, with severe anemia Hgb 4.4, bradycardia, ENRIQUE, hypokalemia, urinary retention with Stubbs placement, who was subsequently re admitted on 1/30/2024 for acute on chronic anemia.  EGD found to have an esophageal ulcer was transfused.      2/5 Given report from rounder due to concern of decompensation.  s/p EGD w-esoph ulcer cauterized (thought from PTA doxy use) hep drip/stop/deferred on reverse.  [place] [facility] [place] Ppi, carafate for GI  2/6 AM contacted due to low BP, Hgb 6.4 getting LR, 1u pRBC transfusion for   2/6 at 1203 Formal RRT called by RN due to persistent hypotension.  Patient had has received half of his LR bolus and he did receive Stubbs unit this morning (second total) pressure still remain 85/45.  Right arm large cuff.  We also trialed the other arm with a regular cuff and pressures are similar.    And most notably patient's heart rate remains very steady 70-75 despite no beta-blockade/meds that would affect/depressed.-Despite this, patient actually has minimal symptoms.  Reporting some mild neck discomfort may be upper chest.  Shortness of breath is improved/resolved on O2.  Still weak.  Hemoglobin at 6.9 resulted during RRT and after that second unit had finished.  Thus I ordered a 3rd p RBC unit. Also note had iron 2/4.   Noting the patient's heart rate remains very steady and slow I think he is actually tolerating this relatively well.  Will move IV fluids to 75.  Continue serial hemoglobins.      Working diagnosis/Impression:  Acute hypotension, likely hypovolemic secondary to GI bleed  -Severe  anemia requiring transfusion, acute on chronic  -Esophageal ulcer thought secondary to doxycycline use  -History MVR 2008, on Coumadin.  Therapeutic INR  -Mild chest discomfort,    Differential diagnosis considered following (not exclusive):  -Septic shock-unlikely as there is no current infection source thought present.  -Consumptive anemia-unlikely, recheck haptoglobin, reticulocyte 4.1%  -Chest discomfort, ACS thought unlikely with nonischemic EKG.    INTERVENTIONS:  - Transfuse an additional RBC unit (third total since admission)  - check hgb 1 hr after unit given and q 8hrs for now  - current transfusion threshold if prn <7,   --- Consider prn hgb < 8 given CV hx, CP. Defer to hospitalist  - Finish  cc bolus, then 75 an hour.  - Continue close BP monitoring every 1hr today, then q2-3hr overnight  ---target SBP 85-95, MAP>60 for now  - Continue telemetry, contact hospitalist if HR greater than 100  - ECG - no acute ischemia  - No pressors needed, diuresis on hold given hypotension despite vol overload  - check trop x 2 (addon to am, and in 4 hrs)  - Check haptoglobin, reticul (4.1%)  - Currently on clears liquid diet  - remains off coumadin, last dose 2/4, risk of thrombosis if INR<2    At the end of the RRT, pt appears to be stable, with BP 85/45, reporting no significant symptoms, controlled shortness of breath.  Heart rate remains very stable 68-73.  No tachycardia.    Disposition- remains in room w close monitoring as per above    Discussed with Dr Jim zaman in detail this morning again after my evaluation. Defer future cares to Dr Fernandez /hospitalist attending provider    Code Status: Full Code    Allergies   Allergies   Allergen Reactions    Amiodarone Shortness Of Breath    Pcn [Penicillins] Shortness Of Breath     Rxn occurred in childhood     Statins Muscle Pain (Myalgia) and Hives    Amiodarone     Latex     Zetia [Ezetimibe] Muscle Pain (Myalgia)     Muscle weakness legs       Last 24H  PRN:     sodium chloride (PF) 0.9% PF flush 3 mL, 3 mL at 02/05/24 2016    Subjective:  Interval History     Patient states that he is feeling relatively asymptomatic/okay.  He has some minimal discomfort in the lower neck/upper subxiphoid sternal area.  Does not radiate to anything in particular but apparently constant unchanged since arrival. No new CV sx. Reports he had some shortness of breath earlier but he is currently on O2 and that SOB has resolved fully.  Does not have any dizziness or lightheadedness (though supine) in bed.  Does not endorse any other areas of chest pain, abdominal pain ,or any other new symptoms. Was not sure why he had a branch in (placed for rentention prev admit per spouse)    -Patient's partner is in the room and they do highlight symptom onset with new shortness of breath/ZUNIGA that was new onset for about 4 months. Reports Anemia been attributed by past providers to maybe his mitral valve with RBC destruction..  By chart review he was seen here about a month ago with severe anemia requiring transfusions.Patient has had a persistent Branch since last hospitalization    Exam::  Physical Exam   Vital Signs with Ranges:  Vitals:    02/06/24 1228 02/06/24 1230 02/06/24 1240 02/06/24 1300   BP: (!) 83/43  91/50 (!) 89/46   BP Location:    Right arm   Patient Position:    Semi-Calderon's   Pulse: 76  66 73   Resp: 24  28 18   Temp: 98.7  F (37.1  C)  98.5  F (36.9  C)    TempSrc: Oral Oral Oral    SpO2: 92%  92% 93%   Weight:       Height:         Temp:  [98.5  F (36.9  C)-100.2  F (37.9  C)] 99  F (37.2  C)  Pulse:  [] 73  Resp:  [12-33] 22  BP: ()/(30-70) 85/41  SpO2:  [83 %-100 %] 96 %  I/O last 3 completed shifts:  In: -   Out: 1875 [Urine:1875]    Lines, PIV , Branch with clear urine  Constitutional: vs as above and/or per EMR  General:  adult pt lying in bed without acute distress  HEENT: NC/AT,    Neck: w/o JVD, supple  CV: S1S2, w/ mech MVR click/obvious murmur  Resp: on O2via  NC at 2, Spo2 98, chest rise unlabored, lungs clr (after a few deep breaths) , w/ trace basilar rales, no whz upper   GI: soft, nontender, nondistended  Musculoskeletal: MAEW, no bony joint deformities no lower edema or mottling  Skin: no obvious rashes on exposed skin  Lymph: defer  Neuro: non focal, faces symmetric, tongue midline, speech fluent  Psych: calm, pleasant interactive    Labs/Imaging  Data     CBC with Diff:  Recent Labs   Lab Test 02/06/24  1207 02/06/24  0521 02/05/24  2232 02/05/24 2027 02/05/24  0800 02/04/24  0828   WBC  --  7.2  --   --  4.7 3.8*   HGB 6.9* 6.4* 7.6*   < > 6.1* 7.2*   MCV  --  95  --   --  94 96   PLT  --  160  --   --  165 174   INR  --  2.49*  --   --  1.96* 1.82*    < > = values in this interval not displayed.        Comprehensive Metabolic Panel:  Recent Labs   Lab 02/06/24  0521 02/05/24  2133 02/05/24 2027 02/05/24  1742 02/05/24  0800 02/04/24  0828 02/03/24  1519 02/03/24  0741     --   --   --  135 136  --  134*   POTASSIUM 4.0  --   --   --  3.8 3.6  --  3.6   CHLORIDE 91*  --   --   --  90* 91*  --  91*   CO2 40*  --   --   --  40* 40*  --  38*   ANIONGAP 6*  --   --   --  5* 5*  --  5*   * 93  --  94 101* 98  --  95   BUN 49.6*  --   --   --  37.9* 18.1  --  15.3   CR 1.11  --   --   --  1.04 0.97  --  0.95   GFRESTIMATED 68  --   --   --  73 80  --  82   JOSEPH 8.4*  --   --   --  8.6* 8.9  --  8.8   MAG 1.9  --  2.0  --  1.4* 1.6*   < > 1.4*   PROTTOTAL  --   --   --   --   --   --   --  5.1*   ALBUMIN  --   --   --   --   --   --   --  2.7*   BILITOTAL  --   --   --   --   --   --   --  0.6   ALKPHOS  --   --   --   --   --   --   --  58   AST  --   --   --   --   --   --   --  27   ALT  --   --   --   --   --   --   --  9    < > = values in this interval not displayed.     INR:    Recent Labs   Lab Test 02/06/24  0521   INR 2.49*     Lactic Acid:      Lab Results   Component Value Date    LACT 1.6 02/05/2024    LACT 0.8 01/15/2024         IMAGING  recent:   pending echo     ECG:   Interpreted by Brent Arcos PA-C,  A fib, d/w cardiologist Dr Figueroa the I, AvF mild QRS changes are not significant  No Q waves, no new ST depression/elevation/ acute strain or acute ischemic signs. Normal QTc interval,    Appears stable vs 2/5 comparison tracing,     Time Spent on this Encounter   I spent 45 minutes of critical care time on the unit/floor managing the care of this patient. Upon evaluation, this patient had a high probability of imminent or life-threatening deterioration due to acute severe anemia with hypotension, which required my direct attention, intervention, and personal management including transfusion as noted above, 100% of my time was spent at the bedside counseling the patient and/or coordinating care regarding services listed in this note.    [facility] House Officer/SACHI  Brent Arcos PA-C    [facility]  Securely message with the Vocera Web Console (learn more here)  Text page via [employer] Paging/Directory

## 2024-02-06 NOTE — PROGRESS NOTES
[facility]  Gastroenterology Progress Note     Feng Wynne MRN# [id number]   YOB: 1945 Age: 79 year old          Assessment and Plan:     Feng Wynne is a 79 year old male with past medical history significant for mechanical mitral valve replacement for severe mitral regurgitation and MAZE procedure in 2008, on chronic anticoagulation with warfarin, pulmonary hypertension, chronic diastolic CHF with preserved EF, previous dilated LV likely due to mitral regurgitation, subsequent improvement with ACE inhibitor and beta-blockers, hypertension, hyperlipidemia,and morbid obesity admitted on 1/30/2024 with acute on chronic anemia.     Anemia  Epistaxis  Pt had an admission from 1/9-1/19 for similar. At that time hgb was down to 4.4. EGD and colonoscopy revealed no active bleed; had poor colonoscopy prep with note of coupious stool in entire colon, lavage was performed. EGD was negative recommended outpatient capsule endoscopy  Hemoglobin trending down and being transfused. Last draw 6.4  Does report at least 3 months for epistaxis with large clots before and now ongoing blood tinged and some small clots every few days- certainly a possible source of blood loss  Vomited clots and blood on 2/5 2/5 EGD noted huge (28x33 mm) pill esophagitis ulcer with visible vessel,  likely from doxycycline. Clots noted in esophagus and stomach     - Start clear liquid diet  - sucralfate suspension  - BID PPI  - serial hemoglobin and transfuse for HGB <7  - avoid doxycycline  - restarting heparin at this time would significantly increase recurrent GI bleed risk.   - Will keep NPO at midnightand consider repeat EGD to check for healing tomorrow                Interval History:     Feeling ok. No nausea. No abdominal pain.              Review of Systems:     C: NEGATIVE for fever, chills, change in weight  E/M: NEGATIVE for ear, mouth and throat problems  R: NEGATIVE for significant cough or  SOB  CV: NEGATIVE for chest pain, palpitations or peripheral edema             Medications:   I have reviewed this patient's current medications   ferrous sulfate  325 mg Oral Daily    [Held by provider] furosemide  60 mg Intravenous Q12H    iron sucrose  300 mg Intravenous Q72H    miconazole   Topical BID    pantoprazole  40 mg Intravenous BID    sodium chloride (PF)  3 mL Intracatheter Q8H    sodium chloride (PF)  3 mL Intracatheter Q8H    sucralfate  1 g Oral 4x Daily AC & HS    [Held by provider] warfarin ANTICOAGULANT  5 mg Oral ONCE at 18:00    Warfarin Therapy Reminder  1 each Oral See Admin Instructions                  Physical Exam:   Vitals were reviewed  Vital Signs with Ranges  Temp:  [98.5  F (36.9  C)-100.2  F (37.9  C)] 99.1  F (37.3  C)  Pulse:  [] 73  Resp:  [12-33] 33  BP: ()/(30-70) 92/52  SpO2:  [83 %-100 %] 94 %  I/O last 3 completed shifts:  In: -   Out: 1875 [Urine:1875]  Constitutional: healthy, alert, and no distress   Cardiovascular: negative, PMI normal. No lifts, heaves, or thrills. RRR. No murmurs, clicks gallops or rub  Respiratory: negative, Percussion normal. Good diaphragmatic excursion. Lungs clear  Abdomen: Abdomen soft, non-tender. BS normal. No masses, organomegaly             Data:   I reviewed the patient's new clinical lab test results.   Recent Labs   Lab Test 02/06/24 0521 02/05/24 2232 02/05/24 2027 02/05/24 0800 02/04/24  0828   WBC 7.2  --   --  4.7 3.8*   HGB 6.4* 7.6* 7.7* 6.1* 7.2*   MCV 95  --   --  94 96     --   --  165 174   INR 2.49*  --   --  1.96* 1.82*     Recent Labs   Lab Test 02/06/24 0521 02/05/24 0800 02/04/24  0828   POTASSIUM 4.0 3.8 3.6   CHLORIDE 91* 90* 91*   CO2 40* 40* 40*   BUN 49.6* 37.9* 18.1   ANIONGAP 6* 5* 5*     Recent Labs   Lab Test 02/03/24  0741 01/19/24  0607 01/18/24  0625 01/15/24  2210 01/15/24  1815 01/09/24  1157   ALBUMIN 2.7* 2.9* 2.9*  --  3.1* 3.5   BILITOTAL 0.6  --   --   --  1.0 0.7   ALT 9  --   --    --  13 23   AST 27  --   --   --  29 36   PROTEIN  --   --   --  Trace*  --   --        I reviewed the patient's new imaging results.    All laboratory data reviewed  All imaging studies reviewed by me.    Cheryl Waters PA-C,  2/1/2024  [facility]  Office : [phone]  Cell: [phone] (Dr. Olvera)  Cell: [phone] (Cheryl Waters PA-C)

## 2024-02-06 NOTE — PLAN OF CARE
IMC: Alert and oriented x2-3, disoriented to place and situation at times. Vital signs stable on 3L NC ex soft pressures throught shift. RRT called approx 1200 for hypotension. Lift assist, turn and repo Q2. Poor appetite, clear liquid diet. NPO at 0000 for potential procedure tomorrow. Lung sounds diminished with fine crackles at bases. Bowel sounds active, passing flatus, no BM during shift, Stubbs in place for retention, adequate urine output. PTA pressure injury on coccyx, mepilex in place. Denies pain. Denies nausea. Tele a.fib CVR. 2 units of PRBC transfused during shift, next hgb recheck at 2200. K and mag WDL, recheck tomorrow am.

## 2024-02-06 NOTE — PROGRESS NOTES
"Called lab @ 1825 to request 10am, 3pm and 6pm labs be drawn and lab said \"it might be a while, theres only 3 lab techs on.\" Informed lab these were labs from 1500 and lab said \"someone will be up when they can.\"  "

## 2024-02-06 NOTE — PLAN OF CARE
Goal Outcome Evaluation:       Orientations: A & O x3, disoriented to time.   Vitals/Pain: vitals stable on 3-5 lpm O2 via NC. Soft BPs  Lungs:clear, diminished bilaterally.  Tele: a-fib CVR  Diet: NPO ex ice chips and meds.    Lines/Drains: PIVx2, SL and int abx. Stubbs in place for retention,  adequate UOP.  Skin/Wounds: Scattered bruising, redness on buttock perineum,groin and abd folds. Antifungal powder applied.  PI to coccyx, mepilex applied. BLE edema 2+.  GI/: active BS, no BM, passing flatus.   Abnormal labs/Trends/: Serial Hgb(q6h);6.4, MD notified, 1 unit of blood ordered. INR (2.49).  Electrolyte Replacement: On K/Mg protocols.  Ambulation/Assist: Ax2 with lift. Turn/repo q2h.  Sleep Quality: slept/rested b/n cares.  Plan: monitor Hgb and signs for acute bleeding.

## 2024-02-06 NOTE — PROGRESS NOTES
Heme       I have reviewed EGD and findings . Bleeding ulcer     Dx GI bleed causing anemia   Hb 6.4 this am and transferred out to [facility]     On protonix and carafate     We will sign off at this time   Furthur management with hospitalist, Cards and GI teams       Roque Short MD

## 2024-02-06 NOTE — PROGRESS NOTES
[facility]  Cardiology Progress Note    Date of Service (when I saw the patient): 02/06/2024  Primary Cardiologist: Dr. Gomez       Interval History:   Feels tired today. Reports no lightheadedness.     ----------------------------------------------------------------------------------------    Assessment:  Feng Wynne is a 79 year old male who was admitted on 1/30/2024.      # Severe anemia   -- Hgb svitlana ~4 earlier this month; improved to 7-8 but recurrent dip to 6 today requiring another transfusion (5 units so far on current admission  -- GI consulted and EGD/colonoscopy were inconclusive; possible epistaxis but no active bleeding- given recurrent anemia pt underwent repeat EGD and this showed esophageal ulcer likely from recent doxycycline use- they recommend holding heparin and repeat EGD tomorrow (2/7)  -- There was some question of low grade hemolysis but echo shows normal valve function and blood smear showed no morphological changes to suggest hemolysis    # Acute on Chronic HFpEF, secondary to above   -- responded well to IV diuresis but this on hold at this time due to low BP; renal fxn remains nl   -- echo showed significant RV volume overload  -- will plan to resume IV diuresis once anemia is more stable     # S/P MVR in 2008 with mechanical valve  -- S/p 31 mm [facility] Raffi- repeat echo 2/1 showed normal mitral valve function but regurgitation assessment could not be made due to acoustic shadowing  -- heparin on hold currently- INR 2.4; pta regimen includes warfarin      # Chronic A Fib   -- hx of MAZE procedure at the time of MVR  -- hx of bradycardia which resolved with discontinuation of BB     # Moderate-Severe TR  -- likely functional in the setting of volume overload     ----------------------------------------------------------------------------------------    Plan:  -- Ok to hold IV diuresis   -- Echocardiogram images and blood morphology not concerning for hemolysis  anemia- his current acute anemia is managed by GI team  -- No reversal of INR   -- Resume heparin as soon as possible to lower risk of valve thrombosis unless there is worsening anemia despite transfusions        ----------------------------------------------------------------------------------------  Physical Exam   Temp: 98.5  F (36.9  C) Temp src: Oral BP: 91/50 Pulse: 66   Resp: 28 SpO2: 92 % O2 Device: Nasal cannula Oxygen Delivery: 3 LPM  Vitals:    02/04/24 0632 02/05/24 0610 02/06/24 0336   Weight: 142.4 kg (313 lb 15 oz) 142.3 kg (313 lb 11.4 oz) 135.5 kg (298 lb 11.6 oz)       GEN:  NAD. Oxygen on nasal cannula.  HEENT: Mucous membranes moist.  NECK:  Unable to assess JVP.  C/V:  Regular rate and rhythm; crisp valve sound noted   RESP: CTA, anand  GI: Abdomen soft, nontender, nondistended.    EXTREM: 2+ pitting LE edema.   NEURO: Alert and oriented, cooperative.   PSYCH: Normal affect.  SKIN: Warm and dry.   VASC: 2+ radial and dorsalis pedis pulses bilaterally.      Medications    [Held by provider] heparin Stopped (02/05/24 1213)    - MEDICATION INSTRUCTIONS -        ferrous sulfate  325 mg Oral Daily    [Held by provider] furosemide  60 mg Intravenous Q12H    iron sucrose  300 mg Intravenous Q72H    miconazole   Topical BID    pantoprazole  40 mg Intravenous BID    sodium chloride (PF)  3 mL Intracatheter Q8H    sodium chloride (PF)  3 mL Intracatheter Q8H    sucralfate  1 g Oral 4x Daily AC & HS    [Held by provider] warfarin ANTICOAGULANT  5 mg Oral ONCE at 18:00    Warfarin Therapy Reminder  1 each Oral See Admin Instructions       Data   Reviewed.    Grace Hernandez PA-C   2/6/2024  Pager: [phone]

## 2024-02-07 NOTE — PLAN OF CARE
Goal Outcome Evaluation:      Neuro intact intermitt confused about situation, Weak/hypotonia. Afib-rate controlled in the 80s,BP soft throughout the night. MD aware-no signs of bleeding but hgb 6.8-received 1 unit prbc came up to 7.5. NC 2-4L while sleeping, NPO for repeat egd. No BM-branch in place-pérez urine, 2 wounds to coccyx-WOC following mepilex changed, powder in folds. Turns/repositioning q2 complete.        Problem: Adult Inpatient Plan of Care  Goal: Plan of Care Review  Description: The Plan of Care Review/Shift note should be completed every shift.  The Outcome Evaluation is a brief statement about your assessment that the patient is improving, declining, or no change.  This information will be displayed automatically on your shift  note.  Outcome: Progressing     Problem: Adult Inpatient Plan of Care  Goal: Absence of Hospital-Acquired Illness or Injury  Outcome: Progressing     Problem: Adult Inpatient Plan of Care  Goal: Absence of Hospital-Acquired Illness or Injury  Intervention: Prevent Infection  Recent Flowsheet Documentation  Taken 2/7/2024 0000 by Bandar Desir, RN  Infection Prevention:   rest/sleep promoted   single patient room provided  Taken 2/6/2024 2000 by Bandar Desir, RN  Infection Prevention:   rest/sleep promoted   single patient room provided

## 2024-02-07 NOTE — PROVIDER NOTIFICATION
Notified provider about indwelling branch catheter discussed removal or continued need.    Did provider choose to remove indwelling branch catheter? No    Provider's branch indication for keeping indwelling branch catheter: Strict 1-2 Hour I & O if external catheters are not an option.    Is there an order for indwelling branch catheter? Yes    *If there is a plan to keep branch catheter in place at discharge daily notification with provider is not necessary, but please add a notation in the treatment team sticky note that the patient will be discharging with the catheter.

## 2024-02-07 NOTE — PROGRESS NOTES
[facility]    Medicine Progress Note - Hospitalist Service        Date of Admission:  1/30/2024 10:06 AM    Assessment & Plan:   Feng Wynne is a 79 year old male with past medical history significant for mechanical mitral valve replacement for severe mitral regurgitation and MAZE procedure in 2008, on chronic anticoagulation with warfarin, pulmonary hypertension, chronic diastolic CHF with preserved EF, hypertension, hyperlipidemia,and morbid obesity admitted on 1/30/2024 with acute on chronic anemia.   Patient was recently admitted to [facility] from 1/9/24-1/19/24 with anemia with a hemoglobin of 4.4.  EGD and colonoscopy did not reveal any active bleeding.  He was discharged to TCU and sent back to the emergency room after his outpatient hemoglobin came back at 6.     Acute blood loss on chronic anemia.  Severe pill esophagitis with active bleeding ulcer and visible vessel s/p cauterization and endoclips placement on 2/5/2024.  Iron deficiency anemia.  -Previously hospitalized between 1/9/2024-1/19/2024 for similar drop in hemoglobin  -EGD and colonoscopy at that time did not reveal any active bleeding at that time.    -GI had recommended outpatient capsule endoscopy, which has not been completed yet.  -Discharge hemoglobin on 1/19/2024 was 8.0, he presented with a hemoglobin of 6 on 1/30/2024  -FOBT positive   -Peripheral smear showed marked normochromic, normocytic anemia with increased red cell regeneration without morphologic evidence of hemolysis  -Hematology signed off  -Initially the etiology of his acute drop in hemoglobin was unclear however patient underwent upper GI endoscopy on 3/5/2024 with Dr. Olvera which revealed severe pill esophagitis with active bleeding ulcer and visible vessels.  There was clotted blood in the middle third of the esophagus and the entire stomach.   -Continue Protonix 40 mg IV twice a day  -Carafate 1 g 4 times daily  -Continue [facility] blood pressures  have been soft since yesterday however he is mentating adequately with adequate urine output.  -Altogether 6 units of PRBC thus far with 4 units in the last 24 hours.    -Serial hemoglobin every 6 hours  -Reviewed cardiology notes, no plan for reversal of anticoagulation at this time.  -Will transfuse additional unit of PRBC this morning  --Low threshold to transfer to ICU.  They recommend holding anticoagulation as soon as it is possible  -INR is above 2.5, continue to hold Coumadin tonight  -Serial hemoglobin every 6 hours  -[facility] following, currently on full liquid diet.  -N.p.o. after midnight for potential EGD in the morning    Acute on chronic diastolic CHF with preserved EF  Acute respiratory failure due to above  Moderate pulmonary Hypertension  -ECHO from 1/10/24 noted LVEF 55%; s/p 31mm [place]affi mechanical MVR. Mean 5mmHg, moderate to mod-severe (2-3+) tricuspid regurgitation   -On admission pt reported Shortness of breath, increased cough, recent weight gain and increased leg swelling.   -On admission BNP is within normal limits 1,253.   -CXR on 1/30/24 showed increased right perihilar hazy patchy opacities suggestive of worsening pulmonary edema. Vascular congestion and cardiomegaly. Trace R pleural effusion.   -Echo on 2/1/2024 shows EF of 60 to 65%, RV is moderately dilated, moderately decreased RV systolic function and flattened septum consistent with volume overload, moderate pulmonary hypertension, severe tricuspid regurgitation and status 31 mm [place]i mechanical mitral valve and no pericardial effusion  -Initially was on IV Lasix until 2/5/2024  -Diuretics on hold now due to soft blood pressure and concern for GI bleed  -Net -20 L since admission  -Resume diuretics as clinically indicated.      Hx of mechanical mitral valve replacement for severe mitral regurgitation   S/p MAZE procedure (2008)   Chronic anticoagulation with warfarin   Paroxysmal atrial fibrillation  Subtherapeutic INR     -Initially on admission his INR was subtherapeutic at 1.98 and Coumadin was held but he was restarted back on heparin and Coumadin on p.m. of 1/31 .   -Goal INR is 2.5-3.5   -INR  is 2.55 today  -Discontinue heparin drip  -Hold Coumadin for 1 more day today, if hemoglobin is stable without evidence of ongoing bleeding, anticipate resuming tomorrow evening.  -Recheck INR in the morning    Mild troponin elevation   -Troponin  elevated to 58 on admission.   -Suspect demand ischemia from acute anemia, CHF, pneumonia etc  -he had troponin elevation during his last admission as well (73-69).   -Continue to monitor on telemetry    Epistaxis  -Patient did mention that he has history of epistaxis and some clots initiated.    -ENT consulted, however given that he was not actively bleeding definitively reasonable to to try humidification and monitor clinically.        Recent Pneumonia   -CXR obtained on 1/26 as outpatient which per the read suggested CHF, but ultimately the provider ended up treating for pneumonia. Received 2g of IM Rocephin on 1/26 and was started on Ceftin and Doxycycline on 1/27.   -Patient completed, previously planned course of Ceftin and doxycycline this hospitalization on 2/2/2024     Hypomagnesemia  -Replace per protocol      HLD  -Intolerant to statins and Zetia      Urinary retention s/p Branch Catheter   -Pt was supposed to have an appointment with urology on 1/30 for TOV   -Continue branch for now while getting IV diuresis   -will TOV prior to discharge from this hospitalization once he is clinically more stable     Diet: NPO per Anesthesia Guidelines for Procedure/Surgery Except for: Meds     DVT Prophylaxis: Warfarin   Branch Catheter: PRESENT, indication: Retention  Code Status: Full Code     Disposition Plan       Expected Discharge Date: 02/09/2024      Destination: inpatient rehabilitation facility  Discharge Comments: Diuresis and pending hemoglobin stability      Entered: Anival Mohan  "MD 02/07/2024, 8:56 AM        Clinically Significant Risk Factors              # Hypoalbuminemia: Lowest albumin = 2.7 g/dL at 2/3/2024  7:41 AM, will monitor as appropriate     # Hypertension: Noted on problem list    # Acute heart failure with preserved ejection fraction: heart failure noted on problem list, last echo with EF >50%, and receiving IV diuretics       # Severe Obesity: Estimated body mass index is 43.75 kg/m  as calculated from the following:    Height as of this encounter: 1.803 m (5' 11\").    Weight as of this encounter: 142.3 kg (313 lb 11.4 oz).        # Financial/Environmental Concerns:            The patient's care was discussed with the Bedside Nurse and Patient.     Medical Decision Making       **CLEAR ALL SELECTIONS**      Labs/Imaging Reviewed:  See Information above and Data section below  Time SPENT BY ME on the date of service doing chart review, history, exam, documentation & further activities per the note:  50 MINUTES    Chart documentation was completed, in part, with [employer] voice-recognition software. Even though reviewed, some grammatical, spelling, and word errors may remain.    Anival Mohan MD  Hospitalist Service  [facility]  Text Page 7AM-6PM  Securely message with the Vocera Web Console (learn more here)  Text page via [employer] Paging/Directory    ______________________________________________________________________    Interval History   Required 4 units of PRBC transfusion in the last 24 hours.  Hemoglobin stable at 7.5 on last check.  No clinical evidence of active bleeding.  No hematemesis.  Blood pressures have been soft but he is mentating okay with adequate urine output.  INR is therapeutic.  No plans for reversal of anticoagulation.  Coumadin continues to be on hold.  Patient denies significant dyspnea.  Oxygenation stable at baseline at 2 L.    Data reviewed today: I reviewed all medications, new labs and imaging results over the last 24 " "hours. I personally reviewed no images or EKG's today.    Physical Exam   Vital signs:  Temp: 98.5  F (36.9  C) Temp src: Oral BP: (!) 86/51 Pulse: 72   Resp: 19 SpO2: 97 % O2 Device: Nasal cannula Oxygen Delivery: 4 LPM Height: 180.3 cm (5' 11\") Weight: 142.3 kg (313 lb 11.4 oz)  Estimated body mass index is 43.75 kg/m  as calculated from the following:    Height as of this encounter: 1.803 m (5' 11\").    Weight as of this encounter: 142.3 kg (313 lb 11.4 oz).      Wt Readings from Last 2 Encounters:   02/07/24 142.3 kg (313 lb 11.4 oz)   01/29/24 (!) 155.3 kg (342 lb 6.4 oz)       Gen: AAOX3, NAD, comfortable  HEENT: Supple neck, moist oral mucosa, no pallor  Resp: Diminished air entry at the bases, normal effort of breathing.  CVS: RRR, no murmur  Abd/GI: Soft, non-tender. BS- normoactive.    Skin: Warm, dry no rashes  MSK: Lymphedema+  Neuro- CN- intact. No focal deficits.        Data   Recent Labs   Lab 02/07/24  0554 02/07/24  0553 02/06/24  2238 02/06/24  1752 02/06/24  1207 02/06/24  0521 02/05/24  2232 02/05/24  2133 02/05/24  1742 02/05/24  0800 02/03/24  2314 02/03/24  0741   WBC  --  4.7  --   --   --  7.2  --   --   --  4.7   < > 3.2*   HGB  --  7.5*  7.5* 6.8* 7.2*   < > 6.4*   < >  --    < > 6.1*   < > 7.1*   MCV  --  93  --   --   --  95  --   --   --  94   < > 94   PLT  --  142*  --   --   --  160  --   --   --  165   < > 157   INR  --  2.55*  --   --   --  2.49*  --   --   --  1.96*   < > 1.80*     --   --   --   --  137  --   --   --  135   < > 134*   POTASSIUM 3.6  --   --   --   --  4.0  --   --   --  3.8   < > 3.6   CHLORIDE 93*  --   --   --   --  91*  --   --   --  90*   < > 91*   CO2 42*  --   --   --   --  40*  --   --   --  40*   < > 38*   BUN 51.9*  --   --   --   --  49.6*  --   --   --  37.9*   < > 15.3   CR 1.14  --   --   --   --  1.11  --   --   --  1.04   < > 0.95   ANIONGAP 3*  --   --   --   --  6*  --   --   --  5*   < > 5*   JOSEPH 8.4*  --   --   --   --  8.4*  --   --   -- "  8.6*   < > 8.8   GLC 98  --   --   --   --  104*  --  93   < > 101*   < > 95   ALBUMIN  --   --   --   --   --   --   --   --   --   --   --  2.7*   PROTTOTAL  --   --   --   --   --   --   --   --   --   --   --  5.1*   BILITOTAL  --   --   --   --   --   --   --   --   --   --   --  0.6   ALKPHOS  --   --   --   --   --   --   --   --   --   --   --  58   ALT  --   --   --   --   --   --   --   --   --   --   --  9   AST  --   --   --   --   --   --   --   --   --   --   --  27    < > = values in this interval not displayed.       No results found for this or any previous visit (from the past 24 hour(s)).  Medications    [Held by provider] heparin Stopped (02/05/24 1213)    - MEDICATION INSTRUCTIONS -        ferrous sulfate  325 mg Oral Daily    [Held by provider] furosemide  60 mg Intravenous Q12H    iron sucrose  300 mg Intravenous Q72H    miconazole   Topical BID    pantoprazole  40 mg Intravenous BID    sodium chloride (PF)  3 mL Intracatheter Q8H    sodium chloride (PF)  3 mL Intracatheter Q8H    sucralfate  1 g Oral 4x Daily AC & HS    Warfarin Therapy Reminder  1 each Oral See Admin Instructions

## 2024-02-07 NOTE — PLAN OF CARE
IMC: Alert and oriented x3-4, disoriented to place at times, intermittently forgetful. Vital signs stable on 1L NC ex soft pressures throught shift. Lift assist, turn and repo Q2. Poor appetite, full liquid diet. NPO at 0000 for potential procedure tomorrow. Lung sounds diminished with fine crackles at bases. Bowel sounds active, passing flatus, no BM during shift, Stubbs in place for retention, adequate urine output. PTA pressure injury on coccyx, mepilex in place, WOC completed wound care during shift. Denies pain. Denies nausea. Tele a.fib CVR. Q6 hgb checks. K and mag WDL, recheck tomorrow am.

## 2024-02-07 NOTE — PROGRESS NOTES
[facility]  Gastroenterology Progress Note     Feng Wynne MRN# [id number]   YOB: 1945 Age: 79 year old          Assessment and Plan:     Feng Wynne is a 79 year old male with past medical history significant for mechanical mitral valve replacement for severe mitral regurgitation and MAZE procedure in 2008, on chronic anticoagulation with warfarin, pulmonary hypertension, chronic diastolic CHF with preserved EF, previous dilated LV likely due to mitral regurgitation, subsequent improvement with ACE inhibitor and beta-blockers, hypertension, hyperlipidemia,and morbid obesity admitted on 1/30/2024 with acute on chronic anemia.     Anemia  Epistaxis  Pt had an admission from 1/9-1/19 for similar. At that time hgb was down to 4.4. EGD and colonoscopy revealed no active bleed; had poor colonoscopy prep with note of coupious stool in entire colon, lavage was performed. EGD was negative recommended outpatient capsule endoscopy  Hemoglobin trending down and being transfused. Last draw 7.5 and improved from 6.8 after transfusion  Does report at least 3 months for epistaxis with large clots before and now ongoing blood tinged and some small clots every few days- certainly a possible source of blood loss  Vomited clots and blood on 2/5 2/5 EGD noted huge (28x33 mm) pill esophagitis ulcer with visible vessel,  likely from doxycycline. Clots noted in esophagus and stomach     - Start full liquid diet- no EGD today  - sucralfate suspension  - BID PPI  - serial hemoglobin and transfuse for HGB <7  - avoid doxycycline  - restarting heparin at this time would significantly increase recurrent GI bleed risk.   - Unable to do EGD today as respiratory status poor on 4 LPM and SBP in 80s. Would recommend to cheo. Once vitals improved could consider repeat EGD. Possibly tomorrow  - EGD consideration in morning. NPO at midnight                Interval History:     Feeling ok. No nausea. No  abdominal pain.              Review of Systems:     C: NEGATIVE for fever, chills, change in weight  E/M: NEGATIVE for ear, mouth and throat problems  R: NEGATIVE for significant cough or SOB  CV: NEGATIVE for chest pain, palpitations or peripheral edema             Medications:   I have reviewed this patient's current medications   ferrous sulfate  325 mg Oral Daily    [Held by provider] furosemide  60 mg Intravenous Q12H    iron sucrose  300 mg Intravenous Q72H    miconazole   Topical BID    pantoprazole  40 mg Intravenous BID    sodium chloride (PF)  3 mL Intracatheter Q8H    sodium chloride (PF)  3 mL Intracatheter Q8H    sodium chloride 0.9%  500 mL Intravenous Once    sucralfate  1 g Oral 4x Daily AC & HS    Warfarin Therapy Reminder  1 each Oral See Admin Instructions                  Physical Exam:   Vitals were reviewed  Vital Signs with Ranges  Temp:  [97.7  F (36.5  C)-99.2  F (37.3  C)] 98.5  F (36.9  C)  Pulse:  [66-90] 74  Resp:  [6-32] 6  BP: ()/(39-59) 82/40  Cuff Mean (mmHg):  [61] 61  SpO2:  [91 %-99 %] 97 %  I/O last 3 completed shifts:  In: 1133.75 [P.O.:150]  Out: 750 [Urine:750]  Constitutional: healthy, alert, and no distress   Cardiovascular: negative, PMI normal. No lifts, heaves, or thrills. RRR. No murmurs, clicks gallops or rub  Respiratory: negative, Percussion normal. Good diaphragmatic excursion. Lungs clear  Abdomen: Abdomen soft, non-tender. BS normal. No masses, organomegaly             Data:   I reviewed the patient's new clinical lab test results.   Recent Labs   Lab Test 02/07/24  0553 02/06/24  2238 02/06/24  1752 02/06/24  1207 02/06/24  0521 02/05/24 2027 02/05/24  0800   WBC 4.7  --   --   --  7.2  --  4.7   HGB 7.5*  7.5* 6.8* 7.2*   < > 6.4*   < > 6.1*   MCV 93  --   --   --  95  --  94   *  --   --   --  160  --  165   INR 2.55*  --   --   --  2.49*  --  1.96*    < > = values in this interval not displayed.     Recent Labs   Lab Test 02/07/24  0549  02/06/24  0521 02/05/24  0800   POTASSIUM 3.6 4.0 3.8   CHLORIDE 93* 91* 90*   CO2 42* 40* 40*   BUN 51.9* 49.6* 37.9*   ANIONGAP 3* 6* 5*     Recent Labs   Lab Test 02/03/24  0741 01/19/24  0607 01/18/24  0625 01/15/24  2210 01/15/24  1815 01/09/24  1157   ALBUMIN 2.7* 2.9* 2.9*  --  3.1* 3.5   BILITOTAL 0.6  --   --   --  1.0 0.7   ALT 9  --   --   --  13 23   AST 27  --   --   --  29 36   PROTEIN  --   --   --  Trace*  --   --        I reviewed the patient's new imaging results.    All laboratory data reviewed  All imaging studies reviewed by me.    Cheryl Waters PA-C,  2/1/2024  [facility]  Office : [phone]  Cell: [phone] (Dr. Olvera)  Cell: [phone] (Cheryl Waters PA-C)

## 2024-02-07 NOTE — PROGRESS NOTES
"[facility] Nurse Inpatient Assessment     Consulted for: Wound coccyx     Summary: patient with POA evolving deep tissue pressure injury to buttock, continues evolving and noted improving 2/7     Patient History (according to provider note(s):      \"79 year old male with past medical history significant for mechanical mitral valve replacement for severe mitral regurgitation and MAZE procedure in 2008, on chronic anticoagulation with warfarin, pulmonary hypertension, chronic diastolic CHF with preserved EF, previous dilated LV likely due to mitral regurgitation, subsequent improvement with ACE inhibitor and beta-blockers, hypertension, hyperlipidemia,and morbid obesity admitted on 1/30/2024 with acute on chronic anemia. \"    Assessment:      Areas visualized during today's visit: Focused: and Sacrum/coccyx    Wound location: buttock, coccyx    Last photo: 2/7   Wound due to: Pressure Injury POA deep tissue injury evolving   Wound history/plan of care: found with sacral mepilex x2 in use   Wound base:  non-blanchable, erythema, maroon, purple, and dermis     Palpation of the wound bed: boggy and textured        Drainage: small     Description of drainage: serosanguinous     Measurements (length x width x depth, in cm): open area remains concentrated to a 4  x 4  x  0.2 cm cluster area, nonblanchable maroon to left buttock starting to open this visit, total skin damage cluster to buttock 15cm x 15cm x 0.1cm      Tunneling: N/A     Undermining: N/A  Periwound skin: Superficial erosion and fragile, distant periwound skin intact       Color: normal and consistent with surrounding tissue      Temperature: normal   Odor: none  Pain: moderate, pain related to right side more than wound itself   Pain interventions prior to dressing change: patient tolerated well  Treatment goal: Heal   STATUS: evolving and improving  Supplies ordered: at bedside, supplies stored on unit, and discussed with " "patient        Treatment Plan:       Wound care  EVERY SHIFT        Comments: Location: buttock  Care: provided qshift by primary RN  1. Cleanse with each incontinence episode with evelio cleanser and soft dry wipes (patient has intermittent fecal incontinence and requires assistance, patient discussed at [facility] consult he is hesitant to ask for help)  2. Apply skin prep (sure prep or 3M no sting skin barrier wipes) to wound and periwound skin, let dry for ~30 seconds  3. Cover with sacral mepilex, or with two 4x4\" mepilex at the same time to cover all the damaged skin. Ok to use each mepilex up to 5 days. Ok to lift mepilex for routine assessments of the skin and reapply          Skin care precautions  EFFECTIVE NOW        Comments: Pressure Injury Prevention (PIP) Plan:  If patient is declining pressure injury prevention interventions: Explore reason why and address patient's concerns, Educate on pressure injury risk and prevention intervention(s), If patient is still declining, document \"informed refusal\" , and Ensure Care team is aware ( provider, charge nurse, etc)  Mattress: Follow bed algorithm, reassess daily and order specialty mattress, if indicated.  HOB: Maintain at or below 30 degrees, unless contraindicated  Repositioning in bed: Every 1-2 hours , Left/right positioning; avoid supine, and Raise foot of bed prior to raising head of bed, to reduce patient sliding down (shear)  Heels: Keep elevated off mattress and Pillows under calves  Protective Dressing: Sacral Mepilex for prevention (#707646),  especially for the agitated patient  Positioning Equipment: Z-Theo positioner (#[id number] or #[id number]) to help maintain side lying position.  Chair positioning: Chair cushion (#[id number]) , Assist patient to reposition hourly, and Do NOT use a donut for sitting (this increases pressure to smaller area and creates a higher potential for injury)    If patient has a buttock pressure injury, or high risk for PI use " chair cushion or SPS.  Moisture Management: Perineal cleansing /protection: Follow Incontinence Protocol, Avoid brief in bed, Clean and dry skin folds with bathing , and Moisturize dry skin  Under Devices: Inspect skin under all medical devices during skin inspection , Ensure tubes are stabilized without tension, and Ensure patient is not lying on medical devices or equipment when repositioned  Ask provider to discontinue device when no longer needed.          Nutrition Services Adult IP Consult  ONE TIME     Complete     References:    Medical Nutrition Therapy policy and MNT protocol   Provider: (Not yet assigned)   Question Answer Comment   Reason for Consult: RN Consult - specify Reason    Reason for Consult: wound healing support needs with evolving pressure injury to buttock            Orders: Reviewed    RECOMMEND PRIMARY TEAM ORDER: None, at this time  Education provided: importance of repositioning, plan of care, Infection prevention , Moisture management, Hygiene, and Off-loading pressure  Discussed plan of care with: Patient, Family, and Nurse   WOC nurse follow-up plan: weekly  Notify WOC if wound(s) deteriorate.  Nursing to notify the Provider(s) and re-consult the WOC Nurse if new skin concern.    DATA:     Current support surface: Standard  Low air loss (PRICILA pump, Isolibrium, Pulsate, skin guard, etc)  Containment of urine/stool: Incontinence Protocol, Incontinent pad in bed, and Indwelling catheter  BMI: Body mass index is 43.75 kg/m .   Active diet order: Orders Placed This Encounter      Full Liquid Diet      NPO per Anesthesia Guidelines for Procedure/Surgery Except for: Meds     Output: I/O last 3 completed shifts:  In: 1133.75 [P.O.:150]  Out: 750 [Urine:750]     Labs:   Recent Labs   Lab 02/07/24  1250 02/07/24  1014 02/07/24  0553 02/03/24  2314 02/03/24  0741   ALBUMIN  --   --   --   --  2.7*   HGB 7.6*   < > 7.5*  7.5*   < > 7.1*   INR  --   --  2.55*   < > 1.80*   WBC  --   --  4.7   < >  3.2*    < > = values in this interval not displayed.     Pressure injury risk assessment:   Sensory Perception: 4-->no impairment  Moisture: 3-->occasionally moist  Activity: 1-->bedfast  Mobility: 2-->very limited  Nutrition: 3-->adequate  Friction and Shear: 1-->problem  Roscoe Score: 14    Jyoti OCN   1st choice: Securely message with [employer] ([facility] [employer])   (2nd option: [facility] Office Phone [phone], messages checked periodically Mon-Fri 8a-4p)

## 2024-02-07 NOTE — PROGRESS NOTES
"Cardiology Progress Note          Assessment and Plan:       Feng Wynne is a 79 year old male who was admitted on 2024.      # Severe anemia   -- Hgb svitlana ~4 earlier this month; improved to 7-8 but recurrent dip to 6.8  received 1 unit(s) PRBS  today requiring another transfusion (6 units so far on current admission  -- GI consulted and EGD/colonoscopy were inconclusive; possible epistaxis but no active bleeding- given recurrent anemia pt underwent repeat EGD and this showed esophageal ulcer likely from recent doxycycline use- they recommend holding heparin and repeat EGD\ possibly tomorrow  --  No evidence for hemolysis      # Acute on Chronic HFpEF, secondary to above   -- responded well to IV diuresis but this on hold at this time due to low BP; renal fxn remains nl   -- echo showed significant RV volume overload  -- IV diuresis.  Changed to give unless SBP <90     # S/P MVR in  with mechanical valve  -- S/p 31 mm [facility]- repeat echo  showed normal mitral valve function but regurgitation assessment could not be made due to acoustic shadowing  -- heparin on hold currently- INR 2.55 pta regimen includes warfarin      # Chronic A Fib   -- hx of MAZE procedure at the time of MVR  -- hx of bradycardia which resolved with discontinuation of BB     # Moderate-Severe TR  -- likely functional in the setting of volume overload                   Interval History:     Improved today.  Improved appetite.  No N/V.  No chest pain.                Review of Systems:   As per subjective, otherwise 5 systems reviewed and negative.           Physical Exam:   Blood pressure (!) 80/43, pulse 69, temperature 97.8  F (36.6  C), temperature source Oral, resp. rate 16, height 1.803 m (5' 11\"), weight 142.3 kg (313 lb 11.4 oz), SpO2 100%.      Vital Sign Ranges  Temperature Temp  Av.5  F (36.9  C)  Min: 97.7  F (36.5  C)  Max: 99.2  F (37.3  C)   Blood pressure Systolic (24hrs), Av , Min:77 , Max:105        " Diastolic (24hrs), Av, Min:40, Max:59      Pulse Pulse  Av  Min: 66  Max: 90   Respirations Resp  Av  Min: 6  Max: 32   Pulse oximetry SpO2  Av.9 %  Min: 91 %  Max: 100 %         Intake/Output Summary (Last 24 hours) at 2024 1421  Last data filed at 2024 1049  Gross per 24 hour   Intake 833.75 ml   Output 1050 ml   Net -216.25 ml       Constitutional:   NAD   Skin:   Warm and dry   Head:   Nontraumatic   Neck:   Supple, symmetrical, trachea midline, no adenopathy, thyroid symmetric, not enlarged and no tenderness, skin normal   Lungs:   normal   Cardiovascular:   regular rate and rhythm and murmurs include systolic murmur II/VI located at left lower sternal border without radiation    Abdomen:   Benign   Extremities and Back:   Symmetric, no curvature, spinous processes are non-tender on palpation, paraspinous muscles are non-tender on palpation, no costal vertebral tenderness   Neurological:   Grossly nonfocal            Medications:     No current outpatient medications on file.                Data:     Results for orders placed or performed during the hospital encounter of 24 (from the past 24 hour(s))   Troponin T, High Sensitivity   Result Value Ref Range    Troponin T, High Sensitivity 72 (H) <=22 ng/L   Hemoglobin   Result Value Ref Range    Hemoglobin 7.2 (L) 13.3 - 17.7 g/dL   Hemoglobin   Result Value Ref Range    Hemoglobin 6.8 (LL) 13.3 - 17.7 g/dL   Troponin T, High Sensitivity   Result Value Ref Range    Troponin T, High Sensitivity 74 (H) <=22 ng/L   Prepare red blood cells (unit)   Result Value Ref Range    Blood Component Type Red Blood Cells     Product Code I1136I44     Unit Status Transfused     Unit Number [id number]     CROSSMATCH Compatible     CODING SYSTEM [id number]     ISSUE DATE AND TIME [id number]     UNIT ABO/RH O+     UNIT TYPE ISBT 5100    INR   Result Value Ref Range    INR 2.55 (H) 0.85 - 1.15   CBC with platelets   Result Value Ref Range    WBC  Count 4.7 4.0 - 11.0 10e3/uL    RBC Count 2.58 (L) 4.40 - 5.90 10e6/uL    Hemoglobin 7.5 (L) 13.3 - 17.7 g/dL    Hematocrit 24.0 (L) 40.0 - 53.0 %    MCV 93 78 - 100 fL    MCH 29.1 26.5 - 33.0 pg    MCHC 31.3 (L) 31.5 - 36.5 g/dL    RDW 23.8 (H) 10.0 - 15.0 %    Platelet Count 142 (L) 150 - 450 10e3/uL   Hemoglobin   Result Value Ref Range    Hemoglobin 7.5 (L) 13.3 - 17.7 g/dL   Basic metabolic panel   Result Value Ref Range    Sodium 138 135 - 145 mmol/L    Potassium 3.6 3.4 - 5.3 mmol/L    Chloride 93 (L) 98 - 107 mmol/L    Carbon Dioxide (CO2) 42 (H) 22 - 29 mmol/L    Anion Gap 3 (L) 7 - 15 mmol/L    Urea Nitrogen 51.9 (H) 8.0 - 23.0 mg/dL    Creatinine 1.14 0.67 - 1.17 mg/dL    GFR Estimate 66 >60 mL/min/1.73m2    Calcium 8.4 (L) 8.8 - 10.2 mg/dL    Glucose 98 70 - 99 mg/dL   Magnesium   Result Value Ref Range    Magnesium 2.0 1.7 - 2.3 mg/dL   Troponin T, High Sensitivity   Result Value Ref Range    Troponin T, High Sensitivity 74 (H) <=22 ng/L   Hemoglobin   Result Value Ref Range    Hemoglobin 7.6 (L) 13.3 - 17.7 g/dL   Hemoglobin   Result Value Ref Range    Hemoglobin 7.6 (L) 13.3 - 17.7 g/dL     *Note: Due to a large number of results and/or encounters for the requested time period, some results have not been displayed. A complete set of results can be found in Results Review.

## 2024-02-07 NOTE — PROGRESS NOTES
[facility] brief RRT follow up:    Received sign out from colleague, Brent Arcos PA-C and was asked to follow up on trop; please refer to Josselyn's RRT note for further details.  Pt's trop 72, slightly up from recent baseline in the 50s (had recently been elevated up to the 70s recent hospitalization).  Noted pt has had soft/hypotensive pressures throughout today in addition to ongoing anemia, hgb ~ 7.  Per Josselyn's recommendation, will repeat trop in 4 hours.  Suspect mildly elevated trop mutlifactorial 2/2 demand ischemia in setting of hypotension, anemia, acute on chronic HFpEF, PNA.      Addendum: 2323: Noted repeat trop relatively flat, 74, up from 72.  Will repeat trop in AM to ensure remains flat.     Latest Reference Range & Units 01/09/24 11:57 01/09/24 13:58 01/30/24 10:48 01/31/24 07:28 02/06/24 17:52 02/06/24 22:38   Troponin T, High Sensitivity <=22 ng/L 73 (H) 69 (H) 58 (H) 59 (H) 72 (H) 74 (H)   (H): Data is abnormally high    MIKE Morales, CNP  Hospitalist-[facility]  Hospitalist Service  Securely message with [employer] (more info)  Text page via [employer] Paging/Directory     Medical Decision Making       7 MINUTES SPENT BY ME on the date of service doing chart review, history, exam, documentation & further activities per the note.

## 2024-02-08 NOTE — PLAN OF CARE
Goal Outcome Evaluation:    Patient intermittently confused alert to self/place/date although needs redirected about situation. Calm Q2 turn/reposition with lift. Pain with turns/sore. 1LNC although increased to 4LNC while sleeping. Clear/dim with fine crackles in the bases. Afib BP 80s/60s or higher- MD aware. Pulses WDL. Edema throughout. Chronic branch in place decreased urine output although BP not stable for lasix and echo showed overload. MD notified okay to watch for now. Branch leaked 1 time -will watch. New wound found on penis from branch tube, moved, cleaned and charted documentation. WOC already following for known coccyx wound. Small smear no BM this shift. Full liquid diet -NPO since 0000 for possible EGD. Awaiting labs; midnight hgb stable at 7.4. on standard mg/k protocol.              Problem: Adult Inpatient Plan of Care  Goal: Plan of Care Review  Description: The Plan of Care Review/Shift note should be completed every shift.  The Outcome Evaluation is a brief statement about your assessment that the patient is improving, declining, or no change.  This information will be displayed automatically on your shift  note.  Outcome: Progressing     Problem: Adult Inpatient Plan of Care  Goal: Absence of Hospital-Acquired Illness or Injury  Outcome: Progressing     Problem: Adult Inpatient Plan of Care  Goal: Absence of Hospital-Acquired Illness or Injury  Intervention: Prevent Skin Injury  Recent Flowsheet Documentation  Taken 2/8/2024 0200 by Bandar Desir, RN  Body Position:   turned   side-lying   upper extremity elevated   weight shifting   tilted   lower extremity elevated   heels elevated   right  Taken 2/8/2024 0000 by Bandar Desir, RN  Body Position:   left   weight shifting   upper extremity elevated  Skin Protection:   silicone foam dressing in place   incontinence pads utilized  Device Skin Pressure Protection:   absorbent pad utilized/changed   positioning supports utilized  Taken 2/7/2024  2338 by Bandar Desir, RN  Body Position:   turned   side-lying   left   upper extremity elevated   weight shifting   tilted   lower extremity elevated   heels elevated  Taken 2/7/2024 2000 by Bandar Desir RN  Body Position:   left   weight shifting   upper extremity elevated  Skin Protection:   silicone foam dressing in place   incontinence pads utilized  Device Skin Pressure Protection:   absorbent pad utilized/changed   positioning supports utilized     Problem: Adult Inpatient Plan of Care  Goal: Absence of Hospital-Acquired Illness or Injury  Intervention: Prevent Infection  Recent Flowsheet Documentation  Taken 2/8/2024 0000 by Bandar Desir RN  Infection Prevention:   rest/sleep promoted   single patient room provided  Taken 2/7/2024 2000 by Bandar Desir RN  Infection Prevention:   rest/sleep promoted   single patient room provided

## 2024-02-08 NOTE — CONSULTS
"[facility] Nurse Inpatient Assessment     Consulted for: Coccyx; new consult 2/8 for penis    Summary:   Coccyx/ left buttock - POA evolving deep tissue pressure injury, continues evolving and noted improving 2/7 (not assessed 2/8)  Penis - mucosal pressure injury related to branch catheter    Patient History (according to provider note(s):      \"79 year old male with past medical history significant for mechanical mitral valve replacement for severe mitral regurgitation and MAZE procedure in 2008, on chronic anticoagulation with warfarin, pulmonary hypertension, chronic diastolic CHF with preserved EF, previous dilated LV likely due to mitral regurgitation, subsequent improvement with ACE inhibitor and beta-blockers, hypertension, hyperlipidemia,and morbid obesity admitted on 1/30/2024 with acute on chronic anemia. \"    Assessment:      Areas visualized during today's visit: Focused: and Perineal area    Pressure Injury Location: Penis - right side     Last photo: 2-8-24      Wound type: Pressure Injury     Pressure Injury Stage: Mucosal, hospital acquired      This is a Medical Device Related Pressure Injury (MDRPI) due to branch  Wound history/plan of care: pt has had indwelling catheter for approx 3 weeks per report.  Distal penis inverted and difficult to fully visualize.  Pt states he is uncircumcised; difficult to distinguish if wound is on preputial (mucosal) tissue vs true skin.     Wound base: maroon dryish tissue; the wound is not visible unless retract the surrounding tissue      Palpation of the wound bed: normal      Drainage: none     Description of drainage: none     Measurements (length x width x depth, in cm) approx 1 x 0.3 x 0cm   Periwound skin: Intact      Color: normal and consistent with surrounding tissue      Temperature: normal   Odor: none  Pain:  minimal    Pain intervention prior to dressing change: no significant pain present   Treatment goal: Heal  and " Protection  STATUS: initial assessment  Supplies ordered: supplies stored on unit      Wound location: Buttock, coccyx (not assessed 2/8)    Last photo: 2/7   Wound due to: Pressure Injury POA deep tissue injury evolving   Wound history/plan of care: found with sacral mepilex x2 in use   Wound base:  non-blanchable, erythema, maroon, purple, and dermis     Palpation of the wound bed: boggy and textured        Drainage: small     Description of drainage: serosanguinous     Measurements (length x width x depth, in cm): open area remains concentrated to a 4  x 4  x  0.2 cm cluster area, nonblanchable maroon to left buttock starting to open this visit, total skin damage cluster to buttock 15cm x 15cm x 0.1cm      Tunneling: N/A     Undermining: N/A  Periwound skin: Superficial erosion and fragile, distant periwound skin intact       Color: normal and consistent with surrounding tissue      Temperature: normal   Odor: none  Pain: moderate, pain related to right side more than wound itself   Pain interventions prior to dressing change: patient tolerated well  Treatment goal: Heal   STATUS: evolving and improving  Supplies ordered: at bedside, supplies stored on unit, and discussed with patient        Treatment Plan:     Penis/ perineal cares: BID and prn:  Cleanse with Fredrick lotion and soft dry wipes.  Fully retract the penile tissue to cleanse, then return to relaxed position.   If Stubbs present: Catheter cares per protocol.  Ensure catheter is appropriately secured, without tension.  Rotate securement areas as needed to prevent prolonged pressure.  Avoid briefs in bed, especially when catheter present.       Wound care  EVERY SHIFT        Comments: Location: buttock  Care: provided qshift by primary RN  1. Cleanse with each incontinence episode with fredrick cleanser and soft dry wipes (patient has intermittent fecal incontinence and requires assistance, patient discussed at [facility] consult he is hesitant to ask for help)  2.  "Apply skin prep (sure prep or 3M no sting skin barrier wipes) to wound and periwound skin, let dry for ~30 seconds  3. Cover with sacral mepilex, or with two 4x4\" mepilex at the same time to cover all the damaged skin. Ok to use each mepilex up to 5 days. Ok to lift mepilex for routine assessments of the skin and reapply          Skin care precautions  EFFECTIVE NOW        Comments: Pressure Injury Prevention (PIP) Plan:  If patient is declining pressure injury prevention interventions: Explore reason why and address patient's concerns, Educate on pressure injury risk and prevention intervention(s), If patient is still declining, document \"informed refusal\" , and Ensure Care team is aware ( provider, charge nurse, etc)  Mattress: Follow bed algorithm, reassess daily and order specialty mattress, if indicated.  HOB: Maintain at or below 30 degrees, unless contraindicated  Repositioning in bed: Every 1-2 hours , Left/right positioning; avoid supine, and Raise foot of bed prior to raising head of bed, to reduce patient sliding down (shear)  Heels: Keep elevated off mattress and Pillows under calves  Protective Dressing: Sacral Mepilex for prevention (#[id number]),  especially for the agitated patient  Positioning Equipment: Z-Theo positioner (#[id number] or #[id number]) to help maintain side lying position.  Chair positioning: Chair cushion (#[id number]) , Assist patient to reposition hourly, and Do NOT use a donut for sitting (this increases pressure to smaller area and creates a higher potential for injury)    If patient has a buttock pressure injury, or high risk for PI use chair cushion or SPS.  Moisture Management: Perineal cleansing /protection: Follow Incontinence Protocol, Avoid brief in bed, Clean and dry skin folds with bathing , and Moisturize dry skin  Under Devices: Inspect skin under all medical devices during skin inspection , Ensure tubes are stabilized without tension, and Ensure patient is not lying on " medical devices or equipment when repositioned  Ask provider to discontinue device when no longer needed.          Nutrition Services Adult IP Consult  ONE TIME     Complete     References:    Medical Nutrition Therapy policy and MNT protocol   Provider: (Not yet assigned)   Question Answer Comment   Reason for Consult: RN Consult - specify Reason    Reason for Consult: wound healing support needs with evolving pressure injury to buttock            Orders: Reviewed    RECOMMEND PRIMARY TEAM ORDER: None, at this time  Education provided: importance of repositioning, plan of care, Infection prevention , Moisture management, Hygiene, and Off-loading pressure  Discussed plan of care with: Patient, Family, and Nurse   [facility] nurse follow-up plan: twice weekly  Notify [facility] if wound(s) deteriorate.  Nursing to notify the Provider(s) and re-consult the [facility] Nurse if new skin concern.    DATA:     Current support surface: Standard  Low air loss (PRICILA pump, Isolibrium, Pulsate, skin guard, etc)  Containment of urine/stool: Incontinence Protocol, Incontinent pad in bed, and Indwelling catheter  BMI: Body mass index is 43.72 kg/m .   Active diet order: Orders Placed This Encounter      NPO per Anesthesia Guidelines for Procedure/Surgery Except for: Meds     Output: I/O last 3 completed shifts:  In: 300 [P.O.:300]  Out: 875 [Urine:875]     Labs:   Recent Labs   Lab 02/08/24  0551 02/03/24  2314 02/03/24  0741   ALBUMIN  --   --  2.7*   HGB 7.6*   < > 7.1*   INR 2.52*   < > 1.80*   WBC 4.0   < > 3.2*    < > = values in this interval not displayed.     Pressure injury risk assessment:   Sensory Perception: 4-->no impairment  Moisture: 3-->occasionally moist  Activity: 1-->bedfast  Mobility: 2-->very limited  Nutrition: 2-->probably inadequate  Friction and Shear: 1-->problem  Roscoe Score: 13    Rachel Ortiz RN CWOCN  -Securely message with [employer] ([facility] [employer])   -[facility] Office Phone: [phone] (messages checked  periodically Mon-Fri 8a-4p)

## 2024-02-08 NOTE — PROGRESS NOTES
CLINICAL NUTRITION SERVICES - REASSESSMENT NOTE    RECOMMENDATIONS FOR MD/PROVIDER TO ORDER:   Very minimal intakes for the past 4 days. May need to consider nutrition support if unable to advance diet or if intakes inadequate.   Recommendations Ordered by Registered Dietitian (RD):   Now that patient on FLD will order Ensure Max between meals  + PRN supplements   Malnutrition:   % Weight Loss:  Difficult to determine true weight loss w/ fluid status  % Intake:  </= 50% for >/= 5 days (severe malnutrition)  Subcutaneous Fat Loss:  Orbital region Mild depletion  Muscle Loss:  Clavicle bone region Mild depletion  Fluid Retention:  3+ Moderate edema    Malnutrition Diagnosis: Moderate malnutrition  In Context of:  Acute illness or injury  Chronic illness or disease     EVALUATION OF PROGRESS TOWARD GOALS   Diet:  NPO at time of visit this AM --> now FLD  2/7: NPO --> FLD  2/6: CLD  2/5: NPO  1/31-2/5: Regular diet    Intake/Tolerance:    - 25-50% intakes documented per flowsheets  - Per [facility]:  2/7: 1 meal ordered  2/6: 0 meals ordered  2/5: 1 meal ordered  2/4: 1 meal ordered  2/3 and 2/2: 3 meals ordered both days  - intakes seem to be declining since start of admit  - per RN note 2/5, reports discomfort to esophageal area 2/5  - Spoke with patient at bedside. NPO at time of visit. Per patient, he has had minimal nutrition for the past few days d/t diet restrictions (intermittent NPO/CLD/FLD status). He says he was doing OK with eating at the beginning of admit but not in the past few days. He doesn't feel hungry and is OK with not eating at the moment as this is part of the POC. Mentioned to patient we will be following diet advancement and will be around for further questions, supplements, etc., in the coming days.    ASSESSED NUTRITION NEEDS:  Dosing Weight 94.2 kg (adjusted based off 142.2 kg 2/8)  Estimated Energy Needs: 2460-1637 kcals (20-25 Kcal/Kg)  Justification: maintenance  Estimated Protein Needs:  "113-141 grams protein (1.2-1.5 g pro/Kg)  Justification: wound healing  Estimated Fluid Needs: 1 mL/kcal or per provider pending fluid status    NEW FINDINGS:   General: EGD 2/5 - per MD note, \"severe pill esophagitis with active bleeding ulcer and visible vessels. There was clotted blood in the middle third of the esophagus and the entire stomach.\"    Skin:  WOC following -   Wound location: buttock, coccyx - Pressure Injury POA deep tissue injury - evolving and improving 2/7    Meds: ferrous sulfate, lasix    Weight: trending down in the past week however has been diuresing  02/08/24 0400 142.2 kg (313 lb 7.9 oz) Bed scale   02/07/24 0600 142.3 kg (313 lb 11.4 oz) Bed scale   02/06/24 0336 135.5 kg (298 lb 11.6 oz) Bed scale   02/05/24 0610 142.3 kg (313 lb 11.4 oz) Bed scale   02/04/24 0632 142.4 kg (313 lb 15 oz) Bed scale   02/03/24 0639 143.4 kg (316 lb 2.2 oz) Bed scale   02/02/24 0615 147.3 kg (324 lb 11.8 oz) Bed scale   02/01/24 0625 149 kg (328 lb 7.8 oz) Bed scale     Wt's have been fluctuating, suspect d/t fluid status shifts. Wt does seem up from September 2023, but difficult to determine weight trends in the past few months.  Wt Readings from Last 15 Encounters:   02/08/24 142.2 kg (313 lb 7.9 oz)   01/29/24 (!) 155.3 kg (342 lb 6.4 oz)   01/26/24 143.8 kg (317 lb)   01/25/24 (!) 153 kg (337 lb 6.4 oz)   01/22/24 (!) 152.4 kg (336 lb)   01/22/24 (!) 152.4 kg (336 lb)   01/18/24 (!) 160.1 kg (352 lb 15.7 oz)   01/09/24 135.6 kg (299 lb)   09/13/23 135.6 kg (299 lb)   07/15/22 147.9 kg (326 lb)   02/09/22 145.2 kg (320 lb)   10/16/20 149.2 kg (329 lb)   11/05/19 (!) 153.8 kg (339 lb)   03/14/19 (!) 155.1 kg (342 lb)   07/26/18 (!) 164.2 kg (362 lb)     Previous Goals:   N/A    Previous Nutrition Diagnosis:   No nutrition diagnosis at this time.   Evaluation: Declining    CURRENT NUTRITION DIAGNOSIS  Inadequate oral intake related to diet restrictions as evidenced by intermittent NPO/CLD/FLD for the past " 4 days, only 0-1 meals ordered/day recently    INTERVENTIONS  Recommendations / Nutrition Prescription  See above    Implementation  Medical Food Supplement - ordered Ensure Max between meals  PRN supplements    Goals  Patient to consume >75% of TID FLD meals and/or supplements  Diet advancement > FLD within 48 hours    MONITORING AND EVALUATION:  Progress towards goals will be monitored and evaluated per protocol and Practice Guidelines    Patti Oakes RD, LD  Clinical Dietitian - [facility]

## 2024-02-08 NOTE — PROGRESS NOTES
[facility]    Medicine Progress Note - Hospitalist Service        Date of Admission:  1/30/2024 10:06 AM    Assessment & Plan:   Feng Wynne is a 79 year old male with past medical history significant for mechanical mitral valve replacement for severe mitral regurgitation and MAZE procedure in 2008, on chronic anticoagulation with warfarin, pulmonary hypertension, chronic diastolic CHF with preserved EF, hypertension, hyperlipidemia,and morbid obesity admitted on 1/30/2024 with acute on chronic anemia.   Patient was recently admitted to [facility] from 1/9/24-1/19/24 with anemia with a hemoglobin of 4.4.  EGD and colonoscopy did not reveal any active bleeding.  He was discharged to TCU and sent back to the emergency room after his outpatient hemoglobin came back at 6.  The etiology of his anemia initially was unclear, however patient started exhibiting symptoms of GI bleed and therefore an upper GI endoscopy done on 3/5/2024 showed severe pill esophagitis with evidence of active bleeding.     Acute blood loss on chronic anemia.  Severe pill esophagitis with active bleeding ulcer and visible vessel s/p cauterization and endoclips placement on 2/5/2024.  Iron deficiency anemia.  -Previously hospitalized between 1/9/2024-1/19/2024 for similar drop in hemoglobin  -EGD and colonoscopy at that time did not reveal any active bleeding at that time.    -GI had recommended outpatient capsule endoscopy, which has not been completed yet.  -Discharge hemoglobin on 1/19/2024 was 8.0, he presented with a hemoglobin of 6 on 1/30/2024  -FOBT positive   -Peripheral smear showed marked normochromic, normocytic anemia with increased red cell regeneration without morphologic evidence of hemolysis  -Hematology signed off  -Initially the etiology of his acute drop in hemoglobin was unclear however patient underwent upper GI endoscopy on 3/5/2024 with Dr. Olvera which revealed severe pill esophagitis with active  bleeding ulcer and visible vessels.  There was clotted blood in the middle third of the esophagus and the entire stomach.   -Continue Protonix 40 mg IV twice a day  -Carafate 1 g 4 times daily  -Altogether has received 6 units of PRBC transfusion, none in the last 24 hours  -Hemoglobin has remained stable around 7.5  -Repeat endoscopy completed today, formal report pending, however per Dr. Olvera, he is stable with minimal oozing, no active evidence of bleeding.  -Dr. Olvera is okay with resuming Coumadin tonight.  Please see discussion below.  -Serial hemoglobin every 8 hours  -Continue IMC cares  -Full liquid diet for at least another 48 hours per GI    Acute on chronic diastolic CHF with preserved EF  Acute respiratory failure due to above  Moderate pulmonary Hypertension  -ECHO from 1/10/24 noted LVEF 55%; s/p 31mm [place] Raffi mechanical MVR. Mean 5mmHg, moderate to mod-severe (2-3+) tricuspid regurgitation   -On admission pt reported Shortness of breath, increased cough, recent weight gain and increased leg swelling.   -On admission BNP is within normal limits 1,253.   -CXR on 1/30/24 showed increased right perihilar hazy patchy opacities suggestive of worsening pulmonary edema. Vascular congestion and cardiomegaly. Trace R pleural effusion.   -Echo on 2/1/2024 shows EF of 60 to 65%, RV is moderately dilated, moderately decreased RV systolic function and flattened septum consistent with volume overload, moderate pulmonary hypertension, severe tricuspid regurgitation and status 31 mm [place] R[place]i mechanical mitral valve and no pericardial effusion  -Initially diuresed with IV Lasix 60 mg twice a day, due to GI bleed and hypotension this was held until today.  -Resume Lasix at slightly lower dose of 40 mg IV twice a day while closely monitoring blood pressure  -Net -21 L since admission  -Appreciate cardiology following      Hx of mechanical mitral valve replacement for severe mitral regurgitation   S/p MAZE procedure  (2008)   Chronic anticoagulation with warfarin   Paroxysmal atrial fibrillation  Subtherapeutic INR    -Initially on admission his INR was subtherapeutic at 1.98 and Coumadin was held but he was restarted back on heparin and Coumadin on p.m. of 1/31 .   -Goal INR is 2.5-3.5   -Heparin discontinued on 2/5 after there was evidence of active GI bleeding  -Coumadin has been on hold since 2/5, hemoglobin has been stable, repeat endoscopy today is relatively stable.  -INR today is 2.5  -Resume Coumadin tonight, pharmacy to dose    Mild troponin elevation   -Troponin  elevated to 58 on admission.   -Suspect demand ischemia from acute anemia, CHF, pneumonia etc  -he had troponin elevation during his last admission as well (73-69).   -Continue to monitor on telemetry    Epistaxis  -Patient did mention that he has history of epistaxis and some clots initiated.    -ENT consulted, however given that he was not actively bleeding definitively reasonable to to try humidification and monitor clinically.        Recent Pneumonia   -CXR obtained on 1/26 as outpatient which per the read suggested CHF, but ultimately the provider ended up treating for pneumonia. Received 2g of IM Rocephin on 1/26 and was started on Ceftin and Doxycycline on 1/27.   -Patient completed, previously planned course of Ceftin and doxycycline this hospitalization on 2/2/2024     Hypomagnesemia  -Replace per protocol      HLD  -Intolerant to statins and Zetia      Urinary retention s/p Stubbs Catheter   -Pt was supposed to have an appointment with urology on 1/30 for TOV   -Stubbs was continued due to need for monitoring intake and output closely  -Patient probably has an ulcer on his meatus, WOC will be consulted  -Discontinue Stubbs today for voiding trial after wound care evaluation today.    Diet: NPO per Anesthesia Guidelines for Procedure/Surgery Except for: Meds     DVT Prophylaxis: Warfarin   Stubbs Catheter: PRESENT, indication: Retention;Other  "(Comment)  Code Status: Full Code     Disposition Plan       Expected Discharge Date: 02/12/2024      Destination: inpatient rehabilitation facility  Discharge Comments:   Entered: Anival Mohan MD 02/08/2024, 9:07 AM        Clinically Significant Risk Factors              # Hypoalbuminemia: Lowest albumin = 2.7 g/dL at 2/3/2024  7:41 AM, will monitor as appropriate  # Coagulation Defect: INR = 2.52 (Ref range: 0.85 - 1.15) and/or PTT = N/A, will monitor for bleeding    # Hypertension: Noted on problem list    # Acute heart failure with preserved ejection fraction: heart failure noted on problem list, last echo with EF >50%, and receiving IV diuretics       # Severe Obesity: Estimated body mass index is 43.72 kg/m  as calculated from the following:    Height as of this encounter: 1.803 m (5' 11\").    Weight as of this encounter: 142.2 kg (313 lb 7.9 oz).        # Financial/Environmental Concerns:            The patient's care was discussed with the Bedside Nurse and Patient.     Medical Decision Making       **CLEAR ALL SELECTIONS**      Labs/Imaging Reviewed:  See Information above and Data section below  Time SPENT BY ME on the date of service doing chart review, history, exam, documentation & further activities per the note:  50 MINUTES    Chart documentation was completed, in part, with Spring[employer] voice-recognition software. Even though reviewed, some grammatical, spelling, and word errors may remain.    Anival Mohan MD  Hospitalist Service  [facility]  Text Page 7AM-6PM  Securely message with the Vocera Web Console (learn more here)  Text page via [employer] Paging/Directory    ______________________________________________________________________    Interval History   Hemoglobin remained stable all day yesterday.  No further need for transfusion in the last 24 hours.  INR still at 2.5.  Repeat endoscopy was done today, per Dr. Olvera looks relatively stable with minor evidence of " "oozing.  Patient denies nausea, vomiting or abdominal pain.    Data reviewed today: I reviewed all medications, new labs and imaging results over the last 24 hours. I personally reviewed no images or EKG's today.    Physical Exam   Vital signs:  Temp: 97.6  F (36.4  C) Temp src: Oral BP: 109/59 Pulse: 75   Resp: 17 SpO2: 97 % O2 Device: Nasal cannula Oxygen Delivery: 4 LPM Height: 180.3 cm (5' 11\") Weight: 142.2 kg (313 lb 7.9 oz)  Estimated body mass index is 43.72 kg/m  as calculated from the following:    Height as of this encounter: 1.803 m (5' 11\").    Weight as of this encounter: 142.2 kg (313 lb 7.9 oz).      Wt Readings from Last 2 Encounters:   02/08/24 142.2 kg (313 lb 7.9 oz)   01/29/24 (!) 155.3 kg (342 lb 6.4 oz)       Gen: AAOX3, NAD, comfortable  HEENT:  no pallor  Resp: Diminished air entry at the bases, normal effort of breathing.  CVS: RRR, no murmur  Abd/GI: Soft, non-tender. BS- normoactive.    Skin: Warm, dry no rashes  MSK: Lymphedema+  Neuro- CN- intact. No focal deficits.        Data   Recent Labs   Lab 02/08/24  0551 02/08/24  0005 02/07/24  1744 02/07/24  1014 02/07/24  0554 02/07/24  0553 02/06/24  1207 02/06/24  0521 02/03/24  2314 02/03/24  0741   WBC 4.0  --   --   --   --  4.7  --  7.2   < > 3.2*   HGB 7.6* 7.4* 7.6*   < >  --  7.5*  7.5*   < > 6.4*   < > 7.1*   MCV 96  --   --   --   --  93  --  95   < > 94     --   --   --   --  142*  --  160   < > 157   INR 2.52*  --   --   --   --  2.55*  --  2.49*   < > 1.80*     --   --   --  138  --   --  137   < > 134*   POTASSIUM 3.6  --   --   --  3.6  --   --  4.0   < > 3.6   CHLORIDE 95*  --   --   --  93*  --   --  91*   < > 91*   CO2 41*  --   --   --  42*  --   --  40*   < > 38*   BUN 45.9*  --   --   --  51.9*  --   --  49.6*   < > 15.3   CR 1.02  --   --   --  1.14  --   --  1.11   < > 0.95   ANIONGAP 5*  --   --   --  3*  --   --  6*   < > 5*   JOSEPH 8.6*  --   --   --  8.4*  --   --  8.4*   < > 8.8   GLC 89  --   --   --  " 98  --   --  104*   < > 95   ALBUMIN  --   --   --   --   --   --   --   --   --  2.7*   PROTTOTAL  --   --   --   --   --   --   --   --   --  5.1*   BILITOTAL  --   --   --   --   --   --   --   --   --  0.6   ALKPHOS  --   --   --   --   --   --   --   --   --  58   ALT  --   --   --   --   --   --   --   --   --  9   AST  --   --   --   --   --   --   --   --   --  27    < > = values in this interval not displayed.       Recent Results (from the past 24 hour(s))   XR Chest Port 1 View    Narrative    CHEST ONE VIEW  2/7/2024 3:43 PM     HISTORY: Respiratory failure    COMPARISON: Chest radiograph 1/30/2024      Impression    IMPRESSION: Similar right perihilar opacity, cardiomegaly and  pulmonary vascular congestion. No new focal consolidation, or  pneumothorax. Similar possible trace pleural effusions versus pleural  thickening. Median sternotomy and cardiac valve prosthesis.    SHIELA BOLDEN MD         SYSTEM ID:  [id number]     Medications    - MEDICATION INSTRUCTIONS -        ferrous sulfate  325 mg Oral Daily    [Held by provider] furosemide  60 mg Intravenous Q12H    miconazole   Topical BID    pantoprazole  40 mg Intravenous BID    sodium chloride (PF)  3 mL Intracatheter Q8H    sodium chloride (PF)  3 mL Intracatheter Q8H    sucralfate  1 g Oral 4x Daily AC & HS    [Held by provider] Warfarin Therapy Reminder  1 each Oral See Admin Instructions

## 2024-02-08 NOTE — PROGRESS NOTES
"Cardiology Progress Note          Assessment and Plan:       Shortness of breath    Positive occult stool blood test    Anemia due to blood loss, acute    Acute on chronic congestive heart failure, unspecified heart failure type (H)    EGD showing healing of ulcer  BP improved  Reinitiate diuresis.    Restart coumadin tonight.                  Interval History:     Improved.  No CP, SOB>               Review of Systems:   As per subjective, otherwise 5 systems reviewed and negative.           Physical Exam:   Blood pressure 97/62, pulse 67, temperature 97.6  F (36.4  C), temperature source Oral, resp. rate 20, height 1.803 m (5' 11\"), weight 142.2 kg (313 lb 7.9 oz), SpO2 97%.      Vital Sign Ranges  Temperature Temp  Av.2  F (36.8  C)  Min: 97.6  F (36.4  C)  Max: 98.6  F (37  C)   Blood pressure Systolic (24hrs), Av , Min:80 , Max:109        Diastolic (24hrs), Av, Min:42, Max:63      Pulse Pulse  Av.3  Min: 62  Max: 81   Respirations Resp  Av.6  Min: 12  Max: 25   Pulse oximetry SpO2  Av.2 %  Min: 93 %  Max: 100 %         Intake/Output Summary (Last 24 hours) at 2024 1133  Last data filed at 2024 0600  Gross per 24 hour   Intake 300 ml   Output 575 ml   Net -275 ml       Constitutional:   NAD   Skin:   Warm and dry   Head:   Nontraumatic   Neck:   Supple, symmetrical, trachea midline, no adenopathy, thyroid symmetric, not enlarged and no tenderness, skin normal   Lungs:   normal   Cardiovascular:   Normal apical impulse, regular rate and rhythm, normal S1 and S2, no S3 or S4, and no murmur noted    Abdomen:   Benign   Extremities and Back:      Neurological:   Grossly nonfocal            Medications:     No current outpatient medications on file.                Data:     Results for orders placed or performed during the hospital encounter of 24 (from the past 24 hour(s))   Hemoglobin   Result Value Ref Range    Hemoglobin 7.6 (L) 13.3 - 17.7 g/dL   XR Chest Port 1 View    " Narrative    CHEST ONE VIEW  2/7/2024 3:43 PM     HISTORY: Respiratory failure    COMPARISON: Chest radiograph 1/30/2024      Impression    IMPRESSION: Similar right perihilar opacity, cardiomegaly and  pulmonary vascular congestion. No new focal consolidation, or  pneumothorax. Similar possible trace pleural effusions versus pleural  thickening. Median sternotomy and cardiac valve prosthesis.    SHIELA BOLDEN MD         SYSTEM ID:  [id number]   Hemoglobin   Result Value Ref Range    Hemoglobin 7.6 (L) 13.3 - 17.7 g/dL   Hemoglobin   Result Value Ref Range    Hemoglobin 7.4 (L) 13.3 - 17.7 g/dL   INR   Result Value Ref Range    INR 2.52 (H) 0.85 - 1.15   CBC with platelets   Result Value Ref Range    WBC Count 4.0 4.0 - 11.0 10e3/uL    RBC Count 2.62 (L) 4.40 - 5.90 10e6/uL    Hemoglobin 7.6 (L) 13.3 - 17.7 g/dL    Hematocrit 25.2 (L) 40.0 - 53.0 %    MCV 96 78 - 100 fL    MCH 29.0 26.5 - 33.0 pg    MCHC 30.2 (L) 31.5 - 36.5 g/dL    RDW 23.5 (H) 10.0 - 15.0 %    Platelet Count 155 150 - 450 10e3/uL   Basic metabolic panel   Result Value Ref Range    Sodium 141 135 - 145 mmol/L    Potassium 3.6 3.4 - 5.3 mmol/L    Chloride 95 (L) 98 - 107 mmol/L    Carbon Dioxide (CO2) 41 (H) 22 - 29 mmol/L    Anion Gap 5 (L) 7 - 15 mmol/L    Urea Nitrogen 45.9 (H) 8.0 - 23.0 mg/dL    Creatinine 1.02 0.67 - 1.17 mg/dL    GFR Estimate 75 >60 mL/min/1.73m2    Calcium 8.6 (L) 8.8 - 10.2 mg/dL    Glucose 89 70 - 99 mg/dL   Magnesium   Result Value Ref Range    Magnesium 2.0 1.7 - 2.3 mg/dL   UPPER GI ENDOSCOPY   Result Value Ref Range    Upper GI Endoscopy       [address] [address] [place]  [address]  _______________________________________________________________________________  Patient Name: Feng Wynne             Procedure Date: 2/8/2024 9:03 AM  MRN: [id number]                       Account Number: [id number]  YOB: 1945               Admit Type: Inpatient  Age: 79                                Room: 3  Note Status: Finalized                Attending MD: ALVARADO MICHAEL MD,   Instrument Name: 508 GIF- Gastroscope   _______________________________________________________________________________     Procedure:                Upper GI endoscopy  Indications:              Hematemesis  Providers:                ALVARADO MICHAEL MD, Matilda Grimes, RN  Referring MD:               Medicines:                Fentanyl 50 micrograms IV, Midazolam 2 mg IV,                             Cetacaine spray  Complications:            No immediate complications.  ____________________________________________ ___________________________________  Procedure:                Pre-Anesthesia Assessment:                            - Prior to the procedure, a History and Physical                             was performed, and patient medications and                             allergies were reviewed. The patient is competent.                             The risks and benefits of the procedure and the                             sedation options and risks were discussed with the                             patient. All questions were answered and informed                             consent was obtained. Patient identification and                             proposed procedure were verified by the physician                             in the pre-procedure area. Mental Status                             Examination: alert and oriented. Airway                             Examination: normal oropharyngeal airway and neck                             mobility. Respiratory Examination: clear to                              auscultation. CV Examination: normal. Prophylactic                             Antibiotics: The patient does not require                             prophylactic antibiotics. Prior Anticoagulants: The                             patient has taken no anticoagulant or antiplatelet                              agents. ASA Grade Assessment: II - A patient with                             mild systemic disease. After reviewing the risks                             and benefits, the patient was deemed in                             satisfactory condition to undergo the procedure.                             The anesthesia plan was to use moderate sedation /                             analgesia (conscious sedation). Immediately prior                             to administration of medications, the patient was                             re-assessed for adequacy to receive sedatives. The                             heart rate, respiratory rate, oxygen saturations,                              blood pressure, adequacy of pulmonary ventilation,                             and response to care were monitored throughout the                             procedure. The physical status of the patient was                             re-assessed after the procedure.                            After obtaining informed consent, the endoscope was                             passed under direct vision. Throughout the                             procedure, the patient's blood pressure, pulse, and                             oxygen saturations were monitored continuously. The                             endoscope 508 was introduced through the mouth, and                             advanced to the third part of duodenum. The upper                             GI endoscopy was accomplished without difficulty.                             The patient tolerated the procedure well.                                                                                   Findings:        One cratered esophageal ulcer oozing blood was found in the middle third        of the esophagus. The lesion was thirty mm by thirty mm in largest        dimension.       Normal mucosa was found in the lower third of the esophagus.       The entire examined stomach was  normal.       The duodenal bulb, first portion of the duodenum and second portion of        the duodenum were normal.                                                                                   Impression:               - Esophageal ulcer oozing blood.                            - Normal mucosa was found in the lower third of the                             esophagus.                            - Normal stomach.                            - Normal duodenal bulb, first portion of the                             duodenum and second portion of the duodenum.                            - No specimens collected.  Recommendation:           - Please continue to follow with Primary care                             Physici an.                            - Return patient to hospital [facility] for ongoing care.                            - Full liquid diet.                            - Use sucralfate suspension 1 gram PO QID.                                                                                   Procedure Code(s):       --- Professional ---       18196, Esophagogastroduodenoscopy, flexible, transoral; diagnostic,        including collection of specimen(s) by brushing or washing, when        performed (separate procedure)  Diagnosis Code(s):       --- Professional ---       K22.11, Ulcer of esophagus with bleeding       K92.0, Hematemesis    CPT copyright 2022 [employer]. All rights reserved.    The codes documented in this report are preliminary and upon  review may   be revised to meet current compliance requirements.    Electronically Signed by Osbaldo Tate  ________________________  OSBALDO MICHAEL MD  2/8/2024 10:59:07 AM  I was physically present for the entire view ing portion of the exam.  OSBALDO MICHAEL MD  Number of Addenda: 0    Note Initiated On: 2/8/2024 9:03 AM  Total Procedure Duration: 0 hours 2 minutes 30 seconds   Scope In: 9:28:02 AM  Scope Out: 9:30:32 AM       *Note:  Due to a large number of results and/or encounters for the requested time period, some results have not been displayed. A complete set of results can be found in Results Review.

## 2024-02-08 NOTE — PROGRESS NOTES
[facility]  Gastroenterology Progress Note     Feng Wynne MRN# [id number]   YOB: 1945 Age: 79 year old          Assessment and Plan:     Feng Wynne is a 79 year old male with past medical history significant for mechanical mitral valve replacement for severe mitral regurgitation and MAZE procedure in 2008, on chronic anticoagulation with warfarin, pulmonary hypertension, chronic diastolic CHF with preserved EF, previous dilated LV likely due to mitral regurgitation, subsequent improvement with ACE inhibitor and beta-blockers, hypertension, hyperlipidemia,and morbid obesity admitted on 1/30/2024 with acute on chronic anemia.     Anemia  Epistaxis  Pt had an admission from 1/9-1/19 for similar. At that time hgb was down to 4.4. EGD and colonoscopy revealed no active bleed; had poor colonoscopy prep with note of coupious stool in entire colon, lavage was performed. EGD was negative recommended outpatient capsule endoscopy  Hemoglobin stable in mid 7 range  Does report at least 3 months for epistaxis with large clots before and now ongoing blood tinged and some small clots every few days- certainly a possible source of blood loss  Vomited clots and blood on 2/5 2/5 EGD noted huge (28x33 mm) pill esophagitis ulcer with visible vessel,  likely from doxycycline. Clots noted in esophagus and stomach     - sucralfate suspension  - BID PPI  - serial hemoglobin and transfuse for HGB <7  - avoid doxycycline  - restarting heparin at this time would significantly increase recurrent GI bleed risk.   - INR above 2- not on warfarin  - EGD today to determine if esophageal ulcer bleeding                Interval History:     Feeling ok. No nausea. No abdominal pain.              Review of Systems:     C: NEGATIVE for fever, chills, change in weight  E/M: NEGATIVE for ear, mouth and throat problems  R: NEGATIVE for significant cough or SOB  CV: NEGATIVE for chest pain, palpitations or  peripheral edema             Medications:   I have reviewed this patient's current medications   ferrous sulfate  325 mg Oral Daily    furosemide  40 mg Intravenous BID    miconazole   Topical BID    pantoprazole  40 mg Intravenous BID    sodium chloride (PF)  3 mL Intracatheter Q8H    sodium chloride (PF)  3 mL Intracatheter Q8H    sucralfate  1 g Oral 4x Daily AC & HS    Warfarin Therapy Reminder  1 each Oral See Admin Instructions                  Physical Exam:   Vitals were reviewed  Vital Signs with Ranges  Temp:  [97.6  F (36.4  C)-98.6  F (37  C)] 97.6  F (36.4  C)  Pulse:  [62-81] 71  Resp:  [12-27] 16  BP: ()/(42-63) 101/57  SpO2:  [93 %-100 %] 98 %  I/O last 3 completed shifts:  In: 300 [P.O.:300]  Out: 875 [Urine:875]  Constitutional: healthy, alert, and no distress   Cardiovascular: negative, PMI normal. No lifts, heaves, or thrills. RRR. No murmurs, clicks gallops or rub  Respiratory: negative, Percussion normal. Good diaphragmatic excursion. Lungs clear  Abdomen: Abdomen soft, non-tender. BS normal. No masses, organomegaly             Data:   I reviewed the patient's new clinical lab test results.   Recent Labs   Lab Test 02/08/24  0551 02/08/24  0005 02/07/24  1744 02/07/24  1014 02/07/24  0553 02/06/24  1207 02/06/24  0521   WBC 4.0  --   --   --  4.7  --  7.2   HGB 7.6* 7.4* 7.6*   < > 7.5*  7.5*   < > 6.4*   MCV 96  --   --   --  93  --  95     --   --   --  142*  --  160   INR 2.52*  --   --   --  2.55*  --  2.49*    < > = values in this interval not displayed.     Recent Labs   Lab Test 02/08/24  0551 02/07/24  0554 02/06/24  0521   POTASSIUM 3.6 3.6 4.0   CHLORIDE 95* 93* 91*   CO2 41* 42* 40*   BUN 45.9* 51.9* 49.6*   ANIONGAP 5* 3* 6*     Recent Labs   Lab Test 02/03/24  0741 01/19/24  0607 01/18/24  0625 01/15/24  2210 01/15/24  1815 01/09/24  1157   ALBUMIN 2.7* 2.9* 2.9*  --  3.1* 3.5   BILITOTAL 0.6  --   --   --  1.0 0.7   ALT 9  --   --   --  13 23   AST 27  --   --   --   29 36   PROTEIN  --   --   --  Trace*  --   --        I reviewed the patient's new imaging results.    All laboratory data reviewed  All imaging studies reviewed by me.    Cheryl Waters PA-C,  2/1/2024  [facility]  Office : [phone]  Cell: [phone] (Dr. Olvera)  Cell: [phone] (Cheryl Waters PA-C)

## 2024-02-09 NOTE — PROGRESS NOTES
[facility]  Gastroenterology Progress Note     Feng Wynne MRN# [id number]   YOB: 1945 Age: 79 year old          Assessment and Plan:     Feng Wynne is a 79 year old male with past medical history significant for mechanical mitral valve replacement for severe mitral regurgitation and MAZE procedure in 2008, on chronic anticoagulation with warfarin, pulmonary hypertension, chronic diastolic CHF with preserved EF, previous dilated LV likely due to mitral regurgitation, subsequent improvement with ACE inhibitor and beta-blockers, hypertension, hyperlipidemia,and morbid obesity admitted on 1/30/2024 with acute on chronic anemia.     Anemia  Epistaxis  Pt had an admission from 1/9-1/19 for similar. At that time hgb was down to 4.4. EGD and colonoscopy revealed no active bleed; had poor colonoscopy prep with note of coupious stool in entire colon, lavage was performed. EGD was negative recommended outpatient capsule endoscopy  Hemoglobin stable in mid 7 range  Does report at least 3 months for epistaxis with large clots before and now ongoing blood tinged and some small clots every few days- certainly a possible source of blood loss  Vomited clots and blood on 2/5 2/5 EGD noted huge (28x33 mm) pill esophagitis ulcer with visible vessel,  likely from doxycycline. Clots noted in esophagus and stomach  2/8 EGD noted oozing form esophageal ulcer with no clots  Warfarin restarted- INR 2.62     - sucralfate suspension  - BID PPI  - serial hemoglobin and transfuse for HGB <7  - avoid doxycycline  - INR above 2- not on warfarin                Interval History:     Feeling ok. No nausea. No abdominal pain.              Review of Systems:     C: NEGATIVE for fever, chills, change in weight  E/M: NEGATIVE for ear, mouth and throat problems  R: NEGATIVE for significant cough or SOB  CV: NEGATIVE for chest pain, palpitations or peripheral edema             Medications:   I have reviewed  this patient's current medications   ferrous sulfate  325 mg Oral Daily    furosemide  40 mg Intravenous BID    miconazole   Topical BID    pantoprazole  40 mg Intravenous BID    sodium chloride (PF)  3 mL Intracatheter Q8H    sodium chloride (PF)  3 mL Intracatheter Q8H    sodium chloride (PF)  3 mL Intracatheter Q8H    sucralfate  1 g Oral 4x Daily AC & HS    warfarin ANTICOAGULANT  1 mg Oral ONCE at 18:00    Warfarin Therapy Reminder  1 each Oral See Admin Instructions                  Physical Exam:   Vitals were reviewed  Vital Signs with Ranges  Temp:  [97.4  F (36.3  C)-98.7  F (37.1  C)] 98.7  F (37.1  C)  Pulse:  [58-77] 66  Resp:  [0-30] 28  BP: ()/(46-66) 103/52  SpO2:  [91 %-100 %] 91 %  I/O last 3 completed shifts:  In: 200 [P.O.:200]  Out: 2300 [Urine:2300]  Constitutional: healthy, alert, and no distress   Cardiovascular: negative, PMI normal. No lifts, heaves, or thrills. RRR. No murmurs, clicks gallops or rub  Respiratory: negative, Percussion normal. Good diaphragmatic excursion. Lungs clear  Abdomen: Abdomen soft, non-tender. BS normal. No masses, organomegaly             Data:   I reviewed the patient's new clinical lab test results.   Recent Labs   Lab Test 02/09/24  0530 02/08/24  2251 02/08/24  1447 02/08/24  0551 02/07/24  1014 02/07/24  0553   WBC 3.7*  --   --  4.0  --  4.7   HGB 7.5* 8.0* 8.0* 7.6*   < > 7.5*  7.5*   MCV 95  --   --  96  --  93     --   --  155  --  142*   INR 2.62*  --   --  2.52*  --  2.55*    < > = values in this interval not displayed.     Recent Labs   Lab Test 02/09/24  0530 02/08/24  0551 02/07/24  0554   POTASSIUM 3.2* 3.6 3.6   CHLORIDE 93* 95* 93*   CO2 41* 41* 42*   BUN 36.8* 45.9* 51.9*   ANIONGAP 6* 5* 3*     Recent Labs   Lab Test 02/03/24  0741 01/19/24  0607 01/18/24  0625 01/15/24  2210 01/15/24  1815 01/09/24  1157   ALBUMIN 2.7* 2.9* 2.9*  --  3.1* 3.5   BILITOTAL 0.6  --   --   --  1.0 0.7   ALT 9  --   --   --  13 23   AST 27  --   --    --  29 36   PROTEIN  --   --   --  Trace*  --   --        I reviewed the patient's new imaging results.    All laboratory data reviewed  All imaging studies reviewed by me.    Cheryl Waters PA-C,  2/1/2024  [facility]  Office : [phone]  Cell: [phone] (Dr. Olvera)  Cell: [phone] (Cheryl Waters PA-C)

## 2024-02-09 NOTE — PROVIDER NOTIFICATION
Paged Dr. Kwabena Kelly @ 2037 with message below:    FYI pt. BP 85/54 MAP 64. Pt. BP has been hanging here throughout the night. Are we ok with these pressures?     Orders received: yes, continue to monitor for now and let me know if they go any lower.

## 2024-02-09 NOTE — PROGRESS NOTES
Care Management Follow Up    Length of Stay (days): 10    Expected Discharge Date: 02/12/2024     Concerns to be Addressed: discharge planning     Patient plan of care discussed at interdisciplinary rounds: Yes    Anticipated Discharge Disposition: Skilled Nursing Facility     Anticipated Discharge Services: None  Anticipated Discharge DME: None    Patient/family educated on Medicare website which has current facility and service quality ratings: yes  Education Provided on the Discharge Plan: Yes  Patient/Family in Agreement with the Plan: yes    Referrals Placed by CM/SW: Post Acute Facilities  Private pay costs discussed: Not applicable    Additional Information:  No accepting TCU yet. Barriers include bed availability and bariatric needs. DIONY met with pt and partner, Jose. He will not go back to [facility] due to the care he received there. Discussed additional referrals will be sent to appropriate facilities in the area. Pt and Jose expressed an understanding. Referrals sent. SW following.     GILL Edwards, LICSW  [phone] Desk phone  [phone] Cell/text (Preferred)  [facility]

## 2024-02-09 NOTE — PROGRESS NOTES
02/09/24 0957   Appointment Info   Signing Clinician's Name / Credentials (OT) Michael Guajardo, Dainta, OTR/L   Living Environment   People in Home significant other   Current Living Arrangements apartment   Home Accessibility no concerns  (first floor apartment, elvator to underground garage)   Transportation Anticipated health plan transportation   Living Environment Comments Pt states he likes and does cooking, gets help as needed from SO.  Is retired, worked as a hairdresser for many years   Self-Care   Usual Activity Tolerance good   Current Activity Tolerance poor   Regular Exercise Yes   Activity/Exercise Type walking;biking   Exercise Amount/Frequency 3-5 times/wk   Equipment Currently Used at Home raised toilet seat;shower chair;grab bar, toilet;grab bar, tub/shower;walker, rolling  (tub shower in bathroom)   Fall history within last six months no   Activity/Exercise/Self-Care Comment Pt reports having 2 accidents with falls on the elevator a his former work, resulting in decreased ROM of right arm and leg.  Has been in TCU lately with some improvement in mobility   Instrumental Activities of Daily Living (IADL)   IADL Comments has monthly cleaning and possibly some other help with home management tasks   General Information   Onset of Illness/Injury or Date of Surgery 01/31/24   Referring Physician Cindy Rea   Patient/Family Therapy Goal Statement (OT) home   Additional Occupational Profile Info/Pertinent History of Current Problem Pt is a 79 year old male admitted with acute chronic anemia.  Testing found an esophageal ulcer with bleeding.  Pt also has a wound on his left buttocks and penis.  PMH includes mitral valve replacement in 2005, on chronic anticoagulation on warfarin, pulmonary HTN, HLD, chronic diastolic CHF, morbid obesity   Performance Patterns (Routines, Roles, Habits) pt states he does his own basic ADLS, enjoys cooking.  Does some exercise.  Not sure how accurate pt is, his comments may  not reflect xiao qu wu you performance.   Existing Precautions/Restrictions fall;other (see comments)  (has had episodes of anemia affecting energy level since admission)   General Observations and Info pt in bed, willing to participate   Cognitive Status Examination   Orientation Status orientation to person, place and time   Cognitive Status Comments not sure reporting of current function is accurate, pt confusing PT at times during our session   Visual Perception   Visual Impairment/Limitations corrective lenses full-time   Pain Assessment   Patient Currently in Pain Yes, see Vital Sign flowsheet   Range of Motion Comprehensive   General Range of Motion upper extremity range of motion deficits identified   Comment, General Range of Motion decreased ROM in both shoulders, worse in right.   Strength Comprehensive (MMT)   General Manual Muscle Testing (MMT) Assessment upper extremity strength deficits identified   Comment, General Manual Muscle Testing (MMT) Assessment decreased shoulder strength, B hand strength apears funcional   Coordination   Fine Motor Coordination Pt had some trembling wiht hands and feet, not affecting functional performance at this time.   Bed Mobility   Bed Mobility supine-sit   Supine-Sit [place] (Bed Mobility) maximum assist (25% patient effort)   Assistive Device (Bed Mobility) lift device   Comment (Bed Mobility) pt needing Max A of 2 and lift per chart notes   Transfers   Transfer Comments pt declining further motiblity at this time   Clinical Impression   Criteria for Skilled Therapeutic Interventions Met (OT) Yes, treatment indicated   OT Diagnosis decreased independence in ADLS and IADLS   OT Problem List-Impairments impacting ADL problems related to;activity tolerance impaired;balance;cognition;range of motion (ROM);strength   Assessment of Occupational Performance 3-5 Performance Deficits   Identified Performance Deficits Decreased independence in I/ADLs (Dressing, bathing,  toileting, meal preparation)   Planned Therapy Interventions (OT) ADL retraining;ROM;strengthening;transfer training;home program guidelines   Clinical Decision Making Complexity (OT) problem focused assessment/low complexity   Risk & Benefits of therapy have been explained care plan/treatment goals reviewed;evaluation/treatment results reviewed;patient   OT Total Evaluation Time   OT Eval, Moderate Complexity Minutes (53487) 15   OT Goals   Therapy Frequency (OT) 5 times/week   OT Predicted Duration/Target Date for Goal Attainment 02/16/24   OT Goals Hygiene/Grooming   OT: Hygiene/Grooming supervision/stand-by assist;using adaptive equipment;within precautions   OT: Upper Body Dressing Supervision/stand-by assist;using adaptive equipment;within precautions;including set-up/clothing retrieval   OT: Lower Body Dressing Minimal assist;using adaptive equipment;within precautions;including set-up/clothing retrieval   OT: Toilet Transfer/Toileting Minimal assist;toilet transfer;cleaning and garment management;using adaptive equipment;within precautions   OT: Cognitive Patient/caregiver will verbalize understanding of cognitive assessment results/recommendations as needed for safe discharge planning   Interventions   Interventions Quick Adds Self-Care/Home Management   Self-Care/Home Management   Self-Care/Home Mgmt/ADL, Compensatory, Meal Prep Minutes (00705) 25   Symptoms Noted During/After Treatment (Meal Preparation/Planning Training) fatigue   Treatment Detail/Skilled Intervention OT: Pt in bed, had grooming and hyiene items on tray.  Not willing to try to move out of bed, wanted to wait for nursing.  May have some decreased insight, was saying he was able to walk to the chair when notes are saying Max A of 2 and lift.  Was set up with bedside table to complete 3 BUE grooming tasks.  Able to manipulate containers with right hand but very limited ability to lift his right arm.  AAROM at times to help with right arm.    OT Discharge Planning   OT Plan A of 2 for EOB/transfer to chair for ADLS and UE strength.  Monitor cognition, consider a cognitive screen   OT Discharge Recommendation (DC Rec) Transitional Care Facility   OT Rationale for DC Rec At baseline, pt independent in all ADLs and uses a 4WW for mobility. Pt is significantly below baseline in I/ADLs. Pt limited by decreased strength and activity tolerance, and pain. Skilled OT in TCU to address safety and IND in I/ADLs.   OT Brief overview of current status Pt declining OOB activity but doing morning grooming routine with setup   Total Session Time   Timed Code Treatment Minutes 25   Total Session Time (sum of timed and untimed services) 40

## 2024-02-09 NOTE — PLAN OF CARE
Orientation: A&O x 1 this shift DO to time, place and situation @ beginning of shift, A&OX4 at end of shift  Activity: A2+lift, T/R Q2hrs, refuses at times  Diet/BS Checks: full liq  Tele:  Afib CVR  IV Access/Drains: PIV-SL  Abnormal VS/Results: VSS ex soft BP's ( 80s/40s @ times) on 1-4L NC. K(3.2) redraw @ 1124 Mg(1.8) redraw tomorrow AM. Hgb 8.0, 7.5, transfuse <7  Bowel/Bladder: ext. cath in place w/ good UOP, smear BM overnight  Skin/Wounds: PI to coccyx, mepilex in place  Plan: Cont monitoring BP and HGB

## 2024-02-09 NOTE — PLAN OF CARE
IMC: Alert and oriented x3-4, disoriented to place at times, intermittently forgetful. Vital signs stable on 1L NC ex soft pressures at times throughout shift. Lift assist, turn and repo Q2. Poor appetite, full liquid diet. Lung sounds diminished with fine crackles at bases. Bowel sounds active, passing flatus, smear BM x2 during shift. Stubbs removed, incontinent, unable to accurately measure I/Os due to external cath not fitting due to pt anatomy. PTA pressure injury on coccyx, mepilex in place. Pressure injury on penis, WOC following. Denies pain. Denies nausea. Tele a.fib CVR. Q6 hgb checks. K and mag WDL, recheck tomorrow am. Pt to EGD this am, no signs of bleeding throughout shift.

## 2024-02-09 NOTE — PROGRESS NOTES
[facility]  Cardiology Progress Note    Date of Service (when I saw the patient): 02/09/2024  Primary Cardiologist: Dr. Gomez       Interval History:   Feels tired today. Reports no lightheadedness.     ----------------------------------------------------------------------------------------    Assessment:  Feng Wynne is a 79 year old male who was admitted on 1/30/2024.      # Severe anemia 2/2 pill esophagitis with some prior hx of anemia  -- Hgb svitlana ~4 earlier this month; improved to 7-8 but recurrent dips to 6-7 requiring readmission and repeated transfusions (6 units so far on current admission)  -- GI consulted and EGD/colonoscopy were inconclusive; possible epistaxis but no active bleeding- given recurrent anemia pt underwent repeat EGD on 2/5 which showed esophageal ulcer likely from recent doxycycline use- s/p cauterization and endoclip placement  -- There was some question of low grade hemolysis but echo showed normal valve function and blood smear showed no morphological changes to suggest hemolysis  -- Heme was consulted at some point but they felt this was primarily GI related and signed off     # Acute on Chronic HFpEF with RV volume overload and signs of hepatic congestion, secondary to above   -- responded well to IV diuresis but this held intermittently due to concerns for low BP; renal fxn has remained normal  -- echo showed significant RV volume overload  -- source of anemia has been found as described above and thus BP should improve and we should have more room to escalate diuretic   -- his weight is trending down now that we are not infusing him anymore; renal function normal     # S/P MVR in 2008 with mechanical valve  -- S/p 31 mm [facility] Raffi- repeat echo 2/1 showed normal mitral valve function but regurgitation assessment could not be made due to acoustic shadowing  -- coumadin held and was resumed last night but we are holding again due to slight drop in hgb  (with repeat EGD showing oozing blood at the site but overall stable)- INR is therapeutic still suspect some of this is due to hepatic congestion     # Chronic A Fib   -- hx of MAZE procedure at the time of MVR  -- hx of bradycardia which resolved with discontinuation of BB     # Moderate-Severe TR  -- likely functional in the setting of RV volume overload   -- should be reassessed once we reach euvolemia    ----------------------------------------------------------------------------------------    Plan:  -- Escalate IV diuresis- IV lasix 40 mg q 8 hr  -- Management of anemia per primary and GI teams  -- No reversal of INR given mechanical valve at the mitral position  -- Hold coumadin for this evening  -- Cardiology will sign off but reconsult on Monday if needed; he will benefit from torsemide and spironolactone use once near euvolemia      ----------------------------------------------------------------------------------------  Physical Exam   Temp: 98.8  F (37.1  C) Temp src: Oral BP: 108/56 Pulse: 68   Resp: 22 SpO2: 94 % O2 Device: Nasal cannula Oxygen Delivery: 1 LPM  Vitals:    02/07/24 0600 02/08/24 0400 02/09/24 0651   Weight: 142.3 kg (313 lb 11.4 oz) 142.2 kg (313 lb 7.9 oz) 140.8 kg (310 lb 6.5 oz)       GEN:  NAD. Oxygen on nasal cannula.  HEENT: Mucous membranes moist.  NECK:  Unable to assess JVP.  C/V:  Regular rate and rhythm; crisp valve sound noted   RESP: CTA, anand  GI: Abdomen soft, nontender, nondistended.    EXTREM: 2+ pitting LE edema.   NEURO: Alert and oriented, cooperative.   PSYCH: Normal affect.  SKIN: Warm and dry.   VASC: 2+ radial and dorsalis pedis pulses bilaterally.      Medications    - MEDICATION INSTRUCTIONS -        ferrous sulfate  325 mg Oral Daily    furosemide  40 mg Intravenous BID    miconazole   Topical BID    pantoprazole  40 mg Intravenous BID    sodium chloride (PF)  3 mL Intracatheter Q8H    sodium chloride (PF)  3 mL Intracatheter Q8H    sodium chloride (PF)  3 mL  Intracatheter Q8H    sucralfate  1 g Oral 4x Daily AC & HS    [Held by provider] warfarin ANTICOAGULANT  1 mg Oral ONCE at 18:00    Warfarin Therapy Reminder  1 each Oral See Admin Instructions       Data   Reviewed.    Grace Hernandez PA-C   2/9/2024  Pager: [phone]

## 2024-02-09 NOTE — PROGRESS NOTES
[facility]    Medicine Progress Note - Hospitalist Service    Date of Admission:  1/30/2024    Assessment & Plan   Feng Wynne is a 79 year old male with past medical history significant for mechanical mitral valve replacement for severe mitral regurgitation and MAZE procedure in 2008, on chronic anticoagulation with warfarin, pulmonary hypertension, chronic diastolic CHF with preserved EF, hypertension, hyperlipidemia,and morbid obesity admitted on 1/30/2024 with acute on chronic anemia.   Patient was recently admitted to [facility] from 1/9/24-1/19/24 with anemia with a hemoglobin of 4.4.  EGD and colonoscopy did not reveal any active bleeding.  He was discharged to TCU and sent back to the emergency room after his outpatient hemoglobin came back at 6.  The etiology of his anemia initially was unclear, however patient started exhibiting symptoms of GI bleed and therefore an upper GI endoscopy done on 3/5/2024 showed severe pill esophagitis with evidence of active bleeding.     Acute blood loss on chronic anemia.  Severe pill esophagitis with active bleeding ulcer and visible vessel s/p cauterization and endoclips placement on 2/5/2024.  Iron deficiency anemia.  -Previously hospitalized between 1/9/2024-1/19/2024 for similar drop in hemoglobin  -EGD and colonoscopy at that time did not reveal any active bleeding at that time.    -GI had recommended outpatient capsule endoscopy, which has not been completed yet.  -Discharge hemoglobin on 1/19/2024 was 8.0, he presented with a hemoglobin of 6 on 1/30/2024  -FOBT positive   -Peripheral smear showed marked normochromic, normocytic anemia with increased red cell regeneration without morphologic evidence of hemolysis  -Hematology signed off  -Initially the etiology of his acute drop in hemoglobin was unclear however patient underwent upper GI endoscopy on 2/5/2024 with Dr. Olvera which revealed severe pill esophagitis with active  bleeding ulcer and visible vessels.  There was clotted blood in the middle third of the esophagus and the entire stomach.   -Altogether has received 6 units of PRBC transfusion  -Repeat endoscopy completed on 2/8-esophageal ulcer with oozing  -Continue Protonix 40 mg IV twice a day  -Carafate 1 g 4 times daily  -warfarin resumed on 2/8 after discussion with GI  -Serial hemoglobin every 8 hours, currently 7.5>>8>>7.5g/dL this am, afternoon hgb pending  -Continue IMC cares  -Full liquid diet for at least another 48 hours per GI rec on 2/8, continue full liquid diet today, can advance to mechanical soft on 2/10     Acute on chronic diastolic CHF with preserved EF  Acute hypoxic respiratory failure due to above  Moderate pulmonary Hypertension  -ECHO from 1/10/24 noted LVEF 55%; s/p 31mm St Raffi mechanical MVR. Mean 5mmHg, moderate to mod-severe (2-3+) tricuspid regurgitation   -On admission pt reported Shortness of breath, increased cough, recent weight gain and increased leg swelling.   -On admission BNP is within normal limits 1,253.   -CXR on 1/30/24 showed increased right perihilar hazy patchy opacities suggestive of worsening pulmonary edema. Vascular congestion and cardiomegaly. Trace R pleural effusion.   -Echo on 2/1/2024 shows EF of 60 to 65%, RV is moderately dilated, moderately decreased RV systolic function and flattened septum consistent with volume overload, moderate pulmonary hypertension, severe tricuspid regurgitation and status 31 mm Saint Raffi mechanical mitral valve and no pericardial effusion  -Initially diuresed with IV Lasix 60 mg twice a day 2/3-2/5, held due to GI bleed and hypotension.   - Lasix 40mg IV BID restarted on 2/8, increased to TID dosing on 2/9 per cardiology  - need to monitor BP closely since has been soft  - net is -22L since admission, however intake has not bee accurately recorded  -Appreciate cardiology followed, signing off on 2/9, reconsult if needed on Monday. Recommend  torsemide and aldactone use once near euvolemia  - currently on 1L NC, wean as able      Hx of mechanical mitral valve replacement for severe mitral regurgitation   S/p MAZE procedure (2008)   Chronic anticoagulation with warfarin   Paroxysmal atrial fibrillation  Subtherapeutic INR    -Initially on admission his INR was subtherapeutic at 1.98 and Coumadin was held but he was restarted back on heparin and Coumadin on p.m. of 1/31 .   -Goal INR is 2.5-3.5   -Heparin discontinued on 2/5 after there was evidence of active GI bleeding  -Coumadin has been on hold since 2/5, hemoglobin has been stable, repeat endoscopy 2/8 is relatively stable.  - Coumadin resumed on 2/8 as mentioned above  -INR 2.6 today  - continue with warfarin per pharmD dosing. F/up hemoglobin this afternoon, if lower may need to hold evening dosing warfarin    ADDENDUM--discussed with cardiology. Hgb this afternoon is stable at 8.2.  Continue with Warfarin at this time, not holding dose on 2/9 as stated on cardiology note.      Mild troponin elevation   -Troponin  elevated to 58 on admission.   -Suspect demand ischemia from acute anemia, CHF, pneumonia etc  -he had troponin elevation during his last admission as well (73-69).   -Continue to monitor on telemetry     Epistaxis  -Patient did mention that he has history of epistaxis and some clots initiated.    -ENT consulted, however given that he was not actively bleeding definitively reasonable to to try humidification and monitor clinically.        Recent Pneumonia-treated  CXR obtained on 1/26 as outpatient which per the read suggested CHF, but ultimately the provider ended up treating for pneumonia. Received 2g of IM Rocephin on 1/26 and was started on Ceftin and Doxycycline on 1/27. Patient completed, previously planned course of Ceftin and doxycycline this hospitalization on 2/2/2024     Hypomagnesemia  -Replace per protocol    Hypokalemia  - replace per protocol      HLD  -Intolerant to statins  "and Zetia      Urinary retention s/p Branch Catheter   Discharge with Branch cathter during recent hospital stay. He was supposed to have an appointment with urology on 1/30 for TOV   -Branch was continued due to need for monitoring intake and output closely  - branch discontinued on 2/8 and currently voiding  - bladder protocol, monitor for retetnion    Penis, mucosal pressure injury related to branch catheter   - WOC evaluated on 2/8  - branch as above removed on 2/8    Moderate malnutrition  In Context of:  Acute illness or injury, Chronic illness or disease  - dietitian following  - supplements between meals        Diet: Snacks/Supplements Adult: Other; Ensure Max + PRN supplements (RD); Between Meals  Full Liquid Diet    DVT Prophylaxis: Warfarin  Branch Catheter: Not present  Lines: None     Cardiac Monitoring: ACTIVE order. Indication: Acute decompensated heart failure (48 hours)  Code Status: Full Code      Clinically Significant Risk Factors        # Hypokalemia: Lowest K = 3.2 mmol/L in last 2 days, will replace as needed       # Hypoalbuminemia: Lowest albumin = 2.7 g/dL at 2/3/2024  7:41 AM, will monitor as appropriate     # Hypertension: Noted on problem list  # Acute heart failure with preserved ejection fraction: heart failure noted on problem list, last echo with EF >50%, and receiving IV diuretics       # Severe Obesity: Estimated body mass index is 43.29 kg/m  as calculated from the following:    Height as of this encounter: 1.803 m (5' 11\").    Weight as of this encounter: 140.8 kg (310 lb 6.5 oz).   # Moderate Malnutrition: based on nutrition assessment    # Financial/Environmental Concerns:           Disposition Plan  likely in the next 3 days once adequately diuresed, stable hemoglobin     Expected Discharge Date: 02/12/2024      Destination: inpatient rehabilitation facility  Discharge Comments: Diuresis and pending hemoglobin stability            Chacha Rosales MD  Hospitalist Service  [facility] " [facility]  Securely message with Ravi (more info)  Text page via [facility] Paging/Directory   ______________________________________________________________________    Interval History   Patient reports he is doing good. Denies nausea or vomiting.   Smear BM overnight per nursing note. Patient is not sure if he has had BM.     Physical Exam   Vital Signs: Temp: 98.7  F (37.1  C) Temp src: Oral BP: 93/46 Pulse: 64   Resp: 17 SpO2: 98 % O2 Device: Nasal cannula Oxygen Delivery: 1 LPM  Weight: 310 lbs 6.52 oz    General Appearance: Alert,awake and no apparent distress  Respiratory: diminished breath sounds at the bases, clear to auscultation bilaterally, no wheezing  Cardiovascular: regular rate and rhythm, bl LE edema  GI: soft and non-tender  Skin: warm and dry                Data

## 2024-02-10 NOTE — PROGRESS NOTES
Care Management Follow Up    Length of Stay (days): 11    Expected Discharge Date: 02/12/2024     Concerns to be Addressed: discharge planning     Patient plan of care discussed at interdisciplinary rounds: Yes    Anticipated Discharge Disposition: Skilled Nursing Facility     Anticipated Discharge Services: None  Anticipated Discharge DME: None    Patient/family educated on Medicare website which has current facility and service quality ratings: yes  Education Provided on the Discharge Plan: Yes  Patient/Family in Agreement with the Plan: yes    Referrals Placed by CM/SW: Post Acute Facilities  Private pay costs discussed: Not applicable    Additional Information:  Pt has been accepted to [facility] when ready. Pt will have a private room, no fee, due to bariatric needs. Will need to keep Christiane at [facility] updated.     Addendum 1430: Updated pt on acceptance to [facility] in a bariatric room (private, no fee). Pt excited to hear about this and appreciative of update. Pt will need [facility] evicore insurance authorization that SW will complete before able to discharge.     KASSANDRA Gorman  Social Work  [facility]

## 2024-02-10 NOTE — PLAN OF CARE
[facility]  Date & Time: 2/9 2428-0259  Diagnosis: SOB  Procedures: 2/5 - EGD, 2/8 - EGD  Orientation/Cognitive: AOx4  VS/O2: VSS, 1L O2 NC (weaning)  Mobility: A2 + lift   Diet: Full liquids  Pain Management: Denies - PRNs available  Neuro: Intact  Bowel & Bladder: Incontinent - BM 2/8, purewick patent  Skin: PTA coccyx/sacral wound - DERIC, refused, penile meatus wound - DERIC, refused, scattered bruising  Abnormal Labs: BUN 36.8, anion 6, WBC 3.7, hgb 8.2 (q6hrs), INR 2.62  Tele: Afib CVR  IV Access/Drips/Fluids: L PIV SL   Drains: Purewibillie   Tests/Imaging: EGD - large ulcers & exposed vessels - clips placed  Consults: Cardiology, hospitalist, GI  Discharge Plan: Pending - needs LTC  Other: Will upgrade diet 2/10

## 2024-02-10 NOTE — PROGRESS NOTES
[facility]    Medicine Progress Note - Hospitalist Service    Date of Admission:  1/30/2024    Assessment & Plan     Feng Wynne is a 79 year old male with past medical history significant for mechanical mitral valve replacement for severe mitral regurgitation and MAZE procedure in 2008, on chronic anticoagulation with warfarin, pulmonary hypertension, chronic diastolic CHF with preserved EF, hypertension, hyperlipidemia,and morbid obesity admitted on 1/30/2024 with acute on chronic anemia.   Patient was recently admitted to [facility] from 1/9/24-1/19/24 with anemia with a hemoglobin of 4.4.  EGD and colonoscopy did not reveal any active bleeding.  He was discharged to TCU and sent back to the emergency room after his outpatient hemoglobin came back at 6.  The etiology of his anemia initially was unclear, however patient started exhibiting symptoms of GI bleed and therefore an upper GI endoscopy done on 3/5/2024 showed severe pill esophagitis with evidence of active bleeding.     Acute blood loss on chronic anemia.  Severe pill esophagitis with active bleeding ulcer and visible vessel s/p cauterization and endoclips placement on 2/5/2024.  Iron deficiency anemia:    --Previously hospitalized between 1/9/2024-1/19/2024 for similar drop in hemoglobin  --EGD and colonoscopy at that time did not reveal any active bleeding at that time.    --GI had recommended outpatient capsule endoscopy, which has not been completed yet.  --Discharge hemoglobin on 1/19/2024 was 8.0, he presented with a hemoglobin of 6 on 1/30/2024  --FOBT positive   --Peripheral smear showed marked normochromic, normocytic anemia with increased red cell regeneration without morphologic evidence of hemolysis  --Hematology signed off  --Initially the etiology of his acute drop in hemoglobin was unclear however patient underwent upper GI endoscopy on 2/5/2024 with Dr. Olvera which revealed severe pill esophagitis with  active bleeding ulcer and visible vessels. There was clotted blood in the middle third of the esophagus and the entire stomach.   --Altogether has received 6 units of PRBC transfusion  --Repeat endoscopy completed on 2/8-esophageal ulcer with oozing  --Continue Protonix 40 mg IV twice a day  --Carafate 1 g 4 times daily  --warfarin resumed on 2/8 after discussion with GI  --Serial hemoglobin every 8 hours, currently 7.5>>8>>7.5g/dL this am, afternoon hgb pending  -- Continue [facility] cares  -- Changing patient to Mechanical soft diet this morning      Acute on chronic diastolic CHF with preserved EF  Acute hypoxic respiratory failure due to above  Moderate pulmonary Hypertension:    -- ECHO from 1/10/24 noted LVEF 55%; s/p 31mm St Raffi mechanical MVR. Mean 5mmHg, moderate to mod-severe (2-3+) tricuspid regurgitation   -- On admission pt reported SOB, increased cough, recent weight gain and increased leg swelling.   -- On admission BNP is within normal limits 1,253.   -- CXR on 1/30/24 showed increased right perihilar hazy patchy opacities suggestive of worsening pulmonary edema. Vascular congestion and cardiomegaly. Trace R pleural effusion.   -- Echo on 2/1/2024 shows EF of 60 to 65%, RV is moderately dilated, moderately decreased RV systolic function and flattened septum consistent with volume overload, moderate pulmonary hypertension, severe tricuspid regurgitation and status 31 mm Saint Raffi mechanical mitral valve and no pericardial effusion  --Initially diuresed with IV Lasix 60 mg twice a day 2/3-2/5, held due to GI bleed and hypotension.   -- Lasix 40mg IV BID restarted on 2/8, increased to TID dosing on 2/9 per cardiology  -- need to monitor BP closely since has been soft  -- net is -22L since admission, however intake has not bee accurately recorded  --Appreciate cardiology followed, signing off on 2/9, reconsult if needed on Monday. Recommend torsemide and aldactone use once near euvolemia  -- currently on 1L  NC, wean as able  -- Continue diuresis , check BMP tomorrow      Hx of mechanical mitral valve replacement for severe mitral regurgitation   S/p MAZE procedure (2008)   Chronic anticoagulation with warfarin   Paroxysmal atrial fibrillation  Subtherapeutic INR      --Initially on admission his INR was subtherapeutic at 1.98 and Coumadin was held but he was restarted back on heparin and Coumadin on p.m. of 1/31 .   --Goal INR is 2.5-3.5   --Heparin discontinued on 2/5 after there was evidence of active GI bleeding  --Coumadin has been on hold since 2/5, hemoglobin has been stable, repeat endoscopy 2/8 is relatively stable.  -- Coumadin resumed on 2/8 as mentioned above  --INR 2.25 today   -- continue with warfarin per pharmD dosing. F/up hemoglobin this afternoon, if lower may need to hold evening dosing warfarin  -- Hgb is 7.8 this morning , per cards ,Continue with Warfarin at this time     Mild troponin elevation   --Troponin  elevated to 58 on admission.   --Suspect demand ischemia from acute anemia, CHF, pneumonia etc  --he had troponin elevation during his last admission as well (73-69).   --Continue to monitor on telemetry     Epistaxis  --Patient did mention that he has history of epistaxis and some clots initiated.    --ENT consulted, however given that he was not actively bleeding definitively reasonable to to try humidification and monitor clinically.        Recent Pneumonia-treated  CXR obtained on 1/26 as outpatient which per the read suggested CHF, but ultimately the provider ended up treating for pneumonia.   --Received 2g of IM Rocephin on 1/26 and was started on Ceftin and Doxycycline on 1/27. --Patient completed, previously planned course of Ceftin and doxycycline this hospitalization on 2/2/2024     Hypomagnesemia  --Replace per protocol    Hypokalemia  -- replace per protocol      HLD  --Intolerant to statins and Zetia      Urinary retention s/p Stubbs Catheter   Discharge with Stubbs cathter during  "recent hospital stay. He was supposed to have an appointment with urology on 1/30 for TOV   --Branch was continued due to need for monitoring intake and output closely  -- branch discontinued on 2/8 and currently voiding  -- bladder protocol, monitor for retetnion    Penis, mucosal pressure injury related to branch catheter   -- WOC evaluated on 2/8  -- branch as above removed on 2/8    Moderate malnutrition  In Context of:  Acute illness or injury, Chronic illness or disease  -- dietitian following  -- supplements between meals        Diet: Snacks/Supplements Adult: Other; Ensure Max + PRN supplements (RD); Between Meals  Full Liquid Diet    DVT Prophylaxis: Warfarin  Branch Catheter: Not present  Lines: None     Cardiac Monitoring: ACTIVE order. Indication: Acute decompensated heart failure (48 hours)  Code Status: Full Code      Clinically Significant Risk Factors        # Hypokalemia: Lowest K = 3.2 mmol/L in last 2 days, will replace as needed     # Hypomagnesemia: Lowest Mg = 1.6 mg/dL in last 2 days, will replace as needed   # Hypoalbuminemia: Lowest albumin = 2.7 g/dL at 2/3/2024  7:41 AM, will monitor as appropriate     # Hypertension: Noted on problem list    # Acute heart failure with preserved ejection fraction: heart failure noted on problem list, last echo with EF >50%, and receiving IV diuretics       # Severe Obesity: Estimated body mass index is 40.59 kg/m  as calculated from the following:    Height as of this encounter: 1.803 m (5' 11\").    Weight as of this encounter: 132 kg (291 lb 0.1 oz).   # Moderate Malnutrition: based on nutrition assessment      # Financial/Environmental Concerns:           Disposition Plan  likely in the next 3 days once adequately diuresed, stable hemoglobin    Expected Discharge Date: 02/12/2024      Destination: inpatient rehabilitation facility  Discharge Comments: Diuresis and pending hemoglobin stability            Laurel Aguilera MD  Hospitalist Service  [facility] " [facility]  Securely message with Ravi (more info)  Text page via [email or web address] Paging/Directory   ______________________________________________________________________    Interval History     Patient care was assumed this morning, patient was seen and examined, doing well, denying chest pain, palpitations, nausea or vomiting.  Has not noticed any bloody bowel movement or melena.  Reviewed lab results with patient in detail showing hemoglobin of 7.8, tolerating start of off warfarin    Physical Exam   Vital Signs: Temp: 99.6  F (37.6  C) Temp src: Oral BP: 120/55 Pulse: 77   Resp: 10 SpO2: 94 % O2 Device: Nasal cannula Oxygen Delivery: 2 LPM  Weight: 291 lbs .12 oz    General Appearance: Alert,awake and no apparent distress  Respiratory: diminished breath sounds at the bases, clear to auscultation bilaterally, no wheezing  Cardiovascular: regular rate and rhythm, bl LE edema  GI: soft and non-tender  Skin: warm and dry    Medical Decision Making       52 MINUTES SPENT BY ME on the date of service doing chart review, history, exam, documentation & further activities per the note.              Data

## 2024-02-10 NOTE — PLAN OF CARE
Goal Outcome Evaluation:    Orientations: AOx4  Vitals/Pain: VSS, Pt on 1-2L O2 via nasal cannula, denies pain   Tele: Afib CVR  Lines/Drains: LUE PIVx1   Skin/Wounds: Coccyx wound- wound cares completed, barrier cream and mepilex placed, scattered bruising   GI/: Incontinent of bowel and bladder. AUOP. BMx3  LS: Diminished   Labs: Abnormal/Trends, Electrolyte Replacement- Pt on potassium protocol. Hemoglobin stable @ 7.8  Ambulation/Assist: Ax2 w/ lift   Sleep Quality: Fair

## 2024-02-10 NOTE — PLAN OF CARE
Vital signs stable on 1L-2L  NC . Alert and oriented x 4. Lung sounds diminished. Bowel sounds active, flatus present. Denies any sign of pain. Up with assist of 2. Tolerating full liquid diet. CMS intact. Purewick in place  Skin: PTA coccyx/sacral wound - DERIC, refused, penile meatus wound - DERIC, refused, scattered bruising

## 2024-02-11 NOTE — PLAN OF CARE
Goal Outcome Evaluation:    Orientations: AOx4  Vitals/Pain: VSS, Pt on 1-2L O2 via nasal cannula, denies pain   Tele: Afib CVR  Lines/Drains: LUE PIVx1   Skin/Wounds: Coccyx wound- wound cares completed, barrier cream and mepilex placed, scattered bruising   GI/: Incontinent of bowel and bladder. AUOP. BMx2  LS: Diminished   Labs: Abnormal/Trends, Electrolyte Replacement- Pt on potassium protocol. Potassium replaced x2 per eMAR. Hemoglobin stable @ 7.8  Ambulation/Assist: Ax2 w/ lift   Sleep Quality: Fair

## 2024-02-11 NOTE — PLAN OF CARE
Vital signs stable on 2 L  NC . Alert and oriented x 4. Lung sounds diminished. Bowel sounds active, flatus present. Denies any sign of pain. Up with assist of 2. Tolerating mechanical diet. CMS intact. Purewick in place  Skin: PTA coccyx/sacral wound - DERIC, refused, penile meatus wound - DERIC, refused, scattered bruising

## 2024-02-11 NOTE — PROGRESS NOTES
[facility]    Medicine Progress Note - Hospitalist Service    Date of Admission:  1/30/2024    Assessment & Plan     Feng Wynne is a 79 year old male with past medical history significant for mechanical mitral valve replacement for severe mitral regurgitation and MAZE procedure in 2008, on chronic anticoagulation with warfarin, pulmonary hypertension, chronic diastolic CHF with preserved EF, hypertension, hyperlipidemia,and morbid obesity admitted on 1/30/2024 with acute on chronic anemia.   Patient was recently admitted to [facility] from 1/9/24-1/19/24 with anemia with a hemoglobin of 4.4.  EGD and colonoscopy did not reveal any active bleeding.  He was discharged to TCU and sent back to the emergency room after his outpatient hemoglobin came back at 6.  The etiology of his anemia initially was unclear, however patient started exhibiting symptoms of GI bleed and therefore an upper GI endoscopy done on 3/5/2024 showed severe pill esophagitis with evidence of active bleeding. Patient has been evaluated by gastroenterology and cardiology during this admission     Acute blood loss on chronic anemia.  Severe pill esophagitis with active bleeding ulcer and visible vessel s/p cauterization and endoclips placement on 2/5/2024.  Iron deficiency anemia:    Previously hospitalized between 1/9/2024-1/19/2024 for similar drop in hemoglobin  EGD and colonoscopy at that time did not reveal any active bleeding at that time.    GI had recommended outpatient capsule endoscopy, which was not completed  Discharge hemoglobin on 1/19/2024 was 8.0, he presented with a hemoglobin of 6 on 1/30/2024  FOBT was positive   Peripheral smear showed marked normochromic, normocytic anemia with increased red cell regeneration without morphologic evidence of hemolysis  On reviewing home med's , patient was on potassium tablets which can contribute to pill esophagitis     -- Patient was admitted for further  evaluation and management, initially was evaluated by hematology, they have signed off  -- Patient underwent upper GI endoscopy on 02/5/24 with Dr. Olvera   -- EGD revealed severe pill esophagitis with active bleeding ulcer and visible vessels. There was clotted blood in the middle third of the esophagus and the entire stomach.   --Altogether has received 6 units of PRBC transfusion  --Repeat endoscopy completed on 2/8-esophageal ulcer with oozing  --Continue Protonix 40 mg IV twice a day  --Carafate 1 g 4 times daily  --warfarin resumed on 2/8 after discussion with GI  --Serial hemoglobin, hemoglobin is 7.8 this morning, stable  -- Will discontinue IMC orders  -- Diet is advanced to mechanical soft diet on 02/10/2024  -- Plan of care was discussed in detail with patient , his partner and their son at bedside in detail , patient wanted his hospitalization course to be summarized so he can update his brother  -- Also discussed with him about setting up My Chart where he can also review providers note   -- checked iron and ferritin , iron is slightly low with low IBC and Iron sats in normal range , probably mild iron deficiency with anemia of Chronic disease , will give patient one dose of IV Venofer 300 mg IV X 2   -- Hgb stable this morning at 7.8     Acute on chronic diastolic CHF with preserved EF  Acute hypoxic respiratory failure due to above  Moderate pulmonary Hypertension:    ECHO from 1/10/24 noted LVEF 55%; s/p 31mm St Raffi mechanical MVR. Mean 5mmHg, moderate to mod-severe (2-3+) tricuspid regurgitation   On admission pt reported SOB, increased cough, recent weight gain and increased leg swelling.   On admission BNP is within normal limits 1,253.   CXR on 1/30/24 showed increased right perihilar hazy patchy opacities suggestive of worsening pulmonary edema. Vascular congestion and cardiomegaly. Trace R pleural effusion.   Echo on 2/1/2024 shows EF of 60 to 65%, RV is moderately dilated, moderately  decreased RV systolic function and flattened septum consistent with volume overload, moderate pulmonary hypertension, severe tricuspid regurgitation and status 31 mm Saint Raffi mechanical mitral valve and no pericardial effusion      --Initially diuresed with IV Lasix 60 mg twice a day 2/3-2/5, held due to GI bleed and hypotension.   -- Lasix 40mg IV BID restarted on 2/8, increased to TID dosing on 2/9 per cardiology  -- Blood pressure is monitored closely while patient is undergoing diuresis  -- Net negative is documented around -29 L since admission, however intake has not been accurately recorded  -- Cards followed during this hospitalization, signed off on 02/9  -- Cardiology commended reconsultation on Monday if needed  -- Cards have recommended transition to torsemide and Aldactone once near euvolemia, probably can be switched on 02/12/2024  -- Patient continues to require 2 L of oxygen, wean as able  -- Continue diuresis  -- Checked BMP on 02/12, reviewed showing Cr of 0.98, slightly low K and stable Hgb around 7.8      Hx of mechanical mitral valve replacement for severe mitral regurgitation   S/p MAZE procedure (2008)   Chronic anticoagulation with warfarin   Paroxysmal atrial fibrillation  Subtherapeutic INR      --Initially on admission his INR was subtherapeutic at 1.98 and Coumadin was held but he was restarted back on heparin and Coumadin on p.m. of 1/31 .   --Goal INR is 2.5-3.5   --Heparin discontinued on 2/5 after there was evidence of active GI bleeding  --Coumadin has been on hold since 2/5, hemoglobin has been stable, repeat endoscopy 2/8 is relatively stable.  -- Coumadin resumed on 2/8   -- INR 2.09  -- continue with warfarin per pharmD dosing.   -- Hgb is 7.8 this morning , per cards ,Continue with Warfarin at this time     Type II NSTEMI, causing mild troponin elevation on admission, secondary to supply demand mismatch    --Troponin  elevated to 58 on admission.   --Suspect demand ischemia  from acute anemia, CHF, pneumonia etc  -- he had troponin elevation during his last admission as well (73-69).   -- Continue to monitor on telemetry     Epistaxis;    --Patient did mention that he has history of epistaxis and some clots initiated.    --ENT consulted, however given that he was not actively bleeding definitively reasonable to to try humidification and monitor clinically.        Recent Pneumonia-treated  CXR obtained on 1/26 as outpatient which per the read suggested CHF, but ultimately the provider ended up treating for pneumonia.     --Received 2g of IM Rocephin on 1/26 and was started on Ceftin and Doxycycline on 1/27. --Patient completed, previously planned course of Ceftin and doxycycline this hospitalization on 2/2/2024     Hypomagnesemia  --Replace per protocol    Hypokalemia  -- replace per protocol      HLD  --Intolerant to statins and Zetia      Urinary retention s/p Branch Catheter   Discharge with Branch cathter during recent hospital stay. He was supposed to have an appointment with urology on 1/30 for TOV     -- Branch was continued due to need for monitoring intake and output closely  -- branch discontinued on 2/8 and currently voiding  -- bladder protocol, monitor for retetnion    Penis, mucosal pressure injury related to branch catheter   -- WOC evaluated on 2/8  -- branch as above removed on 2/8    Moderate malnutrition  In Context of:  Acute illness or injury, Chronic illness or disease  -- dietitian following  -- supplements between meals        Diet: Snacks/Supplements Adult: Other; Ensure Max + PRN supplements (RD); Between Meals  Mechanical/Dental Soft Diet    DVT Prophylaxis: Warfarin  Branch Catheter: Not present  Lines: None     Cardiac Monitoring: ACTIVE order. Indication: Acute decompensated heart failure (48 hours)  Code Status: Full Code      Clinically Significant Risk Factors        # Hypokalemia: Lowest K = 3.3 mmol/L in last 2 days, will replace as needed     #  "Hypomagnesemia: Lowest Mg = 1.6 mg/dL in last 2 days, will replace as needed   # Hypoalbuminemia: Lowest albumin = 2.7 g/dL at 2/3/2024  7:41 AM, will monitor as appropriate     # Hypertension: Noted on problem list    # Acute heart failure with preserved ejection fraction: heart failure noted on problem list, last echo with EF >50%, and receiving IV diuretics       # Severe Obesity: Estimated body mass index is 40.74 kg/m  as calculated from the following:    Height as of this encounter: 1.803 m (5' 11\").    Weight as of this encounter: 132.5 kg (292 lb 1.8 oz).   # Moderate Malnutrition: based on nutrition assessment      # Financial/Environmental Concerns:           Disposition Plan possible discharge on 02/13/2024 or 02/14/2024 once can be switched to oral diuretic.  Social workers have been following, [facility] has been [facility], social workers are working on [employer] insurance authorization       Expected Discharge Date: 02/12/2024,  3:00 PM    Destination: inpatient rehabilitation facility  Discharge Comments: Diuresis and pending hemoglobin stability  2/11 Hgb stable.  [facility] pending ins auth            Laurel Aguilera MD, MD  Hospitalist Service  [facility]  Securely message with [employer] (more info)  Text page via "[employer] Paging/Directory   ______________________________________________________________________    Interval History     Patient was seen and examined, feeling much better ,  and son present at the bedside , discussed in detail his current hospital course , plan to continue diuresis , will switch K pills to powder , INR is in therapeutic range , no clinical signs of active bleeding       Physical Exam   Vital Signs: Temp: 97.6  F (36.4  C) Temp src: Oral BP: 110/55 Pulse: 69   Resp: 11 SpO2: 95 % O2 Device: Nasal cannula Oxygen Delivery: 2 LPM  Weight: 292 lbs 1.75 oz    General Appearance: Alert,awake and no apparent distress  Respiratory: " diminished breath sounds at the bases, clear to auscultation bilaterally, no wheezing  Cardiovascular: regular rate and rhythm, bl LE edema  GI: soft and non-tender  Skin: warm and dry    Medical Decision Making       55 MINUTES SPENT BY ME on the date of service doing chart review, history, exam, documentation & further activities per the note.              Data

## 2024-02-11 NOTE — PROGRESS NOTES
Care Management Follow Up    Length of Stay (days): 12    Expected Discharge Date: 02/12/2024     Concerns to be Addressed: discharge planning     Patient plan of care discussed at interdisciplinary rounds: Yes    Anticipated Discharge Disposition: Skilled Nursing Facility     Anticipated Discharge Services: None  Anticipated Discharge DME: None    Patient/family educated on Medicare website which has current facility and service quality ratings: yes  Education Provided on the Discharge Plan: Yes  Patient/Family in Agreement with the Plan: yes    Referrals Placed by CM/SW: Post Acute Facilities  Private pay costs discussed: Not applicable    Additional Information:  Informed that pt is not ready for discharge today. Updated Christian[facility] at [facility]. Informed PT that we will need recent PT note in order to complete [employer] insurance auth, requested they see pt tomorrow as soon as their schedule allows.     KASSANDRA Gorman  Social Work  [facility]

## 2024-02-12 NOTE — PROGRESS NOTES
[facility]    Medicine Progress Note - Hospitalist Service    Date of Admission:  1/30/2024    Assessment & Plan     Feng Wynne is a 79 year old male with past medical history significant for mechanical mitral valve replacement for severe mitral regurgitation and MAZE procedure in 2008, on chronic anticoagulation with warfarin, pulmonary hypertension, chronic diastolic CHF with preserved EF, hypertension, hyperlipidemia,and morbid obesity admitted on 1/30/2024 with acute on chronic anemia.   Patient was recently admitted to [facility] from 1/9/24-1/19/24 with anemia with a hemoglobin of 4.4.  EGD and colonoscopy did not reveal any active bleeding.  He was discharged to TCU and sent back to the emergency room after his outpatient hemoglobin came back at 6.  The etiology of his anemia initially was unclear, however patient started exhibiting symptoms of GI bleed and therefore an upper GI endoscopy done on 3/5/2024 showed severe pill esophagitis with evidence of active bleeding. Patient has been evaluated by gastroenterology and cardiology during this admission     Acute blood loss on chronic anemia.  Severe pill esophagitis with active bleeding ulcer and visible vessel s/p cauterization and endoclips placement on 2/5/2024.  Iron deficiency anemia:     Previously hospitalized between 1/9/2024-1/19/2024 for similar drop in hemoglobin  EGD and colonoscopy at that time did not reveal any active bleeding at that time.    GI had recommended outpatient capsule endoscopy, which was not completed  Discharge hemoglobin on 1/19/2024 was 8.0, he presented with a hemoglobin of 6 on 1/30/2024  FOBT was positive   Peripheral smear showed marked normochromic, normocytic anemia with increased red cell regeneration without morphologic evidence of hemolysis  On reviewing home med's , patient was on potassium tablets which can contribute to pill esophagitis      -- Patient was admitted for further  evaluation and management, initially was evaluated by hematology, they have signed off  -- Patient underwent upper GI endoscopy on 02/5/24 with Dr. Olvera   -- EGD revealed severe pill esophagitis with active bleeding ulcer and visible vessels. There was clotted blood in the middle third of the esophagus and the entire stomach.   --Altogether has received 6 units of PRBC transfusion  --Repeat endoscopy completed on 2/8-esophageal ulcer with oozing  --Continue Protonix 40 mg IV twice a day  --Carafate 1 g 4 times daily  --warfarin resumed on 2/8 after discussion with GI  --Serial hemoglobin, hemoglobin is 7.8 this morning, stable  -- Will discontinue IMC orders  -- Diet is advanced to mechanical soft diet on 02/10/2024  -- Plan of care was discussed in detail with patient , his partner and their son at bedside in detail , patient wanted his hospitalization course to be summarized so he can update his brother  -- Also discussed with him about setting up My Chart where he can also review providers note   -- checked iron and ferritin , iron is slightly low with low IBC and Iron sats in normal range , probably mild iron deficiency with anemia of Chronic disease , will give patient one dose of IV Venofer 300 mg IV X 2   -- Hgb stable this morning at 7.8     Acute on chronic diastolic CHF with preserved EF  Acute hypoxic respiratory failure due to above  Moderate pulmonary Hypertension:     ECHO from 1/10/24 noted LVEF 55%; s/p 31mm St Raffi mechanical MVR. Mean 5mmHg, moderate to mod-severe (2-3+) tricuspid regurgitation   On admission pt reported SOB, increased cough, recent weight gain and increased leg swelling.   On admission BNP is within normal limits 1,253.   CXR on 1/30/24 showed increased right perihilar hazy patchy opacities suggestive of worsening pulmonary edema. Vascular congestion and cardiomegaly. Trace R pleural effusion.   Echo on 2/1/2024 shows EF of 60 to 65%, RV is moderately dilated, moderately  decreased RV systolic function and flattened septum consistent with volume overload, moderate pulmonary hypertension, severe tricuspid regurgitation and status 31 mm Saint Raffi mechanical mitral valve and no pericardial effusion        --Initially diuresed with IV Lasix 60 mg twice a day 2/3-2/5, held due to GI bleed and hypotension.   -- Lasix 40mg IV BID restarted on 2/8, increased to TID dosing on 2/9 per cardiology  -- Blood pressure is monitored closely while patient is undergoing diuresis  -- Net negative is documented around -33 L since admission, however intake has not been accurately recorded  -- Cards followed during this hospitalization, signed off on 02/9  -- Cardiology commended reconsultation on Monday if needed  -- Patient continues to require 2 L of oxygen, wean as able  -- Continue diuresis  -- Patient continues to have significant edema over the lower extremities and is still needing 2 L of oxygen and I will continue with IV diuresis and reassess him tomorrow  -If he continues to have significant swelling and needing oxygen then we will consult cardiology      Hx of mechanical mitral valve replacement for severe mitral regurgitation   S/p MAZE procedure (2008)   Chronic anticoagulation with warfarin   Paroxysmal atrial fibrillation  Subtherapeutic INR       --Initially on admission his INR was subtherapeutic at 1.98 and Coumadin was held but he was restarted back on heparin and Coumadin on p.m. of 1/31 .   --Goal INR is 2.5-3.5   --Heparin discontinued on 2/5 after there was evidence of active GI bleeding  --Coumadin has been on hold since 2/5, hemoglobin has been stable, repeat endoscopy 2/8 is relatively stable.  -- Coumadin resumed on 2/8   -- INR 2.05  -- continue with warfarin per pharmD dosing.   -- Hgb is 8.4 this morning , per cards ,Continue with Warfarin at this time     Type II NSTEMI, causing mild troponin elevation on admission, secondary to supply demand mismatch     --Troponin   elevated to 58 on admission.   --Suspect demand ischemia from acute anemia, CHF, pneumonia etc  -- he had troponin elevation during his last admission as well (73-69).   -- Continue to monitor on telemetry     Epistaxis   --Patient did mention that he has history of epistaxis and some clots initiated.    --ENT consulted, however given that he was not actively bleeding definitively reasonable to to try humidification and monitor clinically.        Recent Pneumonia-treated  -CXR obtained on 1/26 as outpatient which per the read suggested CHF, but ultimately the provider ended up treating for pneumonia.      --Received 2g of IM Rocephin on 1/26 and was started on Ceftin and Doxycycline on 1/27.   ---Patient completed, previously planned course of Ceftin and doxycycline this hospitalization on 2/2/2024     Hypomagnesemia  Hypokalemia  -Magnesium today is 1.5 and continue with replacement protocol  -Potassium today is 3.5 and will switch him to high-dose potassium replacement      HLD  --Intolerant to statins and Zetia      Urinary retention s/p Branch Catheter   Discharge with Branch cathter during recent hospital stay. He was supposed to have an appointment with urology on 1/30 for TOV      -- Branch was continued due to need for monitoring intake and output closely  -- branch discontinued on 2/8 and currently voiding  -- bladder protocol, monitor for retetnion     Penis, mucosal pressure injury related to branch catheter   -- WOC evaluated on 2/8  -- branch as above removed on 2/8     Moderate malnutrition  In Context of:  Acute illness or injury, Chronic illness or disease  -- dietitian following  -- supplements between meals             Diet: Snacks/Supplements Adult: Ensure Max Protein (bariatric); Between Meals  Snacks/Supplements Adult: Ensure Enlive; Between Meals  Mechanical/Dental Soft Diet    DVT Prophylaxis: Warfarin  Branch Catheter: Not present  Lines: None     Cardiac Monitoring: ACTIVE order. Indication: Acute  "decompensated heart failure (48 hours)  Code Status: Full Code      Clinically Significant Risk Factors        # Hypokalemia: Lowest K = 3.3 mmol/L in last 2 days, will replace as needed     # Hypomagnesemia: Lowest Mg = 1.5 mg/dL in last 2 days, will replace as needed   # Hypoalbuminemia: Lowest albumin = 2.7 g/dL at 2/3/2024  7:41 AM, will monitor as appropriate     # Hypertension: Noted on problem list  # Acute heart failure with preserved ejection fraction: heart failure noted on problem list, last echo with EF >50%, and receiving IV diuretics       # Severe Obesity: Estimated body mass index is 40.56 kg/m  as calculated from the following:    Height as of this encounter: 1.803 m (5' 11\").    Weight as of this encounter: 131.9 kg (290 lb 12.6 oz).   # Moderate Malnutrition: based on nutrition assessment    # Financial/Environmental Concerns:           Disposition Plan      Expected Discharge Date: 02/14/2024,  3:00 PM    Destination: inpatient rehabilitation facility  Discharge Comments: Diuresis and pending hemoglobin stability  2/11 Hgb stable.  [facility] pending ins auth  d/c when PO lasix  2/12: IV lasix today            Cindy Rea MD  Hospitalist Service  [facility]  Securely message with [employer] (more info)  Text page via [employer] Paging/Directory   ______________________________________________________________________    Interval History     I met Feng and his partner today and he is still on oxygen.  Feng does tell me that he has no prior history of smoking and was not on oxygen about 1 month prior to his recent hospitalization.  Continues to have significant swelling over legs.  No episode of any bleeding apart from small amount of epistaxis.    Physical Exam   Vital Signs: Temp: 97.7  F (36.5  C) Temp src: Oral BP: 120/63 Pulse: 73   Resp: 21 SpO2: 96 % O2 Device: Nasal cannula Oxygen Delivery: 1.5 LPM  Weight: 290 lbs 12.59 oz          General: Patient appears " comfortable and in no acute distress.  HEENT: Head is atraumatic, normocephalic.    Neck: Neck is supple   Respiratory: Lungs are clear to auscultation bilaterally with no wheeze or crackles   Cardiovascular: Regular rate , S1 and S2 normal with no murmer or rubs or gallops and still has bilateral edema  Abdomen:   soft , non tender , non distended and bowel sound present   Skin: No skin rashes or lesions to inspection or palpation.  Neurologic: No facial droop  Musculoskeletal: Normal Range of motion over upper and lower extremities bilaterally   Psychiatric: cooperative      Medical Decision Making       Time spent in care of patient is 45 minutes and I reviewed his labs including CBC, basic, INR and discussed plan of care in detail with the patient and the nursing staff.      Data     I have personally reviewed the following data over the past 24 hrs:    3.7 (L)  \   8.4 (L)   / 166     136 89 (L) 27.0 (H) /  91   3.5 42 (H) 1.01 \     INR:  2.05 (H) PTT:  N/A   D-dimer:  N/A Fibrinogen:  N/A     Ferritin:  N/A % Retic:  N/A LDH:  N/A       Imaging results reviewed over the past 24 hrs:   No results found for this or any previous visit (from the past 24 hour(s)).

## 2024-02-12 NOTE — PLAN OF CARE
Problem: Oral Intake Inadequate  Goal: Improved Oral Intake  Outcome: Progressing  Intervention: Promote and Optimize Oral Intake  Recent Flowsheet Documentation  Taken 2/12/2024 1221 by Lucila Gross  Nutrition Interventions: supplemental drinks provided   Goal Outcome Evaluation:    Mechanical, dental soft diet. Likes the Ensure oral nutrition supplements. Encouraged PO intake, specifically protein intake. Adjusted oral nutrition supplement. Offered wound specific oral nutrition supplement- pt would like to hold off on that for today.     Lucila Gross, RD, LD

## 2024-02-12 NOTE — PLAN OF CARE
Vital signs stable on 2 L  NC . Alert and oriented x 4. Lung sounds diminished. Bowel sounds active, flatus present. Denies any sign of pain. Up with assist of 2. Tolerating mechanical diet. Tele:Afib CVR CMS intact. Purewick in place,+BM  Skin: PTA coccyx/sacral wound - DERIC, refused, penile meatus wound - blanchable redness, scattered bruising .

## 2024-02-12 NOTE — PLAN OF CARE
A&O x4. VSS on 1L O2 NC. Tele Afib CVR.   Up with lift assist x2, sat in chair all morning.   LS diminished, few fine crackles in LLL. Pulmonary toilet encouraged.  BS+, BM x4 today (2 events of 2nd BM right after 1st) Stool very soft, black- incontinence of bowel and bladder.   Urine output adequate with external catheter in place- does not always collect well so some unmeasured.   RUE very weak, needs to use LUE to help lift arm- baseline per pt. BLE weaker, unable to lift legs on his own.  Wound on coccyx/ buttock open, red with scant bleeding when cleansing- wound cares done x2 per plan of care with each incontinence.   Wound cares on penis done, pink, no drainage.

## 2024-02-12 NOTE — PROGRESS NOTES
CLINICAL NUTRITION SERVICES - REASSESSMENT NOTE      Recommendations Ordered by Registered Dietitian (RD):   Decreased chocolate Ensure Max to 1x daily, 10 am   Ordered chocolate Ensure Enlive w/ ice cream @ 2 pm  RD reviewed importance of protein intake for healing. He would like to continue w/ the Ensure Max oral nutrition supplements and try a higher kcal option too while PO intake remains poor. He endorsed being vegetarian, RD offered to order vegetarian diet in [facility]. Pt declined, he is not very strict and prefers to continue w/ self-selection.      Future/Additional Recommendations:   Order wound specific oral nutrition supplement if appropriate and pt willing   RD today discussed trying a wound specific oral nutrition supplement for wound healing. Reviewed benefits, instructions for use, available options. Pt believes his wound is getting better and would like to check in w/ WOC RN first.      Malnutrition:   % Weight Loss:  unable to evaluate d/t fluid-status   % Intake:  </= 50% for >/= 5 days (severe malnutrition)-- x8 days  Subcutaneous Fat Loss:  Orbital region Mild depletion (2/8)  Muscle Loss:  Clavicle bone region Mild depletion (2/8)  Fluid Retention:  Mild to moderate BLE + bilateral arm edema     Malnutrition Diagnosis: Moderate malnutrition in the context of --  Acute illness or injury  Chronic illness or disease         EVALUATION OF PROGRESS TOWARD GOALS   Diet: Mechanical/Dental Soft Diet    Noted that pt follows Vegetarian diet   Supplements: Ensure Max (BID); Between Meals    2/8: NPO --> FLD --> NPO --> FLD  2/9: FLD + 2g Na diet  2/10: Mech/dental soft diet    Intake/Tolerance:  Pt reported he is tolerating the soft diet ok but tired of the few menu options. Is used to eating many fresh fruits and vegetables, salads at home which he is aware not allowed on dental soft diet. Likes the Ensure Max supplements if over ice. RD reviewed importance of protein intake for healing. He would like to  continue w/ the Ensure Max oral nutrition supplements and try a higher kcal option too while PO intake remains poor. Will order 1 Ensure Max and 1 Ensure Enlive shake daily. He endorsed being vegetarian, RD offered to order vegetarian diet in [facility]. Pt declined, he is not very strict and prefers to continue w/ self-selection. Discussed trying a wound specific oral nutrition supplement for wound healing. Reviewed benefits, instructions for use, available options. Pt believes his wound is getting better and would like to check in w/ WOC RN first.   Tolerating diet per chart review.   Pt has been receiving 1-2 small meals + 2 supplements daily per health touch. 50-75% intakes documented recently per nursing flow sheet.      ASSESSED NUTRITION NEEDS:  Dosing Weight 94.2 kg (adjusted based off 142.2 kg 2/8)  Estimated Energy Needs: 3750-8171 kcals (20-25 Kcal/Kg)  Justification: maintenance  Estimated Protein Needs: 113-141 grams protein (1.2-1.5 g pro/Kg)  Justification: wound healing  Estimated Fluid Needs: 1 mL/kcal or per provider pending fluid status        NEW FINDINGS:   General:   Accepted to [facility]  2/8: EGD =   Esophageal ulcer oozing blood.   Normal mucosa was found in the lower third of the esophagus.   Normal stomach.   Normal duodenal bulb, first portion of the duodenum and second portion of the duodenum.    Weight: wt trending down, pt continues to be diuresed. Suspect mostly fluid-related losses  Date/Time Weight Weight Method   02/12/24 0002 131.9 kg (290 lb 12.6 oz) Bed scale   02/11/24 0500 132.5 kg (292 lb 1.8 oz) --   02/10/24 0507 132 kg (291 lb 0.1 oz) Bed scale   02/09/24 0651 140.8 kg (310 lb 6.5 oz) Bed scale   02/08/24 0400 142.2 kg (313 lb 7.9 oz) Bed scale   02/07/24 0600 142.3 kg (313 lb 11.4 oz) Bed scale   02/06/24 0336 135.5 kg (298 lb 11.6 oz) Bed scale   02/05/24 0610 142.3 kg (313 lb 11.4 oz) Bed scale   02/04/24 0632 142.4 kg (313 lb 15 oz) Bed scale   02/03/24 0639 143.4  kg (316 lb 2.2 oz) Bed scale   02/02/24 0615 147.3 kg (324 lb 11.8 oz) Bed scale   02/01/24 0625 149 kg (328 lb 7.8 oz) Bed scale   01/30/24 1252 145.2 kg (320 lb) --     I/O: Net I/O Since Admission: -33,289.92 mL [02/12/24 0848].  2/8: diuresis re-initiated per cardiology   I/O last 3 completed shifts:  In: 240 [P.O.:240]  Out: 3500 [Urine:3500]  Stool output = 1x BM yesterday, 3x on 2/10, 1x on 2/9, 2x on 2/8    Labs: reviewed   Component Value Date   BUN 27.0 (H) 02/12/2024   MAG 1.6 (L) 02/10/2024     Medications: reviewed    ferrous sulfate  325 mg Oral Daily    furosemide  40 mg Intravenous Q8H    iron sucrose  300 mg Intravenous Daily    pantoprazole  40 mg Intravenous BID    sucralfate  1 g Oral 4x Daily AC & HS     Skin: WOC following--  Pressure Injury Location: Penis - right side (assessed 2/8)  Wound type: Pressure Injury     Pressure Injury Stage: Mucosal, hospital acquired      This is a Medical Device Related Pressure Injury (MDRPI) due to branch  STATUS: initial assessment   Wound location: Buttock, coccyx (assessed 2/7)   Wound due to: Pressure Injury POA deep tissue injury evolving   STATUS: evolving and improving     Resp.: 2LPM nasal cannula         Previous Goals:   Patient to consume >75% of TID FLD meals and/or supplements  Evaluation: Not met  Diet advancement > FLD within 48 hours  Evaluation: Met    Previous Nutrition Diagnosis:   Inadequate oral intake related to diet restrictions as evidenced by intermittent NPO/CLD/FLD for the past 4 days, only 0-1 meals ordered/day recently   Evaluation: Improving        MALNUTRITION  % Weight Loss:  unable to evaluate d/t fluid-status   % Intake:  </= 50% for >/= 5 days (severe malnutrition)-- x8 days  Subcutaneous Fat Loss:  Orbital region Mild depletion (2/8)  Muscle Loss:  Clavicle bone region Mild depletion (2/8)  Fluid Retention:  Mild to moderate BLE + bilateral arm edema     Malnutrition Diagnosis: Moderate malnutrition in the context of  --  Acute illness or injury  Chronic illness or disease        CURRENT NUTRITION DIAGNOSIS  Inadequate oral intake related to ongoing diet restrictions, menu fatigue as evidenced by slow advancement of diet for first 11 days of admission, dental soft diet w/ limited options x2 days, 50-75% intakes documented recently, continued need for BID oral nutrition supplements        INTERVENTIONS  Recommendations / Nutrition Prescription  Decreased chocolate Ensure Max to 1x daily, 10 am   Ordered chocolate Ensure Enlive w/ ice cream @ 2 pm  RD reviewed importance of protein intake for healing. He would like to continue w/ the Ensure Max oral nutrition supplements and try a higher kcal option too while PO intake remains poor. He endorsed being vegetarian, RD offered to order vegetarian diet in [facility]. Pt declined, he is not very strict and prefers to continue w/ self-selection.   Discussed trying a wound specific oral nutrition supplement for wound healing. Reviewed benefits, instructions for use, available options. Pt believes his wound is getting better and would like to check in w/ WOC RN first.     Implementation  Medical food supplement therapy  Nutrition education for nutrition relationship to health/disease    Goals  Pt to consume >75% of 2 meals/day + 2 oral nutrition supplements/day         MONITORING AND EVALUATION:  Progress towards goals will be monitored and evaluated per protocol and Practice Guidelines      Lucila Gross RD, LD  Pager: [phone]

## 2024-02-13 NOTE — PROGRESS NOTES
[facility]    Medicine Progress Note - Hospitalist Service    Date of Admission:  1/30/2024    Assessment & Plan     Feng Wynne is a 79 year old male with past medical history significant for mechanical mitral valve replacement for severe mitral regurgitation and MAZE procedure in 2008, on chronic anticoagulation with warfarin, pulmonary hypertension, chronic diastolic CHF with preserved EF, hypertension, hyperlipidemia,and morbid obesity admitted on 1/30/2024 with acute on chronic anemia.   Patient was recently admitted to [facility] from 1/9/24-1/19/24 with anemia with a hemoglobin of 4.4.  EGD and colonoscopy did not reveal any active bleeding.  He was discharged to TCU and sent back to the emergency room after his outpatient hemoglobin came back at 6.  The etiology of his anemia initially was unclear, however patient started exhibiting symptoms of GI bleed and therefore an upper GI endoscopy done on 3/5/2024 showed severe pill esophagitis with evidence of active bleeding. Patient has been evaluated by gastroenterology and cardiology during this admission     Acute blood loss on chronic anemia.  Severe pill esophagitis with active bleeding ulcer and visible vessel s/p cauterization and endoclips placement on 2/5/2024.  Iron deficiency anemia:     Previously hospitalized between 1/9/2024-1/19/2024 for similar drop in hemoglobin  EGD and colonoscopy at that time did not reveal any active bleeding at that time.    GI had recommended outpatient capsule endoscopy, which was not completed  Discharge hemoglobin on 1/19/2024 was 8.0, he presented with a hemoglobin of 6 on 1/30/2024  FOBT was positive   Peripheral smear showed marked normochromic, normocytic anemia with increased red cell regeneration without morphologic evidence of hemolysis  On reviewing home med's , patient was on potassium tablets which can contribute to pill esophagitis      -- Patient was admitted for further  evaluation and management, initially was evaluated by hematology, they have signed off  -- Patient underwent upper GI endoscopy on 02/5/24 with Dr. Olvera   -- EGD revealed severe pill esophagitis with active bleeding ulcer and visible vessels. There was clotted blood in the middle third of the esophagus and the entire stomach.   --Altogether has received 6 units of PRBC transfusion  --Repeat endoscopy completed on 2/8-esophageal ulcer with oozing  --Continue Protonix 40 mg IV twice a day  --Carafate 1 g 4 times daily  --warfarin resumed on 2/8 after discussion with GI  - iron is slightly low with low IBC and Iron sats in normal range , probably mild iron deficiency with anemia of Chronic disease  and s/p  IV Venofer 300 mg IV X 2   -- Hgb stable this morning at 8     Acute on chronic diastolic CHF with preserved EF  Acute hypoxic respiratory failure due to above  Moderate pulmonary Hypertension:     ECHO from 1/10/24 noted LVEF 55%; s/p 31mm [place]i mechanical MVR. Mean 5mmHg, moderate to mod-severe (2-3+) tricuspid regurgitation   On admission pt reported SOB, increased cough, recent weight gain and increased leg swelling.   On admission BNP is within normal limits 1,253.   CXR on 1/30/24 showed increased right perihilar hazy patchy opacities suggestive of worsening pulmonary edema. Vascular congestion and cardiomegaly. Trace R pleural effusion.   Echo on 2/1/2024 shows EF of 60 to 65%, RV is moderately dilated, moderately decreased RV systolic function and flattened septum consistent with volume overload, moderate pulmonary hypertension, severe tricuspid regurgitation and status 31 mm [place]i mechanical mitral valve and no pericardial effusion        --Initially diuresed with IV Lasix 60 mg twice a day 2/3-2/5, held due to GI bleed and hypotension.   -- Lasix 40mg IV BID restarted on 2/8, increased to TID dosing on 2/9 per cardiology  -- Blood pressure is monitored closely while patient is undergoing  diuresis  -- Net negative is documented around -35 L since admission, however intake has not been accurately recorded  -- Cards followed during this hospitalization, signed off on 02/9  -Patient did receive 40 IV Lasix x 3 on 2/12 and continues to have swelling over both lower extremities with oxygen needs as much as 2 L  -Blood pressure was on the lower side this morning and cardiology was reconsulted and further doses of her diuretics per cardiology  -Will switch to high-dose potassium replacement      Hx of mechanical mitral valve replacement for severe mitral regurgitation   S/p MAZE procedure (2008)   Chronic anticoagulation with warfarin   Paroxysmal atrial fibrillation  Subtherapeutic INR       --Initially on admission his INR was subtherapeutic at 1.98 and Coumadin was held but he was restarted back on heparin and Coumadin on p.m. of 1/31 .   --Goal INR is 2.5-3.5   --Heparin discontinued on 2/5 after there was evidence of active GI bleeding  --Coumadin has been on hold since 2/5, hemoglobin has been stable, repeat endoscopy 2/8 is relatively stable.  -- Coumadin resumed on 2/8 and the dose was conservative as per my discussion with pharmacy team given recurrent GI bleed  -Hemoglobin has been relatively stable and INR is taking time to catch up and was around 1.7 and discussed with pharmacy and they  will  dose him at a higher dose today     Type II NSTEMI, causing mild troponin elevation on admission, secondary to supply demand mismatch     --Troponin  elevated to 58 on admission.   --Suspect demand ischemia from acute anemia, CHF, pneumonia etc  -- he had troponin elevation during his last admission as well (73-69).   -- Continue to monitor on telemetry     Epistaxis   --Patient did mention that he has history of epistaxis and some clots initiated.    --ENT consulted, however given that he was not actively bleeding definitively reasonable to to try humidification and monitor clinically.        Recent  Pneumonia-treated  -CXR obtained on 1/26 as outpatient which per the read suggested CHF, but ultimately the provider ended up treating for pneumonia.      --Received 2g of IM Rocephin on 1/26 and was started on Ceftin and Doxycycline on 1/27.   ---Patient completed, previously planned course of Ceftin and doxycycline this hospitalization on 2/2/2024     Hypomagnesemia-resolved  Hypokalemia-resolved      HLD  --Intolerant to statins and Zetia      Urinary retention s/p Branch Catheter   Discharge with Branch cathter during recent hospital stay. He was supposed to have an appointment with urology on 1/30 for TOV      -- Branch was continued due to need for monitoring intake and output closely  -- branch discontinued on 2/8 and currently voiding  -- bladder protocol, monitor for retetnion     Penis, mucosal pressure injury related to branch catheter   -- WOC evaluated on 2/8  -- branch as above removed on 2/8     Moderate malnutrition  In Context of:  Acute illness or injury, Chronic illness or disease  -- dietitian following  -- supplements between meals             Diet: Snacks/Supplements Adult: Ensure Max Protein (bariatric); Between Meals  Snacks/Supplements Adult: Ensure Enlive; Between Meals  Mechanical/Dental Soft Diet    DVT Prophylaxis: Warfarin  Branch Catheter: Not present  Lines: None     Cardiac Monitoring: ACTIVE order. Indication: Acute decompensated heart failure (48 hours)  Code Status: Full Code      Clinically Significant Risk Factors            # Hypomagnesemia: Lowest Mg = 1.5 mg/dL in last 2 days, will replace as needed   # Hypoalbuminemia: Lowest albumin = 2.7 g/dL at 2/3/2024  7:41 AM, will monitor as appropriate     # Hypertension: Noted on problem list    # Acute heart failure with preserved ejection fraction: heart failure noted on problem list, last echo with EF >50%, and receiving IV diuretics       # Obesity: Estimated body mass index is 38.34 kg/m  as calculated from the following:    Height as  "of this encounter: 1.803 m (5' 11\").    Weight as of this encounter: 124.7 kg (274 lb 14.6 oz).   # Moderate Malnutrition: based on nutrition assessment      # Financial/Environmental Concerns:           Disposition Plan      Expected Discharge Date: 02/14/2024,  3:00 PM    Destination: inpatient rehabilitation facility  Discharge Comments: Diuresis and pending hemoglobin stability  2/11 Hgb stable.  [employer] pending ins auth  d/c when PO lasix  2/12: IV lasix today            Cindy Rea MD  Hospitalist Service  [facility]  Securely message with Fridge (more info)  Text page via [facility] Paging/Directory   ______________________________________________________________________    Interval History     Seen today and her partner was present and patient continues to have oxygen needs as much as 2 L and nurses have tried weaning him down but not possible.  He denies any further episode of any bleeding.  Tolerating diet.  Legs are still swollen.  I did tell him that further doses of diuretics per cardiology and he understands.  Discussed with patient's nurse and also with partner and they are satisfied with plan of care.    Physical Exam   Vital Signs: Temp: 97.8  F (36.6  C) Temp src: Oral BP: 104/69 Pulse: 79   Resp: 10 SpO2: 98 % O2 Device: Nasal cannula Oxygen Delivery: 1.5 LPM  Weight: 274 lbs 14.62 oz          General: Patient appears comfortable and in no acute distress.  HEENT: Head is atraumatic, normocephalic.    Neck: Neck is supple   Respiratory: Lungs are clear to auscultation bilaterally with no wheeze or crackles   Cardiovascular: Regular rate , S1 and S2 normal with no murmer or rubs or gallops and still has bilateral edema  Abdomen:   soft , non tender , non distended and bowel sound present   Skin: No skin rashes or lesions to inspection or palpation.  Neurologic: No facial droop  Musculoskeletal: Normal Range of motion over upper and lower extremities bilaterally "   Psychiatric: cooperative      Medical Decision Making       Time spent in care of patient is 45 minutes and I reviewed his labs including CBC, basic, INR and discussed plan of care in detail with the patient and the nursing staff.      Data     I have personally reviewed the following data over the past 24 hrs:    4.1  \   8.0 (L)   / 182     136 88 (L) 23.9 (H) /  86   3.5; 3.7 42 (H) 0.99 \     INR:  1.78 (H) PTT:  N/A   D-dimer:  N/A Fibrinogen:  N/A       Imaging results reviewed over the past 24 hrs:   No results found for this or any previous visit (from the past 24 hour(s)).

## 2024-02-13 NOTE — PROGRESS NOTES
[facility]  Cardiology Progress Note    Date of Service (when I saw the patient): 02/13/2024  Summary: Feng Wynne is a 79 year old male with history of a mechanical mitral valve replacement for severe mitral regurgitation and MAZE procedure in 2008. He is chronically anticoagulated with warfarin. Additionally, he has a history of pulmonary hypertension, chronic HFpEF, hypertension, hyperlipidemia and morbid obesity. He was recently admitted from 1/9/24 for 10 days with severe anemia and a hemoglobin of 4. EGD and colonoscopy did not show active bleeding at that time. He was discharged and returned on 1/30/24 with worsening anemia and a hemoglobin of 6. EGD on 2/5/24 showed severe pill esophagitis with an actively bleeding ulcer s/p treatment. Had repeat endoscopy on 2/8 with an oozing esophageal ulcer. He has required 6 units of pRBCs. Cardiology has been consulted for management of his heart failure. He was followed last week and diuresed. We are now asked to re-consult due to ongoing CHF.  Interval History   Reviewed hospital course. He was diuresed earlier in his stay however diuretics were then held due to hypotension and bleeding. Diuretics were resumed 2/8. He has had excellent UOP and weight is trending down. Renal function is stable.  He is back on warfarin but INR remains below goal. He has not been on heparin due to concern for recurrent bleeding. He is weak but has been working with PT/OT. He has chronic LE edema management with pneumatic pumps at home. Him and his partner agree his legs look better than baseline for him. Remains on O2.  Assessment & Plan   Acute on chronic anemia 2/2 pill esophagitis. S/p EGD on 2/5 and 2/8 with severe pill esophagitis and bleeding ulcers  -  hemoglobin stable around 8  -  on PPI, carafate.  2.   Acute on chronic HFpEF and RV volume overload. In the setting of above. He has responded well to IV lasix it appears. Renal function has been  stable.  -  Echo on 2/1/24 showed normal LV function, moderately decreased RVSF and moderately dilated RV, flattened septum and moderate pulmonary hypertension and significant TR.  -  not historically on daily diuretic therapy.  3.   S/P MVR in 2008 with mechanical valve. 31 mm St Raffi valve. Mean gradient on last echo on 2/1/24 was 6.3 at a HR of 64.  -  on chronic warfarin therapy with goal INR of 2.5 - 3.5. Warfarin resumed on 2/8.  4.  Chronic A Fib. With hx of MAZE procedure at the time of MVR. Hx of bradycardia which resolved with discontinuation of BB.   5.  Severe TR. With RV dysfunction noted on echocardiogram.     Plan:  He has been diuresed significantly this admission. Weight is down to 274 lbs from over 320 lbs. His weight in September was 299 lbs. He still remains on O2. BPs are stable and renal function is stable. Will continue with IV diuresis for now as BP and renal function tolerates. He had been receiving 40 mg TID. Will restart 60 mg BID.  Continue warfarin.   Daily BMP, strict I&Os, daily weights.  Eventual reassessment of his valvular disease following diuresis.    I spent 20 minutes face-to-face or coordinating care of Feng Wynne. Over 50% of our time on the unit was spent counseling the patient and/or coordinating care.    Glenda Owens PA-C    Physical Exam   Temp: 97.8  F (36.6  C) Temp src: Oral BP: 104/69 Pulse: 79   Resp: 10 SpO2: 98 % O2 Device: Nasal cannula Oxygen Delivery: 1.5 LPM  Vitals:    01/30/24 1252 02/01/24 0625 02/02/24 0615 02/03/24 0639   Weight: 145.2 kg (320 lb) 149 kg (328 lb 7.8 oz) 147.3 kg (324 lb 11.8 oz) 143.4 kg (316 lb 2.2 oz)    02/04/24 0632 02/05/24 0610 02/06/24 0336 02/07/24 0600   Weight: 142.4 kg (313 lb 15 oz) 142.3 kg (313 lb 11.4 oz) 135.5 kg (298 lb 11.6 oz) 142.3 kg (313 lb 11.4 oz)    02/08/24 0400 02/09/24 0651 02/10/24 0507 02/11/24 0500   Weight: 142.2 kg (313 lb 7.9 oz) 140.8 kg (310 lb 6.5 oz) 132 kg (291 lb 0.1 oz) 132.5 kg (292 lb 1.8  oz)    02/12/24 0002 02/13/24 0617   Weight: 131.9 kg (290 lb 12.6 oz) 124.7 kg (274 lb 14.6 oz)     Vital Signs with Ranges  Temp:  [97.7  F (36.5  C)-98.7  F (37.1  C)] 97.8  F (36.6  C)  Pulse:  [67-79] 79  Resp:  [10-27] 10  BP: ()/(46-69) 104/69  SpO2:  [86 %-99 %] 98 %  02/08 0700 - 02/13 0659  In: 2623 [P.O.:2610; I.V.:13]  Out: 68191 [Urine:72087]  Net: -28241  Constitutional: NAD.   Respiratory: anterior breath sounds clear.  Cardiovascular: RRR, s1s2, + valve click  GI: soft, BS+  Skin: warm, no rashes  Musculoskeletal: Moving all extremities. + pitting edema bilaterally.  Neurologic: Alert, oriented x 3  Neuropsychiatric: Normal affect   Data   Results for orders placed or performed during the hospital encounter of 01/30/24 (from the past 24 hour(s))   Magnesium   Result Value Ref Range    Magnesium 2.1 1.7 - 2.3 mg/dL   INR   Result Value Ref Range    INR 1.78 (H) 0.85 - 1.15   Potassium   Result Value Ref Range    Potassium 3.5 3.4 - 5.3 mmol/L   Magnesium   Result Value Ref Range    Magnesium 2.0 1.7 - 2.3 mg/dL   Basic metabolic panel   Result Value Ref Range    Sodium 136 135 - 145 mmol/L    Potassium 3.7 3.4 - 5.3 mmol/L    Chloride 88 (L) 98 - 107 mmol/L    Carbon Dioxide (CO2) 42 (H) 22 - 29 mmol/L    Anion Gap 6 (L) 7 - 15 mmol/L    Urea Nitrogen 23.9 (H) 8.0 - 23.0 mg/dL    Creatinine 0.99 0.67 - 1.17 mg/dL    GFR Estimate 78 >60 mL/min/1.73m2    Calcium 8.6 (L) 8.8 - 10.2 mg/dL    Glucose 86 70 - 99 mg/dL   CBC with platelets   Result Value Ref Range    WBC Count 4.1 4.0 - 11.0 10e3/uL    RBC Count 2.76 (L) 4.40 - 5.90 10e6/uL    Hemoglobin 8.0 (L) 13.3 - 17.7 g/dL    Hematocrit 26.5 (L) 40.0 - 53.0 %    MCV 96 78 - 100 fL    MCH 29.0 26.5 - 33.0 pg    MCHC 30.2 (L) 31.5 - 36.5 g/dL    RDW 22.3 (H) 10.0 - 15.0 %    Platelet Count 182 150 - 450 10e3/uL     *Note: Due to a large number of results and/or encounters for the requested time period, some results have not been displayed. A  complete set of results can be found in Results Review.       Medications    - MEDICATION INSTRUCTIONS -        ferrous sulfate  325 mg Oral Daily    miconazole   Topical BID    pantoprazole  40 mg Intravenous BID    sodium chloride (PF)  3 mL Intracatheter Q8H    sucralfate  1 g Oral 4x Daily AC & HS    warfarin ANTICOAGULANT  4 mg Oral ONCE at 18:00    Warfarin Therapy Reminder  1 each Oral See Admin Instructions

## 2024-02-13 NOTE — PROGRESS NOTES
"Care Management Follow Up    Length of Stay (days): 14    Expected Discharge Date: 02/14/2024     Concerns to be Addressed: discharge planning     Patient plan of care discussed at interdisciplinary rounds: Yes    Anticipated Discharge Disposition: Skilled Nursing Facility     Anticipated Discharge Services: None  Anticipated Discharge DME: None    Patient/family educated on Medicare website which has current facility and service quality ratings: yes  Education Provided on the Discharge Plan: Yes  Patient/Family in Agreement with the Plan: yes    Referrals Placed by CM/SW: Post Acute Facilities  Private pay costs discussed: Not applicable    Additional Information:  [facility] accepted. Possible discharge by Wednesday, per MD. DIONY started prior-auth process with MARIAA/Isamar. Was not given a case number as system was \"down.\" Clinicals faxed. SW following.     GILL Edwards, [facility]  [phone] Desk phone  [phone] Cell/text (Preferred)  [facility]    Addendum: Case number assigned by Isamar, [id number].      "

## 2024-02-13 NOTE — PLAN OF CARE
Orientations: A&O x4  Vitals/Pain: VSS on 1.5 L NC. Denies pain, denies nausea   Tele: A-fib CVR w/ occasional PVCs   Lines/Drains: Left forearm PIV CDI,SL. Purewick placed, taped for positioning,   Skin/Wounds: Sacrum/Coccyx wound with redness and scant drainage. Skin barrier applied and covered with mepilex. Left gluteal wound with redness. Covered per wound care orders. Penile wound appears pink/red, REGIS - hospital acquired. Generalized edema, 3+ pitting edema in bilateral feet/ankles.   GI/: 1 small BM overnight, BS audible, +flatus. AUOP with purewick.   Labs: Mg and K+ protocol.   Ambulation/Assist: Up with lift.   Sleep Quality: good  Plan: Continue plan of care

## 2024-02-13 NOTE — PROGRESS NOTES
"[facility] Nurse Inpatient Assessment     Consulted for: Coccyx; new consult 2/8 for penis    Summary:   Coccyx/ left buttock - POA evolving deep tissue pressure injury, continues evolving and noted improving 2/7 (not assessed 2/8)  Penis - mucosal pressure injury related to branch catheter    Patient History (according to provider note(s):      \"79 year old male with past medical history significant for mechanical mitral valve replacement for severe mitral regurgitation and MAZE procedure in 2008, on chronic anticoagulation with warfarin, pulmonary hypertension, chronic diastolic CHF with preserved EF, previous dilated LV likely due to mitral regurgitation, subsequent improvement with ACE inhibitor and beta-blockers, hypertension, hyperlipidemia,and morbid obesity admitted on 1/30/2024 with acute on chronic anemia. \"    Assessment:      Areas visualized during today's visit: Focused: and Perineal area    Pressure Injury Location: Penis - right side     Last photo: 2/13/24 2-8-24      Wound type: Pressure Injury     Pressure Injury Stage: Mucosal, hospital acquired      This is a Medical Device Related Pressure Injury (MDRPI) due to branch  Wound history/plan of care: pt has had indwelling catheter for approx 3 weeks per report.  Distal penis inverted and difficult to fully visualize.  Pt states he is uncircumcised; difficult to distinguish if wound is on preputial (mucosal) tissue vs true skin.     Wound base: moist mucosal tissue     Palpation of the wound bed: normal      Drainage: none     Description of drainage: none     Measurements (length x width x depth, in cm) approx 1 x 0.3 x 0cm   Periwound skin: Intact      Color: normal and consistent with surrounding tissue      Temperature: normal   Odor: none  Pain:  minimal    Pain intervention prior to dressing change: no significant pain present   Treatment goal: Heal  and Protection  STATUS: evolving  Supplies ordered: supplies " "stored on unit      Wound location: Buttock, coccyx     Last photo: 2/13    Wound due to: Pressure Injury POA deep tissue injury evolving   Wound history/plan of care: found with sacral mepilex x2 in use   Wound base:  dermis, granulation tissue, and fibrin     Palpation of the wound bed: boggy and textured        Drainage: small     Description of drainage: serosanguinous     Measurements (length x width x depth, in cm): 9cm x 4cm x 0.1cm      Tunneling: N/A     Undermining: N/A  Periwound skin: Superficial erosion and fragile, distant periwound skin intact       Color: normal and consistent with surrounding tissue      Temperature: normal   Odor: none  Pain: moderate, pain related to right side more than wound itself   Pain interventions prior to dressing change: patient tolerated well  Treatment goal: Heal   STATUS: evolving and improving  Supplies ordered: at bedside, supplies stored on unit, and discussed with patient        Treatment Plan:     Penis/ perineal cares: BID and prn:  Cleanse with Fredrick lotion and soft dry wipes.  Fully retract the penile tissue to cleanse, then return to relaxed position.   Cover with cavilon no sting barrier film  If Stubbs present: Catheter cares per protocol.  Ensure catheter is appropriately secured, without tension.  Rotate securement areas as needed to prevent prolonged pressure.  Avoid briefs in bed, especially when catheter present.       Wound care  EVERY SHIFT        Comments: Location: buttock  Care: provided qshift by primary RN  1. Cleanse with each incontinence episode with fredrick cleanser and soft dry wipes (patient has intermittent fecal incontinence and requires assistance, patient discussed at WOC consult he is hesitant to ask for help)  2. Apply skin prep (sure prep or 3M no sting skin barrier wipes) to wound and periwound skin, let dry for ~30 seconds  3. Cover with sacral mepilex, or with two 4x4\" mepilex at the same time to cover all the damaged skin. Ok to use " "each mepilex up to 5 days. Ok to lift mepilex for routine assessments of the skin and reapply          Skin care precautions  EFFECTIVE NOW        Comments: Pressure Injury Prevention (PIP) Plan:  If patient is declining pressure injury prevention interventions: Explore reason why and address patient's concerns, Educate on pressure injury risk and prevention intervention(s), If patient is still declining, document \"informed refusal\" , and Ensure Care team is aware ( provider, charge nurse, etc)  Mattress: Follow bed algorithm, reassess daily and order specialty mattress, if indicated.  HOB: Maintain at or below 30 degrees, unless contraindicated  Repositioning in bed: Every 1-2 hours , Left/right positioning; avoid supine, and Raise foot of bed prior to raising head of bed, to reduce patient sliding down (shear)  Heels: Keep elevated off mattress and Pillows under calves  Protective Dressing: Sacral Mepilex for prevention (#[id number]),  especially for the agitated patient  Positioning Equipment: Z-Theo positioner (#[id number] or #[id number]) to help maintain side lying position.  Chair positioning: Chair cushion (#[id number]) , Assist patient to reposition hourly, and Do NOT use a donut for sitting (this increases pressure to smaller area and creates a higher potential for injury)    If patient has a buttock pressure injury, or high risk for PI use chair cushion or SPS.  Moisture Management: Perineal cleansing /protection: Follow Incontinence Protocol, Avoid brief in bed, Clean and dry skin folds with bathing , and Moisturize dry skin  Under Devices: Inspect skin under all medical devices during skin inspection , Ensure tubes are stabilized without tension, and Ensure patient is not lying on medical devices or equipment when repositioned  Ask provider to discontinue device when no longer needed.          Nutrition Services Adult IP Consult  ONE TIME     Complete     References:    Medical Nutrition Therapy policy and " MNT protocol   Provider: (Not yet assigned)   Question Answer Comment   Reason for Consult: RN Consult - specify Reason    Reason for Consult: wound healing support needs with evolving pressure injury to buttock            Orders: Reviewed    RECOMMEND PRIMARY TEAM ORDER: None, at this time  Education provided: importance of repositioning, plan of care, Infection prevention , Moisture management, Hygiene, and Off-loading pressure  Discussed plan of care with: Patient, Family, and Nurse   WOC nurse follow-up plan: twice weekly  Notify WOC if wound(s) deteriorate.  Nursing to notify the Provider(s) and re-consult the WOC Nurse if new skin concern.    DATA:     Current support surface: Standard  Low air loss (PRICILA pump, Isolibrium, Pulsate, skin guard, etc)  Containment of urine/stool: Incontinence Protocol, Incontinent pad in bed, and Indwelling catheter  BMI: Body mass index is 38.34 kg/m .   Active diet order: Orders Placed This Encounter      Mechanical/Dental Soft Diet     Output: I/O last 3 completed shifts:  In: 330 [P.O.:330]  Out: 3860 [Urine:3860]     Labs:   Recent Labs   Lab 02/13/24  0911 02/13/24  0554   HGB 8.0*  --    INR  --  1.78*   WBC 4.1  --      Pressure injury risk assessment:   Sensory Perception: 3-->slightly limited  Moisture: 3-->occasionally moist  Activity: 1-->bedfast  Mobility: 2-->very limited  Nutrition: 2-->probably inadequate  Friction and Shear: 2-->potential problem  Roscoe Score: 13    Rose BADILLO   Dept. Vocera- Contact WO Nurse Gokul) via  Vocera   Dept. Office Number: [phone]

## 2024-02-14 NOTE — PROGRESS NOTES
[facility]  Cardiology Progress Note    Date of Service (when I saw the patient): 02/14/2024  Summary: Feng Wynne is a 79 year old male with history of a mechanical mitral valve replacement for severe mitral regurgitation and MAZE procedure in 2008. He is chronically anticoagulated with warfarin. Additionally, he has a history of pulmonary hypertension, chronic HFpEF, hypertension, hyperlipidemia and morbid obesity. He was recently admitted from 1/9/24 for 10 days with severe anemia and a hemoglobin of 4. EGD and colonoscopy did not show active bleeding at that time. He was discharged and returned on 1/30/24 with worsening anemia and a hemoglobin of 6. EGD on 2/5/24 showed severe pill esophagitis with an actively bleeding ulcer s/p treatment. Had repeat endoscopy on 2/8 with an oozing esophageal ulcer. He has required 6 units of pRBCs. Cardiology has been consulted for management of his heart failure. He was followed last week and diuresed. We are now asked to re-consult due to ongoing CHF.  Interval History   Reviewed hospital course. He was diuresed earlier in his stay however diuretics were then held due to hypotension and bleeding. Diuretics were resumed 2/8. He has had excellent UOP and weight is trending down. Renal function is stable.  He is back on warfarin but INR remains below goal. He has not been on heparin due to concern for recurrent bleeding. He is weak but has been working with PT/OT. He has chronic LE edema management with pneumatic pumps at home. Him and his partner agree his legs look better than baseline for him. Remains on O2.  Assessment & Plan   Acute on chronic anemia 2/2 pill esophagitis. S/p EGD on 2/5 and 2/8 with severe pill esophagitis and bleeding ulcers  -  hemoglobin stable around 8  -  on PPI, carafate.  2.   Acute on chronic HFpEF and RV volume overload. In the setting of above. He has responded well to IV lasix it appears.  -  Echo on 2/1/24 showed  normal LV function, moderately decreased RVSF and moderately dilated RV, flattened septum and moderate pulmonary hypertension and severe TR.  -  not historically on daily diuretic therapy.  3.   S/P MVR in 2008 with mechanical valve. 31 mm St Raffi valve. Mean gradient on last echo on 2/1/24 was 6.3 at a HR of 64.  -  on chronic warfarin therapy with goal INR of 2.5 - 3.5. Warfarin resumed on 2/8. Has not been on heparin due to concern for risk of recurrent GI bleed  4.  Chronic A Fib. With hx of MAZE procedure at the time of MVR. Hx of bradycardia which resolved with discontinuation of BB.   5.  Severe TR. With significant RV dysfunction and dilation noted on echocardiogram. He has significant RA enlargement.    Plan:  He has been diuresed significantly this admission and is down over 50 lbs to 273, lower than his weight in the fall. Will defer further IV diuresis and would recommend initiation of torsemide 20 mg daily for maintenance diuretic therapy and ongoing management of his   Continue warfarin - INR remains subtherapeutic today.  Will arrange for post hospital cardiology follow up.     Please call with questions.    I spent 20 minutes face-to-face or coordinating care of Feng Wynne. Over 50% of our time on the unit was spent counseling the patient and/or coordinating care.    Glenda Owens PA-C    Physical Exam   Temp: 98.6  F (37  C) Temp src: Oral BP: 104/61 Pulse: 69   Resp: 18 SpO2: 97 % O2 Device: Oxymask Oxygen Delivery: 3 LPM  Vitals:    01/30/24 1252 02/01/24 0625 02/02/24 0615 02/03/24 0639   Weight: 145.2 kg (320 lb) 149 kg (328 lb 7.8 oz) 147.3 kg (324 lb 11.8 oz) 143.4 kg (316 lb 2.2 oz)    02/04/24 0632 02/05/24 0610 02/06/24 0336 02/07/24 0600   Weight: 142.4 kg (313 lb 15 oz) 142.3 kg (313 lb 11.4 oz) 135.5 kg (298 lb 11.6 oz) 142.3 kg (313 lb 11.4 oz)    02/08/24 0400 02/09/24 0651 02/10/24 0507 02/11/24 0500   Weight: 142.2 kg (313 lb 7.9 oz) 140.8 kg (310 lb 6.5 oz) 132 kg (291 lb 0.1  oz) 132.5 kg (292 lb 1.8 oz)    02/12/24 0002 02/13/24 0617 02/14/24 0500   Weight: 131.9 kg (290 lb 12.6 oz) 124.7 kg (274 lb 14.6 oz) 123.9 kg (273 lb 2.4 oz)     Vital Signs with Ranges  Temp:  [98.2  F (36.8  C)-98.6  F (37  C)] 98.6  F (37  C)  Pulse:  [61-79] 69  Resp:  [10-18] 18  BP: (101-113)/(53-69) 104/61  SpO2:  [93 %-100 %] 97 %  02/09 0700 - 02/14 0659  In: 3193 [P.O.:3180; I.V.:13]  Out: 27097 [Urine:58499]  Net: -21432  Constitutional: NAD.   Respiratory: anterior breath sounds clear.  GI: soft, BS+  Skin: warm, no rashes  Musculoskeletal: Moving all extremities. He  as some mild edema  Neurologic: Alert, oriented x 3  Neuropsychiatric: Normal affect   Data   Results for orders placed or performed during the hospital encounter of 01/30/24 (from the past 24 hour(s))   INR   Result Value Ref Range    INR 1.89 (H) 0.85 - 1.15   Magnesium   Result Value Ref Range    Magnesium 1.9 1.7 - 2.3 mg/dL   CBC with platelets   Result Value Ref Range    WBC Count 3.8 (L) 4.0 - 11.0 10e3/uL    RBC Count 2.73 (L) 4.40 - 5.90 10e6/uL    Hemoglobin 8.0 (L) 13.3 - 17.7 g/dL    Hematocrit 26.3 (L) 40.0 - 53.0 %    MCV 96 78 - 100 fL    MCH 29.3 26.5 - 33.0 pg    MCHC 30.4 (L) 31.5 - 36.5 g/dL    RDW 21.8 (H) 10.0 - 15.0 %    Platelet Count 175 150 - 450 10e3/uL   Basic metabolic panel   Result Value Ref Range    Sodium 137 135 - 145 mmol/L    Potassium 3.0 (L) 3.4 - 5.3 mmol/L    Chloride 89 (L) 98 - 107 mmol/L    Carbon Dioxide (CO2) 42 (H) 22 - 29 mmol/L    Anion Gap 6 (L) 7 - 15 mmol/L    Urea Nitrogen 21.3 8.0 - 23.0 mg/dL    Creatinine 0.96 0.67 - 1.17 mg/dL    GFR Estimate 81 >60 mL/min/1.73m2    Calcium 8.6 (L) 8.8 - 10.2 mg/dL    Glucose 90 70 - 99 mg/dL     *Note: Due to a large number of results and/or encounters for the requested time period, some results have not been displayed. A complete set of results can be found in Results Review.       Medications    - MEDICATION INSTRUCTIONS -        ferrous sulfate   325 mg Oral Daily    miconazole   Topical BID    pantoprazole  40 mg Intravenous BID    potassium chloride  10 mEq Intravenous Q1H    sodium chloride (PF)  3 mL Intracatheter Q8H    sucralfate  1 g Oral 4x Daily AC & HS    Warfarin Therapy Reminder  1 each Oral See Admin Instructions

## 2024-02-14 NOTE — PROGRESS NOTES
Care Management Follow Up    Length of Stay (days): 15    Expected Discharge Date: 02/14/2024     Concerns to be Addressed: discharge planning     Patient plan of care discussed at interdisciplinary rounds: Yes    Anticipated Discharge Disposition: Skilled Nursing Facility     Anticipated Discharge Services: None  Anticipated Discharge DME: None    Patient/family educated on Medicare website which has current facility and service quality ratings: yes  Education Provided on the Discharge Plan: Yes  Patient/Family in Agreement with the Plan: yes    Referrals Placed by CM/SW: Post Acute Facilities  Private pay costs discussed: Not applicable    Additional Information:  Not yet ready for discharge, per MD. SW updated [facility]. Prior-auth received from BCMARYJANE/Isamar, [id number], good dates 2/14-2/20/24. SW following.     GILL Edwards, [facility]  [phone] Desk phone  [phone] Cell/text (Preferred)  [facility]

## 2024-02-14 NOTE — PLAN OF CARE
A&O x4. VSS on 1-2L O2 NC. Tele A FIB CVR. Up with lift, turn repo side to side as much as possible.   LS diminished bases. Pulmonary toilet encouraged.   BS+, 2 small BM today, soft, black.   Urine output less throughout day without lasix.   RUE very weak, needs to use LUE to help lift arm- baseline per pt. BLE weaker, unable to lift legs on his own.  Wound on coccyx/ buttock open, red with scant bleeding when cleansing- wound cares done per plan of care.  Wound cares on penis done, PI at tip appears to be healing, but sm pink opening in skin on shaft observed with WOC RN today- cares done for shift.  Denies pain.

## 2024-02-14 NOTE — PLAN OF CARE
Orientations: A&O x4  Vitals/Pain: VSS on 1L NC while awake. Desat to 70s when sleeping d/t apnea, laced on 3L oxymask. Denies pain, denies nausea   Tele: A-fib CVR   Lines/Drains: Left forearm PIV CDI,SL. Purewick placed, taped for positioning.   Skin/Wounds: Sacrum/Coccyx wound with redness and scant drainage - covered with mepilex. Left gluteal wound with redness. Covered per wound care orders. Penile wound appears pink/red, REGIS - hospital acquired - appears to be healing. Generalized edema, 3+ pitting edema in bilateral feet/ankles.   GI/: No BM overnight , BS audible, +flatus. AUOP with purewick.   Resp: LS diminished/clear  Labs: Mg and High K+ protocol. K+ 3.0.   Ambulation/Assist: Up with lift.   Sleep Quality: good  Plan: Continue plan of care.

## 2024-02-14 NOTE — PROGRESS NOTES
[facility]    Medicine Progress Note - Hospitalist Service    Date of Admission:  1/30/2024    Assessment & Plan     Feng Wynne is a 79 year old male with past medical history significant for mechanical mitral valve replacement for severe mitral regurgitation and MAZE procedure in 2008, on chronic anticoagulation with warfarin, pulmonary hypertension, chronic diastolic CHF with preserved EF, hypertension, hyperlipidemia,and morbid obesity admitted on 1/30/2024 with acute on chronic anemia.   Patient was recently admitted to [facility] from 1/9/24-1/19/24 with anemia with a hemoglobin of 4.4.  EGD and colonoscopy did not reveal any active bleeding.  He was discharged to TCU and sent back to the emergency room after his outpatient hemoglobin came back at 6.  The etiology of his anemia initially was unclear, however patient started exhibiting symptoms of GI bleed and therefore an upper GI endoscopy done on 3/5/2024 showed severe pill esophagitis with evidence of active bleeding. Patient has been evaluated by gastroenterology and cardiology during this admission     Acute blood loss on chronic anemia.  Severe pill esophagitis with active bleeding ulcer and visible vessel s/p cauterization and endoclips placement on 2/5/2024.  Iron deficiency anemia:     Previously hospitalized between 1/9/2024-1/19/2024 for similar drop in hemoglobin  EGD and colonoscopy at that time did not reveal any active bleeding at that time.    GI had recommended outpatient capsule endoscopy, which was not completed  Discharge hemoglobin on 1/19/2024 was 8.0, he presented with a hemoglobin of 6 on 1/30/2024  FOBT was positive   Peripheral smear showed marked normochromic, normocytic anemia with increased red cell regeneration without morphologic evidence of hemolysis  On reviewing home med's , patient was on potassium tablets which can contribute to pill esophagitis      -- Patient was admitted for further  evaluation and management, initially was evaluated by hematology, they have signed off  -- Patient underwent upper GI endoscopy on 02/5/24 with Dr. Olvera   -- EGD revealed severe pill esophagitis with active bleeding ulcer and visible vessels. There was clotted blood in the middle third of the esophagus and the entire stomach.   --Altogether has received 6 units of PRBC transfusion  --Repeat endoscopy completed on 2/8-esophageal ulcer with oozing  --Continue Protonix 40 mg IV twice a day  --Carafate 1 g 4 times daily  --warfarin resumed on 2/8 after discussion with GI  - iron is slightly low with low IBC and Iron sats in normal range , probably mild iron deficiency with anemia of Chronic disease  and s/p  IV Venofer 300 mg IV X 2   -- Hgb stable this morning at 8     Acute on chronic diastolic CHF with preserved EF  Acute hypoxic respiratory failure due to above  Moderate pulmonary Hypertension:     ECHO from 1/10/24 noted LVEF 55%; s/p 31mm [place]i mechanical MVR. Mean 5mmHg, moderate to mod-severe (2-3+) tricuspid regurgitation   On admission pt reported SOB, increased cough, recent weight gain and increased leg swelling.   On admission BNP is within normal limits 1,253.   CXR on 1/30/24 showed increased right perihilar hazy patchy opacities suggestive of worsening pulmonary edema. Vascular congestion and cardiomegaly. Trace R pleural effusion.   Echo on 2/1/2024 shows EF of 60 to 65%, RV is moderately dilated, moderately decreased RV systolic function and flattened septum consistent with volume overload, moderate pulmonary hypertension, severe tricuspid regurgitation and status 31 mm [place]i mechanical mitral valve and no pericardial effusion        --Initially diuresed with IV Lasix 60 mg twice a day 2/3-2/5, held due to GI bleed and hypotension.   -- Lasix 40mg IV BID restarted on 2/8, increased to TID dosing on 2/9 per cardiology  -- Blood pressure is monitored closely while patient is undergoing  diuresis  -- Net negative is documented around -35 L since admission, however intake has not been accurately recorded  -- Cards followed during this hospitalization, signed off on 02/9  -Patient did receive 40 IV Lasix x 3 on 2/12 and continues to have swelling over both lower extremities with oxygen needs as much as 2 L  -Cards again reconsulted on 2/13 and he received 60 mg of IV Lasix  -Patient is switched today to oral torsemide 20 mg with plan to continue the same dose as outpatient and follow-up with cardiology      Hx of mechanical mitral valve replacement for severe mitral regurgitation   S/p MAZE procedure (2008)   Chronic anticoagulation with warfarin   Paroxysmal atrial fibrillation  Subtherapeutic INR       --Initially on admission his INR was subtherapeutic at 1.98 and Coumadin was held but he was restarted back on heparin and Coumadin on p.m. of 1/31 .   --Goal INR is 2.5-3.5   --Heparin discontinued on 2/5 after there was evidence of active GI bleeding  --Coumadin has been on hold since 2/5, hemoglobin has been stable, repeat endoscopy 2/8 is relatively stable.  -- Coumadin resumed on 2/8 and the dose was conservative as per my discussion with pharmacy team given recurrent GI bleed  -Hemoglobin has been relatively stable and INR is uptrending and is 1.89 and I had discussed heparin drip with the patient and he and his significant other are concerned and he does not want heparin drip given his recurrent bleeds  -Continue with Coumadin    Hypokalemia  -His potassium today is 3 and continue with replacement and I think at discharge we should plan for potassium replacement in the solution form as oral potassium can cause pill esophagitis     Type II NSTEMI, causing mild troponin elevation on admission, secondary to supply demand mismatch     --Troponin  elevated to 58 on admission.   --Suspect demand ischemia from acute anemia, CHF, pneumonia etc  -- he had troponin elevation during his last admission as  well (73-69).   -- Continue to monitor on telemetry     Epistaxis   --Patient did mention that he has history of epistaxis and some clots initiated.    --ENT consulted, however given that he was not actively bleeding definitively reasonable to to try humidification and monitor clinically.        Recent Pneumonia-treated  -CXR obtained on 1/26 as outpatient which per the read suggested CHF, but ultimately the provider ended up treating for pneumonia.      --Received 2g of IM Rocephin on 1/26 and was started on Ceftin and Doxycycline on 1/27.   ---Patient completed, previously planned course of Ceftin and doxycycline this hospitalization on 2/2/2024     Hypomagnesemia-resolved  Hypokalemia-resolved      HLD  --Intolerant to statins and Zetia      Urinary retention s/p Branch Catheter   Discharge with Branch cathter during recent hospital stay. He was supposed to have an appointment with urology on 1/30 for TOV      -- Branch was continued due to need for monitoring intake and output closely  -- branch discontinued on 2/8 and currently voiding  -- bladder protocol, monitor for retetnion     Penis, mucosal pressure injury related to branch catheter   -- WOC evaluated on 2/8  -- branch as above removed on 2/8     Moderate malnutrition  In Context of:  Acute illness or injury, Chronic illness or disease  -- dietitian following  -- supplements between meals     Disposition  -Patient has been accepted to TCU and I think he should be medically optimized by tomorrow to be discharged and d/w case         Diet: Snacks/Supplements Adult: Ensure Max Protein (bariatric); Between Meals  Snacks/Supplements Adult: Ensure Enlive; Between Meals  Mechanical/Dental Soft Diet    DVT Prophylaxis: Warfarin  Branch Catheter: Not present  Lines: None     Cardiac Monitoring: ACTIVE order. Indication: Acute decompensated heart failure (48 hours)  Code Status: Full Code      Clinically Significant Risk Factors        # Hypokalemia: Lowest K = 3 mmol/L  "in last 2 days, will replace as needed       # Hypoalbuminemia: Lowest albumin = 2.7 g/dL at 2/3/2024  7:41 AM, will monitor as appropriate     # Hypertension: Noted on problem list    # Acute heart failure with preserved ejection fraction: heart failure noted on problem list, last echo with EF >50%, and receiving IV diuretics       # Obesity: Estimated body mass index is 38.1 kg/m  as calculated from the following:    Height as of this encounter: 1.803 m (5' 11\").    Weight as of this encounter: 123.9 kg (273 lb 2.4 oz).   # Moderate Malnutrition: based on nutrition assessment      # Financial/Environmental Concerns:           Disposition Plan      Expected Discharge Date: 02/15/2024,  3:00 PM    Destination: inpatient rehabilitation facility  Discharge Comments: If he remains stable by tomorrow he can be discharged to TCU            Cindy Rea MD  Hospitalist Service  [facility]  Securely message with [employer] (more info)  Text page via [employer] Paging/Directory   ______________________________________________________________________    Interval History     Seen today and and blood was present and patient was on 2 L of oxygen and he was having bowel movement.  He denied any fever or chills.  He denied any nausea or vomiting.  Discussed results of the labs with the patient.    Physical Exam   Vital Signs: Temp: 98.6  F (37  C) Temp src: Oral BP: 104/61 Pulse: 69   Resp: 18 SpO2: 97 % O2 Device: Oxymask Oxygen Delivery: 3 LPM  Weight: 273 lbs 2.4 oz          General: Patient appears comfortable and in no acute distress.  Respiratory: No apparent crackles and he is on 2 L  Cardiovascular: Regular rate , S1 and S2 normal with no murmer or rubs or gallops and still has bilateral edema  Abdomen:   soft , non tender , non distended and bowel sound present   Skin: No skin rashes or lesions to inspection or palpation.  Neurologic: No facial droop  Musculoskeletal: Normal Range of motion over upper " and lower extremities bilaterally   Psychiatric: cooperative      Medical Decision Making       Time spent in care of patient is 45 minutes and I reviewed his labs including CBC, basic, INR and discussed plan of care in detail with the patient and the nursing staff.      Data     I have personally reviewed the following data over the past 24 hrs:    3.8 (L)  \   8.0 (L)   / 175     137 89 (L) 21.3 /  90   3.0 (L) 42 (H) 0.96 \     INR:  1.89 (H) PTT:  N/A   D-dimer:  N/A Fibrinogen:  N/A       Imaging results reviewed over the past 24 hrs:   No results found for this or any previous visit (from the past 24 hour(s)).

## 2024-02-15 NOTE — DISCHARGE SUMMARY
"[facility]  Hospitalist Discharge Summary      Date of Admission:  1/30/2024  Date of Discharge:  2/15/2024  Discharging Provider: Cindy Rea MD  Discharge Service: Hospitalist Service    Discharge Diagnoses     Acute blood loss on chronic anemia.  Severe pill esophagitis with active bleeding ulcer and visible vessel s/p cauterization and endoclips placement on 2/5/2024.  Iron deficiency anemia  Acute on chronic diastolic CHF with preserved EF  Acute hypoxic respiratory failure due to above  Moderate pulmonary Hypertension  Hx of mechanical mitral valve replacement for severe mitral regurgitation   S/p MAZE procedure (2008)   Chronic anticoagulation with warfarin   Paroxysmal atrial fibrillation  Subtherapeutic INR    Hypokalemia-resolved and hypomagnesemia-  Type II NSTEMI, causing mild troponin elevation on admission, secondary to supply demand mismatch   Concern for epistaxis  Urinary retention status Stubbs catheter status removal  Penile mucosal pressure injury related to Stubbs catheter  Moderate malnutrition  Pressure injury over the buttock and coccyx-POA    Clinically Significant Risk Factors     # Obesity: Estimated body mass index is 37.64 kg/m  as calculated from the following:    Height as of this encounter: 1.803 m (5' 11\").    Weight as of this encounter: 122.4 kg (269 lb 13.5 oz).  # Moderate Malnutrition: based on nutrition assessment      Follow-ups Needed After Discharge   Follow-up Appointments     Follow Up and recommended labs and tests      Follow up with custodial physician.  The following labs/tests are   recommended: cbc abd basic  in 2-3 days . Check INR on 2/16/24 and dose   coumadin according goal INR is 2.5-3.5    Follow with GI dr camacho in 1-2 weeks   Follow with cardiology in 1-2 weeks        {Additional follow-up instructions/to-do's for PCP        Unresulted Labs Ordered in the Past 30 Days of this Admission       Date and Time Order Name Status " Description    2/2/2024  9:13 AM CONDITIONAL Prepare red blood cells (unit) Preliminary     1/10/2024 11:57 PM CONDITIONAL Prepare red blood cells (unit) Preliminary         These results will be followed up by     Discharge Disposition   Discharged to home  Condition at discharge: Stable    Hospital Course     Feng Wynne is a 79 year old male with past medical history significant for mechanical mitral valve replacement for severe mitral regurgitation and MAZE procedure in 2008, on chronic anticoagulation with warfarin, pulmonary hypertension, chronic diastolic CHF with preserved EF, hypertension, hyperlipidemia,and morbid obesity admitted on 1/30/2024 with acute on chronic anemia     Patient was recently admitted to [facility] from 1/9/24-1/19/24 with anemia with a hemoglobin of 4.4.  EGD and colonoscopy did not reveal any active bleeding.  He was discharged to TCU and sent back to the emergency room after his outpatient hemoglobin came back at 6.  He does have mechanical valve and his INR was subtherapeutic.        Patient recently at [facility] was also diagnosed with pneumonia and he has been on 2 L of oxygen and on admission patient's PTA oral antibiotics including doxycycline were continued.  GI was consulted and patient was being monitored closely and initial plan was to do capsule endoscopy as outpatient.  FOBT on admission was positive and peripheral smear was done which showed normocytic normochromic anemia with increased Red cell regeneration without morphological evidence of hemolysis      He was started on heparin drip along with Coumadin as INR was subtherapeutic and during the course of hospitalization he started exhibiting symptoms of GI bleed and had upper EGD done on 3/5/2024 which showed severe pill esophagitis with evidence of active bleeding.  Patient had local intervention done and received multiple blood units while he was in the hospital.  He was being monitored off anticoagulation and  underwent repeat endoscopy on 2/8 and at that time it showed esophageal ulcer was oozing blood.  His anticoagulation with Coumadin was stopped and was dosed conservatively and patient was continued with PPI twice daily and Carafate suspension 1 g 4 times daily.  His hemoglobin remained stable and had no further episode of any bleed and on day of discharge hemoglobin was 8.8.  Patient will continue with PPI twice daily and Carafate for 1 month and will follow-up with GI in 1 to 2 weeks.  Based on his hemoglobin trend GI will decide if he needs capsule endoscopy or not.  Avoid medications including doxycycline and oral potassium tablets.    As noted above patient at TCU was diagnosed with pneumonia and was being treated with oral antibiotics including doxycycline and cephalosporin and he was continued with the same and BNP on admission and clinically on exam he looked volume overloaded and cardiology was consulted and he was diuresed with IV Lasix extensively and showed clinical improvement but continues to have oxygen needs and was switched to oral torsemide 20 mg daily which she will continue as outpatient along with 20 mEq oral potassium in solution form  He will follow-up with cardiology as outpatient    As far as anticoagulation is concerned his INR on day of discharge is 1.95 and INR is slowly trending upwards and reason he was not restarted back on heparin drip is because of concern for ongoing bleed and patient did not wanted to take a risk which is reasonable.  He was given 5 mg Coumadin on 2/15 and can continue with the oral Coumadin 5 mg daily at [facility] based on INR check on 2/16/2024.    Also on admission patient was found to have pressure injury and was seen by wound care team and treatment plan was formulated per the wound care team    During the course of hospital stay patient was followed by physical therapy, Occupational Therapy, case management, wound care and on day of discharge patient is still  needing oxygen but he is stable and he is in agreement with discharge to TCU.        Consultations This Hospital Stay   CARDIOLOGY IP CONSULT  GASTROENTEROLOGY IP CONSULT  WOUND OSTOMY CONTINENCE NURSE  IP CONSULT  CARE MANAGEMENT / SOCIAL WORK IP CONSULT  PHYSICAL THERAPY ADULT IP CONSULT  OCCUPATIONAL THERAPY ADULT IP CONSULT  NUTRITION SERVICES ADULT IP CONSULT  PHARMACY TO DOSE WARFARIN  PHARMACY IP CONSULT  ENT IP CONSULT  HEMATOLOGY & ONCOLOGY IP CONSULT  SPEECH LANGUAGE PATH ADULT IP CONSULT  WOUND OSTOMY CONTINENCE NURSE  IP CONSULT  PHARMACY TO DOSE WARFARIN  CARDIOLOGY IP CONSULT  PHYSICAL THERAPY ADULT IP CONSULT  OCCUPATIONAL THERAPY ADULT IP CONSULT    Code Status   Full Code    Time Spent on this Encounter   I, Cindy Rea MD, personally saw the patient today and spent greater than 30 minutes discharging this patient.       Cindy Rea MD  [facility]  [address] [place] [address]  Phone: [phone]  ______________________________________________________________________    Physical Exam   Vital Signs: Temp: 97.8  F (36.6  C) Temp src: Oral BP: 108/52 Pulse: 77   Resp: 18 SpO2: 97 % O2 Device: Nasal cannula Oxygen Delivery: 2 LPM  Weight: 269 lbs 13.49 oz    General: AOx3  Respiratory: No apparent crackles and he is on 2 L  Cardiovascular: Regular rate , S1 and S2 normal with no murmer or rubs or gallops and still has bilateral edema  Abdomen:   soft , non tender , non distended and bowel sound present   Skin: No skin rashes or lesions to inspection or palpation.  Neurologic: No facial droop  Musculoskeletal: Normal Range of motion over upper and lower extremities bilaterally   Psychiatric: cooperative       Primary Care Physician   Jayme Deng MD    Discharge Orders      Medication Therapy Management Referral      Follow-Up with Cardiology SACHI      General info for SNF    Length of Stay Estimate: Short Term Care: Estimated # of Days  <30  Condition at Discharge: Improving  Level of care:skilled   Rehabilitation Potential: Good  Admission H&P remains valid and up-to-date: Yes  Recent Chemotherapy: N/A  Use Nursing Home Standing Orders: Yes     Mantoux instructions    Give two-step Mantoux (PPD) Per Facility Policy Yes     Follow Up and recommended labs and tests    Follow up with group home physician.  The following labs/tests are recommended: cbc abd basic  in 2-3 days . Check INR on 2/16/24 and dose coumadin according goal INR is 2.5-3.5    Follow with GI dr camacho in 1-2 weeks   Follow with cardiology in 1-2 weeks     Reason for your hospital stay    Gi bleed     Activity - Up with nursing assistance     Physical Therapy Adult Consult    Evaluate and treat as clinically indicated.    Reason:  weakness     Occupational Therapy Adult Consult    Evaluate and treat as clinically indicated.    Reason:  weakness     Oxygen (SNF/TCU) Discharge     Fall precautions     Diet    Follow this diet upon discharge: Orders Placed This Encounter      Snacks/Supplements Adult: Ensure Max Protein (bariatric); Between Meals      Snacks/Supplements Adult: Ensure Enlive; Between Meals      Mechanical/Dental Soft Diet       Significant Results and Procedures   Most Recent 3 CBC's:  Recent Labs   Lab Test 02/15/24  0857 02/14/24  0535 02/13/24  0911 02/12/24  0544   WBC  --  3.8* 4.1 3.7*   HGB 8.8* 8.0* 8.0* 8.4*   MCV  --  96 96 96   PLT  --  175 182 166     Most Recent 3 BMP's:  Recent Labs   Lab Test 02/15/24  0554 02/14/24 2000 02/14/24  0535 02/13/24  0554     --  137 136   POTASSIUM 3.7 3.6 3.0* 3.7  3.5   CHLORIDE 88*  --  89* 88*   CO2 41*  --  42* 42*   BUN 18.7  --  21.3 23.9*   CR 0.98  --  0.96 0.99   ANIONGAP 6*  --  6* 6*   JOSEPH 8.9  --  8.6* 8.6*   GLC 91  --  90 86     Most Recent 2 LFT's:  Recent Labs   Lab Test 02/03/24  0741 01/15/24  1815   AST 27 29   ALT 9 13   ALKPHOS 58 58   BILITOTAL 0.6 1.0     Most Recent 3 INR's:  Recent Labs    Lab Test 02/15/24  0554 02/14/24  0535 02/13/24  0554   INR 1.95* 1.89* 1.78*     Most Recent INR's and Anticoagulation Dosing History:  Anticoagulation Dose History  More data exists         Latest Ref Rng & Units 2/9/2024 2/10/2024 2/11/2024 2/12/2024 2/13/2024 2/14/2024 2/15/2024   Recent Dosing and Labs   warfarin ANTICOAGULANT (COUMADIN) 1 MG tablet - 1 mg, $Given 1 mg, $Given - - - - -   warfarin ANTICOAGULANT (COUMADIN) 2 MG tablet - - - 2 mg, $Given - 4 mg, $Given 4 mg, $Given -   warfarin ANTICOAGULANT (COUMADIN) 3 MG tablet - - - - 3 mg, $Given - - -   INR 0.85 - 1.15 2.62  2.25  2.09  2.05  1.78  1.89  1.95    ,   Results for orders placed or performed during the hospital encounter of 01/30/24   XR Chest 2 Views    Narrative    CHEST TWO VIEWS January 30, 2024 11:04 AM     HISTORY: Shortness of breath.    COMPARISON: Chest radiograph 1/15/2024.       Impression    IMPRESSION: Improved left lateral patchy opacity. Increased right  perihilar hazy opacity suggestive of worsening pulmonary edema.  Similar vascular congestion and cardiomegaly. No new focal  consolidation. Possible trace right pleural effusion, however the  lateral view is limited due to overlapping soft tissue. Median  sternotomy and cardiac valve prosthesis.    SHIELA BOLDEN MD         SYSTEM ID:  [id number]   XR Chest Port 1 View    Narrative    CHEST ONE VIEW  2/7/2024 3:43 PM     HISTORY: Respiratory failure    COMPARISON: Chest radiograph 1/30/2024      Impression    IMPRESSION: Similar right perihilar opacity, cardiomegaly and  pulmonary vascular congestion. No new focal consolidation, or  pneumothorax. Similar possible trace pleural effusions versus pleural  thickening. Median sternotomy and cardiac valve prosthesis.    SHIELA BOLDEN MD         SYSTEM ID:  [id number]   Echocardiogram Limited     Value    LVEF  60-65%    Narrative    [id number]  [id number]  [id number]  [id number]^PAULINE^[employer]  [facility]  Echocardiography Laboratory  [address] [place] [address]     Name: JESSE CABRAL  MRN: [id number]  : 1945  Study Date: 2024 02:14 PM  Age: 79 yrs  Gender: Male  Patient Location: [facility]  Reason For Study: Mitral Valve Replacement  Ordering Physician: MADDISON CARPENTER  Referring Physician: Jayme Deng  Performed By: Sedrick Pappas RDCS     BSA: 2.6 m2  Height: 71 in  Weight: 328 lb  HR: 83  BP: 115/53 mmHg  ______________________________________________________________________________  Procedure  Limited Portable Echo Adult.  ______________________________________________________________________________  Interpretation Summary     Left ventricular systolic function is normal.  The visual ejection fraction is 60-65%.  The right ventricle is moderately dilated.  Moderately decreased right ventricular systolic function  Flattened septum is consistent with RV pressure/volume overload. Moderate (46-  55mmHg) pulmonary hypertension is present.  There is severe (4+) tricuspid regurgitation.  s/p 31mm St Raffi mechanical MVR. Mean 6.3 mmHg @ HR 64 bpm. Cannot visualize  MR due to acoustic shadowing.  There is no pericardial effusion.  Rhythm is atrial fibrillation.     Compared to prior study dated 1/10/2024, degree of TR is similar in  appearance. The mean gradient across the mitral prosthesis is slightly higher  but at higher heart rates. Overall findings appear similar.  ______________________________________________________________________________  Left Ventricle  The left ventricle is normal in size. There is normal left ventricular wall  thickness. Left ventricular systolic function is normal. The visual ejection  fraction is 60-65%. Diastolic function not assessed due to presence of  prosthetic mitral valve. Flattened septum is consistent with RV  pressure/volume overload.     Right Ventricle  The right ventricle is moderately dilated. Moderately decreased  right  ventricular systolic function.     Atria  The left atrium is severely dilated. The right atrium is severely dilated.     Mitral Valve  The mean mitral valve gradient is 6.3 mmHg. s/p 31mm St Raffi mechanical MVR.  Mean 6.3 mmHg @ HR 64 bpm.     Tricuspid Valve  The tricuspid valve is normal in structure and function. There is severe (4+)  tricuspid regurgitation. The right ventricular systolic pressure is  approximated at 40mmHg plus the right atrial pressure. Moderate (46-55mmHg)  pulmonary hypertension is present.     Aortic Valve  The aortic valve is trileaflet.     Pulmonic Valve  The pulmonic valve is not well visualized.     Vessels  The aortic root is normal size. Inferior vena cava not well visualized for  estimation of right atrial pressure.     Pericardium  There is no pericardial effusion.     Rhythm  The rhythm was atrial fibrillation.  ______________________________________________________________________________  Doppler Measurements & Calculations  MV max P.1 mmHg  MV mean P.3 mmHg  MV V2 VTI: 50.1 cm  TR max prema: 316.8 cm/sec  TR max P.2 mmHg     ______________________________________________________________________________  Report approved by: Danyelle Jhaveri 2024 04:04 PM           *Note: Due to a large number of results and/or encounters for the requested time period, some results have not been displayed. A complete set of results can be found in Results Review.       Discharge Medications   Current Discharge Medication List        START taking these medications    Details   calcium carbonate (TUMS) 500 MG chewable tablet Take 1 tablet (500 mg) by mouth 3 times daily as needed for heartburn    Associated Diagnoses: Anemia due to blood loss, acute      ferrous sulfate (FEROSUL) 325 (65 Fe) MG tablet Take 1 tablet (325 mg) by mouth daily    Associated Diagnoses: Anemia due to blood loss, acute      potassium chloride (KAYCIEL) 10 % SOLN solution Take 15 mLs (20 mEq)  by mouth daily    Associated Diagnoses: Acute on chronic congestive heart failure, unspecified heart failure type (H)      sodium chloride (OCEAN) 0.65 % nasal spray Spray 1 spray into both nostrils every hour as needed for congestion    Associated Diagnoses: Nasal congestion      sucralfate (CARAFATE) 1 GM/10ML suspension Take 10 mLs (1 g) by mouth 4 times daily (before meals and nightly) for 30 days    Associated Diagnoses: Anemia due to blood loss, acute      torsemide (DEMADEX) 20 MG tablet Take 1 tablet (20 mg) by mouth daily    Associated Diagnoses: Acute on chronic congestive heart failure, unspecified heart failure type (H)           CONTINUE these medications which have CHANGED    Details   warfarin ANTICOAGULANT (COUMADIN) 5 MG tablet Take 1 tablet (5 mg) by mouth daily Draw inr on 2/16/24 and further dosing per TCU    Associated Diagnoses: Chronic atrial fibrillation (H)           CONTINUE these medications which have NOT CHANGED    Details   albuterol (PROAIR HFA/PROVENTIL HFA/VENTOLIN HFA) 108 (90 Base) MCG/ACT inhaler Inhale 1-2 puffs into the lungs every 4 hours as needed for shortness of breath, wheezing or cough  Qty: 18 g, Refills: 3    Comments: Pharmacy may dispense brand covered by insurance (Proair, or proventil or ventolin or generic albuterol inhaler)  Associated Diagnoses: Iron deficiency anemia due to chronic blood loss; Wheezing      bisacodyl (DULCOLAX) 10 MG suppository Place 10 mg rectally daily as needed for constipation Every 3 days if no BM      clindamycin (CLEOCIN) 150 MG capsule TAKE 4 CAPS BY MOUTH 1 HOUR PRIOR TO DENTAL APPOINTMENT      Multiple Vitamins-Minerals (CENTRUM SILVER PO) Take 1 tablet by mouth daily.    Associated Diagnoses: Chronic atrial fibrillation (H); Hypertension goal BP (blood pressure) < 140/90; Hyperlipidemia LDL goal <130; Hypercholesteremia      nystatin (MYCOSTATIN) 844504 UNIT/GM external powder Apply topically 2 times daily Groin folds       pantoprazole (PROTONIX) 40 MG EC tablet Take 1 tablet (40 mg) by mouth 2 times daily (before meals)    Associated Diagnoses: Gastrointestinal hemorrhage, unspecified gastrointestinal hemorrhage type      polyethylene glycol (MIRALAX) 17 GM/Dose powder Take 17 g by mouth daily      sennosides (SENOKOT) 8.6 MG tablet Take 1 tablet by mouth 2 times daily as needed for constipation      Omega-3 Fatty Acids (FISH OIL CONCENTRATE PO) Take 1 capsule by mouth daily      order for DME Equipment being ordered: COMPRESSION STOCKINGS, 20-30 mmHg  Qty: 1 each, Refills: 1    Associated Diagnoses: Lower extremity edema           STOP taking these medications       cefuroxime (CEFTIN) 500 MG tablet Comments:   Reason for Stopping:         doxycycline hyclate (VIBRA-TABS) 100 MG tablet Comments:   Reason for Stopping:         furosemide (LASIX) 40 MG tablet Comments:   Reason for Stopping:         guaiFENesin (MUCINEX) 600 MG 12 hr tablet Comments:   Reason for Stopping:         potassium chloride ER (K-TAB) 20 MEQ CR tablet Comments:   Reason for Stopping:             Allergies   Allergies   Allergen Reactions    Amiodarone Shortness Of Breath    Pcn [Penicillins] Shortness Of Breath     Rxn occurred in childhood     Statins Muscle Pain (Myalgia) and Hives    Amiodarone     Latex     Zetia [Ezetimibe] Muscle Pain (Myalgia)     Muscle weakness legs

## 2024-02-15 NOTE — PROGRESS NOTES
Care Management Discharge Note    Discharge Date: 02/15/2024       Discharge Disposition: Skilled Nursing Facility    Discharge Services: None    Discharge DME: None    Discharge Transportation: health plan transportation    Private pay costs discussed: transportation costs    Does the patient's insurance plan have a 3 day qualifying hospital stay waiver?  No    PAS Confirmation Code:  as previous  Patient/family educated on Medicare website which has current facility and service quality ratings: yes    Education Provided on the Discharge Plan: Yes  Persons Notified of Discharge Plans: Pt, partner, RN, MD, facility  Patient/Family in Agreement with the Plan: yes    Handoff Referral Completed: yes    Additional Information:  Pt will discharge today to [facility] via [facility] between 5110-1388. Orders faxed and facility updated. Pt and partner Jose updated and in agreement. Questions answered. Both aware pt will get a bill for the cost of the ride. Nursing aware.     GILL Edwards, LICSW  [phone] Desk phone  [phone] Cell/text (Preferred)  [facility]

## 2024-02-15 NOTE — PROGRESS NOTES
Speech Language Therapy Discharge Summary    Reason for therapy discharge:    Discharged to transitional care facility.    Progress towards therapy goal(s). See goals on Care Plan in [facility] electronic health record for goal details.  Goals partially met.  Barriers to achieving goals:   discharge from facility.  Good tolerance of a mechanical dental soft diet reported    Therapy recommendation(s):    PRN swallow Tx after discharge if increased swallowing difficulty noted; see below       02/12/24 1351   SLP Discharge Planning   SLP Plan Diet tolerance, strategy training x 1 - being directed by GI   SLP Discharge Recommendation home   SLP Rationale for DC Rec Below baseline for swallow function, however primarily esophageal issues; Consider OP esophagram +/- VFSS if increased difficulty noted after discharge   SLP Brief overview of current status  Globus sensation and slight cough x 1 observed with semi-solids when pt had not chewed a piece well during swallow Tx.  Rec: continue a current mechanical dental soft diet (as per GI) and thin liquids with use of safe swallow strategies/GERD precautions

## 2024-02-15 NOTE — PLAN OF CARE
Goal Outcome Evaluation:     Plan of Care Reviewed With: patient, significant other    Overall Patient Progress: improving    Orientations: A&Ox4  Vitals/Pain: AVSS, O2 @ 2L/NC.  Denies need for pain meds.  Pain on RLE with movement.  Tele: Afib CVR  Resp: LS dim.  IS encouraged.   Lines/Drains: PIV saline lock.   Skin/Wounds: Cleansed per POC. Mepilex changed as needed.   GI/: Fair appetite. Tolerating oral intake.  (+)BM x 2 this shift.  Voiding without difficulty, using purewick.   Labs: Potassium replaced, recheck pending.   Ambulation/Assist: Turned and repo x 2 assist & lift.  Refused repo at time. Refused up to chair.  Plan: Monitoring hgb.  Oral diuretics started.

## 2024-02-15 NOTE — PROGRESS NOTES
Occupational Therapy Discharge Summary    Reason for therapy discharge:    Discharged to transitional care facility.    Progress towards therapy goal(s). See goals on Care Plan in [facility] electronic health record for goal details.  Goals not met.  Barriers to achieving goals:   discharge from facility.    Therapy recommendation(s):    Continued therapy is recommended.  Rationale/Recommendations:  Pt well below baseline, PLOF pt IND in ADLs and uses 4WW for mobility. Pt limited by decreased strength, activity tolerance, pain, and mobility. Recommend TCU to increase activity tolerance and IND in I/ADLs..

## 2024-02-15 NOTE — PROGRESS NOTES
[facility] GERIATRICS    PRIMARY CARE PROVIDER AND CLINIC:  Jayme Deng MD, MD, [address] / [place] [address]  Chief Complaint   Patient presents with    Hospital F/U      [facility] Medical Record Number:  [id number]  Place of Service where encounter took place:  [facility]  ([facility]) [[id number]]    Feng Wynne  is a 79 year old  (1945) with severe mitral regurgitation s/p MAZE 2008 on chronic anticoagulation, pulmonary HTN, chronic diastolic HFpEF, HTN, dyslipidemia, morbid obesity and anemia, admitted to the above facility from  [facility]. Hospital stay 1/30 through 2/15..     By chart review, patient was hospitalized at [facility] 1/30-2/15/24 with acute on chronic anemia. Hospitalized previously at [facility] 1/9-1/19/24 with anemia, hgb 4.4 and underwent EGD, colonoscopy that did not reveal active bleeding. He was discharged to TCU, treated for pneumonia before being readmitted for hgb 6.0 in TCU. In ED, labs showed hgb 6.0, otherwise CBC, BMP grossly normal. BNP was mildly elevated at 1253. CXR showed pulmonary edema. FOBT was positive. Peripheral smear showed normocytic normochromic anemia, increased RBC regeneration without evidence of hemolysis.  GI was consulted and recommended capsule endoscopy outpatient. He was started on heparin drip with coumadin for subtherapeutic INR. Caridology was consulted for volume overload and he was diuresed with IV lasix. He has persistent oxygen needs. On 2/5 he developed evidence of GIB and underwent EGD which showed severe esophagitis with active bleeding. He had local intervention completed and received multiple units PRBC. Anticoagulation was held. He underwent repeat endoscopy 2/8 which showed continued blood oozing from esophageal ulcer. He received BID PPI and carafate QID. Hgb remained stable. GI will follow outpatient to consider endoscopy, recommended avoiding doxycycline, oral potassium. He was  transitioned to oral torsemide and liquid Kcl at discharge. Coumadin was resumed, dosed conservatively and mildly subtherapeutic. He was seen by therapies and recommended TCU at discharge.     HPI:    Seen today in his room in TCU up to wheelchair. His long-term partner Jose is present and helps augment some history. They report he was sitting on his bed with therapies today and did not realize the bed had been lowered and he nearly fell. Since, he has noticed a wooshing noise in his ear which is quite irritating. It seems to help somewhat intermittently if he manipulates the ear. He is tolerating a soft diet with poor appetite. He reports regular bowel movements, denies bloody or tarry stools. He denies CP, SOB, cahgnes in bladder habits, fever/chills, dizziness or light headedness. INR today is 2.5    CODE STATUS/ADVANCE DIRECTIVES DISCUSSION:  Prior  CPR/Full code   ALLERGIES:   Allergies   Allergen Reactions    Amiodarone Shortness Of Breath    Pcn [Penicillins] Shortness Of Breath     Rxn occurred in childhood     Statins Muscle Pain (Myalgia) and Hives    Amiodarone     Latex     Zetia [Ezetimibe] Muscle Pain (Myalgia)     Muscle weakness legs      PAST MEDICAL HISTORY:   Past Medical History:   Diagnosis Date    Acute on chronic congestive heart failure, unspecified heart failure type (H) 1/30/2024    Arthritis     Atrial fibrillation (H)     Coronary artery disease     Hypercholesteremia     Morbid obesity (H)     Unspecified essential hypertension       PAST SURGICAL HISTORY:   has a past surgical history that includes mitral valve replacement (8-); Esophagoscopy, gastroscopy, duodenoscopy (EGD), combined (N/A, 1/15/2024); Colonoscopy (N/A, 1/15/2024); and Esophagoscopy, gastroscopy, duodenoscopy (EGD), combined (N/A, 2/8/2024).  FAMILY HISTORY: family history includes C.A.D. in his brother; Cerebrovascular Disease in his father; Diabetes in his paternal grandmother.  SOCIAL HISTORY:   reports that  he has quit smoking. His smoking use included cigarettes. He has a 2.5 pack-year smoking history. He has never used smokeless tobacco. He reports current alcohol use. He reports that he does not use drugs.  Patient's living condition: lives with partner    Post Discharge Medication Reconciliation Status:   MED REC REQUIRED  Post Medication Reconciliation Status: discharge medications reconciled and changed, per note/orders       Current Outpatient Medications   Medication Sig    albuterol (PROAIR HFA/PROVENTIL HFA/VENTOLIN HFA) 108 (90 Base) MCG/ACT inhaler Inhale 1-2 puffs into the lungs every 4 hours as needed for shortness of breath, wheezing or cough    bisacodyl (DULCOLAX) 10 MG suppository Place 10 mg rectally daily as needed for constipation Every 3 days if no BM    calcium carbonate (TUMS) 500 MG chewable tablet Take 1 tablet (500 mg) by mouth 3 times daily as needed for heartburn    clindamycin (CLEOCIN) 150 MG capsule TAKE 4 CAPS BY MOUTH 1 HOUR PRIOR TO DENTAL APPOINTMENT    ferrous sulfate (FEROSUL) 325 (65 Fe) MG tablet Take 1 tablet (325 mg) by mouth daily    fluticasone (FLONASE) 50 MCG/ACT nasal spray Spray 2 sprays into both nostrils daily    Multiple Vitamins-Minerals (CENTRUM SILVER PO) Take 1 tablet by mouth daily.    nystatin (MYCOSTATIN) 942679 UNIT/GM external powder Apply topically 2 times daily Groin folds    Omega-3 Fatty Acids (FISH OIL CONCENTRATE PO) Take 1 capsule by mouth daily    order for DME Equipment being ordered: COMPRESSION STOCKINGS, 20-30 mmHg    pantoprazole (PROTONIX) 40 MG EC tablet Take 1 tablet (40 mg) by mouth 2 times daily (before meals)    polyethylene glycol (MIRALAX) 17 GM/Dose powder Take 17 g by mouth daily    potassium chloride (KAYCIEL) 10 % SOLN solution Take 15 mLs (20 mEq) by mouth daily    sennosides (SENOKOT) 8.6 MG tablet Take 1 tablet by mouth 2 times daily as needed for constipation    sodium chloride (OCEAN) 0.65 % nasal spray Spray 1 spray into both  "nostrils every hour as needed for congestion    sucralfate (CARAFATE) 1 GM/10ML suspension Take 10 mLs (1 g) by mouth 4 times daily (before meals and nightly) for 30 days    torsemide (DEMADEX) 20 MG tablet Take 1 tablet (20 mg) by mouth daily    Warfarin Therapy Reminder Take 1 each by mouth See Admin Instructions Per INR     No current facility-administered medications for this visit.       ROS:  10 point ROS of systems including Constitutional, Eyes, Respiratory, Cardiovascular, Gastroenterology, Genitourinary, Integumentary, Musculoskeletal, Psychiatric were all negative except for pertinent positives noted in my HPI.    Vitals:  /62   Pulse 79   Temp 98.3  F (36.8  C)   Resp 18   Ht 1.803 m (5' 11\")   Wt 122 kg (269 lb)   SpO2 94%   BMI 37.52 kg/m    Exam:  GENERAL APPEARANCE:  Alert, in no distress  RESP:  respiratory effort and palpation of chest normal, lungs clear to auscultation , no respiratory distress  CV:  Palpation and auscultation of heart done , regular rate and rhythm, no murmur, rub, or gallop, peripheral edema 2+ in BLE  ABDOMEN:  normal bowel sounds, soft, nontender, no hepatosplenomegaly or other masses  M/S:   Gait and station abnormal transfers with assist  SKIN:  Inspection of skin and subcutaneous tissue baseline, Palpation of skin and subcutaneous tissue baseline, ble in compression  NEURO:   prabhakar freely, follows commands  PSYCH:  oriented X 3, affect and mood normal    Lab/Diagnostic data:  Labs done in SNF are in [facility]. Please refer to them using [facility] Everywhere. and Recent labs in [facility] reviewed by me today.     ASSESSMENT/PLAN:    (D62) ABLA (acute blood loss anemia)  (primary encounter diagnosis)  (K22.11) Ulcer of esophagus with bleeding  Comment: acute on chronic, resolving. S/p EGD x 2 with local intervention, multiple units PRBC inpatient. No evidence of continued blood loss.  Hemoglobin   Date Value Ref Range Status   02/15/2024 8.8 (L) 13.3 - 17.7 g/dL " Final   02/14/2024 8.0 (L) 13.3 - 17.7 g/dL Final   10/16/2020 12.4 (L) 13.3 - 17.7 g/dL Final   03/14/2019 12.8 (L) 13.3 - 17.7 g/dL Final   Plan: continue FeSO4, carafate QID x 30 days, pantoprazole 40 mg BID, recheck CBC 2/19, trend hgb, monitor for s/sx bleeding. Follow-up with GI per recommendations.     (H93.8X2) Congestion of left ear  Comment: acute, will trial flonase and debrox  Plan: flonase 2 sprays bid x 7 days, debrox drops x 3 days, follow with rinse on day 3. Monitor symptoms    (Z79.01) Chronic anticoagulation  (Z95.2) S/P mitral valve replacement  Comment: chronic, INR today is therapeutic.   Plan: continue warfarin per INR - coumadin 2 mg daily, INR 2/18, if >2.8 hold coumadin, if <2,9 conitnue coumadin 2 mg daily. INR 2/19.  Continue prophylactic clindamycin PRN for dental appointments, follow-up with cardiology per recommendations     (I50.32) Chronic heart failure with preserved ejection fraction (H)  (I27.20) Pulmonary hypertension (H)  Comment: chronic, edema is improving per patient  Plan: continue torsemide 20 mg daily, Kcl, monitor weights, exam    (J96.01) Acute respiratory failure with hypoxia (H)  (J18.9) Pneumonia of right lung due to infectious organism, unspecified part of lung - resolved  Comment: acute, unable to wean O2 despite treatment of fluid overload, pneumonia. Likely anemia is contributing  Plan: continue albuterol PRN, supplemental O2, wean as tolerated    (I10) Essential hypertension  Comment: chronic, well controlled  BP Readings from Last 3 Encounters:   02/15/24 116/62   02/15/24 108/52   01/29/24 93/54   Plan: continue toresmide, monitor BP    (E78.5) Dyslipidemia  Comment: chronic by history, not on statin  Plan: continue PTA fish oil    (E66.01) Morbid obesity due to excess calories (H)  Comment: chronic  Plan: encourage activity, RD to follow    (K59.01) Slow transit constipation  Comment: chronic  Plan: monitor bowel habits, continue bowel regimen    (R53.)  Physical deconditioning  Comment: acute on chronic, related to acute and chronic conditions as above, recent hospitalizations   Plan: PT/OT. SNF for assist with ADLs, medication management, meals, activities as needed    Orders:  Daily weights, notify provider if wt gain >2 lbs/day or >5 lbs/week dx: HFpEF  Repeat BMP, CBC 2/19/24 dx: anemia,   Wean O2 as tolerated dx: HFpEF  Flonase 50 mcg/act give 2 sprays in both nostrils bid x 7 days dx: congestion  Debrox 6% otic solution give 5 drops in left ear BID x 3 days, on 3rd day, rinse with tepid water dx: congestion    Total time spent with patient visit at the skilled nursing facility was 50 minutes including patient visit, review of past records, and discussion with patient contact, nursing facility staff today.     Electronically signed by:  MIKE Pillai CNP

## 2024-02-15 NOTE — PLAN OF CARE
Physical Therapy Discharge Summary    Reason for therapy discharge:    Discharged to transitional care facility.    Progress towards therapy goal(s). See goals on Care Plan in [facility] electronic health record for goal details.  Goals partially met.  Barriers to achieving goals:   discharge from facility.    Therapy recommendation(s):    Continued therapy is recommended.  Rationale/Recommendations:  cont PT at TCU to further address deficits and optimize functional independence and safety with mobility.

## 2024-02-15 NOTE — PLAN OF CARE
Vital signs stable on 2 L NC. Alert and oriented x 4. Lung sounds clear. Bowel sounds active, flatus present.. Pt denies pain. Up assist of two. Tolerating mechanical soft diet. CMS intact.+BM

## 2024-02-15 NOTE — PROGRESS NOTES
ANTICOAGULATION  MANAGEMENT: Discharge Review    Feng Wynne chart reviewed for anticoagulation continuity of care    Hospital Admission on -2/15/24 for acute blood loss on chronic anemia.    Discharge disposition: TCU  [facility]   Results:    Recent labs: (last 7 days)     24  0530 02/10/24  0529 24  0523 24  0544 24  0554 24  0535 02/15/24  0554   INR 2.62* 2.25* 2.09* 2.05* 1.78* 1.89* 1.95*     Anticoagulation inpatient management:   -2.5 mg  - 3 mg  -2/3- 5 mg  - 7.5 mg    -- held warfarin due to esophageal ulcer with active bleed   -10- 1 mg  - 2 mg  - 3 mg  -- 4 mg  2/15- 5 mg      Anticoagulation discharge instructions:     Warfarin dosin mg daily   Bridging: No   INR goal change: No      Medication changes affecting anticoagulation: Yes: started on torsemide, stopped doxycyline and cefuroxime at discharge    Additional factors affecting anticoagulation: Yes: yes upper GI bleed     PLAN     Agree with discharge plan for follow up on 24 INR recheck  at TCU    Recommended follow up is scheduled    Anticoagulation Calendar updated    Natali Mondragon RN

## 2024-02-15 NOTE — PLAN OF CARE
Pt. A & O x 4.  VSS on 2L O2.  Turn & repo.  Mechanical soft diet.  Lift x 2 assist.  Wound on coccyx; mepilex in place.  A-fib, CVR.  K+ replaced.  Coumadin given before discharge.  Ride to TCU @ 1500.  CTM.

## 2024-02-16 NOTE — LETTER
2/16/2024        RE: Feng Wynne  [address]  [place] [address]        [facility] GERIATRICS    PRIMARY CARE PROVIDER AND CLINIC:  Jayme Deng MD, MD, [address] / [place] [address]  Chief Complaint   Patient presents with     Hospital F/U      [facility] Medical Record Number:  [id number]  Place of Service where encounter took place:  [facility]  ([facility]) [[id number]]    Feng Wnyne  is a 79 year old  (1945) with severe mitral regurgitation s/p MAZE 2008 on chronic anticoagulation, pulmonary HTN, chronic diastolic HFpEF, HTN, dyslipidemia, morbid obesity and anemia, admitted to the above facility from  [facility]. Hospital stay 1/30 through 2/15..     By chart review, patient was hospitalized at [facility] 1/30-2/15/24 with acute on chronic anemia. Hospitalized previously at [facility] 1/9-1/19/24 with anemia, hgb 4.4 and underwent EGD, colonoscopy that did not reveal active bleeding. He was discharged to TCU, treated for pneumonia before being readmitted for hgb 6.0 in TCU. In ED, labs showed hgb 6.0, otherwise CBC, BMP grossly normal. BNP was mildly elevated at 1253. CXR showed pulmonary edema. FOBT was positive. Peripheral smear showed normocytic normochromic anemia, increased RBC regeneration without evidence of hemolysis.  GI was consulted and recommended capsule endoscopy outpatient. He was started on heparin drip with coumadin for subtherapeutic INR. Caridology was consulted for volume overload and he was diuresed with IV lasix. He has persistent oxygen needs. On 2/5 he developed evidence of GIB and underwent EGD which showed severe esophagitis with active bleeding. He had local intervention completed and received multiple units PRBC. Anticoagulation was held. He underwent repeat endoscopy 2/8 which showed continued blood oozing from esophageal ulcer. He received BID PPI and carafate QID. Hgb remained stable. GI will follow  outpatient to consider endoscopy, recommended avoiding doxycycline, oral potassium. He was transitioned to oral torsemide and liquid Kcl at discharge. Coumadin was resumed, dosed conservatively and mildly subtherapeutic. He was seen by therapies and recommended TCU at discharge.     HPI:    Seen today in his room in TCU up to wheelchair. His long-term partner Jose is present and helps augment some history. They report he was sitting on his bed with therapies today and did not realize the bed had been lowered and he nearly fell. Since, he has noticed a wooshing noise in his ear which is quite irritating. It seems to help somewhat intermittently if he manipulates the ear. He is tolerating a soft diet with poor appetite. He reports regular bowel movements, denies bloody or tarry stools. He denies CP, SOB, cahgnes in bladder habits, fever/chills, dizziness or light headedness. INR today is 2.5    CODE STATUS/ADVANCE DIRECTIVES DISCUSSION:  Prior  CPR/Full code   ALLERGIES:   Allergies   Allergen Reactions     Amiodarone Shortness Of Breath     Pcn [Penicillins] Shortness Of Breath     Rxn occurred in childhood      Statins Muscle Pain (Myalgia) and Hives     Amiodarone      Latex      Zetia [Ezetimibe] Muscle Pain (Myalgia)     Muscle weakness legs      PAST MEDICAL HISTORY:   Past Medical History:   Diagnosis Date     Acute on chronic congestive heart failure, unspecified heart failure type (H) 1/30/2024     Arthritis      Atrial fibrillation (H)      Coronary artery disease      Hypercholesteremia      Morbid obesity (H)      Unspecified essential hypertension       PAST SURGICAL HISTORY:   has a past surgical history that includes mitral valve replacement (8-); Esophagoscopy, gastroscopy, duodenoscopy (EGD), combined (N/A, 1/15/2024); Colonoscopy (N/A, 1/15/2024); and Esophagoscopy, gastroscopy, duodenoscopy (EGD), combined (N/A, 2/8/2024).  FAMILY HISTORY: family history includes C.A.D. in his brother;  Cerebrovascular Disease in his father; Diabetes in his paternal grandmother.  SOCIAL HISTORY:   reports that he has quit smoking. His smoking use included cigarettes. He has a 2.5 pack-year smoking history. He has never used smokeless tobacco. He reports current alcohol use. He reports that he does not use drugs.  Patient's living condition: lives with partner    Post Discharge Medication Reconciliation Status:   MED REC REQUIRED  Post Medication Reconciliation Status: discharge medications reconciled and changed, per note/orders       Current Outpatient Medications   Medication Sig     albuterol (PROAIR HFA/PROVENTIL HFA/VENTOLIN HFA) 108 (90 Base) MCG/ACT inhaler Inhale 1-2 puffs into the lungs every 4 hours as needed for shortness of breath, wheezing or cough     bisacodyl (DULCOLAX) 10 MG suppository Place 10 mg rectally daily as needed for constipation Every 3 days if no BM     calcium carbonate (TUMS) 500 MG chewable tablet Take 1 tablet (500 mg) by mouth 3 times daily as needed for heartburn     clindamycin (CLEOCIN) 150 MG capsule TAKE 4 CAPS BY MOUTH 1 HOUR PRIOR TO DENTAL APPOINTMENT     ferrous sulfate (FEROSUL) 325 (65 Fe) MG tablet Take 1 tablet (325 mg) by mouth daily     fluticasone (FLONASE) 50 MCG/ACT nasal spray Spray 2 sprays into both nostrils daily     Multiple Vitamins-Minerals (CENTRUM SILVER PO) Take 1 tablet by mouth daily.     nystatin (MYCOSTATIN) 774409 UNIT/GM external powder Apply topically 2 times daily Groin folds     Omega-3 Fatty Acids (FISH OIL CONCENTRATE PO) Take 1 capsule by mouth daily     order for DME Equipment being ordered: COMPRESSION STOCKINGS, 20-30 mmHg     pantoprazole (PROTONIX) 40 MG EC tablet Take 1 tablet (40 mg) by mouth 2 times daily (before meals)     polyethylene glycol (MIRALAX) 17 GM/Dose powder Take 17 g by mouth daily     potassium chloride (KAYCIEL) 10 % SOLN solution Take 15 mLs (20 mEq) by mouth daily     sennosides (SENOKOT) 8.6 MG tablet Take 1 tablet  "by mouth 2 times daily as needed for constipation     sodium chloride (OCEAN) 0.65 % nasal spray Spray 1 spray into both nostrils every hour as needed for congestion     sucralfate (CARAFATE) 1 GM/10ML suspension Take 10 mLs (1 g) by mouth 4 times daily (before meals and nightly) for 30 days     torsemide (DEMADEX) 20 MG tablet Take 1 tablet (20 mg) by mouth daily     Warfarin Therapy Reminder Take 1 each by mouth See Admin Instructions Per INR     No current facility-administered medications for this visit.       ROS:  10 point ROS of systems including Constitutional, Eyes, Respiratory, Cardiovascular, Gastroenterology, Genitourinary, Integumentary, Musculoskeletal, Psychiatric were all negative except for pertinent positives noted in my HPI.    Vitals:  /62   Pulse 79   Temp 98.3  F (36.8  C)   Resp 18   Ht 1.803 m (5' 11\")   Wt 122 kg (269 lb)   SpO2 94%   BMI 37.52 kg/m    Exam:  GENERAL APPEARANCE:  Alert, in no distress  RESP:  respiratory effort and palpation of chest normal, lungs clear to auscultation , no respiratory distress  CV:  Palpation and auscultation of heart done , regular rate and rhythm, no murmur, rub, or gallop, peripheral edema 2+ in BLE  ABDOMEN:  normal bowel sounds, soft, nontender, no hepatosplenomegaly or other masses  M/S:   Gait and station abnormal transfers with assist  SKIN:  Inspection of skin and subcutaneous tissue baseline, Palpation of skin and subcutaneous tissue baseline, ble in compression  NEURO:   prabhakar freely, follows commands  PSYCH:  oriented X 3, affect and mood normal    Lab/Diagnostic data:  Labs done in SNF are in [facility]. Please refer to them using [employer]/Care Everywhere. and Recent labs in [facility] reviewed by me today.     ASSESSMENT/PLAN:    (D62) ABLA (acute blood loss anemia)  (primary encounter diagnosis)  (K22.11) Ulcer of esophagus with bleeding  Comment: acute on chronic, resolving. S/p EGD x 2 with local intervention, multiple units PRBC " inpatient. No evidence of continued blood loss.  Hemoglobin   Date Value Ref Range Status   02/15/2024 8.8 (L) 13.3 - 17.7 g/dL Final   02/14/2024 8.0 (L) 13.3 - 17.7 g/dL Final   10/16/2020 12.4 (L) 13.3 - 17.7 g/dL Final   03/14/2019 12.8 (L) 13.3 - 17.7 g/dL Final   Plan: continue FeSO4, carafate QID x 30 days, pantoprazole 40 mg BID, recheck CBC 2/19, trend hgb, monitor for s/sx bleeding. Follow-up with GI per recommendations.     (H93.8X2) Congestion of left ear  Comment: acute, will trial flonase and debrox  Plan: flonase 2 sprays bid x 7 days, debrox drops x 3 days, follow with rinse on day 3. Monitor symptoms    (Z79.01) Chronic anticoagulation  (Z95.2) S/P mitral valve replacement  Comment: chronic, INR today is therapeutic.   Plan: continue warfarin per INR - coumadin 2 mg daily, INR 2/18, if >2.8 hold coumadin, if <2,9 conitnue coumadin 2 mg daily. INR 2/19.  Continue prophylactic clindamycin PRN for dental appointments, follow-up with cardiology per recommendations     (I50.32) Chronic heart failure with preserved ejection fraction (H)  (I27.20) Pulmonary hypertension (H)  Comment: chronic, edema is improving per patient  Plan: continue torsemide 20 mg daily, Kcl, monitor weights, exam    (J96.01) Acute respiratory failure with hypoxia (H)  (J18.9) Pneumonia of right lung due to infectious organism, unspecified part of lung - resolved  Comment: acute, unable to wean O2 despite treatment of fluid overload, pneumonia. Likely anemia is contributing  Plan: continue albuterol PRN, supplemental O2, wean as tolerated    (I10) Essential hypertension  Comment: chronic, well controlled  BP Readings from Last 3 Encounters:   02/15/24 116/62   02/15/24 108/52   01/29/24 93/54   Plan: continue toresmide, monitor BP    (E78.5) Dyslipidemia  Comment: chronic by history, not on statin  Plan: continue PTA fish oil    (E66.01) Morbid obesity due to excess calories (H)  Comment: chronic  Plan: encourage activity, RD to  follow    (K59.01) Slow transit constipation  Comment: chronic  Plan: monitor bowel habits, continue bowel regimen    (R53.81) Physical deconditioning  Comment: acute on chronic, related to acute and chronic conditions as above, recent hospitalizations   Plan: PT/OT. SNF for assist with ADLs, medication management, meals, activities as needed    Orders:  Daily weights, notify provider if wt gain >2 lbs/day or >5 lbs/week dx: HFpEF  Repeat BMP, CBC 2/19/24 dx: anemia,   Wean O2 as tolerated dx: HFpEF  Flonase 50 mcg/act give 2 sprays in both nostrils bid x 7 days dx: congestion  Debrox 6% otic solution give 5 drops in left ear BID x 3 days, on 3rd day, rinse with tepid water dx: congestion    Total time spent with patient visit at the skilled nursing facility was 50 minutes including patient visit, review of past records, and discussion with patient contact, nursing facility staff today.     Electronically signed by:  MIKE Pillai CNP                   Sincerely,        MIKE Pillai CNP

## 2024-02-16 NOTE — PATIENT INSTRUCTIONS
Orders  Feng Wynne  1945     Daily weights, notify provider if wt gain >2 lbs/day or >5 lbs/week dx: HFpEF  Repeat BMP, CBC 2/19/24 dx: anemia,   Wean O2 as tolerated dx: HFpEF  Flonase 50 mcg/act give 2 sprays in both nostrils bid x 7 days dx: congestion  Debrox 6% otic solution give 5 drops in left ear BID x 3 days, on 3rd day, rinse with tepid water dx: congestion      MIKE Pillai CNP on 2/16/2024 at 1:23 PM

## 2024-02-16 NOTE — PROGRESS NOTES
Clinic Care Coordination Contact  Care Coordination Transition Communication    Clinical Data: Patient was hospitalized at [facility] from 1/30/2024 to 2/15/2024 with diagnosis of ABLA, Acute on chronic CHF, A.Fib, ENRIQUE, HTN.     Assessment: Patient has transitioned to TCU/ARU for short term rehabilitation:    Facility Name: [facility]   Transition Communication:  Notified facility of Primary Care- Care Coordination support via Epic fax.    Plan: Care Coordinator will await notification from facility staff informing of patient's discharge plans/needs. Care Coordinator will review chart and outreach to facility staff every 4 weeks and as needed.      Yaneth Davila RN, BSN, PHN  Primary Care / Care Coordinator   [facility]  E-mail [email or web address]   [phone]

## 2024-02-16 NOTE — LETTER
[facility]   To:   [facility]           Please give to facility    From:   Yaneth Davila RN  Care Coordinator   [facility]   P: [phone]  [email or web address]   Patient Name:  Feng Wynne YOB: 1945   Admit date: 2/15/2024      *Information Needed:  Please contact me when the patient will discharge (or if they will move to long term care)- include the discharge date, disposition, and main diagnosis   If the patient is discharged with home care services, please provide the name of the agency    Also- Please inform me if a care conference is being held.   Phone, Fax or Email with information      Yaneth Davila RN, BSN, PHN  Primary Care / Care Coordinator   [facility]  E-mail [email or web address]   [phone]                Thank you

## 2024-02-16 NOTE — TELEPHONE ENCOUNTER
Patient was admitted to [facility] on 1/30/24 with acute on chronic anemia with Hgb of 6. EGD on 2/5/24 showed severe pill esophagitis with an actively bleeding ulcer s/p treatment. Had repeat endoscopy on 2/8 with an oozing esophageal ulcer. He has required 6 units of pRBCs. Cardiology has been consulted for management of his heart failure. He was followed last week and diuresed. We are now asked to re-consult due to ongoing CHF. Coumadin once again held. Pt also with increasing weight, shortness of breath, and lower extremity swelling, which has been slowly worsening for quite some time.     He was recently hospitalized from 1/9/34 through 1/19/24 for severe anemia, and had undergone workup for this without definite culprit lesion identified. His home Coumadin for his mechanical mitral valve was held during his prior admission but was then restarted.      PMH: mechanical mitral valve replacement for severe mitral regurgitation and MAZE procedure in 2008. He is chronically anticoagulated with Warfarin, pulmonary hypertension, chronic HFpEF, hypertension, hyperlipidemia and morbid obesity.     2/1/24: Echo showed EF of 60-65%. The right ventricle is moderately dilated. Moderately decreased right ventricular systolic function. Flattened septum is consistent with RV pressure/volume overload. Moderate (46-55 mmHg) pulmonary hypertension is present. There is severe (4+) tricuspid regurgitation. S/P 31mm [employer] mechanical MVR. Mean 6.3 mmHg @ HR 64 bpm. Cannot visualize MR due to acoustic shadowing.There is no pericardial effusion.    2/5/24: EGD showed severe pill esophagitis with an actively bleeding ulcer s/p treatment.    2/8/24: Had repeat endoscopy with an oozing esophageal ulcer. He received 6 units of pRBC's.    IV Lasix diuresed.    Pt was started on Fe and continued on Warfarin. PTA Lasix, ABX's and KCL were discontinued at time of discharge.    Pt is scheduled for an OV on 3/15/24 at 1000 with SACHI Elaina Reaves at  our [place] Office.    Writer called TCU and appt as above was [place] [facility] No further questions. NIA Melissa RN.

## 2024-02-18 NOTE — TELEPHONE ENCOUNTER
Brice Stricklandtamia Wynne is a 79 year old  (1945), Nurse called today to report:      INR results.  INR stable today.   will continue on same dose with INR check on Friday 2/23/24       Electronically signed by:   Ebonie Brennan CNP i

## 2024-02-19 NOTE — PROGRESS NOTES
[facility] GERIATRICS    Chief Complaint   Patient presents with    Nursing Home Acute     HPI:  Feng Wynne is a 79 year old  (1945), who is being seen today for an episodic care visit at: [facility]  ([facility]) [[id number]].     By chart review, patient was hospitalized at [facility] 1/30-2/15/24 with acute on chronic anemia. Hospitalized previously at [facility] 1/9-1/19/24 with anemia, hgb 4.4 and underwent EGD, colonoscopy that did not reveal active bleeding. He was discharged to TCU, treated for pneumonia before being readmitted for hgb 6.0 in TCU. In ED, labs showed hgb 6.0, otherwise CBC, BMP grossly normal. BNP was mildly elevated at 1253. CXR showed pulmonary edema. FOBT was positive. Peripheral smear showed normocytic normochromic anemia, increased RBC regeneration without evidence of hemolysis. GI was consulted and recommended capsule endoscopy outpatient. He was started on heparin drip with coumadin for subtherapeutic INR. Caridology was consulted for volume overload and he was diuresed with IV lasix. He has persistent oxygen needs. On 2/5 he developed evidence of GIB and underwent EGD which showed severe esophagitis with active bleeding. He had local intervention completed and received multiple units PRBC. Anticoagulation was held. He underwent repeat endoscopy 2/8 which showed continued blood oozing from esophageal ulcer. He received BID PPI and carafate QID. Hgb remained stable. GI will follow outpatient to consider endoscopy, recommended avoiding doxycycline, oral potassium. He was transitioned to oral torsemide and liquid Kcl at discharge. Coumadin was resumed, dosed conservatively and mildly subtherapeutic. He was seen by therapies and recommended TCU at discharge.     Today's concern is: Seen today for routine follow-up in TCU resting abed. Partner Jose is present and helps augment some history. Ringing in his ear is resolved. He used some rubbing alcohol to dry it out. Diet has been  "difficult as he is a vegetarian but Jose is brining in some soups and items from home. He is eager to get some therapies today and move his legs. Edema is about the same, maybe slightly increased. He is still on supplemental O2, does not feel short of breath but saturations have been ~91. They have not weighed him today. He is denying CP, SOB, changes in b/b habits, fever/chills.    Allergies, and PMH/PSH reviewed in [facility] today.  REVIEW OF SYSTEMS:  10 point ROS of systems including Constitutional, Eyes, Respiratory, Cardiovascular, Gastroenterology, Genitourinary, Integumentary, Musculoskeletal, Psychiatric were all negative except for pertinent positives noted in my HPI.    Objective:   /74   Pulse 68   Temp 98  F (36.7  C)   Resp 18   Ht 1.803 m (5' 11\")   Wt 121.6 kg (268 lb)   SpO2 90%   BMI 37.38 kg/m    GENERAL APPEARANCE:  Alert, in no distress  RESP:  respiratory effort and palpation of chest normal, lungs clear to auscultation , no respiratory distress  CV:  Palpation and auscultation of heart done , regular rate and rhythm, no murmur, rub, or gallop, peripheral edema 2+ in both feet, 1+ BLE  ABDOMEN:  normal bowel sounds, soft, nontender, no hepatosplenomegaly or other masses  M/S:   Gait and station abnormal transfers with assist, wheelchair for mobility  SKIN:  Inspection of skin and subcutaneous tissue baseline, Palpation of skin and subcutaneous tissue baseline  NEURO:   prabhakar freely, follows commands  PSYCH:  oriented X 3, affect and mood normal    Labs done in SNF are in [place] [facility]. Please refer to them using [facility]/Care Everywhere. and Recent labs in [facility] reviewed by me today.     Assessment/Plan:  (D62) ABLA (acute blood loss anemia)  (primary encounter diagnosis)  (K22.11) Ulcer of esophagus with bleeding  Comment: acute on chronic, resolving. S/p EGD x 2 with local intervention, multiple units PRBC inpatient. No evidence of continued blood loss.        Hemoglobin   Date Value Ref " Range Status   02/15/2024 8.8 (L) 13.3 - 17.7 g/dL Final   02/14/2024 8.0 (L) 13.3 - 17.7 g/dL Final   10/16/2020 12.4 (L) 13.3 - 17.7 g/dL Final   03/14/2019 12.8 (L) 13.3 - 17.7 g/dL Final   Plan: continue FeSO4, carafate QID x 30 days, pantoprazole 40 mg BID, trend hgb, monitor for s/sx bleeding. Follow-up with GI per recommendations.      (H93.8X2) Congestion of left ear  Comment: acute, resolved  Plan: flonase 2 sprays bid x 7 days, Monitor symptoms     (Z79.01) Chronic anticoagulation  (Z95.2) S/P mitral valve replacement  Comment: chronic, INR Sunday was therapeutic.  Plan: continue warfarin per INR - recheck 2/23. Continue prophylactic clindamycin PRN for dental appointments, follow-up with cardiology per recommendations      (I50.32) Chronic heart failure with preserved ejection fraction (H)  (I27.20) Pulmonary hypertension (H)  (E87.1) Hyponatremia  Comment: chronic, edema is improving. Note mild hyponatremia on labs today, PO intake probably contributing, working with RD and bringing food from home.   Plan: continue torsemide 20 mg daily, Kcl, monitor weights, exam. Recheck BMP 2/22    (N18.2) CKD (chronic kidney disease) stage 2, GFR 60-89 ml/min  (primary encounter diagnosis)  Comment: chronic, baseline Cr 0.9-1.1.   Plan: Avoid nephrotoxins. Renally dose medications as appropriate. Monitor BMP.     (J96.01) Acute respiratory failure with hypoxia (H)  (J18.9) Pneumonia of right lung due to infectious organism, unspecified part of lung - resolved  Comment: acute, unable to wean O2 despite treatment of fluid overload, pneumonia. Likely anemia is contributing.  Plan: continue albuterol PRN, supplemental O2, wean as tolerated     (I10) Essential hypertension  Comment: chronic, well controlled  BP Readings from Last 3 Encounters:   02/19/24 117/74   02/15/24 116/62   02/15/24 108/52   Plan: continue toresmide, monitor BP     (E78.5) Dyslipidemia  Comment: chronic by history, not on statin  Plan: continue PTA  fish oil     (E66.01) Morbid obesity due to excess calories (H)  Comment: chronic  Plan: encourage activity, RD to follow     (K59.01) Slow transit constipation  Comment: chronic  Plan: monitor bowel habits, continue bowel regimen     (R53.81) Physical deconditioning  Comment: acute on chronic, related to acute and chronic conditions as above, recent hospitalizations   -management reviewed with therapy team, nursing staff today  Plan: PT/OT. SNF for assist with ADLs, medication management, meals, activities as needed        Orders:  Nursing please weigh patient today, update PRN per previous orders    Electronically signed by: MIKE Pillai CNP

## 2024-02-19 NOTE — LETTER
2/19/2024        RE: Feng Wynne  [address]  [place] [id number]        [facility] GERIATRICS    Chief Complaint   Patient presents with     Nursing Home Acute     HPI:  Feng Wynne is a 79 year old  (1945), who is being seen today for an episodic care visit at: [facility]  ([facility]) [[id number]].     By chart review, patient was hospitalized at [facility] 1/30-2/15/24 with acute on chronic anemia. Hospitalized previously at [facility] 1/9-1/19/24 with anemia, hgb 4.4 and underwent EGD, colonoscopy that did not reveal active bleeding. He was discharged to U, treated for pneumonia before being readmitted for hgb 6.0 in TCU. In ED, labs showed hgb 6.0, otherwise CBC, BMP grossly normal. BNP was mildly elevated at 1253. CXR showed pulmonary edema. FOBT was positive. Peripheral smear showed normocytic normochromic anemia, increased RBC regeneration without evidence of hemolysis. GI was consulted and recommended capsule endoscopy outpatient. He was started on heparin drip with coumadin for subtherapeutic INR. Caridology was consulted for volume overload and he was diuresed with IV lasix. He has persistent oxygen needs. On 2/5 he developed evidence of GIB and underwent EGD which showed severe esophagitis with active bleeding. He had local intervention completed and received multiple units PRBC. Anticoagulation was held. He underwent repeat endoscopy 2/8 which showed continued blood oozing from esophageal ulcer. He received BID PPI and carafate QID. Hgb remained stable. GI will follow outpatient to consider endoscopy, recommended avoiding doxycycline, oral potassium. He was transitioned to oral torsemide and liquid Kcl at discharge. Coumadin was resumed, dosed conservatively and mildly subtherapeutic. He was seen by therapies and recommended TCU at discharge.     Today's concern is: Seen today for routine follow-up in TCU resting abed. Partner Jose is present and helps augment some history.  "Ringing in his ear is resolved. He used some rubbing alcohol to dry it out. Diet has been difficult as he is a vegetarian but Jose is brining in some soups and items from home. He is eager to get some therapies today and move his legs. Edema is about the same, maybe slightly increased. He is still on supplemental O2, does not feel short of breath but saturations have been ~91. They have not weighed him today. He is denying CP, SOB, changes in b/b habits, fever/chills.    Allergies, and PMH/PSH reviewed in [facility] today.  REVIEW OF SYSTEMS:  10 point ROS of systems including Constitutional, Eyes, Respiratory, Cardiovascular, Gastroenterology, Genitourinary, Integumentary, Musculoskeletal, Psychiatric were all negative except for pertinent positives noted in my HPI.    Objective:   /74   Pulse 68   Temp 98  F (36.7  C)   Resp 18   Ht 1.803 m (5' 11\")   Wt 121.6 kg (268 lb)   SpO2 90%   BMI 37.38 kg/m    GENERAL APPEARANCE:  Alert, in no distress  RESP:  respiratory effort and palpation of chest normal, lungs clear to auscultation , no respiratory distress  CV:  Palpation and auscultation of heart done , regular rate and rhythm, no murmur, rub, or gallop, peripheral edema 2+ in both feet, 1+ BLE  ABDOMEN:  normal bowel sounds, soft, nontender, no hepatosplenomegaly or other masses  M/S:   Gait and station abnormal transfers with assist, wheelchair for mobility  SKIN:  Inspection of skin and subcutaneous tissue baseline, Palpation of skin and subcutaneous tissue baseline  NEURO:   prabhakar freely, follows commands  PSYCH:  oriented X 3, affect and mood normal    Labs done in SNF are in [facility]. Please refer to them using [facility]/Care Everywhere. and Recent labs in [facility] reviewed by me today.     Assessment/Plan:  (D62) ABLA (acute blood loss anemia)  (primary encounter diagnosis)  (K22.11) Ulcer of esophagus with bleeding  Comment: acute on chronic, resolving. S/p EGD x 2 with local intervention, multiple units " PRBC inpatient. No evidence of continued blood loss.        Hemoglobin   Date Value Ref Range Status   02/15/2024 8.8 (L) 13.3 - 17.7 g/dL Final   02/14/2024 8.0 (L) 13.3 - 17.7 g/dL Final   10/16/2020 12.4 (L) 13.3 - 17.7 g/dL Final   03/14/2019 12.8 (L) 13.3 - 17.7 g/dL Final   Plan: continue FeSO4, carafate QID x 30 days, pantoprazole 40 mg BID, trend hgb, monitor for s/sx bleeding. Follow-up with GI per recommendations.      (H93.8X2) Congestion of left ear  Comment: acute, resolved  Plan: flonase 2 sprays bid x 7 days, Monitor symptoms     (Z79.01) Chronic anticoagulation  (Z95.2) S/P mitral valve replacement  Comment: chronic, INR Sunday was therapeutic.  Plan: continue warfarin per INR - recheck 2/23. Continue prophylactic clindamycin PRN for dental appointments, follow-up with cardiology per recommendations      (I50.32) Chronic heart failure with preserved ejection fraction (H)  (I27.20) Pulmonary hypertension (H)  (E87.1) Hyponatremia  Comment: chronic, edema is improving. Note mild hyponatremia on labs today, PO intake probably contributing, working with RD and bringing food from home.   Plan: continue torsemide 20 mg daily, Kcl, monitor weights, exam. Recheck BMP 2/22    (N18.2) CKD (chronic kidney disease) stage 2, GFR 60-89 ml/min  (primary encounter diagnosis)  Comment: chronic, baseline Cr 0.9-1.1.   Plan: Avoid nephrotoxins. Renally dose medications as appropriate. Monitor BMP.     (J96.01) Acute respiratory failure with hypoxia (H)  (J18.9) Pneumonia of right lung due to infectious organism, unspecified part of lung - resolved  Comment: acute, unable to wean O2 despite treatment of fluid overload, pneumonia. Likely anemia is contributing.  Plan: continue albuterol PRN, supplemental O2, wean as tolerated     (I10) Essential hypertension  Comment: chronic, well controlled  BP Readings from Last 3 Encounters:   02/19/24 117/74   02/15/24 116/62   02/15/24 108/52   Plan: continue toresmide, monitor  BP     (E78.5) Dyslipidemia  Comment: chronic by history, not on statin  Plan: continue PTA fish oil     (E66.01) Morbid obesity due to excess calories (H)  Comment: chronic  Plan: encourage activity, RD to follow     (K59.01) Slow transit constipation  Comment: chronic  Plan: monitor bowel habits, continue bowel regimen     (R53.81) Physical deconditioning  Comment: acute on chronic, related to acute and chronic conditions as above, recent hospitalizations   -management reviewed with therapy team, nursing staff today  Plan: PT/OT. SNF for assist with ADLs, medication management, meals, activities as needed        Orders:  Nursing please weigh patient today, update PRN per previous orders    Electronically signed by: MIKE Pillai CNP         Sincerely,        MIKE Pillai CNP

## 2024-02-21 NOTE — PROGRESS NOTES
[facility]  INITIAL VISIT NOTE  February 22, 2024    PRIMARY CARE PROVIDER AND CLINIC:  Jayme Deng [address] / [place] [address]    CHIEF COMPLAINT:  Hospital follow-up/Initial visit    HPI:    Feng Wynne is a 79 year old  (1945) male who was seen at [facility] February [facility], 2024 for an initial visit.     Medical history is notable for CHF, pulmonary hypertension, severe mitral valve regurgitation (s/p mechanical valve replacement), permanent atrial fibrillation, hypertension, dyslipidemia, CKD, pneumonia, osteoarthritis, and severe obesity.    Summary of hospital course:  Patient was initially hospitalized at [facility] from January 9 through January 19, 2024 for profound anemia with hemoglobin of 4.4 due to possible GI bleed in the setting of supratherapeutic INR.  Anticoagulation was reversed and he was transfused with PRBC.  He was evaluated by GI service and underwent upper GI endoscopy on January 15 that showed normal findings.  Colonoscopy was also done on January 15 but preparation was poor that interfered with visualization.  Normal mucosa was found in the entire colon as well as internal hemorrhoids on retroflexion.  Notably, iron studies on admission showed iron deficiency with serum iron of 17, iron saturation of 4%, TIBC of 435, and ferritin of 21.  While hospitalized, he was treated with IV ceftriaxone for E. coli UTI and discharged on 3 days of oral cefuroxime. A Stubbs catheter was also placed for urinary retention. He was ultimately discharged to [facility].  In the TCU he was diagnosed with right-sided pneumonia and treated with cefuroxime and doxycycline after receiving 2 g of IM ceftriaxone x 1.  He was rehospitalized at [facility] from January 30 through February 15, 2024 for acute on chronic anemia and acute on chronic CHF.  EKG showed atrial fibrillation with a  heart rate of 76 bpm.  Laboratory evaluation was significant for white count 3.4, hemoglobin 6, platelets 172,000,  INR 1.98, positive stool guaiac, high-sensitivity troponin 58, and N-terminal proBNP 1253. Chest x-ray showed improved left lateral patchy opacity, increased right perihilar hazy opacities suggestive of worsening pulmonary edema, vascular congestion, and cardiomegaly.  Echocardiogram was remarkable for LVEF 60 to 65%, moderately decreased RV systolic function, moderate pulmonary hypertension, 4+ tricuspid gravitation, and presence of St. Raffi mechanical mitral valve.  He was evaluated by GI service and underwent upper endoscopy on February 5 that showed severe pill esophagitis with active bleeding ulcer in the middle third of esophagus that was cauterized with BiCap.  His warfarin was held, he was placed on IV heparin bridge, and treated with PPI and sucralfate.  He was diuresed with IV furosemide for acute CHF that eventually transitioned to oral torsemide 20 mg daily.  He was ultimately placed back on warfarin. Follow-up endoscopy on February 8 was remarkable for oozing esophageal ulcer prompting IV heparin discontinuation.  Notably, he was transfused with several units of PRBC and received IV Venofer x 2 in the hospital.  He was seen by ENT for epistaxis but no intervention was warranted.  At discharge, hemoglobin was 8.8. TCU was recommended per therapies.    Patient is admitted to this facility for medical management, nursing care, and rehab.     Of note, history was obtained from patient, facility RN, and extensive review of the chart.    Today's visit:  Patient was seen in his room, lying in bed.  He looks comfortable.  He is currently on oxygen at 1 L/min.  He feels somewhat lightheaded upon standing.  He also reports vertigo when he suddenly moves his head.  He denies fever, chills, chest pain, palpitation, dyspnea, nausea, vomiting, abdominal pain, or urinary symptoms.  He had a bowel  movement last night which was normal.  He no longer has a Stubbs catheter but does straight caths.  Today's INR is 1.9.      CODE STATUS:   CPR/Full code     PAST MEDICAL HISTORY:   Chronic HFpEF (LVEF 60 to 65%, per echo on February 1, 2024)  Moderately decreased RV systolic function  Moderate pulmonary hypertension  Severe tricuspid regurgitation  Severe mitral valve regurgitation, s/p mechanical (St. Raffi) mitral valve replacement in September 2008  Permanent atrial fibrillation, s/p MAZE procedure in September 2008  Hypertension  Dyslipidemia  Severe pill esophagitis with bleeding esophageal ulcer (middle third), s/p cauterization with BiCap on February 5, 2024  GERD  Internal hemorrhoids  CKD stage II, baseline creatinine 0.9-1.1  Urinary retention  E. coli UTI in January 2024  Pneumonia  Pressure injury of coccyx  Osteoarthritis  Severe obesity, BMI 37.4  Suspected sleep apnea  Moderate malnutrition      Past Medical History:   Diagnosis Date     Acute on chronic congestive heart failure, unspecified heart failure type (H) 1/30/2024     Arthritis      Atrial fibrillation (H)      Coronary artery disease      Hypercholesteremia      Morbid obesity (H)      Unspecified essential hypertension        PAST SURGICAL HISTORY:   Past Surgical History:   Procedure Laterality Date     COLONOSCOPY N/A 1/15/2024    Procedure: Colonoscopy;  Surgeon: Osbaldo Olvera MD;  Location:  GI     ESOPHAGOSCOPY, GASTROSCOPY, DUODENOSCOPY (EGD), COMBINED N/A 1/15/2024    Procedure: Esophagoscopy, gastroscopy, duodenoscopy (EGD), combined;  Surgeon: Osbaldo Olvera MD;  Location:  GI     ESOPHAGOSCOPY, GASTROSCOPY, DUODENOSCOPY (EGD), COMBINED N/A 2/8/2024    Procedure: Esophagoscopy, gastroscopy, duodenoscopy (EGD), combined;  Surgeon: Osbaldo Olvera MD;  Location:  GI     MITRAL VALVE REPLACEMENT  8-    Mitral valve replacement with 31-mm St. Raffi mechanical valve.        FAMILY HISTORY:   Family  History   Problem Relation Age of Onset     Cerebrovascular Disease Father      Diabetes Paternal Grandmother      SINAN. Brother         CABG X 3 at age 51       SOCIAL HISTORY:  Social History     Tobacco Use     Smoking status: Former     Packs/day: 0.50     Years: 5.00     Additional pack years: 0.00     Total pack years: 2.50     Types: Cigarettes     Smokeless tobacco: Never     Tobacco comments:     quit 20+ yrs ago   Substance Use Topics     Alcohol use: Yes     Comment: 1 drink per month       MEDICATIONS:  Current Outpatient Medications   Medication Sig Dispense Refill     albuterol (PROAIR HFA/PROVENTIL HFA/VENTOLIN HFA) 108 (90 Base) MCG/ACT inhaler Inhale 1-2 puffs into the lungs every 4 hours as needed for shortness of breath, wheezing or cough 18 g 3     bisacodyl (DULCOLAX) 10 MG suppository Place 10 mg rectally daily as needed for constipation Every 3 days if no BM       calcium carbonate (TUMS) 500 MG chewable tablet Take 1 tablet (500 mg) by mouth 3 times daily as needed for heartburn       clindamycin (CLEOCIN) 150 MG capsule TAKE 4 CAPS BY MOUTH 1 HOUR PRIOR TO DENTAL APPOINTMENT       ferrous sulfate (FEROSUL) 325 (65 Fe) MG tablet Take 1 tablet (325 mg) by mouth daily       fluticasone (FLONASE) 50 MCG/ACT nasal spray Spray 2 sprays into both nostrils daily       Multiple Vitamins-Minerals (CENTRUM SILVER PO) Take 1 tablet by mouth daily.       nystatin (MYCOSTATIN) 114648 UNIT/GM external powder Apply topically 2 times daily Groin folds       Omega-3 Fatty Acids (FISH OIL CONCENTRATE PO) Take 1 capsule by mouth daily       order for DME Equipment being ordered: COMPRESSION STOCKINGS, 20-30 mmHg 1 each 1     pantoprazole (PROTONIX) 40 MG EC tablet Take 1 tablet (40 mg) by mouth 2 times daily (before meals)       polyethylene glycol (MIRALAX) 17 GM/Dose powder Take 17 g by mouth daily       potassium chloride (KAYCIEL) 10 % SOLN solution Take 15 mLs (20 mEq) by mouth daily       sennosides  "(SENOKOT) 8.6 MG tablet Take 1 tablet by mouth 2 times daily as needed for constipation       sodium chloride (OCEAN) 0.65 % nasal spray Spray 1 spray into both nostrils every hour as needed for congestion       sucralfate (CARAFATE) 1 GM/10ML suspension Take 10 mLs (1 g) by mouth 4 times daily (before meals and nightly) for 30 days       torsemide (DEMADEX) 20 MG tablet Take 1 tablet (20 mg) by mouth daily       Warfarin Therapy Reminder Take 1 each by mouth See Admin Instructions Per INR         MED REC REQUIRED  Post Medication Reconciliation Status: medication reconcilation previously completed during another office visit      ALLERGIES:  Allergies   Allergen Reactions     Amiodarone Shortness Of Breath     Pcn [Penicillins] Shortness Of Breath     Rxn occurred in childhood      Statins Muscle Pain (Myalgia) and Hives     Amiodarone      Latex      Zetia [Ezetimibe] Muscle Pain (Myalgia)     Muscle weakness legs       ROS:  10 point ROS were negative other than the symptoms noted above in the HPI.    PHYSICAL EXAM:  Vital signs were reviewed in the chart.  Vital Signs: /68   Pulse 68   Temp 97.2  F (36.2  C)   Resp 18   Ht 1.803 m (5' 11\")   Wt 121.6 kg (268 lb)   SpO2 91%   BMI 37.38 kg/m    General: Comfortable and in no acute distress  HEENT: Conjunctival pallor, no scleral icterus or injection, moist oral mucosa  Cardiovascular: Mechanical sounding S1, normal S2, irregularly irregular rhythm   Respiratory: Bilateral basilar crackles  GI: Abdomen soft, non-tender, non-distended, +BS  Extremities: No significant LE edema  Neuro: CX II-XII grossly intact; ROM in all four extremities grossly intact  Psych: Alert and oriented almost x3; normal affect  Skin: No acute rash    LABORATORY/IMAGING DATA:  All relevant labs and imaging data in [facility] and/or Care Everywhere were personally reviewed today.      Most Recent 3 CBC's:Recent Labs   Lab Test 02/19/24  0618 02/15/24  0857 02/14/24  0535 " 02/13/24  0911   WBC 3.2*  --  3.8* 4.1   HGB 8.5* 8.8* 8.0* 8.0*   MCV 99  --  96 96     --  175 182     Most Recent 3 BMP's:  Recent Labs   Lab Test 02/22/24  0548 02/19/24  0618 02/15/24  0554    133* 135   POTASSIUM 4.5 3.6 3.7   CHLORIDE 96* 91* 88*   CO2 31* 35* 41*   BUN 19.8 17.0 18.7   CR 1.06 1.12 0.98   ANIONGAP 9 7 6*   JOSEPH 8.8 8.8 8.9   GLC 66* 75 91     Most Recent 2 LFT's:Recent Labs   Lab Test 02/03/24  0741 01/15/24  1815   AST 27 29   ALT 9 13   ALKPHOS 58 58   BILITOTAL 0.6 1.0         ASSESSMENT/PLAN:  Acute upper GI bleed,  Severe pill esophagitis with bleeding esophageal ulcer (middle third), s/p cauterization with BiCap on February 5, 2024,  GERD.  Patient is hemodynamically stable.  Plan:  Continue sucralfate 1 g 4 times daily for 4 weeks through March 16  Continue pantoprazole 40 mg twice daily  Continue Tums as needed  Monitor for recurrence of GI bleed  Follow-up with GI as directed    ABLA,  Iron deficiency.   Patient presented with profound anemia due to upper GI bleed as well as intermittent epistaxis for 3 months.  Patient was transfused with several units of PRBC and received IV Venofer x 2 in the hospital.  Last CBC on February 19 with hemoglobin 8.5 and MCV 99.  Plan:  Continue ferrous sulfate 325 mg daily  Closely monitor hemoglobin  Transfuse PRN for hemoglobin less than 7    Leukopenia.  Noted since January 2024.  Last white count 3.2 on February 19.  Plan:  Monitor intermittently    Acute on chronic HFpEF (LVEF 60 to 65%, per echo on February 1, 2024),  Moderately decreased RV systolic function,  Moderate pulmonary hypertension,  Severe tricuspid regurgitation.  Patient was diuresed with IV furosemide in the hospital and discharged on oral torsemide.  Patient currently weighs 268 LBS and has no significant peripheral edema.  Plan:  Continue torsemide 20 mg daily  Continue potassium chloride 20 mEq daily  Monitor weight and volume status  Follow-up with cardiology as  directed    Acute hypoxic respiratory failure.  Likely multifactorial due to acute CHF, volume overload, and anemia.  Recent antibiotic therapy for possible right-sided pneumonia.  Discharged to TCU on nasal cannula oxygen.  Patient is currently on oxygen at 1 L/min and is saturating at 91%.  Plan:  Continue supplemental oxygen, wean as able  Continue to encourage using incentive spirometer  Continue management of CHF and anemia as above  Monitor respiratory status    History of severe mitral valve regurgitation, s/p mechanical (St. Raffi) mitral valve replacement in September 2008,  Permanent atrial fibrillation, s/p MAZE procedure in September 2008.  Patient is on chronic anticoagulation with warfarin with INR target 2.5-3.5.  Previously on warfarin 7.5 mg every Tuesday and Saturday, and 5 mg all other days.  EKG in the hospital showed atrial fibrillation.  Not on rate control medications or antiarrhythmics.  Rate is controlled.  He is currently on warfarin 2 mg daily.   Today's INR is 1.9.  Plan:  Warfarin 4 mg today and recheck INR tomorrow  Adjust warfarin dose for INR target 2.5-3.5  Monitor heart rate  Follow-up with cardiology as directed    Hypertension.  Blood pressure is fairly controlled.  Plan:  Continue torsemide 20 mg daily  Monitor blood pressure    Dyslipidemia.  Intolerant of ezetimibe and statins.  Plan:  Continue fish oil 1200 mg daily    CKD stage II.  Baseline creatinine 0.9-1.1.  Today's creatinine is stable at 1.06.  Plan:  Avoid NSAIDs and nephrotoxins  Monitor renal function periodically    Urinary retention.  A Stubbs catheter was placed during the Jan 9-Jan 19, 2024 hospitalization.  Stubbs catheter was removed on February 8 in the hospital and patient does straight cath 1-2 times a day..  Plan:  Continue straight caths  Monitor     Sacrococcygeal pressure injury, stage III.  Present on admission.  Plan:  Continue wound care per WOC instructions    History of epistaxis.  Plan:   Monitor for  recurrence    Slow transit constipation.  Plan:  Continue the bowel regimen    Severe obesity, BMI 37.4,  Suspected sleep apnea (per assessment in the hospital).  Plan:  Staff to assist with daily care and mobility  Sleep study as outpatient    Moderate malnutrition.  Per assessment in the hospital.  Plan:  Continue the nutritional supplement per RD.    Physical deconditioning.  Plan:  Continue PT/OT evaluation and therapy        Orders written by provider at facility:  BMP on February 29, DX: CHF, CKD  CBC on February 26, DX: Anemia  Warfarin 4 mg p.o. tonight and recheck INR tomorrow, February 23, DX: Mechanical mitral valve, atrial fibrillation        Total time: 80 minutes including face to face time with the patient, reviewing the chart in EPIC, adjusting warfarin, ordering labs, communicating the plan of care with RN, and documenting all information electronically.      Disclaimer: This note contains text created using speech-recognition software and may have unintended word substitutions.      Electronically signed by:  Pavan Hernandez MD

## 2024-02-22 NOTE — PATIENT INSTRUCTIONS
Orders for Feng Wynne (1945), MR# [id number]:    1) BMP on February 29, DX: CHF, CKD  2) CBC on February 26, DX: Anemia  3) Warfarin 4 mg p.o. tonight and recheck INR tomorrow, February 23, DX: Mechanical mitral valve, atrial fibrillation    Pavan Hernandez MD  [facility]

## 2024-02-22 NOTE — LETTER
2/22/2024        RE: Feng Wynne  [address]  [facility] [address]        [facility]  INITIAL VISIT NOTE  February 22, 2024    PRIMARY CARE PROVIDER AND CLINIC:  Jayme Deng [address] / [place] [address]    CHIEF COMPLAINT:  Hospital follow-up/Initial visit    HPI:    Feng Wynne is a 79 year old  (1945) male who [facility], [facility] for an initial visit.     Medical history is notable for CHF, pulmonary hypertension, severe mitral valve regurgitation (s/p mechanical valve replacement), permanent atrial fibrillation, hypertension, dyslipidemia, CKD, pneumonia, osteoarthritis, and severe obesity.    Summary of hospital course:  Patient was initially hospitalized at [facility] from January 9 through January 19, 2024 for profound anemia with hemoglobin of 4.4 due to possible GI bleed in the setting of supratherapeutic INR.  Anticoagulation was reversed and he was transfused with PRBC.  He was evaluated by GI service and underwent upper GI endoscopy on January 15 that showed normal findings.  Colonoscopy was also done on January 15 but preparation was poor that interfered with visualization.  Normal mucosa was found in the entire colon as well as internal hemorrhoids on retroflexion.  Notably, iron studies on admission showed iron deficiency with serum iron of 17, iron saturation of 4%, TIBC of 435, and ferritin of 21.  While hospitalized, he was treated with IV ceftriaxone for E. coli UTI and discharged on 3 days of oral cefuroxime. A Stubbs catheter was also placed for urinary retention. He was ultimately discharged to [facility] for rehab.  In the TCU he was diagnosed with right-sided pneumonia and treated with cefuroxime and doxycycline after receiving 2 g of IM ceftriaxone x 1.  He was rehospitalized at [facility] from January 30 through February 15, 2024 for  acute on chronic anemia and acute on chronic CHF.  EKG showed atrial fibrillation with a heart rate of 76 bpm.  Laboratory evaluation was significant for white count 3.4, hemoglobin 6, platelets 172,000,  INR 1.98, positive stool guaiac, high-sensitivity troponin 58, and N-terminal proBNP 1253. Chest x-ray showed improved left lateral patchy opacity, increased right perihilar hazy opacities suggestive of worsening pulmonary edema, vascular congestion, and cardiomegaly.  Echocardiogram was remarkable for LVEF 60 to 65%, moderately decreased RV systolic function, moderate pulmonary hypertension, 4+ tricuspid gravitation, and presence of St. Raffi mechanical mitral valve.  He was evaluated by GI service and underwent upper endoscopy on February 5 that showed severe pill esophagitis with active bleeding ulcer in the middle third of esophagus that was cauterized with BiCap.  His warfarin was held, he was placed on IV heparin bridge, and treated with PPI and sucralfate.  He was diuresed with IV furosemide for acute CHF that eventually transitioned to oral torsemide 20 mg daily.  He was ultimately placed back on warfarin. Follow-up endoscopy on February 8 was remarkable for oozing esophageal ulcer prompting IV heparin discontinuation.  Notably, he was transfused with several units of PRBC and received IV Venofer x 2 in the hospital.  He was seen by ENT for epistaxis but no intervention was warranted.  At discharge, hemoglobin was 8.8. TCU was recommended per therapies.    Patient is admitted to this facility for medical management, nursing care, and rehab.     Of note, history was obtained from patient, facility RN, and extensive review of the chart.    Today's visit:  Patient was seen in his room, lying in bed.  He looks comfortable.  He is currently on oxygen at 1 L/min.  He feels somewhat lightheaded upon standing.  He also reports vertigo when he suddenly moves his head.  He denies fever, chills, chest pain,  palpitation, dyspnea, nausea, vomiting, abdominal pain, or urinary symptoms.  He had a bowel movement last night which was normal.  He no longer has a Stubbs catheter but does straight caths.  Today's INR is 1.9.      CODE STATUS:   CPR/Full code     PAST MEDICAL HISTORY:   Chronic HFpEF (LVEF 60 to 65%, per echo on February 1, 2024)  Moderately decreased RV systolic function  Moderate pulmonary hypertension  Severe tricuspid regurgitation  Severe mitral valve regurgitation, s/p mechanical (St. Raffi) mitral valve replacement in September 2008  Permanent atrial fibrillation, s/p MAZE procedure in September 2008  Hypertension  Dyslipidemia  Severe pill esophagitis with bleeding esophageal ulcer (middle third), s/p cauterization with BiCap on February 5, 2024  GERD  Internal hemorrhoids  CKD stage II, baseline creatinine 0.9-1.1  Urinary retention  E. coli UTI in January 2024  Pneumonia  Pressure injury of coccyx  Osteoarthritis  Severe obesity, BMI 37.4  Suspected sleep apnea  Moderate malnutrition      Past Medical History:   Diagnosis Date     Acute on chronic congestive heart failure, unspecified heart failure type (H) 1/30/2024     Arthritis      Atrial fibrillation (H)      Coronary artery disease      Hypercholesteremia      Morbid obesity (H)      Unspecified essential hypertension        PAST SURGICAL HISTORY:   Past Surgical History:   Procedure Laterality Date     COLONOSCOPY N/A 1/15/2024    Procedure: Colonoscopy;  Surgeon: Osbaldo Olvera MD;  Location:  GI     ESOPHAGOSCOPY, GASTROSCOPY, DUODENOSCOPY (EGD), COMBINED N/A 1/15/2024    Procedure: Esophagoscopy, gastroscopy, duodenoscopy (EGD), combined;  Surgeon: Osbaldo Olvera MD;  Location:  GI     ESOPHAGOSCOPY, GASTROSCOPY, DUODENOSCOPY (EGD), COMBINED N/A 2/8/2024    Procedure: Esophagoscopy, gastroscopy, duodenoscopy (EGD), combined;  Surgeon: Osbaldo Olvera MD;  Location: [facility]     MITRAL VALVE REPLACEMENT  8-     Mitral valve replacement with 31-mm St. Raffi mechanical valve.        FAMILY HISTORY:   Family History   Problem Relation Age of Onset     Cerebrovascular Disease Father      Diabetes Paternal Grandmother      C.A.SOLA. Brother         CABG X 3 at age 51       SOCIAL HISTORY:  Social History     Tobacco Use     Smoking status: Former     Packs/day: 0.50     Years: 5.00     Additional pack years: 0.00     Total pack years: 2.50     Types: Cigarettes     Smokeless tobacco: Never     Tobacco comments:     quit 20+ yrs ago   Substance Use Topics     Alcohol use: Yes     Comment: 1 drink per month       MEDICATIONS:  Current Outpatient Medications   Medication Sig Dispense Refill     albuterol (PROAIR HFA/PROVENTIL HFA/VENTOLIN HFA) 108 (90 Base) MCG/ACT inhaler Inhale 1-2 puffs into the lungs every 4 hours as needed for shortness of breath, wheezing or cough 18 g 3     bisacodyl (DULCOLAX) 10 MG suppository Place 10 mg rectally daily as needed for constipation Every 3 days if no BM       calcium carbonate (TUMS) 500 MG chewable tablet Take 1 tablet (500 mg) by mouth 3 times daily as needed for heartburn       clindamycin (CLEOCIN) 150 MG capsule TAKE 4 CAPS BY MOUTH 1 HOUR PRIOR TO DENTAL APPOINTMENT       ferrous sulfate (FEROSUL) 325 (65 Fe) MG tablet Take 1 tablet (325 mg) by mouth daily       fluticasone (FLONASE) 50 MCG/ACT nasal spray Spray 2 sprays into both nostrils daily       Multiple Vitamins-Minerals (CENTRUM SILVER PO) Take 1 tablet by mouth daily.       nystatin (MYCOSTATIN) 246088 UNIT/GM external powder Apply topically 2 times daily Groin folds       Omega-3 Fatty Acids (FISH OIL CONCENTRATE PO) Take 1 capsule by mouth daily       order for DME Equipment being ordered: COMPRESSION STOCKINGS, 20-30 mmHg 1 each 1     pantoprazole (PROTONIX) 40 MG EC tablet Take 1 tablet (40 mg) by mouth 2 times daily (before meals)       polyethylene glycol (MIRALAX) 17 GM/Dose powder Take 17 g by mouth daily       potassium  "chloride (KAYCIEL) 10 % SOLN solution Take 15 mLs (20 mEq) by mouth daily       sennosides (SENOKOT) 8.6 MG tablet Take 1 tablet by mouth 2 times daily as needed for constipation       sodium chloride (OCEAN) 0.65 % nasal spray Spray 1 spray into both nostrils every hour as needed for congestion       sucralfate (CARAFATE) 1 GM/10ML suspension Take 10 mLs (1 g) by mouth 4 times daily (before meals and nightly) for 30 days       torsemide (DEMADEX) 20 MG tablet Take 1 tablet (20 mg) by mouth daily       Warfarin Therapy Reminder Take 1 each by mouth See Admin Instructions Per INR         MED REC REQUIRED  Post Medication Reconciliation Status: medication reconcilation previously completed during another office visit      ALLERGIES:  Allergies   Allergen Reactions     Amiodarone Shortness Of Breath     Pcn [Penicillins] Shortness Of Breath     Rxn occurred in childhood      Statins Muscle Pain (Myalgia) and Hives     Amiodarone      Latex      Zetia [Ezetimibe] Muscle Pain (Myalgia)     Muscle weakness legs       ROS:  10 point ROS were negative other than the symptoms noted above in the HPI.    PHYSICAL EXAM:  Vital signs were reviewed in the chart.  Vital Signs: /68   Pulse 68   Temp 97.2  F (36.2  C)   Resp 18   Ht 1.803 m (5' 11\")   Wt 121.6 kg (268 lb)   SpO2 91%   BMI 37.38 kg/m    General: Comfortable and in no acute distress  HEENT: Conjunctival pallor, no scleral icterus or injection, moist oral mucosa  Cardiovascular: Mechanical sounding S1, S2, irregularly irregular rhythm   Respiratory: Bilateral basilar crackles  GI: Abdomen soft, non-tender, non-distended, +BS  Extremities: No significant LE edema  Neuro: CX II-XII grossly intact; ROM in all four extremities grossly intact  Psych: Alert and oriented almost x3; normal affect  Skin: No acute rash    LABORATORY/IMAGING DATA:  All relevant labs and imaging data in [facility] and/or Care Everywhere were personally reviewed today.      Most Recent 3 " CBC's:Recent Labs   Lab Test 02/19/24  0618 02/15/24  0857 02/14/24  0535 02/13/24  0911   WBC 3.2*  --  3.8* 4.1   HGB 8.5* 8.8* 8.0* 8.0*   MCV 99  --  96 96     --  175 182     Most Recent 3 BMP's:  Recent Labs   Lab Test 02/22/24  0548 02/19/24  0618 02/15/24  0554    133* 135   POTASSIUM 4.5 3.6 3.7   CHLORIDE 96* 91* 88*   CO2 31* 35* 41*   BUN 19.8 17.0 18.7   CR 1.06 1.12 0.98   ANIONGAP 9 7 6*   JOSEPH 8.8 8.8 8.9   GLC 66* 75 91     Most Recent 2 LFT's:Recent Labs   Lab Test 02/03/24  0741 01/15/24  1815   AST 27 29   ALT 9 13   ALKPHOS 58 58   BILITOTAL 0.6 1.0         ASSESSMENT/PLAN:  Acute upper GI bleed,  Severe pill esophagitis with bleeding esophageal ulcer (middle third), s/p cauterization with BiCap on February 5, 2024,  GERD.  Patient is hemodynamically stable.  Plan:  Continue sucralfate 1 g 4 times daily for 4 weeks through March 16  Continue pantoprazole 40 mg twice daily  Continue Tums as needed  Monitor for recurrence of GI bleed  Follow-up with GI as directed    ABLA,  Iron deficiency.   Patient presented with profound anemia due to upper GI bleed as well as intermittent epistaxis for 3 months.  Patient was transfused with several units of PRBC and received IV Venofer x 2 in the hospital.  Last CBC on February 19 with hemoglobin 8.5 and MCV 99.  Plan:  Continue ferrous sulfate 325 mg daily  Closely monitor hemoglobin  Transfuse PRN for hemoglobin less than 7    Leukopenia.  Noted since January 2024.  Last white count 3.2 on February 19.  Plan:  Monitor intermittently    Acute on chronic HFpEF (LVEF 60 to 65%, per echo on February 1, 2024),  Moderately decreased RV systolic function,  Moderate pulmonary hypertension,  Severe tricuspid regurgitation.  Patient was diuresed with IV furosemide in the hospital and discharged on oral torsemide.  Patient currently weighs 268 LBS and has no significant peripheral edema.  Plan:  Continue torsemide 20 mg daily  Continue potassium chloride 20  mEq daily  Monitor weight and volume status  Follow-up with cardiology as directed    Acute hypoxic respiratory failure.  Likely multifactorial due to acute CHF, volume overload, and anemia.  Recent antibiotic therapy for possible right-sided pneumonia.  Discharged to TCU on nasal cannula oxygen.  Patient is currently on oxygen at 1 L/min and is saturating at 91%.  Plan:  Continue supplemental oxygen, wean as able  Continue to encourage using incentive spirometer  Continue management of CHF and anemia as above  Monitor respiratory status    History of severe mitral valve regurgitation, s/p mechanical (St. Raffi) mitral valve replacement in September 2008,  Permanent atrial fibrillation, s/p MAZE procedure in September 2008.  Patient is on chronic anticoagulation with warfarin with INR target 2.5-3.5.  Previously on warfarin 7.5 mg every Tuesday and Saturday, and 5 mg all other days.  EKG in the hospital showed atrial fibrillation.  Not on rate control medications or antiarrhythmics.  Rate is controlled.  He is currently on warfarin 2 mg daily.   Today's INR is 1.9.  Plan:  Warfarin 4 mg today and recheck INR tomorrow  Adjust warfarin dose for INR target 2.5-3.5  Monitor heart rate  Follow-up with cardiology as directed    Hypertension.  Blood pressure is fairly controlled.  Plan:  Continue torsemide 20 mg daily  Monitor blood pressure    Dyslipidemia.  Intolerant of ezetimibe and statins.  Plan:  Continue fish oil 1200 mg daily    CKD stage II.  Baseline creatinine 0.9-1.1.  Today's creatinine is stable at 1.06.  Plan:  Avoid NSAIDs and nephrotoxins  Monitor renal function periodically    Urinary retention.  A Stubbs catheter was placed during the Jan 9-Jan 19, 2024 hospitalization.  Stubbs catheter was removed on February 8 in the hospital patient does straight cath 1-2 times a day..  Plan:  Continue straight caths  Monitor     Sacrococcygeal pressure injury, stage III.  Present on admission.  Plan:  Continue wound  care per WOC instructions    History of epistaxis.  Plan:   Monitor for recurrence    Slow transit constipation.  Plan:  Continue the bowel regimen    Severe obesity, BMI 37.4,  Suspected sleep apnea (per assessment hospital).  Plan:  Staff to assist with daily care and mobility  Sleep study as outpatient    Moderate malnutrition.  Per assessment in the hospital.  Plan:  Continue the nutritional supplement per RD.    Physical deconditioning.  Plan:  Continue PT/OT evaluation and therapy        Orders written by provider at facility:  BMP on February 29, DX: CHF, CKD  CBC on February 26, DX: Anemia  Warfarin 4 mg p.o. tonight and recheck INR tomorrow, February 23, DX: Mechanical mitral valve, atrial fibrillation        Total time: 80 minutes including face to face time with the patient, reviewing the chart in EPIC, adjusting warfarin, ordering labs, communicating the plan of care with RN, and documenting all information electronically.      Disclaimer: This note contains text created using speech-recognition software and may have unintended word substitutions.      Electronically signed by:  Pavan Hernandez MD                          Sincerely,        Pavan Hernandez MD

## 2024-02-23 NOTE — PROGRESS NOTES
[facility]  February 26, 2024      CHIEF COMPLAINT:  Episodic/follow-up visit    HPI:    Feng Wynne is a 79 year old  (1945), who is being seen today for an episodic care visit at [facility].     Medical history is notable for CHF, pulmonary hypertension, severe mitral valve regurgitation (s/p mechanical valve replacement), permanent atrial fibrillation, hypertension, dyslipidemia, CKD, pneumonia, osteoarthritis, and severe obesity.     Summary of hospital course:  Patient was initially hospitalized at [facility] from January 9 through January 19, 2024 for profound anemia with hemoglobin of 4.4 due to possible GI bleed in the setting of supratherapeutic INR.  Anticoagulation was reversed and he was transfused with PRBC.  He was evaluated by GI service and underwent upper GI endoscopy on January 15 that showed normal findings.  Colonoscopy was also done on January 15 but preparation was poor that interfered with visualization.  Normal mucosa was found in the entire colon as well as internal hemorrhoids on retroflexion.  Notably, iron studies on admission showed iron deficiency with serum iron of 17, iron saturation of 4%, TIBC of 435, and ferritin of 21.  While hospitalized, he was treated with IV ceftriaxone for E. coli UTI and discharged on 3 days of oral cefuroxime. A Stubbs catheter was also placed for urinary retention. He was ultimately discharged to [facility] for rehab.  In the TCU he was diagnosed with right-sided pneumonia and treated with cefuroxime and doxycycline after receiving 2 g of IM ceftriaxone x 1.  He was rehospitalized at [facility] from January 30 through February 15, 2024 for acute on chronic anemia and acute on chronic CHF.  EKG showed atrial fibrillation with a heart rate of 76 bpm.  Laboratory evaluation was significant for white count 3.4, hemoglobin 6, platelets 172,000,  INR 1.98,  positive stool guaiac, high-sensitivity troponin 58, and N-terminal proBNP 1253. Chest x-ray showed improved left lateral patchy opacity, increased right perihilar hazy opacities suggestive of worsening pulmonary edema, vascular congestion, and cardiomegaly. Echocardiogram was remarkable for LVEF 60 to 65%, moderately decreased RV systolic function, moderate pulmonary hypertension, 4+ tricuspid gravitation, and presence of St. Raffi mechanical mitral valve.  He was evaluated by GI service and underwent upper endoscopy on February 5 that showed severe pill esophagitis with active bleeding ulcer in the middle third of esophagus that was cauterized with BiCap. Warfarin was held, he was placed on IV heparin bridge, and treated with PPI and sucralfate.  He was diuresed with IV furosemide for acute CHF that eventually transitioned to oral torsemide 20 mg daily.  He was ultimately placed back on warfarin. Follow-up endoscopy on February 8 was remarkable for oozing esophageal ulcer prompting IV heparin discontinuation.  Notably, he was transfused with several units of PRBC and received IV Venofer x 2 in the hospital.  He was seen by ENT for epistaxis but no intervention was warranted.  At discharge, hemoglobin was 8.8. TCU was recommended per therapies and he was then admitted to this facility for medical management, nursing care, and rehab.       Today's visit:  Patient was seen in his room, lying bed.  He appears comfortable.  He overall feels well.  He has been weaned off oxygen.  His vertigo has resolved after Epley maneuver with PT.  He reports no recurrence of GI bleed or epistaxis.  He denies fever, chills, chest pain, palpitation, dyspnea, nausea, vomiting, or abdominal pain.  He continues to do straight cath 1-3 times a day.  He had a BM 2 days ago.      CODE STATUS:   CPR/Full code     Past Medical, Surgical, Family, and Social History were reviewed in [employer].    Current Outpatient Medications   Medication Sig  Dispense Refill     albuterol (PROAIR HFA/PROVENTIL HFA/VENTOLIN HFA) 108 (90 Base) MCG/ACT inhaler Inhale 1-2 puffs into the lungs every 4 hours as needed for shortness of breath, wheezing or cough 18 g 3     bisacodyl (DULCOLAX) 10 MG suppository Place 10 mg rectally daily as needed for constipation Every 3 days if no BM       calcium carbonate (TUMS) 500 MG chewable tablet Take 1 tablet (500 mg) by mouth 3 times daily as needed for heartburn       clindamycin (CLEOCIN) 150 MG capsule TAKE 4 CAPS BY MOUTH 1 HOUR PRIOR TO DENTAL APPOINTMENT       ferrous sulfate (FEROSUL) 325 (65 Fe) MG tablet Take 1 tablet (325 mg) by mouth daily       fluticasone (FLONASE) 50 MCG/ACT nasal spray Spray 2 sprays into both nostrils daily       Multiple Vitamins-Minerals (CENTRUM SILVER PO) Take 1 tablet by mouth daily.       nystatin (MYCOSTATIN) 660734 UNIT/GM external powder Apply topically 2 times daily Groin folds       Omega-3 Fatty Acids (FISH OIL CONCENTRATE PO) Take 1 capsule by mouth daily       order for DME Equipment being ordered: COMPRESSION STOCKINGS, 20-30 mmHg 1 each 1     pantoprazole (PROTONIX) 40 MG EC tablet Take 1 tablet (40 mg) by mouth 2 times daily (before meals)       polyethylene glycol (MIRALAX) 17 GM/Dose powder Take 17 g by mouth daily       potassium chloride (KAYCIEL) 10 % SOLN solution Take 15 mLs (20 mEq) by mouth daily       sennosides (SENOKOT) 8.6 MG tablet Take 1 tablet by mouth 2 times daily as needed for constipation       sodium chloride (OCEAN) 0.65 % nasal spray Spray 1 spray into both nostrils every hour as needed for congestion       sucralfate (CARAFATE) 1 GM/10ML suspension Take 10 mLs (1 g) by mouth 4 times daily (before meals and nightly) for 30 days       torsemide (DEMADEX) 20 MG tablet Take 1 tablet (20 mg) by mouth daily       Warfarin Therapy Reminder Take 1 each by mouth See Admin Instructions Per INR         MED REC REQUIRED  Post Medication Reconciliation Status: medication  "reconcilation previously completed during another office visit      ALLERGIES: Amiodarone, Pcn [penicillins], Statins, Amiodarone, Latex, and Zetia [ezetimibe]    REVIEW OF SYSTEMS:  10 point ROS were negative other than the symptoms noted above in the HPI.    PHYSICAL EXAM:  Vital signs were reviewed in the chart.  Vital Signs:  Ht 1.803 m (5' 11\")   Wt 121.6 kg (268 lb)   BMI 37.38 kg/m    General: Comfortable and in no acute distress  HEENT: Conjunctival pallor, no scleral icterus or injection, moist oral mucosa  Cardiovascular: Clinical sounding S1, normal S2, irregularly irregular rhythm  Respiratory: Lungs clear to auscultation bilaterally  GI: Abdomen soft, non-tender, non-distended, +BS  Extremities: 1-2+ bilateral LE edema  Neuro: CX II-XII grossly intact; ROM in all four extremities grossly intact  Psych: Alert and oriented almost x3; normal affect  Skin: No acute rash    LABORATORY/IMAGING DATA:  All relevant labs and imaging data in [facility] and/or Care Everywhere were personally reviewed today.      Most Recent 3 CBC's:Recent Labs   Lab Test 02/19/24  0618 02/15/24  0857 02/14/24  0535 02/13/24  0911   WBC 3.2*  --  3.8* 4.1   HGB 8.5* 8.8* 8.0* 8.0*   MCV 99  --  96 96     --  175 182     Most Recent 3 BMP's:Recent Labs   Lab Test 02/22/24  0548 02/19/24  0618 02/15/24  0554    133* 135   POTASSIUM 4.5 3.6 3.7   CHLORIDE 96* 91* 88*   CO2 31* 35* 41*   BUN 19.8 17.0 18.7   CR 1.06 1.12 0.98   ANIONGAP 9 7 6*   JOSEPH 8.8 8.8 8.9   GLC 66* 75 91     Most Recent 2 LFT's:Recent Labs   Lab Test 02/03/24  0741 01/15/24  1815   AST 27 29   ALT 9 13   ALKPHOS 58 58   BILITOTAL 0.6 1.0       ASSESSMENT/PLAN:  Acute upper GI bleed,  Severe pill esophagitis with bleeding esophageal ulcer (middle third), s/p cauterization with BiCap on February 5, 2024,  GERD.  Stable.   No recurrence of GI bleed.  Plan:  Continue sucralfate 1 g 4 times daily for 4 weeks through March 16  Continue pantoprazole 40 mg twice " daily  Continue Tums as needed  Monitor for recurrence of GI bleed  Follow-up with GI as directed     ABLA,  Iron deficiency.   Patient presented with profound anemia due to upper GI bleed as well as 3 months of intermittent epistaxis.  Patient was transfused with several units of PRBC and received IV Venofer x 2 in the hospital.  Last CBC on February 19 with hemoglobin 8.5 and MCV 99.  Plan:  Continue ferrous sulfate 325 mg daily  Continue to monitor hemoglobin (next CBC on February 29)  Transfuse PRN for hemoglobin less than 7     Leukopenia.  Noted since January 2024.  Last white count 3.2 on February 19.  Plan:  Monitor intermittently (next CBC on February 29)     Acute on chronic HFpEF (LVEF 60 to 65%, per echo on February 1, 2024),  Moderately decreased RV systolic function,  Moderate pulmonary hypertension,  Severe tricuspid regurgitation.  Patient was diuresed with IV furosemide in the hospital and discharged on oral torsemide.  Patient currently weighs 263.4 LBS and has 1-2+ bilateral peripheral edema.   Plan:  Continue torsemide 20 mg daily  Continue potassium chloride 20 mEq daily  Monitor weight and volume status  Follow-up with cardiology as directed     Acute hypoxic respiratory failure, resolved.  Likely multifactorial due to acute CHF, volume overload, and anemia.  Recent antibiotic therapy for possible right-sided pneumonia.  Discharged to TCU on nasal cannula oxygen.  He is now weaned off oxygen.  Plan:  Continue to encourage using incentive spirometer  Continue management of CHF and anemia as above  Monitor respiratory status     History of severe mitral valve regurgitation, s/p mechanical (St. Raffi) mitral valve replacement in September 2008,  Permanent atrial fibrillation, s/p MAZE procedure in September 2008.  Patient is on chronic anticoagulation with warfarin with INR target 2.5-3.5.  Previously on warfarin 7.5 mg every Tuesday and Saturday, and 5 mg all other days.  EKG in the hospital  showed atrial fibrillation.  Not on rate control medications or antiarrhythmics.  Rate is controlled.  Last INR 2.4 on February 25.  He is currently on warfarin 5 mg daily.   Plan:  Continue warfarin 5 mg daily and recheck INR on February 29 as ordered  Adjust warfarin dose for INR target 2.5-3.5  Monitor heart rate  Follow-up with cardiology as directed     Hypertension.  Blood pressure is fairly controlled.  Plan:  Continue torsemide 20 mg daily  Monitor blood pressure     Dyslipidemia.  Intolerant of ezetimibe and statins.  Plan:  Continue fish oil 1200 mg daily     CKD stage II.  Baseline creatinine 0.9-1.1.  Last creatinine 1.06 on February 22.  Plan:  Avoid NSAIDs and nephrotoxins  Monitor renal function periodically (next BMP on February 29)     Urinary retention.  A Stubbs catheter was placed during the Jan 9-Jan 19, 2024 hospitalization.  Stubbs catheter was removed on February 8 in the hospital and patient does straight cath 1-3 times a day..  Plan:  Continue straight caths  Monitor     Vertigo.  Treated successfully with Epley maneuver by PT.   Plan:  Monitor for recurrence     Sacrococcygeal pressure injury, stage III.  Present on admission.  Plan:  Continue wound care per WOC instructions     History of epistaxis.  Plan:   Monitor for recurrence     Slow transit constipation.  Plan:  Continue the bowel regimen     Severe obesity, BMI 36.7,  Suspected sleep apnea (per assessment in the hospital).  Plan:  Staff to assist with daily care and mobility  Sleep study as outpatient     Moderate malnutrition.  Per assessment in the hospital.  Plan:  Continue the nutritional supplement per RD     Physical deconditioning.  Plan:  Continue PT/OT evaluation and therapy    Cognitive function:  CPT 4.9/5.6 this TCU stay.       Orders written by provider at facility:  None.          Disclaimer: This note contains text created using speech-recognition software and may have unintended word  substitutions.      Electronically signed by  Pavan Hernandez MD

## 2024-02-25 NOTE — TELEPHONE ENCOUNTER
[facility]   2024     Name: Feng MADIE [facility]   : 1945     Background:  Today, INR 2.4. Taking Warfarin 5 mg PO every day. Goal INR 2.5-3.5 S/P MVR    Orders:  Continue Warfarin 5 mg PO every day   Recheck INR 24    Electronically signed by MIKE Palm CNP

## 2024-02-26 NOTE — LETTER
2/26/2024        RE: Feng Wynne  [address]  [facility] [id number]        [place] [facility]  February 26, 2024      CHIEF COMPLAINT:  Episodic/follow-up visit    HPI:    Feng Wynne is a 79 year old  (1945), who is being seen today for an episodic care visit at [facility].     Medical history is notable for CHF, pulmonary hypertension, severe mitral valve regurgitation (s/p mechanical valve replacement), permanent atrial fibrillation, hypertension, dyslipidemia, CKD, pneumonia, osteoarthritis, and severe obesity.     Summary of hospital course:  Patient was initially hospitalized at [facility] from January 9 through January 19, 2024 for profound anemia with hemoglobin of 4.4 due to possible GI bleed in the setting of supratherapeutic INR.  Anticoagulation was reversed and he was transfused with PRBC.  He was evaluated by GI service and underwent upper GI endoscopy on January 15 that showed normal findings.  Colonoscopy was also done on January 15 but preparation was poor that interfered with visualization.  Normal mucosa was found in the entire colon as well as internal hemorrhoids on retroflexion.  Notably, iron studies on admission showed iron deficiency with serum iron of 17, iron saturation of 4%, TIBC of 435, and ferritin of 21.  While hospitalized, he was treated with IV ceftriaxone for E. coli UTI and discharged on 3 days of oral cefuroxime. A Stubbs catheter was also placed for urinary retention. He was ultimately discharged to [facility] for rehab.  In the TCU he was diagnosed with right-sided pneumonia and treated with cefuroxime and doxycycline after receiving 2 g of IM ceftriaxone x 1.  He was rehospitalized at [facility] from January 30 through February 15, 2024 for acute on chronic anemia and acute on chronic CHF.  EKG showed atrial fibrillation with a heart rate of 76 bpm.  Laboratory  evaluation was significant for white count 3.4, hemoglobin 6, platelets 172,000,  INR 1.98, positive stool guaiac, high-sensitivity troponin 58, and N-terminal proBNP 1253. Chest x-ray showed improved left lateral patchy opacity, increased right perihilar hazy opacities suggestive of worsening pulmonary edema, vascular congestion, and cardiomegaly. Echocardiogram was remarkable for LVEF 60 to 65%, moderately decreased RV systolic function, moderate pulmonary hypertension, 4+ tricuspid gravitation, and presence of St. Raffi mechanical mitral valve.  He was evaluated by GI service and underwent upper endoscopy on February 5 that showed severe pill esophagitis with active bleeding ulcer in the middle third of esophagus that was cauterized with BiCap. Warfarin was held, he was placed on IV heparin bridge, and treated with PPI and sucralfate.  He was diuresed with IV furosemide for acute CHF that eventually transitioned to oral torsemide 20 mg daily.  He was ultimately placed back on warfarin. Follow-up endoscopy on February 8 was remarkable for oozing esophageal ulcer prompting IV heparin discontinuation.  Notably, he was transfused with several units of PRBC and received IV Venofer x 2 in the hospital.  He was seen by ENT for epistaxis but no intervention was warranted.  At discharge, hemoglobin was 8.8. TCU was recommended per therapies and he was then admitted to this facility for medical management, nursing care, and rehab.       Today's visit:  Patient was seen in his room, lying bed.  He appears comfortable.  He overall feels well.  He has been weaned off oxygen.  His vertigo has resolved after Epley maneuver with PT.  He reports no recurrence of GI bleed or epistaxis.  He denies fever, chills, chest pain, palpitation, dyspnea, nausea, vomiting, or abdominal pain.  He continues to do straight cath 1-3 times a day.  He had a BM 2 days ago.      CODE STATUS:   CPR/Full code     Past Medical, Surgical, Family, and  Social History were reviewed in [facility].    Current Outpatient Medications   Medication Sig Dispense Refill     albuterol (PROAIR HFA/PROVENTIL HFA/VENTOLIN HFA) 108 (90 Base) MCG/ACT inhaler Inhale 1-2 puffs into the lungs every 4 hours as needed for shortness of breath, wheezing or cough 18 g 3     bisacodyl (DULCOLAX) 10 MG suppository Place 10 mg rectally daily as needed for constipation Every 3 days if no BM       calcium carbonate (TUMS) 500 MG chewable tablet Take 1 tablet (500 mg) by mouth 3 times daily as needed for heartburn       clindamycin (CLEOCIN) 150 MG capsule TAKE 4 CAPS BY MOUTH 1 HOUR PRIOR TO DENTAL APPOINTMENT       ferrous sulfate (FEROSUL) 325 (65 Fe) MG tablet Take 1 tablet (325 mg) by mouth daily       fluticasone (FLONASE) 50 MCG/ACT nasal spray Spray 2 sprays into both nostrils daily       Multiple Vitamins-Minerals (CENTRUM SILVER PO) Take 1 tablet by mouth daily.       nystatin (MYCOSTATIN) 180465 UNIT/GM external powder Apply topically 2 times daily Groin folds       Omega-3 Fatty Acids (FISH OIL CONCENTRATE PO) Take 1 capsule by mouth daily       order for DME Equipment being ordered: COMPRESSION STOCKINGS, 20-30 mmHg 1 each 1     pantoprazole (PROTONIX) 40 MG EC tablet Take 1 tablet (40 mg) by mouth 2 times daily (before meals)       polyethylene glycol (MIRALAX) 17 GM/Dose powder Take 17 g by mouth daily       potassium chloride (KAYCIEL) 10 % SOLN solution Take 15 mLs (20 mEq) by mouth daily       sennosides (SENOKOT) 8.6 MG tablet Take 1 tablet by mouth 2 times daily as needed for constipation       sodium chloride (OCEAN) 0.65 % nasal spray Spray 1 spray into both nostrils every hour as needed for congestion       sucralfate (CARAFATE) 1 GM/10ML suspension Take 10 mLs (1 g) by mouth 4 times daily (before meals and nightly) for 30 days       torsemide (DEMADEX) 20 MG tablet Take 1 tablet (20 mg) by mouth daily       Warfarin Therapy Reminder Take 1 each by mouth See Admin  "Instructions Per INR         MED REC REQUIRED  Post Medication Reconciliation Status: medication reconcilation previously completed during another office visit      ALLERGIES: Amiodarone, Pcn [penicillins], Statins, Amiodarone, Latex, and Zetia [ezetimibe]    REVIEW OF SYSTEMS:  10 point ROS were negative other than the symptoms noted above in the HPI.    PHYSICAL EXAM:  Vital signs were reviewed in the chart.  Vital Signs:  Ht 1.803 m (5' 11\")   Wt 121.6 kg (268 lb)   BMI 37.38 kg/m    General: Comfortable and in no acute distress  HEENT: Conjunctival pallor, no scleral icterus or injection, moist oral mucosa  Cardiovascular: Clinical sounding S1, normal S2, irregularly irregular rhythm  Respiratory: Lungs clear to auscultation bilaterally  GI: Abdomen soft, non-tender, non-distended, +BS  Extremities: 1-2+ bilateral LE edema  Neuro: CX II-XII grossly intact; ROM in all four extremities grossly intact  Psych: Alert and oriented almost x3; normal affect  Skin: No acute rash    LABORATORY/IMAGING DATA:  All relevant labs and imaging data in [facility] and/or Care Everywhere were personally reviewed today.      Most Recent 3 CBC's:Recent Labs   Lab Test 02/19/24  0618 02/15/24  0857 02/14/24  0535 02/13/24  0911   WBC 3.2*  --  3.8* 4.1   HGB 8.5* 8.8* 8.0* 8.0*   MCV 99  --  96 96     --  175 182     Most Recent 3 BMP's:Recent Labs   Lab Test 02/22/24  0548 02/19/24  0618 02/15/24  0554    133* 135   POTASSIUM 4.5 3.6 3.7   CHLORIDE 96* 91* 88*   CO2 31* 35* 41*   BUN 19.8 17.0 18.7   CR 1.06 1.12 0.98   ANIONGAP 9 7 6*   JOSEPH 8.8 8.8 8.9   GLC 66* 75 91     Most Recent 2 LFT's:Recent Labs   Lab Test 02/03/24  0741 01/15/24  1815   AST 27 29   ALT 9 13   ALKPHOS 58 58   BILITOTAL 0.6 1.0       ASSESSMENT/PLAN:  Acute upper GI bleed,  Severe pill esophagitis with bleeding esophageal ulcer (middle third), s/p cauterization with BiCap on February 5, 2024,  GERD.  Stable.   No recurrence of GI " bleed.  Plan:  Continue sucralfate 1 g 4 times daily for 4 weeks through March 16  Continue pantoprazole 40 mg twice daily  Continue Tums as needed  Monitor for recurrence of GI bleed  Follow-up with GI as directed     ABLA,  Iron deficiency.   Patient presented with profound anemia due to upper GI bleed as well as 3 months of intermittent epistaxis.  Patient was transfused with several units of PRBC and received IV Venofer x 2 in the hospital.  Last CBC on February 19 with hemoglobin 8.5 and MCV 99.  Plan:  Continue ferrous sulfate 325 mg daily  Continue to monitor hemoglobin (next CBC on February 29)  Transfuse PRN for hemoglobin less than 7     Leukopenia.  Noted since January 2024.  Last white count 3.2 on February 19.  Plan:  Monitor intermittently (next CBC on February 29)     Acute on chronic HFpEF (LVEF 60 to 65%, per echo on February 1, 2024),  Moderately decreased RV systolic function,  Moderate pulmonary hypertension,  Severe tricuspid regurgitation.  Patient was diuresed with IV furosemide in the hospital and discharged on oral torsemide.  Patient currently weighs 263.4 LBS and has 1-2+ bilateral peripheral edema.   Plan:  Continue torsemide 20 mg daily  Continue potassium chloride 20 mEq daily  Monitor weight and volume status  Follow-up with cardiology as directed     Acute hypoxic respiratory failure, resolved.  Likely multifactorial due to acute CHF, volume overload, and anemia.  Recent antibiotic therapy for possible right-sided pneumonia.  Discharged to TCU on nasal cannula oxygen.  He is now weaned off oxygen.  Plan:  Continue to encourage using incentive spirometer  Continue management of CHF and anemia as above  Monitor respiratory status     History of severe mitral valve regurgitation, s/p mechanical (St. Raffi) mitral valve replacement in September 2008,  Permanent atrial fibrillation, s/p MAZE procedure in September 2008.  Patient is on chronic anticoagulation with warfarin with INR target  2.5-3.5.  Previously on warfarin 7.5 mg every Tuesday and Saturday, and 5 mg all other days.  EKG in the hospital showed atrial fibrillation.  Not on rate control medications or antiarrhythmics.  Rate is controlled.  Last INR 2.4 on February 25.  He is currently on warfarin 5 mg daily.   Plan:  Continue warfarin 5 mg daily and recheck INR on February 29 as ordered  Adjust warfarin dose for INR target 2.5-3.5  Monitor heart rate  Follow-up with cardiology as directed     Hypertension.  Blood pressure is fairly controlled.  Plan:  Continue torsemide 20 mg daily  Monitor blood pressure     Dyslipidemia.  Intolerant of ezetimibe and statins.  Plan:  Continue fish oil 1200 mg daily     CKD stage II.  Baseline creatinine 0.9-1.1.  Last creatinine 1.06 on February 22.  Plan:  Avoid NSAIDs and nephrotoxins  Monitor renal function periodically (next BMP on February 29)     Urinary retention.  A Stubbs catheter was placed during the Jan 9-Jan 19, 2024 hospitalization.  Stubbs catheter was removed on February 8 in the hospital and patient does straight cath 1-3 times a day..  Plan:  Continue straight caths  Monitor     Vertigo.  Treated successfully with Epley maneuver by PT.   Plan:  Monitor for recurrence     Sacrococcygeal pressure injury, stage III.  Present on admission.  Plan:  Continue wound care per WOC instructions     History of epistaxis.  Plan:   Monitor for recurrence     Slow transit constipation.  Plan:  Continue the bowel regimen     Severe obesity, BMI 36.7,  Suspected sleep apnea (per assessment in the hospital).  Plan:  Staff to assist with daily care and mobility  Sleep study as outpatient     Moderate malnutrition.  Per assessment in the hospital.  Plan:  Continue the nutritional supplement per RD     Physical deconditioning.  Plan:  Continue PT/OT evaluation and therapy    Cognitive function:  CPT 4.9/5.6 this TCU stay.       Orders written by provider at facility:  None.          Disclaimer: This note  contains text created using speech-recognition software and may have unintended word substitutions.      Electronically signed by  Pavan Hernandez MD                        Sincerely,        Pavan Hernandez MD

## 2024-02-28 NOTE — PROGRESS NOTES
[place] [facility]  February 29, 2024      CHIEF COMPLAINT:  Episodic/follow-up visit    HPI:    Feng Wynne is a 79 year old  (1945), who is being seen today for an episodic care visit at [facility].     Medical history is notable for CHF, pulmonary hypertension, severe mitral valve regurgitation (s/p mechanical valve replacement), permanent atrial fibrillation, hypertension, dyslipidemia, CKD, pneumonia, osteoarthritis, and severe obesity.     Summary of hospital course:  Patient was initially hospitalized at [facility] from January 9 through January 19, 2024 for profound anemia with hemoglobin of 4.4 due to possible GI bleed in the setting of supratherapeutic INR.  Anticoagulation was reversed and he was transfused with PRBC.  He was evaluated by GI service and underwent upper GI endoscopy on January 15 that showed normal findings.  Colonoscopy was also done on January 15 but preparation was poor that interfered with visualization.  Normal mucosa was found in the entire colon as well as internal hemorrhoids on retroflexion.  Notably, iron studies on admission showed iron deficiency with serum iron of 17, iron saturation of 4%, TIBC of 435, and ferritin of 21.  While hospitalized, he was treated with IV ceftriaxone for E. coli UTI and discharged on 3 days of oral cefuroxime. A Stubbs catheter was also placed for urinary retention. He was ultimately discharged to [facility] for rehab.  In the TCU he was diagnosed with right-sided pneumonia and treated with cefuroxime and doxycycline after receiving 2 g of IM ceftriaxone x 1.  He was rehospitalized at [facility] from January 30 through February 15, 2024 for acute on chronic anemia and acute on chronic CHF.  EKG showed atrial fibrillation with a heart rate of 76 bpm.  Laboratory evaluation was significant for white count 3.4, hemoglobin 6, platelets 172,000,  INR  1.98, positive stool guaiac, high-sensitivity troponin 58, and N-terminal proBNP 1253. Chest x-ray showed improved left lateral patchy opacity, increased right perihilar hazy opacities suggestive of worsening pulmonary edema, vascular congestion, and cardiomegaly. Echocardiogram was remarkable for LVEF 60 to 65%, moderately decreased RV systolic function, moderate pulmonary hypertension, 4+ tricuspid gravitation, and presence of St. Raffi mechanical mitral valve.  He was evaluated by GI service and underwent upper endoscopy on February 5 that showed severe pill esophagitis with active bleeding ulcer in the middle third of esophagus that was cauterized with BiCap. Warfarin was held, he was placed on IV heparin bridge, and treated with PPI and sucralfate.  He was diuresed with IV furosemide for acute CHF that eventually transitioned to oral torsemide 20 mg daily.  He was ultimately placed back on warfarin. Follow-up endoscopy on February 8 was remarkable for oozing esophageal ulcer prompting IV heparin discontinuation.  Notably, he was transfused with several units of PRBC and received IV Venofer x 2 in the hospital.  He was seen by ENT for epistaxis but no intervention was warranted.  At discharge, hemoglobin was 8.8. TCU was recommended per therapies and he was then admitted to this facility for medical management, nursing care, and rehab.       Today's visit:  Patient was seen in his room, lying in bed.  He appears comfortable.  He reports no recurrence of vertigo after Epley maneuver.  He has had no more epistaxis since admission to TCU.  He had a BM yesterday and his stool looked dark brown.  He denies fever, chills, chest pain, palpitation, dyspnea, nausea, vomiting, abdominal pain, or urinary symptoms.       CODE STATUS:   CPR/Full code     Past Medical, Surgical, Family, and Social History were reviewed in [facility].    Current Outpatient Medications   Medication Sig Dispense Refill     albuterol (PROAIR  HFA/PROVENTIL HFA/VENTOLIN HFA) 108 (90 Base) MCG/ACT inhaler Inhale 1-2 puffs into the lungs every 4 hours as needed for shortness of breath, wheezing or cough 18 g 3     bisacodyl (DULCOLAX) 10 MG suppository Place 10 mg rectally daily as needed for constipation Every 3 days if no BM       calcium carbonate (TUMS) 500 MG chewable tablet Take 1 tablet (500 mg) by mouth 3 times daily as needed for heartburn       clindamycin (CLEOCIN) 150 MG capsule TAKE 4 CAPS BY MOUTH 1 HOUR PRIOR TO DENTAL APPOINTMENT       ferrous sulfate (FEROSUL) 325 (65 Fe) MG tablet Take 1 tablet (325 mg) by mouth daily       fluticasone (FLONASE) 50 MCG/ACT nasal spray Spray 2 sprays into both nostrils daily       Multiple Vitamins-Minerals (CENTRUM SILVER PO) Take 1 tablet by mouth daily.       nystatin (MYCOSTATIN) 696739 UNIT/GM external powder Apply topically 2 times daily Groin folds       Omega-3 Fatty Acids (FISH OIL CONCENTRATE PO) Take 1 capsule by mouth daily       order for DME Equipment being ordered: COMPRESSION STOCKINGS, 20-30 mmHg 1 each 1     pantoprazole (PROTONIX) 40 MG EC tablet Take 1 tablet (40 mg) by mouth 2 times daily (before meals)       polyethylene glycol (MIRALAX) 17 GM/Dose powder Take 17 g by mouth daily       potassium chloride (KAYCIEL) 10 % SOLN solution Take 15 mLs (20 mEq) by mouth daily       sennosides (SENOKOT) 8.6 MG tablet Take 1 tablet by mouth 2 times daily as needed for constipation       sodium chloride (OCEAN) 0.65 % nasal spray Spray 1 spray into both nostrils every hour as needed for congestion       sucralfate (CARAFATE) 1 GM/10ML suspension Take 10 mLs (1 g) by mouth 4 times daily (before meals and nightly) for 30 days       torsemide (DEMADEX) 20 MG tablet Take 1 tablet (20 mg) by mouth daily       Warfarin Therapy Reminder Take 1 each by mouth See Admin Instructions Per INR         MED REC REQUIRED  Post Medication Reconciliation Status: medication reconcilation previously completed during  "another office visit      ALLERGIES: Amiodarone, Pcn [penicillins], Statins, Amiodarone, Latex, and Zetia [ezetimibe]    REVIEW OF SYSTEMS:  10 point ROS were negative other than the symptoms noted above in the HPI.    PHYSICAL EXAM:  Vital signs were reviewed in the chart.  Vital Signs:  /73   Pulse 70   Temp 97.7  F (36.5  C)   Resp 18   Ht 1.803 m (5' 11\")   Wt 118.8 kg (262 lb)   SpO2 94%   BMI 36.54 kg/m    General: Comfortable and in no acute distress  HEENT: No conjunctival pallor, no scleral icterus or injection, moist oral mucosa  Cardiovascular: Mechanical sounding S1, S2, irregularly irregular rhythm  Respiratory: Lungs clear to auscultation bilaterally  GI: Abdomen soft, non-tender, non-distended, +BS  Extremities: Trace to 1+ bilateral LE edema; compression stockings are on  Neuro: CX II-XII grossly intact; ROM in all four extremities grossly intact  Psych: Alert and oriented x3; normal affect  Skin: No acute rash    LABORATORY/IMAGING DATA:  All relevant labs and imaging data in [facility] and/or Care Everywhere were personally reviewed today.      Most Recent 3 CBC's:Recent Labs   Lab Test 02/26/24  0801 02/19/24  0618 02/15/24  0857 02/14/24  0535   WBC 3.0* 3.2*  --  3.8*   HGB 9.4* 8.5* 8.8* 8.0*   * 99  --  96    187  --  175     Most Recent 3 BMP's:  Recent Labs   Lab Test 02/29/24  0519 02/22/24  0548 02/19/24  0618    136 133*   POTASSIUM 3.4 4.5 3.6   CHLORIDE 98 96* 91*   CO2 34* 31* 35*   BUN 26.7* 19.8 17.0   CR 1.11 1.06 1.12   ANIONGAP 6* 9 7   JOSEPH 8.7* 8.8 8.8   GLC 86 66* 75     Most Recent 2 LFT's:Recent Labs   Lab Test 02/03/24  0741 01/15/24  1815   AST 27 29   ALT 9 13   ALKPHOS 58 58   BILITOTAL 0.6 1.0       ASSESSMENT/PLAN:  Acute upper GI bleed,  Severe pill esophagitis with bleeding esophageal ulcer (middle third), s/p cauterization with BiCap on February 5, 2024,  GERD.  No recurrence of GI bleed.   Plan:  Continue sucralfate 1 g 4 times daily for 4 " weeks through March 16  Continue pantoprazole 40 mg twice daily  Continue Tums as needed  Monitor for recurrence of GI bleed  Follow-up with GI on March 12 as scheduled     ABLA,  Iron deficiency.   Patient presented with profound anemia due to upper GI bleed as well as 3 months of intermittent epistaxis.  Patient was transfused with several units of PRBC and received IV Venofer x 2 in the hospital.  Last CBC on February 26 with hemoglobin 9.4 and .   Plan:  Continue ferrous sulfate 325 mg daily  Continue to monitor hemoglobin periodically  Transfuse PRN for hemoglobin less than 7     Leukopenia.  Noted since January 2024.  Last white count 3 on February 26.   Plan:  Monitor intermittently   Work-up/follow-up as outpatient     Acute on chronic HFpEF (LVEF 60 to 65%, per echo on February 1, 2024),  Moderately decreased RV systolic function,  Moderate pulmonary hypertension,  Severe tricuspid regurgitation.  Patient was diuresed with IV furosemide in the hospital and discharged on oral torsemide.  Patient currently weighs 262.7 LBS and has  trace-1+ bilateral  leg edema.  Plan:  Continue torsemide 20 mg daily  Continue potassium chloride 20 mEq daily  Continue the compression stockings   Monitor weight and volume status  Follow-up with cardiology as directed     Acute hypoxic respiratory failure, resolved.  Likely multifactorial due to acute CHF, volume overload, and anemia.  Recent antibiotic therapy for possible right-sided pneumonia.  Discharged to TCU on nasal cannula oxygen.  He was weaned off oxygen in TCU.    Plan:  Continue management of CHF and anemia as above  Continue albuterol PRN  Monitor respiratory status     History of severe mitral valve regurgitation, s/p mechanical (St. Raffi) mitral valve replacement in September 2008,  Permanent atrial fibrillation, s/p MAZE procedure in September 2008.  Patient is on chronic anticoagulation with warfarin for INR target 2.5-3.5.  Previously on warfarin 10 mg  every Wednesday and Saturday, and 5 mg all other days.  EKG in the hospital showed atrial fibrillation.  Not on rate control medications or antiarrhythmics.  Rate is controlled.  Today's INR is supratherapeutic at 4.2.  He is currently on warfarin 5 mg daily.   Plan:  Hold warfarin today and recheck INR tomorrow, March 1  Adjust warfarin dose for INR target 2.5-3.5  Monitor heart rate  Follow-up with cardiology as directed     Hypertension.  Blood pressure is fairly controlled.  Plan:  Continue torsemide 20 mg daily  Monitor blood pressure     Dyslipidemia.  Intolerant of ezetimibe and statins.  Plan:  Continue fish oil 1200 mg daily     CKD stage II.  Baseline creatinine 0.9-1.1.  Today's creatinine is 1.11.  Plan:  Avoid NSAIDs and nephrotoxins  Monitor renal function periodically     Urinary retention.  A Stubbs catheter was placed during the Jan 9-Jan 19, 2024 hospitalization.  Stubbs catheter was removed on February 8 in the hospital and patient does straight cath 1-3 times a day.  Plan:  Continue straight caths  Monitor      Vertigo.  Treated successfully with Epley maneuver by PT.   No recurrence.  Plan:  Monitor for recurrence     Sacrococcygeal pressure injury, stage III.  Present on admission.  Plan:  Continue wound care per WOC instructions     History of epistaxis.  No epistaxis in the TCU.  Plan:   Monitor for recurrence     Slow transit constipation.  Plan:  Continue the bowel regimen     Severe obesity, BMI 36.7,  Suspected sleep apnea (per assessment in the hospital).  Plan:  Staff to assist with daily care and mobility  Sleep study as outpatient     Moderate malnutrition.  Per assessment in the hospital.  Plan:  Continue the nutritional supplement per RD     Physical deconditioning.  Plan:  Continue PT/OT evaluation and therapy     Cognitive function:  SLUMS 23/30 and CPT 4.9/5.6 this TCU stay.   Plan:  Continue cognitive evaluation per OT for safe discharge planning      Orders written by provider at  facility:  Hold warfarin today, February 29, and recheck INR tomorrow, March 1, DX: Mechanical mitral valve, atrial fibrillation  CBC on March 4, DX; Anemia          Disclaimer: This note contains text created using speech-recognition software and may have unintended word substitutions.      Electronically signed by  Pavan Hernandez MD

## 2024-02-29 NOTE — LETTER
2/29/2024        RE: Feng Wynne  [address]  [facility] [address]        [place] [facility]  February 29, 2024      CHIEF COMPLAINT:  Episodic/follow-up visit    HPI:    Feng Wynne is a 79 year old  (1945), who is being seen today for an episodic care visit at [facility].   Medical history is notable for CHF, pulmonary hypertension, severe mitral valve regurgitation (s/p mechanical valve replacement), permanent atrial fibrillation, hypertension, dyslipidemia, CKD, pneumonia, osteoarthritis, and severe obesity.     Summary of hospital course:  Patient was initially hospitalized at [facility] from January 9 through January 19, 2024 for profound anemia with hemoglobin of 4.4 due to possible GI bleed in the setting of supratherapeutic INR.  Anticoagulation was reversed and he was transfused with PRBC.  He was evaluated by GI service and underwent upper GI endoscopy on January 15 that showed normal findings.  Colonoscopy was also done on January 15 but preparation was poor that interfered with visualization.  Normal mucosa was found in the entire colon as well as internal hemorrhoids on retroflexion.  Notably, iron studies on admission showed iron deficiency with serum iron of 17, iron saturation of 4%, TIBC of 435, and ferritin of 21.  While hospitalized, he was treated with IV ceftriaxone for E. coli UTI and discharged on 3 days of oral cefuroxime. A Stubbs catheter was also placed for urinary retention. He was ultimately discharged to [facility] TCU for rehab.  In the TCU he was diagnosed with right-sided pneumonia and treated with cefuroxime and doxycycline after receiving 2 g of IM ceftriaxone x 1.  He was rehospitalized at [facility] from January 30 through February 15, 2024 for acute on chronic anemia and acute on chronic CHF.  EKG showed atrial fibrillation with a heart rate of 76 bpm.  Laboratory  evaluation was significant for white count 3.4, hemoglobin 6, platelets 172,000,  INR 1.98, positive stool guaiac, high-sensitivity troponin 58, and N-terminal proBNP 1253. Chest x-ray showed improved left lateral patchy opacity, increased right perihilar hazy opacities suggestive of worsening pulmonary edema, vascular congestion, and cardiomegaly. Echocardiogram was remarkable for LVEF 60 to 65%, moderately decreased RV systolic function, moderate pulmonary hypertension, 4+ tricuspid gravitation, and presence of St. Raffi mechanical mitral valve.  He was evaluated by GI service and underwent upper endoscopy on February 5 that showed severe pill esophagitis with active bleeding ulcer in the middle third of esophagus that was cauterized with BiCap. Warfarin was held, he was placed on IV heparin bridge, and treated with PPI and sucralfate.  He was diuresed with IV furosemide for acute CHF that eventually transitioned to oral torsemide 20 mg daily.  He was ultimately placed back on warfarin. Follow-up endoscopy on February 8 was remarkable for oozing esophageal ulcer prompting IV heparin discontinuation.  Notably, he was transfused with several units of PRBC and received IV Venofer x 2 in the hospital.  He was seen by ENT for epistaxis but no intervention was warranted.  At discharge, hemoglobin was 8.8. TCU was recommended per therapies and he was then admitted to this facility for medical management, nursing care, and rehab.       Today's visit:  Patient was seen in his room, lying in bed.  He appears comfortable.  He reports no recurrence of vertigo after Epley maneuver.  He has had no more epistaxis since admission to TCU.  He had a BM yesterday and his stool looked dark brown.  He denies fever, chills, chest pain, palpitation, dyspnea, nausea, vomiting, abdominal pain, or urinary symptoms.       CODE STATUS:   CPR/Full code     Past Medical, Surgical, Family, and Social History were reviewed in EPIC.    Current  Outpatient Medications   Medication Sig Dispense Refill     albuterol (PROAIR HFA/PROVENTIL HFA/VENTOLIN HFA) 108 (90 Base) MCG/ACT inhaler Inhale 1-2 puffs into the lungs every 4 hours as needed for shortness of breath, wheezing or cough 18 g 3     bisacodyl (DULCOLAX) 10 MG suppository Place 10 mg rectally daily as needed for constipation Every 3 days if no BM       calcium carbonate (TUMS) 500 MG chewable tablet Take 1 tablet (500 mg) by mouth 3 times daily as needed for heartburn       clindamycin (CLEOCIN) 150 MG capsule TAKE 4 CAPS BY MOUTH 1 HOUR PRIOR TO DENTAL APPOINTMENT       ferrous sulfate (FEROSUL) 325 (65 Fe) MG tablet Take 1 tablet (325 mg) by mouth daily       fluticasone (FLONASE) 50 MCG/ACT nasal spray Spray 2 sprays into both nostrils daily       Multiple Vitamins-Minerals (CENTRUM SILVER PO) Take 1 tablet by mouth daily.       nystatin (MYCOSTATIN) 328369 UNIT/GM external powder Apply topically 2 times daily Groin folds       Omega-3 Fatty Acids (FISH OIL CONCENTRATE PO) Take 1 capsule by mouth daily       order for DME Equipment being ordered: COMPRESSION STOCKINGS, 20-30 mmHg 1 each 1     pantoprazole (PROTONIX) 40 MG EC tablet Take 1 tablet (40 mg) by mouth 2 times daily (before meals)       polyethylene glycol (MIRALAX) 17 GM/Dose powder Take 17 g by mouth daily       potassium chloride (KAYCIEL) 10 % SOLN solution Take 15 mLs (20 mEq) by mouth daily       sennosides (SENOKOT) 8.6 MG tablet Take 1 tablet by mouth 2 times daily as needed for constipation       sodium chloride (OCEAN) 0.65 % nasal spray Spray 1 spray into both nostrils every hour as needed for congestion       sucralfate (CARAFATE) 1 GM/10ML suspension Take 10 mLs (1 g) by mouth 4 times daily (before meals and nightly) for 30 days       torsemide (DEMADEX) 20 MG tablet Take 1 tablet (20 mg) by mouth daily       Warfarin Therapy Reminder Take 1 each by mouth See Admin Instructions Per INR         MED REC REQUIRED  Post  "Medication Reconciliation Status: medication reconcilation previously completed during another office visit      ALLERGIES: Amiodarone, Pcn [penicillins], Statins, Amiodarone, Latex, and Zetia [ezetimibe]    REVIEW OF SYSTEMS:  10 point ROS were negative other than the symptoms noted above in the HPI.    PHYSICAL EXAM:  Vital signs were reviewed in the chart.  Vital Signs:  /73   Pulse 70   Temp 97.7  F (36.5  C)   Resp 18   Ht 1.803 m (5' 11\")   Wt 118.8 kg (262 lb)   SpO2 94%   BMI 36.54 kg/m    General: Comfortable and in no acute distress  HEENT: No conjunctival pallor, no scleral icterus or injection, moist oral mucosa  Cardiovascular: Mechanical sounding S1, S2, irregularly irregular rhythm  Respiratory: Lungs clear to auscultation bilaterally  GI: Abdomen soft, non-tender, non-distended, +BS  Extremities: Trace to 1+ bilateral LE edema; compression stockings are on  Neuro: CX II-XII grossly intact; ROM in all four extremities grossly intact  Psych: Alert and oriented x3; normal affect  Skin: No acute rash    LABORATORY/IMAGING DATA:  All relevant labs and imaging data in [facility] and/or Care Everywhere were personally reviewed today.      Most Recent 3 CBC's:Recent Labs   Lab Test 02/26/24  0801 02/19/24  0618 02/15/24  0857 02/14/24  0535   WBC 3.0* 3.2*  --  3.8*   HGB 9.4* 8.5* 8.8* 8.0*   * 99  --  96    187  --  175     Most Recent 3 BMP's:  Recent Labs   Lab Test 02/29/24  0519 02/22/24  0548 02/19/24  0618    136 133*   POTASSIUM 3.4 4.5 3.6   CHLORIDE 98 96* 91*   CO2 34* 31* 35*   BUN 26.7* 19.8 17.0   CR 1.11 1.06 1.12   ANIONGAP 6* 9 7   JOSEPH 8.7* 8.8 8.8   GLC 86 66* 75     Most Recent 2 LFT's:Recent Labs   Lab Test 02/03/24  0741 01/15/24  1815   AST 27 29   ALT 9 13   ALKPHOS 58 58   BILITOTAL 0.6 1.0       ASSESSMENT/PLAN:  Acute upper GI bleed,  Severe pill esophagitis with bleeding esophageal ulcer (middle third), s/p cauterization with BiCap on February 5, " 2024,  GERD.  No recurrence of GI bleed.   Plan:  Continue sucralfate 1 g 4 times daily for 4 weeks through March 16  Continue pantoprazole 40 mg twice daily  Continue Tums as needed  Monitor for recurrence of GI bleed  Follow-up with GI on March 12 as scheduled     ABLA,  Iron deficiency.   Patient presented with profound anemia due to upper GI bleed as well as 3 months of intermittent epistaxis.  Patient was transfused with several units of PRBC and received IV Venofer x 2 in the hospital.  Last CBC on February 26 with hemoglobin 9.4 and .   Plan:  Continue ferrous sulfate 325 mg daily  Continue to monitor hemoglobin periodically  Transfuse PRN for hemoglobin less than 7     Leukopenia.  Noted since January 2024.  Last white count 3 on February 26.   Plan:  Monitor intermittently   Work-up/follow-up as outpatient     Acute on chronic HFpEF (LVEF 60 to 65%, per echo on February 1, 2024),  Moderately decreased RV systolic function,  Moderate pulmonary hypertension,  Severe tricuspid regurgitation.  Patient was diuresed with IV furosemide in the hospital and discharged on oral torsemide.  Patient currently weighs 262.7 LBS and has  trace-1+ bilateral  leg edema.  Plan:  Continue torsemide 20 mg daily  Continue potassium chloride 20 mEq daily  Continue the compression stockings   Monitor weight and volume status  Follow-up with cardiology as directed     Acute hypoxic respiratory failure, resolved.  Likely multifactorial due to acute CHF, volume overload, and anemia.  Recent antibiotic therapy for possible right-sided pneumonia.  Discharged to TCU on nasal cannula oxygen.  He was weaned off oxygen in TCU.    Plan:  Continue management of CHF and anemia as above  Continue albuterol PRN  Monitor respiratory status     History of severe mitral valve regurgitation, s/p mechanical (St. Raffi) mitral valve replacement in September 2008,  Permanent atrial fibrillation, s/p MAZE procedure in September 2008.  Patient is  on chronic anticoagulation with warfarin for INR target 2.5-3.5.  Previously on warfarin 10 mg every Wednesday and Saturday, and 5 mg all other days.  EKG in the hospital showed atrial fibrillation.  Not on rate control medications or antiarrhythmics.  Rate is controlled.  Today's INR is supratherapeutic at 4.2.  He is currently on warfarin 5 mg daily.   Plan:  Hold warfarin today and recheck INR tomorrow, March 1  Adjust warfarin dose for INR target 2.5-3.5  Monitor heart rate  Follow-up with cardiology as directed     Hypertension.  Blood pressure is fairly controlled.  Plan:  Continue torsemide 20 mg daily  Monitor blood pressure     Dyslipidemia.  Intolerant of ezetimibe and statins.  Plan:  Continue fish oil 1200 mg daily     CKD stage II.  Baseline creatinine 0.9-1.1.  Today's creatinine is 1.11.  Plan:  Avoid NSAIDs and nephrotoxins  Monitor renal function periodically     Urinary retention.  A Stubbs catheter was placed during the Jan 9-Jan 19, 2024 hospitalization.  Stubbs catheter was removed on February 8 in the hospital and patient does straight cath 1-3 times a day.  Plan:  Continue straight caths  Monitor      Vertigo.  Treated successfully with Epley maneuver by PT.   No recurrence.  Plan:  Monitor for recurrence     Sacrococcygeal pressure injury, stage III.  Present on admission.  Plan:  Continue wound care per WOC instructions     History of epistaxis.  No epistaxis in the TCU.  Plan:   Monitor for recurrence     Slow transit constipation.  Plan:  Continue the bowel regimen     Severe obesity, BMI 36.7,  Suspected sleep apnea (per assessment in the hospital).  Plan:  Staff to assist with daily care and mobility  Sleep study as outpatient     Moderate malnutrition.  Per assessment in the hospital.  Plan:  Continue the nutritional supplement per RD     Physical deconditioning.  Plan:  Continue PT/OT evaluation and therapy     Cognitive function:  SLUMS 23/30 and CPT 4.9/5.6 this TCU  stay.   Plan:  Continue cognitive evaluation per OT for safe discharge planning      Orders written by provider at facility:  Hold warfarin today, February 29, and recheck INR tomorrow, March 1, DX: Mechanical mitral valve, atrial fibrillation  CBC on March 4, DX; Anemia          Disclaimer: This note contains text created using speech-recognition software and may have unintended word substitutions.      Electronically signed by  Pavan Hernandez MD                        Sincerely,        Pavan Hernandez MD

## 2024-02-29 NOTE — PATIENT INSTRUCTIONS
Orders for Feng RAMACHANDRAN dominic (1945), MR# [id number]:    1) Hold warfarin today, February 29, and recheck INR tomorrow, March 1, DX: Mechanical mitral valve, atrial fibrillation  2) CBC on March 4, DX; Anemia      Pavan Hernandez MD  [facility]

## 2024-03-01 NOTE — PATIENT INSTRUCTIONS
Orders  Feng Wynne  1945  1) Coumadin 3 mg today, 4 mg tomorrow. Next INR 3/3.   On 3/3: if INR >3.6 Hold coumadin and recheck 3/4   If INR 3.3-3.6 Coumadin 3 mg daily and recheck 3/4   If INR 2.5-3.3 administer 4 mg daily. Recheck 3/4   If INR <2.4 administer 5 mg daily. Recheck 3/4  MIKE Preciado CNP on 3/1/2024 at 10:28 AM

## 2024-03-01 NOTE — LETTER
"    3/1/2024        RE: Feng Wynne  [address]  [facility] [address]        [facility]    Chief Complaint   Patient presents with     INR RESULTS     HPI:  Feng Wynne is a 79 year old  (1945), who is being seen today for an episodic care visit at: [facility]  ([facility]) [[id number]].     Today's concern is:   1. Encounter for therapeutic drug monitoring    2. Long term (current) use of anticoagulants    3. Permanent atrial fibrillation (H)    4. Hx of mitral valve replacement with mechanical valve      Patient was seen today for episodic follow-up in the [facility] to dose Coumadin.  Patient's INR today is 3.7, Coumadin yesterday was held for INR 4.2.  Patient's INR goal is 2.5-3.5.  Patient denies any bleeding, notes darker stools that have been present since admission here- no blood in them.  He does have many questions today about his diet related to Coumadin dosing, stating how different it is here than what he eats at home.  We discussed consistency is the key and when he discharges back home will need close monitoring as his diet changes.  Patient also interested in changing pharmacies to mail order pharmacy when he discharges, answered his questions about this today as well.    Reviewed facility EMR including medications, recent nursing progress notes, vital signs.  Nursing staff with no concerns today.    Allergies, and PMH/PSH reviewed in [facility] today.  REVIEW OF SYSTEMS:  4 point ROS including Consitutional, Respiratory, CV, GI other than that noted in the HPI,  is negative    Objective:   /83   Pulse 77   Temp 97.3  F (36.3  C)   Resp 18   Ht 1.803 m (5' 11\")   Wt 117.5 kg (259 lb)   SpO2 94%   BMI 36.12 kg/m    GENERAL APPEARANCE:  Alert, in NAD  HEENT: normocephalic, moist mucous membranes, nose without drainage or crusting  RESP:  respiratory effort normal, no respiratory distress, patient is on RA  PSYCH: affect and mood normal      Labs done in SNF " are in [facility]. Please refer to them using [facility] Everywhere. and Recent labs in [facility] reviewed by me today.     Assessment/Plan:  1. Encounter for therapeutic drug monitoring    2. Long term (current) use of anticoagulants    3. Permanent atrial fibrillation (H)    4. Hx of mitral valve replacement with mechanical valve    PTA patient is on 10 mg every Wednesday and Saturday and 5 mg rest of days  HR 62-77 recently  INR today 3.7-supra therapeutic   Plan: Coumadin 3 mg today, 4 mg tomorrow. Next INR 3/3.   On 3/3: if INR >3.6 Hold coumadin and recheck 3/4   If INR 3.3-3.6 Coumadin 3 mg daily and recheck 3/4   If INR 2.5-3.3 administer 4 mg daily. Recheck 3/4   If INR <2.4 administer 5 mg daily. Recheck 3/4    INR goal 2.5-3.5. Monitor heart rate        Of note per discussion with patient all scripts will need to be sent to new pharmacy, likely [facility] at time of discharge.       MED REC REQUIRED  Post Medication Reconciliation Status:  Medication reconciliation previously completed during another office visit    Disclaimer: This note may contain text created using speech-recognition software and may contain unintended word substitutions.      Electronically signed by: MIKE Preciado CNP       Sincerely,        MIKE Preciado CNP

## 2024-03-01 NOTE — PROGRESS NOTES
"[facility]    Chief Complaint   Patient presents with    INR RESULTS     HPI:  Feng Wynne is a 79 year old  (1945), who is being seen today for an episodic care visit at: [facility]  ([facility]) [[id number]].     Today's concern is:   1. Encounter for therapeutic drug monitoring    2. Long term (current) use of anticoagulants    3. Permanent atrial fibrillation (H)    4. Hx of mitral valve replacement with mechanical valve      Patient was seen today for episodic follow-up in the [facility] to dose Coumadin.  Patient's INR today is 3.7, Coumadin yesterday was held for INR 4.2.  Patient's INR goal is 2.5-3.5.  Patient denies any bleeding, notes darker stools that have been present since admission here- no blood in them.  He does have many questions today about his diet related to Coumadin dosing, stating how different it is here than what he eats at home.  We discussed consistency is the key and when he discharges back home will need close monitoring as his diet changes.  Patient also interested in changing pharmacies to mail order pharmacy when he discharges, answered his questions about this today as well.    Reviewed facility EMR including medications, recent nursing progress notes, vital signs.  Nursing staff with no concerns today.    Allergies, and PMH/PSH reviewed in [employer] today.  REVIEW OF SYSTEMS:  4 point ROS including Consitutional, Respiratory, CV, GI other than that noted in the HPI,  is negative    Objective:   /83   Pulse 77   Temp 97.3  F (36.3  C)   Resp 18   Ht 1.803 m (5' 11\")   Wt 117.5 kg (259 lb)   SpO2 94%   BMI 36.12 kg/m    GENERAL APPEARANCE:  Alert, in NAD  HEENT: normocephalic, moist mucous membranes, nose without drainage or crusting  RESP:  respiratory effort normal, no respiratory distress, patient is on RA  PSYCH: affect and mood normal      Labs done in SNF are in [facility]. Please refer to them using [facility]Care Everywhere. and Recent labs in " EPIC reviewed by me today.     Assessment/Plan:  1. Encounter for therapeutic drug monitoring    2. Long term (current) use of anticoagulants    3. Permanent atrial fibrillation (H)    4. Hx of mitral valve replacement with mechanical valve    PTA patient is on 10 mg every Wednesday and Saturday and 5 mg rest of days  HR 62-77 recently  INR today 3.7-supra therapeutic   Plan: Coumadin 3 mg today, 4 mg tomorrow. Next INR 3/3.   On 3/3: if INR >3.6 Hold coumadin and recheck 3/4   If INR 3.3-3.6 Coumadin 3 mg daily and recheck 3/4   If INR 2.5-3.3 administer 4 mg daily. Recheck 3/4   If INR <2.4 administer 5 mg daily. Recheck 3/4    INR goal 2.5-3.5. Monitor heart rate        Of note per discussion with patient all scripts will need to be sent to new pharmacy, likely [facility] at time of discharge.       MED REC REQUIRED  Post Medication Reconciliation Status:  Medication reconciliation previously completed during another office visit    Disclaimer: This note may contain text created using speech-recognition software and may contain unintended word substitutions.      Electronically signed by: MIKE Preciado CNP

## 2024-03-01 NOTE — PROGRESS NOTES
[facility]  March 4, 2024      CHIEF COMPLAINT:  Episodic/follow-up visit    HPI:    Feng Wynne is a 79 year old  (1945), who is being seen today for an episodic care visit at [facility].     Medical history is notable for CHF, pulmonary hypertension, severe mitral valve regurgitation (s/p mechanical valve replacement), permanent atrial fibrillation, hypertension, dyslipidemia, CKD, pneumonia, osteoarthritis, and severe obesity.     Summary of hospital course:  Patient was initially hospitalized at [facility] from January 9 through January 19, 2024 for profound anemia with hemoglobin of 4.4 due to possible GI bleed in the setting of supratherapeutic INR.  Anticoagulation was reversed and he was transfused with PRBC.  He was evaluated by GI service and underwent upper GI endoscopy on January 15 that showed normal findings.  Colonoscopy was also done on January 15 but preparation was poor that interfered with visualization.  Normal mucosa was found in the entire colon as well as internal hemorrhoids on retroflexion.  Notably, iron studies on admission showed iron deficiency with serum iron of 17, iron saturation of 4%, TIBC of 435, and ferritin of 21.  While hospitalized, he was treated with IV ceftriaxone for E. coli UTI and discharged on 3 days of oral cefuroxime. A Stubbs catheter was also placed for urinary retention. He was ultimately discharged to [facility]CU for rehab.  In the TCU he was diagnosed with right-sided pneumonia and treated with cefuroxime and doxycycline after receiving 2 g of IM ceftriaxone x 1.  He was rehospitalized at [facility] from January 30 through February 15, 2024 for acute on chronic anemia and acute on chronic CHF.  EKG showed atrial fibrillation with a heart rate of 76 bpm.  Laboratory evaluation was significant for white count 3.4, hemoglobin 6, platelets 172,000,  INR 1.98, positive  stool guaiac, high-sensitivity troponin 58, and N-terminal proBNP 1253. Chest x-ray showed improved left lateral patchy opacity, increased right perihilar hazy opacities suggestive of worsening pulmonary edema, vascular congestion, and cardiomegaly. Echocardiogram was remarkable for LVEF 60 to 65%, moderately decreased RV systolic function, moderate pulmonary hypertension, 4+ tricuspid gravitation, and presence of St. Raffi mechanical mitral valve.  He was evaluated by GI service and underwent upper endoscopy on February 5 that showed severe pill esophagitis with active bleeding ulcer in the middle third of esophagus that was cauterized with BiCap. Warfarin was held, he was placed on IV heparin bridge, and treated with PPI and sucralfate.  He was diuresed with IV furosemide for acute CHF that eventually transitioned to oral torsemide 20 mg daily.  He was ultimately placed back on warfarin. Follow-up endoscopy on February 8 was remarkable for oozing esophageal ulcer prompting IV heparin discontinuation.  Notably, he was transfused with several units of PRBC and received IV Venofer x 2 in the hospital.  He was seen by ENT for epistaxis but no intervention was warranted.  At discharge, hemoglobin was 8.8. TCU was recommended per therapies and he was then admitted to this facility for medical management, nursing care, and rehab.        Today's visit:  Patient was seen in his room, lying in bed.  He appears comfortable and cheerful.  He requires assist and Chandni for transfers.  He reports no recurrence of epistaxis or GI bleed.  He had a normal BM 2 days ago.  He denies fever, chills, chest pain, palpitation, dyspnea, nausea, vomiting, abdominal pain, or urinary symptoms.  He continues to do straight cath 2-3 times a day.  Today's INR is 3.    CODE STATUS:   CPR/Full code     Past Medical, Surgical, Family, and Social History were reviewed in [employer].    Current Outpatient Medications   Medication Sig Dispense Refill      albuterol (PROAIR HFA/PROVENTIL HFA/VENTOLIN HFA) 108 (90 Base) MCG/ACT inhaler Inhale 1 puff into the lungs every 4 hours as needed for shortness of breath       bisacodyl (DULCOLAX) 10 MG suppository Place 10 mg rectally daily as needed for constipation Every 3 days if no BM       calcium carbonate (TUMS) 500 MG chewable tablet Take 1 tablet (500 mg) by mouth 3 times daily as needed for heartburn       clindamycin (CLEOCIN) 150 MG capsule TAKE 4 CAPS BY MOUTH 1 HOUR PRIOR TO DENTAL APPOINTMENT       ferrous sulfate (FEROSUL) 325 (65 Fe) MG tablet Take 1 tablet (325 mg) by mouth daily       fluticasone (FLONASE) 50 MCG/ACT nasal spray Spray 2 sprays into both nostrils daily       Multiple Vitamins-Minerals (CENTRUM SILVER PO) Take 1 tablet by mouth daily.       nystatin (MYCOSTATIN) 708120 UNIT/GM external powder Apply topically 2 times daily Groin folds       Omega-3 Fatty Acids (FISH OIL CONCENTRATE PO) Take 1 capsule by mouth daily       order for DME Equipment being ordered: COMPRESSION STOCKINGS, 20-30 mmHg 1 each 1     pantoprazole (PROTONIX) 40 MG EC tablet Take 1 tablet (40 mg) by mouth 2 times daily (before meals)       polyethylene glycol (MIRALAX) 17 GM/Dose powder Take 17 g by mouth daily       potassium chloride (KAYCIEL) 10 % SOLN solution Take 15 mLs (20 mEq) by mouth daily       sennosides (SENOKOT) 8.6 MG tablet Take 1 tablet by mouth 2 times daily as needed for constipation       sodium chloride (OCEAN) 0.65 % nasal spray Spray 1 spray into both nostrils every hour as needed for congestion       sucralfate (CARAFATE) 1 GM/10ML suspension Take 10 mLs (1 g) by mouth 4 times daily (before meals and nightly) for 30 days       torsemide (DEMADEX) 20 MG tablet Take 1 tablet (20 mg) by mouth daily       Warfarin Therapy Reminder Take 1 each by mouth See Admin Instructions Per INR         MED REC REQUIRED  Post Medication Reconciliation Status: medication reconcilation previously completed during another  "office visit      ALLERGIES: Amiodarone, Pcn [penicillins], Statins, Amiodarone, Latex, and Zetia [ezetimibe]    REVIEW OF SYSTEMS:  10 point ROS were negative other than the symptoms noted above in the HPI.    PHYSICAL EXAM:  Vital signs were reviewed in the chart.  Vital Signs:  /72   Pulse 72   Temp 97.7  F (36.5  C)   Resp 16   Ht 1.803 m (5' 11\")   Wt 117.8 kg (259 lb 11.2 oz)   SpO2 98%   BMI 36.22 kg/m    General: Comfortable, cheerful, and in no acute distress  HEENT: Conjunctival pallor, no scleral icterus or injection, moist oral mucosa  Cardiovascular: Mechanical S1, S2, irregularly irregular rhythm   Respiratory: Lungs clear to auscultation bilaterally  GI: Abdomen soft, non-tender, non-distended, +BS  Extremities: 2+ bilateral LE edema  Neuro: CX II-XII grossly intact; ROM in all four extremities grossly intact  Psych: Awake and alert; normal affect  Skin: No acute rash    LABORATORY/IMAGING DATA:  All relevant labs and imaging data in [facility] and/or Care Everywhere were personally reviewed today.      Most Recent 3 CBC's:  Recent Labs   Lab Test 03/04/24  0700 02/26/24  0801 02/19/24  0618   WBC 3.5* 3.0* 3.2*   HGB 9.5* 9.4* 8.5*   * 101* 99    165 187     Most Recent 3 BMP's:Recent Labs   Lab Test 02/29/24  0519 02/22/24  0548 02/19/24  0618    136 133*   POTASSIUM 3.4 4.5 3.6   CHLORIDE 98 96* 91*   CO2 34* 31* 35*   BUN 26.7* 19.8 17.0   CR 1.11 1.06 1.12   ANIONGAP 6* 9 7   JOSEPH 8.7* 8.8 8.8   GLC 86 66* 75     Most Recent 2 LFT's:Recent Labs   Lab Test 02/03/24  0741 01/15/24  1815   AST 27 29   ALT 9 13   ALKPHOS 58 58   BILITOTAL 0.6 1.0       ASSESSMENT/PLAN:  Acute upper GI bleed,  Severe pill esophagitis with bleeding esophageal ulcer (middle third), s/p cauterization with BiCap on February 5, 2024,  GERD.  Patient reports no recurrence of GI bleed.  Plan:  Continue sucralfate 1 g 4 times daily for 4 weeks through March 16  Continue pantoprazole 40 mg twice " daily  Continue Tums as needed  Monitor for recurrence of GI bleed  Follow-up with GI on March 12 as scheduled     ABLA,  Iron deficiency.   Patient presented with profound anemia due to upper GI bleed as well as 3 months of intermittent epistaxis.  Patient was transfused with several units of PRBC and received IV Venofer x 2 in the hospital.  Today's CBC with hemoglobin 9.5 and .  Plan:  Continue ferrous sulfate 325 mg daily  Continue to monitor hemoglobin periodically  Transfuse PRN for hemoglobin less than 7     Leukopenia.  Noted since January 2024.  Today's white count 3.5.  Plan:  Monitor intermittently   Work-up/follow-up as outpatient     Chronic HFpEF (LVEF 60 to 65%, per echo on February 1, 2024),  Moderately decreased RV systolic function,  Moderate pulmonary hypertension,  Severe tricuspid regurgitation.  Patient was diuresed with IV furosemide in the hospital for acute on chronic CHF and discharged on oral torsemide.  Patient currently weighs 259.5 LBS and has   2+ bilateral  leg edema.   Plan:  Continue torsemide 20 mg daily  Continue potassium chloride 20 mEq daily  Continue the compression stockings   Monitor weight and volume status  Follow-up with cardiology as directed     History of severe mitral valve regurgitation, s/p mechanical (St. Raffi) mitral valve replacement in September 2008,  Permanent atrial fibrillation, s/p MAZE procedure in September 2008.  Patient is on chronic anticoagulation with warfarin for INR target 2.5-3.5.  Previously on warfarin 10 mg every Wednesday and Saturday, and 5 mg all other days.  EKG in the hospital showed atrial fibrillation.  Not on rate control medications or antiarrhythmics.  Rate is controlled.  He received warfarin 4 mg yesterday and today's INR is 3.  Plan:  Warfarin 5 mg today, then 4 mg tomorrow, and the day after tomorrow and recheck INR on March 7  Adjust warfarin dose for INR target 2.5-3.5  Monitor heart rate  Follow-up with cardiology as  directed     Hypertension.  Blood pressure is fairly controlled.  Plan:  Continue torsemide 20 mg daily  Monitor blood pressure     Dyslipidemia.  Intolerant of ezetimibe and statins.  Plan:  Continue fish oil 1200 mg daily     CKD stage II.  Baseline creatinine 0.9-1.1.  Today's creatinine is 1.11.  Plan:  Avoid NSAIDs and nephrotoxins  Monitor renal function periodically     Urinary retention.  A Stubbs catheter was placed during the Jan 9-Jan 19, 2024 hospitalization.  Stubbs catheter was removed on February 8 in the hospital and patient does straight cath 2-3 times a day.  Plan:  Continue straight caths PRN  Monitor      Vertigo.  Treated successfully with Epley maneuver by PT.   No recurrence.  Plan:  Monitor for recurrence     Sacrococcygeal pressure injury, stage III.  Present on admission.  Plan:  Continue wound care per WOC instructions     Epistaxis.  No recurrence since last week.  Plan:   Monitor for recurrence     Slow transit constipation.  Plan:  Continue the bowel regimen     Severe obesity, BMI 36.2,  Suspected sleep apnea (per assessment in the hospital).  Plan:  Staff to assist with daily care and mobility  Sleep study as outpatient     Moderate malnutrition.  Per assessment in the hospital.  Plan:  Continue the nutritional supplement per RD     Physical deconditioning.  Plan:  Continue PT/OT evaluation and therapy     Cognitive function:  SLUMS 23/30 and CPT 4.9/5.6 this TCU stay.   Plan:  Continue cognitive evaluation per OT for safe discharge planning         Orders written by provider at facility:  BMP on March 7, DX: CKD, CHF  CBC on March 11, DX: Anemia  Warfarin 5 mg p.o. today, March 4, then 4 mg p.o. on March 5 and 4 mg p.o. on March 6, and recheck INR on March 7, DX: Mechanical mitral valve, atrial fibrillation        Disclaimer: This note contains text created using speech-recognition software and may have unintended word substitutions.      Electronically signed by  Pavan Hernandez,  MD

## 2024-03-04 NOTE — LETTER
3/4/2024        RE: Feng Wynne  [address]  [facility] [address]        [facility]  March 4, 2024      CHIEF COMPLAINT:  Episodic/follow-up visit    HPI:    Feng Wynne is a 79 year old  (1945), who is being seen today for an episodic care visit at [facility].     Medical history is notable for CHF, pulmonary hypertension, severe mitral valve regurgitation (s/p mechanical valve replacement), permanent atrial fibrillation, hypertension, dyslipidemia, CKD, pneumonia, osteoarthritis, and severe obesity.     Summary of hospital course:  Patient was initially hospitalized at [facility] from January 9 through January 19, 2024 for profound anemia with hemoglobin of 4.4 due to possible GI bleed in the setting of supratherapeutic INR.  Anticoagulation was reversed and he was transfused with PRBC.  He was evaluated by GI service and underwent upper GI endoscopy on January 15 that showed normal findings.  Colonoscopy was also done on January 15 but preparation was poor that interfered with visualization.  Normal mucosa was found in the entire colon as well as internal hemorrhoids on retroflexion.  Notably, iron studies on admission showed iron deficiency with serum iron of 17, iron saturation of 4%, TIBC of 435, and ferritin of 21.  While hospitalized, he was treated with IV ceftriaxone for E. coli UTI and discharged on 3 days of oral cefuroxime. A Stubbs catheter was also placed for urinary retention. He was ultimately discharged to [facility] for rehab.  In the TCU he was diagnosed with right-sided pneumonia and treated with cefuroxime and doxycycline after receiving 2 g of IM ceftriaxone x 1.  He was rehospitalized at [facility] from January 30 through February 15, 2024 for acute on chronic anemia and acute on chronic CHF.  EKG showed atrial fibrillation with a heart rate of 76 bpm.  Laboratory evaluation  was significant for white count 3.4, hemoglobin 6, platelets 172,000,  INR 1.98, positive stool guaiac, high-sensitivity troponin 58, and N-terminal proBNP 1253. Chest x-ray showed improved left lateral patchy opacity, increased right perihilar hazy opacities suggestive of worsening pulmonary edema, vascular congestion, and cardiomegaly. Echocardiogram was remarkable for LVEF 60 to 65%, moderately decreased RV systolic function, moderate pulmonary hypertension, 4+ tricuspid gravitation, and presence of St. Raffi mechanical mitral valve.  He was evaluated by GI service and underwent upper endoscopy on February 5 that showed severe pill esophagitis with active bleeding ulcer in the middle third of esophagus that was cauterized with BiCap. Warfarin was held, he was placed on IV heparin bridge, and treated with PPI and sucralfate.  He was diuresed with IV furosemide for acute CHF that eventually transitioned to oral torsemide 20 mg daily.  He was ultimately placed back on warfarin. Follow-up endoscopy on February 8 was remarkable for oozing esophageal ulcer prompting IV heparin discontinuation.  Notably, he was transfused with several units of PRBC and received IV Venofer x 2 in the hospital.  He was seen by ENT for epistaxis but no intervention was warranted.  At discharge, hemoglobin was 8.8. TCU was recommended per therapies and he was then admitted to this facility for medical management, nursing care, and rehab.        Today's visit:  Patient was seen in his room, lying in bed.  He appears comfortable and cheerful.  He requires assist and Chandni for transfers.  He reports no recurrence of epistaxis or GI bleed.  He had a normal BM 2 days ago.  He denies fever, chills, chest pain, palpitation, dyspnea, nausea, vomiting, abdominal pain, or urinary symptoms.  He continues to do straight cath 2-3 times a day.  Today's INR is 3.    CODE STATUS:   CPR/Full code     Past Medical, Surgical, Family, and Social History were  reviewed in [employer].    Current Outpatient Medications   Medication Sig Dispense Refill     albuterol (PROAIR HFA/PROVENTIL HFA/VENTOLIN HFA) 108 (90 Base) MCG/ACT inhaler Inhale 1 puff into the lungs every 4 hours as needed for shortness of breath       bisacodyl (DULCOLAX) 10 MG suppository Place 10 mg rectally daily as needed for constipation Every 3 days if no BM       calcium carbonate (TUMS) 500 MG chewable tablet Take 1 tablet (500 mg) by mouth 3 times daily as needed for heartburn       clindamycin (CLEOCIN) 150 MG capsule TAKE 4 CAPS BY MOUTH 1 HOUR PRIOR TO DENTAL APPOINTMENT       ferrous sulfate (FEROSUL) 325 (65 Fe) MG tablet Take 1 tablet (325 mg) by mouth daily       fluticasone (FLONASE) 50 MCG/ACT nasal spray Spray 2 sprays into both nostrils daily       Multiple Vitamins-Minerals (CENTRUM SILVER PO) Take 1 tablet by mouth daily.       nystatin (MYCOSTATIN) 020822 UNIT/GM external powder Apply topically 2 times daily Groin folds       Omega-3 Fatty Acids (FISH OIL CONCENTRATE PO) Take 1 capsule by mouth daily       order for DME Equipment being ordered: COMPRESSION STOCKINGS, 20-30 mmHg 1 each 1     pantoprazole (PROTONIX) 40 MG EC tablet Take 1 tablet (40 mg) by mouth 2 times daily (before meals)       polyethylene glycol (MIRALAX) 17 GM/Dose powder Take 17 g by mouth daily       potassium chloride (KAYCIEL) 10 % SOLN solution Take 15 mLs (20 mEq) by mouth daily       sennosides (SENOKOT) 8.6 MG tablet Take 1 tablet by mouth 2 times daily as needed for constipation       sodium chloride (OCEAN) 0.65 % nasal spray Spray 1 spray into both nostrils every hour as needed for congestion       sucralfate (CARAFATE) 1 GM/10ML suspension Take 10 mLs (1 g) by mouth 4 times daily (before meals and nightly) for 30 days       torsemide (DEMADEX) 20 MG tablet Take 1 tablet (20 mg) by mouth daily       Warfarin Therapy Reminder Take 1 each by mouth See Admin Instructions Per INR         MED REC REQUIRED  Post  "Medication Reconciliation Status: medication reconcilation previously completed during another office visit      ALLERGIES: Amiodarone, Pcn [penicillins], Statins, Amiodarone, Latex, and Zetia [ezetimibe]    REVIEW OF SYSTEMS:  10 point ROS were negative other than the symptoms noted above in the HPI.    PHYSICAL EXAM:  Vital signs were reviewed in the chart.  Vital Signs:  /72   Pulse 72   Temp 97.7  F (36.5  C)   Resp 16   Ht 1.803 m (5' 11\")   Wt 117.8 kg (259 lb 11.2 oz)   SpO2 98%   BMI 36.22 kg/m    General: Comfortable, cheerful, and in no acute distress  HEENT: Conjunctival pallor, no scleral icterus or injection, moist oral mucosa  Cardiovascular: Mechanical S1, S2, irregularly irregular rhythm   Respiratory: Lungs clear to auscultation bilaterally  GI: Abdomen soft, non-tender, non-distended, +BS  Extremities: 2+ bilateral LE edema  Neuro: CX II-XII grossly intact; ROM in all four extremities grossly intact  Psych: Awake and alert; normal affect  Skin: No acute rash    LABORATORY/IMAGING DATA:  All relevant labs and imaging data in [facility] and/or Care Everywhere were personally reviewed today.      Most Recent 3 CBC's:  Recent Labs   Lab Test 03/04/24  0700 02/26/24  0801 02/19/24  0618   WBC 3.5* 3.0* 3.2*   HGB 9.5* 9.4* 8.5*   * 101* 99    165 187     Most Recent 3 BMP's:Recent Labs   Lab Test 02/29/24  0519 02/22/24  0548 02/19/24  0618    136 133*   POTASSIUM 3.4 4.5 3.6   CHLORIDE 98 96* 91*   CO2 34* 31* 35*   BUN 26.7* 19.8 17.0   CR 1.11 1.06 1.12   ANIONGAP 6* 9 7   JOSEPH 8.7* 8.8 8.8   GLC 86 66* 75     Most Recent 2 LFT's:Recent Labs   Lab Test 02/03/24  0741 01/15/24  1815   AST 27 29   ALT 9 13   ALKPHOS 58 58   BILITOTAL 0.6 1.0       ASSESSMENT/PLAN:  Acute upper GI bleed,  Severe pill esophagitis with bleeding esophageal ulcer (middle third), s/p cauterization with BiCap on February 5, 2024,  GERD.  Patient reports no recurrence of GI bleed.  Plan:  Continue " sucralfate 1 g 4 times daily for 4 weeks through March 16  Continue pantoprazole 40 mg twice daily  Continue Tums as needed  Monitor for recurrence of GI bleed  Follow-up with GI on March 12 as scheduled     ABLA,  Iron deficiency.   Patient presented with profound anemia due to upper GI bleed as well as 3 months of intermittent epistaxis.  Patient was transfused with several units of PRBC and received IV Venofer x 2 in the hospital.  Today's CBC with hemoglobin 9.5 and .  Plan:  Continue ferrous sulfate 325 mg daily  Continue to monitor hemoglobin periodically  Transfuse PRN for hemoglobin less than 7     Leukopenia.  Noted since January 2024.  Today's white count 3.5.  Plan:  Monitor intermittently   Work-up/follow-up as outpatient     Chronic HFpEF (LVEF 60 to 65%, per echo on February 1, 2024),  Moderately decreased RV systolic function,  Moderate pulmonary hypertension,  Severe tricuspid regurgitation.  Patient was diuresed with IV furosemide in the hospital for acute on chronic CHF and discharged on oral torsemide.  Patient currently weighs 259.5 LBS and has   2+ bilateral  leg edema.   Plan:  Continue torsemide 20 mg daily  Continue potassium chloride 20 mEq daily  Continue the compression stockings   Monitor weight and volume status  Follow-up with cardiology as directed     History of severe mitral valve regurgitation, s/p mechanical (St. Raffi) mitral valve replacement in September 2008,  Permanent atrial fibrillation, s/p MAZE procedure in September 2008.  Patient is on chronic anticoagulation with warfarin for INR target 2.5-3.5.  Previously on warfarin 10 mg every Wednesday and Saturday, and 5 mg all other days.  EKG in the hospital showed atrial fibrillation.  Not on rate control medications or antiarrhythmics.  Rate is controlled.  He received warfarin 4 mg yesterday and today's INR is 3.  Plan:  Warfarin 5 mg today, then 4 mg tomorrow, and the day after tomorrow and recheck INR on March  7  Adjust warfarin dose for INR target 2.5-3.5  Monitor heart rate  Follow-up with cardiology as directed     Hypertension.  Blood pressure is fairly controlled.  Plan:  Continue torsemide 20 mg daily  Monitor blood pressure     Dyslipidemia.  Intolerant of ezetimibe and statins.  Plan:  Continue fish oil 1200 mg daily     CKD stage II.  Baseline creatinine 0.9-1.1.  Today's creatinine is 1.11.  Plan:  Avoid NSAIDs and nephrotoxins  Monitor renal function periodically     Urinary retention.  A Stubbs catheter was placed during the Jan 9-Jan 19, 2024 hospitalization.  Stubbs catheter was removed on February 8 in the hospital and patient does straight cath 2-3 times a day.  Plan:  Continue straight caths PRN  Monitor      Vertigo.  Treated successfully with Epley maneuver by PT.   No recurrence.  Plan:  Monitor for recurrence     Sacrococcygeal pressure injury, stage III.  Present on admission.  Plan:  Continue wound care per WOC instructions     Epistaxis.  No recurrence since last week.  Plan:   Monitor for recurrence     Slow transit constipation.  Plan:  Continue the bowel regimen     Severe obesity, BMI 36.2,  Suspected sleep apnea (per assessment in the hospital).  Plan:  Staff to assist with daily care and mobility  Sleep study as outpatient     Moderate malnutrition.  Per assessment in the hospital.  Plan:  Continue the nutritional supplement per RD     Physical deconditioning.  Plan:  Continue PT/OT evaluation and therapy     Cognitive function:  SLUMS 23/30 and CPT 4.9/5.6 this TCU stay.   Plan:  Continue cognitive evaluation per OT for safe discharge planning         Orders written by provider at facility:  BMP on March 7, DX: CKD, CHF  CBC on March 11, DX: Anemia  Warfarin 5 mg p.o. today, March 4, then 4 mg p.o. on March 5 and 4 mg p.o. on March 6, and recheck INR on March 7, DX: Mechanical mitral valve, atrial fibrillation        Disclaimer: This note contains text created using speech-recognition software  and may have unintended word substitutions.      Electronically signed by  Pavan Hernandez MD                        Sincerely,        Pavan Hernandez MD

## 2024-03-04 NOTE — PATIENT INSTRUCTIONS
Orders for Feng Wynne (1945), MR# [id number]:    1) BMP on March 7, DX: CKD, CHF  2) CBC on March 11, DX: Anemia  3) Warfarin 5 mg p.o. today, March 4, then warfarin 4 mg p.o. on March 5 and warfarin 4 mg p.o. on March 6; DX: Mechanical mitral valve, atrial fibrillation  4) Recheck INR on March 7, DX: Mechanical mitral valve, atrial fibrillation      Pavan Hernandez MD  [facility]

## 2024-03-06 NOTE — TELEPHONE ENCOUNTER
Alverto calling to schedule appointment for patient.  Has unstagable wound on coccyx.  Using a high grade air mattress.  DAMIÁN at the moment, working with PT to pivot transfer.  OK to schedule.   [phone]  Kezia

## 2024-03-06 NOTE — PROGRESS NOTES
[facility] GERIATRICS    Chief Complaint   Patient presents with    RECHECK     HPI:  Feng Wynne is a 79 year old  (1945), who is being seen today for an episodic care visit at: [facility]  ([facility]) [[id number]].     PMH significant for HFpEF, pulmonary hypertension, history of severe mitral valve regurgitation status post mitral valve replacement, atrial fibrillation, hypertension, dyslipidemia, chronic kidney disease, obesity    Of note patient was hospitalized at [facility] from 1/9/2024 to 1/19/2024 for acute anemia, hemoglobin found to be 4.4 due to possible GI bleed in setting of supratherapeutic INR.  Patient received transfusion.  GI consulted and patient's status post upper endoscopy on 1/15 with normal findings.  Patient also underwent colonoscopy on 1/15, however prep was poor and unable to visualize well.  Patient was also treated for E. coli UTI and required Stubbs catheter placement for urinary retention, he was discharged to [facility].  In the U patient treated for right-sided pneumonia with ceftriaxone followed by cefuroxime and doxycycline.    Summary of recent hospitalization:  Most recently, patient was hospitalized at [facility] from 1/30/2024 to 2/15/2024 for acute anemia and acute on chronic congestive heart failure exacerbation.  In the emergency department laboratory evaluation significant for WBC 3.4, hemoglobin 6.9, high-sensitivity troponin 58, BNP 1253.  Patient also found to have positive stool guaiac.  Chest x-ray positive for increased right perihilar hazy opacity suggestive of worsening pulmonary edema, similar vascular congestion and cardiomegaly, no focal consolidation, possible trace right pleural effusion, however lateral view was limited due to overlapping soft tissue.  Echo 2/1/2024 showed LVEF 60 to 65%, diastolic function not assessed due to presence of prosthetic mitral valve, flattened septum consistent with RV  pressure/volume overload, right ventricle moderately dilated, moderately decreased right ventricular systolic function, left and right atrium severely dilated, severe pulm 4+ tricuspid regurgitation, moderate pulmonary hypertension.  Patient was diuresed with IV furosemide for acute CHF and transition back to oral torsemide.  GI was consulted and patient underwent underwent upper endoscopy on 2/5 that revealed severe pill esophagitis with active bleeding ulcer in the middle third of esophagus that was cauterized with BiCap.  PTA warfarin was held and he was bridged with heparin.  He was started on PPI and Carafate.  Endoscopy on 2/8 showed oozing esophageal ulcer, heparin was discontinued.  Patient also received multiple transfusions of PRBCs and IV Venofer x2. Hemoglobin 8.8 on 2/15/2024. He was discharged on altered diet. GI to follow up outpatient, recommend avoid doxycylcine and oral potassium. Potassium was changed to liquid at discharge. Coumadin was restarted. Discharged to TCU for physical rehabilitation and medical management.     Reviewed notes, imaging and labs from recent hospitalization today.    Today patient is seen for routine follow-up in the TCU. He reports chronic bilateral knee pain, worse on the right overall.  Patient is requesting cortisone injection, has had them in the past with variable results.  Had a hard day yesterday overall, but is trying to stay positive.  He denies any shortness of breath, chest pain.  He denies shortness of breath, dizziness/lightheadedness.  Reports that his bowels are moving regularly. Denies blood stools. He denies dysuria/trouble urinating, denies straight cath recently. INR 3.6 today. Patient continues to work with therapy.  Per therapy notes, patient is min to mod assist to, EZ stand with staff, max assist for bed mobility, ambulating 42 feet with 2 wheeled walker contact-guard assist, max assist for lower body dressing, max assist for toileting.    Reviewed  "facility EMR including medications, recent nursing progress notes, vital signs.  Discussed plan of care with nursing today.      Allergies, and PMH/PSH reviewed in EPIC today.  REVIEW OF SYSTEMS:  8 point ROS of systems including Constitutional, Respiratory, Cardiovascular, Gastroenterology, Genitourinary, Integumentary, Musculoskeletal, Psychiatric were all negative except for pertinent positives noted in my HPI.    Objective:   /73   Pulse 61   Temp 98.2  F (36.8  C)   Resp 17   Ht 1.803 m (5' 11\")   Wt 117.5 kg (259 lb)   SpO2 97%   BMI 36.12 kg/m    GENERAL APPEARANCE:  Alert, in NAD  HEENT: normocephalic, moist mucous membranes, nose without drainage or crusting  RESP:  respiratory effort normal, no respiratory distress, Lung sounds clear, patient is on RA  CV: auscultation of heart done, rate and rhythm irregular.   ABDOMEN: + bowel sounds, soft, nontender, no grimacing or guarding with palpation.  M/S: +1 bilateral lower extremity edema; right knee warm, tender to palpation- reports this is chronic  NEURO: cranial nerves 2-12 grossly intact and at patient's baseline; moves extremities freely  PSYCH:  affect and mood normal      Labs done in SNF are in [facility]. Please refer to them using EPIC/Care Everywhere. and Recent labs in EPIC reviewed by me today.     Assessment/Plan:  Acute upper GI bleed  Severe pill esophagitis with bleeding esophageal ulcer status post cauterization with BiCap on 2/5/2024  GERD  Denies bloody stools  Plan: Continue pantoprazole 40 mg twice daily, sucralfate 1 g 4 times daily x 4 weeks through 3/16/2024.  Monitor for signs and symptoms of GI bleed.  Follow-up with GI as scheduled on 3/12/2024    Acute blood loss anemia  Iron deficiency  Secondary to GI bleed and intermittent epistaxis for 3 months prior  Received PRBC transfusions inpatient and IV venofer inpatient  Hemoglobin 9.5 on 3/4/2024- trending up  Plan: CBC 3/11. Continue ferrous sulfate 325 mg daily.  " Monitor for signs and symptoms of bleeding.    Leukopenia  Started in 1/2024  WBC 3.5 on 3/4/2024  Plan: CBC 3/11. Follow up outpatient for further work up.    Chronic heart failure with preserved EF, LVEF 60 to 65% per echo on 2/1/2024  Moderate pulmonary hypertension  Severe tricuspid regurgitation  Treated for acute on chronic CHF exacerbation inpatient with IV furosemide, tablet potassium changed to liquid potassium as well  Weight 259.8 lb on 3/5, +1 leg swelling today  Plan: Continue torsemide 20 mg daily, potassium chloride 20 mill equivalents daily.  Continue compression stockings on in a.m. off in p.m. Daily weights. Notify provider with weight gain >2 lbs in a day, >5 lbs in on week. Follow-up with cardiology as scheduled    History of severe mitral valve regurgitation status post mechanical mitral valve replacement in 2/2008  Permanent atrial fibrillation  PTA warfarin 10 mg every Wednesday and Saturday, 5 mg rest of days  INR goal 2.5-3.5  Heart rate 63-73 recently  INR today 3.6- supratherapeutic, received coumadin 5 mg on 3/4 and then 4 mg T and W  Plan: Coumadin 3 today, then 4 mg daily. INR 3/11. INR goal 2.5-3.5. Monitor heart rate. Follow up with cardiology as scheduled.    Essential hypertension  SBP controlled 110-120s recently  Plan: Continue torsemide 20 mg daily.  Monitor blood pressure.    Dyslipidemia  Plan: Continue fish oil 1200 mg po daily    Chronic knee arthritis  Per patient report  Right worse than left  Plan: Patient requesting cortisone injections to knees. Spoke to ortho provider who is able to complete injections on 3/8- updated patient. Monitor symptoms.    CKD stage II  Baseline creatinine 0.9-1.1  Creatinine 1.17 on 3/7/2024  Plan: BMP PRN. Avoid nephrotoxins. Renally dose medications as indicated.    Urinary retention  Patient had Stubbs catheter during recent admission, initially needed to straight cath, reports voiding now without difficulty and feels he is emptying bladder  completely  Plan: Continue to straight cath as needed.  Monitor symptoms.  Follow-up with urology per recommendations.    Vertigo  Patient was treated with Epley maneuver by physical therapy, has not had reoccurrence  Plan: Monitor for symptoms    Sacrococcygeal pressure injury stage III  Present on admission, per discussion with nurse manager is worse this week  Reviewed images in PCC, no signs of infection, but significant amount of slough is present earlier this week so plurogel was added to help debride  Plan: Continue wound care as ordered, nurse manager is doing this currently due to recent worsening.  Reposition frequently.  Continue air mattress.  Nursing to update provider with worsening    Epistaxis  No recurrence  Plan: Monitor.    Slow transit constipation  Bowels moving regularly  Plan: Continue senna 1 tab twice daily as needed, MiraLAX 17 g daily as needed.  Monitor bowel.    Moderate malnutrition  Per inpatient dietitian assessment  Diet has been advanced, eating % of meals recently per nursing documentation  Wt Readings from Last 4 Encounters:   03/06/24 117.5 kg (259 lb)   03/04/24 117.8 kg (259 lb 11.2 oz)   03/01/24 117.5 kg (259 lb)   02/28/24 118.8 kg (262 lb)     Plan: Dietician following. Supplements per dietician. Monitor weights and po intake    Obesity, BMI 36.12  Suspected GINGER per hospital assessment  Complicates care  Plan: Encourage weight loss.  Dietitian follows in the TCU.  Follow-up outpatient for sleep evaluation.    Cognitive impairment  Slums 23/30, CPT 4.9/5.6  Plan: Based on cognitive testing, recommend weekly check-in's for safety.    Physical deconditioning  Secondary to acute/ chronic medical conditions as above, multiple recent hospitalizations   Patient continues to work with therapy  Plan: Encourage participation in physical therapy/occupational therapy for strengthening and deconditioning. Discharge planning per their recommendation. Social work to assist with d/c  planning.        MED REC REQUIRED  Post Medication Reconciliation Status:  Medication reconciliation previously completed during another office visit    Disclaimer: This note may contain text created using speech-recognition software and may contain unintended word substitutions.       Electronically signed by: MIKE Preciado CNP

## 2024-03-06 NOTE — TELEPHONE ENCOUNTER
Consult received via Phone from Alverto Pickering for wound of the Sacrum    Please schedule with Dawn Dee PA-C, Melinda Holman NP, or Travis Downey M.D. at [facility] [place] for next available appointment.    **For all providers, Rosalva Sanabria PA-C, Dr. Spain, Melinda Holman NP or Dr. Downey, please schedule a follow up 2-3 weeks after initial appointment.**  --If unable to schedule within 2-3 weeks then please place on cancellation list--    Is the patient able to make their own medical decisions? Yes    Can the patient be scheduled on a Thursday? No    Is patient a DAMIÁN lift? PLEASE INQUIRE WHEN MAKING THE APPOINTMENT AND PUT IN APPOINTMENT NOTES    Routing to  Wound Healing Scheduling.

## 2024-03-07 NOTE — PATIENT INSTRUCTIONS
Orders  Feng Wynne  1945  1) Coumadin 3 today, then 4 mg daily. INR 3/11.  2) Air mattress  MIKE Preciado CNP on 3/7/2024 at 12:54 PM

## 2024-03-07 NOTE — LETTER
3/7/2024        RE: Feng Wynne  [address]  [place] [address]        [facility] GERIATRICS    Chief Complaint   Patient presents with     RECHECK     HPI:  Feng Wynne is a 79 year old  (1945), who is being seen today for an episodic care visit at: [facility]  ([facility]) [[id number]].     PMH significant for HFpEF, pulmonary hypertension, history of severe mitral valve regurgitation status post mitral valve replacement, atrial fibrillation, hypertension, dyslipidemia, chronic kidney disease, obesity    Of note patient was hospitalized at [facility] from 1/9/2024 to 1/19/2024 for acute anemia, hemoglobin found to be 4.4 due to possible GI bleed in setting of supratherapeutic INR.  Patient received transfusion.  GI consulted and patient's status post upper endoscopy on 1/15 with normal findings.  Patient also underwent colonoscopy on 1/15, however prep was poor and unable to visualize well.  Patient was also treated for E. coli UTI and required Stubbs catheter placement for urinary retention, he was discharged to [facility].  In the U patient treated for right-sided pneumonia with ceftriaxone followed by cefuroxime and doxycycline.    Summary of recent hospitalization:  Most recently, patient was hospitalized at [facility] from 1/30/2024 to 2/15/2024 for acute anemia and acute on chronic congestive heart failure exacerbation.  In the emergency department laboratory evaluation significant for WBC 3.4, hemoglobin 6.9, high-sensitivity troponin 58, BNP 1253.  Patient also found to have positive stool guaiac.  Chest x-ray positive for increased right perihilar hazy opacity suggestive of worsening pulmonary edema, similar vascular congestion and cardiomegaly, no focal consolidation, possible trace right pleural effusion, however lateral view was limited due to overlapping soft tissue.  Echo 2/1/2024 showed LVEF 60 to 65%, diastolic function not assessed  due to presence of prosthetic mitral valve, flattened septum consistent with RV pressure/volume overload, right ventricle moderately dilated, moderately decreased right ventricular systolic function, left and right atrium severely dilated, severe pulm 4+ tricuspid regurgitation, moderate pulmonary hypertension.  Patient was diuresed with IV furosemide for acute CHF and transition back to oral torsemide.  GI was consulted and patient underwent underwent upper endoscopy on 2/5 that revealed severe pill esophagitis with active bleeding ulcer in the middle third of esophagus that was cauterized with BiCap.  PTA warfarin was held and he was bridged with heparin.  He was started on PPI and Carafate.  Endoscopy on 2/8 showed oozing esophageal ulcer, heparin was discontinued.  Patient also received multiple transfusions of PRBCs and IV Venofer x2. Hemoglobin 8.8 on 2/15/2024. He was discharged on altered diet. GI to follow up outpatient, recommend avoid doxycylcine and oral potassium. Potassium was changed to liquid at discharge. Coumadin was restarted. Discharged to TCU for physical rehabilitation and medical management.     Reviewed notes, imaging and labs from recent hospitalization today.    Today patient is seen for routine follow-up in the TCU. He reports chronic bilateral knee pain, worse on the right overall.  Patient is requesting cortisone injection, has had them in the past with variable results.  Had a hard day yesterday overall, but is trying to stay positive.  He denies any shortness of breath, chest pain.  He denies shortness of breath, dizziness/lightheadedness.  Reports that his bowels are moving regularly. Denies blood stools. He denies dysuria/trouble urinating, denies straight cath recently. INR 3.6 today. Patient continues to work with therapy.  Per therapy notes, patient is min to mod assist to, EZ stand with staff, max assist for bed mobility, ambulating 42 feet with 2 wheeled walker contact-guard  "assist, max assist for lower body dressing, max assist for toileting.    Reviewed facility EMR including medications, recent nursing progress notes, vital signs.  Discussed plan of care with nursing today.      Allergies, and PMH/PSH reviewed in EPIC today.  REVIEW OF SYSTEMS:  8 point ROS of systems including Constitutional, Respiratory, Cardiovascular, Gastroenterology, Genitourinary, Integumentary, Musculoskeletal, Psychiatric were all negative except for pertinent positives noted in my HPI.    Objective:   /73   Pulse 61   Temp 98.2  F (36.8  C)   Resp 17   Ht 1.803 m (5' 11\")   Wt 117.5 kg (259 lb)   SpO2 97%   BMI 36.12 kg/m    GENERAL APPEARANCE:  Alert, in NAD  HEENT: normocephalic, moist mucous membranes, nose without drainage or crusting  RESP:  respiratory effort normal, no respiratory distress, Lung sounds clear, patient is on RA  CV: auscultation of heart done, rate and rhythm irregular.   ABDOMEN: + bowel sounds, soft, nontender, no grimacing or guarding with palpation.  M/S: +1 bilateral lower extremity edema; right knee warm, tender to palpation- reports this is chronic  NEURO: cranial nerves 2-12 grossly intact and at patient's baseline; moves extremities freely  PSYCH:  affect and mood normal      Labs done in SNF are in [facility]. Please refer to them using [employer]/Care Everywhere. and Recent labs in [facility] reviewed by me today.     Assessment/Plan:  Acute upper GI bleed  Severe pill esophagitis with bleeding esophageal ulcer status post cauterization with BiCap on 2/5/2024  GERD  Denies bloody stools  Plan: Continue pantoprazole 40 mg twice daily, sucralfate 1 g 4 times daily x 4 weeks through 3/16/2024.  Monitor for signs and symptoms of GI bleed.  Follow-up with GI as scheduled on 3/12/2024    Acute blood loss anemia  Iron deficiency  Secondary to GI bleed and intermittent epistaxis for 3 months prior  Received PRBC transfusions inpatient and IV venofer inpatient  Hemoglobin 9.5 on " 3/4/2024- trending up  Plan: CBC 3/11. Continue ferrous sulfate 325 mg daily.  Monitor for signs and symptoms of bleeding.    Leukopenia  Started in 1/2024  WBC 3.5 on 3/4/2024  Plan: CBC 3/11. Follow up outpatient for further work up.    Chronic heart failure with preserved EF, LVEF 60 to 65% per echo on 2/1/2024  Moderate pulmonary hypertension  Severe tricuspid regurgitation  Treated for acute on chronic CHF exacerbation inpatient with IV furosemide, tablet potassium changed to liquid potassium as well  Weight 259.8 lb on 3/5, +1 leg swelling today  Plan: Continue torsemide 20 mg daily, potassium chloride 20 mill equivalents daily.  Continue compression stockings on in a.m. off in p.m. Daily weights. Notify provider with weight gain >2 lbs in a day, >5 lbs in on week. Follow-up with cardiology as scheduled    History of severe mitral valve regurgitation status post mechanical mitral valve replacement in 2/2008  Permanent atrial fibrillation  PTA warfarin 10 mg every Wednesday and Saturday, 5 mg rest of days  INR goal 2.5-3.5  Heart rate 63-73 recently  INR today 3.6- supratherapeutic, received coumadin 5 mg on 3/4 and then 4 mg T and W  Plan: Coumadin 3 today, then 4 mg daily. INR 3/11. INR goal 2.5-3.5. Monitor heart rate. Follow up with cardiology as scheduled.    Essential hypertension  SBP controlled 110-120s recently  Plan: Continue torsemide 20 mg daily.  Monitor blood pressure.    Dyslipidemia  Plan: Continue fish oil 1200 mg po daily    Chronic knee arthritis  Per patient report  Right worse than left  Plan: Patient requesting cortisone injections to knees. Spoke to ortho provider who is able to complete injections on 3/8- updated patient. Monitor symptoms.    CKD stage II  Baseline creatinine 0.9-1.1  Creatinine 1.17 on 3/7/2024  Plan: BMP PRN. Avoid nephrotoxins. Renally dose medications as indicated.    Urinary retention  Patient had Stubbs catheter during recent admission, initially needed to straight  cath, reports voiding now without difficulty and feels he is emptying bladder completely  Plan: Continue to straight cath as needed.  Monitor symptoms.  Follow-up with urology per recommendations.    Vertigo  Patient was treated with Epley maneuver by physical therapy, has not had reoccurrence  Plan: Monitor for symptoms    Sacrococcygeal pressure injury stage III  Present on admission, per discussion with nurse manager is worse this week  Reviewed images in PCC, no signs of infection, but significant amount of slough is present earlier this week so plurogel was added to help debride  Plan: Continue wound care as ordered, nurse manager is doing this currently due to recent worsening.  Reposition frequently.  Continue air mattress.  Nursing to update provider with worsening    Epistaxis  No recurrence  Plan: Monitor.    Slow transit constipation  Bowels moving regularly  Plan: Continue senna 1 tab twice daily as needed, MiraLAX 17 g daily as needed.  Monitor bowel.    Moderate malnutrition  Per inpatient dietitian assessment  Diet has been advanced, eating % of meals recently per nursing documentation  Wt Readings from Last 4 Encounters:   03/06/24 117.5 kg (259 lb)   03/04/24 117.8 kg (259 lb 11.2 oz)   03/01/24 117.5 kg (259 lb)   02/28/24 118.8 kg (262 lb)     Plan: Dietician following. Supplements per dietician. Monitor weights and po intake    Obesity, BMI 36.12  Suspected GINGER per hospital assessment  Complicates care  Plan: Encourage weight loss.  Dietitian follows in the TCU.  Follow-up outpatient for sleep evaluation.    Cognitive impairment  Slums 23/30, CPT 4.9/5.6  Plan: Based on cognitive testing, recommend weekly check-in's for safety.    Physical deconditioning  Secondary to acute/ chronic medical conditions as above, multiple recent hospitalizations   Patient continues to work with therapy  Plan: Encourage participation in physical therapy/occupational therapy for strengthening and  deconditioning. Discharge planning per their recommendation. Social work to assist with d/c planning.        MED REC REQUIRED  Post Medication Reconciliation Status:  Medication reconciliation previously completed during another office visit    Disclaimer: This note may contain text created using speech-recognition software and may contain unintended word substitutions.       Electronically signed by: MIKE Preciado CNP               Sincerely,        MIKE Preciado CNP

## 2024-03-08 NOTE — LETTER
"    3/8/2024        RE: Feng Wynne  [address]  [facility] [address]        [facility] GERIATRICS    Chief Complaint   Patient presents with     RECHECK     HPI:  Feng Wynne is a 79 year old  (1945), who is being seen today for an episodic care visit at: [facility]  ([facility]) [[id number]].     Today's Visit:  Feng is seen today at his request for bilateral knee injections.  He has been dealing with chronic bilateral knee pain since 2012.  R knee was injured at that time in an elevator accident.  Has received cortisone shots in his knees previously at [facility] (Dr. Adair).  Believes his last injections were ~6 months ago.  States that the injections have been slowly getting less helpful but he still wants to repeat today, as his knee pain is slowing progress in therapy at [facility].  Pain is currently being managed with acetaminophen.  Wishes he could use other analgesia modalities but cannot take NSAIDs due to age and coumadin use.  Has back issues as well but denies current radiculopathy.      Allergies, and PMH/PSH reviewed in EPIC today.  REVIEW OF SYSTEMS:  Pertinent positives/negatives as noted above    Objective:   /73   Pulse 61   Temp 98.2  F (36.8  C)   Resp 17   Ht 1.803 m (5' 11\")   Wt 117.5 kg (259 lb)   SpO2 97%   BMI 36.12 kg/m    GENERAL APPEARANCE:  Alert, in no distress, cooperative, obese  M/S:   R knee with valgus deformity, no ecchymosis, skin intact.  L knee with less severe valgus deformity.  No effusions.  Limited ROM bilaterally-- flexion limited to ~80 degrees on R and L, unable to lift legs independently.    PSYCH:  oriented X 3      Assessment/Plan:  (M17.0) Primary osteoarthritis of both knees  (primary encounter diagnosis)  (M25.561,  M25.562,  G89.29) Chronic pain of both knees  Chronic issue with exacerbations of pain during TCU stay d/t rehab requirements to regain strength and return to community following hospitalization.  We " discussed that cortisone shots often become less and less effective, encouraged him to talk with Dr. Adair about whether he is a good candidate for synvisc injection.  We reviewed topical agents for pain management including voltaren gel (w/ caution d/t use of coumadin) and lidocaine patches.  He may want to consider one of these if cortisone is not helpful.    Plan:   -Proceed with bilateral knee injections today  -Can repeat injections every 3 months as needed  -If no pain relief w/ injections, consider adding lidocaine patch or voltaren gel (w/ mindful dosing given coumadin)  -Possible future follow-up with Dr. Adair to discuss synvisc injections- if beneficial for his case or not      Procedure:  After risks, benefits and complications of steroid injection were discussed with the patient he elected to proceed.  Using sterile technique, the area was first prepped with betadyne and an alcohol swab.  A 22 gauge needle was used to inject 40 mg DepoMedrol and 4 cc of 1% lidocaine into the knee on the right and left via lateral joint spaces.  Feng  tolerated the procedure well without complications.    CPT Code:  31399    Electronically signed by: MIKE Drake CNP         Sincerely,        MIKE Drake CNP

## 2024-03-08 NOTE — PROGRESS NOTES
"[facility] GERIATRICS    Chief Complaint   Patient presents with    RECHECK     HPI:  Feng Wynne is a 79 year old  (1945), who is being seen today for an episodic care visit at: [facility]  ([facility]) [[id number]].     Today's Visit:  Feng is seen today at his request for bilateral knee injections.  He has been dealing with chronic bilateral knee pain since 2012.  R knee was injured at that time in an elevator accident.  Has received cortisone shots in his knees previously at [facility] (Dr. Adair).  Believes his last injections were ~6 months ago.  States that the injections have been slowly getting less helpful but he still wants to repeat today, as his knee pain is slowing progress in therapy at [facility].  Pain is currently being managed with acetaminophen.  Wishes he could use other analgesia modalities but cannot take NSAIDs due to age and coumadin use.  Has back issues as well but denies current radiculopathy.      Allergies, and PMH/PSH reviewed in EPIC today.  REVIEW OF SYSTEMS:  Pertinent positives/negatives as noted above    Objective:   /73   Pulse 61   Temp 98.2  F (36.8  C)   Resp 17   Ht 1.803 m (5' 11\")   Wt 117.5 kg (259 lb)   SpO2 97%   BMI 36.12 kg/m    GENERAL APPEARANCE:  Alert, in no distress, cooperative, obese  M/S:   R knee with valgus deformity, no ecchymosis, skin intact.  L knee with less severe valgus deformity.  No effusions.  Limited ROM bilaterally-- flexion limited to ~80 degrees on R and L, unable to lift legs independently.    PSYCH:  oriented X 3      Assessment/Plan:  (M17.0) Primary osteoarthritis of both knees  (primary encounter diagnosis)  (M25.561,  M25.562,  G89.29) Chronic pain of both knees  Chronic issue with exacerbations of pain during TCU stay d/t rehab requirements to regain strength and return to community following hospitalization.  We discussed that cortisone shots often become less and less effective, encouraged him to talk " with Dr. Adair about whether he is a good candidate for synvisc injection.  We reviewed topical agents for pain management including voltaren gel (w/ caution d/t use of coumadin) and lidocaine patches.  He may want to consider one of these if cortisone is not helpful.    Plan:   -Proceed with bilateral knee injections today  -Can repeat injections every 3 months as needed  -If no pain relief w/ injections, consider adding lidocaine patch or voltaren gel (w/ mindful dosing given coumadin)  -Possible future follow-up with Dr. Adair to discuss synvisc injections- if beneficial for his case or not      Procedure:  After risks, benefits and complications of steroid injection were discussed with the patient he elected to proceed.  Using sterile technique, the area was first prepped with betadyne and an alcohol swab.  A 22 gauge needle was used to inject 40 mg DepoMedrol and 4 cc of 1% lidocaine into the knee on the right and left via lateral joint spaces.  Feng  tolerated the procedure well without complications.    CPT Code:  52622    Electronically signed by: MIKE Drake CNP

## 2024-03-11 NOTE — PROGRESS NOTES
Clinic Care Coordination Contact    Situation: Patient chart reviewed by care coordinator.    Background:   Patient was admitted to [facility] from 1/30/2024 to 2/15/2024 with a diagnoses of   ABLA, Acute on chronic CHF, A.Fib, ENRIQUE, HTN and discharged to [facility].     Assessment:   Patient is still admitted to [facility].j    Plan/Recommendations:   SW/RN Care Coordination will wait for notification from facility apprising SW/RN Care Coordination of patient's discharge plans.  SW/RN will review chart and outreach to facility every 4 weeks and as needed.     Yaneth Davila RN, BSN, PHN  Primary Care / Care Coordinator   [facility]  E-mail [email or web address]   [phone]

## 2024-03-12 NOTE — PATIENT INSTRUCTIONS
Orders  Feng Wynne  1945  1) CBC, BMP 3/18. dX: anemia, CKD  2) Start tylenol 650 mg 6h PRN for pain  Cassy Romero, APRN CNP on 3/12/2024 at 1:11 PM

## 2024-03-12 NOTE — PROGRESS NOTES
[facility] GERIATRICS    Chief Complaint   Patient presents with    RECHECK     HPI:  Feng Wynne is a 79 year old  (1945), who is being seen today for an episodic care visit at: [facility]  ([facility]) [[id number]].       PMH significant for HFpEF, pulmonary hypertension, history of severe mitral valve regurgitation status post mitral valve replacement, atrial fibrillation, hypertension, dyslipidemia, chronic kidney disease, obesity     Of note patient was hospitalized at [facility] from 1/9/2024 to 1/19/2024 for acute anemia, hemoglobin found to be 4.4 due to possible GI bleed in setting of supratherapeutic INR.  Patient received transfusion.  GI consulted and patient's status post upper endoscopy on 1/15 with normal findings.  Patient also underwent colonoscopy on 1/15, however prep was poor and unable to visualize well.  Patient was also treated for E. coli UTI and required Stubbs catheter placement for urinary retention, he was discharged to [facility].  In the U patient treated for right-sided pneumonia with ceftriaxone followed by cefuroxime and doxycycline.     Summary of recent hospitalization:  Most recently, patient was hospitalized at [facility] from 1/30/2024 to 2/15/2024 for acute anemia and acute on chronic congestive heart failure exacerbation.  In the emergency department laboratory evaluation significant for WBC 3.4, hemoglobin 6.9, high-sensitivity troponin 58, BNP 1253.  Patient also found to have positive stool guaiac.  Chest x-ray positive for increased right perihilar hazy opacity suggestive of worsening pulmonary edema, similar vascular congestion and cardiomegaly, no focal consolidation, possible trace right pleural effusion, however lateral view was limited due to overlapping soft tissue.  Echo 2/1/2024 showed LVEF 60 to 65%, diastolic function not assessed due to presence of prosthetic mitral valve, flattened septum consistent with RV  pressure/volume overload, right ventricle moderately dilated, moderately decreased right ventricular systolic function, left and right atrium severely dilated, severe pulm 4+ tricuspid regurgitation, moderate pulmonary hypertension.  Patient was diuresed with IV furosemide for acute CHF and transition back to oral torsemide.  GI was consulted and patient underwent underwent upper endoscopy on 2/5 that revealed severe pill esophagitis with active bleeding ulcer in the middle third of esophagus that was cauterized with BiCap.  PTA warfarin was held and he was bridged with heparin.  He was started on PPI and Carafate.  Endoscopy on 2/8 showed oozing esophageal ulcer, heparin was discontinued.  Patient also received multiple transfusions of PRBCs and IV Venofer x2. Hemoglobin 8.8 on 2/15/2024. He was discharged on altered diet. GI to follow up outpatient, recommend avoid doxycylcine and oral potassium. Potassium was changed to liquid at discharge. Coumadin was restarted. Discharged to TCU for physical rehabilitation and medical management.      Today patient was seen for routine follow-up in the TCU.  He had bilateral cortisone injections on 3/8.  Patient reports that he had a near fall to the floor recently, unable to find documentation in PCC. He reports that he slipped off the bed and his knees hit the floor and he held onto the railing. He has had some knee pain and shoulder pain since this fall. He has some bruising to his right knee today. Discussed with nursing today at facility. He otherwise denies SOB, CP, dizziness/lightheadedness, dysuria/ trouble voiding. No blood in stools. Has follow up with GI today.    Reviewed facility EMR including medications, recent nursing notes and vital signs. Discussed plan of care with nursing today.    Allergies, and PMH/PSH reviewed in EPIC today.  REVIEW OF SYSTEMS:  7 point ROS of systems including Constitutional, Respiratory, Cardiovascular, Gastroenterology, Genitourinary,  "Integumentary, Musculoskeletal were all negative except for pertinent positives noted in my HPI.    Objective:   /73   Pulse 63   Temp 97.8  F (36.6  C)   Resp 18   Ht 1.803 m (5' 11\")   Wt 113.9 kg (251 lb)   SpO2 91%   BMI 35.01 kg/m    GENERAL APPEARANCE:  Alert, in NAD  HEENT: normocephalic, moist mucous membranes, nose without drainage or crusting  RESP:  respiratory effort normal, no respiratory distress, Lung sounds clear, patient is on RA  CV: auscultation of heart done, rate and rhythm regular.   ABDOMEN: + bowel sounds, soft, nontender, no grimacing or guarding with palpation.  M/S:   no lower extremity edema  SKIN: reviewed images in [facility] today- no updated images since last week  NEURO: cranial nerves 2-12 grossly intact and at patient's baseline; moves extremities freely  PSYCH: affect and mood normal    Labs done in SNF are in [facility]. Please refer to them using [facility]/Care Everywhere. and Recent labs in [facility] reviewed by me today.     Assessment/Plan:  Acute upper GI bleed  Severe pill esophagitis with bleeding esophageal ulcer status post cauterization with BiCap on 2/5/2024  GERD  Denies blood stools  Plan: Continue pantoprazole 40 mg twice daily, sucralfate 1 g 4 times daily x 4 weeks through 3/16/2024.  Monitor for signs and symptoms of GI bleed.  Follow-up with GI today.     Acute blood loss anemia  Iron deficiency  Secondary to GI bleed and intermittent epistaxis for 3 months prior  Received PRBC transfusions inpatient and IV venofer inpatient  Hemoglobin 9.0 on 3/11/2024-stable  Plan: CBC 3/18. Continue ferrous sulfate 325 mg daily.  Monitor for signs and symptoms of bleeding.     Leukopenia  Started in 1/2024  WBC 3.7 on 3/11/2024  Plan: CBC 3/18. Follow up outpatient for further work up.     Chronic heart failure with preserved EF, LVEF 60 to 65% per echo on 2/1/2024  Moderate pulmonary hypertension  Severe tricuspid regurgitation  Treated for acute on chronic CHF exacerbation " inpatient with IV furosemide, tablet potassium changed to liquid potassium as well  Weight 251.1 lb on 3/12, no leg swelling today  Plan: Continue torsemide 20 mg daily, potassium chloride 20 mill equivalents daily.  BMP 3/18. Continue compression stockings on in a.m. off in p.m. Daily weights. Notify provider with weight gain >2 lbs in a day, >5 lbs in on week. Follow-up with cardiology as scheduled     History of severe mitral valve regurgitation status post mechanical mitral valve replacement in 2/2008  Permanent atrial fibrillation  PTA warfarin 10 mg every Wednesday and Saturday, 5 mg rest of days  INR goal 2.5-3.5  Heart rate 62-84 recently  INR 3.1 on 3/11/2024  Plan: Coumadin 4 mg daily. INR 3/14. INR goal 2.5-3.5. Monitor heart rate. Follow up with cardiology as scheduled.     Essential hypertension  SBP controlled 100-120s recently  Plan: Continue torsemide 20 mg daily. Monitor blood pressure.     Dyslipidemia  Plan: Continue fish oil 1200 mg po daily     Chronic knee arthritis  Had bilateral cortisone injections 3//2024  Plan: Start tylenol 650 mg q6h PRN. Monitor symptoms.     CKD stage II  Baseline creatinine 0.9-1.1  Creatinine 1.17 on 3/7/2024  Plan: BMP 3/18. Avoid nephrotoxins. Renally dose medications as indicated.     Urinary retention, resolved  Patient had Stubbs catheter during recent admission, initially needed to straight cath, reports voiding now without difficulty and feels he is emptying bladder completely  Plan: Continue to straight cath as needed.  Monitor symptoms.  Follow-up with urology per recommendations.     Vertigo  Patient was treated with Epley maneuver by physical therapy, has not had reoccurrence  Plan: Monitor for symptoms     Sacrococcygeal pressure injury stage III  Present on admission, per discussion with nurse manager is worse this week  Reviewed images in PCC, no updated images from this week, no concerns noted by nursing  Plan: Continue wound care as ordered, nurse  manager is doing this currently due to recent worsening.  Reposition frequently.  Continue air mattress.  Nursing to update provider with worsening     Epistaxis  No recurrence  Plan: Monitor.     Slow transit constipation  Bowels moving regularly  Plan: Continue senna 1 tab twice daily as needed, MiraLAX 17 g daily as needed.  Monitor bowel.     Moderate malnutrition  Per inpatient dietitian assessment  Wt Readings from Last 4 Encounters:   03/12/24 113.9 kg (251 lb)   03/08/24 117.5 kg (259 lb)   03/08/24 117.5 kg (259 lb)   03/06/24 117.5 kg (259 lb)     Plan: Dietician following. Supplements per dietician. Monitor weights and po intake     Obesity, BMI 35.01  Suspected GINGER per hospital assessment  Complicates care  Plan: Encourage weight loss.  Dietitian follows in the TCU.  Follow-up outpatient for sleep evaluation.     Cognitive impairment  Slums 23/30, CPT 4.9/5.6  Plan: Based on cognitive testing, recommend weekly check-in's for safety.     Physical deconditioning  Secondary to acute/ chronic medical conditions as above, multiple recent hospitalizations   Patient continues to work with therapy  Plan: Encourage participation in physical therapy/occupational therapy for strengthening and deconditioning. Discharge planning per their recommendation. Social work to assist with d/c planning.    MED REC REQUIRED  Post Medication Reconciliation Status:  Medication reconciliation previously completed during another office visit      Disclaimer: This note may contain text created using speech-recognition software and may contain unintended word substitutions.       Electronically signed by: MIKE Preciado CNP

## 2024-03-12 NOTE — LETTER
3/12/2024        RE: Feng Wynne  [address]  [facility] [address]        [facility] GERIATRICS    Chief Complaint   Patient presents with     RECHECK     HPI:  Feng Wynne is a 79 year old  (1945), who is being seen today for an episodic care visit at: [facility]  ([facility]) [[id number]].       PMH significant for HFpEF, pulmonary hypertension, history of severe mitral valve regurgitation status post mitral valve replacement, atrial fibrillation, hypertension, dyslipidemia, chronic kidney disease, obesity     Of note patient was hospitalized at [facility] from 1/9/2024 to 1/19/2024 for acute anemia, hemoglobin found to be 4.4 due to possible GI bleed in setting of supratherapeutic INR.  Patient received transfusion.  GI consulted and patient's status post upper endoscopy on 1/15 with normal findings.  Patient also underwent colonoscopy on 1/15, however prep was poor and unable to visualize well.  Patient was also treated for E. coli UTI and required Stubbs catheter placement for urinary retention, he was discharged to [facility].  In the U patient treated for right-sided pneumonia with ceftriaxone followed by cefuroxime and doxycycline.     Summary of recent hospitalization:  Most recently, patient was hospitalized at [facility] from 1/30/2024 to 2/15/2024 for acute anemia and acute on chronic congestive heart failure exacerbation.  In the emergency department laboratory evaluation significant for WBC 3.4, hemoglobin 6.9, high-sensitivity troponin 58, BNP 1253.  Patient also found to have positive stool guaiac.  Chest x-ray positive for increased right perihilar hazy opacity suggestive of worsening pulmonary edema, similar vascular congestion and cardiomegaly, no focal consolidation, possible trace right pleural effusion, however lateral view was limited due to overlapping soft tissue.  Echo 2/1/2024 showed LVEF 60 to 65%, diastolic function not  assessed due to presence of prosthetic mitral valve, flattened septum consistent with RV pressure/volume overload, right ventricle moderately dilated, moderately decreased right ventricular systolic function, left and right atrium severely dilated, severe pulm 4+ tricuspid regurgitation, moderate pulmonary hypertension.  Patient was diuresed with IV furosemide for acute CHF and transition back to oral torsemide.  GI was consulted and patient underwent underwent upper endoscopy on 2/5 that revealed severe pill esophagitis with active bleeding ulcer in the middle third of esophagus that was cauterized with BiCap.  PTA warfarin was held and he was bridged with heparin.  He was started on PPI and Carafate.  Endoscopy on 2/8 showed oozing esophageal ulcer, heparin was discontinued.  Patient also received multiple transfusions of PRBCs and IV Venofer x2. Hemoglobin 8.8 on 2/15/2024. He was discharged on altered diet. GI to follow up outpatient, recommend avoid doxycylcine and oral potassium. Potassium was changed to liquid at discharge. Coumadin was restarted. Discharged to TCU for physical rehabilitation and medical management.      Today patient was seen for routine follow-up in the TCU.  He had bilateral cortisone injections on 3/8.  Patient reports that he had a near fall to the floor recently, unable to find documentation in PCC. He reports that he slipped off the bed and his knees hit the floor and he held onto the railing. He has had some knee pain and shoulder pain since this fall. He has some bruising to his right knee today. Discussed with nursing today at facility. He otherwise denies SOB, CP, dizziness/lightheadedness, dysuria/ trouble voiding. No blood in stools. Has follow up with GI today.    Reviewed facility EMR including medications, recent nursing notes and vital signs. Discussed plan of care with nursing today.    Allergies, and PMH/PSH reviewed in EPIC today.  REVIEW OF SYSTEMS:  7 point ROS of  "systems including Constitutional, Respiratory, Cardiovascular, Gastroenterology, Genitourinary, Integumentary, Musculoskeletal were all negative except for pertinent positives noted in my HPI.    Objective:   /73   Pulse 63   Temp 97.8  F (36.6  C)   Resp 18   Ht 1.803 m (5' 11\")   Wt 113.9 kg (251 lb)   SpO2 91%   BMI 35.01 kg/m    GENERAL APPEARANCE:  Alert, in NAD  HEENT: normocephalic, moist mucous membranes, nose without drainage or crusting  RESP:  respiratory effort normal, no respiratory distress, Lung sounds clear, patient is on RA  CV: auscultation of heart done, rate and rhythm regular.   ABDOMEN: + bowel sounds, soft, nontender, no grimacing or guarding with palpation.  M/S:   no lower extremity edema  SKIN: reviewed images in [facility] today- no updated images since last week  NEURO: cranial nerves 2-12 grossly intact and at patient's baseline; moves extremities freely  PSYCH: affect and mood normal    Labs done in SNF are in [facility]. Please refer to them using [facility]/Care Everywhere. and Recent labs in EPIC reviewed by me today.     Assessment/Plan:  Acute upper GI bleed  Severe pill esophagitis with bleeding esophageal ulcer status post cauterization with BiCap on 2/5/2024  GERD  Denies blood stools  Plan: Continue pantoprazole 40 mg twice daily, sucralfate 1 g 4 times daily x 4 weeks through 3/16/2024.  Monitor for signs and symptoms of GI bleed.  Follow-up with GI today.     Acute blood loss anemia  Iron deficiency  Secondary to GI bleed and intermittent epistaxis for 3 months prior  Received PRBC transfusions inpatient and IV venofer inpatient  Hemoglobin 9.0 on 3/11/2024-stable  Plan: CBC 3/18. Continue ferrous sulfate 325 mg daily.  Monitor for signs and symptoms of bleeding.     Leukopenia  Started in 1/2024  WBC 3.7 on 3/11/2024  Plan: CBC 3/18. Follow up outpatient for further work up.     Chronic heart failure with preserved EF, LVEF 60 to 65% per echo on 2/1/2024  Moderate " pulmonary hypertension  Severe tricuspid regurgitation  Treated for acute on chronic CHF exacerbation inpatient with IV furosemide, tablet potassium changed to liquid potassium as well  Weight 251.1 lb on 3/12, no leg swelling today  Plan: Continue torsemide 20 mg daily, potassium chloride 20 mill equivalents daily.  BMP 3/18. Continue compression stockings on in a.m. off in p.m. Daily weights. Notify provider with weight gain >2 lbs in a day, >5 lbs in on week. Follow-up with cardiology as scheduled     History of severe mitral valve regurgitation status post mechanical mitral valve replacement in 2/2008  Permanent atrial fibrillation  PTA warfarin 10 mg every Wednesday and Saturday, 5 mg rest of days  INR goal 2.5-3.5  Heart rate 62-84 recently  INR 3.1 on 3/11/2024  Plan: Coumadin 4 mg daily. INR 3/14. INR goal 2.5-3.5. Monitor heart rate. Follow up with cardiology as scheduled.     Essential hypertension  SBP controlled 100-120s recently  Plan: Continue torsemide 20 mg daily. Monitor blood pressure.     Dyslipidemia  Plan: Continue fish oil 1200 mg po daily     Chronic knee arthritis  Had bilateral cortisone injections 3//2024  Plan: Start tylenol 650 mg q6h PRN. Monitor symptoms.     CKD stage II  Baseline creatinine 0.9-1.1  Creatinine 1.17 on 3/7/2024  Plan: BMP 3/18. Avoid nephrotoxins. Renally dose medications as indicated.     Urinary retention, resolved  Patient had Stubbs catheter during recent admission, initially needed to straight cath, reports voiding now without difficulty and feels he is emptying bladder completely  Plan: Continue to straight cath as needed.  Monitor symptoms.  Follow-up with urology per recommendations.     Vertigo  Patient was treated with Epley maneuver by physical therapy, has not had reoccurrence  Plan: Monitor for symptoms     Sacrococcygeal pressure injury stage III  Present on admission, per discussion with nurse manager is worse this week  Reviewed images in PCC, no updated  images from this week, no concerns noted by nursing  Plan: Continue wound care as ordered, nurse manager is doing this currently due to recent worsening.  Reposition frequently.  Continue air mattress.  Nursing to update provider with worsening     Epistaxis  No recurrence  Plan: Monitor.     Slow transit constipation  Bowels moving regularly  Plan: Continue senna 1 tab twice daily as needed, MiraLAX 17 g daily as needed.  Monitor bowel.     Moderate malnutrition  Per inpatient dietitian assessment  Wt Readings from Last 4 Encounters:   03/12/24 113.9 kg (251 lb)   03/08/24 117.5 kg (259 lb)   03/08/24 117.5 kg (259 lb)   03/06/24 117.5 kg (259 lb)     Plan: Dietician following. Supplements per dietician. Monitor weights and po intake     Obesity, BMI 35.01  Suspected GINGER per hospital assessment  Complicates care  Plan: Encourage weight loss.  Dietitian follows in the TCU.  Follow-up outpatient for sleep evaluation.     Cognitive impairment  Slums 23/30, CPT 4.9/5.6  Plan: Based on cognitive testing, recommend weekly check-in's for safety.     Physical deconditioning  Secondary to acute/ chronic medical conditions as above, multiple recent hospitalizations   Patient continues to work with therapy  Plan: Encourage participation in physical therapy/occupational therapy for strengthening and deconditioning. Discharge planning per their recommendation. Social work to assist with d/c planning.    MED REC REQUIRED  Post Medication Reconciliation Status:  Medication reconciliation previously completed during another office visit      Disclaimer: This note may contain text created using speech-recognition software and may contain unintended word substitutions.       Electronically signed by: MIKE Preciado CNP         Sincerely,        MIKE Preciado CNP

## 2024-03-14 NOTE — PROGRESS NOTES
[facility] GERIATRICS    Chief Complaint   Patient presents with    RECHECK     HPI:  Feng Wynne is a 79 year old  (1945), who is being seen today for an episodic care visit at: [facility]  ([facility]) [[id number]].     PMH significant for HFpEF, pulmonary hypertension, history of severe mitral valve regurgitation status post mitral valve replacement, atrial fibrillation, hypertension, dyslipidemia, chronic kidney disease, obesity     Of note patient was hospitalized at [facility] from 1/9/2024 to 1/19/2024 for acute anemia, hemoglobin found to be 4.4 due to possible GI bleed in setting of supratherapeutic INR.  Patient received transfusion.  GI consulted and patient's status post upper endoscopy on 1/15 with normal findings.  Patient also underwent colonoscopy on 1/15, however prep was poor and unable to visualize well.  Patient was also treated for E. coli UTI and required Stubbs catheter placement for urinary retention, he was discharged to [facility].  In the U patient treated for right-sided pneumonia with ceftriaxone followed by cefuroxime and doxycycline.     Summary of recent hospitalization:  Most recently, patient was hospitalized at [facility] from 1/30/2024 to 2/15/2024 for acute anemia and acute on chronic congestive heart failure exacerbation.  In the emergency department laboratory evaluation significant for WBC 3.4, hemoglobin 6.9, high-sensitivity troponin 58, BNP 1253.  Patient also found to have positive stool guaiac.  Chest x-ray positive for increased right perihilar hazy opacity suggestive of worsening pulmonary edema, similar vascular congestion and cardiomegaly, no focal consolidation, possible trace right pleural effusion, however lateral view was limited due to overlapping soft tissue.  Echo 2/1/2024 showed LVEF 60 to 65%, diastolic function not assessed due to presence of prosthetic mitral valve, flattened septum consistent with RV  pressure/volume overload, right ventricle moderately dilated, moderately decreased right ventricular systolic function, left and right atrium severely dilated, severe pulm 4+ tricuspid regurgitation, moderate pulmonary hypertension.  Patient was diuresed with IV furosemide for acute CHF and transition back to oral torsemide.  GI was consulted and patient underwent underwent upper endoscopy on 2/5 that revealed severe pill esophagitis with active bleeding ulcer in the middle third of esophagus that was cauterized with BiCap.  PTA warfarin was held and he was bridged with heparin.  He was started on PPI and Carafate.  Endoscopy on 2/8 showed oozing esophageal ulcer, heparin was discontinued.  Patient also received multiple transfusions of PRBCs and IV Venofer x2. Hemoglobin 8.8 on 2/15/2024. He was discharged on altered diet. GI to follow up outpatient, recommend avoid doxycylcine and oral potassium. Potassium was changed to liquid at discharge. Coumadin was restarted. Discharged to TCU for physical rehabilitation and medical management.     Today patient was seen for routine follow-up to TCU.  His INR is 3.2 today.  Patient had follow-up with GI 3/12 with plan for EGD in 2 months and was started on Metamucil to bulk the stools.  Patient denies any blood in stool.  Does report stools being loose occurring 3-4 times per day.  He continues to have knee pain, initially did note some improvement to his knee pain after receiving cortisone injection, however since then had a near fall and hit his knees on the floor and has had increased pain.  He would like to try he voltaren for this. Otherwise denies shortness of breath, chest pain, dizziness or lightheadedness, dysuria/trouble urinating.  Patient continues to work with therapy.    Reviewed facility EMR including medications, recent nursing progress notes, vital signs.  Discussed plan of care with nursing today.    Allergies, and PMH/PSH reviewed in EPIC today.  REVIEW OF  "SYSTEMS:  6 point ROS of systems including Constitutional, Respiratory, Cardiovascular, Gastroenterology, Genitourinary, Musculoskeletal were all negative except for pertinent positives noted in my HPI.    Objective:   /54   Pulse 79   Temp 98.2  F (36.8  C)   Resp 17   Ht 1.803 m (5' 11\")   Wt 112 kg (247 lb)   SpO2 95%   BMI 34.45 kg/m    GENERAL APPEARANCE:  Alert, in NAD  HEENT: normocephalic, moist mucous membranes, nose without drainage or crusting  RESP:  respiratory effort normal, no respiratory distress, Lung sounds clear, patient is on RA  CV: auscultation of heart done, rate and rhythm regular.   ABDOMEN: large hernia, + bowel sounds, soft, nontender, no grimacing or guarding with palpation.  M/S:   no lower extremity edema  SKIN:  reviewed images in [facility], slough is loosening up  NEURO: cranial nerves 2-12 grossly intact and at patient's baseline; moves extremities freely  PSYCH:affect and mood normal      Labs done in SNF are in [facility]. Please refer to them using [facility]/Care Everywhere. and Recent labs in [facility] reviewed by me today.     Assessment/Plan:  Acute upper GI bleed  Severe pill esophagitis with bleeding esophageal ulcer status post cauterization with BiCap on 2/5/2024  GERD  Had follow up with GI on 3/12 and recommended repeat EGD in 2 months  No blood in stool  Plan: Continue pantoprazole 40 mg twice daily, sucralfate 1 g 4 times daily x 4 weeks through 3/16/2024.  Monitor for signs and symptoms of GI bleed.  Follow-up with GI today.     Acute blood loss anemia  Iron deficiency  Secondary to GI bleed and intermittent epistaxis for 3 months prior  Received PRBC transfusions inpatient and IV venofer inpatient  Hemoglobin 9.0 on 3/11/2024-stable  Plan: CBC ordered for 3/18. Continue ferrous sulfate 325 mg daily.  Monitor for signs and symptoms of bleeding.     Leukopenia  Started in 1/2024  WBC 3.7 on 3/11/2024  Plan: CBC ordered for 3/18. Follow up outpatient for further work " up.     Chronic heart failure with preserved EF, LVEF 60 to 65% per echo on 2/1/2024  Moderate pulmonary hypertension  Severe tricuspid regurgitation  Treated for acute on chronic CHF exacerbation inpatient with IV furosemide, tablet potassium changed to liquid potassium as well  Weight 247.6 lb today and no leg swelling   Plan: Continue torsemide 20 mg daily, potassium chloride 20 mill equivalents daily.  BMP ordered for 3/18. Continue compression stockings on in a.m. off in p.m. Daily weights. Notify provider with weight gain >2 lbs in a day, >5 lbs in on week. Follow-up with cardiology as scheduled     History of severe mitral valve regurgitation status post mechanical mitral valve replacement in 2/2008  Permanent atrial fibrillation  PTA warfarin 10 mg every Wednesday and Saturday, 5 mg rest of days  INR goal 2.5-3.5  Heart rate 62-82 recently  INR 3.2 today-therapeutic  Plan: Coumadin 3 mg today and Sunday and 4 mg rest of days. INR 3/18. INR goal 2.5-3.5. Monitor heart rate. Follow up with cardiology as scheduled.     Essential hypertension  SBP controlled 110-120s recently  Plan: Continue torsemide 20 mg daily. Monitor blood pressure.     Dyslipidemia  Plan: Continue fish oil 1200 mg po daily     Chronic knee arthritis  Had bilateral cortisone injections 3//2024  Continues to have ongoing pain since his near fall recently  Does feel cortisone injections overall helped the pain  Plan: Start voltaren gel 1% TID. Continue tylenol 650 mg q6h PRN. Monitor symptoms.     CKD stage II  Baseline creatinine 0.9-1.1  Creatinine 1.17 on 3/7/2024  Plan: BMP ordered for 3/18. Avoid nephrotoxins. Renally dose medications as indicated.     Urinary retention, resolved  Patient had Stubbs catheter during recent admission, initially needed to straight cath, reports voiding now without difficulty and feels he is emptying bladder completely  Plan: Continue to straight cath as needed.  Monitor symptoms.  Follow-up with urology per  recommendations.     Vertigo  Patient was treated with Epley maneuver by physical therapy, has not had reoccurrence  Plan: Monitor for symptoms     Sacrococcygeal pressure injury stage III  Present on admission, per discussion with nurse manager is worse this week  Reviewed in PCC today, fani has loosened up  Plan: Continue wound care as ordered, nurse manager is doing this currently due to recent worsening.  Reposition frequently.  Continue air mattress.  Nursing to update provider with worsening     Epistaxis  No recurrence  Plan: Monitor.     Loose stools  Reports bowels moving regularly but are loose and 3 times per day or so  Started on psyllium on 3/12 by GI  Plan: Continue psyllium 1 tbsp daily. Continue senna 1 tab twice daily as needed, MiraLAX 17 g daily as needed.  Monitor bowel. Discussed keeping nursing updated if loose stools continue and can increase psyllium.     Moderate malnutrition  Per inpatient dietitian assessment  Wt Readings from Last 4 Encounters:   03/14/24 112 kg (247 lb)   03/12/24 113.9 kg (251 lb)   03/08/24 117.5 kg (259 lb)   03/08/24 117.5 kg (259 lb)     Plan: Dietician following. Supplements per dietician. Monitor weights and po intake     Obesity, BMI 34.45  Suspected GINGER per hospital assessment  Complicates care  Plan: Encourage weight loss.  Dietitian follows in the TCU.  Follow-up outpatient for sleep evaluation.     Cognitive impairment  Slums 23/30, CPT 4.9/5.6  Plan: Based on cognitive testing, recommend weekly check-in's for safety.     Physical deconditioning  Secondary to acute/ chronic medical conditions as above, multiple recent hospitalizations   Patient continues to work with therapy  Plan: Encourage participation in physical therapy/occupational therapy for strengthening and deconditioning. Discharge planning per their recommendation. Social work to assist with d/c planning.          MED REC REQUIRED  Post Medication Reconciliation Status:  Medication  reconciliation previously completed during another office visit      Disclaimer: This note may contain text created using speech-recognition software and may contain unintended word substitutions.       Electronically signed by: MIKE Preciado CNP

## 2024-03-14 NOTE — LETTER
3/14/2024        RE: Feng Wynne  [address]  [facility] [address]        [facility] GERIATRICS    Chief Complaint   Patient presents with     RECHECK     HPI:  Feng Wynne is a 79 year old  (1945), who is being seen today for an episodic care visit at: [facility]  ([facility]) [[id number]].     PMH significant for HFpEF, pulmonary hypertension, history of severe mitral valve regurgitation status post mitral valve replacement, atrial fibrillation, hypertension, dyslipidemia, chronic kidney disease, obesity     Of note patient was hospitalized at [facility] from 1/9/2024 to 1/19/2024 for acute anemia, hemoglobin found to be 4.4 due to possible GI bleed in setting of supratherapeutic INR.  Patient received transfusion.  GI consulted and patient's status post upper endoscopy on 1/15 with normal findings.  Patient also underwent colonoscopy on 1/15, however prep was poor and unable to visualize well.  Patient was also treated for E. coli UTI and required Stubbs catheter placement for urinary retention, he was discharged to [facility].  In the U patient treated for right-sided pneumonia with ceftriaxone followed by cefuroxime and doxycycline.     Summary of recent hospitalization:  Most recently, patient was hospitalized at [facility] from 1/30/2024 to 2/15/2024 for acute anemia and acute on chronic congestive heart failure exacerbation.  In the emergency department laboratory evaluation significant for WBC 3.4, hemoglobin 6.9, high-sensitivity troponin 58, BNP 1253.  Patient also found to have positive stool guaiac.  Chest x-ray positive for increased right perihilar hazy opacity suggestive of worsening pulmonary edema, similar vascular congestion and cardiomegaly, no focal consolidation, possible trace right pleural effusion, however lateral view was limited due to overlapping soft tissue.  Echo 2/1/2024 showed LVEF 60 to 65%, diastolic function not  assessed due to presence of prosthetic mitral valve, flattened septum consistent with RV pressure/volume overload, right ventricle moderately dilated, moderately decreased right ventricular systolic function, left and right atrium severely dilated, severe pulm 4+ tricuspid regurgitation, moderate pulmonary hypertension.  Patient was diuresed with IV furosemide for acute CHF and transition back to oral torsemide.  GI was consulted and patient underwent underwent upper endoscopy on 2/5 that revealed severe pill esophagitis with active bleeding ulcer in the middle third of esophagus that was cauterized with BiCap.  PTA warfarin was held and he was bridged with heparin.  He was started on PPI and Carafate.  Endoscopy on 2/8 showed oozing esophageal ulcer, heparin was discontinued.  Patient also received multiple transfusions of PRBCs and IV Venofer x2. Hemoglobin 8.8 on 2/15/2024. He was discharged on altered diet. GI to follow up outpatient, recommend avoid doxycylcine and oral potassium. Potassium was changed to liquid at discharge. Coumadin was restarted. Discharged to TCU for physical rehabilitation and medical management.     Today patient was seen for routine follow-up to TCU.  His INR is 3.2 today.  Patient had follow-up with GI 3/12 with plan for EGD in 2 months and was started on Metamucil to bulk the stools.  Patient denies any blood in stool.  Does report stools being loose occurring 3-4 times per day.  He continues to have knee pain, initially did note some improvement to his knee pain after receiving cortisone injection, however since then had a near fall and hit his knees on the floor and has had increased pain.  He would like to try he voltaren for this. Otherwise denies shortness of breath, chest pain, dizziness or lightheadedness, dysuria/trouble urinating.  Patient continues to work with therapy.    Reviewed facility EMR including medications, recent nursing progress notes, vital signs.  Discussed plan  "of care with nursing today.    Allergies, and PMH/PSH reviewed in EPIC today.  REVIEW OF SYSTEMS:  6 point ROS of systems including Constitutional, Respiratory, Cardiovascular, Gastroenterology, Genitourinary, Musculoskeletal were all negative except for pertinent positives noted in my HPI.    Objective:   /54   Pulse 79   Temp 98.2  F (36.8  C)   Resp 17   Ht 1.803 m (5' 11\")   Wt 112 kg (247 lb)   SpO2 95%   BMI 34.45 kg/m    GENERAL APPEARANCE:  Alert, in NAD  HEENT: normocephalic, moist mucous membranes, nose without drainage or crusting  RESP:  respiratory effort normal, no respiratory distress, Lung sounds clear, patient is on RA  CV: auscultation of heart done, rate and rhythm regular.   ABDOMEN: large hernia, + bowel sounds, soft, nontender, no grimacing or guarding with palpation.  M/S:   no lower extremity edema  SKIN:  reviewed images in [facility], slough is loosening up  NEURO: cranial nerves 2-12 grossly intact and at patient's baseline; moves extremities freely  PSYCH:affect and mood normal      Labs done in SNF are in [place] EPIC. Please refer to them using EPIC/Care Everywhere. and Recent labs in EPIC reviewed by me today.     Assessment/Plan:  Acute upper GI bleed  Severe pill esophagitis with bleeding esophageal ulcer status post cauterization with BiCap on 2/5/2024  GERD  Had follow up with GI on 3/12 and recommended repeat EGD in 2 months  No blood in stool  Plan: Continue pantoprazole 40 mg twice daily, sucralfate 1 g 4 times daily x 4 weeks through 3/16/2024.  Monitor for signs and symptoms of GI bleed.  Follow-up with GI today.     Acute blood loss anemia  Iron deficiency  Secondary to GI bleed and intermittent epistaxis for 3 months prior  Received PRBC transfusions inpatient and IV venofer inpatient  Hemoglobin 9.0 on 3/11/2024-stable  Plan: CBC ordered for 3/18. Continue ferrous sulfate 325 mg daily.  Monitor for signs and symptoms of bleeding.     Leukopenia  Started in " 1/2024  WBC 3.7 on 3/11/2024  Plan: CBC ordered for 3/18. Follow up outpatient for further work up.     Chronic heart failure with preserved EF, LVEF 60 to 65% per echo on 2/1/2024  Moderate pulmonary hypertension  Severe tricuspid regurgitation  Treated for acute on chronic CHF exacerbation inpatient with IV furosemide, tablet potassium changed to liquid potassium as well  Weight 247.6 lb today and no leg swelling   Plan: Continue torsemide 20 mg daily, potassium chloride 20 mill equivalents daily.  BMP ordered for 3/18. Continue compression stockings on in a.m. off in p.m. Daily weights. Notify provider with weight gain >2 lbs in a day, >5 lbs in on week. Follow-up with cardiology as scheduled     History of severe mitral valve regurgitation status post mechanical mitral valve replacement in 2/2008  Permanent atrial fibrillation  PTA warfarin 10 mg every Wednesday and Saturday, 5 mg rest of days  INR goal 2.5-3.5  Heart rate 62-82 recently  INR 3.2 today-therapeutic  Plan: Coumadin 3 mg today and Sunday and 4 mg rest of days. INR 3/18. INR goal 2.5-3.5. Monitor heart rate. Follow up with cardiology as scheduled.     Essential hypertension  SBP controlled 110-120s recently  Plan: Continue torsemide 20 mg daily. Monitor blood pressure.     Dyslipidemia  Plan: Continue fish oil 1200 mg po daily     Chronic knee arthritis  Had bilateral cortisone injections 3//2024  Continues to have ongoing pain since his near fall recently  Does feel cortisone injections overall helped the pain  Plan: Start voltaren gel 1% TID. Continue tylenol 650 mg q6h PRN. Monitor symptoms.     CKD stage II  Baseline creatinine 0.9-1.1  Creatinine 1.17 on 3/7/2024  Plan: BMP ordered for 3/18. Avoid nephrotoxins. Renally dose medications as indicated.     Urinary retention, resolved  Patient had Stubbs catheter during recent admission, initially needed to straight cath, reports voiding now without difficulty and feels he is emptying bladder  completely  Plan: Continue to straight cath as needed.  Monitor symptoms.  Follow-up with urology per recommendations.     Vertigo  Patient was treated with Epley maneuver by physical therapy, has not had reoccurrence  Plan: Monitor for symptoms     Sacrococcygeal pressure injury stage III  Present on admission, per discussion with nurse manager is worse this week  Reviewed in PCC today, fani has loosened up  Plan: Continue wound care as ordered, nurse manager is doing this currently due to recent worsening.  Reposition frequently.  Continue air mattress.  Nursing to update provider with worsening     Epistaxis  No recurrence  Plan: Monitor.     Loose stools  Reports bowels moving regularly but are loose and 3 times per day or so  Started on psyllium on 3/12 by GI  Plan: Continue psyllium 1 tbsp daily. Continue senna 1 tab twice daily as needed, MiraLAX 17 g daily as needed.  Monitor bowel. Discussed keeping nursing updated if loose stools continue and can increase psyllium.     Moderate malnutrition  Per inpatient dietitian assessment  Wt Readings from Last 4 Encounters:   03/14/24 112 kg (247 lb)   03/12/24 113.9 kg (251 lb)   03/08/24 117.5 kg (259 lb)   03/08/24 117.5 kg (259 lb)     Plan: Dietician following. Supplements per dietician. Monitor weights and po intake     Obesity, BMI 34.45  Suspected GINGER per hospital assessment  Complicates care  Plan: Encourage weight loss.  Dietitian follows in the TCU.  Follow-up outpatient for sleep evaluation.     Cognitive impairment  Slums 23/30, CPT 4.9/5.6  Plan: Based on cognitive testing, recommend weekly check-in's for safety.     Physical deconditioning  Secondary to acute/ chronic medical conditions as above, multiple recent hospitalizations   Patient continues to work with therapy  Plan: Encourage participation in physical therapy/occupational therapy for strengthening and deconditioning. Discharge planning per their recommendation. Social work to assist with d/c  planning.          MED REC REQUIRED  Post Medication Reconciliation Status:  Medication reconciliation previously completed during another office visit      Disclaimer: This note may contain text created using speech-recognition software and may contain unintended word substitutions.       Electronically signed by: MIKE Preciado CNP         Sincerely,        MIKE Preciado CNP

## 2024-03-14 NOTE — PATIENT INSTRUCTIONS
Orders  Feng Wynne  1945  1) Start voltaren gel 1%. Apply 4 grams to bilateral knees for pain related to OA  2) Coumadin 3 mg today and Sunday and 4 mg rest of days. INR 3/18.   MIKE Preciado CNP on 3/14/2024 at 11:25 AM

## 2024-03-15 NOTE — TELEPHONE ENCOUNTER
Called received from [facility] [phone] seeking an earlier appointment than what is scheduled for the patient. Nothing available at this time - adding to the cancellation list.  Carrillo Naranjo LPN on 3/15/2024 at 3:43 PM

## 2024-03-15 NOTE — LETTER
3/15/2024    Jayme Deng MD, MD  [address]  [facility] [address]    RE: Feng Wynne       Dear Colleague,     I had the pleasure of seeing Feng Wynne in the [facility].  Cardiology Clinic Progress Note  Feng Wynne MRN# [id number]   YOB: 1945 Age: 79 year old     Primary cardiologist: Dr. Gomez    Reason for visit: hospital follow-up     History of presenting illness:    Feng Wynne is a pleasant 79 year old patient with past medical history significant for mechanical mitral valve replacement for severe mitral regurgitation and MAZE procedure in 2008. He is chronically anticoagulated with warfarin. Additionally, he has a history of pulmonary hypertension, chronic HFpEF, hypertension, hyperlipidemia and morbid obesity.     He was recently admitted on 1/9/24 for 10 days with severe anemia and a hemoglobin of 4. EGD and colonoscopy did not show active bleeding at that time. He was discharged and returned on 1/30/24 with worsening anemia and a hemoglobin of 6. EGD on 2/5/24 showed severe pill esophagitis with an actively bleeding ulcer s/p treatment. Had repeat endoscopy on 2/8 with an oozing esophageal ulcer. He received 6 units of pRBCs.  Additionally, he was in acute heart failure secondary to RV failure in the setting of recent bleeding and anemia. Echo showed normal LVEF, moderately decreased RVSF and moderately dilated RV, flattened septum and moderate pulmonary hypertension and severe TR.  He was aggressively diuresed with excellent response.  His renal function remained stable.  He was down 50 lbs to a weight of 273. His furosemide was switched to torsemide 20 mg daily for maintenance therapy.  He was discharged 2/15/2024 to TCU.    He presents today for follow-up.  He is accompanied by his partner, Jose.  He still is at the TCU and overall feels he is doing well from a cardiovascular standpoint.  His weight is actually down to 240 lbs, though  this is likely due to poor oral intake at [facility].  He is attempting to consume more protein in is drinking boost or Ensure on a daily basis.  He otherwise denies any chest discomfort, shortness of breath, syncope near syncope, or palpitations.  He has no PND or orthopnea.  He has no peripheral edema.    I reviewed his most recent labs from 3/7/2024.  His BMP demonstrates creatinine of 1.17, BUN 26, normal electrolytes.  Hemoglobin is stable at 9.6.         Assessment and Plan:     ASSESSMENT:    Acute on chronic HFpEF   Recent exacerbation during admission likely in the setting of acute anemia.  Echo 2/1/24 showed normal LV function, moderately decreased RVSF and moderately dilated RV, flattened septum and moderate pulmonary hypertension and severe TR.   He had an excellent response to IV diuresis, discharge weight of 273 lbs.  He is euvolemic today, on torsemide 20 mg daily.    Acute on chronic anemia in the setting of pill esophagitis with active bleeding ulcer  Hgb is stable at 9.6.   Following with Dr. Olvera with plans for follow-up EGD in the upcoming weeks.     S/p MVR in 2008 with 31 mm St. Raffi mechanical mitral valve  Mean gradient of 6 mmHg on most recent TTE  Chronically on warfarin with goal INR 2.5-3.5    Chronic atrial fibrillation  S/p MAZE at time of MVR  Hx of bradycardia that resolved with discontinuation of BB    Severe TR with significant RV dysfunction and dilatation noted on echocardiogram      PLAN:     Overall Feng appears to be doing well from a cardiovascular standpoint, he has no clinical signs of heart failure.  It appears he might even be slightly dehydrated, so I have encouraged him to increase his fluid intake.  Continue current dose of torsemide 20 mg daily for now.  Repeat BMP in 1 to 2 weeks.  Assuming he remains clinically stable, follow-up with Dr. Gomez in 6 months.  Follow-up with GI, as previously arranged.         Elaina Reaves, EAMON, APRN, CNP  Page: [phone] (8a-5p  "M-F)    Orders this Visit:  Orders Placed This Encounter   Procedures    Basic metabolic panel    Follow-Up with Cardiology     No orders of the defined types were placed in this encounter.    There are no discontinued medications.    Today's clinic visit entailed:  Review of the result(s) of each unique test - echo, labs, EKG  Ordering of each unique test  Prescription drug management  35 minutes spent by me on the date of the encounter doing chart review, review of test results, interpretation of tests, patient visit, and documentation   Provider  Link to [facility] Help Grid     The level of medical decision making during this visit was of moderate complexity.           Review of Systems:     Review of Systems:  Skin:  not assessed     Eyes:  not assessed    ENT:  Positive for vertigo  Respiratory:  Negative    Cardiovascular:    edema;Positive for  Gastroenterology: not assessed    Genitourinary:  not assessed    Musculoskeletal:  not assessed    Neurologic:  not assessed    Psychiatric:  not assessed    Heme/Lymph/Imm:  not assessed    Endocrine:  not assessed              Physical Exam:   Vitals: /64   Pulse 73   Ht 1.816 m (5' 11.5\")   Wt 108.9 kg (240 lb)   SpO2 96%   BMI 33.01 kg/m    Constitutional:  cooperative        Skin:  warm and dry to the touch        Head:  normocephalic        Eyes:  pupils equal and round        ENT:  not assessed this visit        Neck:  JVP normal        Chest:  normal breath sounds, clear to auscultation, normal A-P diameter, normal symmetry, normal respiratory excursion, no use of accessory muscles        Cardiac: regular rhythm;normal S1 and S2;no murmurs, gallops or rubs detected                  Abdomen:  abdomen soft        Vascular: not assessed this visit                                      Extremities and Back:  no edema        Neurological:  no gross motor deficits;affect appropriate             Medications:     Current Outpatient Medications   Medication Sig " Dispense Refill    acetaminophen (TYLENOL) 325 MG tablet Take 2 tablets (650 mg) by mouth every 6 hours as needed for mild pain      albuterol (PROAIR HFA/PROVENTIL HFA/VENTOLIN HFA) 108 (90 Base) MCG/ACT inhaler Inhale 1 puff into the lungs every 4 hours as needed for shortness of breath      bisacodyl (DULCOLAX) 10 MG suppository Place 10 mg rectally daily as needed for constipation Every 3 days if no BM      calcium carbonate (TUMS) 500 MG chewable tablet Take 1 tablet (500 mg) by mouth 3 times daily as needed for heartburn      clindamycin (CLEOCIN) 150 MG capsule TAKE 4 CAPS BY MOUTH 1 HOUR PRIOR TO DENTAL APPOINTMENT      diclofenac (VOLTAREN) 1 % topical gel Apply 4 g topically 3 times daily      ferrous sulfate (FEROSUL) 325 (65 Fe) MG tablet Take 1 tablet (325 mg) by mouth daily      Multiple Vitamins-Minerals (CENTRUM SILVER PO) Take 1 tablet by mouth daily.      nystatin (MYCOSTATIN) 456894 UNIT/GM external powder Apply topically 2 times daily Groin folds      Omega-3 Fatty Acids (FISH OIL CONCENTRATE PO) Take 1 capsule by mouth daily      order for DME Equipment being ordered: COMPRESSION STOCKINGS, 20-30 mmHg 1 each 1    pantoprazole (PROTONIX) 40 MG EC tablet Take 1 tablet (40 mg) by mouth 2 times daily (before meals)      polyethylene glycol (MIRALAX) 17 GM/Dose powder Take 17 g by mouth daily as needed for constipation      potassium chloride (KAYCIEL) 10 % SOLN solution Take 15 mLs (20 mEq) by mouth daily      psyllium (METAMUCIL/KONSYL) 58.6 % powder Take 18 g (1 Tablespoonful) by mouth daily      sennosides (SENOKOT) 8.6 MG tablet Take 1 tablet by mouth 2 times daily as needed for constipation      sodium chloride (OCEAN) 0.65 % nasal spray Spray 1 spray into both nostrils every hour as needed for congestion      sucralfate (CARAFATE) 1 GM/10ML suspension Take 10 mLs (1 g) by mouth 4 times daily (before meals and nightly) for 30 days      torsemide (DEMADEX) 20 MG tablet Take 1 tablet (20 mg) by  mouth daily      Warfarin Therapy Reminder Take 1 each by mouth See Admin Instructions Per INR         Family History   Problem Relation Age of Onset    Cerebrovascular Disease Father     Diabetes Paternal Grandmother     C.A.D. Brother         CABG X 3 at age 51       Social History     Socioeconomic History    Marital status: Single     Spouse name: Not on file    Number of children: 1    Years of education: Not on file    Highest education level: Not on file   Occupational History    Occupation: Self employed      Comment:     Tobacco Use    Smoking status: Former     Packs/day: 0.50     Years: 5.00     Additional pack years: 0.00     Total pack years: 2.50     Types: Cigarettes     Quit date:      Years since quittin.2    Smokeless tobacco: Never   Vaping Use    Vaping Use: Never used   Substance and Sexual Activity    Alcohol use: Yes     Comment: 1 drink per month    Drug use: No    Sexual activity: Yes     Partners: Male   Other Topics Concern    Parent/sibling w/ CABG, MI or angioplasty before 65F 55M? Yes   Social History Narrative    Not on file     Social Determinants of Health     Financial Resource Strain: Not on file   Food Insecurity: Not on file   Transportation Needs: Not on file   Physical Activity: Not on file   Stress: Not on file   Social Connections: Not on file   Interpersonal Safety: Not on file   Housing Stability: Not on file            Past Medical History:     Past Medical History:   Diagnosis Date    Acute on chronic congestive heart failure, unspecified heart failure type (H) 2024    Arthritis     Atrial fibrillation (H)     Coronary artery disease     Hypercholesteremia     Morbid obesity (H)     Unspecified essential hypertension               Past Surgical History:     Past Surgical History:   Procedure Laterality Date    COLONOSCOPY N/A 1/15/2024    Procedure: Colonoscopy;  Surgeon: Osbaldo Olvera MD;  Location:  GI    ESOPHAGOSCOPY, GASTROSCOPY,  DUODENOSCOPY (EGD), COMBINED N/A 1/15/2024    Procedure: Esophagoscopy, gastroscopy, duodenoscopy (EGD), combined;  Surgeon: Osbaldo Olvera MD;  Location: [facility]    ESOPHAGOSCOPY, GASTROSCOPY, DUODENOSCOPY (EGD), COMBINED N/A 2/8/2024    Procedure: Esophagoscopy, gastroscopy, duodenoscopy (EGD), combined;  Surgeon: Osbaldo Olvera MD;  Location: [facility]    MITRAL VALVE REPLACEMENT  8-    Mitral valve replacement with 31-mm St. Raffi mechanical valve.               Allergies:   Amiodarone, Pcn [penicillins], Statins, Amiodarone, Latex, and Zetia [ezetimibe]       Data:   All laboratory data reviewed:    Recent Labs   Lab Test 02/11/24  0523 02/05/24  2232 02/05/24  0800 02/03/24  0741 01/09/24  1027 02/09/22  1117 02/09/22  1117 10/16/20  1058 03/14/19  0959   LDL  --   --   --   --  22  --  78 79 91   HDL  --   --   --   --  25*  --  46 48 42   NHDL  --   --   --   --  28  --  95 99 112   CHOL  --   --   --   --  53  --  141 147 154   TRIG  --   --   --   --  32  --  85 102 107   TSH  --  2.56 3.52  --   --   --   --   --  2.39   IRON 39*  --   --   --  17*   < >  --   --  95   *  --   --   --  435*   < >  --   --  360   IRONSAT 20  --   --   --  4*   < >  --   --  26   COURTNEY 351  --   --    < > 21*   < > 33  --  65    < > = values in this interval not displayed.       Lab Results   Component Value Date    WBC 3.7 (L) 03/11/2024    WBC 4.3 10/16/2020    RBC 2.85 (L) 03/11/2024    RBC 3.79 (L) 10/16/2020    HGB 9.6 (L) 03/13/2024    HGB 12.4 (L) 10/16/2020    HCT 29.0 (L) 03/11/2024    HCT 38.2 (L) 10/16/2020     (H) 03/11/2024     (H) 10/16/2020    MCH 31.6 03/11/2024    MCH 32.7 10/16/2020    MCHC 31.0 (L) 03/11/2024    MCHC 32.5 10/16/2020    RDW 18.4 (H) 03/11/2024    RDW 13.3 10/16/2020     03/11/2024     (L) 10/16/2020       Lab Results   Component Value Date     03/07/2024     10/16/2020    POTASSIUM 3.7 03/07/2024    POTASSIUM 5.0 02/09/2022     "POTASSIUM 4.6 10/16/2020    CHLORIDE 99 03/07/2024    CHLORIDE 107 02/09/2022    CHLORIDE 109 10/16/2020    CO2 32 (H) 03/07/2024    CO2 27 02/09/2022    CO2 23 10/16/2020    ANIONGAP 7 03/07/2024    ANIONGAP 3 02/09/2022    ANIONGAP 7 10/16/2020    GLC 93 03/07/2024     (H) 02/09/2024    GLC 96 02/09/2022    GLC 93 10/16/2020    BUN 26.3 (H) 03/07/2024    BUN 30 02/09/2022    BUN 24 10/16/2020    CR 1.17 03/07/2024    CR 0.85 10/16/2020    GFRESTIMATED 64 03/07/2024    GFRESTIMATED 85 10/16/2020    GFRESTBLACK >90 10/16/2020    JOSEPH 8.9 03/07/2024    JOSEPH 9.4 10/16/2020      Lab Results   Component Value Date    AST 27 02/03/2024    AST 19 10/16/2020    ALT 9 02/03/2024    ALT 21 10/16/2020       No results found for: \"A1C\"    Lab Results   Component Value Date    INR 1.95 (H) 02/15/2024    INR 1.89 (H) 02/14/2024    INR 3.6 (H) 01/05/2024    INR 3.2 12/20/2023    INR 3.3 (A) 07/13/2021    INR 3.2 (A) 06/29/2021         Thank you for allowing me to participate in the care of your patient.      Sincerely,     Elaina Reaves, [facility]  cc:   Glenda Owens PA-C  "

## 2024-03-15 NOTE — PROGRESS NOTES
Cardiology Clinic Progress Note  Feng Wynne MRN# [id number]   YOB: 1945 Age: 79 year old     Primary cardiologist: Dr. Gomez    Reason for visit: hospital follow-up     History of presenting illness:    Feng Wynne is a pleasant 79 year old patient with past medical history significant for mechanical mitral valve replacement for severe mitral regurgitation and MAZE procedure in 2008. He is chronically anticoagulated with warfarin. Additionally, he has a history of pulmonary hypertension, chronic HFpEF, hypertension, hyperlipidemia and morbid obesity.     He was recently admitted on 1/9/24 for 10 days with severe anemia and a hemoglobin of 4. EGD and colonoscopy did not show active bleeding at that time. He was discharged and returned on 1/30/24 with worsening anemia and a hemoglobin of 6. EGD on 2/5/24 showed severe pill esophagitis with an actively bleeding ulcer s/p treatment. Had repeat endoscopy on 2/8 with an oozing esophageal ulcer. He received 6 units of pRBCs.  Additionally, he was in acute heart failure secondary to RV failure in the setting of recent bleeding and anemia. Echo showed normal LVEF, moderately decreased RVSF and moderately dilated RV, flattened septum and moderate pulmonary hypertension and severe TR.  He was aggressively diuresed with excellent response.  His renal function remained stable.  He was down 50 lbs to a weight of 273. His furosemide was switched to torsemide 20 mg daily for maintenance therapy.  He was discharged 2/15/2024 to TCU.    He presents today for follow-up.  He is accompanied by his partner, Jose.  He still is at the TCU and overall feels he is doing well from a cardiovascular standpoint.  His weight is actually down to 240 lbs, though this is likely due to poor oral intake at [facility].  He is attempting to consume more protein in is drinking boost or Ensure on a daily basis.  He otherwise denies any chest discomfort, shortness of breath, syncope near  syncope, or palpitations.  He has no PND or orthopnea.  He has no peripheral edema.    I reviewed his most recent labs from 3/7/2024.  His BMP demonstrates creatinine of 1.17, BUN 26, normal electrolytes.  Hemoglobin is stable at 9.6.         Assessment and Plan:     ASSESSMENT:    Acute on chronic HFpEF   Recent exacerbation during admission likely in the setting of acute anemia.  Echo 2/1/24 showed normal LV function, moderately decreased RVSF and moderately dilated RV, flattened septum and moderate pulmonary hypertension and severe TR.   He had an excellent response to IV diuresis, discharge weight of 273 lbs.  He is euvolemic today, on torsemide 20 mg daily.    Acute on chronic anemia in the setting of pill esophagitis with active bleeding ulcer  Hgb is stable at 9.6.   Following with Dr. Olvera with plans for follow-up EGD in the upcoming weeks.     S/p MVR in 2008 with 31 mm St. Raffi mechanical mitral valve  Mean gradient of 6 mmHg on most recent TTE  Chronically on warfarin with goal INR 2.5-3.5    Chronic atrial fibrillation  S/p MAZE at time of MVR  Hx of bradycardia that resolved with discontinuation of BB    Severe TR with significant RV dysfunction and dilatation noted on echocardiogram      PLAN:     Overall Feng appears to be doing well from a cardiovascular standpoint, he has no clinical signs of heart failure.  It appears he might even be slightly dehydrated, so I have encouraged him to increase his fluid intake.  Continue current dose of torsemide 20 mg daily for now.  Repeat BMP in 1 to 2 weeks.  Assuming he remains clinically stable, follow-up with Dr. Gomez in 6 months.  Follow-up with GI, as previously arranged.         Elaina Reaves, EAMON, APRN, CNP  Page: [phone] (8a-5p M-F)    Orders this Visit:  Orders Placed This Encounter   Procedures    Basic metabolic panel    Follow-Up with Cardiology     No orders of the defined types were placed in this encounter.    There are no discontinued  "medications.    Today's clinic visit entailed:  Review of the result(s) of each unique test - echo, labs, EKG  Ordering of each unique test  Prescription drug management  35 minutes spent by me on the date of the encounter doing chart review, review of test results, interpretation of tests, patient visit, and documentation   Provider  Link to [facility] Help Grid     The level of medical decision making during this visit was of moderate complexity.           Review of Systems:     Review of Systems:  Skin:  not assessed     Eyes:  not assessed    ENT:  Positive for vertigo  Respiratory:  Negative    Cardiovascular:    edema;Positive for  Gastroenterology: not assessed    Genitourinary:  not assessed    Musculoskeletal:  not assessed    Neurologic:  not assessed    Psychiatric:  not assessed    Heme/Lymph/Imm:  not assessed    Endocrine:  not assessed              Physical Exam:   Vitals: /64   Pulse 73   Ht 1.816 m (5' 11.5\")   Wt 108.9 kg (240 lb)   SpO2 96%   BMI 33.01 kg/m    Constitutional:  cooperative        Skin:  warm and dry to the touch        Head:  normocephalic        Eyes:  pupils equal and round        ENT:  not assessed this visit        Neck:  JVP normal        Chest:  normal breath sounds, clear to auscultation, normal A-P diameter, normal symmetry, normal respiratory excursion, no use of accessory muscles        Cardiac: regular rhythm;normal S1 and S2;no murmurs, gallops or rubs detected                  Abdomen:  abdomen soft        Vascular: not assessed this visit                                      Extremities and Back:  no edema        Neurological:  no gross motor deficits;affect appropriate             Medications:     Current Outpatient Medications   Medication Sig Dispense Refill    acetaminophen (TYLENOL) 325 MG tablet Take 2 tablets (650 mg) by mouth every 6 hours as needed for mild pain      albuterol (PROAIR HFA/PROVENTIL HFA/VENTOLIN HFA) 108 (90 Base) MCG/ACT inhaler Inhale 1 " puff into the lungs every 4 hours as needed for shortness of breath      bisacodyl (DULCOLAX) 10 MG suppository Place 10 mg rectally daily as needed for constipation Every 3 days if no BM      calcium carbonate (TUMS) 500 MG chewable tablet Take 1 tablet (500 mg) by mouth 3 times daily as needed for heartburn      clindamycin (CLEOCIN) 150 MG capsule TAKE 4 CAPS BY MOUTH 1 HOUR PRIOR TO DENTAL APPOINTMENT      diclofenac (VOLTAREN) 1 % topical gel Apply 4 g topically 3 times daily      ferrous sulfate (FEROSUL) 325 (65 Fe) MG tablet Take 1 tablet (325 mg) by mouth daily      Multiple Vitamins-Minerals (CENTRUM SILVER PO) Take 1 tablet by mouth daily.      nystatin (MYCOSTATIN) 645417 UNIT/GM external powder Apply topically 2 times daily Groin folds      Omega-3 Fatty Acids (FISH OIL CONCENTRATE PO) Take 1 capsule by mouth daily      order for DME Equipment being ordered: COMPRESSION STOCKINGS, 20-30 mmHg 1 each 1    pantoprazole (PROTONIX) 40 MG EC tablet Take 1 tablet (40 mg) by mouth 2 times daily (before meals)      polyethylene glycol (MIRALAX) 17 GM/Dose powder Take 17 g by mouth daily as needed for constipation      potassium chloride (KAYCIEL) 10 % SOLN solution Take 15 mLs (20 mEq) by mouth daily      psyllium (METAMUCIL/KONSYL) 58.6 % powder Take 18 g (1 Tablespoonful) by mouth daily      sennosides (SENOKOT) 8.6 MG tablet Take 1 tablet by mouth 2 times daily as needed for constipation      sodium chloride (OCEAN) 0.65 % nasal spray Spray 1 spray into both nostrils every hour as needed for congestion      sucralfate (CARAFATE) 1 GM/10ML suspension Take 10 mLs (1 g) by mouth 4 times daily (before meals and nightly) for 30 days      torsemide (DEMADEX) 20 MG tablet Take 1 tablet (20 mg) by mouth daily      Warfarin Therapy Reminder Take 1 each by mouth See Admin Instructions Per INR         Family History   Problem Relation Age of Onset    Cerebrovascular Disease Father     Diabetes Paternal Grandmother      SHAYY Brother         CABG X 3 at age 51       Social History     Socioeconomic History    Marital status: Single     Spouse name: Not on file    Number of children: 1    Years of education: Not on file    Highest education level: Not on file   Occupational History    Occupation: Self employed      Comment:     Tobacco Use    Smoking status: Former     Packs/day: 0.50     Years: 5.00     Additional pack years: 0.00     Total pack years: 2.50     Types: Cigarettes     Quit date:      Years since quittin.2    Smokeless tobacco: Never   Vaping Use    Vaping Use: Never used   Substance and Sexual Activity    Alcohol use: Yes     Comment: 1 drink per month    Drug use: No    Sexual activity: Yes     Partners: Male   Other Topics Concern    Parent/sibling w/ CABG, MI or angioplasty before 65F 55M? Yes   Social History Narrative    Not on file     Social Determinants of Health     Financial Resource Strain: Not on file   Food Insecurity: Not on file   Transportation Needs: Not on file   Physical Activity: Not on file   Stress: Not on file   Social Connections: Not on file   Interpersonal Safety: Not on file   Housing Stability: Not on file            Past Medical History:     Past Medical History:   Diagnosis Date    Acute on chronic congestive heart failure, unspecified heart failure type (H) 2024    Arthritis     Atrial fibrillation (H)     Coronary artery disease     Hypercholesteremia     Morbid obesity (H)     Unspecified essential hypertension               Past Surgical History:     Past Surgical History:   Procedure Laterality Date    COLONOSCOPY N/A 1/15/2024    Procedure: Colonoscopy;  Surgeon: Osbaldo Olvera MD;  Location: [facility]    ESOPHAGOSCOPY, GASTROSCOPY, DUODENOSCOPY (EGD), COMBINED N/A 1/15/2024    Procedure: Esophagoscopy, gastroscopy, duodenoscopy (EGD), combined;  Surgeon: Osbaldo Olvera MD;  Location: [facility]    ESOPHAGOSCOPY, GASTROSCOPY, DUODENOSCOPY (EGD),  COMBINED N/A 2/8/2024    Procedure: Esophagoscopy, gastroscopy, duodenoscopy (EGD), combined;  Surgeon: Osbaldo Olvera MD;  Location: [facility]    MITRAL VALVE REPLACEMENT  8-    Mitral valve replacement with 31-mm St. Raffi mechanical valve.               Allergies:   Amiodarone, Pcn [penicillins], Statins, Amiodarone, Latex, and Zetia [ezetimibe]       Data:   All laboratory data reviewed:    Recent Labs   Lab Test 02/11/24  0523 02/05/24  2232 02/05/24  0800 02/03/24  0741 01/09/24  1027 02/09/22  1117 02/09/22  1117 10/16/20  1058 03/14/19  0959   LDL  --   --   --   --  22  --  78 79 91   HDL  --   --   --   --  25*  --  46 48 42   NHDL  --   --   --   --  28  --  95 99 112   CHOL  --   --   --   --  53  --  141 147 154   TRIG  --   --   --   --  32  --  85 102 107   TSH  --  2.56 3.52  --   --   --   --   --  2.39   IRON 39*  --   --   --  17*   < >  --   --  95   *  --   --   --  435*   < >  --   --  360   IRONSAT 20  --   --   --  4*   < >  --   --  26   COURTNEY 351  --   --    < > 21*   < > 33  --  65    < > = values in this interval not displayed.       Lab Results   Component Value Date    WBC 3.7 (L) 03/11/2024    WBC 4.3 10/16/2020    RBC 2.85 (L) 03/11/2024    RBC 3.79 (L) 10/16/2020    HGB 9.6 (L) 03/13/2024    HGB 12.4 (L) 10/16/2020    HCT 29.0 (L) 03/11/2024    HCT 38.2 (L) 10/16/2020     (H) 03/11/2024     (H) 10/16/2020    MCH 31.6 03/11/2024    MCH 32.7 10/16/2020    MCHC 31.0 (L) 03/11/2024    MCHC 32.5 10/16/2020    RDW 18.4 (H) 03/11/2024    RDW 13.3 10/16/2020     03/11/2024     (L) 10/16/2020       Lab Results   Component Value Date     03/07/2024     10/16/2020    POTASSIUM 3.7 03/07/2024    POTASSIUM 5.0 02/09/2022    POTASSIUM 4.6 10/16/2020    CHLORIDE 99 03/07/2024    CHLORIDE 107 02/09/2022    CHLORIDE 109 10/16/2020    CO2 32 (H) 03/07/2024    CO2 27 02/09/2022    CO2 23 10/16/2020    ANIONGAP 7 03/07/2024    ANIONGAP 3 02/09/2022  "   ANIONGAP 7 10/16/2020    GLC 93 03/07/2024     (H) 02/09/2024    GLC 96 02/09/2022    GLC 93 10/16/2020    BUN 26.3 (H) 03/07/2024    BUN 30 02/09/2022    BUN 24 10/16/2020    CR 1.17 03/07/2024    CR 0.85 10/16/2020    GFRESTIMATED 64 03/07/2024    GFRESTIMATED 85 10/16/2020    GFRESTBLACK >90 10/16/2020    JOSEPH 8.9 03/07/2024    JOSEPH 9.4 10/16/2020      Lab Results   Component Value Date    AST 27 02/03/2024    AST 19 10/16/2020    ALT 9 02/03/2024    ALT 21 10/16/2020       No results found for: \"A1C\"    Lab Results   Component Value Date    INR 1.95 (H) 02/15/2024    INR 1.89 (H) 02/14/2024    INR 3.6 (H) 01/05/2024    INR 3.2 12/20/2023    INR 3.3 (A) 07/13/2021    INR 3.2 (A) 06/29/2021         "

## 2024-03-15 NOTE — PATIENT INSTRUCTIONS
Today's Plan:     - No changes today.   - Stay well hydrated.   - Repeat labs to check kidney function in 1 week.   - Follow-up with Dr. Gomez in 6 months.     If you have questions or concerns please call my nurse team:  Ramona RN ([phone]) and Piedad RN ([phone])    After Hours: [phone], option #2, ask for cardiologist on-call    Scheduling phone number: [phone]    Reminder: Please bring in all current medications, over the counter supplements and vitamin bottles to your next appointment.-    It was a pleasure seeing you today!     Elaina Reaves, CHRISTIE  3/15/2024    -

## 2024-03-18 PROBLEM — L98.429: Status: ACTIVE | Noted: 2024-01-01

## 2024-03-18 NOTE — LETTER
3/18/2024        RE: Feng Wynne  [address]  [facility] [address]        [facility] GERIATRICS    Chief Complaint   Patient presents with     RECHECK     HPI:  Feng Wynne is a 79 year old  (1945), who is being seen today for an episodic care visit at: [facility]  ([facility]) [[id number]].     PMH significant for HFpEF, pulmonary hypertension, history of severe mitral valve regurgitation status post mitral valve replacement, atrial fibrillation, hypertension, dyslipidemia, chronic kidney disease, obesity     Of note patient was hospitalized at [facility] from 1/9/2024 to 1/19/2024 for acute anemia, hemoglobin found to be 4.4 due to possible GI bleed in setting of supratherapeutic INR.  Patient received transfusion.  GI consulted and patient's status post upper endoscopy on 1/15 with normal findings.  Patient also underwent colonoscopy on 1/15, however prep was poor and unable to visualize well.  Patient was also treated for E. coli UTI and required Stubbs catheter placement for urinary retention, he was discharged to [facility].  In the U patient treated for right-sided pneumonia with ceftriaxone followed by cefuroxime and doxycycline.     Summary of recent hospitalization:  Most recently, patient was hospitalized at [facility] from 1/30/2024 to 2/15/2024 for acute anemia and acute on chronic congestive heart failure exacerbation.  In the emergency department laboratory evaluation significant for WBC 3.4, hemoglobin 6.9, high-sensitivity troponin 58, BNP 1253.  Patient also found to have positive stool guaiac.  Chest x-ray positive for increased right perihilar hazy opacity suggestive of worsening pulmonary edema, similar vascular congestion and cardiomegaly, no focal consolidation, possible trace right pleural effusion, however lateral view was limited due to overlapping soft tissue.  Echo 2/1/2024 showed LVEF 60 to 65%, diastolic function not  assessed due to presence of prosthetic mitral valve, flattened septum consistent with RV pressure/volume overload, right ventricle moderately dilated, moderately decreased right ventricular systolic function, left and right atrium severely dilated, severe pulm 4+ tricuspid regurgitation, moderate pulmonary hypertension.  Patient was diuresed with IV furosemide for acute CHF and transition back to oral torsemide.  GI was consulted and patient underwent underwent upper endoscopy on 2/5 that revealed severe pill esophagitis with active bleeding ulcer in the middle third of esophagus that was cauterized with BiCap.  PTA warfarin was held and he was bridged with heparin.  He was started on PPI and Carafate.  Endoscopy on 2/8 showed oozing esophageal ulcer, heparin was discontinued.  Patient also received multiple transfusions of PRBCs and IV Venofer x2. Hemoglobin 8.8 on 2/15/2024. He was discharged on altered diet. GI to follow up outpatient, recommend avoid doxycylcine and oral potassium. Potassium was changed to liquid at discharge. Coumadin was restarted. Discharged to TCU for physical rehabilitation and medical management.      Today patient was followed in the TCU.  INR today 4.2, patient received 3 mg Friday and Sunday and 4 mg rest of days.  Patient denies any bleeding.  He denies shortness of breath, chest pain, dizziness/lightheadedness.  He feels the Voltaren gel is helpful for his knee pain.  He tells me that his bowels have improved since adding the psyllium.  They are firmed up, moving on average 2-3 times per day.  Denies any abdominal pain.  Denies dysuria/trouble urinating.  Patient continues to work with therapy.    Reviewed facility EMR including medications, recent nursing progress notes, vital signs.  Discussed plan of care with nursing staff.  Patient also with some questions for insurance nurse, I updated her and asked her to stop by to visit patient.    Allergies, and PMH/PSH reviewed in EPIC  "today.  REVIEW OF SYSTEMS:  7 point ROS of systems including Constitutional, Respiratory, Cardiovascular, Gastroenterology, Genitourinary, Integumentary, Musculoskeletal were all negative except for pertinent positives noted in my HPI.    Objective:   /75   Pulse 75   Temp 98.4  F (36.9  C)   Resp 18   Ht 1.803 m (5' 11\")   Wt 112.5 kg (248 lb)   SpO2 96%   BMI 34.59 kg/m    GENERAL APPEARANCE:  Alert, in NAD  HEENT: normocephalic, moist mucous membranes, nose without drainage or crusting  RESP:  respiratory effort normal, no respiratory distress, Lung sounds clear, patient is on RA  CV: auscultation of heart done, rate and rhythm regular. .   ABDOMEN: large hernia present, + bowel sounds, soft, nontender, no grimacing or guarding with palpation.  M/S:  no lower extremity edema, TG shapers in place  SKIN: reviewed images in [facility], not updated since last week- no concerns noted by nursing staff  NEURO: cranial nerves 2-12 grossly intact and at patient's baseline; moves extremities freely  PSYCH: oriented x 3, affect and mood normal      Labs done in SNF are in [facility]. Please refer to them using EPIC/Care Everywhere. and Recent labs in EPIC reviewed by me today.     Assessment/Plan:  Acute upper GI bleed  Severe pill esophagitis with bleeding esophageal ulcer status post cauterization with BiCap on 2/5/2024  GERD  Had follow up with GI on 3/12 and recommended repeat EGD in 2 months  Completed sucralfate course 3/16/2024  Denies blood in stool  Plan: Continue pantoprazole 40 mg twice daily.  Monitor for signs and symptoms of GI bleed.  Follow-up with GI per recommendations.     Acute blood loss anemia  Iron deficiency  Secondary to GI bleed and intermittent epistaxis for 3 months prior  Received PRBC transfusions inpatient and IV venofer inpatient  Hemoglobin 9.6 on 3/18/2024- stable from previous  Plan: CBC PRN. Continue ferrous sulfate 325 mg daily.  Monitor for signs and symptoms of bleeding.   "   Leukopenia, resolved  Started in 1/2024  WBC 6.6 on 3/18/2024  Plan: CBC PRN. Follow up outpatient for further work up if recurrence.     Chronic heart failure with preserved EF, LVEF 60 to 65% per echo on 2/1/2024  Moderate pulmonary hypertension  Severe tricuspid regurgitation  Treated for acute on chronic CHF exacerbation inpatient with IV furosemide, tablet potassium changed to liquid potassium as well  Had follow up with cardiology on 3/15 and no changes made to medications  Weight 248.2 lb 3/17/2024, no leg swelling  Plan: Continue torsemide 20 mg daily, potassium chloride 20 mill equivalents daily. Continue compression stockings on in a.m. off in p.m. Daily weights. Notify provider with weight gain >2 lbs in a day, >5 lbs in on week. Follow-up with cardiology as scheduled     History of severe mitral valve regurgitation status post mechanical mitral valve replacement in 2/2008  Permanent atrial fibrillation  PTA warfarin 10 mg every Wednesday and Saturday, 5 mg rest of days  INR goal 2.5-3.5  Heart rate 63-81 recently  INR 4.2 today  Plan: Hold coumadin. INR 3/19. INR goal 2.5-3.5. Monitor heart rate. Follow up with cardiology as scheduled.     Essential hypertension  SBP controlled, mostly 120s  Plan: Continue torsemide 20 mg daily. Monitor blood pressure.     Dyslipidemia  Plan: Continue fish oil 1200 mg po daily     Chronic knee arthritis  Had bilateral cortisone injections 3//2024  Continues to have ongoing pain since his near fall recently  Does feel cortisone injections overall helped the pain  Started on voltaren gel in the TCU  Plan: Continue voltaren gel 1% TID. Continue tylenol 650 mg q6h PRN. Monitor symptoms.     CKD stage II  Baseline creatinine 0.9-1.1  Creatinine 1.07 on 3/18/2024, BUN up a bit- may be a bit dehydrated  Plan: BMP 3/22. Encourage fluids. Avoid nephrotoxins. Renally dose medications as indicated.     Urinary retention, resolved  Patient had Stubbs catheter during recent  admission, initially needed to straight cath  Denies issues voiding, feels like he is emptying his bladder completely  Plan: Continue to straight cath as needed.  Monitor symptoms.  Follow-up with urology per recommendations.     Vertigo  Patient was treated with Epley maneuver by physical therapy, has not had reoccurrence  Plan: Monitor for symptoms     Sacrococcygeal pressure injury stage III  Present on admission  No updated images to review since last week  Plan: Continue wound care as ordered. Reposition frequently.  Continue air mattress.  Nursing to update provider with worsening     Epistaxis  No recurrence  Plan: Monitor.     Loose stools, improved  Started on psyllium on 3/12 by GI  Bowels firmed up, moving 2-3 times per day  Plan: Continue psyllium 1 tbsp daily. Continue senna 1 tab twice daily as needed, MiraLAX 17 g daily as needed.  Monitor bowels.     Moderate malnutrition  Per inpatient dietitian assessment  Wt Readings from Last 4 Encounters:   03/18/24 112.5 kg (248 lb)   03/15/24 108.9 kg (240 lb)   03/14/24 112 kg (247 lb)   03/12/24 113.9 kg (251 lb)     Plan: Dietician following. Supplements per dietician. Monitor weights and po intake     Obesity, BMI 34.59  Suspected GINGER per hospital assessment  Complicates care  Plan: Encourage weight loss.  Dietitian follows in the TCU.  Follow-up outpatient for sleep evaluation.     Cognitive impairment  Slums 23/30, CPT 4.9/5.6  Plan: Based on cognitive testing, recommend weekly check-in's for safety.     Physical deconditioning  Secondary to acute/ chronic medical conditions as above, multiple recent hospitalizations   Patient continues to work with therapy  Plan: Encourage participation in physical therapy/occupational therapy for strengthening and deconditioning. Discharge planning per their recommendation. Social work to assist with d/c planning.    MED REC REQUIRED  Post Medication Reconciliation Status:  Medication reconciliation previously  completed during another office visit      Disclaimer: This note may contain text created using speech-recognition software and may contain unintended word substitutions.       Electronically signed by: MIKE Preciado CNP         Sincerely,        MIKE Preciado CNP

## 2024-03-18 NOTE — ED PROVIDER NOTES
History     Chief Complaint:  Pressure Ulcer    The history is provided by the patient.      Feng Wynne is a 79 year old male on warfarin with a history of CHF, hypertension, mechanical mitral valve replacement, paroxysmal atrial fibrillation, and type II NSTEMI who presents to the ED via EMS for evaluation of a wound. The patient reports that at the beginning of February he developed a wound at his tailbone. He is currently staying in a TCU, where they change the bandage for this wound every other day. Today, a nurse noticed a drainage this wound. This is the first time that there has been drainage from the wound. He was recommended to go to the ED for evaluation of potential osteomyelitis. It is hard for the patient to sit in a wheelchair due to the pain. Denies fevers, chills, shortness of breath, abdominal pain, or having diabetes. He feels cold, which he attributes to his anemia. The patient is accompanied by his partner.     Independent Historian:   None - Patient Only    Review of External Notes:   I reviewed a transition care note from earlier today. The patient was hospitalized from 1/9/24 to 1/19/24 for profound anemia with a hemoglobin of 4.4 He was hospitalized again from 1/30/24 to 2/15/2024 for acute blood loss on chronic anemia. He was discharged to a TCU in stable condition. The patient was found to have a pressure injury and was seen by the wound care team. He was discharged to TCU.     Medications:    Albuterol  Bisacodyl  Calcium carbonate  Clindamycin  Diclofenac  Ferrous sulfate  Nystatin  Pantoprazole  Polyethylene glycol  Potassium chloride  Sennosides  Torsemide  Warfarin    Past Medical History:    CHF  Arthritis  paroxysmal atrial fibrillation  Past Medical History:   Diagnosis Date    Acute on chronic congestive heart failure, unspecified heart failure type (H) 1/30/2024    Arthritis     Atrial fibrillation (H)     Coronary artery disease     Hypercholesteremia     Morbid obesity (H)   "   Unspecified essential hypertension        Past Surgical History:    Past Surgical History:   Procedure Laterality Date    COLONOSCOPY N/A 1/15/2024    Procedure: Colonoscopy;  Surgeon: Osbaldo Olvera MD;  Location: [facility]    ESOPHAGOSCOPY, GASTROSCOPY, DUODENOSCOPY (EGD), COMBINED N/A 1/15/2024    Procedure: Esophagoscopy, gastroscopy, duodenoscopy (EGD), combined;  Surgeon: Osbaldo Olvera MD;  Location: [facility]    ESOPHAGOSCOPY, GASTROSCOPY, DUODENOSCOPY (EGD), COMBINED N/A 2/8/2024    Procedure: Esophagoscopy, gastroscopy, duodenoscopy (EGD), combined;  Surgeon: Osbaldo Olvera MD;  Location: [facility]    MITRAL VALVE REPLACEMENT  8-    Mitral valve replacement with 31-mm [employer] mechanical valve.         Physical Exam   Patient Vitals for the past 24 hrs:   BP Temp Temp src Pulse Resp SpO2 Height Weight   03/18/24 2308 -- -- -- -- -- -- 1.803 m (5' 11\") --   03/18/24 2039 -- -- -- -- -- 94 % -- --   03/18/24 2029 109/63 -- -- 81 -- 94 % -- --   03/18/24 1945 -- -- -- -- -- 91 % -- --   03/18/24 1930 117/56 -- -- -- -- -- -- --   03/18/24 1700 115/72 97.7  F (36.5  C) Oral 87 20 99 % 1.803 m (5' 11\") 112 kg (247 lb)     Physical Exam  General:              Well-nourished              Speaking in full sentences  Eyes:              Conjunctiva without injection or scleral icterus  ENT:              Moist mucous membranes              Nares patent              Pinnae normal  Neck:              Full ROM              No stiffness appreciated  Resp:              Lungs CTAB              No crackles, wheezing or audible rubs              Good air movement  CV:                    Normal rate, regular rhythm              S1 and S2 present              No murmur, gallop or rub  GI:              BS present              Abdomen soft without distention              Non-tender to light and deep palpation              No guarding or rebound tenderness  :              With female nurse chaperone " present              Sacral decubitus ulcer with malodorous drainage and surrounding erythema              Packing in place  Skin:              Warm, dry, well perfused              No rashes or open wounds on exposed skin  MSK:              Moves all extremities              No focal deformities or swelling  Neuro:              Alert              Answers questions appropriately              Moves all extremities equally              Gait stable  Psych:              Normal affect, normal mood    Emergency Department Course   Imaging:  MR Pelvis Bone w Contrast    (Results Pending)     Laboratory:  Labs Ordered and Resulted from Time of ED Arrival to Time of ED Departure   ERYTHROCYTE SEDIMENTATION RATE AUTO - Abnormal       Result Value    Erythrocyte Sedimentation Rate >140 (*)    CRP INFLAMMATION - Abnormal    CRP Inflammation 119.65 (*)    INR - Abnormal    INR 3.93 (*)    AEROBIC BACTERIAL CULTURE ROUTINE     Emergency Department Course & Assessments:    Interventions:  Medications   cefTRIAXone (ROCEPHIN) 2 g vial to attach to  ml bag for ADULTS or NS 50 ml bag for PEDS (0 g Intravenous Stopped 3/18/24 2039)   vancomycin (VANCOCIN) 2,500 mg in sodium chloride 0.9 % 500 mL intermittent infusion (2,500 mg Intravenous $New Bag 3/18/24 2040)        Assessments:  1704 I obtained history and examined the patient as noted above.   1841 I rechecked the patient and explained findings.     Independent Interpretation (X-rays, CTs, rhythm strip):  None    Consultations/Discussion of Management or Tests:  ED Course as of 03/18/24 2331   Mon Mar 18, 2024   1807 I spoke with Dr Tl Du from general surgery regarding the patient's presentation and plan of care.      2002 Spoke with Dr. Sebastian, accepting for admission.     Social Determinants of Health affecting care:   None    Disposition:  The patient was admitted to the hospital under the care of Dr. Sebastian.     Impression & Plan    Medical Decision Making:  Feng  Sherrell is a very pleasant 79-year-old male with a complex past medical history presenting to the ED with concerns for a pressure ulcer to the sacrum.  VS on presentation unremarkable.  Examination confirms a pressure ulceration, with concerns for evolving infection given no malodorous drainage.  Labs reviewed from earlier today revealing normal WBC count, and stable anemia.  Inflammatory markers added revealing elevations to ESR and CRP.  INR also supratherapeutic.  Patient restarted on broad-spectrum antibiotics with ceftriaxone and vancomycin.  Plan for hospital admission anticipation of likely MRI to evaluate for potential osteomyelitis.  Currently, I feel it is unlikely represent necrotizing infection.  He has remained hemodynamically stable and denies any other complaints or concerns.  Patient require surgical consultation during his hospitalization to determine if debridement is necessary.  Questions answered of patient and significant other prior to admission.      Diagnosis:    ICD-10-CM    1. Skin ulcer of sacrum, unspecified ulcer stage (H)  L98.429       2. Chronic anticoagulation  Z79.01            Scribe Disclosure:  I, Krzysztof Devine, am serving as a scribe at 7:07 PM on 3/18/2024 to document services personally performed by Jayme Saldivar MD, based on my observations and the provider's statements to me.   3/18/2024   Jayme Saldivar MD Roach, Brian Donald, MD  03/18/24 9793

## 2024-03-18 NOTE — ED TRIAGE NOTES
Pt arrives by Ems with complaint of pressure ulcer. Pt is staying in TCU after esophageal tear and RN noted pressure ulcer on coccyx. RN sent to ED to check for osteomyelitis. VSS. A&O X4.

## 2024-03-18 NOTE — PATIENT INSTRUCTIONS
Orders  Feng Wynne  1945  1) Hold coumadin. INR 3/19.  2) BMP 3/22. Diagnosis: CKD  3) Encourage fluids  MIKE Preciado CNP on 3/18/2024 at 9:53 AM

## 2024-03-18 NOTE — ED NOTES
[facility]  Expected date:   Expected time:   Means of arrival:   Comments:  A hallway

## 2024-03-18 NOTE — PROGRESS NOTES
[facility] GERIATRICS    Chief Complaint   Patient presents with    RECHECK     HPI:  Feng Wynne is a 79 year old  (1945), who is being seen today for an episodic care visit at: [facility]  ([facility]) [[id number]].     PMH significant for HFpEF, pulmonary hypertension, history of severe mitral valve regurgitation status post mitral valve replacement, atrial fibrillation, hypertension, dyslipidemia, chronic kidney disease, obesity     Of note patient was hospitalized at [facility] from 1/9/2024 to 1/19/2024 for acute anemia, hemoglobin found to be 4.4 due to possible GI bleed in setting of supratherapeutic INR.  Patient received transfusion.  GI consulted and patient's status post upper endoscopy on 1/15 with normal findings.  Patient also underwent colonoscopy on 1/15, however prep was poor and unable to visualize well.  Patient was also treated for E. coli UTI and required Stubbs catheter placement for urinary retention, he was discharged to [facility].  In the U patient treated for right-sided pneumonia with ceftriaxone followed by cefuroxime and doxycycline.     Summary of recent hospitalization:  Most recently, patient was hospitalized at [facility] from 1/30/2024 to 2/15/2024 for acute anemia and acute on chronic congestive heart failure exacerbation.  In the emergency department laboratory evaluation significant for WBC 3.4, hemoglobin 6.9, high-sensitivity troponin 58, BNP 1253.  Patient also found to have positive stool guaiac.  Chest x-ray positive for increased right perihilar hazy opacity suggestive of worsening pulmonary edema, similar vascular congestion and cardiomegaly, no focal consolidation, possible trace right pleural effusion, however lateral view was limited due to overlapping soft tissue.  Echo 2/1/2024 showed LVEF 60 to 65%, diastolic function not assessed due to presence of prosthetic mitral valve, flattened septum consistent with RV  pressure/volume overload, right ventricle moderately dilated, moderately decreased right ventricular systolic function, left and right atrium severely dilated, severe pulm 4+ tricuspid regurgitation, moderate pulmonary hypertension.  Patient was diuresed with IV furosemide for acute CHF and transition back to oral torsemide.  GI was consulted and patient underwent underwent upper endoscopy on 2/5 that revealed severe pill esophagitis with active bleeding ulcer in the middle third of esophagus that was cauterized with BiCap.  PTA warfarin was held and he was bridged with heparin.  He was started on PPI and Carafate.  Endoscopy on 2/8 showed oozing esophageal ulcer, heparin was discontinued.  Patient also received multiple transfusions of PRBCs and IV Venofer x2. Hemoglobin 8.8 on 2/15/2024. He was discharged on altered diet. GI to follow up outpatient, recommend avoid doxycylcine and oral potassium. Potassium was changed to liquid at discharge. Coumadin was restarted. Discharged to TCU for physical rehabilitation and medical management.      Today patient was followed in the TCU.  INR today 4.2, patient received 3 mg Friday and Sunday and 4 mg rest of days.  Patient denies any bleeding.  He denies shortness of breath, chest pain, dizziness/lightheadedness.  He feels the Voltaren gel is helpful for his knee pain.  He tells me that his bowels have improved since adding the psyllium.  They are firmed up, moving on average 2-3 times per day.  Denies any abdominal pain.  Denies dysuria/trouble urinating.  Patient continues to work with therapy.    Reviewed facility EMR including medications, recent nursing progress notes, vital signs.  Discussed plan of care with nursing staff.  Patient also with some questions for insurance nurse, I updated her and asked her to stop by to visit patient.    Allergies, and PMH/PSH reviewed in EPIC today.  REVIEW OF SYSTEMS:  7 point ROS of systems including Constitutional, Respiratory,  "Cardiovascular, Gastroenterology, Genitourinary, Integumentary, Musculoskeletal were all negative except for pertinent positives noted in my HPI.    Objective:   /75   Pulse 75   Temp 98.4  F (36.9  C)   Resp 18   Ht 1.803 m (5' 11\")   Wt 112.5 kg (248 lb)   SpO2 96%   BMI 34.59 kg/m    GENERAL APPEARANCE:  Alert, in NAD  HEENT: normocephalic, moist mucous membranes, nose without drainage or crusting  RESP:  respiratory effort normal, no respiratory distress, Lung sounds clear, patient is on RA  CV: auscultation of heart done, rate and rhythm regular. .   ABDOMEN: large hernia present, + bowel sounds, soft, nontender, no grimacing or guarding with palpation.  M/S:  no lower extremity edema, TG shapers in place  SKIN: reviewed images in [facility], not updated since last week- no concerns noted by nursing staff  NEURO: cranial nerves 2-12 grossly intact and at patient's baseline; moves extremities freely  PSYCH: oriented x 3, affect and mood normal      Labs done in SNF are in [facility]. Please refer to them using EPIC/Care Everywhere. and Recent labs in EPIC reviewed by me today.     Assessment/Plan:  Acute upper GI bleed  Severe pill esophagitis with bleeding esophageal ulcer status post cauterization with BiCap on 2/5/2024  GERD  Had follow up with GI on 3/12 and recommended repeat EGD in 2 months  Completed sucralfate course 3/16/2024  Denies blood in stool  Plan: Continue pantoprazole 40 mg twice daily.  Monitor for signs and symptoms of GI bleed.  Follow-up with GI per recommendations.     Acute blood loss anemia  Iron deficiency  Secondary to GI bleed and intermittent epistaxis for 3 months prior  Received PRBC transfusions inpatient and IV venofer inpatient  Hemoglobin 9.6 on 3/18/2024- stable from previous  Plan: CBC PRN. Continue ferrous sulfate 325 mg daily.  Monitor for signs and symptoms of bleeding.     Leukopenia, resolved  Started in 1/2024  WBC 6.6 on 3/18/2024  Plan: CBC PRN. Follow up " outpatient for further work up if recurrence.     Chronic heart failure with preserved EF, LVEF 60 to 65% per echo on 2/1/2024  Moderate pulmonary hypertension  Severe tricuspid regurgitation  Treated for acute on chronic CHF exacerbation inpatient with IV furosemide, tablet potassium changed to liquid potassium as well  Had follow up with cardiology on 3/15 and no changes made to medications  Weight 248.2 lb 3/17/2024, no leg swelling  Plan: Continue torsemide 20 mg daily, potassium chloride 20 mill equivalents daily. Continue compression stockings on in a.m. off in p.m. Daily weights. Notify provider with weight gain >2 lbs in a day, >5 lbs in on week. Follow-up with cardiology as scheduled     History of severe mitral valve regurgitation status post mechanical mitral valve replacement in 2/2008  Permanent atrial fibrillation  PTA warfarin 10 mg every Wednesday and Saturday, 5 mg rest of days  INR goal 2.5-3.5  Heart rate 63-81 recently  INR 4.2 today  Plan: Hold coumadin. INR 3/19. INR goal 2.5-3.5. Monitor heart rate. Follow up with cardiology as scheduled.     Essential hypertension  SBP controlled, mostly 120s  Plan: Continue torsemide 20 mg daily. Monitor blood pressure.     Dyslipidemia  Plan: Continue fish oil 1200 mg po daily     Chronic knee arthritis  Had bilateral cortisone injections 3//2024  Continues to have ongoing pain since his near fall recently  Does feel cortisone injections overall helped the pain  Started on voltaren gel in the TCU  Plan: Continue voltaren gel 1% TID. Continue tylenol 650 mg q6h PRN. Monitor symptoms.     CKD stage II  Baseline creatinine 0.9-1.1  Creatinine 1.07 on 3/18/2024, BUN up a bit- may be a bit dehydrated  Plan: [facility] 3/22. Encourage fluids. Avoid nephrotoxins. Renally dose medications as indicated.     Urinary retention, resolved  Patient had Stubbs catheter during recent admission, initially needed to straight cath  Denies issues voiding, feels like he is emptying his  bladder completely  Plan: Continue to straight cath as needed.  Monitor symptoms.  Follow-up with urology per recommendations.     Vertigo  Patient was treated with Epley maneuver by physical therapy, has not had reoccurrence  Plan: Monitor for symptoms     Sacrococcygeal pressure injury stage III  Present on admission  No updated images to review since last week  Plan: Continue wound care as ordered. Reposition frequently.  Continue air mattress.  Nursing to update provider with worsening     ADDENDUM: Updated by WOC RN and nursing staff. Patient with worsening of wound, increased in depth with concern that it is at bone level. Additionally with odor present, increased drainage and new area of erythema and warmth. With concern for osteomyelitis due to location and depth.   Discussed with nursing, patient and family. Send to ED for imaging to rule out osteomyelitis and will need antibiotic management as well. Answered questions.    Epistaxis  No recurrence  Plan: Monitor.     Loose stools, improved  Started on psyllium on 3/12 by GI  Bowels firmed up, moving 2-3 times per day  Plan: Continue psyllium 1 tbsp daily. Continue senna 1 tab twice daily as needed, MiraLAX 17 g daily as needed.  Monitor bowels.     Moderate malnutrition  Per inpatient dietitian assessment  Wt Readings from Last 4 Encounters:   03/18/24 112.5 kg (248 lb)   03/15/24 108.9 kg (240 lb)   03/14/24 112 kg (247 lb)   03/12/24 113.9 kg (251 lb)     Plan: Dietician following. Supplements per dietician. Monitor weights and po intake     Obesity, BMI 34.59  Suspected GINGER per hospital assessment  Complicates care  Plan: Encourage weight loss.  Dietitian follows in the TCU.  Follow-up outpatient for sleep evaluation.     Cognitive impairment  Slums 23/30, CPT 4.9/5.6  Plan: Based on cognitive testing, recommend weekly check-in's for safety.     Physical deconditioning  Secondary to acute/ chronic medical conditions as above, multiple recent  hospitalizations   Patient continues to work with therapy  Plan: Encourage participation in physical therapy/occupational therapy for strengthening and deconditioning. Discharge planning per their recommendation. Social work to assist with d/c planning.    MED REC REQUIRED  Post Medication Reconciliation Status:  Medication reconciliation previously completed during another office visit      Disclaimer: This note may contain text created using speech-recognition software and may contain unintended word substitutions.       Electronically signed by: MIKE Preciado CNP

## 2024-03-19 NOTE — PLAN OF CARE
"[facility]    ED Boarding Nurse Handoff Addendum Report:    Date/time: 3/19/2024, 6:35 AM    Activity Level: in bed    Fall Risk: Yes:  arm band in place, patient and family education, assistive device/personal items within reach, and activity supervised    Active Infusions: None    Current Meds Due: See Mar  Current care needs: See chart     Oxygen requirements (liters/min and/or FiO2): None    Respiratory status: Room air    Vital signs (within last 30 minutes):    Vitals:    03/18/24 2029 03/18/24 2039 03/18/24 2308 03/19/24 0550   BP: 109/63   114/63   BP Location:    Right arm   Pulse: 81   82   Resp:    20   Temp:    100.1  F (37.8  C)   TempSrc:    Oral   SpO2: 94% 94%  95%   Weight:       Height:   1.803 m (5' 11\")        Focused assessment within last 30 minutes:    A&Ox4 currently denies pain, stable on RA, PIV flushed/SL transferred to air mattress, incontinent cares completed, abx given     ED Boarding Nurse name: Magali Lugo RN   "

## 2024-03-19 NOTE — PHARMACY-ANTICOAGULATION SERVICE
Clinical Pharmacy - Warfarin Dosing Consult     Pharmacy has been consulted to manage this patient s warfarin therapy.  Indication: Mechanical Mitral Valve Replacement  Therapy Goal: INR 2.5-3.5  Warfarin Prior to Admission: Yes  Warfarin PTA Regimen: 10 mg Wednesday/Saturday, 5 mg all other days  Significant drug interactions: Antibiotics  Recent documented change in oral intake/nutrition: Unknown    INR   Date Value Ref Range Status   03/18/2024 3.93 (H) 0.85 - 1.15 Final   02/15/2024 1.95 (H) 0.85 - 1.15 Final       Recommend warfarin 2.5 mg today.  Pharmacy will monitor Feng Wynne daily and order warfarin doses to achieve specified goal.      Please contact pharmacy as soon as possible if the warfarin needs to be held for a procedure or if the warfarin goals change.      Joleen Blanco, PharmD, [facility]  Emergency Medicine Clinical Pharmacist  [phone]

## 2024-03-19 NOTE — CONSULTS
General Surgery Consultation    Consulting provider:  Dr Perez    Assessment:    Feng Wynne is a 79 year old male seen in consultation for a sacral decubitus ulcer with drainage.    Plan:  At present he has a sloughing eschar with surrounding erythema which is likely pressure related but not overtly cellulitis. This should be treated with local wound care. His supratheraputic INR obviates any intervention at present but again it does not seem indicated at the moment.  He has an MRI pending; if he has underlying sacral osteomyelitis, debridement of this bone is beyond the practice scope of our facility.  Continue IV antibiotics.  Will follow with you.    HPI:  Feng Wynne is a 79 year old male who was admitted through the ED with pain, redness and drainage involving his sacral area. He was admitted twice at [facility] for anemia and was found to have esophagitis. He has a history of a mechanical mitral valve for which he takes coumadin. He suspects that he may have sustained this pressure injury from a bedpan while at [facility]. It has been treated with local wound care (packing and Mepilex). Evidently he had new drainage noted by the RN at his TCU which prompted his evaluation in our ED. He has no systemic signs of infection such as fever, chills, nausea or vomiting.    Fever/Chills: No  Additional signs/symptoms of sepsis:  No  Currently on antibiotics: No, not prior to admit        PMH:  Past Medical History:   Diagnosis Date    Acute on chronic congestive heart failure, unspecified heart failure type (H) 1/30/2024    Arthritis     Atrial fibrillation (H)     Coronary artery disease     Hypercholesteremia     Morbid obesity (H)     Unspecified essential hypertension        PSH:  Past Surgical History:   Procedure Laterality Date    COLONOSCOPY N/A 1/15/2024    Procedure: Colonoscopy;  Surgeon: Osbaldo Olvera MD;  Location:  GI    ESOPHAGOSCOPY, GASTROSCOPY, DUODENOSCOPY (EGD), COMBINED N/A 1/15/2024     Procedure: Esophagoscopy, gastroscopy, duodenoscopy (EGD), combined;  Surgeon: Osbaldo Olvera MD;  Location:  GI    ESOPHAGOSCOPY, GASTROSCOPY, DUODENOSCOPY (EGD), COMBINED N/A 2/8/2024    Procedure: Esophagoscopy, gastroscopy, duodenoscopy (EGD), combined;  Surgeon: Osbaldo Olvera MD;  Location:  GI    MITRAL VALVE REPLACEMENT  8-    Mitral valve replacement with 31-mm St. Raffi mechanical valve.        Allergies:  Allergies   Allergen Reactions    Amiodarone Shortness Of Breath    Pcn [Penicillins] Shortness Of Breath     Rxn occurred in childhood     Statins Muscle Pain (Myalgia) and Hives    Amiodarone     Latex     Zetia [Ezetimibe] Muscle Pain (Myalgia)     Muscle weakness legs       Home Medications:  Current Outpatient Medications   Medication Sig Dispense Refill    acetaminophen (TYLENOL) 325 MG tablet Take 2 tablets (650 mg) by mouth every 6 hours as needed for mild pain      albuterol (PROAIR HFA/PROVENTIL HFA/VENTOLIN HFA) 108 (90 Base) MCG/ACT inhaler Inhale 1 puff into the lungs every 4 hours as needed for shortness of breath      bisacodyl (DULCOLAX) 10 MG suppository Place 10 mg rectally daily as needed for constipation Every 3 days if no BM      calcium carbonate (TUMS) 500 MG chewable tablet Take 1 tablet (500 mg) by mouth 3 times daily as needed for heartburn      clindamycin (CLEOCIN) 150 MG capsule TAKE 4 CAPS BY MOUTH 1 HOUR PRIOR TO DENTAL APPOINTMENT      diclofenac (VOLTAREN) 1 % topical gel Apply 4 g topically 3 times daily (Patient taking differently: Apply 4 g topically daily)      ferrous sulfate (FEROSUL) 325 (65 Fe) MG tablet Take 1 tablet (325 mg) by mouth daily      Multiple Vitamins-Minerals (CENTRUM SILVER PO) Take 1 tablet by mouth daily.      nystatin (MYCOSTATIN) 379216 UNIT/GM external powder Apply topically 2 times daily Groin folds      Omega-3 Fatty Acids (FISH OIL CONCENTRATE PO) Take 1 capsule by mouth daily      pantoprazole (PROTONIX) 40 MG EC  "tablet Take 1 tablet (40 mg) by mouth 2 times daily (before meals)      polyethylene glycol (MIRALAX) 17 GM/Dose powder Take 17 g by mouth daily as needed for constipation      potassium chloride (KAYCIEL) 10 % SOLN solution Take 15 mLs (20 mEq) by mouth daily      psyllium (METAMUCIL/KONSYL) 58.6 % powder Take 18 g (1 Tablespoonful) by mouth daily      sennosides (SENOKOT) 8.6 MG tablet Take 1 tablet by mouth 2 times daily as needed for constipation      sodium chloride (OCEAN) 0.65 % nasal spray Spray 1 spray into both nostrils every hour as needed for congestion      sucralfate (CARAFATE) 1 GM/10ML suspension Take 1 g by mouth 4 times daily (before meals and nightly)      torsemide (DEMADEX) 20 MG tablet Take 1 tablet (20 mg) by mouth daily      warfarin ANTICOAGULANT (COUMADIN) 3 MG tablet Take by mouth daily 3 mg 3/14, 4 mg 3/15, 4 mg 3/16 , 3 mg 3/17      order for DME Equipment being ordered: COMPRESSION STOCKINGS, 20-30 mmHg 1 each 1    Warfarin Therapy Reminder Take 1 each by mouth See Admin Instructions Per INR         Social History:  Social History     Tobacco Use    Smoking status: Former     Packs/day: 0.50     Years: 5.00     Additional pack years: 0.00     Total pack years: 2.50     Types: Cigarettes     Quit date:      Years since quittin.2    Smokeless tobacco: Never   Vaping Use    Vaping Use: Never used   Substance Use Topics    Alcohol use: Yes     Comment: 1 drink per month    Drug use: No       Family History:  Family history reviewed and is not pertinent.    ROS:  The 10 point review of systems is negative other than noted in the HPI and above.    PE:  /60   Pulse 80   Temp 99.3  F (37.4  C) (Oral)   Resp 18   Ht 1.803 m (5' 11\")   Wt 112 kg (247 lb)   SpO2 95%   BMI 34.45 kg/m    General appearance: well-nourished, no apparent distress  Lungs: respirations unlabored  Skin: there is a 2 cm boggy eschar overlying the sacrum, no drainage noted at present, surrounding " dependent erythema without overt cellulitis.  Extremities- without edema  Psych- mood and affect are appropriate  Neurologic- alert, speech is clear, moves all extremities with good strength  Integument- without lesions, rashes, or jaundice    Imaging:  None relevant.      Phill Jimenez MD

## 2024-03-19 NOTE — CONSULTS
[facility] Nurse Inpatient Assessment     Consulted for: Coccyx    Summary:  Patient with worsening coccyx pressure injury.  Surgery saw patient, no plans for debridement, if MRI shows osteo will need debridement at a higher level of care.      Patient History (according to provider note(s):      Feng Wynne is a 79 year old male with history of mitral valve replacement with titanium valve, chronic anticoagulation with warfarin, hypertension, hypercholesterolemia, coronary artery disease, atrial fibrillation, congestive heart failure, and obesity with BMI of 35.  He was admitted to [facility] from 1/9/2024 through 1/19/2024 with severe anemia and hemoglobin of 4.4.  He discharged to transitional care.  He was readmitted to [facility] from 1/30/2024 through 2/15/2024 with recurrent anemia.  He was found to have severe pill esophagitis.  At the time of that admission he was noted to have a sacral decubitus ulcer.  He discharged to transitional care.  He has been receiving wound care for his decubitus ulcer since his second hospitalization at [facility].  Wound care continued at transitional care.  He was sent to the ED today with concern of sacral decubitus ulcer wound infection.  The wound care nurse had noticed some drainage with foul odor.  Evaluation showed hemoglobin 9.6, white blood cell 6.6, platelets 217, BUN 30.7, creatinine 1.07, sed rate greater than 140, and .65.  Exam showed deep appearing sacral decubitus ulcer with malodorous drainage and surrounding erythema.  He was given Rocephin and vancomycin.  I was asked to admit him for further cares.  He denied fevers, chills, chest pain, cough, dysuria, and diarrhea.  He has had pain in sacral decubitus ulcer with sitting or any pressure.     Assessment:      Areas visualized during today's visit: Sacrum/coccyx    Pressure Injury Location: Coccyx  Last photo:  "3/19/24    Wound type: Pressure Injury     Pressure Injury Stage: Unstageable, present on admission   Wound history/plan of care:   Patient reports wound started about 2 months ago.  Wound has significantly deteriorated in last month.    Wound base: 100 % eschar and slough     Palpation of the wound bed: boggy      Drainage: copious     Description of drainage: serosanguinous and purulent     Measurements (length x width x depth, in cm) 3  x 2.5  x  3 cm      Tunneling N/A     Undermining up to 2 cm circumferentially   Periwound skin: Scar tissue      Color: purple      Temperature: normal   Odor: none  Pain: moderate  Pain intervention prior to dressing change: slow and gentle cares   Treatment goal: Heal  and Remove necrotic tissue  STATUS: initial assessment  Supplies ordered: ordered Vashe, packing strip    My PI Risk Assessment     Sensory Perception: 3 - Slightly Limited     Moisture: 2 - Very moist      Activity: 1 - Bedfast      Mobility: 2 - Very limited     Nutrition: 2 - Probably inadequate      Friction/Shear: 2 - Potential problem      TOTAL: 12       Treatment Plan:     Coccyx wound(s): BID  Cleanse with Vashe  Pack with 1/2 inch packing strip (SPD#[id number]) moistened with Vashe  Cover with dry gauze or ABD     Pressure Injury Prevention (PIP) Plan:  If patient is declining pressure injury prevention interventions: Explore reason why and address patient's concerns, Educate on pressure injury risk and prevention intervention(s), and If patient is still declining, document \"informed refusal\"   Mattress: Follow bed algorithm, reassess daily and order specialty mattress, if indicated.  HOB: Maintain at or below 30 degrees, unless contraindicated  Repositioning in bed: Every 1-2 hours  and Left/right positioning; avoid supine  Heels: Pillows under calves  Protective Dressing: None  Chair positioning: Chair cushion (#[id number])    If patient has a buttock pressure injury, or high risk for PI use chair cushion or " SPS.  Moisture Management: Avoid brief in bed  Under Devices: Inspect skin under all medical devices during skin inspection , Ensure tubes are stabilized without tension, and Ensure patient is not lying on medical devices or equipment when repositioned  Ask provider to discontinue device when no longer needed.       Orders: Written    RECOMMEND PRIMARY TEAM ORDER: None, at this time  Education provided: importance of repositioning, plan of care, and Off-loading pressure  Discussed plan of care with: Patient, Family, and Nurse  [facility] nurse follow-up plan: weekly  Notify WOC if wound(s) deteriorate.  Nursing to notify the Provider(s) and re-consult the WO Nurse if new skin concern.    DATA:     Current support surface: Bariatric Low air loss (PRICILA pump, Isolibrium, Pulsate, skin guard, etc)  Containment of urine/stool: Incontinence Protocol  BMI: Body mass index is 34.45 kg/m .   Active diet order: Orders Placed This Encounter      Vegetarian Diet     Output: No intake/output data recorded.     Labs:   Recent Labs   Lab 03/19/24  0759   HGB 9.3*   INR 4.17*   WBC 8.3     Pressure injury risk assessment:   Sensory Perception: 3-->slightly limited  Moisture: 3-->occasionally moist  Activity: 1-->bedfast  Mobility: 2-->very limited  Nutrition: 2-->probably inadequate  Friction and Shear: 2-->potential problem  Roscoe Score: 13    ONOFRE Arauz [facility] [employer]  Dept. Office Number: [phone]

## 2024-03-19 NOTE — H&P
[facility]    History and Physical - Hospitalist Service       Date of Admission:  3/18/2024    Assessment & Plan      Feng Wynne is a 79 year old male with history of mitral valve replacement with titanium valve, chronic anticoagulation with warfarin, hypertension, hypercholesterolemia, coronary artery disease, atrial fibrillation, congestive heart failure, and obesity with BMI of 35.  He was admitted to [facility] from 1/9/2024 through 1/19/2024 with severe anemia and hemoglobin of 4.4.  He discharged to transitional care.  He was readmitted to [facility] from 1/30/2024 through 2/15/2024 with recurrent anemia.  He was found to have severe pill esophagitis.  At the time of that admission he was noted to have a sacral decubitus ulcer.  He discharged to transitional care.  He has been receiving wound care for his decubitus ulcer since his second hospitalization at [facility].  Wound care continued at transitional [facility].  He was sent to the ED today with concern of sacral decubitus ulcer wound infection.  The wound care nurse had noticed some drainage with foul odor.  Evaluation showed hemoglobin 9.6, white blood cell 6.6, platelets 217, BUN 30.7, creatinine 1.07, sed rate greater than 140, and .65.  Exam showed deep appearing sacral decubitus ulcer with malodorous drainage and surrounding erythema.  He was given Rocephin and vancomycin.  I was asked to admit him for further cares.  He denied fevers, chills, chest pain, cough, dysuria, and diarrhea.  He has had pain in sacral decubitus ulcer with sitting or any pressure.    Problem list:    Likely infected sacral decubitus ulcer  -Admit as inpatient  -Continue vancomycin  -Instead of Rocephin we will start cefepime  -MRI of pelvis tomorrow  -Consult surgery for consideration of debridement   -Consult infectious disease  -Wound care nurse consult  -As needed Tylenol, oxycodone, and IV  "Dilaudid for pain  -CBC and BMP in the morning    Prosthetic mitral valve  Chronic anticoagulation with warfarin  -Warfarin per pharmacy    Hypertension  Hypercholesterolemia  Coronary artery disease  History of congestive heart failure  Atrial fibrillation  -Resume prior to admission torsemide, potassium chloride, and warfarin    Recent hospitalizations for anemia due to severe pill esophagitis  -Continue prior to admission Protonix and Carafate    Moderate Protein-Calorie Malnutrition    -Nutrition following       Diet: Combination Diet Regular Diet Adult    DVT Prophylaxis: Warfarin  Stubbs Catheter: Not present  Lines: None     Cardiac Monitoring: None  Code Status: Full Code      Clinically Significant Risk Factors Present on Admission               # Drug Induced Coagulation Defect: home medication list includes an anticoagulant medication    # Hypertension: Noted on problem list  # Chronic heart failure with preserved ejection fraction: heart failure noted on problem list and last echo with EF >50%     # Obesity: Estimated body mass index is 34.45 kg/m  as calculated from the following:    Height as of this encounter: 1.803 m (5' 11\").    Weight as of this encounter: 112 kg (247 lb).       # Financial/Environmental Concerns:           Disposition Plan      Expected Discharge Date: 03/20/2024                  Cyril Sebastian MD  Hospitalist Service  [facility]  Securely message with MODLOFT (more info)  Text page via [facility] Paging/Directory     ______________________________________________________________________    Chief Complaint   Concern for sacral decubitus ulcer infection    History is obtained from the patient, his partner, Dr. Saldivar, and the medical record    History of Present Illness   Feng Wynne is a 79 year old male with history of mitral valve replacement with titanium valve, chronic anticoagulation with warfarin, hypertension, hypercholesterolemia, coronary artery " disease, atrial fibrillation, congestive heart failure, and obesity with BMI of 35.  He was admitted to [facility] from 1/9/2024 through 1/19/2024 with severe anemia and hemoglobin of 4.4.  He discharged to transitional care.  He was readmitted to [facility] from 1/30/2024 through 2/15/2024 with recurrent anemia.  He was found to have severe pill esophagitis.  At the time of that admission he was noted to have a sacral decubitus ulcer.  He discharged to transitional care.  He has been receiving wound care for his decubitus ulcer since his second hospitalization at [facility].  Wound care continued at transitional [facility].  He was sent to the ED today with concern of sacral decubitus ulcer wound infection.  The wound care nurse had noticed some drainage with foul odor.  Evaluation showed hemoglobin 9.6, white blood cell 6.6, platelets 217, BUN 30.7, creatinine 1.07, sed rate greater than 140, and .65.  Exam showed deep appearing sacral decubitus ulcer with malodorous drainage and surrounding erythema.  He was given Rocephin and vancomycin.  I was asked to admit him for further cares.  He denied fevers, chills, chest pain, cough, dysuria, and diarrhea.  He has had pain in sacral decubitus ulcer with sitting or any pressure.      Past Medical History    Past Medical History:   Diagnosis Date    Acute on chronic congestive heart failure, unspecified heart failure type (H) 1/30/2024    Arthritis     Atrial fibrillation (H)     Coronary artery disease     Hypercholesteremia     Morbid obesity (H)     Unspecified essential hypertension        Past Surgical History   Past Surgical History:   Procedure Laterality Date    COLONOSCOPY N/A 1/15/2024    Procedure: Colonoscopy;  Surgeon: Osbaldo Olvera MD;  Location:  GI    ESOPHAGOSCOPY, GASTROSCOPY, DUODENOSCOPY (EGD), COMBINED N/A 1/15/2024    Procedure: Esophagoscopy, gastroscopy, duodenoscopy (EGD), combined;   Surgeon: Osbaldo Olvera MD;  Location:  GI    ESOPHAGOSCOPY, GASTROSCOPY, DUODENOSCOPY (EGD), COMBINED N/A 2/8/2024    Procedure: Esophagoscopy, gastroscopy, duodenoscopy (EGD), combined;  Surgeon: Osbaldo Olvera MD;  Location:  GI    MITRAL VALVE REPLACEMENT  8-    Mitral valve replacement with 31-mm St. Raffi mechanical valve.        Prior to Admission Medications   Prior to Admission Medications   Prescriptions Last Dose Informant Patient Reported? Taking?   Multiple Vitamins-Minerals (CENTRUM SILVER PO) 3/18/2024 at am Nursing Home Yes Yes   Sig: Take 1 tablet by mouth daily.   Omega-3 Fatty Acids (FISH OIL CONCENTRATE PO) 3/18/2024 at am Nursing Home Yes Yes   Sig: Take 1 capsule by mouth daily   Warfarin Therapy Reminder   Yes No   Sig: Take 1 each by mouth See Admin Instructions Per INR   acetaminophen (TYLENOL) 325 MG tablet   No Yes   Sig: Take 2 tablets (650 mg) by mouth every 6 hours as needed for mild pain   albuterol (PROAIR HFA/PROVENTIL HFA/VENTOLIN HFA) 108 (90 Base) MCG/ACT inhaler   Yes Yes   Sig: Inhale 1 puff into the lungs every 4 hours as needed for shortness of breath   bisacodyl (DULCOLAX) 10 MG suppository  FCI Yes Yes   Sig: Place 10 mg rectally daily as needed for constipation Every 3 days if no BM   calcium carbonate (TUMS) 500 MG chewable tablet   No Yes   Sig: Take 1 tablet (500 mg) by mouth 3 times daily as needed for heartburn   clindamycin (CLEOCIN) 150 MG capsule  Nursing Home Yes Yes   Sig: TAKE 4 CAPS BY MOUTH 1 HOUR PRIOR TO DENTAL APPOINTMENT   diclofenac (VOLTAREN) 1 % topical gel 3/18/2024 at am  No Yes   Sig: Apply 4 g topically 3 times daily   Patient taking differently: Apply 4 g topically daily   ferrous sulfate (FEROSUL) 325 (65 Fe) MG tablet 3/18/2024 at am  No Yes   Sig: Take 1 tablet (325 mg) by mouth daily   nystatin (MYCOSTATIN) 018714 UNIT/GM external powder 3/18/2024 at x1 Nursing Home Yes Yes   Sig: Apply topically 2 times daily  Groin folds   order for DME   No No   Sig: Equipment being ordered: COMPRESSION STOCKINGS, 20-30 mmHg   pantoprazole (PROTONIX) 40 MG EC tablet 3/18/2024 at x1 Nursing Home No Yes   Sig: Take 1 tablet (40 mg) by mouth 2 times daily (before meals)   polyethylene glycol (MIRALAX) 17 GM/Dose powder  Nursing Home Yes Yes   Sig: Take 17 g by mouth daily as needed for constipation   potassium chloride (KAYCIEL) 10 % SOLN solution 3/18/2024 at am  No Yes   Sig: Take 15 mLs (20 mEq) by mouth daily   psyllium (METAMUCIL/KONSYL) 58.6 % powder 3/18/2024 at am  No Yes   Sig: Take 18 g (1 Tablespoonful) by mouth daily   sennosides (SENOKOT) 8.6 MG tablet  Nursing Home Yes Yes   Sig: Take 1 tablet by mouth 2 times daily as needed for constipation   sodium chloride (OCEAN) 0.65 % nasal spray   No Yes   Sig: Spray 1 spray into both nostrils every hour as needed for congestion   sucralfate (CARAFATE) 1 GM/10ML suspension 3/18/2024 at x2  Yes Yes   Sig: Take 1 g by mouth 4 times daily (before meals and nightly)   torsemide (DEMADEX) 20 MG tablet 3/18/2024 at am  No Yes   Sig: Take 1 tablet (20 mg) by mouth daily   warfarin ANTICOAGULANT (COUMADIN) 3 MG tablet 3/17/2024 at pm 3 mg  Yes Yes   Sig: Take by mouth daily 3 mg 3/14, 4 mg 3/15, 4 mg 3/16 , 3 mg 3/17      Facility-Administered Medications: None        Review of Systems    The 10 point Review of Systems is negative other than noted in the HPI or here.     Social History   I have reviewed this patient's social history and updated it with pertinent information if needed.  Social History     Tobacco Use    Smoking status: Former     Packs/day: 0.50     Years: 5.00     Additional pack years: 0.00     Total pack years: 2.50     Types: Cigarettes     Quit date:      Years since quittin.2    Smokeless tobacco: Never   Vaping Use    Vaping Use: Never used   Substance Use Topics    Alcohol use: Yes     Comment: 1 drink per month    Drug use: No         Family History   I have  reviewed this patient's family history and updated it with pertinent information if needed.  Family History   Problem Relation Age of Onset    Cerebrovascular Disease Father     Diabetes Paternal Grandmother     C.A.D. Brother         CABG X 3 at age 51         Allergies   Allergies   Allergen Reactions    Amiodarone Shortness Of Breath    Pcn [Penicillins] Shortness Of Breath     Rxn occurred in childhood     Statins Muscle Pain (Myalgia) and Hives    Amiodarone     Latex     Zetia [Ezetimibe] Muscle Pain (Myalgia)     Muscle weakness legs        Physical Exam   Vital Signs: Temp: 97.7  F (36.5  C) Temp src: Oral BP: 109/63 Pulse: 81   Resp: 20 SpO2: 94 %      Weight: 247 lbs 0 oz    GENERAL: Pleasant and cooperative. No acute distress.  EYES: Pupils equal and round. No scleral erythema or icterus.  ENT: External ears are normal without deformity. Posterior oropharynx is without erythem, swelling, or exudate.  NECK: Supple. No masses or swelling. No tenderness. Thyroid is normal without mass or tenderness.  CHEST: Clear to auscultation. Normal breath sounds. No retractions.   CV: Regular rate and rhythm. No JVD. Pulses normal.  ABDOMEN: Bowel sounds present. No tenderness. No masses or hernia.  EXTREMETIES: No clubbing, cyanosis, or ischemia.  SKIN: Warm and dry to touch. No wounds or rashes.  Examination of sacral decubitus ulcer was deferred as he has had several dressing changes today and is tender.  Patient will be seen by wound care nurse and surgery tomorrow.  NEUROLOGIC: Strength and sensation are normal. Deep tendon reflexes are normal. Cranial nerves are normal.      Medical Decision Making       70 MINUTES SPENT BY ME on the date of service doing chart review, history, exam, documentation & further activities per the note.      Data     I have personally reviewed the following data over the past 24 hrs:    6.6  \   9.6 (L)   / 217     136 95 (L) 30.7 (H) /  84   3.6 31 (H) 1.07 \     Procal: N/A CRP:  119.65 (H) Lactic Acid: N/A       INR:  3.93 (H) PTT:  N/A   D-dimer:  N/A Fibrinogen:  N/A       Imaging results reviewed over the past 24 hrs:   No results found for this or any previous visit (from the past 24 hour(s)).  Recent Labs   Lab 03/18/24  1844 03/18/24  0714 03/13/24  0800   WBC  --  6.6  --    HGB  --  9.6* 9.6*   MCV  --  98  --    PLT  --  217  --    INR 3.93*  --   --    NA  --  136  --    POTASSIUM  --  3.6  --    CHLORIDE  --  95*  --    CO2  --  31*  --    BUN  --  30.7*  --    CR  --  1.07  --    ANIONGAP  --  10  --    JOSEPH  --  8.3*  --    GLC  --  84  --

## 2024-03-19 NOTE — PHARMACY-ADMISSION MEDICATION HISTORY
Pharmacist Admission Medication History    Admission medication history is complete. The information provided in this note is only as accurate as the sources available at the time of the update.    Information Source(s): Facility (U/NH/) medication list/MAR via     Pertinent Information: none    Changes made to PTA medication list:  Added: carafate  Deleted: None  Changed: voltaren    Allergies reviewed with patient and updates made in EHR: yes    Medication History Completed By: Chirag Bar [facility] 3/18/2024 8:36 PM    PTA Med List   Medication Sig Last Dose    acetaminophen (TYLENOL) 325 MG tablet Take 2 tablets (650 mg) by mouth every 6 hours as needed for mild pain     albuterol (PROAIR HFA/PROVENTIL HFA/VENTOLIN HFA) 108 (90 Base) MCG/ACT inhaler Inhale 1 puff into the lungs every 4 hours as needed for shortness of breath     bisacodyl (DULCOLAX) 10 MG suppository Place 10 mg rectally daily as needed for constipation Every 3 days if no BM     calcium carbonate (TUMS) 500 MG chewable tablet Take 1 tablet (500 mg) by mouth 3 times daily as needed for heartburn     clindamycin (CLEOCIN) 150 MG capsule TAKE 4 CAPS BY MOUTH 1 HOUR PRIOR TO DENTAL APPOINTMENT     diclofenac (VOLTAREN) 1 % topical gel Apply 4 g topically 3 times daily (Patient taking differently: Apply 4 g topically daily) 3/18/2024 at am    ferrous sulfate (FEROSUL) 325 (65 Fe) MG tablet Take 1 tablet (325 mg) by mouth daily 3/18/2024 at am    Multiple Vitamins-Minerals (CENTRUM SILVER PO) Take 1 tablet by mouth daily. 3/18/2024 at am    nystatin (MYCOSTATIN) 846929 UNIT/GM external powder Apply topically 2 times daily Groin folds 3/18/2024 at x1    Omega-3 Fatty Acids (FISH OIL CONCENTRATE PO) Take 1 capsule by mouth daily 3/18/2024 at am    pantoprazole (PROTONIX) 40 MG EC tablet Take 1 tablet (40 mg) by mouth 2 times daily (before meals) 3/18/2024 at x1    polyethylene glycol (MIRALAX) 17 GM/Dose powder Take 17 g by mouth daily as needed for  constipation     potassium chloride (KAYCIEL) 10 % SOLN solution Take 15 mLs (20 mEq) by mouth daily 3/18/2024 at am    psyllium (METAMUCIL/KONSYL) 58.6 % powder Take 18 g (1 Tablespoonful) by mouth daily 3/18/2024 at am    sennosides (SENOKOT) 8.6 MG tablet Take 1 tablet by mouth 2 times daily as needed for constipation     sodium chloride (OCEAN) 0.65 % nasal spray Spray 1 spray into both nostrils every hour as needed for congestion     sucralfate (CARAFATE) 1 GM/10ML suspension Take 1 g by mouth 4 times daily (before meals and nightly) 3/18/2024 at x2    torsemide (DEMADEX) 20 MG tablet Take 1 tablet (20 mg) by mouth daily 3/18/2024 at am    warfarin ANTICOAGULANT (COUMADIN) 3 MG tablet Take by mouth daily 3 mg 3/14, 4 mg 3/15, 4 mg 3/16 , 3 mg 3/17 3/17/2024 at pm 3 mg

## 2024-03-19 NOTE — PROGRESS NOTES
Care Management Follow Up    Length of Stay (days): 1    Expected Discharge Date: 03/20/2024       Additional Information:  Patient does not have a bed hold at [facility] TCU. Will need BC[employer] auth if needing TCU again.      GILL Felix, SW  Emergency Room   Please contact the SW on the floor in which the patient is staying for any questions or concerns

## 2024-03-19 NOTE — ED NOTES
[facility]  ED Nurse Handoff Report    ED Chief complaint: Pressure Ulcer  . ED Diagnosis:   Final diagnoses:   Skin ulcer of sacrum, unspecified ulcer stage (H)   Chronic anticoagulation       Allergies:   Allergies   Allergen Reactions    Amiodarone Shortness Of Breath    Pcn [Penicillins] Shortness Of Breath     Rxn occurred in childhood     Statins Muscle Pain (Myalgia) and Hives    Amiodarone     Latex     Zetia [Ezetimibe] Muscle Pain (Myalgia)     Muscle weakness legs       Code Status: Full Code    Activity level - Baseline/Home:  walker.  Activity Level - Current:   in bed.   Lift room needed: No.   Bariatric: Yes   Needed: No   Isolation: No.   Infection: Not Applicable.     Respiratory status: Room air    Vital Signs (within 30 minutes):   Vitals:    03/18/24 1930 03/18/24 1945 03/18/24 2029 03/18/24 2039   BP: 117/56  109/63    Pulse:   81    Resp:       Temp:       TempSrc:       SpO2:  91% 94% 94%   Weight:       Height:           Cardiac Rhythm:  ,      Pain level:    Patient confused: No.   Patient Falls Risk: arm band in place, patient and family education, and room door open.   Elimination Status: Has voided     Patient Report - Initial Complaint: Pressure ulcer on coccyx.   Focused Assessment: Feng Wynne is a 79 year old male on warfarin with a history of CHF, hypertension, mechanical mitral valve replacement, paroxysmal atrial fibrillation, and type II NSTEMI who presents to the ED via EMS for evaluation of a wound. The patient reports that at the beginning of February he developed a wound at his tailbone. He is currently staying in a TCU, where they change the bandage for this wound every other day. Today, a nurse noticed a drainage this wound. This is the first time that there has been drainage from the wound. He was recommended to go to the ED for evaluation of potential osteomyelitis. It is hard for the patient to sit in a wheelchair due to the pain.  Denies fevers, chills, shortness of breath, abdominal pain, or having diabetes. He feels cold, which he attributes to his anemia. The patient is accompanied by his partner.       Abnormal Results:   Labs Ordered and Resulted from Time of ED Arrival to Time of ED Departure   ERYTHROCYTE SEDIMENTATION RATE AUTO - Abnormal       Result Value    Erythrocyte Sedimentation Rate >140 (*)    CRP INFLAMMATION - Abnormal    CRP Inflammation 119.65 (*)    INR - Abnormal    INR 3.93 (*)    AEROBIC BACTERIAL CULTURE ROUTINE        No orders to display       Treatments provided: See MAR  Family Comments: Partner in room  OBS brochure/video discussed/provided to patient:  Yes  ED Medications:   Medications   vancomycin (VANCOCIN) 2,500 mg in sodium chloride 0.9 % 500 mL intermittent infusion (2,500 mg Intravenous $New Bag 3/18/24 2040)   cefTRIAXone (ROCEPHIN) 2 g vial to attach to  ml bag for ADULTS or NS 50 ml bag for PEDS (0 g Intravenous Stopped 3/18/24 2039)       Drips infusing:  Yes  For the majority of the shift this patient was Green.   Interventions performed were N/A.    Sepsis treatment initiated: No    Cares/treatment/interventions/medications to be completed following ED care: N/A    ED Nurse Name: Rosalva Lee RN  8:44 PM      RECEIVING UNIT ED HANDOFF REVIEW    Above ED Nurse Handoff Report was reviewed: Yes  Reviewed by: Ramona Ponce RN on March 19, 2024 at 1:46 PM   MARLA Rodrigues called the ED to inform them the note was read: No

## 2024-03-19 NOTE — CONSULTS
ID consult dictated, progressive sacral decubitus wound, not obvious major deep infection or cellulitis but some fever, get blood cultures, antibiotics as were doing for now, surgery is seen but real issue is if this is deep in the bone will need more than just debridement and more than just antibiotics, for now with cefepime and Vanco

## 2024-03-19 NOTE — PROGRESS NOTES
"[facility]    ED Boarding Nurse Handoff Addendum Report:    Date/time: 3/19/2024, 8:18 AM    Activity Level: in bed, lift    Fall Risk: Yes:  bed/chair alarm on, nonskid shoes/slippers when out of bed, arm band in place, patient and family education, assistive device/personal items within reach, and activity supervised    Active Infusions: none    Current Meds Due: see MAR    Current care needs: IV antibiotics, daily weight, MRI    Oxygen requirements (liters/min and/or FiO2): none    Respiratory status: Room air    Vital signs (within last 30 minutes):    Vitals:    03/18/24 2039 03/18/24 2308 03/19/24 0550 03/19/24 0719   BP:   114/63 103/60   BP Location:   Right arm    Pulse:   82 80   Resp:   20 18   Temp:   100.1  F (37.8  C) (!) 100.8  F (38.2  C)   TempSrc:   Oral Oral   SpO2: 94%  95% 95%   Weight:       Height:  1.803 m (5' 11\")         Focused assessment within last 30 minutes:    A&Ox4. Pleasant, cooperative. Bed bound, has low air loss mattress d/t sacral pressure wound. Pain at wound site with repositioning only, denies any other pain. Tolerating regular diet (vegetarian). Incontinent of bowel and bladder. External catheter in place. Potassium replaced, lab recheck at 1404. Has been seen by surgery, no plans for debridement of wound at this time. Seen by [facility], see wound care orders. Dressing changed this morning and again by C nurse.     ED Boarding Nurse name: Greer Duarte RN   "

## 2024-03-19 NOTE — PHARMACY-VANCOMYCIN DOSING SERVICE
Pharmacy Vancomycin Initial Note  Date of Service 2024  Patient's  1945  79 year old, male    Indication: Skin and Soft Tissue Infection    Current estimated CrCl = Estimated Creatinine Clearance: 72.4 mL/min (based on SCr of 1.07 mg/dL).    Creatinine for last 3 days  3/18/2024:  7:14 AM Creatinine 1.07 mg/dL    Recent Vancomycin Level(s) for last 3 days  No results found for requested labs within last 3 days.      Vancomycin IV Administrations (past 72 hours)                     vancomycin (VANCOCIN) 2,500 mg in sodium chloride 0.9 % 500 mL intermittent infusion (mg) 2,500 mg New Bag 24                    Nephrotoxins and other renal medications (From now, onward)      Start     Dose/Rate Route Frequency Ordered Stop    24 0900  vancomycin (VANCOCIN) 1,500 mg in 0.9% NaCl 250 mL intermittent infusion         1,500 mg  over 90 Minutes Intravenous EVERY 24 HOURS 24 0800  torsemide (DEMADEX) tablet 20 mg        Note to Pharmacy: PTA Sig:Take 1 tablet (20 mg) by mouth daily      20 mg Oral DAILY 24 2100      24 184  vancomycin (VANCOCIN) 2,500 mg in sodium chloride 0.9 % 500 mL intermittent infusion         2,500 mg  over 2 Hours Intravenous ONCE 24 1842              Contrast Orders - past 72 hours (72h ago, onward)      None            Insight[employer] Prediction of Planned Initial Vancomycin Regimen  Loading dose: 2500 mg  Regimen: 1500 mg IV every 24 hours.  Start time: 20:40 on 2024  Exposure target: AUC24 (range)400-600 mg/L.hr   AUC24,ss: 422 mg/L.hr  Probability of AUC24 > 400: 56 %  Ctrough,ss: 12.3 mg/L  Probability of Ctrough,ss > 20: 14 %  Probability of nephrotoxicity (Lodise GUTIERREZ ): 8 %          Plan:  Start vancomycin 1500 mg IV q24h.   Vancomycin monitoring method: AUC  Vancomycin therapeutic monitoring goal: 400-600 mg*h/L  Pharmacy will check vancomycin levels as appropriate in 1-3 Days.    Serum creatinine levels will be  ordered daily for the first week of therapy and at least twice weekly for subsequent weeks.      Joleen Blanco, PharmD, [facility]  Emergency Medicine Clinical Pharmacist  [phone]

## 2024-03-20 NOTE — PROGRESS NOTES
"[facility]   General Surgery Progress Note         Assessment and Plan:   Assessment:   Feng Wynne is a 79 year old male seen in consultation for a sacral decubitus ulcer with drainage   MRI 3/20/24 shows osteomyelitis of the entire coccyx and 5.5 cm abscess in the posterior  pararectal/left ischioanal fossa which extends and is adherent to the  posterior rectal wall      Plan:   -Wound cares per WOC RN, BID wet to dry changes with Vashe solution  -Underlying osteomyelitis of coccyx, debridement of this bone is beyond the practice scope of our facility. Recommend transfer to the [facility].   -Recommend colorectal surgery consult for pararectal/ischioanal abscess. Could have both this and osteo addressed at the [place].       Update seen that [facility] declined transfer, recommending colorectal drain abscess here. Suspect the ulcer, coccyx osteo and abscess are all one process best taken care of with tertiary center with multidisciplinary approach. Bone debridement for osteomyelitis not done at [facility]. Note plans for possible transfer outside system pending progress.  Would hold coumadin pending CRS recommendations   Piedad Mcclain MD          Interval History:   Resting in bed. No complaints. Denies pain at rest. Has discomfort at wound site.         Physical Exam:   Blood pressure 119/63, pulse 83, temperature 99  F (37.2  C), temperature source Oral, resp. rate 20, height 1.803 m (5' 11\"), weight 112 kg (247 lb), SpO2 94%.    I/O last 3 completed shifts:  In: -   Out: 1000 [Urine:1000]    Sacral wound - bandages c/d/I as they were recently changed by nursing staff, wound exam deferred          Data:     Recent Labs   Lab Test 03/19/24  0759 03/18/24  0714 03/13/24  0800 03/11/24  0702   HGB 9.3* 9.6* 9.6* 9.0*   WBC 8.3 6.6  --  3.7*       MRI 3/20/24 Impression:  1. Osteomyelitis involving the entirety of the coccyx. No evidence of  sacral involvement.  2. Extensive " inflammatory/phlegmonous changes throughout the  presacral/coccygeal soft tissues with 5.5 cm abscess in the posterior  pararectal/left ischioanal fossa which extends and is adherent to the  posterior rectal wall.        Lázaro Sanchez PA-C

## 2024-03-20 NOTE — CONSULTS
Care Management Initial Consult    General Information  Assessment completed with: Patient, Spouse or significant other, Patient & SO, Jose  Type of CM/SW Visit: Initial Assessment    Primary Care Provider verified and updated as needed: Yes   Readmission within the last 30 days:        Reason for Consult: discharge planning  Advance Care Planning:            Communication Assessment  Patient's communication style: spoken language (English or Bilingual)    Hearing Difficulty or Deaf: no   Wear Glasses or Blind: yes    Cognitive  Cognitive/Neuro/Behavioral: WDL                      Living Environment:   People in home: significant other  Jose  Current living Arrangements: apartment      Able to return to prior arrangements:         Family/Social Support:  Care provided by: self  Provides care for: no one     Significant Other       Jose  Description of Support System: Supportive, Involved         Current Resources:   Patient receiving home care services: No     Community Resources:    Equipment currently used at home: grab bar, toilet, grab bar, tub/shower, raised toilet seat, shower chair, walker, rolling, walker, standard, wheelchair, manual  Supplies currently used at home:      Employment/Financial:  Employment Status:          Financial Concerns:             Does the patient's insurance plan have a 3 day qualifying hospital stay waiver?  Yes     Which insurance plan 3 day waiver is available? Alternative insurance waiver    Will the waiver be used for post-acute placement? Undetermined at this time    Lifestyle & Psychosocial Needs:  Social Determinants of Health     Food Insecurity: Not on file   Depression: Not at risk (3/15/2024)    PHQ-2     PHQ-2 Score: 0   Housing Stability: Not on file   Tobacco Use: Medium Risk (3/15/2024)    Patient History     Smoking Tobacco Use: Former     Smokeless Tobacco Use: Never     Passive Exposure: Not on file   Financial Resource Strain: Not on file   Alcohol Use: Not on  file   Transportation Needs: Not on file   Physical Activity: Not on file   Interpersonal Safety: Not on file   Stress: Not on file   Social Connections: Not on file       Functional Status:  Prior to admission patient needed assistance:   Dependent ADLs:: Ambulation-walker  Dependent IADLs:: Cleaning       Mental Health Status:          Chemical Dependency Status:                Values/Beliefs:  Spiritual, Cultural Beliefs, Synagogue Practices, Values that affect care:                 Additional Information:  SW met with patient & his partner, Jose to complete the initial assessment for discharge planning. Patient confirmed he was at [facility] for transitional care prior to coming to the hospital. He voiced he does not have a bed hold & hopes not to return to [facility] as he wants to go home. Patient voiced he would have his partner with him at home & is aware he could get some home care services for the first few weeks. Patient prior to his TCU stay used a 2 wheel walker inside his apartment & a 4 wheel walker outside of his apartment for mobility. He has an adjustable bed, grab bars, a shower chair, & a 4 inch toilet riser also at home. They have a cleaning lady that comes once a month otherwise Feng Garcia managed everything themselves. They are aware SW will continue to follow & will meet with them again once discharge recommendations are known to assist with discharge planning. Patient is currently as assist of 2 with a lift. Feng denied any questions or concerns related to discharge planning at this time.     KASSANDRA Miles

## 2024-03-20 NOTE — PLAN OF CARE
End of Shift Summary  For vital signs and complete assessments, please see documentation flowsheets.     Pertinent assessments: VSS. Afebrile. LS clear. BS hypoactive. Pt denies pain and nausea. Vegetarian diet. Ax2 with Lift to move, turning as patient tolerates. External catheter in place, good output. PIV - SL. Pressure ulcer on sacrum noted. A&O, alarm on bed for safety. Slept well.     Major Shift Events: none    Treatment Plan: Maxipime, Vanco, MRI to be completed, Discharge TBD.

## 2024-03-20 NOTE — PLAN OF CARE
Goal Outcome Evaluation:      Plan of Care Reviewed With: patient, significant other    Overall Patient Progress: no change Overall Patient Progress: no change    To Do:  End of Shift Summary  For vital signs and complete assessments, please see documentation flowsheets.     [id number]:  Pertinent assessments: VSS, afebrile. LS clear. BS hypoactive. Pt denies pain and nausea. Vegetarian diet. Ax2 with Lift to move, turning as patient tolerates. Pt using urinal for urinating. Pressure ulcer on sacrum, wound care done- pt tolerated well. Umbilical bulge noted. A&O, alarm on bed for safety.     Major Shift Events: MRI results, Flagyl added. CRS consult. Electrolyte replacement    Treatment Plan: Maxipime, Vanco, Flagyl. Discharge TBD.    Bedside Nurse: Jason Fox RN

## 2024-03-20 NOTE — PROGRESS NOTES
Transfer Type: [facility]  Transfer Triage Note    Date of call: 03/20/24  Time of call: 12:45 PM    Current Patient Location:  [facility]  Current Level of Care: Med Surg    Vitals: stable  Diagnosis: sacral wound + osteomyeli  Reason for requested transfer: Further diagnostic work up, management, and consultation for specialized care   Isolation Needs: None    Care everywhere has been updated and reviewed: Yes  Necessary images have been sent through PACS: Yes    If patient is transferring for specialty care or specific procedure, the specialist required has participated in the transfer call and agreed with need for transfer and anticipated timeline:   See below    Patient is a 79 y.o. man who is having a sacral decubitus ulcer with drainage. Imaging (MRI) revealed sacral osteomyelitis of entire coccyx and adjacent desmond-rectal abscess. Transfer requested for debridement and possible plastic surgery if needed.     I was asked to be a part of this conversation. It sounds like orthopedic surgery was contacted first by patient placement. Orthopedic surgery declined the patient as they said they don't typically debride osteomyelitis and treatment is medical, not surgical. Plastic surgery was contacted (which is when I was involved). Plastic surgery stated that they don't operate on an area for reconstruction until after debridement. Called general surgery since orthopedics declined-- they recommended an abscess drainage be performed by local general surgeons. They stated they would contacct me if further assistance was needed. [facility] hospitalist was informed as well.     Transfer accepted: No.  See above    Rationale for declining transfer or suggested follow-up: higher level of care/more specialty care not needed     Recommendations for Management and Stabilization: Not needed    Becky Khalil MD

## 2024-03-20 NOTE — PLAN OF CARE
"Goal Outcome Evaluation:      Plan of Care Reviewed With: patient    Overall Patient Progress: improvingOverall Patient Progress: improving          A/O x4 , Ax2 with lift, RA, incontinent, external cath. Coccyx wound, WOC following. Vanco given during time of care. PIV-SL     Problem: Adult Inpatient Plan of Care  Goal: Plan of Care Review  Description: The Plan of Care Review/Shift note should be completed every shift.  The Outcome Evaluation is a brief statement about your assessment that the patient is improving, declining, or no change.  This information will be displayed automatically on your shift  note.  3/19/2024 2313 by Carlos Carlin RN  Outcome: Progressing  Flowsheets (Taken 3/19/2024 2313)  Plan of Care Reviewed With: patient  Overall Patient Progress: improving  3/19/2024 2135 by Carlos Carlin RN  Outcome: Progressing  Goal: Patient-Specific Goal (Individualized)  Description: You can add care plan individualizations to a care plan. Examples of Individualization might be:  \"Parent requests to be called daily at 9am for status\", \"I have a hard time hearing out of my right ear\", or \"Do not touch me to wake me up as it startles  me\".  Outcome: Progressing  Goal: Absence of Hospital-Acquired Illness or Injury  Outcome: Progressing  Goal: Optimal Comfort and Wellbeing  Outcome: Progressing  Goal: Readiness for Transition of Care  Outcome: Progressing     Problem: Infection  Goal: Absence of Infection Signs and Symptoms  Outcome: Progressing     Problem: Malnutrition  Goal: Improved Nutritional Intake  Outcome: Progressing     Problem: Comorbidity Management  Goal: Maintenance of Heart Failure Symptom Control  Outcome: Progressing     "

## 2024-03-20 NOTE — PROGRESS NOTES
"CLINICAL NUTRITION SERVICES  -  ASSESSMENT NOTE    Recommendations Ordered by Registered Dietitian (RD):   - Continue MVI/M  - Ordered ensure with dinner and expedite bottle to come during 2pm snack to support wound healing     MALNUTRITION:  % Weight Loss:  Weight loss does not meet criteria for malnutrition -- 4.6% in 3 weeks  % Intake:  <75% for > 7 days   Subcutaneous Fat Loss:  Orbital region mild depletion  Muscle Loss:  Clavicle bone region mild depletion  Fluid Retention:  None noted    Malnutrition Diagnosis: Moderate malnutrition  In Context of:  Chronic illness or disease      REASON FOR ASSESSMENT  Feng Wynne is a 79 year old male seen by Registered Dietitian for +MST.    Feng Wynne admitted for evaluation of wound.     PMH: mitral valve replacement , chronic anticoagulation with warfarin, hypertension, hypercholesterolemia, coronary artery disease, atrial fibrillation, congestive heart failure  - Pelvic MRI performed 3/20 shows osteomyelitis of the entire coccyx and 5.5 cm abscess in the posterior pararectal/left ischioanal fossa which extends and is adherent to the posterior rectal wall     NUTRITION HISTORY  Information obtained from pt and partner.   - per previous nutrition notes, pt is vegetarian and loves eggs, nuts, beans, dairy. He also used Ensure Max and Ensure + ice cream during his previous hospitalization to increase his protein intake   - Pt came in from TCU. Pt stated he has been eating less than he usually does because the vegetarian food was \"terrible\" at the TCU facility. He was drinking 1 expedite/day at the facility and prioritizing protein when he was able to.     CURRENT NUTRITION ORDERS  Diet Order:  Vegetarian Diet     Current Intake/Tolerance:   - Per [facility]: pt ordered 3 meals yesterday   - Per Flowsheet: 75% intake of dinner noted   - Per pt report: Appetite is better when the food is better. Denies n/v. RD offered setting up supplements which the pt was agreeable " "to. He says he likes vanilla or chocolate ensure 1x/day, and would like to continue the expedite.   - RD expressed concern with unintentional wt loss and emphasized the importance of protein and calories to help his wound heal. Pt agreed and had no further questions at this time.     Food Allergies/Intolerances: NKFA; latex     Labs: reviewed; Na 131 (L), K 3.2 (L) - replacement noted,     Medications: reviewed; ferrous sulfate, MVI/M, potassium chloride, metamucil, torsemide,     Skin: deep sacral decubitus ulcer -- WOC consulted     I/Os: 2x BM noted today, 3x BM yesterday       ANTHROPOMETRICS  Height: 5' 11\"  Weight: 247 lbs 0 oz  Body mass index is 34.45 kg/m .  IBW: 172 lbs  %IBW: 143%  Weight History:   Wt Readings from Last 10 Encounters:   03/18/24 112 kg (247 lb)   03/18/24 112.5 kg (248 lb)   03/15/24 108.9 kg (240 lb)   03/14/24 112 kg (247 lb)   03/12/24 113.9 kg (251 lb)   03/08/24 117.5 kg (259 lb)   03/08/24 117.5 kg (259 lb)   03/06/24 117.5 kg (259 lb)   03/04/24 117.8 kg (259 lb 11.2 oz)   03/01/24 117.5 kg (259 lb)    - per chart review, wt is down 4.6% from the beginning of March (almost 3 weeks).   - Pt attributes unintentional wt loss to his dislike of the food offered at the TCU       ASSESSED NUTRITION NEEDS PER APPROVED PRACTICE GUIDELINES:  Dosing Weight 76.3 kg (ABW based on wt from 3/18)  Estimated Energy Needs: 2356-1471 kcals (30-35 Kcal/Kg)  Justification: maintenance and wound  Estimated Protein Needs: 153+ grams protein (2+ g pro/kg)  Justification: wound healing and obesity guidelines   Estimated Fluid Needs: 1 mL/Kcal  Justification: maintenance OR per provider pending fluid status      NUTRITION DIAGNOSIS:  Inadequate oral intake related to poor appetite r/t limited food options/palatability at TCU as evidenced by pt report of decreased intake, 4.6% wt loss.       NUTRITION INTERVENTIONS  Recommendations / Nutrition Prescription  See above.       Implementation  Nutrition " education: provided education on adequate protein intake and utilization of Expedite to promote wound healing   Medical Food Supplement and Collaboration of Nutrition care      Nutrition Goals  Consume adequate protein/calories to promote wound healing      MONITORING AND EVALUATION:  Progress towards goals will be monitored and evaluated per protocol and Practice Guidelines      Cathie Fowler MS, RD, LD  Clinical Dietitian  3rd floor/ICU: [phone]  All other floors: [phone]  Weekend/holiday: [phone]  Office: [phone]

## 2024-03-20 NOTE — CONSULTS
[facility]    Infectious Disease Consultation     Date of Admission:  3/18/2024  Date of Consult (When I saw the patient): 03/20/24    Assessment & Plan   Feng Wynne is a 79 year old who was admitted on 3/18/2024.     Impression: 1  79-year-old male, admitted with worsening sacral decubitus wound, noted at care facility to have increasing drainage mild redness, patient denying other symptoms mild pain  2  fever, no symptoms suggesting sepsis but new acute fever, blood cultures now being done  3  recent 2 lengthy hospitalizations at [facility] for  anemia and esophagitis  4  mitral valve replacement  5  childhood penicillin allergy    REC 1  Vanco and cefepime started, just ordered blood cultures, MRI scan being done  2  await all findings and adjust, if osteomyelitis found needs multidiscipline intervention including plastic surgery, debridement and antibiotics simply giving antibiotics alone no role to play               Jeremiah Bourne MD    Reason for Consult   Reason for consult: I was asked to evaluate this patient for  infected sacral decubitus wound.    Primary Care Physician   Jayme Deng MD    Chief Complaint    worsening wound    History is obtained from the patient and medical records    History of Present Illness   Feng Wynne is a 79 year old male who presents with  worsening sacral decubitus wound noted at care facility.  Patient has had a recent history of 2 lengthy hospitalizations at [facility] with profound anemia.  Workup was not entirely clear-cut cause but had significant esophagitis.  Somewhere in that course developed a sacral decubitus pressure wound which has been ongoing.  Has been being cared for at the care center seem to be stable not perceived infected prior to this.  Then worsening drainage some noted redness around it no fever noted at the care facility.  In the ER just as I was seeing him had a temp to 101 degrees.  No other  focal pain sites no major infection history    Past Medical History   I have reviewed this patient's medical history and updated it with pertinent information if needed.   Past Medical History:   Diagnosis Date    Acute on chronic congestive heart failure, unspecified heart failure type (H) 1/30/2024    Arthritis     Atrial fibrillation (H)     Coronary artery disease     Hypercholesteremia     Morbid obesity (H)     Unspecified essential hypertension        Past Surgical History   I have reviewed this patient's surgical history and updated it with pertinent information if needed.  Past Surgical History:   Procedure Laterality Date    COLONOSCOPY N/A 1/15/2024    Procedure: Colonoscopy;  Surgeon: Osbaldo Olvera MD;  Location:  GI    ESOPHAGOSCOPY, GASTROSCOPY, DUODENOSCOPY (EGD), COMBINED N/A 1/15/2024    Procedure: Esophagoscopy, gastroscopy, duodenoscopy (EGD), combined;  Surgeon: Osbaldo Olvera MD;  Location: [facility]    ESOPHAGOSCOPY, GASTROSCOPY, DUODENOSCOPY (EGD), COMBINED N/A 2/8/2024    Procedure: Esophagoscopy, gastroscopy, duodenoscopy (EGD), combined;  Surgeon: Osbaldo Olvera MD;  Location: [facility]    MITRAL VALVE REPLACEMENT  8-    Mitral valve replacement with 31-mm [employer]affi mechanical valve.        Prior to Admission Medications   Prior to Admission Medications   Prescriptions Last Dose Informant Patient Reported? Taking?   Multiple Vitamins-Minerals (CENTRUM SILVER PO) 3/18/2024 at am Nursing Home Yes Yes   Sig: Take 1 tablet by mouth daily.   Omega-3 Fatty Acids (FISH OIL CONCENTRATE PO) 3/18/2024 at am Nursing Home Yes Yes   Sig: Take 1 capsule by mouth daily   Warfarin Therapy Reminder   Yes No   Sig: Take 1 each by mouth See Admin Instructions Per INR   acetaminophen (TYLENOL) 325 MG tablet   No Yes   Sig: Take 2 tablets (650 mg) by mouth every 6 hours as needed for mild pain   albuterol (PROAIR HFA/PROVENTIL HFA/VENTOLIN HFA) 108 (90 Base) MCG/ACT inhaler   Yes Yes    Sig: Inhale 1 puff into the lungs every 4 hours as needed for shortness of breath   bisacodyl (DULCOLAX) 10 MG suppository  retirement Yes Yes   Sig: Place 10 mg rectally daily as needed for constipation Every 3 days if no BM   calcium carbonate (TUMS) 500 MG chewable tablet   No Yes   Sig: Take 1 tablet (500 mg) by mouth 3 times daily as needed for heartburn   clindamycin (CLEOCIN) 150 MG capsule  Nursing Home Yes Yes   Sig: TAKE 4 CAPS BY MOUTH 1 HOUR PRIOR TO DENTAL APPOINTMENT   diclofenac (VOLTAREN) 1 % topical gel 3/18/2024 at am  No Yes   Sig: Apply 4 g topically 3 times daily   Patient taking differently: Apply 4 g topically daily   ferrous sulfate (FEROSUL) 325 (65 Fe) MG tablet 3/18/2024 at am  No Yes   Sig: Take 1 tablet (325 mg) by mouth daily   nystatin (MYCOSTATIN) 596284 UNIT/GM external powder 3/18/2024 at x1 Nursing Home Yes Yes   Sig: Apply topically 2 times daily Groin folds   order for DME   No No   Sig: Equipment being ordered: COMPRESSION STOCKINGS, 20-30 mmHg   pantoprazole (PROTONIX) 40 MG EC tablet 3/18/2024 at x1 Nursing Home No Yes   Sig: Take 1 tablet (40 mg) by mouth 2 times daily (before meals)   polyethylene glycol (MIRALAX) 17 GM/Dose powder  Nursing Home Yes Yes   Sig: Take 17 g by mouth daily as needed for constipation   potassium chloride (KAYCIEL) 10 % SOLN solution 3/18/2024 at am  No Yes   Sig: Take 15 mLs (20 mEq) by mouth daily   psyllium (METAMUCIL/KONSYL) 58.6 % powder 3/18/2024 at am  No Yes   Sig: Take 18 g (1 Tablespoonful) by mouth daily   sennosides (SENOKOT) 8.6 MG tablet  Nursing Home Yes Yes   Sig: Take 1 tablet by mouth 2 times daily as needed for constipation   sodium chloride (OCEAN) 0.65 % nasal spray   No Yes   Sig: Spray 1 spray into both nostrils every hour as needed for congestion   sucralfate (CARAFATE) 1 GM/10ML suspension 3/18/2024 at x2  Yes Yes   Sig: Take 1 g by mouth 4 times daily (before meals and nightly)   torsemide (DEMADEX) 20 MG tablet  3/18/2024 at am  No Yes   Sig: Take 1 tablet (20 mg) by mouth daily   warfarin ANTICOAGULANT (COUMADIN) 3 MG tablet 3/17/2024 at pm 3 mg  Yes Yes   Sig: Take by mouth daily 3 mg 3/14, 4 mg 3/15, 4 mg 3/16 , 3 mg 3/17      Facility-Administered Medications: None     Allergies   Allergies   Allergen Reactions    Amiodarone Shortness Of Breath    Pcn [Penicillins] Shortness Of Breath     Rxn occurred in childhood     Statins Muscle Pain (Myalgia) and Hives    Amiodarone     Latex     Zetia [Ezetimibe] Muscle Pain (Myalgia)     Muscle weakness legs       Immunization History   Immunization History   Administered Date(s) Administered    COVID-19 12+ (2023-24) (Pfizer) 10/24/2023    COVID-19 Bivalent 12+ (Pfizer) 10/31/2022    COVID-19 MONOVALENT 12+ (Pfizer) 02/02/2021, 02/23/2021, 10/16/2021    Influenza (H1N1) 12/07/2009    Influenza (High Dose) 3 valent vaccine 11/17/2014, 11/09/2015, 10/04/2016, 09/28/2017, 10/01/2018, 10/11/2019, 10/14/2022    Influenza (IIV3) PF 11/12/2007, 10/20/2008, 09/25/2009, 10/04/2010, 10/21/2011, 11/02/2012, 09/23/2013    Influenza Vaccine 65+ (FLUAD) 09/26/2020, 09/15/2021, 11/02/2022, 09/21/2023    Influenza Vaccine 65+ (Fluzone HD) 10/10/2020    Pneumo Conj 13-V (2010&after) 09/14/2015    Pneumococcal 23 valent 04/08/2013    TDAP Vaccine (Adacel) 04/08/2013    Zoster recombinant adjuvanted (SHINGRIX) 05/23/2023, 09/07/2023    Zoster vaccine, live 04/08/2013       Social History   I have reviewed this patient's social history and updated it with pertinent information if needed. Feng Wynne  reports that he quit smoking about 26 years ago. His smoking use included cigarettes. He has a 2.5 pack-year smoking history. He has never used smokeless tobacco. He reports current alcohol use. He reports that he does not use drugs.    Family History   I have reviewed this patient's family history and updated it with pertinent information if needed.   Family History   Problem Relation Age of Onset  "   Cerebrovascular Disease Father     Diabetes Paternal Grandmother     C.A.D. Brother         CABG X 3 at age 51       Review of Systems   The 10 point Review of Systems is negative  including not really noticing fever    Physical Exam   Temp: 99  F (37.2  C) Temp src: Oral BP: 119/63 Pulse: 83   Resp: 20 SpO2: 94 % O2 Device: None (Room air)    Vital Signs with Ranges  Temp:  [98  F (36.7  C)-99.3  F (37.4  C)] 99  F (37.2  C)  Pulse:  [65-85] 83  Resp:  [16-20] 20  BP: (107-147)/(58-75) 119/63  SpO2:  [94 %-98 %] 94 %  247 lbs 0 oz  Body mass index is 34.45 kg/m .    GENERAL APPEARANCE:  awake  EYES: Eyes grossly normal to inspection  NECK: no adenopathy  RESP: lungs clear   CV: regular rates and rhythm  LYMPHATICS: normal ant/post cervical and supraclavicular nodes  ABDOMEN: soft, nontender  MS: extremities normal  SKIN: no suspicious lesions or rashes   wound noted slight erythema some purulent drainage appears to track quite deep      Data   All laboratory and imaging data in the past 24 hours reviewed  No results for input(s): \"CULT\" in the last 168 hours.  No lab results found.    Invalid input(s): \"UC\"       All cultures:  Recent Labs   Lab 03/19/24  1438 03/19/24  1333 03/18/24  1930   CULTURE No growth after 12 hours No growth after 12 hours Culture in progress  2+ Enterococcus faecalis*  2+ Gram negative bacilli*      Blood culture:  Results for orders placed or performed during the hospital encounter of 03/18/24   Blood Culture Hand, Left    Specimen: Hand, Left; Blood   Result Value Ref Range    Culture No growth after 12 hours    Blood Culture Arm, Left    Specimen: Arm, Left; Blood   Result Value Ref Range    Culture No growth after 12 hours      *Note: Due to a large number of results and/or encounters for the requested time period, some results have not been displayed. A complete set of results can be found in Results Review.      Urine culture:  Results for orders placed or performed during the " hospital encounter of 01/09/24   Urine Culture    Specimen: Urine, Stubbs Catheter   Result Value Ref Range    Culture >100,000 CFU/mL Escherichia coli (A)        Susceptibility    Escherichia coli - VARGHESE     Ampicillin >=32 Resistant ug/mL     Ampicillin/ Sulbactam 4 Susceptible ug/mL     Piperacillin/Tazobactam <=4 Susceptible ug/mL     Cefazolin* <=4 Susceptible ug/mL      * Cefazolin VARGHESE breakpoints are for the treatment of uncomplicated urinary tract infections. For the treatment of systemic infections, please contact the laboratory for additional testing.     Cefoxitin <=4 Susceptible ug/mL     Ceftazidime <=1 Susceptible ug/mL     Ceftriaxone <=1 Susceptible ug/mL     Cefepime <=1 Susceptible ug/mL     Gentamicin <=1 Susceptible ug/mL     Tobramycin <=1 Susceptible ug/mL     Ciprofloxacin <=0.25 Susceptible ug/mL     Levofloxacin <=0.12 Susceptible ug/mL     Nitrofurantoin <=16 Susceptible ug/mL     Trimethoprim/Sulfamethoxazole >16/304 Resistant ug/mL     *Note: Due to a large number of results and/or encounters for the requested time period, some results have not been displayed. A complete set of results can be found in Results Review.

## 2024-03-20 NOTE — PLAN OF CARE
Problem: Adult Inpatient Plan of Care  Goal: Plan of Care Review  Description: The Plan of Care Review/Shift note should be completed every shift.  The Outcome Evaluation is a brief statement about your assessment that the patient is improving, declining, or no change.  This information will be displayed automatically on your shift  note.  Flowsheets (Taken 3/19/2024 3162)  Outcome Evaluation: A&O, lift transfer. Unable to preform full skin assessment as pt states sacral wound packed. Denied pain sob or nausea.  Plan of Care Reviewed With: patient  Overall Patient Progress: no change   Goal Outcome Evaluation:      Plan of Care Reviewed With: patient    Overall Patient Progress: no changeOverall Patient Progress: no change    Outcome Evaluation: A&O, lift transfer. Unable to preform full skin assessment as pt states sacral wound packed. Denied pain sob or nausea.

## 2024-03-20 NOTE — PLAN OF CARE
"  Problem: Adult Inpatient Plan of Care  Goal: Plan of Care Review  Description: The Plan of Care Review/Shift note should be completed every shift.  The Outcome Evaluation is a brief statement about your assessment that the patient is improving, declining, or no change.  This information will be displayed automatically on your shift  note.  Outcome: Progressing  Flowsheets (Taken 3/19/2024 1949)  Outcome Evaluation: afebrile throughout shift, patient was cooperative with cares  Plan of Care Reviewed With: patient  Overall Patient Progress: improving  Goal: Patient-Specific Goal (Individualized)  Description: You can add care plan individualizations to a care plan. Examples of Individualization might be:  \"Parent requests to be called daily at 9am for status\", \"I have a hard time hearing out of my right ear\", or \"Do not touch me to wake me up as it startles  me\".  Outcome: Progressing  Goal: Absence of Hospital-Acquired Illness or Injury  Outcome: Progressing  Intervention: Identify and Manage Fall Risk  Recent Flowsheet Documentation  Taken 3/19/2024 1700 by Lul Corona RN  Safety Promotion/Fall Prevention:   activity supervised   room near nurse's station   safety round/check completed   treat reversible contributory factors   patient and family education   increase visualization of patient   assistive device/personal items within reach  Intervention: Prevent Skin Injury  Recent Flowsheet Documentation  Taken 3/19/2024 1700 by Lul Corona RN  Body Position:   turned   right  Intervention: Prevent and Manage VTE (Venous Thromboembolism) Risk  Recent Flowsheet Documentation  Taken 3/19/2024 1700 by Lul Corona RN  VTE Prevention/Management: SCDs (sequential compression devices) off  Intervention: Prevent Infection  Recent Flowsheet Documentation  Taken 3/19/2024 1700 by Lul Corona RN  Infection Prevention: rest/sleep promoted  Goal: Optimal Comfort and " Wellbeing  Outcome: Progressing  Goal: Readiness for Transition of Care  Outcome: Progressing     Problem: Infection  Goal: Absence of Infection Signs and Symptoms  Outcome: Progressing     Problem: Malnutrition  Goal: Improved Nutritional Intake  Outcome: Progressing     Problem: Comorbidity Management  Goal: Maintenance of Heart Failure Symptom Control  Outcome: Progressing  Intervention: Maintain Heart Failure Management  Recent Flowsheet Documentation  Taken 3/19/2024 1700 by Lul Corona RN  Medication Review/Management: medications reviewed   Goal Outcome Evaluation:      Plan of Care Reviewed With: patient    Overall Patient Progress: improvingOverall Patient Progress: improving    Outcome Evaluation: afebrile throughout shift, patient was cooperative with cares    [id number]    Assessments:   A/O x4, Not OOB during shift, uses walker at baseline per pt. Ax2 with cares, turns well. Gauze dressing changed over sacral wound, moderately soaked. BM x1, incontinent at times with bowel and bladder.     Treatment Plan: continue antibiotics, wound care, MRI pending, ID following    Bedside Nurse: Lul Corona RN

## 2024-03-20 NOTE — PROGRESS NOTES
Colon and Rectal Surgery Progress Note    Patient chart and MRI of the pelvis reviewed.  He has been hemodynamically stable, with no significant leukocytosis or evidence of systemic sepsis.  Will plan to see in formal consultation tomorrow.  On review of the MRI, the fluid collection next to the rectum extends from the coccyx and presacral space and is associated with the inflammation of the coccyx, and is more consistent with fluid from a sacral decubitus ulcer rather than an isolated anorectal problem from an ischiorectal abscess. Our team will examine him tomorrow in order to better correlate with the imaging findings, but will likely defer surgical management to the general surgery team who will be taking care of his sacral decubitus ulcer. In addition, with an INR of 4, he would need that reversed prior to any surgical intervention.    Savana Little MD  Colorectal Surgery    [facility]  [address] [address]  T: [phone]  F: [phone]

## 2024-03-20 NOTE — PROGRESS NOTES
[facility]  Hospitalist Progress Note  Sedrick Perez MD 03/20/2024    Reason for Stay (Diagnosis): sacral ulcer, osteomyelitis of coccyx, perirectal abscess         Assessment and Plan:      Summary of Stay: Feng Wynne is a 79 year old male with history of mitral valve replacement with titanium valve, chronic anticoagulation with warfarin, hypertension, hypercholesterolemia, coronary artery disease, atrial fibrillation, congestive heart failure, and obesity with BMI of 35.  He was admitted to [facility] from 1/9/2024 through 1/19/2024 with severe anemia and hemoglobin of 4.4.  He discharged to transitional care.  He was readmitted to [facility] from 1/30/2024 through 2/15/2024 with recurrent anemia.  He was found to have severe pill esophagitis.  At the time of that admission he was noted to have a sacral decubitus ulcer.  He discharged to transitional care.  He has been receiving wound care for his decubitus ulcer since his second hospitalization at [facility].  Wound care continued at transitional care.  He was sent to the ED today with concern of sacral decubitus ulcer wound infection.  The wound care nurse had noticed some drainage with foul odor.  Evaluation showed hemoglobin 9.6, white blood cell 6.6, platelets 217, BUN 30.7, creatinine 1.07, sed rate greater than 140, and .65.  Exam showed deep appearing sacral decubitus ulcer with malodorous drainage and surrounding erythema.  He denied fevers, chills, chest pain, cough, dysuria, and diarrhea.  He has had pain in sacral decubitus ulcer with sitting or any pressure.  79 year old male with history of mitral valve replacement with titanium valve, chronic anticoagulation with warfarin, hypertension, hypercholesterolemia, coronary artery disease, atrial fibrillation, congestive heart failure, and obesity with BMI of 35.  He was admitted to [facility] from 1/9/2024  through 1/19/2024 with severe anemia and hemoglobin of 4.4.  He discharged to transitional care.  He was readmitted to [facility] from 1/30/2024 through 2/15/2024 with recurrent anemia.  He was found to have severe pill esophagitis.  At the time of that admission he was noted to have a sacral decubitus ulcer.  He discharged to transitional care.  He has been receiving wound care for his decubitus ulcer since his second hospitalization at [facility].  Wound care continued at transitional care.  He was sent to the ED today with concern of sacral decubitus ulcer wound infection.  The wound care nurse had noticed some drainage with foul odor.  Evaluation showed hemoglobin 9.6, white blood cell 6.6, platelets 217, BUN 30.7, creatinine 1.07, sed rate greater than 140, and .65.  Exam showed deep appearing sacral decubitus ulcer with malodorous drainage and surrounding erythema.  He denied fevers, chills, chest pain, cough, dysuria, and diarrhea.  He has had pain in sacral decubitus ulcer with sitting or any pressure.      Pelvic MRI performed after admission and shows osteomyelitis of the entire coccyx and 5.5 cm abscess in the posterior pararectal/left ischioanal fossa which extends and is adherent to the posterior rectal wall     Since admission, the patient been treated with IV vancomycin and IV cefepime.  He is being followed by general surgery, WOC, and ID service.      Plans today:  -Continue IV vancomycin/IV cefepime  - Given MRI findings today I have called [facility] to try and arrange transfer.    - Patient's partner Jose updated at bedside today  - CRS consult for desmond-rectal abscess  - Hold warfarin dose today and will administer vit K 2.5 mg PO dose for INR reversal in anticipation of intervention for perirectal abscess; may need additional dosing pending response.  Recheck INR in AM. When he becomes subtherapeutic would start bridging heparin gtt    Addendum: I communicated  with the hospitalist and a surgeon at [facility].  They told me they had communicated with [facility] and [facility].  Orthopedic surgery declined the patient as they said they don't typically debride osteomyelitis and treatment is medical, not surgical. Plastic surgery was contacted (I did not speak with them directly) and reportedly stated that they don't operate on an area for reconstruction until after debridement.  The [facility] surgeon I spoke with did feel colorectal abscess needs to be addressed but do not feel that requires transfer.  At this time [facility] has declined transfer.    I will consult colorectal surgery here at [facility] to see if they are able to assist with treatment of perirectal abscess.  If it is felt the patient needs care beyond our expertise here at [facility], would consider transfer to tertiary care center outside of the [facility].  As patient is not septic, no need for urgent transfer at this point.  I returned to the patient's room to update he and his partner re: above.  They are extremely disappointed as they were hopeful [facility] would accept him in transfer.       Problem list:     Sacral decubitus ulcer (present since 1/24)  Osteomyelitis of coccyx (new dx)  -Continue IV vancomycin/IV cefepime/IV Flagyl  -MRI of pelvis shows osteomyelitis of coccyx and 5.5 cm perirectal abscess  -appreciate gen surg, ID, and WOC consults.      -Wound culture done on admission shows gram-negative bacilli and Enterococcus faecalis, though this is likely colonized wound adrián   - attempted tx to [facility] on 3/20 but transfer declined (see discussion above).   - [facility] recommendations (provided over the phone during attempt at transfer): prolonged IV antibiotic course without need for acute bone debridement.  If this ultimately is the therapeutic route, would strongly recommend [facility] with outpatient plastic surgery consult (consider Dr. Srinivasan at  "[facility])    Perirectal abscess (new dx)  - CRS consult     Prosthetic mitral valve  Chronic anticoagulation with warfarin  -Warfarin per pharmacy       Hypertension  Hypercholesterolemia  Coronary artery disease  History of congestive heart failure  Atrial fibrillation  -Resume prior to admission torsemide, potassium chloride, and warfarin     Recent hospitalizations for anemia due to severe pill esophagitis  -Continue prior to admission Protonix and Carafate    Mobility  -At baseline patient mobilizes with a walker  -In January patient was admitted for severe anemia which transiently limited his mobility.  At this time he developed sacral decubitus ulcer noted above  -Patient was in a TCU prior to this admission, mobilizing with a walker while at TCU        Diet: Combination Diet Regular Diet Adult    DVT Prophylaxis: Warfarin  Stubbs Catheter: Not present  Lines: None     Cardiac Monitoring: None  Code Status: Full Code    DISPO: Currently unclear.  Attempted transfer to [facility] but they declined to accept patient.  If unable to deal with the patient's acute issues here at [facility], may need to consider transfer to alternative tertiary hospital outside the  system        Interval History (Subjective):      I reviewed the patient's pelvic MRI results with the patient and his partner at bedside.  We discussed transfer to [facility] for further treatment, which will likely include debridement of coccyx, plastic surgery consult, and drainage of perirectal abscess.  Patient agreeable to transfer to [facility].                  Physical Exam:      Last Vital Signs:  /63 (BP Location: Right arm)   Pulse 83   Temp 99  F (37.2  C) (Oral)   Resp 20   Ht 1.803 m (5' 11\")   Wt 112 kg (247 lb)   SpO2 94%   BMI 34.45 kg/m        Intake/Output Summary (Last 24 hours) at 3/20/2024 1233  Last data filed at 3/20/2024 0344  Gross per 24 hour   Intake --   Output 600 ml   Net -600 ml       Constitutional: Awake, alert, cooperative, no " apparent distress     Respiratory: Clear to auscultation bilaterally, no crackles or wheezing   Cardiovascular: Regular rate and rhythm, normal S1 and S2, and no murmur noted   Abdomen: Normal bowel sounds, soft, non-distended, non-tender   Skin: No rashes, no cyanosis, dry to touch   Neuro: Alert and oriented x3, no weakness, numbness, memory loss   Extremities: No edema, normal range of motion   Other(s):        All other systems: Negative          Medications:      All current medications were reviewed with changes reflected in problem list.         Data:      All new lab and imaging data was reviewed.   Labs:       Lab Results   Component Value Date     03/19/2024     03/18/2024     03/07/2024     10/16/2020     03/14/2019     10/04/2016    Lab Results   Component Value Date    CHLORIDE 95 03/19/2024    CHLORIDE 95 03/18/2024    CHLORIDE 99 03/07/2024    CHLORIDE 107 02/09/2022    CHLORIDE 109 10/16/2020    CHLORIDE 108 03/14/2019    CHLORIDE 104 10/04/2016    Lab Results   Component Value Date    BUN 29.7 03/19/2024    BUN 30.7 03/18/2024    BUN 26.3 03/07/2024    BUN 30 02/09/2022    BUN 24 10/16/2020    BUN 25 03/14/2019    BUN 25 10/04/2016      Lab Results   Component Value Date    POTASSIUM 3.2 03/20/2024    POTASSIUM 3.9 03/19/2024    POTASSIUM 3.4 03/19/2024    POTASSIUM 5.0 02/09/2022    POTASSIUM 4.6 10/16/2020    POTASSIUM 4.5 03/14/2019    POTASSIUM 4.4 10/04/2016    Lab Results   Component Value Date    CO2 30 03/19/2024    CO2 31 03/18/2024    CO2 32 03/07/2024    CO2 27 02/09/2022    CO2 23 10/16/2020    CO2 25 03/14/2019    CO2 28 10/04/2016    Lab Results   Component Value Date    CR 0.97 03/19/2024    CR 1.07 03/18/2024    CR 1.17 03/07/2024    CR 0.85 10/16/2020    CR 0.85 03/14/2019    CR 0.78 10/04/2016        Recent Labs   Lab 03/19/24  0759   WBC 8.3   HGB 9.3*   HCT 29.3*   MCV 99         Imaging:   Recent Results (from the past 24 hour(s))   MR  Pelvis Bone wo & w Contrast    Narrative    MR pelvis without and with contrast 3/20/2024 9:59 AM    Techniques: Multiplanar multisequence imaging of the pelvis was  obtained before and after administration of  intravenous contrast  using routing [facility] protocol.    History: sacral decubitus ulcer. Eval for osteomyelitis     Comparison: MRI lumbar spine 8/16/2022.     Findings:   Arising from the superior aspect of the gluteal cleft there is a  sacral decubitus ulcer with extensive soft tissue thickening and edema  involving the subcutaneous tissue deep to the ulcer. Diffusely  abnormal T1 hypointense, T2 hyperintense signal replacing the distal  coccygeal segments with associated destructive/erosive changes. The  abnormal bone marrow signal extends up to and involves the first  coccygeal segment. Normal bone marrow signal pattern of the S5  vertebral body. There is enhancing edema-like signal changes extending  along the presacral soft tissues extending from approximately the  level of S2-S3 caudally along the posterior rectosigmoid colon. There  is a rim-enhancing fluid and gas collection along the posterior  pararectal fossa extending into the left ischioanal fossa measuring  approximately 1.7 x 3.8 x 5.5 cm (series 9 image 31 and series 10  image 23).    Osseous structures  Osseous structures: No focal osseous lesion. No acute fracture or  avascular necrosis.    Joint and Periarticular soft tissue:    Moderate degenerative changes of the sacroiliac joints. Moderate  osteoarthrosis of the right hip with heterotopic ossification about  the hip joint. Degenerative disc disease at L5-S1.    Joint effusion: Small right hip joint effusion.    Bursal effusion: Minimal nonspecific edema over the greater  trochanter. No substantial iliopsoas or trochanteric bursal effusion.    Muscles and tendons  Diffuse fatty atrophy of the pelvic musculature. Enhancing  intramuscular edema involving the bilateral gluteus willian  muscles,  left greater than right, likely inflammatory/phlegmonous changes.  Intramuscular edema involving the right gluteus minimus and medius  muscles. Mild edema in the left adductor muscles.     Nerves:  The visualized course of the sciatic nerves are unremarkable  bilaterally.    Other Findings:  Large stool burden in the rectosigmoid colon.      Impression    Impression:  1. Osteomyelitis involving the entirety of the coccyx. No evidence of  sacral involvement.  2. Extensive inflammatory/phlegmonous changes throughout the  presacral/coccygeal soft tissues with 5.5 cm abscess in the posterior  pararectal/left ischioanal fossa which extends and is adherent to the  posterior rectal wall.    I have personally reviewed the examination and initial interpretation  and I agree with the findings.    JUTTA ELLERMANN, MD         SYSTEM ID:  [id number]

## 2024-03-20 NOTE — PROGRESS NOTES
[facility]  Infectious Disease Progress Note          Assessment and Plan:   Date of Admission:  3/18/2024  Date of Consult (When I saw the patient): 03/20/24        Assessment & Plan  Feng Wynne is a 79 year old who was admitted on 3/18/2024.      Impression: 1  79-year-old male, admitted with worsening sacral decubitus wound, noted at care facility to have increasing drainage mild redness, patient denying other symptoms mild pain  2  fever, no symptoms suggesting sepsis but new acute fever, blood cultures now being done  3  recent 2 lengthy hospitalizations at [facility] for  anemia and esophagitis  4  mitral valve replacement  5  childhood penicillin allergy     REC 1  Vanco and cefepime given the CT findings we will now add Flagyl as well  2 [facility] for [facility] care of the problems is logical but they have refused  3 unclear what to make of the abscess and how to connect to this, colorectal consult makes sense needs drainage of some  4 await blood cultures, follow fever, follow wound, may still need some local debridement of the wound even if not a curative approach for control, surgery still follow  5 once abscess resolved and assuming not bacteremic prolonged IV antibiotics no role to play alone in treating the osteomyelitis would certainly not recommend that or plan that        Interval History:     no new complaints and doing well; no cp, sob, n/v/d, or abd pain.  Teary-eyed over the information, CT scan shows a possible abscess unclear whether it is a true colorectal abscess or related definite osteomyelitis on imaging quite impressive, blood cultures so far negative temp down              Medications:      ceFEPIme  2 g Intravenous Q8H    ferrous sulfate  325 mg Oral Daily    metroNIDAZOLE  500 mg Intravenous Q12H [facility] (08/20)    miconazole   Topical BID    multivitamin, therapeutic  1 tablet Oral Daily    pantoprazole  40 mg Oral BID AC    potassium  "chloride  20 mEq Oral Daily    psyllium  1 packet Oral Daily    sodium chloride (PF)  3 mL Intracatheter Q8H    sucralfate  1 g Oral 4x Daily AC & HS    torsemide  20 mg Oral Daily    vancomycin  1,500 mg Intravenous Q24H    Warfarin Therapy Reminder  1 each Oral See Admin Instructions    warfarin-No DOSE today  1 each Does not apply no dose today (warfarin)    wound support modular  60 mL Oral Daily                  Physical Exam:   Blood pressure 119/63, pulse 83, temperature 99  F (37.2  C), temperature source Oral, resp. rate 20, height 1.803 m (5' 11\"), weight 112 kg (247 lb), SpO2 94%.  Wt Readings from Last 2 Encounters:   03/18/24 112 kg (247 lb)   03/18/24 112.5 kg (248 lb)     Vital Signs with Ranges  Temp:  [98  F (36.7  C)-99  F (37.2  C)] 99  F (37.2  C)  Pulse:  [65-85] 83  Resp:  [16-20] 20  BP: (107-147)/(58-75) 119/63  SpO2:  [94 %-98 %] 94 %    Constitutional: Awake, alert, cooperative, no apparent distress     Lungs: Clear to auscultation bilaterally, no crackles or wheezing   Cardiovascular: Regular rate and rhythm, normal S1 and S2, and no murmur noted   Abdomen: Normal bowel sounds, soft, non-distended, non-tender   Skin: No rashes, no cyanosis, no edema   Other: Wounds about the same          Data:   All microbiology laboratory data reviewed.  Recent Labs   Lab Test 03/19/24  0759 03/18/24  0714 03/13/24  0800 03/11/24  0702   WBC 8.3 6.6  --  3.7*   HGB 9.3* 9.6* 9.6* 9.0*   HCT 29.3* 30.0*  --  29.0*   MCV 99 98  --  102*    217  --  190     Recent Labs   Lab Test 03/19/24  0759 03/18/24  0714 03/07/24  0100   CR 0.97 1.07 1.17     Recent Labs   Lab Test 03/18/24  1748   SED >140*     No lab results found.    Invalid input(s): \"UC\"     "

## 2024-03-20 NOTE — PLAN OF CARE
Goal Outcome Evaluation:      Plan of Care Reviewed With: patient    Overall Patient Progress: no changeOverall Patient Progress: no change    Outcome Evaluation: afebrile, wound care complete      Problem: Adult Inpatient Plan of Care  Goal: Optimal Comfort and Wellbeing  Outcome: Progressing    Pertinent assessments: VSS, afebrile. LS clear. BS hypoactive. Pt denies pain and nausea. Vegetarian diet. Ax2 with Lift to move, turning as patient tolerates. Pt using urinal for urinating. Pressure ulcer on sacrum, wound care done x2- pt tolerated well. Umbilical bulge noted. A&O, alarm on bed for safety.     Major Shift Events: MRI results, Flagyl added. CRS consult placed. Electrolyte replacement 3.5, recheck in AM    Treatment Plan: Maxipime, Vanco, Flagyl, wound care BID, pain management

## 2024-03-21 NOTE — PROGRESS NOTES
[facility]  Infectious Disease Progress Note          Assessment and Plan:   Date of Admission:  3/18/2024  Date of Consult (When I saw the patient): 03/20/24        Assessment & Plan  Feng Wynne is a 79 year old who was admitted on 3/18/2024.      Impression: 1  79-year-old male, admitted with worsening sacral decubitus wound, noted at care facility to have increasing drainage mild redness, patient denying other symptoms mild pain  2  fever, no symptoms suggesting sepsis but new acute fever, blood cultures now being done  3  recent 2 lengthy hospitalizations at [facility] for  anemia and esophagitis  4  mitral valve replacement  5  childhood penicillin allergy     REC 1  Vanco and cefepime given the CT findings we will now add Flagyl as well  2 [facility] for [facility] care of the problems is logical but they have refused  3 appears abscess is from the wound thus not colorectal intervention, general surgery planning debridement of that in the wound in general without major bone debridement  4 blood cultures negative, temp is down, assuming sepsis clears and no significant bacteremia, likely oral antibiotics as a disposition plan once debrided, culture growing multiple organisms full information to follow, probably plan on rechallenging his childhood penicillin allergy at some point with Enterococcus, anaerobes etc. needing covered        Interval History:     no new complaints and doing well; no cp, sob, n/v/d, or abd pain.  In better spirits today CT scan shows  abscess almost certainly related to wound, definite osteomyelitis on imaging quite impressive, blood cultures so far negative temp down wound culture with gram-negative rods and Enterococcus              Medications:      ceFEPIme  2 g Intravenous Q8H    ferrous sulfate  325 mg Oral Daily    metroNIDAZOLE  500 mg Intravenous Q12H [facility] (08/20)    miconazole   Topical BID    multivitamin, therapeutic  1 tablet  "Oral Daily    pantoprazole  40 mg Oral BID AC    potassium chloride  20 mEq Oral Daily    psyllium  1 packet Oral Daily    sodium chloride (PF)  3 mL Intracatheter Q8H    sucralfate  1 g Oral 4x Daily AC & HS    torsemide  20 mg Oral Daily    vancomycin  1,500 mg Intravenous Q24H    Warfarin Therapy Reminder  1 each Oral See Admin Instructions    wound support modular  60 mL Oral Daily                  Physical Exam:   Blood pressure 110/58, pulse 76, temperature 98.4  F (36.9  C), temperature source Oral, resp. rate 16, height 1.803 m (5' 11\"), weight 112 kg (247 lb), SpO2 94%.  Wt Readings from Last 2 Encounters:   03/18/24 112 kg (247 lb)   03/18/24 112.5 kg (248 lb)     Vital Signs with Ranges  Temp:  [98.4  F (36.9  C)-100.2  F (37.9  C)] 98.4  F (36.9  C)  Pulse:  [76-90] 76  Resp:  [16-20] 16  BP: (108-110)/(48-62) 110/58  SpO2:  [92 %-95 %] 94 %    Constitutional: Awake, alert, cooperative, no apparent distress     Lungs: Clear to auscultation bilaterally, no crackles or wheezing   Cardiovascular: Regular rate and rhythm, normal S1 and S2, and no murmur noted   Abdomen: Normal bowel sounds, soft, non-distended, non-tender   Skin: No rashes, no cyanosis, no edema   Other: Wounds about the same          Data:   All microbiology laboratory data reviewed.  Recent Labs   Lab Test 03/21/24  0701 03/19/24  0759 03/18/24  0714   WBC 8.8 8.3 6.6   HGB 9.2* 9.3* 9.6*   HCT 28.0* 29.3* 30.0*   MCV 96 99 98    202 217     Recent Labs   Lab Test 03/21/24  0701 03/19/24  0759 03/18/24  0714   CR 1.15 0.97 1.07     Recent Labs   Lab Test 03/18/24  1748   SED >140*     No lab results found.    Invalid input(s): \"UC\"     "

## 2024-03-21 NOTE — PHARMACY-ADMISSION MEDICATION HISTORY
Pharmacist Admission Medication History    Addendum to medication list:  Added probiotic to medication list        PTA Med List   Medication Sig Last Dose    acetaminophen (TYLENOL) 325 MG tablet Take 2 tablets (650 mg) by mouth every 6 hours as needed for mild pain     albuterol (PROAIR HFA/PROVENTIL HFA/VENTOLIN HFA) 108 (90 Base) MCG/ACT inhaler Inhale 1 puff into the lungs every 4 hours as needed for shortness of breath     bisacodyl (DULCOLAX) 10 MG suppository Place 10 mg rectally daily as needed for constipation Every 3 days if no BM     calcium carbonate (TUMS) 500 MG chewable tablet Take 1 tablet (500 mg) by mouth 3 times daily as needed for heartburn     clindamycin (CLEOCIN) 150 MG capsule TAKE 4 CAPS BY MOUTH 1 HOUR PRIOR TO DENTAL APPOINTMENT     diclofenac (VOLTAREN) 1 % topical gel Apply 4 g topically 3 times daily (Patient taking differently: Apply 4 g topically daily) 3/18/2024 at am    ferrous sulfate (FEROSUL) 325 (65 Fe) MG tablet Take 1 tablet (325 mg) by mouth daily 3/18/2024 at am    Multiple Vitamins-Minerals (CENTRUM SILVER PO) Take 1 tablet by mouth daily. 3/18/2024 at am    nystatin (MYCOSTATIN) 364201 UNIT/GM external powder Apply topically 2 times daily Groin folds 3/18/2024 at x1    Omega-3 Fatty Acids (FISH OIL CONCENTRATE PO) Take 1 capsule by mouth daily 3/18/2024 at am    pantoprazole (PROTONIX) 40 MG EC tablet Take 1 tablet (40 mg) by mouth 2 times daily (before meals) 3/18/2024 at x1    polyethylene glycol (MIRALAX) 17 GM/Dose powder Take 17 g by mouth daily as needed for constipation     potassium chloride (KAYCIEL) 10 % SOLN solution Take 15 mLs (20 mEq) by mouth daily 3/18/2024 at am    Probiotic CHEW Take 2 chew tab by mouth daily     psyllium (METAMUCIL/KONSYL) 58.6 % powder Take 18 g (1 Tablespoonful) by mouth daily 3/18/2024 at am    sennosides (SENOKOT) 8.6 MG tablet Take 1 tablet by mouth 2 times daily as needed for constipation     sodium chloride (OCEAN) 0.65 % nasal  spray Spray 1 spray into both nostrils every hour as needed for congestion     sucralfate (CARAFATE) 1 GM/10ML suspension Take 1 g by mouth 4 times daily (before meals and nightly) 3/18/2024 at x2    torsemide (DEMADEX) 20 MG tablet Take 1 tablet (20 mg) by mouth daily 3/18/2024 at am    warfarin ANTICOAGULANT (COUMADIN) 3 MG tablet Take by mouth daily 3 mg 3/14, 4 mg 3/15, 4 mg 3/16 , 3 mg 3/17 3/17/2024 at pm 3 mg

## 2024-03-21 NOTE — PROGRESS NOTES
[facility]    Hospitalist Progress Note  Name: Feng Wynne    MRN: [id number]  Provider:  Modesto Hammer DO  Date of Service: 03/21/2024    Summary of Stay: Feng Wynne is a 79 year old male with a history of mechanical mitral valve on warfarin, severe tricuspid regurgitation, right-sided heart failure, moderate pulmonary hypertension, coronary artery disease, hypertension, hypercholesterolemia, atrial fibrillation, history of pill esophagitis with resultant anemia, known sacral decubitus ulcer admitted on 3/18/2024 with concern for infection of his sacral wound.  In the emergency department, patient was found to have a temperature of 97.7  F, blood pressure 109/63, heart rate 81, respiratory rate 20, SpO2 94% on room air.  Initial lab work showed bicarb 31, BUN/creatinine 30.7/1.07, .65, hemoglobin 9.6, ESR greater than 140, INR 3.93.  The patient was started on vancomycin and cefepime.  Infectious disease and general surgery were consulted to see the patient.  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  The sacral/coccygeal soft tissues with 5.5 cm abscess in the posterior perirectal/left ischial anal fossa which extends and is adherent to the posterior rectal wall.  There was consideration for transfer to the [facility] for higher level of care including plastic surgery consultation for wound closure.  The [facility] declined the transfer with plastic surgery recommending IV antibiotic course and wound debridement.  General surgery at the [facility] recommended wound debridement at [facility].  The patient was transitioned to a heparin drip and INR was reversed.    TODAY'S PLAN:  Appreciate General Surgery, Infectious Disease, Colorectal Surgery recommendations.  Pt declined transfer to the [facility] recommended debridement and drainage  of abscess with plan for prolonged course of IV abx.  Plan is for debridement at [facility] tomorrow.  NPO at midnight.  Will start heparin drip in the setting of mechanical mitral valve and plan for vitamin k this evening.  Will maintain heparin drip after procedure tomorrow if further debridements are necessary.  Turn heparin drip off 4 hours prior to procedure and turn on after procedure has completed, if ok with general surgery.  Continue vanco/cefepime per ID.  Pt's significant other Jose at bedside.  All questions answered.    Problem List:   Sacral Decubitus Ulcer - POA  New Osteomyelitis of Coccyx  Perirectal Abscess  - Continue vanco and cefepime  - Appreciate ID recommendations  - Appreciate General Surgery, Colorectal Surgery recommendations  - NPO at midnight for debridement tomorrow  - Attempted transfer to [facility] on 3/20, however pt declined as plastic surgery recommended IV abx treatment and debridement, and general surgery recommended debridement and abscess drainage at [facility]  - MRI pelvis showed osteomyelitis of coccyx and 5.5 cm perirectal abscess  - At baseline, pt ambulates with a walker    Mechanical Mitral Valve  Warfarin Coagulopathy  - INR goal is 2.5-3.5  - Hold warfarin and start heparin drip for consideration of debridement tomorrow and any further debridements as deemed necessary    Mild Hypokalemia  - Electrolyte replacement protocol    Chronic Medical Problems:  Atrial Fibrillation  Hypertension  Hypercholesterolemia  Coronary Artery Disease  Pill Esophagitis with Resultant Anemia  Severe Tricuspid Regurgitation  Right Sided Heart Failure  Moderate Pulmonary Hypertension    I spent 53 minutes in reviewing this patient's labs, imaging, medications, medical history.  In addition time was spent interviewing the patient, communicating with family, and medical decision making.     DVT Prophylaxis: Heparin drip/warfarin  Code Status: Full Code  Diet: Snacks/Supplements Adult: Ensure Enlive; With  Meals  Vegetarian Diet  NPO per Anesthesia Guidelines for Procedure/Surgery Except for: Meds    Stubbs Catheter: Not present  Disposition: Expected discharge in 3-4 days to TCU. Goals prior to discharge include abx plan established, general surgery work up and debridements completed.   Family updated today: Yes      Interval History   Pt seen and examined.  Pt reports frustration with the plan.  Would like to eat.    -Data reviewed today: I personally reviewed all new labs and imaging results over the last 24 hours.     Physical Exam   Temp: 98.4  F (36.9  C) Temp src: Oral BP: 110/58 Pulse: 76   Resp: 16 SpO2: 94 % O2 Device: None (Room air)    Vitals:    03/18/24 1700   Weight: 112 kg (247 lb)     Vital Signs with Ranges  Temp:  [98.4  F (36.9  C)-100.2  F (37.9  C)] 98.4  F (36.9  C)  Pulse:  [76-90] 76  Resp:  [16-20] 16  BP: (108-110)/(48-62) 110/58  SpO2:  [92 %-95 %] 94 %  I/O last 3 completed shifts:  In: -   Out: 750 [Urine:750]    GENERAL: No apparent distress. Awake, alert, and fully oriented.  HEENT: Normocephalic, atraumatic. Extraocular movements intact.  CARDIOVASCULAR: Regular rate and rhythm, +mechanical valve. No S3.  PULMONARY: Clear bilaterally.  GASTROINTESTINAL: Soft, non-tender, non-distended. Bowel sounds normoactive.   EXTREMITIES: No cyanosis or clubbing. No edema.  NEUROLOGICAL: CN 2-12 grossly intact, no focal neurological deficits.  DERMATOLOGICAL: No rash, ulcer, bruising, nor jaundice.    Medications    heparin      - MEDICATION INSTRUCTIONS -        ceFEPIme  2 g Intravenous Q8H    ferrous sulfate  325 mg Oral Daily    metroNIDAZOLE  500 mg Intravenous Q12H [facility] (08/20)    miconazole   Topical BID    multivitamin, therapeutic  1 tablet Oral Daily    pantoprazole  40 mg Oral BID AC    phytonadione  5 mg Oral Once    potassium chloride  20 mEq Oral Daily    psyllium  1 packet Oral Daily    sodium chloride (PF)  3 mL Intracatheter Q8H    sucralfate  1 g Oral 4x Daily AC & HS    torsemide   "20 mg Oral Daily    vancomycin  1,500 mg Intravenous Q24H    Warfarin Therapy Reminder  1 each Oral See Admin Instructions    warfarin-No DOSE today  1 each Does not apply no dose today (warfarin)    wound support modular  60 mL Oral Daily     Data     Laboratory:  Recent Labs   Lab 03/21/24  0701 03/19/24  0759 03/18/24  0714   WBC 8.8 8.3 6.6   HGB 9.2* 9.3* 9.6*   HCT 28.0* 29.3* 30.0*   MCV 96 99 98    202 217     Recent Labs   Lab 03/21/24  0701 03/20/24  1704 03/20/24  0702 03/19/24  2251 03/19/24  1333 03/19/24  0759 03/18/24  0714     --   --   --   --  131* 136   POTASSIUM 3.2* 3.5 3.2*  --    < > 3.4 3.6   CHLORIDE 96*  --   --   --   --  95* 95*   CO2 31*  --   --   --   --  30* 31*   ANIONGAP 8  --   --   --   --  6* 10   *  --   --  98  --  98 84   BUN 33.8*  --   --   --   --  29.7* 30.7*   CR 1.15  --   --   --   --  0.97 1.07   GFRESTIMATED 65  --   --   --   --  80 71   JOSEPH 8.5*  --   --   --   --  8.2* 8.3*    < > = values in this interval not displayed.     No results for input(s): \"CULT\" in the last 168 hours.    Imaging:  No results found for this or any previous visit (from the past 24 hour(s)).      Modesto Hammer DO  Critical access hospital Hospitalist  [address] [place] [address]  Securely message with [employer] (more info)  Text page via [employer] Paging/Directory   03/21/2024   "

## 2024-03-21 NOTE — PLAN OF CARE
"Goal Outcome Evaluation:    Pertinent assessments: Patient alert and oriented x4. Forgetful. VSS, afebrile. Ax2 with lift. LS clear. BS hypoactive. IV dilaudid given once for pain. Using urinal for urinating. Pressure ulcer on sacrum, refused wound care. Umbilical bulge noted.     Major Shift Events: K+ replaced, Heparin drip @ 1200 units. Hep recheck @ 1900.     Treatment Plan: Maxipime, Vanco, Flagyl, wound care BID, pain management. NPO @ midnight, I&D tomorrow @ 1110.       Problem: Adult Inpatient Plan of Care  Goal: Plan of Care Review  Description: The Plan of Care Review/Shift note should be completed every shift.  The Outcome Evaluation is a brief statement about your assessment that the patient is improving, declining, or no change.  This information will be displayed automatically on your shift  note.  Outcome: Progressing  Flowsheets (Taken 3/21/2024 1530)  Plan of Care Reviewed With: patient  Overall Patient Progress: no change  Goal: Patient-Specific Goal (Individualized)  Description: You can add care plan individualizations to a care plan. Examples of Individualization might be:  \"Parent requests to be called daily at 9am for status\", \"I have a hard time hearing out of my right ear\", or \"Do not touch me to wake me up as it startles  me\".  Outcome: Progressing  Goal: Absence of Hospital-Acquired Illness or Injury  Outcome: Progressing  Intervention: Identify and Manage Fall Risk  Recent Flowsheet Documentation  Taken 3/21/2024 1057 by Lisa Fried RN  Safety Promotion/Fall Prevention:   activity supervised   safety round/check completed   room near nurse's station  Intervention: Prevent and Manage VTE (Venous Thromboembolism) Risk  Recent Flowsheet Documentation  Taken 3/21/2024 1057 by Lisa Fried RN  VTE Prevention/Management: patient refused intervention  Intervention: Prevent Infection  Recent Flowsheet Documentation  Taken 3/21/2024 1057 by Lisa Fried RN  Infection " Prevention: single patient room provided  Goal: Optimal Comfort and Wellbeing  Outcome: Progressing  Intervention: Monitor Pain and Promote Comfort  Recent Flowsheet Documentation  Taken 3/21/2024 1057 by Lisa Fried RN  Pain Management Interventions: medication (see MAR)  Goal: Readiness for Transition of Care  Outcome: Progressing     Problem: Infection  Goal: Absence of Infection Signs and Symptoms  Outcome: Progressing     Problem: Malnutrition  Goal: Improved Nutritional Intake  Outcome: Progressing     Problem: Comorbidity Management  Goal: Maintenance of Heart Failure Symptom Control  Outcome: Progressing  Intervention: Maintain Heart Failure Management  Recent Flowsheet Documentation  Taken 3/21/2024 1057 by Lisa Fried RN  Medication Review/Management: medications reviewed             Plan of Care Reviewed With: patient    Overall Patient Progress: no changeOverall Patient Progress: no change

## 2024-03-21 NOTE — PLAN OF CARE
"Care continued: 1900 - 0730  Goal Outcome Evaluation:      Plan of Care Reviewed With: patient    Problem: Adult Inpatient Plan of Care  Goal: Plan of Care Review  Outcome: Progressing    Outcome Evaluation: Verbalized understanding of care and treatment plan, pain and anxiety controlled.    Critical Shift Events: PRN given for pain this shift, endorses good pain control without them but states it affects his sleep. PRN's given. Pt was anxious about his sacral injury but refusing repositioning. Education provided.     Pt was very anxious about his injury this shift   States it is \"the worst thing\", therapeutic communication and reassurance provided   Pt endorses feeling tired like he is not getting any sleep, PRN given     A+O x4   Lung sounds clear, room air without complication   BS present, umbilical hernia noted, Incontinent   Voids spontaneously- dribbles with urinal     Gtts: Abx x3 continued     VSS   /48 (BP Location: Left arm)   Pulse 90   Temp 100.2  F (37.9  C) (Oral)   Resp 20   Ht 1.803 m (5' 11\")   Wt 112 kg (247 lb)   SpO2 95%   BMI 34.45 kg/m       Cluster cares in effect to promote sleep and reduce risks for delirium. RN will continue to maintain care and the treatment plan this shift, handing off care to oncoming RN at shift change 03/21/2024     May Taveras RN 03/21/2024 7:08 AM     "

## 2024-03-21 NOTE — PROGRESS NOTES
[facility]  Consult for sacral pressure ulcer with underlying infection.  MRI reviewed, discussed results with patient. Appears to have an underlying abscess which tracks to the desmond-rectal area. Underlying osteo of the sacrum also documented. Received vitamin K yesterday. He feels no different than my initial ED evaluation.  INR today is 2.7  Tmax 100.2  Plan to debride and washout sacral wound in the OR tomorrow provided his INR is acceptable (<2).  Discussed continuing wound care, need for definitive debridement of sacrum (not done at this facility) and potential for flap closure in the future. Relayed that this will be a temporizing maneuver of sorts. NPO after midnight.

## 2024-03-21 NOTE — CONSULTS
"[facility]  Colon and Rectal Surgery Consult Note  Name: Feng Wynne    MRN: [id number]  YOB: 1945    Age: 79 year old  Date of admission: 3/18/2024  Primary care provider: Jayme Deng     Requesting Physician:  Dr. Perez  Reason for consult:  Perirectal abscess           History of Present Illness:   Feng Wynne is a 79 year old male on warfarin with history of mitral valve replacement with titanium valve, chronic anticoagulation with warfarin, hypertension, hypercholesterolemia, coronary artery disease, atrial fibrillation, congestive heart failure, and obesity with BMI of 35, seen at the request of Dr. Perez, presents with a sacral decubitus ulcer. Patient was recently admitted to [facility] from 1/9/24 through 1/19/24 and again from 1/30/24 through 2/15/2024 with anemia. He was found to have severe pill esophagitis. At his second admission, he was noted to have a sacral decubitus ulcer and was discharged to transitional care. A nurse at the TCU noticed new drainage from the wound and patient was admitted through the ED on 3/18/24 with pain, redness, and drainage from his sacral area.     Patient is afebrile. All vital signs are within normal limits. Laboratory values today are notable for potassium 3.2, BUM 33.8, calcium 8.5, .89, glucose 101, INR 2.72, and Hgb 9.2. WBC is normal. MRI pelvis revealed \"osteomyelitis involving the entirety of the coccyx. There is also extensive inflammatory/phlegmonous changes throughout the presacral/coccygeal soft tissues with 5.5 cm abscess in the posterior pararectal/left ischioanal fossa which extends and is adherent to the posterior rectal wall.\"    Today, patient is resting comfortably in bed. He endorses pain in his sacral area. He continues on IV antibiotics.    Colonoscopy History:  1/15/24: normal mucosa, but noted to have poor preparation    Surgical History: mitral valve replacement            Past Medical " History:     Past Medical History:   Diagnosis Date    Acute on chronic congestive heart failure, unspecified heart failure type (H) 2024    Arthritis     Atrial fibrillation (H)     Coronary artery disease     Hypercholesteremia     Morbid obesity (H)     Unspecified essential hypertension              Past Surgical History:     Past Surgical History:   Procedure Laterality Date    COLONOSCOPY N/A 1/15/2024    Procedure: Colonoscopy;  Surgeon: Osbaldo Olvera MD;  Location:  GI    ESOPHAGOSCOPY, GASTROSCOPY, DUODENOSCOPY (EGD), COMBINED N/A 1/15/2024    Procedure: Esophagoscopy, gastroscopy, duodenoscopy (EGD), combined;  Surgeon: Osbaldo Olvera MD;  Location:  GI    ESOPHAGOSCOPY, GASTROSCOPY, DUODENOSCOPY (EGD), COMBINED N/A 2024    Procedure: Esophagoscopy, gastroscopy, duodenoscopy (EGD), combined;  Surgeon: Osbaldo Olvera MD;  Location: [facility]    MITRAL VALVE REPLACEMENT  2008    Mitral valve replacement with 31-mm [employer] mechanical valve.                Social History:     Social History     Tobacco Use    Smoking status: Former     Packs/day: 0.50     Years: 5.00     Additional pack years: 0.00     Total pack years: 2.50     Types: Cigarettes     Quit date:      Years since quittin.2    Smokeless tobacco: Never   Substance Use Topics    Alcohol use: Yes     Comment: 1 drink per month             Family History:     Family History   Problem Relation Age of Onset    Cerebrovascular Disease Father     Diabetes Paternal Grandmother     C.A.D. Brother         CABG X 3 at age 51             Allergies:     Allergies   Allergen Reactions    Amiodarone Shortness Of Breath    Pcn [Penicillins] Shortness Of Breath     Rxn occurred in childhood     Statins Muscle Pain (Myalgia) and Hives    Amiodarone     Latex     Zetia [Ezetimibe] Muscle Pain (Myalgia)     Muscle weakness legs             Medications:      ceFEPIme  2 g Intravenous Q8H    ferrous sulfate  325 mg Oral  "Daily    metroNIDAZOLE  500 mg Intravenous Q12H [facility] (08/20)    miconazole   Topical BID    multivitamin, therapeutic  1 tablet Oral Daily    pantoprazole  40 mg Oral BID AC    potassium chloride  20 mEq Oral Daily    psyllium  1 packet Oral Daily    sodium chloride (PF)  3 mL Intracatheter Q8H    sucralfate  1 g Oral 4x Daily AC & HS    torsemide  20 mg Oral Daily    vancomycin  1,500 mg Intravenous Q24H    Warfarin Therapy Reminder  1 each Oral See Admin Instructions    wound support modular  60 mL Oral Daily             Review of Systems:   A comprehensive greater than 10 system review of systems was carried out.  Pertinent positives and negatives are noted above.  Otherwise negative for contributory info.            Physical Exam:     Blood pressure 110/58, pulse 76, temperature 98.4  F (36.9  C), temperature source Oral, resp. rate 16, height 1.803 m (5' 11\"), weight 112 kg (247 lb), SpO2 94%.    Intake/Output Summary (Last 24 hours) at 3/21/2024 0815  Last data filed at 3/20/2024 2300  Gross per 24 hour   Intake --   Output 750 ml   Net -750 ml     EXAM:  GEN: Awake alert and oriented, appears stated age  PULM: Non-labored breathing with normal respiratory effort  RECTAL: Sacral wound approximately 3 cm x 2.5 cm with erythema and fluctuance extending inferiorly. JOSE without fullness.   NEURO: CN II-XII grossly intact  PSYCH: responsive, alert, cooperative; oriented x3; appropriate mood and affect           Data Reviewed:     Results for orders placed or performed during the hospital encounter of 03/18/24   MR Pelvis Bone wo & w Contrast    Narrative    MR pelvis without and with contrast 3/20/2024 9:59 AM    Techniques: Multiplanar multisequence imaging of the pelvis was  obtained before and after administration of  intravenous contrast  using routing [facility] protocol.    History: sacral decubitus ulcer. Eval for osteomyelitis     Comparison: MRI lumbar spine 8/16/2022.     Findings:   Arising from the superior " aspect of the gluteal cleft there is a  sacral decubitus ulcer with extensive soft tissue thickening and edema  involving the subcutaneous tissue deep to the ulcer. Diffusely  abnormal T1 hypointense, T2 hyperintense signal replacing the distal  coccygeal segments with associated destructive/erosive changes. The  abnormal bone marrow signal extends up to and involves the first  coccygeal segment. Normal bone marrow signal pattern of the S5  vertebral body. There is enhancing edema-like signal changes extending  along the presacral soft tissues extending from approximately the  level of S2-S3 caudally along the posterior rectosigmoid colon. There  is a rim-enhancing fluid and gas collection along the posterior  pararectal fossa extending into the left ischioanal fossa measuring  approximately 1.7 x 3.8 x 5.5 cm (series 9 image 31 and series 10  image 23).    Osseous structures  Osseous structures: No focal osseous lesion. No acute fracture or  avascular necrosis.    Joint and Periarticular soft tissue:    Moderate degenerative changes of the sacroiliac joints. Moderate  osteoarthrosis of the right hip with heterotopic ossification about  the hip joint. Degenerative disc disease at L5-S1.    Joint effusion: Small right hip joint effusion.    Bursal effusion: Minimal nonspecific edema over the greater  trochanter. No substantial iliopsoas or trochanteric bursal effusion.    Muscles and tendons  Diffuse fatty atrophy of the pelvic musculature. Enhancing  intramuscular edema involving the bilateral gluteus willian muscles,  left greater than right, likely inflammatory/phlegmonous changes.  Intramuscular edema involving the right gluteus minimus and medius  muscles. Mild edema in the left adductor muscles.     Nerves:  The visualized course of the sciatic nerves are unremarkable  bilaterally.    Other Findings:  Large stool burden in the rectosigmoid colon.      Impression    Impression:  1. Osteomyelitis involving the  "entirety of the coccyx. No evidence of  sacral involvement.  2. Extensive inflammatory/phlegmonous changes throughout the  presacral/coccygeal soft tissues with 5.5 cm abscess in the posterior  pararectal/left ischioanal fossa which extends and is adherent to the  posterior rectal wall.    I have personally reviewed the examination and initial interpretation  and I agree with the findings.    JUTTA ELLERMANN, MD         SYSTEM ID:  [id number]     *Note: Due to a large number of results and/or encounters for the requested time period, some results have not been displayed. A complete set of results can be found in Results Review.       Recent Labs   Lab 03/21/24  0701 03/19/24  0759 03/18/24  0714   WBC 8.8 8.3 6.6   HGB 9.2* 9.3* 9.6*   HCT 28.0* 29.3* 30.0*   MCV 96 99 98    202 217     Recent Labs   Lab 03/21/24  0701 03/20/24  1704 03/20/24  0702 03/19/24  2251 03/19/24  1333 03/19/24  0759 03/18/24  0714     --   --   --   --  131* 136   POTASSIUM 3.2* 3.5 3.2*  --    < > 3.4 3.6   CHLORIDE 96*  --   --   --   --  95* 95*   CO2 31*  --   --   --   --  30* 31*   ANIONGAP 8  --   --   --   --  6* 10   *  --   --  98  --  98 84   BUN 33.8*  --   --   --   --  29.7* 30.7*   CR 1.15  --   --   --   --  0.97 1.07   GFRESTIMATED 65  --   --   --   --  80 71   JOSEPH 8.5*  --   --   --   --  8.2* 8.3*    < > = values in this interval not displayed.     Recent Labs   Lab 03/21/24  0701 03/20/24  0702 03/19/24  0759   INR 2.72* 4.21* 4.17*     No results for input(s): \"LACT\" in the last 168 hours.  No results for input(s): \"COLOR\", \"APPEARANCE\", \"URINEGLC\", \"URINEBILI\", \"URINEKETONE\", \"SG\", \"UBLD\", \"URINEPH\", \"PROTEIN\", \"UROBILINOGEN\", \"NITRITE\", \"LEUKEST\", \"RBCU\", \"WBCU\" in the last 168 hours.      Assessment and Plan:   Feng Wynne is a 79 year old male on warfarin with history of mitral valve replacement with titanium valve, chronic anticoagulation with warfarin, hypertension, hypercholesterolemia, " coronary artery disease, atrial fibrillation, congestive heart failure, and obesity with BMI of 35, seen at the request of Dr. Perez, presents with a sacral decubitus ulcer. CRS was consulted for a possible perirectal abscess. On review on the MRI, there is a fluid collection next to the rectum that extends from the coccyx that is more consistent with fluid from a sacral decubitus ulcer rather than an isolated ischiorectal abscess. On exam, there a sacral decubitus ulcer with surrounding fluctuance. No internal fluctuance palpated on JOSE. Patient is currently hemodynamically stable. No significant leukocytosis or evidence of systemic sepsis. There is no indication for surgical intervention from our standpoint. In addition, his INR would need to be reversed prior to any consideration of surgery. Continue with diligent wound care, antibiotics, and off-loading pressure.     Plan:  Admit to hospitalist  Surgery: No emergent surgery indicated  Diet: per hospitalist  IV Fluids: per hospitalist  Antibiotics:  per hospitalist  Medications: Continue home meds per hospitalist  I&O s:  strict I&O s  Labs:   - Reviewed: by myself and Dr. Little  - Ordered: None   Imaging:   - Dr. Little and myself have personally viewed: MRI pelvis  - Ordered:  None  Activity: ambulate as tolerated, encourage OOB  DVT prophylaxis: SCD s  This plan has been discussed with Dr. Little    Patient specific identified risk factors considered as part of today s evaluation include: Chronic anticoagulation with warfarin, BMI> 30     Additional history obtained from chart review and patient.  Time spent on consultation: 50 minutes, greater than 50 percent of the total encounter time is spent in counseling and/or coordination of care          Jason Pitts PA-C  [facility]  Phone:  [phone]

## 2024-03-22 NOTE — PLAN OF CARE
"Goal Outcome Evaluation:      Plan of Care Reviewed With: patient, significant other    Overall Patient Progress: improvingOverall Patient Progress: improving    Outcome Evaluation: I&D completed, pain managed, repo q2    End of Shift Summary  For vital signs and complete assessments, please see documentation flowsheets.     Pertinent assessments: @ times disoriented x3, had some difficulty with word finding- MD aware. Did speak to partner who says he can get like this when he has not had much sleep, pt agrees. VSS x BP soft 90s. C/o buttock pain, gave tylenol x1, dilaudid x1. Up w/ LIFT. LS clear. HS regular. Repo q2. PT had a I&D, dressing done by surgery. Heparin infusing @ 1200 units/hour after speaking w/ pharm.    Major Shift Events: I & D     Treatment Plan: pain management, IV cefepime, heparin drip, flagyl, repo q2 dressing changes, ID     Bedside Nurse: Mili Santamaria RN     Problem: Adult Inpatient Plan of Care  Goal: Plan of Care Review  Description: The Plan of Care Review/Shift note should be completed every shift.  The Outcome Evaluation is a brief statement about your assessment that the patient is improving, declining, or no change.  This information will be displayed automatically on your shift  note.  Outcome: Progressing  Flowsheets (Taken 3/22/2024 1835)  Outcome Evaluation: I&D completed, pain managed, repo q2  Plan of Care Reviewed With:   patient   significant other  Overall Patient Progress: improving  Goal: Patient-Specific Goal (Individualized)  Description: You can add care plan individualizations to a care plan. Examples of Individualization might be:  \"Parent requests to be called daily at 9am for status\", \"I have a hard time hearing out of my right ear\", or \"Do not touch me to wake me up as it startles  me\".  Outcome: Progressing  Goal: Absence of Hospital-Acquired Illness or Injury  Outcome: Progressing  Intervention: Identify and Manage Fall Risk  Recent Flowsheet " Documentation  Taken 3/22/2024 0900 by Mili Santamaria RN  Safety Promotion/Fall Prevention:   activity supervised   clutter free environment maintained   increased rounding and observation   increase visualization of patient  Intervention: Prevent Skin Injury  Recent Flowsheet Documentation  Taken 3/22/2024 1715 by Mili Santamaria RN  Body Position:   turned   left  Taken 3/22/2024 1400 by Mili Santamaria RN  Body Position:   right   turned  Taken 3/22/2024 0900 by Mili Santamaria RN  Body Position:   right   turned   legs elevated  Intervention: Prevent and Manage VTE (Venous Thromboembolism) Risk  Recent Flowsheet Documentation  Taken 3/22/2024 0900 by Mili Santamaria RN  VTE Prevention/Management: SCDs (sequential compression devices) off  Intervention: Prevent Infection  Recent Flowsheet Documentation  Taken 3/22/2024 0900 by Mili Santamaria RN  Infection Prevention: cohorting utilized  Goal: Optimal Comfort and Wellbeing  Outcome: Progressing  Intervention: Monitor Pain and Promote Comfort  Recent Flowsheet Documentation  Taken 3/22/2024 0900 by Mili Santamaria RN  Pain Management Interventions: medication (see MAR)  Goal: Readiness for Transition of Care  Outcome: Progressing

## 2024-03-22 NOTE — ANESTHESIA CARE TRANSFER NOTE
Patient: Feng Wynne    Procedure: Procedure(s):  IRRIGATION AND DEBRIDEMENT, WOUND, SACRAL REGION       Diagnosis: Skin ulcer of sacrum, unspecified ulcer stage (H) [L98.429]  Diagnosis Additional Information: No value filed.    Anesthesia Type:   General     Note:    Oropharynx: oropharynx clear of all foreign objects and spontaneously breathing  Level of Consciousness: awake  Oxygen Supplementation: face mask  Level of Supplemental Oxygen (L/min / FiO2): 8  Independent Airway: airway patency satisfactory and stable  Dentition: dentition unchanged  Vital Signs Stable: post-procedure vital signs reviewed and stable  Report to RN Given: handoff report given  Patient transferred to: PACU  Comments: Pt to recovery.  Spontaneous respirations and exchanging well.  Pt making needs known.  Report given to RN.      Handoff Report: Identifed the Patient, Identified the Reponsible Provider, Reviewed the pertinent medical history, Discussed the surgical course, Reviewed Intra-OP anesthesia mangement and issues during anesthesia, Set expectations for post-procedure period and Allowed opportunity for questions and acknowledgement of understanding      Vitals:  Vitals Value Taken Time   BP 92/60 03/22/24 1200   Temp 98.2F    Pulse 83 03/22/24 1203   Resp 15 03/22/24 1203   SpO2 98 % 03/22/24 1203   Vitals shown include unfiled device data.    Electronically Signed By: MIKE Rueda CRNA  March 22, 2024  12:05 PM

## 2024-03-22 NOTE — PLAN OF CARE
"  Problem: Adult Inpatient Plan of Care  Goal: Plan of Care Review  Description: The Plan of Care Review/Shift note should be completed every shift.  The Outcome Evaluation is a brief statement about your assessment that the patient is improving, declining, or no change.  This information will be displayed automatically on your shift  note.  Outcome: Progressing  Goal: Patient-Specific Goal (Individualized)  Description: You can add care plan individualizations to a care plan. Examples of Individualization might be:  \"Parent requests to be called daily at 9am for status\", \"I have a hard time hearing out of my right ear\", or \"Do not touch me to wake me up as it startles  me\".  Outcome: Progressing  Goal: Absence of Hospital-Acquired Illness or Injury  Outcome: Progressing  Intervention: Identify and Manage Fall Risk  Recent Flowsheet Documentation  Taken 3/21/2024 1659 by Jaime Rivera RN  Safety Promotion/Fall Prevention:   activity supervised   safety round/check completed   room near nurse's station  Intervention: Prevent Skin Injury  Recent Flowsheet Documentation  Taken 3/21/2024 2200 by Jaime Rivera RN  Body Position:   heels elevated   turned   right   left  Taken 3/21/2024 2000 by Jiame Rivera RN  Body Position:   turned   right   left  Taken 3/21/2024 1800 by Jaime Rivera RN  Body Position:   left   right   turned   heels elevated  Taken 3/21/2024 1659 by Jaime Rivera RN  Body Position:   turned   right   foot of bed elevated   heels elevated  Skin Protection: adhesive use limited  Device Skin Pressure Protection:   absorbent pad utilized/changed   adhesive use limited  Intervention: Prevent and Manage VTE (Venous Thromboembolism) Risk  Recent Flowsheet Documentation  Taken 3/21/2024 1659 by Jaime Rivera RN  VTE Prevention/Management: SCDs (sequential compression devices) off  Intervention: Prevent Infection  Recent Flowsheet Documentation  Taken 3/21/2024 1659 by Miguel" Jaime RO RN  Infection Prevention: single patient room provided  Goal: Optimal Comfort and Wellbeing  Outcome: Progressing  Goal: Readiness for Transition of Care  Outcome: Progressing  For vital signs and complete assessments, please see documentation flowsheets.     Pertinent assessments: Patient alert and oriented x4, but forgetful. VSS, afebrile. Ax2 with lift. LS clear. BS hypoactive.Denies pain when asked. No S/S of pain noted. Using urinal and incontinent of bowel. Pressure ulcer on sacrum, wound cares completed this shift. Umbilical bulge    Major Shift Events: K+ replaced, Heparin drip @ 1500 units, bolus at 2000. Hep recheck @ 0150.     Treatment Plan: Maxipime, Vanco, Flagyl, wound care BID, pain management. NPO @ midnight, I&D tomorrow @ 1100.

## 2024-03-22 NOTE — PROGRESS NOTES
[facility]    Hospitalist Progress Note  Name: Feng Wynne    MRN: [id number]  Provider:  Modesto Hammer DO  Date of Service: 03/22/2024    Summary of Stay: Feng Wynne is a 79 year old male with a history of mechanical mitral valve on warfarin, severe tricuspid regurgitation, right-sided heart failure, moderate pulmonary hypertension, coronary artery disease, hypertension, hypercholesterolemia, atrial fibrillation, history of pill esophagitis with resultant anemia, known sacral decubitus ulcer admitted on 3/18/2024 with concern for infection of his sacral wound.  In the emergency department, patient was found to have a temperature of 97.7  F, blood pressure 109/63, heart rate 81, respiratory rate 20, SpO2 94% on room air.  Initial lab work showed bicarb 31, BUN/creatinine 30.7/1.07, .65, hemoglobin 9.6, ESR greater than 140, INR 3.93.  The patient was started on vancomycin and cefepime.  Infectious disease and general surgery were consulted to see the patient.  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  The sacral/coccygeal soft tissues with 5.5 cm abscess in the posterior perirectal/left ischial anal fossa which extends and is adherent to the posterior rectal wall.  There was consideration for transfer to the [facility] for higher level of care including plastic surgery consultation for wound closure.  The [facility] declined the transfer with plastic surgery recommending IV antibiotic course and wound debridement.  General surgery at the [facility] recommended wound debridement at [facility].  The patient was transitioned to a heparin drip and INR was reversed.     TODAY'S PLAN:  Appreciate General Surgery and ID recommendations. Plan is for debridement today.  AM INR pending.  Started heparin drip and gave vitamin k yesterday afternoon.  Pt  reports some dry mouth this morning.  Continue vanco, cefepime, and flagyl.  No plans for transfer to the [facility] at this point as they have declined his transfer.  Resume heparin drip as soon as cleared by general surgery post-procedure.      Problem List:   Sacral Decubitus Ulcer - POA  New Osteomyelitis of Coccyx  Perirectal Abscess  - Continue vanco and cefepime and flagyl  - Appreciate ID recommendations  - Appreciate General Surgery, Colorectal Surgery recommendations  - NPO at midnight for debridement  - Attempted transfer to [facility] on 3/20, however pt declined as plastic surgery recommended IV abx treatment and debridement, and general surgery recommended debridement and abscess drainage at [facility]  - MRI pelvis showed osteomyelitis of coccyx and 5.5 cm perirectal abscess  - At baseline, pt ambulates with a walker     Mechanical Mitral Valve  Warfarin Coagulopathy  - INR goal is 2.5-3.5  - Hold warfarin and start heparin drip for consideration of debridement tomorrow and any further debridements as deemed necessary     Mild Hypokalemia  - Electrolyte replacement protocol     Chronic Medical Problems:  Atrial Fibrillation  Hypertension  Hypercholesterolemia  Coronary Artery Disease  Pill Esophagitis with Resultant Anemia  Severe Tricuspid Regurgitation  Right Sided Heart Failure  Moderate Pulmonary Hypertension    I spent 48 minutes in reviewing this patient's labs, imaging, medications, medical history.  In addition time was spent interviewing the patient, communicating with family, and medical decision making.     DVT Prophylaxis: Heparin drip  Code Status: Full Code  Diet: Snacks/Supplements Adult: Ensure Enlive; With Meals  NPO per Anesthesia Guidelines for Procedure/Surgery Except for: Meds    Stubbs Catheter: Not present  Disposition: Expected discharge in 3-5 days to TCU. Goals prior to discharge include general surgery without plan for further debridements, abx plan established.   Family updated today: No      Interval History   Pt seen and examined.  Pt reports having some dry mouth.    -Data reviewed today: I personally reviewed all new labs and imaging results over the last 24 hours.     Physical Exam   Temp: 99.2  F (37.3  C) Temp src: Oral BP: 106/41 Pulse: 89   Resp: 18 SpO2: 95 % O2 Device: None (Room air)    Vitals:    03/18/24 1700   Weight: 112 kg (247 lb)     Vital Signs with Ranges  Temp:  [98.6  F (37  C)-99.2  F (37.3  C)] 99.2  F (37.3  C)  Pulse:  [79-89] 89  Resp:  [16-18] 18  BP: ()/(41-52) 106/41  SpO2:  [95 %-96 %] 95 %  I/O last 3 completed shifts:  In: 1356 [I.V.:1356]  Out: 300 [Urine:300]    GENERAL: No apparent distress. Awake, alert, and fully oriented.  HEENT: Normocephalic, atraumatic. Extraocular movements intact.  CARDIOVASCULAR: Regular rate and rhythm without murmurs or rubs. No S3.  PULMONARY: Clear bilaterally.  GASTROINTESTINAL: Soft, non-tender, non-distended. Bowel sounds normoactive.   EXTREMITIES: No cyanosis or clubbing. No edema.  NEUROLOGICAL: CN 2-12 grossly intact, no focal neurological deficits.  DERMATOLOGICAL: No rash, ulcer, bruising, nor jaundice.    Medications    heparin Stopped (03/22/24 0654)    - MEDICATION INSTRUCTIONS -        ceFEPIme  2 g Intravenous Q8H    ferrous sulfate  325 mg Oral Daily    RA Probiotic Gummies  2 chew tab Oral Daily    metroNIDAZOLE  500 mg Intravenous Q12H BETTY (08/20)    miconazole   Topical BID    Multivitamin Adult  2 chew tab Oral Daily    pantoprazole  40 mg Oral BID AC    potassium chloride  20 mEq Oral Daily    psyllium  1 packet Oral Daily    sodium chloride (PF)  3 mL Intracatheter Q8H    sucralfate  1 g Oral 4x Daily AC & HS    torsemide  20 mg Oral Daily    vancomycin  1,250 mg Intravenous Q24H    Warfarin Therapy Reminder  1 each Oral See Admin Instructions    wound support modular  60 mL Oral Daily     Data     Laboratory:  Recent Labs   Lab 03/21/24  0701 03/19/24  0759 03/18/24  0714   WBC 8.8 8.3 6.6   HGB 9.2* 9.3*  "9.6*   HCT 28.0* 29.3* 30.0*   MCV 96 99 98    202 217     Recent Labs   Lab 03/21/24  1809 03/21/24  0701 03/20/24  1704 03/20/24  0702 03/19/24  2251 03/19/24  1333 03/19/24  0759 03/18/24  0714   NA  --  135  --   --   --   --  131* 136   POTASSIUM 3.4 3.2* 3.5   < >  --    < > 3.4 3.6   CHLORIDE  --  96*  --   --   --   --  95* 95*   CO2  --  31*  --   --   --   --  30* 31*   ANIONGAP  --  8  --   --   --   --  6* 10   GLC  --  101*  --   --  98  --  98 84   BUN  --  33.8*  --   --   --   --  29.7* 30.7*   CR  --  1.15  --   --   --   --  0.97 1.07   GFRESTIMATED  --  65  --   --   --   --  80 71   JOSEPH  --  8.5*  --   --   --   --  8.2* 8.3*    < > = values in this interval not displayed.     No results for input(s): \"CULT\" in the last 168 hours.    Imaging:  No results found for this or any previous visit (from the past 24 hour(s)).      Modesto Hammer DO  [facility] Hospitalist  [address] [place] [address]  Securely message with [employer] (more info)  Text page via [employer] Paging/Directory   03/22/2024   "

## 2024-03-22 NOTE — PHARMACY-ANTICOAGULATION SERVICE
Clinical Pharmacy - Warfarin Dosing Consult     Pharmacy has been consulted to manage this patient s warfarin therapy.  Indication: Mechanical Mitral Valve Replacement  Therapy Goal: INR 2.5-3.5  Warfarin Prior to Admission: Yes  Warfarin PTA Regimen: 10 mg Wednesday/Saturday, 5 mg all other days  Significant drug interactions: Flagyl  Recent documented change in oral intake/nutrition: Unknown    INR   Date Value Ref Range Status   03/22/2024 1.69 (H) 0.85 - 1.15 Final   03/21/2024 2.72 (H) 0.85 - 1.15 Final       Patient has received IV Vit K 5mg x2 in last 48 hours, now on heparin infusion.   Low Intensity Heparin Treatment WITH Boluses.   GOAL: Heparin Xa (10a) LEVEL = 0.25-0.5       Patient going to OR for debridement today, ordered NO DOSE for this evening. Paged hospitalist to call unit pharmacist or re-consult if they want a dose tonight after debridement.       Edi Rivera, Pharm.D., BCPS

## 2024-03-22 NOTE — ANESTHESIA PREPROCEDURE EVALUATION
Anesthesia Pre-Procedure Evaluation    Patient: Feng Wynne   MRN: [id number] : 1945        Procedure : Procedure(s):  IRRIGATION AND DEBRIDEMENT, WOUND, SACRAL REGION          Past Medical History:   Diagnosis Date    Acute on chronic congestive heart failure, unspecified heart failure type (H) 2024    Arthritis     Atrial fibrillation (H)     Coronary artery disease     Hypercholesteremia     Morbid obesity (H)     Unspecified essential hypertension       Past Surgical History:   Procedure Laterality Date    COLONOSCOPY N/A 1/15/2024    Procedure: Colonoscopy;  Surgeon: Osbaldo Olvera MD;  Location:  GI    ESOPHAGOSCOPY, GASTROSCOPY, DUODENOSCOPY (EGD), COMBINED N/A 1/15/2024    Procedure: Esophagoscopy, gastroscopy, duodenoscopy (EGD), combined;  Surgeon: Osbaldo Olvera MD;  Location: [facility]    ESOPHAGOSCOPY, GASTROSCOPY, DUODENOSCOPY (EGD), COMBINED N/A 2024    Procedure: Esophagoscopy, gastroscopy, duodenoscopy (EGD), combined;  Surgeon: Osbaldo Olvera MD;  Location: [facility]    MITRAL VALVE REPLACEMENT  2008    Mitral valve replacement with 31-mm St. Raffi mechanical valve.       Allergies   Allergen Reactions    Amiodarone Shortness Of Breath    Pcn [Penicillins] Shortness Of Breath     Rxn occurred in childhood     Statins Muscle Pain (Myalgia) and Hives    Amiodarone     Latex     Zetia [Ezetimibe] Muscle Pain (Myalgia)     Muscle weakness legs      Social History     Tobacco Use    Smoking status: Former     Packs/day: 0.50     Years: 5.00     Additional pack years: 0.00     Total pack years: 2.50     Types: Cigarettes     Quit date:      Years since quittin.2    Smokeless tobacco: Never   Substance Use Topics    Alcohol use: Yes     Comment: 1 drink per month      Wt Readings from Last 1 Encounters:   24 112 kg (247 lb)        Anesthesia Evaluation   Pt has had prior anesthetic. Type: General.    No history of anesthetic complications        ROS/MED HX  ENT/Pulmonary:  - neg pulmonary ROS     Neurologic:  - neg neurologic ROS     Cardiovascular:     (+) Dyslipidemia hypertension- -  CAD -  - -   Taking blood thinners Pt has received instructions: Instructions Given to patient: held and bridged. CHF etiology: right heart failure related to severe TR   NYHA classification: II. ZUNIGA.                valvular problems/murmurs type: MR S/P MVR severe TR.    Previous cardiac testing   Echo: Date: 2/2024 Results:  Left ventricular systolic function is normal.  The visual ejection fraction is 60-65%.  The right ventricle is moderately dilated.  Moderately decreased right ventricular systolic function  Flattened septum is consistent with RV pressure/volume overload. Moderate (46-  55mmHg) pulmonary hypertension is present.  There is severe (4+) tricuspid regurgitation.  s/p 31mm St Raffi mechanical MVR. Mean 6.3 mmHg @ HR 64 bpm. Cannot visualize  MR due to acoustic shadowing.  There is no pericardial effusion.  Rhythm is atrial fibrillation.     Compared to prior study dated 1/10/2024, degree of TR is similar in  appearance. The mean gradient across the mitral prosthesis is slightly higher  but at higher heart rates. Overall findings appear similar.    Stress Test:  Date: Results:    ECG Reviewed:  Date: Results:    Cath:  Date: Results:      METS/Exercise Tolerance:     Hematologic:     (+)      anemia,          Musculoskeletal: Comment: Sacral breakdown  (+)  arthritis,             GI/Hepatic:     (+)   esophageal disease,                 Renal/Genitourinary:     (+) renal disease, type: CRI,            Endo: Comment: Class 2 obesity    (+)               Obesity,       Psychiatric/Substance Use:  - neg psychiatric ROS     Infectious Disease: Comment: Sacral decubitus ulcer      Malignancy:  - neg malignancy ROS     Other:  - neg other ROS          Physical Exam    Airway        Mallampati: II   TM distance: > 3 FB   Neck ROM: full   Mouth opening: > 3  cm    Respiratory Devices and Support         Dental           Cardiovascular   cardiovascular exam normal       Rhythm and rate: regular and normal     Pulmonary   pulmonary exam normal        breath sounds clear to auscultation       Other findings: Lab Test        03/22/24 03/21/24 03/20/24 03/19/24                       0921          0701          0702          0759          WBC          9.0          8.8           --          8.3           HGB          9.4*         9.2*          --          9.3*          MCV          98           96            --          99            PLT          236          214           --          202           INR          1.69*        2.72*        4.21*        4.17*          Lab Test        03/22/24 03/21/24 03/21/24 03/20/24 03/19/24 03/19/24 03/19/24                       0921          1809          0701          0702          2251          1333          0759          NA           136           --          135           --           --           --          131*          POTASSIUM    3.7  3.7    3.4          3.2*           < >         --            < >        3.4           CHLORIDE     96*           --          96*           --           --           --          95*           CO2          29            --          31*           --           --           --          30*           BUN          34.8*         --          33.8*         --           --           --          29.7*         CR           1.13          --          1.15          --           --           --          0.97          ANIONGAP     11            --          8             --           --           --          6*            JOSEPH          8.7*          --          8.5*          --           --           --          8.2*          GLC          125*          --          101*          --          98            --          98             < > = values in this interval not displayed.                         EKG Interpretation:   Atrial fibrillation with a competing junctional pacemaker   Low voltage QRS   Incomplete right bundle branch block   Left anterior fascicular block   Cannot rule out Anterior infarct (cited on or before 17-SEP-2014)   Abnormal ECG         OUTSIDE LABS:  CBC:   Lab Results   Component Value Date    WBC 9.0 03/22/2024    WBC 8.8 03/21/2024    HGB 9.4 (L) 03/22/2024    HGB 9.2 (L) 03/21/2024    HCT 30.2 (L) 03/22/2024    HCT 28.0 (L) 03/21/2024     03/22/2024     03/21/2024     BMP:   Lab Results   Component Value Date     03/22/2024     03/21/2024    POTASSIUM 3.7 03/22/2024    POTASSIUM 3.7 03/22/2024    CHLORIDE 96 (L) 03/22/2024    CHLORIDE 96 (L) 03/21/2024    CO2 29 03/22/2024    CO2 31 (H) 03/21/2024    BUN 34.8 (H) 03/22/2024    BUN 33.8 (H) 03/21/2024    CR 1.13 03/22/2024    CR 1.15 03/21/2024     (H) 03/22/2024     (H) 03/21/2024     COAGS:   Lab Results   Component Value Date    PTT 58 (H) 09/03/2008    INR 1.69 (H) 03/22/2024    FIBR 300 08/25/2008     POC:   Lab Results   Component Value Date     (H) 08/26/2008     HEPATIC:   Lab Results   Component Value Date    ALBUMIN 2.7 (L) 02/03/2024    PROTTOTAL 5.1 (L) 02/03/2024    ALT 9 02/03/2024    AST 27 02/03/2024    ALKPHOS 58 02/03/2024    BILITOTAL 0.6 02/03/2024     OTHER:   Lab Results   Component Value Date    PH 7.38 08/27/2008    LACT 1.6 02/05/2024    JOSEPH 8.7 (L) 03/22/2024    PHOS 2.6 01/19/2024    MAG 1.8 02/15/2024    TSH 2.56 02/05/2024    SED >140 (H) 03/18/2024       Anesthesia Plan    ASA Status:  4    NPO Status:  NPO Appropriate    Anesthesia Type: General.     - Airway: ETT   Induction: Intravenous.   Maintenance: Balanced.        Consents    Anesthesia Plan(s) and associated risks, benefits, and realistic alternatives discussed. Questions answered and patient/representative(s) expressed understanding.     - Discussed: Risks, Benefits and Alternatives for BOTH  SEDATION and the PROCEDURE were discussed     - Discussed with:  Patient      - Extended Intubation/Ventilatory Support Discussed: No.      - Patient is DNR/DNI Status: No     Use of blood products discussed: No .     Postoperative Care    Pain management: IV analgesics, Oral pain medications, Multi-modal analgesia.   PONV prophylaxis: Ondansetron (or other 5HT-3), Dexamethasone or Solumedrol     Comments:    Other Comments: Severe valvular heart disease with anticoagulation.  High risk for cardiopulmonary complications           Baljeet Pantoja MD    I have reviewed the pertinent notes and labs in the chart from the past 30 days and (re)examined the patient.  Any updates or changes from those notes are reflected in this note.

## 2024-03-22 NOTE — OP NOTE
Urology procedure note    I was called operating #2 to assist in Stubbs catheter placement.  This patient just underwent a sacral decubitus wound debridement and postoperatively was found to have 500 mL of urine on bladder scan.  Regardless, the plan was to have a Stubbs catheter placed at the end of his procedure.  I was called to assist in Stubbs catheter placement.    On exam, he has a buried penis and phimosis.  I was able to deliver the foreskin through the buried penis and he has a tight phimosis, pinpoint in size.  I used a Clara clamp to stretch open the phimotic ring.  I could then see the meatus so I prepped the penis and meatus with Betadine and placed a 16 Citizen of [place] coudé tip Stubbs catheter without any difficulty.  Clear urine drained from the bladder.    Recommendations: This patient can have his catheter removed at any time that he is ambulatory.

## 2024-03-22 NOTE — PLAN OF CARE
"  To Do:  End of Shift Summary  For vital signs and complete assessments, please see documentation flowsheets.     Pertinent assessments: Pt A/O. VSS. Pt denies nausea and SOB. Wound dressing to sacrum intact. Incontinent of urine.   Major Shift Events: 2350 Hep xa .66, Hep gtt paused for 1 hr and rate changed to 12 ml/hr. Rechecked 0718.   0638- Hep gtt stopped  Treatment Plan: I& D today, Hep gtt, Maxipime, Vancomycin and symptom management  Bedside Nurse: Barbara Borrero RN   Problem: Adult Inpatient Plan of Care  Goal: Plan of Care Review  Description: The Plan of Care Review/Shift note should be completed every shift.  The Outcome Evaluation is a brief statement about your assessment that the patient is improving, declining, or no change.  This information will be displayed automatically on your shift  note.  Outcome: Not Progressing  Flowsheets (Taken 3/22/2024 0701)  Outcome Evaluation: denies pain, I&D today  Plan of Care Reviewed With: patient  Overall Patient Progress: no change  Goal: Patient-Specific Goal (Individualized)  Description: You can add care plan individualizations to a care plan. Examples of Individualization might be:  \"Parent requests to be called daily at 9am for status\", \"I have a hard time hearing out of my right ear\", or \"Do not touch me to wake me up as it startles  me\".  Outcome: Not Progressing  Goal: Absence of Hospital-Acquired Illness or Injury  Outcome: Not Progressing  Intervention: Identify and Manage Fall Risk  Recent Flowsheet Documentation  Taken 3/22/2024 0434 by Barbara Borrero, RN  Safety Promotion/Fall Prevention:   activity supervised   clutter free environment maintained   increase visualization of patient   nonskid shoes/slippers when out of bed   safety round/check completed  Intervention: Prevent Skin Injury  Recent Flowsheet Documentation  Taken 3/22/2024 0552 by Barbara Borrero, RN  Body Position: refuses positioning  Taken 3/22/2024 0434 by Barbara Borrero, ONOFRE  Body " Position: weight shifting  Goal: Optimal Comfort and Wellbeing  Outcome: Not Progressing  Goal: Readiness for Transition of Care  Outcome: Not Progressing     Problem: Infection  Goal: Absence of Infection Signs and Symptoms  Outcome: Not Progressing     Problem: Malnutrition  Goal: Improved Nutritional Intake  Outcome: Not Progressing     Problem: Comorbidity Management  Goal: Maintenance of Heart Failure Symptom Control  Outcome: Not Progressing  Intervention: Maintain Heart Failure Management  Recent Flowsheet Documentation  Taken 3/22/2024 0434 by Barbara Borrero RN  Medication Review/Management: medications reviewed   Goal Outcome Evaluation:      Plan of Care Reviewed With: patient    Overall Patient Progress: no changeOverall Patient Progress: no change    Outcome Evaluation: denies pain, I&D today

## 2024-03-22 NOTE — ANESTHESIA PROCEDURE NOTES
Airway       Patient location during procedure: OR       Procedure Start/Stop Times: 3/22/2024 11:16 AM  Staff -        CRNA: Tracy Cabrera APRN CRNA       Performed By: CRNA  Consent for Airway        Urgency: elective  Indications and Patient Condition       Indications for airway management: desmond-procedural       Induction type:intravenous       Mask difficulty assessment: 1 - vent by mask    Final Airway Details       Final airway type: endotracheal airway       Successful airway: ETT - single  Endotracheal Airway Details        ETT size (mm): 8.0       Cuffed: yes       Successful intubation technique: direct laryngoscopy       DL Blade Type: Billy 2       Grade View of Cords: 1       Adjucts: stylet       Position: Right       Measured from: gums/teeth       Secured at (cm): 23       Bite block used: None    Post intubation assessment        Placement verified by: capnometry, equal breath sounds and chest rise        Number of attempts at approach: 1       Number of other approaches attempted: 0       Secured with: plastic tape       Ease of procedure: easy       Dentition: Intact and Unchanged    Medication(s) Administered   Medication Administration Time: 3/22/2024 11:16 AM

## 2024-03-22 NOTE — PHARMACY-VANCOMYCIN DOSING SERVICE
Pharmacy Vancomycin Note  Date of Service 2024  Patient's  1945   79 year old, male    Indication: Skin and Soft Tissue Infection  Day of Therapy: 4  Current vancomycin regimen:  1500 mg IV q24h  Current vancomycin monitoring method: AUC  Current vancomycin therapeutic monitoring goal: 400-600 mg*h/L    Insight[employer] Prediction of Current Vancomycin Regimen  Regimen: 1500 mg IV every 24 hours.  Start time: 23:16 on 2024  Exposure target: AUC24 (range)400-600 mg/L.hr   AUC24,ss: 606 mg/L.hr  Probability of AUC24 > 400: 100 %  Ctrough,ss: 19.3 mg/L  Probability of Ctrough,ss > 20: 42 %    Current estimated CrCl = Estimated Creatinine Clearance: 67.4 mL/min (based on SCr of 1.15 mg/dL).    Creatinine for last 3 days  3/19/2024:  7:59 AM Creatinine 0.97 mg/dL  3/21/2024:  7:01 AM Creatinine 1.15 mg/dL    Recent Vancomycin Levels (past 3 days)  3/21/2024:  6:09 PM Vancomycin 18.9 ug/mL    Vancomycin IV Administrations (past 72 hours)                     vancomycin (VANCOCIN) 1,500 mg in 0.9% NaCl 250 mL intermittent infusion (mg) 1,500 mg New Bag 246     1,500 mg New Bag 24    vancomycin (VANCOCIN) 2,500 mg in sodium chloride 0.9 % 500 mL intermittent infusion (mg) 2,500 mg New Bag 24                    Nephrotoxins and other renal medications (From now, onward)      Start     Dose/Rate Route Frequency Ordered Stop    24 2200  vancomycin (VANCOCIN) 1,250 mg in 0.9% NaCl 250 mL intermittent infusion         1,250 mg  over 90 Minutes Intravenous EVERY 24 HOURS 24 1943      03/19/24 0800  torsemide (DEMADEX) tablet 20 mg        Note to Pharmacy: MICHAEL Sig:Take 1 tablet (20 mg) by mouth daily      20 mg Oral DAILY 24 2100                 Contrast Orders - past 72 hours (72h ago, onward)      Start     Dose/Rate Route Frequency Stop    24 0900  gadobutrol (GADAVIST) injection 11 mL         11 mL Intravenous ONCE 24 0847    24 1225  gadobutrol  (GADAVIST) injection 11 mL         11 mL Intravenous ONCE 03/20/24 0028            Interpretation of levels and current regimen:  Vancomycin level is reflective of AUC greater than 600    Has serum creatinine changed greater than 50% in last 72 hours: No    Urine output:  good urine output    Renal Function: Stable    InsightRX Prediction of Planned New Vancomycin Regimen  Regimen: 1250 mg IV every 24 hours.  Start time: 2200 on 03/21/2024  Exposure target: AUC24 (range)400-600 mg/L.hr   AUC24,ss: 509 mg/L.hr  Probability of AUC24 > 400: 97 %  Ctrough,ss: 16.3 mg/L  Probability of Ctrough,ss > 20: 12 %  Probability of nephrotoxicity (Lodise GUTIERREZ 2009): 12 %      Plan:  Decrease Dose to 1250 mg IV q24h  Vancomycin monitoring method: AUC  Vancomycin therapeutic monitoring goal: 400-600 mg*h/L  Pharmacy will check vancomycin levels as appropriate in 3-5 Days.  Serum creatinine levels will be ordered daily for the first week of therapy and at least twice weekly for subsequent weeks.    Chirag Bar [facility]

## 2024-03-22 NOTE — OP NOTE
General Surgery Operative Note      Pre-operative diagnosis: Infected sacral decubitus ulcer   Post-operative diagnosis: Same    Procedure: Excisional debridement of sacral decubitus ulcer using electrocautery including subcutaneous fat.   Surgeon: Phill Jimenez MD   Assistant(s): Annalee Gutierrez PA-C   Anesthesia: General    Estimated blood loss: 5 cc     Specimens: ID Type Source Tests Collected by Time Destination   A : Sacral Wound Tissue Tissue Spine, Sacrum ANAEROBIC BACTERIAL CULTURE ROUTINE, GRAM STAIN, AEROBIC BACTERIAL CULTURE ROUTINE Phill Jimenez MD 3/22/2024 11:35 AM           The patient was induced under general anesthesia and placed in a right lateral decubitus position. The sacral was prepped and draped in standard sterile fashion using betadine.  There was a soft eschar of unidentifiable tissue measuring 3cm in diameter which was excised using cautery. Deep to this was a mild amount of thin purulent material. We debrided the underlying fat with cautery back to yellow and bleeding tissue. The debrided tissue was sent for culture. The wound then was bluntly probed and found to track 6cm inferiorly and 3cm medially to the patient's left.  The site was then irrigated with saline, anesthetized with 0.5% marcaine with epi and subsequently packed with betadine soaked Kerlex. A new Mepilex was placed thereafter. The patient tolerated the procedure well.  Sponge and needle counts were correct at the end of the case.    Phill Jimenez MD

## 2024-03-22 NOTE — ANESTHESIA POSTPROCEDURE EVALUATION
Patient: Feng Wynne    Procedure: Procedure(s):  IRRIGATION AND DEBRIDEMENT, WOUND, SACRAL REGION       Anesthesia Type:  General    Note:  Disposition: Inpatient   Postop Pain Control: Uneventful            Sign Out: Well controlled pain   PONV: No   Neuro/Psych: Uneventful            Sign Out: Acceptable/Baseline neuro status   Airway/Respiratory: Uneventful            Sign Out: Acceptable/Baseline resp. status   CV/Hemodynamics: Uneventful            Sign Out: Acceptable CV status; No obvious hypovolemia; No obvious fluid overload   Other NRE: NONE   DID A NON-ROUTINE EVENT OCCUR? No           Last vitals:  Vitals Value Taken Time   BP 92/60 03/22/24 1200   Temp     Pulse 76 03/22/24 1204   Resp 12 03/22/24 1204   SpO2 99 % 03/22/24 1204   Vitals shown include unfiled device data.    Electronically Signed By: Baljeet Pantoja MD  March 22, 2024  12:06 PM

## 2024-03-22 NOTE — PROGRESS NOTES
[facility]  Infectious Disease Progress Note          Assessment and Plan:   Date of Admission:  3/18/2024  Date of Consult (When I saw the patient): 03/20/24        Assessment & Plan  Feng Wynne is a 79 year old who was admitted on 3/18/2024.      Impression: 1  79-year-old male, admitted with worsening sacral decubitus wound, noted at care facility to have increasing drainage mild redness, patient denying other symptoms mild pain  2  fever, no symptoms suggesting sepsis but new acute fever, blood cultures now being done  3  recent 2 lengthy hospitalizations at [facility] for  anemia and esophagitis  4  mitral valve replacement  5  childhood penicillin allergy     REC 1  Vanco and cefepime given the CT findings we will now add Flagyl as well  2 [facility] for [facility] of the problems is logical but they have refused  3 appears abscess is from the wound thus not colorectal intervention, general surgery planning debridement of that in the wound in general without major bone debridement  4 blood cultures negative, temp is down, assuming sepsis clears and no significant bacteremia, likely oral antibiotics as a disposition plan once debrided, culture growing multiple organisms full information to follow, probably plan on rechallenging his childhood penicillin allergy at some point with Enterococcus, anaerobes etc. needing covered  5 operative intervention and debridement currently, await those findings, final antibiotic plan depending on overall progress and findings        Interval History:     no new complaints and doing well; no cp, sob, n/v/d, or abd pain.  In better spirits today CT scan shows  abscess almost certainly related to wound, definite osteomyelitis on imaging quite impressive, blood cultures so far negative temp down wound culture with gram-negative rods and Enterococcus              Medications:      acetaminophen  975 mg Oral Once    [Auto Hold]  "ceFEPIme  2 g Intravenous Q8H    [Auto Hold] ferrous sulfate  325 mg Oral Daily    [Auto Hold] RA Probiotic Gummies  2 chew tab Oral Daily    [Auto Hold] metroNIDAZOLE  500 mg Intravenous Q12H [facility] (08/20)    [Auto Hold] miconazole   Topical BID    [Auto Hold] Multivitamin Adult  2 chew tab Oral Daily    [Auto Hold] pantoprazole  40 mg Oral BID AC    [Auto Hold] potassium chloride  20 mEq Oral Daily    [Auto Hold] psyllium  1 packet Oral Daily    [Auto Hold] sodium chloride (PF)  3 mL Intracatheter Q8H    [Auto Hold] sucralfate  1 g Oral 4x Daily AC & HS    [Auto Hold] torsemide  20 mg Oral Daily    [Auto Hold] vancomycin  1,250 mg Intravenous Q24H    [Auto Hold] Warfarin Therapy Reminder  1 each Oral See Admin Instructions    [Auto Hold] warfarin-No DOSE today  1 each Does not apply no dose today (warfarin)    [Auto Hold] wound support modular  60 mL Oral Daily                  Physical Exam:   Blood pressure 103/63, pulse 75, temperature 98.2  F (36.8  C), temperature source Temporal, resp. rate 17, height 1.803 m (5' 11\"), weight 112 kg (247 lb), SpO2 100%.  Wt Readings from Last 2 Encounters:   03/18/24 112 kg (247 lb)   03/18/24 112.5 kg (248 lb)     Vital Signs with Ranges  Temp:  [97.2  F (36.2  C)-99.3  F (37.4  C)] 98.2  F (36.8  C)  Pulse:  [67-89] 75  Resp:  [7-32] 17  BP: ()/(41-68) 103/63  SpO2:  [93 %-100 %] 100 %    Constitutional: Awake, alert, cooperative, no apparent distress     Lungs: Clear to auscultation bilaterally, no crackles or wheezing   Cardiovascular: Regular rate and rhythm, normal S1 and S2, and no murmur noted   Abdomen: Normal bowel sounds, soft, non-distended, non-tender   Skin: No rashes, no cyanosis, no edema   Other: Wounds about the same          Data:   All microbiology laboratory data reviewed.  Recent Labs   Lab Test 03/22/24  0921 03/21/24  0701 03/19/24  0759   WBC 9.0 8.8 8.3   HGB 9.4* 9.2* 9.3*   HCT 30.2* 28.0* 29.3*   MCV 98 96 99    214 202     Recent Labs " "  Lab Test 03/22/24  0921 03/21/24  0701 03/19/24  0759   CR 1.13 1.15 0.97     Recent Labs   Lab Test 03/18/24  1748   SED >140*     No lab results found.    Invalid input(s): \"UC\"     "

## 2024-03-23 NOTE — PROGRESS NOTES
It was discussed and explained that patients with large pupils are at a higher risk of experiencing glare and halo's at night after refractive surgery. [facility]    Hospitalist Progress Note  Name: Feng Wynne    MRN: [id number]  Provider:  Modesto Hammer DO  Date of Service: 03/23/2024    Summary of Stay: Feng Wynne is a 79 year old male with a history of mechanical mitral valve on warfarin, severe tricuspid regurgitation, right-sided heart failure, moderate pulmonary hypertension, coronary artery disease, hypertension, hypercholesterolemia, atrial fibrillation, history of pill esophagitis with resultant anemia, known sacral decubitus ulcer admitted on 3/18/2024 with concern for infection of his sacral wound.  In the emergency department, patient was found to have a temperature of 97.7  F, blood pressure 109/63, heart rate 81, respiratory rate 20, SpO2 94% on room air.  Initial lab work showed bicarb 31, BUN/creatinine 30.7/1.07, .65, hemoglobin 9.6, ESR greater than 140, INR 3.93.  The patient was started on vancomycin and cefepime.  Infectious disease and general surgery were consulted to see the patient.  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  The sacral/coccygeal soft tissues with 5.5 cm abscess in the posterior perirectal/left ischial anal fossa which extends and is adherent to the posterior rectal wall.  There was consideration for transfer to the [facility] for higher level of care including plastic surgery consultation for wound closure.  The [facility] declined the transfer with plastic surgery recommending IV antibiotic course and wound debridement.  General surgery at the [facility] recommended wound debridement at [facility].  The patient was transitioned to a heparin drip and INR was reversed.  On 3/22, the patient was taken for excisional debridement of sacral decubitus ulcer with electrocautery.  Postoperatively, the patient experienced significant encephalopathy.    TODAY'S  PLAN:  Pt quite encephalopathic this morning.  Appears uncomfortable and moaning so was given IV dilaudid without much improvement.  Check VBG and CT brain without contrast.  Differential includes toxic encephalopathy due to narcotics and anesthesia, infection, stroke, etc.  Moves all ext and no focal neurologic deficits.  Significant other at bedside.  Pt had some word finding difficulties yesterday prior to the procedure but further conversations this improved and SO reports when he is sleep deprived he can have difficulty finding words.  Branch placed in PACU for wound healing.  Would avoid narcotics as able and added IV toradol.  Toradol should be first line for pain control.    ADDENDUM:  Pt more comfortable this afternoon.  CT head negative.  VBG unremarkable.  Check LA and Ammonia.      Problem List:   Sacral Decubitus Ulcer - POA  New Osteomyelitis of Coccyx  Perirectal Abscess  - Continue vanco and cefepime and flagyl  - Appreciate ID recommendations  - Appreciate General Surgery, Colorectal Surgery recommendations  - NPO at midnight for debridement  - Attempted transfer to [facility] on 3/20, however pt declined as plastic surgery recommended IV abx treatment and debridement, and general surgery recommended debridement and abscess drainage at [facility]  - MRI pelvis showed osteomyelitis of coccyx and 5.5 cm perirectal abscess  - At baseline, pt ambulates with a walker  - Maintain branch for now for wound healing    Acute Metabolic vs Toxic Encephalopathy  - Unclear cause.  Possibly secondary to narcotics vs anesthesia vs worsening infection vs cva vs other  - Check CT brain without contrast  - Check VBG  - Will check LA and ammonia later today     Mechanical Mitral Valve  Warfarin Coagulopathy  - INR goal is 2.5-3.5  - Hold warfarin and start heparin drip for consideration of debridement tomorrow and any further debridements as deemed necessary     Mild Hypokalemia  - Electrolyte replacement protocol     Chronic  "Medical Problems:  Atrial Fibrillation  Hypertension  Hypercholesterolemia  Coronary Artery Disease  Pill Esophagitis with Resultant Anemia  Severe Tricuspid Regurgitation  Right Sided Heart Failure  Moderate Pulmonary Hypertension    I spent 54 minutes in reviewing this patient's labs, imaging, medications, medical history.  In addition time was spent interviewing the patient, communicating with family, and medical decision making.     DVT Prophylaxis: Heparin drip  Code Status: Full Code  Diet: Snacks/Supplements Adult: Ensure Enlive; With Meals  Regular Diet Adult    Stubbs Catheter: PRESENT, indication: Surgical procedure  Disposition: Expected discharge in 2-4 days to TCU. Goals prior to discharge include mentation improved, abx plan established, no further plans for debridement.   Family updated today: Yes      Interval History   Pt seen and examined.  Pt moaning and saying \"yup\" repeatedly.  Intermittently follows commands.  Responded with his name    -Data reviewed today: I personally reviewed all new labs and imaging results over the last 24 hours.     Physical Exam   Temp: 97.6  F (36.4  C) Temp src: Axillary BP: 103/51 Pulse: 65   Resp: 20 SpO2: 96 % O2 Device: Nasal cannula Oxygen Delivery: 2 LPM  Vitals:    03/18/24 1700   Weight: 112 kg (247 lb)     Vital Signs with Ranges  Temp:  [97.6  F (36.4  C)-98.2  F (36.8  C)] 97.6  F (36.4  C)  Pulse:  [65-75] 65  Resp:  [15-20] 20  BP: ()/(51-63) 103/51  SpO2:  [95 %-100 %] 96 %  I/O last 3 completed shifts:  In: 591 [P.O.:240; I.V.:351]  Out: 1155 [Urine:1150; Blood:5]    GENERAL: Moaning, opens eyes to command intermittently  HEENT: Normocephalic, atraumatic. Extraocular movements intact.  CARDIOVASCULAR: Regular rate and rhythm without murmurs or rubs. No S3.  PULMONARY: Clear bilaterally.  GASTROINTESTINAL: Soft, non-tender, non-distended. Bowel sounds normoactive.   EXTREMITIES: No cyanosis or clubbing. No edema.  NEUROLOGICAL: CN 2-12 grossly " "intact, no focal neurological deficits.  DERMATOLOGICAL: No rash, ulcer, bruising, nor jaundice.    Medications    heparin 1,350 Units/hr (03/23/24 0928)    - MEDICATION INSTRUCTIONS -        ceFEPIme  2 g Intravenous Q8H    ferrous sulfate  325 mg Oral Daily    RA Probiotic Gummies  2 chew tab Oral Daily    metroNIDAZOLE  500 mg Intravenous Q12H BETTY (08/20)    miconazole   Topical BID    Multivitamin Adult  2 chew tab Oral Daily    pantoprazole  40 mg Oral BID AC    [Held by provider] potassium chloride  20 mEq Oral Daily    psyllium  1 packet Oral Daily    sodium chloride (PF)  3 mL Intracatheter Q8H    sucralfate  1 g Oral 4x Daily AC & HS    [Held by provider] torsemide  20 mg Oral Daily    vancomycin  1,250 mg Intravenous Q24H    Warfarin Therapy Reminder  1 each Oral See Admin Instructions    warfarin-No DOSE today  1 each Does not apply no dose today (warfarin)    wound support modular  60 mL Oral Daily     Data     Laboratory:  Recent Labs   Lab 03/23/24  0437 03/22/24  0921 03/21/24  0701   WBC 5.6 9.0 8.8   HGB 9.0* 9.4* 9.2*   HCT 28.4* 30.2* 28.0*   MCV 97 98 96    236 214     Recent Labs   Lab 03/23/24  0437 03/22/24  0921 03/21/24  1809 03/21/24  0701    136  --  135   POTASSIUM 3.5 3.7  3.7 3.4 3.2*   CHLORIDE 96* 96*  --  96*   CO2 29 29  --  31*   ANIONGAP 10 11  --  8   * 125*  --  101*   BUN 40.5* 34.8*  --  33.8*   CR 1.25* 1.13  --  1.15   GFRESTIMATED 59* 67  --  65   JOSEPH 8.8 8.7*  --  8.5*     No results for input(s): \"CULT\" in the last 168 hours.    Imaging:  No results found for this or any previous visit (from the past 24 hour(s)).      Modesto Hammer DO  [facility] Hospitalist  [address] [place] [address]  Securely message with IID (more info)  Text page via [employer] Paging/Directory   03/23/2024   "

## 2024-03-23 NOTE — PLAN OF CARE
Goal Outcome Evaluation:      Plan of Care Reviewed With: patient    Overall Patient Progress: improvingOverall Patient Progress: improving  To Do:  End of Shift Summary  For vital signs and complete assessments, please see documentation flowsheets.     Pertinent assessments: Assumed cares 3544-1297. Pt disoriented x3, had some difficulty with word finding-  MD. VSS except for soft BP. Afebrile, denies nausea. Non verbal signs of pain present, pt moaning and groaning. IV dilaudid given x 1 for pain. Shifted weight. Up w/ LIFT. LS clear. HS regular. Repo q2. PT had a I&D, dressing done by surgery. Heparin infusing @ 1350 units/hour. Stubbs in place with good urine output.     Major Shift Events: uneventful     Treatment Plan: pain management, IV cefepime, heparin drip, flagyl, repo q2 dressing changes, ID     Bedside Nurse: Sánchez Vallejo RN

## 2024-03-23 NOTE — PROGRESS NOTES
Surgery note:     Attempted to see patient today but down getting CT. Ok to start dressing changes today. Will return tomorrow for evaluation of wound. Please call with any questions or concerns. Would leave branch for now, maybe remove tomorrow pending clinical improvement. Continue heparin.     Baljeet Alexander MD

## 2024-03-23 NOTE — PLAN OF CARE
"Goal Outcome Evaluation:      Plan of Care Reviewed With: patient, spouse    Overall Patient Progress: no changeOverall Patient Progress: no change    Outcome Evaluation: CT completed. pain meds given, dressing changed    End of Shift Summary  For vital signs and complete assessments, please see documentation flowsheets.     Pertinent assessments: Disoriented x3. Pt would not open eyes this AM, but was moaning and talking- not making a lot of sense, restless. MD notified about increased confusion as well as when writer put pills in patient's mouth he would not swallow them. HS regular. LS clear. BS active x4. C/o significant pain, gave dilaudid x2. Tylenol, atarax, and IV morphine prior to dressing change.    Major Shift Events: pain, increased confusion, CT of head    Treatment Plan: repo q2, woc, pain management, IV abx     Bedside Nurse: Mili Santamaria RN       Problem: Adult Inpatient Plan of Care  Goal: Plan of Care Review  Description: The Plan of Care Review/Shift note should be completed every shift.  The Outcome Evaluation is a brief statement about your assessment that the patient is improving, declining, or no change.  This information will be displayed automatically on your shift  note.  Outcome: Not Progressing  Flowsheets (Taken 3/23/2024 1856)  Outcome Evaluation: CT completed. pain meds given, dressing changed  Plan of Care Reviewed With:   patient   spouse  Overall Patient Progress: no change  Goal: Patient-Specific Goal (Individualized)  Description: You can add care plan individualizations to a care plan. Examples of Individualization might be:  \"Parent requests to be called daily at 9am for status\", \"I have a hard time hearing out of my right ear\", or \"Do not touch me to wake me up as it startles  me\".  Outcome: Not Progressing  Goal: Absence of Hospital-Acquired Illness or Injury  Outcome: Not Progressing  Intervention: Identify and Manage Fall Risk  Recent Flowsheet Documentation  Taken " 3/23/2024 0900 by Mili Santamaria RN  Safety Promotion/Fall Prevention:   activity supervised   clutter free environment maintained   increased rounding and observation   increase visualization of patient  Intervention: Prevent Skin Injury  Recent Flowsheet Documentation  Taken 3/23/2024 1600 by Mili Santamaria RN  Body Position:   turned   left  Taken 3/23/2024 1200 by Mili Santamaria RN  Body Position:   turned   right  Intervention: Prevent and Manage VTE (Venous Thromboembolism) Risk  Recent Flowsheet Documentation  Taken 3/23/2024 0900 by Mili Santamaria RN  VTE Prevention/Management: SCDs (sequential compression devices) off  Intervention: Prevent Infection  Recent Flowsheet Documentation  Taken 3/23/2024 0900 by Mili Santamaria RN  Infection Prevention: cohorting utilized  Goal: Optimal Comfort and Wellbeing  Outcome: Not Progressing  Intervention: Monitor Pain and Promote Comfort  Recent Flowsheet Documentation  Taken 3/23/2024 1040 by Mili Santamaria RN  Pain Management Interventions:   MD notified (comment)   medication (see MAR)  Taken 3/23/2024 1010 by Mili Santamaria RN  Pain Management Interventions:   MD notified (comment)   medication (see MAR)  Taken 3/23/2024 0800 by Mili Santamaria RN  Pain Management Interventions: medication (see MAR)  Goal: Readiness for Transition of Care  Outcome: Not Progressing

## 2024-03-23 NOTE — PROGRESS NOTES
[facility]  Infectious Disease Progress Note          Assessment and Plan:   Date of Admission:  3/18/2024  Date of Consult (When I saw the patient): 03/20/24        Assessment & Plan  Feng Wynne is a 79 year old who was admitted on 3/18/2024.      Impression: 1  79-year-old male, admitted with worsening sacral decubitus wound, noted at care facility to have increasing drainage mild redness, patient denying other symptoms mild pain  2  fever, no symptoms suggesting sepsis but new acute fever, blood cultures now being done  3  recent 2 lengthy hospitalizations at [facility] for  anemia and esophagitis  4  mitral valve replacement  5  childhood penicillin allergy     REC 1 operative findings noted, temp is down, not bacteremic, prior microbiology same  2 worsening cognition, probably multifactorial but among issues possible here is cefepime  3 simplify antibiotics to meropenem alone adequate Enterococcus coverage to what ever degree it is a problem and covers the other organisms acceptably, eliminates the cefepime CNS effect        Interval History:     no new fever or sepsis but mentation has worsened, operative findings noted CT scan shows  abscess almost certainly related to wound, definite osteomyelitis on imaging quite impressive, blood cultures so far negative temp down wound culture with gram-negative rods and Enterococcus relatively resistant but covered by meropenem and was covered by cefepime              Medications:      ferrous sulfate  325 mg Oral Daily    RA Probiotic Gummies  2 chew tab Oral Daily    meropenem  1 g Intravenous Q8H    miconazole   Topical BID    Multivitamin Adult  2 chew tab Oral Daily    pantoprazole  40 mg Oral BID AC    [Held by provider] potassium chloride  20 mEq Oral Daily    psyllium  1 packet Oral Daily    sodium chloride (PF)  3 mL Intracatheter Q8H    sucralfate  1 g Oral 4x Daily AC & HS    [Held by provider] torsemide  20 mg Oral Daily     "Warfarin Therapy Reminder  1 each Oral See Admin Instructions    warfarin-No DOSE today  1 each Does not apply no dose today (warfarin)    wound support modular  60 mL Oral Daily                  Physical Exam:   Blood pressure 118/63, pulse 65, temperature 98  F (36.7  C), temperature source Axillary, resp. rate 22, height 1.803 m (5' 11\"), weight 112 kg (247 lb), SpO2 96%.  Wt Readings from Last 2 Encounters:   03/18/24 112 kg (247 lb)   03/18/24 112.5 kg (248 lb)     Vital Signs with Ranges  Temp:  [97.6  F (36.4  C)-98  F (36.7  C)] 98  F (36.7  C)  Pulse:  [65-70] 65  Resp:  [16-22] 22  BP: ()/(51-63) 118/63  SpO2:  [95 %-99 %] 96 %    Constitutional: Awake, alert, cooperative, no apparent distress     Lungs: Clear to auscultation bilaterally, no crackles or wheezing   Cardiovascular: Regular rate and rhythm, normal S1 and S2, and no murmur noted   Abdomen: Normal bowel sounds, soft, non-distended, non-tender   Skin: No rashes, no cyanosis, no edema   Other: Wounds about the same          Data:   All microbiology laboratory data reviewed.  Recent Labs   Lab Test 03/23/24 0437 03/22/24  0921 03/21/24  0701   WBC 5.6 9.0 8.8   HGB 9.0* 9.4* 9.2*   HCT 28.4* 30.2* 28.0*   MCV 97 98 96    236 214     Recent Labs   Lab Test 03/23/24  0437 03/22/24  0921 03/21/24  0701   CR 1.25* 1.13 1.15     Recent Labs   Lab Test 03/18/24  1748   SED >140*     No lab results found.    Invalid input(s): \"UC\"     "

## 2024-03-23 NOTE — PROGRESS NOTES
Notified provider about indwelling branch catheter discussed removal or continued need.    Did provider choose to remove indwelling branch catheter? NO    Provider's branch indication for keeping indwelling branch catheter: Indication for continued use: Other -      Is there an order for indwelling branch catheter? MD LING paged, wrote in note to keep in for today     *If there is a plan to keep branch catheter in place at discharge daily notification with provider is not necessary, but please add a notation in the treatment team sticky note that the patient will be discharging with the catheter.

## 2024-03-24 NOTE — PLAN OF CARE
"End of Shift Summary  For vital signs and complete assessments, please see documentation flowsheets.     Pertinent assessments: Pt drowsy, did not follow commands overnight. DERIC orientation. No signs of pain at rest, pt moans with any movement. Toradol given for comfort. No oral meds given, pt refused. Ax2 with lift. Sacral dressing intact.       Major Shift Events: pain, increased confusion, CT of head  Treatment Plan: repo q2, woc, pain management, IV abx   Bedside Nurse: Katie Zabala RN     Problem: Adult Inpatient Plan of Care  Goal: Plan of Care Review  Description: The Plan of Care Review/Shift note should be completed every shift.  The Outcome Evaluation is a brief statement about your assessment that the patient is improving, declining, or no change.  This information will be displayed automatically on your shift  note.  Outcome: Progressing  Flowsheets (Taken 3/24/2024 0616)  Plan of Care Reviewed With: other (see comments)  Overall Patient Progress: no change  Goal: Patient-Specific Goal (Individualized)  Description: You can add care plan individualizations to a care plan. Examples of Individualization might be:  \"Parent requests to be called daily at 9am for status\", \"I have a hard time hearing out of my right ear\", or \"Do not touch me to wake me up as it startles  me\".  Outcome: Progressing  Goal: Absence of Hospital-Acquired Illness or Injury  Outcome: Progressing  Intervention: Identify and Manage Fall Risk  Recent Flowsheet Documentation  Taken 3/23/2024 2000 by Katie Zabala RN  Safety Promotion/Fall Prevention:   activity supervised   assistive device/personal items within reach   clutter free environment maintained   increased rounding and observation   safety round/check completed  Intervention: Prevent Skin Injury  Recent Flowsheet Documentation  Taken 3/23/2024 2000 by Katie Zabala, RN  Body Position:   weight shifting   supine, head elevated  Intervention: Prevent and Manage VTE " (Venous Thromboembolism) Risk  Recent Flowsheet Documentation  Taken 3/23/2024 2000 by Katie Zabala, RN  VTE Prevention/Management: SCDs (sequential compression devices) off  Intervention: Prevent Infection  Recent Flowsheet Documentation  Taken 3/23/2024 2000 by Katie Zabala, RN  Infection Prevention: cohorting utilized  Goal: Optimal Comfort and Wellbeing  Outcome: Progressing  Goal: Readiness for Transition of Care  Outcome: Progressing     Problem: Infection  Goal: Absence of Infection Signs and Symptoms  Outcome: Progressing     Problem: Malnutrition  Goal: Improved Nutritional Intake  Outcome: Progressing     Problem: Comorbidity Management  Goal: Maintenance of Heart Failure Symptom Control  Outcome: Progressing  Intervention: Maintain Heart Failure Management  Recent Flowsheet Documentation  Taken 3/23/2024 2000 by Katie Zabala, RN  Medication Review/Management: medications reviewed

## 2024-03-24 NOTE — PROGRESS NOTES
"   03/24/24 1641   Appointment Info   Signing Clinician's Name / Credentials (PT) Jimi Isbell DPT   Living Environment   People in Home spouse   Transportation Anticipated family or friend will provide   Living Environment Comments Limited information obtained from pt, pt very confused. Per conversation with nurse, partner stated pt at baseline was able to ambulate with walker (limited to short distances) like getting to the bathroom. Lives in house with partner, but most recently was at [facility] then a [facility].   Self-Care   Usual Activity Tolerance moderate   Current Activity Tolerance poor   Fall history within last six months no   Activity/Exercise/Self-Care Comment ambulating with walker at baseline, other info not obtained on this date.   General Information   Onset of Illness/Injury or Date of Surgery 03/18/24   Referring Physician Phill Jimenez MD   Pertinent History of Current Problem (include personal factors and/or comorbidities that impact the POC) Pt is 78 yo male who, per chart, \"79 year old male with a history of mechanical mitral valve on warfarin, severe tricuspid regurgitation, right-sided heart failure, moderate pulmonary hypertension, coronary artery disease, hypertension, hypercholesterolemia, atrial fibrillation, history of pill esophagitis with resultant anemia, known sacral decubitus ulcer admitted on 3/18/2024 with concern for infection of his sacral wound.\" and \"Infectious disease and general surgery were consulted to see the patient.  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  The sacral/coccygeal soft tissues with 5.5 cm abscess in the posterior perirectal/left ischial anal fossa which extends and is adherent to the posterior rectal wall.  There was consideration for transfer to the [facility] for higher level of care including plastic surgery consultation for wound " "closure.  The [facility] declined the transfer with plastic surgery recommending IV antibiotic course and wound debridement.  General surgery at the [facility] recommended wound debridement at [facility].  The patient was transitioned to a heparin drip and INR was reversed.  On 3/22, the patient was taken for excisional debridement of sacral decubitus ulcer with electrocautery.  Postoperatively, the patient experienced significant encephalopathy. \"   Existing Precautions/Restrictions fall   Cognition   Affect/Mental Status (Cognition) confused   Pain Assessment   Patient Currently in Pain   (yes, unable to identify where verbally or visually.)   Integumentary/Edema   Integumentary/Edema Comments loose skin noted throughout B LEs and UEs, appears to be quite atrophied with poor muscle mass.   Range of Motion (ROM)   Range of Motion ROM deficits secondary to pain;ROM deficits secondary to weakness   ROM Comment PROM of B knees and hips limited due to pain, pt having pain sitting EOB with bending knees to ~90 degrees.   Strength (Manual Muscle Testing)   Strength (Manual Muscle Testing) Deficits observed during functional mobility   Bed Mobility   Bed Mobility supine-sit   Comment, (Bed Mobility) maxAx2-3, assistance from nurse and nursing aid.   Transfers   Comment, (Transfers) unable to perform on this date.   Gait/Stairs (Locomotion)   Comment, (Gait/Stairs) unable to perform on this date.   Balance   Balance Comments sitting balance fair to good.   Sensory Examination   Sensory Perception Comments difficulty to assess, pt very sensative to touch/palpation of distal B LEs saying \"ow\" when assessing for edema.   Clinical Impression   Criteria for Skilled Therapeutic Intervention Yes, treatment indicated   PT Diagnosis (PT) impaired functional mobility   Influenced by the following impairments pain, decreased ROM, strength, and activity tolerance; confusion.   Functional limitations due " to impairments difficulty with bed mobility, all OOB.   Clinical Presentation (PT Evaluation Complexity) evolving   Clinical Presentation Rationale current presentation, PMH, and clinical judgment   Clinical Decision Making (Complexity) low complexity   Planned Therapy Interventions (PT) balance training;bed mobility training;gait training;home exercise program;neuromuscular re-education;ROM (range of motion);strengthening;stretching;stair training;transfer training;progressive activity/exercise   Risk & Benefits of therapy have been explained evaluation/treatment results reviewed;care plan/treatment goals reviewed;risks/benefits reviewed;current/potential barriers reviewed;participants voiced agreement with care plan;participants included;patient   PT Total Evaluation Time   PT Eval, Low Complexity Minutes (91838) 10   Physical Therapy Goals   PT Frequency 3x/week   PT Predicted Duration/Target Date for Goal Attainment 03/31/24   PT Goals Bed Mobility;Transfers;Gait   PT: Bed Mobility Moderate assist;Supine to/from sit   PT: Transfers Minimal assist;Sit to/from stand;Assistive device   PT: Gait Minimal assist;Standard walker;10 feet   Interventions   Interventions Quick Adds Therapeutic Activity   Therapeutic Activity   Therapeutic Activities: dynamic activities to improve functional performance Minutes (60731) 23   Symptoms Noted During/After Treatment Fatigue   Treatment Detail/Skilled Intervention Pt supine in bed upon PT arrival. Pt on 1L of O2 via NC, SpO2 at 98% prior to mobility. Nurse and nursing aid present during session, okay'd weaning off O2 during session. Pt confused and not following 90% of commands. Performed PROM of B LEs at ankles, knees, hips, pain when performing and pt limited when cued for pariticpating. Extended time setting up room prior to mobility. Attempted supine rolling towards R side, maxA, unable to complete roll to sidelying (accomplished ~50% roll), pt able to grab R grabbar. Once  sitting, pt needing Maribeth, then progressed to SBA with cues on UE placement, maxA to reposition LEs with feet under knees for increase support. Pt sat for ~7 minutes. Pt performed sit > supine, maxAx3, boosted in bed Ax2. Pt supine, nurse present helping pt with positioning.   PT Discharge Planning   PT Plan Ax2-3 likely required, progress sitting EOB tolerance, consider supine ROM and exercises; consider delaney to chair as able then performing transfers.   PT Discharge Recommendation (DC Rec) Transitional Care Facility;Long term care facility   PT Rationale for DC Rec Unclear exact baseline mobility, but per nurse pt ambulating with walker while living in house with partner. Pt came from [facility]. Pt is significantly below baseline, limited by confusion, decreased strength and decreased command following. Recommend [facility]. Pt would not be safe to return home at this time.   PT Brief overview of current status bed mob maxAx2-3   Total Session Time   Timed Code Treatment Minutes 23   Total Session Time (sum of timed and untimed services) 33

## 2024-03-24 NOTE — PROGRESS NOTES
Surgery progress note:     Wound examined today and it appears good. No active infection or necrotic tissue seen. Beefy red tissue with some fibrinous exudates as expected. The wound was repacked with vashe soaked gauze and a Mepilex pad.     A/P:   -continue with daily dressing changes, no plans for repeat debridement in the OR    -ok to transition to oral anticoagulation from surgery perspective as no OR trips anticipated   -branch can be removed at any point, reasonable to leave in for today with ongoing encephalopathy work-up and MRI but does not need to stay in for the wound cares in general     Baljeet Alexander MD

## 2024-03-24 NOTE — PLAN OF CARE
"Goal Outcome Evaluation:      Plan of Care Reviewed With: patient, spouse    Overall Patient Progress: no changeOverall Patient Progress: no change    Outcome Evaluation: CT completed. pain meds given, dressing changed      End of Shift Summary  For vital signs and complete assessments, please see documentation flowsheets.     Pertinent assessments: Unable to assess orientation as patient is not following commands and just repeating statements. Pt does moan and vocalize but unable to tell writer where his pain is, he will not take oral pills, moves his mouth away. Gave toradol x1 for pain. LS clear. HS regular. Repo q2 extensive A2 or Ax3. NS IVF infusing. Heparin infusing @ 1150. After MRI patient was more responsive, still not making a lot of sense but able to sit at edge of bed with PT and take some pills and eat    Major Shift Events: brain MRI, neurology    Treatment Plan: neuro q4, pain management, IV merrem, ID, WOC repo q2, monitor mentation    Bedside Nurse: Mili Santamaria RN         Problem: Adult Inpatient Plan of Care  Goal: Plan of Care Review  Description: The Plan of Care Review/Shift note should be completed every shift.  The Outcome Evaluation is a brief statement about your assessment that the patient is improving, declining, or no change.  This information will be displayed automatically on your shift  note.  Outcome: Not Progressing  Goal: Patient-Specific Goal (Individualized)  Description: You can add care plan individualizations to a care plan. Examples of Individualization might be:  \"Parent requests to be called daily at 9am for status\", \"I have a hard time hearing out of my right ear\", or \"Do not touch me to wake me up as it startles  me\".  Outcome: Not Progressing  Goal: Absence of Hospital-Acquired Illness or Injury  Outcome: Not Progressing  Intervention: Identify and Manage Fall Risk  Recent Flowsheet Documentation  Taken 3/24/2024 0800 by Mili Santamaria, RN  Safety " Promotion/Fall Prevention:   activity supervised   clutter free environment maintained   increased rounding and observation   increase visualization of patient  Intervention: Prevent Skin Injury  Recent Flowsheet Documentation  Taken 3/24/2024 1630 by Mili Santamaria RN  Body Position:   turned   left  Taken 3/24/2024 1500 by Mili Santamaria RN  Body Position:   turned   right  Taken 3/24/2024 1200 by Mili Santamaria RN  Body Position:   turned   left  Intervention: Prevent and Manage VTE (Venous Thromboembolism) Risk  Recent Flowsheet Documentation  Taken 3/24/2024 0800 by Mili Santamaria RN  VTE Prevention/Management: SCDs (sequential compression devices) on  Intervention: Prevent Infection  Recent Flowsheet Documentation  Taken 3/24/2024 0800 by Mili Santamaria RN  Infection Prevention: cohorting utilized  Goal: Optimal Comfort and Wellbeing  Outcome: Not Progressing  Intervention: Monitor Pain and Promote Comfort  Recent Flowsheet Documentation  Taken 3/24/2024 1256 by Mili Santamaria RN  Pain Management Interventions: medication (see MAR)  Taken 3/24/2024 0800 by Mili Santamaria RN  Pain Management Interventions: declines  Goal: Readiness for Transition of Care  Outcome: Not Progressing

## 2024-03-24 NOTE — PROGRESS NOTES
[facility]    Hospitalist Progress Note  Name: Feng Wynne    MRN: [id number]  Provider:  Modesto Hammer DO  Date of Service: 03/24/2024    Summary of Stay: Feng Wynne is a 79 year old male with a history of mechanical mitral valve on warfarin, severe tricuspid regurgitation, right-sided heart failure, moderate pulmonary hypertension, coronary artery disease, hypertension, hypercholesterolemia, atrial fibrillation, history of pill esophagitis with resultant anemia, known sacral decubitus ulcer admitted on 3/18/2024 with concern for infection of his sacral wound.  In the emergency department, patient was found to have a temperature of 97.7  F, blood pressure 109/63, heart rate 81, respiratory rate 20, SpO2 94% on room air.  Initial lab work showed bicarb 31, BUN/creatinine 30.7/1.07, .65, hemoglobin 9.6, ESR greater than 140, INR 3.93.  The patient was started on vancomycin and cefepime.  Infectious disease and general surgery were consulted to see the patient.  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  The sacral/coccygeal soft tissues with 5.5 cm abscess in the posterior perirectal/left ischial anal fossa which extends and is adherent to the posterior rectal wall.  There was consideration for transfer to the [facility] for higher level of care including plastic surgery consultation for wound closure.  The [facility] declined the transfer with plastic surgery recommending IV antibiotic course and wound debridement.  General surgery at the [facility] recommended wound debridement at [facility].  The patient was transitioned to a heparin drip and INR was reversed.  On 3/22, the patient was taken for excisional debridement of sacral decubitus ulcer with electrocautery.  Postoperatively, the patient experienced significant encephalopathy.     TODAY'S  PLAN:  Pt still quite encephalopathic.  Repeating his name over and over.  Does not follow commands, but moves all ext.  Last dose of morphine or narcotic was yesterday afternoon.  Cefepime discontinued yesterday to exclude it from possible causes of encephalopathy.  CT brain, LA, Ammonia, VBG, CBC unremarkable.  Given his mechanical heart valve and brief period off anticoagulation for surgery, will check MRI brain and ask for Neurology evaluation.  Could be toxic encephalopathy and taking time for his body to eliminate the products, but would expect some degree of improvement.  On 3/20, pt was communicating normally and able to hold a conversation.  Maintain branch today given his encephalopathy.  On meropenem now and appreciate ID and General Surgery recommendations.  Maintain heparin drip for now pending General Surgery evaluation until certain no further debridements needed.  Updates SO Jose via phone.  He is not coming to the hospital today.  All questions answered.  ADDENDUM:  Discussed with and appreciate Neurology recommendations.  Suspect toxic encephalopathy that is slow to resolve.  Possibly secondary to cefepime vs narcotics vs anesthesia.  Plan to check EEG.  MRI negative for stroke.  Does show pachymeningeal thickening which can be seen with intracranial hypotension or sometimes meningitis.  Possible coccygeal infection seeded to CSF, though on abx suspect less likely.  Only way to rule out is an LP.  Plan is to see if mentation slowly improves.  If no improvement, could consider LP in the next several days, but will hold off for now.    Problem List:   Sacral Decubitus Ulcer - POA  New Osteomyelitis of Coccyx  Perirectal Abscess  - Continue vanco and cefepime and flagyl.  Transitioned to Meropenem on 3/23  - Appreciate ID recommendations  - Appreciate General Surgery, Colorectal Surgery recommendations  - Wound cultures positive for enterococcus faecalis, gram negative bacilli, sensitivities  pending  - Attempted transfer to [facility] on 3/20, however pt declined as plastic surgery recommended IV abx treatment and debridement, and general surgery recommended debridement and abscess drainage at [facility]  - MRI pelvis showed osteomyelitis of coccyx and 5.5 cm perirectal abscess  - At baseline, pt ambulates with a walker  - Maintain branch for now for wound healing     Acute Metabolic vs Toxic Encephalopathy  - Unclear cause.  Possibly secondary to narcotics vs anesthesia vs worsening infection vs cva vs other  - CT brain negative  - VBG, LA, Ammonia unremarkable  - Check MRI brain  - Appreciate Neurology recommendations    Mild Acute Kidney Injury  - Baseline Cr = 0.9  - IVF today  - Daily BMP     Mechanical Mitral Valve  Warfarin Coagulopathy  - INR goal is 2.5-3.5  - Hold warfarin and start heparin drip for consideration of debridement tomorrow and any further debridements as deemed necessary     Mild Hypokalemia  - Electrolyte replacement protocol     Chronic Medical Problems:  Atrial Fibrillation  Hypertension  Hypercholesterolemia  Coronary Artery Disease  Pill Esophagitis with Resultant Anemia  Severe Tricuspid Regurgitation  Right Sided Heart Failure  Moderate Pulmonary Hypertension    I spent 55 minutes in reviewing this patient's labs, imaging, medications, medical history.  In addition time was spent interviewing the patient, communicating with family, and medical decision making.     DVT Prophylaxis: Heparin drip  Code Status: Full Code  Diet: Snacks/Supplements Adult: Ensure Enlive; With Meals  Vegetarian Diet    Branch Catheter: PRESENT, indication: Surgical procedure  Disposition: Expected discharge in 3-5 days to TCU. Goals prior to discharge include mentation improved, abx plan established, no further plans for surgical intervention.   Family updated today: Yes      Interval History   Pt seen and examined.  Pt repeating name over and over.  Opens eyes.    -Data reviewed today: I personally reviewed  all new labs and imaging results over the last 24 hours.     Physical Exam   Temp: 97.3  F (36.3  C) Temp src: Axillary BP: 121/59 Pulse: 71   Resp: 18 SpO2: 99 % O2 Device: Nasal cannula Oxygen Delivery: 2 LPM  Vitals:    03/18/24 1700   Weight: 112 kg (247 lb)     Vital Signs with Ranges  Temp:  [97.3  F (36.3  C)-98  F (36.7  C)] 97.3  F (36.3  C)  Pulse:  [54-71] 71  Resp:  [16-22] 18  BP: (101-121)/(55-63) 121/59  SpO2:  [96 %-99 %] 99 %  I/O last 3 completed shifts:  In: 0   Out: 475 [Urine:475]    GENERAL: Repeating name over and over, opens eyes to voice but does not follow commands  HEENT: Normocephalic, atraumatic. Extraocular movements intact.  CARDIOVASCULAR: Regular rate and rhythm without murmurs or rubs. No S3.  PULMONARY: Clear bilaterally.  GASTROINTESTINAL: Soft, non-tender, non-distended. Bowel sounds normoactive.   EXTREMITIES: No cyanosis or clubbing. No edema.  NEUROLOGICAL: CN 2-12 grossly intact, no focal neurological deficits.  DERMATOLOGICAL: No rash, ulcer, bruising, nor jaundice.  OTHER:     Medications    heparin 1,150 Units/hr (03/24/24 0231)    - MEDICATION INSTRUCTIONS -      sodium chloride        acetaminophen  975 mg Oral Q8H    ferrous sulfate  325 mg Oral Daily    RA Probiotic Gummies  2 chew tab Oral Daily    meropenem  1 g Intravenous Q8H    miconazole   Topical BID    Multivitamin Adult  2 chew tab Oral Daily    pantoprazole  40 mg Oral BID AC    [Held by provider] potassium chloride  20 mEq Oral Daily    psyllium  1 packet Oral Daily    sodium chloride (PF)  3 mL Intracatheter Q8H    sucralfate  1 g Oral 4x Daily AC & HS    [Held by provider] torsemide  20 mg Oral Daily    Warfarin Therapy Reminder  1 each Oral See Admin Instructions     Data     Laboratory:  Recent Labs   Lab 03/23/24  1229   PHV 7.47*   PO2V 84*   PCO2V 44   HCO3V 32*     Recent Labs   Lab 03/24/24  0722 03/23/24  0437 03/22/24  0921   WBC 6.2 5.6 9.0   HGB 9.7* 9.0* 9.4*   HCT 31.2* 28.4* 30.2*   MCV 99 97  "98    240 236     Recent Labs   Lab 03/24/24  0722 03/23/24  0437 03/22/24  0921    135 136   POTASSIUM 3.9 3.5 3.7  3.7   CHLORIDE 101 96* 96*   CO2 28 29 29   ANIONGAP 10 10 11   GLC 87 137* 125*   BUN 47.2* 40.5* 34.8*   CR 1.38* 1.25* 1.13   GFRESTIMATED 52* 59* 67   JOSEPH 9.2 8.8 8.7*     Recent Labs   Lab 03/23/24  1712   TIFFANY 36     Recent Labs   Lab 03/23/24  1712   LACT 0.8     No results for input(s): \"CULT\" in the last 168 hours.    Imaging:  Recent Results (from the past 24 hour(s))   CT Head w/o Contrast    Narrative    EXAM: CT HEAD W/O CONTRAST  LOCATION: [facility]  DATE: 3/23/2024    INDICATION: encephalopathy, mechanical heart valve and off anticoag for surgery  COMPARISON: None.  TECHNIQUE: Routine CT Head without IV contrast. Multiplanar reformats. Dose reduction techniques were used.    FINDINGS:  INTRACRANIAL CONTENTS: No intracranial hemorrhage, extraaxial collection, or mass effect.  No CT evidence of acute infarct. Mild presumed chronic small vessel ischemic changes. Mild generalized volume loss. No hydrocephalus.     VISUALIZED ORBITS/SINUSES/MASTOIDS: No intraorbital abnormality. No paranasal sinus mucosal disease. No middle ear or mastoid effusion.    BONES/SOFT TISSUES: No acute abnormality.      Impression    IMPRESSION:  1.  No acute findings. Chronic changes as above.             Modesto Hammer DO  [facility]  [address] [place] [address]  Securely message with [employer] (more info)  Text page via [facility] Paging/Directory   03/24/2024   "

## 2024-03-24 NOTE — PROGRESS NOTES
[facility]  Infectious Disease Progress Note          Assessment and Plan:   Date of Admission:  3/18/2024  Date of Consult (When I saw the patient): 03/20/24        Assessment & Plan  Feng Wynne is a 79 year old who was admitted on 3/18/2024.      Impression: 1  79-year-old male, admitted with worsening sacral decubitus wound, noted at care facility to have increasing drainage mild redness, patient denying other symptoms mild pain  2  fever, no symptoms suggesting sepsis but new acute fever, blood cultures now being done  3  recent 2 lengthy hospitalizations at [facility] for  anemia and esophagitis  4  mitral valve replacement  5  childhood penicillin allergy     REC 1 operative findings noted, temp is down, not bacteremic, prior microbiology same  2 worsening cognition, probably multifactorial but among issues possible here is cefepime but now off cefepime encephalopathy continues at a high level, CT scan negative getting MRI scan done really not septic enough to be explained by sepsis  3 simplify antibiotics to meropenem alone adequate Enterococcus coverage to what ever degree it is a problem and covers the other organisms acceptably, eliminates the cefepime CNS effect        Interval History:     no new fever or sepsis but mentation has worsened, and he remains quite encephalopathic operative findings noted CT scan shows  abscess almost certainly related to wound, definite osteomyelitis on imaging quite impressive, blood cultures so far negative temp down wound culture with gram-negative rods and Enterococcus relatively resistant but covered by meropenem and was covered by cefepime              Medications:      acetaminophen  975 mg Oral Q8H    ferrous sulfate  325 mg Oral Daily    LORazepam  0.5 mg Intravenous Once    RA Probiotic Gummies  2 chew tab Oral Daily    meropenem  1 g Intravenous Q8H    miconazole   Topical BID    Multivitamin Adult  2 chew tab Oral Daily     "pantoprazole  40 mg Intravenous BID    [Held by provider] potassium chloride  20 mEq Oral Daily    psyllium  1 packet Oral Daily    sodium chloride (PF)  3 mL Intracatheter Q8H    sucralfate  1 g Oral 4x Daily AC & HS    [Held by provider] torsemide  20 mg Oral Daily    Warfarin Therapy Reminder  1 each Oral See Admin Instructions    warfarin-No DOSE today  1 each Does not apply no dose today (warfarin)                  Physical Exam:   Blood pressure 108/57, pulse 65, temperature (!) 96.7  F (35.9  C), temperature source Axillary, resp. rate 20, height 1.803 m (5' 11\"), weight 112 kg (247 lb), SpO2 98%.  Wt Readings from Last 2 Encounters:   03/18/24 112 kg (247 lb)   03/18/24 112.5 kg (248 lb)     Vital Signs with Ranges  Temp:  [96.7  F (35.9  C)-97.4  F (36.3  C)] 96.7  F (35.9  C)  Pulse:  [54-71] 65  Resp:  [16-22] 20  BP: (101-121)/(55-59) 108/57  SpO2:  [98 %-100 %] 98 %    Constitutional: Very confused encephalopathy 90 better than previously     Lungs: Clear to auscultation bilaterally, no crackles or wheezing   Cardiovascular: Regular rate and rhythm, normal S1 and S2, and no murmur noted   Abdomen: Normal bowel sounds, soft, non-distended, non-tender   Skin: No rashes, no cyanosis, no edema   Other: Wounds about the same          Data:   All microbiology laboratory data reviewed.  Recent Labs   Lab Test 03/24/24  0722 03/23/24  0437 03/22/24  0921   WBC 6.2 5.6 9.0   HGB 9.7* 9.0* 9.4*   HCT 31.2* 28.4* 30.2*   MCV 99 97 98    240 236     Recent Labs   Lab Test 03/24/24  0722 03/23/24  0437 03/22/24  0921   CR 1.38* 1.25* 1.13     Recent Labs   Lab Test 03/18/24  1748   SED >140*     No lab results found.    Invalid input(s): \"UC\"     "

## 2024-03-24 NOTE — CONSULTS
Neurology Consultation    Feng Wynne MRN# [id number]   YOB: 1945 Age: 79 year old    Code Status:Full Code   Date of Admission: 3/18/2024  Date of Consult:03/24/2024                                                                                       Assessment and Plan:                                         #. Encephalopathy in setting of recent surgery (sacral wound / infection s/p debridement), pain, narcotics, prolonged medical illness, recent use of cefepime (discontinued yesterday) and meropenem.  All risk factors for hospital acquired delirium    --Continue supportive medical care.    EEG to be schedule for tomorrow.   #. MRI finding of pachymeningeal enhancement- nonspecific finding which can be due to several conditions- intracranial hypotension/csf leak. , carcinomatosis, infection-question given the amount of osteomyelitis involving the coccyx--whether this could be contiguous with the meninges  -- Consider lumbar puncture if patient does not improve clinically  Plan discussed with salenaistDr. Harman Sommer from general neurology service will follow this patient in the week ahead.          ----------------------------------------------------------------------------------  ----------------------------------------------------------------------------------  Reason for consult: as above       Chief Complaint:   Patient unaware           History of Present Illness:   This patient is a 79 year old male who presents with confusion over last couple days.    Background Medical HX: mechanical mitral valve on warfarin, severe tricuspid regurgitation, right-sided heart failure, moderate pulmonary hypertension, coronary artery disease, hypertension, hypercholesterolemia, atrial fibrillation, history of pill esophagitis with resultant anemia, known sacral decubitus ulcer     Admitted 3/18 with concern for infection of his sacral wound. The patient was started on vancomycin  and cefepime.  Infectious disease and general surgery were consulted to see the patient.  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  The sacral/coccygeal soft tissues with 5.5 cm abscess in the posterior perirectal/left ischial anal fossa which extends and is adherent to the posterior rectal wall.  There was consideration for transfer to the [facility] for higher level of care including plastic surgery consultation for wound closure.  The [facility] declined the transfer with plastic surgery recommending IV antibiotic course and wound debridement.  General surgery at the [facility] recommended [facility]brid[facility].  The patient was transitioned to a heparin drip and INR was reversed.  On 3/22, the patient was taken for excisional debridement of sacral decubitus ulcer with electrocautery.  Postoperatively, the patient experienced significant encephalopathy. .SO reports when he is sleep deprived he can have difficulty finding words   No current fever.  Last dosing of narcotics 24 hours ago.  Receiving antibiotics including cefepime and meropenem both of which are known to be associated with delirium-cefepime was discontinued yesterday  Nursing staff feels that he is somewhat better today after receiving lorazepam for his MRI          MEDICAL DOCUMENTATION REVIEWED:  Hospitalist notes:   ID notes  Surgery notes       Past Medical History:     Past Medical History:   Diagnosis Date    Acute on chronic congestive heart failure, unspecified heart failure type (H) 01/30/2024    Arthritis     Atrial fibrillation (H)     Coronary artery disease     Hypercholesteremia     Obesity     Unspecified essential hypertension          Past Surgical History:     Past Surgical History:   Procedure Laterality Date    COLONOSCOPY N/A 1/15/2024    Procedure: Colonoscopy;  Surgeon: May  Osbaldo Centeno MD;  Location:  GI    ESOPHAGOSCOPY, GASTROSCOPY, DUODENOSCOPY (EGD), COMBINED N/A 1/15/2024    Procedure: Esophagoscopy, gastroscopy, duodenoscopy (EGD), combined;  Surgeon: Osbaldo Olvera MD;  Location:  GI    ESOPHAGOSCOPY, GASTROSCOPY, DUODENOSCOPY (EGD), COMBINED N/A 2024    Procedure: Esophagoscopy, gastroscopy, duodenoscopy (EGD), combined;  Surgeon: Osbaldo Olvera MD;  Location:  GI    MITRAL VALVE REPLACEMENT  2008    Mitral valve replacement with 31-mm [employer] mechanical valve.           Social History:     Social History     Socioeconomic History    Marital status: Single     Spouse name: None    Number of children: 1    Years of education: None    Highest education level: None   Occupational History    Occupation: Self employed      Comment:     Tobacco Use    Smoking status: Former     Packs/day: 0.50     Years: 5.00     Additional pack years: 0.00     Total pack years: 2.50     Types: Cigarettes     Quit date:      Years since quittin.2    Smokeless tobacco: Never   Vaping Use    Vaping Use: Never used   Substance and Sexual Activity    Alcohol use: Yes     Comment: 1 drink per month    Drug use: No    Sexual activity: Yes     Partners: Male   Other Topics Concern    Parent/sibling w/ CABG, MI or angioplasty before 65F 55M? Yes     Patient denies smoking, no significant alcohol intake, denies illicit drugs use       Family History:     Family History   Problem Relation Age of Onset    Cerebrovascular Disease Father     Diabetes Paternal Grandmother     C.A.D. Brother         CABG X 3 at age 51     Reviewed and not felt to be contributory.        Home Medications:     Prior to Admission Medications   Prescriptions Last Dose Informant Patient Reported? Taking?   Multiple Vitamins-Minerals (MULTIVITAMIN ADULT) CHEW Unknown  Yes Yes   Sig: Take 2 chew tab by mouth daily   Omega-3 Fatty Acids (FISH OIL CONCENTRATE PO) 3/18/2024 at am  Nursing Home Yes Yes   Sig: Take 1 capsule by mouth daily   Probiotic CHEW Unknown  Yes Yes   Sig: Take 2 chew tab by mouth daily   Warfarin Therapy Reminder   Yes No   Sig: Take 1 each by mouth See Admin Instructions Per INR   acetaminophen (TYLENOL) 325 MG tablet 3/22/2024  No Yes   Sig: Take 2 tablets (650 mg) by mouth every 6 hours as needed for mild pain   albuterol (PROAIR HFA/PROVENTIL HFA/VENTOLIN HFA) 108 (90 Base) MCG/ACT inhaler Unknown  Yes Yes   Sig: Inhale 1 puff into the lungs every 4 hours as needed for shortness of breath   bisacodyl (DULCOLAX) 10 MG suppository  skilled nursing Yes Yes   Sig: Place 10 mg rectally daily as needed for constipation Every 3 days if no BM   calcium carbonate (TUMS) 500 MG chewable tablet Unknown  No Yes   Sig: Take 1 tablet (500 mg) by mouth 3 times daily as needed for heartburn   clindamycin (CLEOCIN) 150 MG capsule Unknown Nursing Home Yes Yes   Sig: TAKE 4 CAPS BY MOUTH 1 HOUR PRIOR TO DENTAL APPOINTMENT   diclofenac (VOLTAREN) 1 % topical gel 3/18/2024 at am  No Yes   Sig: Apply 4 g topically 3 times daily   Patient taking differently: Apply 4 g topically daily   ferrous sulfate (FEROSUL) 325 (65 Fe) MG tablet 3/22/2024 at am  No Yes   Sig: Take 1 tablet (325 mg) by mouth daily   nystatin (MYCOSTATIN) 494049 UNIT/GM external powder 3/18/2024 at x1 Nursing Home Yes Yes   Sig: Apply topically 2 times daily Groin folds   order for DME   No No   Sig: Equipment being ordered: COMPRESSION STOCKINGS, 20-30 mmHg   pantoprazole (PROTONIX) 40 MG EC tablet 3/18/2024 at x1 Nursing Home No Yes   Sig: Take 1 tablet (40 mg) by mouth 2 times daily (before meals)   polyethylene glycol (MIRALAX) 17 GM/Dose powder Unknown Nursing Home Yes Yes   Sig: Take 17 g by mouth daily as needed for constipation   potassium chloride (KAYCIEL) 10 % SOLN solution 3/18/2024 at am  No Yes   Sig: Take 15 mLs (20 mEq) by mouth daily   psyllium (METAMUCIL/KONSYL) 58.6 % powder 3/18/2024 at am  No Yes    Sig: Take 18 g (1 Tablespoonful) by mouth daily   sennosides (SENOKOT) 8.6 MG tablet  Nursing Home Yes Yes   Sig: Take 1 tablet by mouth 2 times daily as needed for constipation   sodium chloride (OCEAN) 0.65 % nasal spray Unknown  No Yes   Sig: Spray 1 spray into both nostrils every hour as needed for congestion   sucralfate (CARAFATE) 1 GM/10ML suspension 3/18/2024 at x2  Yes Yes   Sig: Take 1 g by mouth 4 times daily (before meals and nightly)   torsemide (DEMADEX) 20 MG tablet 3/18/2024 at am  No Yes   Sig: Take 1 tablet (20 mg) by mouth daily   warfarin ANTICOAGULANT (COUMADIN) 3 MG tablet 3/17/2024 at pm 3 mg  Yes Yes   Sig: Take by mouth daily 3 mg 3/14, 4 mg 3/15, 4 mg 3/16 , 3 mg 3/17      Facility-Administered Medications: None          Allergy:     Allergies   Allergen Reactions    Amiodarone Shortness Of Breath    Pcn [Penicillins] Shortness Of Breath     Rxn occurred in childhood     Statins Muscle Pain (Myalgia) and Hives    Amiodarone     Latex     Zetia [Ezetimibe] Muscle Pain (Myalgia)     Muscle weakness legs          Inpatient Medications:   Scheduled Meds:   acetaminophen  975 mg Oral Q8H    ferrous sulfate  325 mg Oral Daily    RA Probiotic Gummies  2 chew tab Oral Daily    meropenem  1 g Intravenous Q8H    miconazole   Topical BID    Multivitamin Adult  2 chew tab Oral Daily    pantoprazole  40 mg Oral BID AC    [Held by provider] potassium chloride  20 mEq Oral Daily    psyllium  1 packet Oral Daily    sodium chloride (PF)  3 mL Intracatheter Q8H    sucralfate  1 g Oral 4x Daily AC & HS    [Held by provider] torsemide  20 mg Oral Daily    Warfarin Therapy Reminder  1 each Oral See Admin Instructions     PRN Meds: acetaminophen, albuterol, bisacodyl, calcium carbonate, artificial tears, diclofenac, [Held by provider] HYDROmorphone, [Held by provider] HYDROmorphone, hydrOXYzine HCl **OR** hydrOXYzine HCl, ketorolac, lidocaine 4%, lidocaine (buffered or not buffered), melatonin, morphine,  "naloxone **OR** naloxone **OR** naloxone **OR** naloxone, ondansetron **OR** ondansetron, [Held by provider] oxyCODONE, [Held by provider] oxyCODONE IR, - MEDICATION INSTRUCTIONS -, polyethylene glycol, prochlorperazine **OR** prochlorperazine **OR** prochlorperazine, sennosides, sodium chloride, sodium chloride (PF)          Physical Exam:   Physical Exam   Vitals:  Height:5' 11\"  Weight:247 lbs 0 oz   Temp: 97.3  F (36.3  C) Temp src: Axillary BP: 121/59 Pulse: 71   Resp: 18 SpO2: 99 % O2 Device: Nasal cannula Oxygen Delivery: 2 LPM  General Appearance: No acute distress, normal body habitus  Neuro Exam:  Mental Status Exam: Alert but very disoriented/confused.  Unable to state name.  Not able to consistently follow commands  Cranial Nerves: Vision/visual fields intact to finger counting. Pupils are equal and reactive to light. Extraocular movements intact. Facial strength and sensation is normal. No jaw or tongue deviation.  Motor: Limited movement in the lower extremities although able to wiggle her bed to command.  Reflexes: Symmetrically intact. Plantar signs normal.  Neck: No nuchal rigidity  Extremities: No clubbing, no cyanosis, no edema          Data:   ROUTINE IP LABS   CBC RESULTS:     Recent Labs   Lab 03/24/24  0722 03/23/24  0437 03/22/24  0921   WBC 6.2 5.6 9.0   RBC 3.16* 2.92* 3.09*   HGB 9.7* 9.0* 9.4*   HCT 31.2* 28.4* 30.2*    240 236     Basic Metabolic Panel:   Recent Labs   Lab Test 03/24/24  0722 03/23/24  0437 03/22/24  0921    135 136   POTASSIUM 3.9 3.5 3.7  3.7   CHLORIDE 101 96* 96*   CO2 28 29 29   BUN 47.2* 40.5* 34.8*   CR 1.38* 1.25* 1.13   GLC 87 137* 125*   JOSEPH 9.2 8.8 8.7*     Liver panel:  Recent Labs   Lab Test 02/03/24  0741 01/19/24  0607 01/18/24  0625 01/15/24  1815 01/09/24  1157 01/09/24  1027 01/09/24  1027 02/09/22  1117   PROTTOTAL 5.1*  --   --  5.5* 5.7*  --  5.8* 7.3   ALBUMIN 2.7* 2.9* 2.9* 3.1* 3.5   < > 3.5 3.7   BILITOTAL 0.6  --   --  1.0 0.7  --  " 0.7 0.6   ALKPHOS 58  --   --  58 62  --  60 61   AST 27  --   --  29 36  --  37 17   ALT 9  --   --  13 23  --  22 19    < > = values in this interval not displayed.     Lipid Profile:  Recent Labs   Lab Test 01/09/24  1027 02/09/22  1117 10/16/20  1058 03/14/19  0959 06/06/17  0658   CHOL 53 141 147 154 147   HDL 25* 46 48 42 47   LDL 22 78 79 91 77   TRIG 32 85 102 107 115     Thyroid Panel:  Recent Labs   Lab Test 02/05/24  2232 02/05/24  0800 03/14/19  0959   TSH 2.56 3.52 2.39      Vitamin B12:   Recent Labs   Lab Test 01/30/24  1850 02/09/22  1117 03/14/19  0959   B12 658 389 359           IMAGING:   Independent interpretation of the following studies by myself as part of today's encounter. MRI does reveal mild pachymeningeal thickening and enhancement.    MR BRAIN WITHOUT AND WITH CONTRAST  LOCATION: [facility]  DATE: 03/24/2024     INDICATION: Encephalopathy since surgery, mechanical heart valve. Briefly off anticoagulation for surgery.  COMPARISON: CT of the head dated 03/23/2024.  CONTRAST: 11 mL Gadavist IV.  TECHNIQUE: Routine multiplanar multisequence head MRI without and with intravenous contrast.     FINDINGS:  INTRACRANIAL CONTENTS: No abnormal intracranial restricted diffusion is identified to suggest recent infarct. The ventricles appear within normal limits in size and configuration. There is mild to moderate generalized brain parenchymal volume loss. Mild   patchy nonspecific T2 FLAIR hyperintense signal abnormalities in the cerebral white matter, likely due to chronic small vessel ischemic change. Mild smooth diffuse pachymeningeal thickening and enhancement along the frontal convexities on both sides with   associated T2 FLAIR hyperintense signal. This dural thickening measures up to approximately 2 mm in thickness (series 8 image 15). This is nonspecific. Otherwise, no focal mass or abnormal enhancement identified.     SELLA: No abnormality accounting for technique.      OSSEOUS STRUCTURES/SOFT TISSUES: Normal marrow signal. The major intracranial vascular flow-voids are maintained.      ORBITS: No abnormality accounting for technique.      SINUSES/MASTOIDS: Mild mucosal thickening scattered about the paranasal sinuses. Moderate scattered fluid/membrane thickening in the right mastoid air cells. Trace fluid in the left mastoid tip.                                                                       IMPRESSION:  1.  No acute/early subacute ischemic infarct or mass effect.  2.  Mild, smooth, diffuse bilateral cerebral convexity pachymeningeal thickening and enhancement. This is nonspecific. It could be incidental. It also may be related to trace recent subdural hemorrhage/trauma or postprocedural changes. Pachymeningeal   thickening in the setting of intracranial hypotension could also be considered, although the finding is typically more pronounced in that entity. Recommend clinical correlation. Other considerations such as infectious/inflammatory process   (pachymeningitis) or neoplasm are thought to be less likely.  3.  Atrophy and presumed chronic small vessel ischemic changes, as described.  4.  Moderate fluid/membrane thickening in the right mastoid air cells  CT head:   CT HEAD W/O CONTRAST  LOCATION: [facility]  DATE: 3/23/2024     INDICATION: encephalopathy, mechanical heart valve and off anticoag for surgery  COMPARISON: None.  TECHNIQUE: Routine CT Head without IV contrast. Multiplanar reformats. Dose reduction techniques were used.     FINDINGS:  INTRACRANIAL CONTENTS: No intracranial hemorrhage, extraaxial collection, or mass effect.  No CT evidence of acute infarct. Mild presumed chronic small vessel ischemic changes. Mild generalized volume loss. No hydrocephalus.      VISUALIZED ORBITS/SINUSES/MASTOIDS: No intraorbital abnormality. No paranasal sinus mucosal disease. No middle ear or mastoid effusion.     BONES/SOFT TISSUES: No acute  abnormality.                                                                      IMPRESSION:  1.  No acute findings. Chronic changes as above    MRI pelvis:   Impression:  1. Osteomyelitis involving the entirety of the coccyx. No evidence of  sacral involvement.  2. Extensive inflammatory/phlegmonous changes throughout the  presacral/coccygeal soft tissues with 5.5 cm abscess in the posterior  pararectal/left ischioanal fossa which extends and is adherent to the  posterior rectal wall.  Time:  75minutes evaluation and management.     Tran Jolly M.D.  [facility]  Office [phone]

## 2024-03-25 NOTE — PLAN OF CARE
Goal Outcome Evaluation:      Problem: Malnutrition  Goal: Improved Nutritional Intake  Outcome: Progressing     Diet advanced, still monitoring PO intakes acutely but improving so far.

## 2024-03-25 NOTE — PHARMACY-ANTICOAGULATION SERVICE
Clinical Pharmacy - Warfarin Dosing Consult     Pharmacy has been consulted to manage this patient s warfarin therapy.  Indication: Mechanical Mitral Valve Replacement  Therapy Goal: INR 2.5-3.5  Warfarin Prior to Admission: Yes  Warfarin PTA Regimen: 3 mg 3/14, 4 mg 3/15, 4 mg 3/16 , 3 mg 3/17  Significant drug interactions:   Recent documented change in oral intake/nutrition: Unknown    INR   Date Value Ref Range Status   03/25/2024 1.69 (H) 0.85 - 1.15 Final   03/25/2024 1.79 (H) 0.85 - 1.15 Final       Recommend warfarin 4 mg today.  Pharmacy will monitor Feng Wynne daily and order warfarin doses to achieve specified goal.      Please contact pharmacy as soon as possible if the warfarin needs to be held for a procedure or if the warfarin goals change.

## 2024-03-25 NOTE — PROGRESS NOTES
Stubbs catheter removed at 3:30 PM.  No urine output since.  Bladder scan for only 235 mL.  Reviewed chart.  BUN and creatinine rising slightly in the last 2 days.  -Give 500 mL LR over 5 hours  -BMP in the morning

## 2024-03-25 NOTE — PLAN OF CARE
"Goal Outcome Evaluation:  End of Shift Summary  For vital signs and complete assessments, please see documentation flowsheets.     Pertinent assessments: Pt Alert to self. DERIC orientation. VSS and on RA. Had some pain and scd tylenol given. Tolerated diet and no nausea noted. Heparin infusing at 1300. Wound care done and Pt tolerated the procedure. Pt did not void all morning and BS at 1300 for 383 MD notified and orders to keep monitoring Pt later had an incontinent episode at 1700.    Major Shift Events Had EEG this shift.    Treatment Plan: Pain management, IV Merrem, ID, Woc, Repo q2 , monitor mentation.  Bedside Nurse: Tennille Becker RN          Problem: Adult Inpatient Plan of Care  Goal: Plan of Care Review  Description: The Plan of Care Review/Shift note should be completed every shift.  The Outcome Evaluation is a brief statement about your assessment that the patient is improving, declining, or no change.  This information will be displayed automatically on your shift  note.  3/25/2024 1901 by Tennille Becker RN  Outcome: Not Progressing  3/25/2024 1900 by Tennille Becker RN  Outcome: Not Progressing  Flowsheets (Taken 3/25/2024 1900)  Outcome Evaluation: EEG done and Neuro consulted. Wound care done.  Plan of Care Reviewed With: other (see comments)  Overall Patient Progress: no change  Goal: Patient-Specific Goal (Individualized)  Description: You can add care plan individualizations to a care plan. Examples of Individualization might be:  \"Parent requests to be called daily at 9am for status\", \"I have a hard time hearing out of my right ear\", or \"Do not touch me to wake me up as it startles  me\".  3/25/2024 1901 by Tennille Becker RN  Outcome: Not Progressing  3/25/2024 1900 by Tennille Becker RN  Outcome: Not Progressing  Goal: Absence of Hospital-Acquired Illness or Injury  3/25/2024 1901 by Tennille Becker RN  Outcome: Not Progressing  3/25/2024 1900 by Tennille Becker" RN  Outcome: Not Progressing  Intervention: Identify and Manage Fall Risk  Recent Flowsheet Documentation  Taken 3/25/2024 0830 by Tennille Becker RN  Safety Promotion/Fall Prevention:   activity supervised   clutter free environment maintained   increased rounding and observation   increase visualization of patient   room near nurse's station  Intervention: Prevent Skin Injury  Recent Flowsheet Documentation  Taken 3/25/2024 1600 by Tennille Becker RN  Body Position: left  Taken 3/25/2024 1400 by Tennille Becker RN  Body Position: right  Taken 3/25/2024 1156 by Tennille Becker RN  Body Position: left  Taken 3/25/2024 1000 by Tennille Becker RN  Body Position: right  Taken 3/25/2024 0830 by Tennille Becker RN  Body Position:   turned   right  Intervention: Prevent and Manage VTE (Venous Thromboembolism) Risk  Recent Flowsheet Documentation  Taken 3/25/2024 0830 by Tennille Becker RN  VTE Prevention/Management: SCDs (sequential compression devices) off  Goal: Optimal Comfort and Wellbeing  3/25/2024 1901 by Tennille Becker RN  Outcome: Not Progressing  3/25/2024 1900 by Tennille Becker RN  Outcome: Not Progressing  Goal: Readiness for Transition of Care  3/25/2024 1901 by Tennille Becker RN  Outcome: Not Progressing  3/25/2024 1900 by Tennille Becker RN  Outcome: Not Progressing     Problem: Infection  Goal: Absence of Infection Signs and Symptoms  3/25/2024 1901 by Tennille Becker RN  Outcome: Not Progressing  3/25/2024 1900 by Tennille Becker RN  Outcome: Not Progressing     Problem: Malnutrition  Goal: Improved Nutritional Intake  3/25/2024 1901 by Tennille Becker RN  Outcome: Not Progressing  3/25/2024 1900 by Tennille Becker RN  Outcome: Not Progressing     Problem: Comorbidity Management  Goal: Maintenance of Heart Failure Symptom Control  3/25/2024 1901 by Tennille Becker RN  Outcome: Not Progressing  3/25/2024 1900 by Tennille Becker  RN  Outcome: Not Progressing  Intervention: Maintain Heart Failure Management  Recent Flowsheet Documentation  Taken 3/25/2024 0830 by Tennille Becker, RN  Medication Review/Management: medications reviewed

## 2024-03-25 NOTE — PROGRESS NOTES
[facility]    General Surgery Progress Note          Assessment and Plan:   Assessment:    Feng Wynne is a 79 year old male 3 Days Post-Op from Procedure(s):  IRRIGATION AND DEBRIDEMENT, WOUND, SACRAL REGION         Plan:   Continue wound cares per [place], RN to change dressing  Call with any concerns with dressing changes  Ok for oral anticoagulation from surgical perspective           Interval History:   Pt returning from procedure.  Wound cares per RN         Physical Exam:   Temp: 97.8  F (36.6  C) Temp src: Axillary BP: 106/42 Pulse: 68   Resp: 18   SpO2: 100 % O2 Device: None (Room air) Oxygen Delivery: 1 LPM    I/O last 3 completed shifts:  In: -   Out: 550 [Urine:550]    Pt not seen, returning from procedure          Data:   Data   Recent Labs   Lab 03/25/24  0740 03/24/24  0722 03/23/24  0437   WBC 5.1 6.2 5.6   HGB 9.9* 9.7* 9.0*   * 99 97    254 240   INR 1.79* 1.56* 1.62*    139 135   POTASSIUM 3.8 3.9 3.5   CHLORIDE 102 101 96*   CO2 29 28 29   BUN 46.0* 47.2* 40.5*   CR 1.25* 1.38* 1.25*   ANIONGAP 9 10 10   JOSEPH 9.1 9.2 8.8   GLC 84 87 137*   ALBUMIN 2.6*  --   --    PROTTOTAL 5.7*  --   --    BILITOTAL 0.5  --   --    ALKPHOS 83  --   --    ALT 15  --   --    AST 47*  --   --         Annalee Gutierrez PA-C

## 2024-03-25 NOTE — PROVIDER NOTIFICATION
Pt has not voided since branch removed at 1530.   Bladder scan 220 @ 2100, 235 @ 0000  MD notified.

## 2024-03-25 NOTE — PROGRESS NOTES
[facility]    Medicine Progress Note - Hospitalist Service    Date of Admission:  3/18/2024    Assessment & Plan        Summary of Stay: Feng Wynne is a 79 year old male with a history of mechanical mitral valve on warfarin, severe tricuspid regurgitation, right-sided heart failure, moderate pulmonary hypertension, coronary artery disease, hypertension, hypercholesterolemia, atrial fibrillation, history of pill esophagitis with resultant anemia, known sacral decubitus ulcer admitted on 3/18/2024 with concern for infection of his sacral wound.  In the emergency department, patient was found to have a temperature of 97.7  F, blood pressure 109/63, heart rate 81, respiratory rate 20, SpO2 94% on room air.  Initial lab work showed bicarb 31, BUN/creatinine 30.7/1.07, .65, hemoglobin 9.6, ESR greater than 140, INR 3.93.  The patient was started on vancomycin and cefepime.  Infectious disease and general surgery were consulted to see the patient.  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  The sacral/coccygeal soft tissues with 5.5 cm abscess in the posterior perirectal/left ischial anal fossa which extends and is adherent to the posterior rectal wall.  There was consideration for transfer to the [facility] for higher level of care including plastic surgery consultation for wound closure.  The [facility] declined the transfer with plastic surgery recommending IV antibiotic course and wound debridement.  General surgery at the [facility] recommended wound debridement at [facility].  The patient was transitioned to a heparin drip and INR was reversed.  On 3/22, the patient was taken for excisional debridement of sacral decubitus ulcer with electrocautery.  Postoperatively, the patient experienced significant encephalopathy.      TODAY'S PLAN:    Mental  state appears to be improved as compared to previous days  -Currently afebrile and not hypoxic  -Was able to follow some simple verbal instructions reportedly able to tolerate oral diet  -Remain on IV heparin.  Requested pharmacy dosing for warfarin protocol.  Target INR between 2.5-3.5 with known history of mechanical mitral valve  -Underwent EEG earlier today  -MRI done earlier for confusion and encephalopathy with no clear evidence of CVA  -Appreciate input from general neurology service  -Cefepime was discontinued given possible contributing factor for earlier confusion  -Remain on IV antibiotics with ID service following with us currently on meropenem  -Appreciate continuous input from general surgery service  -No plans for repeat debridement in the operating room  -Multiple opioids/narcotics discontinued earlier due to confusion that might be also contributory to his encephalopathy  -Deferring any lumbar puncture approach for now given improving mental status, continue anticoagulation       Problem List:   Sacral Decubitus Ulcer - POA  New Osteomyelitis of Coccyx  Perirectal Abscess  -Previously he was on vanco and cefepime and flagyl.  Transitioned to Meropenem on 3/23  - Appreciate ID recommendations  - Appreciate General Surgery, Colorectal Surgery recommendations  - Wound cultures positive for enterococcus faecalis, gram negative bacilli,  - Attempted transfer to [facility] on 3/20, however pt declined as plastic surgery recommended IV abx treatment and debridement, and general surgery recommended debridement and abscess drainage at [facility]  - MRI pelvis showed osteomyelitis of coccyx and 5.5 cm perirectal abscess  - At baseline, pt ambulates with a walker  -  Stubbs catheter has been discontinued.  Continue to monitor.  Patient is unfortunately incontinent     Acute Metabolic vs Toxic Encephalopathy  - Unclear cause.  Possibly secondary to narcotics vs anesthesia vs worsening infection vs cva vs other  - CT brain  "negative  - VBG, LA, Ammonia unremarkable  - Check MRI brain  - Appreciate Neurology recommendations     Mild Acute Kidney Injury-creatinine improving  - Baseline Cr = 0.9  - IVF today  - Daily BMP    Anticipating-continuous improvement with increasing oral intake     Mechanical Mitral Valve  Warfarin Coagulopathy  - INR goal is 2.5-3.5  -Resume warfarin, on bridging heparin  -Stop heparin with therapeutic INR     Mild Hypokalemia  - Electrolyte replacement protocol     Chronic Medical Problems:  Atrial Fibrillation  Hypertension  Hypercholesterolemia  Coronary Artery Disease  Pill Esophagitis with Resultant Anemia  Severe Tricuspid Regurgitation  Right Sided Heart Failure  Moderate Pulmonary Hypertension           Diet: Vegetarian Diet  Snacks/Supplements Adult: Ensure Enlive; With Meals    DVT Prophylaxis: Warfarin  Stubbs Catheter: Not present  Lines: None     Cardiac Monitoring: None  Code Status: Full Code      Clinically Significant Risk Factors           # Hypercalcemia: corrected calcium is >10.1, will monitor as appropriate    # Hypoalbuminemia: Lowest albumin = 2.6 g/dL at 3/25/2024  7:40 AM, will monitor as appropriate  # Coagulation Defect: INR = 1.79 (Ref range: 0.85 - 1.15) and/or PTT = N/A, will monitor for bleeding    # Hypertension: Noted on problem list  # Chronic heart failure with preserved ejection fraction: heart failure noted on problem list and last echo with EF >50%       # Obesity: Estimated body mass index is 34.45 kg/m  as calculated from the following:    Height as of this encounter: 1.803 m (5' 11\").    Weight as of this encounter: 112 kg (247 lb).   # Moderate Malnutrition: based on nutrition assessment    # Financial/Environmental Concerns:           Disposition Plan     Expected Discharge Date: 03/27/2024      Destination: home with family;home with help/services          Family updated at bedside    Stone Hannon MD, MD  Hospitalist Service  [facility]  Securely message with [employer]avi (more info)  Text page via [facility] Paging/Directory   ______________________________________________________________________    Interval History   I assumed medicine service care today.  Seen and examined.  Chart reviewed.  Case discussed with nursing service.  I met this gentleman while he is laying comfortably in bed and just finished his breakfast food tray.  He appears to be improved in terms of his mental state as he was able to follow some simple verbal instructions.  He was able to identify his visitors at bedside.  Demonstrated spontaneous and coherent speech.  Was able to do visual tracking at the same time had appropriate hand gestures and facial expression during my encounter and conversation with him.  No reports of any bleeding tendencies overnight.  Remained afebrile.  Currently not requiring oxygen support.  Feng endorses no ongoing abdominal pain, chest pain or shortness of breath.  No reported nausea, vomiting or diarrhea either.  He voided freely earlier however remains incontinent.      Physical Exam   Vital Signs: Temp: 97.8  F (36.6  C) Temp src: Axillary BP: 106/42 Pulse: 68   Resp: 18 SpO2: 100 % O2 Device: None (Room air) Oxygen Delivery: 1 LPM  Weight: 247 lbs 0 oz    HEENT; Atraumatic, normocephalic, pinkish conjuctiva, pupils bilateral reactive   Skin: warm and moist, no rashes  Lungs: equal chest expansion, clear to auscultation, no wheezes, no stridor, no crackles,   Heart: normal rate, normal rhythm, no rubs or gallops.   Abdomen: normal bowel sounds, no tenderness, no peritoneal signs, no guarding  Extremities: no deformities, bilateral lower extremity edema +1 mostly at the ankle level  Neuro; follow commands, alert and oriented x1, spontaneous speech, coherent, moves all extremities spontaneously  Was able to identify family and visitors at bedside  -Still repeating some words   -However he was able to provide some appropriate answers to simple  questions  psych; no hallucination, euthymic mood, not agitated      Medical Decision Making       55 MINUTES SPENT BY ME on the date of service doing chart review, history, exam, documentation & further activities per the note.  MANAGEMENT DISCUSSED with the following over the past 24 hours: Yes   NOTE(S)/MEDICAL RECORDS REVIEWED over the past 24 hours: Yes       Data     I have personally reviewed the following data over the past 24 hrs:    5.1  \   9.9 (L)   / 245     140 102 46.0 (H) /  84   3.8 29 1.25 (H) \     ALT: 15 AST: 47 (H) AP: 83 TBILI: 0.5   ALB: 2.6 (L) TOT PROTEIN: 5.7 (L) LIPASE: N/A     INR:  1.79 (H) PTT:  N/A   D-dimer:  N/A Fibrinogen:  N/A       Imaging results reviewed over the past 24 hrs:   Recent Results (from the past 24 hour(s))   MR Brain w/o & w Contrast    Narrative    EXAM: MR BRAIN WITHOUT AND WITH CONTRAST  LOCATION: [facility]  DATE: 03/24/2024    INDICATION: Encephalopathy since surgery, mechanical heart valve. Briefly off anticoagulation for surgery.  COMPARISON: CT of the head dated 03/23/2024.  CONTRAST: 11 mL Gadavist IV.  TECHNIQUE: Routine multiplanar multisequence head MRI without and with intravenous contrast.    FINDINGS:  INTRACRANIAL CONTENTS: No abnormal intracranial restricted diffusion is identified to suggest recent infarct. The ventricles appear within normal limits in size and configuration. There is mild to moderate generalized brain parenchymal volume loss. Mild   patchy nonspecific T2 FLAIR hyperintense signal abnormalities in the cerebral white matter, likely due to chronic small vessel ischemic change. Mild smooth diffuse pachymeningeal thickening and enhancement along the frontal convexities on both sides with   associated T2 FLAIR hyperintense signal. This dural thickening measures up to approximately 2 mm in thickness (series 8 image 15). This is nonspecific. Otherwise, no focal mass or abnormal enhancement identified.    SELLA: No  abnormality accounting for technique.    OSSEOUS STRUCTURES/SOFT TISSUES: Normal marrow signal. The major intracranial vascular flow-voids are maintained.     ORBITS: No abnormality accounting for technique.     SINUSES/MASTOIDS: Mild mucosal thickening scattered about the paranasal sinuses. Moderate scattered fluid/membrane thickening in the right mastoid air cells. Trace fluid in the left mastoid tip.       Impression    IMPRESSION:  1.  No acute/early subacute ischemic infarct or mass effect.  2.  Mild, smooth, diffuse bilateral cerebral convexity pachymeningeal thickening and enhancement. This is nonspecific. It could be incidental. It also may be related to trace recent subdural hemorrhage/trauma or postprocedural changes. Pachymeningeal   thickening in the setting of intracranial hypotension could also be considered, although the finding is typically more pronounced in that entity. Recommend clinical correlation. Other considerations such as infectious/inflammatory process   (pachymeningitis) or neoplasm are thought to be less likely.  3.  Atrophy and presumed chronic small vessel ischemic changes, as described.  4.  Moderate fluid/membrane thickening in the right mastoid air cells.

## 2024-03-25 NOTE — PLAN OF CARE
"Pertinent assessments: Pt more alert tonight. DERIC orientation, does not follow commands. Repeats same words over again. Scheduled tylenol given for pain management, able to swallow pills appropriately. Heparin infusing at 1300. Branch removed, pt voided x1 - incontinent. Dressing to sacrum intact. Repo Q2hr, Ax2-3.     Major Shift Events: Pt still due to void after branch removed at midnight, MD paged and 500mL LR bolus ordered. Pt voided at 0500, PVR 62.  Treatment Plan: neuro q4, pain management, IV merrem, ID, WOC repo q2, monitor mentation  Bedside Nurse: Katie Zabala RN       Problem: Adult Inpatient Plan of Care  Goal: Plan of Care Review  Description: The Plan of Care Review/Shift note should be completed every shift.  The Outcome Evaluation is a brief statement about your assessment that the patient is improving, declining, or no change.  This information will be displayed automatically on your shift  note.  Outcome: Not Progressing  Flowsheets (Taken 3/25/2024 0623)  Plan of Care Reviewed With: other (see comments)  Overall Patient Progress: no change  Goal: Patient-Specific Goal (Individualized)  Description: You can add care plan individualizations to a care plan. Examples of Individualization might be:  \"Parent requests to be called daily at 9am for status\", \"I have a hard time hearing out of my right ear\", or \"Do not touch me to wake me up as it startles  me\".  Outcome: Not Progressing  Goal: Absence of Hospital-Acquired Illness or Injury  Outcome: Not Progressing  Intervention: Identify and Manage Fall Risk  Recent Flowsheet Documentation  Taken 3/24/2024 2000 by Katie Zabala, RN  Safety Promotion/Fall Prevention:   activity supervised   assistive device/personal items within reach   clutter free environment maintained   increased rounding and observation   increase visualization of patient   safety round/check completed  Intervention: Prevent Skin Injury  Recent Flowsheet Documentation  Taken " 3/24/2024 2349 by Katie Zabala, RN  Body Position:   turned   left  Taken 3/24/2024 2112 by Katie Zabala RN  Body Position:   turned   right  Taken 3/24/2024 2000 by Katie Zabala RN  Body Position: weight shifting  Intervention: Prevent and Manage VTE (Venous Thromboembolism) Risk  Recent Flowsheet Documentation  Taken 3/24/2024 2000 by Katie Zabala RN  VTE Prevention/Management: SCDs (sequential compression devices) on  Intervention: Prevent Infection  Recent Flowsheet Documentation  Taken 3/24/2024 2000 by Katie Zabala, RN  Infection Prevention: cohorting utilized  Goal: Optimal Comfort and Wellbeing  Outcome: Not Progressing  Intervention: Monitor Pain and Promote Comfort  Recent Flowsheet Documentation  Taken 3/24/2024 2112 by Katie Zabala RN  Pain Management Interventions: medication (see MAR)  Goal: Readiness for Transition of Care  Outcome: Not Progressing     Problem: Infection  Goal: Absence of Infection Signs and Symptoms  Outcome: Not Progressing     Problem: Malnutrition  Goal: Improved Nutritional Intake  Outcome: Not Progressing     Problem: Comorbidity Management  Goal: Maintenance of Heart Failure Symptom Control  Outcome: Not Progressing  Intervention: Maintain Heart Failure Management  Recent Flowsheet Documentation  Taken 3/24/2024 2000 by Katie Zabala, RN  Medication Review/Management: medications reviewed

## 2024-03-25 NOTE — PROGRESS NOTES
Patient Name: Feng Wynne  MRN: [id number]  : 1945  Visit Date: 3/25/2024    Subjective:  When asked how he is doing he responds well and says good how are you doing.  After that he does not respond well and says yes or says okay.  He is unable to identify his name or where he is at or who the president is.  He does not follow commands well.  He has difficulty with moving his legs.    Summary:  Admitted 3/18 with concern for infection of his sacral wound. The patient was started on vancomycin and cefepime.  Infectious disease and general surgery were consulted to see the patient.  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  The sacral/coccygeal soft tissues with 5.5 cm abscess in the posterior perirectal/left ischial anal fossa which extends and is adherent to the posterior rectal wall.  There was consideration for transfer to the [facility] for higher level of care including plastic surgery consultation for wound closure.  The [facility] declined the transfer with plastic surgery recommending IV antibiotic course and wound debridement.  General surgery at the [facility] recommended wound debridement at [facility].  On  showed pachymeningeal thickening and enhancement that could be concerning for intracranial hypotension or possible infection.  The patient was transitioned to a heparin drip and INR was reversed.  On 3/22, the patient was taken for excisional debridement of sacral decubitus ulcer with electrocautery.  Postoperatively, the patient experienced significant encephalopathy.  He also was treated with cefepime which was later discontinued on 2024.      Past Medical History:  Past Medical History:   Diagnosis Date    Acute on chronic congestive heart failure, unspecified heart failure type (H) 2024    Arthritis     Atrial fibrillation (H)      Coronary artery disease     Hypercholesteremia     Obesity     Unspecified essential hypertension      Past Surgical History:  Past Surgical History:   Procedure Laterality Date    COLONOSCOPY N/A 1/15/2024    Procedure: Colonoscopy;  Surgeon: Osbaldo Olvera MD;  Location:  GI    ESOPHAGOSCOPY, GASTROSCOPY, DUODENOSCOPY (EGD), COMBINED N/A 1/15/2024    Procedure: Esophagoscopy, gastroscopy, duodenoscopy (EGD), combined;  Surgeon: Osbaldo Olvera MD;  Location:  GI    ESOPHAGOSCOPY, GASTROSCOPY, DUODENOSCOPY (EGD), COMBINED N/A 2024    Procedure: Esophagoscopy, gastroscopy, duodenoscopy (EGD), combined;  Surgeon: Osbaldo Olvera MD;  Location:  GI    MITRAL VALVE REPLACEMENT  2008    Mitral valve replacement with 31-mm [employer]affi mechanical valve.      Medications:  No current outpatient medications on file.     Allergies:  Allergies   Allergen Reactions    Amiodarone Shortness Of Breath    Pcn [Penicillins] Shortness Of Breath     Rxn occurred in childhood     Statins Muscle Pain (Myalgia) and Hives    Amiodarone     Latex     Zetia [Ezetimibe] Muscle Pain (Myalgia)     Muscle weakness legs     Family History:  Family History   Problem Relation Age of Onset    Cerebrovascular Disease Father     Diabetes Paternal Grandmother     C.A.D. Brother         CABG X 3 at age 51     Social History:  Social History     Socioeconomic History    Marital status: Single     Spouse name: Not on file    Number of children: 1    Years of education: Not on file    Highest education level: Not on file   Occupational History    Occupation: Self employed      Comment:     Tobacco Use    Smoking status: Former     Packs/day: 0.50     Years: 5.00     Additional pack years: 0.00     Total pack years: 2.50     Types: Cigarettes     Quit date:      Years since quittin.2    Smokeless tobacco: Never   Vaping Use    Vaping Use: Never used   Substance and Sexual Activity    Alcohol use:  "Yes     Comment: 1 drink per month    Drug use: No    Sexual activity: Yes     Partners: Male   Other Topics Concern    Parent/sibling w/ CABG, MI or angioplasty before 65F 55M? Yes   Social History Narrative    Not on file     Social Determinants of Health     Financial Resource Strain: Not on file   Food Insecurity: Not on file   Transportation Needs: Not on file   Physical Activity: Not on file   Stress: Not on file   Social Connections: Not on file   Interpersonal Safety: Not on file   Housing Stability: Not on file         Vital Signs:  /48 (BP Location: Left arm)   Pulse 66   Temp 98.6  F (37  C) (Axillary)   Resp 16   Ht 1.803 m (5' 11\")   Wt 112 kg (247 lb)   SpO2 98%   BMI 34.45 kg/m        Neurological examination  Mental Status: He is confused and unable to follow commands.  He does not speak much.  He just says yes or okay.  Cranial Nerve I: Not tested.  Cranial Nerve II: The pupils are 3 mm, equally round and reactive to light.  Cranial Nerves III, IV, VI: The extraocular movements are full in all directions of gaze without  ophthalmoplegia or nystagmus.  Cranial Nerve V: Light touch is intact and symmetric in the V1, V2, V3 divisions of both  trigeminal nerves.  Cranial Nerve VII: Facial movements are symmetric.  Cranial Nerve XI: Sternocleidomastoid strength is 5/5 bilaterally with normal shoulder shrug.  Muscle Strength: He is able to move his upper extremities easily but does not do it to command.  He is able to somewhat move his legs but is not able to lift against gravity.  Reflexes: The reflexes are 0/4 for biceps, patellar tendon reflexes bilaterally and  symmetrically.  Sensory: The sensory examination is normal for light touch bilaterally and symmetrically.  Cerebellar: He does not do a proper finger-to-nose testing but is able to give a high-five in both hands.    Review of Diagnostics:  I personally reviewed and interpreted the following:    MR Brain w/o & w Contrast    Result " Date: 3/24/2024  EXAM: MR BRAIN WITHOUT AND WITH CONTRAST LOCATION: [facility] DATE: 03/24/2024 INDICATION: Encephalopathy since surgery, mechanical heart valve. Briefly off anticoagulation for surgery. COMPARISON: CT of the head dated 03/23/2024. CONTRAST: 11 mL Gadavist IV. TECHNIQUE: Routine multiplanar multisequence head MRI without and with intravenous contrast. FINDINGS: INTRACRANIAL CONTENTS: No abnormal intracranial restricted diffusion is identified to suggest recent infarct. The ventricles appear within normal limits in size and configuration. There is mild to moderate generalized brain parenchymal volume loss. Mild patchy nonspecific T2 FLAIR hyperintense signal abnormalities in the cerebral white matter, likely due to chronic small vessel ischemic change. Mild smooth diffuse pachymeningeal thickening and enhancement along the frontal convexities on both sides with  associated T2 FLAIR hyperintense signal. This dural thickening measures up to approximately 2 mm in thickness (series 8 image 15). This is nonspecific. Otherwise, no focal mass or abnormal enhancement identified. SELLA: No abnormality accounting for technique. OSSEOUS STRUCTURES/SOFT TISSUES: Normal marrow signal. The major intracranial vascular flow-voids are maintained. ORBITS: No abnormality accounting for technique. SINUSES/MASTOIDS: Mild mucosal thickening scattered about the paranasal sinuses. Moderate scattered fluid/membrane thickening in the right mastoid air cells. Trace fluid in the left mastoid tip.     IMPRESSION: 1.  No acute/early subacute ischemic infarct or mass effect. 2.  Mild, smooth, diffuse bilateral cerebral convexity pachymeningeal thickening and enhancement. This is nonspecific. It could be incidental. It also may be related to trace recent subdural hemorrhage/trauma or postprocedural changes. Pachymeningeal thickening in the setting of intracranial hypotension could also be considered, although  the finding is typically more pronounced in that entity. Recommend clinical correlation. Other considerations such as infectious/inflammatory process (pachymeningitis) or neoplasm are thought to be less likely. 3.  Atrophy and presumed chronic small vessel ischemic changes, as described. 4.  Moderate fluid/membrane thickening in the right mastoid air cells.     CT Head w/o Contrast    Result Date: 3/23/2024  EXAM: CT HEAD W/O CONTRAST LOCATION: [facility] DATE: 3/23/2024 INDICATION: encephalopathy, mechanical heart valve and off anticoag for surgery COMPARISON: None. TECHNIQUE: Routine CT Head without IV contrast. Multiplanar reformats. Dose reduction techniques were used. FINDINGS: INTRACRANIAL CONTENTS: No intracranial hemorrhage, extraaxial collection, or mass effect.  No CT evidence of acute infarct. Mild presumed chronic small vessel ischemic changes. Mild generalized volume loss. No hydrocephalus. VISUALIZED ORBITS/SINUSES/MASTOIDS: No intraorbital abnormality. No paranasal sinus mucosal disease. No middle ear or mastoid effusion. BONES/SOFT TISSUES: No acute abnormality.     IMPRESSION: 1.  No acute findings. Chronic changes as above.     MR Pelvis Bone wo & w Contrast    Result Date: 3/20/2024  MR pelvis without and with contrast 3/20/2024 9:59 AM Techniques: Multiplanar multisequence imaging of the pelvis was obtained before and after administration of  intravenous contrast using routing [facility] protocol. History: sacral decubitus ulcer. Eval for osteomyelitis Comparison: MRI lumbar spine 8/16/2022. Findings: Arising from the superior aspect of the gluteal cleft there is a sacral decubitus ulcer with extensive soft tissue thickening and edema involving the subcutaneous tissue deep to the ulcer. Diffusely abnormal T1 hypointense, T2 hyperintense signal replacing the distal coccygeal segments with associated destructive/erosive changes. The abnormal bone marrow signal extends up to and  involves the first coccygeal segment. Normal bone marrow signal pattern of the S5 vertebral body. There is enhancing edema-like signal changes extending along the presacral soft tissues extending from approximately the level of S2-S3 caudally along the posterior rectosigmoid colon. There is a rim-enhancing fluid and gas collection along the posterior pararectal fossa extending into the left ischioanal fossa measuring approximately 1.7 x 3.8 x 5.5 cm (series 9 image 31 and series 10 image 23). Osseous structures Osseous structures: No focal osseous lesion. No acute fracture or avascular necrosis. Joint and Periarticular soft tissue: Moderate degenerative changes of the sacroiliac joints. Moderate osteoarthrosis of the right hip with heterotopic ossification about the hip joint. Degenerative disc disease at L5-S1. Joint effusion: Small right hip joint effusion. Bursal effusion: Minimal nonspecific edema over the greater trochanter. No substantial iliopsoas or trochanteric bursal effusion. Muscles and tendons Diffuse fatty atrophy of the pelvic musculature. Enhancing intramuscular edema involving the bilateral gluteus willian muscles, left greater than right, likely inflammatory/phlegmonous changes. Intramuscular edema involving the right gluteus minimus and medius muscles. Mild edema in the left adductor muscles. Nerves: The visualized course of the sciatic nerves are unremarkable bilaterally. Other Findings: Large stool burden in the rectosigmoid colon.     Impression: 1. Osteomyelitis involving the entirety of the coccyx. No evidence of sacral involvement. 2. Extensive inflammatory/phlegmonous changes throughout the presacral/coccygeal soft tissues with 5.5 cm abscess in the posterior pararectal/left ischioanal fossa which extends and is adherent to the posterior rectal wall. I have personally reviewed the examination and initial interpretation and I agree with the findings. JUTTA ELLERMANN, MD   SYSTEM ID:   [id number]      No results found for this or any previous visit (from the past 24 hour(s)).    Vitamin B12:   Lab Results   Component Value Date    B12 658 01/30/2024    B12 359 03/14/2019         Complete Blood Count:    Recent Labs   Lab Test 03/25/24  0740 03/24/24  0722 03/23/24  0437 02/02/24  0805 02/01/24  1115 01/30/24  1850 01/30/24  1048 01/30/24  0545 01/19/24  0607   WBC 5.1 6.2 5.6   < > 3.5*   < > 3.4*   < > 3.5*   RBC 3.26* 3.16* 2.92*   < > 2.75*   < > 2.51*   < > 3.10*   HGB 9.9* 9.7* 9.0*   < > 7.8*   < > 6.9*   < > 8.0*   HCT 32.8* 31.2* 28.4*   < > 26.2*   < > 24.1*   < > 27.6*    254 240   < > 191   < > 190   < > 143*   LYMPH  --   --   --   --  31  --  28  --  31    < > = values in this interval not displayed.          Thyroid Stimulating Hormone:   Lab Results   Component Value Date    TSH 2.56 02/05/2024    TSH 2.39 03/14/2019        Recent Labs   Lab Test 03/25/24  1136 03/25/24  0740 03/24/24  0722   WBC  --  5.1 6.2   HGB  --  9.9* 9.7*   MCV  --  101* 99   PLT  --  245 254   INR 1.69* 1.79* 1.56*      Recent Labs   Lab Test 03/25/24  0740 03/24/24  0722    139   POTASSIUM 3.8 3.9   CHLORIDE 102 101   CO2 29 28   BUN 46.0* 47.2*   CR 1.25* 1.38*   ANIONGAP 9 10   JOSEPH 9.1 9.2   GLC 84 87     CMP:  Recent Labs   Lab 03/25/24  0740   PROTTOTAL 5.7*   ALBUMIN 2.6*   ALKPHOS 83   AST 47*   ALT 15   BILITOTAL 0.5         Impression:  Mr. Wynne is a 79 year old With a past medical history of pulmonary hypertension, atrial fibrillation, anemia, hypertension and coronary artery disease was admitted on March 18 after he was found to have a sacral wound with infection and was diagnosed with osteomyelitis.  He underwent excisional debridement on March 22.  He received cefepime which can also cause confusion.  He continues to be confused today.  He says I am okay repeatedly and says yes but does not follow commands.  He does not have a recent fever for the past few days or WBC count spike.   On examination he does not have any neck stiffness.  It is unlikely he has meningitis however it is puzzling but he continues to be confused and does have a focus of infection.  Would recommend lumbar puncture to check for any central nervous system infection however he is on warfarin so this would be challenging.      Bing Llamas MD  Neurology      Thank you very much  for allowing me to participate in the care of this patient.   [facility] for Tobacco Control website --- [email or web address] website --- [email or web address]

## 2024-03-25 NOTE — PROGRESS NOTES
[facility]  Infectious Disease Progress Note          Assessment and Plan:   Date of Admission:  3/18/2024  Date of Consult (When I saw the patient): 03/20/24        Assessment & Plan  Feng Wynne is a 79 year old who was admitted on 3/18/2024.      Impression: 1  79-year-old male, admitted with worsening sacral decubitus wound, noted at care facility to have increasing drainage mild redness, patient denying other symptoms mild pain  2  fever, no symptoms suggesting sepsis but new acute fever, blood cultures now being done  3  recent 2 lengthy hospitalizations at [facility] for  anemia and esophagitis  4  mitral valve replacement  5  childhood penicillin allergy     REC 1 operative findings noted, temp is down, not bacteremic, prior microbiology same  2 worsening cognition, probably multifactorial but among issues possible here is cefepime but now off cefepime encephalopathy continues but is somewhat improved, CT scan negative MRI scan slightly abnormal some consideration of some inflammatory process but clinically that is not logically fitting here given his initial presentation that he would have a new CNS infectious process,  not septic enough for mentation to be explained by sepsis  3 Continue meropenem alone adequate Enterococcus coverage to what ever degree it is a problem and covers the other organisms acceptably, eliminates the cefepime CNS effect of course of meropenem also possible CNS affecting agent, I think every 12 hour dosing will be acceptable here with some mild renal insufficiency and is 79 years old        Interval History:     no new fever or sepsis but mentation worsened, and he remains somewhat encephalopathic but per significant other is improved operative findings noted CT scan shows  abscess almost certainly related to wound, definite osteomyelitis on imaging quite impressive, blood cultures so far negative temp down wound culture with gram-negative rods and  "Enterococcus relatively resistant but covered by meropenem and was covered by cefepime              Medications:      acetaminophen  975 mg Oral Q8H    ferrous sulfate  325 mg Oral Daily    RA Probiotic Gummies  2 chew tab Oral Daily    meropenem  1 g Intravenous Q12H    miconazole   Topical BID    Multivitamin Adult  2 chew tab Oral Daily    pantoprazole  40 mg Intravenous BID    [Held by provider] potassium chloride  20 mEq Oral Daily    psyllium  1 packet Oral Daily    sodium chloride (PF)  3 mL Intracatheter Q8H    sucralfate  1 g Oral 4x Daily AC & HS    [Held by provider] torsemide  20 mg Oral Daily    warfarin ANTICOAGULANT  4 mg Oral ONCE at 18:00    Warfarin Therapy Reminder  1 each Oral See Admin Instructions    Warfarin Therapy Reminder  1 each Oral See Admin Instructions                  Physical Exam:   Blood pressure 108/48, pulse 66, temperature 98.6  F (37  C), temperature source Axillary, resp. rate 16, height 1.803 m (5' 11\"), weight 112 kg (247 lb), SpO2 98%.  Wt Readings from Last 2 Encounters:   03/18/24 112 kg (247 lb)   03/18/24 112.5 kg (248 lb)     Vital Signs with Ranges  Temp:  [97.4  F (36.3  C)-98.6  F (37  C)] 98.6  F (37  C)  Pulse:  [65-87] 66  Resp:  [16-18] 16  BP: (100-119)/(42-65) 108/48  SpO2:  [97 %-100 %] 98 %    Constitutional: Very confused encephalopathy 90 better than previously     Lungs: Clear to auscultation bilaterally, no crackles or wheezing   Cardiovascular: Regular rate and rhythm, normal S1 and S2, and no murmur noted   Abdomen: Normal bowel sounds, soft, non-distended, non-tender   Skin: No rashes, no cyanosis, no edema   Other: Wounds about the same          Data:   All microbiology laboratory data reviewed.  Recent Labs   Lab Test 03/25/24  0740 03/24/24  0722 03/23/24  0437   WBC 5.1 6.2 5.6   HGB 9.9* 9.7* 9.0*   HCT 32.8* 31.2* 28.4*   * 99 97    254 240     Recent Labs   Lab Test 03/25/24  0740 03/24/24  0722 03/23/24  0437   CR 1.25* 1.38* " "1.25*     Recent Labs   Lab Test 03/18/24  1748   SED >140*     No lab results found.    Invalid input(s): \"UC\"     "

## 2024-03-25 NOTE — PROGRESS NOTES
CLINICAL NUTRITION SERVICES - REASSESSMENT NOTE      Recommendations:   - Continue diet as ordered.  Appreciate any assistance provided at meal times and recording of PO intakes in flowsheets.  - Continue supplement offerings but change to chocolate flavor and increase frequency to BID.  - Continue MVI/M.  - Not clear if PO intakes will be consistent enough to support high wound healing needs, but will continue to monitor acutely.     MALNUTRITION:  % Weight Loss:  Weight loss does not meet criteria for malnutrition -- 4.6% in 3 weeks --> PTA  % Intake:  <75% for > 7 days   Subcutaneous Fat Loss:  Orbital region mild depletion  Muscle Loss:  Clavicle bone region mild depletion  Fluid Retention: Mild/generalized --> would mask true wt trending     Malnutrition Diagnosis: Moderate malnutrition  In Context of:  Chronic illness or disease         EVALUATION OF PROGRESS TOWARD GOALS   Diet: Vegetarian, Ensure w/ dinner    Intake/Tolerance:  Noted patient forgetful prior to procedure and becoming confused afterwards (neurology consulted and thought to be toxic encephalopathy).  Required excisional debridement of sacral pressure injury in OR on 3/22.  Has had interruptions to diet order as a result and it looks like after the procedure eating may be slightly variable?  Very limited flowsheet recordings since last RD assessment, 0% recorded a few times.  Over the past ~3 days patient has had 1-2 meals ordered daily and meals are quite small/only consistent of liquids.  Unclear supplement contribution?      Met with patient, family, and support staff in room who was feeding patient breakfast.  Support staff reports they have had patient before today and that he actually did ok with eating over the past day(s), about 50% of meals consumed.  He was able to take 75% of his breakfast today and drank 100% of an Ensure.  Family and Feng requested change in Ensure flavor from vanilla to chocolate and increase in frequency to  BID.    ASSESSED NUTRITION NEEDS PER APPROVED PRACTICE GUIDELINES:  Dosing Weight 112 kg - actual body weight  Estimated Energy Needs: >/=20 kcal/kg  Justification: wound healing  Estimated Protein Needs: >/=1.3 g protein/kg  Justification: wound healing   Estimated Fluid Needs:  per MD      NEW FINDINGS:   Medications:   - Required discontinuation of Expedite as not vegetarian compliant.  - On MVI/M (pt refused for days?)    Labs: Reviewed.    Stooling: Last BM recording on 3/20.    Weight: No updated weight since admission and patient has mild/generalized edema documented:  Vitals:    03/18/24 1700   Weight: 112 kg (247 lb)       Previous goals:   Consume adequate protein/calories to promote wound healing   Evaluation: Unable to evaluate     Previous nutrition diagnosis:   Inadequate oral intake related to poor appetite r/t limited food options/palatability at TCU as evidenced by pt report of decreased intake, 4.6% wt loss.    Evaluation: No change but hopefully slowly improving    Current nutrition diagnosis:  Inadequate oral intake related to diet interruptions for procedure and AMS as evidenced by meeting <75% of nutrition needs acutely.    INTERVENTIONS  Recommendations / Nutrition Prescription  See above.    Implementation  Medical Food Supplement: As above.    Collaboration and Referral of Nutrition care: Discussed POC with team during rounds and PO intakes w/ nursing staff.    Current goals:  Patient to consume at least 75% of meals or supplements TID.    MONITORING AND EVALUATION  Progress towards goals will be monitored and evaluated per protocol and practice guidelines.      Petty Bueno RDN, LD  Clinical Dietitian  3rd floor/ICU: [phone]  All other floors: [phone]  Weekend/holiday: [phone]  Office: [phone]

## 2024-03-26 NOTE — PROGRESS NOTES
Patient Name: Feng Wynne  MRN: [id number]  : 1945      Subjective:  His mentation has improved significantly today.  He is currently on the phone waiting to order dinner.  He was able to order seizure salad along with side of cottage cheese.  He is much more conversive today.  He also feels much better and he is more clear.  He says the date is 2024.  He says he is in [facility] [place].  He denies any headache or neck pain.  No numbness tingling or weakness.    Summary:  Admitted 3/18 with concern for infection of his sacral wound. The patient was started on vancomycin and cefepime.  Infectious disease and general surgery were consulted to see the patient.  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  The sacral/coccygeal soft tissues with 5.5 cm abscess in the posterior perirectal/left ischial anal fossa which extends and is adherent to the posterior rectal wall.  There was consideration for transfer to the [facility] for higher level of care including plastic surgery consultation for wound closure.  The [facility] declined the transfer with plastic surgery recommending IV antibiotic course and wound debridement.  General surgery at the [facility] recommended wound debridement at [facility].  On  showed pachymeningeal thickening and enhancement that could be concerning for intracranial hypotension or possible infection.  The patient was transitioned to a heparin drip and INR was reversed.  On 3/22, the patient was taken for excisional debridement of sacral decubitus ulcer with electrocautery.  Postoperatively, the patient experienced significant encephalopathy.  He also was treated with cefepime which was later discontinued on 2024.      Past Medical History:  Past Medical History:   Diagnosis Date    Acute on chronic congestive heart failure,  unspecified heart failure type (H) 01/30/2024    Arthritis     Atrial fibrillation (H)     Coronary artery disease     Hypercholesteremia     Obesity     Unspecified essential hypertension      Past Surgical History:  Past Surgical History:   Procedure Laterality Date    COLONOSCOPY N/A 1/15/2024    Procedure: Colonoscopy;  Surgeon: Osbaldo Olvera MD;  Location:  GI    ESOPHAGOSCOPY, GASTROSCOPY, DUODENOSCOPY (EGD), COMBINED N/A 1/15/2024    Procedure: Esophagoscopy, gastroscopy, duodenoscopy (EGD), combined;  Surgeon: Osbaldo Olvera MD;  Location:  GI    ESOPHAGOSCOPY, GASTROSCOPY, DUODENOSCOPY (EGD), COMBINED N/A 2/8/2024    Procedure: Esophagoscopy, gastroscopy, duodenoscopy (EGD), combined;  Surgeon: Osbaldo Olvera MD;  Location:  GI    IRRIGATION AND DEBRIDEMENT SACRAL WOUND, COMBINED N/A 3/22/2024    Procedure: IRRIGATION AND DEBRIDEMENT, WOUND, SACRAL REGION;  Surgeon: Phill Jimenez MD;  Location: [facility] OR    MITRAL VALVE REPLACEMENT  8-    Mitral valve replacement with 31-mm [employer] Raffi mechanical valve.      Medications:  No current outpatient medications on file.     Allergies:  Allergies   Allergen Reactions    Amiodarone Shortness Of Breath    Pcn [Penicillins] Shortness Of Breath     Rxn occurred in childhood     Statins Muscle Pain (Myalgia) and Hives    Amiodarone     Latex     Zetia [Ezetimibe] Muscle Pain (Myalgia)     Muscle weakness legs     Family History:  Family History   Problem Relation Age of Onset    Cerebrovascular Disease Father     Diabetes Paternal Grandmother     C.A.D. Brother         CABG X 3 at age 51     Social History:  Social History     Socioeconomic History    Marital status: Single     Spouse name: Not on file    Number of children: 1    Years of education: Not on file    Highest education level: Not on file   Occupational History    Occupation: Self employed      Comment:     Tobacco Use    Smoking status: Former      "Packs/day: 0.50     Years: 5.00     Additional pack years: 0.00     Total pack years: 2.50     Types: Cigarettes     Quit date:      Years since quittin.2    Smokeless tobacco: Never   Vaping Use    Vaping Use: Never used   Substance and Sexual Activity    Alcohol use: Yes     Comment: 1 drink per month    Drug use: No    Sexual activity: Yes     Partners: Male   Other Topics Concern    Parent/sibling w/ CABG, MI or angioplasty before 65F 55M? Yes   Social History Narrative    Not on file     Social Determinants of Health     Financial Resource Strain: Not on file   Food Insecurity: Not on file   Transportation Needs: Not on file   Physical Activity: Not on file   Stress: Not on file   Social Connections: Not on file   Interpersonal Safety: Not on file   Housing Stability: Not on file         Vital Signs:  /68 (BP Location: Left arm)   Pulse 70   Temp 97.9  F (36.6  C) (Oral)   Resp 18   Ht 1.803 m (5' 11\")   Wt 112 kg (247 lb)   SpO2 95%   BMI 34.45 kg/m        Neurological examination  Mental Status: More alert and oriented today.  Is able to follow commands.  Says the date is 2024.  Cranial Nerve I: Not tested.  Cranial Nerve II: The pupils are 3 mm, equally round and reactive to light.  Cranial Nerves III, IV, VI: The extraocular movements are full in all directions of gaze without  ophthalmoplegia or nystagmus.  Cranial Nerve V: Light touch is intact and symmetric in the V1, V2, V3 divisions of both  trigeminal nerves.  Cranial Nerve VII: Facial movements are symmetric.  Cranial Nerve XI: Sternocleidomastoid strength is 5/5 bilaterally with normal shoulder shrug.  Muscle Strength: Normal power in upper extremities.  He is able to somewhat move his legs but is not able to lift against gravity.  Sensory: The sensory examination is normal for light touch bilaterally and symmetrically.  Cerebellar: Normal finger-to-nose testing.    Review of Diagnostics:  I personally reviewed and " interpreted the following:    MR Brain w/o & w Contrast    Result Date: 3/24/2024  EXAM: MR BRAIN WITHOUT AND WITH CONTRAST LOCATION: [facility] DATE: 03/24/2024 INDICATION: Encephalopathy since surgery, mechanical heart valve. Briefly off anticoagulation for surgery. COMPARISON: CT of the head dated 03/23/2024. CONTRAST: 11 mL Gadavist IV. TECHNIQUE: Routine multiplanar multisequence head MRI without and with intravenous contrast. FINDINGS: INTRACRANIAL CONTENTS: No abnormal intracranial restricted diffusion is identified to suggest recent infarct. The ventricles appear within normal limits in size and configuration. There is mild to moderate generalized brain parenchymal volume loss. Mild patchy nonspecific T2 FLAIR hyperintense signal abnormalities in the cerebral white matter, likely due to chronic small vessel ischemic change. Mild smooth diffuse pachymeningeal thickening and enhancement along the frontal convexities on both sides with  associated T2 FLAIR hyperintense signal. This dural thickening measures up to approximately 2 mm in thickness (series 8 image 15). This is nonspecific. Otherwise, no focal mass or abnormal enhancement identified. SELLA: No abnormality accounting for technique. OSSEOUS STRUCTURES/SOFT TISSUES: Normal marrow signal. The major intracranial vascular flow-voids are maintained. ORBITS: No abnormality accounting for technique. SINUSES/MASTOIDS: Mild mucosal thickening scattered about the paranasal sinuses. Moderate scattered fluid/membrane thickening in the right mastoid air cells. Trace fluid in the left mastoid tip.     IMPRESSION: 1.  No acute/early subacute ischemic infarct or mass effect. 2.  Mild, smooth, diffuse bilateral cerebral convexity pachymeningeal thickening and enhancement. This is nonspecific. It could be incidental. It also may be related to trace recent subdural hemorrhage/trauma or postprocedural changes. Pachymeningeal thickening in the  setting of intracranial hypotension could also be considered, although the finding is typically more pronounced in that entity. Recommend clinical correlation. Other considerations such as infectious/inflammatory process (pachymeningitis) or neoplasm are thought to be less likely. 3.  Atrophy and presumed chronic small vessel ischemic changes, as described. 4.  Moderate fluid/membrane thickening in the right mastoid air cells.     CT Head w/o Contrast    Result Date: 3/23/2024  EXAM: CT HEAD W/O CONTRAST LOCATION: [facility] DATE: 3/23/2024 INDICATION: encephalopathy, mechanical heart valve and off anticoag for surgery COMPARISON: None. TECHNIQUE: Routine CT Head without IV contrast. Multiplanar reformats. Dose reduction techniques were used. FINDINGS: INTRACRANIAL CONTENTS: No intracranial hemorrhage, extraaxial collection, or mass effect.  No CT evidence of acute infarct. Mild presumed chronic small vessel ischemic changes. Mild generalized volume loss. No hydrocephalus. VISUALIZED ORBITS/SINUSES/MASTOIDS: No intraorbital abnormality. No paranasal sinus mucosal disease. No middle ear or mastoid effusion. BONES/SOFT TISSUES: No acute abnormality.     IMPRESSION: 1.  No acute findings. Chronic changes as above.     MR Pelvis Bone wo & w Contrast    Result Date: 3/20/2024  MR pelvis without and with contrast 3/20/2024 9:59 AM Techniques: Multiplanar multisequence imaging of the pelvis was obtained before and after administration of  intravenous contrast using routing MSK protocol. History: sacral decubitus ulcer. Eval for osteomyelitis Comparison: MRI lumbar spine 8/16/2022. Findings: Arising from the superior aspect of the gluteal cleft there is a sacral decubitus ulcer with extensive soft tissue thickening and edema involving the subcutaneous tissue deep to the ulcer. Diffusely abnormal T1 hypointense, T2 hyperintense signal replacing the distal coccygeal segments with associated  destructive/erosive changes. The abnormal bone marrow signal extends up to and involves the first coccygeal segment. Normal bone marrow signal pattern of the S5 vertebral body. There is enhancing edema-like signal changes extending along the presacral soft tissues extending from approximately the level of S2-S3 caudally along the posterior rectosigmoid colon. There is a rim-enhancing fluid and gas collection along the posterior pararectal fossa extending into the left ischioanal fossa measuring approximately 1.7 x 3.8 x 5.5 cm (series 9 image 31 and series 10 image 23). Osseous structures Osseous structures: No focal osseous lesion. No acute fracture or avascular necrosis. Joint and Periarticular soft tissue: Moderate degenerative changes of the sacroiliac joints. Moderate osteoarthrosis of the right hip with heterotopic ossification about the hip joint. Degenerative disc disease at L5-S1. Joint effusion: Small right hip joint effusion. Bursal effusion: Minimal nonspecific edema over the greater trochanter. No substantial iliopsoas or trochanteric bursal effusion. Muscles and tendons Diffuse fatty atrophy of the pelvic musculature. Enhancing intramuscular edema involving the bilateral gluteus willian muscles, left greater than right, likely inflammatory/phlegmonous changes. Intramuscular edema involving the right gluteus minimus and medius muscles. Mild edema in the left adductor muscles. Nerves: The visualized course of the sciatic nerves are unremarkable bilaterally. Other Findings: Large stool burden in the rectosigmoid colon.     Impression: 1. Osteomyelitis involving the entirety of the coccyx. No evidence of sacral involvement. 2. Extensive inflammatory/phlegmonous changes throughout the presacral/coccygeal soft tissues with 5.5 cm abscess in the posterior pararectal/left ischioanal fossa which extends and is adherent to the posterior rectal wall. I have personally reviewed the examination and initial  interpretation and I agree with the findings. JUTTA ELLERMANN, MD   SYSTEM ID:  [id number]      No results found for this or any previous visit (from the past 24 hour(s)).    Vitamin B12:   Lab Results   Component Value Date    B12 658 01/30/2024    B12 359 03/14/2019         Complete Blood Count:    Recent Labs   Lab Test 03/25/24  0740 03/24/24  0722 03/23/24  0437 02/02/24  0805 02/01/24  1115 01/30/24  1850 01/30/24  1048 01/30/24  0545 01/19/24  0607   WBC 5.1 6.2 5.6   < > 3.5*   < > 3.4*   < > 3.5*   RBC 3.26* 3.16* 2.92*   < > 2.75*   < > 2.51*   < > 3.10*   HGB 9.9* 9.7* 9.0*   < > 7.8*   < > 6.9*   < > 8.0*   HCT 32.8* 31.2* 28.4*   < > 26.2*   < > 24.1*   < > 27.6*    254 240   < > 191   < > 190   < > 143*   LYMPH  --   --   --   --  31  --  28  --  31    < > = values in this interval not displayed.          Thyroid Stimulating Hormone:   Lab Results   Component Value Date    TSH 2.56 02/05/2024    TSH 2.39 03/14/2019        Recent Labs   Lab Test 03/26/24  0702 03/25/24  1136 03/25/24  0740   WBC 4.3  --  5.1   HGB 9.5*  --  9.9*   *  --  101*     --  245   INR 1.86* 1.69* 1.79*      Recent Labs   Lab Test 03/26/24  0702 03/25/24  0740    140   POTASSIUM 3.5 3.8   CHLORIDE 102 102   CO2 28 29   BUN 41.7* 46.0*   CR 1.14 1.25*   ANIONGAP 10 9   JOSEPH 8.9 9.1   GLC 91 84   INR was 1.6 on March 26.        Impression:  Mr. Wynne is a 79 year old With a past medical history of pulmonary hypertension, atrial fibrillation, anemia, hypertension and coronary artery disease was admitted on March 18 after he was found to have a sacral wound with infection and was diagnosed with osteomyelitis.  He underwent excisional debridement on March 22.  He received cefepime which can also cause confusion.  He continues to be confused today.  He says I am okay repeatedly and says yes but does not follow commands.  He does not have a recent fever for the past few days or WBC count spike.  It is unlikely  he has meningitis and since he is improving can hold on lumbar puncture especially since he is on warfarin this may be very challenging.      Bing Llamas MD  Neurology      Thank you very much  for allowing me to participate in the care of this patient.

## 2024-03-26 NOTE — PROGRESS NOTES
[facility] Nurse Inpatient Assessment     Consulted for: Coccyx    Summary:  Coccyx wound  now s/p debridement on 3/22/24.      Patient History (according to provider note(s):      Feng Wynne is a 79 year old male with history of mitral valve replacement with titanium valve, chronic anticoagulation with warfarin, hypertension, hypercholesterolemia, coronary artery disease, atrial fibrillation, congestive heart failure, and obesity with BMI of 35.  He was admitted to [facility] from 1/9/2024 through 1/19/2024 with severe anemia and hemoglobin of 4.4.  He discharged to transitional care.  He was readmitted to [facility] from 1/30/2024 through 2/15/2024 with recurrent anemia.  He was found to have severe pill esophagitis.  At the time of that admission he was noted to have a sacral decubitus ulcer.  He discharged to transitional care.  He has been receiving wound care for his decubitus ulcer since his second hospitalization at [facility].  Wound care continued at transitional care.  He was sent to the ED today with concern of sacral decubitus ulcer wound infection.  The wound care nurse had noticed some drainage with foul odor.  Evaluation showed hemoglobin 9.6, white blood cell 6.6, platelets 217, BUN 30.7, creatinine 1.07, sed rate greater than 140, and .65.  Exam showed deep appearing sacral decubitus ulcer with malodorous drainage and surrounding erythema.  He was given Rocephin and vancomycin.  I was asked to admit him for further cares.  He denied fevers, chills, chest pain, cough, dysuria, and diarrhea.  He has had pain in sacral decubitus ulcer with sitting or any pressure.     Assessment:      Areas visualized during today's visit: Sacrum/coccyx    Pressure Injury Location: Coccyx  Last photo: 3/26/24    Wound type: Pressure Injury     Pressure Injury Stage: 4, present on admission   Wound history/plan of care:    "Patient reports wound started about 2 months ago.  Wound has significantly deteriorated in last month.    Wound base: 30 % slough, 10% bone, 60% pink/red nongranular tissue     Palpation of the wound bed: normal      Drainage:  large     Description of drainage: serosanguinous and purulent     Measurements (length x width x depth, in cm) 3  x 2.5  x  3.5 cm      Tunneling N/A     Undermining up to 6 cm circumferentially   Periwound skin: Scar tissue      Color: purple 2.5x1.3cm      Temperature: normal   Odor: none  Pain: moderate  Pain intervention prior to dressing change: slow and gentle cares   Treatment goal: Heal  and Remove necrotic tissue  STATUS: improving  Supplies ordered: at bedside    My PI Risk Assessment     Sensory Perception: 3 - Slightly Limited     Moisture: 2 - Very moist      Activity: 1 - Bedfast      Mobility: 2 - Very limited     Nutrition: 2 - Probably inadequate      Friction/Shear: 2 - Potential problem      TOTAL: 12       Treatment Plan:     Coccyx wound(s): BID  Cleanse with Vashe  Pack with kerlix moistened with Vashe  Cover with dry gauze or ABD     Pressure Injury Prevention (PIP) Plan:  If patient is declining pressure injury prevention interventions: Explore reason why and address patient's concerns, Educate on pressure injury risk and prevention intervention(s), and If patient is still declining, document \"informed refusal\"   Mattress: Follow bed algorithm, reassess daily and order specialty mattress, if indicated.  HOB: Maintain at or below 30 degrees, unless contraindicated  Repositioning in bed: Every 1-2 hours  and Left/right positioning; avoid supine  Heels: Pillows under calves  Protective Dressing: None  Chair positioning: Chair cushion (#[id number])    If patient has a buttock pressure injury, or high risk for PI use chair cushion or SPS.  Moisture Management: Avoid brief in bed  Under Devices: Inspect skin under all medical devices during skin inspection , Ensure tubes are " stabilized without tension, and Ensure patient is not lying on medical devices or equipment when repositioned  Ask provider to discontinue device when no longer needed.       Orders: Updated    RECOMMEND PRIMARY TEAM ORDER: None, at this time  Education provided: importance of repositioning, plan of care, and Off-loading pressure  Discussed plan of care with: Patient, Family, and Nurse  WO nurse follow-up plan: weekly  Notify WOC if wound(s) deteriorate.  Nursing to notify the Provider(s) and re-consult the WOC Nurse if new skin concern.    DATA:     Current support surface: Bariatric Low air loss (PRICILA pump, Isolibrium, Pulsate, skin guard, etc)  Containment of urine/stool: Incontinence Protocol  BMI: Body mass index is 34.45 kg/m .   Active diet order: Orders Placed This Encounter      Vegetarian Diet     Output: I/O last 3 completed shifts:  In: 237 [P.O.:237]  Out: -      Labs:   Recent Labs   Lab 03/26/24  0702 03/25/24  1136 03/25/24  0740   ALBUMIN  --   --  2.6*   HGB 9.5*  --  9.9*   INR 1.86*   < > 1.79*   WBC 4.3  --  5.1    < > = values in this interval not displayed.     Pressure injury risk assessment:   Sensory Perception: 3-->slightly limited  Moisture: 3-->occasionally moist  Activity: 1-->bedfast  Mobility: 2-->very limited  Nutrition: 1-->very poor  Friction and Shear: 2-->potential problem  Roscoe Score: 12    ONOFRE Arauz [facility] [employer]  Dept. Office Number: [phone]

## 2024-03-26 NOTE — PROGRESS NOTES
[facility]    Medicine Progress Note - Hospitalist Service    Date of Admission:  3/18/2024    Assessment & Plan        Summary of Stay: eFng Wynne is a 79 year old male with a history of mechanical mitral valve on warfarin, severe tricuspid regurgitation, right-sided heart failure, moderate pulmonary hypertension, coronary artery disease, hypertension, hypercholesterolemia, atrial fibrillation, history of pill esophagitis with resultant anemia, known sacral decubitus ulcer admitted on 3/18/2024 with concern for infection of his sacral wound.  In the emergency department, patient was found to have a temperature of 97.7  F, blood pressure 109/63, heart rate 81, respiratory rate 20, SpO2 94% on room air.  Initial lab work showed bicarb 31, BUN/creatinine 30.7/1.07, .65, hemoglobin 9.6, ESR greater than 140, INR 3.93.  The patient was started on vancomycin and cefepime.  Infectious disease and general surgery were consulted to see the patient.  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  The sacral/coccygeal soft tissues with 5.5 cm abscess in the posterior perirectal/left ischial anal fossa which extends and is adherent to the posterior rectal wall.  There was consideration for transfer to the [facility] for higher level of care including plastic surgery consultation for wound closure.  The [facility] declined the transfer with plastic surgery recommending IV antibiotic course and wound debridement.  General surgery at the [facility] recommended wound debridement at [facility].  The patient was transitioned to a heparin drip and INR was reversed.  On 3/22, the patient was taken for excisional debridement of sacral decubitus ulcer with electrocautery.  Postoperatively, the patient experienced significant encephalopathy.      TODAY'S PLAN:    Mental  state continues to improve.  He is conversational, pleasant, cooperative and following simple verbal instructions  -Much improved kidney function.  Decreasing azotemia    -Currently afebrile and not hypoxic  -Was able to follow some simple verbal instructions reportedly able to tolerate oral diet  -Remain on IV heparin.  Requested pharmacy dosing for warfarin protocol.  Target INR between 2.5-3.5 with known history of mechanical mitral valve  -Underwent EEG earlier with no clear evidence of seizure-like activity  -MRI done earlier for confusion and encephalopathy with no clear evidence of CVA  -Appreciate input from general neurology service  -With his improvement with clinical status, neurological status I opted to defer further investigation with lumbar puncture as patient also has significant risk factor with him on anticoagulation given his mechanical valve history.    -Cefepime was discontinued given possible contributing factor for earlier confusion  -Remain on IV antibiotics with ID service following with us currently on meropenem  -Appreciate continuous input from general surgery service  -No plans for repeat debridement in the operating room  -Multiple opioids/narcotics discontinued earlier due to confusion that might be also contributory to his encephalopathy         Problem List:   Sacral Decubitus Ulcer - POA  New Osteomyelitis of Coccyx  Perirectal Abscess  -Previously he was on vanco and cefepime and flagyl.  Transitioned to Meropenem on 3/23  - Appreciate ID recommendations  - Appreciate General Surgery, Colorectal Surgery recommendations  - Wound cultures positive for enterococcus faecalis, gram negative bacilli,  - Attempted transfer to [facility] on 3/20, however pt declined as plastic surgery recommended IV abx treatment and debridement, and general surgery recommended debridement and abscess drainage at [facility]  - MRI pelvis showed osteomyelitis of coccyx and 5.5 cm perirectal abscess  - At baseline, pt  "ambulates with a walker  -  Stubbs catheter has been discontinued.  Continue to monitor.  Patient is unfortunately incontinent     Acute Metabolic vs Toxic Encephalopathy  - Unclear cause.  Possibly secondary to narcotics vs anesthesia vs worsening infection vs cva vs other  - CT brain negative  - VBG, LA, Ammonia unremarkable  - Check MRI brain  - Appreciate Neurology recommendations     Mild Acute Kidney Injury-creatinine improving  - Baseline Cr = 0.9  - IVF today  - Daily BMP    Anticipating-continuous improvement with increasing oral intake     Mechanical Mitral Valve  Warfarin Coagulopathy  - INR goal is 2.5-3.5  -Resume warfarin, on bridging heparin  -Stop heparin with therapeutic INR     Mild Hypokalemia  - Electrolyte replacement protocol     Chronic Medical Problems:  Atrial Fibrillation  Hypertension  Hypercholesterolemia  Coronary Artery Disease  Pill Esophagitis with Resultant Anemia  Severe Tricuspid Regurgitation  Right Sided Heart Failure  Moderate Pulmonary Hypertension           Diet: Vegetarian Diet  Snacks/Supplements Adult: Ensure Enlive; With Meals    DVT Prophylaxis: Warfarin  Stubbs Catheter: Not present  Lines: None     Cardiac Monitoring: None  Code Status: Full Code      Clinically Significant Risk Factors           # Hypercalcemia: corrected calcium is >10.1, will monitor as appropriate    # Hypoalbuminemia: Lowest albumin = 2.6 g/dL at 3/25/2024  7:40 AM, will monitor as appropriate       # Hypertension: Noted on problem list  # Chronic heart failure with preserved ejection fraction: heart failure noted on problem list and last echo with EF >50%       # Obesity: Estimated body mass index is 34.45 kg/m  as calculated from the following:    Height as of this encounter: 1.803 m (5' 11\").    Weight as of this encounter: 112 kg (247 lb).   # Moderate Malnutrition: based on nutrition assessment      # Financial/Environmental Concerns:           Disposition Plan     Expected Discharge Date: " 03/28/2024      Destination: home with family;home with help/services          Family updated at bedside    Stone Hannon MD, MD  Hospitalist Service  [facility]  Securely message with [employer] (more info)  Text page via [email or web address] Paging/Directory   ______________________________________________________________________    Interval History     Continuing medicine service care today.  Seen and examined.  Chart reviewed.  Case discussed with nursing service.  I met Feng this morning while he is laying comfortably in bed.  Reportedly able to tolerate oral diet.  He appears to be significantly improved with this mental state.  He is more responsive, conversational, following simple verbal instructions.  Endorsing no ongoing nausea, vomiting.  Not requiring oxygen support.  Afebrile.  No reported diarrhea.  Family updated this patient's partner present at bedside and also attested that patient's mental state significantly improved and almost back to baseline levels.      Physical Exam   Vital Signs: Temp: 97.6  F (36.4  C) Temp src: Oral BP: 123/61 Pulse: 72   Resp: 18 SpO2: 99 % O2 Device: None (Room air)    Weight: 247 lbs 0 oz    HEENT; Atraumatic, normocephalic, pinkish conjuctiva, pupils bilateral reactive   Skin: warm and moist, no rashes  Lungs: equal chest expansion, clear to auscultation, no wheezes, no stridor, no crackles,   Heart: normal rate, normal rhythm, no rubs or gallops.   Abdomen: normal bowel sounds, no tenderness, no peritoneal signs, no guarding  Extremities: no deformities, bilateral lower extremity edema +1 mostly at the ankle level  Neuro;   Feng is more awake, alert, oriented to time place and person, following simple verbal instructions, able to demonstrate spontaneous engagement speech, moves all extremities spontaneously  psych; no hallucination, euthymic mood, not agitated      Medical Decision Making       50 MINUTES SPENT BY ME on the date of service doing chart  review, history, exam, documentation & further activities per the note.  MANAGEMENT DISCUSSED with the following over the past 24 hours: yes   NOTE(S)/MEDICAL RECORDS REVIEWED over the past 24 hours: Yes       Data     I have personally reviewed the following data over the past 24 hrs:    4.3  \   9.5 (L)   / 259     140 102 41.7 (H) /  91   3.5 28 1.14 \     INR:  1.86 (H) PTT:  N/A   D-dimer:  N/A Fibrinogen:  N/A       Imaging results reviewed over the past 24 hrs:   No results found for this or any previous visit (from the past 24 hour(s)).

## 2024-03-26 NOTE — PROGRESS NOTES
Care Management Follow Up    Length of Stay (days): 8    Expected Discharge Date: 03/28/2024     Concerns to be Addressed:       Patient plan of care discussed at interdisciplinary rounds: Yes    Anticipated Discharge Disposition: TCU     Anticipated Discharge Services:    Anticipated Discharge DME:      Patient/family educated on Medicare website which has current facility and service quality ratings:  yes  Education Provided on the Discharge Plan: yes   Patient/Family in Agreement with the Plan:  yes    Referrals Placed by CM/SW:  Post Acute Care Facilities  Private pay costs discussed: private room/amenity fees    Additional Information:  Met with patient and partner Jose at the bedside. Patients mentation is clear today. Alert, oriented and making needs known. Patient and partner wanting to discuss discharge planning. Explained the CM team has been following waiting for patients clinical picture to improve and waiting for guidance from MD when they feel he is medically ready for discharge. Discussed recommendations for TCU and they are in agreement. Provided medicare.gov list of facilities in the [facility]. They would like referrals sent to 1) [facility] 2) [facility] 3) [facility] 4) [facility]  Will send referrals once updated therapy notes are in with current improved mentation.    Batsheva Campbell RN BSN OCN  Care Coordinator  [facility]  [phone]

## 2024-03-26 NOTE — PROGRESS NOTES
"   03/26/24 1400   Appointment Info   Signing Clinician's Name / Credentials (OT) Mercedes Painter, OTR/L   Living Environment   People in Home spouse   Current Living Arrangements apartment   Home Accessibility no concerns   Transportation Anticipated car, drives self   Living Environment Comments Walk in shower with grab bars and shower chair; Comfort height with grab bars next to   Self-Care   Fall history within last six months no   Activity/Exercise/Self-Care Comment Per pt, utilizes 2ww in home. Pt reports at baseline indep with ADLs/IADLs. Pt reports has cleaning services.Pt's spouse helps with IADLs as well.   General Information   Onset of Illness/Injury or Date of Surgery 03/18/24   Referring Physician Phill Jimenez MD   Patient/Family Therapy Goal Statement (OT) agreeable to TCU   Additional Occupational Profile Info/Pertinent History of Current Problem per chart:  Feng Wynne is a 79 year old male with a history of mechanical mitral valve on warfarin, severe tricuspid regurgitation, right-sided heart failure, moderate pulmonary hypertension, coronary artery disease, hypertension, hypercholesterolemia, atrial fibrillation, history of pill esophagitis with resultant anemia, known sacral decubitus ulcer admitted on 3/18/2024 with concern for infection of his sacral wound.   Cognitive Status Examination   Orientation Status person   Cognitive Status Comments confusion has cleared significantly but still noted deficits. Would benefit from cog screen at TCU.   Visual Perception   Impact of Vision Impairment on Function (Vision) wears corrective lenses all the time   Sensory   Sensory Comments denies N/T   Pain Assessment   Patient Currently in Pain No   Range of Motion Comprehensive   General Range of Motion upper extremity range of motion deficits identified   Comment, General Range of Motion RUE deficits due to \"frozen shoulder\" for approx 10 years   Bed Mobility   Bed Mobility supine-sit   Supine-Sit " [place] (Bed Mobility) 2 person assist;maximum assist (25% patient effort)   Transfers   Transfers bed-chair transfer   Transfer Skill: Bed to Chair/Chair to Bed   Bed-Chair [place] (Transfers) maximum assist (25% patient effort);2 person assist  (w/SS)   Clinical Impression   Criteria for Skilled Therapeutic Interventions Met (OT) Yes, treatment indicated   OT Diagnosis weakness   OT Problem List-Impairments impacting ADL problems related to;activity tolerance impaired;balance;cognition;mobility;strength;pain   Assessment of Occupational Performance 5 or more Performance Deficits   Identified Performance Deficits ADLs/IADLs and functional transfers and mobility   Planned Therapy Interventions (OT) ADL retraining;IADL retraining;cognition;strengthening;transfer training;progressive activity/exercise;risk factor education   Clinical Decision Making Complexity (OT) problem focused assessment/low complexity   Risk & Benefits of therapy have been explained evaluation/treatment results reviewed;care plan/treatment goals reviewed;risks/benefits reviewed;current/potential barriers reviewed;participants voiced agreement with care plan;participants included;patient;spouse/significant other   OT Total Evaluation Time   OT Eval, Low Complexity Minutes (31824) 10   OT Goals   Therapy Frequency (OT) 5 times/week   OT Predicted Duration/Target Date for Goal Attainment 04/02/24   OT Goals Hygiene/Grooming;Upper Body Dressing;Lower Body Dressing;Toilet Transfer/Toileting;Cognition;OT Goal 1   OT: Hygiene/Grooming supervision/stand-by assist   OT: Upper Body Dressing Supervision/stand-by assist   OT: Lower Body Dressing Supervision/stand-by assist   OT: Toilet Transfer/Toileting Supervision/stand-by assist   OT: Cognitive Patient/caregiver will verbalize understanding of cognitive assessment results/recommendations as needed for safe discharge planning   OT: Goal 1 Pt will be SBA with shower transfer.   Interventions    Interventions Quick Adds Therapeutic Activity   Therapeutic Activities   Therapeutic Activity Minutes (69162) 15   Treatment Detail/Skilled Intervention Bed inflated to full amount to ease transfer. Pt is max A x2 with bed mobility from supine to sit with raised HOB and use of bed rail; pt requires assist to bring legs to EOB, to roll to side, and to push up into sitting. Once sitting, pt requires min A to stabilize self, but then able to be CGA. SS placed for pt to transfer. Pt is max A x2 with STS from raised bed with SS; pt unable to stand on first attempt but able to stand on second attempt. Pt transferred into chair with SS; pt is mod A x2 with STS in SS from paddles. Pt is min A x2 with stand to sit into chair for controlled descent. Pt educated on TCU and pt is very agreeable. Pt left in chair with call light in reach and chair alarm on. Pt on pressure relieving cushion.   OT Discharge Planning   OT Plan ADLs EOB or in chair; UE excercises; commode transfer   OT Discharge Recommendation (DC Rec) Transitional Care Facility   OT Rationale for DC Rec Pt is significantly below baseline due to limitations with weakness and cognition. Appears pt is cognitively clearning. Pt able to follow commands throughout session. Pt would benefit from skilled OT during IP therapy and TCU to maximize indep.   OT Brief overview of current status max A x2 in SS for bed>chair transfer. Recommend nursing use lift x2.   OT Equipment Needed at Discharge   (defer to TCU)   Total Session Time   Timed Code Treatment Minutes 15   Total Session Time (sum of timed and untimed services) 25

## 2024-03-26 NOTE — PLAN OF CARE
Pertinent assessments: Pt Alert to self. DERIC orientation. Pt constantly repeating words and phases that are illogical. VSS and on RA. Had some pain, scheduled tylenol given. Pt would only take partial tylenol dose at 0400. Denies N/V, SOB. Heparin infusing at 1300. IV Merrem infusing. Frequent repositioning Q2h. Incont of b/b. No bm during shift.     Major Shift Events: pt having issue remembering pass code to phone, keeps locking himself out of it for hours at a time.     Treatment Plan: Pain management, IV Merrem, ID, WOC, Repo q2 , monitor mentation    Bedside Nurse: Arlen Li RN           Goal Outcome Evaluation:      Plan of Care Reviewed With: patient    Overall Patient Progress: no changeOverall Patient Progress: no change    Outcome Evaluation: pt still confused but alert

## 2024-03-26 NOTE — PLAN OF CARE
"Goal Outcome Evaluation:      o Goal Outcome Evaluation:  End of Shift Summary  For vital signs and complete assessments, please see documentation flowsheets.     Pertinent assessments: Pt A&O x4. VSS and on RA. LS clear and no sob noted,Tolerated diet and no nausea noted. Heparin infusing at 1300. Wound care done and Pt tolerated the procedure. Pt voiding using urinal. Ble edema+2     Major Shift Events :None     Treatment Plan: Pain management, IV Merrem, ID, Woc, Repo q2 , monitor mentation.  Bedside Nurse: Tennille Becker RN      Problem: Adult Inpatient Plan of Care  Goal: Patient-Specific Goal (Individualized)  Description: You can add care plan individualizations to a care plan. Examples of Individualization might be:  \"Parent requests to be called daily at 9am for status\", \"I have a hard time hearing out of my right ear\", or \"Do not touch me to wake me up as it startles  me\".  3/26/2024 1803 by Tennille Becker RN  Outcome: Progressing  3/26/2024 1800 by Tennille Becker RN  Outcome: Progressing  3/26/2024 1758 by Tennille Becker RN  Outcome: Progressing  3/26/2024 1757 by Tennille Becker RN  Outcome: Progressing  3/26/2024 1755 by Tennille Becker RN  Outcome: Progressing  Goal: Plan of Care Review  Description: The Plan of Care Review/Shift note should be completed every shift.  The Outcome Evaluation is a brief statement about your assessment that the patient is improving, declining, or no change.  This information will be displayed automatically on your shift  note.  Outcome: Progressing  Flowsheets  Taken 3/26/2024 1758  Outcome Evaluation: Pt's mentation greatly improved this shift, able to sit on the chair with therapy and voided indep in the urinal.  Plan of Care Reviewed With: patient  Overall Patient Progress: improving  Taken 3/26/2024 1755  Outcome Evaluation: Pt's mentation greatly improved this shift, able to sit on the chair with therapy and voided indep with the " urinal.  Overall Patient Progress: improving  Goal: Absence of Hospital-Acquired Illness or Injury  Outcome: Progressing  Intervention: Identify and Manage Fall Risk  Recent Flowsheet Documentation  Taken 3/26/2024 0900 by Tennille Becker RN  Safety Promotion/Fall Prevention: activity supervised  Intervention: Prevent Skin Injury  Recent Flowsheet Documentation  Taken 3/26/2024 0900 by Tennille Becker RN  Body Position:   turned   left  Intervention: Prevent and Manage VTE (Venous Thromboembolism) Risk  Recent Flowsheet Documentation  Taken 3/26/2024 0900 by Tennille Becker RN  VTE Prevention/Management: SCDs (sequential compression devices) off  Goal: Optimal Comfort and Wellbeing  Outcome: Progressing  Intervention: Monitor Pain and Promote Comfort  Recent Flowsheet Documentation  Taken 3/26/2024 1600 by Tennille Becker RN  Pain Management Interventions: declines  Taken 3/26/2024 0900 by Tennille Becker RN  Pain Management Interventions: declines  Goal: Readiness for Transition of Care  Outcome: Progressing     Problem: Infection  Goal: Absence of Infection Signs and Symptoms  Outcome: Progressing     Problem: Malnutrition  Goal: Improved Nutritional Intake  Outcome: Progressing     Problem: Comorbidity Management  Goal: Maintenance of Heart Failure Symptom Control  Outcome: Progressing  Intervention: Maintain Heart Failure Management  Recent Flowsheet Documentation  Taken 3/26/2024 0900 by Tennille Becker RN  Medication Review/Management: medications reviewed

## 2024-03-26 NOTE — PROGRESS NOTES
[facility]  Infectious Disease Progress Note          Assessment and Plan:   Date of Admission:  3/18/2024  Date of Consult (When I saw the patient): 03/20/24        Assessment & Plan  Feng Wynne is a 79 year old who was admitted on 3/18/2024.      Impression: 1  79-year-old male, admitted with worsening sacral decubitus wound, noted at care facility to have increasing drainage mild redness, patient denying other symptoms mild pain  2  fever, no symptoms suggesting sepsis but new acute fever, blood cultures now being done  3  recent 2 lengthy hospitalizations at [facility] for  anemia and esophagitis  4  mitral valve replacement  5  childhood penicillin allergy     REC 1 operative findings noted, temp is down, not bacteremic, prior microbiology same  2 worsening cognition, probably multifactorial but among issues possible here is cefepime but now off cefepime encephalopathy continues but is somewhat improved, CT scan negative MRI scan slightly abnormal some consideration of some inflammatory process but clinically that is not logically fitting here given his initial presentation that he would have a new CNS infectious process,  not septic enough for mentation to be explained by sepsis fortunately has fully resolved at this point and mentation is excellent  3 Continue meropenem alone adequate Enterococcus coverage to what ever degree it is a problem and covers the other organisms acceptably, but really do not think prolonged IV needed here.  The obvious oral antibiotic to get to his Augmentin probably with Septra, plan on that for several weeks.  Has a childhood literally infancy penicillin reaction I think we can directly reach allergy without any desensitization very unlikely a true allergy plan that assuming all is going well tomorrow        Interval History:     no new fever or sepsis mentation problems essentially completely resolved, excellent mental status currently noted CT  "scan shows  abscess almost certainly related to wound, definite osteomyelitis on imaging quite impressive, blood cultures so far negative temp down wound culture with gram-negative rods and Enterococcus relatively resistant but covered by meropenem and was covered by cefepime              Medications:      acetaminophen  975 mg Oral Q8H    ferrous sulfate  325 mg Oral Daily    RA Probiotic Gummies  2 chew tab Oral Daily    meropenem  1 g Intravenous Q12H    miconazole   Topical BID    Multivitamin Adult  2 chew tab Oral Daily    pantoprazole  40 mg Intravenous BID    [Held by provider] potassium chloride  20 mEq Oral Daily    psyllium  1 packet Oral Daily    sodium chloride (PF)  3 mL Intracatheter Q8H    sucralfate  1 g Oral 4x Daily AC & HS    [Held by provider] torsemide  20 mg Oral Daily    warfarin ANTICOAGULANT  2.5 mg Oral ONCE at 18:00    Warfarin Therapy Reminder  1 each Oral See Admin Instructions    Warfarin Therapy Reminder  1 each Oral See Admin Instructions                  Physical Exam:   Blood pressure 123/61, pulse 72, temperature 97.6  F (36.4  C), temperature source Oral, resp. rate 18, height 1.803 m (5' 11\"), weight 112 kg (247 lb), SpO2 99%.  Wt Readings from Last 2 Encounters:   03/18/24 112 kg (247 lb)   03/18/24 112.5 kg (248 lb)     Vital Signs with Ranges  Temp:  [97.4  F (36.3  C)-97.7  F (36.5  C)] 97.6  F (36.4  C)  Pulse:  [63-72] 72  Resp:  [16-18] 18  BP: (105-135)/(49-67) 123/61  SpO2:  [96 %-99 %] 99 %    Constitutional: Confusion resolved     Lungs: Clear to auscultation bilaterally, no crackles or wheezing   Cardiovascular: Regular rate and rhythm, normal S1 and S2, and no murmur noted   Abdomen: Normal bowel sounds, soft, non-distended, non-tender   Skin: No rashes, no cyanosis, no edema   Other: Wounds quite clean looking          Data:   All microbiology laboratory data reviewed.  Recent Labs   Lab Test 03/26/24  0702 03/25/24  0740 03/24/24  0722   WBC 4.3 5.1 6.2   HGB 9.5* " "9.9* 9.7*   HCT 31.2* 32.8* 31.2*   * 101* 99    245 254     Recent Labs   Lab Test 03/26/24  0702 03/25/24  0740 03/24/24  0722   CR 1.14 1.25* 1.38*     Recent Labs   Lab Test 03/18/24  1748   SED >140*     No lab results found.    Invalid input(s): \"UC\"     "

## 2024-03-27 NOTE — PLAN OF CARE
"Goal Outcome Evaluation:  Pertinent assessments: A&Ox2/3 with intermittent forgetfulness. Denied pain. Assist x2 using sera steady. Hep gtt infusing at 1300units/hr. Next heparin Xa in AM. On K+ & was WNL. Recheck in AM. Up in chair during this shift. On regular diet with good appetite. Sacral wound packed as ordered. Turned and reposition. Family at bedside    Major Shift Events: Started on Amoxicillin & Benadryl   Treatment Plan: Pain management, IV Merrem, ID, WOC, Repo q2 , monitor mentation.        Problem: Adult Inpatient Plan of Care  Goal: Patient-Specific Goal (Individualized)  Description: You can add care plan individualizations to a care plan. Examples of Individualization might be:  \"Parent requests to be called daily at 9am for status\", \"I have a hard time hearing out of my right ear\", or \"Do not touch me to wake me up as it startles  me\".  Outcome: Progressing  Goal: Plan of Care Review  Description: The Plan of Care Review/Shift note should be completed every shift.  The Outcome Evaluation is a brief statement about your assessment that the patient is improving, declining, or no change.  This information will be displayed automatically on your shift  note.  Outcome: Progressing  Goal: Absence of Hospital-Acquired Illness or Injury  Outcome: Progressing  Intervention: Identify and Manage Fall Risk  Recent Flowsheet Documentation  Taken 3/27/2024 1000 by Valerie Hart RN  Safety Promotion/Fall Prevention: activity supervised  Intervention: Prevent Skin Injury  Recent Flowsheet Documentation  Taken 3/27/2024 1000 by Valerie Hart, RN  Body Position:   left   turned  Intervention: Prevent and Manage VTE (Venous Thromboembolism) Risk  Recent Flowsheet Documentation  Taken 3/27/2024 1000 by Valerie Hart, RN  VTE Prevention/Management: compression stockings off  Goal: Optimal Comfort and Wellbeing  Outcome: Progressing  Intervention: Monitor Pain and Promote Comfort  Recent Flowsheet Documentation  Taken " 3/27/2024 1332 by Valerie Hart, RN  Pain Management Interventions: medication (see MAR)  Taken 3/27/2024 1000 by Valerie Hart RN  Pain Management Interventions: declines  Goal: Readiness for Transition of Care  Outcome: Progressing     Problem: Infection  Goal: Absence of Infection Signs and Symptoms  Outcome: Progressing     Problem: Malnutrition  Goal: Improved Nutritional Intake  Outcome: Progressing     Problem: Comorbidity Management  Goal: Maintenance of Heart Failure Symptom Control  Outcome: Progressing  Intervention: Maintain Heart Failure Management  Recent Flowsheet Documentation  Taken 3/27/2024 1000 by Valerie Hart, RN  Medication Review/Management: medications reviewed

## 2024-03-27 NOTE — PROGRESS NOTES
Medicine Progress Note - Hospitalist Service    Date of Admission:  3/18/2024    Assessment & Plan        Summary of Stay: Feng Wynne is a 79 year old male with a history of mechanical mitral valve on warfarin, severe tricuspid regurgitation, right-sided heart failure, moderate pulmonary hypertension, coronary artery disease, hypertension, hypercholesterolemia, atrial fibrillation, history of pill esophagitis with resultant anemia, known sacral decubitus ulcer admitted on 3/18/2024 with concern for infection of his sacral wound.  In the emergency department, patient was found to have a temperature of 97.7  F, blood pressure 109/63, heart rate 81, respiratory rate 20, SpO2 94% on room air.  Initial lab work showed bicarb 31, BUN/creatinine 30.7/1.07, .65, hemoglobin 9.6, ESR greater than 140, INR 3.93.  The patient was started on vancomycin and cefepime.  Infectious disease and general surgery were consulted to see the patient.  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  The sacral/coccygeal soft tissues with 5.5 cm abscess in the posterior perirectal/left ischial anal fossa which extends and is adherent to the posterior rectal wall.  There was consideration for transfer to the [facility] for higher level of care including plastic surgery consultation for wound closure.  The [facility] declined the transfer with plastic surgery recommending IV antibiotic course and wound debridement.  General surgery at the [facility] recommended wound debridement at [facility].  The patient was transitioned to a heparin drip and INR was reversed.  On 3/22, the patient was taken for excisional debridement of sacral decubitus ulcer with electrocautery.  Postoperatively, the patient experienced significant encephalopathy.      TODAY'S PLAN:    Mental  state continues to be back at baseline level.    He remained conversational, cooperative   Tolerating oral diet.  No reports of any nausea, vomiting.  No diarrhea   -Remain on IV antibiotics.  -Appreciate continuous input from ID service and likely patient will not need prolonged IV antibiotics coverage  -INR improving but not yet therapeutic.  Will continue ongoing heparin    -  Target INR between 2.5-3.5 with known history of mechanical mitral valve  -Underwent EEG earlier with no clear evidence of seizure-like activity  -MRI done earlier for confusion and encephalopathy with no clear evidence of CVA  -Appreciate input from general neurology service  -With his improvement with clinical status, neurological status I opted to defer further investigation with lumbar puncture as patient also has significant risk factor with him on anticoagulation given his mechanical valve history.    -Cefepime was discontinued given possible contributing factor for earlier confusion  -Remain on IV antibiotics with ID service following with us currently on meropenem  -Appreciate continuous input from general surgery service  -No plans for repeat debridement in the operating room  -Multiple opioids/narcotics discontinued earlier due to confusion that might be also contributory to his encephalopathy         Problem List:   Sacral Decubitus Ulcer - POA  New Osteomyelitis of Coccyx  Perirectal Abscess  -Previously he was on vanco and cefepime and flagyl.  Transitioned to Meropenem on 3/23  - Appreciate ID recommendations  - Appreciate General Surgery, Colorectal Surgery recommendations  - Wound cultures positive for enterococcus faecalis, gram negative bacilli,  - Attempted transfer to [facility] on 3/20, however pt declined as plastic surgery recommended IV abx treatment and debridement, and general surgery recommended debridement and abscess drainage at [facility]  - MRI pelvis showed osteomyelitis of coccyx and 5.5 cm perirectal abscess  - At  "baseline, pt ambulates with a walker  -  Stubbs catheter has been discontinued.  Continue to monitor.  Patient is unfortunately incontinent     Acute Metabolic vs Toxic Encephalopathy  - Unclear cause.  Possibly secondary to narcotics vs anesthesia vs worsening infection vs cva vs other  - CT brain negative  - VBG, LA, Ammonia unremarkable  - Check MRI brain  - Appreciate Neurology recommendations     Mild Acute Kidney Injury-creatinine improving  - Baseline Cr = 0.9  - IVF today  - Daily BMP    Anticipating-continuous improvement with increasing oral intake     Mechanical Mitral Valve  Warfarin Coagulopathy  - INR goal is 2.5-3.5  -Resume warfarin, on bridging heparin  -Stop heparin with therapeutic INR     Mild Hypokalemia  - Electrolyte replacement protocol     Chronic Medical Problems:  Atrial Fibrillation  Hypertension  Hypercholesterolemia  Coronary Artery Disease  Pill Esophagitis with Resultant Anemia  Severe Tricuspid Regurgitation  Right Sided Heart Failure  Moderate Pulmonary Hypertension           Diet: Vegetarian Diet  Snacks/Supplements Adult: Ensure Enlive; With Meals    DVT Prophylaxis: Warfarin  Stubbs Catheter: Not present  Lines: None     Cardiac Monitoring: None  Code Status: Full Code      Clinically Significant Risk Factors              # Hypoalbuminemia: Lowest albumin = 2.6 g/dL at 3/25/2024  7:40 AM, will monitor as appropriate       # Hypertension: Noted on problem list  # Chronic heart failure with preserved ejection fraction: heart failure noted on problem list and last echo with EF >50%       # Obesity: Estimated body mass index is 34.46 kg/m  as calculated from the following:    Height as of this encounter: 1.803 m (5' 11\").    Weight as of this encounter: 112.1 kg (247 lb 1.6 oz).   # Moderate Malnutrition: based on nutrition assessment      # Financial/Environmental Concerns:           Disposition Plan     Expected Discharge Date: 03/29/2024      Destination: home with family;home with " help/services          Family updated at bedside    Stone Hannon MD, MD  Hospitalist Service  [facility]  Securely message with [employer] (more info)  Text page via [facility] Paging/Directory   ______________________________________________________________________    Interval History     Continuing medicine service care today.  Seen and examined.  Chart reviewed.    Family updated this patient's  partner present at bedside during my encounter.  Feng continues to demonstrate stable mental status showed significant improvement since yesterday.  He remained conversational, providing appropriate answers to the questions and following simple instructions.  Family also attested that he is at baseline mental state.  No reports of being agitated, combative or hallucinations.  Remained afebrile.  Not requiring oxygen support.  Able to tolerate oral diet.  No reported nausea or vomiting.  Denies any chest pain or shortness of breath.  No bleeding tendencies.        Physical Exam   Vital Signs: Temp: 97.3  F (36.3  C) Temp src: Oral BP: 136/66 Pulse: 61   Resp: 18 SpO2: 99 % O2 Device: None (Room air)    Weight: 247 lbs 1.6 oz    HEENT; Atraumatic, normocephalic, pinkish conjuctiva, pupils bilateral reactive   Skin: warm and moist, no rashes  Lungs: equal chest expansion, clear to auscultation, no wheezes, no stridor, no crackles,   Heart: normal rate, normal rhythm, no rubs or gallops.   Abdomen: normal bowel sounds, no tenderness, no peritoneal signs, no guarding  Extremities: no deformities, bilateral lower extremity edema +1 mostly at the ankle level  Neuro;   Feng is more awake, alert, oriented to time place and person, following simple verbal instructions, able to demonstrate spontaneous engagement speech, moves all extremities spontaneously  psych; no hallucination, euthymic mood, not agitated      Medical Decision Making       50 MINUTES SPENT BY ME on the date of service doing chart review,  history, exam, documentation & further activities per the note.  MANAGEMENT DISCUSSED with the following over the past 24 hours: Yes   NOTE(S)/MEDICAL RECORDS REVIEWED over the past 24 hours: Yes       Data     I have personally reviewed the following data over the past 24 hrs:    5.3  \   9.2 (L)   / 225     N/A N/A N/A /  N/A   4.0 N/A N/A \     INR:  2.00 (H) PTT:  N/A   D-dimer:  N/A Fibrinogen:  N/A       Imaging results reviewed over the past 24 hrs:   No results found for this or any previous visit (from the past 24 hour(s)).

## 2024-03-27 NOTE — PROGRESS NOTES
[facility]  Infectious Disease Progress Note          Assessment and Plan:   Date of Admission:  3/18/2024  Date of Consult (When I saw the patient): 03/20/24        Assessment & Plan  Feng yWnne is a 79 year old who was admitted on 3/18/2024.      Impression: 1  79-year-old male, admitted with worsening sacral decubitus wound, noted at care facility to have increasing drainage mild redness, patient denying other symptoms mild pain  2  fever, no symptoms suggesting sepsis but new acute fever, blood cultures now being done  3  recent 2 lengthy hospitalizations at [facility] for  anemia and esophagitis  4  mitral valve replacement  5  childhood penicillin allergy     REC 1 operative findings noted, temp is down, not bacteremic, prior microbiology same  2 worsening cognition, probably multifactorial but among issues possible here is cefepime but now off cefepime encephalopathy continues but is somewhat improved, CT scan negative MRI scan slightly abnormal some consideration of some inflammatory process but clinically that is not logically fitting here given his initial presentation that he would have a new CNS infectious process,  not septic enough for mentation to be explained by sepsis fortunately has fully resolved at this point and mentation is excellent  3 Continue meropenem alone adequate Enterococcus coverage to what ever degree it is a problem and covers the other organisms acceptably, but really do not think prolonged IV needed here.  The obvious oral antibiotic to get to his Augmentin probably with Septra or quinolone, plan on that for several weeks.  Has a childhood literally infancy penicillin reaction I think we can directly reach allergy without any desensitization very unlikely a true allergy, will do amoxicillin low-dose for today with a dose of Benadryl if he does well go to Augmentin and probably a quinolone orally        Interval History:     no new fever or sepsis  "mentation problems essentially completely resolved, excellent mental status currently noted CT scan shows  abscess almost certainly related to wound, definite osteomyelitis on imaging quite impressive, blood cultures so far negative temp down wound culture with gram-negative rods and Enterococcus relatively resistant but covered by meropenem and was covered by cefepime              Medications:      acetaminophen  975 mg Oral Q8H    amoxicillin  250 mg Oral Q8H BETTY    diphenhydrAMINE  25 mg Oral Once    ferrous sulfate  325 mg Oral Daily    RA Probiotic Gummies  2 chew tab Oral Daily    meropenem  1 g Intravenous Q12H    miconazole   Topical BID    Multivitamin Adult  2 chew tab Oral Daily    pantoprazole  40 mg Intravenous BID    [Held by provider] potassium chloride  20 mEq Oral Daily    psyllium  1 packet Oral Daily    sodium chloride (PF)  3 mL Intracatheter Q8H    sucralfate  1 g Oral 4x Daily AC & HS    [Held by provider] torsemide  20 mg Oral Daily    warfarin ANTICOAGULANT  4 mg Oral ONCE at 18:00    Warfarin Therapy Reminder  1 each Oral See Admin Instructions                  Physical Exam:   Blood pressure 136/66, pulse 61, temperature 97.3  F (36.3  C), temperature source Oral, resp. rate 18, height 1.803 m (5' 11\"), weight 112.1 kg (247 lb 1.6 oz), SpO2 99%.  Wt Readings from Last 2 Encounters:   03/26/24 112.1 kg (247 lb 1.6 oz)   03/18/24 112.5 kg (248 lb)     Vital Signs with Ranges  Temp:  [97.3  F (36.3  C)-98.7  F (37.1  C)] 97.3  F (36.3  C)  Pulse:  [61-88] 61  Resp:  [16-18] 18  BP: (103-136)/(59-68) 136/66  SpO2:  [94 %-99 %] 99 %    Constitutional: Confusion resolved     Lungs: Clear to auscultation bilaterally, no crackles or wheezing   Cardiovascular: Regular rate and rhythm, normal S1 and S2, and no murmur noted   Abdomen: Normal bowel sounds, soft, non-distended, non-tender   Skin: No rashes, no cyanosis, no edema   Other: Wounds quite clean looking          Data:   All microbiology " "laboratory data reviewed.  Recent Labs   Lab Test 03/27/24  0707 03/26/24  0702 03/25/24  0740   WBC 5.3 4.3 5.1   HGB 9.2* 9.5* 9.9*   HCT 29.4* 31.2* 32.8*   MCV 98 101* 101*    259 245     Recent Labs   Lab Test 03/26/24  0702 03/25/24  0740 03/24/24  0722   CR 1.14 1.25* 1.38*     Recent Labs   Lab Test 03/18/24  1748   SED >140*     No lab results found.    Invalid input(s): \"UC\"     "

## 2024-03-27 NOTE — PLAN OF CARE
Pertinent assessments: A&O x4. VSS and on RA. Intermittent pain with repositioning, scheduled tylenol given. Denies N/V, SOB. Tolerating diet. Heparin infusing at 1300. IV Merrem infusing. Frequent repositioning Q2h. No bm during shift. Sacral dressing CDI. BLE edema +2.      Major Shift Events :None     Treatment Plan: Pain management, IV Merrem, ID, WOC, Repo q2 , monitor mentation.    Bedside Nurse: Arlen Li RN

## 2024-03-28 NOTE — PLAN OF CARE
"Assessments:   A/O x4. Was up in chair, transferred back to bed with Ax2 with lift. Hep gtt infusing at 1300units/hr, next heparin Anti Xa level recheck in AM. 2nd sacral wound dressing for the day done. Turned and repositioned. Voids with urinal at bedside with assist.     Treatment Plan: IV Merrem, oral amoxicillin, wound cares, Heparin drip     Bedside Nurse: Lul Corona RN       Problem: Adult Inpatient Plan of Care  Goal: Patient-Specific Goal (Individualized)  Description: You can add care plan individualizations to a care plan. Examples of Individualization might be:  \"Parent requests to be called daily at 9am for status\", \"I have a hard time hearing out of my right ear\", or \"Do not touch me to wake me up as it startles  me\".  Outcome: Progressing  Goal: Plan of Care Review  Description: The Plan of Care Review/Shift note should be completed every shift.  The Outcome Evaluation is a brief statement about your assessment that the patient is improving, declining, or no change.  This information will be displayed automatically on your shift  note.  Outcome: Progressing  Goal: Absence of Hospital-Acquired Illness or Injury  Outcome: Progressing  Intervention: Identify and Manage Fall Risk  Recent Flowsheet Documentation  Taken 3/27/2024 1700 by Lul Corona RN  Safety Promotion/Fall Prevention: activity supervised  Intervention: Prevent Skin Injury  Recent Flowsheet Documentation  Taken 3/27/2024 2221 by Lul Corona RN  Body Position: supine, head elevated  Taken 3/27/2024 2016 by Lul Corona RN  Body Position:   turned   right   side-lying 30 degrees  Taken 3/27/2024 1700 by Lul Corona RN  Body Position: weight shifting  Intervention: Prevent and Manage VTE (Venous Thromboembolism) Risk  Recent Flowsheet Documentation  Taken 3/27/2024 1700 by Lul Corona RN  VTE Prevention/Management: compression stockings off  Goal: Optimal Comfort and " Wellbeing  Outcome: Progressing  Intervention: Monitor Pain and Promote Comfort  Recent Flowsheet Documentation  Taken 3/27/2024 2016 by Lul Corona RN  Pain Management Interventions: medication (see MAR)  Goal: Readiness for Transition of Care  Outcome: Progressing     Problem: Infection  Goal: Absence of Infection Signs and Symptoms  Outcome: Progressing     Problem: Malnutrition  Goal: Improved Nutritional Intake  Outcome: Progressing     Problem: Comorbidity Management  Goal: Maintenance of Heart Failure Symptom Control  Outcome: Progressing  Intervention: Maintain Heart Failure Management  Recent Flowsheet Documentation  Taken 3/27/2024 1700 by Lul Corona, RN  Medication Review/Management: medications reviewed

## 2024-03-28 NOTE — PLAN OF CARE
"Goal Outcome Evaluation:       End of Shift Summary  For vital signs and complete assessments, please see documentation flowsheets.     Pertinent assessments:  Pt A&Ox4. VSS and on RA.  Ls clear and no sob noted. Tolerated diet and no nausea reported. Wound care done to coccyx. Had one incontinent bladder and later voided with the urinal. Walked with the walker  to the chair with A-2.. Had some pain managed with schd tylenol. Hep gtt infusing and Hep Xa lab recheck for the AM    Major Shift Events :None    Treatment Plan: Wound care, Hep gtt, pain management,  turn and repo,monitor symptoms.    Bedside Nurse: Tennille Becker RN     Problem: Adult Inpatient Plan of Care  Goal: Patient-Specific Goal (Individualized)  Description: You can add care plan individualizations to a care plan. Examples of Individualization might be:  \"Parent requests to be called daily at 9am for status\", \"I have a hard time hearing out of my right ear\", or \"Do not touch me to wake me up as it startles  me\".  Outcome: Progressing  Flowsheets (Taken 3/28/2024 1751)  Individualized Care Needs: Wound care done  Anxieties, Fears or Concerns: None verbalized  Patient/Family-Specific Goals (Include Timeframe): Walked to the chair today and mentation back to baseline  Goal: Plan of Care Review  Description: The Plan of Care Review/Shift note should be completed every shift.  The Outcome Evaluation is a brief statement about your assessment that the patient is improving, declining, or no change.  This information will be displayed automatically on your shift  note.  Outcome: Progressing  Goal: Absence of Hospital-Acquired Illness or Injury  Outcome: Progressing  Intervention: Identify and Manage Fall Risk  Recent Flowsheet Documentation  Taken 3/28/2024 0900 by Tennille Becker RN  Safety Promotion/Fall Prevention: activity supervised  Intervention: Prevent Skin Injury  Recent Flowsheet Documentation  Taken 3/28/2024 0900 by Tennille Becker " A, RN  Body Position: weight shifting  Intervention: Prevent and Manage VTE (Venous Thromboembolism) Risk  Recent Flowsheet Documentation  Taken 3/28/2024 0900 by Tennille Becker RN  VTE Prevention/Management: SCDs (sequential compression devices) off  Goal: Optimal Comfort and Wellbeing  Outcome: Progressing  Intervention: Monitor Pain and Promote Comfort  Recent Flowsheet Documentation  Taken 3/28/2024 0900 by Tennille Becker RN  Pain Management Interventions: declines  Goal: Readiness for Transition of Care  Outcome: Progressing     Problem: Infection  Goal: Absence of Infection Signs and Symptoms  Outcome: Progressing     Problem: Malnutrition  Goal: Improved Nutritional Intake  Outcome: Progressing     Problem: Comorbidity Management  Goal: Maintenance of Heart Failure Symptom Control  Outcome: Progressing  Intervention: Maintain Heart Failure Management  Recent Flowsheet Documentation  Taken 3/28/2024 0900 by Tennille Becker RN  Medication Review/Management: medications reviewed     Problem: Skin or Soft Tissue Infection  Goal: Absence of Infection Signs and Symptoms  Outcome: Progressing   monitor symptoms.  Bedside Nurse: Tennille Becker RN

## 2024-03-28 NOTE — PROGRESS NOTES
Care Management Follow Up    Length of Stay (days): 10    Expected Discharge Date: 03/29/2024     Concerns to be Addressed:       Patient plan of care discussed at interdisciplinary rounds: Yes    Anticipated Discharge Disposition: Skilled Nursing Facility, Transitional Care     Anticipated Discharge Services:    Anticipated Discharge DME:      Patient/family educated on Medicare website which has current facility and service quality ratings: yes  Education Provided on the Discharge Plan:    Patient/Family in Agreement with the Plan:      Referrals Placed by CM/SW: Post Acute Facilities  Private pay costs discussed: private room/amenity fees and transportation costs    Additional Information:  [facility] has clinically accepted patient for TCU bed. They could secure a private room for him tomorrow but MD cannot confirm he will be ready as waiting on therapeutic INR. They would consider private room without cost due to his weight and needs.  Informed patient and spouse and they currently accept the facility. Patient is very upbeat and excited to get moving. He was excited that he was able to ambulate to the chair today.  Did explain to patient and spouse that due to his recent TCU from 2/15 to 3/18 he is likely into his medicare copay period and this would continue into his next stay at [facility].  Will stay in touch with [facility] regarding bed status. Will need to obtain [facility] authorization when discharge confirmed.    Addendum 1556:   [facility] Case # [id number]  Authorization # [id number]  Dates authorized 3/29/2024 to 4/4/2024        Batsheva Campbell RN BSN OCN  Care Coordinator  [facility]  [phone]

## 2024-03-28 NOTE — PROGRESS NOTES
[facility]    Medicine Progress Note - Hospitalist Service    Date of Admission:  3/18/2024    Assessment & Plan        Summary of Stay: Feng Wynne is a 79 year old male with a history of mechanical mitral valve on warfarin, severe tricuspid regurgitation, right-sided heart failure, moderate pulmonary hypertension, coronary artery disease, hypertension, hypercholesterolemia, atrial fibrillation, history of pill esophagitis with resultant anemia, known sacral decubitus ulcer admitted on 3/18/2024 with concern for infection of his sacral wound.  In the emergency department, patient was found to have a temperature of 97.7  F, blood pressure 109/63, heart rate 81, respiratory rate 20, SpO2 94% on room air.  Initial lab work showed bicarb 31, BUN/creatinine 30.7/1.07, .65, hemoglobin 9.6, ESR greater than 140, INR 3.93.  The patient was started on vancomycin and cefepime.  Infectious disease and general surgery were consulted to see the patient.  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  The sacral/coccygeal soft tissues with 5.5 cm abscess in the posterior perirectal/left ischial anal fossa which extends and is adherent to the posterior rectal wall.  There was consideration for transfer to the [facility] for higher level of care including plastic surgery consultation for wound closure.  The [facility] declined the transfer with plastic surgery recommending IV antibiotic course and wound debridement.  General surgery at the [facility] recommended wound debridement at [facility].  The patient was transitioned to a heparin drip and INR was reversed.  On 3/22, the patient was taken for excisional debridement of sacral decubitus ulcer with electrocautery.  Postoperatively, the patient experienced significant encephalopathy.      TODAY'S PLAN:    Mental  state continues to be at baseline.  No further issues of any recurrent confusion or disorientation  -Appreciate continuous input from ID service.    -Currently being transitioned to oral antibiotic route   -Reported that the history of possible allergy to penicillin during infancy, currently being challenged with amoxicillin and if tolerates will eventually go home with Augmentin   -INR improving but not yet therapeutic.  Will continue ongoing heparin  -He remained in good spirits  -- Working with ambulatory activities  -Looking forward for likely TCU placement upon hospital discharge      -  Target INR between 2.5-3.5 with known history of mechanical mitral valve  -Underwent EEG earlier with no clear evidence of seizure-like activity  -MRI done earlier for confusion and encephalopathy with no clear evidence of CVA  -Appreciate input from general neurology service  -With his improvement with clinical status, neurological status I opted to defer further investigation with lumbar puncture as patient also has significant risk factor with him on anticoagulation given his mechanical valve history.    -Cefepime was discontinued given possible contributing factor for earlier confusion  -Remain on IV antibiotics with ID service following with us currently on meropenem  -Appreciate continuous input from general surgery service  -No plans for repeat debridement in the operating room  -Multiple opioids/narcotics discontinued earlier due to confusion that might be also contributory to his encephalopathy         Problem List:   Sacral Decubitus Ulcer - POA  New Osteomyelitis of Coccyx  Perirectal Abscess  -Previously he was on vanco and cefepime and flagyl.  Transitioned to Meropenem on 3/23  - Appreciate ID recommendations  - Appreciate General Surgery, Colorectal Surgery recommendations  - Wound cultures positive for enterococcus faecalis, gram negative bacilli,  - Attempted transfer to [facility] on 3/20, however pt declined as  "plastic surgery recommended IV abx treatment and debridement, and general surgery recommended debridement and abscess drainage at [facility]  - MRI pelvis showed osteomyelitis of coccyx and 5.5 cm perirectal abscess  - At baseline, pt ambulates with a walker  -  Stubbs catheter has been discontinued.  Continue to monitor.  Patient is unfortunately incontinent     Acute Metabolic vs Toxic Encephalopathy  - Unclear cause.  Possibly secondary to narcotics vs anesthesia vs worsening infection vs cva vs other  - CT brain negative  - VBG, LA, Ammonia unremarkable  - Check MRI brain  - Appreciate Neurology recommendations     Mild Acute Kidney Injury-creatinine improving  - Baseline Cr = 0.9  - IVF today  - Daily BMP    Anticipating-continuous improvement with increasing oral intake     Mechanical Mitral Valve  Warfarin Coagulopathy  - INR goal is 2.5-3.5  -Resume warfarin, on bridging heparin  -Stop heparin with therapeutic INR     Mild Hypokalemia  - Electrolyte replacement protocol     Chronic Medical Problems:  Atrial Fibrillation  Hypertension  Hypercholesterolemia  Coronary Artery Disease  Pill Esophagitis with Resultant Anemia  Severe Tricuspid Regurgitation  Right Sided Heart Failure  Moderate Pulmonary Hypertension           Diet: Vegetarian Diet  Snacks/Supplements Adult: Ensure Enlive; With Meals    DVT Prophylaxis: Warfarin  Stubbs Catheter: Not present  Lines: None     Cardiac Monitoring: None  Code Status: Full Code      Clinically Significant Risk Factors              # Hypoalbuminemia: Lowest albumin = 2.6 g/dL at 3/25/2024  7:40 AM, will monitor as appropriate       # Hypertension: Noted on problem list  # Chronic heart failure with preserved ejection fraction: heart failure noted on problem list and last echo with EF >50%       # Obesity: Estimated body mass index is 34.46 kg/m  as calculated from the following:    Height as of this encounter: 1.803 m (5' 11\").    Weight as of this encounter: 112.1 kg (247 lb 1.6 " oz).   # Moderate Malnutrition: based on nutrition assessment      # Financial/Environmental Concerns:           Disposition Plan     Expected Discharge Date: Anticipating in the next 1 to 2 days if continues to demonstrate same trajectory of clinical improvement     Destination: home with family;home with help/services          Family updated at bedside    Stone Hannon MD, MD  Hospitalist Service  [facility]  Securely message with [employer] (more info)  Text page via [email or web address] Paging/Directory   ______________________________________________________________________    Interval History     Continuing medicine service care today.  Seen and examined.  Chart reviewed.  No significant reported issues overnight  Afebrile.  No reports of having any nausea or vomiting.  Not requiring oxygen support.  Reportedly able to tolerate oral diet.  No diarrhea  No reported bleeding tendencies either.  No reported chest pain or shortness of breath     Physical Exam   Vital Signs: Temp: 97.9  F (36.6  C) Temp src: Oral BP: 123/64 Pulse: 63   Resp: 16 SpO2: 98 % O2 Device: None (Room air)    Weight: 247 lbs 1.6 oz    HEENT; Atraumatic, normocephalic, pinkish conjuctiva, pupils bilateral reactive   Skin: warm and moist, no rashes  Lungs: equal chest expansion, clear to auscultation, no wheezes, no stridor, no crackles,   Heart: normal rate, normal rhythm, no rubs or gallops.   Abdomen: normal bowel sounds, no tenderness, no peritoneal signs, no guarding  Extremities: no deformities, bilateral lower extremity edema +1 mostly at the ankle level  Neuro;   Feng is more awake, alert, oriented to time place and person, following simple verbal instructions, able to demonstrate spontaneous engagement speech, moves all extremities spontaneously  psych; no hallucination, euthymic mood, not agitated      Medical Decision Making       45 MINUTES SPENT BY ME on the date of service doing chart review, history, exam,  documentation & further activities per the note.  MANAGEMENT DISCUSSED with the following over the past 24 hours: Yes   NOTE(S)/MEDICAL RECORDS REVIEWED over the past 24 hours: Yes       Data     I have personally reviewed the following data over the past 24 hrs:    N/A  \   N/A   / N/A     135 99 44.7 (H) /  89   4.0 28 1.37 (H) \     INR:  2.18 (H) PTT:  N/A   D-dimer:  N/A Fibrinogen:  N/A       Imaging results reviewed over the past 24 hrs:   No results found for this or any previous visit (from the past 24 hour(s)).

## 2024-03-28 NOTE — PROGRESS NOTES
[facility]  Infectious Disease Progress Note          Assessment and Plan:   Date of Admission:  3/18/2024  Date of Consult (When I saw the patient): 03/20/24        Assessment & Plan  Feng Wynne is a 79 year old who was admitted on 3/18/2024.      Impression: 1  79-year-old male, admitted with worsening sacral decubitus wound, noted at care facility to have increasing drainage mild redness, patient denying other symptoms mild pain  2  fever, no symptoms suggesting sepsis but new acute fever, blood cultures now being done  3  recent 2 lengthy hospitalizations at [facility] for  anemia and esophagitis  4  mitral valve replacement  5  childhood penicillin allergy     REC 1 operative findings noted, temp is down, not bacteremic, prior microbiology same  2  mental status promise completely resolved, wound is stable and well debrided, no major sepsis or ongoing fever.  Has clear and obvious osteomyelitis but prolonged antibiotics alone without at least bone debridement really of no value here.  Will plan on extended oral treatment.  Despite penicillin allergy is tolerated amoxicillin will now go to Augmentin 875 twice daily and Cipro 500 twice daily (several drug interaction issues especially with anticoagulants but still the desired agent)  plan on a 2-week course and then stop and watch off antibiotics.  Most importantly follow-up with the [place] [facility] [place] to make a long-term plan with plastic surgery etc. about treating this wound         Interval History:     no new fever or sepsis mentation problems essentially completely resolved, excellent mental status currently noted CT scan shows  abscess almost certainly related to wound, definite osteomyelitis on imaging quite impressive, blood cultures so far negative temp down wound culture with gram-negative rods and Enterococcus relatively resistant but covered by meropenem and was covered by cefepime   no reaction to the  "amoxicillin              Medications:      amoxicillin-clavulanate  1 tablet Oral Q12H [facility] (08/20)    ciprofloxacin  500 mg Oral Q12H BETTY (08/20)    ferrous sulfate  325 mg Oral Daily    RA Probiotic Gummies  2 chew tab Oral Daily    miconazole   Topical BID    Multivitamin Adult  2 chew tab Oral Daily    pantoprazole  40 mg Intravenous BID    [Held by provider] potassium chloride  20 mEq Oral Daily    psyllium  1 packet Oral Daily    sodium chloride (PF)  3 mL Intracatheter Q8H    sucralfate  1 g Oral 4x Daily AC & HS    [Held by provider] torsemide  20 mg Oral Daily    warfarin ANTICOAGULANT  3.5 mg Oral ONCE at 18:00    Warfarin Therapy Reminder  1 each Oral See Admin Instructions                  Physical Exam:   Blood pressure 123/64, pulse 63, temperature 97.9  F (36.6  C), temperature source Oral, resp. rate 16, height 1.803 m (5' 11\"), weight 112.1 kg (247 lb 1.6 oz), SpO2 98%.  Wt Readings from Last 2 Encounters:   03/26/24 112.1 kg (247 lb 1.6 oz)   03/18/24 112.5 kg (248 lb)     Vital Signs with Ranges  Temp:  [97.5  F (36.4  C)-97.9  F (36.6  C)] 97.9  F (36.6  C)  Pulse:  [63-75] 63  Resp:  [16] 16  BP: (109-123)/(64-78) 123/64  SpO2:  [96 %-98 %] 98 %    Constitutional: Confusion resolved     Lungs: Clear to auscultation bilaterally, no crackles or wheezing   Cardiovascular: Regular rate and rhythm, normal S1 and S2, and no murmur noted   Abdomen: Normal bowel sounds, soft, non-distended, non-tender   Skin: No rashes, no cyanosis, no edema   Other: Wounds quite clean looking          Data:   All microbiology laboratory data reviewed.  Recent Labs   Lab Test 03/27/24  0707 03/26/24  0702 03/25/24  0740   WBC 5.3 4.3 5.1   HGB 9.2* 9.5* 9.9*   HCT 29.4* 31.2* 32.8*   MCV 98 101* 101*    259 245     Recent Labs   Lab Test 03/28/24  0706 03/26/24  0702 03/25/24  0740   CR 1.37* 1.14 1.25*     Recent Labs   Lab Test 03/18/24  1748   SED >140*     No lab results found.    Invalid input(s): \"UC\"     "

## 2024-03-28 NOTE — PLAN OF CARE
"To Do:  End of Shift Summary  For vital signs and complete assessments, please see documentation flowsheets.     Pertinent assessments: Pt alert, intermittent confusion. Denies pain. Wound dressing intact. Incontinent for urine.  Major Shift Events none  Treatment Plan: Merrem. Wound care, hep gtt and symptom management  Bedside Nurse: Barbara Borrero RN     Problem: Adult Inpatient Plan of Care  Goal: Patient-Specific Goal (Individualized)  Description: You can add care plan individualizations to a care plan. Examples of Individualization might be:  \"Parent requests to be called daily at 9am for status\", \"I have a hard time hearing out of my right ear\", or \"Do not touch me to wake me up as it startles  me\".  Outcome: Progressing  Flowsheets (Taken 3/28/2024 0647)  Individualized Care Needs: wound care  Anxieties, Fears or Concerns: no anxiety verbalized  Goal: Absence of Hospital-Acquired Illness or Injury  Intervention: Prevent Skin Injury  Recent Flowsheet Documentation  Taken 3/28/2024 0440 by Barbara Borrero, RN  Body Position: weight shifting  Goal: Plan of Care Review  Description: The Plan of Care Review/Shift note should be completed every shift.  The Outcome Evaluation is a brief statement about your assessment that the patient is improving, declining, or no change.  This information will be displayed automatically on your shift  note.  Outcome: Progressing  Goal: Absence of Hospital-Acquired Illness or Injury  Outcome: Progressing  Intervention: Prevent Skin Injury  Recent Flowsheet Documentation  Taken 3/28/2024 0440 by Barbara Borrero, RN  Body Position: weight shifting  Goal: Optimal Comfort and Wellbeing  Outcome: Progressing  Goal: Readiness for Transition of Care  Outcome: Progressing     Problem: Infection  Goal: Absence of Infection Signs and Symptoms  Outcome: Progressing   Goal Outcome Evaluation:                        "

## 2024-03-29 NOTE — PLAN OF CARE
"Care continued: 1900 - 2330  Goal Outcome Evaluation:      Plan of Care Reviewed With: patient    Outcome Evaluation: Verbalized understanding of education provided, very excited to leave the hospital for TCU. VSS    Problem: Adult Inpatient Plan of Care  Goal: Plan of Care Review  Description: The Plan of Care Review/Shift note should be completed every shift.  The Outcome Evaluation is a brief statement about your assessment that the patient is improving, declining, or no change.  This information will be displayed automatically on your shift  note.  Recent Flowsheet Documentation  Taken 3/28/2024 2009 by May Taveras, RN  Outcome Evaluation: Verbalized understanding of education provided, very excited to leave the hospital for TCU. VSS  Plan of Care Reviewed With: patient    Critical Shift Events: Continue heparin gtts @ 1300 units/13mL     Pt is very excited to go home and is aware of location   Reports he is feeling ready but anxious to get the IV's out   Does have questions about IV access and education provided     VSS   /61 (BP Location: Left arm)   Pulse 62   Temp 97.7  F (36.5  C) (Oral)   Resp 16   Ht 1.803 m (5' 11\")   Wt 112.1 kg (247 lb 1.6 oz)   SpO2 98%   BMI 34.46 kg/m      RN will continue to maintain care and the treatment plan this shift, handing off care to oncoming RN at shift change 03/28/2024     May Taveras, RN 03/28/2024 10:28 PM     "

## 2024-03-29 NOTE — Clinical Note
Feng was seen today for chronic right hip and thigh pain. Symptoms likely due to gluteal weakness/tendonitis, trochanteric bursitis and IT band syndrome. We will try a hip bursa injection and have them work with PT. They're holding off on starting gabapentin until we do the injection and PT and see how their pain responds.  Thanks for this referral,  Joey Pastor, [facility]   
Statement Selected
Alert-The patient is alert, awake and responds to voice. The patient is oriented to time, place, and person. The triage nurse is able to obtain subjective information.

## 2024-03-29 NOTE — PLAN OF CARE
End of Shift Summary  For vital signs and complete assessments, please see documentation flowsheets.     Pertinent assessments: VSS. Afebrile. LS clear. BS x4. Pt denies pain and nausea. Vegetarian diet. Ax2 with belt and walker to ambulate, can require Lift at times. PIV - Heparin Drip infusing at 1300 units/hour. Packing noted on sacral wound, CDI, no dressing change required. Incontinent of bowel and bladder. A&O, alarm on bed for safety. Slept well.     Major Shift Events: none    Treatment Plan: Augmentin, Cipro, Encourage activity, Hopeful discharge to TCU in 1-2 days.

## 2024-03-29 NOTE — PLAN OF CARE
Physical Therapy Discharge Summary     Reason for therapy discharge:    Discharged to transitional care facility.     Progress towards therapy goal(s). See goals on Care Plan in [facility] electronic health record for goal details.  Goals not met.  Barriers to achieving goals:  discharge to TCU   Therapy recommendation(s):    Continued therapy is recommended.  Rationale/Recommendations:  Pt is below baseline with functional mobility and strength and would benefit from continued PT to progress skills.

## 2024-03-29 NOTE — PROGRESS NOTES
[facility]  Infectious Disease Progress Note          Assessment and Plan:   Date of Admission:  3/18/2024  Date of Consult (When I saw the patient): 03/20/24        Assessment & Plan  Feng Wynne is a 79 year old who was admitted on 3/18/2024.      Impression: 1  79-year-old male, admitted with worsening sacral decubitus wound, noted at care facility to have increasing drainage mild redness, patient denying other symptoms mild pain  2  fever, no symptoms suggesting sepsis but new acute fever, blood cultures now being done  3  recent 2 lengthy hospitalizations at [facility] for  anemia and esophagitis  4  mitral valve replacement  5  childhood penicillin allergy     REC 1 operative findings noted, temp is down, not bacteremic,  microbiology same  2  mental status completely resolved, wound is stable and well debrided, no major sepsis or ongoing fever.  Has clear and obvious osteomyelitis but prolonged antibiotics alone without at least bone debridement really of no value here.  Will plan on extended oral treatment.  Despite penicillin allergy is tolerated amoxicillin will now go to Augmentin 875 twice daily and Cipro 500 twice daily (several drug interaction issues especially with anticoagulants but still the desired agent)  plan on a 2-week course and then stop and watch off antibiotics.  Most importantly follow-up with the [place] [facility] [place] to make a long-term plan with plastic surgery etc. about treating this wound         Interval History:     no new fever or sepsis mentation problems essentially completely resolved, excellent mental status currently noted CT scan shows  abscess almost certainly related to wound, definite osteomyelitis on imaging quite impressive, blood cultures so far negative temp down wound culture with gram-negative rods and Enterococcus relatively resistant but covered by meropenem and was covered by cefepime   no reaction to the amoxicillin  "and no side effects or GI symptoms from the Augmentin and Cipro which he is tolerating              Medications:      amoxicillin-clavulanate  1 tablet Oral Q12H [facility] (08/20)    ciprofloxacin  500 mg Oral Q12H BETTY (08/20)    ferrous sulfate  325 mg Oral Daily    RA Probiotic Gummies  2 chew tab Oral Daily    miconazole   Topical BID    Multivitamin Adult  2 chew tab Oral Daily    pantoprazole  40 mg Intravenous BID    [Held by provider] potassium chloride  20 mEq Oral Daily    psyllium  1 packet Oral Daily    sodium chloride (PF)  3 mL Intracatheter Q8H    sucralfate  1 g Oral 4x Daily AC & HS    [Held by provider] torsemide  20 mg Oral Daily    Warfarin Therapy Reminder  1 each Oral See Admin Instructions                  Physical Exam:   Blood pressure 131/55, pulse 80, temperature 97.6  F (36.4  C), temperature source Oral, resp. rate 16, height 1.803 m (5' 11\"), weight 112.1 kg (247 lb 1.6 oz), SpO2 97%.  Wt Readings from Last 2 Encounters:   03/26/24 112.1 kg (247 lb 1.6 oz)   03/18/24 112.5 kg (248 lb)     Vital Signs with Ranges  Temp:  [97.6  F (36.4  C)-98  F (36.7  C)] 97.6  F (36.4  C)  Pulse:  [62-80] 80  Resp:  [16] 16  BP: (112-131)/(55-61) 131/55  SpO2:  [95 %-98 %] 97 %    Constitutional: Confusion resolved     Lungs: Clear to auscultation bilaterally, no crackles or wheezing   Cardiovascular: Regular rate and rhythm, normal S1 and S2, and no murmur noted   Abdomen: Normal bowel sounds, soft, non-distended, non-tender   Skin: No rashes, no cyanosis, no edema   Other: Wounds quite clean looking          Data:   All microbiology laboratory data reviewed.  Recent Labs   Lab Test 03/27/24  0707 03/26/24  0702 03/25/24  0740   WBC 5.3 4.3 5.1   HGB 9.2* 9.5* 9.9*   HCT 29.4* 31.2* 32.8*   MCV 98 101* 101*    259 245     Recent Labs   Lab Test 03/28/24  0706 03/26/24  0702 03/25/24  0740   CR 1.37* 1.14 1.25*     Recent Labs   Lab Test 03/18/24  1748   SED >140*     No lab results found.    Invalid " "input(s): \"UC\"     "

## 2024-03-29 NOTE — PHARMACY-ANTICOAGULATION SERVICE
Clinical Pharmacy Warfarin Discharge Note  This patient is currently on warfarin for the treatment of Mechanical heart valve.  INR Goal= 2.5-3.5.    Warfarin PTA Regimen: 3 mg 3/14, 4 mg 3/15, 4 mg 3/16 , 3 mg 3/17    Anticoagulation Dose History  More data exists         Latest Ref Rng & Units 3/23/2024 3/24/2024 3/25/2024 3/26/2024 3/27/2024 3/28/2024 3/29/2024   Recent Dosing and Labs   warfarin ANTICOAGULANT (COUMADIN) 1 MG tablet - - - - - - 3.5 mg, $Given -   warfarin ANTICOAGULANT (COUMADIN) 2 MG tablet - - - 4 mg, $Given - 4 mg, $Given - -   warfarin ANTICOAGULANT (COUMADIN) 2.5 MG tablet - - - - 2.5 mg, $Given - - -   INR 0.85 - 1.15 1.62  1.56  1.69  1.79  1.86  2.00  2.18  2.62      Vitamin K doses administered during the last 7 days:   2.5 mg PO on 3/20/24  5 mg PO on 3/21/24   FFP administered during the last 7 days: None     Agree discharging the patient on a warfarin regimen of 3 mg daily with close INR follow up.    The patient should have an INR checked  in 1 day as directed .    Clara Christine [facility]

## 2024-03-29 NOTE — PROGRESS NOTES
ANTICOAGULATION  MANAGEMENT: Discharge Review    [facility] [place] chart reviewed for anticoagulation continuity of care    Hospital Admission on 3/18 -3/29 for Acute encephalopathy likely metabolic and infectious toxic encephalopathy improving resolved  ENRIQUE improving resolved  Osteomyelitis of the coccyx  Infected sacral decubitus ulcer unstageable present on admission  Chronic anticoagulated state with history of mechanical mitral valve   warfarin coagulopathy   hypertension  Chronic atrial fibrillation  CAD  Dyslipidemia  Moderate pulmonary hypertension  Hypokalemia  Physical deconditioning    3/22/24 Irrigation and Debridement done of sacral wound.    Discharge disposition: TCU    [facility]  ([facility])  Skilled Nursing  [address] [place] [address] [phone]    Results:    Recent labs: (last 7 days)     03/22/24  2215 03/23/24  0437 03/23/24  1226 03/23/24  1712 03/24/24  0152 03/24/24  0722 03/24/24  2105 03/25/24  0740 03/25/24  1136 03/26/24  0702 03/27/24  0707 03/28/24  0706 03/29/24  0641   INR  --  1.62*  --   --   --  1.56*  --  1.79* 1.69* 1.86* 2.00* 2.18* 2.62*   AAUFH 0.43 0.24 0.57 0.31 0.26  --  0.17 0.34  --  0.32 0.37 0.45 0.33     Anticoagulation inpatient management:     reversed with phytonadione  on3/20 and 3/21 and bridged with heparin    Anticoagulation discharge instructions:     Warfarin dosing:  3 mg daily   Bridging: No   INR goal change: No      Medication changes affecting anticoagulation: Yes: discharged on 1.Augmentin for 14 days for bone and joint infection  2. Cipro for 14 days for same diagnosis    Additional factors affecting anticoagulation: Yes: infection     PLAN         Patient not contacted  ACC will resume monitoring upon discharge from TCU    Anticoagulation Calendar updated of doses held and given while inpatient    Gaviota Rivera RN

## 2024-03-29 NOTE — PROGRESS NOTES
Care Management Discharge Note    Discharge Date: 03/29/2024       Discharge Disposition: Skilled Nursing Facility, Transitional Care    Discharge Services:      Discharge DME:      Discharge Transportation: car, drives self    Private pay costs discussed: transportation costs    Does the patient's insurance plan have a 3 day qualifying hospital stay waiver?  Yes     Which insurance plan 3 day waiver is available? Alternative insurance waiver    Will the waiver be used for post-acute placement? Yes    PAS Confirmation Code: [id number]  Patient/family educated on Medicare website which has current facility and service quality ratings: yes    Education Provided on the Discharge Plan:  no  Persons Notified of Discharge Plans: Patient, friend, staff RN's, NST, TCU  Patient/Family in Agreement with the Plan: yes     Handoff Referral Completed: Yes    Additional Information:  Patient discharging to [facility] today via [facility] w/c with a transport window of [phone]. Orders were faxed. Patient, friend, TCU, staff RN's,NST all notified.    Carolyn Brown, RN, BSN, CM  Inpatient Care Coordination  [facility]  [phone]

## 2024-03-29 NOTE — PLAN OF CARE
"Goal Outcome Evaluation:       Discharge Note    Patient discharged to TCU via transportation service  accompanied by significant other .  IV: Discontinued  Prescriptions e-prescribed to pharmacy.   Belongings reviewed and sent with patient and family.   Home medications returned to patient: Yes  Equipment sent with: and family.   patient and family verbalizes understanding of discharge instructions. AVS given to patient and family.  Additional education completed?  N/A          Problem: Adult Inpatient Plan of Care  Goal: Patient-Specific Goal (Individualized)  Description: You can add care plan individualizations to a care plan. Examples of Individualization might be:  \"Parent requests to be called daily at 9am for status\", \"I have a hard time hearing out of my right ear\", or \"Do not touch me to wake me up as it startles  me\".  Outcome: Adequate for Care Transition  Flowsheets (Taken 3/29/2024 1246)  Individualized Care Needs: involve patient in timing of wound care and interventions  Anxieties, Fears or Concerns: none  Patient/Family-Specific Goals (Include Timeframe): increasing mobility, managing  bowel and bladder schedule.  Goal: Plan of Care Review  Description: The Plan of Care Review/Shift note should be completed every shift.  The Outcome Evaluation is a brief statement about your assessment that the patient is improving, declining, or no change.  This information will be displayed automatically on your shift  note.  Outcome: Adequate for Care Transition  Goal: Absence of Hospital-Acquired Illness or Injury  Outcome: Adequate for Care Transition  Intervention: Identify and Manage Fall Risk  Recent Flowsheet Documentation  Taken 3/29/2024 0900 by Adan Inman, RN  Safety Promotion/Fall Prevention:   activity supervised   room near nurse's station   supervised activity  Intervention: Prevent Skin Injury  Recent Flowsheet Documentation  Taken 3/29/2024 0900 by Adan Inman RN  Body Position: " supine, head elevated  Device Skin Pressure Protection: absorbent pad utilized/changed  Goal: Optimal Comfort and Wellbeing  Outcome: Adequate for Care Transition  Goal: Readiness for Transition of Care  Outcome: Adequate for Care Transition     Problem: Infection  Goal: Absence of Infection Signs and Symptoms  Outcome: Adequate for Care Transition  Intervention: Prevent or Manage Infection  Recent Flowsheet Documentation  Taken 3/29/2024 0900 by Adan Inman RN  Infection Management: aseptic technique maintained     Problem: Malnutrition  Goal: Improved Nutritional Intake  Outcome: Adequate for Care Transition     Problem: Comorbidity Management  Goal: Maintenance of Heart Failure Symptom Control  Outcome: Adequate for Care Transition  Intervention: Maintain Heart Failure Management  Recent Flowsheet Documentation  Taken 3/29/2024 0900 by Adan Inman RN  Medication Review/Management: medications reviewed     Problem: Skin or Soft Tissue Infection  Goal: Absence of Infection Signs and Symptoms  Outcome: Adequate for Care Transition  Intervention: Minimize and Manage Infection Progression  Recent Flowsheet Documentation  Taken 3/29/2024 0900 by Adan Inman RN  Infection Management: aseptic technique maintained

## 2024-03-29 NOTE — DISCHARGE SUMMARY
"[facility]  Hospitalist Discharge Summary      Date of Admission:  3/18/2024  Date of Discharge:  3/29/2024  Discharging Provider: Stone Hannon MD, MD  Discharge Service: Hospitalist Service    Discharge Diagnoses   Acute encephalopathy likely metabolic and infectious toxic encephalopathy improving resolved  ENRIQUE improving resolved  Osteomyelitis of the coccyx  Infected sacral decubitus ulcer unstageable present on admission  Chronic anticoagulated state with history of mechanical mitral valve   warfarin coagulopathy   hypertension  Chronic atrial fibrillation  CAD  Dyslipidemia  Moderate pulmonary hypertension  Hypokalemia  Physical deconditioning    Clinically Significant Risk Factors     # Obesity: Estimated body mass index is 34.46 kg/m  as calculated from the following:    Height as of this encounter: 1.803 m (5' 11\").    Weight as of this encounter: 112.1 kg (247 lb 1.6 oz).  # Moderate Malnutrition: based on nutrition assessment      Follow-ups Needed After Discharge   Follow-up Appointments     Follow Up and recommended labs and tests      Follow up with shelter physician.  The following labs/tests are   recommended: repeat INR this coming Saturday March 30 and adjust warfarin   accordingly.  He will need frequent INR monitoring at least 1-2x per week while in TCU   as patient is on antibiotics (Cipro and Augmentin).  Follow up with wound care            Unresulted Labs Ordered in the Past 30 Days of this Admission       No orders found from 2/17/2024 to 3/19/2024.            Discharge Disposition   Discharged to home  Condition at discharge: Stable    Hospital Course     Continuing medicine service care.  Seen and examined.  Chart reviewed.  Case discussed with nursing service.  Family updated this patient's significant other present at bedside during my encounter.  He remained at baseline mental state.  Continues to show significant improvement and demonstrating stable " hemodynamics.  Tolerating oral diet.  No reports of any nausea, vomiting or diarrhea.  No bleeding tendencies either.  Most recent INR now therapeutic at 2.6 with target goal between 2.5-3.5 with prior history of mechanical mitral valve.  He will be continued on his warfarin dosing.  Recommendations for frequent INR monitoring as patient is at risk for worsening coagulopathy in the setting of concurrent use of antibiotics of which she is being prescribed to continue Augmentin and ciprofloxacin as per ID recommendations for his recent recovering infectious process.  He is agreeable for planned discharge going to a TCU setting.  Will proceed with hospital discharge later today.      I will refer you to excerpts of prior progress notes as listed below for details of his hospitalization stay    Summary of Stay: Feng Wynne is a 79 year old male with a history of mechanical mitral valve on warfarin, severe tricuspid regurgitation, right-sided heart failure, moderate pulmonary hypertension, coronary artery disease, hypertension, hypercholesterolemia, atrial fibrillation, history of pill esophagitis with resultant anemia, known sacral decubitus ulcer admitted on 3/18/2024 with concern for infection of his sacral wound.  In the emergency department, patient was found to have a temperature of 97.7  F, blood pressure 109/63, heart rate 81, respiratory rate 20, SpO2 94% on room air.  Initial lab work showed bicarb 31, BUN/creatinine 30.7/1.07, .65, hemoglobin 9.6, ESR greater than 140, INR 3.93.  The patient was started on vancomycin and cefepime.  Infectious disease and general surgery were consulted to see the patient.  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  The sacral/coccygeal soft tissues with 5.5 cm abscess in the posterior perirectal/left ischial anal fossa which extends and is adherent to the posterior rectal  celestine.  There was consideration for transfer to the [facility] for higher level of care including plastic surgery consultation for wound closure.  The [facility] declined the transfer with plastic surgery recommending IV antibiotic course and wound debridement.  General surgery at the [facility] recommended wound debridement at [facility].  The patient was transitioned to a heparin drip and INR was reversed.  On 3/22, the patient was taken for excisional debridement of sacral decubitus ulcer with electrocautery.  Postoperatively, the patient experienced significant encephalopathy.        Mental state continues to be at baseline.  No further issues of any recurrent confusion or disorientation  -Appreciate continuous input from ID service.    -Currently being transitioned to oral antibiotic route   -Reported that the history of possible allergy to penicillin during infancy, currently being challenged with amoxicillin and if tolerates will eventually go home with Augmentin   -INR improving but not yet therapeutic.  Will continue ongoing heparin  -He remained in good spirits  -- Working with ambulatory activities  -Looking forward for likely TCU placement upon hospital discharge      -  Target INR between 2.5-3.5 with known history of mechanical mitral valve  -Underwent EEG earlier with no clear evidence of seizure-like activity  -MRI done earlier for confusion and encephalopathy with no clear evidence of CVA  -Appreciate input from general neurology service  -With his improvement with clinical status, neurological status I opted to defer further investigation with lumbar puncture as patient also has significant risk factor with him on anticoagulation given his mechanical valve history.    -Cefepime was discontinued given possible contributing factor for earlier confusion  -Remain on IV antibiotics with ID service following with us currently on meropenem  -Appreciate continuous  input from general surgery service  -No plans for repeat debridement in the operating room  -Multiple opioids/narcotics discontinued earlier due to confusion that might be also contributory to his encephalopathy         Problem List:   Sacral Decubitus Ulcer - POA  New Osteomyelitis of Coccyx  Perirectal Abscess  -Previously he was on vanco and cefepime and flagyl.  Transitioned to Meropenem on 3/23  - Appreciate ID recommendations  - Appreciate General Surgery, Colorectal Surgery recommendations  - Wound cultures positive for enterococcus faecalis, gram negative bacilli,  - Attempted transfer to [facility] on 3/20, however pt declined as plastic surgery recommended IV abx treatment and debridement, and general surgery recommended debridement and abscess drainage at [facility]  - MRI pelvis showed osteomyelitis of coccyx and 5.5 cm perirectal abscess  - At baseline, pt ambulates with a walker  -  Stubbs catheter has been discontinued.  Continue to monitor.  Patient is unfortunately incontinent     Acute Metabolic vs Toxic Encephalopathy  - Unclear cause.  Possibly secondary to narcotics vs anesthesia vs worsening infection vs cva vs other  - CT brain negative  - VBG, LA, Ammonia unremarkable  - Check MRI brain  - Appreciate Neurology recommendations     Mild Acute Kidney Injury-creatinine improving  - Baseline Cr = 0.9  - IVF today  - Daily BMP    Anticipating-continuous improvement with increasing oral intake     Mechanical Mitral Valve  Warfarin Coagulopathy  - INR goal is 2.5-3.5  -Resume warfarin, on bridging heparin  -Stop heparin with therapeutic INR     Mild Hypokalemia  - Electrolyte replacement protocol     Chronic Medical Problems:  Atrial Fibrillation  Hypertension  Hypercholesterolemia  Coronary Artery Disease  Pill Esophagitis with Resultant Anemia  Severe Tricuspid Regurgitation  Right Sided Heart Failure  Moderate Pulmonary Hypertension       Consultations This Hospital Stay   PHARMACY TO DOSE VANCO  PHARMACY TO  DOSE VANCO  INFECTIOUS DISEASES IP CONSULT  SURGERY GENERAL IP CONSULT  WOUND OSTOMY CONTINENCE NURSE  IP CONSULT  PHARMACY TO DOSE WARFARIN  PHYSICAL THERAPY ADULT IP CONSULT  OCCUPATIONAL THERAPY ADULT IP CONSULT  CARE MANAGEMENT / SOCIAL WORK IP CONSULT  COLORECTAL SURGERY IP CONSULT  PHARMACY IP CONSULT  NEUROLOGY IP CONSULT  PHARMACY TO DOSE WARFARIN  PHYSICAL THERAPY ADULT IP CONSULT  OCCUPATIONAL THERAPY ADULT IP CONSULT    Code Status   Full Code    Time Spent on this Encounter   IStone MD, MD, personally saw the patient today and spent greater than 30 minutes discharging this patient.       Stone Hannon MD, MD  [place] [address] [facility]  [address] [place] [address]  Phone: [phone]  Fax: [phone]  ______________________________________________________________________    Physical Exam   Vital Signs: Temp: 97.6  F (36.4  C) Temp src: Oral BP: 131/55 Pulse: 80   Resp: 16 SpO2: 97 % O2 Device: None (Room air)    Weight: 247 lbs 1.6 oz  HEENT; Atraumatic, normocephalic, pinkish conjuctiva, pupils bilateral reactive   Skin: warm and moist, no rashes  Lungs: equal chest expansion, clear to auscultation, no wheezes, no stridor, no crackles,   Heart: normal rate, normal rhythm, no rubs or gallops.   Abdomen: normal bowel sounds, no tenderness, no peritoneal signs, no guarding  Extremities: no deformities, no edema   Neuro; follow commands, alert and oriented x3, spontaneous speech, coherent, moves all extremities spontaneously  Psych; no hallucination, euthymic mood, not agitated         Primary Care Physician   Jayme Deng MD    Discharge Orders      Medication Therapy Management Referral      General info for SNF    Length of Stay Estimate: Short Term Care: Estimated # of Days <30  Condition at Discharge: Improving  Level of care:skilled   Rehabilitation Potential: Good  Admission H&P remains valid and up-to-date: Yes  Recent  Chemotherapy: N/A  Use Nursing Home Standing Orders: Yes     Mantoux instructions    Give two-step Mantoux (PPD) Per Facility Policy Yes     Follow Up and recommended labs and tests    Follow up with [facility].  The following labs/tests are recommended: repeat INR this coming Saturday March 30 and adjust warfarin accordingly.  He will need frequent INR monitoring at least 1-2x per week while in TCU as patient is on antibiotics (Cipro and Augmentin).  Follow up with wound care     Reason for your hospital stay     Activity - Up with nursing assistance     Wound care    As per wound care service instructions     Full Code     Physical Therapy Adult Consult    Evaluate and treat as clinically indicated.    Reason:  physical deconditioning     Occupational Therapy Adult Consult    Evaluate and treat as clinically indicated.    Reason:  physical deconditioning     Fall precautions     Diet    Follow this diet upon discharge: Orders Placed This Encounter      Snacks/Supplements Adult: Ensure Enlive; With Meals      Vegetarian Diet       Significant Results and Procedures   Most Recent 3 CBC's:  Recent Labs   Lab Test 03/27/24  0707 03/26/24  0702 03/25/24  0740   WBC 5.3 4.3 5.1   HGB 9.2* 9.5* 9.9*   MCV 98 101* 101*    259 245     Most Recent 3 BMP's:  Recent Labs   Lab Test 03/29/24  0641 03/28/24  0706 03/27/24  0707 03/26/24  0702 03/25/24  0740   NA  --  135  --  140 140   POTASSIUM 4.1 4.0 4.0 3.5 3.8   CHLORIDE  --  99  --  102 102   CO2  --  28  --  28 29   BUN  --  44.7*  --  41.7* 46.0*   CR  --  1.37*  --  1.14 1.25*   ANIONGAP  --  8  --  10 9   JOSEPH  --  9.0  --  8.9 9.1   GLC  --  89  --  91 84     Most Recent 2 LFT's:  Recent Labs   Lab Test 03/25/24  0740 02/03/24  0741   AST 47* 27   ALT 15 9   ALKPHOS 83 58   BILITOTAL 0.5 0.6     Most Recent 3 INR's:  Recent Labs   Lab Test 03/29/24  0641 03/28/24  0706 03/27/24  0707   INR 2.62* 2.18* 2.00*     Most Recent 3 Hemoglobins:  Recent  Labs   Lab Test 03/27/24  0707 03/26/24  0702 03/25/24  0740   HGB 9.2* 9.5* 9.9*     Most Recent 3 Troponin's:No lab results found.  Most Recent 3 BNP's:  Recent Labs   Lab Test 01/30/24  1048 01/09/24  1157   NTBNPI 1,253 2,149*     Most Recent D-dimer:No lab results found.,   Results for orders placed or performed during the hospital encounter of 03/18/24   MR Pelvis Bone wo & w Contrast    Narrative    MR pelvis without and with contrast 3/20/2024 9:59 AM    Techniques: Multiplanar multisequence imaging of the pelvis was  obtained before and after administration of  intravenous contrast  using routing MSK protocol.    History: sacral decubitus ulcer. Eval for osteomyelitis     Comparison: MRI lumbar spine 8/16/2022.     Findings:   Arising from the superior aspect of the gluteal cleft there is a  sacral decubitus ulcer with extensive soft tissue thickening and edema  involving the subcutaneous tissue deep to the ulcer. Diffusely  abnormal T1 hypointense, T2 hyperintense signal replacing the distal  coccygeal segments with associated destructive/erosive changes. The  abnormal bone marrow signal extends up to and involves the first  coccygeal segment. Normal bone marrow signal pattern of the S5  vertebral body. There is enhancing edema-like signal changes extending  along the presacral soft tissues extending from approximately the  level of S2-S3 caudally along the posterior rectosigmoid colon. There  is a rim-enhancing fluid and gas collection along the posterior  pararectal fossa extending into the left ischioanal fossa measuring  approximately 1.7 x 3.8 x 5.5 cm (series 9 image 31 and series 10  image 23).    Osseous structures  Osseous structures: No focal osseous lesion. No acute fracture or  avascular necrosis.    Joint and Periarticular soft tissue:    Moderate degenerative changes of the sacroiliac joints. Moderate  osteoarthrosis of the right hip with heterotopic ossification about  the hip joint.  Degenerative disc disease at L5-S1.    Joint effusion: Small right hip joint effusion.    Bursal effusion: Minimal nonspecific edema over the greater  trochanter. No substantial iliopsoas or trochanteric bursal effusion.    Muscles and tendons  Diffuse fatty atrophy of the pelvic musculature. Enhancing  intramuscular edema involving the bilateral gluteus willian muscles,  left greater than right, likely inflammatory/phlegmonous changes.  Intramuscular edema involving the right gluteus minimus and medius  muscles. Mild edema in the left adductor muscles.     Nerves:  The visualized course of the sciatic nerves are unremarkable  bilaterally.    Other Findings:  Large stool burden in the rectosigmoid colon.      Impression    Impression:  1. Osteomyelitis involving the entirety of the coccyx. No evidence of  sacral involvement.  2. Extensive inflammatory/phlegmonous changes throughout the  presacral/coccygeal soft tissues with 5.5 cm abscess in the posterior  pararectal/left ischioanal fossa which extends and is adherent to the  posterior rectal wall.    I have personally reviewed the examination and initial interpretation  and I agree with the findings.    JUTTA ELLERMANN, MD         SYSTEM ID:  [id number]   CT Head w/o Contrast    Narrative    EXAM: CT HEAD W/O CONTRAST  LOCATION: [facility]  DATE: 3/23/2024    INDICATION: encephalopathy, mechanical heart valve and off anticoag for surgery  COMPARISON: None.  TECHNIQUE: Routine CT Head without IV contrast. Multiplanar reformats. Dose reduction techniques were used.    FINDINGS:  INTRACRANIAL CONTENTS: No intracranial hemorrhage, extraaxial collection, or mass effect.  No CT evidence of acute infarct. Mild presumed chronic small vessel ischemic changes. Mild generalized volume loss. No hydrocephalus.     VISUALIZED ORBITS/SINUSES/MASTOIDS: No intraorbital abnormality. No paranasal sinus mucosal disease. No middle ear or mastoid  effusion.    BONES/SOFT TISSUES: No acute abnormality.      Impression    IMPRESSION:  1.  No acute findings. Chronic changes as above.       MR Brain w/o & w Contrast    Narrative    EXAM: MR BRAIN WITHOUT AND WITH CONTRAST  LOCATION: [facility]  DATE: 03/24/2024    INDICATION: Encephalopathy since surgery, mechanical heart valve. Briefly off anticoagulation for surgery.  COMPARISON: CT of the head dated 03/23/2024.  CONTRAST: 11 mL Gadavist IV.  TECHNIQUE: Routine multiplanar multisequence head MRI without and with intravenous contrast.    FINDINGS:  INTRACRANIAL CONTENTS: No abnormal intracranial restricted diffusion is identified to suggest recent infarct. The ventricles appear within normal limits in size and configuration. There is mild to moderate generalized brain parenchymal volume loss. Mild   patchy nonspecific T2 FLAIR hyperintense signal abnormalities in the cerebral white matter, likely due to chronic small vessel ischemic change. Mild smooth diffuse pachymeningeal thickening and enhancement along the frontal convexities on both sides with   associated T2 FLAIR hyperintense signal. This dural thickening measures up to approximately 2 mm in thickness (series 8 image 15). This is nonspecific. Otherwise, no focal mass or abnormal enhancement identified.    SELLA: No abnormality accounting for technique.    OSSEOUS STRUCTURES/SOFT TISSUES: Normal marrow signal. The major intracranial vascular flow-voids are maintained.     ORBITS: No abnormality accounting for technique.     SINUSES/MASTOIDS: Mild mucosal thickening scattered about the paranasal sinuses. Moderate scattered fluid/membrane thickening in the right mastoid air cells. Trace fluid in the left mastoid tip.       Impression    IMPRESSION:  1.  No acute/early subacute ischemic infarct or mass effect.  2.  Mild, smooth, diffuse bilateral cerebral convexity pachymeningeal thickening and enhancement. This is nonspecific. It  could be incidental. It also may be related to trace recent subdural hemorrhage/trauma or postprocedural changes. Pachymeningeal   thickening in the setting of intracranial hypotension could also be considered, although the finding is typically more pronounced in that entity. Recommend clinical correlation. Other considerations such as infectious/inflammatory process   (pachymeningitis) or neoplasm are thought to be less likely.  3.  Atrophy and presumed chronic small vessel ischemic changes, as described.  4.  Moderate fluid/membrane thickening in the right mastoid air cells.       *Note: Due to a large number of results and/or encounters for the requested time period, some results have not been displayed. A complete set of results can be found in Results Review.       Discharge Medications   Current Discharge Medication List        START taking these medications    Details   amoxicillin-clavulanate (AUGMENTIN) 875-125 MG tablet Take 1 tablet by mouth every 12 hours for 14 days    Comments: Patient tolerated medication while in the hospital  Associated Diagnoses: Pressure injury of sacral region, stage 4 (H)      ciprofloxacin (CIPRO) 500 MG tablet Take 1 tablet (500 mg) by mouth every 12 hours for 14 days    Associated Diagnoses: Pressure injury of sacral region, stage 4 (H)           CONTINUE these medications which have CHANGED    Details   warfarin ANTICOAGULANT (COUMADIN) 3 MG tablet Take 1 tablet (3 mg) by mouth daily  Qty: 7 tablet, Refills: 0    Associated Diagnoses: Chronic anticoagulation           CONTINUE these medications which have NOT CHANGED    Details   acetaminophen (TYLENOL) 325 MG tablet Take 2 tablets (650 mg) by mouth every 6 hours as needed for mild pain      albuterol (PROAIR HFA/PROVENTIL HFA/VENTOLIN HFA) 108 (90 Base) MCG/ACT inhaler Inhale 1 puff into the lungs every 4 hours as needed for shortness of breath      bisacodyl (DULCOLAX) 10 MG suppository Place 10 mg rectally daily as needed  for constipation Every 3 days if no BM      calcium carbonate (TUMS) 500 MG chewable tablet Take 1 tablet (500 mg) by mouth 3 times daily as needed for heartburn    Associated Diagnoses: Anemia due to blood loss, acute      diclofenac (VOLTAREN) 1 % topical gel Apply 4 g topically 3 times daily      ferrous sulfate (FEROSUL) 325 (65 Fe) MG tablet Take 1 tablet (325 mg) by mouth daily    Associated Diagnoses: Anemia due to blood loss, acute      Multiple Vitamins-Minerals (MULTIVITAMIN ADULT) CHEW Take 2 chew tab by mouth daily      nystatin (MYCOSTATIN) 676279 UNIT/GM external powder Apply topically 2 times daily Groin folds      Omega-3 Fatty Acids (FISH OIL CONCENTRATE PO) Take 1 capsule by mouth daily      pantoprazole (PROTONIX) 40 MG EC tablet Take 1 tablet (40 mg) by mouth 2 times daily (before meals)    Associated Diagnoses: Gastrointestinal hemorrhage, unspecified gastrointestinal hemorrhage type      polyethylene glycol (MIRALAX) 17 GM/Dose powder Take 17 g by mouth daily as needed for constipation      potassium chloride (KAYCIEL) 10 % SOLN solution Take 15 mLs (20 mEq) by mouth daily    Associated Diagnoses: Acute on chronic congestive heart failure, unspecified heart failure type (H)      Probiotic CHEW Take 2 chew tab by mouth daily      psyllium (METAMUCIL/KONSYL) 58.6 % powder Take 18 g (1 Tablespoonful) by mouth daily      sennosides (SENOKOT) 8.6 MG tablet Take 1 tablet by mouth 2 times daily as needed for constipation      sodium chloride (OCEAN) 0.65 % nasal spray Spray 1 spray into both nostrils every hour as needed for congestion    Associated Diagnoses: Nasal congestion      sucralfate (CARAFATE) 1 GM/10ML suspension Take 1 g by mouth 4 times daily (before meals and nightly)      torsemide (DEMADEX) 20 MG tablet Take 1 tablet (20 mg) by mouth daily    Associated Diagnoses: Acute on chronic congestive heart failure, unspecified heart failure type (H)      order for DME Equipment being ordered:  COMPRESSION STOCKINGS, 20-30 mmHg  Qty: 1 each, Refills: 1    Associated Diagnoses: Lower extremity edema      Warfarin Therapy Reminder Take 1 each by mouth See Admin Instructions Per INR           STOP taking these medications       clindamycin (CLEOCIN) 150 MG capsule Comments:   Reason for Stopping:             Allergies   Allergies   Allergen Reactions    Amiodarone Shortness Of Breath    Pcn [Penicillins] Shortness Of Breath     Rxn occurred in childhood     Statins Muscle Pain (Myalgia) and Hives    Amiodarone     Latex     Zetia [Ezetimibe] Muscle Pain (Myalgia)     Muscle weakness legs

## 2024-03-29 NOTE — PROGRESS NOTES
You have successfully submitted the preadmission screening (PAS) to the [facility] [employer] [facility] on:  Created On  3/29/2024 9:06 AM    Your confirmation number is:  [id number]

## 2024-03-30 NOTE — PLAN OF CARE
Occupational Therapy Discharge Summary    Reason for therapy discharge:    Discharged to transitional care facility.    Progress towards therapy goal(s). See goals on Care Plan in [facility] electronic health record for goal details.  Goals partially met.  Barriers to achieving goals:   discharge from facility.    Therapy recommendation(s):    Continued therapy is recommended.  Rationale/Recommendations:  Pt is significantly below baseline due to limitations with weakness and cognition. Appears pt is cognitively clearing. Pt able to follow commands throughout session. Pt would benefit from skilled OT during IP therapy and TCU to maximize indep..    **Pt not seen by writer on this date, note written based on previous treating OT's note and recommendations.

## 2024-04-01 PROBLEM — N39.0 URINARY TRACT INFECTION, SITE NOT SPECIFIED: Status: ACTIVE | Noted: 2024-01-01

## 2024-04-01 PROBLEM — R33.9 RETENTION OF URINE, UNSPECIFIED: Status: ACTIVE | Noted: 2024-01-01

## 2024-04-01 PROBLEM — H93.8X2 OTHER SPECIFIED DISORDERS OF LEFT EAR: Status: ACTIVE | Noted: 2024-01-01

## 2024-04-01 PROBLEM — R26.9 UNSPECIFIED ABNORMALITIES OF GAIT AND MOBILITY: Status: ACTIVE | Noted: 2024-01-01

## 2024-04-01 PROBLEM — B96.20 UNSPECIFIED ESCHERICHIA COLI (E. COLI) AS THE CAUSE OF DISEASES CLASSIFIED ELSEWHERE: Status: ACTIVE | Noted: 2024-01-01

## 2024-04-01 PROBLEM — R26.89 OTHER ABNORMALITIES OF GAIT AND MOBILITY: Status: ACTIVE | Noted: 2024-01-01

## 2024-04-01 PROBLEM — R79.1 ABNORMAL COAGULATION PROFILE: Status: ACTIVE | Noted: 2024-01-01

## 2024-04-01 PROBLEM — J18.9 PNEUMONIA, UNSPECIFIED ORGANISM: Status: ACTIVE | Noted: 2024-01-01

## 2024-04-01 PROBLEM — R60.9 EDEMA, UNSPECIFIED: Status: ACTIVE | Noted: 2024-01-01

## 2024-04-01 PROBLEM — M62.81 MUSCLE WEAKNESS (GENERALIZED): Status: ACTIVE | Noted: 2024-01-01

## 2024-04-01 PROBLEM — J96.01 ACUTE RESPIRATORY FAILURE WITH HYPOXIA (H): Status: ACTIVE | Noted: 2024-01-01

## 2024-04-01 PROBLEM — I50.32 CHRONIC DIASTOLIC (CONGESTIVE) HEART FAILURE (H): Status: ACTIVE | Noted: 2024-01-01

## 2024-04-01 PROBLEM — K22.11 ULCER OF ESOPHAGUS WITH BLEEDING: Status: ACTIVE | Noted: 2024-01-01

## 2024-04-01 PROBLEM — N18.2 CHRONIC KIDNEY DISEASE, STAGE 2 (MILD): Status: ACTIVE | Noted: 2024-01-01

## 2024-04-01 NOTE — PROGRESS NOTES
[facility] GERIATRICS    PRIMARY CARE PROVIDER AND CLINIC:  Jayme Deng MD, MD, [address] / [place] [address]  Chief Complaint   Patient presents with    Hospital F/U      [place] Medical Record Number:  [id number]  Place of Service where encounter took place:  [facility] (TCU) [[id number]]      HPI:    Patient is a 79-year-old male with past medical history of mechanical mitral valve on chronic anticoagulation with warfarin, HFpEF, pulmonary hypertension, CKD, CAD, hypertension, hypercholesterolemia, A-fib, anemia, known sacral decubitus ulcer, and multiple recent hospitalizations who was admitted at [facility] from 3/18 - 3/29, 2024 after presenting from TCU with concern of osteomyelitis of sacral wound.  Workup in ED was significant for a CRP of 119, otherwise unremarkable with a hemoglobin value of 9.6.  MRI of the pelvis revealed osteomyelitis involving the entirety of the coccyx with extensive inflammatory and phlegmonous changes throughout the sacral/coccygeal soft tissues with a 5.5 cm abscess in the posterior perirectal/left ischial anal fossa, extending and adherent to the posterior rectal wall.  Initial consideration for transfer to [facility] for higher level of care which was declined.  Patient was initiated on vancomycin and cefepime, seen in consultation with ID and general surgery.  INR reversed, bridged with heparin.  Ultimately underwent excisional debridement with electrocautery on 3/22/2024.  Postoperatively experienced significant encephalopathy, underwent MRI imaging which was negative.  EEG was also negative.  Gradually returning to baseline mental state.  Transitioned to oral antibiotics.  Referred to TCU for ongoing rehab, medical management.    1/9 - 1/19, 2024: Admission at [facility] for acute anemia with hemoglobin of 4.4.  Etiology felt to be GI bleed in setting of supratherapeutic INR.  EGD was unremarkable, colonoscopy with poor prep  and inability to visualize well.  Hemoglobin stabilized, anticoagulation resumed and patient discharged to TCU.  While at TCU patient was treated for pneumonia with ceftriaxone followed by cefuroxime and doxycycline.    1/30 - 2/15, 2024: Admission at [facility] for acute anemia and acute on chronic HFpEF.  Hemoglobin at time of presentation was 6.9, chest x-ray with findings of worsening pulmonary edema.  Patient was diuresed with IV furosemide, transition back to oral torsemide.  Seen by GI, s/p EGD 2/5 with severe pill esophagitis and active bleeding ulcer in the middle third of esophagus that was cauterized with BiCap.  Patient was started on PPI and Carafate.  Repeat endoscopy 2/8 indicated oozing esophageal ulcer necessitating anticoagulation to be held.  Hemoglobin ultimately stabilized to value 8-9, warfarin resumed.  Discharged to TCU.    Patient is presently evaluated as initial visit.  Partner is at bedside, assists with history.  Patient is resting in bed on interview, reports that he is generally doing well and adjusting to yet another TCU.  States that he has significant pain to the sacrum however is highly hesitant to use pain medications given his significant encephalopathy while in the hospital.  At a minimum would like to have scheduled Tylenol available, is agreeable to an as needed narcotic which she will decide if she would like to trial.  Notes he has an appointment tomorrow with outpatient wound care for further evaluation and management of wound.  Otherwise denies any shortness of breath, chest discomfort, constipation.  Prior to multiple hospitalizations patient was living with his significant other. Full code.    CODE STATUS/ADVANCE DIRECTIVES DISCUSSION:  Full Code  CPR/Full code   ALLERGIES:   Allergies   Allergen Reactions    Amiodarone Shortness Of Breath    Pcn [Penicillins] Shortness Of Breath     Rxn occurred in childhood     Statins Muscle Pain (Myalgia) and Hives     Amiodarone     Latex     Zetia [Ezetimibe] Muscle Pain (Myalgia)     Muscle weakness legs      PAST MEDICAL HISTORY:   Past Medical History:   Diagnosis Date    Acute on chronic congestive heart failure, unspecified heart failure type (H) 01/30/2024    Arthritis     Atrial fibrillation (H)     Coronary artery disease     Hypercholesteremia     Obesity     Unspecified essential hypertension       PAST SURGICAL HISTORY:   has a past surgical history that includes mitral valve replacement (8-); Esophagoscopy, gastroscopy, duodenoscopy (EGD), combined (N/A, 1/15/2024); Colonoscopy (N/A, 1/15/2024); Esophagoscopy, gastroscopy, duodenoscopy (EGD), combined (N/A, 2/8/2024); and Irrigation and debridement sacral wound, combined (N/A, 3/22/2024).  FAMILY HISTORY: family history includes C.A.D. in his brother; Cerebrovascular Disease in his father; Diabetes in his paternal grandmother.  SOCIAL HISTORY:   reports that he quit smoking about 26 years ago. His smoking use included cigarettes. He has a 2.5 pack-year smoking history. He has never used smokeless tobacco. He reports current alcohol use. He reports that he does not use drugs.  Patient's living condition: lives with spouse    Post Discharge Medication Reconciliation Status:   MED REC REQUIRED  Post Medication Reconciliation Status: patient was not discharged from an inpatient facility or TCU       Current Outpatient Medications   Medication Sig Dispense Refill    HYDROmorphone (DILAUDID) 4 MG tablet Take 0.5 tablets (2 mg) by mouth every 6 hours as needed for pain 10 tablet 0    acetaminophen (TYLENOL) 325 MG tablet Take 2 tablets (650 mg) by mouth every 6 hours as needed for mild pain      albuterol (PROAIR HFA/PROVENTIL HFA/VENTOLIN HFA) 108 (90 Base) MCG/ACT inhaler Inhale 1 puff into the lungs every 4 hours as needed for shortness of breath      amoxicillin-clavulanate (AUGMENTIN) 875-125 MG tablet Take 1 tablet by mouth every 12 hours for 14 days       bisacodyl (DULCOLAX) 10 MG suppository Place 10 mg rectally daily as needed for constipation Every 3 days if no BM      calcium carbonate (TUMS) 500 MG chewable tablet Take 1 tablet (500 mg) by mouth 3 times daily as needed for heartburn      ciprofloxacin (CIPRO) 500 MG tablet Take 1 tablet (500 mg) by mouth every 12 hours for 14 days      diclofenac (VOLTAREN) 1 % topical gel Apply 4 g topically 3 times daily      ferrous sulfate (FEROSUL) 325 (65 Fe) MG tablet Take 1 tablet (325 mg) by mouth daily      Multiple Vitamins-Minerals (MULTIVITAMIN ADULT) CHEW Take 2 chew tab by mouth daily      nystatin (MYCOSTATIN) 944070 UNIT/GM external powder Apply topically 2 times daily Groin folds      Omega-3 Fatty Acids (FISH OIL CONCENTRATE PO) Take 1 capsule by mouth daily      order for DME Equipment being ordered: COMPRESSION STOCKINGS, 20-30 mmHg 1 each 1    pantoprazole (PROTONIX) 40 MG EC tablet Take 1 tablet (40 mg) by mouth 2 times daily (before meals)      polyethylene glycol (MIRALAX) 17 GM/Dose powder Take 17 g by mouth daily as needed for constipation      potassium chloride (KAYCIEL) 10 % SOLN solution Take 15 mLs (20 mEq) by mouth daily      Probiotic CHEW Take 2 chew tab by mouth daily      psyllium (METAMUCIL/KONSYL) 58.6 % powder Take 18 g (1 Tablespoonful) by mouth daily      sennosides (SENOKOT) 8.6 MG tablet Take 1 tablet by mouth 2 times daily as needed for constipation      sodium chloride (OCEAN) 0.65 % nasal spray Spray 1 spray into both nostrils every hour as needed for congestion      sucralfate (CARAFATE) 1 GM/10ML suspension Take 1 g by mouth 4 times daily (before meals and nightly)      torsemide (DEMADEX) 20 MG tablet Take 1 tablet (20 mg) by mouth daily      warfarin ANTICOAGULANT (COUMADIN) 3 MG tablet Take 1 tablet (3 mg) by mouth daily 7 tablet 0    Warfarin Therapy Reminder Take 1 each by mouth See Admin Instructions Per INR       No current facility-administered medications for this visit.  "      ROS:  4 point ROS including Respiratory, CV, GI and , other than that noted in the HPI,  is negative    Vitals:  /73   Pulse 85   Temp 97.8  F (36.6  C)   Resp 18   Ht 1.803 m (5' 11\")   Wt 112 kg (247 lb)   SpO2 96%   BMI 34.45 kg/m    Exam:  GEN: well-developed, well-nourished, appears comfortable  HEENT: NCAT, EOM intact bilaterally, sclera clear, conjunctiva normal, nose & mouth patent, mucous membranes moist  CHEST: lungs CTA bilaterally, no increased work of breathing, no wheeze, crackles, rhonchi  HEART: irregularly irregular, S1 & S2  ABD: soft, nontender, nondistended, no guarding or rigidity, +BS in all 4 quadrants  MSK: AROM bilateral UE/LE, pedal & radial pulses 2+ bilaterally  NEURO: awake, alert. CN II-XII grossly intact. Sensation grossly intact to light touch.   SKIN: warm & dry without rash, no pedal edema; sacral wound not visualized     Lab/Diagnostic data:  Labs done in [facility] are in [facility]. Please refer to them using [employer] Everywhere., Recent labs in [facility] reviewed by me today. , and Most Recent 3 CBC's:  Recent Labs   Lab Test 03/27/24  0707 03/26/24  0702 03/25/24  0740   WBC 5.3 4.3 5.1   HGB 9.2* 9.5* 9.9*   MCV 98 101* 101*    259 245     Most Recent 3 BMP's:  Recent Labs   Lab Test 03/29/24  0641 03/28/24  0706 03/27/24  0707 03/26/24  0702 03/25/24  0740   NA  --  135  --  140 140   POTASSIUM 4.1 4.0 4.0 3.5 3.8   CHLORIDE  --  99  --  102 102   CO2  --  28  --  28 29   BUN  --  44.7*  --  41.7* 46.0*   CR  --  1.37*  --  1.14 1.25*   ANIONGAP  --  8  --  10 9   JOSEPH  --  9.0  --  8.9 9.1   GLC  --  89  --  91 84     Most Recent 2 LFT's:  Recent Labs   Lab Test 03/25/24  0740 02/03/24  0741   AST 47* 27   ALT 15 9   ALKPHOS 83 58   BILITOTAL 0.5 0.6     Most Recent ESR & CRP:  Recent Labs   Lab Test 03/21/24  0701 03/18/24  1748   SED  --  >140*   CRPI 158.89* 119.65*       ASSESSMENT/PLAN:    Osteomyelitis of coccyx  Perirectal abscess s/p I&D  Sacral " decubitus ulcer, POA  History as detailed above, seen in consult with ID, general surgery.  Is status post above procedure.  Patient was trialed on amoxicillin while inpatient despite reported penicillin allergy as an infant, tolerated.  Transition to Augmentin and Cipro for planned 2-week course. Reports significant pain to site of wound.  -Tylenol 1000mg TID, dilaudid 2mg q6h PRN  -Continues on Augmentin, Cipro twice daily  -Follow-up with wound care [place] tomorrow as scheduled    Physical deconditioning  Impaired mobility and ADLs  Generalized muscle weakness  In the context of multiple hospitalizations, acute illness and injury.  -PT/OT evaluations  -SW for discharge planning    Mechanical mitral valve  A-fib  On chronic anticoagulation with warfarin, INR goal 2.5-3.5.  Prior to admission on warfarin 3 mg daily.  INR today is 2.9.  Not on rate control medications.  -Warfarin 3 mg daily  -INR 4/4    HFpEF  Pulmonary hypertension, moderate  Severe TR  CAD  HTN/HLD  Chronic, stable.  -Continues on torsemide 20 mg/day  -Daily weights  -Monitor volume status    ENRIQUE on CKD  Creatinine up to 1.38, baseline of 0.9-1.1.  -BMP 4/4    Anemia  Hx GI bleed  Multiple hospitalizations recently for GI bleed as noted above.  Hemoglobin stable during recent hospitalization with value 9.2 at discharge.  -CBC 4/4    Total time spent with patient visit at the skilled nursing facility was 45 min including patient visit and review of past records.     Electronically signed by:  Michael Alcantar PA-C

## 2024-04-01 NOTE — LETTER
4/1/2024        RE: Feng Wynne  [address]  [place] [address]        [facility] GERIATRICS    PRIMARY CARE PROVIDER AND CLINIC:  Jayme Deng MD, MD, [address] / [place] [address]  Chief Complaint   Patient presents with    Hospital F/U      [facility] Medical Record Number:  [id number]  Place of Service where encounter took place:  [facility] (TCU) [[id number]]      HPI:    ***    CODE STATUS/ADVANCE DIRECTIVES DISCUSSION:  Full Code  CPR/Full code   ALLERGIES:   Allergies   Allergen Reactions    Amiodarone Shortness Of Breath    Pcn [Penicillins] Shortness Of Breath     Rxn occurred in childhood     Statins Muscle Pain (Myalgia) and Hives    Amiodarone     Latex     Zetia [Ezetimibe] Muscle Pain (Myalgia)     Muscle weakness legs      PAST MEDICAL HISTORY:   Past Medical History:   Diagnosis Date    Acute on chronic congestive heart failure, unspecified heart failure type (H) 01/30/2024    Arthritis     Atrial fibrillation (H)     Coronary artery disease     Hypercholesteremia     Obesity     Unspecified essential hypertension       PAST SURGICAL HISTORY:   has a past surgical history that includes mitral valve replacement (8-); Esophagoscopy, gastroscopy, duodenoscopy (EGD), combined (N/A, 1/15/2024); Colonoscopy (N/A, 1/15/2024); Esophagoscopy, gastroscopy, duodenoscopy (EGD), combined (N/A, 2/8/2024); and Irrigation and debridement sacral wound, combined (N/A, 3/22/2024).  FAMILY HISTORY: family history includes C.A.D. in his brother; Cerebrovascular Disease in his father; Diabetes in his paternal grandmother.  SOCIAL HISTORY:   reports that he quit smoking about 26 years ago. His smoking use included cigarettes. He has a 2.5 pack-year smoking history. He has never used smokeless tobacco. He reports current alcohol use. He reports that he does not use drugs.  Patient's living condition: lives with spouse    Post Discharge Medication Reconciliation  Status:   MED REC REQUIRED{TIP  Click the link below to document or use med rec list, use list to pull in response :[id number]}  Post Medication Reconciliation Status: {MED REC LIST:462164}       Current Outpatient Medications   Medication Sig    acetaminophen (TYLENOL) 325 MG tablet Take 2 tablets (650 mg) by mouth every 6 hours as needed for mild pain    albuterol (PROAIR HFA/PROVENTIL HFA/VENTOLIN HFA) 108 (90 Base) MCG/ACT inhaler Inhale 1 puff into the lungs every 4 hours as needed for shortness of breath    amoxicillin-clavulanate (AUGMENTIN) 875-125 MG tablet Take 1 tablet by mouth every 12 hours for 14 days    bisacodyl (DULCOLAX) 10 MG suppository Place 10 mg rectally daily as needed for constipation Every 3 days if no BM    calcium carbonate (TUMS) 500 MG chewable tablet Take 1 tablet (500 mg) by mouth 3 times daily as needed for heartburn    ciprofloxacin (CIPRO) 500 MG tablet Take 1 tablet (500 mg) by mouth every 12 hours for 14 days    diclofenac (VOLTAREN) 1 % topical gel Apply 4 g topically 3 times daily    ferrous sulfate (FEROSUL) 325 (65 Fe) MG tablet Take 1 tablet (325 mg) by mouth daily    Multiple Vitamins-Minerals (MULTIVITAMIN ADULT) CHEW Take 2 chew tab by mouth daily    nystatin (MYCOSTATIN) 260134 UNIT/GM external powder Apply topically 2 times daily Groin folds    Omega-3 Fatty Acids (FISH OIL CONCENTRATE PO) Take 1 capsule by mouth daily    order for DME Equipment being ordered: COMPRESSION STOCKINGS, 20-30 mmHg    pantoprazole (PROTONIX) 40 MG EC tablet Take 1 tablet (40 mg) by mouth 2 times daily (before meals)    polyethylene glycol (MIRALAX) 17 GM/Dose powder Take 17 g by mouth daily as needed for constipation    potassium chloride (KAYCIEL) 10 % SOLN solution Take 15 mLs (20 mEq) by mouth daily    Probiotic CHEW Take 2 chew tab by mouth daily    psyllium (METAMUCIL/KONSYL) 58.6 % powder Take 18 g (1 Tablespoonful) by mouth daily    sennosides (SENOKOT) 8.6 MG tablet Take 1 tablet by  "mouth 2 times daily as needed for constipation    sodium chloride (OCEAN) 0.65 % nasal spray Spray 1 spray into both nostrils every hour as needed for congestion    sucralfate (CARAFATE) 1 GM/10ML suspension Take 1 g by mouth 4 times daily (before meals and nightly)    torsemide (DEMADEX) 20 MG tablet Take 1 tablet (20 mg) by mouth daily    warfarin ANTICOAGULANT (COUMADIN) 3 MG tablet Take 1 tablet (3 mg) by mouth daily    Warfarin Therapy Reminder Take 1 each by mouth See Admin Instructions Per INR     No current facility-administered medications for this visit.       ROS:  {ROS FGS:079347}    Vitals:  /73   Pulse 85   Temp 97.8  F (36.6  C)   Resp 18   Ht 1.803 m (5' 11\")   SpO2 96%   BMI 34.46 kg/m    Exam:  {Nursing home physical exam :081417}    Lab/Diagnostic data:  {fgslab:118176}    ASSESSMENT/PLAN:    ***    Total time spent with patient visit at the skilled nursing facility was *** min including patient visit and review of past records.     Electronically signed by:  Michael Callejas ***                     Sincerely,        Michael Alcantar PA-C      "

## 2024-04-01 NOTE — LETTER
4/1/2024        RE: Feng Wynne  [address]  [place] [address]        [facility] GERIATRICS    PRIMARY CARE PROVIDER AND CLINIC:  Jayme Deng MD, MD, [address] / [place] [address]  Chief Complaint   Patient presents with    Hospital F/U      [facility] Medical Record Number:  [id number]  Place of Service where encounter took place:  [facility] (TCU) [[id number]]      HPI:    ***    CODE STATUS/ADVANCE DIRECTIVES DISCUSSION:  Full Code  CPR/Full code   ALLERGIES:   Allergies   Allergen Reactions    Amiodarone Shortness Of Breath    Pcn [Penicillins] Shortness Of Breath     Rxn occurred in childhood     Statins Muscle Pain (Myalgia) and Hives    Amiodarone     Latex     Zetia [Ezetimibe] Muscle Pain (Myalgia)     Muscle weakness legs      PAST MEDICAL HISTORY:   Past Medical History:   Diagnosis Date    Acute on chronic congestive heart failure, unspecified heart failure type (H) 01/30/2024    Arthritis     Atrial fibrillation (H)     Coronary artery disease     Hypercholesteremia     Obesity     Unspecified essential hypertension       PAST SURGICAL HISTORY:   has a past surgical history that includes mitral valve replacement (8-); Esophagoscopy, gastroscopy, duodenoscopy (EGD), combined (N/A, 1/15/2024); Colonoscopy (N/A, 1/15/2024); Esophagoscopy, gastroscopy, duodenoscopy (EGD), combined (N/A, 2/8/2024); and Irrigation and debridement sacral wound, combined (N/A, 3/22/2024).  FAMILY HISTORY: family history includes C.A.D. in his brother; Cerebrovascular Disease in his father; Diabetes in his paternal grandmother.  SOCIAL HISTORY:   reports that he quit smoking about 26 years ago. His smoking use included cigarettes. He has a 2.5 pack-year smoking history. He has never used smokeless tobacco. He reports current alcohol use. He reports that he does not use drugs.  Patient's living condition: lives with spouse    Post Discharge Medication Reconciliation  Status:   MED REC REQUIRED{TIP  Click the link below to document or use med rec list, use list to pull in response :[id number]}  Post Medication Reconciliation Status: {MED REC LIST:086619}       Current Outpatient Medications   Medication Sig    acetaminophen (TYLENOL) 325 MG tablet Take 2 tablets (650 mg) by mouth every 6 hours as needed for mild pain    albuterol (PROAIR HFA/PROVENTIL HFA/VENTOLIN HFA) 108 (90 Base) MCG/ACT inhaler Inhale 1 puff into the lungs every 4 hours as needed for shortness of breath    amoxicillin-clavulanate (AUGMENTIN) 875-125 MG tablet Take 1 tablet by mouth every 12 hours for 14 days    bisacodyl (DULCOLAX) 10 MG suppository Place 10 mg rectally daily as needed for constipation Every 3 days if no BM    calcium carbonate (TUMS) 500 MG chewable tablet Take 1 tablet (500 mg) by mouth 3 times daily as needed for heartburn    ciprofloxacin (CIPRO) 500 MG tablet Take 1 tablet (500 mg) by mouth every 12 hours for 14 days    diclofenac (VOLTAREN) 1 % topical gel Apply 4 g topically 3 times daily    ferrous sulfate (FEROSUL) 325 (65 Fe) MG tablet Take 1 tablet (325 mg) by mouth daily    Multiple Vitamins-Minerals (MULTIVITAMIN ADULT) CHEW Take 2 chew tab by mouth daily    nystatin (MYCOSTATIN) 799352 UNIT/GM external powder Apply topically 2 times daily Groin folds    Omega-3 Fatty Acids (FISH OIL CONCENTRATE PO) Take 1 capsule by mouth daily    order for DME Equipment being ordered: COMPRESSION STOCKINGS, 20-30 mmHg    pantoprazole (PROTONIX) 40 MG EC tablet Take 1 tablet (40 mg) by mouth 2 times daily (before meals)    polyethylene glycol (MIRALAX) 17 GM/Dose powder Take 17 g by mouth daily as needed for constipation    potassium chloride (KAYCIEL) 10 % SOLN solution Take 15 mLs (20 mEq) by mouth daily    Probiotic CHEW Take 2 chew tab by mouth daily    psyllium (METAMUCIL/KONSYL) 58.6 % powder Take 18 g (1 Tablespoonful) by mouth daily    sennosides (SENOKOT) 8.6 MG tablet Take 1 tablet by  "mouth 2 times daily as needed for constipation    sodium chloride (OCEAN) 0.65 % nasal spray Spray 1 spray into both nostrils every hour as needed for congestion    sucralfate (CARAFATE) 1 GM/10ML suspension Take 1 g by mouth 4 times daily (before meals and nightly)    torsemide (DEMADEX) 20 MG tablet Take 1 tablet (20 mg) by mouth daily    warfarin ANTICOAGULANT (COUMADIN) 3 MG tablet Take 1 tablet (3 mg) by mouth daily    Warfarin Therapy Reminder Take 1 each by mouth See Admin Instructions Per INR     No current facility-administered medications for this visit.       ROS:  {ROS FGS:589861}    Vitals:  /73   Pulse 85   Temp 97.8  F (36.6  C)   Resp 18   Ht 1.803 m (5' 11\")   SpO2 96%   BMI 34.46 kg/m    Exam:  {Nursing home physical exam :999565}    Lab/Diagnostic data:  {fgslab:972389}    ASSESSMENT/PLAN:    ***    Total time spent with patient visit at the skilled nursing facility was *** min including patient visit and review of past records.     Electronically signed by:  Michael Callejas ***                     Sincerely,        Michael Alcantar PA-C      "

## 2024-04-02 PROBLEM — L98.429: Status: RESOLVED | Noted: 2024-01-01 | Resolved: 2024-01-01

## 2024-04-02 PROBLEM — L89.154 STAGE IV PRESSURE ULCER OF SACRAL REGION (H): Status: ACTIVE | Noted: 2024-01-01

## 2024-04-02 NOTE — PROGRESS NOTES
Patient arrived for wound care visit. Certified Wound Care Nurse time spent evaluating patient record, completed a full evaluation and documented wound(s) & desmond-wound skin; provided recommendation based on treatment plan. Applied dressing, reviewed discharge instructions, patient education, and discussed plan of care with appropriate medical team staff members and patient and/or family members.

## 2024-04-02 NOTE — DISCHARGE INSTRUCTIONS
Feng Wynne   1945    A DME order was not completed because the supplies are ordered by home care or at a care facility    Dressing changes outside of clinic are being performed by  care facility.    [facility] [address] (Phone: [phone], Fax: [phone])    PLAN:  Avoid Recliner Chairs to prevent pressure on coccyx wound  Avoid Bed Pans - utilize toilets for defecation when able  Hope to discharge home from [facility] in 2 weeks (4/15/24)  Initiate VeraFlo NPWT  Continue Group 2 mattress - will need to continue once discharges to home  Consider evaluation with plastic evaluation to consider a FLAP closure if wound doesn't close on it's own down the road    Wound Dressing Change: Coccyx  Until NPWT arrives:  - Wash your hands with soap and water before you begin your dressing change and prepare a clean surface for dressings.  - Cleanse with mild unscented soap (such as Cetaphil, Cerave or Dove) and water  - Apply small amount of VASHE on gauze, lay into wound bed, let sit for 10 minutes, remove gauze (do not rinse)  - Apply a thin layer of Zinc barrier cream to excoriated areas on desmond-wound  - Primary dressing: Fill defect with dry AMD gauze - lightly pack, ensure to fill all undermined areas  - Secondary dressing: Cover with ABD pad and secure with medipore tape  Change twice daily and as needed for soilage/drainage until NPWT starts    VeraFlo NPWT: Coccyx  - Remove prior dressing  - Clean wound and periwound thoroughly with wound cleanser or Vashe and gauze to remove any loose material  - Pat periwound skin dry then paint with barrier film (such as No Sting or equivalent)  - Picture frame wound with drape  - Apply cleanse choice foam cut to fit size of wound ensuring to fill the undermined/tunnel areas and depth of wound  - Cover with drape and bridge hub off of possible pressure surfaces  - Settings: Target Pressure: -125mmHg, Intensity: medium, Continuous therapy, Rise/fall time: default 3min, Instill  amount: use Fill Assist if possible (if not use 10mL)  - Utilize Vashe solution for Instill  Change three times per week and PRN  If dressing is compromised for greater than 2 hours then please remove entire dressing and change or tuck moist Vashe gauze into wound until new dressing can be applied.  May use ostomy paste for divots and crevices as needed to maintain seal.      Repositioning:  Bed: Reposition MINIMALLY every 1-2 hours in bed to relieve pressure and promote perfusion to tissue.  Chair: When up to the chair, do not sit for longer than one hour total before returning to bed for at least 60 minutes to relieve pressure and promote perfusion to the tissue.  Completely recline/tilt for 15 minutes each hour.  Sit on a chair cushion when up to the chair.     Reduce shear and friction  Prevent skin breakdown by reducing friction and shear. Patients are less likely to slide down in bed if they re supported by pillows and the head of the bed isn t raised too high.  When transferring be sure to use assistive devices, whenever possible.  In a bed  Clean and smooth the bed surface.  Lift the head of the bed no more than 30 .  Use draw-sheets or transfer boards to move patients.  Lift the head of the bed no more than 30 degrees.  Raise the foot of the bed slightly.  In a wheelchair  Keep upright (don't slump) - keep weight forward onto your thighs, not on your tailbone  Support the back with a pillow.  Use a foot extension.    A diet high in protein is important for wound healing, we recommend getting 90 grams of protein per day.   Taking protein shakes or bars are a good way to get extra protein in your diet.     Good sources of protein VEGETARIAN:  Whey protein powder - 24g per scoop (on average)  Greek yogurt - 23g per 8oz   Navy beans - 20g per cup  Cottage cheese - 14g per 1/2 cup   Lentils - 13g per 1/4 cup  2% milk - 8g per cup  Peanut butter - 8g per 2 tablespoons  Eggs - 6g per egg  Mixed nuts - 6g per  2oz  Tofu - 10g per 1/2 cup     Main Provider: Travis Downey M.D. April 2, 2024    Call us at [phone] if you have any questions about your wounds, if you have redness or swelling around your wound, have a fever of 101 degrees Fahrenheit or greater or if you have any other problems or concerns. We answer the phone Monday through Friday 8 am to 4 pm, please leave a message as we check the voicemail frequently throughout the day. If you have a concern over the weekend, please leave a message and we will return your call Monday. If the need is urgent, go to the ER or urgent care.    If you had a positive experience please indicate that on your patient satisfaction survey form that [facility] will be sending you.    It was a pleasure meeting with you today.  Thank you for allowing me and my team the privilege of caring for you today.  YOU are the reason we are here, and I truly hope we provided you with the excellent service you deserve.  Please let us know if there is anything else we can do for you so that we can be sure you are leaving completely satisfied with your care experience.      If you have any billing related questions please call the [facility] Business office at [phone]. The clinic staff does not handle billing related matters.    If you are scheduled to have a follow up appointment, you will receive a reminder call the day before your visit. On the appointment day please arrive 15 minutes prior to your appointment time. If you are unable to keep that appointment, please call the clinic to cancel or reschedule. If you are more than 10 minutes late or greater for your scheduled appointment time, the clinic policy is that you may be asked to reschedule.
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
## 2024-04-04 NOTE — PROGRESS NOTES
[facility] GERIATRICS    Chief Complaint   Patient presents with    RECHECK     HPI:  Feng Wynne is a 79 year old  (1945), who is being seen today for an episodic care visit at: [facility] (TCU) [[id number]].     Patient is a 79-year-old male with past medical history of mechanical mitral valve on chronic anticoagulation with warfarin, HFpEF, pulmonary hypertension, CKD, CAD, hypertension, hypercholesterolemia, A-fib, anemia, known sacral decubitus ulcer, and multiple recent hospitalizations who was admitted at [facility] from 3/18 - 3/29, 2024 after presenting from TCU with concern of osteomyelitis of sacral wound.  Workup in ED was significant for a CRP of 119, otherwise unremarkable with a hemoglobin value of 9.6.  MRI of the pelvis revealed osteomyelitis involving the entirety of the coccyx with extensive inflammatory and phlegmonous changes throughout the sacral/coccygeal soft tissues with a 5.5 cm abscess in the posterior perirectal/left ischial anal fossa, extending and adherent to the posterior rectal wall.  Initial consideration for transfer to [facility] for higher level of care which was declined.  Patient was initiated on vancomycin and cefepime, seen in consultation with ID and general surgery.  INR reversed, bridged with heparin.  Ultimately underwent excisional debridement with electrocautery on 3/22/2024.  Postoperatively experienced significant encephalopathy, underwent MRI imaging which was negative.  EEG was also negative.  Gradually returning to baseline mental state.  Transitioned to oral antibiotics.  Referred to TCU for ongoing rehab, medical management.     1/9 - 1/19, 2024: Admission at [facility] for acute anemia with hemoglobin of 4.4.  Etiology felt to be GI bleed in setting of supratherapeutic INR.  EGD was unremarkable, colonoscopy with poor prep and inability to visualize well.  Hemoglobin stabilized, anticoagulation resumed and patient discharged to  "TCU.  While at TCU patient was treated for pneumonia with ceftriaxone followed by cefuroxime and doxycycline.     1/30 - 2/15, 2024: Admission at [facility] for acute anemia and acute on chronic HFpEF.  Hemoglobin at time of presentation was 6.9, chest x-ray with findings of worsening pulmonary edema.  Patient was diuresed with IV furosemide, transition back to oral torsemide.  Seen by GI, s/p EGD 2/5 with severe pill esophagitis and active bleeding ulcer in the middle third of esophagus that was cauterized with BiCap.  Patient was started on PPI and Carafate.  Repeat endoscopy 2/8 indicated oozing esophageal ulcer necessitating anticoagulation to be held.  Hemoglobin ultimately stabilized to value 8-9, warfarin resumed.  Discharged to TCU.    Today, pt is seen in follow-up. Saw wound care provider and had veraflow vac placed. Pain is marginally improved with the scheduled tylenol, continues with discomfort at attempts to reposition. Reminded him there is PRN dilaudid available should he have severe, intractable pain. Pt reports he is otherwise doing well. No cp, sob, abdominal discomfort. Having regular bowel movements. No urinary symptoms.     On review of facility records, BPs ranging 104-112, HRs 68-76.    Allergies, and PMH/PSH reviewed in EPIC today.  REVIEW OF SYSTEMS:  4 point ROS including Respiratory, CV, GI and , other than that noted in the HPI,  is negative    Objective:   /69   Pulse 68   Temp 97.4  F (36.3  C)   Resp 18   Ht 1.803 m (5' 11\")   SpO2 96%   BMI 34.41 kg/m    GEN: well-developed, well-nourished, appears comfortable  HEENT: NCAT, EOM intact bilaterally, sclera clear, conjunctiva normal, nose & mouth patent, mucous membranes moist  CHEST: lungs CTA bilaterally, no increased work of breathing, no wheeze, crackles, rhonchi  HEART: irregularly irregular, S1 & S2  ABD: soft, nontender, nondistended, no guarding or rigidity, +BS in all 4 quadrants  MSK: AROM bilateral " UE/LE, pedal & radial pulses 2+ bilaterally  NEURO: awake, alert. CN II-XII grossly intact. Sensation grossly intact to light touch.   SKIN: warm & dry without rash, no pedal edema; sacral wound not visualized    Recent labs in [facility] reviewed by me today.  and Most Recent 3 CBC's:  Recent Labs   Lab Test 04/04/24  0546 03/27/24  0707 03/26/24  0702   WBC 3.0* 5.3 4.3   HGB 8.3* 9.2* 9.5*   MCV 98 98 101*    225 259     Most Recent 3 BMP's:  Recent Labs   Lab Test 04/04/24  0546 03/29/24  0641 03/28/24  0706 03/27/24  0707 03/26/24  0702     --  135  --  140   POTASSIUM 3.2* 4.1 4.0   < > 3.5   CHLORIDE 98  --  99  --  102   CO2 31*  --  28  --  28   BUN 25.6*  --  44.7*  --  41.7*   CR 1.00  --  1.37*  --  1.14   ANIONGAP 8  --  8  --  10   JOSEPH 8.3*  --  9.0  --  8.9   GLC 81  --  89  --  91    < > = values in this interval not displayed.     Most Recent 2 LFT's:  Recent Labs   Lab Test 03/25/24  0740 02/03/24  0741   AST 47* 27   ALT 15 9   ALKPHOS 83 58   BILITOTAL 0.5 0.6     Most Recent ESR & CRP:  Recent Labs   Lab Test 03/21/24  0701 03/18/24  1748   SED  --  >140*   CRPI 158.89* 119.65*       Assessment/Plan:    Osteomyelitis of coccyx  Perirectal abscess s/p I&D 3/22/2024  Sacral decubitus ulcer, POA  History as detailed above, seen in consult with ID, general surgery.  Is status post above procedure.  Patient was trialed on amoxicillin while inpatient despite reported penicillin allergy as an infant, tolerated.  Transition to Augmentin and Cipro for planned 2-week course. Pain improved, has not used the PRN dilaudid.  -Continues on tylenol 1000mg TID, dilaudid 2mg q6h PRN  -Continues on Augmentin, Cipro twice daily (end 4/12)  -Veraflow wound vac in place  -Follow up with wound care provider as recommended     Physical deconditioning  Impaired mobility and ADLs  Generalized muscle weakness  In the context of multiple hospitalizations, acute illness and injury.  -PT/OT continuing  -SW for  discharge planning     Mechanical mitral valve  A-fib  Not on rate control medications. On chronic anticoagulation with warfarin, INR goal 2.5-3.5.  Prior to admission on warfarin 3 mg daily.  INR today is 3.5.  HRs controlled.  -Warfarin 2.5 mg daily  -INR 4/8     HFpEF  Pulmonary hypertension, moderate  Severe TR  CAD  HTN/HLD  Chronic, stable. BPs controlled.  -Continues on torsemide 20 mg/day  -Daily weights  -Monitor volume status     ENRIQUE on CKD  Creatinine up to 1.38, baseline of 0.9-1.1. stable on follow-up, value 1.     Anemia  Hx GI bleed  Multiple hospitalizations recently for GI bleed as noted above.  Hemoglobin stable during recent hospitalization with value 9.2 at discharge. Follow-up value 8.3. no reports of melena.  -Continues on ferrous sulfate 325mg/d, protonix 40mg BID  -CBC 4/9     Obesity, BMI 34  Increased risk of all-cause mortality.    MED REC REQUIRED  Post Medication Reconciliation Status: patient was not discharged from an inpatient facility or TCU      Electronically signed by: Michael Alcantar PA-C

## 2024-04-05 NOTE — PROGRESS NOTES
ANTICOAGULATION FOLLOW-UP CLINIC VISIT    Patient Name:  Feng Wynne  Date:  4/11/2018  Contact Type:  Telephone/ Dose instructions left on patient's voice mail    SUBJECTIVE:     Patient Findings     Positives No Problem Findings           OBJECTIVE    INR   Date Value Ref Range Status   04/11/2018 1.8  Final       ASSESSMENT / PLAN  INR assessment SUB    Recheck INR In: 1 WEEK    INR Location Home INR      Anticoagulation Summary as of 4/11/2018     INR goal 2.5-3.5   Today's INR 1.8!   Maintenance plan 5 mg (5 mg x 1) on Tue, Thu, Sat; 10 mg (5 mg x 2) all other days   Full instructions 4/11: 12.5 mg; 4/12: 10 mg; Otherwise 5 mg on Tue, Thu, Sat; 10 mg all other days   Weekly total 55 mg   Plan last modified Love Elliott RN (6/27/2017)   Next INR check 4/18/2018   Priority INR   Target end date     Indications   Long-term (current) use of anticoagulants [Z79.01] [Z79.01]  Heart valve replaced [Z95.2] [Z95.2]         Anticoagulation Episode Summary     INR check location     Preferred lab     Send INR reminders to [facility] ANTICOAGUL[facility]    Comments [facility]      Anticoagulation Care Providers     Provider Role Specialty Phone number    Enrique Walker Abhishek Granados MD [facility] Internal Medicine [phone]            See the Encounter Report to view Anticoagulation Flowsheet and Dosing Calendar (Go to Encounters tab in chart review, and find the Anticoagulation Therapy Visit)    Dosage adjustment made based on physician directed care plan.    Martha Whitten RN                [Well Developed] : well developed [Well Nourished] : well nourished [No Acute Distress] : no acute distress [Normal Conjunctiva] : normal conjunctiva [No Xanthelasma] : no xanthelasma [Normal Venous Pressure] : normal venous pressure [No Carotid Bruit] : no carotid bruit [Normal S1, S2] : normal S1, S2 [No Murmur] : no murmur [No Rub] : no rub [No Gallop] : no gallop [Clear Lung Fields] : clear lung fields [No Respiratory Distress] : no respiratory distress  [Good Air Entry] : good air entry [Normal Gait] : normal gait [Soft] : abdomen soft [Gait - Sufficient for Exercise Testing] : gait - sufficient for exercise testing [No Edema] : no edema [No Cyanosis] : no cyanosis [No Varicosities] : no varicosities [No Clubbing] : no clubbing [No Rash] : no rash [No Focal Deficits] : no focal deficits [Moves all extremities] : moves all extremities [Normal Speech] : normal speech [Alert and Oriented] : alert and oriented [de-identified] : patchy hypopigmention

## 2024-04-08 NOTE — PROGRESS NOTES
[place] [facility]ERIATRIC SERVICES  INITIAL VISIT NOTE  April 9, 2024    PRIMARY CARE PROVIDER AND CLINIC:  AarontiffanypeJayme Carrillo [address] / [place] [address]    CHIEF COMPLAINT:  Hospital follow-up/Initial visit    HPI:    Feng Wynne is a 79 year old  (1945) male [facility].     Medical history is notable for CHF, pulmonary hypertension, severe mitral valve regurgitation (s/p mechanical valve replacement), permanent atrial fibrillation, hypertension, dyslipidemia, CKD, pill esophagitis, esophageal ulcer, GERD, pneumonia, osteoarthritis, and obesity.      Summary of hospital course:  Patient was hospitalized at [facility] from March 18 through March 29, 2024 for infected sacral decubitus ulcer and osteomyelitis of coccyx.  He was started empirically on vancomycin and cefepime.  MRI pelvis showed osteomyelitis involving the entirety of the coccyx with extensive inflammatory and phlegmonous changes throughout the sacral/coccygeal soft tissues with 5.5 cm abscess in the pararectal/left ischioanal fossa.  He was seen by surgical service and underwent excisional debridement of sacral decubitus ulcer using electrocautery on March 22, 2024.  Hospital course was complicated by postop encephalopathy.  CT head was negative for acute abnormality and brain MRI showed nonspecific mild smooth diffuse bilateral cerebral convexity pachymeningeal thickening and no acute/subacute ischemic infarct or mass effect.  Notably, tissue culture grew Enterococcus faecalis, E. coli, Clostridium perfringens, and Citrobacter freundii complex.  Antibiotic therapy transitioned to oral Augmentin and ciprofloxacin on discharge to continue for 14 days.  TCU was recommended for rehab.    Patient is admitted to this facility for medical management, nursing care, and rehab.     Of note, history was obtained from patient and extensive review of the  chart.    Today's visit:  Patient was seen in his room, lying bed.  He appears comfortable.  He has a wound VAC.  He denies fever, chills, chest pain, palpitation, dyspnea, epistaxis nausea, vomiting, abdominal pain, melena, hematochezia, or urinary symptoms.  He used to do self-catheterization but is now using a urinal and is able to urinate.    CODE STATUS:   CPR/Full code     PAST MEDICAL HISTORY:   Infected decubitus ulcer of sacrum with osteomyelitis of the coccyx, s/p excisional debridement of sacral decubitus ulcer on March 22, 2024  Chronic HFpEF (LVEF 60-65%, per echo on February 1, 2024)  Moderately decreased RV systolic function  Moderate pulmonary hypertension  Severe tricuspid regurgitation  Severe mitral valve regurgitation, s/p mechanical (St. Raffi) mitral valve replacement in September 2008  Permanent atrial fibrillation, s/p MAZE procedure in September 2008  Hypertension  Dyslipidemia  Severe pill esophagitis with bleeding esophageal ulcer (middle third), s/p cauterization with BiCap on February 5, 2024  GERD  Internal hemorrhoids  CKD stage II, baseline creatinine 0.9-1.1  Urinary retention  E. coli UTI in January 2024  Pneumonia  Epistaxis  Osteoarthritis  Obesity, BMI 34.4  Suspected sleep apnea  Moderate malnutrition     Past Medical History:   Diagnosis Date     Acute on chronic congestive heart failure, unspecified heart failure type (H) 01/30/2024     Arthritis      Atrial fibrillation (H)      Coronary artery disease      Hypercholesteremia      Obesity      Unspecified essential hypertension        PAST SURGICAL HISTORY:   Past Surgical History:   Procedure Laterality Date     COLONOSCOPY N/A 1/15/2024    Procedure: Colonoscopy;  Surgeon: Osbaldo Olvera MD;  Location: [facility]     ESOPHAGOSCOPY, GASTROSCOPY, DUODENOSCOPY (EGD), COMBINED N/A 1/15/2024    Procedure: Esophagoscopy, gastroscopy, duodenoscopy (EGD), combined;  Surgeon: Osbaldo Olvera MD;  Location:  GI      ESOPHAGOSCOPY, GASTROSCOPY, DUODENOSCOPY (EGD), COMBINED N/A 2024    Procedure: Esophagoscopy, gastroscopy, duodenoscopy (EGD), combined;  Surgeon: Osbaldo Olvera MD;  Location:  GI     IRRIGATION AND DEBRIDEMENT SACRAL WOUND, COMBINED N/A 3/22/2024    Procedure: IRRIGATION AND DEBRIDEMENT, WOUND, SACRAL REGION;  Surgeon: Phill Jimenez MD;  Location: [facility] OR     MITRAL VALVE REPLACEMENT  2008    Mitral valve replacement with 31-mm St. Raffi mechanical valve.        FAMILY HISTORY:   Family History   Problem Relation Age of Onset     Cerebrovascular Disease Father      Diabetes Paternal Grandmother      C.A.D. Brother         CABG X 3 at age 51       SOCIAL HISTORY:  Social History     Tobacco Use     Smoking status: Former     Packs/day: 0.50     Years: 5.00     Additional pack years: 0.00     Total pack years: 2.50     Types: Cigarettes     Quit date:      Years since quittin.2     Smokeless tobacco: Never   Substance Use Topics     Alcohol use: Yes     Comment: 1 drink per month       MEDICATIONS:  Current Outpatient Medications   Medication Sig Dispense Refill     acetaminophen (TYLENOL) 500 MG tablet Take 2 tablets (1,000 mg) by mouth 3 times daily       albuterol (PROAIR HFA/PROVENTIL HFA/VENTOLIN HFA) 108 (90 Base) MCG/ACT inhaler Inhale 1 puff into the lungs every 4 hours as needed for shortness of breath       amoxicillin-clavulanate (AUGMENTIN) 875-125 MG tablet Take 1 tablet by mouth every 12 hours for 14 days       bisacodyl (DULCOLAX) 10 MG suppository Place 10 mg rectally daily as needed for constipation Every 3 days if no BM       calcium carbonate (TUMS) 500 MG chewable tablet Take 1 tablet (500 mg) by mouth 3 times daily as needed for heartburn       ciprofloxacin (CIPRO) 500 MG tablet Take 1 tablet (500 mg) by mouth every 12 hours for 14 days       diclofenac (VOLTAREN) 1 % topical gel Apply 4 g topically 3 times daily       ferrous sulfate (FEROSUL) 325 (65 Fe) MG  tablet Take 1 tablet (325 mg) by mouth daily       HYDROmorphone (DILAUDID) 4 MG tablet Take 0.5 tablets (2 mg) by mouth every 6 hours as needed for pain 10 tablet 0     Multiple Vitamins-Minerals (MULTIVITAMIN ADULT) CHEW Take 2 chew tab by mouth daily       nystatin (MYCOSTATIN) 365028 UNIT/GM external powder Apply topically 2 times daily Groin folds       Omega-3 Fatty Acids (FISH OIL CONCENTRATE PO) Take 1 capsule by mouth daily       order for DME Equipment being ordered: COMPRESSION STOCKINGS, 20-30 mmHg 1 each 1     pantoprazole (PROTONIX) 40 MG EC tablet Take 1 tablet (40 mg) by mouth 2 times daily (before meals)       polyethylene glycol (MIRALAX) 17 GM/Dose powder Take 17 g by mouth daily as needed for constipation       potassium chloride (KAYCIEL) 10 % SOLN solution Take 15 mLs (20 mEq) by mouth daily       Probiotic CHEW Take 2 chew tab by mouth daily       psyllium (METAMUCIL/KONSYL) 58.6 % powder Take 18 g (1 Tablespoonful) by mouth daily       sennosides (SENOKOT) 8.6 MG tablet Take 1 tablet by mouth 2 times daily as needed for constipation       sodium chloride (OCEAN) 0.65 % nasal spray Spray 1 spray into both nostrils every hour as needed for congestion       sucralfate (CARAFATE) 1 GM/10ML suspension Take 1 g by mouth 4 times daily (before meals and nightly)       torsemide (DEMADEX) 20 MG tablet Take 1 tablet (20 mg) by mouth daily       warfarin ANTICOAGULANT (COUMADIN) 3 MG tablet Take 1 tablet (3 mg) by mouth daily 7 tablet 0     Warfarin Therapy Reminder Take 1 each by mouth See Admin Instructions Per INR         MED REC REQUIRED  Post Medication Reconciliation Status: medication reconcilation previously completed during another office visit        ALLERGIES:  Allergies   Allergen Reactions     Amiodarone Shortness Of Breath     Pcn [Penicillins] Shortness Of Breath     Rxn occurred in childhood      Statins Muscle Pain (Myalgia) and Hives     Amiodarone      Latex      Zetia [Ezetimibe]  "Muscle Pain (Myalgia)     Muscle weakness legs       ROS:  10 point ROS were negative other than the symptoms noted above in the HPI.    PHYSICAL EXAM:  Vital signs were reviewed in the chart.  Vital Signs: /69   Pulse 75   Temp 97.8  F (36.6  C)   Resp 18   Ht 1.803 m (5' 11\")   Wt 111.9 kg (246 lb 11.2 oz)   SpO2 97%   BMI 34.41 kg/m    General: Comfortable and in no acute distress  HEENT: Conjunctival pallor, no scleral icterus or injection, moist oral mucosa  Cardiovascular: Mechanical sounding S1, normal S2, irregularly irregular rhythm  Respiratory: Lungs clear to auscultation bilaterally  GI: Abdomen soft, non-tender, non-distended, +BS; abdominal hernia noted  Extremities: No significant LE edema  Neuro: CX II-XII grossly intact; ROM in all four extremities grossly intact  Psych: Alert and oriented x3; normal affect  Skin: Sacral wound VAC in place    LABORATORY/IMAGING DATA:  All relevant labs and imaging data in [facility] and/or Care Everywhere were personally reviewed today.      Most Recent 3 CBC's:Recent Labs   Lab Test 04/04/24  0546 03/27/24  0707 03/26/24  0702   WBC 3.0* 5.3 4.3   HGB 8.3* 9.2* 9.5*   MCV 98 98 101*    225 259     Most Recent 3 BMP's:Recent Labs   Lab Test 04/04/24  0546 03/29/24  0641 03/28/24  0706 03/27/24  0707 03/26/24  0702     --  135  --  140   POTASSIUM 3.2* 4.1 4.0   < > 3.5   CHLORIDE 98  --  99  --  102   CO2 31*  --  28  --  28   BUN 25.6*  --  44.7*  --  41.7*   CR 1.00  --  1.37*  --  1.14   ANIONGAP 8  --  8  --  10   JOSEPH 8.3*  --  9.0  --  8.9   GLC 81  --  89  --  91    < > = values in this interval not displayed.     Most Recent 2 LFT's:Recent Labs   Lab Test 03/25/24  0740 02/03/24  0741   AST 47* 27   ALT 15 9   ALKPHOS 83 58   BILITOTAL 0.5 0.6         ASSESSMENT/PLAN:  Infected decubitus ulcer of sacrum with osteomyelitis of the coccyx, s/p excisional debridement of sacral decubitus ulcer on March 22, 2024.  Tissue culture grew " Enterococcus faecalis, E. coli, Clostridium perfringens, and Citrobacter freundii complex.   He was treated with cefepime and vancomycin inpatient and discharged on oral Augmentin and ciprofloxacin.  Patient is afebrile and hemodynamically stable.  Wound VAC is in place.  Plan:  Continue Augmentin 875-125 mg 1 tablet every 12 hours through April 12, 2024  Continue ciprofloxacin 500 mg twice daily through April 12, 2024  Continue wound care/wound VAC per instructions  Continue pain management with acetaminophen 1000 mg 3 times daily, hydromorphone 2 mg every 4 hours as needed, and as needed diclofenac gel  Follow-up with wound/surgery clinic as directed    Pill esophagitis with bleeding esophageal ulcer (middle third), s/p cauterization with BiCap on February 5, 2024,  GERD.  No recurrence of GI bleed.  Plan:  Discontinue sucralfate as he has completed the recommended course  Continue pantoprazole 40 mg twice daily  Continue Tums as needed  Monitor for recurrence of GI bleed  Follow-up with GI as directed     ABLA,  Iron deficiency.   Patient received IV iron and blood transfusion during the recent hospitalization of February/January 2024.  Last CBC on April 4 with hemoglobin 8.3 and MCV 98.  Plan:  Continue ferrous sulfate 325 mg daily  Continue to monitor hemoglobin periodically  Transfuse PRN for hemoglobin less than 7     Leukopenia.  Noted since January 2024.  Last white count 3 on April 4.  Plan:  Monitor intermittently   Work-up/follow-up as outpatient    Acute encephalopathy, resolved.  SLUMS 23/30 and CPT 4.9/5.6 in March 2024.   Today, he is oriented x 3.  Plan:  Continue cognitive evaluation per OT for safe discharge planning  Standard delirium precautions     Chronic HFpEF (LVEF 60-65%, per echo on February 1, 2024),  Moderately decreased RV systolic function,  Moderate pulmonary hypertension,  Severe tricuspid regurgitation.  Patient currently weighs 246 LBS and has no significant lower extremity  edema.  Plan:  Continue torsemide 20 mg daily  Monitor weight and volume status  Follow-up with cardiology as directed    Hypokalemia.  Due to diuretic therapy.  Plan:  Increase potassium chloride to 20 mEq twice daily   Recheck BMP on April 11     History of severe mitral valve regurgitation, s/p mechanical (St. Raffi) mitral valve replacement in September 2008,  Permanent atrial fibrillation, s/p MAZE procedure in September 2008.  Patient is on chronic anticoagulation with warfarin for INR target 2.5-3.5.  EKG in the hospital showed atrial fibrillation.  Previously on warfarin 10 mg every Wednesday and Saturday, and 5 mg all other days.  He is now on warfarin 2.5 mg daily and last INR was 3.1 on April 8.  Not on rate control medications or antiarrhythmics.  Rate is controlled.  Plan:  Continue warfarin 2.5 mg daily as ordered  Adjust warfarin dose for INR target 2.5-3.5 (next INR on April 11)  Monitor heart rate  Follow-up with cardiology as directed     Hypertension.  Blood pressure is fairly controlled.  Plan:  Continue torsemide 20 mg daily  Monitor blood pressure     Dyslipidemia.  Intolerant of ezetimibe and statins.  Plan:  Continue fish oil 1200 mg daily    ENRIQUE, resolved,  CKD stage II.  Baseline creatinine 0.9-1.1.  Last creatinine 1 on April 4.  Plan:  Avoid NSAIDs and nephrotoxins  Monitor renal function periodically     Urinary retention.  It has resolved and patient no longer does self-catheterization.  He is now using a urinal.  Plan:  Monitor for recurrence of retention     Slow transit constipation.  Plan:  Continue the bowel regimen     Obesity, BMI 34.41,  Suspected sleep apnea.  Plan:  Staff to assist with daily care and mobility  Sleep study as outpatient     Moderate malnutrition.  Per assessment in the hospital.  Plan:  Continue the nutritional supplement per RD     Physical deconditioning.  Plan:  Continue PT/OT evaluation and therapy        Orders written by provider at facility:  Discontinue  sucralfate  Increase potassium chloride to 20 mEq twice daily   Repeat BMP on April 11, DX: Hypokalemia, CHF          Disclaimer: This note contains text created using speech-recognition software and may have unintended word substitutions.    Electronically signed by:  Pavan Hernandez MD

## 2024-04-12 NOTE — PROGRESS NOTES
Clinic Care Coordination Contact    Situation: Patient chart reviewed by care coordinator.    Background:   Patient was admitted to [facility] from 1/30/2024 to 2/15/2024 with a diagnoses of  ABLA, Acute on chronic CHF, A.Fib, ENRIQUE, HTN and discharged to [facility].    Assessment:   The patient is currently admitted to [facility].    Plan/Recommendations:   SW/RN Care Coordination will wait for notification from facility apprising SW/RN Care Coordination of patient's discharge plans.  SW/RN will review chart and outreach to facility every 4 weeks and as needed.     Yaneth Davila RN, BSN, PHN  Primary Care / Care Coordinator   [facility]  E-mail [email or web address]   [phone]

## 2024-04-12 NOTE — PROGRESS NOTES
[facility] GERIATRICS    Chief Complaint   Patient presents with    RECHECK     HPI:  Feng Wynne is a 79 year old  (1945), who is being seen today for an episodic care visit at: [facility] (TCU) [[id number]].     Summary: Patient is a 79-year-old male with past medical history of mechanical mitral valve on chronic anticoagulation with warfarin, HFpEF, pulmonary hypertension, CKD, CAD, hypertension, hypercholesterolemia, A-fib, anemia, known sacral decubitus ulcer, and multiple recent hospitalizations who was admitted at [facility] from 3/18 - 3/29, 2024 after presenting from TCU with concern of osteomyelitis of sacral wound.  Workup in ED was significant for a CRP of 119, otherwise unremarkable with a hemoglobin value of 9.6.  MRI of the pelvis revealed osteomyelitis involving the entirety of the coccyx with extensive inflammatory and phlegmonous changes throughout the sacral/coccygeal soft tissues with a 5.5 cm abscess in the posterior perirectal/left ischial anal fossa, extending and adherent to the posterior rectal wall.  Initial consideration for transfer to [facility] for higher level of care which was declined.  Patient was initiated on vancomycin and cefepime, seen in consultation with ID and general surgery.  INR reversed, bridged with heparin.  Ultimately underwent excisional debridement with electrocautery on 3/22/2024.  Postoperatively experienced significant encephalopathy, underwent MRI imaging which was negative.  EEG was also negative.  Gradually returning to baseline mental state.  Transitioned to oral antibiotics.  Referred to TCU for ongoing rehab, medical management.     1/9 - 1/19, 2024: Admission at [facility] for acute anemia with hemoglobin of 4.4.  Etiology felt to be GI bleed in setting of supratherapeutic INR.  EGD was unremarkable, colonoscopy with poor prep and inability to visualize well.  Hemoglobin stabilized, anticoagulation resumed and patient  "discharged to TCU.  While at TCU patient was treated for pneumonia with ceftriaxone followed by cefuroxime and doxycycline.     1/30 - 2/15, 2024: Admission at [facility] for acute anemia and acute on chronic HFpEF.  Hemoglobin at time of presentation was 6.9, chest x-ray with findings of worsening pulmonary edema.  Patient was diuresed with IV furosemide, transition back to oral torsemide.  Seen by GI, s/p EGD 2/5 with severe pill esophagitis and active bleeding ulcer in the middle third of esophagus that was cauterized with BiCap.  Patient was started on PPI and Carafate.  Repeat endoscopy 2/8 indicated oozing esophageal ulcer necessitating anticoagulation to be held.  Hemoglobin ultimately stabilized to value 8-9, warfarin resumed.  Discharged to TCU.    Today, pt is seen in follow-up. Most recently was started on a vashe wound vac which unfortunately has not been functioning consistently, he has since been changed to wet-to-dry wound dressings. Was seen by wound care provider at TCU on 4/10 with orders for a standard wound vac placement which is scheduled to be placed today. Pt reports he was told the wound is improving. Dislikes the air pressure mattress as his positioning is such that he sits on a bar that is very uncomfortable. States pain is manageable, largely positional. Has not tried the oral dilaudid. Otherwise denies cp, sob.    On review of facility records, BPs ranging 104-114, HRs 68-72.    Allergies, and PMH/PSH reviewed in [facility] today.  REVIEW OF SYSTEMS:  4 point ROS including Respiratory, CV, GI and , other than that noted in the HPI,  is negative    Objective:   /71   Pulse 68   Temp 97.6  F (36.4  C)   Resp 18   Ht 1.803 m (5' 11\")   SpO2 95%   BMI 34.41 kg/m    GEN: well-developed, well-nourished, appears comfortable  HEENT: NCAT, EOM intact bilaterally, sclera clear, conjunctiva normal, nose & mouth patent, mucous membranes moist  CHEST: lungs CTA bilaterally, no " increased work of breathing, no wheeze, crackles, rhonchi  HEART: irregularly irregular, S1 & S2  ABD: soft, nontender, nondistended, no guarding or rigidity, +BS in all 4 quadrants  MSK: AROM bilateral UE/LE, pedal & radial pulses 2+ bilaterally  NEURO: awake, alert. CN II-XII grossly intact. Sensation grossly intact to light touch.   SKIN: warm & dry without rash, no pedal edema; sacral wound not visualized    Recent labs in [facility] reviewed by me today.  and Most Recent 3 CBC's:  Recent Labs   Lab Test 04/04/24  0546 03/27/24  0707 03/26/24  0702   WBC 3.0* 5.3 4.3   HGB 8.3* 9.2* 9.5*   MCV 98 98 101*    225 259     Most Recent 3 BMP's:  Recent Labs   Lab Test 04/11/24  0632 04/04/24  0546 03/29/24  0641 03/28/24  0706    137  --  135   POTASSIUM 3.9 3.2* 4.1 4.0   CHLORIDE 95* 98  --  99   CO2 35* 31*  --  28   BUN 30.4* 25.6*  --  44.7*   CR 0.94 1.00  --  1.37*   ANIONGAP 6* 8  --  8   JOSEPH 8.7* 8.3*  --  9.0   GLC 78 81  --  89       Assessment/Plan:    Osteomyelitis of coccyx  Perirectal abscess s/p I&D 3/22/2024  Sacral decubitus ulcer, POA  History as detailed above, seen in consult with ID, general surgery.  Is status post above procedure.  Patient was trialed on amoxicillin while inpatient despite reported penicillin allergy as an infant, tolerated.  Transition to Augmentin and Cipro for planned 2-week course, last dose today. Pain improved, has not used the PRN dilaudid.  -Continues on tylenol 1000mg TID, dilaudid 2mg q6h PRN  -Continues on wet to dry dressings, plan to place standard wound vac today  -Follow up with wound care provider as recommended     Physical deconditioning  Impaired mobility and ADLs  Generalized muscle weakness  In the context of multiple hospitalizations, acute illness and injury.  -PT/OT continuing  -SW for discharge planning     Mechanical mitral valve  A-fib  Not on rate control medications. On chronic anticoagulation with warfarin, INR goal 2.5-3.5.  Prior to  admission on warfarin 3 mg daily.  INR has been therapeutic.  HRs controlled.  -Warfarin 3mg/d continuing     HFpEF  Pulmonary hypertension, moderate  Severe TR  CAD  HTN/HLD  Chronic, stable. BPs controlled.  -Continues on torsemide 20 mg/day  -Daily weights  -Monitor volume status     ENRIQUE on CKD  Creatinine up to 1.38, baseline of 0.9-1.1. stable on follow-up, value 1.     Anemia  Hx GI bleed  Multiple hospitalizations recently for GI bleed as noted above.  Hemoglobin stable during recent hospitalization with value 9.2 at discharge. Follow-up value 8.3. no reports of melena.  -Continues on ferrous sulfate 325mg/d, protonix 40mg BID     Obesity, BMI 34  Increased risk of all-cause mortality.    MED REC REQUIRED  Post Medication Reconciliation Status: patient was not discharged from an inpatient facility or TCU      Electronically signed by: Michael Alcantar PA-C

## 2024-04-16 PROBLEM — M86.9 OSTEOMYELITIS (H): Status: ACTIVE | Noted: 2024-01-01

## 2024-04-16 NOTE — PROGRESS NOTES
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   Appreciate dietitian optimization of diet for healing.     HISTORY OF PRESENT ILLNESS:   Feng Wynne is a 79 year old male who is seen at [facility] today. Was recently hospitalized at [facility] from 3/18-3/29 for infected sacral decubitus ulcer and osteomyelitis of coccyx. MR showed osteomyelitis of entire coccyx as well as perirectal abscess. He underwent debridement to bleeding tissue, there was no mention of bone removal during procedure. Saw ID in hospital who did not feel that prolonged antibiotics were merited without at least bone debridement. Therefore planned for 2 week course of Augmentin and cipro and then monitoring until definitive treatment. I am seeing this patient today for the ulceration. He was seen at [facility] by Dr. Downey who will ultimately be directing care for this ulcer. At their visit on 4/2 they planned on starting a wound VAC. Patient was not interested in discussing further surgical intervention at that visit.     Pmhx notable for  CHF, pulmonary hypertension, severe mitral valve regurgitation (s/p mechanical valve replacement), permanent atrial fibrillation, hypertension, dyslipidemia, CKD, pill esophagitis, esophageal ulcer, GERD, pneumonia, osteoarthritis, and obesity .    TREATMENT COURSE:  4/10/2024 : Initial visit at [facility]. Pt and staff state that after many attempts they could not get the [employer] machine to work.     MATTRESS:  Group 1 mattress - patient states that he really likes the current mattress and that he finds group 2 mattresses to be intolerable  WHEELCHAIR CUSHION: utilizes a sacral cut out cushion he purchased himself   URINE MANAGEMENT: incontinent, using diapers  BOWEL MANAGEMENT: incontinent, using  diapers    PHYSICAL EXAM:  GENERAL: Patient is alert and oriented and in no acute distress  INTEGUMENTARY: cavernous pressure ulceration over coccyx with small amount of exposed sharp bone at base, along left rim there is non-viable tissue visualized    PROCEDURE: Verbal consent was obtained by patient. A selective debridement was performed of non-viable tissue of <20cm2 using a 15 blade. Hemostasis was achieved with digital pressure.     MDM: Moderate (new) - 35 minutes spent reviewing hospital and TCU notes, assessing patient and developing care plan.    Electronically signed by Dawn Dee PA-C

## 2024-04-17 NOTE — PROGRESS NOTES
[facility] GERIATRICS    Chief Complaint   Patient presents with    RECHECK     HPI:  Feng Wynne is a 79 year old  (1945), who is being seen today for an episodic care visit at: [facility] ([facility]) [[id number]].     Summary: Patient is a 79-year-old male with past medical history of mechanical mitral valve on chronic anticoagulation with warfarin, HFpEF, pulmonary hypertension, CKD, CAD, hypertension, hypercholesterolemia, A-fib, anemia, known sacral decubitus ulcer, and multiple recent hospitalizations who was admitted at [facility] from 3/18 - 3/29, 2024 after presenting from TCU with concern of osteomyelitis of sacral wound.  Workup in ED was significant for a CRP of 119, otherwise unremarkable with a hemoglobin value of 9.6.  MRI of the pelvis revealed osteomyelitis involving the entirety of the coccyx with extensive inflammatory and phlegmonous changes throughout the sacral/coccygeal soft tissues with a 5.5 cm abscess in the posterior perirectal/left ischial anal fossa, extending and adherent to the posterior rectal wall.  Initial consideration for transfer to [facility] for higher level of care which was declined.  Patient was initiated on vancomycin and cefepime, seen in consultation with ID and general surgery.  INR reversed, bridged with heparin.  Ultimately underwent excisional debridement with electrocautery on 3/22/2024.  Postoperatively experienced significant encephalopathy, underwent MRI imaging which was negative.  EEG was also negative.  Gradually returning to baseline mental state.  Transitioned to oral antibiotics.  Referred to TCU for ongoing rehab, medical management.     1/9 - 1/19, 2024: Admission at [facility] for acute anemia with hemoglobin of 4.4.  Etiology felt to be GI bleed in setting of supratherapeutic INR.  EGD was unremarkable, colonoscopy with poor prep and inability to visualize well.  Hemoglobin stabilized, anticoagulation resumed and patient  "discharged to TCU.  While at TCU patient was treated for pneumonia with ceftriaxone followed by cefuroxime and doxycycline.     1/30 - 2/15, 2024: Admission at [facility] for acute anemia and acute on chronic HFpEF.  Hemoglobin at time of presentation was 6.9, chest x-ray with findings of worsening pulmonary edema.  Patient was diuresed with IV furosemide, transition back to oral torsemide.  Seen by GI, s/p EGD 2/5 with severe pill esophagitis and active bleeding ulcer in the middle third of esophagus that was cauterized with BiCap.  Patient was started on PPI and Carafate.  Repeat endoscopy 2/8 indicated oozing esophageal ulcer necessitating anticoagulation to be held.  Hemoglobin ultimately stabilized to value 8-9, warfarin resumed.  Discharged to TCU.    Today, pt is seen in follow-up. Resting in bed on interview, reports he is doing better today. Yesterday had a rough day. Took Senna-S inadvertently and as a result had multiple large BMs, wants it completely discontinued as it is not needed. Also had difficulty with the standard wound vac due to the noise was unable to sleep, described as torture. Has since been changed back to wet-to-dry dressings. Reports are the wound is healing and improving, he understands it may heal at a slower pace but would like to continue with wet to dry dressings in the meantime to allow for rest. Will reconsider a vac if healing stalls. Otherwise denies sob, cp, abdominal pain. Tolerating oral intake.      On review of facility records, BPs ranging 108-117, HRs 61-76.    Allergies, and PMH/PSH reviewed in [facility] today.  REVIEW OF SYSTEMS:  4 point ROS including Respiratory, CV, GI and , other than that noted in the HPI,  is negative    Objective:   /76   Pulse 72   Temp 97.6  F (36.4  C)   Resp 16   Ht 1.803 m (5' 11\")   Wt 106.1 kg (233 lb 14.4 oz)   SpO2 94%   BMI 32.62 kg/m    GEN: well-developed, well-nourished, appears comfortable  HEENT: NCAT, EOM " intact bilaterally, sclera clear, conjunctiva normal, nose & mouth patent, mucous membranes moist  CHEST: lungs CTA bilaterally, no increased work of breathing, no wheeze, crackles, rhonchi  HEART: irregularly irregular, S1 & S2  ABD: soft, nontender, nondistended, no guarding or rigidity, +BS in all 4 quadrants  MSK: AROM bilateral UE/LE, pedal & radial pulses 2+ bilaterally  NEURO: awake, alert. CN II-XII grossly intact. Sensation grossly intact to light touch.   SKIN: warm & dry without rash, no pedal edema; sacral wound not visualized    Recent labs in [facility] reviewed by me today.  and Most Recent 3 CBC's:  Recent Labs   Lab Test 04/04/24  0546 03/27/24  0707 03/26/24  0702   WBC 3.0* 5.3 4.3   HGB 8.3* 9.2* 9.5*   MCV 98 98 101*    225 259     Most Recent 3 BMP's:  Recent Labs   Lab Test 04/15/24  0558 04/11/24  0632 04/04/24  0546    136 137   POTASSIUM 3.9 3.9 3.2*   CHLORIDE 97* 95* 98   CO2 27 35* 31*   BUN 28.7* 30.4* 25.6*   CR 0.84 0.94 1.00   ANIONGAP 12 6* 8   JOSEPH 8.7* 8.7* 8.3*   GLC 79 78 81     Most Recent 2 LFT's:  Recent Labs   Lab Test 03/25/24  0740 02/03/24  0741   AST 47* 27   ALT 15 9   ALKPHOS 83 58   BILITOTAL 0.5 0.6     Most Recent ESR & CRP:  Recent Labs   Lab Test 03/21/24  0701 03/18/24  1748   SED  --  >140*   CRPI 158.89* 119.65*       Assessment/Plan:    Osteomyelitis of coccyx  Perirectal abscess s/p I&D 3/22/2024  Sacral decubitus ulcer, POA  History as detailed above, seen in consult with ID, general surgery.  Is status post above procedure.  Patient was trialed on amoxicillin while inpatient despite reported penicillin allergy as an infant, tolerated.  Transition to Augmentin and Cipro for planned 2-week course, last dose today. Pain improved, has not used the PRN dilaudid.  -Continues on tylenol 1000mg TID, dilaudid 2mg q6h PRN  -Continues on wet to dry dressings  -Follow up with wound care provider as recommended     Physical deconditioning  Impaired mobility and  ADLs  Generalized muscle weakness  In the context of multiple hospitalizations, acute illness and injury.  -PT/OT continuing  - for discharge planning     Mechanical mitral valve  A-fib  Not on rate control medications. On chronic anticoagulation with warfarin, INR goal 2.5-3.5.  Prior to admission on warfarin 3 mg daily.  INR has been therapeutic.  HRs controlled.  -Warfarin 3mg/d continuing     HFpEF  Pulmonary hypertension, moderate  Severe TR  CAD  HTN/HLD  Chronic, stable. BPs controlled.  -Continues on torsemide 20 mg/day  -Daily weights  -Monitor volume status     ENRIQUE on CKD  Creatinine up to 1.38, baseline of 0.9-1.1. stable on follow-up, value 1.     Anemia  Hx GI bleed  Multiple hospitalizations recently for GI bleed as noted above.  Hemoglobin stable during recent hospitalization with value 9.2 at discharge. Follow-up value 8.3. No reports of melena.  -Continues on ferrous sulfate 325mg/d, protonix 40mg BID     Obesity, BMI 34  Increased risk of all-cause mortality.    MED REC REQUIRED  Post Medication Reconciliation Status: patient was not discharged from an inpatient facility or TCU      Electronically signed by: Michael Alcantar PA-C

## 2024-04-24 NOTE — PATIENT INSTRUCTIONS
PHYSICIAN ORDERS    Discontinue KCl liquid  Start Klor-Con 20mEq packet PO BID dissolve in at least 120 mL of cold water or other beverage prior to administration. If GI irritation occurs, dissolve in at least 180mL of water/beverage.    Michael Alcantar PA-C  4/24/2024, 4:45 PM

## 2024-04-24 NOTE — PROGRESS NOTES
WOUND VISIT AT [facility]    ASSESSMENT/PLAN:   Cont'd exposed bone - d/w patient that we will continue to monitor, may need additional surgery/tx will defer to Dr. 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   Appreciate dietitian optimization of diet for healing.     INTERVAL Hx:  04/24/24: Visited with pt at bedside. He is doing well. The nursing staff reports that they could not get the VAC to adhere so have been doing Vashe wet to dry BID.     HISTORY OF PRESENT ILLNESS:   Feng Wynne is a 79 year old male who is seen at [facility] today. [facility] for infected sacral decubitus ulcer and osteomyelitis of coccyx. MR showed osteomyelitis of entire coccyx as well as perirectal abscess. He underwent debridement to bleeding tissue, there was no mention of bone removal during procedure. Saw ID in hospital who did not feel that prolonged antibiotics were merited without at least bone debridement. Therefore planned for 2 week course of Augmentin and cipro and then monitoring until definitive treatment. I am seeing this patient today for the ulceration. He was seen at [facility] by Dr. Downey who will ultimately be directing care for this ulcer. At their visit on 4/2 they planned on starting a wound VAC. Patient was not interested in discussing further surgical intervention at that visit.     Pmhx notable for  CHF, pulmonary hypertension, severe mitral valve regurgitation (s/p mechanical valve replacement), permanent atrial fibrillation, hypertension, dyslipidemia, CKD, pill esophagitis, esophageal ulcer, GERD, pneumonia, osteoarthritis, and obesity .    TREATMENT COURSE:  4/10/2024 : Initial visit at [facility]. Pt and staff state that after many attempts they could not get the [employer] machine to work.     MATTRESS:   Group 1 mattress - patient states that he really likes the current mattress and that he finds group 2 mattresses to be intolerable  WHEELCHAIR CUSHION: utilizes a sacral cut out cushion he purchased himself   URINE MANAGEMENT: incontinent, using diapers  BOWEL MANAGEMENT: incontinent, using diapers    PHYSICAL EXAM:  GENERAL: Patient is alert and oriented and in no acute distress  INTEGUMENTARY: cavernous pressure ulceration over coccyx with exposed sharp bone at base at 3 o clock, the depth is 2.2 and undermining 3.5 circumferentially        MDM: Low (return) - 15 minutes spent reviewing hospital and TCU notes, assessing patient and developing care plan.    Electronically signed by Dawn Dee PA-C

## 2024-04-24 NOTE — PROGRESS NOTES
[facility] GERIATRICS    Chief Complaint   Patient presents with    RECHECK     HPI:  Feng Wynne is a 79 year old  (1945), who is being seen today for an episodic care visit at: [facility] (TCU) [[id number]].     Summary: Patient is a 79-year-old male with past medical history of mechanical mitral valve on chronic anticoagulation with warfarin, HFpEF, pulmonary hypertension, CKD, CAD, hypertension, hypercholesterolemia, A-fib, anemia, known sacral decubitus ulcer, and multiple recent hospitalizations who was admitted at [facility] from 3/18 - 3/29, 2024 after presenting from TCU with concern of osteomyelitis of sacral wound.  Workup in ED was significant for a CRP of 119, otherwise unremarkable with a hemoglobin value of 9.6.  MRI of the pelvis revealed osteomyelitis involving the entirety of the coccyx with extensive inflammatory and phlegmonous changes throughout the sacral/coccygeal soft tissues with a 5.5 cm abscess in the posterior perirectal/left ischial anal fossa, extending and adherent to the posterior rectal wall.  Initial consideration for transfer to [facility] for higher level of care which was declined.  Patient was initiated on vancomycin and cefepime, seen in consultation with ID and general surgery.  INR reversed, bridged with heparin.  Ultimately underwent excisional debridement with electrocautery on 3/22/2024.  Postoperatively experienced significant encephalopathy, underwent MRI imaging which was negative.  EEG was also negative.  Gradually returning to baseline mental state.  Transitioned to oral antibiotics.  Referred to TCU for ongoing rehab, medical management.     1/9 - 1/19, 2024: Admission at [facility] for acute anemia with hemoglobin of 4.4.  Etiology felt to be GI bleed in setting of supratherapeutic INR.  EGD was unremarkable, colonoscopy with poor prep and inability to visualize well.  Hemoglobin stabilized, anticoagulation resumed and patient  "discharged to TCU.  While at TCU patient was treated for pneumonia with ceftriaxone followed by cefuroxime and doxycycline.     1/30 - 2/15, 2024: Admission at [facility] for acute anemia and acute on chronic HFpEF.  Hemoglobin at time of presentation was 6.9, chest x-ray with findings of worsening pulmonary edema.  Patient was diuresed with IV furosemide, transition back to oral torsemide.  Seen by GI, s/p EGD 2/5 with severe pill esophagitis and active bleeding ulcer in the middle third of esophagus that was cauterized with BiCap.  Patient was started on PPI and Carafate.  Repeat endoscopy 2/8 indicated oozing esophageal ulcer necessitating anticoagulation to be held.  Hemoglobin ultimately stabilized to value 8-9, warfarin resumed.  Discharged to TCU    Today, pt is seen in follow-up. Resting in bed on interview, spouse at bedside. Is doing well, states the wound has been healing although there is a significant portion of bone exposed which is to be determined if additional procedures need to be performed. Asks to evaluate the potassium supplement he is receiving as the liquid form is very bothersome. Notes he cannot take pills due to recent pill esophagitis. Is willing to try any alternative. Denies constipation, urinary symptoms. Therapies are going well. Tolerating oral intake.     On review of facility records, BPs ranging 102-118, HRs 56-74.    Allergies, and PMH/PSH reviewed in [facility] today.  REVIEW OF SYSTEMS:  4 point ROS including Respiratory, CV, GI and , other than that noted in the HPI,  is negative    Objective:   /62   Pulse 56   Temp 97.7  F (36.5  C)   Resp 18   Ht 1.803 m (5' 11\")   Wt 106.1 kg (233 lb 14.4 oz)   SpO2 96%   BMI 32.62 kg/m    GEN: well-developed, well-nourished, appears comfortable  HEENT: NCAT, EOM intact bilaterally, sclera clear, conjunctiva normal, nose & mouth patent, mucous membranes moist  CHEST: lungs CTA bilaterally, no increased work of " breathing, no wheeze, crackles, rhonchi  HEART: irregularly irregular, S1 & S2  ABD: soft, nontender, nondistended, no guarding or rigidity, +BS in all 4 quadrants  MSK: AROM bilateral UE/LE, pedal & radial pulses 2+ bilaterally  NEURO: awake, alert. CN II-XII grossly intact. Sensation grossly intact to light touch.   SKIN: warm & dry without rash, no pedal edema; sacral wound not visualized    Recent labs in [facility] reviewed by me today.     Assessment/Plan:    Osteomyelitis of coccyx  Perirectal abscess s/p I&D 3/22/2024  Sacral decubitus ulcer, POA  History as detailed above, seen in consult with ID, general surgery.  Is status post above procedure.  Patient was trialed on amoxicillin while inpatient despite reported penicillin allergy as an infant, tolerated.  Transitioned to Augmentin and Cipro for planned 2-week course, completed course.   -Discontinue dilaudid dt nonuse  -Continues on tylenol 1000mg TID  -Continues on wet to dry dressings  -Follow up with wound care provider as recommended     Physical deconditioning  Impaired mobility and ADLs  Generalized muscle weakness  In the context of multiple hospitalizations, acute illness and injury.  -PT/OT continuing  -SW for discharge planning     Mechanical mitral valve  A-fib  Not on rate control medications. On chronic anticoagulation with warfarin, INR goal 2.5-3.5.  Prior to admission on warfarin 3 mg daily.  INR has been therapeutic.  HRs controlled.  -Warfarin 3mg/d continuing     HFpEF  Pulmonary hypertension, moderate  Severe TR  CAD  HTN/HLD  Chronic, stable. BPs controlled. Greatly dislikes potassium supplement as has resulted in significant esophageal burning, requests alternative formulation if available. I discussed with geriatric pharmacist, who recommended trial of potassium packets dissolved in water.  -Discontinue liquid potassium  -Start Klor-Con 20mEq packet PO BID dissolve in at least 120 mL of cold water or other beverage prior to  administration. If GI irritation occurs, dissolve in at least 180mL of water/beverage.   -Continues on torsemide 20 mg/day  -Daily weights  -Monitor volume status     ENRIQUE on CKD  Creatinine up to 1.38, baseline of 0.9-1.1. stable on follow-up, value 1.     Anemia  Hx GI bleed  Multiple hospitalizations recently for GI bleed as noted above.  Hemoglobin stable during recent hospitalization with value 9.2 at discharge. Follow-up value 8.3. No reports of melena.  -Continues on ferrous sulfate 325mg/d, protonix 40mg BID     Obesity, BMI 34  Increased risk of all-cause mortality.    MED REC REQUIRED  Post Medication Reconciliation Status: patient was not discharged from an inpatient facility or TCU      Electronically signed by: Michael Alcantar PA-C

## 2024-04-25 NOTE — PROGRESS NOTES
[place] [facility]  April 26, 2024      CHIEF COMPLAINT:  Episodic/follow-up visit    HPI:    Feng Wynne is a 79 year old  (1945), who is being seen today for an episodic care visit at [facility] .     Medical history is notable for CHF, pulmonary hypertension, severe mitral valve regurgitation (s/p mechanical valve replacement), permanent atrial fibrillation, hypertension, dyslipidemia, CKD, pill esophagitis, esophageal ulcer, GERD, pneumonia, osteoarthritis, and obesity.       Summary of hospital course:  Patient was hospitalized at [facility] from March 18 through March 29, 2024 for infected sacral decubitus ulcer and osteomyelitis of coccyx.  He was started empirically on vancomycin and cefepime.  MRI pelvis showed osteomyelitis involving the entirety of the coccyx with extensive inflammatory and phlegmonous changes throughout the sacral/coccygeal soft tissues with 5.5 cm abscess in the pararectal/left ischioanal fossa.  He was seen by surgical service and underwent excisional debridement of sacral decubitus ulcer using electrocautery on March 22, 2024.  Hospital course was complicated by postop encephalopathy.  CT head was negative for acute abnormality and brain MRI showed nonspecific mild smooth diffuse bilateral cerebral convexity pachymeningeal thickening and no acute/subacute ischemic infarct or mass effect.  Notably, tissue culture grew Enterococcus faecalis, E. coli, Clostridium perfringens, and Citrobacter freundii complex.  Antibiotic therapy transitioned to oral Augmentin and ciprofloxacin on discharge to continue for 14 days.  TCU was recommended for rehab. He was then admitted to this facility for medical management, nursing care, and rehab.       Today's visit:  Patient was seen in his room, lying in bed.  He appears comfortable and cheerful.  He no longer has a wound VAC.  He is currently on warfarin 3 mg daily and last INR was 3.4 on  April 25.  He reports feeling sore in his bottom.  He denies fever, chills, chest pain, palpitation, dyspnea, nausea, vomiting, abdominal pain, or urinary symptoms.  He had a BM yesterday.  He is using a urinal for urination.      CODE STATUS:   CPR/Full code     Past Medical, Surgical, Family, and Social History were reviewed in [facility].    Current Outpatient Medications   Medication Sig Dispense Refill     acetaminophen (TYLENOL) 500 MG tablet Take 2 tablets (1,000 mg) by mouth 3 times daily       albuterol (PROAIR HFA/PROVENTIL HFA/VENTOLIN HFA) 108 (90 Base) MCG/ACT inhaler Inhale 1 puff into the lungs every 4 hours as needed for shortness of breath       bisacodyl (DULCOLAX) 10 MG suppository Place 10 mg rectally daily as needed for constipation Every 3 days if no BM       calcium carbonate (TUMS) 500 MG chewable tablet Take 1 tablet (500 mg) by mouth 3 times daily as needed for heartburn       diclofenac (VOLTAREN) 1 % topical gel Apply 4 g topically 3 times daily       ferrous sulfate (FEROSUL) 325 (65 Fe) MG tablet Take 1 tablet (325 mg) by mouth daily       HYDROmorphone (DILAUDID) 4 MG tablet Take 0.5 tablets (2 mg) by mouth every 6 hours as needed for pain 10 tablet 0     Multiple Vitamins-Minerals (MULTIVITAMIN ADULT) CHEW Take 2 chew tab by mouth daily       nystatin (MYCOSTATIN) 234995 UNIT/GM external powder Apply topically 2 times daily Groin folds       Omega-3 Fatty Acids (FISH OIL CONCENTRATE PO) Take 1 capsule by mouth daily       order for DME Equipment being ordered: COMPRESSION STOCKINGS, 20-30 mmHg 1 each 1     pantoprazole (PROTONIX) 40 MG EC tablet Take 1 tablet (40 mg) by mouth 2 times daily (before meals)       polyethylene glycol (MIRALAX) 17 GM/Dose powder Take 17 g by mouth daily as needed for constipation       potassium chloride (KAYCIEL) 20 MEQ/15ML (10%) solution Take 20 mEq by mouth 2 times daily       potassium chloride (KLOR-CON) 20 MEQ packet Take 20 mEq by mouth 2 times daily    "    Probiotic CHEW Take 2 chew tab by mouth daily       psyllium (METAMUCIL/KONSYL) 58.6 % powder Take 18 g (1 Tablespoonful) by mouth daily       sennosides (SENOKOT) 8.6 MG tablet Take 1 tablet by mouth 2 times daily as needed for constipation       sodium chloride (OCEAN) 0.65 % nasal spray Spray 1 spray into both nostrils every hour as needed for congestion       torsemide (DEMADEX) 20 MG tablet Take 1 tablet (20 mg) by mouth daily       warfarin ANTICOAGULANT (COUMADIN) 3 MG tablet Take 1 tablet (3 mg) by mouth daily 7 tablet 0     Warfarin Therapy Reminder Take 1 each by mouth See Admin Instructions Per INR         MED REC REQUIRED  Post Medication Reconciliation Status: medication reconcilation previously completed during another office visit      ALLERGIES: Amiodarone, Pcn [penicillins], Statins, Amiodarone, Latex, and Zetia [ezetimibe]    REVIEW OF SYSTEMS:  10 point ROS were negative other than the symptoms noted above in the HPI.    PHYSICAL EXAM:  Vital signs were reviewed in the chart.  Vital Signs:  /84   Pulse 65   Temp 97.5  F (36.4  C)   Resp 16   Ht 1.803 m (5' 11\")   Wt 106.1 kg (233 lb 14.4 oz)   SpO2 97%   BMI 32.62 kg/m    General: Comfortable, cheerful and in no acute distress  HEENT: Conjunctival pallor, no scleral icterus or injection, moist oral mucosa  Cardiovascular: Mechanical sounding S1, normal S2, irregularly irregular rhythm   Respiratory: Lungs clear to auscultation bilaterally  GI: Abdomen soft, non-tender, non-distended, +BS; large abdominal hernia noted  Extremities: No significant LE edema  Neuro: CX II-XII grossly intact; ROM in all four extremities grossly intact  Psych: Alert and oriented x3; normal affect  Skin: Sacral wound is dressed    LABORATORY/IMAGING DATA:  All relevant labs and imaging data in [facility] and/or Care Everywhere were personally reviewed today.      Most Recent 3 CBC's:Recent Labs   Lab Test 04/25/24  0715 04/04/24  0546 03/27/24  0707   WBC 4.7 " 3.0* 5.3   HGB 9.1* 8.3* 9.2*   MCV 99 98 98    198 225     Most Recent 3 BMP's:Recent Labs   Lab Test 04/25/24  0715 04/15/24  0558 04/11/24  0632    136 136   POTASSIUM 4.2 3.9 3.9   CHLORIDE 97* 97* 95*   CO2 32* 27 35*   BUN 26.1* 28.7* 30.4*   CR 0.82 0.84 0.94   ANIONGAP 10 12 6*   JOSEPH 9.2 8.7* 8.7*   GLC 74 79 78     Most Recent 2 LFT's:Recent Labs   Lab Test 03/25/24  0740 02/03/24  0741   AST 47* 27   ALT 15 9   ALKPHOS 83 58   BILITOTAL 0.5 0.6       ASSESSMENT/PLAN:  Infected stage IV decubitus ulcer of sacrum with osteomyelitis of the coccyx, s/p excisional debridement of sacral decubitus ulcer on March 22, 2024.  Tissue culture grew Enterococcus faecalis, E. coli, Clostridium perfringens, and Citrobacter freundii complex.   He was treated with cefepime and vancomycin inpatient and discharged on oral Augmentin and ciprofloxacin, completed on April 12, 2024.  Patient is afebrile and hemodynamically stable.  He no longer has a wound VAC.  Plan:  Continue wound care per instructions   Continue pain management with acetaminophen 1000 mg 3 times daily, hydromorphone 2 mg every 4 hours as needed, and as needed diclofenac gel  Follow-up with wound/surgery clinic as directed     Pill esophagitis with bleeding esophageal ulcer (middle third), s/p cauterization with BiCap on February 5, 2024,  GERD.  No recurrence of GI bleed.  Plan:  Continue pantoprazole 40 mg twice daily  Continue Tums as needed  Monitor for recurrence of GI bleed  Follow-up with GI as directed     ABLA,  Iron deficiency.   Patient received IV iron and blood transfusion during the recent hospitalization of February/January 2024.  Last CBC on April 25 with hemoglobin 9.1 and MCV 99.  Plan:  Continue ferrous sulfate 325 mg daily  Continue to monitor hemoglobin periodically  Transfuse PRN for hemoglobin less than 7     Leukopenia.  Resolved.  Plan:  Monitor intermittently     Acute encephalopathy, resolved.  [facility] 25/30 this TCU  stay.  Today, he is oriented x 3.  Plan:  Monitor     Chronic HFpEF (LVEF 60-65%, per echo on February 1, 2024),  Moderately decreased RV systolic function,  Moderate pulmonary hypertension,  Severe tricuspid regurgitation.  Patient currently weighs 233.9 LBS and has no significant lower extremity edema.  Plan:  Continue torsemide 20 mg daily  Monitor weight and volume status  Follow-up with cardiology as directed     Hypokalemia.  Due to diuretic therapy.  Corrected.  Last potassium level 4.2 on April 25.  Plan:  Continue potassium chloride 20 mEq twice daily   Monitor potassium level periodically     History of severe mitral valve regurgitation, s/p mechanical ([facility]) mitral valve replacement in September 2008,  Permanent atrial fibrillation, s/p MAZE procedure in September 2008.  Patient is on chronic anticoagulation with warfarin for INR target 2.5-3.5.  EKG in the hospital showed atrial fibrillation.  Previously on warfarin 10 mg every Wednesday and Saturday, and 5 mg all other days.  He is now on warfarin 3 mg daily and last INR was 3.4 on April 25.  Not on rate control medications or antiarrhythmics.  Rate is controlled.  Plan:  Continue warfarin 3 mg daily  Adjust warfarin dose for INR target 2.5-3.5 (next INR on April 29)  Monitor heart rate  Follow-up with cardiology as directed     Hypertension.  Blood pressure is fairly controlled.  Plan:  Continue torsemide 20 mg daily  Monitor blood pressure     Dyslipidemia.  Intolerant of ezetimibe and statins.  Plan:  Follow-up as outpatient     ENRIQUE, resolved,  CKD stage II.  Baseline creatinine 0.9-1.1.  Last creatinine 0.82 on April 25.  Plan:  Avoid NSAIDs and nephrotoxins  Monitor renal function periodically     Urinary retention.  It has resolved and patient no longer does self-catheterization.  He is now using a urinal.  Plan:  Monitor for recurrence of retention     Slow transit constipation.  Plan:  Continue the bowel regimen     Moderate  malnutrition.  Per assessment in the hospital.  Plan:  Continue the nutritional supplement (Ensure Plus) per RD     Physical deconditioning.  Plan:  Continue PT/OT evaluation and therapy    Obesity, BMI 32.6,  Suspected sleep apnea.  Plan:  Staff to assist with daily care and mobility  Sleep study as outpatient      Orders written by provider at facility:  TIA on May 6, DX: CHF, CKD          Disclaimer: This note contains text created using speech-recognition software and may have unintended word substitutions.      Electronically signed by  Pavan Hernandez MD

## 2024-04-30 NOTE — PROGRESS NOTES
[place] [facility]  May 1, 2024      CHIEF COMPLAINT:  Episodic/follow-up visit    HPI:    Feng yWnne is a 79 year old  (1945), who is being seen today for an episodic care visit at [place] [facility].     Medical history is notable for CHF, pulmonary hypertension, severe mitral valve regurgitation (s/p mechanical valve replacement), permanent atrial fibrillation, hypertension, dyslipidemia, CKD, pill esophagitis, esophageal ulcer, GERD, pneumonia, osteoarthritis, and obesity.       Summary of hospital course:  Patient was hospitalized at [facility] from March 18 through March 29, 2024 for infected sacral decubitus ulcer and osteomyelitis of coccyx.  He was started empirically on vancomycin and cefepime.  MRI pelvis showed osteomyelitis involving the entirety of the coccyx with extensive inflammatory and phlegmonous changes throughout the sacral/coccygeal soft tissues with 5.5 cm abscess in the pararectal/left ischioanal fossa.  He was seen by surgical service and underwent excisional debridement of sacral decubitus ulcer using electrocautery on March 22, 2024.  Hospital course was complicated by postop encephalopathy.  CT head was negative for acute abnormality and brain MRI showed nonspecific mild smooth diffuse bilateral cerebral convexity pachymeningeal thickening and no acute/subacute ischemic infarct or mass effect.  Notably, tissue culture grew Enterococcus faecalis, E. coli, Clostridium perfringens, and Citrobacter freundii complex.  Antibiotic therapy transitioned to oral Augmentin and ciprofloxacin on discharge to continue for 14 days.  TCU was recommended for rehab. He was then admitted to this facility for medical management, nursing care, and rehab.     Today's visit:  Patient seen in his room, lying in bed.  He appears comfortable.  He feels sore in his bottom.  He denies fever, chills, chest pain, palpitation, dyspnea, nausea, vomiting, abdominal pain, melena,  hematochezia, or urinary symptoms.  He had a BM 1-2 days ago.  He is currently on warfarin 3 mg p.o. daily.  Last INR was 2.7 on April 29.      CODE STATUS:   CPR/Full code     Past Medical, Surgical, Family, and Social History were reviewed in [facility].    Current Outpatient Medications   Medication Sig Dispense Refill     acetaminophen (TYLENOL) 500 MG tablet Take 2 tablets (1,000 mg) by mouth 3 times daily       albuterol (PROAIR HFA/PROVENTIL HFA/VENTOLIN HFA) 108 (90 Base) MCG/ACT inhaler Inhale 1 puff into the lungs every 4 hours as needed for shortness of breath       bisacodyl (DULCOLAX) 10 MG suppository Place 10 mg rectally daily as needed for constipation Every 3 days if no BM       calcium carbonate (TUMS) 500 MG chewable tablet Take 1 tablet (500 mg) by mouth 3 times daily as needed for heartburn       diclofenac (VOLTAREN) 1 % topical gel Apply 4 g topically 3 times daily       ferrous sulfate (FEROSUL) 325 (65 Fe) MG tablet Take 1 tablet (325 mg) by mouth daily       HYDROmorphone (DILAUDID) 4 MG tablet Take 0.5 tablets (2 mg) by mouth every 6 hours as needed for pain 10 tablet 0     Multiple Vitamins-Minerals (MULTIVITAMIN ADULT) CHEW Take 2 chew tab by mouth daily       nystatin (MYCOSTATIN) 750240 UNIT/GM external powder Apply topically 2 times daily Groin folds       Omega-3 Fatty Acids (FISH OIL CONCENTRATE PO) Take 1 capsule by mouth daily       order for DME Equipment being ordered: COMPRESSION STOCKINGS, 20-30 mmHg 1 each 1     pantoprazole (PROTONIX) 40 MG EC tablet Take 1 tablet (40 mg) by mouth 2 times daily (before meals)       polyethylene glycol (MIRALAX) 17 GM/Dose powder Take 17 g by mouth daily as needed for constipation       potassium chloride (KAYCIEL) 20 MEQ/15ML (10%) solution Take 20 mEq by mouth 2 times daily       potassium chloride (KLOR-CON) 20 MEQ packet Take 20 mEq by mouth 2 times daily       Probiotic CHEW Take 2 chew tab by mouth daily       psyllium (METAMUCIL/KONSYL) 58.6  "% powder Take 18 g (1 Tablespoonful) by mouth daily       sennosides (SENOKOT) 8.6 MG tablet Take 1 tablet by mouth 2 times daily as needed for constipation       sodium chloride (OCEAN) 0.65 % nasal spray Spray 1 spray into both nostrils every hour as needed for congestion       torsemide (DEMADEX) 20 MG tablet Take 1 tablet (20 mg) by mouth daily       warfarin ANTICOAGULANT (COUMADIN) 3 MG tablet Take 1 tablet (3 mg) by mouth daily 7 tablet 0     Warfarin Therapy Reminder Take 1 each by mouth See Admin Instructions Per INR         MED REC REQUIRED  Post Medication Reconciliation Status: medication reconcilation previously completed during another office visit      ALLERGIES: Amiodarone, Pcn [penicillins], Statins, Amiodarone, Latex, and Zetia [ezetimibe]    REVIEW OF SYSTEMS:  10 point ROS were negative other than the symptoms noted above in the HPI.    PHYSICAL EXAM:  Vital signs were reviewed in the chart.  Vital Signs:  /71   Pulse 70   Temp 98  F (36.7  C)   Resp 18   Ht 1.803 m (5' 11\")   Wt 103.7 kg (228 lb 9.6 oz)   SpO2 95%   BMI 31.88 kg/m    General: Comfortable and in no acute distress  HEENT: Conjunctival pallor, no scleral icterus or injection, moist oral mucosa  Cardiovascular: Mechanical sounding S1, normal S2, irregularly irregular rhythm  Respiratory: Lungs clear to auscultation bilaterally  GI: Abdomen soft, non-tender, non-distended, +BS, large abdominal hernia noted  Extremities: No significant LE edema  Neuro: CX II-XII grossly intact; ROM in all four extremities grossly intact  Psych: Alert and oriented x3; normal affect  Skin: No acute rash    LABORATORY/IMAGING DATA:  All relevant labs and imaging data in [facility] and/or Care Everywhere were personally reviewed today.      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  Tissue culture grew Enterococcus faecalis, E. coli, Clostridium perfringens, and Citrobacter freundii complex.   He was treated with cefepime and vancomycin inpatient and discharged on oral Augmentin and ciprofloxacin, completed on April 12, 2024.  Patient is afebrile and hemodynamically stable.  He no longer has a wound VAC.  Plan:  Continue wound care per instructions   Continue pain management with acetaminophen 1000 mg 3 times daily, hydromorphone 2 mg every 4 hours as needed, and as needed diclofenac gel  Follow-up with wound/surgery clinic as directed     Pill esophagitis with bleeding esophageal ulcer (middle third), s/p cauterization with BiCap on February 5, 2024,  GERD.  No recurrence of GI bleed.  Plan:  Continue pantoprazole 40 mg twice daily  Continue Tums as needed  Monitor for recurrence of GI bleed  Follow-up with GI as directed     ABLA,  Iron deficiency.   Patient received IV iron and blood transfusion during the recent hospitalization of February/January 2024.  Last CBC on April 25 with hemoglobin 9.1 and MCV 99.  Plan:  Continue ferrous sulfate 325 mg daily  Continue to monitor hemoglobin periodically  Transfuse PRN for hemoglobin less than 7     Leukopenia.  Resolved.  Plan:  Monitor intermittently      Acute encephalopathy, resolved.  SLUMS 25/30 this TCU stay.  Today, he is oriented x 3.   Plan:  Monitor     Chronic HFpEF (LVEF 60-65%, per echo on February 1,  2024),  Moderately decreased RV systolic function,  Moderate pulmonary hypertension,  Severe tricuspid regurgitation.  Patient currently weighs 228.6 LBS and has no significant lower extremity edema.   Plan:  Continue torsemide 20 mg daily  Monitor weight and volume status  Follow-up with cardiology as directed     Hypokalemia.  Due to diuretic therapy.  Corrected.  Last potassium level 4.2 on April 25.  Plan:  Continue potassium chloride 20 mEq twice daily   Monitor potassium level periodically (next BMP on May 6)     History of severe mitral valve regurgitation, s/p mechanical ([facility]) mitral valve replacement in September 2008,  Permanent atrial fibrillation, s/p MAZE procedure in September 2008.  Patient is on chronic anticoagulation with warfarin for INR target 2.5-3.5.  EKG in the hospital showed atrial fibrillation.  Previously on warfarin 10 mg every Wednesday and Saturday, and 5 mg all other days.  He is now on warfarin 5 mg every Monday and 3 mg all other days.   Last INR 2.7 on April 29.  Not on rate control medications or antiarrhythmics.  Rate is controlled.  Plan:  Continue anticoagulation with warfarin  Adjust warfarin dose for INR target 2.5-3.5 (next INR on May 2)   Monitor heart rate  Follow-up with cardiology as directed     Hypertension.  Blood pressure is fairly controlled.  Plan:  Continue torsemide 20 mg daily  Monitor blood pressure     Dyslipidemia.  Intolerant of ezetimibe and statins.  Plan:  Follow-up as outpatient     ENRIQUE, resolved,  CKD stage II.  Baseline creatinine 0.9-1.1.  Last creatinine 0.82 on April 25.  Plan:  Avoid NSAIDs and nephrotoxins  Monitor renal function periodically (next BMP on May 6)     Urinary retention.  It has resolved and patient no longer does self-catheterization.  He is now using a urinal.  Plan:  Monitor for recurrence of retention     Slow transit constipation.  Plan:  Continue the bowel regimen     Moderate malnutrition.  Per assessment in the  hospital.  Plan:  Continue the nutritional supplement (Ensure Plus) per RD     Physical deconditioning.  Plan:  Continue PT/OT evaluation and therapy     Obesity, BMI 31.9,  Suspected sleep apnea.  Plan:  Staff to assist with daily care and mobility  Sleep study as outpatient      Orders written by provider at facility:  None.        Disclaimer: This note contains text created using speech-recognition software and may have unintended word substitutions.      Electronically signed by  Pavan Hernandez MD

## 2024-05-06 NOTE — TELEPHONE ENCOUNTER
[facility]    Who is the name of the provider?:  Shayan    What is the location you see this provider at/preferred location?: [place]  Person calling / Facility: Self  Phone number:  [phone] or alternate #: [phone] must leave vm  Nurse call back needed:  YES     Reason for call:  Has questions re his 5/7/24 appointment.  Asking if bone shaving will be done at that appointment and needs to make sure the appointment will only 20 minutes as he uses [employer] for transportation.       Pharmacy location:  NA  Outside Imaging: NA   Can we leave a detailed message on this number?  YES

## 2024-05-06 NOTE — PROGRESS NOTES
[facility]    Chief Complaint   Patient presents with    RECHECK     HPI:  Feng Wynne is a 79 year old  (1945), who is being seen today for an episodic care visit at: [facility] (TCU) [[id number]].     Summary: Patient is a 79-year-old male with past medical history of mechanical mitral valve on chronic anticoagulation with warfarin, HFpEF, pulmonary hypertension, CKD, CAD, hypertension, hypercholesterolemia, A-fib, anemia, known sacral decubitus ulcer, and multiple recent hospitalizations who was admitted at [facility] from 3/18 - 3/29, 2024 after presenting from TCU with concern of osteomyelitis of sacral wound.  Workup in ED was significant for a CRP of 119, otherwise unremarkable with a hemoglobin value of 9.6.  MRI of the pelvis revealed osteomyelitis involving the entirety of the coccyx with extensive inflammatory and phlegmonous changes throughout the sacral/coccygeal soft tissues with a 5.5 cm abscess in the posterior perirectal/left ischial anal fossa, extending and adherent to the posterior rectal wall.  Initial consideration for transfer to [facility] for higher level of care which was declined.  Patient was initiated on vancomycin and cefepime, seen in consultation with ID and general surgery.  INR reversed, bridged with heparin.  Ultimately underwent excisional debridement with electrocautery on 3/22/2024.  Postoperatively experienced significant encephalopathy, underwent MRI imaging which was negative.  EEG was also negative.  Gradually returning to baseline mental state.  Transitioned to oral antibiotics.  Referred to TCU for ongoing rehab, medical management.     1/9 - 1/19, 2024: Admission at [facility] for acute anemia with hemoglobin of 4.4.  Etiology felt to be GI bleed in setting of supratherapeutic INR.  EGD was unremarkable, colonoscopy with poor prep and inability to visualize well.  Hemoglobin stabilized, anticoagulation resumed and patient  "discharged to TCU.  While at TCU patient was treated for pneumonia with ceftriaxone followed by cefuroxime and doxycycline.     1/30 - 2/15, 2024: Admission at [facility] for acute anemia and acute on chronic HFpEF.  Hemoglobin at time of presentation was 6.9, chest x-ray with findings of worsening pulmonary edema.  Patient was diuresed with IV furosemide, transition back to oral torsemide.  Seen by GI, s/p EGD 2/5 with severe pill esophagitis and active bleeding ulcer in the middle third of esophagus that was cauterized with BiCap.  Patient was started on PPI and Carafate.  Repeat endoscopy 2/8 indicated oozing esophageal ulcer necessitating anticoagulation to be held.  Hemoglobin ultimately stabilized to value 8-9, warfarin resumed.  Discharged to TCU    Today, pt is seen in follow-up. Significant other at bedside, assists with history. Hates the potassium supplement. No longer has the burning sensation as with the liquid but now has abrupt onset intestinal gas, loose stools almost immediately after taking the powder form. Has been declining doses frequently due to side effect. Really wants to try gummy supplements. Previously discussed with pharmacist, who noted chloride losses as well and recommended to continue with packet. Feng and spouse note there is a KCl gummy via [employer] if this would be acceptable. He otherwise feels he is doing well, continues with BID dressing changes to wound. Sees wound specialist tomorrow. Notes there is some bone exposed.     On review of facility records, BPs ranging 113-129, HRs 67-71.    Allergies, and PMH/PSH reviewed in EPIC today.  REVIEW OF SYSTEMS:  4 point ROS including Respiratory, CV, GI and , other than that noted in the HPI,  is negative    Objective:   /65   Pulse 72   Temp 97.4  F (36.3  C)   Resp 18   Ht 1.803 m (5' 11\")   Wt 103.7 kg (228 lb 9.6 oz)   SpO2 94%   BMI 31.88 kg/m    GEN: well-developed, well-nourished, appears " comfortable  HEENT: NCAT, EOM intact bilaterally, sclera clear, conjunctiva normal, nose & mouth patent, mucous membranes moist  CHEST: lungs CTA bilaterally, no increased work of breathing, no wheeze, crackles, rhonchi  HEART: irregularly irregular, S1 & S2  ABD: soft, nontender, nondistended, no guarding or rigidity, +BS in all 4 quadrants  MSK: AROM bilateral UE/LE, pedal & radial pulses 2+ bilaterally  NEURO: awake, alert. CN II-XII grossly intact. Sensation grossly intact to light touch.   SKIN: warm & dry without rash, no pedal edema; sacral wound not visualized    Recent labs in [facility] reviewed by me today.  and Most Recent 3 CBC's:  Recent Labs   Lab Test 04/25/24  0715 04/04/24  0546 03/27/24  0707   WBC 4.7 3.0* 5.3   HGB 9.1* 8.3* 9.2*   MCV 99 98 98    198 225     Most Recent 3 BMP's:  Recent Labs   Lab Test 05/06/24  0855 04/25/24  0715 04/15/24  0558    139 136   POTASSIUM 3.4 4.2 3.9   CHLORIDE 96* 97* 97*   CO2 29 32* 27   BUN 38.2* 26.1* 28.7*   CR 0.78 0.82 0.84   ANIONGAP 14 10 12   JOSEPH 9.3 9.2 8.7*   GLC 96 74 79       Assessment/Plan:    Osteomyelitis of coccyx  Perirectal abscess s/p I&D 3/22/2024  Sacral decubitus ulcer, POA  History as detailed above, seen in consult with ID, general surgery.  Is status post above procedure.  Patient was trialed on amoxicillin while inpatient despite reported penicillin allergy as an infant, tolerated.  Transitioned to Augmentin and Cipro for planned 2-week course, since completed.   -Continues on tylenol 1000mg TID  -Continues on wet to dry dressings  -Follow up with wound care provider as recommended     Physical deconditioning  Impaired mobility and ADLs  Generalized muscle weakness  In the context of multiple hospitalizations, acute illness and injury.  -PT/OT continuing  -SW for discharge planning     Mechanical mitral valve  A-fib  Not on rate control medications. On chronic anticoagulation with warfarin, INR goal 2.5-3.5.  Prior to admission  on warfarin 3 mg daily.  INR has been intermittently subtherapeutic requiring titration of warfarin.  HRs controlled.  -Warfarin 5mg M/Th, 3mg AOD     HFpEF  Pulmonary hypertension, moderate  Severe TR  CAD  HTN/HLD  Chronic, stable. BPs controlled. Greatly dislikes potassium supplement as has resulted in significant esophageal burning, requests alternative formulation if available. I discussed with geriatric pharmacist, who recommended trial of potassium packets dissolved in water.  -Discontinue Klor-Con packed  -Start KCl gummy supplement, ok to use home supply and self-administer. Per picture, each gummy is 500mg which is equivalent to 12.8mEq (atomic weight K = 39). Directions to take 2 gummies twice daily  -BMP 5/9  -Continues on torsemide 20 mg/day  -Daily weights  -Monitor volume status     ENRIQUE on CKD  Creatinine up to 1.38, baseline of 0.9-1.1. stable on follow-up, value 1.     Anemia  Hx GI bleed  Multiple hospitalizations recently for GI bleed as noted above.  Hemoglobin stable during recent hospitalization with value 9.2 at discharge. Follow-up value 8.3. No reports of melena.  -Continues on ferrous sulfate 325mg/d, protonix 40mg BID     Obesity, BMI 34  Increased risk of all-cause mortality.    MED REC REQUIRED  Post Medication Reconciliation Status: patient was not discharged from an inpatient facility or TCU      Electronically signed by: Michael Alcantar PA-C

## 2024-05-06 NOTE — PROGRESS NOTES
Clinic Care Coordination Contact    Situation: Patient chart reviewed by care coordinator.    Background:   Patient was admitted to [facility] from 1/30/2024 to 2/15/2024 with a diagnoses of ABLA, Acute on chronic CHF, A.Fib, ENRIQUE, HTN and discharged to [place] [facility].    Assessment:   The patient is currently admitted to [facility] [facility].    Plan/Recommendations:   SW/RN Care Coordination will wait for notification from facility apprising SW/RN Care Coordination of patient's discharge plans.  SW/RN will review chart and outreach to facility every 4 weeks and as needed.     Yaneth Davila RN, BSN, PHN  Primary Care / Care Coordinator   [facility]  E-mail [email or web address]   [phone]

## 2024-05-06 NOTE — TELEPHONE ENCOUNTER
"Returned call to patient. Discussed with him that it is unknown if tomorrow the bone will be \"shaved\" and discussed that the appointment will likely take 45-60 min. He will schedule [facility] accordingly.  "
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
## 2024-05-07 NOTE — DISCHARGE INSTRUCTIONS
05/07/2024   Feng Wynne   1945    A DME order was not completed because the supplies are ordered by home care or at a care facility    Dressing changes outside of clinic are being performed by  Care Facility  [facility] on [address] (Phone: [phone], Fax: [phone])     PLAN:  Avoid Recliner Chairs to prevent pressure on coccyx wound  Avoid Bed Pans - utilize toilets for defecation when able  Group 2 mattress - did not tolerate  Offloading with pillows - ok to sleep up on your sides  Consider evaluation with plastic evaluation to consider a FLAP closure if wound doesn't close on it's own down the road  Prefer treatment with NPWT - will call and speak with staff at [facility] to discern barriers  Continue to work to increase protein. Partner bringing Ensure Max, protein bar, and supplements and meals from home.  Consider use of Primofit for urinary needs while in bed.     Wound Dressing Change: Coccyx (until start NPWT)  - Wash your hands with soap and water before you begin your dressing change and prepare a clean surface for dressings.  - Cleanse with mild unscented soap (such as Cetaphil, Cerave or Dove) and water  - Apply small amount of VASHE on gauze, lay into wound bed, let sit for 10 minutes, remove gauze (do not rinse)  - Apply a thin layer of Zinc barrier cream to macerated/excoriated areas on desmond-wound  - Primary dressing: Fill defect with dry AMD gauze - lightly pack (do not overfill), ensure to fill all undermined areas and tuck into tunnel at 9 o'clock as able  - Secondary dressing: Cover with Super Absorbancy dressing (like optilock) and secure with medipore tape  Change twice daily and as needed for soilage/drainage until NPWT starts    Start NPWT to coccyx:  - Remove old dressing and ensure all black foam is removed  - Cleanse wound with Vashe moist gauze, leave in place for 10 minutes; remove  - Cleanse periwound skin with wound spray, pat dry and apply No Sting Barrier film.  - Picture frame the  wound and bridge out to hip using Tape  - Cut Black Foam like a cinnamon roll to fill the undermine/ tunnel area and depth of wound, bridge out to hip at flat area with black foam and drape tape;  - Cover wound with VAC dressing tape to seal the foam, cut appropriate size opening for the TRAC pad.  - Connect TRAC pad and connect to pump; NPWT -125 mmHg Continuous, ensure no leaks  - Change canister when alarms full or weekly  Change dressing three times a week  Document on the outside of the dressing the number of sponges used and the date of the dressing  If dressing is compromised for greater than 2 hours then please remove entire dressing and change or tuck moist Vashe gauze into wound until new dressing can be applied.  May use ostomy paste for divots and crevices as needed to maintain seal.          Repositioning:  Bed: Reposition MINIMALLY every 1-2 hours in bed to relieve pressure and promote perfusion to tissue.  Chair: When up to the chair, do not sit for longer than one hour total before returning to bed for at least 60 minutes to relieve pressure and promote perfusion to the tissue.  Completely recline/tilt for 15 minutes each hour.  Sit on a chair cushion when up to the chair.      Reduce shear and friction  Prevent skin breakdown by reducing friction and shear. Patients are less likely to slide down in bed if they re supported by pillows and the head of the bed isn t raised too high.  When transferring be sure to use assistive devices, whenever possible.  In a bed  Clean and smooth the bed surface.  Lift the head of the bed no more than 30 .  Use draw-sheets or transfer boards to move patients.  Lift the head of the bed no more than 30 degrees.  Raise the foot of the bed slightly.  In a wheelchair  Keep upright (don't slump) - keep weight forward onto your thighs, not on your tailbone  Support the back with a pillow.  Use a foot extension.     A diet high in protein is important for wound healing, we  recommend getting 90 grams of protein per day.   Taking protein shakes or bars are a good way to get extra protein in your diet.      Good sources of protein VEGETARIAN:  Whey protein powder - 24g per scoop (on average)  Greek yogurt - 23g per 8oz   Navy beans - 20g per cup  Cottage cheese - 14g per 1/2 cup   Lentils - 13g per 1/4 cup  2% milk - 8g per cup  Peanut butter - 8g per 2 tablespoons  Eggs - 6g per egg  Mixed nuts - 6g per 2oz  Tofu - 10g per 1/2 cup     Main Provider: Travis Downey M.D. May 7, 2024    Call us at [phone] if you have any questions about your wounds, if you have redness or swelling around your wound, have a fever of 101 degrees Fahrenheit or greater or if you have any other problems or concerns. We answer the phone Monday through Friday 8 am to 4 pm, please leave a message as we check the voicemail frequently throughout the day. If you have a concern over the weekend, please leave a message and we will return your call Monday. If the need is urgent, go to the ER or urgent care.    If you had a positive experience please indicate that on your patient satisfaction survey form that [facility] will be sending you.    It was a pleasure meeting with you today.  Thank you for allowing me and my team the privilege of caring for you today.  YOU are the reason we are here, and I truly hope we provided you with the excellent service you deserve.  Please let us know if there is anything else we can do for you so that we can be sure you are leaving completely satisfied with your care experience.      If you have any billing related questions please call the [facility] Business office at [phone]. The clinic staff does not handle billing related matters.    If you are scheduled to have a follow up appointment, you will receive a reminder call the day before your visit. On the appointment day please arrive 15 minutes prior to your appointment time. If you are unable to keep that  appointment, please call the clinic to cancel or reschedule. If you are more than 10 minutes late or greater for your scheduled appointment time, the clinic policy is that you may be asked to reschedule.

## 2024-05-09 NOTE — PROGRESS NOTES
Patient Active Problem List   Diagnosis    Allergic rhinitis    Chronic atrial fibrillation (H)    S/P mitral valve replacement    Hyperlipidemia LDL goal <130    Atrial fibrillation (H)    Lumbago    Knee pain    Rosacea    Proteinuria    Essential hypertension with goal blood pressure less than 140/90    Morbid obesity due to excess calories (H)    Long-term (current) use of anticoagulants [Z79.01]    Heart valve replaced [Z95.2]    Essential hypertension    Chronic right shoulder pain    Permanent atrial fibrillation (H)    Bradycardia    SOB (shortness of breath)    Supratherapeutic INR    Anemia, unspecified type    Hypertensive heart disease with heart failure (H)    Shortness of breath    Positive occult stool blood test    Anemia due to blood loss, acute    Acute on chronic congestive heart failure, unspecified heart failure type (H)    Abnormal coagulation profile    Acute respiratory failure with hypoxia (H)    Chronic diastolic (congestive) heart failure (H)    Chronic kidney disease, stage 2 (mild)    Edema, unspecified    Muscle weakness (generalized)    Other abnormalities of gait and mobility    Other specified disorders of left ear    Pneumonia, unspecified organism    Retention of urine, unspecified    Ulcer of esophagus with bleeding    Unspecified abnormalities of gait and mobility    Unspecified Escherichia coli (E. 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     Nutrition requirements were discussed with patient today.  Vitals:  /87 (BP Location: Right arm, Patient Position: Sitting, Cuff Size: Adult Regular)   Pulse 97   Temp 97.4  F (36.3  C) (Temporal)   Wound:   Wound (used by OP [facility] only) 04/02/24 1036 coccyx pressure injury (Active)   Thickness/Stage Stage 4 05/09/24 1024   Base granulating;slough;exposed structure 05/09/24 1024   Periwound excoriated 05/09/24 1024   Periwound Temperature warm 05/09/24 1024   Periwound Skin Turgor soft 05/09/24 1024   Edges open 05/09/24 1024   Length (cm) 3.5 05/09/24 1024   Width (cm) 2.2 05/09/24 1024   Depth (cm) 5.5 05/09/24 1024   Wound (cm^2) 7.7 cm^2 05/09/24 1024   Wound Volume (cm^3) 42.35 cm^3 05/09/24 1024   Wound healing % 3.75 05/09/24 1024   Tunneling [Depth (cm)/Location] 9o'/ 6cm 05/09/24 1024   Undermining [Depth (cm)/Location] 6-12o'/ 5cm 05/09/24 1024   Drainage Characteristics/Odor serosanguineous 05/09/24 1024   Drainage Amount copious 05/09/24 1024   Care, Wound non-select wound debridement performed. 05/09/24 1024      Photo:       Further instructions from your care team         05/09/2024   Feng Wynne   1945  A DME order was not completed because the supplies are ordered by home care or at a care facility  [facility] Phone: [phone]   Fax: [phone]     PLAN: 5/9/24  Bone exposed in wound base STOP NPWT  Done: MRI with I&D 3/18/24  Need to schedule bone debridement to determine Antibiotic therapy [facility]  Will need to manage Blood thinner with Heparin  Dr Srinivasan's  will reach out when a date is available    Consider Temporary Colostomy to avoid wound contamination of stool  Avoid Recliner Chairs to prevent pressure on coccyx wound  No Bed Pans - utilize toilets for defecation   NEEDS: Group 2 mattress Patient will need a Group 2 mattress due to large, stage 4 pressure ulceration on the  "patient's pelvis that has failed to improve on a normal mattress despite regular wound cares and repositioning. A group 1 mattress is not adequate to offload these severe ulcerations. Patient has impaired sensation and is unable to reposition independently.  Offload with pillows - ok to sleep up on your sides  Continue High Protein diet; Ensure Max, protein bar, and supplements and meals from home.  Consider use of Primofit for urinary needs while in bed.     Wound Dressing Change: Sacrococcygeal    Cleanse wound with Vashe moist gauze, leave in place for 10 minutes; remove  Cleanse periwound skin with wound spray, pat dry   Apply light layer of Zinc Oxide barrier paste to periwound  Cut and fluff dry AMD antimicrobial roll gauze into wound bed  Cover with dry absorbant pad and secure with 2\" Medipore tape  Change 2 times a day and as needed for soilage      Repositioning:  Bed: Keep Head of bed at 30 degrees or less; Reposition MINIMALLY every 1-2 hours in bed to relieve pressure and promote perfusion to tissue turning side to side.  Chair: When up to the chair, do not sit for longer than one hour total before returning to bed for at least 60 minutes to relieve pressure and promote perfusion to the tissue.  Completely recline/tilt for 15 minutes each hour.  Sit on a chair cushion when up to the chair.      Reduce shear and friction: Prevent skin breakdown by reducing friction and shear. Patients are less likely to slide down in bed if they re supported by pillows and the head of the bed isn t raised too high.    In a bed  Clean and smooth the bed surface.  Lift the head of the bed no more than 30 .  Use draw-sheets or transfer boards to move patients.  Lift the head of the bed no more than 30 degrees.  Raise the foot of the bed slightly.  In a wheelchair  Keep upright (don't slump) - keep weight forward onto your thighs, not on your tailbone  Support the back with a pillow.  Use a foot extension.    High Protein " Diet: VEGETARIAN:  Whey protein powder - 24g per scoop (on average)  Greek yogurt - 23g per 8oz   Navy beans - 20g per cup  Cottage cheese - 14g per 1/2 cup   Lentils - 13g per 1/4 cup  2% milk - 8g per cup  Peanut butter - 8g per 2 tablespoons  Eggs - 6g per egg  Mixed nuts - 6g per 2oz  Tofu - 10g per 1/2 cup     Main Provider: Barbara Srinivasan M.D. May 9, 2024
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
## 2024-05-09 NOTE — PROGRESS NOTES
[facility]  May 10, 2024      CHIEF COMPLAINT:  Episodic/follow-up visit    HPI:    Feng Wynne is a 79 year old  (1945), who is being seen today for an episodic care visit at [facility].     Medical history is notable for CHF, pulmonary hypertension, severe mitral valve regurgitation (s/p mechanical valve replacement), permanent atrial fibrillation, hypertension, dyslipidemia, CKD, pill esophagitis, esophageal ulcer, GERD, pneumonia, osteoarthritis, and obesity.       Summary of hospital course:  Patient was hospitalized at [facility] from March 18 through March 29, 2024 for infected sacral decubitus ulcer and osteomyelitis of coccyx.  He was started empirically on vancomycin and cefepime.  MRI pelvis showed osteomyelitis involving the entirety of the coccyx with extensive inflammatory and phlegmonous changes throughout the sacral/coccygeal soft tissues with 5.5 cm abscess in the pararectal/left ischioanal fossa.  He was seen by surgical service and underwent excisional debridement of sacral decubitus ulcer using electrocautery on March 22, 2024.  Hospital course was complicated by postop encephalopathy.  CT head was negative for acute abnormality and brain MRI showed nonspecific mild smooth diffuse bilateral cerebral convexity pachymeningeal thickening and no acute/subacute ischemic infarct or mass effect.  Notably, tissue culture grew Enterococcus faecalis, E. coli, Clostridium perfringens, and Citrobacter freundii complex.  Antibiotic therapy transitioned to oral Augmentin and ciprofloxacin on discharge to continue for 14 days.  TCU was recommended for rehab. He was then admitted to this facility for medical management, nursing care, and rehab.       Today's visit:  Patient was seen in his room, lying in bed.  He appears comfortable.  He feels sore in his bottom.  He denies fever, chills, chest pain, palpitation, dyspnea, nausea, vomiting, abdominal pain,  or urinary symptoms.  He had a bowel movement earlier today.  He has been evaluated by wound clinic recently and plan is for further surgical intervention by Dr. Srinivasan, plastic surgeon, at [facility].  Date to be determined.      CODE STATUS:   CPR/Full code     Past Medical, Surgical, Family, and Social [facility].    Current Outpatient Medications   Medication Sig Dispense Refill    acetaminophen (TYLENOL) 500 MG tablet Take 2 tablets (1,000 mg) by mouth 3 times daily      albuterol (PROAIR HFA/PROVENTIL HFA/VENTOLIN HFA) 108 (90 Base) MCG/ACT inhaler Inhale 1 puff into the lungs every 4 hours as needed for shortness of breath      bisacodyl (DULCOLAX) 10 MG suppository Place 10 mg rectally daily as needed for constipation Every 3 days if no BM      calcium carbonate (TUMS) 500 MG chewable tablet Take 1 tablet (500 mg) by mouth 3 times daily as needed for heartburn      diclofenac (VOLTAREN) 1 % topical gel Apply 4 g topically 3 times daily      ferrous sulfate (FEROSUL) 325 (65 Fe) MG tablet Take 1 tablet (325 mg) by mouth daily      HYDROmorphone (DILAUDID) 4 MG tablet Take 0.5 tablets (2 mg) by mouth every 6 hours as needed for severe pain 30 tablet 0    Multiple Vitamins-Minerals (MULTIVITAMIN ADULT) CHEW Take 2 chew tab by mouth daily      nystatin (MYCOSTATIN) 386586 UNIT/GM external powder Apply topically 2 times daily Groin folds      Omega-3 Fatty Acids (FISH OIL CONCENTRATE PO) Take 1 capsule by mouth daily      order for DME Equipment being ordered: COMPRESSION STOCKINGS, 20-30 mmHg 1 each 1    pantoprazole (PROTONIX) 40 MG EC tablet Take 1 tablet (40 mg) by mouth 2 times daily (before meals)      polyethylene glycol (MIRALAX) 17 GM/Dose powder Take 17 g by mouth daily as needed for constipation      Probiotic CHEW Take 2 chew tab by mouth daily      psyllium (METAMUCIL/KONSYL) 58.6 % powder Take 18 g (1 Tablespoonful) by mouth daily      sennosides (SENOKOT) 8.6 MG  "tablet Take 1 tablet by mouth 2 times daily as needed for constipation      sodium chloride (OCEAN) 0.65 % nasal spray Spray 1 spray into both nostrils every hour as needed for congestion      torsemide (DEMADEX) 20 MG tablet Take 1 tablet (20 mg) by mouth daily      warfarin ANTICOAGULANT (COUMADIN) 1 MG tablet Take 5 tablets (5 mg) by mouth See Admin Instructions AND 3 tablets (3 mg) See Admin Instructions. 5mg on Mon, Thu and 3mg AOD      Warfarin Therapy Reminder Take 1 each by mouth See Admin Instructions Per INR         MED REC REQUIRED  Post Medication Reconciliation Status: medication reconcilation previously completed during another office visit      ALLERGIES: Amiodarone, Pcn [penicillins], Statins, Amiodarone, Latex, and Zetia [ezetimibe]    REVIEW OF SYSTEMS:  10 point ROS were negative other than the symptoms noted above in the HPI.    PHYSICAL EXAM:  Vital signs were reviewed in the chart.  Vital Signs:  /65   Pulse 77   Temp 98  F (36.7  C)   Resp 16   Ht 1.803 m (5' 11\")   Wt 103.7 kg (228 lb 9.6 oz)   SpO2 93%   BMI 31.88 kg/m     General: Comfortable and in no acute distress  HEENT: Conjunctival pallor, no scleral icterus or injection, moist oral mucosa  Cardiovascular: Mechanical sounding S1, normal S2, irregular rhythm  Respiratory: Lungs clear to auscultation bilaterally  GI: Large abdominal hernia, +BS  Extremities: No LE edema  Neuro: CX II-XII grossly intact; ROM in all four extremities grossly intact  Psych: Alert and oriented x3; normal affect  Skin: Sacral wound was not assessed today.    LABORATORY/IMAGING DATA:  All relevant labs and imaging data in [facility] and/or Care Everywhere were personally reviewed today.    Most Recent 3 CBC's:  Recent Labs   Lab Test 05/08/24  0837 04/25/24  0715 04/04/24  0546   WBC 5.6 4.7 3.0*   HGB 9.3* 9.1* 8.3*   MCV 97 99 98    245 198     Most Recent 3 BMP's:  Recent Labs   Lab Test 05/08/24  0837 05/06/24  0855 04/25/24  0715    " 139 139   POTASSIUM 3.3* 3.4 4.2   CHLORIDE 95* 96* 97*   CO2 29 29 32*   BUN 50.3* 38.2* 26.1*   CR 0.89 0.78 0.82   ANIONGAP 13 14 10   JOSEPH 9.5 9.3 9.2   * 96 74     Most Recent 2 LFT's:  Recent Labs   Lab Test 05/08/24  0837 03/25/24  0740   AST 21 47*   ALT 5 15   ALKPHOS 83 83   BILITOTAL 0.6 0.5       ASSESSMENT/PLAN:  Infected stage IV decubitus ulcer of sacrum with osteomyelitis of the coccyx, s/p excisional debridement of sacral decubitus ulcer on March 22, 2024.  Tissue culture grew Enterococcus faecalis, E. coli, Clostridium perfringens, and Citrobacter freundii complex.   He was treated with cefepime and vancomycin inpatient and discharged on oral Augmentin and ciprofloxacin, completed on April 12, 2024.  Patient is afebrile and hemodynamically stable.  He no longer has a wound VAC.  He was last seen at the wound clinic on May 7 and it seems that plan is for further surgical intervention by Dr. Srinivasan, plastic surgeon, at [facility]; date to be determined..  Plan:  Continue wound care per instructions   Continue pain management with acetaminophen 1000 mg 3 times daily, hydromorphone 2 mg every 4 hours as needed, and as needed diclofenac gel  Further surgical intervention by Dr. Srinivasan, date to be determined  Follow-up with Dr. Downey at wound clinic on June 5 and July 3     Pill esophagitis with bleeding esophageal ulcer (middle third), s/p cauterization with BiCap on February 5, 2024,  GERD.  No recurrence of GI bleed.  Plan:  Continue pantoprazole 40 mg twice daily  Continue Tums as needed  Monitor for recurrence of GI bleed  Follow-up with GI as directed     ABLA,  Iron deficiency.   Patient received IV iron and blood transfusion during the recent hospitalization of February/January 2024.  Last CBC on May 8 with stable hemoglobin at 9.3 and MCV 97.  Plan:  Continue ferrous sulfate 325 mg daily  Continue to monitor hemoglobin periodically  Transfuse PRN for hemoglobin less than  7     Leukopenia.  Resolved.  Plan:  Monitor intermittently      Acute encephalopathy, resolved.  SLUMS 25/30 this TCU stay.  Today, he is oriented x 3.   Plan:  Monitor     Chronic HFpEF (LVEF 60-65%, per echo on February 1, 2024),  Moderately decreased RV systolic function,  Moderate pulmonary hypertension,  Severe tricuspid regurgitation.  Last weight 228.6 LBS on April 29.  No significant lower extremity edema.   Plan:  Continue torsemide 20 mg daily  Monitor weight and volume status  Follow-up with cardiology as directed     Hypokalemia.  Due to diuretic therapy.  Last potassium level 3.3 on May 8.  Plan:  Extra potassium chloride 40 mEq p.o. x 1 today  Continue potassium chloride 20 mEq twice daily   Monitor potassium level periodically   Repeat BMP on May 14      History of severe mitral valve regurgitation, s/p mechanical ([facility]) mitral valve replacement in September 2008,  Permanent atrial fibrillation, s/p MAZE procedure in September 2008.  Patient is on chronic anticoagulation with warfarin for INR target 2.5-3.5.  EKG in the hospital showed atrial fibrillation.  Previously on warfarin 10 mg every Wednesday and Saturday, and 5 mg all other days.  He is now on warfarin 5 mg every Monday and Thursday and 3 mg all other days.   Last INR 3.9 on May 6.  Not on rate control medications or antiarrhythmics.  Rate is controlled.  Plan:  Continue anticoagulation with warfarin (5 mg every Monday and Thursday, and 3 mg all other days)  Adjust warfarin dose for INR target 2.5-3.5 (next INR on May 13)   Monitor heart rate  Follow-up with cardiology as directed     Hypertension.  Blood pressure is fairly controlled.  Plan:  Continue torsemide 20 mg daily  Monitor blood pressure     Dyslipidemia.  Intolerant of ezetimibe and statins.  Plan:  Follow-up as outpatient     ENRIQUE, resolved,  CKD stage II.  Baseline creatinine 0.9-1.1.  Last creatinine 0.89 on May 8.  Plan:  Avoid NSAIDs and nephrotoxins  Monitor renal  function periodically      Urinary retention.  It has resolved and patient no longer does self-catheterization.  He is now using a urinal.  Plan:  Monitor for recurrence of retention     Slow transit constipation.  Plan:  Continue the bowel regimen     Moderate malnutrition.  Per assessment in the hospital.  Plan:  Continue the nutritional supplement per RD     Physical deconditioning.  Plan:  Continue PT/OT evaluation and therapy     Obesity, BMI 31.9,  Suspected sleep apnea.  Plan:  Staff to assist with daily care and mobility  Sleep study as outpatient         Orders written by provider at facility:  KCl 40 mEq p.o. x 1 today, for hypokalemia  BMP on May 14, DX: CHF, hypokalemia      Disclaimer: This note contains text created using speech-recognition software and may have unintended word substitutions.      Electronically signed by  Pavan Hernandez MD

## 2024-05-12 NOTE — TELEPHONE ENCOUNTER
Reason for Call:  Other INR    Detailed comments: pt said he sent in the INR data and has not heard from  Anyone yet. I went through the people that monitor it for him.  He would like to know if you received the inr results.    Phone Number Patient can be reached at: Home number on file [phone] (home)    Best Time: anytime    Can we leave a detailed message on this number? YES    Call taken on 5/8/2017 at 3:55 PM by Ritu Beltran       Neurovascular

## 2024-05-14 NOTE — LETTER
May 14, 2024      Feng RAMACHANDRAN Sherrell  [address]   [facility] [address]        To Whom It May Concern:    Feng RAMACHANDRAN Sherrell  was seen on ***.  Please excuse him  until *** due to {WORK EXCUSE:343010}.        Sincerely,        Travis Downey MD, MD

## 2024-05-14 NOTE — TELEPHONE ENCOUNTER
Voicemail left with Clara that the increase in CRP is noted but unless the patient is symptomatic nothing would be ordered or changed. The patient is awaiting flap surgery where the infected bone would be removed.

## 2024-05-14 NOTE — PROGRESS NOTES
[facility] GERIATRICS    Chief Complaint   Patient presents with    RECHECK     HPI:  Feng Wynne is a 79 year old  (1945), who is being seen today for an episodic care visit at: [facility] (TCU) [[id number]].     Summary: Patient is a 79-year-old male with past medical history of mechanical mitral valve on chronic anticoagulation with warfarin, HFpEF, pulmonary hypertension, CKD, CAD, hypertension, hypercholesterolemia, A-fib, anemia, known sacral decubitus ulcer, and multiple recent hospitalizations who was admitted at [facility] from 3/18 - 3/29, 2024 after presenting from TCU with concern of osteomyelitis of sacral wound.  Workup in ED was significant for a CRP of 119, otherwise unremarkable with a hemoglobin value of 9.6.  MRI of the pelvis revealed osteomyelitis involving the entirety of the coccyx with extensive inflammatory and phlegmonous changes throughout the sacral/coccygeal soft tissues with a 5.5 cm abscess in the posterior perirectal/left ischial anal fossa, extending and adherent to the posterior rectal wall.  Initial consideration for transfer to [facility] for higher level of care which was declined.  Patient was initiated on vancomycin and cefepime, seen in consultation with ID and general surgery.  INR reversed, bridged with heparin.  Ultimately underwent excisional debridement with electrocautery on 3/22/2024.  Postoperatively experienced significant encephalopathy, underwent MRI imaging which was negative.  EEG was also negative.  Gradually returning to baseline mental state.  Transitioned to oral antibiotics.  Referred to TCU for ongoing rehab, medical management.     1/9 - 1/19, 2024: Admission at [facility] for acute anemia with hemoglobin of 4.4.  Etiology felt to be GI bleed in setting of supratherapeutic INR.  EGD was unremarkable, colonoscopy with poor prep and inability to visualize well.  Hemoglobin stabilized, anticoagulation resumed and patient  discharged to TCU.  While at TCU patient was treated for pneumonia with ceftriaxone followed by cefuroxime and doxycycline.     1/30 - 2/15, 2024: Admission at [facility] for acute anemia and acute on chronic HFpEF.  Hemoglobin at time of presentation was 6.9, chest x-ray with findings of worsening pulmonary edema.  Patient was diuresed with IV furosemide, transition back to oral torsemide.  Seen by GI, s/p EGD 2/5 with severe pill esophagitis and active bleeding ulcer in the middle third of esophagus that was cauterized with BiCap.  Patient was started on PPI and Carafate.  Repeat endoscopy 2/8 indicated oozing esophageal ulcer necessitating anticoagulation to be held.  Hemoglobin ultimately stabilized to value 8-9, warfarin resumed.  Discharged to TCU       Today, pt is seen in follow-up. Most recently saw Dr. Downey, wound care specialist in clinic who recommended wound vac and referral to plastic surgery. Subsequently saw plastics Dr. Srinivasan on 5/9 with findings of exposed bone, recommended to stop wound vac and plan for OR debridement, recommended offloading with new mattress and ongoing cares. On interview today, pt is resting in bed, feels well. Has tried using KCl gummies as supplement rather than packets of Klor Con as these result in loose stools, however discussed finding of worsening hypokalemia. Pt is agreeable to resuming klor-con packets and would like to try once daily to see if sufficient. Further, CRP has elevated this morning. Facility staff have contacted wound care team with no new recommendations. Pt denies sob, cp, abdominal pain, nausea/vomiting. No worsened pain to buttock/sacrum. Continues to use dilaudid sparingly.    On review of facility records, BPs ranging 101-118, HRs 79-93.    Allergies, and PMH/PSH reviewed in EPIC today.  REVIEW OF SYSTEMS:  4 point ROS including Respiratory, CV, GI and , other than that noted in the HPI,  is negative    Objective:   /65   " Pulse 79   Temp 97.4  F (36.3  C)   Resp 18   Ht 1.803 m (5' 11\")   Wt 103.7 kg (228 lb 9.6 oz)   SpO2 99%   BMI 31.88 kg/m    GEN: well-developed, well-nourished, appears comfortable  HEENT: NCAT, EOM intact bilaterally, sclera clear, conjunctiva normal, nose & mouth patent, mucous membranes moist  CHEST: lungs CTA bilaterally, no increased work of breathing, no wheeze, crackles, rhonchi  HEART: irregularly irregular, S1 & S2  ABD: soft, nontender, nondistended, no guarding or rigidity, +BS in all 4 quadrants  MSK: AROM bilateral UE/LE, pedal & radial pulses 2+ bilaterally  NEURO: awake, alert. CN II-XII grossly intact. Sensation grossly intact to light touch.   SKIN: warm & dry without rash, no pedal edema; sacral wound not visualized    Recent labs in [facility] reviewed by me today.  and Most Recent 3 CBC's:  Recent Labs   Lab Test 05/10/24  0705 05/08/24  0837 04/25/24  0715   WBC 6.6 5.6 4.7   HGB 8.8* 9.3* 9.1*   MCV 98 97 99    313 245     Most Recent 3 BMP's:  Recent Labs   Lab Test 05/16/24  0733 05/14/24  0538 05/08/24  0837    140 137   POTASSIUM 3.3* 3.1* 3.3*   CHLORIDE 96* 95* 95*   CO2 31* 33* 29   BUN 38.6* 39.9* 50.3*   CR 0.80 0.87 0.89   ANIONGAP 10 12 13   JOSEPH 9.1 9.2 9.5   GLC 89 96 113*     Most Recent 2 LFT's:  Recent Labs   Lab Test 05/08/24  0837 03/25/24  0740   AST 21 47*   ALT 5 15   ALKPHOS 83 83   BILITOTAL 0.6 0.5     Most Recent ESR & CRP:  Recent Labs   Lab Test 05/14/24  0538 03/21/24  0701 03/18/24  1748   SED  --   --  >140*   CRPI 152.90*   < > 119.65*    < > = values in this interval not displayed.       Assessment/Plan:    Osteomyelitis of coccyx  Perirectal abscess s/p I&D 3/22/2024  Sacral decubitus ulcer, POA  History as detailed above, seen in consult with ID, general surgery.  Is status post above procedure.  Patient was trialed on amoxicillin while inpatient despite reported penicillin allergy as an infant, tolerated.  Transitioned to Augmentin and Cipro " for planned 2-week course, since completed. CRP elevated today, 119--152. Without leukocytosis, hemodynamic changes to suggest infection. Known bone exposure with plans for OR debridement, cultures. Clinically stable.  -Continues on tylenol 1000mg TID, PRN PO dilaudid  -Continues on wet to dry dressings  -Follow up with wound care, plastic surgery as recommended - aware of increased CRP, monitoring     Physical deconditioning  Impaired mobility and ADLs  Generalized muscle weakness  In the context of multiple hospitalizations, acute illness and injury.  -PT/OT continuing  -SW for discharge planning     Mechanical mitral valve  A-fib  Not on rate control medications. On chronic anticoagulation with warfarin, INR goal 2.5-3.5.  Prior to admission on warfarin 3 mg daily, while at TCU has been receiving dose of 5mg M/Th and 3mg AOD.  INR today >5. HRs controlled.  -Holding warfarin  -INR in AM     HFpEF  Pulmonary hypertension, moderate  Severe TR  CAD  HTN/HLD  Chronic, stable. BPs controlled.   -Continues on torsemide 20 mg/day  -Daily weights  -Monitor volume status     Hypokalemia  Maintained on Klor-Con packets as had pill esophagitis in past. Dislikes side effects of GI disturbance, trialed gummy supplement ordered online, K today down to 3.1. in agreement to resume packets.  -discontinue home gummy supply  -Klor-Con 20mEq packets once daily  -BMP 5/16    ENRIQUE on CKD  Creatinine up to 1.38, baseline of 0.9-1.1. stable on follow-up, value 1.     Anemia  Hx GI bleed  Multiple hospitalizations recently for GI bleed as noted above.  Hemoglobin stable during recent hospitalization with value 9.2 at discharge. Follow-up value 8.3. No reports of melena.  -Continues on ferrous sulfate 325mg/d, protonix 40mg BID     Obesity, BMI 34  Increased risk of all-cause mortality.    MED REC REQUIRED  Post Medication Reconciliation Status: patient was not discharged from an inpatient facility or TCU      Electronically signed by:  Michael Alcantar PA-C

## 2024-05-16 NOTE — PROGRESS NOTES
[facility] GERIATRICS    Chief Complaint   Patient presents with    RECHECK     HPI:  Feng Wynne is a 79 year old  (1945), who is being seen today for an episodic care visit at: [facility] (TCU) [[id number]].     Summary: Patient is a 79-year-old male with past medical history of mechanical mitral valve on chronic anticoagulation with warfarin, HFpEF, pulmonary hypertension, CKD, CAD, hypertension, hypercholesterolemia, A-fib, anemia, known sacral decubitus ulcer, and multiple recent hospitalizations who was admitted at [facility] from 3/18 - 3/29, 2024 after presenting from TCU with concern of osteomyelitis of sacral wound.  Workup in ED was significant for a CRP of 119, otherwise unremarkable with a hemoglobin value of 9.6.  MRI of the pelvis revealed osteomyelitis involving the entirety of the coccyx with extensive inflammatory and phlegmonous changes throughout the sacral/coccygeal soft tissues with a 5.5 cm abscess in the posterior perirectal/left ischial anal fossa, extending and adherent to the posterior rectal wall.  Initial consideration for transfer to [facility] for higher level of care which was declined.  Patient was initiated on vancomycin and cefepime, seen in consultation with ID and general surgery.  INR reversed, bridged with heparin.  Ultimately underwent excisional debridement with electrocautery on 3/22/2024.  Postoperatively experienced significant encephalopathy, underwent MRI imaging which was negative.  EEG was also negative.  Gradually returning to baseline mental state.  Transitioned to oral antibiotics.  Referred to TCU for ongoing rehab, medical management.    Most recently saw Dr. Downey, wound care specialist in clinic who recommended wound vac and referral to plastic surgery. Subsequently saw plastics Dr. Srinivasan on 5/9 with findings of exposed bone, recommended to stop wound vac and plan for OR debridement, recommended offloading with new mattress and ongoing  "cares.      1/9 - 1/19, 2024: Admission at [facility] for acute anemia with hemoglobin of 4.4.  Etiology felt to be GI bleed in setting of supratherapeutic INR.  EGD was unremarkable, colonoscopy with poor prep and inability to visualize well.  Hemoglobin stabilized, anticoagulation resumed and patient discharged to TCU.  While at TCU patient was treated for pneumonia with ceftriaxone followed by cefuroxime and doxycycline.     1/30 - 2/15, 2024: Admission at [facility] for acute anemia and acute on chronic HFpEF.  Hemoglobin at time of presentation was 6.9, chest x-ray with findings of worsening pulmonary edema.  Patient was diuresed with IV furosemide, transition back to oral torsemide.  Seen by GI, s/p EGD 2/5 with severe pill esophagitis and active bleeding ulcer in the middle third of esophagus that was cauterized with BiCap.  Patient was started on PPI and Carafate.  Repeat endoscopy 2/8 indicated oozing esophageal ulcer necessitating anticoagulation to be held.  Hemoglobin ultimately stabilized to value 8-9, warfarin resumed.  Discharged to TCU    Today, pt is seen in follow-up. Resting in bed, spouse at bedside. INR continues to be supratherapeutic, today is down to 3.7. No evidence of bleeding. Continues to tolerate oral intake. Tolerating potassium packets, continues with GI side effects but is happy to hear he may only require 1x daily dosing rather than BID. Denies cp, sob. No further word on surgical plan. No concerns from facility staff.    On review of facility records, BPs ranging , HRs 66-73.    Allergies, and PMH/PSH reviewed in EPIC today.  REVIEW OF SYSTEMS:  4 point ROS including Respiratory, CV, GI and , other than that noted in the HPI,  is negative    Objective:   /77   Pulse 73   Temp 97.8  F (36.6  C)   Resp 16   Ht 1.803 m (5' 11\")   Wt 103.7 kg (228 lb 9.6 oz)   SpO2 93%   BMI 31.88 kg/m    GEN: well-developed, well-nourished, appears " comfortable  HEENT: NCAT, EOM intact bilaterally, sclera clear, conjunctiva normal, nose & mouth patent, mucous membranes moist  CHEST: lungs CTA bilaterally, no increased work of breathing, no wheeze, crackles, rhonchi  HEART: irregularly irregular, S1 & S2  ABD: soft, nontender, nondistended, no guarding or rigidity, +BS in all 4 quadrants  MSK: AROM bilateral UE/LE, pedal & radial pulses 2+ bilaterally  NEURO: awake, alert. CN II-XII grossly intact. Sensation grossly intact to light touch.   SKIN: warm & dry without rash, no pedal edema; sacral wound not visualized    Recent labs in [facility] reviewed by me today.  and Most Recent 3 CBC's:  Recent Labs   Lab Test 05/10/24  0705 05/08/24  0837 04/25/24  0715   WBC 6.6 5.6 4.7   HGB 8.8* 9.3* 9.1*   MCV 98 97 99    313 245     Most Recent 3 BMP's:  Recent Labs   Lab Test 05/16/24  0733 05/14/24  0538 05/08/24  0837    140 137   POTASSIUM 3.3* 3.1* 3.3*   CHLORIDE 96* 95* 95*   CO2 31* 33* 29   BUN 38.6* 39.9* 50.3*   CR 0.80 0.87 0.89   ANIONGAP 10 12 13   JOSEPH 9.1 9.2 9.5   GLC 89 96 113*       Assessment/Plan:    Osteomyelitis of coccyx  Perirectal abscess s/p I&D 3/22/2024  Sacral decubitus ulcer, POA  History as detailed above, seen in consult with ID, general surgery.  Is status post above procedure.  Patient was trialed on amoxicillin while inpatient despite reported penicillin allergy as an infant, tolerated.  Transitioned to Augmentin and Cipro for planned 2-week course, since completed. CRP elevated today, 119--152. Without leukocytosis, hemodynamic changes to suggest infection. Known bone exposure with plans for OR debridement, cultures. Clinically stable.  -Continues on tylenol 1000mg TID, PRN PO dilaudid  -Continues on wet to dry dressings  -Follow up with wound care, plastic surgery as recommended - aware of increased CRP, monitoring     Physical deconditioning  Impaired mobility and ADLs  Generalized muscle weakness  In the context of multiple  hospitalizations, acute illness and injury.  -PT/OT continuing  -SW for discharge planning     Mechanical mitral valve  A-fib  Not on rate control medications. On chronic anticoagulation with warfarin, INR goal 2.5-3.5.  Prior to admission on warfarin 3 mg daily, while at TCU has been receiving dose of 5mg M/Th and 3mg AOD.  INR today 3.7. HRs controlled.  -Warfarin 3mg x1, INR in AM     HFpEF  Pulmonary hypertension, moderate  Severe TR  CAD  HTN/HLD  Chronic, stable. BPs controlled.   -Continues on torsemide 20 mg/day  -Daily weights  -Monitor volume status     Hypokalemia  Maintained on Klor-Con packets as had pill esophagitis in past. Dislikes side effects of GI disturbance, trialed gummy supplement ordered online, K down to 3.1. discontinued and resumed packets, today improved to 3.3.  -Continue Klor-Con 20mEq packet once daily  -[facility] 5/20     ENRIQUE on CKD  Creatinine up to 1.38, baseline of 0.9-1.1. stable on follow-up, value 1.     Anemia  Hx GI bleed  Multiple hospitalizations recently for GI bleed as noted above.  Hemoglobin stable during recent hospitalization with value 9.2 at discharge. Follow-up value 8.3. No reports of melena.  -Continues on ferrous sulfate 325mg/d, protonix 40mg BID     Obesity, BMI 34  Increased risk of all-cause mortality.    MED REC REQUIRED  Post Medication Reconciliation Status: patient was not discharged from an inpatient facility or TCU      Electronically signed by: Michael Alcantar PA-C

## 2024-05-21 NOTE — PROGRESS NOTES
[facility] GERIATRICS    Chief Complaint   Patient presents with    RECHECK     HPI:  Feng Wynne is a 79 year old  (1945), who is being seen today for an episodic care visit at: [facility] (TC[facility]) [[id number]].     Summary: Patient is a 79-year-old male with past medical history of mechanical mitral valve on chronic anticoagulation with warfarin, HFpEF, pulmonary hypertension, CKD, CAD, hypertension, hypercholesterolemia, A-fib, anemia, known sacral decubitus ulcer, and multiple recent hospitalizations who was admitted at [facility] from 3/18 - 3/29, 2024 after presenting from TCU with concern of osteomyelitis of sacral wound.  Workup in ED was significant for a CRP of 119, otherwise unremarkable with a hemoglobin value of 9.6.  MRI of the pelvis revealed osteomyelitis involving the entirety of the coccyx with extensive inflammatory and phlegmonous changes throughout the sacral/coccygeal soft tissues with a 5.5 cm abscess in the posterior perirectal/left ischial anal fossa, extending and adherent to the posterior rectal wall.  Initial consideration for transfer to [facility] for higher level of care which was declined.  Patient was initiated on vancomycin and cefepime, seen in consultation with ID and general surgery.  INR reversed, bridged with heparin.  Ultimately underwent excisional debridement with electrocautery on 3/22/2024.  Postoperatively experienced significant encephalopathy, underwent MRI imaging which was negative.  EEG was also negative.  Gradually returning to baseline mental state.  Transitioned to oral antibiotics.  Referred to TCU for ongoing rehab, medical management.     Most recently saw Dr. Downey, wound care specialist in clinic who recommended wound vac and referral to plastic surgery. Subsequently saw plastics Dr. Srinivasan on 5/9 with findings of exposed bone, recommended to stop wound vac and plan for OR debridement, recommended offloading with new mattress and ongoing  "cares.      1/9 - 1/19, 2024: Admission at [facility] for acute anemia with hemoglobin of 4.4.  Etiology felt to be GI bleed in setting of supratherapeutic INR.  EGD was unremarkable, colonoscopy with poor prep and inability to visualize well.  Hemoglobin stabilized, anticoagulation resumed and patient discharged to TCU.  While at TCU patient was treated for pneumonia with ceftriaxone followed by cefuroxime and doxycycline.     1/30 - 2/15, 2024: Admission at [facility] for acute anemia and acute on chronic HFpEF.  Hemoglobin at time of presentation was 6.9, chest x-ray with findings of worsening pulmonary edema.  Patient was diuresed with IV furosemide, transition back to oral torsemide.  Seen by GI, s/p EGD 2/5 with severe pill esophagitis and active bleeding ulcer in the middle third of esophagus that was cauterized with BiCap.  Patient was started on PPI and Carafate.  Repeat endoscopy 2/8 indicated oozing esophageal ulcer necessitating anticoagulation to be held.  Hemoglobin ultimately stabilized to value 8-9, warfarin resumed.  Discharged to TCU    Today, pt is seen in follow-up. Resting in bed on interview, spouse at bedside. Reports he has largely been lying on his side, has heard the wound is improving. Awaiting to hear plans for possible surgery. No sob, cp. Tolerating once daily potassium. No concerns from facility staff.     On review of facility records, BPs ranging 109-118, HRs 63-83.    Allergies, and PMH/PSH reviewed in EPIC today.  REVIEW OF SYSTEMS:  4 point ROS including Respiratory, CV, GI and , other than that noted in the HPI,  is negative    Objective:   /72   Pulse 68   Temp 97.8  F (36.6  C)   Resp 18   Ht 1.803 m (5' 11\")   Wt 103.7 kg (228 lb 9.6 oz)   SpO2 96%   BMI 31.88 kg/m    GEN: well-developed, well-nourished, appears comfortable  HEENT: NCAT, EOM intact bilaterally, sclera clear, conjunctiva normal, nose & mouth patent, mucous membranes " moist  CHEST: lungs CTA bilaterally, no increased work of breathing, no wheeze, crackles, rhonchi  HEART: irregularly irregular, S1 & S2  ABD: soft, nontender, nondistended, no guarding or rigidity, +BS in all 4 quadrants  MSK: AROM bilateral UE/LE, pedal & radial pulses 2+ bilaterally  NEURO: awake, alert. CN II-XII grossly intact. Sensation grossly intact to light touch.   SKIN: warm & dry without rash, no pedal edema; sacral wound not visualized    Recent labs in [facility] reviewed by me today.  and Most Recent 3 CBC's:  Recent Labs   Lab Test 05/10/24  0705 05/08/24  0837 04/25/24  0715   WBC 6.6 5.6 4.7   HGB 8.8* 9.3* 9.1*   MCV 98 97 99    313 245     Most Recent 3 BMP's:  Recent Labs   Lab Test 05/20/24  0601 05/16/24  0733 05/14/24  0538 05/08/24  0837    137 140 137   POTASSIUM 3.4 3.3* 3.1* 3.3*   CHLORIDE 96* 96* 95* 95*   CO2 30* 31* 33* 29   BUN 40.4* 38.6* 39.9* 50.3*   CR 0.95 0.80 0.87 0.89   ANIONGAP 12 10 12 13   JOSEPH 9.1 9.1 9.2 9.5   GLC  --  89 96 113*       Assessment/Plan:    Osteomyelitis of coccyx  Perirectal abscess s/p I&D 3/22/2024  Sacral decubitus ulcer, POA  History as detailed above, seen in consult with ID, general surgery.  Is status post above procedure.  Patient was trialed on amoxicillin while inpatient despite reported penicillin allergy as an infant, tolerated.  Transitioned to Augmentin and Cipro for planned 2-week course, since completed. Known bone exposure with plans for OR debridement, cultures. Clinically stable.  -Continues on tylenol 1000mg TID, PRN PO dilaudid  -Continues on wet to dry dressings  -Follow up with wound care, plastic surgery as recommended, awaiting surgical date/plan     Physical deconditioning  Impaired mobility and ADLs  Generalized muscle weakness  In the context of multiple hospitalizations, acute illness and injury.  -PT/OT continuing  -SW for discharge planning     Mechanical mitral valve  A-fib  Not on rate control medications. On chronic  anticoagulation with warfarin, INR goal 2.5-3.5.  Prior to admission on warfarin 3 mg daily. INR has been variable at TCU, last value 3.1. HRs controlled.  -Continues on warfarin 3mg/d, next INR due 5/23     HFpEF  Pulmonary hypertension, moderate  Severe TR  CAD  HTN/HLD  Chronic, stable. BPs controlled.   -Continues on torsemide 20 mg/day  -Daily weights  -Monitor volume status     Hypokalemia  Maintained on Klor-Con packets as had pill esophagitis in past. Dislikes side effects of GI disturbance, trialed gummy supplement ordered online, K down to 3.1. Discontinued and resumed packets, subsequent values improved.  -Continue Klor-Con 20mEq packet once daily  -BMP 5/23     ENRIQUE on CKD  Creatinine up to 1.38, baseline of 0.9-1.1. stable on follow-up, value 1.     Anemia  Hx GI bleed  Multiple hospitalizations recently for GI bleed as noted above.  Hemoglobin stable during recent hospitalization with value 9.2 at discharge. Follow-up value 8.3. No reports of melena.  -Continues on ferrous sulfate 325mg/d, protonix 40mg BID     Obesity, BMI 34  Increased risk of all-cause mortality.    MED REC REQUIRED  Post Medication Reconciliation Status: patient was not discharged from an inpatient facility or TCU      Electronically signed by: Michael Callejas

## 2024-05-23 NOTE — TELEPHONE ENCOUNTER
[facility] Geriatrics Triage Nurse INR     Provider: Michael Alcantar PA-C  Facility: [facility]  Facility Type:  TCU    Caller: Luz  Call Back Number: [phone]  Reason for call: INR  Diagnosis/Goal: MVR    Date INR New Dose/Orders   5/16 3.7 3 mg   5/17 3.1 3 mg   5/20 3.1 3 mg daily   5/23 4.6 hold        Heparin/Lovenox:  No  Currently on ABX?: No  Other interacting medication:  None  Missed or refused doses: No    Verbal Order/Direction given by Provider: Hold Coumadin. Check INR on 5/24/24.    Provider Giving Order:  Michael Alcantar PA-C    Verbal Order given to: Marsha Valencia RN

## 2024-05-23 NOTE — TELEPHONE ENCOUNTER
Discussed with Dr. Srinivasan who states surgery will not be able to scheduled until August or possibly September. Returned call to Michael and informed her of this. No further questions or concerns.

## 2024-05-24 PROBLEM — I10 ESSENTIAL HYPERTENSION: Chronic | Status: ACTIVE | Noted: 2017-09-15

## 2024-05-24 PROBLEM — M62.81 MUSCLE WEAKNESS (GENERALIZED): Chronic | Status: ACTIVE | Noted: 2024-01-01

## 2024-05-24 PROBLEM — I11.0 HYPERTENSIVE HEART DISEASE WITH HEART FAILURE (H): Chronic | Status: ACTIVE | Noted: 2024-01-01

## 2024-05-24 PROBLEM — Z79.01 LONG TERM CURRENT USE OF ANTICOAGULANT THERAPY: Chronic | Status: ACTIVE | Noted: 2017-05-08

## 2024-05-24 PROBLEM — I50.32 CHRONIC DIASTOLIC (CONGESTIVE) HEART FAILURE (H): Chronic | Status: ACTIVE | Noted: 2024-01-01

## 2024-05-24 PROBLEM — N18.2 CHRONIC KIDNEY DISEASE, STAGE 2 (MILD): Chronic | Status: ACTIVE | Noted: 2024-01-01

## 2024-05-24 PROBLEM — M25.511 CHRONIC RIGHT SHOULDER PAIN: Chronic | Status: ACTIVE | Noted: 2018-08-09

## 2024-05-24 PROBLEM — G89.29 CHRONIC RIGHT SHOULDER PAIN: Chronic | Status: ACTIVE | Noted: 2018-08-09

## 2024-05-24 NOTE — PROGRESS NOTES
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    Orders wrote at [facility] today:    Stage 4 Pressure injury, sacral region  -Cleanse with wound cleanser, pat dry  -Loosely pack with one large piece of calcium alginate with silver  -Cover with a sacral foam dressing OR if issues with adherence, can use ABD and tape  -Change twice daily and as needed    -He is aware the AMB roll gauze and ABD pad has been discontinued; Omega has the sterile roll gauze, not the antimicrobial as far as I can tell (that's fine, we will use silver calcium alginate instead)    Bowel and bladder incontinence  Debility, minimally ambulatory with a walker  Obesity  Mini nutrition assessment score:  11 = at risk of malnutrition (8-11).   Appreciate dietitian optimization of diet for healing.     HISTORY OF PRESENT ILLNESS:   Feng Wynne is a 79 year old male who is seen at [facility].  Hospitalized at [facility] from 3/18/24 - 3/29/24 for an infected sacral pressure injury and osteomyelitis of coccyx.  MR 3/18/24 showed osteomyelitis of the entire coccyx as well as a perirectal abscess.  He underwent operative debridement 3/22/24 to bleeding tissue; there was no mention of bone removal during the procedure.  He saw ID while in the hospital who did not feel that prolonged antibiotics were merited without at least bone debridement, therefore, planned for a two week course of Augmentin and ciprofloxacin and monitoring until definitive treatment.    Medical history notable for CHF, pulmonary hypertension, severe mitral valve regurgitation (s/p mechanical valve replacement), permanent atrial fibrillation, hypertension, dyslipidemia, CKD, pill esophagitis, esophageal ulcer, GERD, pneumonia, osteoarthritis, and obesity.    TREATMENT COURSE:  4/2/24: tNPWT started at [facility].  He was not interested in discussing further surgical intervention at that time.  4/10/24: Initial visit at [facility].   Feng and staff state that after many attempts they could not get the SETiT machine to work.   4/24/24: Nursing staff reports that they could not get the VAC to adhere, so have been doing Vashe wet to dry BID.   5/9/24: Seen by Dr. Srinivasan at [facility].  New bone exposure noted when seen by Dr. Downey two days prior.  tNPWT was discontinued for now, placed on POC of dry AMB gauze and an ABD changed BID and PRN.  5/23/24: Bone exposed.  Proximal aspect of the wound with much nonviable tissue.  Leaking small amount of soft stool on exam.  Laying on his right side, very happy he has started to lay on his sides, finds this quite comfortable.  SNF does not have AMB gauze, just sterile Kerlix.  POC changed to silver alginate and foam changed BID and PRN.  Understands that he needs surgery to address the wound and is hopeful to be scheduled sooner than later.  Made aware of likely Fall 2024 timeline at earliest.    MATTRESS: Group 1 mattress  WHEELCHAIR CUSHION: Auvon pressure relief foam cushion on top of a sling-bottom loaner wheelchair with a very thick foam cushion   URINE MANAGEMENT: incontinent, using diapers  BOWEL MANAGEMENT: incontinent, using diapers    PHYSICAL EXAM:  GENERAL: Patient is alert and oriented and in no acute distress  INTEGUMENTARY: Cavernous pressure injury over the coccyx with exposed sharp bone at base.  Loose bone shard present.  Much nonviable tissue in the proximal portion of the wound.  Nonviable edges with active breakdown.  Some partial thickness skin loss in the periphery on the left.  No signs of acute wound infection.        PROCEDURE: Mechanical/nonselective debridement with gauze with wound cleansing    MDM: Low (return) - 15 minutes spent reviewing hospital and TCU notes, assessing patient and developing care plan. (15 minutes DOS, 18 minutes following the date of service spent on documentation).    Electronically signed by Electronically signed by: Girish Asher, EAMON, APRN, CNP,  CWCN

## 2024-05-30 NOTE — PROGRESS NOTES
[facility]    Chief Complaint   Patient presents with    RECHECK     HPI:  Feng Wynne is a 79 year old  (1945), who is being seen today for an episodic care visit at: [facility] (TCU) [[id number]].     Summary: Patient is a 79-year-old male with past medical history of mechanical mitral valve on chronic anticoagulation with warfarin, HFpEF, pulmonary hypertension, CKD, CAD, hypertension, hypercholesterolemia, A-fib, anemia, known sacral decubitus ulcer, and multiple recent hospitalizations who was admitted at [facility] from 3/18 - 3/29, 2024 after presenting from TCU with concern of osteomyelitis of sacral wound.  Workup in ED was significant for a CRP of 119, otherwise unremarkable with a hemoglobin value of 9.6.  MRI of the pelvis revealed osteomyelitis involving the entirety of the coccyx with extensive inflammatory and phlegmonous changes throughout the sacral/coccygeal soft tissues with a 5.5 cm abscess in the posterior perirectal/left ischial anal fossa, extending and adherent to the posterior rectal wall.  Initial consideration for transfer to [facility] for higher level of care which was declined.  Patient was initiated on vancomycin and cefepime, seen in consultation with ID and general surgery.  INR reversed, bridged with heparin.  Ultimately underwent excisional debridement with electrocautery on 3/22/2024.  Postoperatively experienced significant encephalopathy, underwent MRI imaging which was negative.  EEG was also negative.  Gradually returning to baseline mental state.  Transitioned to oral antibiotics.  Referred to TCU for ongoing rehab, medical management.     1/9 - 1/19, 2024: Admission at [facility] for acute anemia with hemoglobin of 4.4.  Etiology felt to be GI bleed in setting of supratherapeutic INR.  EGD was unremarkable, colonoscopy with poor prep and inability to visualize well.  Hemoglobin stabilized, anticoagulation resumed and patient  "discharged to TCU.  While at TCU patient was treated for pneumonia with ceftriaxone followed by cefuroxime and doxycycline.     1/30 - 2/15, 2024: Admission at [facility] for acute anemia and acute on chronic HFpEF.  Hemoglobin at time of presentation was 6.9, chest x-ray with findings of worsening pulmonary edema.  Patient was diuresed with IV furosemide, transition back to oral torsemide.  Seen by GI, s/p EGD 2/5 with severe pill esophagitis and active bleeding ulcer in the middle third of esophagus that was cauterized with BiCap.  Patient was started on PPI and Carafate.  Repeat endoscopy 2/8 indicated oozing esophageal ulcer necessitating anticoagulation to be held.  Hemoglobin ultimately stabilized to value 8-9, warfarin resumed.  Discharged to TCU.    Most recently saw Dr. Downey, wound care specialist in clinic who recommended wound vac and referral to plastic surgery. Subsequently saw plastics Dr. Srinivasan on 5/9 with findings of exposed bone, recommended to stop wound vac and plan for OR debridement, recommended offloading with new mattress and ongoing cares.     Today, pt is seen in follow-up. Doing well, continues with wound cares. Anticipating discharge early next week as surgery for wound will likely be in August. Denies cp, sob. Eating well. Has been spending time on his side. Spouse at bedside, wonders if calcium agitate dressings need a prescription.    On review of facility records, BPs ranging 112-121, HRs 71-85.    Allergies, and PMH/PSH reviewed in EPIC today.  REVIEW OF SYSTEMS:  4 point ROS including Respiratory, CV, GI and , other than that noted in the HPI,  is negative    Objective:   /72   Pulse 78   Temp 97.2  F (36.2  C)   Resp 18   Ht 1.803 m (5' 11\")   Wt 103.7 kg (228 lb 9.6 oz)   SpO2 97%   BMI 31.88 kg/m    GEN: well-developed, well-nourished, appears comfortable  HEENT: NCAT, EOM intact bilaterally, sclera clear, conjunctiva normal, nose & mouth " patent, mucous membranes moist  CHEST: lungs CTA bilaterally, no increased work of breathing, no wheeze, crackles, rhonchi  HEART: irregularly irregular, S1 & S2  ABD: soft, nontender, nondistended, no guarding or rigidity, +BS in all 4 quadrants  MSK: AROM bilateral UE/LE, pedal & radial pulses 2+ bilaterally  NEURO: awake, alert. CN II-XII grossly intact. Sensation grossly intact to light touch.   SKIN: warm & dry without rash, no pedal edema; sacral wound not visualized    Recent labs in [facility] reviewed by me today.  and Most Recent 3 CBC's:  Recent Labs   Lab Test 05/23/24  0634 05/10/24  0705 05/08/24  0837   WBC 6.5 6.6 5.6   HGB 8.4* 8.8* 9.3*   MCV 98 98 97    313 313     Most Recent 3 BMP's:  Recent Labs   Lab Test 05/23/24  0634 05/20/24  0601 05/16/24  0733 05/14/24  0538    138 137 140   POTASSIUM 3.9 3.4 3.3* 3.1*   CHLORIDE 99 96* 96* 95*   CO2 30* 30* 31* 33*   BUN 32.4* 40.4* 38.6* 39.9*   CR 0.79 0.95 0.80 0.87   ANIONGAP 9 12 10 12   JOSEPH 9.1 9.1 9.1 9.2   GLC 84  --  89 96     Most Recent 2 LFT's:  Recent Labs   Lab Test 05/08/24  0837 03/25/24  0740   AST 21 47*   ALT 5 15   ALKPHOS 83 83   BILITOTAL 0.6 0.5       Assessment/Plan:    Osteomyelitis of coccyx  Perirectal abscess s/p I&D 3/22/2024  Sacral decubitus ulcer, POA  History as detailed above, seen in consult with ID, general surgery.  Is status post above procedure.  Patient was trialed on amoxicillin while inpatient despite reported penicillin allergy as an infant, tolerated.  Transitioned to Augmentin and Cipro for planned 2-week course, since completed. Known bone exposure with plans for OR debridement, cultures. Clinically stable.  -Continues on tylenol 1000mg TID, PRN PO dilaudid  -Continues on wet to dry dressings  -Follow up with wound care team, anticipate surgery in 08/2024 or later     Physical deconditioning  Impaired mobility and ADLs  Generalized muscle weakness  In the context of multiple hospitalizations, acute  illness and injury. Requires use of EZ stand for transfers.  -PT/OT continuing  -SW for discharge planning     Mechanical mitral valve  A-fib  Not on rate control medications. On chronic anticoagulation with warfarin, INR goal 2.5-3.5.  Prior to admission on warfarin 3 mg daily. INR has been variable at TCU. HRs controlled.  -Continues on warfarin 2.5mg/d, next INR due 5/23     HFpEF  Pulmonary hypertension, moderate  Severe TR  CAD  HTN/HLD  Chronic, stable. BPs controlled.   -Continues on torsemide 20 mg/day  -Daily weights  -Monitor volume status     Hypokalemia  Maintained on Klor-Con packets as had pill esophagitis in past. Dislikes side effects of GI disturbance, trialed gummy supplement ordered online, K down to 3.1. Discontinued and resumed packets, subsequent values improved.  -Continue Klor-Con 20mEq packet once daily  -BMP 5/31     ENRIQUE on CKD  Creatinine up to 1.38, baseline of 0.9-1.1. stable on follow-up, value 1.     Anemia  Hx GI bleed  Multiple hospitalizations recently for GI bleed as noted above.  Hemoglobin stable during recent hospitalization with value 9.2 at discharge. Follow-up value 8.3. No reports of melena.  -Continues on ferrous sulfate 325mg/d, protonix 40mg BID     Obesity, BMI 34  Increased risk of all-cause mortality.    MED REC REQUIRED  Post Medication Reconciliation Status: patient was not discharged from an inpatient facility or TCU      Electronically signed by: GRAY [facility]  Face to Face and Medical Necessity Statement for DME Provider visit    Patient: Feng Wynne  Gender: male  YOB: 1945  [place] Medical Record Number: [id number]  Demographics:  [address]   [facility] [address]  [phone] (home)   Social Security Number: [id number]  Primary Care Provider: Jayme Deng  Insurance: Payor: [facility] / Plan: [facility] MEDICARE ADVANTAGE / Product Type: Medicare /     HPI: Feng Wynne is a 79 year old  "(1945), who is being seen today for a face to face provider visit at [facility] ([facility]) [[id number]]. Medical necessity statement for DME included.     This patient requires the following: DME Ordered and Medical Necessity Statement   EZ stand    Patient needs to transfer from bed to W/C and W/C to commode. Without this patient needs to be bed ridden.    Pt needing above DME with expected length of need of  99 months due to medical necessity associated with following diagnosis:  Osteomyelitis of sacrum (H)      Past Medical History:   has a past medical history of Acute on chronic congestive heart failure, unspecified heart failure type (H) (01/30/2024), Arthritis, Atrial fibrillation (H), Coronary artery disease, Hypercholesteremia, Obesity, and Unspecified essential hypertension.    He has no past medical history of Cancer (H), Cerebral infarction (H), COPD (chronic obstructive pulmonary disease) (H), Depressive disorder, Diabetes (H), History of blood transfusion, Thyroid disease, or Uncomplicated asthma.    Review of Systems:  4 point ROS including Respiratory, CV, GI and , other than that noted in the HPI,  is negative    Exam:  Vitals: /72   Pulse 78   Temp 97.2  F (36.2  C)   Resp 18   Ht 1.803 m (5' 11\")   Wt 103.7 kg (228 lb 9.6 oz)   SpO2 97%   BMI 31.88 kg/m    BMI: Body mass index is 31.88 kg/m .  As above     Assessment/Plan:  1. Osteomyelitis of sacrum (H)        Orders:  1. Facility staff/TC to contact DME company to get their order form for provider to fill out    ELECTRONICALLY SIGNED BY YOGI CERTIFIED PROVIDER: Michael Alcantar PA-C   NPI: [id number]  [facility]  [address] [place] [address]     Documentation of Face to Face and Certification for Home Health Services    I certify that services are/were furnished while this patient was under the care of a physician and that a physician or an allowed non-physician practitioner (NPP), had a " face-to-face encounter that meets the physician face-to-face encounter requirements. The encounter was in whole, or in part, related to the primary reason for home health. The patient is confined to his/her home and needs intermittent skilled nursing, physical therapy, speech-language pathology, or the continued need for occupational therapy. A plan of care has been established by a physician and is periodically reviewed by a physician.  Date of Face-to-Face Encounter: 5/30/2024.    I certify that, based on my findings, the following services are medically necessary home health services: Nursing, Occupational Therapy, and Physical Therapy.    My clinical findings support the need for the above skilled services because: Requires assistance of another person or specialized equipment to access medical services because patient: Range of motion limitations prevents ability to exit home safely...    Patient to re-establish plan of care with their PCP within 7-10 days after leaving the facility to reestablish care.  Medicare certified YOGI provider: Michael Alcantar PA-C  Date: May 31, 2024

## 2024-06-03 NOTE — PROGRESS NOTES
[facility] GERIATRICS DISCHARGE SUMMARY  PATIENT'S NAME: Feng Wynne  YOB: 1945  MEDICAL RECORD NUMBER:  [id number]  Place of Service where encounter took place:  [facility] [[id number]]    PRIMARY CARE PROVIDER AND CLINIC RESPONSIBLE AFTER TRANSFER:   Jayme Deng MD, MD, [address] / [place] [address]    G Provider     Transferring providers: Michael Alcantar PA-C, Dr. David MD  Recent Hospitalization/ED:  [facility] Hospital stay 3/18/24 to 3/29/24.  Date of SNF Admission:  3/29/24  Date of SNF (anticipated) Discharge:  6/6/24  Discharged to: previous independent home with spouse  Cognitive Scores:  NA  Physical Function: Mechanical lift dependent for transfers  DME: SNF  coordinating DME needs     CODE STATUS/ADVANCE DIRECTIVES DISCUSSION:  Full Code   ALLERGIES: Amiodarone, Pcn [penicillins], Statins, Amiodarone, Latex, and Zetia [ezetimibe]    NURSING FACILITY COURSE   Medication Changes/Rationale:   As noted    Summary of nursing facility stay:   Summary: Patient is a 79-year-old male with past medical history of mechanical mitral valve on chronic anticoagulation with warfarin, HFpEF, pulmonary hypertension, CKD, CAD, hypertension, hypercholesterolemia, A-fib, anemia, known sacral decubitus ulcer, and multiple recent hospitalizations who was admitted at [facility] from 3/18 - 3/29, 2024 after presenting from TCU with concern of osteomyelitis of sacral wound.  Workup in ED was significant for a CRP of 119, otherwise unremarkable with a hemoglobin value of 9.6.  MRI of the pelvis revealed osteomyelitis involving the entirety of the coccyx with extensive inflammatory and phlegmonous changes throughout the sacral/coccygeal soft tissues with a 5.5 cm abscess in the posterior perirectal/left ischial anal fossa, extending and adherent to the posterior rectal wall.  Initial consideration for transfer to [facility] for  higher level of care which was declined.  Patient was initiated on vancomycin and cefepime, seen in consultation with ID and general surgery.  INR reversed, bridged with heparin.  Ultimately underwent excisional debridement with electrocautery on 3/22/2024.  Postoperatively experienced significant encephalopathy, underwent MRI imaging which was negative.  EEG was also negative.  Gradually returning to baseline mental state.  Transitioned to oral antibiotics.  Referred to TCU for ongoing rehab, medical management.     1/9 - 1/19, 2024: Admission at [facility] for acute anemia with hemoglobin of 4.4.  Etiology felt to be GI bleed in setting of supratherapeutic INR.  EGD was unremarkable, colonoscopy with poor prep and inability to visualize well.  Hemoglobin stabilized, anticoagulation resumed and patient discharged to TCU.  While at TCU patient was treated for pneumonia with ceftriaxone followed by cefuroxime and doxycycline.     1/30 - 2/15, 2024: Admission at [facility] for acute anemia and acute on chronic HFpEF.  Hemoglobin at time of presentation was 6.9, chest x-ray with findings of worsening pulmonary edema.  Patient was diuresed with IV furosemide, transition back to oral torsemide.  Seen by GI, s/p EGD 2/5 with severe pill esophagitis and active bleeding ulcer in the middle third of esophagus that was cauterized with BiCap.  Patient was started on PPI and Carafate.  Repeat endoscopy 2/8 indicated oozing esophageal ulcer necessitating anticoagulation to be held.  Hemoglobin ultimately stabilized to value 8-9, warfarin resumed.  Discharged to TCU.     Most recently saw Dr. Downey, wound care specialist in clinic who recommended wound vac and referral to plastic surgery. Subsequently saw plastics Dr. Srinivasan on 5/9 with findings of exposed bone, recommended to stop wound vac and plan for OR debridement, recommended offloading with new mattress and ongoing cares.     Pt is seen  today for discharge planning. While at TCU pt progressed in therapies, continues to require use of EZ stand for transfers. Wound slowly improving. Had difficulties tolerating wound vac. Ultimately found to have exposed bone, planning on surgical intervention later this summer. Pt will be returning home with assistance from spouse. The following were managed during TCU stay:    Osteomyelitis of coccyx  Perirectal abscess s/p I&D 3/22/2024  Sacral decubitus ulcer, POA  History as detailed above, seen in consult with ID, general surgery.  Is status post above procedure.  Patient was trialed on amoxicillin while inpatient despite reported penicillin allergy as an infant, tolerated.  Transitioned to Augmentin and Cipro for planned 2-week course, since completed. Known bone exposure with plans for OR debridement, cultures. Clinically stable.  -Continues on tylenol 1000mg TID, PRN PO dilaudid  -Continues on wet to dry dressings  -Follow up with wound care team, anticipate surgery in 08/2024 or later     Physical deconditioning  Impaired mobility and ADLs  Generalized muscle weakness  In the context of multiple hospitalizations, acute illness and injury. Requires use of EZ stand for transfers.  -PT/OT continuing     Mechanical mitral valve  A-fib  Not on rate control medications. On chronic anticoagulation with warfarin, INR goal 2.5-3.5.  Prior to admission on warfarin 3 mg daily. INR has been variable at TCU. HRs controlled.  -Continues on warfarin 3mg MWF, 2.5mg AOD, next INR due 6/10     HFpEF  Pulmonary hypertension, moderate  Severe TR  CAD  HTN/HLD  Chronic, stable. BPs controlled.   -Continues on torsemide 20 mg/day     Hypokalemia  Maintained on Klor-Con packets as had pill esophagitis in past. Dislikes side effects of GI disturbance, trialed gummy supplement ordered online, K down to 3.1. Discontinued and resumed packets, subsequent values improved.  -Continue Klor-Con 20mEq packet MWF  -Recommend repeat BMP in  PCP follow-up     ENRIQUE on CKD  Creatinine up to 1.38, baseline of 0.9-1.1. stable on follow-up, value 1.     Anemia  Hx GI bleed  Multiple hospitalizations recently for GI bleed as noted above.  Hemoglobin stable during recent hospitalization with value 9.2 at discharge. Follow-up value 8.3. No reports of melena.  -Continues on ferrous sulfate 325mg/d, protonix 40mg BID     Obesity, BMI 34  Increased risk of all-cause mortality.    Discharge Medications:  MED REC REQUIRED  Post Medication Reconciliation Status: patient was not discharged from an inpatient facility or TCU     Current Outpatient Medications   Medication Sig Dispense Refill    ferrous sulfate (FEROSUL) 325 (65 Fe) MG tablet Take 1 tablet (325 mg) by mouth daily 30 tablet 0    HYDROmorphone (DILAUDID) 4 MG tablet Take 0.5 tablets (2 mg) by mouth every 6 hours as needed for severe pain 30 tablet 0    pantoprazole (PROTONIX) 40 MG EC tablet Take 1 tablet (40 mg) by mouth 2 times daily (before meals) 60 tablet 1    potassium chloride (KLOR-CON) 20 MEQ packet Take 20 mEq by mouth Every Mon, Wed, Fri Morning 10 packet 1    torsemide (DEMADEX) 20 MG tablet Take 1 tablet (20 mg) by mouth daily 30 tablet 1    warfarin ANTICOAGULANT (COUMADIN) 1 MG tablet Take 3 tablets (3 mg) by mouth See Admin Instructions AND 2.5 tablets (2.5 mg) See Admin Instructions. 3mg on MWF and 2.5mg AOD 60 tablet 1    acetaminophen (TYLENOL) 500 MG tablet Take 2 tablets (1,000 mg) by mouth 3 times daily      albuterol (PROAIR HFA/PROVENTIL HFA/VENTOLIN HFA) 108 (90 Base) MCG/ACT inhaler Inhale 1 puff into the lungs every 4 hours as needed for shortness of breath      bisacodyl (DULCOLAX) 10 MG suppository Place 10 mg rectally daily as needed for constipation Every 3 days if no BM      calcium carbonate (TUMS) 500 MG chewable tablet Take 1 tablet (500 mg) by mouth 3 times daily as needed for heartburn      diclofenac (VOLTAREN) 1 % topical gel Apply 4 g topically 3 times daily       "Multiple Vitamins-Minerals (MULTIVITAMIN ADULT) CHEW Take 2 chew tab by mouth daily      nystatin (MYCOSTATIN) 453651 UNIT/GM external powder Apply topically 2 times daily Groin folds      Omega-3 Fatty Acids (FISH OIL CONCENTRATE PO) Take 1 capsule by mouth daily      order for DME Equipment being ordered: COMPRESSION STOCKINGS, 20-30 mmHg 1 each 1    Probiotic CHEW Take 2 chew tab by mouth daily      psyllium (METAMUCIL/KONSYL) 58.6 % powder Take 18 g (1 Tablespoonful) by mouth daily      Warfarin Therapy Reminder Take 1 each by mouth See Admin Instructions Per INR          Controlled medications:   Medication dilaudid electronically prescribed to home pharmacy     Past Medical History:   Past Medical History:   Diagnosis Date    Acute on chronic congestive heart failure, unspecified heart failure type (H) 01/30/2024    Arthritis     Atrial fibrillation (H)     Coronary artery disease     Hypercholesteremia     Obesity     Unspecified essential hypertension      Physical Exam:   Vitals: /72   Pulse 80   Temp 97.7  F (36.5  C)   Resp 16   Ht 1.803 m (5' 11\")   Wt 103.9 kg (229 lb)   SpO2 94%   BMI 31.94 kg/m    BMI: Body mass index is 31.94 kg/m .  GEN: well-developed, well-nourished, appears comfortable  HEENT: NCAT, EOM intact bilaterally, sclera clear, conjunctiva normal, nose & mouth patent, mucous membranes moist  CHEST: lungs CTA bilaterally, no increased work of breathing, no wheeze, crackles, rhonchi  HEART: irregularly irregular, S1 & S2  ABD: soft, nontender, nondistended, no guarding or rigidity, +BS in all 4 quadrants  MSK: AROM bilateral UE/LE, pedal & radial pulses 2+ bilaterally  NEURO: awake, alert. CN II-XII grossly intact. Sensation grossly intact to light touch.   SKIN: warm & dry without rash, no pedal edema; sacral wound not visualized    SNF labs: Recent labs in [facility] reviewed by me today.  and Most Recent 3 CBC's:  Recent Labs   Lab Test 05/23/24  0634 05/10/24  0705 " 05/08/24  0837   WBC 6.5 6.6 5.6   HGB 8.4* 8.8* 9.3*   MCV 98 98 97    313 313     Most Recent 3 BMP's:  Recent Labs   Lab Test 05/31/24  0616 05/23/24  0634 05/20/24  0601 05/16/24  0733 05/14/24  0538    138 138 137 140   POTASSIUM 3.6 3.9 3.4 3.3* 3.1*   CHLORIDE 96* 99 96* 96* 95*   CO2 33* 30* 30* 31* 33*   BUN 33.2* 32.4* 40.4* 38.6* 39.9*   CR 0.88 0.79 0.95 0.80 0.87   ANIONGAP 8 9 12 10 12   JOSEPH 9.2 9.1 9.1 9.1 9.2   GLC  --  84  --  89 96     Most Recent 2 LFT's:  Recent Labs   Lab Test 05/08/24  0837 03/25/24  0740   AST 21 47*   ALT 5 15   ALKPHOS 83 83   BILITOTAL 0.6 0.5       DISCHARGE PLAN:  Follow up labs: INR due 6/10 to be drawn by home care with results to INR clinic  Medical Follow Up:      Follow up with primary care provider in 1-2 weeks  Follow up with specialist plastic surgery, wound care specialists, GI as directed   Current [facility] scheduled appointments:     Discharge Services: Home Care:  Occupational Therapy, Physical Therapy, Registered Nurse, and Home Health Aide  Discharge Instructions Verbalized to Patient at Discharge:   Wound care as per discharge instructions.     TOTAL DISCHARGE TIME:   Greater than 30min  Electronically signed by:  Michael Alcantar PA-C     Documentation of Face to Face and Certification for Home Health Services    I certify that services are/were furnished while this patient was under the care of a physician and that a physician or an allowed non-physician practitioner (NPP), had a face-to-face encounter that meets the physician face-to-face encounter requirements. The encounter was in whole, or in part, related to the primary reason for home health. The patient is confined to his/her home and needs intermittent skilled nursing, physical therapy, speech-language pathology, or the continued need for occupational therapy. A plan of care has been established by a physician and is periodically reviewed by a physician.  Date of Face-to-Face Encounter:  6/3/2024.    I certify that, based on my findings, the following services are medically necessary home health services: Nursing, Occupational Therapy, and Physical Therapy.    My clinical findings support the need for the above skilled services because: Requires assistance of another person or specialized equipment to access medical services because patient: Range of motion limitations prevents ability to exit home safely...    Patient to re-establish plan of care with their PCP within 7-10 days after leaving the facility to reestablish care.  Medicare certified [facility] provider: Michael Alcantar PA-C  Date: Latoya 3, 2024

## 2024-06-05 NOTE — TELEPHONE ENCOUNTER
Patient called with multiple questions. Patient asked how long he can stit in his chair for prior to potential surgery in August or September. Writer discussed that per the AVS patient can sit in chair for 1 hour at a time while tilting and reclining every 15 minutes during that hour then return to bed and offload. Writer discussed that the more he is off of the area the better. Patient also expressed wanting [place]. Writer stated we would not recommend that as you will be sitting for too long. Patient also had questions regarding his blood thinner and drinking the occasional beer. Writer stated to contact his INR provider or nurses regarding that. No further questions or concerns.

## 2024-06-05 NOTE — PROGRESS NOTES
Clinic Care Coordination Contact    Situation: Patient chart reviewed by care coordinator.    Background: Patient was admitted to [facility] from 1/30/2024 to 2/15/2024 with a diagnoses of  ABLA, Acute on chronic CHF, A.Fib, ENRIQUE, HTN and discharged to [facility].     Assessment:   The patient is currently admitted to [facility].    Assessment: CC RN called [facility] and was informed the patient continue to be in the TCU     Plan/Recommendations: CC RN will follow up when discharged from TCU     [facility]   Jeimy Bragg RN, Care Coordinator   [facility]'s   E-mail [email or web address]   [phone]

## 2024-06-07 NOTE — TELEPHONE ENCOUNTER
Reason for Call:  INR    Who is calling?  PAtient    Phone number:  [phone]    Fax number:  NA    Name of caller: Patient    INR Value:  NA    Are there any other concerns:  Yes: Patient calling to discuss meds after hospital stay    Can we leave a detailed message on this number? YES    Phone number patient can be reached at: Cell number on file:    Telephone Information:   Mobile [phone]         Call taken on 6/7/2024 at 8:42 AM by Erin Long

## 2024-06-07 NOTE — PROGRESS NOTES
"ANTICOAGULATION MANAGEMENT     Feng RAMACHANDRAN Sherrell 79 year old male is on warfarin with supratherapeutic INR result. (Goal INR 2.5-3.5)    Recent labs: (last 7 days)     06/07/24  0000   INR 3.6*       ASSESSMENT     Warfarin doses taken: While in TCU, patient was taking less warfarin than compared to maintenance dose.   Diet: Increased greens/vitamin K in diet; ongoing change. Patient reports he eats more greens while at home compared to TCU. Patient would also like to resume having 1 beer daily.  Medication/supplement changes: None noted  New illness, injury, or hospitalization: Yes: multiple hospitalizations in 2024. Most recently, 3/18/24-3/29/24 for osteomyelitis of a sacral wound and low hemoglobin. Per hospitalization note: \"MRI of the pelvis revealed osteomyelitis involving the entirety of the coccyx with extensive inflammatory and phlegmonous changes throughout the sacral/coccygeal soft tissues with a 5.5 cm abscess in the posterior perirectal/left ischial anal fossa, extending and adherent to the posterior rectal wall\".   Signs or symptoms of bleeding or clotting: No  Previous result: Supratherapeutic  Additional findings: TCU discharged patient with warfarin instructions for 3 mg M,W,F and 2.5 mg all other days- yet only discharged patient with 3 mg tablets. Patient states he does not have any other tablets at warfarin at home, and will be unable to  any other tablets until next week.       PLAN     Recommended plan for no diet, medication or health factor changes affecting INR     Dosing Instructions: Increase your warfarin dose (3% change) with next INR in 5 days       Summary  As of 6/7/2024      Full warfarin instructions:  6/7: 1.5 mg; Otherwise 1.5 mg every Fri; 3 mg all other days   Next INR check:  6/12/2024               Telephone call with Feng who verbalizes understanding and agrees to plan    Patient to recheck with home meter    Education provided:   Please call back if any changes to your " diet, medications or how you've been taking warfarin  Symptom monitoring: monitoring for bleeding signs and symptoms, monitoring for clotting signs and symptoms, and monitoring for stroke signs and symptoms  Contact [phone]  with any changes, questions or concerns.     Plan made with [facility] Pharmacist Petty Weston, RN  Anticoagulation Clinic  6/7/2024    _______________________________________________________________________     Anticoagulation Episode Summary       Current INR goal:  2.5-3.5   TTR:  71.2% (9.7 mo)   Target end date:  Indefinite   Send INR reminders to:  ANTICOAG HOME MONITORING    Indications    Long-term (current) use of anticoagulants [Z79.01] [Z79.01]  S/P mitral valve replacement [Z95.2]  Chronic atrial fibrillation (H) [I48.20]             Comments:  [facility] MONITORING Patient, okay  to manage by exception on 7/8/20             Anticoagulation Care Providers       Provider Role Specialty Phone number    Enrique Walker Abhishek Granados MD Referring Internal Medicine     [facility] MD Referring Internal Medicine [phone]    Leena Jeffery MD Referring Internal Medicine [phone]    Jayme Deng MD Referring Internal Medicine [phone]

## 2024-06-10 NOTE — TELEPHONE ENCOUNTER
Home Care is calling regarding an established patient with [facility].       Requesting orders from: Jayme Deng  Provider is following patient: No       Orders Requested    Skilled Nursing  Request for initial certification (first set of orders)   Frequency:  1x/wk for 1 wks then 2x/wk for 5 wks and then 1x/wk for 4 wks   For wound care       Confirmed ok to leave a detailed message with call back.  Contact information confirmed and updated as needed.    Dede Ibarra RN

## 2024-06-11 NOTE — LETTER
[facility] CARE COORDINATION  [address]  [facility] [place] [address]     June 11, 2024    Feng Wynne  [address]   [facility] [address]      Dear Feng,    I am a clinic care coordinator who works with Jayme Deng MD, MD with the [facility]. I wanted to thank you for spending the time to talk with me.  Below is a description of clinic care coordination and how I can further assist you.       The clinic care coordination team is made up of a registered nurse, , financial resource worker and community health worker who understand the health care system. The goal of clinic care coordination is to help you manage your health and improve access to the health care system. Our team works alongside your provider to assist you in determining your health and social needs. We can help you obtain health care and community resources, providing you with necessary information and education. We can work with you through any barriers and develop a care plan that helps coordinate and strengthen the communication between you and your care team.  Our services are voluntary and are offered without charge to you personally.    Please feel free to contact me with any questions or concerns regarding care coordination and what we can offer.      We are focused on providing you with the highest-quality healthcare experience possible.    Sincerely,     [facility]   Jeimy Bragg RN, Care Coordinator   [facility]'s   E-mail [email or web address]   [phone]     Enclosed: I have enclosed a copy of the Patient Centered Plan of Care. This has helpful information and goals that we have talked about. Please keep this in an easy to access place to use as needed.

## 2024-06-11 NOTE — PROGRESS NOTES
Clinic Care Coordination Contact  Clinic Care Coordination Contact  OUTREACH    Referral Information:  Referral Source: SNF/TCU ([facility])    Primary Diagnosis: Other (include Comment box) (skin ulcer of sacrum)    Chief Complaint   Patient presents with    Clinic Care Coordination - Post Hospital     Clinic Care Coordination RN         Universal Utilization:   Background:   Patient was admitted to [facility] from 1/30/2024 to 2/15/2024 with a diagnoses of ABLA, Acute on chronic CHF, A.Fib, ENRIQUE, HTN and discharged to [facility].     Assessment:   The patient is currently admitted to [facility]. Discharged 6/6/2024     Clinic Utilization  Difficulty keeping appointments:: No  Compliance Concerns: No  No-Show Concerns: No  No PCP office visit in Past Year: No  Utilization      No Show Count (past year)  3             ED Visits  3             Hospital Admissions  3                    Current as of: 6/11/2024  2:24 PM                Clinical Concerns:  Current Medical Concerns:  Patient denies any wound discomfort.  Patient has a mechanical lift and hospital bed.  Patient has had 2 home care RN visits for wound care   Patient reports the healing as slow .  Patient also has future appointments at the wound clinic and plastic surgeon surgical procedure in August     Current Behavioral Concerns: No    Education Provided to patient: CC introductory letter and care plan    Pain  Pain (GOAL):: No  Health Maintenance Reviewed: Not assessed  Clinical Pathway: None    Medication Management:  Medication review status: Medications reviewed.  Changes noted per patient report.       Functional Status:  Dependent ADLs:: Ambulation-walker  Bed or wheelchair confined:: No  Mobility Status: Independent w/Device    Living Situation:  Current living arrangement:: Other ([facility])  Type of residence:: Other (U)    Lifestyle &  Psychosocial Needs:    Social Determinants of Health     Food Insecurity: Not on file   Depression: Not at risk (3/15/2024)    PHQ-2     PHQ-2 Score: 0   Housing Stability: Low Risk  (6/11/2024)    Housing Stability     Do you have housing? : Yes     Are you worried about losing your housing?: No   Tobacco Use: Medium Risk (3/22/2024)    Patient History     Smoking Tobacco Use: Former     Smokeless Tobacco Use: Never     Passive Exposure: Not on file   Financial Resource Strain: Not on file   Alcohol Use: Not on file   Transportation Needs: Not on file   Physical Activity: Inactive (6/11/2024)    Exercise Vital Sign     Days of Exercise per Week: 0 days     Minutes of Exercise per Session: 0 min   Interpersonal Safety: Not on file   Stress: Not on file   Social Connections: Not on file   Health Literacy: Not on file     Diet:: Regular  Inadequate nutrition (GOAL):: No  Tube Feeding: No  Inadequate activity/exercise (GOAL):: No  Significant changes in sleep pattern (GOAL): No        Yarsanism or spiritual beliefs that impact treatment:: No             Resources and Interventions:  Current Resources:   Skilled Home Care Services: Skilled Nursing, Home Health Aid, Physical Therapy, Occupational Therapy  Community Resources: Home Care  Supplies Currently Used at Home: Wound Care Supplies, Compression Stockings  Equipment Currently Used at Home: shower chair, raised toilet seat, walker, rolling, grab bar, tub/shower, grab bar, toilet, cane, straight (adjustable bed)  Employment Status: retired         Advance Care Plan/Directive  Advanced Care Plans/Directives on file:: Yes  Status of record:: On File and Validated  Type Advanced Care Plans/Directives: Advanced Directive - On File, DNR/DNI    Referrals Placed: None         Care Plan:  Care Plan: General       Problem: HP GENERAL PROBLEM       Goal: My open sacrum wound will be healed in the next 1-3 months       Start Date: 6/11/2024 Expected End Date: 9/11/2024    This  Visit's Progress: 30%    Priority: High    Note:     Barriers: None identified   Strengths: Motivated   Patient expressed understanding of goal: Yes   Action steps to achieve this goal:  1. I will keep home care and wound clinic .plastic surgeon future appointments   2. I will change positions in bed to keep the pressure off the wound   3.  I will call if any changes questions or concerns                               Patient/Caregiver understanding: Patient has a good understanding of discharge instructions     Outreach Frequency: monthly, more frequently as needed  Future Appointments                In 2 weeks Jayme Deng MD [facility]    In 3 weeks Travis Downey MD [facility] Wound Clinic [facility]    In 1 month Mary Srinivasan MD [facility] Wound [facility]            Plan:  Patient will continue home care services   Patient will keep hospital follow up video visit with PCP 7/1/2024  CC RN will follow up in 1-2 weeks     [facility]   Jeimy Bragg RN, Care Coordinator   [facility]'s   E-mail [email or web address]   [phone]     [facility]   Jeimy Bragg RN, Care Coordinator   [facility]'s   E-mail [email or web address]   [phone]

## 2024-06-11 NOTE — TELEPHONE ENCOUNTER
Attempted to reach ONOFRE Aguayo with AC to notify of PCP's approval for requested home care orders.    Able to reach confidential VM, mailbox was full and unable to leave message.    Triage to recall Dede to relay provider's approval for requested home care orders.    Christine ADHIKARI  [facility]

## 2024-06-11 NOTE — LETTER
[facility]  Patient Centered Plan of Care  About Me:        Patient Name:  Feng Wynne    YOB: 1945  Age:         79 year old   [place] MRN:    [id number] Telephone Information:  Home Phone [phone]   Mobile [phone]       Address:  [address]  [facility] [place] [facility] [address] Email address:  [email or web address]      Emergency Contact(s)    Name Relationship Lgl Grd Work Phone Home Phone Mobile Phone   1. BRENDAN SARABIA Partner No   [phone]   2. SUPA ARNOLD Son No   [phone]           Primary language:  English     needed? No   [employer]:  [phone] op. 1  Other communication barriers:Glasses    Preferred Method of Communication:  Jorget  Current living arrangement: Other ([facility])    Mobility Status/ Medical Equipment: Independent w/Device        Health Maintenance  Health Maintenance Reviewed:   Health Maintenance Due   Topic Date Due    HF ACTION PLAN  Never done    URINE DRUG SCREEN  Never done    HEPATITIS A IMMUNIZATION (1 of 2 - Risk 2-dose series) Never done    HEPATITIS B IMMUNIZATION (1 of 3 - Risk 3-dose series) Never done    RSV VACCINE (Pregnancy & 60+) (1 - 1-dose 60+ series) Never done    MICROALBUMIN  10/04/2017    COVID-19 Vaccine (6 - 2023-24 season) 02/24/2024    MEDICARE ANNUAL WELLNESS VISIT  05/08/2024          My Access Plan  Medical Emergency 911   Primary Clinic Line [facility] [phone]   24 Hour Appointment Line [phone] or  [phone]) (toll-free)   24 Hour Nurse Line [phone] (toll-free)   Preferred Urgent Care [facility], [phone]     Preferred Hospital [facility]  [phone]     Preferred Pharmacy [id number] - [place], [place] - [address] Behavioral Health Crisis Line The National Suicide Prevention Lifeline at 1-409.751.6082 or Text/Call 688           My Care Team  Members  Patient Care Team         Relationship Specialty Notifications Start End    Jayme Deng MD PCP - General Internal Medicine  10/6/23     Phone: [phone] Pager: [phone] Fax: [phone] [address] [place] [address]    Armani Marin MD MD Cardiology  6/8/15     Jeannine Gomez MD MD Cardiovascular Disease  2/21/22     Phone: [phone] Pager: [phone] Fax: [phone] [address] [facility] [address]    Griselda Velez NP Assigned Neuroscience Provider   6/24/23     Phone: [phone] Fax: [phone] [address] [place] [address]    Jayme Deng MD Assigned PCP   12/23/23     Phone: [phone] Pager: [phone] Fax: [phone] [address] [place] [address]    [facility], [facility]    2/15/24     Phone: [phone] Fax: [phone] [address] [facility] [address]    Elaina Reaves CNP Assigned Heart and Vascular Provider   3/23/24     Phone: [phone] Pager: [phone] Fax: [phone] [address] [place] [address]    (Fgs), [facility] - [place]    3/31/24     Phone: [phone] Fax: [phone] [address] [facility] [place] [address]    Michael Alcantar PA-C Hospitalist Internal Medicine All results, Admissions 3/31/24     Phone: [phone] Pager: Use Vocera Fax: [phone] [address] [place] [address]    Jeimy Bragg, RN Clinic Care Coordinator Primary Care - CC Admissions 5/7/24     Phone: [phone]         Mary Srinivasan MD MD Plastic Surgery  5/23/24     Phone: [phone] Fax: [phone]         [address] [place] [address] [address] [facility] [address]    Jayme Deng MD Assigned Pain Medication Provider   5/23/24     Phone: [phone] Pager: [phone] Fax: [phone] [address] [facility] [address]    Jeimy Bragg, RN Lead Care Coordinator  Primary Care - CC Admissions 6/11/24     Phone: [phone]                     My Care Plans  Self Management and Treatment Plan    Care Plan  Care Plan: General       Problem: HP GENERAL PROBLEM       Goal: My open sacrum wound will be healed in the next 1-3 months       Start Date: 6/11/2024 Expected End Date: 9/11/2024    This Visit's Progress: 30%    Priority: High    Note:     Barriers: None identified   Strengths: Motivated   Patient expressed understanding of goal: Yes   Action steps to achieve this goal:  1. I will keep home care and wound clinic .plastic surgeon future appointments   2. I will change positions in bed to keep the pressure off the wound   3.  I will call if any changes questions or concerns                               Action Plans on File:                       Advance Care Plans/Directives:   Advanced Care Plan/Directives on file:   Yes    Status of Document(s):   On File and Validated    Advanced Care Plan/Directives Type:   Advanced Directive - On File; DNR/DNI           My Medical and Care Information  Problem List   Patient Active Problem List   Diagnosis    Allergic rhinitis    Chronic atrial fibrillation (H)    S/P mitral valve replacement    Hyperlipidemia LDL goal <130    Lumbago    Knee pain    Rosacea    Proteinuria    Essential hypertension with goal blood pressure less than 140/90    Morbid obesity due to excess calories (H)    Long-term (current) use of anticoagulants [Z79.01]    Essential hypertension    Chronic right shoulder pain    Bradycardia    SOB (shortness of breath)    Anemia, unspecified type    Hypertensive heart disease with heart failure (H)    Positive occult stool blood test    Anemia due to blood loss, acute    Acute on chronic congestive heart failure, unspecified heart failure type (H)    Abnormal coagulation profile    Acute respiratory failure with hypoxia (H)    Chronic diastolic (congestive) heart failure (H)    Chronic kidney disease, stage 2 (mild)     Edema, unspecified    Muscle weakness (generalized)    Other abnormalities of gait and mobility    Other specified disorders of left ear    Pneumonia, unspecified organism    Retention of urine, unspecified    Ulcer of esophagus with bleeding    Unspecified abnormalities of gait and mobility    Unspecified Escherichia coli (E. 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)    Osteomyelitis (H)      Current Medications and Allergies:    Current Outpatient Medications   Medication Sig Dispense Refill    acetaminophen (TYLENOL) 500 MG tablet Take 2 tablets (1,000 mg) by mouth 3 times daily      albuterol (PROAIR HFA/PROVENTIL HFA/VENTOLIN HFA) 108 (90 Base) MCG/ACT inhaler Inhale 1 puff into the lungs every 4 hours as needed for shortness of breath      bisacodyl (DULCOLAX) 10 MG suppository Place 10 mg rectally daily as needed for constipation Every 3 days if no BM      calcium carbonate (TUMS) 500 MG chewable tablet Take 1 tablet (500 mg) by mouth 3 times daily as needed for heartburn      diclofenac (VOLTAREN) 1 % topical gel Apply 4 g topically 3 times daily      ferrous sulfate (FEROSUL) 325 (65 Fe) MG tablet Take 1 tablet (325 mg) by mouth daily 30 tablet 0    HYDROmorphone (DILAUDID) 4 MG tablet Take 0.5 tablets (2 mg) by mouth every 6 hours as needed for severe pain 30 tablet 0    Multiple Vitamins-Minerals (MULTIVITAMIN ADULT) CHEW Take 2 chew tab by mouth daily      nystatin (MYCOSTATIN) 310112 UNIT/GM external powder Apply topically 2 times daily Groin folds      Omega-3 Fatty Acids (FISH OIL CONCENTRATE PO) Take 1 capsule by mouth daily      order for DME Equipment being ordered: COMPRESSION STOCKINGS, 20-30 mmHg 1 each 1    pantoprazole (PROTONIX) 40 MG EC tablet Take 1 tablet (40 mg) by mouth 2 times daily (before meals) 60 tablet 1    potassium chloride (KLOR-CON) 20 MEQ packet Take 20 mEq by mouth  Every Mon, Wed, Fri Morning 10 packet 1    Probiotic CHEW Take 2 chew tab by mouth daily      psyllium (METAMUCIL/KONSYL) 58.6 % powder Take 18 g (1 Tablespoonful) by mouth daily      torsemide (DEMADEX) 20 MG tablet Take 1 tablet (20 mg) by mouth daily 30 tablet 1    warfarin ANTICOAGULANT (COUMADIN) 1 MG tablet Take 3 tablets (3 mg) by mouth See Admin Instructions AND 2.5 tablets (2.5 mg) See Admin Instructions. 3mg on MWF and 2.5mg AOD 60 tablet 1    Warfarin Therapy Reminder Take 1 each by mouth See Admin Instructions Per INR       No current facility-administered medications for this visit.        Care Coordination Start Date: 1/22/2024   Frequency of Care Coordination: monthly, more frequently as needed     Form Last Updated: 06/11/2024

## 2024-06-12 NOTE — TELEPHONE ENCOUNTER
Patient Contact    Attempt # 2    Was call answered?  No.  VM full     Irena ARCOS, Triage RN  [facility]

## 2024-06-12 NOTE — TELEPHONE ENCOUNTER
JUAN DAVID called and spoke with Feng and updated that he was scheduled to check INR today as per anticoagulation encounter dated 6/7/24.    He stated that he has 3 strips left and he will try to check it today with his home monitor if not successful he will let home care nurse nurse check it tomorrow.

## 2024-06-12 NOTE — TELEPHONE ENCOUNTER
General Call    Contacts         Type Contact Phone/Fax    06/12/2024 07:29 AM CDT Phone (Incoming) Feng Wynne (Self) [phone] (M)     Would like a call back from INR RN to go clarify next steps          Reason for Call:  INR    What are your questions or concerns:  Wanted to go over and clarify the next steps of care.     Believes that out fo the couple things needed opne has been done but just wanted to follow-up    Date of last appointment with provider: N/A    Could we send this information to you in [employer] or would you prefer to receive a phone call?:   Patient would prefer a phone call   Okay to leave a detailed message?: Yes at Cell number on file:    Telephone Information:   Mobile [phone]

## 2024-06-12 NOTE — TELEPHONE ENCOUNTER
FYI - Status Update    Who is Calling: patient    Update: Pt states he is out of strips to test his INR. He does have a home RN coming tomorrow who can help with the INR. Can call if there are questions    Does caller want a call/response back: Yes     Could we send this information to you in [email or web address] or would you prefer to receive a phone call?:   Patient would prefer a phone call   Okay to leave a detailed message?: No at Cell number on file:    Telephone Information:   Mobile [phone]      
ACN and spoke with Feng and he stated that he already took warfarin 1.5 mg  dose today - updated anticoagulation calendar and he will have Home Care nurse check INR tomorrow- instructed to have the nurse call [facility] to report result for dosing.  
I have personally evaluated and examined the patient. The Attending was available to me as a supervising provider if needed.

## 2024-06-14 NOTE — TELEPHONE ENCOUNTER
General Call      Reason for Call:  Returning INR nurses call    What are your questions or concerns:  Returning INR Nurses call.     Date of last appointment with provider: N/A    Could we send this information to you in [facility] or would you prefer to receive a phone call?:   Patient would prefer a phone call   Okay to leave a detailed message?: Yes at Home number on file [phone] (home)

## 2024-06-14 NOTE — PROGRESS NOTES
Estimated Creatinine Clearance: 84.8 mL/min (based on SCr of 0.88 mg/dL).    BMI 31kg/m2 weight 103.9kg     Advise start Lovenox 100mg Q12H until INR >2.0    IF still has 120mg syringes on hand from 07/2023 procedure still in date, OK to use and adjust to 105mg (0.7ml)    Petty Inman, PharmD BCACP  Anticoagulation Clinical Pharmacist

## 2024-06-14 NOTE — PROGRESS NOTES
ANTICOAGULATION MANAGEMENT     Feng Wynne 79 year old male is on warfarin with subtherapeutic INR result. (Goal INR 2.5-3.5)    Recent labs: (last 7 days)     06/14/24  0000   INR 1.1       ASSESSMENT     Source(s): Chart Review, Patient/Caregiver Call, and Home Care/Facility Nurse - Toshia from [facility], and  helper Reji    Warfarin doses taken: Less warfarin taken than planned which may be affecting INR.  Dosing was based on warfarin 3 mg tablet size.  When confirming tablet size, strength and color to find out patient has been using warfarin 1 mg tablet size from TCU discharge instead of 3 mg tablet.     Diet: Protein supplement/shake started which maybe affecting INR - started ensure 2 times per day for the last week  Medication/supplement changes: None noted  New illness, injury, or hospitalization: Yes: recent discharged from TCU  Signs or symptoms of bleeding or clotting: No  Previous result: Supratherapeutic  Additional findings:  INR was 3.6 one week ago post discharge from TCU.  Since then patient has been taking warfarin dose based on 1 mg tablet size instead of 3 mg tablet size along with adding ensure to diet and INR is down to 1.1 today.  Due to mitral valve, consulted with [facility], will bridge with lovenox, boost today and increased maintenance dose by 7.7% with next INR recheck in 3-4 days.    Erx for lovenox and warfarin 3 mg tablet strength sent to [facility] pharmacy. Patient and his son will try their best to  tonight.    Lovenox injection and warfarin dosing instruction reviewed with patient and his helper Reji over the phone.  Verbalizes understanding and agrees with plan.  All questions answered.    [employer] message also sent with warfarin dosing instruction along with link to video instruction for subQ injection.    Left detail message with Toshia ([facility] care) with plan and next recheck date.       PLAN     Recommended plan for temporary change(s) and ongoing change(s) affecting INR      Dosing Instructions: booster dose then Increase your warfarin dose (7.7% change) Start bridging with Enoxaparin with next INR in 4 days       Of note:  Son is able to  lovenox from a different pharmacy location but unable to  warfarin from the same pharmacy as they are out of stock.  SonSedrick will try a different pharmacy later today.  In the event that warfarin won't be able to  today, will start booster dose tomorrow 6/15.  Will start lovenox tonight 6/14.      Patient and caregiver, Reji verbalizes understanding.      Summary  As of 6/14/2024      Full warfarin instructions:  6/14: 6 mg; Otherwise 3 mg every day   Next INR check:  6/18/2024               Telephone call with Feng and helper Reji who agrees to plan and repeated back plan correctly  Sent [employer] message with dosing and follow up instructions    Orders given to  Homecare nurse/facility to recheck    Education provided:   Please call back if any changes to your diet, medications or how you've been taking warfarin  Taking warfarin: prescribed tablet strength and color  Dietary considerations: importance of consistent vitamin K intake, vitamin K content of foods, and Impact of protein intake on INR   Symptom monitoring: monitoring for clotting signs and symptoms and monitoring for stroke signs and symptoms  Lovenox/Heparin education provided: role of enoxaparin/heparin in bridge therapy, prescribed dose and frequency, recommended injection site and site rotation, and proper technique for administration of subcutaneous injection   Contact [phone]  with any changes, questions or concerns.     Plan made with [facility] Pharmacist Petty Castano RN  Anticoagulation Clinic  6/14/2024    _______________________________________________________________________     Anticoagulation Episode Summary       Current INR goal:  2.5-3.5   TTR:  69.8% (9.7 mo)   Target end date:  Indefinite   Send INR reminders to:  ANTICOAG HOME  MONITORING    Indications    Long-term (current) use of anticoagulants [Z79.01] [Z79.01]  S/P mitral valve replacement [Z95.2]  Chronic atrial fibrillation (H) [I48.20]             Comments:  [facility] HOME MONITORING Patient, okay  to manage by exception on 7/8/20             Anticoagulation Care Providers       Provider Role Specialty Phone number    Lynette Walkerdylan Granados MD Referring Internal Medicine     [facility] Referring Internal Medicine [phone]    Leena Jeffery MD Referring Internal Medicine [phone]    Jayme Deng MD Referring Internal Medicine [phone]

## 2024-06-14 NOTE — TELEPHONE ENCOUNTER
General Call      Reason for Call:  Medication     What are your questions or concerns:  Patient calling back with concerns about medication that he needs.He won't be able to get it until tomorrow.     Date of last appointment with provider: N/A    Could we send this information to you in [facility] or would you prefer to receive a phone call?:   Patient would prefer a phone call   Okay to leave a detailed message?: Yes at Home number on file [phone] (home)

## 2024-06-14 NOTE — TELEPHONE ENCOUNTER
Noted. Please see the June 14, 2024 anticoagulation encounter for further information.     Aileen Castano RN

## 2024-06-18 NOTE — PROGRESS NOTES
ANTICOAGULATION MANAGEMENT     Feng Wynne 79 year old male is on warfarin with subtherapeutic INR result. (Goal INR 2.5-3.5)    Recent labs: (last 7 days)     06/18/24  1127   INR 1.3*       ASSESSMENT     Source(s): Chart Review and Home Care/Facility Nurse     Warfarin doses taken: Warfarin taken as instructed  Diet: No new diet changes identified  Medication/supplement changes: None noted  New illness, injury, or hospitalization: No  Signs or symptoms of bleeding or clotting: No  Previous result: Subtherapeutic  Additional findings: None and Bridging with Enoxaparin until INR >= 2.5  Confirmed with home care patient has 3 mg tablets on hand.       PLAN     Recommended plan for no diet, medication or health factor changes affecting INR     Dosing Instructions: Increase your warfarin dose (50% change) Continue bridging with Enoxaparin with next INR in 3 days       Summary  As of 6/18/2024      Full warfarin instructions:  4.5 mg every day   Next INR check:  6/21/2024               Telephone call with [facility] home care nurse who agrees to plan and repeated back plan correctly    Orders given to  Homecare nurse/facility to recheck    Education provided:   Taking warfarin: prescribed tablet strength and color  Lovenox/Heparin education provided: prescribed dose and frequency     Plan made with [facility] Pharmacist Petty Parnell, RN  Anticoagulation Clinic  6/18/2024    _______________________________________________________________________     Anticoagulation Episode Summary       Current INR goal:  2.5-3.5   TTR:  68.3% (9.7 mo)   Target end date:  Indefinite   Send INR reminders to:  ANTICOAG HOME MONITORING    Indications    Long-term (current) use of anticoagulants [Z79.01] [Z79.01]  S/P mitral valve replacement [Z95.2]  Chronic atrial fibrillation (H) [I48.20]             Comments:  [facility] MONITORING Patient, okay  to manage by exception on 7/8/20             Anticoagulation Care  Providers       Provider Role Specialty Phone number    Walker, Enriquenorman Granados MD Referring Internal Medicine     CarminePaula MD Referring Internal Medicine [phone]    Leena Jeffery MD Referring Internal Medicine [phone]    Jayme Deng MD Referring Internal Medicine [phone]

## 2024-06-18 NOTE — TELEPHONE ENCOUNTER
"Per Goldie RN, [facility]:    Prescribed KLOR-CON 20 MEQ packets on 6/4/24    -Has been having \"really loose stool at least 2x/daily.\" -Asking for prescription to treat loose stool    -VS WNL  -Denies dizziness, no concerns for dehydration  "

## 2024-06-20 NOTE — TELEPHONE ENCOUNTER
Home Care is calling regarding an established patient with [facility].       Requesting orders from: Jayme Deng  Provider is following patient: Yes  Is this a 60-day recertification request?  No    Orders Requested    Physical Therapy  Request for initial certification (first set of orders)  Frequency:  1x/wk for 2 wks  then every other x/wk for 6 wks      Confirmed ok to leave a detailed message with call back.  Contact information confirmed and updated as needed.    Laureano Ocasio RN

## 2024-06-21 NOTE — PROGRESS NOTES
ANTICOAGULATION MANAGEMENT     Feng RAMACHANDRAN Sherrell 79 year old male is on warfarin with subtherapeutic INR result. (Goal INR 2.5-3.5)    Recent labs: (last 7 days)     06/21/24  1129   INR 1.4*       ASSESSMENT     Source(s): Chart Review and Home Care/Facility Nurse     Warfarin doses taken: Warfarin taken as instructed  Diet: No new diet changes identified  Medication/supplement changes: None noted  New illness, injury, or hospitalization: No  Signs or symptoms of bleeding or clotting: No  Previous result: Subtherapeutic  Additional findings:  Bridging with Lovenox twice daily       PLAN     Recommended plan for temporary change(s) affecting INR     Dosing Instructions: Increase your warfarin dose (23.8% change) with next INR in 3 days       Summary  As of 6/21/2024      Full warfarin instructions:  4.5 mg every Mon, Thu; 6 mg all other days   Next INR check:  6/24/2024               Telephone call with Feng who agrees to plan and repeated back plan correctly    Telephone call with Ramona ADHIKARI who agrees to abbott and repeated back plan correctly.    Education provided:   Please call back if any changes to your diet, medications or how you've been taking warfarin  Goal range and lab monitoring: goal range and significance of current result    Plan made with [facility] Pharmacist Petty Desir RN  Anticoagulation Clinic  6/21/2024    _______________________________________________________________________     Anticoagulation Episode Summary       Current INR goal:  2.5-3.5   TTR:  67.3% (9.7 mo)   Target end date:  Indefinite   Send INR reminders to:  ANTICOAG HOME MONITORING    Indications    Long-term (current) use of anticoagulants [Z79.01] [Z79.01]  S/P mitral valve replacement [Z95.2]  Chronic atrial fibrillation (H) [I48.20]             Comments:  [facility] MONITOR[facility] Patient, okay  to manage by exception on 7/8/20             Anticoagulation Care Providers       Provider Role Specialty Phone number     Enrique Walker MD Referring Internal Medicine     CarminePaula MD Referring Internal Medicine [phone]    Leena Jeffery MD Referring Internal Medicine [phone]    Jayme Deng MD Referring Internal Medicine [phone]

## 2024-06-21 NOTE — PROGRESS NOTES
Chart reviewed with [facility] RN at time of encounter.    Dose prior to health issue was 45mg/week, advise 23% dose increase to 39mg/week now    Petty Inman, PharmD BCACP  Anticoagulation Clinical Pharmacist

## 2024-06-24 NOTE — LETTER
[facility] CARE COORDINATION  [address]  [facility] [address]    July 2, 2024    Feng Wynne  [address]   [facility] [address]      Dear Feng,    I have been unsuccessful in reaching you since our last contact. At this time the Care Coordination team will make no further attempts to reach you, however this does not change your ability to continue receiving care from your providers at your primary care clinic. If you need additional support from a care coordinator in the future please contact [facility] at [phone].    All of us at [facility] are invested in your health and are here to assist you in meeting your goals.     Sincerely,    Petty Eaton, RN Clinic Care Coordinator  [facility] Clinics: [place], [place] (on-site Wednesdays), [facility] (on-site Thursdays) & [facility].  [email or web address]  Phone: [phone]

## 2024-06-24 NOTE — PROGRESS NOTES
ANTICOAGULATION MANAGEMENT     Feng Wynne 79 year old male is on warfarin with subtherapeutic INR result. (Goal INR 2.5-3.5)    Recent labs: (last 7 days)     06/24/24  1113   INR 1.6*       ASSESSMENT     Source(s): Chart Review, Patient/Caregiver Call, and Home Care/Facility Nurse     Warfarin doses taken: Warfarin taken as instructed, Lovenox taken as instructed  Diet: No new diet changes identified  Medication/supplement changes: None noted  New illness, injury, or hospitalization: No  Signs or symptoms of bleeding or clotting: No  Previous result: Subtherapeutic  Additional findings:  Continue Lovenox injections 100 mgs every 12 hours       PLAN     Recommended plan for no diet, medication or health factor changes affecting INR     Dosing Instructions: Increase your warfarin dose (15.4 % change) with next INR in 4 days       Summary  As of 6/24/2024      Full warfarin instructions:  7.5 mg every Mon, Fri; 6 mg all other days   Next INR check:  6/28/2024               Telephone call with Feng who agrees to plan and repeated back plan correctly.     Telephone call with [facility] home care  who agrees to plan and repeated back plan correctly    Orders given to  Homecare nurse/facility to recheck    Education provided:   Please call back if any changes to your diet, medications or how you've been taking warfarin  Goal range and lab monitoring: goal range and significance of current result    Plan made with [facility] Pharmacist Petty Desir RN  Anticoagulation Clinic  6/24/2024    _______________________________________________________________________     Anticoagulation Episode Summary       Current INR goal:  2.5-3.5   TTR:  66.2% (9.7 mo)   Target end date:  Indefinite   Send INR reminders to:  [facility]IT[facility]    Indications    Long-term (current) use of anticoagulants [Z79.01] [Z79.01]  S/P mitral valve replacement [Z95.2]  Chronic atrial fibrillation (H) [I48.20]              Comments:  [facility] MONITOR[facility] Patient, okay  to manage by exception on 7/8/20             Anticoagulation Care Providers       Provider Role Specialty Phone number    Enrique Walker MD Referring Internal Medicine     Paula Lou MD Referring Internal Medicine [phone]    Leena Jeffery MD Referring Internal Medicine [phone]    Jayme Deng MD Referring Internal Medicine [phone]

## 2024-06-24 NOTE — PROGRESS NOTES
Clinic Care Coordination Contact  [facility]/Voicemail    Clinical Data: Care Coordinator Outreach    Outreach Documentation Number of Outreach Attempt   6/24/2024   2:50 PM 1       Left message on patient's voicemail with call back information and requested return call.    Plan: Care Coordinator will try to reach patient again in 10 business days.    Petty Eaton, RN Clinic Care Coordinator  [facility] Clinics: [place], [place] (on-site Wednesdays), [facility] (on-site Thursdays) & [facility].  [email or web address]  Phone: [phone]

## 2024-06-26 NOTE — TELEPHONE ENCOUNTER
General Call    Contacts       Contact Date/Time Type Contact Phone/Fax    06/26/2024 12:22 PM CDT Phone (Incoming) Jose Washburn (Emergency Contact) [phone] (M)          Reason for Call:  Jose is requesting to speak with Ramona from [facility] regarding a wound patient has today. Jose doesn't have her phone number and normally the INR team calls Ramona with INR dosing. Jose did try calling [facility] but they are close. Patient has a wound near the groin area and Jose is unsure how to take care of it.    Date of last appointment with provider: 06/24/24 INR    Could we send this information to you in [employer] or would you prefer to receive a phone call?:   Patient would prefer a phone call   Okay to leave a detailed message?: Yes at Cell number on file:    Telephone Information:   Mobile [phone] Jose Bettencourt   [facility]  Central Scheduler

## 2024-06-26 NOTE — TELEPHONE ENCOUNTER
Noted. Called and left message for Ramona (identified confidential voicemail) relay the message and ask home care to call patient back regarding wound care questions.     Aileen Castano RN  [facility] Anticoagulation Clinic.

## 2024-06-27 NOTE — TELEPHONE ENCOUNTER
Home Care is calling regarding an established patient with [facility].       Requesting orders from: Jayme Deng  Provider is following patient: Yes  Is this a 60-day recertification request?  No    Orders Requested    Occupational Therapy  Request for initial certification (first set of orders)   Frequency:  every other week for 6 weeks      Confirmed ok to leave a detailed message with call back.  Contact information confirmed and updated as needed.    Alexandra Bradford RN

## 2024-06-28 NOTE — PROGRESS NOTES
ANTICOAGULATION MANAGEMENT     Feng Wynne 79 year old male is on warfarin with subtherapeutic INR result. (Goal INR 2.5-3.5)    Recent labs: (last 7 days)     06/28/24  0000   INR 2.0*       ASSESSMENT     Source(s): Chart Review and Home Care/Facility Nurse Naomi bernal  [phone]    Warfarin doses taken: Warfarin taken as instructed  Diet: No new diet changes identified  Medication/supplement changes: None noted  New illness, injury, or hospitalization: No  Signs or symptoms of bleeding or clotting: No  Previous result: Subtherapeutic  Additional findings: None and Bridging with Enoxaparin until INR >= 2.5       PLAN     Recommended plan for no diet, medication or health factor changes affecting INR     Dosing Instructions: booster dose then Increase your warfarin dose (10% change) with next INR in 3 days       Summary  As of 6/28/2024      Full warfarin instructions:  6/28: 9 mg; Otherwise 6 mg every Tue, Thu; 7.5 mg all other days   Next INR check:  7/1/2024               Telephone call with Naomi home care nurse who verbalizes understanding and agrees to plan    Orders given to  Homecare nurse/facility to recheck    Education provided: Please call back if any changes to your diet, medications or how you've been taking warfarin  Goal range and lab monitoring: goal range and significance of current result, Importance of therapeutic range, and Importance of following up at instructed interval  Symptom monitoring: monitoring for bleeding signs and symptoms    Plan made per [facility] anticoagulation protocol    Irena Whitaker, RN  Anticoagulation Clinic  6/28/2024    _______________________________________________________________________     Anticoagulation Episode Summary       Current INR goal:  2.5-3.5   TTR:  65.0% (9.7 mo)   Target end date:  Indefinite   Send INR reminders to:  [facility]IT[facility]    Indications    Long-term (current) use of anticoagulants [Z79.01] [Z79.01]  S/P mitral valve  replacement [Z95.2]  Chronic atrial fibrillation (H) [I48.20]             Comments:  [facility] MONITORING Patient, okay  to manage by exception on 7/8/20             Anticoagulation Care Providers       Provider Role Specialty Phone number    Enrique Walker MD Referring Internal Medicine     [facility] Referring Internal Medicine [phone]    Leena Jeffery MD Referring Internal Medicine [phone]    Jayme Deng MD Referring Internal Medicine [phone]

## 2024-07-01 NOTE — PROGRESS NOTES
ANTICOAGULATION MANAGEMENT     Feng Wynne 79 year old male is on warfarin with supratherapeutic INR result. (Goal INR 2.5-3.5)    Recent labs: (last 7 days)     07/01/24  1317   INR 4.5*       ASSESSMENT     Source(s): Chart Review and Home Care/Facility Nurse     Warfarin doses taken: Warfarin taken as instructed  Diet: Decreased greens/vitamin K in diet; ongoing change. Patient was previously drinking two Ensure daily, but has decided to discontinue them due to loose stool (discontinued on 6/28/24) . Patient will keep [facility] updated if he restarts these.   Medication/supplement changes: Stopped immodium   New illness, injury, or hospitalization: No  Signs or symptoms of bleeding or clotting: No  Previous result: Subtherapeutic  Additional findings: discontinued Lovenox on 6/30/24 (last dose was in PM)  Patient will have INR checked on 7/3/24 in clinic, please call spouse Jose with results (Feng's phone is not working). Home care will plan to see patient again on 7/8/24.       PLAN     Recommended plan for ongoing change(s) affecting INR     Dosing Instructions: hold dose then decrease your warfarin dose (27% change) with next INR in 2 days       Summary  As of 7/1/2024      Full warfarin instructions:  7/1: Hold; Otherwise 6 mg every Sun, Wed, Fri; 4.5 mg all other days   Next INR check:  7/3/2024               Detailed voice message left for [facility] home care nurse with dosing instructions and follow up date.     Orders given to  Homecare nurse/facility to recheck    Education provided: Please call back if any changes to your diet, medications or how you've been taking warfarin  Symptom monitoring: monitoring for bleeding signs and symptoms, monitoring for clotting signs and symptoms, and monitoring for stroke signs and symptoms  Contact [phone] with any changes, questions or concerns.     Plan made per [facility] anticoagulation protocol    Gaviota Weston RN  Anticoagulation  Clinic  7/1/2024    _______________________________________________________________________     Anticoagulation Episode Summary       Current INR goal:  2.5-3.5   TTR:  64.3% (9.7 mo)   Target end date:  Indefinite   Send INR reminders to:  ANTICOAG HOME MONITORING    Indications    Long-term (current) use of anticoagulants [Z79.01] [Z79.01]  S/P mitral valve replacement [Z95.2]  Chronic atrial fibrillation (H) [I48.20]             Comments:  [facility] MONITORING Patient, okay  to manage by exception on 7/8/20             Anticoagulation Care Providers       Provider Role Specialty Phone number    Enrique Walker MD Referring Internal Medicine     [facility] Referring Internal Medicine [phone]    Leena Jeffery MD Referring Internal Medicine [phone]    Jayme Deng MD Referring Internal Medicine [phone]

## 2024-07-01 NOTE — PROGRESS NOTES
Feng is a 79 year old who is being evaluated via a billable video visit.    How would you like to obtain your AVS? MyChart  If the video visit is dropped, the invitation should be resent by: Text to cell phone: [phone]   Will anyone else be joining your video visit? No          Subjective   Feng is a 79 year old, presenting for the following health issues:  Follow Up      Video Start Time: 5:31 PM    HPI     Diarrhea   Feng Wynne had noticed that he was having loose stools and upset stomach with use of Ensure  as well as oral potassium packets.  He did have multivitamin and probiotics which he is still taking.  He is trying to get protein from vegetarian die.  He notes that appetite has improved. And has no issues swallowing.  He is wondering if he might benefit from metamucil.        Review of Systems  Constitutional, neuro, ENT, endocrine, pulmonary, cardiac -has not followed up with cardiology.  He is no longer taking potassium, but still taking torsemide., gastrointestinal, genitourinary -notes increased urinary frequency with torsemide.  He is trying to stay hydrated as best she can., musculoskeletal, integument  -has a sacral wound which she is following with wound care and psychiatric systems are negative, except as otherwise noted.      Objective           Vitals:  No vitals were obtained today due to virtual visit.    Physical Exam   GENERAL: alert and no distress  EYES: Eyes grossly normal to inspection.  No discharge or erythema, or obvious scleral/conjunctival abnormalities.  RESP: No audible wheeze, cough, or visible cyanosis.    SKIN: Visible skin clear. No significant rash, abnormal pigmentation or lesions.  NEURO: Cranial nerves grossly intact.  Mentation and speech appropriate for age.  PSYCH: Appropriate affect, tone, and pace of words    (R19.5) Loose stools  (primary encounter diagnosis)  Comment: We will add Metamucil to help bulk his stools.  Okay to stop Ensure and try to get most of  his nutrition through normal diet.  Plan: psyllium (METAMUCIL/KONSYL) 58.6 % powder            (M86.9) Osteomyelitis of other site, unspecified type (H)  Comment: Plan to follow-up with wound care.  No further antibiotics prescribed at this time  Plan:     (M62.81) Muscle weakness (generalized)  Comment: Also continue physical therapy and Occupational Therapy to help with muscle strengthening  Plan:     (I50.32) Chronic diastolic (congestive) heart failure (H)  Comment: Recommend continued use of torsemide.  May need to resume potassium depending on her lab results.  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 Location): Home    Distant Location (provider location):  On-site  Platform used for Video Visit: Tom  Signed Electronically by: Jayme Deng MD, MD

## 2024-07-02 NOTE — TELEPHONE ENCOUNTER
Patient's partner left a vm after clinic hours on Monday 7/1/24 asking if a blood draw that is needed for the patient could be done at the [facility] at his 7/3/24 appt. They are asking for a call back.

## 2024-07-02 NOTE — PROGRESS NOTES
Clinic Care Coordination Contact  [facility]/Voicemail    Clinical Data: Care Coordinator Outreach    Outreach Documentation Number of Outreach Attempt   6/24/2024   2:50 PM 1   7/2/2024   3:20 PM 2       Left message on patient's voicemail with call back information and requested return call.    Plan: Care Coordinator will send disenrollment letter with care coordinator contact information via [employer]. Care Coordinator will do no further outreaches at this time.    Petty Eaton, RN Clinic Care Coordinator  [facility] Clinics: [place], [place] (on-site Wednesdays), [facility] (on-site Thursdays) & [facility].  [email or web address]  Phone: [phone]

## 2024-07-02 NOTE — TELEPHONE ENCOUNTER
Returned call to Jose. Discussed that [facility] is not able to draw labs based on another providers orders. Discussed that the clinic on the 1st floor of the [facility] building has a lab and may be able to schedule a lab draw tomorrow before or after the appointment at [facility]. Jose will call there to schedule.

## 2024-07-03 NOTE — TELEPHONE ENCOUNTER
Order for Homecare placed by Dr. Downey. 3xweekly skilled nursing needed for wound vac changes.     Writer sent the orders to the following agencies to obtain home care services. Awaiting which company is able to accept patient.      [facility] Phone: [phone] Fax: [phone]    [facility] office phone [phone] Fax [phone]    [facility] Phone: [phone] Fax [phone]     [facility] Phone: [phone]  Intake Fax new referrals: [phone] Nurse line fax: [phone]    [facility] Phone: [phone] Fax: [phone]

## 2024-07-03 NOTE — TELEPHONE ENCOUNTER
PRIOR AUTHORIZATION DENIED    Medication: PSYLLIUM 28.3 % PO POWD  Insurance Company: [employer] - Phone [phone] Fax [phone]  Denial Date: 7/2/2024  Denial Reason(s):     Appeal Information:     Patient Notified: No

## 2024-07-03 NOTE — PROGRESS NOTES
Wound Clinic Note          Visit date: 07/03/2024       Cheif Complaint:     Feng Wynne is a 79 year old   male had concerns including WOUND CARE..  The patient has sacrococcygeal pressure ulcer .      HISTORY OF PRESENT ILLNESS:    Feng Wynne reports the wound has been present since early January 2024.  The wound began while he was in the hospital being treated for an esophageal problem.  He has normal sensation and normal motor function, has never had other pressure injuries in the past.  He reports while he was in the hospital for an esophageal problem he was left on a bedpan for an extended period of time and this wound developed.  He has previously had an air mattress and found them extremely uncomfortable and had a hard time getting out of them.  He reports that the bed that he is in currently is very comfortable to him.  He is able to get up and walk around throughout the day but he does also have a special cushion for his wheelchair which is a thick foam cushion and has a cut out in the coccyx area.    I last saw this patient on May 7, 2024.  He then saw Dr. Srinivasan on May 9, 2024 to discuss possible surgical closure options.  She does think that a surgery will be helpful in the future but has not scheduled an exact surgery date.  She plans to see the patient again on August 1, 2024.    The patient has been discharged from the skilled nursing facility he was at previously and is now back at home.  His partner has been doing the dressing changes once or twice a day.  They have been bandaging the wound with alginate and ABD pad.  There is been moderate serous drainage from the wound.    The patient confirms he is continue to be on mostly complete bedrest with only getting up in the wheelchair for an hour a day.  When he is in the bed he only lays on his sides and rotates from 1 side to the other every hour.  He does not sit with the head of the bed elevated.        The pateint denies fevers or  chills.  They report the pain from the wound has been 0/10 and has remained about the same recently.      Today the patient reports maintaining a high protein diet and taking protein supplements regularly.        The patient denies a history of diabetes, smoking or chronic steroid use.         The patient has not had any symptoms of infection relating to the wound recently and is not currently on antibiotics.       Problem List:   Past Medical History:   Diagnosis Date    Acute on chronic congestive heart failure, unspecified heart failure type (H) 01/30/2024    Arthritis     Atrial fibrillation (H)     Coronary artery disease     Hypercholesteremia     Obesity     Unspecified essential hypertension               Family Hx: family history includes C.A.D. in his brother; Cerebrovascular Disease in his father; Diabetes in his paternal grandmother.       Surgical Hx:   Past Surgical History:   Procedure Laterality Date    COLONOSCOPY N/A 1/15/2024    Procedure: Colonoscopy;  Surgeon: Osbaldo Olvera MD;  Location:  GI    ESOPHAGOSCOPY, GASTROSCOPY, DUODENOSCOPY (EGD), COMBINED N/A 1/15/2024    Procedure: Esophagoscopy, gastroscopy, duodenoscopy (EGD), combined;  Surgeon: Osbaldo Olvera MD;  Location:  GI    ESOPHAGOSCOPY, GASTROSCOPY, DUODENOSCOPY (EGD), COMBINED N/A 2/8/2024    Procedure: Esophagoscopy, gastroscopy, duodenoscopy (EGD), combined;  Surgeon: Osbaldo Olvera MD;  Location:  GI    IRRIGATION AND DEBRIDEMENT SACRAL WOUND, COMBINED N/A 3/22/2024    Procedure: IRRIGATION AND DEBRIDEMENT, WOUND, SACRAL REGION;  Surgeon: Phill Jimenez MD;  Location: [facility] OR    MITRAL VALVE REPLACEMENT  8-    Mitral valve replacement with 31-mm [employer]. Raffi mechanical valve.           Allergies:    Allergies   Allergen Reactions    Amiodarone Shortness Of Breath    Pcn [Penicillins] Shortness Of Breath     Rxn occurred in childhood     Statins Muscle Pain (Myalgia) and Hives    Amiodarone      Latex     Zetia [Ezetimibe] Muscle Pain (Myalgia)     Muscle weakness legs              Medication History:    Current Outpatient Medications   Medication Sig Dispense Refill    acetaminophen (TYLENOL) 500 MG tablet Take 2 tablets (1,000 mg) by mouth 3 times daily      albuterol (PROAIR HFA/PROVENTIL HFA/VENTOLIN HFA) 108 (90 Base) MCG/ACT inhaler Inhale 1 puff into the lungs every 4 hours as needed for shortness of breath      bisacodyl (DULCOLAX) 10 MG suppository Place 10 mg rectally daily as needed for constipation Every 3 days if no BM      calcium carbonate (TUMS) 500 MG chewable tablet Take 1 tablet (500 mg) by mouth 3 times daily as needed for heartburn      diclofenac (VOLTAREN) 1 % topical gel Apply 4 g topically 3 times daily      enoxaparin ANTICOAGULANT (LOVENOX) 100 MG/ML syringe Inject 1 mL (100 mg) Subcutaneous every 12 hours 28 mL 1    ferrous sulfate (FEROSUL) 325 (65 Fe) MG tablet Take 1 tablet (325 mg) by mouth daily 30 tablet 0    HYDROmorphone (DILAUDID) 4 MG tablet Take 0.5 tablets (2 mg) by mouth every 6 hours as needed for severe pain 30 tablet 0    Multiple Vitamins-Minerals (MULTIVITAMIN ADULT) CHEW Take 2 chew tab by mouth daily      nystatin (MYCOSTATIN) 753925 UNIT/GM external powder Apply topically 2 times daily Groin folds      Omega-3 Fatty Acids (FISH OIL CONCENTRATE PO) Take 1 capsule by mouth daily      order for DME Equipment being ordered: COMPRESSION STOCKINGS, 20-30 mmHg 1 each 1    pantoprazole (PROTONIX) 40 MG EC tablet Take 1 tablet (40 mg) by mouth 2 times daily (before meals) 60 tablet 1    Probiotic CHEW Take 2 chew tab by mouth daily      psyllium (METAMUCIL/KONSYL) 58.6 % powder Take 18 g (1 Tablespoonful) by mouth daily 1040 g 11    torsemide (DEMADEX) 20 MG tablet Take 1 tablet (20 mg) by mouth daily 30 tablet 1    warfarin ANTICOAGULANT (COUMADIN) 1 MG tablet Take 3 tablets (3 mg) by mouth See Admin Instructions AND 2.5 tablets (2.5 mg) See Admin Instructions.  3mg on MWF and 2.5mg AOD 60 tablet 1    warfarin ANTICOAGULANT (COUMADIN) 3 MG tablet Take by mouth 3 mg (3 mg x 1 tablet) every day OR as directed by INR clinic 90 tablet 1    Warfarin Therapy Reminder Take 1 each by mouth See Admin Instructions Per INR       No current facility-administered medications for this encounter.         Tobacco History:  reports that he quit smoking about 26 years ago. His smoking use included cigarettes. He started smoking about 31 years ago. He has a 2.5 pack-year smoking history. He has never used smokeless tobacco.       REVIEW OF SYMPTOMS:   The review of systems was negative except as noted in the HPI.           PHYSICAL EXAMINATION:     /75 (BP Location: Right arm, Patient Position: Sitting, Cuff Size: Adult Regular)   Pulse 75   Temp 98  F (36.7  C) (Temporal)            GENERAL: The patient overall appears well and is no acute distress.   HEAD: normocephalic   EYES: Sclera and conjunctiva clear   NECK: no obvious masses   LUNGS: breathing is unlabored.   EXTREMITIES: No clubbing, cyanosis or edema   SKIN: No rashes or other abnormalities except as noted under the Wound section below.   NEUROLOGICAL: normal motor and sensory function       WOUND: The wound appears healthy with no sign of infection.   Wound bed: granulation tissue  Periwound: healthy intact skin  He has a cavitary wound over the sacrococcygeal area consistent with a stage IV pressure injury.  The wound bed appears much healthier compared with the pictures from his last clinic visit.  There is still a small spicule of exposed bone but I believe it is less compared with his last clinic visit with me.      Also see below for wound details:         Ulceration(s)/Wound(s):   Please see the media tab under the chart review for pictures of the wounds.  Nursing staff removed dressings and cleansed wound.    Wound (used by OP [facility] only) 04/02/24 1036 coccyx pressure injury (Active)   Thickness/Stage Stage 4 07/03/24  "1150   Base granulating;slough;exposed structure;pink 07/03/24 1150   Periwound excoriated 07/03/24 1150   Periwound Temperature warm 07/03/24 1150   Periwound Skin Turgor soft 07/03/24 1150   Edges open 07/03/24 1150   Length (cm) 8.7 07/03/24 1150   Width (cm) 5.4 07/03/24 1150   Depth (cm) 2.7 07/03/24 1150   Wound (cm^2) 46.98 cm^2 07/03/24 1150   Wound Volume (cm^3) 126.85 cm^3 07/03/24 1150   Wound healing % -487.25 07/03/24 1150   Tunneling [Depth (cm)/Location] 6 o'clock / 2.3cm 07/03/24 1150   Undermining [Depth (cm)/Location] 12-12 o'clock / 5.6cm 07/03/24 1150   Drainage Characteristics/Odor serosanguineous 07/03/24 1150   Drainage Amount copious 07/03/24 1150   Care, Wound non-select wound debridement performed. 07/03/24 1150             No results for input(s): \"HGBA1C\", \"A1C\", \"EAG\" in the last 38438 hours.    Invalid input(s): \"IPEPPOJSC3N\"       Recent Labs   Lab Test 03/25/24  0740 02/03/24  0741 01/19/24  0607   ALBUMIN 2.6* 2.7* 2.9*              No sharp debridement performed today.                  ASSESSMENT:   This is a 79 year old  male with a stage IV sacral pressure ulcer.          PLAN:   The patient requests that we try a wound VAC again.  We had problems with maintaining a seal previously but he is confident since he is laying on his sides now that this should not cause the rubbing against the area which was causing the problems before.  We will attempt to place the wound VAC and have home health nurses come out and change it 3 times a week.  I have encouraged him to continue to keep pressure off the areas much as he can.  I have encouraged him to sleep on his sides and to be in the bed is much as possible on his sides so that there is no pressure applied to the area.  He will meet with Dr. Srinivasan again on August 1, 2024 to further discuss surgical closure options.  I have encouraged him to make a bit more of an effort to maintain a high-protein diet and to get protein supplements and " at least once a day.  The patient will return to the wound clinic in 3 to 4 weeks weeks to see me again.        30 minutes spent on the date of the encounter doing chart review, history and exam, documentation and further activities per the note, this time excludes any procedure time      Travis Downey MD  07/03/2024   1:08 PM   [facility] Vascular/Wound  [phone]    This note was electronically signed by Travis Downey MD        Further instructions from your care team         07/03/2024   Feng Wynne   1945    No supplies ordered today as you will be starting a wound vac soon  Dressing changes outside of clinic are being performed by Spouse    Swetha 07/03/2024   Home Care ordered today to come 3 times weekly   Wound Vac ordered and will be delivered to your home this next week     Possible Flap Surgery with Zarina in the future   Need to schedule bone debridement to determine Antibiotic therapy [facility]  Will need to manage Blood thinner with Heparin  Dr Srinivasan's  will reach out when a date is available  Consider Temporary Colostomy to avoid wound contamination of stool  Avoid Recliner Chairs to prevent pressure on coccyx wound  No Bed Pans - utilize toilets for defecation   NEEDS: Group 2 mattress Patient will need a Group 2 mattress due to large, stage 4 pressure ulceration on the patient's pelvis that has failed to improve on a normal mattress despite regular wound cares and repositioning. A group 1 mattress is not adequate to offload these severe ulcerations. Patient has impaired sensation and is unable to reposition independently.  Offload with pillows - ok to sleep up on your sides  Continue High Protein diet; Ensure Max, protein bar, and supplements and meals from home.  Consider use of Primofit for urinary needs while in bed.     Until Wound vac arrives follow the following...  Wound Dressing Change: Coccyx  Cleanse wound with Vashe moist gauze, leave in place for  "10 minutes; remove  Cleanse periwound skin with wound spray, pat dry   Apply light layer of Zinc Oxide barrier paste to periwound  Apply Calcium Alginate into wound bed (Fill space, do not overstuff)  Cover with dry absorbant pad and secure with 2\" Medipore tape  Change 2 times a day and as needed for soilage    Once Wound vac arrives...  Wound Dressing Change: Coccyx  Remove old dressing and ensure all black foam is removed  Cleanse wound with Vashe moist gauze, leave in place for 10 minutes; remove   Cleanse periwound skin with wound spray, pat dry and apply No Sting Barrier film.  Cut Black Foam to fill the depth of wound but please ensure it is not overstuffed.  Cover wound with VAC dressing tape to seal the foam, cut appropriate size opening for the TRAC pad.  Connect TRAC pad and connect to pump; NPWT -125 mmHg Continuous, ensure no leaks  Change canister when alarms full or weekly  Change dressing three times a week  Document on the outside of the dressing the number of sponges used and the date of the dressing  If dressing is compromised for greater than 2 hours then please remove entire dressing and change or tuck moist Vashe gauze into wound until new dressing can be applied.  May use ostomy paste for divots and crevices as needed to maintain seal.       Repositioning:  Bed: Keep Head of bed at 30 degrees or less; Reposition MINIMALLY every 1-2 hours in bed to relieve pressure and promote perfusion to tissue turning side to side.  Chair: When up to the chair, do not sit for longer than one hour total before returning to bed for at least 60 minutes to relieve pressure and promote perfusion to the tissue.  Completely recline/tilt for 15 minutes each hour.  Sit on a chair cushion when up to the chair.      Reduce shear and friction:   Prevent skin breakdown by reducing friction and shear.   Patients are less likely to slide down in bed if they re supported by pillows and the head of the bed isn t raised too " high.    In a bed  Clean and smooth the bed surface.  Lift the head of the bed no more than 30 .  Use draw-sheets or transfer boards to move patients.  Lift the head of the bed no more than 30 degrees.  Raise the foot of the bed slightly.  In a wheelchair  Keep upright (don't slump) - keep weight forward onto your thighs, not on your tailbone  Support the back with a pillow.  Use a foot extension.     High Protein Diet: VEGETARIAN:  Whey protein powder - 24g per scoop (on average)  Greek yogurt - 23g per 8oz   Navy beans - 20g per cup  Cottage cheese - 14g per 1/2 cup   Lentils - 13g per 1/4 cup  2% milk - 8g per cup  Peanut butter - 8g per 2 tablespoons  Eggs - 6g per egg  Mixed nuts - 6g per 2oz  Tofu - 10g per 1/2 cup        Main Provider: Travis Downey M.D. July 3, 2024    Call us at [phone] if you have any questions about your wounds, if you have redness or swelling around your wound, have a fever of 101 degrees Fahrenheit or greater or if you have any other problems or concerns. We answer the phone Monday through Friday 8 am to 4 pm, please leave a message as we check the voicemail frequently throughout the day. If you have a concern over the weekend, please leave a message and we will return your call Monday. If the need is urgent, go to the ER or urgent care.    If you had a positive experience please indicate that on your patient satisfaction survey form that [facility] will be sending you.    It was a pleasure meeting with you today.  Thank you for allowing me and my team the privilege of caring for you today.  YOU are the reason we are here, and I truly hope we provided you with the excellent service you deserve.  Please let us know if there is anything else we can do for you so that we can be sure you are leaving completely satisfied with your care experience.      If you have any billing related questions please call the [facility] Business office at [phone]. The clinic staff does  not handle billing related matters.    If you are scheduled to have a follow up appointment, you will receive a reminder call the day before your visit. On the appointment day please arrive 15 minutes prior to your appointment time. If you are unable to keep that appointment, please call the clinic to cancel or reschedule. If you are more than 10 minutes late or greater for your scheduled appointment time, the clinic policy is that you may be asked to reschedule.        ,

## 2024-07-03 NOTE — PROGRESS NOTES
ANTICOAGULATION MANAGEMENT     Feng Wynne 79 year old male is on warfarin with supratherapeutic INR result. (Goal INR 2.5-3.5)    Recent labs: (last 7 days)     07/03/24  1047   INR 3.9*       ASSESSMENT     Source(s): Chart Review and Patient/Caregiver Call     Warfarin doses taken: Warfarin taken as instructed  Diet: No new diet changes identified  Medication/supplement changes: None noted  New illness, injury, or hospitalization: No  Signs or symptoms of bleeding or clotting: No  Previous result: Supratherapeutic  Additional findings:  Large maintenance dose decrease 2 days ago, checking INR early due to holiday. Per PharmD no more dose changes at this time. See how INR looks on 7/8/24 when homecare checks it       PLAN     Recommended plan for ongoing change(s) affecting INR     Dosing Instructions: Continue your current warfarin dose with next INR in 5 days       Summary  As of 7/3/2024      Full warfarin instructions:  6 mg every Sun, Wed, Fri; 4.5 mg all other days   Next INR check:  7/8/2024               Telephone call with Jose who verbalizes understanding and agrees to plan    Orders given to  Homecare nurse/facility to recheck    Education provided: Please call back if any changes to your diet, medications or how you've been taking warfarin    Plan made per [facility] anticoagulation protocol    Mikey Tyson RN  Anticoagulation Clinic  7/3/2024    _______________________________________________________________________     Anticoagulation Episode Summary       Current INR goal:  2.5-3.5   TTR:  63.6% (9.7 mo)   Target end date:  Indefinite   Send INR reminders to:  [facility]    Indications    Long-term (current) use of anticoagulants [Z79.01] [Z79.01]  S/P mitral valve replacement [Z95.2]  Chronic atrial fibrillation (H) [I48.20]             Comments:  [facility] Patient, okay  to manage by exception on 7/8/20             Anticoagulation Care Providers       Provider Role  Specialty Phone number    Jayme Deng MD Referring Internal Medicine [phone]

## 2024-07-03 NOTE — DISCHARGE INSTRUCTIONS
"07/03/2024   Feng Wynne   1945    No supplies ordered today as you will be starting a wound vac soon  Dressing changes outside of clinic are being performed by Spouse    Swetha 07/03/2024   Home Care ordered today to come 3 times weekly   Wound Vac ordered and will be delivered to your home this next week     Possible Flap Surgery with Zarina in the future   Need to schedule bone debridement to determine Antibiotic therapy [facility]  Will need to manage Blood thinner with Heparin  Dr Srinivasan's  will reach out when a date is available  Consider Temporary Colostomy to avoid wound contamination of stool  Avoid Recliner Chairs to prevent pressure on coccyx wound  No Bed Pans - utilize toilets for defecation   NEEDS: Group 2 mattress Patient will need a Group 2 mattress due to large, stage 4 pressure ulceration on the patient's pelvis that has failed to improve on a normal mattress despite regular wound cares and repositioning. A group 1 mattress is not adequate to offload these severe ulcerations. Patient has impaired sensation and is unable to reposition independently.  Offload with pillows - ok to sleep up on your sides  Continue High Protein diet; Ensure Max, protein bar, and supplements and meals from home.  Consider use of Primofit for urinary needs while in bed.     Until Wound vac arrives follow the following...  Wound Dressing Change: Coccyx  Cleanse wound with Vashe moist gauze, leave in place for 10 minutes; remove  Cleanse periwound skin with wound spray, pat dry   Apply light layer of Zinc Oxide barrier paste to periwound  Apply Calcium Alginate into wound bed (Fill space, do not overstuff)  Cover with dry absorbant pad and secure with 2\" Medipore tape  Change 2 times a day and as needed for soilage    Once Wound vac arrives...  Wound Dressing Change: Coccyx  Remove old dressing and ensure all black foam is removed  Cleanse wound with Vashe moist gauze, leave in place for 10 minutes; remove "   Cleanse periwound skin with wound spray, pat dry and apply No Sting Barrier film.  Cut Black Foam to fill the depth of wound but please ensure it is not overstuffed.  Cover wound with VAC dressing tape to seal the foam, cut appropriate size opening for the TRAC pad.  Connect TRAC pad and connect to pump; NPWT -125 mmHg Continuous, ensure no leaks  Change canister when alarms full or weekly  Change dressing three times a week  Document on the outside of the dressing the number of sponges used and the date of the dressing  If dressing is compromised for greater than 2 hours then please remove entire dressing and change or tuck moist Vashe gauze into wound until new dressing can be applied.  May use ostomy paste for divots and crevices as needed to maintain seal.       Repositioning:  Bed: Keep Head of bed at 30 degrees or less; Reposition MINIMALLY every 1-2 hours in bed to relieve pressure and promote perfusion to tissue turning side to side.  Chair: When up to the chair, do not sit for longer than one hour total before returning to bed for at least 60 minutes to relieve pressure and promote perfusion to the tissue.  Completely recline/tilt for 15 minutes each hour.  Sit on a chair cushion when up to the chair.      Reduce shear and friction:   Prevent skin breakdown by reducing friction and shear.   Patients are less likely to slide down in bed if they re supported by pillows and the head of the bed isn t raised too high.    In a bed  Clean and smooth the bed surface.  Lift the head of the bed no more than 30 .  Use draw-sheets or transfer boards to move patients.  Lift the head of the bed no more than 30 degrees.  Raise the foot of the bed slightly.  In a wheelchair  Keep upright (don't slump) - keep weight forward onto your thighs, not on your tailbone  Support the back with a pillow.  Use a foot extension.     High Protein Diet: VEGETARIAN:  Whey protein powder - 24g per scoop (on average)  Greek yogurt - 23g  per 8oz   Navy beans - 20g per cup  Cottage cheese - 14g per 1/2 cup   Lentils - 13g per 1/4 cup  2% milk - 8g per cup  Peanut butter - 8g per 2 tablespoons  Eggs - 6g per egg  Mixed nuts - 6g per 2oz  Tofu - 10g per 1/2 cup        Main Provider: Travis Downey M.D. July 3, 2024    Call us at [phone] if you have any questions about your wounds, if you have redness or swelling around your wound, have a fever of 101 degrees Fahrenheit or greater or if you have any other problems or concerns. We answer the phone Monday through Friday 8 am to 4 pm, please leave a message as we check the voicemail frequently throughout the day. If you have a concern over the weekend, please leave a message and we will return your call Monday. If the need is urgent, go to the ER or urgent care.    If you had a positive experience please indicate that on your patient satisfaction survey form that [facility] will be sending you.    It was a pleasure meeting with you today.  Thank you for allowing me and my team the privilege of caring for you today.  YOU are the reason we are here, and I truly hope we provided you with the excellent service you deserve.  Please let us know if there is anything else we can do for you so that we can be sure you are leaving completely satisfied with your care experience.      If you have any billing related questions please call the [facility] Business office at [phone]. The clinic staff does not handle billing related matters.    If you are scheduled to have a follow up appointment, you will receive a reminder call the day before your visit. On the appointment day please arrive 15 minutes prior to your appointment time. If you are unable to keep that appointment, please call the clinic to cancel or reschedule. If you are more than 10 minutes late or greater for your scheduled appointment time, the clinic policy is that you may be asked to reschedule.

## 2024-07-03 NOTE — TELEPHONE ENCOUNTER
Jessica from [facility] called, they are able to accept this assignment

## 2024-07-04 NOTE — RESULT ENCOUNTER NOTE
Tom Toney,    I have had the opportunity to review your recent results and an interpretation is as follows:  Your complete blood counts shows improved hemoglobin and we should recheck in the next few weeks  Your basic metabolic panel shows a slight decline in sodium related to use of torsemide and I would recommend that we recheck again in the next few weeks    Sincerely,  Jayme Deng MD

## 2024-07-05 NOTE — TELEPHONE ENCOUNTER
Medication Question or Refill    Contacts       Contact Date/Time Type Contact Phone/Fax    07/05/2024 10:22 AM CDT Phone (Incoming) Jose Washburn (Emergency Contact) [phone] (M)            What medication are you calling about (include dose and sig)?:     ferrous sulfate (FEROSUL) 325 (65 Fe) MG tablet       Preferred Pharmacy:   [facility]/pharmacy #[id number] [place] [id number] [place] [address]  Phone: [phone] Fax: [phone]      Who prescribed the medication?: Dr Deng    Do you need a refill? Yes      Patient offered an appointment? No    Do you have any questions or concerns?  No      Could we send this information to you in [employer] or would you prefer to receive a phone call?:   Patient would like to be contacted via [employer]

## 2024-07-08 NOTE — PROGRESS NOTES
ANTICOAGULATION MANAGEMENT     Feng Wynne 79 year old male is on warfarin with supratherapeutic INR result. (Goal INR 2.5-3.5)    Recent labs: (last 7 days)     07/08/24  1505   INR 4.1*       ASSESSMENT     Source(s): Chart Review and Home Care/Facility Nurse     Warfarin doses taken: Warfarin taken as instructed  Diet: No new diet changes identified  Medication/supplement changes: None noted  New illness, injury, or hospitalization: No  Signs or symptoms of bleeding or clotting: No  Previous result: Supratherapeutic  Additional findings: None       PLAN     Recommended plan for no diet, medication or health factor changes affecting INR     Dosing Instructions: partial hold then decrease your warfarin dose (8.3% change) with next INR in 4 days       Summary  As of 7/8/2024      Full warfarin instructions:  7/8: 3 mg; Otherwise 6 mg every Sun; 4.5 mg all other days   Next INR check:  7/12/2024               Telephone call with Michelle Amezcua home care nurse who agrees to plan and repeated back plan correctly    Orders given to  Homecare nurse/facility to recheck    Education provided: None required    Plan made per [facility] anticoagulation protocol    Ami Robertson, RN  Anticoagulation Clinic  7/8/2024    _______________________________________________________________________     Anticoagulation Episode Summary       Current INR goal:  2.5-3.5   TTR:  61.9% (9.7 mo)   Target end date:  Indefinite   Send INR reminders to:  ANTICOAG HOME MONITORING    Indications    Long-term (current) use of anticoagulants [Z79.01] [Z79.01]  S/P mitral valve replacement [Z95.2]  Chronic atrial fibrillation (H) [I48.20]             Comments:  [facility] Patient, okay  to manage by exception on 7/8/20             Anticoagulation Care Providers       Provider Role Specialty Phone number    Jayme Deng MD Referring Internal Medicine [phone]

## 2024-07-09 NOTE — TELEPHONE ENCOUNTER
RN updated/ MD Downey. ALF.     Wound Dressing Change: Coccyx  Cleanse wound with Vashe moist gauze, leave in place for 10 minutes; remove  Cleanse periwound skin with wound spray, pat dry   Apply light layer of Zinc Oxide barrier paste to periwound  Apply Calcium Alginate into wound bed (Fill space, do not overstuff)  Cover with silicone bordered foam dressing like Zetuvit  Change 2 times a day and as needed for soilage    Patient to continue with BID bandage regiment until see Dr. Srinivasan on at which time bandage regiment will be updated.     RN called and spoke with ONOFRE Duke with [facility]. Updated on bandage regiment. RN verbalized understanding and took verbal orders for bandage regiment.

## 2024-07-09 NOTE — TELEPHONE ENCOUNTER
Patient wants to wait on wound vac until after 8.1.  Should Vasch be continued twice a day?  Or just the calcium alginate?   Can they continue the border foam dressing?  Patient is breaking out from the tape for the dry wrap.   Srikanth  [phone]

## 2024-07-12 NOTE — PROGRESS NOTES
Chart reviewed and plan made with [facility] RN at time of encounter.     Stopping ensure BID likely to have significant effect on INR; recommend reduction to near pre-hospital dose ~ 35 mg/week and close Inr monitoring    Marielos Bland RPH

## 2024-07-12 NOTE — PROGRESS NOTES
ANTICOAGULATION MANAGEMENT     Feng Wynne 79 year old male is on warfarin with supratherapeutic INR result. (Goal INR 2.5-3.5)    Recent labs: (last 7 days)     07/12/24  0000   INR 6.9*       ASSESSMENT     Source(s): Chart Review and Patient/Caregiver Call     Warfarin doses taken: Warfarin taken as instructed  Diet: Protein supplement/shake plan to start on Saturday 7/13/24 which maybe affecting INR and decreased appetite may be affecting diet and INR  Medication/supplement changes: None noted  New illness, injury, or hospitalization: Yes: Pt reports that his wound is healing well but doctor advised protein powder shakes daily  Signs or symptoms of bleeding or clotting: No  Previous result: Supratherapeutic  Additional findings:Spoke to Ramona [facility] but then had to leave voicemail message after consult with [facility]  Pt states that he is eating less than half of what he used to eat. He says that he has no appetite.       PLAN     Recommended plan for ongoing change(s) affecting INR     Dosing Instructions: hold dose then decrease your warfarin dose (13.6% change) with next INR in 3 days       Summary  As of 7/12/2024      Full warfarin instructions:  7/12: Hold; Otherwise 3 mg every Tue, Sat; 4.5 mg all other days   Next INR check:  7/15/2024               Telephone call with Feng who agrees to plan and repeated back plan correctly  Detailed voice message left for Ramona at [phone] home care nurse with dosing instructions and follow up date.     Orders given to  Homecare nurse/facility to recheck    Education provided: Contact [phone] with any changes, questions or concerns.     Plan made with [facility] Pharmacist Petty Mcdonough, RN  Anticoagulation Clinic  7/12/2024    _______________________________________________________________________     Anticoagulation Episode Summary       Current INR goal:  2.5-3.5   TTR:  60.6% (9.7 mo)   Target end date:  Indefinite   Send INR reminders to:   ANTICOAG HOME MONITORING    Indications    Long-term (current) use of anticoagulants [Z79.01] [Z79.01]  S/P mitral valve replacement [Z95.2]  Chronic atrial fibrillation (H) [I48.20]             Comments:  [facility] Patient, okay  to manage by exception on 7/8/20             Anticoagulation Care Providers       Provider Role Specialty Phone number    Jayme Deng MD Referring Internal Medicine [phone]

## 2024-07-15 NOTE — TELEPHONE ENCOUNTER
Goldie from [facility] calling and stating that they do not have an RN that can go out to see the patient today. She is wanting dosing for this evening.    Informed that due to the 6.9 INR reading on Friday I will need to consult [facility] for dosing.    Ami Robertson RN    [facility] Anticoagulation [facility]

## 2024-07-15 NOTE — TELEPHONE ENCOUNTER
Consult with [facility] and 3 mg of warfarin today with recheck of INR on 7/16/2024. If any signs or symptoms of bleeding patient needs to go into ER for assessment.    Left detailed message for Goldie ADHIKARI with the information above and call back number.    Ami Robertson RN    [facility] Anticoagulation Clinic

## 2024-07-16 NOTE — PROGRESS NOTES
ANTICOAGULATION MANAGEMENT     Feng Wynne 79 year old male is on warfarin with supratherapeutic INR result. (Goal INR 2.5-3.5)    Recent labs: (last 7 days)     07/16/24  1302   INR >8.0*       ASSESSMENT     Source(s): Chart Review and Home Care/Facility Nurse     Warfarin doses taken: More warfarin taken than planned which may be affecting INR. Patient accidentally took 6 mg of warfarin on 7/16/24 instead of the directed 3 mg.   Diet: No new diet changes identified. Patient continues one protein drink daily.  Medication/supplement changes: None noted  New illness, injury, or hospitalization: Yes: Patient has had multiple episodes of diarrhea since 7/13/24. Symptoms are improving, patient is currently having about three episodes of diarrhea daily.   Signs or symptoms of bleeding or clotting: No  Previous result: Therapeutic last 2(+) visits  Additional findings: Home care loreto a venous INR today and will be dropping it off at [facility] lab. Please call home care nurse Nicolas ([phone]) with results, and if dosing needs to be changed prior to recheck on 7/18/24.       PLAN     Recommended plan for temporary change(s) affecting INR     Dosing Instructions: hold 2 doses then continue your current warfarin dose with next INR in 2 days       Summary  As of 7/16/2024      Full warfarin instructions:  7/16: Hold; 7/17: Hold; Otherwise 3 mg every Tue, Sat; 4.5 mg all other days   Next INR check:  7/18/2024               Telephone call with Nicolas home care nurse who verbalizes understanding and agrees to plan    Orders given to  Homecare nurse/facility to recheck    Education provided: Please call back if any changes to your diet, medications or how you've been taking warfarin  Dietary considerations: importance of consistent vitamin K intake  Symptom monitoring: monitoring for bleeding signs and symptoms, monitoring for clotting signs and symptoms, monitoring for stroke signs and symptoms, when to seek medical  attention/emergency care, and if you hit your head or have a bad fall seek emergency care  Contact [phone] with any changes, questions or concerns.     Plan made per [facility] anticoagulation protocol    Gaviota Weston RN  Anticoagulation Clinic  7/16/2024    _______________________________________________________________________     Anticoagulation Episode Summary       Current INR goal:  2.5-3.5   TTR:  60.1% (9.5 mo)   Target end date:  Indefinite   Send INR reminders to:  ANTICOAG HOME MONITORING    Indications    Long-term (current) use of anticoagulants [Z79.01] [Z79.01]  S/P mitral valve replacement [Z95.2]  Chronic atrial fibrillation (H) [I48.20]             Comments:  [facility] Patient, okay  to manage by exception on 7/8/20             Anticoagulation Care Providers       Provider Role Specialty Phone number    Jayme Deng MD Referring Internal Medicine [phone]

## 2024-07-16 NOTE — TELEPHONE ENCOUNTER
Home care following up on INR critical lab results.    Writer notified [facility] manages pt's warfarin and INR's. Relayed warfarin instructions from today. Verbalized understanding.    Neema Charlton RN

## 2024-07-16 NOTE — PROGRESS NOTES
Venous result came back at 7.33, will continue outlined planned below. Routing again to primary care provider as an FYI.

## 2024-07-18 PROBLEM — R79.1 SUPRATHERAPEUTIC INR: Status: ACTIVE | Noted: 2024-01-01

## 2024-07-18 PROBLEM — S31.000A WOUND OF SACRAL REGION, INITIAL ENCOUNTER: Status: ACTIVE | Noted: 2024-01-01

## 2024-07-18 PROBLEM — R19.7 DIARRHEA, UNSPECIFIED TYPE: Status: ACTIVE | Noted: 2024-01-01

## 2024-07-18 PROBLEM — E87.1 HYPONATREMIA: Status: ACTIVE | Noted: 2024-01-01

## 2024-07-18 PROBLEM — R19.5 DARK STOOLS: Status: ACTIVE | Noted: 2024-01-01

## 2024-07-18 NOTE — H&P
[facility]    History and Physical - Hospitalist Service       Date of Admission:  7/18/2024    Assessment & Plan   Feng Wynne is a 79 year old male complex past medical history including mechanical mitral valve, A-fib, HFpEF, CAD, hypertension, hyperlipidemia, stage IV sacral decubitus ulcer with history of osteomyelitis among others who admitted on 7/18/2028 with 1 week of diarrhea, generalized weakness and found to have hyponatremia.     Diarrhea with dehydration   Generalized weakness  Physical deconditioning  *Presented with diarrhea x7-10 days, 3x/d.  He has since become quite weak and is having difficulty managing at home.  No recent sick contacts however notes at 1 point someone may have been around someone with COVID.  No respiratory symptoms. No recent antibiotic use.  No new medications.  No fever, chills, abdominal pain, nausea, or vomiting.  Baseline lower extremity weakness send has a lift and wheelchair at home. No obvious bleeding or hematuria, hematochezia.    *Hemodnamically stable on admission. Basic labs reveal sodium 128, magnesium 1.6, normal liver tests, lactic acid and TSH WNL. Hemoglobin 9.4 which is near recent levels however lower than last check on 7/3.  INR 6.46.  Given 1 L of normal saline and admitted to the hospital for further evaluation and monitoring.  -Inpatient status given complex history with diarrhea resulting in generalized weakness and hyponatremia, supratherapeutic INR and high risk.  -Stool samples, Cdiff, enteric/viral panel  -Check COVID  -Low-dose MIVF while monitoring closely  -Orthostatics  -PT/OT/SW/CC    Hyponatremia secondary to above  128 on admission, previously 133 and above.  Suspect hyponatremia is related to diarrhea and continued torsemide use.  -Hold PTA torsemide  -Trend serum sodium  -Check sodium studies  -Intake/output  -Pharmacy consult to review remainder of medications -PPI and torsemide    Supratherapeutic  INR  Mechanical mitral valve chronic warfarin  Atrial fibrillation  Coronary artery disease  Goal INR 2.5-3.5.  6 range on admission.   -Cardiac monitoring  -Daily INR  -warfarin on hold    Heart failure with preserved ejection fraction  Severe tricuspid regurgitation  Moderate pulmonary hypertension  Hypertension  Hyperlipidemia  -I/O  -Daily weights  -Stop MIVF once hydrated to prevent acute decompensation of heart failure    Intake/Output Summary (Last 24 hours) at 7/18/2024 7779  Last data filed at 7/18/2024 1847  Gross per 24 hour   Intake 1000 ml   Output --   Net 1000 ml     Wt Readings from Last 4 Encounters:   07/18/24 101.6 kg (224 lb)   06/03/24 103.9 kg (229 lb)   05/30/24 103.7 kg (228 lb 9.6 oz)   05/21/24 103.7 kg (228 lb 9.6 oz)       Chronic sacral decubitus ulcer stage IV  History of osteomyelitis coccyx  History of perirectal abscess  He has a chronic sacral wound and is seeing plastic surgery soon to come up with a surgical plan and possible flap.  He states it does not seem to be acutely infected however he has increased pain as the home care nurse was aggressively packing his wound day of admit.   -Wound cares  -WOCN evaluation  -RD consultation for wound healing nutrition  -Does not appear acutely infected  -Follow-up with plastic surgery as an outpatient          History of anemia and GI bleed  Multiple hospitalizations earlier this year for GI bleed.  Hemoglobin has been stable 8-9 range.  Patient denies any recent melena or hematochezia.  -Continue iron tablets  -Resume PPI given risk of recurrent bleed in setting of supratherapeutic INR       Diet: Combination Diet Regular Diet Adult  DVT Prophylaxis: Warfarin  Stubbs Catheter: Not present  Lines: None     Cardiac Monitoring: None  Code Status: Full Code -confirmed with patient on admission    Clinically Significant Risk Factors Present on Admission         # Hyponatremia: Lowest Na = 128 mmol/L in last 2 days, will monitor as appropriate  "  # Hypercalcemia: corrected calcium is >10.1, will monitor as appropriate  # Hypomagnesemia: Lowest Mg = 1.6 mg/dL in last 2 days, will replace as needed   # Hypoalbuminemia: Lowest albumin = 2.6 g/dL at 7/18/2024  2:36 PM, will monitor as appropriate    # Drug Induced Coagulation Defect: home medication list includes an anticoagulant medication    # Hypertension: Noted on problem list  # Chronic heart failure with preserved ejection fraction: heart failure noted on problem list and last echo with EF >50%   # Anemia: based on hgb <11           # Overweight: Estimated body mass index is 28.76 kg/m  as calculated from the following:    Height as of this encounter: 1.88 m (6' 2\").    Weight as of this encounter: 101.6 kg (224 lb).         # Financial/Environmental Concerns:           Disposition Plan     Medically Ready for Discharge: Anticipated in 2-4 Days         The patient's care was discussed with the Attending Physician, Dr. Hurtado, Patient, and Patient's Family.    Yaneth Mccarthy PA-C  Hospitalist Service  [facility]  Securely message with [employer] (more info)  Text page via [facility] Paging/Directory     ______________________________________________________________________    Chief Complaint   Diarrhea, generalized weakness    History is obtained from the patient    History of Present Illness   Feng Wynne is a 79 year old male complex past medical history including mechanical mitral valve, A-fib, CKD, CHF, CAD, hypertension, hyperlipidemia, stage IV sacral decubitus ulcer with history of osteomyelitis among others who presented to the emergency department with 1 week of diarrhea and generalized weakness.  The patient reports he has been having diarrhea for the past 7 to 10 days, approximately 3 times per day.  He has since become quite weak and is having difficulty managing at home.  No recent sick contacts however notes at 1 point someone may have been around someone with " COVID.  No respiratory symptoms.  No one else at home is sick.  No recent antibiotic use.  No new medications.  No fever, chills, abdominal pain, nausea, or vomiting.  Baseline lower extremity weakness send has a lift and wheelchair at home.  INR has been supratherapeutic for many days now.  No obvious bleeding or hematuria, hematochezia.  He has a chronic sacral wound and is seeing plastic surgery soon to come up with a surgical plan and possible flap.  He states it does not seem to be acutely infected however he has increased pain as the home care nurse was aggressively packing his wound today.    Hemodynamically stable in the emergency department, blood pressures systolic 90s-110.  Sick labs reveal sodium 128, magnesium 1.6, normal liver tests, lactic acid within normal limits.  TSH within normal limits.  Hemoglobin 9.4 which is near recent levels however lower than last check on 7/3.  INR 6.46.  Given 1 L of normal saline and admitted to the hospital for further evaluation and monitoring.    Past Medical History    Past Medical History:   Diagnosis Date    Acute on chronic congestive heart failure, unspecified heart failure type (H) 01/30/2024    Arthritis     Atrial fibrillation (H)     Coronary artery disease     Hypercholesteremia     Obesity     Unspecified essential hypertension        Past Surgical History   Past Surgical History:   Procedure Laterality Date    COLONOSCOPY N/A 1/15/2024    Procedure: Colonoscopy;  Surgeon: Osbaldo Olvera MD;  Location:  GI    ESOPHAGOSCOPY, GASTROSCOPY, DUODENOSCOPY (EGD), COMBINED N/A 1/15/2024    Procedure: Esophagoscopy, gastroscopy, duodenoscopy (EGD), combined;  Surgeon: Osbaldo Olvera MD;  Location:  GI    ESOPHAGOSCOPY, GASTROSCOPY, DUODENOSCOPY (EGD), COMBINED N/A 2/8/2024    Procedure: Esophagoscopy, gastroscopy, duodenoscopy (EGD), combined;  Surgeon: Osbaldo Olvera MD;  Location:  GI    IRRIGATION AND DEBRIDEMENT SACRAL WOUND,  COMBINED N/A 3/22/2024    Procedure: IRRIGATION AND DEBRIDEMENT, WOUND, SACRAL REGION;  Surgeon: Phill Jimenez MD;  Location: [facility] OR    MITRAL VALVE REPLACEMENT  8-    Mitral valve replacement with 31-mm [employer]. Raffi mechanical valve.        Prior to Admission Medications   Prior to Admission Medications   Prescriptions Last Dose Informant Patient Reported? Taking?   HYDROmorphone (DILAUDID) 4 MG tablet  Self No Yes   Sig: Take 0.5 tablets (2 mg) by mouth every 6 hours as needed for severe pain   Multiple Vitamins-Minerals (MULTIVITAMIN ADULT) CHEW 7/17/2024 Self Yes Yes   Sig: Take 2 chew tab by mouth daily   Omega-3 Fatty Acids (FISH OIL CONCENTRATE PO) 7/17/2024 Nursing Home, Self Yes Yes   Sig: Take 1 capsule by mouth daily   Probiotic CHEW 7/17/2024 Self Yes Yes   Sig: Take 2 chew tab by mouth daily   acetaminophen (TYLENOL) 500 MG tablet 7/17/2024 Self No Yes   Sig: Take 2 tablets (1,000 mg) by mouth 3 times daily   Patient taking differently: Take 1,000 mg by mouth at bedtime   diclofenac (VOLTAREN) 1 % topical gel  Self No Yes   Sig: Apply 4 g topically 3 times daily   Patient taking differently: Apply 4 g topically 3 times daily as needed for moderate pain   ferrous sulfate (FEROSUL) 325 (65 Fe) MG tablet 7/17/2024 Self No Yes   Sig: Take 1 tablet (325 mg) by mouth daily (with breakfast)   nystatin (MYCOSTATIN) 064768 UNIT/GM external powder  Nursing Home, Self Yes Yes   Sig: Apply topically 2 times daily as needed for other Groin folds   order for DME  Self No No   Sig: Equipment being ordered: COMPRESSION STOCKINGS, 20-30 mmHg   pantoprazole (PROTONIX) 40 MG EC tablet Not Taking Self No No   Sig: Take 1 tablet (40 mg) by mouth 2 times daily (before meals)   Patient not taking: Reported on 7/18/2024   torsemide (DEMADEX) 20 MG tablet 7/17/2024 Self No Yes   Sig: Take 1 tablet (20 mg) by mouth daily   warfarin ANTICOAGULANT (COUMADIN) 1 MG tablet Not Taking Self No No   Sig: Take 3 tablets (3  mg) by mouth See Admin Instructions AND 2.5 tablets (2.5 mg) See Admin Instructions. 3mg on MWF and 2.5mg AOD   Patient not taking: Reported on 2024   warfarin ANTICOAGULANT (COUMADIN) 3 MG tablet Past Week Self No Yes   Sig: Take by mouth 3 mg (3 mg x 1 tablet) every day OR as directed by INR clinic   Patient taking differently: 3 mg every Tue, Sat; 4.5 mg all other days      Facility-Administered Medications: None        Review of Systems    The 10 point Review of Systems is negative other than noted in the HPI or here.     Social History   I have reviewed this patient's social history and updated it with pertinent information if needed.  Social History     Tobacco Use    Smoking status: Former     Current packs/day: 0.00     Average packs/day: 0.5 packs/day for 5.0 years (2.5 ttl pk-yrs)     Types: Cigarettes     Start date:      Quit date:      Years since quittin.5    Smokeless tobacco: Never   Vaping Use    Vaping status: Never Used   Substance Use Topics    Alcohol use: Yes     Comment: 1 drink per month    Drug use: No         Allergies   Allergies   Allergen Reactions    Amiodarone Shortness Of Breath    Pcn [Penicillins] Shortness Of Breath     Rxn occurred in childhood     Statins Muscle Pain (Myalgia) and Hives    Amiodarone     Latex     Zetia [Ezetimibe] Muscle Pain (Myalgia)     Muscle weakness legs        Physical Exam   Vital Signs: Temp: 98.7  F (37.1  C) Temp src: Oral BP: 107/62 Pulse: 87   Resp: 18 SpO2: 94 % O2 Device: None (Room air)    Weight: 224 lbs 0 oz    Physical Exam    General: Awake, alert, gentleman who appears stated age. Looks comfortable sitting up in bed. No acute distress.  HEENT: Normocephalic, atraumatic. Extraocular movements intact.  Respiratory: Clear to auscultation bilaterally, no rales, wheezing, or rhonchi.  Cardiovascular: Regular rate and rhythm, +S1 and S2, mechanical sounding heart valve  Gastrointestinal: Soft, BS, large hernia noted. Bowel sounds  present.  Skin: Warm, dry. No obvious rashes or lesions on exposed skin. Dorsalis pedis pulses palpable bilaterally.  Known sacral decubitus ulcer, see photo from ED, did not turn to assess ulcer on my exam.  Musculoskeletal: Muscle loss to bilateral lower extremities.  Neurologic: AAO x3.   Psychiatric: Appropriate mood and affect. No obvious anxiety or depression.      Medical Decision Making       75 MINUTES SPENT BY ME on the date of service doing chart review, history, exam, documentation & further activities per the note.      Data     I have personally reviewed the following data over the past 24 hrs:    6.2  \   9.4 (L)   / 254     131 (L) 86 (L) 21.9 /  101 (H)   3.4 35 (H) 0.55 (L) \     ALT: 6 AST: 17 AP: 77 TBILI: 0.5   ALB: 2.6 (L) TOT PROTEIN: 5.6 (L) LIPASE: N/A     TSH: 3.27 T4: N/A A1C: N/A     Procal: N/A CRP: N/A Lactic Acid: 1.0       INR:  6.46 (HH) PTT:  N/A   D-dimer:  N/A Fibrinogen:  N/A       Imaging results reviewed over the past 24 hrs:   No results found for this or any previous visit (from the past 24 hour(s)).

## 2024-07-18 NOTE — ED NOTES
Bed: ED15  Expected date:   Expected time:   Means of arrival:   Comments:  421-79M Diarrhea x2 days

## 2024-07-18 NOTE — ED NOTES
RN provided incontinence care. Stool dark black. Pt reports taking iron daily. Unable to test stool due to not being loose. Mepilex dressing is clean/dry/intact on sacral wound. RN turned pt to right side with pillows and elevated heels off of pillows.     RN updated Dr. Momin on pt's stool color.

## 2024-07-18 NOTE — PROGRESS NOTES
ANTICOAGULATION MANAGEMENT     Feng Wynne 79 year old male is on warfarin with supratherapeutic INR result. (Goal INR 2.5-3.5)    Recent labs: (last 7 days)     07/18/24  1107   INR 7.3*       ASSESSMENT     Source(s): Chart Review and Home Care/Facility Nurse     Warfarin doses taken:  held as directed  Diet: No new diet changes identified  Medication/supplement changes:  imodium today  New illness, injury, or hospitalization: Continues to  have diarrhea  Signs or symptoms of bleeding or clotting: No  Previous result: Supratherapeutic  Additional findings: None       PLAN     Recommended plan for temporary change(s) affecting INR     Dosing Instructions:  hold today  with next INR in 1 day       Summary  As of 7/18/2024      Full warfarin instructions:  7/18: Hold; 7/19: Hold; Otherwise 3 mg every Tue, Sat; 4.5 mg all other days   Next INR check:  7/19/2024               Telephone call with [facility] home care nurse who verbalizes understanding and agrees to plan    Orders given to  Homecare nurse/facility to recheck    Education provided: Symptom monitoring: monitoring for bleeding signs and symptoms, when to seek medical attention/emergency care, and if you hit your head or have a bad fall seek emergency care    Plan made per [facility] anticoagulation protocol    Samantha Parnell RN  Anticoagulation Clinic  7/18/2024    _______________________________________________________________________     Anticoagulation Episode Summary       Current INR goal:  2.5-3.5   TTR:  58.5% (9.7 mo)   Target end date:  Indefinite   Send INR reminders to:  ANTICOAG HOME MONITORING    Indications    Long-term (current) use of anticoagulants [Z79.01] [Z79.01]  S/P mitral valve replacement [Z95.2]  Chronic atrial fibrillation (H) [I48.20]             Comments:  [facility] MONITORING Patient, okay  to manage by exception on 7/8/20             Anticoagulation Care Providers       Provider Role Specialty Phone number    Jayme Deng  MD [facility] [phone]

## 2024-07-18 NOTE — TELEPHONE ENCOUNTER
Called and spoke to Rolando. Relayed message from the provider below. Rolando asked RN to also call the patient as he is no longer with the patient.     RN called and spoke to the patient. Relayed recommendation from the provider. Patient states he will call 911 for an ambulance since he is bed bound. Patient declined any assistance from the RN. Patient expressed understanding with the recommendation and had no additional questions at this time.     Aidee Isbell RN

## 2024-07-18 NOTE — TELEPHONE ENCOUNTER
Home nurse calling to report  Nurse Triage SBAR    Is this a 2nd Level Triage? YES, LICENSED PRACTITIONER REVIEW IS REQUIRED    Situation: Patient and home nurse are calling. Patient has been having diarrhea since Saturday.  Patient ate a spicy chilli and since has been having watery diarrhea. Nurse informed triage nurse of vitals see below and that patient is bed bound.     Background: Patient is bed bound     Assessment: Patient needs to be assessed     Protocol Recommended Disposition:   No disposition on file.    Recommendation: routing to PCP - cannot walk inappropriate for ADS     Routed to provider    Does the patient meet one of the following criteria for ADS visit consideration? 16+ years old, with an [facility] PCP     TIP  Providers, please consider if this condition is appropriate for management at one of our Acute and Diagnostic Services sites.     If patient is a good candidate, please use dotphrase <dot>triageresponse and select Refer to ADS to document.         Temp 99.7 (temporal)  84 pulse   104/61 bp   94-95%   18   Reason for Disposition   MILD diarrhea (e.g., 1-3 or more stools than normal in past 24 hours) diarrhea without known cause and present > 7 days    Additional Information   Negative: Shock suspected (e.g., cold/pale/clammy skin, too weak to stand, low BP, rapid pulse)   Negative: Difficult to awaken or acting confused (e.g., disoriented, slurred speech)   Negative: Sounds like a life-threatening emergency to the triager   Negative: Vomiting also present and worse than the diarrhea   Negative: Blood in stool and without diarrhea   Negative: SEVERE diarrhea (e.g., 7 or more times / day more than normal) and age > 60 years   Negative: Constant abdominal pain lasting > 2 hours   Negative: Drinking very little and dehydration suspected (e.g., no urine > 12 hours, very dry mouth, very lightheaded)   Negative: Patient sounds very sick or weak to the triager   Negative: SEVERE diarrhea (e.g., 7 or  "more times / day more than normal) and present > 24 hours (1 day)   Negative: MODERATE diarrhea (e.g., 4-6 times / day more than normal) and present > 48 hours (2 days)   Negative: MODERATE diarrhea (e.g., 4-6 times / day more than normal) and age > 70 years   Negative: Abdominal pain (Exception: Pain clears completely with each passage of diarrhea stool.)   Negative: Fever > 101 F (38.3 C)   Negative: Blood in the stool  (Exception: Only on toilet paper. Reason: Diarrhea can cause rectal irritation with blood on wiping.)   Negative: Mucus or pus in stool has been present > 2 days and diarrhea is more than mild   Negative: Weak immune system (e.g., HIV positive, cancer chemo, splenectomy, organ transplant, chronic steroids)   Negative: Travel to a foreign country in past month   Negative: Recent antibiotic therapy (i.e., within last 2 months) and diarrhea present > 3 days since antibiotic was stopped   Negative: Recent hospitalization and diarrhea present > 3 days   Negative: Tube feedings (e.g., nasogastric, g-tube, j-tube)    Answer Assessment - Initial Assessment Questions  1. DIARRHEA SEVERITY: \"How bad is the diarrhea?\" \"How many more stools have you had in the past 24 hours than normal?\"     - NO DIARRHEA (SCALE 0)    - MILD (SCALE 1-3): Few loose or mushy BMs; increase of 1-3 stools over normal daily number of stools; mild increase in ostomy output.    -  MODERATE (SCALE 4-7): Increase of 4-6 stools daily over normal; moderate increase in ostomy output.    -  SEVERE (SCALE 8-10; OR \"WORST POSSIBLE\"): Increase of 7 or more stools daily over normal; moderate increase in ostomy output; incontinence.      Mild   2. ONSET: \"When did the diarrhea begin?\"       sat  3. BM CONSISTENCY: \"How loose or watery is the diarrhea?\"       watery  4. VOMITING: \"Are you also vomiting?\" If Yes, ask: \"How many times in the past 24 hours?\"       no  5. ABDOMEN PAIN: \"Are you having any abdomen pain?\" If Yes, ask: \"What does it " "feel like?\" (e.g., crampy, dull, intermittent, constant)       None   6. ABDOMEN PAIN SEVERITY: If present, ask: \"How bad is the pain?\"  (e.g., Scale 1-10; mild, moderate, or severe)    - MILD (1-3): doesn't interfere with normal activities, abdomen soft and not tender to touch     - MODERATE (4-7): interferes with normal activities or awakens from sleep, abdomen tender to touch     - SEVERE (8-10): excruciating pain, doubled over, unable to do any normal activities        none  7. ORAL INTAKE: If vomiting, \"Have you been able to drink liquids?\" \"How much liquids have you had in the past 24 hours?\"      A lot   8. HYDRATION: \"Any signs of dehydration?\" (e.g., dry mouth [not just dry lips], too weak to stand, dizziness, new weight loss) \"When did you last urinate?\"      no  9. EXPOSURE: \"Have you traveled to a foreign country recently?\" \"Have you been exposed to anyone with diarrhea?\" \"Could you have eaten any food that was spoiled?\"      No- had some chilli on Saturday   10. ANTIBIOTIC USE: \"Are you taking antibiotics now or have you taken antibiotics in the past 2 months?\"        no  11. OTHER SYMPTOMS: \"Do you have any other symptoms?\" (e.g., fever, blood in stool)        no  12. PREGNANCY: \"Is there any chance you are pregnant?\" \"When was your last menstrual period?\"        no    Protocols used: Diarrhea-A-OH    "

## 2024-07-18 NOTE — ED TRIAGE NOTES
Pt BIB EMS, from home, with diarrhea for the last 10 days. And abnormal INR. Pt called provider and was sent to the ER to be tested for c-diff.    Upon EMS arrival BP 90/60 mmHg. EMS administered 400 ml IVF en route. .     Of note, pt has baseline paralysis waist down and baseline RUE weakness. Additionally, pt has known pressure sore per pt's partner.     Pt denies lightheadedness.

## 2024-07-18 NOTE — TELEPHONE ENCOUNTER
Home care nurse [facility] called to discuss wound condition and continuing treatment.  Has advised patient he should go to the ER.  Nic[facility]  [phone]

## 2024-07-18 NOTE — TELEPHONE ENCOUNTER
Dr Srinivasan is aware, has seen the ED photo of Sacral wound; will continue to follow along with provider team at [facility] outpatient as available.

## 2024-07-18 NOTE — ED NOTES
[facility]  ED Nurse Handoff Report    ED Chief complaint: Diarrhea and Hypotension      ED Diagnosis:   Final diagnoses:   Diarrhea, unspecified type   Dark stools   Wound of sacral region, initial encounter   Hyponatremia   Supratherapeutic INR       Code Status: To be determined by admitting provider.     Allergies:   Allergies   Allergen Reactions    Amiodarone Shortness Of Breath    Pcn [Penicillins] Shortness Of Breath     Rxn occurred in childhood     Statins Muscle Pain (Myalgia) and Hives    Amiodarone     Latex     Zetia [Ezetimibe] Muscle Pain (Myalgia)     Muscle weakness legs       Patient Story:  Feng Wynne is a 79 year old male on Lovenox and Coumadin with a history of atrial fibrillation and CKD presenting to the ED for evaluation of diarrhea. The patient reports that he has been having persistent diarrhea for the past 10 days. Today, he decided to call EMS for increasing rectal pain and upon their arrival, he was hypotensive. He denies recent antibiotics or travel, though he adds that he ate spicy chili they day his symptoms started. He also denies fever, abdominal pain, or vomiting. Patient is lower extremity paraplegia and RUE weakness. 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        Focused Assessment:    Patient is A&Ox4. Breathing rate, rhythm and SPO2 wdl. No cough or SOB. Denies chest pain. HR WDL. Slight hypotension. 90s/60s. Dark black stools. Stage 4 pressure injury on sacrum.     Labs Ordered and Resulted from Time of ED Arrival to Time of ED Departure   BASIC METABOLIC PANEL - Abnormal       Result Value    Sodium 128 (*)     Potassium 3.4      Chloride 86 (*)     Carbon Dioxide (CO2) 35 (*)     Anion Gap 7      Urea Nitrogen 21.9      Creatinine 0.55 (*)     GFR Estimate >90      Calcium 9.2      Glucose 101 (*)    INR - Abnormal    INR 6.46 (*)    HEPATIC FUNCTION PANEL - Abnormal    Protein Total 5.6 (*)     Albumin 2.6 (*)     Bilirubin Total  "0.5      Alkaline Phosphatase 77      AST 17      ALT 6      Bilirubin Direct 0.23     CBC WITH PLATELETS AND DIFFERENTIAL - Abnormal    WBC Count 6.2      RBC Count 3.46 (*)     Hemoglobin 9.4 (*)     Hematocrit 30.7 (*)     MCV 89      MCH 27.2      MCHC 30.6 (*)     RDW 15.5 (*)     Platelet Count 254      % Neutrophils 65      % Lymphocytes 22      % Monocytes 11      % Eosinophils 2      % Basophils 1      % Immature Granulocytes 1      NRBCs per 100 WBC 0      Absolute Neutrophils 4.0      Absolute Lymphocytes 1.4      Absolute Monocytes 0.7      Absolute Eosinophils 0.1      Absolute Basophils 0.0      Absolute Immature Granulocytes 0.0      Absolute NRBCs 0.0     LACTIC ACID WHOLE BLOOD - Normal    Lactic Acid 1.0     ENTERIC BACTERIA AND VIRUS PANEL BY PCR   C. DIFFICILE TOXIN B PCR WITH REFLEX TO C. DIFFICILE ANTIGEN AND TOXINS A/B EIA       No orders to display         Treatments and/or interventions provided:  Medications   sodium chloride 0.9% BOLUS 1,000 mL (1,000 mLs Intravenous $New Bag 7/18/24 9705)       Patient's response to treatments and/or interventions:  Patient remains stable. Hypotension better after fluids.     To be done/followed up on inpatient unit:   See any in-patient orders. Sacral dressing changes.     Does this patient have any cognitive concerns?:  N/A    Activity level - Baseline/Home:    Total Care    Activity Level - Current:    Total Care    Patient's Preferred language: English     Needed?: No    Isolation: None and Enteric  Infection: Not Applicable  C-Diff Pending  Patient tested for COVID 19 prior to admission: NO    Bariatric?: No    Vital Signs:   Vitals:    07/18/24 1338 07/18/24 1530 07/18/24 1557 07/18/24 1612   BP: 110/66 99/56 99/64    Pulse: 76 76     Resp: 18 18     Temp: 98.7  F (37.1  C)      TempSrc: Oral      SpO2: 96% 96% 100% 96%   Weight: 101.6 kg (224 lb)      Height: 1.88 m (6' 2\")          Cardiac Rhythm:     Was the PSS-3 completed:   " Yes  What interventions are required if any?                 Family Comments: N/A    OBS brochure/video discussed/provided to patient/family: No              Name of person given brochure if not patient: N/A              Relationship to patient: N/A    For the majority of the shift this patient's behavior was Green.  Behavioral interventions performed were N/A.    ED NURSE PHONE NUMBER: *[phone]

## 2024-07-18 NOTE — PHARMACY-ADMISSION MEDICATION HISTORY
Pharmacist Admission Medication History    Admission medication history is complete. The information provided in this note is only as accurate as the sources available at the time of the update.    Information Source(s): Patient and CareEverywhere/SureScripts via in-person    Pertinent Information:     Changes made to PTA medication list:  Added: None  Deleted: lovenox , psyllium,tums, albuterol, bisacodyl   Changed: None    Allergies reviewed with patient and updates made in EHR: no    Medication History Completed By: Mackenzie Mims RPH 7/18/2024 5:42 PM    PTA Med List   Medication Sig Last Dose    acetaminophen (TYLENOL) 500 MG tablet Take 2 tablets (1,000 mg) by mouth 3 times daily (Patient taking differently: Take 1,000 mg by mouth at bedtime) 7/17/2024    diclofenac (VOLTAREN) 1 % topical gel Apply 4 g topically 3 times daily (Patient taking differently: Apply 4 g topically 3 times daily as needed for moderate pain)     ferrous sulfate (FEROSUL) 325 (65 Fe) MG tablet Take 1 tablet (325 mg) by mouth daily (with breakfast) 7/17/2024    HYDROmorphone (DILAUDID) 4 MG tablet Take 0.5 tablets (2 mg) by mouth every 6 hours as needed for severe pain     Multiple Vitamins-Minerals (MULTIVITAMIN ADULT) CHEW Take 2 chew tab by mouth daily 7/17/2024    nystatin (MYCOSTATIN) 872485 UNIT/GM external powder Apply topically 2 times daily as needed for other Groin folds     Omega-3 Fatty Acids (FISH OIL CONCENTRATE PO) Take 1 capsule by mouth daily 7/17/2024    Probiotic CHEW Take 2 chew tab by mouth daily 7/17/2024    torsemide (DEMADEX) 20 MG tablet Take 1 tablet (20 mg) by mouth daily 7/17/2024    warfarin ANTICOAGULANT (COUMADIN) 3 MG tablet Take by mouth 3 mg (3 mg x 1 tablet) every day OR as directed by INR clinic (Patient taking differently: 3 mg every Tue, Sat; 4.5 mg all other days) Past Week     Mackenzie Mims PharmD

## 2024-07-18 NOTE — ED PROVIDER NOTES
"  Emergency Department Note      History of Present Illness     Chief Complaint   Diarrhea and Hypotension    HPI   Feng Wynne is a 79 year old male on Lovenox and Coumadin with a history of atrial fibrillation and CKD presenting to the ED for evaluation of diarrhea. The patient reports that he has been having persistent diarrhea for the past 10 days. Today, he decided to call EMS for increasing rectal pain and upon their arrival, he was hypotensive. He denies recent antibiotics or travel, though he adds that he ate spicy chili they day his symptoms started. He also denies fever, abdominal pain, or vomiting. The patient has weakness in both legs at baseline. 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   Acute on chronic CHF  Arthritis  Atrial fibrillation  CAD  Hypercholesterolemia  Hypertension    Medications   Tylenol  Proair  Lovenox  Ferosul  Dilaudid  Mycostatin  Fish oil  Protonix  Demadex  Coumadin    Surgical History   Colonoscopy  Esophagogastroduodenoscopy x2  Irrigation and debridement  Mitral valve replacement    Physical Exam     Patient Vitals for the past 24 hrs:   BP Temp Temp src Pulse Resp SpO2 Height Weight   07/18/24 1612 -- -- -- -- -- 96 % -- --   07/18/24 1557 99/64 -- -- -- -- 100 % -- --   07/18/24 1530 99/56 -- -- 76 18 96 % -- --   07/18/24 1338 110/66 98.7  F (37.1  C) Oral 76 18 96 % 1.88 m (6' 2\") 101.6 kg (224 lb)     Physical Exam  VS: Reviewed per above  HENT: Mucous membranes moist  EYES: sclera anicteric  CV: Rate as noted,  regular rhythm.   RESP: Effort normal. Breath sounds are normal bilaterally.  GI: no tenderness/rebound/guarding, obese abdomen. Large ventral hernia defect palpable.   NEURO: GCS 15, " chronic symmetric weakness in the lower legs.  Moving bilateral upper extremities freely.  MSK: No deformity of the extremities  SKIN: Warm and dry, deep sacral wound per below without surrounding redness or purulent discharge          Diagnostics     Lab Results   Labs Ordered and Resulted from Time of ED Arrival to Time of ED Departure   BASIC METABOLIC PANEL - Abnormal       Result Value    Sodium 128 (*)     Potassium 3.4      Chloride 86 (*)     Carbon Dioxide (CO2) 35 (*)     Anion Gap 7      Urea Nitrogen 21.9      Creatinine 0.55 (*)     GFR Estimate >90      Calcium 9.2      Glucose 101 (*)    INR - Abnormal    INR 6.46 (*)    HEPATIC FUNCTION PANEL - Abnormal    Protein Total 5.6 (*)     Albumin 2.6 (*)     Bilirubin Total 0.5      Alkaline Phosphatase 77      AST 17      ALT 6      Bilirubin Direct 0.23     CBC WITH PLATELETS AND DIFFERENTIAL - Abnormal    WBC Count 6.2      RBC Count 3.46 (*)     Hemoglobin 9.4 (*)     Hematocrit 30.7 (*)     MCV 89      MCH 27.2      MCHC 30.6 (*)     RDW 15.5 (*)     Platelet Count 254      % Neutrophils 65      % Lymphocytes 22      % Monocytes 11      % Eosinophils 2      % Basophils 1      % Immature Granulocytes 1      NRBCs per 100 WBC 0      Absolute Neutrophils 4.0      Absolute Lymphocytes 1.4      Absolute Monocytes 0.7      Absolute Eosinophils 0.1      Absolute Basophils 0.0      Absolute Immature Granulocytes 0.0      Absolute NRBCs 0.0     LACTIC ACID WHOLE BLOOD - Normal    Lactic Acid 1.0     ENTERIC BACTERIA AND VIRUS PANEL BY PCR   C. DIFFICILE TOXIN B PCR WITH REFLEX TO C. DIFFICILE ANTIGEN AND TOXINS A/B EIA     Imaging   No orders to display     Independent Interpretation   None    ED Course      Medications Administered   Medications   sodium chloride 0.9% BOLUS 1,000 mL (1,000 mLs Intravenous $New Bag 7/18/24 0860)     ED Course/Discussion of Management   ED Course as of 07/18/24 1653   Thu Jul 18, 2024   1672 I obtained history and examined the  patient as noted above.     1549 I spoke with Radha Mccarthy PA-C for hospitalist, Dr. Hurtado regarding the patient's history and presentation in the emergency department today. Dr. Hurtado accepted the patient for admission.      Optional/Additional Documentation  None    Medical Decision Making / Diagnosis     CMS Diagnoses: None    MIPS       None    MDM   Feng Wynne is a 79 year old male who presents to the ER for evaluation of 10 days of diarrhea and weakness as well as recent pain in the sacral region at site of chronic wound.  On arrival vital signs are reassuring.  Abdominal exam benign.  Sacral wound was inspected and packing was removed.  No evidence of superimposed infection in this chronic appearing wound.  Labs reveal mild hyponatremia, chronic anemia.  Unable to send stool studies due to stool being not loose enough here in the ER.  His stool was dark appearing but no clear signs of brisk GI bleed at this juncture despite elevated INR.  Patient preferred admission for further treatment of his recent diarrheal illness and weakness.  This was facilitated.    Disposition   The patient was admitted to the hospital.     Diagnosis     ICD-10-CM    1. Diarrhea, unspecified type  R19.7       2. Dark stools  R19.5       3. Wound of sacral region, initial encounter  S31.000A       4. Hyponatremia  E87.1       5. Supratherapeutic INR  R79.1          Scribe Disclosure:  I, Aminta Agustin, am serving as a scribe at 2:37 PM on 7/18/2024 to document services personally performed by Jeremi Momin MD based on my observations and the provider's statements to me.        Jeremi Momin MD  07/18/24 7751

## 2024-07-19 NOTE — TELEPHONE ENCOUNTER
Home Care is calling regarding an established patient with [facility].        6/27/2024     3:50 PM   Home Care Information   Date of Home Care episode start 6/19/2024   Current following provider Dr. Jayme Deng    Name/Phone Number Joshua Kinney PT,   Home Care agency [facility]     Requesting orders from: Jayme Deng  Provider is following patient: Yes  Is this a 60-day recertification request?  No    Orders Requested    Physical Therapy  Request for continuation of care with no increase or decrease in frequency  Frequency:  One PT visit next week per pt request, patient currently admitted to the hospital with diarrhea        Verbal orders given.  Home Care will send orders for provider to sign.  Confirmed ok to leave a detailed message with call back.  Contact information confirmed and updated as needed.    Aidee Isbell RN

## 2024-07-19 NOTE — UTILIZATION REVIEW
Admission Status; Secondary Review Determination       Under the authority of the Utilization Management Committee, the utilization review process indicated a secondary review on the above patient. The review outcome is based on review of the medical records, discussions with staff, and applying clinical experience noted on the date of the review.     (x) Inpatient Status Appropriate - This patient's medical care is consistent with medical management for inpatient care and reasonable inpatient medical practice.     RATIONALE FOR DETERMINATION     Patient is a 79 year old male with complex past medical history including mechanical mitral valve, A-fib, chronic anticoagulation with warfarin, HFpEF, CAD, hypertension, hyperlipidemia, stage IV sacral decubitus ulcer with history of osteomyelitis, and others. He presented to the ED on 7/18/2028 with 1 week of diarrhea and generalized weakness. ED evaluation showed marginal blood pressure with several systolic readings in the 90s. Laboratory evaluation showed sodium 128, chloride 86, CO2 35, hemoglobin 9.4, and INR 6.46. He was admitted with diarrhea, dehydration, marginal blood pressure, hyponatremia, supratherapeutic INR due to poor oral intake, and physical deconditioning. His situation is complicated by stage IV sacral decubitus ulcer with risk of infection related to this diarrhea with incontinence. He is being treated supportively for diarrhea. He has had 4 more episodes of loose stools since admission. Stool studies have been requested but not sent yet due to difficulty obtaining related to incontinence. Blood pressure remains marginal in the 90s to 100s systolic in spite of holding prior to admission torsemide Sodium has improved to 131 but remains low. Appetite remains low. He remains weak. INR has improved but is still significantly elevated at 5.36. Wound care nurse has been consulted to help manage stage IV sacral decubitus ulcer and risk of infection with  diarrhea and incontinence. Patient is expected to remain in the hospital for at least two midnights. Inpatient admission is appropriate given expected length of stay, complexity of patient as discussed above, and risk of deterioration related to several things mentioned above (hyponatremia, supratherapeutic INR, marginal blood pressure, and risk of sacral ulcer infection related to diarrhea and incontinence).         At the time of admission with the information available to the attending physician more than 2 nights Hospital complex care was anticipated, based on patient risk of adverse outcome if treated as outpatient and complex care required. Inpatient admission is appropriate based on the Medicare guidelines.     This document was produced using voice recognition software       The information on this document is developed by the utilization review team in order for the business office to ensure compliance. This only denotes the appropriateness of proper admission status and does not reflect the quality of care rendered.   The definitions of Inpatient Status and Observation Status used in making the determination above are those provided in the CMS Coverage Manual, Chapter 1 and Chapter 6, section 70.4.   Sincerely,     Cyril Sebastian MD    Utilization Review  Physician Advisor  [facility].

## 2024-07-19 NOTE — PROGRESS NOTES
RECEIVING UNIT ED HANDOFF REVIEW    ED Nurse Handoff Report was reviewed by: Jennifer Aburto RN on July 18, 2024 at 8:09 PM

## 2024-07-19 NOTE — PLAN OF CARE
Goal Outcome Evaluation:      TIME: 2300-7:30      Medical    Diagnosis: Diarrhea with dehydration ,Generalized weakness , Hyponatremia Physical deconditioning    POD# NA  Mental Status: A&O X4  Activity/dangle: Assist of 2 with lift  Diet: Regular  Pain: Denies  Stubbs/Voiding: incontinent with B/B   Tele/Restraints/Iso: Contact to rule out C-difficile  02/LDA: RA  D/C Date: TBD      Other Info: PIV-SL. Loose FBM x 3 , needs stool sample for lab, but pt is incontinent . Please try to get stool sample today. Pt has Stage 4 pressure ulcer, abdominal pad covered large coccyx wound and mipilex applied.

## 2024-07-19 NOTE — PROGRESS NOTES
Shift Summary [id number]    Admitting Diagnosis: Hyponatremia [E87.1]  Dark stools [R19.5]  Supratherapeutic INR [R79.1]  Wound of sacral region, initial encounter [S31.000A]  Diarrhea, unspecified type [R19.7]     Vitals WDL  A&Ox4  Voiding continent  Mobility A of 2 lift  CMS BLE numbness  GI x1 loose BM   Dressing stage 4 ulcer, mepilex applied  R PIV SL    Orders Placed This Encounter      Combination Diet Regular Diet Adult       Plan: Awaiting stool sample to rule out C Diff

## 2024-07-19 NOTE — CONSULTS
"CLINICAL NUTRITION SERVICES  -  ASSESSMENT NOTE    Recommendations Ordered by Registered Dietitian (RD):   Encouraged po intake, emphasizing the importance of protein for wound healing and to preserve lean muscle mass   cherry Gel+ BID @ 10-2  Expedite w/ ice on the side @ lunch.   Continue Regular diet and multivitamin with minerals to support wound healing   Malnutrition:   % Weight Loss:  > 10% in 6 months (severe malnutrition) - 15% in 6 months  % Intake:  <75% for >/= 1 month (moderate malnutrition)  Subcutaneous Fat Loss:  None observed  Muscle Loss:  Clavicle bone region moderate depletion, Upper arm region severe depletion, and Dorsal hand region moderate-severe depletion  Fluid Retention:  None noted    Malnutrition Diagnosis: Severe malnutrition  In Context of:  Acute illness or injury  Chronic illness or disease     REASON FOR ASSESSMENT  Feng Wynne is a 79 year old male seen by Registered Dietitian for Provider Order - Wound healing, sacral decub stage IV     PMH of: mitral valve, A-fib, HFpEF, CAD, hypertension, hyperlipidemia, stage IV sacral decubitus ulcer with history of osteomyelitis among others who admitted on 7/18/2028 with 1 week of diarrhea, generalized weakness and found to have hyponatremia.     NUTRITION HISTORY  - Information obtained from chart review and pt in bed  - Patient is on a regular, vegetarian diet at home.   - Typical food/fluid intake is: \"at least 2 meals/day but rarely 3.\" RD mentioned to Feng his chart shows recent wt changes, asked what is a stable wt for Feng and he reported a stable wt of 220 lb. However question the accuracy as its less than his current wt and he's had notable wt loss over the past year.   - Diet history:  Feng said his po intake PTA \"terrible\" because he had \"a lot going on\" with moving and other things. He said he tried to make himself eat but that he wouldn't even get in a full meal throughout the day. He said this AM was the first full meal he's " "had in quite some time. He said this poor appetite was going on for a month to 3 weeks. He was strongly encouraged to order portein with each meal and have protein containing snacks.    - Previous [facility] and [facility] RD notes show Pt is vegetarian that's not strict and prefers to self-select. He likes nuts, beans, dairy, eggs. He's done chocolate or vanilla (may prefer chocolate) Ensure Max, Ensure + ice cream, and Expedite for wound healing.  - Supplements: Previous supplement use was reviewed w/ Feng. He said a previous provider told him to stop drinking Ensure but he couldn't recall why or exactly when. He mentioned vitamin K content but thinks it was something else. He was agreeable to have cherry Gel+ BID @ 10-2 and Expedite w/ ice on the side @ lunch. We reviewed high protein snacks and offered to have them scheduled for automatic delivery but he politely declined as he can just ask for them when he want    CURRENT NUTRITION ORDERS  Diet Order:   Regular (vegetarian)     Current Intake/Tolerance: He really like his scrambled eggs w/ cheese, oatmeal w/ brown sugar and potatoes this AM   - Flowsheets show a 25% intake this AM     NUTRITION FOCUSED PHYSICAL ASSESSMENT FOR DIAGNOSING MALNUTRITION)  Yes    Observed:    Muscle wasting (refer to documentation in Malnutrition section)    Obtained from Chart/Interdisciplinary Team:  7/19 WOCN note pending    ANTHROPOMETRICS  Height: 6' 2\"  Weight: 224 lbs 0 oz   Body mass index is 28.76 kg/m .  Weight Status:  Overweight BMI 25-29.9  IBW: 82.2 kg  % IBW: 124%  Weight History: 15% in 6 months  Wt Readings from Last 41 Encounters:   07/18/24 101.6 kg (224 lb)   06/03/24 103.9 kg (229 lb)   05/30/24 103.7 kg (228 lb 9.6 oz)   05/21/24 103.7 kg (228 lb 9.6 oz)   05/16/24 103.7 kg (228 lb 9.6 oz)   05/14/24 103.7 kg (228 lb 9.6 oz)   05/10/24 103.7 kg (228 lb 9.6 oz)   05/09/24 103.7 kg (228 lb 9.6 oz)   05/06/24 103.7 kg (228 lb 9.6 oz)   05/01/24 103.7 kg (228 lb 9.6 oz) "   04/26/24 106.1 kg (233 lb 14.4 oz)   04/24/24 106.1 kg (233 lb 14.4 oz)   04/17/24 106.1 kg (233 lb 14.4 oz)   04/09/24 111.9 kg (246 lb 11.2 oz)   04/02/24 111.9 kg (246 lb 11.2 oz)   04/01/24 112 kg (247 lb)   03/26/24 112.1 kg (247 lb 1.6 oz)   03/18/24 112.5 kg (248 lb)   03/15/24 108.9 kg (240 lb)   03/14/24 112 kg (247 lb)   03/12/24 113.9 kg (251 lb)   03/08/24 117.5 kg (259 lb)   03/08/24 117.5 kg (259 lb)   03/06/24 117.5 kg (259 lb)   03/04/24 117.8 kg (259 lb 11.2 oz)   03/01/24 117.5 kg (259 lb)   02/28/24 118.8 kg (262 lb)   02/26/24 121.6 kg (268 lb)   02/22/24 121.6 kg (268 lb)   02/19/24 121.6 kg (268 lb)   02/15/24 122 kg (269 lb)   02/15/24 122.4 kg (269 lb 13.5 oz)   01/29/24 (!) 155.3 kg (342 lb 6.4 oz)   01/26/24 143.8 kg (317 lb)   01/25/24 (!) 153 kg (337 lb 6.4 oz)   01/22/24 (!) 152.4 kg (336 lb)   01/22/24 (!) 152.4 kg (336 lb)   01/18/24 (!) 160.1 kg (352 lb 15.7 oz)   01/09/24 135.6 kg (299 lb)   09/13/23 135.6 kg (299 lb)   07/15/22 147.9 kg (326 lb)     LABS  Na+ 130 (L), K+ 3.3 (L), Phos 2.3 (L)     MEDICATIONS  multivitamin with minerals, ferusol, NS @ 75 ml/hr    ASSESSED NUTRITION NEEDS PER APPROVED PRACTICE GUIDELINES:  Dosing Weight 87.1 kg (adjusted)  Estimated Energy Needs: 1513-0866+ kcals (25-30+ Kcal/Kg)  Justification: wound healing   Estimated Protein Needs: 113-131+ grams (1.3-1.5+ g pro/Kg)  Justification: wound healing  Estimated Fluid Needs: 6485-5541  mL (1 mL/Kcal)  Justification: maintenance    MALNUTRITION:   % Weight Loss:  > 10% in 6 months (severe malnutrition) - 15% in 6 months  % Intake:  <75% for >/= 1 month (moderate malnutrition)  Subcutaneous Fat Loss:  None observed  Muscle Loss:  Clavicle bone region moderate depletion, Upper arm region severe depletion, and Dorsal hand region moderate-severe depletion  Fluid Retention:  None noted    Malnutrition Diagnosis: Severe malnutrition  In Context of:  Acute illness or injury  Chronic illness or  disease    NUTRITION DIAGNOSIS:  Unintended weight loss related to Inadequate oral intake 2/2 Increased nutrient needs (protein) as evidenced by active chronic wound, need for >113 g PRO/kg/day, <75% po intake for 1 month, 15% wt loss in 6 months, and moderate to severe muscle wasting.     NUTRITION INTERVENTIONS  Recommendations / Nutrition Prescription  Encouraged po intake, emphasizing the importance of protein for wound healing and to preserve lean muscle mass   cherry Gel+ BID @ 10-2  Expedite w/ ice on the side @ lunch.   Continue Regular diet and daily multivitamin with minerals to support wound healing    Implementation  Nutrition education: Provided education on the importance of protein intake, especially for vegetarians, for wound healing and preservation of lean muscle mass  General/healthful diet and Medical Food Supplement    Nutrition Goals  PO intake - >75% meal trays 2-3x/day and 100% of nutrition supplements TID    MONITORING AND EVALUATION:  Progress towards goals will be monitored and evaluated per protocol and Practice Guidelines    Mo Dennis RD, LD

## 2024-07-19 NOTE — PROGRESS NOTES
[facility]    Medicine Progress Note - Hospitalist Service    Date of Admission:  7/18/2024  Date of Service: 07/19/2024    Assessment & Plan     Feng Wynne is a 79 year old male complex past medical history including mechanical mitral valve, A-fib, HFpEF, CAD, hypertension, hyperlipidemia, stage IV sacral decubitus ulcer with history of osteomyelitis among others who admitted on 7/18/2028 with 1 week of diarrhea, generalized weakness and found to have hyponatremia.     Diarrhea with dehydration   Generalized weakness  Physical deconditioning  *Presented with diarrhea x7-10 days, 3x/d.  He has since become quite weak and is having difficulty managing at home.  No recent sick contacts however notes at 1 point someone may have been around someone with COVID.  No respiratory symptoms. No recent antibiotic use.  No new medications.  No fever, chills, abdominal pain, nausea, or vomiting.  Baseline lower extremity weakness send has a lift and wheelchair at home. No obvious bleeding or hematuria, hematochezia.    *Hemodnamically stable on admission. Basic labs reveal sodium 128, magnesium 1.6, normal liver tests, lactic acid and TSH WNL. Hemoglobin 9.4 which is near recent levels however lower than last check on 7/3.  INR 6.46.  Given 1 L of normal saline and admitted to the hospital for further evaluation and monitoring.  Plan:  -Stool samples, Cdiff, enteric/viral panel pending  -Check COVID -> negative   -Low-dose MIVF while monitoring closely  -Orthostatics  -PT/OT/SW/CC    Hyponatremia secondary to above  128 on admission, previously 133 and above.  Suspect hyponatremia is related to diarrhea and continued torsemide use.  -Hold PTA torsemide  -Trend serum sodium -> improving  -Intake/output    Supratherapeutic INR  Mechanical mitral valve chronic warfarin  Atrial fibrillation  Coronary artery disease  Goal INR 2.5-3.5.  6 range on admission.   -Cardiac monitoring  -Daily INR  -warfarin on  hold    Heart failure with preserved ejection fraction  Severe tricuspid regurgitation  Moderate pulmonary hypertension  Hypertension  Hyperlipidemia  -I/O  -Daily weights  -Stop MIVF once hydrated to prevent acute decompensation of heart failure    Chronic sacral decubitus ulcer stage IV  History of osteomyelitis coccyx  History of perirectal abscess  He has a chronic sacral wound and is seeing plastic surgery soon to come up with a surgical plan and possible flap.  He states it does not seem to be acutely infected however he has increased pain as the home care nurse was aggressively packing his wound day of admit.   -Wound cares  -WOCN evaluation  -RD consultation for wound healing nutrition  -Does not appear acutely infected  -Follow-up with plastic surgery as an outpatient          History of anemia and GI bleed  Multiple hospitalizations earlier this year for GI bleed.  Hemoglobin has been stable 8-9 range.  Patient denies any recent melena or hematochezia.  -Continue iron tablets  -Resumed PPI given risk of recurrent bleed in setting of supratherapeutic INR          Diet: Combination Diet Regular Diet Adult  Snacks/Supplements Adult: Gelatein Plus; Between Meals  Snacks/Supplements Adult: Expedite Bottle; With Meals    DVT Prophylaxis: Pneumatic Compression Devices  Stubbs Catheter: Not present  Lines: None     Cardiac Monitoring: None  Code Status: Full Code      Clinically Significant Risk Factors        # Hypokalemia: Lowest K = 3.3 mmol/L in last 2 days, will replace as needed  # Hyponatremia: Lowest Na = 128 mmol/L in last 2 days, will monitor as appropriate   # Hypercalcemia: corrected calcium is >10.1, will monitor as appropriate  # Hypomagnesemia: Lowest Mg = 1.6 mg/dL in last 2 days, will replace as needed   # Hypoalbuminemia: Lowest albumin = 2.6 g/dL at 7/18/2024  2:36 PM, will monitor as appropriate  # Coagulation Defect: INR = 5.36 (Ref range: 0.85 - 1.15) and/or PTT = 85 Seconds (Ref range: 22 -  "38 Seconds), will monitor for bleeding    # Hypertension: Noted on problem list  # Chronic heart failure with preserved ejection fraction: heart failure noted on problem list and last echo with EF >50%             # Overweight: Estimated body mass index is 28.76 kg/m  as calculated from the following:    Height as of this encounter: 1.88 m (6' 2\").    Weight as of this encounter: 101.6 kg (224 lb)., PRESENT ON ADMISSION  # Severe Malnutrition: based on nutrition assessment, PRESENT ON ADMISSION   # Financial/Environmental Concerns:           Disposition Plan     Medically Ready for Discharge: Anticipated Tomorrow             Dudley Cornejo MD  Hospitalist Service  [facility]  Securely message with [employer] (more info)  Text page via [employer] Paging/Directory   ______________________________________________________________________    Interval History     Feels improved  Diarrhea improving, but still significant  No CP/SOB  No nausea / vomiting   No abdominal pain  No fevers  No new complaints  Main complaint otherwise is weakness    Physical Exam   Vital Signs: Temp: 98.4  F (36.9  C) Temp src: Oral BP: 103/68 Pulse: 86   Resp: 16 SpO2: 97 % O2 Device: None (Room air)    Weight: 224 lbs 0 oz    General: no apparent distress  Respiratory: CTABL  Cardiovascular: Regular rate and rhythm, +S1 and S2, mechanical sounding heart valve  Gastrointestinal: Soft, BS, large hernia noted. Bowel sounds present.  Skin: Warm, dry. No obvious rashes or lesions on exposed skin. Known sacral decubitus ulcer, see photo  Musculoskeletal: Muscle loss to bilateral lower extremities.  Neurologic: AAO x3. Vladimir.  Psychiatric: Appropriate mood and affect.     ----------------------------------------------------------------------------------------    Medical Decision Making       35 MINUTES SPENT BY ME on the date of service doing chart review, history, exam, documentation & further activities per the note.      Data "   ------------------------- PAST 24 HR DATA REVIEWED -----------------------------------------------    I have personally reviewed the following data over the past 24 hrs:    6.8  \   10.2 (L)   / 264     130 (L); 130 (L) 89 (L) 19.7 /  92   3.3 (L) 35 (H) 0.52 (L) \     TSH: N/A T4: N/A A1C: N/A     INR:  5.36 (HH) PTT:  N/A   D-dimer:  N/A Fibrinogen:  N/A       Imaging results reviewed over the past 24 hrs:   No results found for this or any previous visit (from the past 24 hour(s)).  ------------------------- ENCOUNTER LABS ----------------------------------------------------------------  Recent Labs   Lab 07/19/24  0733 07/18/24  2214 07/18/24  1436 07/18/24  1353 07/18/24  1107   WBC 6.8  --  6.2  --   --    HGB 10.2*  --  9.4*  --   --    MCV 90  --  89  --   --      --  254  --   --    INR 5.36*  --   --  6.46* 7.3*   *  130* 131* 128*  --   --    POTASSIUM 3.3*  --  3.4  --   --    CHLORIDE 89*  --  86*  --   --    CO2 35*  --  35*  --   --    BUN 19.7  --  21.9  --   --    CR 0.52*  --  0.55*  --   --    ANIONGAP 6*  --  7  --   --    JOSEPH 9.2  --  9.2  --   --    GLC 92  --  101*  --   --    ALBUMIN  --   --  2.6*  --   --    PROTTOTAL  --   --  5.6*  --   --    BILITOTAL  --   --  0.5  --   --    ALKPHOS  --   --  77  --   --    ALT  --   --  6  --   --    AST  --   --  17  --   --        Most Recent 3 CBC's:  Recent Labs   Lab Test 07/19/24  0733 07/18/24 1436 07/03/24  1047   WBC 6.8 6.2 5.9   HGB 10.2* 9.4* 11.2*   MCV 90 89 93    254 264     Most Recent 3 BMP's:  Recent Labs   Lab Test 07/19/24 0733 07/18/24  2214 07/18/24 1436 07/03/24  1047   *  130* 131* 128* 133*   POTASSIUM 3.3*  --  3.4 3.9   CHLORIDE 89*  --  86* 91*   CO2 35*  --  35* 30*   BUN 19.7  --  21.9 20.7   CR 0.52*  --  0.55* 0.60*   ANIONGAP 6*  --  7 12   JOSEPH 9.2  --  9.2 9.6   GLC 92  --  101* 111*     Most Recent 2 LFT's:  Recent Labs   Lab Test 07/18/24 1436 05/08/24  0837   AST 17 21   ALT 6 5    ALKPHOS 77 83   BILITOTAL 0.5 0.6     Most Recent 3 INR's:  Recent Labs   Lab Test 07/19/24  0733 07/18/24  1353 07/18/24  1107   INR 5.36* 6.46* 7.3*     Most Recent 3 Troponin's:No lab results found.  Most Recent 3 BNP's:  Recent Labs   Lab Test 01/30/24  1048 01/09/24  1157   NTBNPI 1,253 2,149*     Most Recent D-dimer:No lab results found.  Most Recent Cholesterol Panel:  Recent Labs   Lab Test 01/09/24  1027   CHOL 53   LDL 22   HDL 25*   TRIG 32     Most Recent 6 Bacteria Isolates From Any Culture (See EPIC Reports for Culture Details):No lab results found.  Most Recent TSH and T4:  Recent Labs   Lab Test 07/18/24  1436   TSH 3.27     Most Recent Urinalysis:  Recent Labs   Lab Test 05/08/24  2030   COLOR Light Yellow   APPEARANCE Clear   URINEGLC Negative   URINEBILI Negative   URINEKETONE Negative   SG 1.014   UBLD Negative   URINEPH 5.0   PROTEIN Negative   NITRITE Negative   LEUKEST Moderate*   RBCU 1   WBCU 12*     Most Recent ESR & CRP:  Recent Labs   Lab Test 05/14/24  0538 03/21/24  0701 03/18/24  1748   SED  --   --  >140*   CRPI 152.90*   < > 119.65*    < > = values in this interval not displayed.

## 2024-07-19 NOTE — ED NOTES
Pt requested and was given urinal.  Friend/family at bedside.  Pt relates he can use urinal himself.

## 2024-07-19 NOTE — PROGRESS NOTES
Annabelle called from lab, critical lab value of Vanco 26.1.  Will notify Karlee RN and pharmacist and Zeyad.
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
## 2024-07-19 NOTE — PLAN OF CARE
Goal Outcome Evaluation:  Date/Time 07/19/24  4025-5525    Trauma/Ortho/Medical (Choose one) medical    Diagnosis:hyponatremia, Diarrhea, Coccyx wound  Mental Status: A&Ox3, forgetful and at times rambles  Activity/dangle up with lift reposition q2 hours  Diet: reg with supplements to promote wound healing  Pain: scheduled tylenol  Stubbs/Voiding: INC  Tele/Restraints/Iso:Enter maintained for R/O cdiff  02/LDA: RA IV infusing  D/C Date:TBD  Other Info: Critial labs of INR 5.36

## 2024-07-19 NOTE — CONSULTS
"[facility] Nurse Inpatient Assessment     Consulted for: \"Sacral decub\"    Summary: POA Stage 4 sacral pressure injury. Patient was being followed by I and home health care PTA.    Patient History (according to provider note(s):      \"79 year old male with complex past medical history including mechanical mitral valve, A-fib, chronic anticoagulation with warfarin, HFpEF, CAD, hypertension, hyperlipidemia, stage IV sacral decubitus ulcer with history of osteomyelitis, and others. He presented to the ED on 7/18/2028 with 1 week of diarrhea and generalized weakness. ED evaluation showed marginal blood pressure with several systolic readings in the 90s. Laboratory evaluation showed sodium 128, chloride 86, CO2 35, hemoglobin 9.4, and INR 6.46. He was admitted with diarrhea, dehydration, marginal blood pressure, hyponatremia, supratherapeutic INR due to poor oral intake, and physical deconditioning. His situation is complicated by stage IV sacral decubitus ulcer with risk of infection related to this diarrhea with incontinence. He is being treated supportively for diarrhea. He has had 4 more episodes of loose stools since admission. Stool studies have been requested but not sent yet due to difficulty obtaining related to incontinence. Blood pressure remains marginal in the 90s to 100s systolic in spite of holding prior to admission torsemide Sodium has improved to 131 but remains low. Appetite remains low. He remains weak. INR has improved but is still significantly elevated at 5.36. Wound care nurse has been consulted to help manage stage IV sacral decubitus ulcer and risk of infection with diarrhea and incontinence. Patient is expected to remain in the hospital for at least two midnights. Inpatient admission is appropriate given expected length of stay, complexity of patient as discussed above, and risk of deterioration related to several things mentioned above (hyponatremia, " "supratherapeutic INR, marginal blood pressure, and risk of sacral ulcer infection related to diarrhea and incontinence)\"    Assessment:      Areas visualized during today's visit: Focused: and Sacrum/coccyx    Pressure Injury Location: Sacrum      Last photo: 7/19  Wound type: Pressure Injury     Pressure Injury Stage: 4, present on admission        Wound history/plan of care:   Wound has been present since January 2024. Patient is being followed by Dr. Downey at the [facility], and had a consult with Dr. Srinivasan on 5/9/24 to discuss surgical options. Patient states that he plans to have surgery at some point. Home health care has also been following this patient, and his significant other changes the dressings between visits. Patient is having very frequent loose stools, but most of the time, they are not reaching the dressing. We will dress the wound with Vashe moistened Kerlix, and cover with a Sacral Mepilex and attempt to seal the dressing with Tegaderm. If this does not stay clean when patient has bowel movements, we may switch to covering the Vashe moistened Kerlix with an Abd pad and change the dressing each time it is soiled.    Wound base: 100% granulation tissue, red, moist     Palpation of the wound bed: normal      Drainage: moderate     Description of drainage: serosanguinous     Measurements (length x width x depth, in cm) ~5.5  x 6  x  3 cm      Tunneling N/A     Undermining up to 5.7 cm circumferentially, with the deepest area around 3 to 4 o'clock  Periwound skin: Intact, Erythema- blanchable, and Scar tissue      Color: normal and consistent with surrounding tissue      Temperature: normal   Odor: none  Pain: mild and during dressing change, aching  Pain intervention prior to dressing change: patient tolerated well, slow and gentle cares , and distraction  Treatment goal: Infection control/prevention, Maintain (prevention of deterioration), and Protection  STATUS: initial assessment  Supplies ordered: " "gathered, at bedside, supplies stored on unit, discussed with RN, and discussed with patient     My PI Risk Assessment     Sensory Perception: 4 - No impairment     Moisture: 2 - Very moist      Activity: 1 - Bedfast      Mobility: 2 - Very limited     Nutrition: 3 - Adequate     Friction/Shear: 2 - Potential problem      TOTAL: 14      Treatment Plan:     Sacral wound(s): Daily  and as needed if soiled, damaged, or loose  Cleanse wound with Vashe  Pack lightly with Vashe moisted Kerlix roll gauze, ensure that all undermining is covered  Apply Cavilon No-sting barrier to protect periwound and to help adhere the dressing  Cover with a Sacral Mepilex  Completely cover Mepilex with Tegaderm and ensure that edges overlap onto skin by at least one inch  Apply Triad paste to perianal area after each episode of incontinence  Follow PIP plan     Pressure Injury Prevention (PIP) Plan:  If patient is declining pressure injury prevention interventions: Explore reason why and address patient's concerns, Educate on pressure injury risk and prevention intervention(s), If patient is still declining, document \"informed refusal\" , and Ensure Care team is aware ( provider, charge nurse, etc)  Mattress: Follow bed algorithm, reassess daily and order specialty mattress, if indicated.  HOB: Maintain at or below 30 degrees, unless contraindicated  Repositioning in bed: Every 1-2 hours , Left/right positioning; avoid supine, and Raise foot of bed prior to raising head of bed, to reduce patient sliding down (shear)  Heels: Keep elevated off mattress and Pillows under calves  Protective Dressing: None  Positioning Equipment:None  Chair positioning:  N/A, patient is unable to sit upright d/t sacral wound    If patient has a buttock pressure injury, or high risk for PI use chair cushion or SPS.  Moisture Management: Perineal cleansing /protection: Follow Incontinence Protocol, Avoid brief in bed, Clean and dry skin folds with bathing , and " Moisturize dry skin  Under Devices: Inspect skin under all medical devices during skin inspection , Ensure tubes are stabilized without tension, and Ensure patient is not lying on medical devices or equipment when repositioned  Ask provider to discontinue device when no longer needed.     Orders: Written    RECOMMEND PRIMARY TEAM ORDER: None, at this time  Education provided: importance of repositioning, plan of care, wound progress, Infection prevention , Moisture management, Hygiene, and Off-loading pressure  Discussed plan of care with: Patient, Family, and Nurse  [facility] nurse follow-up plan: weekly  Notify WOC if wound(s) deteriorate.  Nursing to notify the Provider(s) and re-consult the WO Nurse if new skin concern.    DATA:     Current support surface: Standard  Standard Isoflex gel (Air  Containment of urine/stool: Incontinence Protocol and Incontinent pad in bed  BMI: Body mass index is 28.76 kg/m .   Active diet order: Orders Placed This Encounter      Combination Diet Regular Diet Adult     Output: I/O last 3 completed shifts:  In: 1000 [IV Piggyback:1000]  Out: -      Labs:   Recent Labs   Lab 07/19/24  0733 07/18/24  1436   ALBUMIN  --  2.6*   HGB 10.2* 9.4*   INR 5.36*  --    WBC 6.8 6.2     Pressure injury risk assessment:   Sensory Perception: 4-->no impairment  Moisture: 3-->occasionally moist  Activity: 2-->chairfast  Mobility: 2-->very limited  Nutrition: 3-->adequate  Friction and Shear: 2-->potential problem  Roscoe Score: 16    Rose Segundo RN CWCN  Pager no longer is use, please contact through [employer]: [facility] Nurse Melody

## 2024-07-19 NOTE — PROGRESS NOTES
[facility]    Patient is currently receiving services with [facility]. The patient is currently receiving skilled nursing and OT services. Patient's  and home health team have been notified that patient is under inpatient status. [facility] Liaison will continue to follow patient during stay. Please provide orders to resume home care at time of discharge if appropriate.

## 2024-07-19 NOTE — PROGRESS NOTES
Received a VM message on the home care line at 703 PM 7/18/204: Nurse nicolas reporting that Patient's went to ER so unable to do INR today as planned as he had a supra INR.      Per chart review, patient is now admitted. Hospital will continue to monitor INR and dose warfarin.     Once discharged, ACC and Home care to resume care.    Tried calling Home care nurse Nicolas, but VM box full.    Love Elliott, RN  Anticoagulation Nurse - [facility] [place]

## 2024-07-19 NOTE — PHARMACY-CONSULT NOTE
Consult for medications that cause hyponatremia/SIADH:    Torsemide can lower sodium levels.  PPIs can be associated with SIADH and/or hyponatremia. Incidence is unknown, seems to be rare but a consideration. Especially in elderly.     Thank you,  Lacey Parada, PharmD, BCCCP

## 2024-07-20 NOTE — PLAN OF CARE
Diagnosis: Weakness, Hyponatremia and Diarrhea  Mental Status: A&O x4  Activity/dangle: Ast of 2 Lift  Diet: Regular diet  Pain: Denies pain  Stubbs/Voiding: Incontinent  Tele/Restraints/Iso: Enteric isolation maintained  02/LDA: Room air. NS infusing at 75 mL/hr  D/C Date: TBD  Other Info: Seen by WOC for coccyx wound.

## 2024-07-20 NOTE — PROVIDER NOTIFICATION
MD Notification    Notified Person: MD    Notified Person Name: 07/19/2024 2029    Notification Date/Time: 07/19/2025    Notification Interaction: amcom web    Purpose of Notification: Stool collected today came back positive for EPEC (enteric pathogenic e-coli).    Orders Received: waiting to hear back or for orders.    Comments:

## 2024-07-20 NOTE — PROGRESS NOTES
MD Notification    Notified Person: MD    Notified Person Name: VICKI CAMPOS     Notification Date/Time: 7- 11:18 pm    Notification Interaction:  Amcomweb     Purpose of Notification: lab K 3.3 Phosphorus  2.3, Sodium 130    Orders Received:    Comments:

## 2024-07-20 NOTE — PLAN OF CARE
Goal Outcome Evaluation:                    Summary:  7-20-24 [id number]  Diagnosis: Weakness, Hyponatremia and Diarrhea  Mental Status: A&O x4  Activity/dangle: Ast of 2 Lift  Diet: Regular diet  Pain: Denies pain  Stubbs/Voiding: Incontinent  Tele/Restraints/Iso: Enteric isolation maintained  02/LDA: Room air. IV saline lock   D/C Date: TBD  Other Info: repo every 2hr

## 2024-07-21 NOTE — PLAN OF CARE
Goal Outcome Evaluation:    Trauma/Ortho/Medical (Choose one): Medical  Mental Status: A&O x4  Activity/dangle: Up with mechanical lift  Diet: Regular  Pain: Denies this shift  Stubbs/Voiding: Urinal @ times, incontinent of bowel and bladder  Tele/Restraints/Iso: Enteric precaution  02/LDA: IV-SL  D/C Date: Possibly today   Other Info: Had loose stool x1

## 2024-07-21 NOTE — PROGRESS NOTES
[facility]    Medicine Progress Note - Hospitalist Service    Date of Admission:  7/18/2024    Assessment & Plan     Feng Wynne is a 79 year old male with prosthetic mechanical mitral valve replacement, A-fib, HFpEF, CAD, hypertension, hyperlipidemia, stage IV sacral decubitus ulcer with history of osteomyelitis among others who was admitted on 7/18/2028 with 1 week of diarrhea due to enteropathogic E coli, generalized weakness, mild hyponatremia.      Acute Diarrhea due to enteropathogenic E coli  - Enteric panel positive for EPEC. C diff negative  - improving with supportive care. Diarrhea has improved. Afebrile. No indication for antibiotics    Dehydration  - due to persistent diarrhea. Improved with IV fluids    Mild hyponatremia  - sodium improved from 128 to 130 and has remained stable. Monitor   - continue to hold torsemide     S/p mechanical mitral valve replacement   On chronic anticoagulation with warfarin  Coumadin toxicity with elevated INR of >6 on admission  - goal INR 2.5-3.5  - continue coumadin, dose per pharmacy    Chronic paroxysmal atrial fibrillation  - continue warfarin as above     Coronary artery disease   - stable    Chronic diastolic CHF with EF  Moderate pulmonary hypertension  Severe tricuspid regurgitation  - torsemide on hold. Appears euvolemic      Hypokalemia  Hypophosphatemia  - replace electrolytes per protocol    Chronic sacral decubitus ulcer stage IV  History of osteomyelitis coccyx  History of perirectal abscess  - has chronic sacral decubitus ulcer. Is scheduled to follow up with plastic surgery  - ulcer does not appear infected .   -Wound cares per [facility]OX  -RD consultation for wound healing nutrition  -Follow-up with plastic surgery as an outpatient      History GI bleed  - has had Multiple hospitalizations earlier this year due to GI bleed.  Hemoglobin has been stable 8-9 range.  Patient denies any recent melena or hematochezia.  -Continue iron  tablets and PPI        Generalized weakness  Physical deconditioning  - PT OT eval.               Diet: Combination Diet Regular Diet Adult  Snacks/Supplements Adult: Gelatein Plus; Between Meals  Snacks/Supplements Adult: Expedite Bottle; With Meals  Room Service    DVT Prophylaxis: Warfarin  Stubbs Catheter: Not present  Lines: None     Cardiac Monitoring: None  Code Status: Full Code      Clinically Significant Risk Factors        # Hypokalemia: Lowest K = 3.3 mmol/L in last 2 days, will replace as needed       # Hypoalbuminemia: Lowest albumin = 2.6 g/dL at 7/18/2024  2:36 PM, will monitor as appropriate  # Coagulation Defect: INR = 5.29 (Ref range: 0.85 - 1.15) and/or PTT = 85 Seconds (Ref range: 22 - 38 Seconds), will monitor for bleeding    # Hypertension: Noted on problem list  # Chronic heart failure with preserved ejection fraction: heart failure noted on problem list and last echo with EF >50%              # Severe Malnutrition: based on nutrition assessment, PRESENT ON ADMISSION   # Financial/Environmental Concerns:           Disposition Plan         Medically Ready for Discharge: Anticipated Tomorrow             Mary Rivers MD  Hospitalist Service  [facility]  Securely message with [employer] (more info)  Text page via [employer] Paging/Directory   ______________________________________________________________________    Interval History   Reports he is doing ok. Had 2 episodes of diarrhea this am  No other complains     Physical Exam   Vital Signs: Temp: 98.4  F (36.9  C) Temp src: Oral BP: 108/61 Pulse: 83   Resp: 16 SpO2: 96 % O2 Device: None (Room air)    Weight: 161 lbs 9.55 oz    Constitutional - awake and alert, resting in bed, in no acute distress  Cardiovascular - regular rate and rhythm, no murmurs, no edema  Pulmonary - lungs are clear to auscultation bilaterally, no wheezing or rhonchi  GI - abdomen is soft, nontender, nondistended  Neurological - awake, alert and oriented  x3.        Medical Decision Making       45 MINUTES SPENT BY ME on the date of service doing chart review, history, exam, documentation & further activities per the note.      Data     I have personally reviewed the following data over the past 24 hrs:    INR:  5.29 () PTT:  N/A   D-dimer:  N/A Fibrinogen:  N/A       Imaging results reviewed over the past 24 hrs:   No results found for this or any previous visit (from the past 24 hour(s)).

## 2024-07-21 NOTE — PLAN OF CARE
Goal Outcome Evaluation:             Date/Time: 07/21/20245 ([id number])    Mental Status: A&O x4  Activity/dangle: Up with mechanical lift  Diet: Regular  Pain: Denies this shift  Stubbs/Voiding: Urinal @ times, incontinent of bowel and bladder  Tele/Restraints/Iso: Enteric precaution  02/LDA: IV-SL  D/C Date:TCU pending  Other Info: Sacral wound dressing changed x1. Potassium and phosphorus replaced- lab draw pending.

## 2024-07-21 NOTE — PLAN OF CARE
PT: Orders received. Chart reviewed and discussed with care team. Extensive discussion (~ 20 minutes) with patient and his significant other on his baseline function. Patient has been wheelchair/bed bound for many weeks. He uses an EZ Stand to transfer to his manual wheelchair with assist from his significant other. He is not interested in working with PT during hospital stay on mobility with a FWW, only will need to be able to perform EZ stand transfer to return home. Patient most concerned about controlling diarrhea to prevent infection in his wound.  PT not indicated due to OT following and will address EZ Stand transfers to ensure patient can return home with significant other. Defer discharge recommendations to OT and medical team.  Updated [address] on therapy plan. Will complete orders.

## 2024-07-21 NOTE — PROGRESS NOTES
[facility]    Medicine Progress Note - Hospitalist Service    Date of Admission:  7/18/2024    Assessment & Plan     Feng Wynne is a 79 year old male with mechanical mitral valve replacement, A-fib, HFpEF, CAD, hypertension, hyperlipidemia, stage IV sacral decubitus ulcer with previous osteomyelitis among others, who was admitted on 7/18/2028 with 1 week of diarrhea due to enteropathogic E coli, generalized weakness, mild hyponatremia.      Acute Diarrhea due to enteropathogenic E coli  - Enteric panel positive for EPEC. C diff negative  - improving with supportive care.   - Diarrhea has improved -only 3 episodes on 7/20 and 1 on 7/21   - afebrile.   - No indication for antibiotics    Dehydration  - due to persistent diarrhea.  Resolved with IV fluids    Mild hyponatremia  - sodium improved from 128 to 131 and has remained stable. Monitor   - continue to hold torsemide     S/p mechanical mitral valve replacement   On chronic anticoagulation with warfarin  Coumadin toxicity with elevated INR of >6 on admission  - goal INR 2.5-3.5  - INR has remained elevated since admission.  INR 4.76 on 7/21   - Continue coumadin, dose per pharmacy    Chronic paroxysmal atrial fibrillation  - continue warfarin as above     Coronary artery disease   - stable    Chronic diastolic CHF with EF  Moderate pulmonary hypertension  Severe tricuspid regurgitation  - torsemide on hold. Appears euvolemic      Hypokalemia  Hypophosphatemia  - replace electrolytes per protocol    Chronic sacral decubitus ulcer stage IV-present on admission  History of osteomyelitis coccyx  History of perirectal abscess  - has chronic sacral decubitus ulcer. Is scheduled to follow up with plastic surgery  - ulcer does not appear infected .   - Wound cares per WOC  - RD consultation for wound healing nutrition  - Follow-up with plastic surgery as an outpatient    Severe malnutrition in context of acute illness  -Nutrition services  consulted.  Continue nutritional supplements per RD    History GI bleed  - has had Multiple hospitalizations earlier this year due to GI bleed.  Hemoglobin has been stable 8-9 range.  Patient denies any recent melena or hematochezia.  - Continue iron tablets and PPI        Generalized weakness  Physical deconditioning  - PT OT-TCU recommended  -  to see regarding discharge planning/TCU placement              Diet: Combination Diet Regular Diet Adult  Snacks/Supplements Adult: Gelatein Plus; Between Meals  Snacks/Supplements Adult: Expedite Bottle; With Meals  Room Service    DVT Prophylaxis: Warfarin  Stubbs Catheter: Not present  Lines: None     Cardiac Monitoring: None  Code Status: Full Code      Clinically Significant Risk Factors        # Hypokalemia: Lowest K = 3.3 mmol/L in last 2 days, will replace as needed       # Hypoalbuminemia: Lowest albumin = 2.6 g/dL at 7/18/2024  2:36 PM, will monitor as appropriate    # Coagulation Defect: INR = 4.76 (Ref range: 0.85 - 1.15) and/or PTT = 85 Seconds (Ref range: 22 - 38 Seconds), will monitor for bleeding    # Hypertension: Noted on problem list  # Chronic heart failure with preserved ejection fraction: heart failure noted on problem list and last echo with EF >50%              # Severe Malnutrition: based on nutrition assessment, PRESENT ON ADMISSION   # Financial/Environmental Concerns:           Disposition Plan         Medically Ready for Discharge: Ready Now - diarrhea has significantly improved.  Patient is ready for discharge to TCU             Mary Rivers MD  Hospitalist Service  [facility]  Securely message with [employer] (more info)  Text page via [employer] Paging/Directory   ______________________________________________________________________    Interval History   Reports he is doing well.  Had 3 episodes of diarrhea yesterday and 1 this morning.    Eating better than before   No other complains     Physical Exam    Vital Signs: Temp: 98.9  F (37.2  C) Temp src: Oral BP: 107/70 Pulse: 93   Resp: 18 SpO2: 97 % O2 Device: None (Room air)    Weight: 174 lbs 6.14 oz    Constitutional - awake and alert, resting in bed, in no acute distress  Cardiovascular - regular rate and rhythm, no murmurs, no edema  Pulmonary - lungs are clear to auscultation bilaterally, no wheezing or rhonchi  GI - abdomen is soft, nontender, nondistended  Neurological - awake, alert and oriented x3.        Medical Decision Making       40 MINUTES SPENT BY ME on the date of service doing chart review, history, exam, documentation & further activities per the note.      Data     I have personally reviewed the following data over the past 24 hrs:    N/A  \   N/A   / N/A     131 (L) N/A N/A /  N/A   3.3 (L) N/A N/A \     INR:  4.76 (H) PTT:  N/A   D-dimer:  N/A Fibrinogen:  N/A       Imaging results reviewed over the past 24 hrs:   No results found for this or any previous visit (from the past 24 hour(s)).

## 2024-07-21 NOTE — PLAN OF CARE
Goal Outcome Evaluation:      Plan of Care Reviewed With: patient, significant other          Outcome Evaluation: Discharge to TCU vs home with a resumption of homecare

## 2024-07-21 NOTE — CONSULTS
Care Management Initial Consult    General Information  Assessment completed with: Patient, Spouse or significant other, Significnat other Jose  Type of CM/SW Visit: Initial Assessment    Primary Care Provider verified and updated as needed:     Readmission within the last 30 days:        Reason for Consult: discharge planning  Advance Care Planning: Advance Care Planning Reviewed: present on chart          Communication Assessment  Patient's communication style: spoken language (English or Bilingual)    Hearing Difficulty or Deaf: no   Wear Glasses or Blind: no    Cognitive  Cognitive/Neuro/Behavioral: WDL     Arousal Level: opens eyes spontaneously                Living Environment:   People in home: significant other  Jose  Current living Arrangements: apartment      Able to return to prior arrangements: yes       Family/Social Support:  Care provided by: self, spouse/significant other  Provides care for: no one  Marital Status: Single  Significant Other       Jose  Description of Support System: Supportive, Involved    Support Assessment: Adequate family and caregiver support, Adequate social supports    Current Resources:   Patient receiving home care services: Yes  Skilled Home Care Services: Skilled Nursing, Occupational Therapy  Community Resources: Home Care  Equipment currently used at home: raised toilet seat, grab bar, toilet, wheelchair, manual, shower chair, grab bar, tub/shower, other (see comments) (Adjustable bed)  Supplies currently used at home: Compression Stockings, Wound Care Supplies    Employment/Financial:  Employment Status: retired        Financial Concerns: none   Referral to Financial Worker: No       Does the patient's insurance plan have a 3 day qualifying hospital stay waiver?  No    Lifestyle & Psychosocial Needs:  Social Determinants of Health     Food Insecurity: Not on file   Depression: Not at risk (3/15/2024)    PHQ-2     PHQ-2 Score: 0   Housing Stability: Low Risk   (6/11/2024)    Housing Stability     Do you have housing? : Yes     Are you worried about losing your housing?: No   Tobacco Use: Medium Risk (7/1/2024)    Patient History     Smoking Tobacco Use: Former     Smokeless Tobacco Use: Never     Passive Exposure: Not on file   Financial Resource Strain: Not on file   Alcohol Use: Not on file   Transportation Needs: Not on file   Physical Activity: Inactive (6/11/2024)    Exercise Vital Sign     Days of Exercise per Week: 0 days     Minutes of Exercise per Session: 0 min   Interpersonal Safety: Not on file   Stress: Not on file   Social Connections: Not on file   Health Literacy: Not on file       Functional Status:  Prior to admission patient needed assistance:              Mental Health Status:          Chemical Dependency Status:                Values/Beliefs:  Spiritual, Cultural Beliefs, Rastafari Practices, Values that affect care: no               Additional Information:  Per care management/social work consult for discharge planning.  Patient was admitted on 7-18-24 with generalized weakness and diarrhea.  The tentative date of discharge is yet to be determined.  Reviewed chart and spoke with patient and his significant other Jose regarding discharge plans.  Per patient and Jose's report, they live in a single level apartment.  Jose is patient's primary caregiver, however patient is able to care for himself for certain things.  Patient has mostly been wheelchair and bed bound as of lately.  He has an adjustable bed.  He has a manual wheelchair and transfers with the help of Jose and an EZ Stand.  Patient has a shower chair, raised toilet seat, grab bars in the shower, and by the toilet in the shower.  Patient is able to dress and fee himself.  Patient is open to [facility] for nursing and OT.  Patient and Jose are thinking that patient will have to go to TCU on discharge.  [facility] has been to [facility] and [facility] in the past.   Patient states that he did not have the best experience at [facility].  [facility] was OK.  He had a great experience at [facility] and this would be his first choice.  If they don't have a bed [facility] would be the second choice.  Jose states that he knows Clara Zhang at [facility] so he is hopeful she will help get him a bed there.  Referrals sent, via the discharge navigator, to check bed availability.  Patient has [employer] Medicare Advantage so he will need pre-auth prior to discharge.    Will continue to follow.      GILL Altamirano, [facility]    [phone]  [facility]

## 2024-07-22 NOTE — PROGRESS NOTES
[facility]    Medicine Progress Note - Hospitalist Service    Date of Admission:  7/18/2024    Assessment & Plan   Feng Wynne is a 79 year old male with mechanical mitral valve replacement, A-fib, HFpEF, CAD, hypertension, hyperlipidemia, stage IV sacral decubitus ulcer with previous osteomyelitis among others, who was admitted on 7/18/2028 with 1 week of diarrhea due to enteropathogic E coli, generalized weakness, mild hyponatremia.      Acute Diarrhea due to enteropathogenic E coli  - Enteric panel positive for EPEC. C diff negative  - improving with supportive care.   - Diarrhea has improved -only 3 episodes on 7/20 and 1 on 7/21, still loose BM 7/22  - afebrile.   - No indication for antibiotics     Dehydration  - due to persistent diarrhea.  Resolved with IV fluids  - monitor     Mild hyponatremia  - sodium improved from 128 to 131 and has remained stable. Monitor   - continue to hold torsemide      S/p mechanical mitral valve replacement   On chronic anticoagulation with warfarin  Coumadin toxicity with elevated INR of >6 on admission  - goal INR 2.5-3.5  - INR has remained elevated since admission.  INR 4.76 on 7/21 and 4.7 7/22  - Continue coumadin, dose per pharmacy     Chronic paroxysmal atrial fibrillation  - continue warfarin as above      Coronary artery disease   - stable     Chronic diastolic CHF with preserved EF  Moderate pulmonary hypertension  Severe tricuspid regurgitation  *Echo Feb 2024 EF 60-65%  - torsemide remains on hold. Appears euvolemic     Hypokalemia  Hypophosphatemia  - replace electrolytes per protocol     Chronic sacral decubitus ulcer stage IV-present on admission  History of osteomyelitis coccyx  History of perirectal abscess  - has chronic sacral decubitus ulcer. Is scheduled to follow up with plastic surgery  - ulcer does not appear infected .   - Wound cares per WOC  - RD consultation for wound healing nutrition  - Follow-up with plastic surgery as  an outpatient     Severe malnutrition in context of acute illness  - Nutrition services consulted.  Continue nutritional supplements per RD     History GI bleed  - has had Multiple hospitalizations earlier this year due to GI bleed.  Hemoglobin has been stable 8-9 range.  Patient denies any recent melena or hematochezia.  - Continue iron tablets and PPI         Generalized weakness  Physical deconditioning  - PT OT-TCU recommended  -  to see regarding discharge planning/TCU placement          Diet: Combination Diet Regular Diet Adult  Snacks/Supplements Adult: Gelatein Plus; Between Meals  Snacks/Supplements Adult: Expedite Bottle; With Meals  Room Service    DVT Prophylaxis: Warfarin  Stubbs Catheter: Not present  Lines: None     Cardiac Monitoring: None  Code Status: Full Code      Clinically Significant Risk Factors        # Hypokalemia: Lowest K = 3.3 mmol/L in last 2 days, will replace as needed       # Hypoalbuminemia: Lowest albumin = 2.6 g/dL at 7/18/2024  2:36 PM, will monitor as appropriate  # Coagulation Defect: INR = 4.70 (Ref range: 0.85 - 1.15) and/or PTT = 85 Seconds (Ref range: 22 - 38 Seconds), will monitor for bleeding    # Hypertension: Noted on problem list  # Chronic heart failure with preserved ejection fraction: heart failure noted on problem list and last echo with EF >50%              # Severe Malnutrition: based on nutrition assessment, PRESENT ON ADMISSION   # Financial/Environmental Concerns: none         Disposition Plan     Medically Ready for Discharge: Anticipated Tomorrow             Raj Serrano MD  Hospitalist Service  [facility]  Securely message with [employer] (more info)  Text page via [email or web address] Paging/Directory   ______________________________________________________________________    Interval History   Care assumed today. Pt seen and examined. Partner Jose at bedside. Still with diarrhea at times. No fevers or chills. No sob.      Physical Exam   Vital Signs: Temp: 98.7  F (37.1  C) Temp src: Oral BP: 108/65 Pulse: 82   Resp: 18 SpO2: 98 % O2 Device: None (Room air)    Weight: 174 lbs 6.14 oz    Gen: NAD, pleasant  HEENT: EOMI, MMM  Resp: no focal crackles,  no wheezes, no increased work of resp  CV: S1S2 heard, reg rhythm, reg rate  Abdo: soft, nontender, nondistended, bowel sounds present  Ext: calves nontender, well perfused  Neuro: aa, conversant, moving ext, CN grossly intact, no facial asymmetry      Medical Decision Making       39 MINUTES SPENT BY ME on the date of service doing chart review, history, exam, documentation & further activities per the note.      Data     I have personally reviewed the following data over the past 24 hrs:    N/A  \   N/A   / N/A     N/A N/A N/A /  N/A   3.6 N/A N/A \     INR:  4.70 (H) PTT:  N/A   D-dimer:  N/A Fibrinogen:  N/A

## 2024-07-22 NOTE — PLAN OF CARE
Mental Status: A&O x4  Activity/dangle: Up with mechanical lift  Diet: Regular  Pain: Denies this shift  Stubbs/Voiding: Urinal @ times, incontinent of bowel and bladder  Tele/Restraints/Iso: Enteric precaution  02/LDA: IV-SL  D/C Date:TCU pending  Other Info: Sacral wound dressing changed x1.

## 2024-07-22 NOTE — PROGRESS NOTES
Care Management Follow Up    Length of Stay (days): 4    Expected Discharge Date: 07/22/2024     Concerns to be Addressed: discharge planning     Patient plan of care discussed at interdisciplinary rounds: Yes    Anticipated Discharge Disposition:       Anticipated Discharge Services:    Anticipated Discharge DME:      Patient/family educated on Medicare website which has current facility and service quality ratings:    Education Provided on the Discharge Plan:    Patient/Family in Agreement with the Plan:      Referrals Placed by CM/SW:    Private pay costs discussed: Not applicable    Additional Information:  [facility] W still pending. BC[employer] auth started via fax. Will need additional TCU choices.    Per chart review, patient has been to [facility] in the past. This referral was sent as an option. Will update patient.    Call to [facility] regarding referral. It has not been reviewed so SW waiting for response.  KASSANDRA Miguel

## 2024-07-23 NOTE — PROGRESS NOTES
ANTICOAGULATION     Feng Wynne is overdue for an INR check.     Chart reviewed.    Patient is noted to be in hospital at [facility] for hyponatremia and dark stools since 7/18/24, per chart review patient remains inpatient at this time.      Will postpone one week and review chart for hospital discharge.    Natali Mondragon RN on 7/23/2024 at 9:16 AM

## 2024-07-23 NOTE — PROGRESS NOTES
Patient should remain in contact precautions for EPEC E. Coli for duration of illness due to the fact that patient is incontinent per RN. Patient can be removed from contact precautions when symptoms subside or patient becomes continent, whichever happens first.    Guzman Busch, Infection Prevention on 7/23/2024 at 1:23 PM

## 2024-07-23 NOTE — PLAN OF CARE
Goal Outcome Evaluation:      TIME: 1900-7:30    DIAGNOSIS:Acute Diarrhea due to enteropathogenic E coli     Mental Status: A&O x4, but forgetful  Activity/dangle: Assist of 2 with  lift  Diet: Regular  Pain: Denies pain on this shift.  Stubbs/Voiding: incontinent of bowel and bladder  Tele/Restraints/Iso: Enteric precaution  02/LDA: RA  D/C Date:TCU placement pending    Other Info: PIV-SL, Sacral wound dressing changed x1, weight shift, turn and report q2hrs and as needed. Had 3 episodes of soft large BM. INR-4.70.

## 2024-07-23 NOTE — PLAN OF CARE
Goal Outcome Evaluation:         Date/Time: 07/23/2024 ([id number])    Trauma/Ortho/Medical (Choose one): Medical    Mental Status: A&O x4  Activity/dangle: Up with mechanical lift  Diet: Regular  Pain: Denies @ rest, on scheduled tylenol  Stubbs/Voiding: Urinal, incontinent of bowel  Tele/Restraints/Iso: Contact per infection prevention   02/LDA: IV-SL  D/C Date: Pending  Other Info: Not able to participate with OT due to pain on LLE, offered pain meds- patient refused. Plan for OT tomorrow. Had BM x1 this shift. Sacral wound dressing changed x1. Phosphorus replaced, lab recheck tomorrow morning.

## 2024-07-23 NOTE — PROGRESS NOTES
Care Management Follow Up    Length of Stay (days): 5    Expected Discharge Date: 07/24/2024     Concerns to be Addressed: discharge planning     Patient plan of care discussed at interdisciplinary rounds: Yes    Anticipated Discharge Disposition:       Anticipated Discharge Services:    Anticipated Discharge DME:      Patient/family educated on Medicare website which has current facility and service quality ratings:    Education Provided on the Discharge Plan:    Patient/Family in Agreement with the Plan:      Referrals Placed by CM/SW:    Private pay costs discussed: Not applicable    Additional Information:  Writer received a fax that [facility] had denied SNF stay. Writer spoke to Dr. Serrano who agreed to do a peer to peer. Call to [place] and set up peer to peer today at 1130. At this time [facility] is still pending and was preferred choice. Will call to check status.    1105: Writer was informed that patient and partner would prefer to discharge home with home care. Writer met with patient and Jose at bedside. Jose said that prior to hospitalization he had been managing with patient's mobility needs. Patient also confirmed that they have their apartment all set up and feel that with the diarrhea resolved there is no need for TCU. Writer explained that [facility] was requesting a peer to peer. They both said that if they can resume OT/PT with [facility] then they feel that his needs will be met. Writer updated Dr. Serrano and called to withdraw peer to peer request. Writer also updated [facility].     Domenico called from [facility] and reported that he didn't feel that they could meet patient's needs.     KASSANDRA Miguel       conducted a detailed discussion... I had a detailed discussion with the patient and/or guardian regarding the historical points, exam findings, and any diagnostic results supporting the discharge/admit diagnosis.

## 2024-07-23 NOTE — PLAN OF CARE
Problem: Admitted 7/18 for E. Coli infection w/ diarrhea  Hx: Stage IV sacral decubitus ulcer with previous osteomyelitis  Mechanical mitral valve replacement, A-fib, HFpEF, CAD,HTN, hyperlipidemia  Vitals: VSS on RA  Orientation/Neuro: A/Ox4  Activity Level: A2 lift. T/R side to side with heels & sacrum floating on pillows.   Diet: Reg. Room service.   GI/: Uses urinal. Inc of stool. Loose stools improving  Labs: K 3.6 Phos 2.7. Rechecks ordered for AM.   IV /Drains/Tubes: PIV SL  Incisions/Dressings/Skin: Sacral Dressing changed @1630  Pain Management: Scheduled Tyl given for pain.   Other: low airloss mattress, dressing changes every day & PRN. Wipes & Fredrick cream used with cares  Consults: SW/OT  Plan: Discharge to TCU when medically ready & placement found

## 2024-07-23 NOTE — PROGRESS NOTES
[facility]    Medicine Progress Note - Hospitalist Service    Date of Admission:  7/18/2024    Assessment & Plan   Feng Wynne is a 79 year old male with mechanical mitral valve replacement, A-fib, HFpEF, CAD, hypertension, hyperlipidemia, stage IV sacral decubitus ulcer with previous osteomyelitis among others, who was admitted on 7/18/2028 with 1 week of diarrhea due to enteropathogic E coli, generalized weakness, mild hyponatremia.      Acute Diarrhea due to enteropathogenic E coli  - Enteric panel positive for EPEC. C diff negative  - improving with supportive care.   - Diarrhea has improved -only 3 episodes on 7/20 and 1 on 7/21, still loose BM 7/22  - afebrile.   - No indication for antibiotics  - 7/23 reports some improvement, they are understandably concerned about managing wounds, dressing changes etc with ongoing diarrhea - continue to monitor in hospital, possible home with added services 7/24 or 7/25     Dehydration  - due to persistent diarrhea.  Resolved with IV fluids  - monitor     Mild hyponatremia  - sodium improved from 128 to 131 and has remained stable. Monitor   - BMP awaited 7/23 Na 132  - continue to hold torsemide      S/p mechanical mitral valve replacement   On chronic anticoagulation with warfarin  Coumadin toxicity with elevated INR of >6 on admission  - goal INR 2.5-3.5  - INR has remained elevated since admission.  INR 4.76 on 7/21 and 4.7 7/22 --> 4.5 7/23  - Will consult pharmacy 7/24 possibly for warfarin mgmt, dosing etc     Chronic paroxysmal atrial fibrillation  - continue warfarin when appropriate     Coronary artery disease   - stable     Chronic diastolic CHF with preserved EF  Moderate pulmonary hypertension  Severe tricuspid regurgitation  *Echo Feb 2024 EF 60-65%  - torsemide remains on hold. Appears euvolemic     Hypokalemia  Hypophosphatemia  - replace electrolytes per protocol     Chronic sacral decubitus ulcer stage IV-present on  admission  History of osteomyelitis coccyx  History of perirectal abscess  - has chronic sacral decubitus ulcer. Is scheduled to follow up with plastic surgery  - ulcer does not appear infected .   - Wound cares per WOC  - RD consultation for wound healing nutrition  - Follow-up with plastic surgery as an outpatient - planned for Thursday Aug 1 at [facility] per report     Severe malnutrition in context of acute illness  - Nutrition services consulted.  Continue nutritional supplements per RD     History GI bleed  - has had Multiple hospitalizations earlier this year due to GI bleed.  Hemoglobin has been stable 8-9 range.  Patient denies any recent melena or hematochezia.  - Continue iron tablets and PPI         Generalized weakness  Physical deconditioning  - PT OT-TCU recommended  -  to see regarding discharge planning/TCU placement          Diet: Combination Diet Regular Diet Adult  Snacks/Supplements Adult: Gelatein Plus; Between Meals  Snacks/Supplements Adult: Expedite Bottle; With Meals    DVT Prophylaxis: Warfarin  Stubbs Catheter: Not present  Lines: None     Cardiac Monitoring: None  Code Status: Full Code      Clinically Significant Risk Factors              # Hypoalbuminemia: Lowest albumin = 2.6 g/dL at 7/18/2024  2:36 PM, will monitor as appropriate  # Coagulation Defect: INR = 4.70 (Ref range: 0.85 - 1.15) and/or PTT = 85 Seconds (Ref range: 22 - 38 Seconds), will monitor for bleeding    # Hypertension: Noted on problem list  # Chronic heart failure with preserved ejection fraction: heart failure noted on problem list and last echo with EF >50%              # Severe Malnutrition: based on nutrition assessment    # Financial/Environmental Concerns: none         Disposition Plan     Medically Ready for Discharge: Anticipated in 2-4 Days             Raj Serrano MD  Hospitalist Service  [facility]  Securely message with [employer] (more info)  Text page via [employer]  Paging/Directory   ______________________________________________________________________    Interval History   Seen and examined. Jose at bedside. Pt reports some improvement in diarrhea but still there. No fevers, chills, sob or new pain. Confirmed with patient - they desire to return home when able and continue home services and spouse assistance. They confirmed multiple times they do not want to pursue TCU.    Physical Exam   Vital Signs: Temp: 98.3  F (36.8  C) Temp src: Oral BP: 105/58 Pulse: 87   Resp: 16 SpO2: 98 % O2 Device: None (Room air)    Weight: 174 lbs 6.14 oz    Gen: NAD, pleasant  HEENT: EOMI, MMM  Resp: no focal crackles,  no wheezes, no increased work of resp  CV: S1S2 heard, reg rhythm, reg rate  Abdo: soft, nontender, nondistended, bowel sounds present, midline/R hernia chronic and nontender  Ext: calves nontender, well perfused  Neuro: aa, conversing, moving ext, CN grossly intact, no facial asymmetry      Medical Decision Making       52 MINUTES SPENT BY ME on the date of service doing chart review, history, exam, documentation & further activities per the note.      Data     I have personally reviewed the following data over the past 24 hrs:    6.2  \   8.2 (L)   / 290     132 (L) 93 (L) 22.8 /  98   3.7 32 (H) 0.57 (L) \     INR:  4.59 (H) PTT:  N/A   D-dimer:  N/A Fibrinogen:  N/A

## 2024-07-24 NOTE — PLAN OF CARE
Goal Outcome Evaluation:          TIME: 1900-7:30     DIAGNOSIS:Acute Diarrhea due to enteropathogenic E coli      Mental Status: A&O x4, but forgetful  Activity/dangle: Assist of 2 with  lift  Diet: Regular  Pain: Denies pain on this shift.  Stubbs/Voiding: incontinent of bowel and bladder  Tele/Restraints/Iso: Contact precaution  02/LDA: RA  D/C Date:TCU placement pending     Other Info: PIV-SL, Sacral wound dressing changed x1, weight shift, turn and report q2hrs and as needed. Had 2 episodes of soft large BM.

## 2024-07-24 NOTE — PROGRESS NOTES
CLINICAL NUTRITION SERVICES - REASSESSMENT NOTE    Recommendations Ordered by Registered Dietitian (RD):   Continue Expedite daily at lunch  Continue MVI/M   Malnutrition: (7/19)  % Weight Loss:  > 10% in 6 months (severe malnutrition) - 15% in 6 months  % Intake:  <75% for >/= 1 month (moderate malnutrition)  Subcutaneous Fat Loss:  None observed  Muscle Loss:  Clavicle bone region moderate depletion, Upper arm region severe depletion, and Dorsal hand region moderate-severe depletion  Fluid Retention:  None noted     Malnutrition Diagnosis: Severe malnutrition  In Context of:  Acute illness or injury  Chronic illness or disease     EVALUATION OF PROGRESS TOWARD GOALS   Diet:  Regular  Supplements: Expedite at lunch    Intake/Tolerance:    - 0-75% intakes documented per nursing flowsheets  - Ordering 2-3 meals per day per healthtouch  - Attempted to see pt multiple times however busy with other cares. Will continue Expedite daily at lunch for now.    ASSESSED NUTRITION NEEDS:  Dosing Weight 87.1 kg (adjusted)  Estimated Energy Needs: 6273-5222+ kcals (25-30+ Kcal/Kg)  Justification: wound healing   Estimated Protein Needs: 113-131+ grams (1.3-1.5+ g pro/Kg)  Justification: wound healing  Estimated Fluid Needs: 3732-3248  mL (1 mL/Kcal)  Justification: maintenance    NEW FINDINGS:   Skin: WOC following (7/19) ~  Pressure Injury Location: Sacrum   Wound type: Pressure Injury     Pressure Injury Stage: 4, present on admission   STATUS: initial assessment     Meds: MVI/M    Labs: Na 130 (L)    Previous Goals:   PO intake - >75% meal trays 2-3x/day and 100% of nutrition supplements TID   Evaluation: Not met    Previous Nutrition Diagnosis:   Unintended weight loss related to Inadequate oral intake 2/2 Increased nutrient needs (protein) as evidenced by active chronic wound, need for >113 g PRO/kg/day, <75% po intake for 1 month, 15% wt loss in 6 months, and moderate to severe muscle wasting.   Evaluation: No  change    CURRENT NUTRITION DIAGNOSIS  Unintended weight loss related to Inadequate oral intake 2/2 Increased nutrient needs (protein) as evidenced by active chronic wound, need for >113 g PRO/kg/day, <75% po intake for 1 month, 15% wt loss in 6 months, and moderate to severe muscle wasting    INTERVENTIONS  Recommendations / Nutrition Prescription  See above    Implementation  Medical Food Supplement - continue  Multivitamin/Mineral - continue    Goals  Patient to consume >/= 75% of 2-3 meals/day and 1 bottle Expedite    MONITORING AND EVALUATION:  Progress towards goals will be monitored and evaluated per protocol and Practice Guidelines    Patti Oakes RD, LD  Clinical Dietitian - [facility]

## 2024-07-24 NOTE — PLAN OF CARE
Goal Outcome Evaluation:         7/24   Problem: Admitted 7/18 for E. Coli infection w/ diarrhea  Hx: Stage IV sacral decubitus ulcer with previous osteomyelitis  Mechanical mitral valve replacement, A-fib, HFpEF, CAD,HTN, hyperlipidemia  Vitals: VSS on RA  Orientation/Neuro: A/Ox4  Activity Level: A2 lift. T/R side to side  with heels & sacrum floating on pillows.   Diet: Reg. Room service.   GI/: Uses urinal. Inc of stool. Loose stools improving, 3 on this shift  Labs: K 3.8 Phos 2.9. Rechecks ordered for AM.   IV /Drains/Tubes: PIV SL  Incisions/Dressings/Skin: Sacral Dressing changed @1045  Pain Management: Scheduled Tyl given for pain.   Other: low airloss mattress, dressing changes every day & PRN. Wipes & Fredrick cream used with cares  Consults: SW/OT  Plan: Discharge to TCU when medically ready & placement found

## 2024-07-24 NOTE — PROGRESS NOTES
[facility]    Medicine Progress Note - Hospitalist Service    Date of Admission:  7/18/2024    Assessment & Plan   Feng Wynne is a 79 year old male with mechanical mitral valve replacement, A-fib, HFpEF, CAD, hypertension, hyperlipidemia, stage IV sacral decubitus ulcer with previous osteomyelitis among others, who was admitted on 7/18/2028 with 1 week of diarrhea due to enteropathogic E coli, generalized weakness, mild hyponatremia.      Acute Diarrhea due to enteropathogenic E coli  - Enteric panel positive for EPEC. C diff negative  - improving with supportive care.   - Diarrhea has improved -only 3 episodes on 7/20 and 1 on 7/21, still loose BM 7/22  - afebrile and nontoxic  - they are understandably concerned about managing wounds, dressing changes etc with ongoing diarrhea, had 4-5 watery bouts pm 7/23 - discussed empiric dose of azithromycin 500mg twice today given that he has had quite prolonged course of diarrhea from this; no other pathogen identified in stool.  - patient agreeable to azithromycin, discussed with RN - will order     Dehydration  - due to persistent diarrhea.  Resolving with IV fluids  - monitor     Mild hyponatremia  - sodium improved from 128 to 131 and has remained stable. Monitor   - Na 130-132 fairly stable  - continue to hold torsemide      S/p mechanical mitral valve replacement   On chronic anticoagulation with warfarin  Coumadin toxicity with elevated INR of >6 on admission  - goal INR 2.5-3.5  - INR has remained elevated since admission.  INR 4.1 on 7/24, azithro may alter as well  - Will consult pharmacy 7/25 possibly for warfarin mgmt, dosing etc     Chronic paroxysmal atrial fibrillation  - continue warfarin when appropriate     Coronary artery disease   - stable     Chronic diastolic CHF with preserved EF  Moderate pulmonary hypertension  Severe tricuspid regurgitation  *Echo Feb 2024 EF 60-65%  - torsemide remains on hold. Appears euvolemic      Hypokalemia  Hypophosphatemia  - replace electrolytes per protocol     Chronic sacral decubitus ulcer stage IV-present on admission  History of osteomyelitis coccyx  History of perirectal abscess  - has chronic sacral decubitus ulcer. Is scheduled to follow up with plastic surgery  - ulcer does not appear infected .   - Wound cares per WOC  - RD consultation for wound healing nutrition  - Follow-up with plastic surgery as an outpatient - planned for Thursday Aug 1 at [facility] per report     Severe malnutrition in context of acute illness  - Nutrition services consulted.  Continue nutritional supplements per RD     History GI bleed  - has had Multiple hospitalizations earlier this year due to GI bleed.  Hemoglobin has been stable 8-9 range.  Patient denies any recent melena or hematochezia.  - Continue iron tablets and PPI         Generalized weakness  Physical deconditioning  - PT OT-TCU recommended  -  to see regarding discharge planning/TCU placement          Diet: Combination Diet Regular Diet Adult  Snacks/Supplements Adult: Expedite Bottle; With Meals    DVT Prophylaxis: Warfarin  Stubbs Catheter: Not present  Lines: None     Cardiac Monitoring: None  Code Status: Full Code      Clinically Significant Risk Factors              # Hypoalbuminemia: Lowest albumin = 2.6 g/dL at 7/18/2024  2:36 PM, will monitor as appropriate  # Coagulation Defect: INR = 4.15 (Ref range: 0.85 - 1.15) and/or PTT = 85 Seconds (Ref range: 22 - 38 Seconds), will monitor for bleeding    # Hypertension: Noted on problem list  # Chronic heart failure with preserved ejection fraction: heart failure noted on problem list and last echo with EF >50%              # Severe Malnutrition: based on nutrition assessment    # Financial/Environmental Concerns: none         Disposition Plan     Medically Ready for Discharge: Anticipated Tomorrow pending improvement diarrhea and home services arrangement             Raj Serrano,  MD  Hospitalist Service  [facility]  Securely message with [employer] (more info)  Text page via [employer] Paging/Directory   ______________________________________________________________________    Interval History   Seen and examined. Spouse at bedside. Several watery loose bm last night. No new symptoms otherwise. They are understandably concerned with going home and managing with ongoing diarrhea. Discussed azithro dose and in agreement. Possibly home tomorrow.     Physical Exam   Vital Signs: Temp: 99.8  F (37.7  C) Temp src: Oral BP: 96/61 Pulse: 90   Resp: 16 SpO2: 97 % O2 Device: None (Room air)    Weight: 170 lbs 13.7 oz    Gen: NAD, pleasant  HEENT: EOMI, MMM  Resp: no focal crackles,  no wheezes, no increased work of resp  CV: S1S2 heard, reg rhythm, reg rate  Abdo: soft, nontender, nondistended, bowel sounds present; mid/right hernia (chronic) noted, nontender  Ext: calves nontender, well perfused  Neuro: aa, covnersant, moving ext, CN grossly intact, no facial asymmetry      Medical Decision Making       51 MINUTES SPENT BY ME on the date of service doing chart review, history, exam, documentation & further activities per the note.      Data     I have personally reviewed the following data over the past 24 hrs:    7.2  \   9.0 (L)   / 321     130 (L) 92 (L) 22.2 /  99   3.8 31 (H) 0.61 (L) \     INR:  4.15 (H) PTT:  N/A   D-dimer:  N/A Fibrinogen:  N/A

## 2024-07-25 NOTE — PLAN OF CARE
Goal Outcome Evaluation:       7/25   Problem: Admitted 7/18 for E. Coli infection w/ diarrhea  Hx: Stage IV sacral decubitus ulcer with previous osteomyelitis  Mechanical mitral valve replacement, A-fib, HFpEF, CAD,HTN, hyperlipidemia  Vitals: VSS on RA  Orientation/Neuro: A/Ox4  Activity Level: A2 lift. T/R side to side  with heels & sacrum floating on pillows.   Diet: Reg. Room service.   GI/: Uses urinal. Inc of stool. Loose stools improving, no loose stools during the day shift  IV /Drains/Tubes: PIV SL  Incisions/Dressings/Skin: Sacral Dressing changed @ 1715pm  Pain Management: Scheduled Tyl given for pain, pt now declines this  Other: low airloss mattress, dressing changes every day & PRN. Wipes & Fredrick cream used with cares  Consults: SW/OT  Plan: Discharge to home tomorrow

## 2024-07-25 NOTE — PROGRESS NOTES
S-(situation): Attempted to see patient for follow up assessment of his stage 4 sacral pressure injury.    B-(background): Wound has been present since January 2024. Patient is being followed by Dr. Downey at the [facility], and had a consult with Dr. Srinivasan on 5/9/24 to discuss surgical options. Patient states that he plans to have surgery at some point. Home health care has also been following this patient, and his significant other changes the dressings between visits.     A-(assessment): Per patient, dressing was changed this morning and he does not want to have it changed again now. He is having significantly less frequent bowel movements, and wants to wait to have the dressing changed after his next bowel movement or wait until tomorrow. Woc recommendations are for dressing to be changed once a day unless soiled, damaged, or loose, so waiting to do the next dressing change is appropriate at this time.    R-(recommendations): Woc will follow up with patient at a later time & date.

## 2024-07-25 NOTE — PROGRESS NOTES
Care Management Follow Up    Length of Stay (days): 7    Expected Discharge Date: 07/26/2024     Concerns to be Addressed: discharge planning     Patient plan of care discussed at interdisciplinary rounds: Yes    Anticipated Discharge Disposition:  home with resumption of home care     Anticipated Discharge Services:  resumption of home care  Anticipated Discharge DME:      Patient/family educated on Medicare website which has current facility and service quality ratings:    Education Provided on the Discharge Plan:    Patient/Family in Agreement with the Plan:      Referrals Placed by CM/SW:    Private pay costs discussed: transportation costs    Additional Information:  Met with pt and SO, Jose, to discuss discharge planning.  Pt requesting assistance with transportation to get home and anticipates tomorrow.  Requested ride around 11am.  Jose requesting to ride along, as he does not drive.  [employer] set up for wheelchair ride with pt own wheelchair with service window between 11:08-11:53 tomorrow.  Stated request for Jose to ride along and dispatch stated they made note of this but will be up to .  Updated pt/Jose with transport time and that request was made for Jose to ride but to have a back up plan if  says no.    Discussed therapy recommendations today for delaney, as pt has not been able to get up with Sera Steady at hospital and have been using lift.  Pt/Jose stated the EZ stand is familiar and electric and they will be able to use this at home to transfer pt.  Jose stated he will have help from friends/neighbor as needed.  Stated that pt has a hospital bed and the apartment has the equipment they need to meet pt needs.    Discussed that order will be placed to resume home care at discharge, as pt open to [facility] for RN and OT.    Updated Dr Serrano with ride arranged for tomorrow between 11:08-11:53 and conversation with pt/Jose regarding therapy recommendations today and they feel  they can meet pt needs at home with current equipment.    Lori Dodson RN, BS  Care Coordinator  [email or web address]

## 2024-07-25 NOTE — PLAN OF CARE
Goal Outcome Evaluation:      Plan of Care Reviewed With: patient    Overall Patient Progress: improvingOverall Patient Progress: improving    Date/Time:7/24/24 @ 4510-8475    Trauma/Ortho/Medical (Choose one) Medical    Diagnosis: Acute diarrhea due to enteropathogenic E.coli, stage IV sacral decubitus ulcer with previous osteomyelitis  POD#:N/A  Mental Status: A and O x 4  Activity/dangle: Assist of 2 using the lift  Diet: Regular  Pain:Denied pain. Took scheduled Tylenol.  Stubbs/Voiding: Urinal, incontinent of stool  Tele/Restraints/Iso:Contact precaution maintained.  02/LDA:On RA. PIV SL R forearm.  D/C Date: Pending TCU placement  Other Info: VSS. Had 2 episodes of medium loose stool. Dressing on the sacral area changed.On K and phosphorus protocol, recheck in place.

## 2024-07-25 NOTE — PROGRESS NOTES
[facility]    Medicine Progress Note - Hospitalist Service    Date of Admission:  7/18/2024    Assessment & Plan   Feng Wnyne is a 79 year old male with mechanical mitral valve replacement, A-fib, HFpEF, CAD, hypertension, hyperlipidemia, stage IV sacral decubitus ulcer with previous osteomyelitis among others, who was admitted on 7/18/2028 with 1 week of diarrhea due to enteropathogic E coli, generalized weakness, mild hyponatremia.      Acute Diarrhea due to enteropathogenic E coli  - Enteric panel positive for EPEC. C diff negative  - improving with supportive care.   - Diarrhea has improved -only 3 episodes on 7/20 and 1 on 7/21, still loose BM 7/22  - afebrile and nontoxic  - they are understandably concerned about managing wounds, dressing changes etc with ongoing diarrhea, had 4-5 watery bouts pm 7/23 into 7/24   - agreeable with azithro 500 po x 2 on 7/24    ---> 7/25 woke up with NO BM in brief and was understandably happy   ----> continue to monitor today, work with therapy on transferring and plan discharge for AM 7/26     Dehydration  - due to persistent diarrhea.  Resolving with IV fluids  - monitor     Mild hyponatremia  - sodium improved from 128 to 131 and has remained stable. Monitor   - Na 130-132 fairly stable  - continue to hold torsemide      S/p mechanical mitral valve replacement   On chronic anticoagulation with warfarin  Coumadin toxicity with elevated INR of >6 on admission  - goal INR 2.5-3.5  - INR has remained elevated since admission.  INR 4.1 on 7/24, azithro may alter as well  - 7/25 INR 3.8, will consult pharmacy for help with warfarin dosing - greatly appreciated     Chronic paroxysmal atrial fibrillation  - continue warfarin when appropriate     Coronary artery disease   - stable     Chronic diastolic CHF with preserved EF  Moderate pulmonary hypertension  Severe tricuspid regurgitation  *Echo Feb 2024 EF 60-65%  - torsemide remains on hold. Appears  euvolemic.     Hypokalemia  Hypophosphatemia  - replace electrolytes per protocol     Chronic sacral decubitus ulcer stage IV-present on admission  History of osteomyelitis coccyx  History of perirectal abscess  - has chronic sacral decubitus ulcer. Is scheduled to follow up with plastic surgery  - ulcer does not appear infected .   - Wound cares per WOC  - RD consultation for wound healing nutrition  - Follow-up with plastic surgery as an outpatient - planned for Thursday Aug 1 at [facility] per report     Severe malnutrition in context of acute illness  - Nutrition services consulted.  Continue nutritional supplements per RD     History GI bleed  - has had Multiple hospitalizations earlier this year due to GI bleed.  Hemoglobin has been stable 8-9 range.  Patient denies any recent melena or hematochezia.  - Continue iron tablets and PPI      Generalized weakness  Physical deconditioning  - PT OT-TCU recommended, insurance declined, ongoing therapy evals - patient and spouse confirmed 7/23 they do not want to go to TCU - possible home 7/25 or 7/26 depending on transfer ability and diarrhea calming down   - 7/25 planning on working on transferring bed to chair etc - planning toward discharge home 7/26          Diet: Combination Diet Regular Diet Adult  Snacks/Supplements Adult: Expedite Bottle; With Meals    DVT Prophylaxis: Warfarin  Stubbs Catheter: Not present  Lines: None     Cardiac Monitoring: None  Code Status: Full Code      Clinically Significant Risk Factors              # Hypoalbuminemia: Lowest albumin = 2.6 g/dL at 7/18/2024  2:36 PM, will monitor as appropriate  # Coagulation Defect: INR = 4.15 (Ref range: 0.85 - 1.15) and/or PTT = 85 Seconds (Ref range: 22 - 38 Seconds), will monitor for bleeding    # Hypertension: Noted on problem list  # Chronic heart failure with preserved ejection fraction: heart failure noted on problem list and last echo with EF >50%            # Severe Malnutrition: based on nutrition  assessment    # Financial/Environmental Concerns: none         Disposition Plan     Medically Ready for Discharge: Anticipated Tomorrow             Raj Serrano MD  Hospitalist Service  [facility]  Securely message with [employer] (more info)  Text page via [employer] Paging/Directory   ______________________________________________________________________    Interval History   Seen and examined. He is understandably happy to have slept 7 hours and woke up without BM in briefs. Appears azithro yday may have helped. No new complaints otherwise. Planning for home discharge tmrw.    Physical Exam   Vital Signs: Temp: 98  F (36.7  C) Temp src: Oral BP: 102/61 Pulse: 75   Resp: 16 SpO2: 96 % O2 Device: None (Room air)    Weight: 170 lbs 13.7 oz    Gen: NAD, pleasant, spouse Jose and friend Elen at bedside  HEENT: EOMI, MMM  Resp: no focal crackles,  no wheezes, no increased work of resp  CV: S1S2 heard, reg rhythm, reg rate  Abdo: soft, nontender, nondistended, bowel sounds present  Ext: calves nontender, well perfused  Neuro: aa, conversant, moving ext, CN grossly intact, no facial asymmetry      Medical Decision Making       41 MINUTES SPENT BY ME on the date of service doing chart review, history, exam, documentation & further activities per the note.      Data     I have personally reviewed the following data over the past 24 hrs:    N/A  \   N/A   / N/A     N/A N/A N/A /  N/A   3.7 N/A N/A \     INR:  3.86 (H) PTT:  N/A   D-dimer:  N/A Fibrinogen:  N/A

## 2024-07-25 NOTE — PHARMACY-ANTICOAGULATION SERVICE
Clinical Pharmacy - Warfarin Dosing Consult     Pharmacy has been consulted to manage this patient s warfarin therapy.  Indication: Atrial Fibrillation;Mechanical Mitral Valve Replacement  Therapy Goal: INR 2.5-3.5  Warfarin Prior to Admission: Yes  Warfarin PTA Regimen: 3 mg Tuesday, Saturday; 4.5 mg AOD  Significant drug interactions: Received azithromycin 1g 7/24 x1.  Recent documented change in oral intake/nutrition:  (Admit w/ prolonged diarrhea, still having ~3 BM per day)  Dose Comments: Hasn't had a dose in over 1 week.    INR   Date Value Ref Range Status   07/25/2024 3.86 (H) 0.85 - 1.15 Final   07/24/2024 4.15 (H) 0.85 - 1.15 Final       Recommend no warfarin dose today.  Pharmacy will monitor Feng Wynne daily and order warfarin doses to achieve specified goal.      Please contact pharmacy as soon as possible if the warfarin needs to be held for a procedure or if the warfarin goals change.

## 2024-07-26 NOTE — PROGRESS NOTES
ANTICOAGULATION MANAGEMENT     Feng Wynne 79 year old male is on warfarin with therapeutic INR result. (Goal INR 2.5-3.5)    Recent labs: (last 7 days)     07/26/24  1051   INR 2.82*       ASSESSMENT     Source(s): Chart Review and Patient/Caregiver Call     Warfarin doses taken: Held for supratherapeutic INR and while inpatient  recently which may be affecting INR  Diet:  different in hospital   Medication/supplement changes: None noted  New illness, injury, or hospitalization: Yes: admission from 7/18 - 7/26 for diarrhea due to E-coli.  Warfarin was held during inpatient. Resolved now.   Signs or symptoms of bleeding or clotting: No  Previous result: Supratherapeutic  Additional findings:  Hospital discharge with instruction to take warfarin 3mg daily. Plan to recheck on Monday either with home care or home meter.  Patient aware and agrees with plan.        PLAN     Recommended plan for temporary change(s) affecting INR     Dosing Instructions:  daily dose adjusted to reflect hospital discharge plan  with next INR in 3 days       Summary  As of 7/26/2024      Full warfarin instructions:  7/26: 3 mg; 7/28: 3 mg; Otherwise 3 mg every Tue, Sat; 4.5 mg all other days   Next INR check:  7/29/2024               Telephone call with Feng who verbalizes understanding and agrees to plan    Patient to recheck with home meter or with home care    Education provided: Please call back if any changes to your diet, medications or how you've been taking warfarin    Plan made with [facility] Pharmacist Petty Castano RN  Anticoagulation Clinic  7/26/2024    _______________________________________________________________________     Anticoagulation Episode Summary       Current INR goal:  2.5-3.5   TTR:  57.3% (9.4 mo)   Target end date:  Indefinite   Send INR reminders to:  ANTICOAG HOME MONITORING    Indications    Long-term (current) use of anticoagulants [Z79.01] [Z79.01]  S/P mitral valve replacement  [Z95.2]  Chronic atrial fibrillation (H) [I48.20]             Comments:  [facility] HOME MONITORING Patient, okay  to manage by exception on 7/8/20             Anticoagulation Care Providers       Provider Role Specialty Phone number    Jayme Deng MD Referring Internal Medicine [phone]

## 2024-07-26 NOTE — PLAN OF CARE
Occupational Therapy Discharge Summary    Reason for therapy discharge:    Discharged to home with home therapy.    Progress towards therapy goal(s). See goals on Care Plan in [facility] electronic health record for goal details.  Goals partially met.  Barriers to achieving goals:   discharge from facility.    Therapy recommendation(s):    Continued therapy is recommended.  Rationale/Recommendations:  Pt is requiring total A via OH lift, able to complete WC propulsion with min A. Pt has EZ stand at home, but per previous OT's note, pt did not have success with method. Pt is adamant about returning home despite TCU rec. Has been having total cares from partner, neighbor, and HH therapies (not sure about other outside support). If returns home, will need delaney lift, hospital bed, commode chair.

## 2024-07-26 NOTE — PROGRESS NOTES
"[facility] Nurse Inpatient Assessment     Consulted for: \"Sacral decub\"    Summary: POA Stage 4 sacral pressure injury. Patient was being followed by I and home health care PTA.    Patient History (according to provider note(s):      \"79 year old male with complex past medical history including mechanical mitral valve, A-fib, chronic anticoagulation with warfarin, HFpEF, CAD, hypertension, hyperlipidemia, stage IV sacral decubitus ulcer with history of osteomyelitis, and others. He presented to the ED on 7/18/2028 with 1 week of diarrhea and generalized weakness. ED evaluation showed marginal blood pressure with several systolic readings in the 90s. Laboratory evaluation showed sodium 128, chloride 86, CO2 35, hemoglobin 9.4, and INR 6.46. He was admitted with diarrhea, dehydration, marginal blood pressure, hyponatremia, supratherapeutic INR due to poor oral intake, and physical deconditioning. His situation is complicated by stage IV sacral decubitus ulcer with risk of infection related to this diarrhea with incontinence. He is being treated supportively for diarrhea. He has had 4 more episodes of loose stools since admission. Stool studies have been requested but not sent yet due to difficulty obtaining related to incontinence. Blood pressure remains marginal in the 90s to 100s systolic in spite of holding prior to admission torsemide Sodium has improved to 131 but remains low. Appetite remains low. He remains weak. INR has improved but is still significantly elevated at 5.36. Wound care nurse has been consulted to help manage stage IV sacral decubitus ulcer and risk of infection with diarrhea and incontinence. Patient is expected to remain in the hospital for at least two midnights. Inpatient admission is appropriate given expected length of stay, complexity of patient as discussed above, and risk of deterioration related to several things mentioned above (hyponatremia, " "supratherapeutic INR, marginal blood pressure, and risk of sacral ulcer infection related to diarrhea and incontinence)\"    Assessment:      Areas visualized during today's visit: Focused: and Sacrum/coccyx    Pressure Injury Location: Sacrum      Last photo: 7/19  Wound type: Pressure Injury     Pressure Injury Stage: 4, present on admission        Wound history/plan of care:   Wound has been present since January 2024. Patient is being followed by Dr. Downey at the [facility], and had a consult with Dr. Srinivasan on 5/9/24 to discuss surgical options. Patient states that he plans to have surgery at some point. Home health care has also been following this patient, and his significant other changes the dressings between visits. Patient is having very frequent loose stools, but most of the time, they are not reaching the dressing. We will dress the wound with Vashe moistened Kerlix, and cover with a Sacral Mepilex and attempt to seal the dressing with Tegaderm. If this does not stay clean when patient has bowel movements, we may switch to covering the Vashe moistened Kerlix with an Abd pad and change the dressing each time it is soiled.    Wound base: 100% granulation tissue, red, moist     Palpation of the wound bed: normal      Drainage: moderate     Description of drainage: serosanguinous     Measurements (length x width x depth, in cm) ~5.5  x 6  x  3 cm      Tunneling N/A     Undermining up to 5.7 cm circumferentially, with the deepest area around 3 to 4 o'clock  Periwound skin: Intact, Erythema- blanchable, and Scar tissue      Color: normal and consistent with surrounding tissue      Temperature: normal   Odor: none  Pain: mild and during dressing change, aching  Pain intervention prior to dressing change: patient tolerated well, slow and gentle cares , and distraction  Treatment goal: Infection control/prevention, Maintain (prevention of deterioration), and Protection  STATUS: initial assessment  Supplies ordered: " "gathered, at bedside, supplies stored on unit, discussed with RN, and discussed with patient     My PI Risk Assessment     Sensory Perception: 4 - No impairment     Moisture: 2 - Very moist      Activity: 1 - Bedfast      Mobility: 2 - Very limited     Nutrition: 3 - Adequate     Friction/Shear: 2 - Potential problem      TOTAL: 14      Treatment Plan:     Sacral wound(s): Daily  and as needed if soiled, damaged, or loose  Cleanse wound with Vashe  Pack lightly with Vashe moisted Kerlix roll gauze, ensure that all undermining is covered  Apply Cavilon No-sting barrier to protect periwound and to help adhere the dressing  Cover with a Sacral Mepilex  Completely cover Mepilex with Tegaderm and ensure that edges overlap onto skin by at least one inch  Apply Triad paste to perianal area after each episode of incontinence  Follow PIP plan     Pressure Injury Prevention (PIP) Plan:  If patient is declining pressure injury prevention interventions: Explore reason why and address patient's concerns, Educate on pressure injury risk and prevention intervention(s), If patient is still declining, document \"informed refusal\" , and Ensure Care team is aware ( provider, charge nurse, etc)  Mattress: Follow bed algorithm, reassess daily and order specialty mattress, if indicated.  HOB: Maintain at or below 30 degrees, unless contraindicated  Repositioning in bed: Every 1-2 hours , Left/right positioning; avoid supine, and Raise foot of bed prior to raising head of bed, to reduce patient sliding down (shear)  Heels: Keep elevated off mattress and Pillows under calves  Protective Dressing: None  Positioning Equipment:None  Chair positioning:  N/A, patient is unable to sit upright d/t sacral wound    If patient has a buttock pressure injury, or high risk for PI use chair cushion or SPS.  Moisture Management: Perineal cleansing /protection: Follow Incontinence Protocol, Avoid brief in bed, Clean and dry skin folds with bathing , and " Moisturize dry skin  Under Devices: Inspect skin under all medical devices during skin inspection , Ensure tubes are stabilized without tension, and Ensure patient is not lying on medical devices or equipment when repositioned  Ask provider to discontinue device when no longer needed.     Orders: Written    RECOMMEND PRIMARY TEAM ORDER: None, at this time  Education provided: importance of repositioning, plan of care, wound progress, Infection prevention , Moisture management, Hygiene, and Off-loading pressure  Discussed plan of care with: Patient, Family, and Nurse  [facility] nurse follow-up plan: weekly  Notify WOC if wound(s) deteriorate.  Nursing to notify the Provider(s) and re-consult the [facility] Nurse if new skin concern.    DATA:     Current support surface: Standard  Standard Isoflex gel (Air  Containment of urine/stool: Incontinence Protocol and Incontinent pad in bed  BMI: Body mass index is 24.34 kg/m .   Active diet order: Orders Placed This Encounter      Combination Diet Regular Diet Adult     Output: I/O last 3 completed shifts:  In: -   Out: 400 [Urine:400]     Labs:   Recent Labs   Lab 07/25/24  0901 07/24/24  0739   HGB  --  9.0*   INR 3.86* 4.15*   WBC  --  7.2     Pressure injury risk assessment:   Sensory Perception: 4-->no impairment  Moisture: 3-->occasionally moist  Activity: 1-->bedfast  Mobility: 2-->very limited  Nutrition: 3-->adequate  Friction and Shear: 2-->potential problem  Roscoe Score: 15    Rose Segundo RN CWCN  Pager no longer is use, please contact through [facility] group: [facility] Nurse Melody

## 2024-07-26 NOTE — DISCHARGE SUMMARY
[facility]  Hospitalist Discharge Summary      Date of Admission:  7/18/2024  Date of Discharge:  7/26/2024  Discharging Provider: Raj Serrano MD  Discharge Service: Hospitalist Service    Discharge Diagnoses   Acute Diarrhea due to enteropathogenic E coli - resolving  Dehydration - resolving  Mild hyponatremia  S/p mechanical mitral valve replacement   On chronic anticoagulation with warfarin  Coumadin toxicity with elevated INR of >6 on admission  Chronic paroxysmal atrial fibrillation  CAD  Chronic diastolic CHF with preserved EF  Moderate pulmonary hypertension  Severe tricuspid regurgitation  Hypokalemia  Hypophosphatemia  Chronic sacral decubitus ulcer stage IV-present on admission  History of osteomyelitis coccyx  History of perirectal abscess  Severe malnutrition in context of acute illness   History of GI bleed  Generalized weakness  Physical deconditioning      Clinically Significant Risk Factors     # Severe Malnutrition: based on nutrition assessment      Follow-ups Needed After Discharge   Follow-up Appointments     Follow-up and recommended labs and tests       Follow up with PCP and specialist at [facility] as planned Aug 1  Recheck INR daily at home and follow with INR clinic   Consider repeat BMP in 5-7 days            Unresulted Labs Ordered in the Past 30 Days of this Admission       No orders found from 6/18/2024 to 7/19/2024.        These results will be followed up by NA    Discharge Disposition   Discharged to home  Condition at discharge: Stable    Hospital Course   Home with continued home cares    Pharmacy dosing warfarin, INR 2.8 today, resume 3 mg daily - INR daily and follow with INR Clinic      Feng Wynne is a 79 year old male with mechanical mitral valve replacement, A-fib, HFpEF, CAD, hypertension, hyperlipidemia, stage IV sacral decubitus ulcer with previous osteomyelitis among others, who was admitted on 7/18/2028 with 1 week of diarrhea due to  enteropathogic E coli, generalized weakness, mild hyponatremia.      Acute Diarrhea due to enteropathogenic E coli  - Enteric panel positive for EPEC. C diff negative  - improving with supportive care.   - Diarrhea has improved -only 3 episodes on 7/20 and 1 on 7/21, still loose BM 7/22  - afebrile and nontoxic  - they are understandably concerned about managing wounds, dressing changes etc with ongoing diarrhea, had 4-5 watery bouts pm 7/23 into 7/24              - agreeable with azithro 500 po x 2 on 7/24               ---> 7/25 woke up with NO BM in brief and was understandably happy              ----> 7/26 - no further diarrhea, feeling ready for discharge.   ---> PCP follow up. Resume Home cares     Dehydration  - due to persistent diarrhea.  Resolving with IV fluids  - monitor     Mild hyponatremia  - sodium improved from 128 to 131 and has remained stable. Monitor   - Na 130-132 fairly stable  - resume PTA torsemide at home now that diarrhea stopped and tolerating PO intake     S/p mechanical mitral valve replacement   On chronic anticoagulation with warfarin  Coumadin toxicity with elevated INR of >6 on admission  - goal INR 2.5-3.5  - INR has remained elevated since admission.  INR 4.1 on 7/24, azithro may alter as well  - 7/25 INR 3.8, will consult pharmacy for help with warfarin dosing - greatly appreciated  - 7/25 inr 2.8, pharmacy rec 3mg daily warfarin and inr follow up tmrw - INR clinic to assist with dosing as he previously did at home     Chronic paroxysmal atrial fibrillation  - continue warfarin when appropriate     Coronary artery disease   - stable     Chronic diastolic CHF with preserved EF  Moderate pulmonary hypertension  Severe tricuspid regurgitation  *Echo Feb 2024 EF 60-65%  - torsemide remains on hold. Appears euvolemic.  - as above, resume PTA torsemide upon discharge     Hypokalemia  Hypophosphatemia  - replace electrolytes per protocol     Chronic sacral decubitus ulcer stage  IV-present on admission  History of osteomyelitis coccyx  History of perirectal abscess  - has chronic sacral decubitus ulcer. Is scheduled to follow up with plastic surgery  - ulcer does not appear infected .   - Wound cares per [facility]  - RD consultation for wound healing nutrition  - Follow-up with plastic surgery as an outpatient - planned for Thursday Aug 1 at [facility] per report     Severe malnutrition in context of acute illness  - Nutrition services consulted.  Continue nutritional supplements per RD     History GI bleed  - has had Multiple hospitalizations earlier this year due to GI bleed.  Hemoglobin has been stable 8-9 range.  Patient denies any recent melena or hematochezia.  - Continue iron tablets and PPI      Generalized weakness  Physical deconditioning  - PT OT-TCU recommended, insurance declined, ongoing therapy evals - patient and spouse confirmed 7/23 they do not want to go to TCU - possible home 7/25 or 7/26 depending on transfer ability and diarrhea calming down   - 7/25 planning on working on transferring bed to chair etc - planning toward discharge home 7/26  - doing well 7/26 pt and spouse feel ready to discharge and report having all equipment needed at home      Consultations This Hospital Stay   WOUND OSTOMY CONTINENCE NURSE  IP CONSULT  PHYSICAL THERAPY ADULT IP CONSULT  OCCUPATIONAL THERAPY ADULT IP CONSULT  CARE MANAGEMENT / SOCIAL WORK IP CONSULT  NUTRITION SERVICES ADULT IP CONSULT  PHARMACY IP CONSULT  PHARMACY TO DOSE WARFARIN    Code Status   Full Code    Time Spent on this Encounter   I, Raj Serrano MD, personally saw the patient today and spent greater than 30 minutes discharging this patient.       Raj Serrano MD  [facility]  [address] [place] [id number]  Phone: [phone]  Fax: [phone]  ______________________________________________________________________    Physical Exam   Vital Signs: Temp: 98.6  F (37  C) Temp src:  Oral BP: 109/60 Pulse: 75   Resp: 16 SpO2: 97 % O2 Device: None (Room air)    Weight: 189 lbs 9.53 oz    Gen: NAD, pleasant  HEENT: EOMI, MMM  Resp: no focal crackles,  no wheezes, no increased work of resp  CV: S1S2 heard, reg rhythm, reg rate  Abdo: soft, nontender, nondistended, bowel sounds present  Ext: calves nontender, well perfused  Neuro: aa, conversant, moving ext, CN grossly intact, no facial asymmetry         Primary Care Physician   Jayme Deng MD    Discharge Orders      Primary Care - Care Coordination Referral      Reason for your hospital stay    1 week of diarrhea, generalized weakness and found to have hyponatremia     Activity    Your activity upon discharge: activity as tolerated     Discharge Instructions    Continue medications and wound cares as below.   Follow up with your regular doctor  Follow up with your planned appt with specialist on Aug 1     Resume Home Care Services    Please resume prior to admission home care RN and OT     Follow-up and recommended labs and tests     Follow up with PCP and specialist at [facility] as planned Aug 1  Recheck INR daily at home and follow with INR clinic   Consider repeat BMP in 5-7 days     Diet    Follow this diet upon discharge: Orders Placed This Encounter      Snacks/Supplements Adult: Expedite Bottle; With Meals      Combination Diet Regular Diet Adult       Significant Results and Procedures   Most Recent 3 CBC's:  Recent Labs   Lab Test 07/24/24  0739 07/23/24  1202 07/19/24  0733   WBC 7.2 6.2 6.8   HGB 9.0* 8.2* 10.2*   MCV 91 91 90    290 264     Most Recent 3 BMP's:  Recent Labs   Lab Test 07/25/24  0859 07/24/24  0739 07/23/24  1202 07/21/24  2036 07/21/24  0833 07/19/24  0733   NA  --  130* 132*  --  131* 130*  130*   POTASSIUM 3.7 3.8 3.7   < > 3.3* 3.3*   CHLORIDE  --  92* 93*  --   --  89*   CO2  --  31* 32*  --   --  35*   BUN  --  22.2 22.8  --   --  19.7   CR  --  0.61* 0.57*  --   --  0.52*   ANIONGAP  --  7 7  --    --  6*   JOSEPH  --  8.7* 8.7*  --   --  9.2   GLC  --  99 98  --   --  92    < > = values in this interval not displayed.     Most Recent 3 Troponin's:No lab results found.  7-Day Micro Results       No results found for the last 168 hours.          Most Recent Urinalysis:  Recent Labs   Lab Test 05/08/24  2030   COLOR Light Yellow   APPEARANCE Clear   URINEGLC Negative   URINEBILI Negative   URINEKETONE Negative   SG 1.014   UBLD Negative   URINEPH 5.0   PROTEIN Negative   NITRITE Negative   LEUKEST Moderate*   RBCU 1   WBCU 12*   ,   Results for orders placed or performed during the hospital encounter of 03/18/24   MR Pelvis Bone wo & w Contrast    Narrative    MR pelvis without and with contrast 3/20/2024 9:59 AM    Techniques: Multiplanar multisequence imaging of the pelvis was  obtained before and after administration of  intravenous contrast  using routing MSK protocol.    History: sacral decubitus ulcer. Eval for osteomyelitis     Comparison: MRI lumbar spine 8/16/2022.     Findings:   Arising from the superior aspect of the gluteal cleft there is a  sacral decubitus ulcer with extensive soft tissue thickening and edema  involving the subcutaneous tissue deep to the ulcer. Diffusely  abnormal T1 hypointense, T2 hyperintense signal replacing the distal  coccygeal segments with associated destructive/erosive changes. The  abnormal bone marrow signal extends up to and involves the first  coccygeal segment. Normal bone marrow signal pattern of the S5  vertebral body. There is enhancing edema-like signal changes extending  along the presacral soft tissues extending from approximately the  level of S2-S3 caudally along the posterior rectosigmoid colon. There  is a rim-enhancing fluid and gas collection along the posterior  pararectal fossa extending into the left ischioanal fossa measuring  approximately 1.7 x 3.8 x 5.5 cm (series 9 image 31 and series 10  image 23).    Osseous structures  Osseous structures: No  focal osseous lesion. No acute fracture or  avascular necrosis.    Joint and Periarticular soft tissue:    Moderate degenerative changes of the sacroiliac joints. Moderate  osteoarthrosis of the right hip with heterotopic ossification about  the hip joint. Degenerative disc disease at L5-S1.    Joint effusion: Small right hip joint effusion.    Bursal effusion: Minimal nonspecific edema over the greater  trochanter. No substantial iliopsoas or trochanteric bursal effusion.    Muscles and tendons  Diffuse fatty atrophy of the pelvic musculature. Enhancing  intramuscular edema involving the bilateral gluteus willian muscles,  left greater than right, likely inflammatory/phlegmonous changes.  Intramuscular edema involving the right gluteus minimus and medius  muscles. Mild edema in the left adductor muscles.     Nerves:  The visualized course of the sciatic nerves are unremarkable  bilaterally.    Other Findings:  Large stool burden in the rectosigmoid colon.      Impression    Impression:  1. Osteomyelitis involving the entirety of the coccyx. No evidence of  sacral involvement.  2. Extensive inflammatory/phlegmonous changes throughout the  presacral/coccygeal soft tissues with 5.5 cm abscess in the posterior  pararectal/left ischioanal fossa which extends and is adherent to the  posterior rectal wall.    I have personally reviewed the examination and initial interpretation  and I agree with the findings.    JUTTA ELLERMANN, MD         SYSTEM ID:  [id number]   CT Head w/o Contrast    Narrative    EXAM: CT HEAD W/O CONTRAST  LOCATION: [facility]  DATE: 3/23/2024    INDICATION: encephalopathy, mechanical heart valve and off anticoag for surgery  COMPARISON: None.  TECHNIQUE: Routine CT Head without IV contrast. Multiplanar reformats. Dose reduction techniques were used.    FINDINGS:  INTRACRANIAL CONTENTS: No intracranial hemorrhage, extraaxial collection, or mass effect.  No CT evidence of acute  infarct. Mild presumed chronic small vessel ischemic changes. Mild generalized volume loss. No hydrocephalus.     VISUALIZED ORBITS/SINUSES/MASTOIDS: No intraorbital abnormality. No paranasal sinus mucosal disease. No middle ear or mastoid effusion.    BONES/SOFT TISSUES: No acute abnormality.      Impression    IMPRESSION:  1.  No acute findings. Chronic changes as above.       MR Brain w/o & w Contrast    Narrative    EXAM: MR BRAIN WITHOUT AND WITH CONTRAST  LOCATION: [facility]  DATE: 03/24/2024    INDICATION: Encephalopathy since surgery, mechanical heart valve. Briefly off anticoagulation for surgery.  COMPARISON: CT of the head dated 03/23/2024.  CONTRAST: 11 mL Gadavist IV.  TECHNIQUE: Routine multiplanar multisequence head MRI without and with intravenous contrast.    FINDINGS:  INTRACRANIAL CONTENTS: No abnormal intracranial restricted diffusion is identified to suggest recent infarct. The ventricles appear within normal limits in size and configuration. There is mild to moderate generalized brain parenchymal volume loss. Mild   patchy nonspecific T2 FLAIR hyperintense signal abnormalities in the cerebral white matter, likely due to chronic small vessel ischemic change. Mild smooth diffuse pachymeningeal thickening and enhancement along the frontal convexities on both sides with   associated T2 FLAIR hyperintense signal. This dural thickening measures up to approximately 2 mm in thickness (series 8 image 15). This is nonspecific. Otherwise, no focal mass or abnormal enhancement identified.    SELLA: No abnormality accounting for technique.    OSSEOUS STRUCTURES/SOFT TISSUES: Normal marrow signal. The major intracranial vascular flow-voids are maintained.     ORBITS: No abnormality accounting for technique.     SINUSES/MASTOIDS: Mild mucosal thickening scattered about the paranasal sinuses. Moderate scattered fluid/membrane thickening in the right mastoid air cells. Trace fluid in the  left mastoid tip.       Impression    IMPRESSION:  1.  No acute/early subacute ischemic infarct or mass effect.  2.  Mild, smooth, diffuse bilateral cerebral convexity pachymeningeal thickening and enhancement. This is nonspecific. It could be incidental. It also may be related to trace recent subdural hemorrhage/trauma or postprocedural changes. Pachymeningeal   thickening in the setting of intracranial hypotension could also be considered, although the finding is typically more pronounced in that entity. Recommend clinical correlation. Other considerations such as infectious/inflammatory process   (pachymeningitis) or neoplasm are thought to be less likely.  3.  Atrophy and presumed chronic small vessel ischemic changes, as described.  4.  Moderate fluid/membrane thickening in the right mastoid air cells.       *Note: Due to a large number of results and/or encounters for the requested time period, some results have not been displayed. A complete set of results can be found in Results Review.       Discharge Medications   Discharge Medication List as of 7/26/2024 11:39 AM        CONTINUE these medications which have CHANGED    Details   warfarin ANTICOAGULANT (COUMADIN) 3 MG tablet Take by mouth 3 mg (3 mg x 1 tablet) every day OR as directed by INR clinic. Check INR 7/27., No Print Out           CONTINUE these medications which have NOT CHANGED    Details   acetaminophen (TYLENOL) 500 MG tablet Take 2 tablets (1,000 mg) by mouth 3 times daily, No Print Out      diclofenac (VOLTAREN) 1 % topical gel Apply 4 g topically 3 times daily, No Print Out      ferrous sulfate (FEROSUL) 325 (65 Fe) MG tablet Take 1 tablet (325 mg) by mouth daily (with breakfast), Disp-90 tablet, R-3, E-Prescribe      HYDROmorphone (DILAUDID) 4 MG tablet Take 0.5 tablets (2 mg) by mouth every 6 hours as needed for severe pain, Disp-30 tablet, R-0, E-Prescribe      Multiple Vitamins-Minerals (MULTIVITAMIN ADULT) CHEW Take 2 chew tab by mouth  daily, Historical      nystatin (MYCOSTATIN) 944314 UNIT/GM external powder Apply topically 2 times daily as needed for other Groin foldsHistorical      Omega-3 Fatty Acids (FISH OIL CONCENTRATE PO) Take 1 capsule by mouth daily, Historical      Probiotic CHEW Take 2 chew tab by mouth daily, Historical      torsemide (DEMADEX) 20 MG tablet Take 1 tablet (20 mg) by mouth daily, Disp-30 tablet, R-1, E-Prescribe      order for DME Equipment being ordered: COMPRESSION STOCKINGS, 20-30 mmHgDisp-1 each, R-1, Local Print      pantoprazole (PROTONIX) 40 MG EC tablet Take 1 tablet (40 mg) by mouth 2 times daily (before meals), Disp-60 tablet, R-1, E-Prescribe           Allergies   Allergies   Allergen Reactions    Amiodarone Shortness Of Breath    Pcn [Penicillins] Shortness Of Breath     Rxn occurred in childhood     Statins Muscle Pain (Myalgia) and Hives    Amiodarone     Latex     Zetia [Ezetimibe] Muscle Pain (Myalgia)     Muscle weakness legs

## 2024-07-26 NOTE — PLAN OF CARE
Goal Outcome Evaluation:      A&O x 4. VSS, RA. CMS intact.  Dressing on coccyx CDI. Denies pain. Assist of 2 with lift. Voiding in urinal. Partner at bedside, discharge instruction given to family. Will discharge home with HE transport.

## 2024-07-26 NOTE — PLAN OF CARE
Goal Outcome Evaluation:      Plan of Care Reviewed With: patient    Overall Patient Progress: improvingOverall Patient Progress: improving        Date/Time:7/25/24 @ [id number]     Trauma/Ortho/Medical (Choose one) Medical     Diagnosis: Acute diarrhea due to enteropathogenic E.coli, stage IV sacral decubitus ulcer with previous osteomyelitis  POD#:N/A  Mental Status: A and O x 4  Activity/dangle: Assist of 2 using the lift  Diet: Regular  Pain:Denied pain.   Stubbs/Voiding: Urinal, incontinent of stool  Tele/Restraints/Iso:Contact precaution maintained.  02/LDA:On RA. PIV SL R forearm.  D/C Date: Pending TCU placement  Other Info: VSS. Sacral dressing changed.

## 2024-07-29 NOTE — PROGRESS NOTES
Clinic Care Coordination Contact  Transitions of Care Outreach  Chief Complaint   Patient presents with    Clinic Care Coordination - Post Hospital       Most Recent Admission Date: 7/18/2024   Most Recent Admission Diagnosis: Hyponatremia - E87.1  Dark stools - R19.5  Supratherapeutic INR - R79.1  Wound of sacral region, initial encounter - S31.000A  Diarrhea, unspecified type - R19.7     Most Recent Discharge Date: 7/26/2024   Most Recent Discharge Diagnosis: Diarrhea, unspecified type - R19.7  Dark stools - R19.5  Wound of sacral region, initial encounter - S31.000A  Hyponatremia - E87.1  Supratherapeutic INR - R79.1  Acute on chronic congestive heart failure, unspecified heart failure type (H) - I50.9  Long term current use of anticoagulant therapy - Z79.01  S/P mitral valve replacement - Z95.2  Chronic atrial fibrillation (H) - I48.20     Transitions of Care Assessment    Discharge Assessment  How are you doing now that you are home?: Doing ok.  How are your symptoms? (Red Flag symptoms escalate to triage hotline per guidelines): Unchanged  Do you know how to contact your clinic care team if you have future questions or changes to your health status? : Yes  Does the patient have their discharge instructions? : Yes  Does the patient have questions regarding their discharge instructions? : No  Were you started on any new medications or were there changes to any of your previous medications? : No  Does the patient have all of their medications?: Yes  Do you have questions regarding any of your medications? : No  Do you have all of your needed medical supplies or equipment (DME)?  (i.e. oxygen tank, CPAP, cane, etc.): Yes         Post-op (Clinicians Only)  Did the patient have surgery or a procedure: No  Fever: No  Chills: No  Eating & Drinking: eating and drinking without complaints/concerns  PO Intake: regular diet  Additional Symptoms:  (Denies)  Bowel Function: loose stools  Date of last BM: 07/29/24 (3-4 a  day)  Urinary Status: voiding without complaint/concerns    Care Management   None    Follow up Plan     Deferred Care Coordination services at this time.   Patient will utilize home care services to inquire on OT/PT assessment for alternative lift.   May also ask for SW consult as patient has been bed ridden with wounds and this may be a more chronic versus acute.   Did review how to contact [employer] to get rides to medical appointments.     Discharge Follow-Up  Discharge follow up appointment scheduled in alignment with recommended follow up timeframe or Transitions of Risk Category? (Low = within 30 days; Moderate= within 14 days; High= within 7 days): Yes  Discharge Follow Up Appointment Date: 08/01/24  Discharge Follow Up Appointment Scheduled with?: Primary Care Provider    Future Appointments   Date Time Provider Department Center   8/1/2024 12:30 PM Mary Srinivasan MD [facility]   8/27/2024 10:20 AM Travis Downey MD [facility]       Outpatient Plan as outlined on AVS reviewed with patient.    For any urgent concerns, please contact our 24 hour nurse triage line: [phone] ([phone])       Petty Eaton RN Clinic Care Coordinator  [facility] Clinics: [place], [place] (on-site Wednesdays), [facility] (on-site Thursdays) & [facility].  [email or web address]  Phone: [phone]

## 2024-07-29 NOTE — TELEPHONE ENCOUNTER
Home Care is calling regarding an established patient with [facility].        6/27/2024     3:50 PM   Home Care Information   Date of Home Care episode start 6/19/2024   Current following provider Dr. Jayme Deng    Name/Phone Number Joshua Kinney PT,   Home Care agency [facility]     Requesting orders from: Jayme Deng  Provider is following patient: Yes  Is this a 60-day recertification request?  No    Orders Requested    Skilled Nursing  Request for resumption in care.     Requesting skilled nursing 3 X 1 and 1 x 1    Physical Therapy  Request for initial evaluation and treatment (one time)     Occupational Therapy  Request for initial evaluation and treatment (one time)     Verbal orders given for PT and OT eval and treat.  Information was gathered for skilled nursing visits.  Provider review needed.  RN will contact Home Care with information after provider review.  Confirmed ok to leave a detailed message with call back.  Contact information confirmed and updated as needed.    Alexandra Bradford RN

## 2024-07-29 NOTE — TELEPHONE ENCOUNTER
Spoke with patient who reports that he is having a difficult time with home meter INR check.  Recent discharge from hospital on Friday with warfarin dose of 3 mg daily.  Home care schedule for a visit tomorrow.  Diarrhea getting better but not completely resolved. Advise to continue with 3 mg today and have home care check INR tomorrow.  Verbalizes understanding and agrees with plan.      Aileen Castano RN  [facility].

## 2024-07-29 NOTE — TELEPHONE ENCOUNTER
Writer called and spoke with patient and caregiver Jose (C2C) and reviewed PCP's recommendations. Jose and patient expressed verbal understanding and are agreeable to recommendations, scheduled lab appt for  of stool testin2024 3:15 PM CS LAB [facility]     Jose states patient has been drinking plenty of water, denies any signs/symptoms of dehydration. Will monitor closely and continue hydrating and call clinic if concerns of dehydration/need for IV fluids. Did review option of ADS with patient and caregiver so they are aware.     Routing as FYI update to PCP - thank you!     Christine ADHIKARI  [facility]

## 2024-07-29 NOTE — TELEPHONE ENCOUNTER
I do not see that he was treated with antibiotics during the hospitalization as he was noted to have enteropathogenic E. Coli and improved with supportive cares only.      It may be that he might need to be retested for Clostridium difficile.  I would recommend repeat stool studies and consider acute and diagnostic services if needing hydration    Jayme Deng MD

## 2024-07-29 NOTE — TELEPHONE ENCOUNTER
General Call    Contacts       Contact Date/Time Type Contact Phone/Fax    07/29/2024 11:47 AM CDT Phone (Incoming) Feng Wynne (Self) [phone] (M)     Requesting call back from INR RN          Reason for Call:  INR Question    What are your questions or concerns:  Had a home INR test and wanted to speak with a nurse because he had some questions - did not want to specify when asked about it.    Date of last appointment with provider: N/A    Could we send this information to you in [facility] or would you prefer to receive a phone call?:   Patient would prefer a phone call   Okay to leave a detailed message?: Yes at Cell number on file:    Telephone Information:   Mobile [phone]

## 2024-07-29 NOTE — TELEPHONE ENCOUNTER
Nurse Triage SBAR    Is this a 2nd Level Triage? NO    Situation: Jose, caregiver calling stating he was in hospital for diarrhea and was on an abx for it. He states he is now out of hospital and out of abx now. Patient is still having diarrhea.  We would like more abx, the same abx he received while in the hospital.    Background: Acute Diarrhea due to enteropathogenic E coli  - Enteric panel positive for EPEC. C diff negative     Assessment: Afebrile, 4-5 episodes of diarrhea in the last 24 hrs. Drinking about 2 L of water a day.     Protocol Recommended Disposition:   No disposition on file.    Recommendation: Please advise if abx is appropriate from hospital setting. Unable to find what he was on.     Routed to provider    Does the patient meet one of the following criteria for ADS visit consideration? 16+ years old, no PCP (internal or external) but seen at [facility]     TIP  Providers, please consider if this condition is appropriate for management at one of our Acute and Diagnostic Services sites.     If patient is a good candidate, please use dotphrase <dot>triageresponse and select Refer to ADS to document.

## 2024-07-30 NOTE — TELEPHONE ENCOUNTER
Home Care is calling regarding an established patient with  [email or web address] [facility].        6/27/2024     3:50 PM   Home Care Information   Date of Home Care episode start 6/19/2024   Current following provider Dr. Jayme Deng    Name/Phone Number Joshua Kinney PT,   Home Care agency [facility]     Requesting orders from: Jayme Deng  Provider is following patient: Yes  Is this a 60-day recertification request?  No    Orders Requested    Skilled Nursing  Request for delay in care, service is not able to be provided within same scheduled day.   Does not have staffing to do the SN visit today so will do it 8/2 instead.    Information was gathered and will be sent to provider for review.  RN will contact Home Care with information after provider review.  Confirmed ok to leave a detailed message with call back.  Contact information confirmed and updated as needed.    Hayley Sprague RN

## 2024-07-30 NOTE — TELEPHONE ENCOUNTER
Called [facility] back and was transferred to Hazel ADHIKARI. Verbal approval given for requested resumption of care for skilled nursing. Agrees to fax for provider signature. Alexandra Bradford RN

## 2024-07-31 NOTE — TELEPHONE ENCOUNTER
Left a VM to home care RN to inform the providers approval to delay in care for skilled nursing.     Demetrius Galaviz RN  [facility]

## 2024-07-31 NOTE — TELEPHONE ENCOUNTER
Returned call to Jose. He reports the patient has been having diarrhea and is concerned about this for him coming to the appointment tomorrow. He is mainly concerned that we will be bothered by the diarrhea if it happens on the way or while he is in the clinic. Assured Jose that the staff are all accustomed to incontinence and call help accordingly. If the patient is comfortable with traveling for the appointment tomorrow then it is ok with the clinic that he comes. Jose appreciates this and will discuss with the patient but likely the patient will come to the appointment tomorrow.

## 2024-07-31 NOTE — TELEPHONE ENCOUNTER
Routing to team to please assist with scheduling - thank you!     Christine ADHIKARI  [facility]

## 2024-07-31 NOTE — PROGRESS NOTES
ANTICOAGULATION     Feng Wynne is overdue for an INR check.     See mychart.    Natali Mondragon RN

## 2024-07-31 NOTE — PROGRESS NOTES
"Feng is a 79 year old who is being evaluated via a billable telephone visit.    What phone number would you like to be contacted at? [phone]  How would you like to obtain your AVS? Chelsey  Originating Location (pt. Location): Home    Distant Location (provider location):  On-site    Assessment and Plan:     (A04.0) Enteritis, enteropathogenic E. coli  (primary encounter diagnosis)  Comment: recently hospitalized for diarrhea/dehydration, discharged 5 days ago and now with increased frequency of small volume non-bloody watery diarrhea, goes about 4 times per day, otherwise feels \"great\", denies dizziness/lightheadedness, fever/chills, abdominal pain, on warfarin for afib  Plan: Basic metabolic panel  (Ca, Cl, CO2, Creat,         Gluc, K, Na, BUN)  Discussed with patient and his partner that this is a self-limited illness and since he is feeling great I would discourage starting antibiotics again, he did have a significant increase in his INR while in the hospital while on Zithromax and I suspect this could happen as an outpatient and we would have no way of monitoring closely  I do recommend he have a BMP to ensure electrolytes have remained stable, he will try to arrange a ride for this  Meanwhile we discussed the importance of staying hydrated with water and electrolyte drinks and reasons to be seen promptly including lightheadedness, fever or chills, bloody stool or abdominal pain  I will help arrange close follow-up with his PCP (messaged today) in the next couple weeks to ensure he is continuing to get better    [facility] Same Day Provider       Subjective   Feng is a 79 year old, presenting for the following health issues:  Gastrointestinal Problem (Patient is having a telephone visit for E-Coli follow up.  )    History of Present Illness       Reason for visit:  Diarrhea  Symptom onset:  3-4 weeks ago  Symptoms include:  Diarrhea  Symptom intensity:  Moderate  Symptom " progression:  Staying the same  Had these symptoms before:  No  What makes it worse:  No  What makes it better:  No    He eats 0-1 servings of fruits and vegetables daily.He consumes 0 sweetened beverage(s) daily.He exercises with enough effort to increase his heart rate 9 or less minutes per day.  He exercises with enough effort to increase his heart rate 3 or less days per week.   He is taking medications regularly.    Feng was recently admitted for diarrhea and dehydration     He was treated with zithromax in the hospital    He has been home for 5 days now    He started using ensure at home and then developed increase frequency in diarrhea    He stopped drinking the ensure 4 days ago and has continued to have diarrhea    He estimates he has 3-4 episodes of small volume watery diarrhea     He hasn't noticed any blood in the stool    He feels good, denies fever/chills, dizziness     He has home RN and vitals     He plans to have surgery at [facility] in the near future and is scheduled to meet with a surgeon next week        Objective           Vitals:  No vitals were obtained today due to virtual visit.    Physical Exam   General: Alert and no distress //Respiratory: No audible wheeze, cough, or shortness of breath // Psychiatric:  Appropriate affect, tone, and pace of words        Phone call duration: 17 minutes  Signed Electronically by: Juanita Kauffman PA-C

## 2024-07-31 NOTE — TELEPHONE ENCOUNTER
Called and spoke w pt and states he would call back after his procedure/consult tomorrow to schedule an appt w pcp, depending on findings of tmw visit. Patient aware virtual visits available for follow up.

## 2024-07-31 NOTE — PATIENT INSTRUCTIONS
Adhere to a bland diet: toast applesauce, rice, noodles    Get plenty of fluids daily: drink half water/half pedialyte    Lab visit for BMP    Follow-up with pcp in next 1-2 weeks

## 2024-07-31 NOTE — TELEPHONE ENCOUNTER
ANTICOAGULATION     Feng Wynne is overdue for an INR check.     Patient calling to report he is unable to get sample on home meter. Advised to try again tomorrow as the more attempts, the higher the failure rate of getting a good sample. Sometimes best to just take a breather and try again in 24 hours.  Patient states he will change the batteries but has had meter for 20 years. Advised patient that meters are not meant to last forever and should be exchanged every few years for accuracy. Gave patient acelis phone number to discuss exchanging meter and to get a more up to date meter. Patient verbalized understanding and will call to discuss this. Advised to call Anticoagulation clinic back if needing to come into lab in the interim as INR lab appt can be scheduled until meter is working again. Patient states he will call tomorrow if unable to get INR.    Natali Mondragon RN

## 2024-07-31 NOTE — TELEPHONE ENCOUNTER
Patient's caregiver, Jose, is requesting a call back to discuss some questions. No further details were given. Please call [phone]

## 2024-08-01 NOTE — TELEPHONE ENCOUNTER
Can we call Feng Wynne and let him know that       Tom Toney,    I have had the opportunity to review your recent results and an interpretation is as follows:  Your stool culture did again returned positive for enteropathogenic E. coli, but also now shows a positive finding for Mckayla.  Per up-to-date literatur for patients with mild enterocolitis in the absence of risk for severe illness, antibiotic therapy is not warranted, given lack of benefit. Maintenance of proper hydration and correction of electrolyte abnormalities should be the focus of therapy.    However, for patients with nonsevere enterocolitis who are neonates, or who have underlying immunosuppression or derangement of iron metabolism, we favor treatment with oral antibiotic therapy. And, Clinical strains are usually susceptible to other beta-lactam agents including cephalosporins (second, third, or fourth generation) -    I recommend we treat you with an antibiotic that you have tolerated in the past, ciprofloxacin 500 mg twice per day x 5 days,  you will need close follow up in inr clinic as well     OK team office visit if I do not have any availability        Sincerely,  Jayme Deng MD

## 2024-08-01 NOTE — DISCHARGE INSTRUCTIONS
08/01/2024   Feng Wynne   1945                                 A DME order was not completed because the supplies are ordered by home care or at a care facility    Plan 08/01/2024  Dressing changes outside of clinic are being performed by Spouse and Home Care  [facility] Phone: [phone] Fax: [phone] (3 times per week)  Dr Srinivasan  will call with a date for Bone biopsy on [facility]; will determine Antibiotic therapy. MD to manage Blood thinner with Heparin which will require hospitalization before and after the procedure  Need History & Physical from Primary care provider 30 days prior to surgery.   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 A group 1 mattress is not adequate to offload these severe ulcerations. Patient has impaired sensation and is unable to reposition independently. Pillows and wedges have been used and is not adequate to offload the ulcerations.  -Continue to take your antibiotics that your primary care prescribed for E.Coli  -Wound Vac ordered and will be delivered to your home this next week    Consider Temporary Colostomy to avoid wound contamination of stool  Continue High Protein diet; Ensure Max, protein bar, and supplements: 2 shakes/ day  -Avoid Recliner Chairs to prevent pressure on coccyx wound  -No Bed Pans - utilize toilets for defecation  -Suspect possible Flap Surgery with Dr Srinivasan in future to heal  -Offload with pillows - ok to sleep up on your sides  -Consider use of Primofit for urinary needs while in bed.    Wound Dressing Change: Coccyx  Cleanse wound with Vashe moist gauze, leave in place for 10 minutes; remove  Cleanse periwound skin with wound spray, pat dry  Apply light layer of Zinc Oxide barrier paste to periwound  Apply a  "piece of 4\" AMD roll  into the wound bed and tuck into the undermining areas from 12-12 o'clock   Cover with dry absorbant pad and secure with 2\" Medipore tape  Change 2 times a day and as needed for soilage    Repositioning:  Bed: Patient needs a Group 2 mattress for wound healing. Keep Head of bed at 30 degrees or less; Reposition MINIMALLY every 1-2 hours in bed to relieve pressure and promote perfusion to tissue turning side to side.  Chair: When up to the chair, do not sit for longer than one hour total before returning to bed for at least 60 minutes to relieve pressure and promote perfusion to the tissue. Completely recline/tilt for 15 minutes each hour.  Sit on a chair cushion when up to the chair.  In bed Clean and smooth the bed surface.  Lift the head of the bed no more than 30 .  Use draw-sheets or transfer boards to move patients.  Lift the head of the bed no more than 30 degrees.  Raise the foot of the bed slightly.  In a wheelchair  Keep upright (don't slump) - keep weight forward onto your thighs, not on your tailbone  Support the back with a pillow.  Use a foot extension.    Reduce shear and friction:  Prevent skin breakdown by reducing friction and shear.  Patients are less likely to slide down in bed if they re supported by pillows and the head of the bed isn t raised too high.    High Protein Diet: VEGETARIAN:  Whey protein powder - 24g per scoop (on average)  Greek yogurt - 23g per 8oz  Navy beans - 20g per cup  Cottage cheese - 14g per 1/2 cup  Lentils - 13g per 1/4 cup  2% milk - 8g per cup  Peanut butter - 8g per 2 tablespoons  Eggs - 6g per egg  Mixed nuts - 6g per 2oz  Tofu - 10g per 1/2 cup     Main Provider: Barbara Srinivasan M.D. August 1, 2024    Call us at [phone] if you have any questions about your wounds, if you have redness or swelling around your wound, have a fever of 101 degrees Fahrenheit or greater or if you have any other problems or concerns. We answer the phone " Monday through Friday 8 am to 4 pm, please leave a message as we check the voicemail frequently throughout the day. If you have a concern over the weekend, please leave a message and we will return your call Monday. If the need is urgent, go to the ER or urgent care.    If you had a positive experience please indicate that on your patient satisfaction survey form that [facility] will be sending you.  It was a pleasure meeting with you today.  Thank you for allowing me and my team the privilege of caring for you today.  YOU are the reason we are here, and I truly hope we provided you with the excellent service you deserve.  Please let us know if there is anything else we can do for you so that we can be sure you are leaving completely satisfied with your care experience.      If you have any billing related questions please call the [facility] Business office at [phone]. The clinic staff does not handle billing related matters.  If you are scheduled to have a follow up appointment, you will receive a reminder call the day before your visit. On the appointment day please arrive 15 minutes prior to your appointment time. If you are unable to keep that appointment, please call the clinic to cancel or reschedule. If you are more than 10 minutes late or greater for your scheduled appointment time, the clinic policy is that you may be asked to reschedule.

## 2024-08-01 NOTE — ADDENDUM NOTE
Encounter addended by: Marilou Lujan RN on: 8/1/2024 1:31 PM   Actions taken: Flowsheet accepted, Clinical Note Signed, Charge Capture section accepted

## 2024-08-01 NOTE — TELEPHONE ENCOUNTER
ANTICOAGULATION  MANAGEMENT     Interacting Medication Review    Interacting medication(s): Ciprofloxacin (Cipro) with warfarin.    Duration: 5 days (8/1/24 to 8/5/24)    Indication:  colitis    New medication?: Yes, interaction may increase INR and risk of bleeding. Closer INR monitoring recommended.        PLAN     Continue your current warfarin dose with next INR ASAP, Feng is overdue for INR with his home monitor and a baseline value prior to abx start would be helpful. Also noted that he has reported recent difficulty with the home monitor and he has home care. A visit appears to be scheduled either today or tomorrow with home care.     Left detailed voice message for Feng with dosing instructions and requested INR ASAP with home monitor, home care, or scheduled in lab.     No adjustment to anticoagulation calendar was required          Plan made per [facility] anticoagulation protocol    Florencia Da Silva RN  Anticoagulation Clinic

## 2024-08-01 NOTE — RESULT ENCOUNTER NOTE
Tom Toney,    I have had the opportunity to review your recent results and an interpretation is as follows:  Your stool culture did again returned positive for enteropathogenic E. coli, but also now shows a positive finding for Mckayla.  Per up-to-date literatur for patients with mild enterocolitis in the absence of risk for severe illness, antibiotic therapy is not warranted, given lack of benefit. Maintenance of proper hydration and correction of electrolyte abnormalities should be the focus of therapy.    However, for patients with nonsevere enterocolitis who are neonates, or who have underlying immunosuppression or derangement of iron metabolism, we favor treatment with oral antibiotic therapy. And, Clinical strains are usually susceptible to other beta-lactam agents including cephalosporins (second, third, or fourth generation) -    I recommend we treat you with an antibiotic that you have tolerated in the past, ciprofloxacin 500 mg twice per day x 5 days,  you will need close follow up in inr clinic as well     Sincerely,  Jayme Deng MD

## 2024-08-01 NOTE — TELEPHONE ENCOUNTER
Home Care is calling regarding an established patient with [facility].        6/27/2024     3:50 PM   Home Care Information   Date of Home Care episode start 6/19/2024   Current following provider Dr. Jayme Deng    Name/Phone Number Joshua Kinney PT,   Home Care agency [facility]     Requesting orders from: Jayme Deng  Provider is following patient: Yes  Is this a 60-day recertification request?  No    Orders Requested    Physical Therapy  Request for continuation of care with increase in frequency  Frequency: Requesting one additional PT visit next week to make up for a missed visit due to PT availability.         Verbal orders given.  Home Care will send orders for provider to sign.  Confirmed ok to leave a detailed message with call back.  Contact information confirmed and updated as needed.    Juanita Arango RN

## 2024-08-01 NOTE — TELEPHONE ENCOUNTER
Called patient and partner, Jose, who is on C2C answered. Gave PCP message. He verbalized understanding. Advised rx was sent to pharmacy and also sent message to patient via mc as well.

## 2024-08-01 NOTE — ADDENDUM NOTE
Encounter addended by: Mary Srinivasan MD on: 8/1/2024 1:51 PM   Actions taken: Visit diagnoses modified, Order list changed, Diagnosis association updated, Clinical Note Signed

## 2024-08-01 NOTE — PROGRESS NOTES
Patient Active Problem List   Diagnosis    Allergic rhinitis    Chronic atrial fibrillation (H)    S/P mitral valve replacement    Hyperlipidemia LDL goal <130    Lumbago    Knee pain    Rosacea    Proteinuria    Essential hypertension with goal blood pressure less than 140/90    Morbid obesity due to excess calories (H)    Long-term (current) use of anticoagulants [Z79.01]    Essential hypertension    Chronic right shoulder pain    Bradycardia    SOB (shortness of breath)    Anemia, unspecified type    Hypertensive heart disease with heart failure (H)    Positive occult stool blood test    Anemia due to blood loss, acute    Acute on chronic congestive heart failure, unspecified heart failure type (H)    Abnormal coagulation profile    Acute respiratory failure with hypoxia (H)    Chronic diastolic (congestive) heart failure (H)    Chronic kidney disease, stage 2 (mild)    Edema, unspecified    Muscle weakness (generalized)    Other abnormalities of gait and mobility    Other specified disorders of left ear    Pneumonia, unspecified organism    Retention of urine, unspecified    Ulcer of esophagus with bleeding    Unspecified abnormalities of gait and mobility    Unspecified Escherichia coli (E. 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)    Osteomyelitis (H)    Hyponatremia    Dark stools    Supratherapeutic INR    Wound of sacral region, initial encounter    Diarrhea, unspecified type     Past Medical History:   Diagnosis Date    Acute on chronic congestive heart failure, unspecified heart failure type (H) 01/30/2024    Arthritis     Atrial fibrillation (H)     Coronary artery disease     Hypercholesteremia     Obesity     Unspecified essential hypertension      Labs:   Recent Labs   Lab Test 07/26/24  1051 07/25/24  0901 07/24/24  0739 07/19/24  0733 07/18/24  1436   ALBUMIN  --   --   --   --  2.6*   HGB  --    --  9.0*   < > 9.4*   INR 2.82*   < > 4.15*   < >  --    WBC  --   --  7.2   < > 6.2    < > = values in this interval not displayed.     Nutrition requirements were discussed with patient today.  Vitals:  /74 (BP Location: Right arm, Patient Position: Sitting)   Pulse 81   Temp 96.8  F (36  C) (Temporal)   Wound:   Wound Sacrum Ulceration (Active)   Wound Bed Other (Comment) 07/26/24 0830   Liza-wound Assessment Erythema;Excoriated 07/23/24 2000   Drainage Amount None 07/25/24 1700   Drainage Color/Characteristics Serosanguineous 07/23/24 2000   Wound Care/Cleansing Wound cleanser 07/25/24 1700   Dressing Dry gauze 07/26/24 0830   Amount of Packing Added 1 07/25/24 1700   Amount of Packing Removed 1 07/22/24 1630   Packing removed by Nurse 07/25/24 1700   Dressing Status Saturated 07/25/24 0000   Dressing Change Due 07/26/24 07/25/24 1700       Wound (used by OP [facility] only) 04/02/24 1036 coccyx pressure injury (Active)   Thickness/Stage Stage 4 08/01/24 1210   Base granulating;slough;exposed structure;pink;other (see comments);necrotic 08/01/24 1210   Periwound excoriated 08/01/24 1210   Periwound Temperature warm 08/01/24 1210   Periwound Skin Turgor soft 08/01/24 1210   Edges open 08/01/24 1210   Length (cm) 6.5 08/01/24 1210   Width (cm) 4.8 08/01/24 1210   Depth (cm) 4.3 08/01/24 1210   Wound (cm^2) 31.2 cm^2 08/01/24 1210   Wound Volume (cm^3) 134.16 cm^3 08/01/24 1210   Wound healing % -290 08/01/24 1210   Tunneling [Depth (cm)/Location] 6 o'clock / 2.3cm 07/03/24 1150   Undermining [Depth (cm)/Location] 12-12 o'clock / 6.5cm 08/01/24 1210   Drainage Characteristics/Odor serosanguineous 08/01/24 1210   Drainage Amount copious 08/01/24 1210   Care, Wound non-select wound debridement performed. 08/01/24 1210      Photo:       Further instructions from your care team         08/01/2024   Feng Wynne   1945    A DME order was not completed because the supplies are ordered by home care or at a [facility]  "facility      Plan 08/01/2024    Dressing changes outside of clinic are being performed by Sp[facility] Home Care  [facility] Phone: [phone] Fax: [phone] (three times per week)      Plan 08/01/2024  -When someone calls you with a date for the bone biopsy. Call your primary care provider as you will need a prop completed within 30 days of the surgery.   -Order sent for group 2 mattress through [employer] at [phone].  -Continue to take your antibiotics that your primary care prescribed for E.Coli  -Wound Vac ordered and will be delivered to your home this next week  -Need to schedule bone debridement to determine Antibiotic therapy [facility]. Will need to manage Blood thinner with Heparin which will require hospitalization before and after the procedure. Dr Srinivasan's  will reach out when a date is available  Consider Temporary Colostomy to avoid wound contamination of stool  -Utilize 2 protein shakes per day to increase you protein intake  -Avoid Recliner Chairs to prevent pressure on coccyx wound  -No Bed Pans - utilize toilets for defecation  -Possible Flap Surgery with Zarina in the future  -Offload with pillows - ok to sleep up on your sides  -Continue High Protein diet; Ensure Max, protein bar, and supplements and meals from home.  -Consider use of Primofit for urinary needs while in bed.    Wound Dressing Change: Coccyx  Cleanse wound with Vashe moist gauze, leave in place for 10 minutes; remove  Cleanse periwound skin with wound spray, pat dry  Apply light layer of Zinc Oxide barrier paste to periwound  Apply a piece of 4\" AMD roll  into the wound bed and tuck into the undermining areas from 12-12 o'clock   Cover with dry absorbant pad and secure with 2\" Medipore tape  Change 2 times a day and as needed for soilage    Repositioning:  Bed: Keep Head of bed at 30 degrees or less; Reposition MINIMALLY every 1-2  hours in bed to relieve pressure and promote " perfusion to tissue turning side to  side.  Chair: When up to the chair, do not sit for longer than one hour total before  returning to bed for at least 60 minutes to relieve pressure and promote perfusion  to the tissue. Completely recline/tilt for 15 minutes each hour.  Sit on a chair cushion when up to the chair.    Reduce shear and friction:  Prevent skin breakdown by reducing friction and shear.  Patients are less likely to slide down in bed if they re supported by pillows and the  head of the bed isn t raised too high.    In a bed  Clean and smooth the bed surface.  Lift the head of the bed no more than 30 .  Use draw-sheets or transfer boards to move patients.  Lift the head of the bed no more than 30 degrees.  Raise the foot of the bed slightly.  In a wheelchair  Keep upright (don't slump) - keep weight forward onto your thighs, not on  your tailbone  Support the back with a pillow.  Use a foot extension.  High Protein Diet: VEGETARIAN:  Whey protein powder - 24g per scoop (on average)  Greek yogurt - 23g per 8oz  Navy beans - 20g per cup  Cottage cheese - 14g per 1/2 cup  Lentils - 13g per 1/4 cup  2% milk - 8g per cup  Peanut butter - 8g per 2 tablespoons  Eggs - 6g per egg  Mixed nuts - 6g per 2oz  Tofu - 10g per 1/2 cup     Main Provider: Barbara Srinivasan M.D. August 1, 2024    Call us at [phone] if you have any questions about your wounds, if you have redness or swelling around your wound, have a fever of 101 degrees Fahrenheit or greater or if you have any other problems or concerns. We answer the phone Monday through Friday 8 am to 4 pm, please leave a message as we check the voicemail frequently throughout the day. If you have a concern over the weekend, please leave a message and we will return your call Monday. If the need is urgent, go to the ER or urgent care.    If you had a positive experience please indicate that on your patient satisfaction survey form that [facility] will be  sending you.    It was a pleasure meeting with you today.  Thank you for allowing me and my team the privilege of caring for you today.  YOU are the reason we are here, and I truly hope we provided you with the excellent service you deserve.  Please let us know if there is anything else we can do for you so that we can be sure you are leaving completely satisfied with your care experience.      If you have any billing related questions please call the [facility] office at [phone]. The clinic staff does not handle billing related matters.    If you are scheduled to have a follow up appointment, you will receive a reminder call the day before your visit. On the appointment day please arrive 15 minutes prior to your appointment time. If you are unable to keep that appointment, please call the clinic to cancel or reschedule. If you are more than 10 minutes late or greater for your scheduled appointment time, the clinic policy is that you may be asked to reschedule.
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
## 2024-08-02 NOTE — PROGRESS NOTES
ANTICOAGULATION CLINIC REFERRAL RENEWAL REQUEST       An annual renewal order is required for all patients referred to [facility].?  Please review and sign the pended referral order for Feng Wynne.       ANTICOAGULATION SUMMARY      Warfarin indication(s)   Atrial Fibrillation and Mechanical MVR    Mechanical heart valve present?  Mechanical MVR       Current goal range   INR: 2.5-3.5     Goal appropriate for indication? Goal INR 2.5-3.5 standard for indication(s) above     Time in Therapeutic Range (TTR)  (Goal > 60%) 57%       Office visit with referring provider's group within last year yes on 7/1/24       Chikis Desir RN  [facility]

## 2024-08-02 NOTE — PROGRESS NOTES
ANTICOAGULATION MANAGEMENT     Feng Wynne 79 year old male is on warfarin with therapeutic INR result. (Goal INR 2.5-3.5)    Recent labs: (last 7 days)     08/02/24  1451   INR 3.3*       ASSESSMENT     Source(s): Chart Review and Home Care/Facility Nurse     Warfarin doses taken: Warfarin taken as instructed  Diet:  adds protein powder to oat milk twice daily , may see decrease I INR  Medication/supplement changes:  Cipro 8/1/-8/5 , potential increase in INR due to warfarin interaction  New illness, injury, or hospitalization: No, continued wound management seen by surgeon 8/1/24  Signs or symptoms of bleeding or clotting: No  Previous result: Therapeutic last visit; previously outside of goal range  Additional findings: None       PLAN     Recommended plan for temporary change(s) affecting INR     Dosing Instructions: Continue your current warfarin dose with next INR in 3 days, next home care visit     Summary  As of 8/2/2024      Full warfarin instructions:  3 mg every Tue, Sat; 4.5 mg all other days   Next INR check:  8/5/2024               Telephone call with [facility] home care nurse who agrees to plan and repeated back plan correctly    Orders given to  Homecare nurse/facility to recheck    Education provided: Please call back if any changes to your diet, medications or how you've been taking warfarin  Goal range and lab monitoring: goal range and significance of current result    Plan made per [facility] anticoagulation protocol    Chikis Desir RN  Anticoagulation Clinic  8/2/2024    _______________________________________________________________________     Anticoagulation Episode Summary       Current INR goal:  2.5-3.5   TTR:  57.0% (9.2 mo)   Target end date:  Indefinite   Send INR reminders to:  [facility]    Indications    Long-term (current) use of anticoagulants [Z79.01] [Z79.01]  S/P mitral valve replacement [Z95.2]  Chronic atrial fibrillation (H) [I48.20]             Comments:  [facility] Patient, okay  to manage by exception on 7/8/20             Anticoagulation Care Providers       Provider Role Specialty Phone number    Jayme Deng MD Referring Internal Medicine [phone]

## 2024-08-05 NOTE — PROGRESS NOTES
ANTICOAGULATION MANAGEMENT     Feng Wynne 79 year old male is on warfarin with therapeutic INR result. (Goal INR 2.5-3.5)    Recent labs: (last 7 days)     08/05/24  1241   INR 3.2*       ASSESSMENT     Source(s): Chart Review and Home Care/Facility Nurse     Warfarin doses taken: Warfarin taken as instructed  Diet: No new diet changes identified  Medication/supplement changes:  today is last day cipro  New illness, injury, or hospitalization: No  Signs or symptoms of bleeding or clotting: No  Previous result: Therapeutic last 2(+) visits  Additional findings: None       PLAN     Recommended plan for no diet, medication or health factor changes affecting INR     Dosing Instructions: Continue your current warfarin dose with next INR in 1 week       Summary  As of 8/5/2024      Full warfarin instructions:  3 mg every Tue, Sat; 4.5 mg all other days   Next INR check:  8/12/2024               Telephone call with [facility] home care nurse who verbalizes understanding and agrees to plan    Orders given to  Homecare nurse/facility to recheck    Education provided: Please call back if any changes to your diet, medications or how you've been taking warfarin    Plan made per [facility] anticoagulation protocol    Samantha Parnell, RN  Anticoagulation Clinic  8/5/2024    _______________________________________________________________________     Anticoagulation Episode Summary       Current INR goal:  2.5-3.5   TTR:  58.1% (9.2 mo)   Target end date:  Indefinite   Send INR reminders to:  [facility]    Indications    Long-term (current) use of anticoagulants [Z79.01] [Z79.01]  S/P mitral valve replacement [Z95.2]  Chronic atrial fibrillation (H) [I48.20]             Comments:  [facility] Patient, okay  to manage by exception on 7/8/20             Anticoagulation Care Providers       Provider Role Specialty Phone number    Jayme Deng MD Referring Internal Medicine [phone]

## 2024-08-05 NOTE — TELEPHONE ENCOUNTER
Home Care is calling regarding an established patient with [facility].        6/27/2024     3:50 PM   Home Care Information   Date of Home Care episode start 6/19/2024   Current following provider Dr. Jayme Deng    Name/Phone Number Joshua Kinney PT,   Home Care agency [facility]     Requesting orders from: Jayme Deng  Provider is following patient: Yes  Is this a 60-day recertification request?  Yes    Orders Requested    Skilled Nursing  Request for recertification   Frequency:  1x/wk for 8 wks and 3 PRN visits    Information was gathered and will be sent to provider for review.  RN will contact Home Care with information after provider review.  Confirmed ok to leave a detailed message with call back.  Contact information confirmed and updated as needed.    Hayley Sprague RN

## 2024-08-06 NOTE — ADDENDUM NOTE
Encounter addended by: Gaviota Mckeon RN on: 8/6/2024 10:54 AM   Actions taken: Edited Discharge Instructions

## 2024-08-06 NOTE — TELEPHONE ENCOUNTER
Home Care is calling regarding an established patient with [facility].        6/27/2024     3:50 PM   Home Care Information   Date of Home Care episode start 6/19/2024   Current following provider Dr. Jayme Deng    Name/Phone Number Joshua Kinney PT,   Home Care agency [facility]     Requesting orders from: Jayme Deng  Provider is following patient: Yes  Is this a 60-day recertification request?  No    Orders Requested    Occupational Therapy  Request for discontinuation of care   Goals have not been met/not progressing.  Barriers to care:  Patient is scheduled to have surgery, patient would like to be discharged from OT until after his procedure    Information was gathered and will be sent to provider for review.  RN will contact Home Care with information after provider review.  Confirmed ok to leave a detailed message with call back.  Contact information confirmed and updated as needed.    Aidee Isbell RN

## 2024-08-09 NOTE — TELEPHONE ENCOUNTER
Received a call from LISSETTE Lewis with [facility], requesting an update be sent to Dr. Deng (no verbal orders needed). Patient will be discharged by PT at this time. Patient is currently bed bound but the patient's caregiver can adequately perform cares and repositioning for the patient. PT does plan to work with the patient again about 2-4 weeks after his planned procedure at the end of this month. Joshua states he has been in contact with the patient and the caregiver and they are agreeable to this plan.     Will route to PCP as FYI.     Thank you,  Aidee Isbell RN

## 2024-08-12 NOTE — PROGRESS NOTES
ANTICOAGULATION MANAGEMENT     Feng Wynne 79 year old male is on warfarin with supratherapeutic INR result. (Goal INR 2.5-3.5)    Recent labs: (last 7 days)     08/12/24  1358   INR 3.9*       ASSESSMENT     Source(s): Chart Review and Home Care/Facility Nurse     Warfarin doses taken: Warfarin taken as instructed  Diet: No new diet changes identified  Medication/supplement changes: None noted  New illness, injury, or hospitalization: No  Signs or symptoms of bleeding or clotting: No  Previous result: Therapeutic last 2(+) visits  Additional findings: None, pending flap surgery.       PLAN     Recommended plan for no diet, medication or health factor changes affecting INR     Dosing Instructions: decrease your warfarin dose (5.3% change) with next INR in 1 week       Summary  As of 8/12/2024      Full warfarin instructions:  3 mg every Tue, Thu, Sat; 4.5 mg all other days   Next INR check:  8/19/2024               Telephone call with Michelle Greene home care nurse who agrees to plan and repeated back plan correctly    Orders given to  Homecare nurse/facility to recheck    Education provided: None required    Plan made per [facility] anticoagulation protocol    Ami Robertson, RN  Anticoagulation Clinic  8/12/2024    _______________________________________________________________________     Anticoagulation Episode Summary       Current INR goal:  2.5-3.5   TTR:  59.2% (9.2 mo)   Target end date:  Indefinite   Send INR reminders to:  [facility]    Indications    Long-term (current) use of anticoagulants [Z79.01] [Z79.01]  S/P mitral valve replacement [Z95.2]  Chronic atrial fibrillation (H) [I48.20]             Comments:  [facility] Patient, okay  to manage by exception on 7/8/20             Anticoagulation Care Providers       Provider Role Specialty Phone number    Jayme Deng MD Referring Internal Medicine [phone]

## 2024-08-14 NOTE — TELEPHONE ENCOUNTER
"Patient left a vm after clinic hours 8/13/24 stating that he is \"down again within two weeks\" and asking for a call back. No further details were given.  "

## 2024-08-14 NOTE — TELEPHONE ENCOUNTER
"Returned call to patient the patient. He states \"everything is taken care of now, thank you for your help\". No further questions or concerns.    "

## 2024-08-19 NOTE — PROGRESS NOTES
ANTICOAGULATION MANAGEMENT     Feng Wynne 79 year old male is on warfarin with therapeutic INR result. (Goal INR 2.5-3.5)    Recent labs: (last 7 days)     08/19/24  0000   INR 3.1       ASSESSMENT     Source(s): Chart Review and Home Care/Facility Nurse - Yaneth with [facility]    Warfarin doses taken: Warfarin taken as instructed  Diet: No new diet changes identified  Medication/supplement changes: None noted  New illness, injury, or hospitalization: No  Signs or symptoms of bleeding or clotting: No  Previous result: Supratherapeutic  Additional findings: None       PLAN     Recommended plan for no diet, medication or health factor changes affecting INR     Dosing Instructions: Continue your current warfarin dose with next INR in 1 week       Summary  As of 8/19/2024      Full warfarin instructions:  3 mg every Tue, Thu, Sat; 4.5 mg all other days   Next INR check:  8/26/2024               Telephone call with Yaneth home care nurse who verbalizes understanding and agrees to plan    Orders given to  Homecare nurse/facility to recheck    Education provided: Please call back if any changes to your diet, medications or how you've been taking warfarin    Plan made per [facility] anticoagulation protocol    Aileen Castano RN  Anticoagulation Clinic  8/19/2024    _______________________________________________________________________     Anticoagulation Episode Summary       Current INR goal:  2.5-3.5   TTR:  60.5% (9.1 mo)   Target end date:  Indefinite   Send INR reminders to:  [facility]IT[facility]    Indications    Long-term (current) use of anticoagulants [Z79.01] [Z79.01]  S/P mitral valve replacement [Z95.2]  Chronic atrial fibrillation (H) [I48.20]             Comments:  [facility] MONITORING Patient, okay  to manage by exception on 7/8/20             Anticoagulation Care Providers       Provider Role Specialty Phone number    Jayme Deng MD Referring Internal Medicine [phone]

## 2024-08-19 NOTE — PROGRESS NOTES
Clinic Care Coordination Contact    Situation: Patient left voicemail inquiring on transportation to his wound clinic appointment.     Background: Patient has my number from a TCM call.     Assessment: Patient used a transportation agency to his previous wound care appointment. Doesn't recall the name.     Plan/Recommendations: Will inquire around.   Advised patient to check a past bill to see if he can figure out the name.     Petty Eaton, RN Clinic Care Coordinator  [facility]: [place], [place] (on-site Wednesdays), [facility] & [facility].  [email or web address]  Phone: [phone]

## 2024-08-19 NOTE — TELEPHONE ENCOUNTER
Noted. Left general message for Yaneth to call back. Non identified voicemail.  Please see the August 19, 2024 anticoagulation encounter for further information.     Aileen Castano RN

## 2024-08-19 NOTE — LETTER
[facility] CARE COORDINATION  ***  August 20, 2024    Feng Wynne  [address]   [facility] [address]      Dear Feng,    I am a {clinic role :[id number]}

## 2024-08-19 NOTE — TELEPHONE ENCOUNTER
General Call      Reason for Call:  Yaneth calling to report INR - 3.1 fingerstick    What are your questions or concerns:  needing dosing instruction    Date of last appointment with provider: n/a    Could we send this information to you in [facility] or would you prefer to receive a phone call?:   Patient would prefer a phone call   Okay to leave a detailed message?: No at Other phone number:  [phone]

## 2024-08-20 NOTE — PROGRESS NOTES
Clinic Care Coordination Contact    Situation: Patient trying to recall previous transportation service he used to his wound care appointments.     Assessment: Spoke with patient. He found out the name of the agency he used - [employer].     Plan/Recommendations: No further action needed by Care Coordination.     Petty Eaton RN Clinic Care Coordinator  [facility]: [place], [place] (on-site Wednesdays), [facility] (on-site Thursdays) & [facility].  [email or web address]  Phone: [phone]

## 2024-08-26 NOTE — PROGRESS NOTES
ANTICOAGULATION MANAGEMENT     Feng Wynne 79 year old male is on warfarin with supratherapeutic INR result. (Goal INR 2.5-3.5)    Recent labs: (last 7 days)     08/26/24  1434   INR 4.9*       ASSESSMENT     Source(s): Chart Review and Home Care/Facility Nurse     Warfarin doses taken: Warfarin taken as instructed  Diet: No new diet changes identified  Medication/supplement changes: None noted  New illness, injury, or hospitalization: No  Signs or symptoms of bleeding or clotting: No  Previous result: Therapeutic last visit; previously outside of goal range  Additional findings: None       PLAN     Recommended plan for no diet, medication or health factor changes affecting INR     Dosing Instructions: partial hold then decrease your warfarin dose (11% change) with next INR in 5 days       Summary  As of 8/26/2024      Full warfarin instructions:  8/26: Hold; Otherwise 4.5 mg every Mon, Thu; 3 mg all other days   Next INR check:  8/30/2024               Telephone call with Whitley home care nurse who verbalizes understanding and agrees to plan    Orders given to  Homecare nurse/facility to recheck    Education provided: Please call back if any changes to your diet, medications or how you've been taking warfarin  Symptom monitoring: monitoring for bleeding signs and symptoms, monitoring for clotting signs and symptoms, and monitoring for stroke signs and symptoms  Contact [phone] with any changes, questions or concerns.     Plan made per [facility] anticoagulation protocol    Gaviota Weston RN  Anticoagulation Clinic  8/26/2024    _______________________________________________________________________     Anticoagulation Episode Summary       Current INR goal:  2.5-3.5   TTR:  61.0% (9.2 mo)   Target end date:  Indefinite   Send INR reminders to:  [facility]IT[facility]    Indications    Long-term (current) use of anticoagulants [Z79.01] [Z79.01]  S/P mitral valve replacement [Z95.2]  Chronic atrial fibrillation (H)  [I48.20]             Comments:  [facility] Patient, okay  to manage by exception on 7/8/20             Anticoagulation Care Providers       Provider Role Specialty Phone number    Jayme Deng MD Referring Internal Medicine [phone]

## 2024-08-30 NOTE — PROGRESS NOTES
ANTICOAGULATION MANAGEMENT     Feng Wynne 79 year old male is on warfarin with supratherapeutic INR result. (Goal INR 2.5-3.5)    Recent labs: (last 7 days)     08/30/24  1519   INR 6.2*       ASSESSMENT     Source(s): Chart Review and Home Care/Facility Nurse     Warfarin doses taken: Warfarin taken as instructed  Diet: No new diet changes identified  Medication/supplement changes: None noted  New illness, injury, or hospitalization: No  Signs or symptoms of bleeding or clotting: No  Previous result: Supratherapeutic  Additional findings: None       PLAN     Recommended plan for no diet, medication or health factor changes affecting INR     Dosing Instructions: hold today 8/30 and tomorrow 8/31 doses then decrease your warfarin dose (12.5% change) with next INR in 4 days       Summary  As of 8/30/2024      Full warfarin instructions:  8/30: Hold; 8/31: Hold; Otherwise 3 mg every day   Next INR check:  9/3/2024               Telephone call with Whitley home care nurse who verbalizes understanding and agrees to plan    Orders given to  Homecare nurse/facility to recheck    Education provided: Symptom monitoring: monitoring for bleeding signs and symptoms, when to seek medical attention/emergency care, and if you hit your head or have a bad fall seek emergency care    Plan made with [facility] Pharmacist Petty Parnell, RN  Anticoagulation Clinic  8/30/2024    _______________________________________________________________________     Anticoagulation Episode Summary       Current INR goal:  2.5-3.5   TTR:  60.6% (9.2 mo)   Target end date:  Indefinite   Send INR reminders to:  [facility]IT[facility]    Indications    Long-term (current) use of anticoagulants [Z79.01] [Z79.01]  S/P mitral valve replacement [Z95.2]  Chronic atrial fibrillation (H) [I48.20]             Comments:  [facility] Patient, okay  to manage by exception on 7/8/20             Anticoagulation Care Providers        Provider Role Specialty Phone number    Jayme Deng MD Referring Internal Medicine [phone]

## 2024-08-31 NOTE — RESULT ENCOUNTER NOTE
Tom Toney,    I have had the opportunity to review your recent results and an interpretation is as follows:  As per anticoagulation clinic instructions    Full warfarin instructions:  8/30: Hold; 8/31: Hold; Otherwise 3 mg every day  Next INR check:  9/3/2024        Sincerely,  Jayme Deng MD

## 2024-09-03 NOTE — PROGRESS NOTES
ANTICOAGULATION MANAGEMENT     Feng Wynne 79 year old male is on warfarin with supratherapeutic INR result. (Goal INR 2.5-3.5)    Recent labs: (last 7 days)     08/30/24  1519   INR 6.2*/5.03       ASSESSMENT     See note below.  Venous sample INR resulted after hours.       PLAN     See plan in note below.  Home care to recheck patient 9/3/324.  PCP reviewed patient's results and advised to follow [facility]'s result.    SALOME Banerjee, RN  Anticoagulation Clinic

## 2024-09-03 NOTE — PROGRESS NOTES
ANTICOAGULATION MANAGEMENT     Feng Wynne 79 year old male is on warfarin with supratherapeutic INR result. (Goal INR 2.5-3.5)    Recent labs: (last 7 days)     09/03/24  1532   INR 6.5*       ASSESSMENT     Source(s): Chart Review and Home Care/Facility Nurse     Warfarin doses taken: Warfarin taken as instructed. Spouse assists in setting up medications.   Diet: No new diet changes identified  Medication/supplement changes: None noted. Patient continues to use CBD gummies at night to assist with   New illness, injury, or hospitalization: No  Signs or symptoms of bleeding or clotting: No  Previous result: Supratherapeutic  Additional findings: None       PLAN     Home care nurse Whitley is dropping off a venous INR now, results may not be in prior to the end of the evening. Will plan to hold warfarin tonight, and re-evaluate once venous result is in.    Education provided: Please call back if any changes to your diet, medications or how you've been taking warfarin  Symptom monitoring: monitoring for bleeding signs and symptoms, monitoring for clotting signs and symptoms, monitoring for stroke signs and symptoms, when to seek medical attention/emergency care, and if you hit your head or have a bad fall seek emergency care      Gaviota Weston RN  Anticoagulation Clinic  9/3/2024    _______________________________________________________________________     Anticoagulation Episode Summary       Current INR goal:  2.5-3.5   TTR:  59.1% (9.2 mo)   Target end date:  Indefinite   Send INR reminders to:  [facility] HOME [facility]IT[facility]    Indications    Long-term (current) use of anticoagulants [Z79.01] [Z79.01]  S/P mitral valve replacement [Z95.2]  Chronic atrial fibrillation (H) [I48.20]             Comments:  [facility] Patient, okay  to manage by exception on 7/8/20             Anticoagulation Care Providers       Provider Role Specialty Phone number    Jayme Deng MD Referring Internal Medicine  [phone]

## 2024-09-04 NOTE — TELEPHONE ENCOUNTER
[facility] Care nurse, Whitley, called to report that the patient's wound has deteriorated per their notes. She has also detected undermining to the wound which is new after the initial assessment. Whitley is aware that the patient has an appointment with the [facility] tomorrow 9/5/24 and requests updated wound care orders would be faxed after that visit to [phone]. She did not request a call back but if needed, her number is [phone]

## 2024-09-04 NOTE — ADDENDUM NOTE
Encounter addended by: Nita Mendez RN on: 9/4/2024 3:39 PM   Actions taken: Edited Discharge Instructions

## 2024-09-04 NOTE — PROGRESS NOTES
ANTICOAGULATION MANAGEMENT     Feng Wynne 79 year old male is on warfarin with supratherapeutic INR result. (Goal INR 2.5-3.5)    Recent labs: (last 7 days)     09/03/24  1532   INR 6.5*       ASSESSMENT     See below    Whitley ADHIKARI when called notes wound deterioration.       PLAN     Recommended plan for no diet, medication or health factor changes affecting INR     Dosing Instructions: partial hold then decrease your warfarin dose (21.4% change) with next INR in 2 days       Summary  As of 9/3/2024      Full warfarin instructions:  9/3: Hold; 9/4: 1.5 mg; Otherwise 1.5 mg every Mon, Thu, Sat; 3 mg all other days   Next INR check:  9/6/2024               Telephone call with Jose who verbalizes understanding and agrees to plan. Whitley ADHIKARI informed of dosing and recheck date.    Orders given to  Homecare nurse/facility to recheck    Education provided: None required    Plan made with [facility] Pharmacist Petty Robertson RN  Anticoagulation Clinic  9/4/2024    _______________________________________________________________________     Anticoagulation Episode Summary       Current INR goal:  2.5-3.5   TTR:  59.0% (9.1 mo)   Target end date:  Indefinite   Send INR reminders to:  [facility]AG HOME MONITORING    Indications    Long-term (current) use of anticoagulants [Z79.01] [Z79.01]  S/P mitral valve replacement [Z95.2]  Chronic atrial fibrillation (H) [I48.20]             Comments:  [facility] MONIT[facility] Patient, okay  to manage by exception on 7/8/20             Anticoagulation Care Providers       Provider Role Specialty Phone number    Jayme Deng MD Referring Internal Medicine [phone]

## 2024-09-05 NOTE — PROGRESS NOTES
Patient Active Problem List   Diagnosis    Allergic rhinitis    Chronic atrial fibrillation (H)    S/P mitral valve replacement    Hyperlipidemia LDL goal <130    Lumbago    Knee pain    Rosacea    Proteinuria    Essential hypertension with goal blood pressure less than 140/90    Morbid obesity due to excess calories (H)    Long-term (current) use of anticoagulants [Z79.01]    Essential hypertension    Chronic right shoulder pain    Bradycardia    SOB (shortness of breath)    Anemia, unspecified type    Hypertensive heart disease with heart failure (H)    Positive occult stool blood test    Anemia due to blood loss, acute    Acute on chronic congestive heart failure, unspecified heart failure type (H)    Abnormal coagulation profile    Acute respiratory failure with hypoxia (H)    Chronic diastolic (congestive) heart failure (H)    Chronic kidney disease, stage 2 (mild)    Edema, unspecified    Muscle weakness (generalized)    Other abnormalities of gait and mobility    Other specified disorders of left ear    Pneumonia, unspecified organism    Retention of urine, unspecified    Ulcer of esophagus with bleeding    Unspecified abnormalities of gait and mobility    Unspecified Escherichia coli (E. 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)    Osteomyelitis (H)    Hyponatremia    Dark stools    Supratherapeutic INR    Wound of sacral region, initial encounter    Diarrhea, unspecified type     Past Medical History:   Diagnosis Date    Acute on chronic congestive heart failure, unspecified heart failure type (H) 01/30/2024    Arthritis     Atrial fibrillation (H)     Coronary artery disease     Hypercholesteremia     Obesity     Unspecified essential hypertension      Labs:   Recent Labs   Lab Test 09/03/24  1532 07/25/24  0901 07/24/24  0739 07/19/24  0733 07/18/24  1436   ALBUMIN  --   --   --   --  2.6*   HGB  --    --  9.0*   < > 9.4*   INR 6.5*   < > 4.15*   < >  --    WBC  --   --  7.2   < > 6.2    < > = values in this interval not displayed.     Nutrition requirements were discussed with patient today.  Vitals:  /68 (BP Location: Left arm, Patient Position: Semi-Calderon's)   Pulse 78   Temp 97.1  F (36.2  C) (Temporal)   Wound:   Wound Sacrum Ulceration (Active)       Wound (used by OP [facility] only) 04/02/24 1036 coccyx pressure injury (Active)   Thickness/Stage Stage 4 09/05/24 0927   Base granulating;slough;exposed structure;pink;other (see comments);necrotic;maroon/purple 09/05/24 0927   Periwound intact 09/05/24 0927   Periwound Temperature warm 09/05/24 0927   Periwound Skin Turgor soft 09/05/24 0927   Edges open 09/05/24 0927   Length (cm) 5.6 09/05/24 0927   Width (cm) 6.2 09/05/24 0927   Depth (cm) 2.6 09/05/24 0927   Wound (cm^2) 34.72 cm^2 09/05/24 0927   Wound Volume (cm^3) 90.27 cm^3 09/05/24 0927   Wound healing % -334 09/05/24 0927   Tunneling [Depth (cm)/Location] 6 o'clock / 2.3cm 07/03/24 1150   Undermining [Depth (cm)/Location] 12-12 oclock/ 5.5 cm 09/05/24 0927   Drainage Characteristics/Odor serosanguineous 09/05/24 0927   Drainage Amount copious 09/05/24 0927   Care, Wound non-select wound debridement performed. 09/05/24 0927      Photo:         Further instructions from your care team         09/05/2024   Feng Wynne   1945    A DME order was not completed because the supplies are ordered by home care or at a care facility    [facility] Phone: [phone] Fax: [phone] (3 times per week)  Dressing changes outside of clinic are being performed by Spouse and Home Care     Plan 09/05/2024:  Pending scheduling-Dr Srinivasan  will call with a date for Bone biopsy on [facility]; will determine Antibiotic therapy. MD to manage Blood thinner with Heparin which will require hospitalization before and after the procedure.  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 A group 1 mattress is not adequate to offload these severe ulcerations. Patient has impaired sensation and is unable to reposition independently. Pillows and wedges have been used and is not adequate to offload the ulcerations.  -Continue to take your antibiotics that your primary care prescribed for E.Coli-done  -Continue High Protein diet;VEGETARIAN Ensure Max, protein bar, and supplements: 2 shakes/ day.  -Avoid Recliner Chairs to prevent pressure on coccyx wound.  -No Bed Pans - utilize toilet for defecation if possible  -Offload with pillows - ok to sleep up on your sides; even with Group 2 mattress, regular repositioning is necessary.  -Consider use of Primofit for urinary needs while in bed.     Wound Dressing Change: Coccyx  Cleanse wound with Vashe moist gauze, leave in place for 10 minutes; remove  Cleanse periwound skin with wound spray, pat dry  Apply light layer of Zinc Oxide barrier paste to periwound  Apply a piece of 4\" AMD roll  into the wound bed and tuck into the undermining areas from 12-12 o'clock   Cover with dry absorbant pad and secure with 2\" Medipore tape  Change 2 times a day and as needed for soilage     Repositioning:  Bed: Patient needs a Group 2 mattress for wound healing. Keep Head of bed at 30 degrees or less; Reposition MINIMALLY every 1-2 hours in bed to relieve pressure and promote perfusion to tissue turning side to side.  Chair: When up to the chair, do not sit for longer than one hour total before returning to bed for at least 60 minutes to relieve pressure and promote perfusion to the tissue. " "  Completely recline/tilt for 15 minutes each hour-not possible with manual chair  Sit on your pressure reducing chair cushion when up to the chair.  In bed Clean and smooth the bed surface.  Lift the head of the bed no more than 30 .  Use draw-sheets or transfer boards to move patients.  Lift the head of the bed no more than 30 degrees.  Raise the foot of the bed slightly.  In a wheelchair: Keep upright (don't slump) - keep weight forward onto your thighs, not on your tailbone  Support the back with a pillow.  Use a foot extension.     Reduce shear and friction:  Prevent skin breakdown by reducing friction and shear.  Raise the leg portion of the bed first before raising the HOB; this will \"put the brakes\" on sliding down the bed when raising HOB.  Patients are less likely to slide down in bed if they re supported by pillows and the head of the bed isn t raised too high.  Raise HOB less than 30 degrees if possible; raise greater than 30 degrees for safety of swallowing when needed.         Main Provider: Barbara Srinivasan M.D. September 5, 2024              "
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
## 2024-09-05 NOTE — DISCHARGE INSTRUCTIONS
09/05/2024   Feng Wynne   1945    A DME order was not completed because the supplies are ordered by home care or at a care facility    [facility] Phone: [phone] Fax: [phone] (3 times per week)  Dressing changes outside of clinic are being performed by Spouse [facility] Home Care     Plan 09/05/2024:  Pending scheduling-Dr Srinivasan  will call with a date for Bone biopsy on [facility]; will determine Antibiotic therapy. MD to manage Blood thinner with Heparin which will require hospitalization before and after the procedure.  Need History & Physical from Primary care provider 30 days prior to surgery.-patient will schedule before bone biopsy date  Order: Group 2 mattress  [facility] at [phone]-patient/spouse to call for assess/repair or replace- discussed 09/05/24  Patient will need a Group 2 mattress due to large, stage 4 pressure ulceration on the patient's pelvis that has failed to improve on a normal mattress despite regular wound cares and repositioning. A group 1 mattress is not adequate to offload these severe ulcerations. Patient has impaired sensation and is unable to reposition independently. Pillows and wedges have been used and is not adequate to offload the ulcerations.  -Continue to take your antibiotics that your primary care prescribed for E.Coli-done  -Continue High Protein diet;VEGETARIAN Ensure Max, protein bar, and supplements: 2 shakes/ day.  -Avoid Recliner Chairs to prevent pressure on coccyx wound.  -No Bed Pans - utilize toilet for defecation if possible  -Offload with pillows - ok to sleep up on your sides; even with Group 2 mattress, regular repositioning is necessary.  -Consider use of Primofit for urinary needs while in bed.     Wound Dressing Change: Coccyx  Cleanse wound with Vashe moist gauze, leave in place for 10 minutes; remove  Cleanse periwound skin with wound spray, pat dry  Apply light layer of Zinc Oxide barrier paste to periwound  Apply a  "piece of 4\" AMD roll  into the wound bed and tuck into the undermining areas from 12-12 o'clock   Cover with dry absorbant pad and secure with 2\" Medipore tape  Change 2 times a day and as needed for soilage     Repositioning:  Bed: Patient needs a Group 2 mattress for wound healing. Keep Head of bed at 30 degrees or less; Reposition MINIMALLY every 1-2 hours in bed to relieve pressure and promote perfusion to tissue turning side to side.  Chair: When up to the chair, do not sit for longer than one hour total before returning to bed for at least 60 minutes to relieve pressure and promote perfusion to the tissue.   Completely recline/tilt for 15 minutes each hour-not possible with manual chair  Sit on your pressure reducing chair cushion when up to the chair.  In bed Clean and smooth the bed surface.  Lift the head of the bed no more than 30 .  Use draw-sheets or transfer boards to move patients.  Lift the head of the bed no more than 30 degrees.  Raise the foot of the bed slightly.  In a wheelchair: Keep upright (don't slump) - keep weight forward onto your thighs, not on your tailbone  Support the back with a pillow.  Use a foot extension.     Reduce shear and friction:  Prevent skin breakdown by reducing friction and shear.  Raise the leg portion of the bed first before raising the HOB; this will \"put the brakes\" on sliding down the bed when raising HOB.  Patients are less likely to slide down in bed if they re supported by pillows and the head of the bed isn t raised too high.  Raise HOB less than 30 degrees if possible; raise greater than 30 degrees for safety of swallowing when needed.         Main Provider: Barbara Srinivasan M.D. September 5, 2024    Call us at [phone] if you have any questions about your wounds, if you have redness or swelling around your wound, have a fever of 101 degrees Fahrenheit or greater or if you have any other problems or concerns. We answer the phone Monday through Friday 8 " am to 4 pm, please leave a message as we check the voicemail frequently throughout the day. If you have a concern over the weekend, please leave a message and we will return your call Monday. If the need is urgent, go to the ER or urgent care.    If you had a positive experience please indicate that on your patient satisfaction survey form that [facility] will be sending you.    It was a pleasure meeting with you today.  Thank you for allowing me and my team the privilege of caring for you today.  YOU are the reason we are here, and I truly hope we provided you with the excellent service you deserve.  Please let us know if there is anything else we can do for you so that we can be sure you are leaving completely satisfied with your care experience.      If you have any billing related questions please call the [facility] Business office at [phone]. The clinic staff does not handle billing related matters.    If you are scheduled to have a follow up appointment, you will receive a reminder call the day before your visit. On the appointment day please arrive 15 minutes prior to your appointment time. If you are unable to keep that appointment, please call the clinic to cancel or reschedule. If you are more than 10 minutes late or greater for your scheduled appointment time, the clinic policy is that you may be asked to reschedule.

## 2024-09-06 NOTE — PROGRESS NOTES
ANTICOAGULATION MANAGEMENT     Feng Wynne 79 year old male is on warfarin with supratherapeutic INR result. (Goal INR 2.5-3.5)    Recent labs: (last 7 days)     09/06/24  1220   INR 6.24*       ASSESSMENT     Source(s): Chart Review and Patient/Caregiver Call     Warfarin doses taken: Held for 1.5  recently which may be affecting INR  Diet: No new diet changes identified  Medication/supplement changes: None noted  New illness, injury, or hospitalization: No-ongoing coccyx wound, stage 4  Signs or symptoms of bleeding or clotting: No  Previous result: Supratherapeutic  Additional findings: Procedure plan request sent to [facility] today. Per wound visit note 9/5/24: Pending scheduling-Dr Srinivasan  will call with a date for Bone biopsy on [facility]; will determine Antibiotic therapy. MD to manage Blood thinner with Heparin which will require hospitalization before and after the procedure. Need History & Physical from Primary care provider 30 days prior to surgery.-patient will schedule before bone biopsy date       PLAN     Recommended plan for ongoing change(s) affecting INR     Dosing Instructions: hold 2 doses then decrease your warfarin dose (18.2% change) with next INR in 3 days       Summary  As of 9/6/2024      Full warfarin instructions:  9/6: Hold; 9/7: Hold; Otherwise 3 mg every Tue, Fri; 1.5 mg all other days   Next INR check:  9/9/2024               Telephone call with Jose who verbalizes understanding and agrees to plan and who agrees to plan and repeated back plan correctly  Detailed voice message left for [facility] home care nurse with dosing instructions and follow up date.     Orders given to  Homecare nurse/facility to recheck    Education provided: Please call back if any changes to your diet, medications or how you've been taking warfarin  Goal range and lab monitoring: goal range and significance of current result and Importance of following up at instructed interval  Contact [phone]  with any changes, questions or concerns.     Plan made with [facility] Pharmacist Petty Patten, RN  Anticoagulation Clinic  9/6/2024    _______________________________________________________________________     Anticoagulation Episode Summary       Current INR goal:  2.5-3.5   TTR:  58.1% (9.2 mo)   Target end date:  Indefinite   Send INR reminders to:  ANTICOAG HOME [facility]ITOR[facility]    Indications    Long-term (current) use of anticoagulants [Z79.01] [Z79.01]  S/P mitral valve replacement [Z95.2]  Chronic atrial fibrillation (H) [I48.20]             Comments:  [facility] Patient, okay  to manage by exception on 7/8/20             Anticoagulation Care Providers       Provider Role Specialty Phone number    Jayme Deng MD Referring Internal Medicine [phone]

## 2024-09-06 NOTE — TELEPHONE ENCOUNTER
[facility]-PROCEDURAL ANTICOAGULATION  MANAGEMENT    Feng requesting pre-procedure hold orders for warfarin and review for bridging      Procedure date: Not Scheduled       Procedure:  Bone biopsy      Procedure location and phone number (if external):  Dr. Srinivasan, [facility]     Number of warfarin hold days requested and/or target INR: unknown    Pre-op date: not yet scheduled    Plan 09/05/2024:  Pending scheduling-Dr Srinivasan  will call with a date for Bone biopsy on [facility]; will determine Antibiotic therapy. MD to manage Blood thinner with Heparin which will require hospitalization before and after the procedure.      Routing to Anticoagulation Pharmacist for review.     ACC pool: [id number] ANTICOAG HOME MONITORING     Latricia Patten RN

## 2024-09-09 PROBLEM — N17.9 ACUTE KIDNEY INJURY (H): Status: ACTIVE | Noted: 2024-01-01

## 2024-09-09 PROBLEM — R53.1 GENERALIZED WEAKNESS: Status: ACTIVE | Noted: 2024-01-01

## 2024-09-09 NOTE — H&P
[facility]    History and Physical - Hospitalist Service       Date of Admission:  9/9/2024    Assessment & Plan      Feng Wynne is a 79 year old male with significant past medical history for mechanical mitral valve replacement on chronic anticoagulation with warfarin, A-fib, HFpEF, coronary artery disease, hypertension, hyperlipidemia, sacral decubitus ulcers, and previous admission at [facility] from 7/18/2024 through 7/26/2024 due to enteropathic E. coli with associated diarrhea and mild hyponatremia who presented to the emergency department with weakness, shortness of breath, increased lower extremity swelling, decreased appetite, and difficulty with lying flat.  His workup in the emergency department concerning for hypervolemic hyponatremia with request for hospitalist to admit for further treatment and evaluation.    Acute hypervolemic hyponatremia  -Sodium 118 with baseline typically 130-132  -Presentation of volume overload with decompensated HFpEF notable 3+ pitting edema up to groin with BNP 8442 and troponin 104  -Initially had received 500 cc NS bolus in the emergency department initiated albumin with Lasix at 60 mg  -Patient with hard distended lower abdominal area just distal to large ventral hernia concerning for urinary retention and distended bladder  -EKG completed and not visible in chart without concern for any ST changes or ischemia  -Chest x-ray was unremarkable   -CT abdomen pelvis notable for consolidation of left lung base  -Stat echocardiogram ordered with results pending  -Significant acute renal failure vs Acute HFpEF   -Every 6 sodium checks  -Every 4 neurochecks  -denies headache, dizziness, diplopia     Acute renal failure with hydronephrosis secondary to outlet obstruction  -Baseline creatinine typically 0.5 to-0.61 is 3.11  -Believe secondary to outlet obstruction distended bladder and mild hydronephrosis  -CT abdomen pelvis with distended  bladder and large amount of stool throughout colon and no evidence of obstruction  -Ultrasound of kidneys with moderate to marked bladder distention and possible mild hydronephrosis of left kidney  -Stubbs catheter placed with large amount of cola colored urine returned  -Urology consulted  -Concern for overdiuresis syndrome with the outlet obstruction continue to monitor sodium closely  -Patient received one-time dose of 60 mg Lasix with albumin will monitor closely before proceeding with further doses and input from cardiology    Elevated troponin suspect type II MI  Acute on chronic decompensated HFpEF  Mechanical mitral valve  Severe tricuspid regurgitation  Chronic paroxysmal atrial fibrillation  Hypertension  -Elevated troponin likely secondary from acute heart failure exacerbation  -Initial troponin 104-second 102 EKG changes  -As noted cardiology is consulted  -Mechanical mitral valve on anticoagulation currently supratherapeutic with INR of 6.1  -Pharmacy to dose warfarin  -Typically takes 20 mg torsemide daily at home  -Received 60 mg IV Lasix with albumin millimeter  -Appreciate cardiac consultation  -Continuous cardiac monitoring    Metabolic alkalosis   -suspect improvement with improved renal function  -vbg with ph 7.44  -monitor chem panel    Chronic iron deficiency anemia and anemia of chronic disease  -Hemoglobin stable at 10.2  -Will hold ferrous sulfate for now    Constipation with large known ventral hernia  -perhaps contributing to outlet obstruct   -no bowel obstructive process noted in CT scan   -will provide alleviation with oral lactulose and tap water enema  -as needed bowel meds and schedule  -takes hydromorphone chronically, perhaps contributing    Chronic sacral and coccyx wounds  -follows with wound clinic   -woc consulted     Suspect malnutrition  -patient has not yet been actually weighed for comparison, weight ordered  -appearance of cachexia in comparison to previous            Diet:   regular  DVT Prophylaxis: Warfarin  Stubbs Catheter: Not present  Lines: None     Cardiac Monitoring: None  Code Status:  Full     Clinically Significant Risk Factors Present on Admission        # Drug Induced Coagulation Defect: home medication list includes an anticoagulant medication   # Acute Kidney Injury, unspecified: based on a >150% or 0.3 mg/dL increase in last creatinine compared to past 90 day average, will monitor renal function  # Hypertension: Noted on problem list  # Chronic heart failure with preserved ejection fraction: heart failure noted on problem list and last echo with EF >50%   # Anemia: based on hgb <11           # Financial/Environmental Concerns:                 Disposition Plan     Medically Ready for Discharge: Anticipated in 2-4 Days  Multiple consults pending   Anticipate post cares          The patient's care was discussed with the Attending Physician, Dr. Taveras .    Clara He DNP APRN [facility]  Hospitalist Service  [facility]  Securely message with [employer] (more info)  Text page via [email or web address] Paging/Directory     ______________________________________________________________________    Chief Complaint   Weakness, LE swelling, short of breath     History is obtained from the patient, electronic health record, and emergency department physician    History of Present Illness   Feng Wynne is a 79 year old male with significant past medical history for mechanical mitral valve replacement on chronic anticoagulation with warfarin, A-fib, HFpEF, coronary artery disease, hypertension, hyperlipidemia, sacral decubitus ulcers with prior osteomyelitis followed by wound clinic, and previous admission at [facility] from 7/18/2024 through 7/26/2024 due to enteropathic E. coli with associated diarrhea and mild hyponatremia who presented to the emergency department with weakness, shortness of breath, increased lower extremity swelling, decreased appetite, and  difficulty with lying flat.  His workup in the emergency department with multifactorial concerns including sodium 118, creatinine 3.11, BNP 8442 with troponin 104, and supratherapeutic INR 6.01.  Additional workup including CT abdomen pelvis was obtained as well as ultrasound kidneys noting moderate distention of urinary bladder with evidence of hydronephrosis on renal ultrasound.  Appearance of hypervolemic hyponatremia perhaps secondary to HFpEF exacerbation versus acute kidney failure.  Initial 500 mL fluid bolus given and with further evaluation patient received albumin with accompanied Lasix 60 mg, Stubbs catheter placed.  Hospitalist requested for further treatment and evaluation and admission.    Patient is seen while still residing emergency department resting in bed upon entering room he is alert, oriented, and able to answer all my questions appropriately.  He does not appear to be in distress.  He denies any symptoms of headache, dizziness, does not appear to have confusion as a relates to the hyponatremia.  He denies chest pain but does endorse some shortness of breath as well as increased difficulty breathing while lying flat.  He states he has been having significant weakness and a feeling of early Saturday.  He does state he had a normal bowel movement yesterday and has not had any diarrhea recently.  He also states he has been urinating denying any pain with urination.  He does have swelling in lower extremities chronically but this is increased with notable 3+ edema up to the abdomen.  He resides with his  who takes care of him at their home.      Past Medical History    Past Medical History:   Diagnosis Date    Acute on chronic congestive heart failure, unspecified heart failure type (H) 01/30/2024    Arthritis     Atrial fibrillation (H)     Coronary artery disease     Hypercholesteremia     Obesity     Unspecified essential hypertension        Past Surgical History   Past Surgical History:    Procedure Laterality Date    COLONOSCOPY N/A 1/15/2024    Procedure: Colonoscopy;  Surgeon: Osbaldo Olvera MD;  Location:  GI    ESOPHAGOSCOPY, GASTROSCOPY, DUODENOSCOPY (EGD), COMBINED N/A 1/15/2024    Procedure: Esophagoscopy, gastroscopy, duodenoscopy (EGD), combined;  Surgeon: Osbaldo Olvera MD;  Location:  GI    ESOPHAGOSCOPY, GASTROSCOPY, DUODENOSCOPY (EGD), COMBINED N/A 2/8/2024    Procedure: Esophagoscopy, gastroscopy, duodenoscopy (EGD), combined;  Surgeon: Osbaldo Olvera MD;  Location:  GI    IRRIGATION AND DEBRIDEMENT SACRAL WOUND, COMBINED N/A 3/22/2024    Procedure: IRRIGATION AND DEBRIDEMENT, WOUND, SACRAL REGION;  Surgeon: Phill Jimenez MD;  Location: [facility] OR    MITRAL VALVE REPLACEMENT  8-    Mitral valve replacement with 31-mm St. Raffi mechanical valve.        Prior to Admission Medications   Prior to Admission Medications   Prescriptions Last Dose Informant Patient Reported? Taking?   HYDROmorphone (DILAUDID) 4 MG tablet prn Self No Yes   Sig: Take 0.5 tablets (2 mg) by mouth every 6 hours as needed for severe pain   Multiple Vitamins-Minerals (MULTIVITAMIN ADULT) CHEW 9/8/2024 Self Yes Yes   Sig: Take 2 chew tab by mouth daily   NONFORMULARY 9/8/2024  Yes Yes   Sig: Take by mouth at bedtime. Marijuana edible at bedtime   Probiotic CHEW 9/8/2024 Self Yes Yes   Sig: Take 2 chew tab by mouth daily   acetaminophen (TYLENOL) 500 MG tablet prn Self No Yes   Sig: Take 2 tablets (1,000 mg) by mouth 3 times daily   Patient taking differently: Take 1,000 mg by mouth nightly as needed for mild pain.   diclofenac (VOLTAREN) 1 % topical gel prn Self No Yes   Sig: Apply 4 g topically 3 times daily   Patient taking differently: Apply 4 g topically 3 times daily as needed for moderate pain.   ferrous sulfate (FEROSUL) 325 (65 Fe) MG tablet 9/8/2024 Self No Yes   Sig: Take 1 tablet (325 mg) by mouth daily (with breakfast)   nystatin (MYCOSTATIN) 678461 UNIT/GM  external powder prn Nursing Home, Self Yes Yes   Sig: Apply topically 2 times daily as needed for other Groin folds   order for DME  Self No No   Sig: Equipment being ordered: COMPRESSION STOCKINGS, 20-30 mmHg   pantoprazole (PROTONIX) 40 MG EC tablet unknown at Does not think he's taking this Self No No   Sig: Take 1 tablet (40 mg) by mouth 2 times daily (before meals)   torsemide (DEMADEX) 20 MG tablet 2024  No Yes   Sig: Take 1 tablet (20 mg) by mouth daily   warfarin ANTICOAGULANT (COUMADIN) 3 MG tablet 2024 at 1.5 mg  No Yes   Sig: Take 1 tablet (3 mg) on , ,  and take 1.5 tablets (4.5 mg) all other days or as directed by the INR clinic   Patient taking differently: As of 24:  Hold doses on  and , then take 1 tablet (3 mg) on  and  and take 0.5 tablet (1.5 mg) all other days or as directed by the INR clinic      Facility-Administered Medications: None        Social History   I have reviewed this patient's social history and updated it with pertinent information if needed.  Social History     Tobacco Use    Smoking status: Former     Current packs/day: 0.00     Average packs/day: 0.5 packs/day for 5.0 years (2.5 ttl pk-yrs)     Types: Cigarettes     Start date:      Quit date:      Years since quittin.7    Smokeless tobacco: Never   Vaping Use    Vaping status: Never Used   Substance Use Topics    Alcohol use: Yes     Comment: 1 drink per month    Drug use: No        Physical Exam   Vital Signs: Temp: 97.6  F (36.4  C) Temp src: Oral BP: (!) 87/57 Pulse: 70   Resp: 20 SpO2: 97 % O2 Device: None (Room air)    Weight: 189 lbs 0 oz    Physical Exam  Constitutional:       Comments: Cachectic   HENT:      Mouth/Throat:      Mouth: Mucous membranes are moist.   Eyes:      Pupils: Pupils are equal, round, and reactive to light.   Cardiovascular:      Rate and Rhythm: Normal rate.      Pulses: Normal pulses.   Pulmonary:      Effort: Pulmonary  effort is normal.      Comments: Left lower lobe crackles  Abdominal:      General: There is distension.      Hernia: A hernia is present.   Musculoskeletal:      Right lower leg: Edema present.      Left lower leg: Edema present.   Skin:     General: Skin is warm and dry.      Capillary Refill: Capillary refill takes less than 2 seconds.   Neurological:      General: No focal deficit present.      Mental Status: He is alert and oriented to person, place, and time.          Medical Decision Making       81 MINUTES SPENT BY ME on the date of service doing chart review, history, exam, documentation & further activities per the note.      Data     I have personally reviewed the following data over the past 24 hrs:    8.8  \   10.2 (L)   / 303     120 (L) 75 (L) 100.8 (H) /  97   3.7 24 3.11 (H) \     ALT: <5 AST: 21 AP: 99 TBILI: 0.6   ALB: 3.2 (L) TOT PROTEIN: 6.7 LIPASE: N/A     Trop: 102 (HH) BNP: 8,442 (H)     Procal: N/A CRP: N/A Lactic Acid: 0.9       INR:  6.01 (HH) PTT:  N/A   D-dimer:  N/A Fibrinogen:  N/A       Imaging results reviewed over the past 24 hrs:   Recent Results (from the past 24 hour(s))   Chest XR,  PA & LAT    Narrative    CHEST TWO VIEWS  9/9/2024 4:27 PM     HISTORY:  Crackles. Orthopnea.    COMPARISON: 2/7/2024.      Impression    IMPRESSION: Sternotomy and mitral valve prosthesis. Shallow  inspiration accentuates heart size and pulmonary vascularity. No  pleural effusions. Lungs clear.     SHAR SIDHU MD         SYSTEM ID:  [id number]   CT Abdomen Pelvis w/o Contrast    Narrative    EXAM: CT ABDOMEN PELVIS W/O CONTRAST  LOCATION: [facility]  DATE: 9/9/2024    INDICATION: Nausea. Hernias. Acute kidney insufficiency.  COMPARISON: None.  TECHNIQUE: CT scan of the abdomen and pelvis was performed without IV contrast. Multiplanar reformats were obtained. Dose reduction techniques were used.  CONTRAST: None.    FINDINGS:   LOWER CHEST: Sternotomy and mitral valve  prosthesis. Consolidation at the left lung base posteriorly is likely atelectasis. Coronary artery calcification.    HEPATOBILIARY: No significant mass or bile duct dilatation. No calcified gallstones.     PANCREAS: Normal.    SPLEEN: Normal.    ADRENAL GLANDS: Normal.    KIDNEYS/BLADDER: The urinary bladder is moderately distended. No significant mass, stone, or hydronephrosis.    BOWEL: Large amount of stool throughout the colon and within the rectum, consistent with constipation. There is a large ventral hernia containing multiple loops of gas and stool-filled colon, as well as a small amount of fluid. Scattered colonic   diverticulosis. No small bowel dilatation is identified.    LYMPH NODES: No lymphadenopathy.    VASCULATURE: Unremarkable.    PELVIC ORGANS: No pelvic masses.    MUSCULOSKELETAL: Advanced degenerative changes right hip. Degenerative changes are also noted throughout the visualized thoracolumbar spine. There is diffuse subcutaneous edema.      Impression    IMPRESSION:   1.  Large amount of stool throughout the colon and within the rectum, consistent with constipation.  2.  Large ventral hernia containing multiple loops of gas and stool-filled colon, as well as a small amount of fluid. No convincing evidence for associated bowel obstruction.  3.  Moderate distention of the urinary bladder.  4.  Diffuse subcutaneous edema.     US Renal Complete Non-Vascular    Narrative    EXAM: US RENAL COMPLETE NON-VASCULAR  LOCATION: [facility]  DATE: 9/9/2024    INDICATION: Acute renal failure of unknown origin  COMPARISON: Today's 9/9/2024, 7:00 PM noncontrast CT AP  TECHNIQUE: Routine Bilateral Renal and Bladder Ultrasound.    FINDINGS:    RIGHT KIDNEY: Obscured by overlying bowel gas and not visualized.    LEFT KIDNEY: Possible mild hydronephrosis of the left kidney. Lower half of the left kidney is obscured by overlying bowel gas and not visualized    BLADDER: Moderate to marked  bladder distention at 15 x 14 x 13 cm.      Impression    IMPRESSION:  1.  Moderate to marked bladder distention and possible mild hydronephrosis of the left kidney. Consider placement of a Stubbs bladder catheter.  2.  The entire right kidney and the lower half of the left kidney are obscured by overlying bowel gas.

## 2024-09-09 NOTE — PROGRESS NOTES
"ANTICOAGULATION MANAGEMENT     Feng Wynne 79 year old male is on warfarin with supratherapeutic INR result. (Goal INR 2.5-3.5)    Recent labs: (last 7 days)     09/09/24  0930   INR 6.11*       ASSESSMENT     Source(s): Chart Review and Home Care/Facility Nurse     Warfarin doses taken:  Patient confirmed he held x2 days then 1.5 mg on sunday  Diet:  Denies any changes, decreased appetite over the weekend  Medication/supplement changes: None noted  New illness, injury, or hospitalization: Yes: Denies diarrhea, \"not feeling well\" congested, lungs clear, no coughing, and fatigue, no fever  Signs or symptoms of bleeding or clotting: No  Previous result: Supratherapeutic  Additional findings: Home care can get out to patient earliest on Wednesday,  scheduled visit on Thursday.  [facility] to consult as patient did not move much with 2 days of hold.   Due to reversal risk with high goal range of 2.5-3.5 and concern for possible bone infection, patient was advised to be seen in ED today to assess for cause of elevated INR. Highly suspect an infection is causing INR to continue to be elevated. Home care reports patient will be seen in ED today.  Per chart review, home care contacted wound clinic with concerns today.  Encounter routed to PCP as FYI        PLAN     Recommended plan for temporary change(s) and ongoing change(s) affecting INR     Dosing Instructions: Patient was advised for safe dosing and high risks for reversing elevated INR, ED is appropriate action for patient today. Patient to hold warfarin at this time and go to ED for assessment to find underlying cause of possible infection.    Summary  As of 9/9/2024      Full warfarin instructions:  9/9: Hold; 9/10: Hold; Otherwise 1.5 mg every day   Next INR check:  9/11/2024               Telephone call with [facility] nurse who verbalizes understanding and agrees to plan    Orders given to  Homecare nurse/facility to recheck    Education provided: Please call back " if any changes to your diet, medications or how you've been taking warfarin    Plan made with [facility] Pharmacist Petty Mondragon RN  Anticoagulation Clinic  9/9/2024    _______________________________________________________________________     Anticoagulation Episode Summary       Current INR goal:  2.5-3.5   TTR:  57.0% (9.2 mo)   Target end date:  Indefinite   Send INR reminders to:  ANTICOAG HOME MONITORING    Indications    Long-term (current) use of anticoagulants [Z79.01] [Z79.01]  S/P mitral valve replacement [Z95.2]  Chronic atrial fibrillation (H) [I48.20]             Comments:  [facility] MONITORING Patient, okay  to manage by exception on 7/8/20             Anticoagulation Care Providers       Provider Role Specialty Phone number    Jayme Deng MD Referring Internal Medicine [phone]

## 2024-09-09 NOTE — TELEPHONE ENCOUNTER
LIZA-PROCEDURAL ANTICOAGULATION  MANAGEMENT    ASSESSMENT     Warfarin interruption plan for bone biopsy on date TBD.    Indication for Anticoagulation: Atrial Fibrillation and Mechanical MVR    [id number] = 4 (CHF, Hypertension, and Age >= 75)  31mm St Raffi MVR placed 2008    Liza-Procedure Risk stratification for thromboembolism: high (2022 Chest guidelines)    MVR: 2020 AHA/ACC Management of Valvular Heart disease guidelines suggests bridging is reasonable on individual basis with risk of bleeding weighed against risks of clotting for mechanical MVR patients  AVR/MVR: 2022 Chest Perioperative Management guideline suggests no bridging for mechanical heart valves except select patients at high thromboembolic risk such as with an older-generation mechanical heart valve, MVR with one more more risk factors for thromboembolism, a recent (within 3 months) thromboembolic event, or with prior perioperative stroke    RECOMMENDATION     Pre-Procedure:  Hold warfarin for 5 days, until after procedure   Enoxaparin (Lovenox) 90 mg subq Q 12 hrs (1 mg/kg Q 12 hrs for CrCl >= 60 ml/min and BMI <= 40 kg/m2)   Start enoxaparin: Day 3 of hold in AM  Last dose of enoxaparin prior to procedure: day 5 of hold in AM  (~24 hours prior to procedure)    Post-Procedure:  Resume warfarin dose if okay with provider doing procedure on night of procedure,  PM: 3mg  Resume enoxaparin (Lovenox) ~ 24 hrs post procedure when okay with provider doing procedure. Continue until INR > 2.0  Recheck INR ~5 days after resuming warfarin   ?     Plan routed to referring provider for approval  ?   Petty Inman [facility]    SUBJECTIVE/OBJECTIVE     Feng Wynne, a 79 year old male    Goal INR Range: 2.5-3.5     Patient bridged in past: Yes: most recently 07/2024 with low INR      Wt Readings from Last 3 Encounters:   07/26/24 86 kg (189 lb 9.5 oz)   06/03/24 103.9 kg (229 lb)   05/30/24 103.7 kg (228 lb 9.6 oz)      Ideal body weight: 82.2 kg (181 lb  "3.5 oz)  Adjusted ideal body weight: 83.7 kg (184 lb 9.1 oz)     Estimated body mass index is 24.34 kg/m  as calculated from the following:    Height as of 7/18/24: 1.88 m (6' 2\").    Weight as of 7/26/24: 86 kg (189 lb 9.5 oz).    Lab Results   Component Value Date    INR 6.24 (HH) 09/06/2024    INR 6.5 (A) 09/03/2024    INR 5.09 (HH) 09/03/2024     Lab Results   Component Value Date    HGB 9.0 (L) 07/24/2024    HCT 29.5 (L) 07/24/2024     07/24/2024     Lab Results   Component Value Date    CR 0.61 (L) 07/24/2024    CR 0.57 (L) 07/23/2024    CR 0.52 (L) 07/19/2024     Estimated Creatinine Clearance: 121.4 mL/min (A) (based on SCr of 0.61 mg/dL (L)).    "

## 2024-09-09 NOTE — TELEPHONE ENCOUNTER
Returned call to [facility] and discussed that [facility] gauze should be available from [employer] (the DME provider [employer] uses). [facility] will look into this. She also reports the patient is likely going to the hospital today due to general fatigue and a INR between 5 and 6 for the past week or so.

## 2024-09-09 NOTE — ED TRIAGE NOTES
Increased weakness for the past week, increased swelling to LE's, n/v, given zofran via ems. Pt is bed bound. Pt has an ulcer to coccyx      Triage Assessment (Adult)       Row Name 09/09/24 1438          Triage Assessment    Airway WDL WDL        Respiratory WDL    Respiratory WDL WDL        Cardiac WDL    Cardiac WDL WDL        Cognitive/Neuro/Behavioral WDL    Cognitive/Neuro/Behavioral WDL X

## 2024-09-09 NOTE — ED NOTES
[facility]  ED Nurse Handoff Report    ED Chief complaint: Generalized Weakness and Leg Swelling      ED Diagnosis:   Final diagnoses:   None       Code Status: Admitting MD to establish with patient.    Allergies:   Allergies   Allergen Reactions    Amiodarone Shortness Of Breath    Pcn [Penicillins] Shortness Of Breath     Rxn occurred in childhood     Statins Muscle Pain (Myalgia) and Hives    Amiodarone     Latex     Zetia [Ezetimibe] Muscle Pain (Myalgia)     Muscle weakness legs       Patient Story: Increased weakness for the past week, increased swelling to LE's, n/v, given zofran via ems. Pt is bed bound. Pt has an ulcer to coccyx     Focused Assessment:  AxOx4; hypotension, otherwise VSS; pain 5/10, pleasant and cooperative    Treatments and/or interventions provided: IV placed, labs, imaging, pain medication     Patient's response to treatments and/or interventions: See results; see MAR    To be done/followed up on inpatient unit:  See orders    Does this patient have any cognitive concerns?:  No    Activity level - Baseline/Home:  Total Care  Activity Level - Current:   Total Care    Patient's Preferred language: English   Needed?: No    Isolation: None  Infection: Not Applicable  Patient tested for COVID 19 prior to admission: NO  Bariatric?: No    Vital Signs:   Vitals:    09/09/24 1437 09/09/24 1612 09/09/24 1637   BP: 96/70     Pulse: 63     Resp: 20     Temp: 97.6  F (36.4  C)     TempSrc: Oral     SpO2: 97% 96% 96%   Weight: 85.7 kg (189 lb)         Cardiac Rhythm:     Was the PSS-3 completed:   Yes  What interventions are required if any?               Family Comments: Partner bedside  OBS brochure/video discussed/provided to patient/family: N/A              Name of person given brochure if not patient:               Relationship to patient:     For the majority of the shift this patient's behavior was Green.   No behavioral interventions performed.    ED NURSE PHONE  NUMBER: [phone]

## 2024-09-09 NOTE — PHARMACY-ADMISSION MEDICATION HISTORY
Pharmacist Admission Medication History    Admission medication history is complete. The information provided in this note is only as accurate as the sources available at the time of the update.    Information Source(s): Family member and CareEverywhere/SureScripts via phone    Pertinent Information: Pt has had elevated INRs lately with many held doses and dose decreases to warfarin.  Most recent adjustment: 9/6: Hold; 9/7: Hold; Otherwise 3 mg every Tue, Fri; 1.5 mg all other days.  Also, spouse unsure if taking pantoprazole, last filled 7/2/24 for 30 days.    Changes made to PTA medication list:  Added: Marijuana edible  Deleted: fish oil  Changed: warfarin    Allergies reviewed with patient and updates made in EHR: no    Medication History Completed By: Samantha Sorto RPH 9/9/2024 4:50 PM    PTA Med List   Medication Sig Last Dose    acetaminophen (TYLENOL) 500 MG tablet Take 2 tablets (1,000 mg) by mouth 3 times daily (Patient taking differently: Take 1,000 mg by mouth nightly as needed for mild pain.) prn    diclofenac (VOLTAREN) 1 % topical gel Apply 4 g topically 3 times daily (Patient taking differently: Apply 4 g topically 3 times daily as needed for moderate pain.) prn    ferrous sulfate (FEROSUL) 325 (65 Fe) MG tablet Take 1 tablet (325 mg) by mouth daily (with breakfast) 9/8/2024    HYDROmorphone (DILAUDID) 4 MG tablet Take 0.5 tablets (2 mg) by mouth every 6 hours as needed for severe pain prn    Multiple Vitamins-Minerals (MULTIVITAMIN ADULT) CHEW Take 2 chew tab by mouth daily 9/8/2024    NONFORMULARY Take by mouth at bedtime. Marijuana edible at bedtime 9/8/2024    nystatin (MYCOSTATIN) 549369 UNIT/GM external powder Apply topically 2 times daily as needed for other Groin folds prn    Probiotic CHEW Take 2 chew tab by mouth daily 9/8/2024    torsemide (DEMADEX) 20 MG tablet Take 1 tablet (20 mg) by mouth daily 9/8/2024    warfarin ANTICOAGULANT (COUMADIN) 3 MG tablet Take 1 tablet (3 mg) on  Tuesdays, Thursdays, Saturdays and take 1.5 tablets (4.5 mg) all other days or as directed by the INR clinic (Patient taking differently: As of 9/6/24:  Hold doses on 9/6 and 9/7, then take 1 tablet (3 mg) on Tuesdays and Fridays and take 0.5 tablet (1.5 mg) all other days or as directed by the INR clinic) 9/8/2024 at 1.5 mg

## 2024-09-09 NOTE — TELEPHONE ENCOUNTER
Whitley ADHIKARI, [employer] called in to say that she went ahead and loreto a venous INR today. She will drop off at [facility] by 10:30 am today.    She notes all questions are negative except that the patient notes not feeling well. He is noting that he feels like he has the flu or a cold.    Will call Whitley when result comes in.    Ami Robertson RN    [facility]

## 2024-09-09 NOTE — TELEPHONE ENCOUNTER
Whitley with [facility] care called the clinic 9/9/24. She states that the patient's orders are to pack the wound with AMD roll. However, they are unable to get this item. She is asking if there is a substitute.    [phone]

## 2024-09-09 NOTE — ED PROVIDER NOTES
Emergency Department Note      History of Present Illness     Chief Complaint   Generalized Weakness and Leg Swelling      HPI   Feng Wynne is a 79 year old male on warfarin with history of a-fib, CHF, and more who presents to the ED with his  for evaluation of generalized weakness and leg swelling. Patient has 1 week of increasing lower extremity edema, fatigue, shortness of breath, and decreased appetite. Patient endorses difficulty lying flat. He takes dilaudid for pain at home. He has previously been admitted for TYRONE and received IV diuretics. His  notes INR of 6.24 drawn 3 days ago, but this is mostly unchanged over the last 2 weeks.     Independent Historian    at bedside    Review of External Notes   Reviewed outpt INR labs over last 10 days persistently elevated.    Past Medical History     Medical History and Problem List   Past Medical History:   Diagnosis Date    Acute on chronic congestive heart failure, unspecified heart failure type (H) 01/30/2024    Arthritis     Atrial fibrillation (H)     Coronary artery disease     Hypercholesteremia     Obesity     Unspecified essential hypertension        Medications   acetaminophen (TYLENOL) 500 MG tablet  ciprofloxacin (CIPRO) 500 MG tablet  diclofenac (VOLTAREN) 1 % topical gel  ferrous sulfate (FEROSUL) 325 (65 Fe) MG tablet  HYDROmorphone (DILAUDID) 4 MG tablet  Multiple Vitamins-Minerals (MULTIVITAMIN ADULT) CHEW  nystatin (MYCOSTATIN) 357939 UNIT/GM external powder  Omega-3 Fatty Acids (FISH OIL CONCENTRATE PO)  order for DME  pantoprazole (PROTONIX) 40 MG EC tablet  Probiotic CHEW  torsemide (DEMADEX) 20 MG tablet  warfarin ANTICOAGULANT (COUMADIN) 3 MG tablet        Surgical History   Past Surgical History:   Procedure Laterality Date    COLONOSCOPY N/A 1/15/2024    Procedure: Colonoscopy;  Surgeon: Osbaldo Olvera MD;  Location:  GI    ESOPHAGOSCOPY, GASTROSCOPY, DUODENOSCOPY (EGD), COMBINED N/A 1/15/2024    Procedure:  Esophagoscopy, gastroscopy, duodenoscopy (EGD), combined;  Surgeon: Osbaldo Olvera MD;  Location:  GI    ESOPHAGOSCOPY, GASTROSCOPY, DUODENOSCOPY (EGD), COMBINED N/A 2/8/2024    Procedure: Esophagoscopy, gastroscopy, duodenoscopy (EGD), combined;  Surgeon: Osbaldo Olvera MD;  Location:  GI    IRRIGATION AND DEBRIDEMENT SACRAL WOUND, COMBINED N/A 3/22/2024    Procedure: IRRIGATION AND DEBRIDEMENT, WOUND, SACRAL REGION;  Surgeon: Phill Jimenez MD;  Location: [facility] OR    MITRAL VALVE REPLACEMENT  8-    Mitral valve replacement with 31-mm [employer]affi mechanical valve.        Physical Exam     Patient Vitals for the past 24 hrs:   BP Temp Temp src Pulse Resp SpO2 Weight   09/09/24 1437 96/70 97.6  F (36.4  C) Oral 63 20 97 % 85.7 kg (189 lb)     Physical Exam  Nursing note and vitals reviewed.  HENT:   Mouth/Throat: Moist mucous membranes.   Eyes: EOMI, nonicteric sclera  Cardiovascular: Normal rate, irregular rhythm, no murmurs, rubs, or gallops. Bilateral lower extremity pitting edema.  Pulmonary/Chest: Bibasilar crackles. No wheezes. No increased work of breathing.   Abdominal: Soft. Nontender, nondistended, no guarding or rigidity.   Musculoskeletal: Normal range of motion.   Neurological: Alert. Moves all extremities spontaneously.   Skin: Skin is warm and dry.        Diagnostics     Lab Results   Labs Ordered and Resulted from Time of ED Arrival to Time of ED Departure   NT PROBNP INPATIENT - Abnormal       Result Value    N terminal Pro BNP Inpatient 8,442 (*)    COMPREHENSIVE METABOLIC PANEL - Abnormal    Sodium 118 (*)     Potassium 3.7      Carbon Dioxide (CO2) 24      Anion Gap 19 (*)     Urea Nitrogen 100.8 (*)     Creatinine 3.11 (*)     GFR Estimate 20 (*)     Calcium 9.2      Chloride 75 (*)     Glucose 97      Alkaline Phosphatase 99      AST 21      ALT <5      Protein Total 6.7      Albumin 3.2 (*)     Bilirubin Total 0.6     TROPONIN T, HIGH SENSITIVITY - Abnormal     Troponin T, High Sensitivity 104 (*)    INR - Abnormal    INR 6.01 (*)    ISTAT GASES LACTATE VENOUS POCT - Abnormal    Lactic Acid POCT 0.9      Bicarbonate Venous POCT 29 (*)     O2 Sat, Venous POCT 79 (*)     pCO2 Venous POCT 43      pH Venous POCT 7.44 (*)     pO2 Venous POCT 42      Base Excess/Deficit (+/-) POCT 4.0 (*)    CBC WITH PLATELETS AND DIFFERENTIAL - Abnormal    WBC Count 8.8      RBC Count 3.51 (*)     Hemoglobin 10.2 (*)     Hematocrit 30.8 (*)     MCV 88      MCH 29.1      MCHC 33.1      RDW 16.6 (*)     Platelet Count 303      % Neutrophils 78      % Lymphocytes 17      % Monocytes 5      % Eosinophils 0      % Basophils 0      % Immature Granulocytes 1      NRBCs per 100 WBC 0      Absolute Neutrophils 6.8      Absolute Lymphocytes 1.5      Absolute Monocytes 0.4      Absolute Eosinophils 0.0      Absolute Basophils 0.0      Absolute Immature Granulocytes 0.0      Absolute NRBCs 0.0     SODIUM - Abnormal    Sodium 120 (*)    TROPONIN T, HIGH SENSITIVITY - Abnormal    Troponin T, High Sensitivity 102 (*)    MAGNESIUM - Abnormal    Magnesium 2.5 (*)    SODIUM - Abnormal    Sodium 119 (*)    INR - Abnormal    INR 6.63 (*)    OSMOLALITY, RANDOM URINE - Normal    Osmolality Urine 285     OSMOLALITY - Normal    Osmolality Blood 293     GLUCOSE BY METER - Normal    GLUCOSE BY METER POCT 96     SODIUM RANDOM URINE    Sodium Urine mmol/L <20     GLUCOSE MONITOR NURSING POCT   GLUCOSE MONITOR NURSING POCT       Imaging   Chest XR,  PA & LAT   Final Result   IMPRESSION: Sternotomy and mitral valve prosthesis. Shallow   inspiration accentuates heart size and pulmonary vascularity. No   pleural effusions. Lungs clear.       SHAR SIDHU MD            SYSTEM ID:  [id number]      Echocardiogram Complete    (Results Pending)       EKG   ECG results from 09/09/24   EKG 12 lead     Value    Systolic Blood Pressure     Diastolic Blood Pressure     Ventricular Rate 69    Atrial Rate     PA Interval     QRS  Duration 130        QTc 490    P Axis     R AXIS -42    T Axis 95    Interpretation ECG      Atrial fibrillation with premature ventricular or aberrantly conducted complexes  Left axis deviation  Non-specific intra-ventricular conduction block  Abnormal QRS-T angle, consider primary T wave abnormality  Abnormal ECG  When compared with ECG of 06-Feb-2024 12:16,  No significant change on my read.                Independent Interpretation   I independently reviewed the CXR. I see no evidence of infiltrate/ptx.       ED Course      Medications Administered   Medications   nystatin (MYCOSTATIN) topical powder (has no administration in time range)   pantoprazole (PROTONIX) EC tablet 40 mg (has no administration in time range)   lidocaine 1 % 0.1-1 mL (has no administration in time range)   lidocaine (LMX4) cream (has no administration in time range)   sodium chloride (PF) 0.9% PF flush 3 mL (has no administration in time range)   sodium chloride (PF) 0.9% PF flush 3 mL (has no administration in time range)   senna-docusate (SENOKOT-S/PERICOLACE) 8.6-50 MG per tablet 1 tablet (has no administration in time range)     Or   senna-docusate (SENOKOT-S/PERICOLACE) 8.6-50 MG per tablet 2 tablet (has no administration in time range)   calcium carbonate (TUMS) chewable tablet 1,000 mg (has no administration in time range)   Patient is already receiving anticoagulation with heparin, enoxaparin (LOVENOX), warfarin (COUMADIN)  or other anticoagulant medication (has no administration in time range)   Warfarin Dose Required Daily - Pharmacist Managed (has no administration in time range)   acetaminophen (TYLENOL) tablet 975 mg (0 mg Oral Hold 9/9/24 2232)   HYDROmorphone (DILAUDID) tablet 2 mg (has no administration in time range)   HYDROmorphone (DILAUDID) tablet 4 mg (has no administration in time range)   prochlorperazine (COMPAZINE) injection 5 mg (has no administration in time range)     Or   prochlorperazine (COMPAZINE)  tablet 5 mg (has no administration in time range)     Or   prochlorperazine (COMPAZINE) suppository 12.5 mg (has no administration in time range)   lidocaine 1 % 0.1-1 mL (has no administration in time range)   lidocaine (LMX4) cream (has no administration in time range)   sodium chloride (PF) 0.9% PF flush 3 mL (has no administration in time range)   sodium chloride (PF) 0.9% PF flush 3 mL (has no administration in time range)   albumin human 5 % injection 25 g (has no administration in time range)   naloxone (NARCAN) injection 0.2 mg (has no administration in time range)     Or   naloxone (NARCAN) injection 0.4 mg (has no administration in time range)     Or   naloxone (NARCAN) injection 0.2 mg (has no administration in time range)     Or   naloxone (NARCAN) injection 0.4 mg (has no administration in time range)   senna-docusate (SENOKOT-S/PERICOLACE) 8.6-50 MG per tablet 1 tablet (has no administration in time range)   warfarin-No DOSE today (has no administration in time range)   HYDROmorphone (PF) (DILAUDID) injection 0.5 mg (0.5 mg Intravenous $Given 9/9/24 1554)   HYDROmorphone (DILAUDID) tablet 2 mg (2 mg Oral $Given 9/9/24 1757)   sodium chloride 0.9% BOLUS 500 mL (0 mLs Intravenous Stopped 9/9/24 2112)   albumin human 25 % injection 25 g (0 g Intravenous Stopped 9/9/24 2112)   furosemide (LASIX) injection 60 mg (60 mg Intravenous $Given 9/9/24 2103)   lidocaine (XYLOCAINE) 2 % external gel ( Urethral $Given 9/9/24 2015)   lactulose (CHRONULAC) solution 10 g (10 g Oral $Given 9/9/24 2250)         Discussion of Management   Admitting hospitalist ALEM, Neri.      Additional Documentation  None    Medical Decision Making / Diagnosis     CMS Diagnoses: None    MIPS       None    MDM   Feng Wynne is a 79 year old male who presents with generalized weakness and lower extremity swelling.  On exam, patient appears hypervolemic with crackles and lower extremity pitting edema.  Strong suspicion for exacerbation of  heart failure.  However, labs suggestive of hyponatremia, acute kidney injury with a BUN to creatinine ratio greater than 20 suggestive of prerenal injury.  Patient also with some softer blood pressures therefore 500 mL NS given.  With these findings, admission indicated and I discussed with admitting hospitalist ALEM He who recommended albumin+lasix which was ordered. Pt in stable condition at time of admission. All questions answered.     Disposition   The patient was admitted to the hospital.     Diagnosis     ICD-10-CM    1. Generalized weakness  R53.1       2. Hyponatremia  E87.1       3. Acute on chronic congestive heart failure, unspecified heart failure type (H)  I50.9       4. Acute kidney injury (H24)  N17.9                     Timi Durham MD  09/10/24 0026

## 2024-09-10 NOTE — PROGRESS NOTES
[facility]    Patient is currently receiving services with [facility]. The patient is currently receiving skilled nursing services.  Patient's  and home health team have been notified that patient is under inpatient status. [facility] Liaison will continue to follow patient during stay. Please provide orders to resume home care at time of discharge if appropriate.

## 2024-09-10 NOTE — PLAN OF CARE
PT: Orders received. Chart reviewed and discussed with care team.  PT not indicated due to patient is bed bound and uses [facility] for transfers with assist from his spouse. OT addressing transfers during their POC, PT deferred to prevent duplication of services.  Defer discharge recommendations to OT evaluation.  Will complete orders.

## 2024-09-10 NOTE — PLAN OF CARE
"[id number]  Patient Name: Suhail  MRN: [id number]  Date of Admission: 9/9/2024  Reason for Admission: Acute hypervolemia with hyponatremia, acute renal failure, acute on chronic HF  Level of Care: [facility]    Vitals:   BP Readings from Last 1 Encounters:   09/10/24 92/53     Pulse Readings from Last 1 Encounters:   09/10/24 76     Wt Readings from Last 1 Encounters:   09/09/24 85.7 kg (189 lb)     Ht Readings from Last 1 Encounters:   07/18/24 1.88 m (6' 2\")     Estimated body mass index is 24.27 kg/m  as calculated from the following:    Height as of 7/18/24: 1.88 m (6' 2\").    Weight as of this encounter: 85.7 kg (189 lb).  Temp Readings from Last 1 Encounters:   09/10/24 97.5  F (36.4  C) (Oral)       Pain: Pain goal 2/10 Pain Rating 5/10 Effective pain medication/regimen PRN 2mg dilaudid    CV Surgery Patient: No    Assessment    Resp: RA. Clear upper, crackles lower lobes.   Telemetry: Afib CVR with freq PVCs  Neuro: Pt Aox4 at 0200 and able to follow neuro exam commands, lethargic and intermittently confused/unable to follow neuro exam commands at 0600. Day shift RN aware and report given. R sided weakness baseline, pt bed bound baseline.   GI/: +BS, +gas, -Bm overnight. Per pt last BM was 9/8. Stubbs catheter in place, bloody output with clots [id number].   Skin/Wounds: Large sacral pressure wound, wound packing and mepi in place, per pt the packing was from home wound care, WOC consulted. Blanchable redness to L buttock, mepi in place. Scattered bruising. Large abdominal hernia. +3 BLE edema.   Lines/Drains: Stubbs catheter. 1 PIV R AC SL  Activity: A2 turn/repo q2 hours   Sleep: 2-3 hours  Abnormal Labs: INR critical lab, 7.19 @ 0550, MD notified and orders for Ace wraps to lower legs to mobilize fluid. Na+ 127, Cr 3.14, BUN 96.8. Mg 2.5 and K 3.6, high replacement protocols.     Aggression Stop Light: Green          Patient Care Plan: Cardiology, nephrology, urology, WOC, PT/OT, and social work consulted. " Monitor for signs of bleeding and blood pressures. Ace wraps to BLE. Neuro checks q4h.

## 2024-09-10 NOTE — PROGRESS NOTES
[facility]    Hospitalist Progress Note    Brief Summary:    Assessment & Plan   Feng Wynne is a 79 year old male with significant past medical history for mechanical mitral valve replacement on chronic anticoagulation with warfarin, A-fib, HFpEF, coronary artery disease, hypertension, hyperlipidemia, sacral decubitus ulcers, and previous admission at [facility] from 7/18/2024 through 7/26/2024 due to enteropathic E. coli with associated diarrhea and mild hyponatremia who presented to the emergency department with weakness, shortness of breath, increased lower extremity swelling, decreased appetite, and difficulty with lying flat.       Acute urinary retention  Acute renal failure secondary to obstructive uropathy/likely ATN  Status post Stubbs catheter placement  Gross hematuria  Supratherapeutic INR  Possible UTI    This is a 79-year-old male with multiple comorbidities as above, mostly bedridden, has difficulty in passing urine for more than couple of weeks, and I will build to pass urine.  On exam in the ED found to have urinary obstruction, ultrasound of the renal shows moderate to marked bladder distention and possible mild hydronephrosis of the left kidney, unable to visualize right kidney.  CT abdomen shows moderate distention of the urinary bladder  Status post Stubbs catheter placement with cause hematuria at this time.  He received a dose of IV Lasix times once in the ED.  No significant output since catheter placement.  Although he have lower extreme edema but looks intravascularly dry.  At this time I will start him on IV normal saline at 100 mL/h, as he is hypotensive as well has most likely have obstructive uropathy that will help his recover his kidney function.  Baseline creatinine normal as well he is in July.  Will continue to monitor his creatinine 3.11 on admission and slightly worsened to 3.14  He does have gross hematuria, evaluate by the urology  His INR is  supratherapeutic and worsened than yesterday and 7.0, although would like to avoid giving vitamin K given a mitral valve replacement with mechanical valve, but he has been bleeding hypertensive I will give him 2.5 mg of IV vitamin K to help control his INR to his therapeutic levels.  Continue strict intake and output charting  Daily weight  Continue with the Stubbs catheter placement  Start him on Flomax if able to take it given low blood pressure, and Avodart 0.5 mg daily.  Nephrology is consulted appreciate their input  Recheck UA shows more than 180 WBC count and more than 180 RBCs.  Will send urine culture and start on IV ceftriaxone 1 g daily    Hyponatremia  Patient sodium on admission 118.  Patient told me that he is drinking about 3 L of water daily and sometimes more.  He does have some lower extremity edema  Urine osmolarity is 285, urine sodium less than 20, serum osmolarity 293, mixed picture.  At this time we will keep him on IV fluids of normal saline at 100 mL/h.  Will check his sodium every 6 hours.        Acute renal failure with hydronephrosis secondary to outlet obstruction  -Baseline creatinine typically 0.5 to-0.61 is 3.11  -Believe secondary to outlet obstruction distended bladder and mild hydronephrosis  -CT abdomen pelvis with distended bladder and large amount of stool throughout colon and no evidence of obstruction  -Ultrasound of kidneys with moderate to marked bladder distention and possible mild hydronephrosis of left kidney  -Stubbs catheter placed with large amount of cola colored urine returned  -Urology consulted  -Patient received one-time dose of 60 mg Lasix with albumin will monitor closely before proceeding with further doses and input from cardiology.  Hold further Lasix.  The patient looks intravascularly depleted.  Management as above started on some IV fluids at this time.       Elevated troponin suspect type II MI  Status post mitral valve replacement with mechanical  valve  Severe tricuspid regurgitation  Chronic paroxysmal atrial fibrillation  Hypertension  -Elevated troponin likely secondary from acute heart failure exacerbation  -Initial troponin 104-second 102 EKG changes    -Mechanical mitral valve on anticoagulation currently supratherapeutic with INR of 6.1 on admission, increased to 7 today.  -Pharmacy is consulted to dose warfarin, hold warfarin today.  -Typically takes 20 mg torsemide daily at home, hold any diuretics at this time  -Received 60 mg IV Lasix with albumin in the ED.  Holding further diuresis.  -Appreciate cardiac consultation  -Continuous cardiac monitoring  Cardiology was consulted and evaluate the patient.  Does not recommend any intervention at this time.          Chronic iron deficiency anemia and anemia of chronic disease  -Hemoglobin stable at 10.2  -Will hold ferrous sulfate for now     Constipation with large known ventral hernia  CT scan shows large ventral hernia, and large stools in the colon.  No obstruction at this time.  He is significantly constipated  Start him on MiraLAX 17 g twice daily and senna twice daily.      Chronic sacral and coccyx wounds  -follows with wound clinic   -woc consulted      Bilateral lower extremity edema  Patient has significant 2-3+ lower extremity edema, currently lymphedema wrap in place.  Likely third spacing, will continue with the lymphedema wrap at this time.    Hypotension  Patient is hypotensive at this time.  But asymptomatic.  Do not know what his baseline blood pressure at home.  I will give him a bolus of 250 on normal saline, start normal saline at 100 mL/h.  Start him on midodrine 5 mg 3 times daily.  Will check his cortisol level now and in the morning.  To rule out any underlying adrenal insufficiency     Diet:  regular  DVT Prophylaxis: Warfarin  Stubbs Catheter: Not present  Lines: None     Cardiac Monitoring: None  Code Status:  Full           Clinically Significant Risk Factors Present on  Admission         # Hyponatremia: Lowest Na = 118 mmol/L in last 2 days, will monitor as appropriate  # Hypocalcemia: Lowest Ca = 8.5 mg/dL in last 2 days, will monitor and replace as appropriate    # Anion Gap Metabolic Acidosis: Highest Anion Gap = 24 mmol/L in last 2 days, will monitor and treat as appropriate  # Hypoalbuminemia: Lowest albumin = 3.2 g/dL at 9/9/2024  2:55 PM, will monitor as appropriate  # Drug Induced Coagulation Defect: home medication list includes an anticoagulant medication   # Acute Kidney Injury, unspecified: based on a >150% or 0.3 mg/dL increase in last creatinine compared to past 90 day average, will monitor renal function  # Hypertension: Noted on problem list  # Acute heart failure with preserved ejection fraction: heart failure noted on problem list, last echo with EF >50%, and receiving IV diuretics   # Anemia: based on hgb <11        # Severe Malnutrition: based on nutrition assessment     # Financial/Environmental Concerns:                   DVT Prophylaxis: Warfarin, INR supratherapeutic  Code Status: Full Code    Disposition: Expected discharge in 2-4 days once stable.    Medically Ready for Discharge: Anticipated in 2-4 Days        Ko Huber MD, MD  Text Page  (7am - 6pm)    Interval History   Patient seen and evaluated in his room today, partner at bedside.  Patient feeling much better as compared to yesterday, denies any chest pain shortness orthopnea PND palpitation headache dizziness lightheadedness no fever chills or cough.  He has been lying comfortably in no acute    -Data reviewed today: I reviewed all new labs and imaging results over the last 24 hours. I personally reviewed no images or EKG's today.    Physical Exam   Temp: 97  F (36.1  C) Temp src: Oral BP: (!) 76/50 Pulse: 77   Resp: 16 SpO2: 98 % O2 Device: None (Room air)    Vitals:    09/09/24 1437 09/10/24 0630   Weight: 85.7 kg (189 lb) 93.3 kg (205 lb 11 oz)     Vital Signs with Ranges  Temp:  [97  F  (36.1  C)-98.1  F (36.7  C)] 97  F (36.1  C)  Pulse:  [60-78] 77  Resp:  [16-18] 16  BP: ()/(42-72) 76/50  SpO2:  [95 %-99 %] 98 %  I/O last 3 completed shifts:  In: 500 [IV Piggyback:500]  Out: 900 [Urine:900]    Constitutional: awake, alert, cooperative, no apparent distress, and appears stated age  Eyes: Lids and lashes normal, pupils equal, round and reactive to light, extra ocular muscles intact, sclera clear, conjunctiva normal  Respiratory: No increased work of breathing, good air exchange, clear to auscultation bilaterally, no crackles or wheezing  Cardiovascular: Normal apical impulse, regular rate and rhythm, normal S1 and S2, no S3 or S4, and no murmur noted  GI: No scars, normal bowel sounds, soft, non-distended, non-tender, large ventral hernia, no hepatosplenomegally  Skin: no bruising or bleeding, have decubitus ulcer  Musculoskeletal: 2-3+ lower extremity pitting edema present, lymphedema wrap in place  Neurologic: No focal deficits    Medications   Current Facility-Administered Medications   Medication Dose Route Frequency Provider Last Rate Last Admin    Patient is already receiving anticoagulation with heparin, enoxaparin (LOVENOX), warfarin (COUMADIN)  or other anticoagulant medication   Does not apply Continuous PRN Clara He APRN CNP        sodium chloride 0.9 % infusion   Intravenous Continuous Ko Huber  mL/hr at 09/10/24 1349 New Bag at 09/10/24 1349     Current Facility-Administered Medications   Medication Dose Route Frequency Provider Last Rate Last Admin    acetaminophen (TYLENOL) tablet 975 mg  975 mg Oral TID Clara He APRN CNP        cefTRIAXone (ROCEPHIN) 1 g vial to attach to  mL bag for ADULTS or NS 50 mL bag for PEDS  1 g Intravenous Q24H Ko Huber MD   1 g at 09/10/24 1407    dutasteride (AVODART) capsule 0.5 mg  0.5 mg Oral Daily Ko Huber MD   0.5 mg at 09/10/24 1425    midodrine (PROAMATINE) tablet 5 mg  5 mg  Oral TID w/meals Ko Huber MD        pantoprazole (PROTONIX) EC tablet 40 mg  40 mg Oral BID AC Jocelyn Palmer MD   40 mg at 09/10/24 0844    phytonadione (AQUAMEPHYTON) 10 MG/ML 2.5 mg in sodium chloride 0.9 % 50 mL intermittent infusion  2.5 mg Intravenous Once Ko Huber MD        polyethylene glycol (MIRALAX) Packet 17 g  17 g Oral BID Ko Huber MD   17 g at 09/10/24 1014    senna-docusate (SENOKOT-S/PERICOLACE) 8.6-50 MG per tablet 1 tablet  1 tablet Oral At Bedtime Clara He APRN CNP   1 tablet at 09/10/24 0213    sodium chloride (PF) 0.9% PF flush 3 mL  3 mL Intracatheter Q8H Clara He APRN CNP        sodium chloride 0.9% BOLUS 250 mL  250 mL Intravenous Once Ko Huber MD        tamsulosin (FLOMAX) capsule 0.4 mg  0.4 mg Oral QPM Ko Huber MD        Warfarin Dose Required Daily - Pharmacist Managed  1 each Oral See Admin Instructions Clara He APRN CNP        warfarin-No DOSE today  1 each Does not apply no dose today (warfarin) Carrillo Taveras, DO           Data   Recent Labs   Lab 09/10/24  1148 09/10/24  0520 09/10/24  0154 09/09/24  2343 09/09/24  1848 09/09/24  1455   WBC  --  9.8  --   --   --  8.8   HGB  --  9.7*  --   --   --  10.2*   MCV  --  88  --   --   --  88   PLT  --  249  --   --   --  303   INR  --  7.19*  --  6.63*  --  6.01*   * 127*  --  119*   < > 118*   POTASSIUM  --  3.6  --   --   --  3.7   CHLORIDE  --  78*  --   --   --  75*   CO2  --  25  --   --   --  24   BUN  --  96.8*  --   --   --  100.8*   CR  --  3.14*  --   --   --  3.11*   ANIONGAP  --  24*  --   --   --  19*   JOSEPH  --  8.5*  --   --   --  9.2   GLC  --  80 89 96  --  97   ALBUMIN  --  3.3*  --   --   --  3.2*   PROTTOTAL  --  5.8*  --   --   --  6.7   BILITOTAL  --  0.6  --   --   --  0.6   ALKPHOS  --  80  --   --   --  99   ALT  --  <5  --   --   --  <5   AST  --  18  --   --   --  21    < > = values in this interval not displayed.        Recent Results (from the past 24 hour(s))   Chest XR,  PA & LAT    Narrative    CHEST TWO VIEWS  9/9/2024 4:27 PM     HISTORY:  Crackles. Orthopnea.    COMPARISON: 2/7/2024.      Impression    IMPRESSION: Sternotomy and mitral valve prosthesis. Shallow  inspiration accentuates heart size and pulmonary vascularity. No  pleural effusions. Lungs clear.     SHAR SIDHU MD         SYSTEM ID:  [id number]   CT Abdomen Pelvis w/o Contrast    Narrative    EXAM: CT ABDOMEN PELVIS W/O CONTRAST  LOCATION: [facility]  DATE: 9/9/2024    INDICATION: Nausea. Hernias. Acute kidney insufficiency.  COMPARISON: None.  TECHNIQUE: CT scan of the abdomen and pelvis was performed without IV contrast. Multiplanar reformats were obtained. Dose reduction techniques were used.  CONTRAST: None.    FINDINGS:   LOWER CHEST: Sternotomy and mitral valve prosthesis. Consolidation at the left lung base posteriorly is likely atelectasis. Coronary artery calcification.    HEPATOBILIARY: No significant mass or bile duct dilatation. No calcified gallstones.     PANCREAS: Normal.    SPLEEN: Normal.    ADRENAL GLANDS: Normal.    KIDNEYS/BLADDER: The urinary bladder is moderately distended. No significant mass, stone, or hydronephrosis.    BOWEL: Large amount of stool throughout the colon and within the rectum, consistent with constipation. There is a large ventral hernia containing multiple loops of gas and stool-filled colon, as well as a small amount of fluid. Scattered colonic   diverticulosis. No small bowel dilatation is identified.    LYMPH NODES: No lymphadenopathy.    VASCULATURE: Unremarkable.    PELVIC ORGANS: No pelvic masses.    MUSCULOSKELETAL: Advanced degenerative changes right hip. Degenerative changes are also noted throughout the visualized thoracolumbar spine. There is diffuse subcutaneous edema.      Impression    IMPRESSION:   1.  Large amount of stool throughout the colon and within the rectum,  consistent with constipation.  2.  Large ventral hernia containing multiple loops of gas and stool-filled colon, as well as a small amount of fluid. No convincing evidence for associated bowel obstruction.  3.  Moderate distention of the urinary bladder.  4.  Diffuse subcutaneous edema.     US Renal Complete Non-Vascular    Narrative    EXAM: US RENAL COMPLETE NON-VASCULAR  LOCATION: [facility]  DATE: 2024    INDICATION: Acute renal failure of unknown origin  COMPARISON: Today's 2024, 7:00 PM noncontrast CT AP  TECHNIQUE: Routine Bilateral Renal and Bladder Ultrasound.    FINDINGS:    RIGHT KIDNEY: Obscured by overlying bowel gas and not visualized.    LEFT KIDNEY: Possible mild hydronephrosis of the left kidney. Lower half of the left kidney is obscured by overlying bowel gas and not visualized    BLADDER: Moderate to marked bladder distention at 15 x 14 x 13 cm.      Impression    IMPRESSION:  1.  Moderate to marked bladder distention and possible mild hydronephrosis of the left kidney. Consider placement of a Stubbs bladder catheter.  2.  The entire right kidney and the lower half of the left kidney are obscured by overlying bowel gas.   Echocardiogram Complete   Result Value    LVEF  50-55%    Narrative    [id number]  [id number]  [id number]  [id number]^SUNNY^ALEM^JOSE L     [facility]  Echocardiography Laboratory  [address] [place] [address]     Name: JESSE CABRAL  MRN: [id number]  : 1945  Study Date: 09/10/2024 07:58 AM  Age: 79 yrs  Gender: Male  Patient Location: [facility]  Reason For Study: Heart Failure  Ordering Physician: ALEM CORREA  Referring Physician: ALEM CORREA  Performed By: Giovanny Coyle     BSA: 2.1 m2  Height: 74 in  Weight: 189 lb  HR: 71  BP: 92/53 mmHg  ______________________________________________________________________________  Procedure  Complete Echo Adult. Optison (NDC #1226-5421) given intravenously.  Technically  difficult study.  ______________________________________________________________________________  Interpretation Summary     Status post mechanical mitral valve replacement with a 31-mm St. Raffi  mechanical valve for severe degenerative mitral valve regurgitation, 9/2008.  The mitral prosthesis is well-seated. No regurgitation.  Mean diastolic gradient 2 mmHg (heart rate 72 bpm, afib).  Normal left ventricular systolic function. Estimated LVEF 50-55%.  Normal right ventricular size with mildly decreased systolic function.  Trace tricuspid valve regurgitation.  Inferior vena cava not well-visualized due to challenging subcostal images.     On the last study dated 2/1/2024, moderately decreased RV function and severe  tricuspid valve regurgitation was reported.     ______________________________________________________________________________  Left Ventricle  The left ventricle is normal in size. There is normal left ventricular wall  thickness. Left ventricular systolic function is normal. The visual ejection  fraction is 50-55%. Diastolic function not assessed due to presence of  prosthetic mitral valve. Septal motion is consistent with post-operative  state.     Right Ventricle  The right ventricle is normal size. Mildly decreased right ventricular  systolic function.     Atria  The left atrium is severely dilated. The right atrium is moderately dilated.  Right atrium not well visualized.     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 Raffi) mechanical prosthesis. The prosthetic mitral  valve is well-seated. This degree of valvular regurgitation is within normal  limits. Normal prosthetic mitral valve gradients. Mean diastolic gradient 2  mmHg (heart rate 72 bpm, afib).     Tricuspid Valve  The tricuspid valve is not well visualized, but is grossly normal. There is  trace tricuspid regurgitation. The right ventricular systolic pressure is  approximated at 20.2 mmHg plus the right atrial pressure. Right  ventricle  systolic pressure estimate normal.     Aortic Valve  The aortic valve is trileaflet. No aortic regurgitation is present. No aortic  stenosis is present.     Pulmonic Valve  There is trace pulmonic valvular regurgitation.     Vessels  The aortic root is normal size. Inferior vena cava not well visualized for  estimation of right atrial pressure.     Pericardium  There is no pericardial effusion.     Rhythm  The rhythm was atrial fibrillation.  ______________________________________________________________________________  MMode/2D Measurements & Calculations  IVSd: 0.90 cm     LVIDd: 4.0 cm  LVIDs: 3.5 cm  LVPWd: 1.0 cm  FS: 12.5 %  LV mass(C)d: 118.2 grams  LV mass(C)dI: 55.7 grams/m2  Ao root diam: 3.8 cm  LVOT diam: 2.4 cm  LVOT area: 4.4 cm2  Ao root diam index Ht(cm/m): 2.0  Ao root diam index BSA (cm/m2): 1.8  RWT: 0.50     Doppler Measurements & Calculations  MV E max jose: 118.0 cm/sec  MV max P.1 mmHg  MV mean P.0 mmHg  MV V2 VTI: 45.6 cm  MVA(VTI): 1.3 cm2  MV dec slope: 333.0 cm/sec2  MV dec time: 0.37 sec  Ao V2 max: 142.0 cm/sec  Ao max P.0 mmHg  Ao V2 mean: 94.6 cm/sec  Ao mean P.0 mmHg  Ao V2 VTI: 26.1 cm  KEITH(I,D): 2.2 cm2  KEITH(V,D): 2.6 cm2  LV V1 max P.9 mmHg  LV V1 max: 85.4 cm/sec  LV V1 VTI: 13.1 cm  SV(LVOT): 57.6 ml  SI(LVOT): 27.2 ml/m2     PA acc time: 0.10 sec  TR max jose: 223.8 cm/sec  TR max P.2 mmHg  AV Jose Ratio (DI): 0.60  KEITH Index (cm2/m2): 1.0  E/E' av.3  Lateral E/e': 6.9  Medial E/e': 11.7  RV S Jose: 5.3 cm/sec     ______________________________________________________________________________  Report approved by: Dr Darryl Martinez 09/10/2024 10:04 AM

## 2024-09-10 NOTE — PROVIDER NOTIFICATION
Brief update:    Paged regarding INR 7.19.  Blood pressure continues to be soft in the 90/50 range.    Here with what appears to be heart failure.  Initially given small fluid bolus in the emergency department followed by albumin and Lasix    Failure to thrive.  Large coccygeal wound.    Not actively bleeding at this time, so do not need to reverse INR at this juncture.  As blood pressure is stable and he is asymptomatic, not initiating additional fluids at this time given primary concern for heart failure.    Might benefit from midodrine or similar.  Will defer to rounding team.    Ace wraps to lower extremities to mobilize peripheral fluid    Travis Fontaine MD  6:09 AM

## 2024-09-10 NOTE — CONSULTS
"[facility]  WO Nurse Inpatient Assessment     Consulted for: Wound coccyx     Summary: patient with POA community acquired stage 4 pressure injury with history of osteomyelitis, initial woc consult 9/10    Patient History (according to provider note(s):      \"79 year old male with significant past medical history for mechanical mitral valve replacement on chronic anticoagulation with warfarin, A-fib, HFpEF, coronary artery disease, hypertension, hyperlipidemia, sacral decubitus ulcers, and previous admission at [facility] from 7/18/2024 through 7/26/2024 due to enteropathic E. coli with associated diarrhea and mild hyponatremia who presented to the emergency department with weakness, shortness of breath, increased lower extremity swelling, decreased appetite, and difficulty with lying flat.  His workup in the emergency department concerning for hypervolemic hyponatremia with request for hospitalist to admit for further treatment and evaluation. \"    Assessment:      Areas visualized during today's visit: Focused: and Sacrum/coccyx    Pressure Injury Location: buttock, sacrococcygeal     Last photo: 9/10  Wound type: Pressure Injury     Pressure Injury Stage: 4, present on admission, with history of osteomyelitis, with superimposed deep tissue injury POA to wound base and wound edge       Wound history/plan of care:   found with home plan in use     Wound base:  Fragile, Granulation tissue, Friable, and Bone,      Palpation of the wound bed: normal and boggy      Drainage: copious     Description of drainage: serosanguinous     Measurements (length x width x depth, in cm) 6  x 6  x  3.5 cm      Tunneling N/A     Undermining up to 5 cm from 7 o'clock, and up to 6 cm from 2 o'clock, with general undermining up to 3cm   Periwound skin:  nonblanchable purple left side, and intact       Color: normal and consistent with surrounding tissue      Temperature: normal   Odor: none  Pain: " "denies , none  Pain intervention prior to dressing change: slow and gentle cares   Treatment goal: Heal  per patient stated goal   STATUS: initial assessment  Supplies ordered: supplies stored on unit, discussed with RN, and discussed with patient     My PI Risk Assessment     Sensory Perception: 2 - Very Limited     Moisture: 2 - Very moist      Activity: 1 - Bedfast      Mobility: 2 - Very limited     Nutrition: 2 - Probably inadequate      Friction/Shear: 1 - Problem     TOTAL: 10       Treatment Plan:     09/10/24 1400  Wound care  3 TIMES DAILY        Comments: Location: sacrococcygeal  Care: provided three times a day and as needed by primary RN  1. Cleanse TID with 4x4\" gauze and vashe, pat dry  2. Apply plain non-bordered optifoam (#[id number]) single layer to wound base, cut as needed  3. Cover with sacral mepilex  Change TID and PRN for drainage (expected to decrease when INR theraputic)        09/10/24 0850  Skin care precautions  EFFECTIVE NOW        Comments: Pressure Injury Prevention (PIP) Plan:  If patient is declining pressure injury prevention interventions: Explore reason why and address patient's concerns, Educate on pressure injury risk and prevention intervention(s), If patient is still declining, document \"informed refusal\" , and Ensure Care team is aware ( provider, charge nurse, etc)  HOB: Maintain at or below 30 degrees, unless contraindicated  Repositioning in bed: Every 1-2 hours , Left/right positioning; avoid supine, and Raise foot of bed prior to raising head of bed, to reduce patient sliding down (shear)  Heels: Keep elevated off mattress and Pillows under calves  Protective Dressing: mepilex as needed  Positioning Equipment:Fluidized positioner (#[id number] or #[id number]) to help maintain side lying position.  Chair positioning: Chair cushion (#[id number]) , Assist patient to reposition hourly, and Do NOT use a donut for sitting (this increases pressure to smaller area and creates a " higher potential for injury)    If patient has a buttock pressure injury, or high risk for PI use chair cushion or SPS.  Moisture Management: Perineal cleansing /protection: Follow Incontinence Protocol, Avoid brief in bed, Clean and dry skin folds with bathing , and Moisturize dry skin  Under Devices: Inspect skin under all medical devices during skin inspection , Ensure tubes are stabilized without tension, and Ensure patient is not lying on medical devices or equipment when repositioned  Ask provider to discontinue device when no longer needed.        09/10/24 0852  Nutrition Services Adult IP Consult  ONE TIME     Complete     References:    Medical Nutrition Therapy policy and MNT protocol   Provider: (Not yet assigned)   Question Answer Comment   Reason for Consult: RN Consult - specify Reason    Reason for Consult: wound healing support needs            Orders: Written    RECOMMEND PRIMARY TEAM ORDER: None, at this time  Education provided: importance of repositioning, plan of care, Moisture management, Hygiene, and Off-loading pressure  Discussed plan of care with: Patient, Family, and Nurse  WOC nurse follow-up plan: weekly  Notify WOC if wound(s) deteriorate.  Nursing to notify the Provider(s) and re-consult the WOC Nurse if new skin concern.    DATA:     Current support surface: Standard  Standard gel mattress (Isoflex)  Containment of urine/stool: Incontinence Protocol, Incontinent pad in bed, and Indwelling catheter  BMI: Body mass index is 26.41 kg/m .   Active diet order: Orders Placed This Encounter      Combination Diet Regular Diet Adult     Output: I/O last 3 completed shifts:  In: 500 [IV Piggyback:500]  Out: 900 [Urine:900]     Labs:   Recent Labs   Lab 09/10/24  0520   ALBUMIN 3.3*   HGB 9.7*   INR 7.19*   WBC 9.8     Pressure injury risk assessment:   Sensory Perception: 3-->slightly limited  Moisture: 3-->occasionally moist  Activity: 1-->bedfast  Mobility: 2-->very limited  Nutrition:  2-->probably inadequate  Friction and Shear: 2-->potential problem  Roscoe Score: 13    Jyoti CWOCN   1st choice: Securely message with [employer] ([facility] [employer] [facility])   (2nd option: [facility] Office Phone [phone], messages checked periodically Mon-Fri 8a-4p)

## 2024-09-10 NOTE — CONSULTS
[facility]    Cardiology Consult Note- Cardiology    Date of Admission:  9/9/2024  Reason for Consult: Fluid overload most likely due to kidney disease    History of Present Illness   Feng Wynne is a 79 year old male admitted on 9/9/2024. He is a patient my partner Dr. Gomez he has a mechanical mitral valve because of mitral insufficiency we are consulted because of failure to thrive elevated INR and heart failure but in fact the patient is in acute renal failure because of obstructive uropathy.    In January 2024 he had severe anemia hemoglobin down to 4 ultimately was determined he had severe pill esophagitis with active bleeding and ulcer in February follow-up study still showed oozing ulcer and he received RBC transfusion he was thought to be in congestive heart failure second RV failure in the setting of recent bleed and anemia his echo at that time showed normal EF but moderate decreased RV systolic function moderate dilated RV with a flattened septum and moderate pulm hypertension severe TR he was diuresed and did well    His current echo here reports LV size normal EF 50 to 55% right ventricle normal size and mildly decreased RV systolic function biatrial enlargement mechanical mitral valve is functioning well with a mean gradient of 2 tricuspid valve they report not seen well but now only trace tricuspid insufficiency and normal estimated right heart pressure therefore he made a complete recovery from that January February event.  The week leading up to this admission he noted decreased urine output was feeling poorly INR jumped up to 7.  On this admission he was hyponatremic 3+ pitting edema which apparently the patient and his  tell me started over the week or so before admission in the ER he was given additional saline and albumin and Lasix.  It was noted that he had abdominal distention above and beyond his hernia and indeed it was a distended bladder.  Stubbs  catheter was placed and large amount of urine came out he has been seen by nephrology here.  His troponin did go up it was 104 and on repeat 102 so this was elevated but flat and would probably be most consistent with acute renal failure throughout January and February he also had chronically elevated troponins usually in the 60-70  range    Of note in July of this year he was seen for E. coli infection decubitus ulcer diarrhea weakness shortness of breath hyponatremia and had lower extremity swelling at that point to.  He does not report chest pain    Assessment & Plan: HVSL   1.  Fluid overload although it is called congestive heart failure with preserved ejection fraction I think it is primarily obstructive uropathy and then the heart was a secondary involved he is already been seen by nephrology and urology    At this point I do not think this is a primary cardiac problem I think it is obstructive uropathy and acute renal failure leading to fluid electrolyte disturbance he is already been seen by medical team urology and nephrology.  I recommend they proceed with treatment treating the obstructive uropathy if he still has problems after that please reconsult cardiology.  Regarding the troponin I think it is a flat rise and that would not necessarily mean an acute coronary syndrome and again there was no chest pain issues        The EKG here shows controlled atrial fibrillation nonspecific intraventricular conduction delay no obvious ST segment changes of an acute infarct.    His mitral valve surgery along with Maze procedure was done in 2008 I do not see the results of a coronary angiogram and although chart list coronary artery disease does not give us any additional help so I do not know if he had underlying coronary disease but in any case that would have been checked more than a decade ago.  At this time I do not think he needs heart catheterization or additional testing.  We will again see him on outpatient  basis please call if you need us while he still in the hospital here  Jeremiah Watson MD  ______________________________________________________________________    Past Medical History    Past Medical History:   Diagnosis Date    Acute on chronic congestive heart failure, unspecified heart failure type (H) 01/30/2024    Arthritis     Atrial fibrillation (H)     Coronary artery disease     Hypercholesteremia     Obesity     Unspecified essential hypertension        Past Surgical History   Past Surgical History:   Procedure Laterality Date    COLONOSCOPY N/A 1/15/2024    Procedure: Colonoscopy;  Surgeon: Osbaldo Olvera MD;  Location:  GI    ESOPHAGOSCOPY, GASTROSCOPY, DUODENOSCOPY (EGD), COMBINED N/A 1/15/2024    Procedure: Esophagoscopy, gastroscopy, duodenoscopy (EGD), combined;  Surgeon: Osbaldo Olvera MD;  Location:  GI    ESOPHAGOSCOPY, GASTROSCOPY, DUODENOSCOPY (EGD), COMBINED N/A 2/8/2024    Procedure: Esophagoscopy, gastroscopy, duodenoscopy (EGD), combined;  Surgeon: Osbaldo Olvera MD;  Location:  GI    IRRIGATION AND DEBRIDEMENT SACRAL WOUND, COMBINED N/A 3/22/2024    Procedure: IRRIGATION AND DEBRIDEMENT, WOUND, SACRAL REGION;  Surgeon: Phill Jimenez MD;  Location: [facility] OR    MITRAL VALVE REPLACEMENT  8-    Mitral valve replacement with 31-mm St. Raffi mechanical valve.        Family History   Family History   Problem Relation Age of Onset    Cerebrovascular Disease Father     Diabetes Paternal Grandmother     C.A.D. Brother         CABG X 3 at age 51        Social History   Social History     Socioeconomic History    Marital status: Single    Number of children: 1   Occupational History    Occupation: Self employed      Comment:     Tobacco Use    Smoking status: Former     Current packs/day: 0.00     Average packs/day: 0.5 packs/day for 5.0 years (2.5 ttl pk-yrs)     Types: Cigarettes     Start date: 1993     Quit date: 1998     Years since  quittin.7    Smokeless tobacco: Never   Vaping Use    Vaping status: Never Used   Substance and Sexual Activity    Alcohol use: Yes     Comment: 1 drink per month    Drug use: No    Sexual activity: Yes     Partners: Male   Other Topics Concern    Parent/sibling w/ CABG, MI or angioplasty before 65F 55M? Yes     Social Determinants of Health     Physical Activity: Inactive (2024)    Exercise Vital Sign     Days of Exercise per Week: 0 days     Minutes of Exercise per Session: 0 min   Interpersonal Safety: Low Risk  (2024)    Interpersonal Safety     Do you feel physically and emotionally safe where you currently live?: Yes     Within the past 12 months, have you been hit, slapped, kicked or otherwise physically hurt by someone?: No     Within the past 12 months, have you been humiliated or emotionally abused in other ways by your partner or ex-partner?: No   Housing Stability: Low Risk  (2024)    Housing Stability     Do you have housing? : Yes     Are you worried about losing your housing?: No        Medications   Medications Prior to Admission   Medication Sig Dispense Refill Last Dose    acetaminophen (TYLENOL) 500 MG tablet Take 2 tablets (1,000 mg) by mouth 3 times daily (Patient taking differently: Take 1,000 mg by mouth nightly as needed for mild pain.)   prn    diclofenac (VOLTAREN) 1 % topical gel Apply 4 g topically 3 times daily (Patient taking differently: Apply 4 g topically 3 times daily as needed for moderate pain.)   prn    ferrous sulfate (FEROSUL) 325 (65 Fe) MG tablet Take 1 tablet (325 mg) by mouth daily (with breakfast) 90 tablet 3 2024    HYDROmorphone (DILAUDID) 4 MG tablet Take 0.5 tablets (2 mg) by mouth every 6 hours as needed for severe pain 30 tablet 0 prn    Multiple Vitamins-Minerals (MULTIVITAMIN ADULT) CHEW Take 2 chew tab by mouth daily   2024    NONFORMULARY Take by mouth at bedtime. Marijuana edible at bedtime   2024    nystatin (MYCOSTATIN) 789773  UNIT/GM external powder Apply topically 2 times daily as needed for other Groin folds   prn    Probiotic CHEW Take 2 chew tab by mouth daily   9/8/2024    torsemide (DEMADEX) 20 MG tablet Take 1 tablet (20 mg) by mouth daily 30 tablet 1 9/8/2024    warfarin ANTICOAGULANT (COUMADIN) 3 MG tablet Take 1 tablet (3 mg) on Tuesdays, Thursdays, Saturdays and take 1.5 tablets (4.5 mg) all other days or as directed by the INR clinic (Patient taking differently: As of 9/6/24:  Hold doses on 9/6 and 9/7, then take 1 tablet (3 mg) on Tuesdays and Fridays and take 0.5 tablet (1.5 mg) all other days or as directed by the INR clinic) 180 tablet 0 9/8/2024 at 1.5 mg    order for DME Equipment being ordered: COMPRESSION STOCKINGS, 20-30 mmHg 1 each 1     pantoprazole (PROTONIX) 40 MG EC tablet Take 1 tablet (40 mg) by mouth 2 times daily (before meals) 60 tablet 1 unknown at Does not think he's taking this       Allergies    Allergies   Allergen Reactions    Amiodarone Shortness Of Breath    Pcn [Penicillins] Shortness Of Breath     Rxn occurred in childhood     Statins Muscle Pain (Myalgia) and Hives    Amiodarone     Latex     Zetia [Ezetimibe] Muscle Pain (Myalgia)     Muscle weakness legs        Review of Systems    Skin: Skin breakdown and decubitus ulcer when he was sick earlier this year  Eyes: No eye problem  ENT: neg  Respiratory:  he has had shortness of breath  Cardiovascular: Cardiac as above  Gastroenterology: Very large abdominal hernia  Genitourinary: See HPI   Musculoskeletal: generalized weakness noted  Neurologic: No stroke   normal  Psychiatric: Negative  Heme/Lymph/Imm: Normal  Endocrine: Negative    Physical Exam   Vitals: BP 93/68 (BP Location: Right arm)   Pulse 69   Temp 97  F (36.1  C) (Oral)   Resp 16   Wt 93.3 kg (205 lb 11 oz)   SpO2 98%   BMI 26.41 kg/m    Constitutional: Patient interviewed with his  present apparently he is feeling better but he is very tender to most exam anywhere cannot  sit up well because of pain  Skin: Skin that I could see anteriorly was normal he could not rise up or turn so I cannot see his back  Head: Nontraumatic  Eyes: Nonicteric  Lymph: No cervical lymph node  ENT: Not examined  Neck: No thyromegaly normal carotid upstroke  Respiratory: I could not do a good respiratory exam he could not sit up or turn well I did hear some crackles on the left side which is the dependent side I had difficulty hearing any breath sounds on the right side  Cardiac: Irregular rhythm with good mechanical click and soft systolic murmur  GI: Abdominal exam notable for marked hernia tenderness with some palpation no abdominal bruit very hard to feel liver or's plane because of the large hernia  Extremities and Muscular Skeletal: Peripheral edema noted  Neurological: He appeared nonfocal again limited exam he cannot move well  Psych: Alert  Femoral pulses and dorsal pedal pulses were 2+    Medical Decision Making             Data     I have personally reviewed the following data over the past 24 hrs:    9.8  \   9.7 (L)   / 249     127 (L) 78 (L) 96.8 (H) /  80   3.6 25 3.14 (H) \     ALT: <5 AST: 18 AP: 80 TBILI: 0.6   ALB: 3.3 (L) TOT PROTEIN: 5.8 (L) LIPASE: N/A     Trop: 102 (HH) BNP: 8,442 (H)     Procal: N/A CRP: N/A Lactic Acid: 0.9       INR:  7.19 (HH) PTT:  N/A   D-dimer:  N/A Fibrinogen:  N/A         Imaging results reviewed over the past 24 hrs:   Recent Results (from the past 24 hour(s))   Chest XR,  PA & LAT    Narrative    CHEST TWO VIEWS  9/9/2024 4:27 PM     HISTORY:  Crackles. Orthopnea.    COMPARISON: 2/7/2024.      Impression    IMPRESSION: Sternotomy and mitral valve prosthesis. Shallow  inspiration accentuates heart size and pulmonary vascularity. No  pleural effusions. Lungs clear.     SHAR SIDHU MD         SYSTEM ID:  [id number]   CT Abdomen Pelvis w/o Contrast    Narrative    EXAM: CT ABDOMEN PELVIS W/O CONTRAST  LOCATION: [facility]  DATE:  9/9/2024    INDICATION: Nausea. Hernias. Acute kidney insufficiency.  COMPARISON: None.  TECHNIQUE: CT scan of the abdomen and pelvis was performed without IV contrast. Multiplanar reformats were obtained. Dose reduction techniques were used.  CONTRAST: None.    FINDINGS:   LOWER CHEST: Sternotomy and mitral valve prosthesis. Consolidation at the left lung base posteriorly is likely atelectasis. Coronary artery calcification.    HEPATOBILIARY: No significant mass or bile duct dilatation. No calcified gallstones.     PANCREAS: Normal.    SPLEEN: Normal.    ADRENAL GLANDS: Normal.    KIDNEYS/BLADDER: The urinary bladder is moderately distended. No significant mass, stone, or hydronephrosis.    BOWEL: Large amount of stool throughout the colon and within the rectum, consistent with constipation. There is a large ventral hernia containing multiple loops of gas and stool-filled colon, as well as a small amount of fluid. Scattered colonic   diverticulosis. No small bowel dilatation is identified.    LYMPH NODES: No lymphadenopathy.    VASCULATURE: Unremarkable.    PELVIC ORGANS: No pelvic masses.    MUSCULOSKELETAL: Advanced degenerative changes right hip. Degenerative changes are also noted throughout the visualized thoracolumbar spine. There is diffuse subcutaneous edema.      Impression    IMPRESSION:   1.  Large amount of stool throughout the colon and within the rectum, consistent with constipation.  2.  Large ventral hernia containing multiple loops of gas and stool-filled colon, as well as a small amount of fluid. No convincing evidence for associated bowel obstruction.  3.  Moderate distention of the urinary bladder.  4.  Diffuse subcutaneous edema.     US Renal Complete Non-Vascular    Narrative    EXAM: US RENAL COMPLETE NON-VASCULAR  LOCATION: [facility]  DATE: 9/9/2024    INDICATION: Acute renal failure of unknown origin  COMPARISON: Today's 9/9/2024, 7:00 PM noncontrast CT  AP  TECHNIQUE: Routine Bilateral Renal and Bladder Ultrasound.    FINDINGS:    RIGHT KIDNEY: Obscured by overlying bowel gas and not visualized.    LEFT KIDNEY: Possible mild hydronephrosis of the left kidney. Lower half of the left kidney is obscured by overlying bowel gas and not visualized    BLADDER: Moderate to marked bladder distention at 15 x 14 x 13 cm.      Impression    IMPRESSION:  1.  Moderate to marked bladder distention and possible mild hydronephrosis of the left kidney. Consider placement of a Stubbs bladder catheter.  2.  The entire right kidney and the lower half of the left kidney are obscured by overlying bowel gas.   Echocardiogram Complete   Result Value    LVEF  50-55%    Narrative    [id number]  [id number]  [id number]  [id number]^SUNNY^ALEM^JOSE L     [facility]  Echocardiography Laboratory  [address] [place] [address]     Name: JESSE CABRAL  MRN: [id number]  : 1945  Study Date: 09/10/2024 07:58 AM  Age: 79 yrs  Gender: Male  Patient Location: [facility]  Reason For Study: Heart Failure  Ordering Physician: ALEM CORREA  Referring Physician: ALEM CORREA  Performed By: Giovanny Coyle     BSA: 2.1 m2  Height: 74 in  Weight: 189 lb  HR: 71  BP: 92/53 mmHg  ______________________________________________________________________________  Procedure  Complete Echo Adult. Optison (NDC #9400-3433) given intravenously. Technically  difficult study.  ______________________________________________________________________________  Interpretation Summary     Status post mechanical mitral valve replacement with a 31-mm St. Raffi  mechanical valve for severe degenerative mitral valve regurgitation, 2008.  The mitral prosthesis is well-seated. No regurgitation.  Mean diastolic gradient 2 mmHg (heart rate 72 bpm, afib).  Normal left ventricular systolic function. Estimated LVEF 50-55%.  Normal right ventricular size with mildly decreased systolic function.  Trace  tricuspid valve regurgitation.  Inferior vena cava not well-visualized due to challenging subcostal images.     On the last study dated 2/1/2024, moderately decreased RV function and severe  tricuspid valve regurgitation was reported.     ______________________________________________________________________________  Left Ventricle  The left ventricle is normal in size. There is normal left ventricular wall  thickness. Left ventricular systolic function is normal. The visual ejection  fraction is 50-55%. Diastolic function not assessed due to presence of  prosthetic mitral valve. Septal motion is consistent with post-operative  state.     Right Ventricle  The right ventricle is normal size. Mildly decreased right ventricular  systolic function.     Atria  The left atrium is severely dilated. The right atrium is moderately dilated.  Right atrium not well visualized.     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 Raffi) mechanical prosthesis. The prosthetic mitral  valve is well-seated. This degree of valvular regurgitation is within normal  limits. Normal prosthetic mitral valve gradients. Mean diastolic gradient 2  mmHg (heart rate 72 bpm, afib).     Tricuspid Valve  The tricuspid valve is not well visualized, but is grossly normal. There is  trace tricuspid regurgitation. The right ventricular systolic pressure is  approximated at 20.2 mmHg plus the right atrial pressure. Right ventricle  systolic pressure estimate normal.     Aortic Valve  The aortic valve is trileaflet. No aortic regurgitation is present. No aortic  stenosis is present.     Pulmonic Valve  There is trace pulmonic valvular regurgitation.     Vessels  The aortic root is normal size. Inferior vena cava not well visualized for  estimation of right atrial pressure.     Pericardium  There is no pericardial effusion.     Rhythm  The rhythm was atrial fibrillation.  ______________________________________________________________________________  MMode/2D  Measurements & Calculations  IVSd: 0.90 cm     LVIDd: 4.0 cm  LVIDs: 3.5 cm  LVPWd: 1.0 cm  FS: 12.5 %  LV mass(C)d: 118.2 grams  LV mass(C)dI: 55.7 grams/m2  Ao root diam: 3.8 cm  LVOT diam: 2.4 cm  LVOT area: 4.4 cm2  Ao root diam index Ht(cm/m): 2.0  Ao root diam index BSA (cm/m2): 1.8  RWT: 0.50     Doppler Measurements & Calculations  MV E max jose: 118.0 cm/sec  MV max P.1 mmHg  MV mean P.0 mmHg  MV V2 VTI: 45.6 cm  MVA(VTI): 1.3 cm2  MV dec slope: 333.0 cm/sec2  MV dec time: 0.37 sec  Ao V2 max: 142.0 cm/sec  Ao max P.0 mmHg  Ao V2 mean: 94.6 cm/sec  Ao mean P.0 mmHg  Ao V2 VTI: 26.1 cm  KEITH(I,D): 2.2 cm2  KEITH(V,D): 2.6 cm2  LV V1 max P.9 mmHg  LV V1 max: 85.4 cm/sec  LV V1 VTI: 13.1 cm  SV(LVOT): 57.6 ml  SI(LVOT): 27.2 ml/m2     PA acc time: 0.10 sec  TR max jose: 223.8 cm/sec  TR max P.2 mmHg  AV Jose Ratio (DI): 0.60  KEITH Index (cm2/m2): 1.0  E/E' av.3  Lateral E/e': 6.9  Medial E/e': 11.7  RV S Jose: 5.3 cm/sec     ______________________________________________________________________________  Report approved by: Dr Darryl Martinez 09/10/2024 10:04 AM               Clinically Significant Risk Factors Present on Admission         # Hyponatremia: Lowest Na = 118 mmol/L in last 2 days, will monitor as appropriate  # Hypocalcemia: Lowest Ca = 8.5 mg/dL in last 2 days, will monitor and replace as appropriate    # Anion Gap Metabolic Acidosis: Highest Anion Gap = 24 mmol/L in last 2 days, will monitor and treat as appropriate  # Hypoalbuminemia: Lowest albumin = 3.2 g/dL at 2024  2:55 PM, will monitor as appropriate  # Drug Induced Coagulation Defect: home medication list includes an anticoagulant medication   # Acute Kidney Injury, unspecified: based on a >150% or 0.3 mg/dL increase in last creatinine compared to past 90 day average, will monitor renal function  # Hypertension: Noted on problem list  # Acute heart failure with preserved ejection fraction: heart failure noted on  problem list, last echo with EF >50%, and receiving IV diuretics   # Anemia: based on hgb <11           # Financial/Environmental Concerns:

## 2024-09-10 NOTE — CARE PLAN
Confused this morning. More clear as the morning has gone on. Continues to have low BP. EUNICE. On RA. Dilaudid for pain.

## 2024-09-10 NOTE — PROVIDER NOTIFICATION
MD Notification    Notified Person: MD    Notified Person Name: Cecile    Notification Date/Time: 9/10/24 9646    Notification Interaction: [employer] messaging    Purpose of Notification: Pt has sonia red blood from branch cath - 250 out since 1100.  He sat up on edge of bed with OT, now BPs went from 80s to 70s with map in upper 50s. Fluids started. Pt is not symptomatic anymore, rechecked multiple times. Conditional hgb order released. Can we try midodrine and does he need vit k?     Orders Received: 250 bolus, midodrine, vit K    Comments:

## 2024-09-10 NOTE — PHARMACY-ANTICOAGULATION SERVICE
Clinical Pharmacy - Warfarin Dosing Consult     Pharmacy has been consulted to manage this patient s warfarin therapy.  Indication: Atrial Fibrillation;Mechanical Mitral Valve Replacement  Therapy Goal: INR 2.5-3.5  Warfarin Prior to Admission: Yes  Warfarin PTA Regimen: As of 9/6/24:  Hold doses on 9/6 and 9/7, then take 1 tablet (3 mg) on Tuesdays and Fridays and take 0.5 tablet (1.5 mg) all other days or as directed by the INR clinic  Dose Comments: supratherapeutic INR, hold dose tonight    INR   Date Value Ref Range Status   09/09/2024 6.01 (HH) 0.85 - 1.15 Final   09/09/2024 6.11 (HH) 0.85 - 1.15 Final       Recommend warfarin NO DOSE today.  Pharmacy will monitor Feng Wynne daily and order warfarin doses to achieve specified goal.      Please contact pharmacy as soon as possible if the warfarin needs to be held for a procedure or if the warfarin goals change.

## 2024-09-10 NOTE — CONSULTS
Urology    Name: Feng Wynne    MRN: [id number]   YOB: 1945         We were asked to see Feng Wynne in consultation from CHRISTIE He  for evaluation and treatment of urinary retention.    Consult, one time to make recommendations.           Chief Complaint:   Urinary retention  History is obtained from the patient and EMR.          History of Present Illness:   Feng Wynne is a 79 year old male with urinary retention, admitted for hypervolemic HypoNa, ENRIQUE, urinary retention. He has a PMH of mechanical mitral valve replacement on chronic anticoagulation with warfarin, A-fib, HFpEF, coronary artery disease, hypertension, hyperlipidemia, sacral decubitus ulcers, and previous admission at [facility] from 7/18/2024 through 7/26/2024 due to enteropathic E. coli with associated diarrhea and mild hyponatremia.    He is known to our servie as Dr. Schultz's assistance for Stubbs placement was requested after surgery for his decubitus ulcer in March. He has a buried penis and phimosis. Stubbs was placed in the ED for 900cc.     Cr  3.14, Na 119, UA with large blood, trace leuk est. CT noted moderately distended bladder, large amount of stool in the colon. PSA 1.18 on 1/9/24. Tolerating Stubbs. Urine is draining grade III hematuria.           Past Medical History:     Past Medical History:   Diagnosis Date    Acute on chronic congestive heart failure, unspecified heart failure type (H) 01/30/2024    Arthritis     Atrial fibrillation (H)     Coronary artery disease     Hypercholesteremia     Obesity     Unspecified essential hypertension             Past Surgical History:     Past Surgical History:   Procedure Laterality Date    COLONOSCOPY N/A 1/15/2024    Procedure: Colonoscopy;  Surgeon: Osbaldo Olvera MD;  Location:  GI    ESOPHAGOSCOPY, GASTROSCOPY, DUODENOSCOPY (EGD), COMBINED N/A 1/15/2024    Procedure: Esophagoscopy, gastroscopy, duodenoscopy (EGD), combined;  Surgeon: Osbaldo Olvera  MD Taryn;  Location:  GI    ESOPHAGOSCOPY, GASTROSCOPY, DUODENOSCOPY (EGD), COMBINED N/A 2024    Procedure: Esophagoscopy, gastroscopy, duodenoscopy (EGD), combined;  Surgeon: Osbaldo Olvera MD;  Location:  GI    IRRIGATION AND DEBRIDEMENT SACRAL WOUND, COMBINED N/A 3/22/2024    Procedure: IRRIGATION AND DEBRIDEMENT, WOUND, SACRAL REGION;  Surgeon: Phill Jimenez MD;  Location: [facility] OR    MITRAL VALVE REPLACEMENT  2008    Mitral valve replacement with 31-mm [employer]. Raffi mechanical valve.             Social History:     Social History     Tobacco Use    Smoking status: Former     Current packs/day: 0.00     Average packs/day: 0.5 packs/day for 5.0 years (2.5 ttl pk-yrs)     Types: Cigarettes     Start date:      Quit date:      Years since quittin.7    Smokeless tobacco: Never   Substance Use Topics    Alcohol use: Yes     Comment: 1 drink per month            Family History:     Family History   Problem Relation Age of Onset    Cerebrovascular Disease Father     Diabetes Paternal Grandmother     C.A.D. Brother         CABG X 3 at age 51            Allergies:     Allergies   Allergen Reactions    Amiodarone Shortness Of Breath    Pcn [Penicillins] Shortness Of Breath     Rxn occurred in childhood     Statins Muscle Pain (Myalgia) and Hives    Amiodarone     Latex     Zetia [Ezetimibe] Muscle Pain (Myalgia)     Muscle weakness legs            Medications:     Current Facility-Administered Medications   Medication Dose Route Frequency Provider Last Rate Last Admin    acetaminophen (TYLENOL) tablet 975 mg  975 mg Oral TID Clara He APRN CNP        albumin human 5 % injection 25 g  25 g Intravenous TID PRN Clara He APRN CNP        calcium carbonate (TUMS) chewable tablet 1,000 mg  1,000 mg Oral 4x Daily PRN Clara He APRN CNP        HYDROmorphone (DILAUDID) tablet 2 mg  2 mg Oral Q4H PRN Clara He APRN CNP   2 mg at 09/10/24 0212     HYDROmorphone (DILAUDID) tablet 4 mg  4 mg Oral Q4H PRN Clara He APRN CNP        lidocaine (LMX4) cream   Topical Q1H PRN Clara He APRN CNP        lidocaine (LMX4) cream   Topical Q1H PRN Clara He APRN CNP        lidocaine 1 % 0.1-1 mL  0.1-1 mL Other Q1H PRN Clara He APRN CNP        lidocaine 1 % 0.1-1 mL  0.1-1 mL Other Q1H PRN Clara eH APRN CNP        naloxone (NARCAN) injection 0.2 mg  0.2 mg Intravenous Q2 Min PRN Jocelyn Palmer MD        Or    naloxone (NARCAN) injection 0.4 mg  0.4 mg Intravenous Q2 Min PRN Jocelyn Palmer MD        Or    naloxone (NARCAN) injection 0.2 mg  0.2 mg Intramuscular Q2 Min PRN Jocelyn Palmer MD        Or    naloxone (NARCAN) injection 0.4 mg  0.4 mg Intramuscular Q2 Min PRJocelyn Ontiveros MD        nystatin (MYCOSTATIN) topical powder   Topical BID PRN Clara He APRN CNP        pantoprazole (PROTONIX) EC tablet 40 mg  40 mg Oral BID AC Jocelyn Palmer MD        Patient is already receiving anticoagulation with heparin, enoxaparin (LOVENOX), warfarin (COUMADIN)  or other anticoagulant medication   Does not apply Continuous PRN Clara He APRN CNP        potassium chloride (KAYCIEL) solution 10 mEq  10 mEq Oral or Feeding Tube Once Ko Huber MD        prochlorperazine (COMPAZINE) injection 5 mg  5 mg Intravenous Q6H PRN Clara He APRN CNP        Or    prochlorperazine (COMPAZINE) tablet 5 mg  5 mg Oral Q6H PRN Clara He APRN CNP        Or    prochlorperazine (COMPAZINE) suppository 12.5 mg  12.5 mg Rectal Q12H PRN Clara He APRN CNP        senna-docusate (SENOKOT-S/PERICOLACE) 8.6-50 MG per tablet 1 tablet  1 tablet Oral BID PRN Clara He APRN CNP        Or    senna-docusate (SENOKOT-S/PERICOLACE) 8.6-50 MG per tablet 2 tablet  2 tablet Oral BID Clara Steel APRN CNP        senna-docusate (SENOKOT-S/PERICOLACE) 8.6-50 MG per tablet 1  tablet  1 tablet Oral At Bedtime Clara He APRN CNP   1 tablet at 09/10/24 0213    sodium chloride (PF) 0.9% PF flush 3 mL  3 mL Intracatheter Q8H Clara He APRN CNP        sodium chloride (PF) 0.9% PF flush 3 mL  3 mL Intracatheter q1 min prn Clara He APRN CNP        sodium chloride (PF) 0.9% PF flush 3 mL  3 mL Intracatheter Q8H Clara He APRN CNP   3 mL at 09/10/24 0227    sodium chloride (PF) 0.9% PF flush 3 mL  3 mL Intracatheter q1 min prn Clara He APRN CNP        Warfarin Dose Required Daily - Pharmacist Managed  1 each Oral See Admin Instructions Clara He APRN CNP                 Review of Systems:      ROS: 10 point ROS neg other than the symptoms noted above in the HPI.          Physical Exam:     VS:  T: 97.5    HR: 76    BP: 92/53    RR: 18   GEN:  AOx3.  NAD.  Pleasant.  GEN: NAD, lying in bed  EYES: EOMI  NEURO: AAO          Data:   All laboratory data reviewed:    Recent Labs   Lab 09/10/24  0520 09/09/24  1455   WBC 9.8 8.8   HGB 9.7* 10.2*    303       Recent Labs   Lab 09/10/24  0520 09/10/24  0154 09/09/24  2343 09/09/24  1848 09/09/24  1455   *  --  119* 120* 118*   POTASSIUM 3.6  --   --   --  3.7   CHLORIDE 78*  --   --   --  75*   CO2 25  --   --   --  24   BUN 96.8*  --   --   --  100.8*   CR 3.14*  --   --   --  3.11*   GLC 80 89 96  --  97   JOSEPH 8.5*  --   --   --  9.2   MAG  --   --   --  2.5*  --    PHOS 6.4*  --   --   --   --        Recent Labs   Lab 09/09/24  2345   COLOR Dark Brown*   APPEARANCE Cloudy*   URINEGLC Negative   URINEBILI Negative   URINEKETONE Negative   SG 1.015   URINEPH 5.5   PROTEIN 200*   NITRITE Negative   LEUKEST Trace*   RBCU >182*   WBCU 7*       All pertinent imaging reviewed.  Narrative & Impression   EXAM: CT ABDOMEN PELVIS W/O CONTRAST  LOCATION: [facility]  DATE: 9/9/2024     INDICATION: Nausea. Hernias. Acute kidney insufficiency.  COMPARISON:  None.  TECHNIQUE: CT scan of the abdomen and pelvis was performed without IV contrast. Multiplanar reformats were obtained. Dose reduction techniques were used.  CONTRAST: None.     FINDINGS:   LOWER CHEST: Sternotomy and mitral valve prosthesis. Consolidation at the left lung base posteriorly is likely atelectasis. Coronary artery calcification.     HEPATOBILIARY: No significant mass or bile duct dilatation. No calcified gallstones.      PANCREAS: Normal.     SPLEEN: Normal.     ADRENAL GLANDS: Normal.     KIDNEYS/BLADDER: The urinary bladder is moderately distended. No significant mass, stone, or hydronephrosis.     BOWEL: Large amount of stool throughout the colon and within the rectum, consistent with constipation. There is a large ventral hernia containing multiple loops of gas and stool-filled colon, as well as a small amount of fluid. Scattered colonic   diverticulosis. No small bowel dilatation is identified.     LYMPH NODES: No lymphadenopathy.     VASCULATURE: Unremarkable.     PELVIC ORGANS: No pelvic masses.     MUSCULOSKELETAL: Advanced degenerative changes right hip. Degenerative changes are also noted throughout the visualized thoracolumbar spine. There is diffuse subcutaneous edema.                                                                      IMPRESSION:   1.  Large amount of stool throughout the colon and within the rectum, consistent with constipation.  2.  Large ventral hernia containing multiple loops of gas and stool-filled colon, as well as a small amount of fluid. No convincing evidence for associated bowel obstruction.  3.  Moderate distention of the urinary bladder.  4.  Diffuse subcutaneous edema.               Impression and Plan:   Impression:   Feng Wynne is a 79 year old male with acute urinary retention, ENRIQUE       Plan:   -manage urinary retention with indwelling catheter.   -Ensure foreskin is replaced after Stubbs cares.   -Start flomax 0.4mg every day if medically able  (And continue on discharge) - monitor for orthostatic hypotension as this is the most common side effect.   -Avoid anticholinergic, antihistamine, and alpha-agonist medications as these will exacerbate urinary retention   -Bowel regimen to remedy constipation.  -UC added on.  -Hand irrigate PRN dysfunction. Urine is currently draining grade III hematuria.    -f/u with urology for trial of void in 7-10 days. He will also require cysto for hematuria eval. Send urine cytology (ordered). Our office will call to coordinate.       Discussed with Dr. Schultz.     Mona Jiménez PA-C  [facility]  Office: [phone]  After business hours: [phone]

## 2024-09-10 NOTE — PROGRESS NOTES
"CLINICAL NUTRITION SERVICES  -  ASSESSMENT NOTE    Recommendations Ordered by Registered Dietitian (RD):   Ordered Expedite at lunch w/ ice  Ordered Ensure Enlive at breakfast  Ordered MVI/M w/ cosign --> Addendum: cannot order d/t drug-drug interaction with ciprofloxacin   Malnutrition:   % Weight Loss:  > 10% in 6 months (severe malnutrition)  % Intake: <75% for >/= 3 months (moderate malnutrition)  Subcutaneous Fat Loss:  DERIC  Muscle Loss:  DERIC  Fluid Retention:  3+ Moderate generalized edema    Malnutrition Diagnosis: Severe malnutrition  In Context of:  Acute illness or injury  Chronic illness or disease     REASON FOR ASSESSMENT  Feng Wynne is a 79 year old male seen by Registered Dietitian for RN Consult - \"wound healing support needs\"    PMH of mechanical mitral valve replacement on chronic anticoagulation with warfarin, A-fib, HFpEF, coronary artery disease, hypertension, hyperlipidemia, sacral decubitus ulcers. Presents with acute hypervolemic hyponatremia.    NUTRITION HISTORY    - Patient known to nutrition services. He was last seen 7/24 in which he met severe malnutrition criteria at that time.    - Per chart review (patient busy with therapies):  - Follows a vegetarian diet at home. Usually consumes at least 2 meals/day. In July, patient noted his UBW is around 220# (currently 206#). When admitted he usually prefers to self select food items while staying on a regular diet.  - Has received Expedite previous admits and tolerated. Will order this admit to help with wound healing. He has also tried Ensure, Ensure + ice cream and Gel+.   - Suspect patient has been meeting <75% of nutritional needs for months with decreasing weight trends.    CURRENT NUTRITION ORDERS  Diet Order: Regular     Current Intake/Tolerance:  - 25% intake documented this AM per nursing flowsheets  - Ordered breakfast this AM and lunch per [facility]    NUTRITION FOCUSED PHYSICAL ASSESSMENT FOR DIAGNOSING " "MALNUTRITION)  No:  Patient not available           Obtained from Chart/Interdisciplinary Team:  WOC following (9/10) ~  Pressure Injury Location: buttock, sacrococcygeal   Wound type: Pressure Injury     Pressure Injury Stage: 4, present on admission, with history of osteomyelitis, with superimposed deep tissue injury POA to wound base and wound edge   STATUS: initial assessment     ANTHROPOMETRICS  Height: 6'2\"  Weight: 93.3 kg, 205 lbs 11.03 oz  Body mass index is 26.41 kg/m .  Weight Status:  Overweight BMI 25-29.9  IBW: 86.4 kg  % IBW: 108%  Weight History: 16.7% wt loss in 6 months  Wt Readings from Last 50 Encounters:   09/10/24 93.3 kg (205 lb 11 oz)   07/26/24 86 kg (189 lb 9.5 oz)   06/03/24 103.9 kg (229 lb)   05/30/24 103.7 kg (228 lb 9.6 oz)   05/21/24 103.7 kg (228 lb 9.6 oz)   05/16/24 103.7 kg (228 lb 9.6 oz)   05/09/24 103.7 kg (228 lb 9.6 oz)   05/06/24 103.7 kg (228 lb 9.6 oz)   05/01/24 103.7 kg (228 lb 9.6 oz)   04/26/24 106.1 kg (233 lb 14.4 oz)   04/24/24 106.1 kg (233 lb 14.4 oz)   04/17/24 106.1 kg (233 lb 14.4 oz)   04/09/24 111.9 kg (246 lb 11.2 oz)   04/02/24 111.9 kg (246 lb 11.2 oz)   04/01/24 112 kg (247 lb)   03/26/24 112.1 kg (247 lb 1.6 oz)   03/18/24 112.5 kg (248 lb)   03/15/24 108.9 kg (240 lb)   03/14/24 112 kg (247 lb)   03/12/24 113.9 kg (251 lb)   03/08/24 117.5 kg (259 lb)   03/08/24 117.5 kg (259 lb)   03/06/24 117.5 kg (259 lb)   03/04/24 117.8 kg (259 lb 11.2 oz)   03/01/24 117.5 kg (259 lb)   02/28/24 118.8 kg (262 lb)   02/26/24 121.6 kg (268 lb)   02/22/24 121.6 kg (268 lb)   02/19/24 121.6 kg (268 lb)   02/15/24 122 kg (269 lb)   02/15/24 122.4 kg (269 lb 13.5 oz)   01/29/24 (!) 155.3 kg (342 lb 6.4 oz)   01/26/24 143.8 kg (317 lb)   01/25/24 (!) 153 kg (337 lb 6.4 oz)   01/22/24 (!) 152.4 kg (336 lb)   01/22/24 (!) 152.4 kg (336 lb)   01/18/24 (!) 160.1 kg (352 lb 15.7 oz)   01/09/24 135.6 kg (299 lb)   09/13/23 135.6 kg (299 lb)     LABS  Na 122 (L), BUN 96.8 (H), " Cr 3.14 (H), Phos 6.4 (H)    MEDICATIONS  Miralax, senokot, IVF @ 100 mL/hr    ASSESSED NUTRITION NEEDS PER APPROVED PRACTICE GUIDELINES:  Dosing Weight 93.3 kg  Estimated Energy Needs: 7723-6502 kcals (25-30 Kcal/Kg)  Justification: wound healing  Estimated Protein Needs:  grams protein (1-1.2 g pro/Kg)  Justification: wound healing however elevated BUN  Estimated Fluid Needs: 1316-2148 mL (1 mL/Kcal)  Justification: maintenance or per MD    NUTRITION DIAGNOSIS:  Inadequate oral intake related to increased needs, suspect reduced appetite as evidenced by stage 4 sacrococcygeal pressure injury, 16.7% wt loss in 6 months    NUTRITION INTERVENTIONS  Recommendations / Nutrition Prescription  See above    Implementation  Nutrition education: patient not available  Medical Food Supplement - ordered    Nutrition Goals  Patient to consume at least 50-75% of TID meals  Patient to consume 1 Expedite daily    MONITORING AND EVALUATION:  Progress towards goals will be monitored and evaluated per protocol and Practice Guidelines    Patti Oakes RD, LD  Clinical Dietitian - [facility]

## 2024-09-10 NOTE — CONSULTS
Care Management Initial Consult    General Information  Assessment completed with: VM-chart review,    Type of CM/SW Visit: Initial Assessment    Primary Care Provider verified and updated as needed: Yes   Readmission within the last 30 days: no previous admission in last 30 days      Reason for Consult: discharge planning  Advance Care Planning: Advance Care Planning Reviewed: present on chart          Communication Assessment  Patient's communication style: spoken language (English or Bilingual)             Cognitive  Cognitive/Neuro/Behavioral: WDL, .WDL except  Level of Consciousness: intermittent confusion  Arousal Level: arouses to voice  Orientation: disoriented to, time, situation  Mood/Behavior: calm, cooperative  Best Language: 0 - No aphasia  Speech: clear, spontaneous, logical    Living Environment:   People in home: significant other     Current living Arrangements: apartment      Able to return to prior arrangements: yes       Family/Social Support:  Care provided by: self, spouse/significant other  Provides care for: no one     Support system:            Description of Support System:           Current Resources:   Patient receiving home care services: Yes  Skilled Home Care Services: Skilled Nursing     Community Resources: Home Care  Equipment currently used at home: raised toilet seat, grab bar, toilet, wheelchair, manual, shower chair, grab bar, tub/shower, other (see comments) (Adjustable bed)  Supplies currently used at home: Compression Stockings, Wound Care Supplies    Employment/Financial:  Employment Status: retired        Financial Concerns: none           Does the patient's insurance plan have a 3 day qualifying hospital stay waiver?  No    Lifestyle & Psychosocial Needs:  Social Determinants of Health     Food Insecurity: Not on file   Depression: Not at risk (3/15/2024)    PHQ-2     PHQ-2 Score: 0   Housing Stability: Low Risk  (6/11/2024)    Housing Stability     Do you have housing? :  Yes     Are you worried about losing your housing?: No   Tobacco Use: Medium Risk (7/1/2024)    Patient History     Smoking Tobacco Use: Former     Smokeless Tobacco Use: Never     Passive Exposure: Not on file   Financial Resource Strain: Not on file   Alcohol Use: Not on file   Transportation Needs: Not on file   Physical Activity: Inactive (6/11/2024)    Exercise Vital Sign     Days of Exercise per Week: 0 days     Minutes of Exercise per Session: 0 min   Interpersonal Safety: Low Risk  (9/5/2024)    Interpersonal Safety     Do you feel physically and emotionally safe where you currently live?: Yes     Within the past 12 months, have you been hit, slapped, kicked or otherwise physically hurt by someone?: No     Within the past 12 months, have you been humiliated or emotionally abused in other ways by your partner or ex-partner?: No   Stress: Not on file   Social Connections: Not on file   Health Literacy: Not on file       Functional Status:  Prior to admission patient needed assistance:   Dependent ADLs:: Ambulation-walker  Dependent IADLs:: Cleaning       Mental Health Status:          Chemical Dependency Status:                Values/Beliefs:  Spiritual, Cultural Beliefs, Uatsdin Practices, Values that affect care: no               Discussed  Partnership in Safe Discharge Planning  document with patient/family: No    Additional Information:  Per care management/social work consult for discharge planning. Patient was admitted on 9/9/2024 concerning for hypervolemic hyponatremia with request for hospitalist to admit for further treatment and evaluation. The tentative date of discharge is yet to be determined. Reviewed chart.  Per patient and Jose's report, they live in a single level apartment. Jose is patient's primary caregiver, however patient is able to care for himself for certain things. Patient has mostly been wheelchair and bed bound as of lately. He has an adjustable bed. He has a manual wheelchair  and transfers with the help of Jose and an EZ Stand. Patient has a shower chair, raised toilet seat, grab bars in the shower, and by the toilet in the shower. Patient is able to dress and fee himself. Patient is open to [facility] for nursing and OT.     From previous notes patient has been to [facility], [facility], and [facility] in the past. Patient states that he did not have the best experience at [facility]. [facility] was OK. He had a great experience at [facility] and this would be his first choice. Patient has [employer] Medicare Advantage so he will need pre-auth prior to discharge. Patient has Cardiology and WOC following. Patient may need wound care at discharge.     Next Steps: Follow for discharge planning. OT currently recommending LTC.     Carlos GARLAND, SID  [facility]  Care Management

## 2024-09-10 NOTE — PLAN OF CARE
Goal Outcome Evaluation:      Plan of Care Reviewed With: patient, significant other    Overall Patient Progress: no changeOverall Patient Progress: no change    [facility] Nursing [facility]    Patient Information  Name: Feng Wynne  Age: 79 year old    Admission Information  Date: 9/9/2024   Reason:Hyponatremia [E87.1]  Generalized weakness [R53.1]  Acute kidney injury (H24) [N17.9]  Acute on chronic congestive heart failure, unspecified heart failure type (H) [I50.9]     Assessment  Orientation/Neuro: Disorientated to, Time, and Situation, forgetful  Cardiac/Tele: Afib CVR with PVCs  Resp: ZUNIGA   GI/: Stubbs cath with sonia red blood - provider notified, hgb redrawn, vit K   No nausea, vomiting, abdominal pain, or diarrhea, but does have constipation declined PRNs   Mobility: bedbound, sat on edge of bed with OT and RN   Pain: chronic PU pain, but declined pain medications tylenol    Diet: Orders Placed This Encounter      Combination Diet Regular Diet Adult    Vital Signs  B/P: 78/59, T: 97.5, P: 68, R: 16, O2: 95% on RA      Plan  NS at 100 for kidney function, NA, and hypotension, Vit K for INR of 7, midodrine added for hypotension  Continue to work with therapy, encourage activity, turn and repo q 2 hours, potential discharge in 3-4 days    Elaina Plascencia RN

## 2024-09-10 NOTE — CONSULTS
RENAL CONSULTATION NOTE    REFERRING MD:  Carrillo Taveras DO     REASON FOR CONSULTATION:   post-renal aislinn, severe hyponatremia     HPI:  79 y.o man with mechanical mitral valve, afib, CAD, HTN and decubitus ulcer, who was admitted for severe hyponatremia and severe acute kidney injury.    Patient's  is at the bedside.   He says his partner has had worsening lower extremities edema for a week.   Feng has a decubitus wound.  His  say that the INR clinic advised them to come to the hospital for supra-therapeutic INR.  He is confined to the bed for most of the day.   It has been more difficult to lay flat in bed.  He says his breathing is excellent.   He says he does not eat much.   He denies vomiting and diarrhea.  He has had decrease urine output.   He denies taking NSAIDs.     In ER: afebrile BP 96/70  Na 118 and Scr of 3.11  CT wo contrast showed large stool in the colon, moderate bladder distention and subcutaneous edema.   He was given 500 ml NS, albumin + Lasix.   Branch was placed with ~ 900 ml of urine.   Currently, branch bag is emptied with small amount of gross hematuria.     I reviewed noes from ER (Dr. Butt), urology (Tino Dunn PA-C) and IM (Dr. Taveras).   Cardiology consult pending    ROS:  A complete review of systems was performed and is negative except as noted above.    PMH:    Past Medical History:   Diagnosis Date    Acute on chronic congestive heart failure, unspecified heart failure type (H) 01/30/2024    Arthritis     Atrial fibrillation (H)     Coronary artery disease     Hypercholesteremia     Obesity     Unspecified essential hypertension        PSH:    Past Surgical History:   Procedure Laterality Date    COLONOSCOPY N/A 1/15/2024    Procedure: Colonoscopy;  Surgeon: Osbaldo Olvera MD;  Location:  GI    ESOPHAGOSCOPY, GASTROSCOPY, DUODENOSCOPY (EGD), COMBINED N/A 1/15/2024    Procedure: Esophagoscopy, gastroscopy, duodenoscopy (EGD), combined;  Surgeon:  Osbaldo Olvera MD;  Location:  GI    ESOPHAGOSCOPY, GASTROSCOPY, DUODENOSCOPY (EGD), COMBINED N/A 2/8/2024    Procedure: Esophagoscopy, gastroscopy, duodenoscopy (EGD), combined;  Surgeon: Osbaldo Olvera MD;  Location:  GI    IRRIGATION AND DEBRIDEMENT SACRAL WOUND, COMBINED N/A 3/22/2024    Procedure: IRRIGATION AND DEBRIDEMENT, WOUND, SACRAL REGION;  Surgeon: Phill Jimenez MD;  Location: [facility] OR    MITRAL VALVE REPLACEMENT  8-    Mitral valve replacement with 31-mm St. Raffi mechanical valve.        MEDICATIONS:    Current Facility-Administered Medications   Medication Dose Route Frequency Provider Last Rate Last Admin    acetaminophen (TYLENOL) tablet 975 mg  975 mg Oral TID Clara He APRN CNP        dutasteride (AVODART) capsule 0.5 mg  0.5 mg Oral Daily Ko Huber MD        pantoprazole (PROTONIX) EC tablet 40 mg  40 mg Oral BID AC Jocelyn Palmer MD   40 mg at 09/10/24 0844    polyethylene glycol (MIRALAX) Packet 17 g  17 g Oral BID Ko Huber MD        senna-docusate (SENOKOT-S/PERICOLACE) 8.6-50 MG per tablet 1 tablet  1 tablet Oral At Bedtime Clara He APRN CNP   1 tablet at 09/10/24 0213    sodium chloride (PF) 0.9% PF flush 3 mL  3 mL Intracatheter Q8H Clara He APRN CNP        Warfarin Dose Required Daily - Pharmacist Managed  1 each Oral See Admin Instructions Clara He APRN CNP        warfarin-No DOSE today  1 each Does not apply no dose today (warfarin) Carrillo Taveras,            ALLERGIES:    Allergies as of 09/09/2024 - Reviewed 09/09/2024   Allergen Reaction Noted    Amiodarone Shortness Of Breath 07/25/2011    Pcn [penicillins] Shortness Of Breath 12/29/2006    Statins Muscle Pain (Myalgia) and Hives 01/17/2024    Amiodarone  01/22/2024    Latex  12/29/2006    Zetia [ezetimibe] Muscle Pain (Myalgia) 02/09/2022       FH:    Family History   Problem Relation Age of Onset    Cerebrovascular Disease Father      Diabetes Paternal Grandmother     C.A.D. Brother         CABG X 3 at age 51       SH:    Social History     Socioeconomic History    Marital status: Single     Spouse name: Not on file    Number of children: 1    Years of education: Not on file    Highest education level: Not on file   Occupational History    Occupation: Self employed      Comment:     Tobacco Use    Smoking status: Former     Current packs/day: 0.00     Average packs/day: 0.5 packs/day for 5.0 years (2.5 ttl pk-yrs)     Types: Cigarettes     Start date:      Quit date:      Years since quittin.7    Smokeless tobacco: Never   Vaping Use    Vaping status: Never Used   Substance and Sexual Activity    Alcohol use: Yes     Comment: 1 drink per month    Drug use: No    Sexual activity: Yes     Partners: Male   Other Topics Concern    Parent/sibling w/ CABG, MI or angioplasty before 65F 55M? Yes   Social History Narrative    Not on file     Social Determinants of Health     Financial Resource Strain: Not on file   Food Insecurity: Not on file   Transportation Needs: Not on file   Physical Activity: Inactive (2024)    Exercise Vital Sign     Days of Exercise per Week: 0 days     Minutes of Exercise per Session: 0 min   Stress: Not on file   Social Connections: Not on file   Interpersonal Safety: Low Risk  (2024)    Interpersonal Safety     Do you feel physically and emotionally safe where you currently live?: Yes     Within the past 12 months, have you been hit, slapped, kicked or otherwise physically hurt by someone?: No     Within the past 12 months, have you been humiliated or emotionally abused in other ways by your partner or ex-partner?: No   Housing Stability: Low Risk  (2024)    Housing Stability     Do you have housing? : Yes     Are you worried about losing your housing?: No       PHYSICAL EXAM:    BP (!) 89/58   Pulse 64   Temp 97.5  F (36.4  C) (Oral)   Resp 16   Wt 93.3 kg (205 lb 11 oz)   SpO2  98%   BMI 26.41 kg/m    GENERAL: Pleasant. Frail.   HEENT:  Normocephalic. No gross abnormalities.  Pupils equal.  MMM.    CV: Irregular, + Mechanical click  RESP: CTAB. No wheezes.   GI: Abdomen obese, soft, NT  MUSCULOSKELETAL: lymphedema wrap. + edema.   SKIN: no suspicious lesions or rashes, dry to touch  NEURO:  Awake, alert and conversing normally  PSYCH: mood good, affect appropriate  LYMPH: No palpable ant/post cervical  +brian with ~ 50 ml of gross hematuria.     LABS:      CBC RESULTS:     Recent Labs   Lab 09/10/24  0520 09/09/24  1455   WBC 9.8 8.8   RBC 3.32* 3.51*   HGB 9.7* 10.2*   HCT 29.3* 30.8*    303       BMP RESULTS:  Recent Labs   Lab 09/10/24  0520 09/10/24  0154 09/09/24  2343 09/09/24  1848 09/09/24  1455   *  --  119* 120* 118*   POTASSIUM 3.6  --   --   --  3.7   CHLORIDE 78*  --   --   --  75*   CO2 25  --   --   --  24   BUN 96.8*  --   --   --  100.8*   CR 3.14*  --   --   --  3.11*   GLC 80 89 96  --  97   JOSEPH 8.5*  --   --   --  9.2       INR  Recent Labs   Lab 09/10/24  0520 09/09/24  2343 09/09/24  1455 09/09/24  0930   INR 7.19* 6.63* 6.01* 6.11*        DIAGNOSTICS:  Reviewed    Renal ultrasound   RIGHT KIDNEY: Obscured by overlying bowel gas and not visualized.     LEFT KIDNEY: Possible mild hydronephrosis of the left kidney. Lower half of the left kidney is obscured by overlying bowel gas and not visualized     BLADDER: Moderate to marked bladder distention at 15 x 14 x 13 cm.                                                                      IMPRESSION:  1.  Moderate to marked bladder distention and possible mild hydronephrosis of the left kidney. Consider placement of a Stubbs bladder catheter.  2.  The entire right kidney and the lower half of the left kidney are obscured by overlying bowel gas.    CT AP wo contrast:  1.  Large amount of stool throughout the colon and within the rectum, consistent with constipation.  2.  Large ventral hernia containing multiple  loops of gas and stool-filled colon, as well as a small amount of fluid. No convincing evidence for associated bowel obstruction.  3.  Moderate distention of the urinary bladder.  4.  Diffuse subcutaneous edema.    TTE:  Status post mechanical mitral valve replacement with a 31-mm St. Raffi  mechanical valve for severe degenerative mitral valve regurgitation, 9/2008.  The mitral prosthesis is well-seated. No regurgitation.  Mean diastolic gradient 2 mmHg (heart rate 72 bpm, afib).  Normal left ventricular systolic function. Estimated LVEF 50-55%.  Normal right ventricular size with mildly decreased systolic function.  Trace tricuspid valve regurgitation.  Inferior vena cava not well-visualized due to challenging subcostal images.    A/P:  79 y.o man admitted for severe hyponatremia and severe acute kidney injury.     # Patient with a creatinine of 0.6 mg/dl at the end of July.     # Severe acute kidney in the setting of bladder outlet obstruction:    -blood pressure is also soft   -poor intake     #Severe hypochloremic hyponatremia: Hypervolemia.    -improving    # Bladder outlet obstruction:    -branch in place   -urology    # Hypervolemia: Lots of edema in the LE, but not much respiratory issue. He lays flat in bed comfortably without ZUNIGA or needing supplemental O2.    -TTE with good LV/RV functions.     # Afib on coumadin: Supra-therapeutic INR.    Plan:  # No diuretic for now. BP is soft.   # Start midodrine 10 mg tid  # Add AM cortisol level  # Recheck renal panel at 1600  # Dose all medications to eGFR 10-20 ml/min  # Avoid NSAIDs and nephrotoxins  # Encourage oral intake      Josue Zendejas MD  [facility] - Nephrology  Office Phone: [phone]  Pager: [phone]

## 2024-09-10 NOTE — PROGRESS NOTES
09/10/24 7145   Appointment Info   Signing Clinician's Name / Credentials (OT) Amber Host, OTR/L   Living Environment   People in Home spouse   Current Living Arrangements apartment   Self-Care   Usual Activity Tolerance poor   Current Activity Tolerance poor   Regular Exercise No   Activity/Exercise/Self-Care Comment Pt bedbound. Uses EZ stand for transfers when he has to attend doctor appts. Pt reports he does not get OOB for anything else. Spouse and home health assists w/ all ADLs/IADLs   General Information   Onset of Illness/Injury or Date of Surgery 09/09/24   Referring Physician Clara He, MIKE CNP   Additional Occupational Profile Info/Pertinent History of Current Problem Feng Wynne is a 79 year old male with significant past medical history for mechanical mitral valve replacement on chronic anticoagulation with warfarin, A-fib, HFpEF, coronary artery disease, hypertension, hyperlipidemia, sacral decubitus ulcers, and previous admission at [facility] from 7/18/2024 through 7/26/2024 due to enteropathic E. coli with associated diarrhea and mild hyponatremia who presented to the emergency department with weakness, shortness of breath, increased lower extremity swelling, decreased appetite, and difficulty with lying flat.  His workup in the emergency department concerning for hypervolemic hyponatremia with request for hospitalist to admit for further treatment and evaluation.   Existing Precautions/Restrictions fall   Cognitive Status Examination   Orientation Status person;place   Affect/Mental Status (Cognitive) confabulatory   Follows Commands follows one-step commands;repetition of directions required;verbal cues/prompting required   Safety Deficit judgment   Executive Function Deficit initiation   Pain Assessment   Patient Currently in Pain Yes, see Vital Sign flowsheet   Range of Motion Comprehensive   General Range of Motion upper extremity range of motion deficits identified    General Upper Extremity Assessment (Range of Motion)   Upper Extremity: Range of Motion shoulder, right: UE ROM;scapula, right: UE ROM;elbow/forearm, right: UE ROM;scapula, left: UE ROM;shoulder, left: UE ROM   Strength Comprehensive (MMT)   General Manual Muscle Testing (MMT) Assessment upper extremity strength deficits identified   Upper Extremity (Manual Muscle Testing)   Upper Extremity: Manual Muscle Testing (MMT) left shoulder strength deficit;right shoulder strength deficit;left scapular strength deficit;right scapular strength deficit;left elbow/forearm strength deficit;right elbow/forearm strength deficit   Bed Mobility   Bed Mobility rolling right;rolling left   Rolling Left Stanton (Bed Mobility) maximum assist (25% patient effort);2 person assist   Rolling Right Stanton (Bed Mobility) maximum assist (25% patient effort);2 person assist   Activities of Daily Living   BADL Assessment/Intervention lower body dressing;toileting   Lower Body Dressing Assessment/Training   Stanton Level (Lower Body Dressing) dependent (less than 25% patient effort)   Toileting   Stanton Level (Toileting) dependent (less than 25% patient effort)   Clinical Impression   Criteria for Skilled Therapeutic Interventions Met (OT) Yes, treatment indicated   OT Diagnosis decreased ADL independence, strength, ROM   OT Problem List-Impairments impacting ADL problems related to;activity tolerance impaired;cognition;mobility;strength;range of motion (ROM)   Assessment of Occupational Performance 1-3 Performance Deficits   Identified Performance Deficits functional mobility, ADLs   Planned Therapy Interventions (OT) ADL retraining;ROM;strengthening;progressive activity/exercise;transfer training;cognition   Clinical Decision Making Complexity (OT) detailed assessment/moderate complexity   Risk & Benefits of therapy have been explained patient   OT Total Evaluation Time   OT Siddhartha Moderate Complexity Minutes (92445) 15    OT Goals   Therapy Frequency (OT) 2 times/week   OT Predicted Duration/Target Date for Goal Attainment 09/26/24   OT Goals Transfers;Hygiene/Grooming   OT: Hygiene/Grooming supervision/stand-by assist;from wheelchair   OT: Transfer Maximum assist;with assistive device  (EZ stand transfer)   Therapeutic Activities   Therapeutic Activity Minutes (05260) 23   Symptoms noted during/after treatment fatigue;increased pain   Treatment Detail/Skilled Intervention Pt supine in bed, S.O and RN present. Increased edu and encouragement to participate in session. Pt often going off topic, receptive to cueing to redirect back to task. Pt limited by weakness, pain. Ultimately receptive to EOB sitting after extensive edu and encouragement. RN present and assisting. Pt required repetition of VCs for inititation, hand placement, technique to perform supine-sit. Pt completed w/ max A x2 after cueing. Pt required max A to maintain midline on EOB d/t weakness, pain and anxiety. Pt required to return to supine after ~2 mins d/t mentioned above. Pt required total A x2 to return to supine and boost up in bed. Repo'ed at end of session to off load buttocks and elevate LEs. Pt set up in supine w/ all needs alarm on at end of session. S.O and RN still present.   OT Discharge Planning   OT Plan EZ stand from EOB if pt allows. simple G/H   OT Discharge Recommendation (DC Rec) Long term care facility   OT Rationale for DC Rec Pt below baseline. Limited by pain, weakness. Per pt and S.O report, pt has been mostly bedbound recently. Only uses EZ stand to get to w/c for appts. Appears pt w/ increased weakness and difficulty performing basic functional mobility tasks, recommend LTC facility to reduce caregiver burnout and safely perform ADLs/functional mobility tasks.   OT Brief overview of current status Goals of therapy will be to address safe mobility and make recs for d/c to next level of care. Pt and RN will continue to follow all falls risk  precautions as documented by RN staff while hospitalized   Total Session Time   Timed Code Treatment Minutes 23   Total Session Time (sum of timed and untimed services) 38

## 2024-09-11 NOTE — PROGRESS NOTES
Urology Progress Note    Name: Feng Wynne    MRN: [id number]   YOB: 1945  Date of Admission: 9/9/2024               Impression and Plan:   Impression / Plan:   Feng Wynne is a 79 year old male with mechanical mitral valve replacement on chronic anticoagulation with warfarin, A-fib, HFpEF, coronary artery disease, hypertension, hyperlipidemia, sacral decubitus ulcers, and previous admission at [facility] from 7/18/2024 through 7/26/2024 due to enteropathic E. coli with associated diarrhea and mild hyponatremia who presented to the emergency department with weakness, shortness of breath, increased lower extremity swelling, decreased appetite, and difficulty with lying flat. Found to have acute urinary retention, ENRIQUE, gross hematuria, possible UTI.      Cr 3.12 --> 2.82  Hgb 9.4--> 8.7  AVSS  In good spirits.   Overall severity of hematuria improving. Grade III collecting in tubing. No clots.   UC and cytology in process.    -Continue IV Abx, tailor Tx to C&S as available.   -Continue Flomax daily.   -Avoid anticholinergic, antihistamine, and alpha-agonist medications as these will exacerbate urinary retention   -Maintain indwelling catheter. Ensure foreskin is replaced after Stubbs cares.   -Start flomax 0.4mg every day if medically able (And continue on discharge) - monitor for orthostatic   -Avoid anticholinergic, antihistamine, and alpha-agonist medications as these will exacerbate urinary retention   -Bowel regimen to remedy constipation.  -Hand irrigate PRN for cath dysfunction.   -f/u with urology for trial of void in 7-10 days. He will also require cysto for hematuria eval. Our office will call to coordinate.     Thank you for the opportunity to participate in the care of Feng Wynne.     MIKE Ayon, CNP  [facility] - Department of Urology  Office: [phone]  After business hours: [phone]  Securely message with SPark!

## 2024-09-11 NOTE — PROGRESS NOTES
Renal Medicine Progress Note            Assessment/Plan:     79 y.o man admitted for severe hyponatremia and severe acute kidney injury.      # Patient with a creatinine of 0.6 mg/dl at the end of July.      # Severe acute kidney in the setting of bladder outlet obstruction:                -blood pressure is also soft               -poor intake      #Severe hypochloremic hyponatremia: Hypervolemia.                -improving     # Bladder outlet obstruction:                -branch in place               -urology     # Hypervolemia: Lots of edema in the LE, but not much respiratory issue. He lays flat in bed comfortably without ZUNIGA or needing supplemental O2.                -TTE with good LV/RV functions.      # Afib on coumadin: Supra-therapeutic INR.     Plan:  # Check sodium q8hrs  # Getting Lasix and albumin  # Consider 2% NS if sodium continues to decline  # Stop Flomax-BP is too low  # May need to transfer to ICU for pressor if BP        Interval History:      No new questions from Feng or his .   Getting NS infusion.   He ate half of his lunch.   Branch with gross hematuria.  Creatinine is better.  BP is low.           Medications and Allergies:     Current Facility-Administered Medications   Medication Dose Route Frequency Provider Last Rate Last Admin    acetaminophen (TYLENOL) tablet 975 mg  975 mg Oral TID Clara He APRN CNP        albumin human 25 % injection 12.5 g  12.5 g Intravenous Q8H Ko Huber MD   12.5 g at 09/11/24 1423    cefTRIAXone (ROCEPHIN) 1 g vial to attach to  mL bag for ADULTS or NS 50 mL bag for PEDS  1 g Intravenous Q24H Ko Huber MD   1 g at 09/11/24 1319    dutasteride (AVODART) capsule 0.5 mg  0.5 mg Oral Daily Ko Huber MD   0.5 mg at 09/11/24 0820    furosemide (LASIX) injection 20 mg  20 mg Intravenous Once Ko Huber MD        midodrine (PROAMATINE) tablet 10 mg  10 mg Oral TID w/meals Ko Huber MD   10 mg at  09/11/24 1320    pantoprazole (PROTONIX) EC tablet 40 mg  40 mg Oral BID AC Jocelyn Palmer MD   40 mg at 09/11/24 0820    polyethylene glycol (MIRALAX) Packet 17 g  17 g Oral BID Ko Huber MD   17 g at 09/11/24 0819    senna-docusate (SENOKOT-S/PERICOLACE) 8.6-50 MG per tablet 1 tablet  1 tablet Oral At Bedtime Clara He APRN CNP   1 tablet at 09/10/24 2319    sodium chloride (PF) 0.9% PF flush 3 mL  3 mL Intracatheter Q8H Clara He APRN CNP        [Held by provider] tamsulosin (FLOMAX) capsule 0.4 mg  0.4 mg Oral QPM Ko Huber MD   0.4 mg at 09/10/24 2319    Warfarin Dose Required Daily - Pharmacist Managed  1 each Oral See Admin Instructions Clara He APRN CNP        warfarin-No DOSE today  1 each Does not apply no dose today (warfarin) Ko Huber MD            Allergies   Allergen Reactions    Amiodarone Shortness Of Breath    Pcn [Penicillins] Shortness Of Breath     Rxn occurred in childhood     Statins Muscle Pain (Myalgia) and Hives    Amiodarone     Latex     Zetia [Ezetimibe] Muscle Pain (Myalgia)     Muscle weakness legs            Physical Exam:   Vitals were reviewed   , Blood pressure (!) 79/51, pulse 68, temperature 97.4  F (36.3  C), temperature source Oral, resp. rate 18, weight 101 kg (222 lb 10.6 oz), SpO2 100%.    Wt Readings from Last 3 Encounters:   09/11/24 101 kg (222 lb 10.6 oz)   07/26/24 86 kg (189 lb 9.5 oz)   06/03/24 103.9 kg (229 lb)       Intake/Output Summary (Last 24 hours) at 9/11/2024 1554  Last data filed at 9/11/2024 1319  Gross per 24 hour   Intake 1838.33 ml   Output 650 ml   Net 1188.33 ml       GENERAL APPEARANCE: NAD  HEENT:  Eyes/ears/nose/neck grossly normal  RESP: lungs cta b c good efforts, no crackles, rhonchi or wheezes  CV: RRR  ABDOMEN: Obese, soft.   EXTREMITIES/SKIN: ++ edema. Lymphedema wrapped  NEURO: Awake, alert and conversing appropriately         Data:     CBC RESULTS:     Recent Labs   Lab  09/11/24  0545 09/10/24  1830 09/10/24  0520 09/09/24  1455   WBC 7.3  --  9.8 8.8   RBC 2.97*  --  3.32* 3.51*   HGB 8.7* 9.4* 9.7* 10.2*   HCT 26.4*  --  29.3* 30.8*     --  249 303       Basic Metabolic Panel:  Recent Labs   Lab 09/11/24  1252 09/11/24  0738 09/11/24  0545 09/11/24  0023 09/11/24  0004 09/10/24  1830 09/10/24  1148 09/10/24  0520 09/10/24  0154 09/09/24 2343 09/09/24 1848 09/09/24  1455   *  --  123*  --  121* 120* 122* 127*  --  119*   < > 118*   POTASSIUM 3.7  --  3.0*  --   --   --   --  3.6  --   --   --  3.7   CHLORIDE  --   --  82*  --   --   --   --  78*  --   --   --  75*   CO2  --   --  25  --   --   --   --  25  --   --   --  24   BUN  --   --  96.3*  --   --   --   --  96.8*  --   --   --  100.8*   CR  --   --  2.83*  --   --   --   --  3.14*  --   --   --  3.11*   GLC  --  93 90 97  --   --   --  80 89 96  --  97   JOSEPH  --   --  7.9*  --   --   --   --  8.5*  --   --   --  9.2    < > = values in this interval not displayed.       INR  Recent Labs   Lab 09/11/24  0545 09/10/24  0520 09/09/24  2343 09/09/24  1455   INR 2.01* 7.19* 6.63* 6.01*      Attestation:   I have reviewed today's relevant vital signs, notes, medications, labs and imaging.    Josue Zendejas MD  [facility] - Nephrology  Office phone :[phone]  Pager: [phone]

## 2024-09-11 NOTE — PROGRESS NOTES
[facility]    Hospitalist Progress Note    Brief Summary:    Assessment & Plan   Feng Wynne is a 79 year old male with significant past medical history for mechanical mitral valve replacement on chronic anticoagulation with warfarin, A-fib, HFpEF, coronary artery disease, hypertension, hyperlipidemia, sacral decubitus ulcers, and previous admission at [facility] from 7/18/2024 through 7/26/2024 due to enteropathic E. coli with associated diarrhea and mild hyponatremia who presented to the emergency department with weakness, shortness of breath, increased lower extremity swelling, decreased appetite, and difficulty with lying flat.       Acute urinary retention   Acute renal failure secondary to obstructive uropathy/likely ATN  Status post Stubbs catheter placement  Gross hematuria  Supratherapeutic INR: Resolved  Possible UTI    This is a 79-year-old male with multiple comorbidities as above, mostly bedridden, has difficulty in passing urine for more than couple of weeks, and I will build to pass urine.  On exam in the ED found to have urinary obstruction, ultrasound of the renal shows moderate to marked bladder distention and possible mild hydronephrosis of the left kidney, unable to visualize right kidney.  CT abdomen shows moderate distention of the urinary bladder  Status post Stubbs catheter placement with cause hematuria at this time.  He received a dose of IV Lasix times once in the ED.  No significant output since catheter placement.  Although he have lower extreme edema but looks intravascularly dry.  9/10/2024, I started  him on IV normal saline at 100 mL/h, as he was hypotensive as well has most likely have obstructive uropathy that will help his recover his kidney function.  Baseline creatinine normal last check in July this year.    Creatinine is not trending down to 2.84 with IV fluids.  He does have gross hematuria, evaluate by the urology, no intervention at this  time.  Slightly better today    His INR was supratherapeutic 7.0, although would like to avoid giving vitamin K given a mitral valve replacement with mechanical valve, but he has been bleeding with gross hematuria and hypotensive.  I did give him 2.5 mg of IV vitamin K to help control his INR to his therapeutic levels.  INR does improved to 2.0 today.  Continue strict intake and output charting  Daily weight  Continue with the Stubbs catheter placement  Start him on Flomax if able to take it given low blood pressure, and Avodart 0.5 mg daily.  Nephrology is consulted appreciate their input  Recheck UA shows more than 180 WBC count and more than 180 RBCs.    Keep him on IV ceftriaxone, cultures are pending.      At this time as he has lower extremity edema, weight increased to 101 kg.(May not be accurate), I will hold IV fluids now.  I will give him IV albumin 12.5 g every 8 hours, I will give a dose of IV Lasix 20 mg times once.  Continue midodrine and increase the dose to 10 mg 3 times a day to improve his blood pressure.      Hyponatremia  Patient sodium on admission 118.  Patient told me that he is drinking about 3 L of water daily and sometimes more.  He does have some lower extremity edema  Urine osmolarity is 285, urine sodium less than 20, serum osmolarity 293, mixed picture.  The IV fluids, sodium only mildly improved to 123.  At this time I will stop the IV fluids, keep him on free water restriction 1500 mL in 24 hours  Start him on salt tablet 1 g 3 times daily  IV Lasix 20 mg once.  As above        Acute renal failure with hydronephrosis secondary to outlet obstruction  -Baseline creatinine typically 0.5 to-0.61 is 3.11  -Believe secondary to outlet obstruction distended bladder and mild hydronephrosis, now most likely in ATN  -CT abdomen pelvis with distended bladder and large amount of stool throughout colon and no evidence of obstruction  -Ultrasound of kidneys with moderate to marked bladder distention  and possible mild hydronephrosis of left kidney  -Stubbs catheter placed with large amount of cola colored urine returned  -Urology consulted  -Patient received one-time dose of 60 mg Lasix with albumin will monitor closely before proceeding with further doses and input from cardiology.    He was given IV fluids, creatinine slightly improved to 2.83 at this time.  Nephrology is consulted and following.  Will start him on IV albumin and give a dose of IV Lasix 20 mg times once.       Elevated troponin suspect type II MI  Status post mitral valve replacement with mechanical valve  Severe tricuspid regurgitation  Chronic paroxysmal atrial fibrillation  Hypertension  -Elevated troponin likely secondary from acute heart failure exacerbation  -Initial troponin 104-second 102 EKG changes    -Mechanical mitral valve on anticoagulation currently supratherapeutic with INR of 6.1 on admission, increased to 7 today.  -Pharmacy is consulted to dose warfarin, holding warfarin at this time  -Typically takes 20 mg torsemide daily at home, hold any diuretics at this time  -Received 60 mg IV Lasix with albumin in the ED.  Holding further diuresis.  -Appreciate cardiac consultation  -Continuous cardiac monitoring  Cardiology was consulted and evaluate the patient.  Does not recommend any intervention at this time.          Chronic iron deficiency anemia and anemia of chronic disease  -Hemoglobin stable at 10.2  -Will hold ferrous sulfate for now     Constipation with large known ventral hernia  CT scan shows large ventral hernia, and large stools in the colon.  No obstruction at this time.  He is significantly constipated  Start him on MiraLAX 17 g twice daily and senna twice daily.  Will give him tap water enema today      Chronic sacral and coccyx wounds  -follows with wound clinic   -woc consulted      Bilateral lower extremity edema  Patient has significant 2-3+ lower extremity edema, currently lymphedema wrap in place.  Likely  third spacing, will continue with the lymphedema wrap at this time.    Hypotension  Patient is hypotensive at this time.  But asymptomatic.  Do not know what his baseline blood pressure at home.  I will give him a bolus of 250 on normal saline, start normal saline at 100 mL/h.  Start him on midodrine 5 mg 3 times daily, blood pressure still on the lower side increase the dose to 10 mg 3 times daily.  Cortisol level checked yesterday 26.1.  This morning cortisol levels pending, unlikely to be adrenal insufficiency     Diet:  regular  DVT Prophylaxis: Warfarin  Stubbs Catheter: Not present  Lines: None     Cardiac Monitoring: None  Code Status:  Full           Clinically Significant Risk Factors        # Hypokalemia: Lowest K = 3 mmol/L in last 2 days, will replace as needed  # Hyponatremia: Lowest Na = 118 mmol/L in last 2 days, will monitor as appropriate     # Anion Gap Metabolic Acidosis: Highest Anion Gap = 24 mmol/L in last 2 days, will monitor and treat as appropriate  # Hypoalbuminemia: Lowest albumin = 2.8 g/dL at 9/11/2024  5:45 AM, will monitor as appropriate    # Coagulation Defect: INR = 2.01 (Ref range: 0.85 - 1.15) and/or PTT = 85 Seconds (Ref range: 22 - 38 Seconds), will monitor for bleeding   # Acute Kidney Injury, unspecified: based on a >150% or 0.3 mg/dL increase in last creatinine compared to past 90 day average, will monitor renal function  # Hypertension: Noted on problem list  # Acute heart failure with preserved ejection fraction: heart failure noted on problem list, last echo with EF >50%, and receiving IV diuretics           # Severe Malnutrition: based on nutrition assessment, PRESENT ON ADMISSION   # Financial/Environmental Concerns: none                 DVT Prophylaxis: Warfarin, INR supratherapeutic  Code Status: Full Code    Disposition: Expected discharge in 2-4 days once stable.    Medically Ready for Discharge: Anticipated in 2-4 Days        Ko Huber MD, MD  Text Page  (7am -  6pm)    Interval History   Patient seen and evaluated in his room today, partner at bedside.  Patient feeling much better as compared to yesterday, denies any chest pain shortness orthopnea PND palpitation headache dizziness lightheadedness no fever chills or cough.  He has been lying comfortably in no acute    -Data reviewed today: I reviewed all new labs and imaging results over the last 24 hours. I personally reviewed no images or EKG's today.    Physical Exam   Temp: 98.4  F (36.9  C) Temp src: Axillary BP: 94/64 Pulse: 69   Resp: 18 SpO2: 100 % O2 Device: None (Room air) Oxygen Delivery: 1 LPM  Vitals:    09/09/24 1437 09/10/24 0630 09/11/24 0400   Weight: 85.7 kg (189 lb) 93.3 kg (205 lb 11 oz) 101 kg (222 lb 10.6 oz)     Vital Signs with Ranges  Temp:  [97.4  F (36.3  C)-98.7  F (37.1  C)] 98.4  F (36.9  C)  Pulse:  [57-78] 69  Resp:  [16-18] 18  BP: ()/(42-73) 94/64  SpO2:  [94 %-100 %] 100 %  I/O last 3 completed shifts:  In: 2328.33 [P.O.:710; I.V.:1618.33]  Out: 950 [Urine:950]    Constitutional: awake, alert, cooperative, no apparent distress, and appears stated age  Eyes: Lids and lashes normal, pupils equal, round and reactive to light, extra ocular muscles intact, sclera clear, conjunctiva normal  Respiratory: No increased work of breathing, good air exchange, clear to auscultation bilaterally, no crackles or wheezing  Cardiovascular: Normal apical impulse, regular rate and rhythm, normal S1 and S2, no S3 or S4, and no murmur noted  GI: No scars, normal bowel sounds, soft, non-distended, non-tender, large ventral hernia, no hepatosplenomegally  Skin: no bruising or bleeding, have decubitus ulcer  Musculoskeletal: 2-3+ lower extremity pitting edema present, lymphedema wrap in place  Neurologic: No focal deficits    Medications   Current Facility-Administered Medications   Medication Dose Route Frequency Provider Last Rate Last Admin    Patient is already receiving anticoagulation with heparin,  enoxaparin (LOVENOX), warfarin (COUMADIN)  or other anticoagulant medication   Does not apply Continuous PRN Clara He APRN CNP         Current Facility-Administered Medications   Medication Dose Route Frequency Provider Last Rate Last Admin    acetaminophen (TYLENOL) tablet 975 mg  975 mg Oral TID Clara He APRN CNP        albumin human 25 % injection 12.5 g  12.5 g Intravenous Q8H Ko Huber MD        cefTRIAXone (ROCEPHIN) 1 g vial to attach to  mL bag for ADULTS or NS 50 mL bag for PEDS  1 g Intravenous Q24H Ko Huber MD   1 g at 09/10/24 1407    dutasteride (AVODART) capsule 0.5 mg  0.5 mg Oral Daily Ko Huber MD   0.5 mg at 09/11/24 0820    furosemide (LASIX) injection 20 mg  20 mg Intravenous Once Ko Huber MD        midodrine (PROAMATINE) tablet 10 mg  10 mg Oral TID w/meals Ko Huber MD   10 mg at 09/11/24 0820    pantoprazole (PROTONIX) EC tablet 40 mg  40 mg Oral BID AC Jocelyn Palmer MD   40 mg at 09/11/24 0820    polyethylene glycol (MIRALAX) Packet 17 g  17 g Oral BID Ko Huber MD   17 g at 09/11/24 0819    senna-docusate (SENOKOT-S/PERICOLACE) 8.6-50 MG per tablet 1 tablet  1 tablet Oral At Bedtime Clara He APRN CNP   1 tablet at 09/10/24 2319    sodium chloride (PF) 0.9% PF flush 3 mL  3 mL Intracatheter Q8H Clara He APRN CNP        [Held by provider] tamsulosin (FLOMAX) capsule 0.4 mg  0.4 mg Oral QPM Ko Huber MD   0.4 mg at 09/10/24 2319    Warfarin Dose Required Daily - Pharmacist Managed  1 each Oral See Admin Instructions Clara He APRN CNP           Data   Recent Labs   Lab 09/11/24  0738 09/11/24  0545 09/11/24  0023 09/11/24  0004 09/10/24  1830 09/10/24  1148 09/10/24  0520 09/10/24  0154 09/09/24  2343 09/09/24  1848 09/09/24  1455   WBC  --  7.3  --   --   --   --  9.8  --   --   --  8.8   HGB  --  8.7*  --   --  9.4*  --  9.7*  --   --   --  10.2*   MCV  --  89   --   --   --   --  88  --   --   --  88   PLT  --  225  --   --   --   --  249  --   --   --  303   INR  --  2.01*  --   --   --   --  7.19*  --  6.63*  --  6.01*   NA  --  123*  --  121* 120*   < > 127*  --  119*   < > 118*   POTASSIUM  --  3.0*  --   --   --   --  3.6  --   --   --  3.7   CHLORIDE  --  82*  --   --   --   --  78*  --   --   --  75*   CO2  --  25  --   --   --   --  25  --   --   --  24   BUN  --  96.3*  --   --   --   --  96.8*  --   --   --  100.8*   CR  --  2.83*  --   --   --   --  3.14*  --   --   --  3.11*   ANIONGAP  --  16*  --   --   --   --  24*  --   --   --  19*   JOSEPH  --  7.9*  --   --   --   --  8.5*  --   --   --  9.2   GLC 93 90 97  --   --   --  80   < > 96  --  97   ALBUMIN  --  2.8*  --   --   --   --  3.3*  --   --   --  3.2*   PROTTOTAL  --   --   --   --   --   --  5.8*  --   --   --  6.7   BILITOTAL  --   --   --   --   --   --  0.6  --   --   --  0.6   ALKPHOS  --   --   --   --   --   --  80  --   --   --  99   ALT  --   --   --   --   --   --  <5  --   --   --  <5   AST  --   --   --   --   --   --  18  --   --   --  21    < > = values in this interval not displayed.       No results found for this or any previous visit (from the past 24 hour(s)).

## 2024-09-11 NOTE — PLAN OF CARE
"Patient Name: Suhail  MRN: [id number]  Date of Admission: 9/9/2024  Reason for Admission: Acute hypervolemia with hyponatremia, acute renal failure, acute on chronic HF   [place] [facility] [place]    [place]itals:   BP Readings from Last 1 Encounters:   09/11/24 (!) 82/53     Pulse Readings from Last 1 Encounters:   09/11/24 63     Wt Readings from Last 1 Encounters:   09/11/24 101 kg (222 lb 10.6 oz)     Ht Readings from Last 1 Encounters:   07/18/24 1.88 m (6' 2\")     Estimated body mass index is 28.59 kg/m  as calculated from the following:    Height as of 7/18/24: 1.88 m (6' 2\").    Weight as of this encounter: 101 kg (222 lb 10.6 oz).  Temp Readings from Last 1 Encounters:   09/10/24 97.4  F (36.3  C) (Oral)       Pain: Pain goal 2/10 Pain Rating 5/10   Pain managed with repositioning    CV Surgery Patient: No    Assessment    Resp: RA. LS diminished  Telemetry: Afib CVR with freq PVCs  Neuro: Disoriented to time/situation, forgetful. R sided weakness baseline.   GI/: +BS, +gas, -BM overnight, small smear. Senna and miralax given. Stubbs catheter, sonia blood output, low UOP.   Skin/Wounds: Sacral pressure wound, wound care and dressing changed per WOC order. Blanchable redness to L buttock, skin prep applied. Scattered bruising. Abd hernia. +3 BLE edema, ace wraps in place.   Lines/Drains: Stubbs. 1 PIV infusing 100ml/hr NS.   Activity: A2 turn/repo q2  Sleep: 7 hours  Abnormal Labs: Sodium 121 @ 0000. Morning labs pending.     Aggression Stop Light: Green          Patient Care Plan: Promote bowel movement, encourage activity and working with therapies, turn/repo q2, q4 neuros, wound care 3x daily, monitor BP and signs of bleeding.    "

## 2024-09-11 NOTE — PROGRESS NOTES
ANTICOAGULATION     Feng Wynne is overdue for an INR check.     Per chart review,patient remains in patient. Will postpone one week.    Natali Mondragon RN  9/11/2024  Anticoagulation Clinic  [facility] for routing messages: [facility]  ACC patient phone line: [phone]

## 2024-09-11 NOTE — PLAN OF CARE
Orientation: A&Ox 2-3 self and day of the week   Vitals/Pain: Pt on 1-2L NC, SBP 70-80's. Pt reporting occ back pain, declines medication intervention throughout shift  Tele: Afib CVR with frequent PVCs   Lines/Drains: R PIV SL   LS: diminished   GI/: BS +, large hernia, x2 liquid loose BM following enema and suppository this shift. Still stool present. Pt having oliguria urine maroon with clots.   Labs: Abnormal/Trends, Electrolyte Replacement- K replaced recheck in AM   Ambulation/Assist: q2h turn   Skin/Wounds: coccyx wound dressing done x2/3, R hip mepi for scabbing   Plan: albumin added       Goal Outcome Evaluation:    Plan of Care Reviewed With: patient, significant other  Overall Patient Progress: no change  Outcome Evaluation: SBP 70-80's

## 2024-09-12 NOTE — PROGRESS NOTES
Patient is confused, disoriented to tome and situation. He is severe pain when he is touched; refuses Tylenol but wants Dilaudid. We discussed  today the possibility that narcotics can further depress his BP and Feng agreed not to take any Dilaudid.   Dressing change was completed X 2; incontinent of bowel, had 2 soft BMs on my shift.  Hypotensive, on RA, bed bound, turn/reposition q 2 hours, on heparin drip, coumadin was re-started.   Lower extremities have pitting edema, ACE wraps are in place.    Vegetarian, eat about 50%, refuses all nutritional supplements.   A dietitian consult would probably be warranted.    Scheduled albumin and midodrine.

## 2024-09-12 NOTE — PROGRESS NOTES
Care Management Follow Up    Length of Stay (days): 3    Expected Discharge Date: 09/13/2024     Concerns to be Addressed: discharge planning     Patient plan of care discussed at interdisciplinary rounds: Yes    Anticipated Discharge Disposition: Home Care     Anticipated Discharge Services:    Anticipated Discharge DME:      Patient/family educated on Medicare website which has current facility and service quality ratings: no  Education Provided on the Discharge Plan: Yes  Patient/Family in Agreement with the Plan: yes    Referrals Placed by CM/SW: Homecare  Private pay costs discussed: Not applicable    Discussed  Partnership in Safe Discharge Planning  document with patient/family: No     Handoff Completed: Yes, [facility] PCP: Internal handoff referral completed    Additional Information:  DIONY reviewed chart. PT deferred to OT. OT recommends LTC. SW met with pt and his spouse, Jose. Pt's goal is discharge home and resume care from [facility]. Pt is not interested in facility placement. He believes his level of care can continued to be met at home. Jose is in agreement. He will need a ride home. SW explained if able to transport by wheelchair, he will get a bill for the cost of the ride.     Next Steps: discharge home, when ready. Update [facility]. DIONY following for discharge planning.     GILL Edwards, LICSW  [phone] Desk phone  [phone] Cell/text (Preferred)  [facility]

## 2024-09-12 NOTE — PROGRESS NOTES
Renal Medicine Progress Note            Assessment/Plan:     79 y.o man admitted for severe hyponatremia and severe acute kidney injury.      # Patient with a creatinine of 0.6 mg/dl at the end of July.      # Severe acute kidney in the setting of bladder outlet obstruction:                -blood pressure is also soft               -poor intake      #Severe hypochloremic hyponatremia: Hypervolemia. Improved.               -not eating much     # Bladder outlet obstruction:                -branch in place               -urology     # Hypervolemia: Lots of edema in the LE, but not much respiratory issue. He lays flat in bed comfortably without ZUNIGA or needing supplemental O2.                -TTE with good LV/RV functions.      # Afib on coumadin: Supra-therapeutic INR.     Plan:  # Continue albumin infusion  # Intermittent IV Lasix + albumin as needed.   # Needs to optimize his nutritional status if he wants to get stronger        Interval History:     Pt says he feels better.  He says he ate half of his breakfast.  He does not like the protein boost so he is not drinking it.   RN to get nutritionist to help.    Not much urine output ~ 550 ml recorded and ~ 100 ml of intake?            Medications and Allergies:     Current Facility-Administered Medications   Medication Dose Route Frequency Provider Last Rate Last Admin    acetaminophen (TYLENOL) tablet 975 mg  975 mg Oral TID Clara He APRN CNP        albumin human 25 % injection 12.5 g  12.5 g Intravenous Q8H Ko Huber MD   12.5 g at 09/12/24 1146    cefTRIAXone (ROCEPHIN) 1 g vial to attach to  mL bag for ADULTS or NS 50 mL bag for PEDS  1 g Intravenous Q24H Ko Huber MD   1 g at 09/12/24 1125    dutasteride (AVODART) capsule 0.5 mg  0.5 mg Oral Daily Ko Huber MD   0.5 mg at 09/12/24 0836    midodrine (PROAMATINE) tablet 15 mg  15 mg Oral TID w/meals Ko Huber MD        pantoprazole (PROTONIX) EC tablet 40 mg   40 mg Oral BID Jocelyn Charles MD   40 mg at 09/12/24 0836    polyethylene glycol (MIRALAX) Packet 17 g  17 g Oral BID Ko Huber MD   17 g at 09/11/24 0819    senna-docusate (SENOKOT-S/PERICOLACE) 8.6-50 MG per tablet 1 tablet  1 tablet Oral At Bedtime Clara He APRN CNP   1 tablet at 09/10/24 2319    sodium chloride (PF) 0.9% PF flush 3 mL  3 mL Intracatheter Q8H Clara He APRN CNP   3 mL at 09/12/24 0841    warfarin ANTICOAGULANT (COUMADIN) tablet 5 mg  5 mg Oral ONCE at 18:00 Florencia Chowdary, [facility]        Warfarin Dose Required Daily - Pharmacist Managed  1 each Oral See Admin Instructions Clara He APRN CNP            Allergies   Allergen Reactions    Amiodarone Shortness Of Breath    Pcn [Penicillins] Shortness Of Breath     Rxn occurred in childhood     Statins Muscle Pain (Myalgia) and Hives    Amiodarone     Latex     Zetia [Ezetimibe] Muscle Pain (Myalgia)     Muscle weakness legs            Physical Exam:   Vitals were reviewed   , Blood pressure (!) 83/53, pulse 71, temperature 97.4  F (36.3  C), temperature source Oral, resp. rate 18, weight 101 kg (222 lb 10.6 oz), SpO2 97%.    Wt Readings from Last 3 Encounters:   09/11/24 101 kg (222 lb 10.6 oz)   07/26/24 86 kg (189 lb 9.5 oz)   06/03/24 103.9 kg (229 lb)       Intake/Output Summary (Last 24 hours) at 9/12/2024 1206  Last data filed at 9/12/2024 0923  Gross per 24 hour   Intake 230 ml   Output 775 ml   Net -545 ml     GENERAL APPEARANCE: frail.   HEENT:  Eyes/ears/nose/neck grossly normal  RESP: lungs cta b c good efforts, no crackles, rhonchi or wheezes  CV: irregular.  ABDOMEN: Obese, soft. Very large abdominal hernia  EXTREMITIES/SKIN: ++ edema. Lymphedema wrapped  NEURO: Awake, alert and conversing appropriately  Stubbs with clear urine         Data:     CBC RESULTS:     Recent Labs   Lab 09/12/24  1054 09/12/24  0527 09/11/24  0545 09/10/24  1830 09/10/24  0520 09/09/24  1455   WBC 5.8 6.4 7.3  --   9.8 8.8   RBC 2.78* 2.78* 2.97*  --  3.32* 3.51*   HGB 8.6* 8.3* 8.7* 9.4* 9.7* 10.2*   HCT 25.8* 25.3* 26.4*  --  29.3* 30.8*    206 225  --  249 303       Basic Metabolic Panel:  Recent Labs   Lab 09/12/24  0807 09/12/24  0527 09/12/24  0024 09/11/24  2255 09/11/24  1859 09/11/24  1703 09/11/24  1252 09/11/24  0738 09/11/24  0545 09/11/24  0023 09/11/24  0004 09/10/24  1148 09/10/24  0520 09/09/24  1848 09/09/24  1455   NA  --  126*  126* 123*  --  123*  --  121*  --  123*  --  121*   < > 127*   < > 118*   POTASSIUM  --  3.7  --   --   --   --  3.7  --  3.0*  --   --   --  3.6  --  3.7   CHLORIDE  --  88*  --   --   --   --   --   --  82*  --   --   --  78*  --  75*   CO2  --  26  --   --   --   --   --   --  25  --   --   --  25  --  24   BUN  --  100.3*  --   --   --   --   --   --  96.3*  --   --   --  96.8*  --  100.8*   CR  --  2.48*  --   --   --   --   --   --  2.83*  --   --   --  3.14*  --  3.11*   GLC 92 89  --  95  --  93  --  93 90   < >  --   --  80   < > 97   JOSEPH  --  7.8*  --   --   --   --   --   --  7.9*  --   --   --  8.5*  --  9.2    < > = values in this interval not displayed.       INR  Recent Labs   Lab 09/12/24  0527 09/11/24  0545 09/10/24  0520 09/09/24  2343   INR 1.93* 2.01* 7.19* 6.63*      Attestation:   I have reviewed today's relevant vital signs, notes, medications, labs and imaging.    Josue Zendejas MD  [facility] - Nephrology  Office phone :[phone]  Pager: [phone]

## 2024-09-12 NOTE — PROGRESS NOTES
Urology    Urine reportedly had gross hematuria remaining on rounds yesterday, but urine in branch is clear at this time    Urology will continue to follow

## 2024-09-12 NOTE — PLAN OF CARE
Neuro: lethargic oriented to self and place  Tele/cardiac: Afib CVR with PVCs, soft BP  Respiration: 2L NC  Activity: A2 with lift q2 turn and repo  Pain: generalized pain with repo, refused pain meds  Drips: PIV SL  Drains/tubes: none  Skin: pressure ulcer sacrum  GI/: branch incontinent of bowel  Aggression color: green  Isolation: none

## 2024-09-12 NOTE — PLAN OF CARE
Orientation: Disoriented to time & situation. Able to make needs known    Vitals/Tele: Afib CVR. VSS. SpO2>95% on 1.5L NC.     IV Access/drains: R AC    Diet: Reg diet    Mobility: Ax2 w/ Lift    GI/: Stubbs in place patent. Incontinent of bowels    Wound/Skin: stage 4 sacral wound noted & scattered bruising. Wound care per plan    Consults: Cards, OT    Discharge Plan: TBD      See Flow sheets for assessment

## 2024-09-12 NOTE — PROGRESS NOTES
[facility]    Hospitalist Progress Note    Brief Summary:    Assessment & Plan   Feng Wynne is a 79 year old male with significant past medical history for mechanical mitral valve replacement on chronic anticoagulation with warfarin, A-fib, HFpEF, coronary artery disease, hypertension, hyperlipidemia, sacral decubitus ulcers, and previous admission at [facility] from 7/18/2024 through 7/26/2024 due to enteropathic E. coli with associated diarrhea and mild hyponatremia who presented to the emergency department with weakness, shortness of breath, increased lower extremity swelling, decreased appetite, and difficulty with lying flat.       Acute urinary retention   Acute renal failure secondary to obstructive uropathy/likely ATN  Status post Stubbs catheter placement  Gross hematuria: Resolved  Supratherapeutic INR: Resolved  Possible UTI    This is a 79-year-old male with multiple comorbidities as above, mostly bedridden, has difficulty in passing urine for more than couple of weeks, and I will build to pass urine.  On exam in the ED found to have urinary obstruction, ultrasound of the renal shows moderate to marked bladder distention and possible mild hydronephrosis of the left kidney, unable to visualize right kidney.  CT abdomen shows moderate distention of the urinary bladder  Status post Stubbs catheter placement with cause hematuria at this time.  He received a dose of IV Lasix times once in the ED.  No significant output since catheter placement.  Although he have lower extreme edema but looks intravascularly dry.  9/10/2024, I started  him on IV normal saline at 100 mL/h, as he was hypotensive as well has most likely have obstructive uropathy that will help his recover his kidney function.  Baseline creatinine normal last check in July this year.    Creatinine is trending down, is making urine, hematuria resolved.  Creatinine improved to 2.48.  He does have gross hematuria,  evaluate by the urology, no intervention at this time.  Slightly better today    His INR was supratherapeutic 7.0, although would like to avoid giving vitamin K given a mitral valve replacement with mechanical valve, but he has been bleeding with gross hematuria and hypotensive.  I did give him 2.5 mg of IV vitamin K to help control his INR to his therapeutic levels.  INR is now subtherapeutic, normal gross hematuria.  Normal urine at this time I will start him on IV heparin and resume his Coumadin today..  Continue strict intake and output charting  Daily weight  Continue with the Stubbs catheter placement  Start him on Flomax if able to take it given low blood pressure, and Avodart 0.5 mg daily.  Nephrology is consulted appreciate their input  Recheck UA shows more than 180 WBC count and more than 180 RBCs.    Keep him on IV ceftriaxone, cultures are pending.      At this time as he has lower extremity edema, weight increased to 101 kg.(May not be accurate), I will hold IV fluids now.  I will continue with IV albumin 12.5 g every 8 hours, unable to give the dose of Lasix because of continued low blood pressure..  I will increase the dose of midodrine to 15 mg 3 times a day.  Once blood pressure improved and MAP remained above 65, will be tried to start him on intermittent Lasix.      Hyponatremia: Improving  Patient sodium on admission 118.  Patient told me that he is drinking about 3 L of water daily and sometimes more.  He does have some lower extremity edema  Urine osmolarity is 285, urine sodium less than 20, serum osmolarity 293, mixed picture.  The IV fluids, sodium only mildly improved to 123.  Sodium not improved with IV fluids, but improved with fluid restrictions.  At this time continue free water restriction of 1500 mL in 24-hour, sodium improved to 126  Continue fluid restriction 1500 mL in 24-hour.        Acute renal failure with hydronephrosis secondary to outlet obstruction/ATN  -Baseline creatinine  typically 0.5 to-0.61 is 3.11  -Believe secondary to outlet obstruction distended bladder and mild hydronephrosis, now most likely in ATN  -CT abdomen pelvis with distended bladder and large amount of stool throughout colon and no evidence of obstruction  -Ultrasound of kidneys with moderate to marked bladder distention and possible mild hydronephrosis of left kidney  -Stubbs catheter placed with large amount of cola colored urine returned  -Urology consulted  -Patient received one-time dose of 60 mg Lasix with albumin will monitor closely before proceeding with further doses and input from cardiology.    He was given IV fluids, now discontinued, creatinine continued trending down as above.         Elevated troponin suspect type II MI  Status post mitral valve replacement with mechanical valve  Severe tricuspid regurgitation  Chronic paroxysmal atrial fibrillation  Hypotension  -Elevated troponin likely secondary from acute heart failure exacerbation  -Initial troponin 104-second 102 EKG changes    -Mechanical mitral valve on anticoagulation currently supratherapeutic with INR of 6.1 on admission, increased to 7 today.  -Pharmacy is consulted to dose warfarin, was holding warfarin up till now.  -At this time no more hematuria, I will start him on IV heparin for bridging, as a pharmacy to dose his warfarin today.  Once INR is therapeutic between 2.5-3.5 will discontinue IV heparin.  -Typically takes 20 mg torsemide daily at home, hold any diuretics at this time  -Received 60 mg IV Lasix with albumin in the ED.  Holding further diuresis.  -Appreciate cardiac consultation  -Continuous cardiac monitoring  Cardiology was consulted and evaluate the patient.  Does not recommend any intervention at this time.          Chronic iron deficiency anemia and anemia of chronic disease  -Hemoglobin stable at 10.2  -Will hold ferrous sulfate for now     Constipation with large known ventral hernia: Resolved  CT scan shows large  ventral hernia, and large stools in the colon.  No obstruction at this time.  He is significantly constipated  Start him on MiraLAX 17 g twice daily and senna twice daily.  Given taper to enema on 9/11/2024  Patient has good bowel movement yesterday.      Chronic sacral and coccyx wounds  -follows with wound clinic   -[facility] consulted      Bilateral lower extremity edema  Patient has significant 2-3+ lower extremity edema, currently lymphedema wrap in place.  Likely third spacing, will continue with the lymphedema wrap at this time.    Hypotension  Patient is hypotensive at this time.  But asymptomatic.  Do not know what his baseline blood pressure at home.  I will give him a bolus of 250 on normal saline, start normal saline at 100 mL/h.  Start him on midodrine 5 mg 3 times daily, blood pressure still on the lower side increase the dose to 10 mg 3 times daily.  Cortisol level checked yesterday 26.1.    Morning cortisol on 9/11/2020 2416.4.  Patient told me that his blood pressure always on the lower side but unable to provide me the numbers.  I will increase the dose of midodrine to 15 mg 3 times a day, continue with IV albumin.     Diet:  regular  DVT Prophylaxis: Warfarin  Stubbs Catheter: Not present  Lines: None     Cardiac Monitoring: None  Code Status:  Full           Clinically Significant Risk Factors        # Hypokalemia: Lowest K = 3 mmol/L in last 2 days, will replace as needed  # Hyponatremia: Lowest Na = 120 mmol/L in last 2 days, will monitor as appropriate      # Hypoalbuminemia: Lowest albumin = 2.6 g/dL at 9/12/2024  5:27 AM, will monitor as appropriate    # Coagulation Defect: INR = 1.93 (Ref range: 0.85 - 1.15) and/or PTT = 85 Seconds (Ref range: 22 - 38 Seconds), will monitor for bleeding   # Acute Kidney Injury, unspecified: based on a >150% or 0.3 mg/dL increase in last creatinine compared to past 90 day average, will monitor renal function  # Hypertension: Noted on problem list  # Acute heart  failure with preserved ejection fraction: heart failure noted on problem list, last echo with EF >50%, and receiving IV diuretics           # Severe Malnutrition: based on nutrition assessment, PRESENT ON ADMISSION   # Financial/Environmental Concerns: none                 DVT Prophylaxis: Warfarin,     Disposition: Expected discharge in 2-4 days once stable.    Medically Ready for Discharge: Anticipated in 2-4 Days        Ko Huber MD, MD  Text Page  (7am - 6pm)    Interval History   Patient overall feeling better, denies any headache dizziness lightheadedness no chest pain, had multiple bowel movement yesterday.  Appetite is getting better, consuming more than 50% of his meal, does not like Ensure  He told me that his blood pressure was on the lower side at home, but does not give me any numbers.    No other significant event overnight    -Data reviewed today: I reviewed all new labs and imaging results over the last 24 hours. I personally reviewed no images or EKG's today.    Physical Exam   Temp: 97.4  F (36.3  C) Temp src: Oral BP: (!) 83/53 Pulse: 71   Resp: 18 SpO2: 97 % O2 Device: None (Room air) Oxygen Delivery: 1.5 LPM  Vitals:    09/09/24 1437 09/10/24 0630 09/11/24 0400   Weight: 85.7 kg (189 lb) 93.3 kg (205 lb 11 oz) 101 kg (222 lb 10.6 oz)     Vital Signs with Ranges  Temp:  [97.4  F (36.3  C)-98.4  F (36.9  C)] 97.4  F (36.3  C)  Pulse:  [63-80] 71  Resp:  [18-22] 18  BP: ()/(44-69) 83/53  SpO2:  [91 %-100 %] 97 %  I/O last 3 completed shifts:  In: 100 [P.O.:100]  Out: 550 [Urine:550]    Constitutional: awake, alert, cooperative, no apparent distress, and appears stated age  Eyes: Lids and lashes normal, pupils equal, round and reactive to light, extra ocular muscles intact, sclera clear, conjunctiva normal  Respiratory: No increased work of breathing, good air exchange, clear to auscultation bilaterally, no crackles or wheezing  Cardiovascular: Normal apical impulse, regular rate and  rhythm, normal S1 and S2, no S3 or S4, and no murmur noted  GI: No scars, normal bowel sounds, soft, non-distended, non-tender, large ventral hernia, no hepatosplenomegally  Skin: no bruising or bleeding, have decubitus ulcer  Musculoskeletal: 2-3+ lower extremity pitting edema present, lymphedema wrap in place  Neurologic: No focal deficits    Medications   Current Facility-Administered Medications   Medication Dose Route Frequency Provider Last Rate Last Admin    heparin 25,000 units in 0.45% NaCl 250 mL ANTICOAGULANT infusion  0-5,000 Units/hr Intravenous Continuous Ko Huber MD 12 mL/hr at 09/12/24 1214 1,200 Units/hr at 09/12/24 1214    Patient is already receiving anticoagulation with heparin, enoxaparin (LOVENOX), warfarin (COUMADIN)  or other anticoagulant medication   Does not apply Continuous PRN Clara He APRN CNP         Current Facility-Administered Medications   Medication Dose Route Frequency Provider Last Rate Last Admin    acetaminophen (TYLENOL) tablet 975 mg  975 mg Oral TID Clara He APRN CNP        albumin human 25 % injection 12.5 g  12.5 g Intravenous Q8H Ko Huber MD   12.5 g at 09/12/24 1146    cefTRIAXone (ROCEPHIN) 1 g vial to attach to  mL bag for ADULTS or NS 50 mL bag for PEDS  1 g Intravenous Q24H Ko Huber MD   1 g at 09/12/24 1125    dutasteride (AVODART) capsule 0.5 mg  0.5 mg Oral Daily Ko Huber MD   0.5 mg at 09/12/24 0836    midodrine (PROAMATINE) tablet 15 mg  15 mg Oral TID w/meals Ko Huber MD   15 mg at 09/12/24 1208    pantoprazole (PROTONIX) EC tablet 40 mg  40 mg Oral BID AC Jocelyn Palmer MD   40 mg at 09/12/24 0836    polyethylene glycol (MIRALAX) Packet 17 g  17 g Oral BID Ko Huber MD   17 g at 09/11/24 0819    senna-docusate (SENOKOT-S/PERICOLACE) 8.6-50 MG per tablet 1 tablet  1 tablet Oral At Bedtime Clara He, MIKE CNP   1 tablet at 09/10/24 4764    sodium chloride  (PF) 0.9% PF flush 3 mL  3 mL Intracatheter Q8H Clara He APRN CNP   3 mL at 09/12/24 0841    warfarin ANTICOAGULANT (COUMADIN) tablet 5 mg  5 mg Oral ONCE at 18:00 Florencia Chowdary, [facility]        Warfarin Dose Required Daily - Pharmacist Managed  1 each Oral See Admin Instructions Clara He APRN CNP           Data   Recent Labs   Lab 09/12/24  1054 09/12/24  0807 09/12/24  0527 09/12/24  0024 09/11/24  2255 09/11/24  1859 09/11/24  1703 09/11/24  1252 09/11/24  0738 09/11/24  0545 09/10/24  1148 09/10/24  0520 09/09/24  1848 09/09/24  1455   WBC 5.8  --  6.4  --   --   --   --   --   --  7.3  --  9.8  --  8.8   HGB 8.6*  --  8.3*  --   --   --   --   --   --  8.7*   < > 9.7*  --  10.2*   MCV 93  --  91  --   --   --   --   --   --  89  --  88  --  88     --  206  --   --   --   --   --   --  225  --  249  --  303   INR  --   --  1.93*  --   --   --   --   --   --  2.01*  --  7.19*   < > 6.01*   NA  --   --  126*  126* 123*  --  123*  --  121*  --  123*   < > 127*   < > 118*   POTASSIUM  --   --  3.7  --   --   --   --  3.7  --  3.0*  --  3.6  --  3.7   CHLORIDE  --   --  88*  --   --   --   --   --   --  82*  --  78*  --  75*   CO2  --   --  26  --   --   --   --   --   --  25  --  25  --  24   BUN  --   --  100.3*  --   --   --   --   --   --  96.3*  --  96.8*  --  100.8*   CR  --   --  2.48*  --   --   --   --   --   --  2.83*  --  3.14*  --  3.11*   ANIONGAP  --   --  12  --   --   --   --   --   --  16*  --  24*  --  19*   JOSEPH  --   --  7.8*  --   --   --   --   --   --  7.9*  --  8.5*  --  9.2   GLC  --  92 89  --  95  --    < >  --    < > 90   < > 80   < > 97   ALBUMIN  --   --  2.6*  --   --   --   --   --   --  2.8*  --  3.3*  --  3.2*   PROTTOTAL  --   --   --   --   --   --   --   --   --   --   --  5.8*  --  6.7   BILITOTAL  --   --   --   --   --   --   --   --   --   --   --  0.6  --  0.6   ALKPHOS  --   --   --   --   --   --   --   --   --   --   --  80  --  99   ALT  --    --   --   --   --   --   --   --   --   --   --  <5  --  <5   AST  --   --   --   --   --   --   --   --   --   --   --  18  --  21    < > = values in this interval not displayed.       No results found for this or any previous visit (from the past 24 hour(s)).

## 2024-09-13 NOTE — PROGRESS NOTES
Urology  Progress Note    NAEO  Urine remains clear  Feeling well    Exam  BP (!) 85/49 (BP Location: Left arm)   Pulse 60   Temp 98.2  F (36.8  C)   Resp 17   Wt 100.1 kg (220 lb 10.9 oz)   SpO2 100%   BMI 28.33 kg/m    No acute distress  Unlabored breathing  Urine clear    UOP 1175    Labs  WBC 5.3  Hgb 7.7 (8.6)    Assessment/Plan  79 year old y/o male  with mechanical mitral valve replacement on chronic anticoagulation with warfarin, A-fib, HFpEF, coronary artery disease, hypertension, hyperlipidemia, sacral decubitus ulcers, admitted for acute urinary retention, ENRIQUE, gross hematuria which has resolved.    - Outpatient TOV and cysto arranged  - May have intermittent hematuria, this will be worked up as an outpatient so long as the degree of hematuria is mild and the catheter is not obstructed  - Urology will sign off at this time    Seen and examined. Will discuss with Dr. Pickering.    Jose F Inman MD, PGY-5  Urology Resident

## 2024-09-13 NOTE — PROGRESS NOTES
[facility]    Hospitalist Progress Note    Brief Summary:    Assessment & Plan   Feng Wynne is a 79 year old male with significant past medical history for mechanical mitral valve replacement on chronic anticoagulation with warfarin, A-fib, HFpEF, coronary artery disease, hypertension, hyperlipidemia, sacral decubitus ulcers, and previous admission at [facility] from 7/18/2024 through 7/26/2024 due to enteropathic E. coli with associated diarrhea and mild hyponatremia who presented to the emergency department with weakness, shortness of breath, increased lower extremity swelling, decreased appetite, and difficulty with lying flat.       Acute urinary retention   Acute renal failure secondary to obstructive uropathy/likely ATN  Status post Stubbs catheter placement  Gross hematuria: Resolved  Supratherapeutic INR: Resolved  Possible UTI    This is a 79-year-old male with multiple comorbidities as above, mostly bedridden, has difficulty in passing urine for more than couple of weeks, and I will build to pass urine.  On exam in the ED found to have urinary obstruction, ultrasound of the renal shows moderate to marked bladder distention and possible mild hydronephrosis of the left kidney, unable to visualize right kidney.  CT abdomen shows moderate distention of the urinary bladder  Status post Stubbs catheter placement with cause hematuria at this time.  He received a dose of IV Lasix times once in the ED.  No significant output since catheter placement.  Although he have lower extreme edema but looks intravascularly dry.  9/10/2024, I started  him on IV normal saline at 100 mL/h, as he was hypotensive as well has most likely have obstructive uropathy that will help his recover his kidney function.  Baseline creatinine normal last check in July this year.    Creatinine is trending down, is making urine, hematuria resolved.  Creatinine improved to 2.48.  He does have gross hematuria,  evaluate by the urology, no intervention at this time.  Slightly better today    His INR was supratherapeutic 7.0, although would like to avoid giving vitamin K given a mitral valve replacement with mechanical valve, but he has been bleeding with gross hematuria and hypotensive.  I did give him 2.5 mg of IV vitamin K to help control his INR to his therapeutic levels.  INR is now subtherapeutic, normal gross hematuria.  Normal urine at this time I will start him on IV heparin and resume his Coumadin today..  Continue strict intake and output charting  Daily weight  Continue with the Stubbs catheter placement  Start him on Flomax if able to take it given low blood pressure, and Avodart 0.5 mg daily, unable to give him Flomax because of borderline low blood pressure.  Nephrology is consulted appreciate their input  Recheck UA shows more than 180 WBC count and more than 180 RBCs.    Keep him on IV ceftriaxone, urine culture no growth.  Will discontinue IV ceftriaxone today 9/13/2024.      At this time as he has lower extremity edema, weight increased to 101 kg.(May not be accurate), IV fluid discontinued.  I will continue with IV albumin 12.5 g every 8 hours, unable to give the dose of Lasix because of continued low blood pressure..  Did increase the dose of midodrine to 15 mg 3 times a day.    Creatinine is trending down and improved to 1.96 today.  Will give a dose of oral Lasix 20 mg times once.  I will see how he does with that.    Patient will most likely need long-term Stubbs catheter placement, and need to change every 4 weeks.  He was discharged on Stubbs catheter, with outpatient urology follow-up.      Hyponatremia: Improving  Patient sodium on admission 118.  Patient told me that he is drinking about 3 L of water daily and sometimes more.  He does have some lower extremity edema  Urine osmolarity is 285, urine sodium less than 20, serum osmolarity 293, mixed picture.  The IV fluids, sodium only mildly improved  to 123.  Sodium not improved with IV fluids, but improved with fluid restrictions.  At this time continue free water restriction of 1500 mL in 24-hour, 30 minutes slowly improving to 128 now.  Continue fluid restriction 1500 mL in 24-hour.        Acute renal failure with hydronephrosis secondary to outlet obstruction/ATN  -Baseline creatinine typically 0.5 to-0.61 is 3.11  -Believe secondary to outlet obstruction distended bladder and mild hydronephrosis, now most likely in ATN  -CT abdomen pelvis with distended bladder and large amount of stool throughout colon and no evidence of obstruction  -Ultrasound of kidneys with moderate to marked bladder distention and possible mild hydronephrosis of left kidney  -Stubbs catheter placed with large amount of cola colored urine returned  -Urology consulted  -Patient received one-time dose of 60 mg Lasix with albumin will monitor closely before proceeding with further doses and input from cardiology.    He was given IV fluids, now discontinued, creatinine continued trending down as above.         Elevated troponin suspect type II MI  Status post mitral valve replacement with mechanical valve  Severe tricuspid regurgitation  Chronic paroxysmal atrial fibrillation  Hypotension  -Elevated troponin likely secondary from acute heart failure exacerbation  -Initial troponin 104-second 102 EKG changes    -Mechanical mitral valve on anticoagulation currently supratherapeutic with INR of 6.1 on admission, increased to 7 today.  -Pharmacy is consulted to dose warfarin, was holding warfarin up till now.  -At this time no more hematuria, I will start him on IV heparin for bridging, as a pharmacy to dose his warfarin today.  Once INR is therapeutic between 2.5-3.5 will discontinue IV heparin.  -Typically takes 20 mg torsemide daily at home, hold any diuretics at this time  -Received 60 mg IV Lasix with albumin in the ED.  Holding further diuresis.  -Appreciate cardiac  consultation  -Continuous cardiac monitoring  Cardiology was consulted and evaluate the patient.  Does not recommend any intervention at this time.          Chronic iron deficiency anemia and anemia of chronic disease  -Hemoglobin stable at 10.2  -Will hold ferrous sulfate for now     Constipation with large known ventral hernia: Resolved  CT scan shows large ventral hernia, and large stools in the colon.  No obstruction at this time.  He is significantly constipated  Start him on MiraLAX 17 g twice daily and senna twice daily.  Given taper to enema on 9/11/2024  Constipation is now resolved, having good bowel movements.      Chronic sacral and coccyx wounds  -follows with wound clinic   -[facility] consulted      Bilateral lower extremity edema  Patient has significant 2-3+ lower extremity edema, currently lymphedema wrap in place.  Likely third spacing, will continue with the lymphedema wrap at this time.    Hypotension  Patient is hypotensive at this time.  But asymptomatic.  Do not know what his baseline blood pressure at home.  I will give him a bolus of 250 on normal saline, start normal saline at 100 mL/h.  Start him on midodrine 5 mg 3 times daily, blood pressure still on the lower side increase the dose to 10 mg 3 times daily.  Cortisol level checked yesterday 26.1.    Morning cortisol on 9/11/2020 2416.4.  Patient told me that his blood pressure always on the lower side but unable to provide me the numbers.  I will increase the dose of midodrine to 15 mg 3 times a day, continue with IV albumin.  Most likely has chronic low blood pressure, as he asymptomatic, with good perfusion with his low blood pressure.     Diet:  regular  DVT Prophylaxis: Warfarin  Stubbs Catheter: Not present  Lines: None     Cardiac Monitoring: None  Code Status:  Full           Clinically Significant Risk Factors        # Hypokalemia: Lowest K = 2.9 mmol/L in last 2 days, will replace as needed  # Hyponatremia: Lowest Na = 121 mmol/L in  last 2 days, will monitor as appropriate      # Hypoalbuminemia: Lowest albumin = 2.6 g/dL at 9/12/2024  5:27 AM, will monitor as appropriate      # Acute Kidney Injury, unspecified: based on a >150% or 0.3 mg/dL increase in last creatinine compared to past 90 day average, will monitor renal function  # Hypertension: Noted on problem list  # Chronic heart failure with preserved ejection fraction: heart failure noted on problem list and last echo with EF >50%           # Severe Malnutrition: based on nutrition assessment, PRESENT ON ADMISSION   # Financial/Environmental Concerns: none                 DVT Prophylaxis: Warfarin,     Disposition: Expected discharge in 1 to 2 days.    Medically Ready for Discharge: Anticipated in 2-4 Days        Ko Huber MD, MD  Text Page  (7am - 6pm)    Interval History   Patient seen and evaluated in his room today, partner at bedside.  Feeling much better offers no complaints.    No other significant event overnight    -Data reviewed today: I reviewed all new labs and imaging results over the last 24 hours. I personally reviewed no images or EKG's today.    Physical Exam   Temp: 98.2  F (36.8  C) Temp src: Oral BP: 92/50 Pulse: 63   Resp: 17 SpO2: 96 % O2 Device: None (Room air) Oxygen Delivery: 2 LPM  Vitals:    09/10/24 0630 09/11/24 0400 09/13/24 0551   Weight: 93.3 kg (205 lb 11 oz) 101 kg (222 lb 10.6 oz) 100.1 kg (220 lb 10.9 oz)     Vital Signs with Ranges  Temp:  [97.8  F (36.6  C)-98.2  F (36.8  C)] 98.2  F (36.8  C)  Pulse:  [58-80] 63  Resp:  [16-21] 17  BP: (80-92)/(46-60) 92/50  SpO2:  [92 %-100 %] 96 %  I/O last 3 completed shifts:  In: 732 [P.O.:732]  Out: 1175 [Urine:1175]    Constitutional: awake, alert, cooperative, no apparent distress, and appears stated age  Eyes: Lids and lashes normal, pupils equal, round and reactive to light, extra ocular muscles intact, sclera clear, conjunctiva normal  Respiratory: No increased work of breathing, good air exchange,  clear to auscultation bilaterally, no crackles or wheezing  Cardiovascular: Normal apical impulse, regular rate and rhythm, normal S1 and S2, no S3 or S4, and no murmur noted  GI: No scars, normal bowel sounds, soft, non-distended, non-tender, large ventral hernia, no hepatosplenomegally  Skin: no bruising or bleeding, have decubitus ulcer  Musculoskeletal: 2-3+ lower extremity pitting edema present, lymphedema wrap in place  Neurologic: No focal deficits    Medications   Current Facility-Administered Medications   Medication Dose Route Frequency Provider Last Rate Last Admin    Patient is already receiving anticoagulation with heparin, enoxaparin (LOVENOX), warfarin (COUMADIN)  or other anticoagulant medication   Does not apply Continuous PRN Clara He APRN CNP         Current Facility-Administered Medications   Medication Dose Route Frequency Provider Last Rate Last Admin    acetaminophen (TYLENOL) tablet 975 mg  975 mg Oral TID Clara He APRN CNP   975 mg at 09/13/24 0829    albumin human 25 % injection 12.5 g  12.5 g Intravenous Q8H Ko Huber MD   12.5 g at 09/13/24 0400    cefTRIAXone (ROCEPHIN) 1 g vial to attach to  mL bag for ADULTS or NS 50 mL bag for PEDS  1 g Intravenous Q24H Ko Huber MD   1 g at 09/13/24 1202    dutasteride (AVODART) capsule 0.5 mg  0.5 mg Oral Daily Ko Huber MD   0.5 mg at 09/13/24 0831    magnesium oxide (MAG-OX) tablet 400 mg  400 mg Oral Q4H Ko Huber MD   400 mg at 09/13/24 0830    midodrine (PROAMATINE) tablet 15 mg  15 mg Oral TID w/meals Ko Huber MD   15 mg at 09/13/24 1202    pantoprazole (PROTONIX) EC tablet 40 mg  40 mg Oral BID AC Jocelyn Palmer MD   40 mg at 09/13/24 0658    polyethylene glycol (MIRALAX) Packet 17 g  17 g Oral BID Ko Huber MD   17 g at 09/13/24 0827    potassium chloride (KAYCIEL) solution 20 mEq  20 mEq Oral or Feeding Tube Once Ko Huber MD         potassium chloride (KAYCIEL) solution 40 mEq  40 mEq Oral or Feeding Tube Once Ko Huber MD        senna-docusate (SENOKOT-S/PERICOLACE) 8.6-50 MG per tablet 1 tablet  1 tablet Oral At Bedtime Clara He APRN CNP   1 tablet at 09/10/24 2319    sodium chloride (PF) 0.9% PF flush 3 mL  3 mL Intracatheter Q8H Clara He APRN CNP   3 mL at 09/13/24 0830    warfarin ANTICOAGULANT (COUMADIN) tablet 1 mg  1 mg Oral ONCE at 18:00 Florencia Chowdary, [facility]        Warfarin Dose Required Daily - Pharmacist Managed  1 each Oral See Admin Instructions Clara He APRN CNP           Data   Recent Labs   Lab 09/13/24  1001 09/13/24  0741 09/13/24  0539 09/13/24  0225 09/12/24  1824 09/12/24  1054 09/12/24  0807 09/12/24  0527 09/11/24  1703 09/11/24  1252 09/11/24  0738 09/11/24  0545 09/10/24  1148 09/10/24  0520 09/09/24  1848 09/09/24  1455   WBC  --   --  5.3  --   --  5.8  --  6.4  --   --   --  7.3  --  9.8  --  8.8   HGB  --   --  7.7*  --   --  8.6*  --  8.3*  --   --   --  8.7*   < > 9.7*  --  10.2*   MCV  --   --  93  --   --  93  --  91  --   --   --  89  --  88  --  88   PLT  --   --  192  --   --  216  --  206  --   --   --  225  --  249  --  303   INR  --   --   --  2.53*  --   --   --  1.93*  --   --   --  2.01*  --  7.19*   < > 6.01*   NA  --   --  128*  128*  --  126*  --   --  126*  126*   < > 121*  --  123*   < > 127*   < > 118*   POTASSIUM  --   --  2.9*  --   --   --   --  3.7  --  3.7  --  3.0*  --  3.6  --  3.7   CHLORIDE  --   --  89*  --   --   --   --  88*  --   --   --  82*  --  78*  --  75*   CO2  --   --  28  --   --   --   --  26  --   --   --  25  --  25  --  24   BUN  --   --  92.8*  --   --   --   --  100.3*  --   --   --  96.3*  --  96.8*  --  100.8*   CR  --   --  1.96*  --   --   --   --  2.48*  --   --   --  2.83*  --  3.14*  --  3.11*   ANIONGAP  --   --  11  --   --   --   --  12  --   --   --  16*  --  24*  --  19*   JOSEPH  --   --  7.7*  --   --   --   --   7.8*  --   --   --  7.9*  --  8.5*  --  9.2   GLC 97 78 87  --   --   --    < > 89   < >  --    < > 90   < > 80   < > 97   ALBUMIN  --   --  2.8*  --   --   --   --  2.6*  --   --   --  2.8*  --  3.3*  --  3.2*   PROTTOTAL  --   --   --   --   --   --   --   --   --   --   --   --   --  5.8*  --  6.7   BILITOTAL  --   --   --   --   --   --   --   --   --   --   --   --   --  0.6  --  0.6   ALKPHOS  --   --   --   --   --   --   --   --   --   --   --   --   --  80  --  99   ALT  --   --   --   --   --   --   --   --   --   --   --   --   --  <5  --  <5   AST  --   --   --   --   --   --   --   --   --   --   --   --   --  18  --  21    < > = values in this interval not displayed.       No results found for this or any previous visit (from the past 24 hour(s)).

## 2024-09-13 NOTE — PROGRESS NOTES
Care Management Follow Up    Length of Stay (days): 4    Expected Discharge Date: 09/15/2024     Concerns to be Addressed: discharge planning     Patient plan of care discussed at interdisciplinary rounds: Yes    Anticipated Discharge Disposition: Home Care              Anticipated Discharge Services:    Anticipated Discharge DME:      Patient/family educated on Medicare website which has current facility and service quality ratings: no  Education Provided on the Discharge Plan: Yes  Patient/Family in Agreement with the Plan: yes    Referrals Placed by CM/SW: Homecare  Private pay costs discussed: Not applicable    Discussed  Partnership in Safe Discharge Planning  document with patient/family: Yes:     Additional Information:  Discussed discharge plans with patient,son and s/o.  Son stating patient will need increase assistance at home and feels patients s/o will not be able to manage patient physically. Writer discussed that therapy was recommending TCU however patient declined and requested to be dcd to his home. Patients states he has all the equipment needed for his care . Son feels patient is getting weaker and s/o may not be able to provide the assistance needed.   Discussed hiring private duty aide as patient has medicare and may not qualify for PCA services.   Writer provided brochure for senior linkage line .   Patient states he will look into it and figure out a way.     Next Steps: Cont to follow.    Estefani Baca [phone]

## 2024-09-13 NOTE — PROGRESS NOTES
Renal Medicine Progress Note            Assessment/Plan:     79 y.o man admitted for severe hyponatremia and severe acute kidney injury.      # Patient with a creatinine of 0.6 mg/dl at the end of July.      # Severe acute kidney in the setting of bladder outlet obstruction:                -blood pressure is also soft               -poor intake      #Severe hypochloremic hyponatremia: Hypervolemic. Improved.     # Bladder outlet obstruction:                -branch in place               -urology     # Hypervolemia: Lots of edema in the LE, but not much respiratory issue. He lays flat in bed comfortably without ZUNIGA or needing supplemental O2.                -TTE with good LV/RV functions.      # Afib on coumadin: Supra-therapeutic INR.     Plan:  # No new recommendations from us. I am singing off. Please call us back if you need our assistance. I discussed his case with our SW, who is going to see patient and family.           Interval History:     Ally  and his son are at the bedside.   The would like to speak to our SW.   Feng says he is doing better.  He says he ate his breakfast  Sodium and kidney function are better.             Medications and Allergies:     Current Facility-Administered Medications   Medication Dose Route Frequency Provider Last Rate Last Admin    acetaminophen (TYLENOL) tablet 975 mg  975 mg Oral TID Clara He APRN CNP   975 mg at 09/13/24 1305    albumin human 25 % injection 12.5 g  12.5 g Intravenous Q8H Ko Huber MD   12.5 g at 09/13/24 1304    cefTRIAXone (ROCEPHIN) 1 g vial to attach to  mL bag for ADULTS or NS 50 mL bag for PEDS  1 g Intravenous Q24H Ko Huber MD   1 g at 09/13/24 1202    dutasteride (AVODART) capsule 0.5 mg  0.5 mg Oral Daily Ko Huber MD   0.5 mg at 09/13/24 0831    midodrine (PROAMATINE) tablet 15 mg  15 mg Oral TID w/meals Ko Huber MD   15 mg at 09/13/24 1202    pantoprazole (PROTONIX) EC tablet 40 mg   40 mg Oral BID Jocelyn Charles MD   40 mg at 09/13/24 0658    polyethylene glycol (MIRALAX) Packet 17 g  17 g Oral BID Ko Huber MD   17 g at 09/13/24 0827    potassium chloride (KAYCIEL) solution 20 mEq  20 mEq Oral or Feeding Tube Once Ko Huber MD        senna-docusate (SENOKOT-S/PERICOLACE) 8.6-50 MG per tablet 1 tablet  1 tablet Oral At Bedtime Clara He APRN CNP   1 tablet at 09/10/24 2319    sodium chloride (PF) 0.9% PF flush 3 mL  3 mL Intracatheter Q8H Clara He APRN CNP   3 mL at 09/13/24 0830    warfarin ANTICOAGULANT (COUMADIN) tablet 1 mg  1 mg Oral ONCE at 18:00 Florencia Chowdary, [facility]        Warfarin Dose Required Daily - Pharmacist Managed  1 each Oral See Admin Instructions Clara He APRN CNP            Allergies   Allergen Reactions    Amiodarone Shortness Of Breath    Pcn [Penicillins] Shortness Of Breath     Rxn occurred in childhood     Statins Muscle Pain (Myalgia) and Hives    Amiodarone     Latex     Zetia [Ezetimibe] Muscle Pain (Myalgia)     Muscle weakness legs            Physical Exam:   Vitals were reviewed   , Blood pressure 92/50, pulse 63, temperature 97.8  F (36.6  C), temperature source Oral, resp. rate 17, weight 100.1 kg (220 lb 10.9 oz), SpO2 96%.    Wt Readings from Last 3 Encounters:   09/13/24 100.1 kg (220 lb 10.9 oz)   07/26/24 86 kg (189 lb 9.5 oz)   06/03/24 103.9 kg (229 lb)       Intake/Output Summary (Last 24 hours) at 9/13/2024 1311  Last data filed at 9/13/2024 0916  Gross per 24 hour   Intake 402 ml   Output 1250 ml   Net -848 ml     GENERAL APPEARANCE: frail.   HEENT:  Eyes/ears/nose/neck grossly normal  RESP: lungs cta b c good efforts, no crackles, rhonchi or wheezes  CV: irregular.  ABDOMEN: Obese, soft. Very large abdominal hernia  EXTREMITIES/SKIN: ++ edema. Lymphedema wrapped  NEURO: Awake, alert and conversing appropriately  Stubbs with clear urine         Data:     CBC RESULTS:     Recent Labs   Lab  09/13/24  0539 09/12/24  1054 09/12/24  0527 09/11/24  0545 09/10/24  1830 09/10/24  0520 09/09/24  1455   WBC 5.3 5.8 6.4 7.3  --  9.8 8.8   RBC 2.57* 2.78* 2.78* 2.97*  --  3.32* 3.51*   HGB 7.7* 8.6* 8.3* 8.7* 9.4* 9.7* 10.2*   HCT 24.0* 25.8* 25.3* 26.4*  --  29.3* 30.8*    216 206 225  --  249 303       Basic Metabolic Panel:  Recent Labs   Lab 09/13/24  1231 09/13/24  1001 09/13/24  0741 09/13/24  0539 09/12/24  1824 09/12/24  0807 09/12/24  0527 09/12/24  0024 09/11/24  2255 09/11/24  1859 09/11/24  1703 09/11/24  1252 09/11/24  0738 09/11/24  0545 09/10/24  1148 09/10/24  0520 09/09/24  1848 09/09/24  1455   NA  --   --   --  128*  128* 126*  --  126*  126* 123*  --  123*  --  121*  --  123*   < > 127*   < > 118*   POTASSIUM  --   --   --  2.9*  --   --  3.7  --   --   --   --  3.7  --  3.0*  --  3.6  --  3.7   CHLORIDE  --   --   --  89*  --   --  88*  --   --   --   --   --   --  82*  --  78*  --  75*   CO2  --   --   --  28  --   --  26  --   --   --   --   --   --  25  --  25  --  24   BUN  --   --   --  92.8*  --   --  100.3*  --   --   --   --   --   --  96.3*  --  96.8*  --  100.8*   CR  --   --   --  1.96*  --   --  2.48*  --   --   --   --   --   --  2.83*  --  3.14*  --  3.11*   * 97 78 87  --  92 89  --    < >  --    < >  --    < > 90   < > 80   < > 97   JOSEPH  --   --   --  7.7*  --   --  7.8*  --   --   --   --   --   --  7.9*  --  8.5*  --  9.2    < > = values in this interval not displayed.       INR  Recent Labs   Lab 09/13/24  0225 09/12/24  0527 09/11/24  0545 09/10/24  0520   INR 2.53* 1.93* 2.01* 7.19*      Attestation:   I have reviewed today's relevant vital signs, notes, medications, labs and imaging.    Josue Zendejas MD  [facility] - Nephrology  Office phone :[phone]  Pager: [phone]

## 2024-09-13 NOTE — PROGRESS NOTES
"CLINICAL NUTRITION SERVICES - REASSESSMENT NOTE    Recommendations Ordered by Registered Dietitian (RD):   Continue Regular diet  Continue Expedite as ordered  Trial [employer] vanilla shake and [employer] Instant Breakfast w/ Soy milk   Malnutrition: 9/13  % Weight Loss:  > 10% in 6 months (severe malnutrition)  % Intake:  <75% for >/= 3 months (moderate malnutrition)  Subcutaneous Fat Loss:  Orbital region severe depletion, Upper arm region moderate depletion, and Thoracic region severe depletion  Muscle Loss:  Temporal region severe depletion, Clavicle bone region moderate depletion, and Dorsal hand region moderate depletion  Fluid Retention:  Moderate 3+    Malnutrition Diagnosis: Severe malnutrition  In Context of:  Acute illness or injury  Chronic illness or disease     EVALUATION OF PROGRESS TOWARD GOALS   Diet: Regular diet  Ensure 1x daily  Expedite 1x daily   Intake/Tolerance:   - Pt seen in room. Spouse and another friend/family member present.   - Pt strongly dislikes the Ensure, won't drink it. He eats 50% of his trays \"I always eat 50%\".   - He is vegetarian,reports he does not like milk but does eat eggs and cheese. He loves vanilla pudding. Has also been ordering cream soup, yogurt, cottage cheese.     - Labs: Na 128 (L), K 2.9 (L) - replaced, BUN 92.8 (H), Cr 1.96 (H), GFR 34 (H).   - BMx6 yesterday - previously constipated   - Weight up to 100.1 kg today, up from 85.7 kg on admission.     ASSESSED NUTRITION NEEDS:  Dosing Weight 93.3 kg  Estimated Energy Needs: 3590-9853 kcals (25-30 Kcal/Kg)  Justification: wound healing  Estimated Protein Needs:  grams protein (1-1.2 g pro/Kg)  Justification: wound healing however elevated BUN    Previous Goals:   Patient to consume at least 50-75% of TID meals  Evaluation: Not met - eats 50%  Patient to consume 1 Expedite daily  Evaluation: Unable to evaluate    Previous Nutrition Diagnosis:   Inadequate oral intake related to increased needs, suspect " reduced appetite as evidenced by stage 4 sacrococcygeal pressure injury, 16.7% wt loss in 6 months   Evaluation: No change    MALNUTRITION  % Weight Loss:  > 10% in 6 months (severe malnutrition)  % Intake:  <75% for >/= 3 months (moderate malnutrition)  Subcutaneous Fat Loss:  Orbital region moderate-severe depletion, Upper arm region moderate depletion, and Thoracic region moderate-severe depletion  Muscle Loss:  Temporal region moderate-severe depletion, Clavicle bone region moderate depletion, and Dorsal hand region moderate depletion  Fluid Retention:  Moderate 3+    Malnutrition Diagnosis: Severe malnutrition  In Context of:  Acute illness or injury  Chronic illness or disease    CURRENT NUTRITION DIAGNOSIS  Inadequate oral intake related to increased nutrient needs and low appetite as evidenced by patient consumes up to 50% of small meals, dislikes supplements.    INTERVENTIONS  Recommendations / Nutrition Prescription  Continue Regular diet  Continue Expedite as ordered  Trial [employer] vanilla shake and [place] Instant Breakfast w/ Soy milk    Implementation  Medical Food Supplement: updated supps as above    Goals  Intake of at leatst 50% adequate trays + 2 supplements daily.     MONITORING AND EVALUATION:  Progress towards goals will be monitored and evaluated per protocol and Practice Guidelines    Yakelin Lewis RD, LD  Pager: [phone]

## 2024-09-13 NOTE — PROGRESS NOTES
Orientations: A&Ox2-3, intermittently confused.  Vitals/Pain: VSS on RA, BP runs low. PRN albumin available if systolic is under 80.  Tele: afib CVR  Lines/Drains: PIV SL  Skin/Wounds: Bilat LE edema, ace wrapped. Chronic sacral wound, dressing changed. Ventral hernia.  GI/: Multiple soft BM's. Reg diet, okay appetite. Voiding adequate per branch.  Labs: Abnormal/Trends, Electrolyte Replacement- See labs. Creatinine improving - 1.96.  Ambulation/Assist: T&R, bedbound at baseline.    Plan: Continue coccyx dressing changes. Continue branch - will follow up outpt with urology.

## 2024-09-13 NOTE — PLAN OF CARE
Neuro: lethargic, oriented to time and place  Tele/cardiac: Afib CVR with soft BP  Respiration: Needed 2L NC  while asleep  Activity: A2 with lift, q2 turn and repo  Pain: given dilaudid x1 for generalized soreness  Drips: heparin @ 1350 xa recheck at 0830  Drains/tubes: none  Skin: sacral ulcer, dressing change completed  GI/: incontinent of bowel, branch in place  Aggression color: green  Isolation: none

## 2024-09-14 NOTE — PLAN OF CARE
Goal Outcome Evaluation:    Summary:  Generalized Weakness and Leg Swelling, UTI    DATE & TIME: 9/13/24 2353-6294 Cognitive Concerns/ Orientation : A&O x 2-3, int confusion   BEHAVIOR & AGGRESSION TOOL COLOR: Green  CIWA SCORE: N/A   ABNL VS/O2:  VSS on RA, ex low BP ,PRN albumin available if systolic is under 80.  MOBILITY: A2 Lift, T&R, using Wedge   PAIN MANAGMENT: Dilaudid PRN  DIET: Regular ((RS)  BOWEL/BLADDER: Stubbs cath  ABNL LAB/BG: Na 125, BS 93, 106.  K 3.3 replaced and re-check around 01:35  DRAIN/DEVICES: R&L PIV SL   TELEMETRY RHYTHM: Afib CVR   SKIN: : Bilat LE edema, ace wrapped. Chronic sacral wound, dressing changed. Ventral hernia.  TESTS/PROCEDURES: N/A  D/C DAY/GOALS/PLACE: Pending  OTHER IMPORTANT INFO: Nephrology,  urology, cardiology, nutrition, SW are following

## 2024-09-14 NOTE — PLAN OF CARE
Goal Outcome Evaluation:    Shift Summary [id number]    Admitting Diagnosis: Hyponatremia [E87.1]  Generalized weakness [R53.1]  Acute kidney injury (H24) [N17.9]  Acute on chronic congestive heart failure, unspecified heart failure type (H) [I50.9]   Vitals - stable on RA, ex soft BP  Pain - pt denied during shift   A&Ox4, intermitted confusion   Voiding - branch cath, plans to discharge with in place  Mobility - Ax2, lift  Tele - afib w/ PVC  GI - one small soft bm this shift   Dressing - sacral dressing changed, CDI     Orders Placed This Encounter      Combination Diet Regular Diet Adult       Plan: Discharge pending.

## 2024-09-14 NOTE — PLAN OF CARE
Summary:  Generalized Weakness and Leg Swelling, UTI    DATE & TIME: 9/13/24-9/14/24 6381-1590  Cognitive Concerns/ Orientation : A&O x 3, int confusion, irritable at times   BEHAVIOR & AGGRESSION TOOL COLOR: Green   ABNL VS/O2:  VSS on RA, soft BPs, PRN albumin available if systolic is under 80.  MOBILITY: A2 Lift, T&R, using Wedge- patient requests to take it very slow  PAIN MANAGMENT: Denies- moans with turns but did not want medication or any interventions  DIET: Regular, vegetarian, only ate a small amount of dinner  BOWEL/BLADDER: Stubbs cath, incontinent of bowel-no BM this shift  ABNL LAB/BG: K 3.6- recheck in, creat 1.59, Hgb 8.0, INR 2.65  DRAIN/DEVICES: R&L PIV SL   TELEMETRY RHYTHM: Afib CVR w/ PVCs  SKIN: : Bilat LE edema, ace wrapped. Chronic sacral wound, CDI changed day shift, orders 3x day.  Ventral hernia.  TESTS/PROCEDURES: N/A  D/C DAY/GOALS/PLACE: Pending  OTHER IMPORTANT INFO: Nephrology,  urology, cardiology, nutrition, SW are following. One-time dose warfarin this shift.

## 2024-09-14 NOTE — PROGRESS NOTES
[facility]    Hospitalist Progress Note    Brief Summary:    Assessment & Plan   Feng Wynne is a 79 year old male with significant past medical history for mechanical mitral valve replacement on chronic anticoagulation with warfarin, A-fib, HFpEF, coronary artery disease, hypertension, hyperlipidemia, sacral decubitus ulcers, and previous admission at [facility] from 7/18/2024 through 7/26/2024 due to enteropathic E. coli with associated diarrhea and mild hyponatremia who presented to the emergency department with weakness, shortness of breath, increased lower extremity swelling, decreased appetite, and difficulty with lying flat.       Acute urinary retention   Acute renal failure secondary to obstructive uropathy/likely ATN  Status post Stubbs catheter placement  Gross hematuria: Resolved  Supratherapeutic INR: Resolved  Possible UTI    This is a 79-year-old male with multiple comorbidities as above, mostly bedridden, has difficulty in passing urine for more than couple of weeks, and I will build to pass urine.  On exam in the ED found to have urinary obstruction, ultrasound of the renal shows moderate to marked bladder distention and possible mild hydronephrosis of the left kidney, unable to visualize right kidney.  CT abdomen shows moderate distention of the urinary bladder  Status post Stubbs catheter placement with cause hematuria at this time.  He received a dose of IV Lasix times once in the ED.  No significant output since catheter placement.  Although he have lower extreme edema but looks intravascularly dry.  9/10/2024, I started  him on IV normal saline at 100 mL/h, as he was hypotensive as well has most likely have obstructive uropathy that will help his recover his kidney function.  Baseline creatinine normal last check in July this year.    Creatinine is trending down, is making urine, hematuria resolved.  Creatinine improved to 2.48.  He does have gross hematuria,  evaluate by the urology, no intervention at this time.  Slightly better today    His INR was supratherapeutic 7.0, although would like to avoid giving vitamin K given a mitral valve replacement with mechanical valve, but he has been bleeding with gross hematuria and hypotensive.  I did give him 2.5 mg of IV vitamin K to help control his INR to his therapeutic levels.  INR is now subtherapeutic, normal gross hematuria.  Normal urine at this time I will start him on IV heparin and resume his Coumadin today..  Continue strict intake and output charting  Daily weight  Continue with the Stubbs catheter placement  Start him on Flomax if able to take it given low blood pressure, and Avodart 0.5 mg daily, unable to give him Flomax because of borderline low blood pressure.  Nephrology is consulted appreciate their input  Recheck UA shows more than 180 WBC count and more than 180 RBCs.    Keep him on IV ceftriaxone, urine culture no growth.  Will discontinue IV ceftriaxone today 9/13/2024.      At this time as he has lower extremity edema, weight increased to 101 kg.(May not be accurate), IV fluid discontinued.  I will continue with IV albumin 12.5 g every 8 hours, unable to give the dose of Lasix because of continued low blood pressure..  Did increase the dose of midodrine to 15 mg 3 times a day.    Creatinine is trending down and improved to 1.59 today.  Was given oral Lasix 20 mg on 9/13/2024 and tolerated well.    I will give him oral Lasix 40 mg times once today and will see how he does.    Patient will most likely need long-term Stubbs catheter placement, and need to change every 4 weeks.  He will be discharged on Stubbs catheter, with outpatient urology follow-up.      Hyponatremia: Improving  Patient sodium on admission 118.  Patient told me that he is drinking about 3 L of water daily and sometimes more.  He does have some lower extremity edema  Urine osmolarity is 285, urine sodium less than 20, serum osmolarity 293,  mixed picture.  The IV fluids, sodium only mildly improved to 123.  Sodium not improved with IV fluids, but improved with fluid restrictions.  At this time continue free water restriction of 1500 mL in 24-hour,   Sodium now improved to 133 will continue monitor  Continue fluid restriction 1500 mL in 24-hour.        Acute renal failure with hydronephrosis secondary to outlet obstruction/ATN  -Baseline creatinine typically 0.5 to-0.61 is 3.11  -Believe secondary to outlet obstruction distended bladder and mild hydronephrosis, now most likely in ATN  -CT abdomen pelvis with distended bladder and large amount of stool throughout colon and no evidence of obstruction  -Ultrasound of kidneys with moderate to marked bladder distention and possible mild hydronephrosis of left kidney  -Stubbs catheter placed with large amount of cola colored urine returned  -Urology consulted  -Patient received one-time dose of 60 mg Lasix with albumin will monitor closely before proceeding with further doses and input from cardiology.    He was given IV fluids, now discontinued, creatinine continued trending down as above.  Will giving intermittent oral Lasix.         Elevated troponin suspect type II MI  Status post mitral valve replacement with mechanical valve  Severe tricuspid regurgitation  Chronic paroxysmal atrial fibrillation  Hypotension  -Elevated troponin likely secondary from acute heart failure exacerbation  -Initial troponin 104-second 102 EKG changes    -Mechanical mitral valve on anticoagulation currently supratherapeutic with INR of 6.1 on admission, increased to 7 today.  -Pharmacy is consulted to dose warfarin, was holding warfarin up till now.  -At this time no more hematuria, I will start him on IV heparin for bridging, as a pharmacy to dose his warfarin today.  Once INR is therapeutic between 2.5-3.5 will discontinue IV heparin.  -Typically takes 20 mg torsemide daily at home, hold any diuretics at this time  -Received  60 mg IV Lasix with albumin in the ED.  Holding further diuresis.  -Appreciate cardiac consultation  -Continuous cardiac monitoring  Cardiology was consulted and evaluate the patient.  Does not recommend any intervention at this time.          Chronic iron deficiency anemia and anemia of chronic disease  -Hemoglobin stable at 10.2  -Will hold ferrous sulfate for now     Constipation with large known ventral hernia: Resolved  CT scan shows large ventral hernia, and large stools in the colon.  No obstruction at this time.  He is significantly constipated  Start him on MiraLAX 17 g twice daily and senna twice daily.  Given taper to enema on 9/11/2024  Constipation is now resolved, having good bowel movements.      Chronic sacral and coccyx wounds  -follows with wound clinic   -[facility] consulted      Bilateral lower extremity edema  Patient has significant 2-3+ lower extremity edema, currently lymphedema wrap in place.  Likely third spacing, will continue with the lymphedema wrap at this time.  Will give intermittent oral Lasix we will see if he tolerates will give a dose of 40 mg today    Hypotension: Chronic  Patient is hypotensive at this time.  But asymptomatic.  Do not know what his baseline blood pressure at home.  I will give him a bolus of 250 on normal saline, start normal saline at 100 mL/h.  Start him on midodrine 5 mg 3 times daily, blood pressure still on the lower side increase the dose to 10 mg 3 times daily.  Cortisol level checked yesterday 26.1.    Morning cortisol on 9/11/2020 2416.4.  Patient told me that his blood pressure always on the lower side but unable to provide me the numbers.  I did increase the dose of midodrine to 15 mg 3 times a day, continue with IV albumin.  Most likely has chronic low blood pressure, as he asymptomatic, with good perfusion with his low blood pressure.  Will continue with IV albumin today, and likely stop tomorrow.     Diet:  regular  DVT Prophylaxis: Warfarin  Enoc  Catheter: Not present  Lines: None     Cardiac Monitoring: None  Code Status:  Full           Clinically Significant Risk Factors        # Hypokalemia: Lowest K = 2.9 mmol/L in last 2 days, will replace as needed  # Hyponatremia: Lowest Na = 125 mmol/L in last 2 days, will monitor as appropriate      # Hypoalbuminemia: Lowest albumin = 2.6 g/dL at 9/12/2024  5:27 AM, will monitor as appropriate       # Hypertension: Noted on problem list  # Chronic heart failure with preserved ejection fraction: heart failure noted on problem list and last echo with EF >50%           # Severe Malnutrition: based on nutrition assessment    # Financial/Environmental Concerns: none                 DVT Prophylaxis: Warfarin,     Disposition: Expected discharge in 1 to 2 days.    Medically Ready for Discharge: Anticipated in 2-4 Days        Ko Huber MD, MD  Text Page  (7am - 6pm)    Interval History   Patient offers no complaints this morning.  Feeling otherwise well.  Denies any chest pain shortness of breath dizziness or lightheadedness.    No other significant event overnight    -Data reviewed today: I reviewed all new labs and imaging results over the last 24 hours. I personally reviewed no images or EKG's today.    Physical Exam   Temp: 97.9  F (36.6  C) Temp src: Oral BP: (!) 88/45 Pulse: 50   Resp: 16 SpO2: 98 % O2 Device: None (Room air)    Vitals:    09/11/24 0400 09/13/24 0551 09/14/24 0129   Weight: 101 kg (222 lb 10.6 oz) 100.1 kg (220 lb 10.9 oz) 94.1 kg (207 lb 7.3 oz)     Vital Signs with Ranges  Temp:  [97.7  F (36.5  C)-97.9  F (36.6  C)] 97.9  F (36.6  C)  Pulse:  [50-69] 50  Resp:  [14-16] 16  BP: (88-99)/(45-63) 88/45  SpO2:  [96 %-98 %] 98 %  I/O last 3 completed shifts:  In: 480 [P.O.:480]  Out: 1250 [Urine:1250]    Constitutional: awake, alert, cooperative, no apparent distress, and appears stated age  Eyes: Lids and lashes normal, pupils equal, round and reactive to light, extra ocular muscles intact, sclera  clear, conjunctiva normal  Respiratory: No increased work of breathing, good air exchange, clear to auscultation bilaterally, no crackles or wheezing  Cardiovascular: Normal apical impulse, regular rate and rhythm, normal S1 and S2, no S3 or S4, and no murmur noted  GI: No scars, normal bowel sounds, soft, non-distended, non-tender, large ventral hernia, no hepatosplenomegally  Skin: no bruising or bleeding, have decubitus ulcer  Musculoskeletal: 2-3+ lower extremity pitting edema present, lymphedema wrap in place  Neurologic: No focal deficits    Medications   Current Facility-Administered Medications   Medication Dose Route Frequency Provider Last Rate Last Admin    Patient is already receiving anticoagulation with heparin, enoxaparin (LOVENOX), warfarin (COUMADIN)  or other anticoagulant medication   Does not apply Continuous PRN Clara He APRN CNP         Current Facility-Administered Medications   Medication Dose Route Frequency Provider Last Rate Last Admin    acetaminophen (TYLENOL) tablet 975 mg  975 mg Oral TID Clara eH APRN CNP   975 mg at 09/13/24 1305    albumin human 25 % injection 12.5 g  12.5 g Intravenous Q8H Ko Huber MD   12.5 g at 09/14/24 0545    dutasteride (AVODART) capsule 0.5 mg  0.5 mg Oral Daily Ko Huber MD   0.5 mg at 09/14/24 0806    midodrine (PROAMATINE) tablet 15 mg  15 mg Oral TID w/meals Ko Huber MD   15 mg at 09/14/24 0806    pantoprazole (PROTONIX) EC tablet 40 mg  40 mg Oral BID AC Jocelyn Palmer MD   40 mg at 09/14/24 0631    polyethylene glycol (MIRALAX) Packet 17 g  17 g Oral BID Ko Huber MD   17 g at 09/14/24 0805    senna-docusate (SENOKOT-S/PERICOLACE) 8.6-50 MG per tablet 1 tablet  1 tablet Oral At Bedtime Clara He APRN CNP   1 tablet at 09/13/24 2136    sodium chloride (PF) 0.9% PF flush 3 mL  3 mL Intracatheter Q8H Clara He APRN CNP   3 mL at 09/14/24 0645    warfarin ANTICOAGULANT  (COUMADIN) tablet 1 mg  1 mg Oral ONCE at 18:00 Carrillo Taveras, DO        Warfarin Dose Required Daily - Pharmacist Managed  1 each Oral See Admin Instructions Clara He APRN CNP           Data   Recent Labs   Lab 09/14/24  1135 09/14/24  0850 09/14/24  0813 09/14/24  0558 09/14/24  0153 09/13/24  1940 09/13/24  1759 09/13/24  0741 09/13/24  0539 09/13/24  0225 09/12/24  1824 09/12/24  1054 09/12/24  0807 09/12/24  0527 09/10/24  1148 09/10/24  0520 09/09/24  1848 09/09/24  1455   WBC  --   --   --  6.1  --   --   --   --  5.3  --   --  5.8  --  6.4   < > 9.8  --  8.8   HGB  --   --   --  8.0*  --   --   --   --  7.7*  --   --  8.6*  --  8.3*   < > 9.7*  --  10.2*   MCV  --   --   --  93  --   --   --   --  93  --   --  93  --  91   < > 88  --  88   PLT  --   --   --  186  --   --   --   --  192  --   --  216  --  206   < > 249  --  303   INR  --   --   --  2.65*  --   --   --   --   --  2.53*  --   --   --  1.93*   < > 7.19*   < > 6.01*   NA  --   --   --  133*  --   --  125*  --  128*  128*  --    < >  --   --  126*  126*   < > 127*   < > 118*   POTASSIUM  --   --  3.6 3.9 3.3*   < > 3.4  --  2.9*  --   --   --   --  3.7   < > 3.6  --  3.7   CHLORIDE  --   --   --  93*  --   --   --   --  89*  --   --   --   --  88*   < > 78*  --  75*   CO2  --   --   --  29  --   --   --   --  28  --   --   --   --  26   < > 25  --  24   BUN  --   --   --  84.7*  --   --   --   --  92.8*  --   --   --   --  100.3*   < > 96.8*  --  100.8*   CR  --   --   --  1.59*  --   --   --   --  1.96*  --   --   --   --  2.48*   < > 3.14*  --  3.11*   ANIONGAP  --   --   --  11  --   --   --   --  11  --   --   --   --  12   < > 24*  --  19*   JOSEPH  --   --   --  8.4*  --   --   --   --  7.7*  --   --   --   --  7.8*   < > 8.5*  --  9.2   * 93  --  83  --    < >  --    < > 87  --   --   --    < > 89   < > 80   < > 97   ALBUMIN  --   --   --  3.3*  --   --   --   --  2.8*  --   --   --   --  2.6*   < > 3.3*  --  3.2*    PROTTOTAL  --   --   --   --   --   --   --   --   --   --   --   --   --   --   --  5.8*  --  6.7   BILITOTAL  --   --   --   --   --   --   --   --   --   --   --   --   --   --   --  0.6  --  0.6   ALKPHOS  --   --   --   --   --   --   --   --   --   --   --   --   --   --   --  80  --  99   ALT  --   --   --   --   --   --   --   --   --   --   --   --   --   --   --  <5  --  <5   AST  --   --   --   --   --   --   --   --   --   --   --   --   --   --   --  18  --  21    < > = values in this interval not displayed.       No results found for this or any previous visit (from the past 24 hour(s)).

## 2024-09-14 NOTE — PLAN OF CARE
Goal Outcome Evaluation:       Summary:  Generalized Weakness and Leg Swelling, UTI    DATE & TIME: 9/13/24-9/14/24 6662-4603  Cognitive Concerns/ Orientation : A&O x 2, int confusion   BEHAVIOR & AGGRESSION TOOL COLOR: Green  CIWA SCORE: N/A   ABNL VS/O2:  VSS on RA, ex low BP, PRN albumin available if systolic is under 80.  MOBILITY: A2 Lift, T&R, using Wedge- refused at times overnight, RN educated on importance of repositioning  PAIN MANAGMENT: Denies- moans with turns but did not want interventions- turn slowly  DIET: Regular  BOWEL/BLADDER: Stubbs cath, incontinent of bowel- 1x small BM  ABNL LAB/BG: K 3.3- replaced, recheck in AM  DRAIN/DEVICES: R&L PIV SL   TELEMETRY RHYTHM: Afib CVR w/ occ PVCs  SKIN: : Bilat LE edema, ace wrapped. Chronic sacral wound, mepi changed only per pt request due to pain.  Ventral hernia.  TESTS/PROCEDURES: N/A  D/C DAY/GOALS/PLACE: Pending  OTHER IMPORTANT INFO: Nephrology,  urology, cardiology, nutrition, SW are following. Takes meds crushed in pudding.

## 2024-09-15 NOTE — PLAN OF CARE
Orientation: alert and oriented x4- repeated requests and intermittent confusion    Vitals/Tele: vitals stable on room air- soft blood pressures- PRN albumin available if SBP<80    IV Access/drains: R & L PIV SL    Diet: regular-vegetarian, refusing wound expedite    Mobility: assist 2 w/ lift- can shift weight, not OOB this shift    GI/: chronic branch catheter- will discharge home with, multiple loose bowel movements     Wound/Skin: chronic tennis ball size sacral wound, dressing changed x2 today. Scattered bruising and scabbing, ventral hernia, BLE edema- lymphedema wraps     Consults: nephrology, cardiology, urology, nutrition     Discharge Plan: home tomorrow     Meds crushed in pudding      See Flow sheets for assessment

## 2024-09-15 NOTE — PLAN OF CARE
Goal Outcome Evaluation:  Summary:  Generalized Weakness and Leg Swelling, UTI    DATE & TIME: /14/24 1900-0730  Cognitive Concerns/ Orientation : A&O x 4 int confusion to situation, irritable at times   BEHAVIOR & AGGRESSION TOOL COLOR: Green   ABNL VS/O2:  VSS on RA, soft BPs at baseline, asymptomatic -takes Midodrine, PRN albumin available if systolic is under 80. Has scheduled Albumin  MOBILITY: A2 Lift, T&R, using Wedge vs pillows. patient requests to take it very slow. Foot over ankle before turn. He can help then. Refuses turns despite education. Documented  PAIN MANAGMENT: Denies but moans with turns but did not want medication or any interventions  DIET: Regular, vegetarian,  ate 100% of dinner  BOWEL/BLADDER: Stubbs cath, incontinent of bowel- large, soft BM this shift  ABNL LAB/BG: Na 132, q12h. K 3.6- recheck in, creat 1.59, Hgb 8.0, INR 2.65. BG 99  DRAIN/DEVICES: R&L PIV SL   TELEMETRY RHYTHM: Afib CVR w/ PVCs  SKIN: : Bilat LE edema, ace wrapped. Chronic sacral wound-drsg chg, CDI changed last evening shift ~2230, orders 3x day.  Ventral hernia.  TESTS/PROCEDURES: N/A  D/C DAY/GOALS/PLACE: Pending  OTHER IMPORTANT INFO: Nephrology,  urology, cardiology, nutrition, SW are following. One-time dose warfarin this shift.

## 2024-09-15 NOTE — PROGRESS NOTES
[facility]    Hospitalist Progress Note    Brief Summary:    Assessment & Plan   Feng Wynne is a 79 year old male with significant past medical history for mechanical mitral valve replacement on chronic anticoagulation with warfarin, A-fib, HFpEF, coronary artery disease, hypertension, hyperlipidemia, sacral decubitus ulcers, and previous admission at [facility] from 7/18/2024 through 7/26/2024 due to enteropathic E. coli with associated diarrhea and mild hyponatremia who presented to the emergency department with weakness, shortness of breath, increased lower extremity swelling, decreased appetite, and difficulty with lying flat.       Acute urinary retention   Acute renal failure secondary to obstructive uropathy/likely ATN: Improved  Status post Stubbs catheter placement  Gross hematuria: Resolved  Supratherapeutic INR: Resolved  Possible UTI: Treated    This is a 79-year-old male with multiple comorbidities as above, mostly bedridden, has difficulty in passing urine for more than couple of weeks, and unable to pass urine.  On exam in the ED found to have urinary obstruction, ultrasound of the renal shows moderate to marked bladder distention and possible mild hydronephrosis of the left kidney, unable to visualize right kidney.  CT abdomen shows moderate distention of the urinary bladder  Status post Stubbs catheter placement with post Stubbs gross hematuria.  He received a dose of IV Lasix times once in the ED.  No significant output since catheter placement.  Although he have lower extreme edema but looks intravascularly dry.  9/10/2024, I started  him on IV normal saline at 100 mL/h, as he was hypotensive as well has most likely have obstructive uropathy that will help his recover his kidney function.  Baseline creatinine normal last check in July this year.    Creatinine is trending down, is making urine, hematuria resolved.  Creatinine improved to 1.18, creatinine on  admission 3.11  He does have gross hematuria, evaluate by the urology, no intervention at this time.  Hematuria is now resolved.    His INR was supratherapeutic 7.0, although would like to avoid giving vitamin K given a mitral valve replacement with mechanical valve, but he was bleeding with gross hematuria and hypotensive.  I did give him 2.5 mg of IV vitamin K to help control his INR to his therapeutic levels.  INR is now subtherapeutic, normal gross hematuria.  Normal urine at this time.  He was briefly on IV heparin for bridging, now discontinued, as INR is therapeutic.  Continue strict intake and output charting  Daily weight  Continue with the Stubbs catheter placement  Started him on Flomax if able to take it given low blood pressure, and Avodart 0.5 mg daily, unable to give him Flomax because of borderline low blood pressure.  Nephrology recommended not to give Flomax for  Nephrology was consulted and now signed off.  Recheck UA shows more than 180 WBC count and more than 180 RBCs.    Started him on IV ceftriaxone, urine culture no growth.   discontinue IV ceftriaxone on 9/13/2024.      He had lower extremity edema, weight increased to 101 kg.(May not be accurate), IV fluid discontinued.  Started him on IV albumin 12.5 g every 8 hours, unable to give the dose of Lasix because of continued low blood pressure..  Did increase the dose of midodrine to 15 mg 3 times a day.    Creatinine is trending down and improved to 1.18 on 9/15/2024..  Was given oral Lasix 20 mg on 9/13/2024 and tolerated well, repeat the dose of Lasix 40 mg p.o. on 914 and tolerated that well as well.  I will start him on Lasix 40 mg p.o. daily, his weight is trending down his lower extremity edema is improved with Lasix as well as with lymphedema wrap.    Patient will most likely need long-term Stubbs catheter placement, and need to change every 4 weeks.  He will be discharged on Stubbs catheter, with outpatient urology follow-up.    Overall  improved at this time, kidney pressure almost back to baseline, lower extremity edema is improving, will give Lasix today, continue IV albumin for 1 more day and discontinue albumin from tomorrow.  Will discharge him on low-dose of Lasix 20 mg daily.  He needs to work on his diet and take protein supplements to improve his nutritional status.      Hyponatremia: Improving  Patient sodium on admission 118.  Patient told me that he is drinking about 3 L of water daily and sometimes more.  He does have some lower extremity edema  Urine osmolarity is 285, urine sodium less than 20, serum osmolarity 293, mixed picture.  The IV fluids, sodium only mildly improved to 123.  Sodium not improved with IV fluids, but improved with fluid restrictions.  At this time continue free water restriction of 1500 mL in 24-hour,   Sodium now improved to 133 will continue monitor  Continue fluid restriction 1500 mL in 24-hour.        Acute renal failure with hydronephrosis secondary to outlet obstruction/ATN  -Baseline creatinine typically 0.5 to-0.61 is 3.11  -Believe secondary to outlet obstruction distended bladder and mild hydronephrosis, now most likely in ATN  -CT abdomen pelvis with distended bladder and large amount of stool throughout colon and no evidence of obstruction  -Ultrasound of kidneys with moderate to marked bladder distention and possible mild hydronephrosis of left kidney  -Stubbs catheter placed with large amount of cola colored urine returned  -Urology consulted  -Patient received one-time dose of 60 mg Lasix with albumin will monitor closely before proceeding with further doses and input from cardiology.    He was given IV fluids, now discontinued, creatinine continued trending down as above.  Continue p.o. Lasix, creatinine continue improved.  Creatinine 1.18 on 9/15/2024         Elevated troponin suspect type II MI  Status post mitral valve replacement with mechanical valve  Severe tricuspid regurgitation  Chronic  paroxysmal atrial fibrillation  Hypotension  -Elevated troponin likely secondary from acute heart failure exacerbation  -Initial troponin 104-second 102 EKG changes    -Mechanical mitral valve on anticoagulation currently supratherapeutic with INR of 6.1 on admission, increased to 7 today.  -Pharmacy is consulted to dose warfarin, was holding warfarin up till now.  -At this time no more hematuria, I will start him on IV heparin for bridging, as a pharmacy to dose his warfarin today.  Once INR is therapeutic between 2.5-3.5 will discontinue IV heparin.  -Typically takes 20 mg torsemide daily at home, hold any diuretics at this time  -Received 60 mg IV Lasix with albumin in the ED.  Holding further diuresis.  -Appreciate cardiac consultation  -Continuous cardiac monitoring  Cardiology was consulted and evaluate the patient.  Does not recommend any intervention at this time.          Chronic iron deficiency anemia and anemia of chronic disease  -Hemoglobin stable at 10.2  -Will hold ferrous sulfate for now     Constipation with large known ventral hernia: Resolved  CT scan shows large ventral hernia, and large stools in the colon.  No obstruction at this time.  He is significantly constipated  Start him on MiraLAX 17 g twice daily and senna twice daily.  Given taper to enema on 9/11/2024  Constipation is now resolved, having good bowel movements.      Chronic sacral and coccyx wounds  -follows with wound clinic   -woc consulted      Bilateral lower extremity edema: Improved  Patient has significant 2-3+ lower extremity edema, currently lymphedema wrap in place.  Likely third spacing, will continue with the lymphedema wrap at this time.  He has been getting oral Lasix for the last few days, his weight is trending down and lower extremity edema is improving as well  Continue with the lymphedema wrap at this time, continue Lasix, continue IV albumin for 1 more day    Hypotension: Chronic  Patient is hypotensive at this  time.  But asymptomatic.  Do not know what his baseline blood pressure at home.  I will give him a bolus of 250 on normal saline, start normal saline at 100 mL/h.  Start him on midodrine 5 mg 3 times daily, blood pressure still on the lower side increase the dose to 10 mg 3 times daily.  Cortisol level checked yesterday 26.1.    Morning cortisol on 9/11/2020 2416.4.  Patient told me that his blood pressure always on the lower side but unable to provide me the numbers.  I did increase the dose of midodrine to 15 mg 3 times a day, continue with IV albumin.  Most likely has chronic low blood pressure, as he asymptomatic, with good perfusion with his low blood pressure.  Continue IVF albumin for 1 more then discontinue from tomorrow morning.    Anemia  Patient has chronic anemia, history of GI bleed in the past.  Hemoglobin on admission was 10.2 had course hematuria and did drop his hemoglobin.  Anemia at this time is multifactorial related to renal failure, dilutional, and gross hematuria.  Iron levels are low, I will give him a dose of IV Venofer 300 mg today  We will start him on oral ferrous sulfate on discharge.       Diet:  regular  DVT Prophylaxis: Warfarin  Stubbs Catheter: Not present  Lines: None     Cardiac Monitoring: None  Code Status:  Full           Clinically Significant Risk Factors        # Hypokalemia: Lowest K = 3.3 mmol/L in last 2 days, will replace as needed  # Hyponatremia: Lowest Na = 125 mmol/L in last 2 days, will monitor as appropriate      # Hypoalbuminemia: Lowest albumin = 2.6 g/dL at 9/12/2024  5:27 AM, will monitor as appropriate       # Hypertension: Noted on problem list  # Chronic heart failure with preserved ejection fraction: heart failure noted on problem list and last echo with EF >50%           # Severe Malnutrition: based on nutrition assessment    # Financial/Environmental Concerns: none                 DVT Prophylaxis: Warfarin,     Disposition: Expected discharge  tomorrow.    Medically Ready for Discharge: Anticipated Tomorrow        Ko Huber MD, MD  Text Page  (7am - 6pm)    Interval History   Patient offers no complaints this morning.  Feeling otherwise well.  Denies any chest pain shortness of breath dizziness or lightheadedness.    No other significant event overnight    -Data reviewed today: I reviewed all new labs and imaging results over the last 24 hours. I personally reviewed no images or EKG's today.    Physical Exam   Temp: 97.6  F (36.4  C) Temp src: Oral BP: 101/58 Pulse: 64   Resp: 16 SpO2: 97 % O2 Device: None (Room air)    Vitals:    09/13/24 0551 09/14/24 0129 09/15/24 0520   Weight: 100.1 kg (220 lb 10.9 oz) 94.1 kg (207 lb 7.3 oz) 93.2 kg (205 lb 7.5 oz)     Vital Signs with Ranges  Temp:  [97.6  F (36.4  C)-98.1  F (36.7  C)] 97.6  F (36.4  C)  Pulse:  [64-74] 64  Resp:  [16-18] 16  BP: ()/(49-63) 101/58  SpO2:  [96 %-98 %] 97 %  I/O last 3 completed shifts:  In: 870 [P.O.:870]  Out: 1825 [Urine:1825]    Constitutional: awake, alert, cooperative, no apparent distress, and appears stated age  Eyes: Lids and lashes normal, pupils equal, round and reactive to light, extra ocular muscles intact, sclera clear, conjunctiva normal  Respiratory: No increased work of breathing, good air exchange, clear to auscultation bilaterally, no crackles or wheezing  Cardiovascular: Normal apical impulse, regular rate and rhythm, normal S1 and S2, no S3 or S4, and no murmur noted  GI: No scars, normal bowel sounds, soft, non-distended, non-tender, large ventral hernia, no hepatosplenomegally  Skin: no bruising or bleeding, have decubitus ulcer  Musculoskeletal: 2-3+ lower extremity pitting edema present, lymphedema wrap in place  Neurologic: No focal deficits    Medications   Current Facility-Administered Medications   Medication Dose Route Frequency Provider Last Rate Last Admin    Patient is already receiving anticoagulation with heparin, enoxaparin (LOVENOX),  warfarin (COUMADIN)  or other anticoagulant medication   Does not apply Continuous PRN Clara He APRN CNP         Current Facility-Administered Medications   Medication Dose Route Frequency Provider Last Rate Last Admin    acetaminophen (TYLENOL) tablet 975 mg  975 mg Oral TID Clara He APRN CNP   975 mg at 09/13/24 1305    albumin human 25 % injection 12.5 g  12.5 g Intravenous Q8H Ko Huber  mL/hr at 09/14/24 2223 12.5 g at 09/15/24 0544    dutasteride (AVODART) capsule 0.5 mg  0.5 mg Oral Daily Ko Huber MD   0.5 mg at 09/15/24 0842    furosemide (LASIX) tablet 40 mg  40 mg Oral Daily Ko Huber MD        iron sucrose (VENOFER) 300 mg in sodium chloride 0.9 % 290 mL intermittent infusion  300 mg Intravenous Q72H Ko Huber MD        midodrine (PROAMATINE) tablet 15 mg  15 mg Oral TID w/meals Ko Huber MD   15 mg at 09/15/24 0842    pantoprazole (PROTONIX) EC tablet 40 mg  40 mg Oral BID AC Jocelyn Palmer MD   40 mg at 09/15/24 0545    polyethylene glycol (MIRALAX) Packet 17 g  17 g Oral BID Ko Huber MD   17 g at 09/14/24 2224    senna-docusate (SENOKOT-S/PERICOLACE) 8.6-50 MG per tablet 1 tablet  1 tablet Oral At Bedtime Clara He APRN CNP   1 tablet at 09/14/24 2224    sodium chloride (PF) 0.9% PF flush 3 mL  3 mL Intracatheter Q8H Clara He APRN CNP   3 mL at 09/15/24 0842    warfarin ANTICOAGULANT (COUMADIN) half-tab 1.5 mg  1.5 mg Oral ONCE at 18:00 Carrillo Taveras DO        Warfarin Dose Required Daily - Pharmacist Managed  1 each Oral See Admin Instructions Clara He APRN CNP           Data   Recent Labs   Lab 09/15/24  1126 09/15/24  0818 09/15/24  0722 09/14/24  2112 09/14/24  1853 09/14/24  0850 09/14/24  0813 09/14/24  0558 09/13/24  0741 09/13/24  0539 09/13/24  0225 09/10/24  1148 09/10/24  0520 09/09/24  1848 09/09/24  1455   WBC  --   --  5.4  --   --   --   --  6.1  --  5.3  --    <  > 9.8  --  8.8   HGB  --   --  7.6*  --   --   --   --  8.0*  --  7.7*  --    < > 9.7*  --  10.2*   MCV  --   --  94  --   --   --   --  93  --  93  --    < > 88  --  88   PLT  --   --  180  --   --   --   --  186  --  192  --    < > 249  --  303   INR  --   --  2.74*  --   --   --   --  2.65*  --   --  2.53*   < > 7.19*   < > 6.01*   NA  --   --  133*  133*  --  132*  --   --  133*   < > 128*  128*  --    < > 127*   < > 118*   POTASSIUM  --   --  3.5  --   --   --  3.6 3.9   < > 2.9*  --    < > 3.6  --  3.7   CHLORIDE  --   --  94*  --   --   --   --  93*  --  89*  --    < > 78*  --  75*   CO2  --   --  28  --   --   --   --  29  --  28  --    < > 25  --  24   BUN  --   --  77.0*  --   --   --   --  84.7*  --  92.8*  --    < > 96.8*  --  100.8*   CR  --   --  1.18*  --   --   --   --  1.59*  --  1.96*  --    < > 3.14*  --  3.11*   ANIONGAP  --   --  11  --   --   --   --  11  --  11  --    < > 24*  --  19*   JOSEPH  --   --  8.5*  --   --   --   --  8.4*  --  7.7*  --    < > 8.5*  --  9.2   * 109* 94   < >  --    < >  --  83   < > 87  --    < > 80   < > 97   ALBUMIN  --   --  3.1*  --   --   --   --  3.3*  --  2.8*  --    < > 3.3*  --  3.2*   PROTTOTAL  --   --   --   --   --   --   --   --   --   --   --   --  5.8*  --  6.7   BILITOTAL  --   --   --   --   --   --   --   --   --   --   --   --  0.6  --  0.6   ALKPHOS  --   --   --   --   --   --   --   --   --   --   --   --  80  --  99   ALT  --   --   --   --   --   --   --   --   --   --   --   --  <5  --  <5   AST  --   --   --   --   --   --   --   --   --   --   --   --  18  --  21    < > = values in this interval not displayed.       No results found for this or any previous visit (from the past 24 hour(s)).

## 2024-09-16 NOTE — PLAN OF CARE
SUMMARY: Generalized Weakness, Leg Swelling, UTI    DATE & TIME:  2024 0700- 1530     Cognitive Concerns/ Orientation: A & O x4   BEHAVIOR & AGGRESSION TOOL COLOR: Green  ABNL VS/O2: VSS on RA, soft BPs at baseline, asymptomatic -takes Midodrine, PRN albumin available if systolic is under 80, also on scheduled Albumin  MOBILITY: Assist x2 Lift, T&R, patient requests to move very slow. Foot over ankle before turn. He can help with turns then  PAIN MANAGMENT: Denied pain this shift - on scheduled Tylenol-refused medication   DIET: Regular,  Vegetarian  BOWEL/BLADDER: Branch cath in place- will discharge with branch, Incontinent of bowel- 1 large, loose BM this shift  ABNL LAB/BG: Na 135 (q12hr checks); K 3.1 - recheck at noon, hgb 7.7, B  DRAIN/DEVICES: removed  TELEMETRY RHYTHM: None- discontinued  SKIN: Scattered Bruises and scabs. Bilateral LE edema, ace wrapped. Chronic sacral wound-dressing changed this shift; CDI. Orders for dressing to be changed 3x day. Ventral hernia.  TESTS/PROCEDURES: None this shift   D/C DATE: Home with home-care     OTHER IMPORTANT INFO: Nephrology,  urology, cardiology, nutrition, SW are following. Prefers pills crushed with pudding

## 2024-09-16 NOTE — PLAN OF CARE
Goal Outcome Evaluation:      Plan of Care Reviewed With: patient    Overall Patient Progress: improving     SUMMARY: Generalized Weakness, Leg Swelling, UTI    DATE & TIME: 09/15/2024 - 2024 [id number]     Cognitive Concerns/ Orientation: A & O x4   BEHAVIOR & AGGRESSION TOOL COLOR: Green  ABNL VS/O2: VSS on RA, soft BPs at baseline, asymptomatic -takes Midodrine, PRN albumin available if systolic is under 80, also on scheduled Albumin  MOBILITY: Assist x2 Lift, T&R, patient requests to move very slow. Foot over ankle before turn. He can help with turns then  PAIN MANAGMENT: Denied pain this shift - on scheduled Tylenol - however refuses medication   DIET: Regular, pt is Vegetarian  BOWEL/BLADDER: Branch cath in place- will discharge with branch, Incontinent of bowel- 1 large, soft BM this shift  ABNL LAB/BG: Na 135 (q12hr checks); K 3.5 - recheck placed for AM draw, hgb 7.6, B  DRAIN/DEVICES: R & L PIV SL   TELEMETRY RHYTHM: A FIB CVR w/ occasional PVCs  SKIN: Scattered Bruises and scabs. Bilateral LE edema, ace wrapped. Chronic sacral wound-dressing changed this shift; CDI. Orders for dressing to be changed 3x day. Ventral hernia.  TESTS/PROCEDURES: None this shift   D/C DATE: Home with home-care     OTHER IMPORTANT INFO: Nephrology,  urology, cardiology, nutrition, SW are following. Prefers pills crushed with pudding

## 2024-09-16 NOTE — PROGRESS NOTES
CLINICAL NUTRITION SERVICES - REASSESSMENT NOTE      Recommendations Ordered by Registered Dietitian (RD):   - Magic cup 1x/day (290 kcal 9 g protein each)   - [employer] supplement 1x/day  - Discontinued Ensure Enlive and Expedite.     Malnutrition:   Severe malnutrition          EVALUATION OF PROGRESS TOWARD GOALS   Diet:  Regular   Oral Nutrition Supplements: Ensure Enlive (1x/day) and Expedite (1x/day)    Intake/Tolerance:  Poor appetite persists. Based on recall, estimate his intake is meeting </= 50% nutritional needs. Agreeable to trial of Magic Cup and [employer] supplements.       ASSESSED NUTRITION NEEDS:  Dosing Weight 93.3 kg  Estimated Energy Needs: 3947-5263 kcals (25-30 Kcal/Kg)  Justification: wound healing  Estimated Protein Needs:  grams protein (1-1.2 g pro/Kg)  Justification: wound healing however elevated BUN      NEW FINDINGS:   Plan for discharge home 9/16.    Previous Goals:   Intake of at leatst 50% adequate trays + 2 supplements daily.   Evaluation: Not met    Previous Nutrition Diagnosis:   Inadequate oral intake related to increased nutrient needs and low appetite as evidenced by patient consumes up to 50% of small meals, dislikes supplements.   Evaluation: No change      MALNUTRITION  % Weight Loss:  > 10% in 6 months (severe malnutrition)  % Intake:  <75% for >/= 3 months (moderate malnutrition)  Subcutaneous Fat Loss:  Orbital region moderate-severe depletion, Upper arm region moderate depletion, and Thoracic region moderate-severe depletion  Muscle Loss:  Temporal region moderate-severe depletion, Clavicle bone region moderate depletion, and Dorsal hand region moderate depletion  Fluid Retention:  Moderate 3+     Malnutrition Diagnosis: Severe malnutrition  In Context of:  Acute illness or injury  Chronic illness or disease    CURRENT NUTRITION DIAGNOSIS  Inadequate oral intake related to increased nutrient needs and low appetite as evidenced by patient consumes 25-75% of small  meals, dislikes supplements.     INTERVENTIONS  Recommendations / Nutrition Prescription  See above    Goals  Pt to consume at least 75% of three nutritionally adequate meals per day (or equivalent via snacks/supplements).       MONITORING AND EVALUATION:  Progress towards goals will be monitored and evaluated per protocol and Practice Guidelines    Shannon Rivera RD, LD, CNSC

## 2024-09-16 NOTE — PROGRESS NOTES
Discharge    Patient discharged to home with home care via stretcher with [facility] transport.  Care plan note completed.    Listed belongings gathered and given to patient (including from security/pharmacy). Yes  Care Plan and Patient education resolved: Yes  Prescriptions if needed, hard copies sent with patient  Yes  Medication Bin checked and emptied on discharge Yes  SW/care coordinator/charge RN aware of discharge: Yes    [Arrhythmia/ECG Abnorrmalities] : arrhythmia/ECG abnormalities [[email or web address]] : Siva Potter MD

## 2024-09-16 NOTE — DISCHARGE SUMMARY
[facility]    Discharge Summary  Hospitalist    Date of Admission:  9/9/2024  Date of Discharge:  9/16/2024  Discharging Provider: Ko Huber MD, MD  Date of Service (when I saw the patient): 09/16/24    Discharge Diagnoses   Please refer below    History of Present Illness   Feng Wynne is an 79 year old male who presented with weakness, shortness of breath lower extremity swelling    Hospital Course   Feng Wynne is a 79 year old male with significant past medical history for mechanical mitral valve replacement on chronic anticoagulation with warfarin, A-fib, HFpEF, coronary artery disease, hypertension, hyperlipidemia, sacral decubitus ulcers, and previous admission at [facility] from 7/18/2024 through 7/26/2024 due to enteropathic E. coli with associated diarrhea and mild hyponatremia who presented to the emergency department with weakness, shortness of breath, increased lower extremity swelling, decreased appetite, and difficulty with lying flat.      Final discharge diagnoses and hospital course      Acute urinary retention   Acute renal failure secondary to obstructive uropathy/likely ATN: Resolved  Status post Stubbs catheter placement 9/10/2024  Gross hematuria: Resolved  Supratherapeutic INR: Resolved  Possible UTI: Treated     This is a 79-year-old male with multiple comorbidities as above, mostly bedridden, has difficulty in passing urine for more than couple of weeks, and unable to pass urine.  On exam in the ED found to have urinary obstruction, ultrasound of the renal shows moderate to marked bladder distention and possible mild hydronephrosis of the left kidney, unable to visualize right kidney.  CT abdomen shows moderate distention of the urinary bladder  Status post Stubbs catheter placement with post Stubbs gross hematuria.  He received a dose of IV Lasix times once in the ED.  No significant output since catheter placement.  Although he have lower extreme  edema but looks intravascularly dry.  9/10/2024, I started  him on IV normal saline at 100 mL/h, as he was hypotensive as well has most likely have obstructive uropathy that will help his recover his kidney function.  Baseline creatinine normal last check in July this year.    Creatinine is trending down, is making urine, hematuria resolved.  Creatinine improved to 0.97 on day of discharge, creatinine on admission 3.11  He had gross hematuria after placement of Stubbs catheter, evaluate by the urology, no intervention at this time.  Hematuria is now resolved, this was most likely secondary to due to supratherapeutic INR and Stubbs catheter placement.     His INR was supratherapeutic 7.0, although would like to avoid giving vitamin K given a mitral valve replacement with mechanical valve, but he was bleeding with gross hematuria and hypotensive.  I did give him 2.5 mg of IV vitamin K to help control his INR to his therapeutic levels.  INR is now subtherapeutic, no more gross hematuria.  Normal urine at this time.  He was briefly on IV heparin for bridging, now discontinued, as INR is therapeutic.  Continue strict intake and output charting  Daily weight  Continue with the Stubbs catheter placement, most likely will need replacement every 4 weeks.  Started him on Flomax if able to take it given low blood pressure, and Avodart 0.5 mg daily, unable to give him Flomax because of borderline low blood pressure.  Nephrology recommended not to give Flomax, so discontinue  Nephrology was consulted and now signed off.  Recheck UA shows more than 180 WBC count and more than 180 RBCs.    Started him on IV ceftriaxone, urine culture no growth.   discontinue IV ceftriaxone on 9/13/2024.        He had lower extremity edema, weight increased to 101 kg.(May not be accurate), IV fluid discontinued.  Started him on IV albumin 12.5 g every 8 hours, unable to give the dose of Lasix because of continued low blood pressure..  Did increase the  dose of midodrine to 15 mg 3 times a day.    Creatinine is trending down and improved to 1.18 on 9/15/2024..  Was given oral Lasix 20 mg on 9/13/2024 and tolerated well, repeat the dose of Lasix 40 mg p.o. on 914 and tolerated that well as well.  I will start him on Lasix 40 mg p.o. daily, his weight is trending down his lower extremity edema is improved with Lasix as well as with lymphedema wrap.  His lower extremity edema significantly improved, and now only trace to 1+ at the time of discharge  Function is normal, he will resume his PTA torsemide 20 mg daily     Patient will most likely need long-term Stubbs catheter placement, and need to change every 4 weeks.  He will be discharged on Stubbs catheter, with outpatient urology follow-up.     Overall improved at this time, kidney pressure almost back to baseline, lower extremity edema is improving, IV albumin discontinued.  Midodrine dose decreased to 10 mg 3 times daily.  He will be discharged on his torsemide 20 mg daily.  Lymphedema wrap as outpatient  Discharge home with home health RN PT and OT        Hyponatremia: Improved  Patient sodium on admission 118.  Patient told me that he is drinking about 3 L of water daily and sometimes more.  He does have some lower extremity edema  Urine osmolarity is 285, urine sodium less than 20, serum osmolarity 293, mixed picture.  The IV fluids, sodium only mildly improved to 123.  Sodium not improved with IV fluids, but improved with fluid restrictions.  At this time continue free water restriction of 1500 mL in 24-hour,   Sodium now improved to 133 will continue monitor  Continue fluid restriction 1500 mL in 24-hour.           Acute renal failure with hydronephrosis secondary to outlet obstruction/ATN: Resolved  -Baseline creatinine typically 0.5 to-0.61 is 3.11  -Believe secondary to outlet obstruction distended bladder and mild hydronephrosis, now most likely in ATN  -CT abdomen pelvis with distended bladder and large  amount of stool throughout colon and no evidence of obstruction  -Ultrasound of kidneys with moderate to marked bladder distention and possible mild hydronephrosis of left kidney  -Stubbs catheter placed with large amount of cola colored urine returned  -Urology consulted  -Patient received one-time dose of 60 mg Lasix with albumin will monitor closely before proceeding with further doses and input from cardiology.    He was given IV fluids, now discontinued, creatinine continued trending down as above.  Continue p.o. torsemide at discharge creatinine normal at time of discharge.           Elevated troponin suspect type II MI  Status post mitral valve replacement with mechanical valve  Severe tricuspid regurgitation  Chronic paroxysmal atrial fibrillation  Hypotension  -Elevated troponin likely secondary from acute heart failure exacerbation  -Initial troponin 104-second 102 EKG changes     -Mechanical mitral valve on anticoagulation currently supratherapeutic with INR of 6.1 on admission, increased to 7 today.  -Pharmacy is consulted to dose warfarin, was holding warfarin up till now.  -At this time no more hematuria, I will start him on IV heparin for bridging, as a pharmacy to dose his warfarin today.  Once INR is therapeutic between 2.5-3.5 will discontinue IV heparin.  -Typically takes 20 mg torsemide daily at home, hold any diuretics at this time  -Received 60 mg IV Lasix with albumin in the ED.  Holding further diuresis.  -Appreciate cardiac consultation  -Continuous cardiac monitoring  Cardiology was consulted and evaluate the patient.  Does not recommend any intervention at this time.           Chronic iron deficiency anemia and anemia of chronic disease  -Hemoglobin 7.7 at time of discharge.  Follow outpatient  -Will hold ferrous sulfate for now     Constipation with large known ventral hernia: Resolved  CT scan shows large ventral hernia, and large stools in the colon.  No obstruction at this time.  He is  significantly constipated  Start him on MiraLAX 17 g twice daily and senna twice daily.  Given taper to enema on 9/11/2024  Constipation is now resolved, bowel movements are more regular  Discharge on MiraLAX 17 g daily        Chronic sacral and coccyx wounds  -follows with wound clinic   -[facility] consulted      Bilateral lower extremity edema: Improved  Patient has significant 2-3+ lower extremity edema, currently lymphedema wrap in place.  Likely third spacing, will continue with the lymphedema wrap at this time.  He has been getting oral Lasix for the last few days, his weight is trending down and lower extremity edema is improving as well  Continue with the lymphedema wrap at this time, continue with PTA torsemide.  Lower extremity edema significantly improved at the time of discharge     Hypotension: Chronic  Patient is hypotensive at this time.  But asymptomatic.  Do not know what his baseline blood pressure at home.  I will give him a bolus of 250 on normal saline, start normal saline at 100 mL/h.  Start him on midodrine 5 mg 3 times daily, blood pressure still on the lower side increase the dose to 10 mg 3 times daily.  Cortisol level checked yesterday 26.1.    Morning cortisol on 9/11/2020 2416.4.  Patient told me that his blood pressure always on the lower side but unable to provide me the numbers.  I did increase the dose of midodrine to 15 mg 3 times a day, continue with IV albumin.  Most likely has chronic low blood pressure, as he asymptomatic, with good perfusion with his low blood pressure.  IV albumin discontinued.  Dose of midodrine decreased to 10 mg 3 times a day on discharge     Anemia  Patient has chronic anemia, history of GI bleed in the past.  Hemoglobin on admission was 10.2 had course hematuria and did drop his hemoglobin.  Anemia at this time is multifactorial related to renal failure, dilutional, and gross hematuria.  Iron levels are low, I will give him a dose of IV Venofer 300 mg  today  Continue ferrous sulfate 325 mg daily        Diet:  regular  DVT Prophylaxis: Warfarin  Stubbs Catheter: Not present  Lines: None     Cardiac Monitoring: None  Code Status:  Full            Clinically Significant Risk Factors        # Hypokalemia: Lowest K = 3.1 mmol/L in last 2 days, will replace as needed       # Hypoalbuminemia: Lowest albumin = 2.6 g/dL at 9/12/2024  5:27 AM, will monitor as appropriate     # Hypertension: Noted on problem list  # Chronic heart failure with preserved ejection fraction: heart failure noted on problem list and last echo with EF >50%           # Severe Malnutrition: based on nutrition assessment    # Financial/Environmental Concerns: none            Ko Huber MD, MD    Significant Results and Procedures       Pending Results   These results will be followed up by PCP  Unresulted Labs Ordered in the Past 30 Days of this Admission       No orders found from 8/10/2024 to 9/10/2024.            Code Status   Full Code       Primary Care Physician   Jayme Deng MD    Physical Exam   Temp: 97.6  F (36.4  C) Temp src: Oral BP: 101/56 Pulse: 77   Resp: 16 SpO2: 95 % O2 Device: None (Room air)    Vitals:    09/14/24 0129 09/15/24 0520 09/15/24 2154   Weight: 94.1 kg (207 lb 7.3 oz) 93.2 kg (205 lb 7.5 oz) 94 kg (207 lb 3.7 oz)     Vital Signs with Ranges  Temp:  [97.6  F (36.4  C)-98.2  F (36.8  C)] 97.6  F (36.4  C)  Pulse:  [65-78] 77  Resp:  [16-18] 16  BP: ()/(52-66) 101/56  SpO2:  [95 %-98 %] 95 %  I/O last 3 completed shifts:  In: 240 [P.O.:240]  Out: 1700 [Urine:1700]    Constitutional: awake, alert, cooperative, no apparent distress, and appears stated age  Eyes: Lids and lashes normal, pupils equal, round and reactive to light, extra ocular muscles intact, sclera clear, conjunctiva normal  Respiratory: No increased work of breathing, good air exchange, clear to auscultation bilaterally, no crackles or wheezing  Cardiovascular: Normal apical impulse,  regular rate and rhythm, normal S1 and S2, no S3 or S4, and no murmur noted  GI: No scars, normal bowel sounds, soft, non-distended, non-tender, large ventral hernia, no hepatosplenomegally  Musculoskeletal: 1+ lower extremity pitting edema present    Discharge Disposition   Discharged to home home health  Condition at discharge: Stable    Consultations This Hospital Stay   CARDIOLOGY IP CONSULT  PHYSICAL THERAPY ADULT IP CONSULT  OCCUPATIONAL THERAPY ADULT IP CONSULT  PHARMACY TO DOSE WARFARIN  UROLOGY IP CONSULT  WOUND OSTOMY CONTINENCE NURSE  IP CONSULT  NEPHROLOGY IP CONSULT  CARE MANAGEMENT / SOCIAL WORK IP CONSULT  NUTRITION SERVICES ADULT IP CONSULT  VASCULAR ACCESS ADULT IP CONSULT  PHARMACY IP CONSULT  VASCULAR ACCESS ADULT IP CONSULT    Time Spent on this Encounter   IKo MD, personally saw the patient today and spent greater than 30 minutes discharging this patient.    Discharge Orders      Medication Therapy Management Referral      Primary Care - Care Coordination Referral      Adult Urology  Referral      Home Care Referral      Reason for your hospital stay    Acute urinary retention   Acute renal failure secondary to obstructive uropathy/likely ATN: Improved  Status post Stubbs catheter placement  Gross hematuria: Resolved  Supratherapeutic INR: Resolved  Possible UTI: Treated  Hypotension     Follow-up and recommended labs and tests     Follow up with primary care provider, Jayme Deng MD, within 7 days for hospital follow- up.  The following labs/tests are recommended: cbc,bmp.    Follow up with the Urology in  1-2 weeks     Activity    Your activity upon discharge: activity as tolerated     Diet    Follow this diet upon discharge: Current Diet:Orders Placed This Encounter      Snacks/Supplements Adult: Other; chocolate Ensure Enlive at breakfast, Expedite bottle at lunch (RD); With Meals      Room Service      Combination Diet Regular Diet Adult     Discharge  Medications   Current Discharge Medication List        START taking these medications    Details   dutasteride (AVODART) 0.5 MG capsule Take 1 capsule (0.5 mg) by mouth daily.  Qty: 30 capsule, Refills: 0    Associated Diagnoses: Urinary retention; Benign prostatic hyperplasia with urinary retention      midodrine (PROAMATINE) 10 MG tablet Take 1 tablet (10 mg) by mouth 3 times daily (with meals).  Qty: 90 tablet, Refills: 0    Associated Diagnoses: Hypotension, unspecified hypotension type      polyethylene glycol (MIRALAX) 17 GM/Dose powder Take 17 g by mouth daily.  Qty: 510 g, Refills: 0    Associated Diagnoses: Constipation, unspecified constipation type      potassium chloride reagan ER (KLOR-CON M20) 20 MEQ CR tablet Take 1 tablet (20 mEq) by mouth daily.  Qty: 30 tablet, Refills: 0    Associated Diagnoses: Hypokalemia           CONTINUE these medications which have CHANGED    Details   acetaminophen (TYLENOL) 500 MG tablet Take 2 tablets (1,000 mg) by mouth nightly as needed for mild pain.    Associated Diagnoses: Osteomyelitis of sacrum (H)      diclofenac (VOLTAREN) 1 % topical gel Apply 4 g topically 3 times daily as needed for moderate pain.    Associated Diagnoses: Chronic low back pain without sciatica, unspecified back pain laterality           CONTINUE these medications which have NOT CHANGED    Details   ferrous sulfate (FEROSUL) 325 (65 Fe) MG tablet Take 1 tablet (325 mg) by mouth daily (with breakfast)  Qty: 90 tablet, Refills: 3    Associated Diagnoses: Anemia due to blood loss, acute      HYDROmorphone (DILAUDID) 4 MG tablet Take 0.5 tablets (2 mg) by mouth every 6 hours as needed for severe pain  Qty: 30 tablet, Refills: 0    Associated Diagnoses: Osteomyelitis of sacrum (H)      Multiple Vitamins-Minerals (MULTIVITAMIN ADULT) CHEW Take 2 chew tab by mouth daily      NONFORMULARY Take by mouth at bedtime. Marijuana edible at bedtime      nystatin (MYCOSTATIN) 522663 UNIT/GM external powder Apply  topically 2 times daily as needed for other Groin folds      Probiotic CHEW Take 2 chew tab by mouth daily      torsemide (DEMADEX) 20 MG tablet Take 1 tablet (20 mg) by mouth daily  Qty: 30 tablet, Refills: 1    Associated Diagnoses: Acute on chronic congestive heart failure, unspecified heart failure type (H)      warfarin ANTICOAGULANT (COUMADIN) 3 MG tablet Take 1 tablet (3 mg) on Tuesdays, Thursdays, Saturdays and take 1.5 tablets (4.5 mg) all other days or as directed by the INR clinic  Qty: 180 tablet, Refills: 0    Associated Diagnoses: Long term current use of anticoagulant therapy; S/P mitral valve replacement; Chronic atrial fibrillation (H)      order for DME Equipment being ordered: COMPRESSION STOCKINGS, 20-30 mmHg  Qty: 1 each, Refills: 1    Associated Diagnoses: Lower extremity edema      pantoprazole (PROTONIX) 40 MG EC tablet Take 1 tablet (40 mg) by mouth 2 times daily (before meals)  Qty: 60 tablet, Refills: 1    Associated Diagnoses: Gastrointestinal hemorrhage, unspecified gastrointestinal hemorrhage type           Allergies   Allergies   Allergen Reactions    Amiodarone Shortness Of Breath    Pcn [Penicillins] Shortness Of Breath     Rxn occurred in childhood     Statins Muscle Pain (Myalgia) and Hives    Amiodarone     Latex     Zetia [Ezetimibe] Muscle Pain (Myalgia)     Muscle weakness legs     Data   Most Recent 3 CBC's:  Recent Labs   Lab Test 09/16/24  0621 09/15/24  0722 09/14/24  0558   WBC 4.5 5.4 6.1   HGB 7.7* 7.6* 8.0*   MCV 95 94 93   * 180 186      Most Recent 3 BMP's:  Recent Labs   Lab Test 09/16/24  0813 09/16/24  0621 09/15/24  2140 09/15/24  1935 09/15/24  0818 09/15/24  0722 09/14/24  0850 09/14/24  0813 09/14/24  0558   NA  --  134*  --  135  --  133*  133*   < >  --  133*   POTASSIUM  --  3.1*  --   --   --  3.5  --  3.6 3.9   CHLORIDE  --  95*  --   --   --  94*  --   --  93*   CO2  --  30*  --   --   --  28  --   --  29   BUN  --  66.3*  --   --   --  77.0*  --    --  84.7*   CR  --  0.97  --   --   --  1.18*  --   --  1.59*   ANIONGAP  --  9  --   --   --  11  --   --  11   JOSEPH  --  8.8  --   --   --  8.5*  --   --  8.4*   GLC 97 90 103*  --    < > 94   < >  --  83    < > = values in this interval not displayed.     Most Recent 2 LFT's:  Recent Labs   Lab Test 09/10/24  0520 09/09/24  1455   AST 18 21   ALT <5 <5   ALKPHOS 80 99   BILITOTAL 0.6 0.6     Most Recent INR's and Anticoagulation Dosing History:  Anticoagulation Dose History  More data exists         Latest Ref Rng & Units 9/10/2024 9/11/2024 9/12/2024 9/13/2024 9/14/2024 9/15/2024 9/16/2024   Recent Dosing and Labs   warfarin ANTICOAGULANT (COUMADIN) 1 MG tablet - - - - 1 mg, $Given 1 mg, $Given - -   warfarin ANTICOAGULANT (COUMADIN) 1.5 mg TABS half-tab - - - - - - 1.5 mg, $Given -   warfarin ANTICOAGULANT (COUMADIN) 5 MG tablet - - - 5 mg, $Given - - - -   INR 0.85 - 1.15 7.19  2.01  1.93  2.53  2.65  2.74  2.46       Details                 Most Recent 3 Troponin's:No lab results found.  Most Recent Cholesterol Panel:  Recent Labs   Lab Test 01/09/24  1027   CHOL 53   LDL 22   HDL 25*   TRIG 32     Most Recent 6 Bacteria Isolates From Any Culture (See EPIC Reports for Culture Details):No lab results found.  Most Recent TSH, T4 and A1c Labs:  Recent Labs   Lab Test 07/18/24  1436   TSH 3.27     Results for orders placed or performed during the hospital encounter of 09/09/24   Chest XR,  PA & LAT    Narrative    CHEST TWO VIEWS  9/9/2024 4:27 PM     HISTORY:  Crackles. Orthopnea.    COMPARISON: 2/7/2024.      Impression    IMPRESSION: Sternotomy and mitral valve prosthesis. Shallow  inspiration accentuates heart size and pulmonary vascularity. No  pleural effusions. Lungs clear.     SHAR SIDHU MD         SYSTEM ID:  [id number]   US Renal Complete Non-Vascular    Narrative    EXAM: US RENAL COMPLETE NON-VASCULAR  LOCATION: [facility]  DATE: 9/9/2024    INDICATION: Acute renal  failure of unknown origin  COMPARISON: Today's 9/9/2024, 7:00 PM noncontrast CT AP  TECHNIQUE: Routine Bilateral Renal and Bladder Ultrasound.    FINDINGS:    RIGHT KIDNEY: Obscured by overlying bowel gas and not visualized.    LEFT KIDNEY: Possible mild hydronephrosis of the left kidney. Lower half of the left kidney is obscured by overlying bowel gas and not visualized    BLADDER: Moderate to marked bladder distention at 15 x 14 x 13 cm.      Impression    IMPRESSION:  1.  Moderate to marked bladder distention and possible mild hydronephrosis of the left kidney. Consider placement of a Stubbs bladder catheter.  2.  The entire right kidney and the lower half of the left kidney are obscured by overlying bowel gas.   CT Abdomen Pelvis w/o Contrast    Narrative    EXAM: CT ABDOMEN PELVIS W/O CONTRAST  LOCATION: [facility]  DATE: 9/9/2024    INDICATION: Nausea. Hernias. Acute kidney insufficiency.  COMPARISON: None.  TECHNIQUE: CT scan of the abdomen and pelvis was performed without IV contrast. Multiplanar reformats were obtained. Dose reduction techniques were used.  CONTRAST: None.    FINDINGS:   LOWER CHEST: Sternotomy and mitral valve prosthesis. Consolidation at the left lung base posteriorly is likely atelectasis. Coronary artery calcification.    HEPATOBILIARY: No significant mass or bile duct dilatation. No calcified gallstones.     PANCREAS: Normal.    SPLEEN: Normal.    ADRENAL GLANDS: Normal.    KIDNEYS/BLADDER: The urinary bladder is moderately distended. No significant mass, stone, or hydronephrosis.    BOWEL: Large amount of stool throughout the colon and within the rectum, consistent with constipation. There is a large ventral hernia containing multiple loops of gas and stool-filled colon, as well as a small amount of fluid. Scattered colonic   diverticulosis. No small bowel dilatation is identified.    LYMPH NODES: No lymphadenopathy.    VASCULATURE: Unremarkable.    PELVIC  ORGANS: No pelvic masses.    MUSCULOSKELETAL: Advanced degenerative changes right hip. Degenerative changes are also noted throughout the visualized thoracolumbar spine. There is diffuse subcutaneous edema.      Impression    IMPRESSION:   1.  Large amount of stool throughout the colon and within the rectum, consistent with constipation.  2.  Large ventral hernia containing multiple loops of gas and stool-filled colon, as well as a small amount of fluid. No convincing evidence for associated bowel obstruction.  3.  Moderate distention of the urinary bladder.  4.  Diffuse subcutaneous edema.     Echocardiogram Complete     Value    LVEF  50-55%    [facility]    [id number]  [id number]  [id number]^SUNNY^ALEM^JOSE L     [facility]  Echocardiography Laboratory  [address] [place] [address]     Name: JESSE CABRAL  MRN: [id number]  : 1945  Study Date: 09/10/2024 07:58 AM  Age: 79 yrs  Gender: Male  Patient Location: [facility]  Reason For Study: Heart Failure  Ordering Physician: ALEM CORREA  Referring Physician: ALEM CORREA  Performed By: Giovanny Coyle     BSA: 2.1 m2  Height: 74 in  Weight: 189 lb  HR: 71  BP: 92/53 mmHg  ______________________________________________________________________________  Procedure  Complete Echo Adult. Optison (NDC #3205-9962) given intravenously. Technically  difficult study.  ______________________________________________________________________________  Interpretation Summary     Status post mechanical mitral valve replacement with a 31-mm [employer]. Raffi  mechanical valve for severe degenerative mitral valve regurgitation, 2008.  The mitral prosthesis is well-seated. No regurgitation.  Mean diastolic gradient 2 mmHg (heart rate 72 bpm, afib).  Normal left ventricular systolic function. Estimated LVEF 50-55%.  Normal right ventricular size with mildly decreased systolic function.  Trace tricuspid valve regurgitation.  Inferior vena cava not  well-visualized due to challenging subcostal images.     On the last study dated 2/1/2024, moderately decreased RV function and severe  tricuspid valve regurgitation was reported.     ______________________________________________________________________________  Left Ventricle  The left ventricle is normal in size. There is normal left ventricular wall  thickness. Left ventricular systolic function is normal. The visual ejection  fraction is 50-55%. Diastolic function not assessed due to presence of  prosthetic mitral valve. Septal motion is consistent with post-operative  state.     Right Ventricle  The right ventricle is normal size. Mildly decreased right ventricular  systolic function.     Atria  The left atrium is severely dilated. The right atrium is moderately dilated.  Right atrium not well visualized.     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) mechanical prosthesis. The prosthetic mitral  valve is well-seated. This degree of valvular regurgitation is within normal  limits. Normal prosthetic mitral valve gradients. Mean diastolic gradient 2  mmHg (heart rate 72 bpm, afib).     Tricuspid Valve  The tricuspid valve is not well visualized, but is grossly normal. There is  trace tricuspid regurgitation. The right ventricular systolic pressure is  approximated at 20.2 mmHg plus the right atrial pressure. Right ventricle  systolic pressure estimate normal.     Aortic Valve  The aortic valve is trileaflet. No aortic regurgitation is present. No aortic  stenosis is present.     Pulmonic Valve  There is trace pulmonic valvular regurgitation.     Vessels  The aortic root is normal size. Inferior vena cava not well visualized for  estimation of right atrial pressure.     Pericardium  There is no pericardial effusion.     Rhythm  The rhythm was atrial fibrillation.  ______________________________________________________________________________  MMode/2D Measurements & Calculations  IVSd: 0.90 cm     LVIDd: 4.0  cm  LVIDs: 3.5 cm  LVPWd: 1.0 cm  FS: 12.5 %  LV mass(C)d: 118.2 grams  LV mass(C)dI: 55.7 grams/m2  Ao root diam: 3.8 cm  LVOT diam: 2.4 cm  LVOT area: 4.4 cm2  Ao root diam index Ht(cm/m): 2.0  Ao root diam index BSA (cm/m2): 1.8  RWT: 0.50     Doppler Measurements & Calculations  MV E max jose: 118.0 cm/sec  MV max P.1 mmHg  MV mean P.0 mmHg  MV V2 VTI: 45.6 cm  MVA(VTI): 1.3 cm2  MV dec slope: 333.0 cm/sec2  MV dec time: 0.37 sec  Ao V2 max: 142.0 cm/sec  Ao max P.0 mmHg  Ao V2 mean: 94.6 cm/sec  Ao mean P.0 mmHg  Ao V2 VTI: 26.1 cm  KEITH(I,D): 2.2 cm2  KEITH(V,D): 2.6 cm2  LV V1 max P.9 mmHg  LV V1 max: 85.4 cm/sec  LV V1 VTI: 13.1 cm  SV(LVOT): 57.6 ml  SI(LVOT): 27.2 ml/m2     PA acc time: 0.10 sec  TR max jose: 223.8 cm/sec  TR max P.2 mmHg  AV Jose Ratio (DI): 0.60  KEITH Index (cm2/m2): 1.0  E/E' av.3  Lateral E/e': 6.9  Medial E/e': 11.7  RV S Jose: 5.3 cm/sec     ______________________________________________________________________________  Report approved by: Dr Darryl Martinez 09/10/2024 10:04 AM           *Note: Due to a large number of results and/or encounters for the requested time period, some results have not been displayed. A complete set of results can be found in Results Review.     Most Recent 3 CBC's:  Recent Labs   Lab Test 24  0621 09/15/24  0722 24  0558   WBC 4.5 5.4 6.1   HGB 7.7* 7.6* 8.0*   MCV 95 94 93   * 180 186     Most Recent 3 BMP's:  Recent Labs   Lab Test 24  0813 24  0621 09/15/24  2140 09/15/24  1935 09/15/24  0818 09/15/24  0722 24  0850 24  0813 24  0558   NA  --  134*  --  135  --  133*  133*   < >  --  133*   POTASSIUM  --  3.1*  --   --   --  3.5  --  3.6 3.9   CHLORIDE  --  95*  --   --   --  94*  --   --  93*   CO2  --  30*  --   --   --  28  --   --  29   BUN  --  66.3*  --   --   --  77.0*  --   --  84.7*   CR  --  0.97  --   --   --  1.18*  --   --  1.59*   ANIONGAP  --  9  --   --   --  11  --    --  11   JOSEPH  --  8.8  --   --   --  8.5*  --   --  8.4*   GLC 97 90 103*  --    < > 94   < >  --  83    < > = values in this interval not displayed.     Most Recent 2 LFT's:  Recent Labs   Lab Test 09/10/24  0520 09/09/24  1455   AST 18 21   ALT <5 <5   ALKPHOS 80 99   BILITOTAL 0.6 0.6

## 2024-09-16 NOTE — PROGRESS NOTES
Care Management Discharge Note    Length of Stay (days): 7    Discharge Date: 09/16/2024     Discharge Disposition: Home   Discharge Services:  [facility] SN and PT  Discharge DME:  NA  Discharge Transportation:-[facility] stretcher    Private pay costs discussed: transportation costs    Does the patient's insurance plan have a 3 day qualifying hospital stay waiver?  Yes     Which insurance plan 3 day waiver is available? Alternative insurance waiver    Will the waiver be used for post-acute placement? No    PAS Confirmation Code:  NA  Patient/family educated on Medicare website which has current facility and service quality ratings: no    Education Provided on the Discharge Plan: Yes  Persons Notified of Discharge Plans: Patient, Nsg  Patient/Family in Agreement with the Plan: yes    Handoff Referral Completed: Yes, [facility] PCP: Internal handoff referral completed  Additional Information:  Patient is discharging home today.  There was previous discussion on 9/13 concerning transitioning to a TCU, but patient has declined this.  Patient's Partner, Jose, is patient's primary caregiver.  Jose reports that he is planning to hire additional help, but did not share any details concerning this.  He has the [employer] information.  Home care services of RN and PT will be resumed thru [facility].  A referral has been sent to home care.  PCP follow up is scheduled.  Jose does not drive.  For appointments, patient has a WC and they use a  [address] service.    A PCP appointment is scheduled on 9/19.  CC has left a message with the [facility], as they manage patient's INRs.  Patient is planning to have surgical intervention with Dr Srinivasan in the near future.  Patient does have I follow up on 9/26.  Discharge orders for wound care are what patient was doing prior to admission.  Transportation has been arranged via stretcher with transport window of 3:10-4:00 PM.  Jose would like to ride along if  able.  This is up to the [place] .  If they don't allow this, Jose will take an Uber.      Kezia Healy, RN  Inpatient Care Management  [phone]

## 2024-09-16 NOTE — TELEPHONE ENCOUNTER
Kezia, calling from [facility] with patient update. He is being discharged from hospital today and will resume [facility] management and Home Care.

## 2024-09-17 NOTE — TELEPHONE ENCOUNTER
Returned call to Whitley. Discharge instructions state to continue wound care orders as prior to hospitalization. This was relayed to Whitley. No further questions or concerns.

## 2024-09-17 NOTE — TELEPHONE ENCOUNTER
Home Care is calling regarding an established patient with [facility].        6/27/2024     3:50 PM   Home Care Information   Date of Home Care episode start 6/19/2024   Current following provider Dr. Jayme Deng    Name/Phone Number Joshua Kinney PT,   Home Care agency [facility]     Requesting orders from: Jayme Deng  Provider is following patient: No       Orders Requested    Skilled Nursing  Request for  resumption of care r/t hospital stay.   Skilled Nursing   2/wk x 2/wks, then 2 PRN visits. Wound care is orderedby Dr. Srinivasan.         Confirmed ok to leave a detailed message with call back.  Contact information confirmed and updated as needed.    Clover Gillis RN

## 2024-09-17 NOTE — TELEPHONE ENCOUNTER
Whitley with [facility] is asking if there are updated wound care orders following the patient's recent hospital discharge or if they should continue on with the same/previous orders.     Whitley [phone]

## 2024-09-17 NOTE — TELEPHONE ENCOUNTER
Spoke to home care RN to inform about provider's approval for home care orders.    Demetrius Galaviz RN  [facility]

## 2024-09-17 NOTE — TELEPHONE ENCOUNTER
ANTICOAGULATION  MANAGEMENT: Discharge Review    Feng Wynne chart reviewed for anticoagulation continuity of care    Hospital Admission on 9/9 for Acute urinary retention.    Discharge disposition: Home with Home Care    Results:    Recent labs: (last 7 days)     09/11/24  0545 09/12/24  0527 09/12/24  1823 09/13/24  0225 09/13/24  0900 09/14/24  0558 09/15/24  0722 09/16/24  0621   INR 2.01* 1.93*  --  2.53*  --  2.65* 2.74* 2.46*   AAUFH  --   --  0.16 0.48 0.43  --   --   --       09/12/24 09/13/24 09/14/24 09/15/24   Warfarin Sodium 5 mg, $Given 1 mg, $Given 1 mg, $Given 1.5 mg, $Given     Anticoagulation inpatient management:     anticoagulation calendar updated with inpatient dosing  2.5 mg phytonadione IV 9/10/2024    Anticoagulation discharge instructions:     Warfarin dosing: home regimen continued   Bridging: No   INR goal change: No      Medication changes affecting anticoagulation: No    Additional factors affecting anticoagulation: No     PLAN     No adjustment to anticoagulation plan needed  Recommend to check INR on 9/20/24    Spoke with home care nurse Whitley    Anticoagulation Calendar updated    Samantha Parnell, RN  9/17/2024  Anticoagulation Clinic  [email or web address] for routing messages: [facility]  [facility] patient phone line: [phone]

## 2024-09-17 NOTE — PROGRESS NOTES
"Occupational Therapy Discharge Summary    Reason for therapy discharge:    Discharged to home with home therapy.    Progress towards therapy goal(s). See goals on Care Plan in [facility] electronic health record for goal details.  Goals partially met.  Barriers to achieving goals:   discharge from facility.    Therapy recommendation(s):    \"Pt below baseline. Limited by pain, weakness. Per pt and S.O report, pt has been mostly bedbound recently. Only uses EZ stand to get to w/c for appts. Appears pt w/ increased weakness and difficulty performing basic functional mobility tasks, recommend LTC facility to reduce caregiver burnout and safely perform ADLs/functional mobility tasks.\"  Written based on evaluating therapist recommendation Amber Mendieta  "

## 2024-09-17 NOTE — TELEPHONE ENCOUNTER
Patent home from hospital discharge yesterday. Home care RN states Albuterol, bisacodyl and calcium are on home medication list but are not on hospital discharge med list. Chart reviewed and not on PCP clinic list either. Normal BM yesterday per RN. RN saw him today and no SOB noted.     PCP- please clarify if patient is to be taking above medications and if so, please provide rx for albuterol and doses/directions for OTC medications.     Thank you-    Sandra Stack, RN

## 2024-09-17 NOTE — PROGRESS NOTES
Clinic Care Coordination Contact  Transitions of Care Outreach  Chief Complaint   Patient presents with    Clinic Care Coordination - Post Hospital       Most Recent Admission Date: 9/9/2024   Most Recent Admission Diagnosis: Hyponatremia - E87.1  Generalized weakness - R53.1  Acute kidney injury (H24) - N17.9  Acute on chronic congestive heart failure, unspecified heart failure type (H) - I50.9     Most Recent Discharge Date: 9/16/2024   Most Recent Discharge Diagnosis: Generalized weakness - R53.1  Hyponatremia - E87.1  Acute on chronic congestive heart failure, unspecified heart failure type (H) - I50.9  Acute kidney injury (H24) - N17.9  Supratherapeutic INR - R79.1  Osteomyelitis of sacrum (H) - M46.28  Hypotension, unspecified hypotension type - I95.9  Hypokalemia - E87.6  Urinary retention - R33.9  Benign prostatic hyperplasia with urinary retention - N40.1, R33.8  Chronic low back pain without sciatica, unspecified back pain laterality - M54.50, G89.29  Constipation, unspecified constipation type - K59.00     Transitions of Care Assessment    Discharge Assessment  How are you doing now that you are home?: Going well.  How are your symptoms? (Red Flag symptoms escalate to triage hotline per guidelines): Improved  Do you know how to contact your clinic care team if you have future questions or changes to your health status? : Yes  Does the patient have their discharge instructions? : Yes  Does the patient have questions regarding their discharge instructions? : No  Were you started on any new medications or were there changes to any of your previous medications? : Yes  Does the patient have all of their medications?: Yes  Do you have questions regarding any of your medications? : No  Do you have all of your needed medical supplies or equipment (DME)?  (i.e. oxygen tank, CPAP, cane, etc.): Yes         Post-op (Clinicians Only)  Did the patient have surgery or a procedure: No  Fever: No  Chills: No  Pressure  Reduction Devices: positioning supports utilized  Eating & Drinking: eating and drinking without complaints/concerns  PO Intake: regular diet (vegetarian)  Additional Symptoms:  (Denies)  Bowel Function: normal  Date of last BM: 09/17/24  Urinary Status: indwelling urinary catheter    Care Management   None    Follow up Plan     Patient declined Care Coordination services at this time.   Patient is aware of how to initiate Care Coordination services with the clinic in the future.     Patient declined to review medication list.     Discharge Follow-Up  Discharge follow up appointment scheduled in alignment with recommended follow up timeframe or Transitions of Risk Category? (Low = within 30 days; Moderate= within 14 days; High= within 7 days): Yes  Discharge Follow Up Appointment Date: 09/19/24  Discharge Follow Up Appointment Scheduled with?: Primary Care Provider    Future Appointments   Date Time Provider Department Center   9/19/2024 10:00 AM Carlee Mullen PA-C [facility]    9/24/2024 10:30 AM  NURSE [facility]   9/26/2024 12:30 PM Mary Srinivasan MD [facility]   10/10/2024 10:30 AM Mark Schultz MD [facility]   10/24/2024 10:15 AM Mary Srinivasan MD [facility]       Outpatient Plan as outlined on AVS reviewed with patient.    For any urgent concerns, please contact our 24 hour nurse triage line: [phone] ([phone])       Petty Eaton RN

## 2024-09-18 NOTE — TELEPHONE ENCOUNTER
Patient currently hospitalized - would recommend await discharge before new prescription signed for toresmide as medications may change    Jayme Deng MD,  
Patient was discharged from the hospital on 9/16. Is provider okay with refilling medication?    Hayley Sprague RN  [facility] Triage Team    
CABG with CPB. LIMA to LAD. SVG to OM-1. SVG-rPDA.    Normal postoperative function.    Cell saver utilized.

## 2024-09-18 NOTE — TELEPHONE ENCOUNTER
MTM referral from: Transitions of Care (recent hospital discharge or ED visit)    MT referral outreach attempt #1 on September 18, 2024 at 12:31 PM      Outcome: Spoke with patient having surgery    Use bcbs part d map  for the carrier/Plan on the flowsheet          ELIO Marquez   [phone]

## 2024-09-18 NOTE — TELEPHONE ENCOUNTER
Med list signed by: Carrillo Fraga PA-C  and faxed to [phone].      Jessi RAMACHANDRAN MA on 9/18/2024 at 2:24 PM

## 2024-09-19 NOTE — TELEPHONE ENCOUNTER
Home Care is calling regarding an established patient with [facility].        6/27/2024     3:50 PM   Home Care Information   Date of Home Care episode start 6/19/2024   Current following provider Dr. Jayme Deng    Name/Phone Number Joshua Kinney PT,   Home Care agency [facility]     Requesting orders from: Jayme Deng  Provider is following patient: Yes  Is this a 60-day recertification request?  No    Orders Requested    Social Work  Request for initial evaluation and treatment (one time)     Confirmed ok to leave a detailed message with call back.  Contact information confirmed and updated as needed.    Theron Virgen RN

## 2024-09-20 NOTE — PROGRESS NOTES
ANTICOAGULATION MANAGEMENT     Feng Wynne 79 year old male is on warfarin with subtherapeutic INR result. (Goal INR 2.5-3.5)    Recent labs: (last 7 days)     24  1320   INR 2.2*       ASSESSMENT     Source(s): Chart Review and Home Care/Facility Nurse   Inpatient dosin/9-- held dose, 2.5 mg of IV vitamin K given  - 5 mg   - 1 mg last dose of heparin  -1 mg  9/15- 1.5 mg   home dose    Warfarin doses taken:  Sent home on home Maintenance dose   Diet:  patient takes one protein shake/day  Medication/supplement changes:  Discharged on senna and miralax (no diarrhea reported), cefrtiaxone IV was given -24 for UTI, was receiving IV albumin while inpatient  New illness, injury, or hospitalization: Yes: Hospitalized on -24 for CHF, hyponatremia, patient still has 2+ edema in lower extremities, hematuria in ED, resolved at discharge,  Signs or symptoms of bleeding or clotting: No  Previous result: Subtherapeutic  Additional findings: RPH to consult as patient has been elevated recently and difficult to bring down with UTI that was discovered during last hospitalization. Will huddle with [facility] to determine plan.       PLAN     Recommended plan for ongoing change(s) affecting INR     Dosing Instructions: Increase your warfarin dose (22.2% change) with next INR in 4 days       Summary  As of 2024      Full warfarin instructions:  1.5 mg every Sun, e, Thu; 3 mg all other days   Next INR check:  2024               Telephone call with [facility] home care nurse who verbalizes understanding and agrees to plan    Orders given to  Homecare nurse/facility to recheck    Education provided: Please call back if any changes to your diet, medications or how you've been taking warfarin    Plan made with [facility] Pharmacist Petty Mondragon RN  2024  Anticoagulation Clinic  [facility] for routing messages: [facility]  [facility] patient phone line:  [phone]        _______________________________________________________________________     Anticoagulation Episode Summary       Current INR goal:  2.5-3.5   TTR:  55.2% (8.8 mo)   Target end date:  Indefinite   Send INR reminders to:  [facility]    Indications    Long-term (current) use of anticoagulants [Z79.01] [Z79.01]  S/P mitral valve replacement [Z95.2]  Chronic atrial fibrillation (H) [I48.20]             Comments:  [facility] Patient, okay  to manage by exception on 7/8/20             Anticoagulation Care Providers       Provider Role Specialty Phone number    Jayme Deng MD Referring Internal Medicine [phone]

## 2024-09-24 NOTE — PROGRESS NOTES
ANTICOAGULATION MANAGEMENT     Feng Wynne 79 year old male is on warfarin with therapeutic INR result. (Goal INR 2.5-3.5)    Recent labs: (last 7 days)     09/24/24  1328   INR 2.5*       ASSESSMENT     Source(s): Chart Review and Home Care/Facility Nurse     Warfarin doses taken: Warfarin taken as instructed  Diet: No new diet changes identified  Medication/supplement changes: None noted  New illness, injury, or hospitalization: No, continues to have increased edema lower extremities  Signs or symptoms of bleeding or clotting: No  Previous result: Subtherapeutic  Additional findings: None       PLAN     Recommended plan for no diet, medication or health factor changes affecting INR     Dosing Instructions: Continue your current warfarin dose with next INR in 1 week       Summary  As of 9/24/2024      Full warfarin instructions:  1.5 mg every Sun, Tue, Thu; 3 mg all other days   Next INR check:  10/1/2024               Telephone call with [facility] home care nurse who agrees to plan and repeated back plan correctly    Orders given to  Homecare nurse/facility to recheck    Education provided: Please call back if any changes to your diet, medications or how you've been taking warfarin    Plan made per [facility] anticoagulation protocol    Chikis Desir RN  9/24/2024  Anticoagulation Clinic  [employer] for routing messages: p [facility]AG [facility]ITOR[facility]  [facility] patient phone line: [phone]        _______________________________________________________________________     Anticoagulation Episode Summary       Current INR goal:  2.5-3.5   TTR:  53.7% (8.8 mo)   Target end date:  Indefinite   Send INR reminders to:  [facility]IT[facility]    Indications    Long-term (current) use of anticoagulants [Z79.01] [Z79.01]  S/P mitral valve replacement [Z95.2]  Chronic atrial fibrillation (H) [I48.20]             Comments:  [facility] Patient, okay  to manage by exception on 7/8/20             Anticoagulation Care  Providers       Provider Role Specialty Phone number    Jayme Deng MD Referring Internal Medicine [phone]

## 2024-09-24 NOTE — TELEPHONE ENCOUNTER
Spoke with Jose, patients caregiver. Patient has appointment 9/26/24. At that time they wlil discuss bone marrow biopsy procedure. Advised to contact [facility] once date is determined. Also patient is to let provider know a 5 day hold of warfarin is advised, this is important for scheduling of procedure.

## 2024-09-26 NOTE — PROGRESS NOTES
"[facility]   PROGRESS NOTE      HISTORY OF PRESENT ILLNESS:  Feng Wynne is a 79 year old  male with stage IV sacrococcygeal pressure ulcer since late January 2024.   Reports he has had the sore since late January. States he had an esophageal ulcer treated surgically with \"stapling and cauterization\", and was mistreated by 3 night aides during his hospitalization at [facility].   Large ventral hernia. Obesity. Ambulates with a walker. Colon not diverted - has had leaky stool due to recent K supps. CHF. CKD 2.         INTERVAL HISTORY:   5/9/24: Feng presents today with his partner Jose.   Got a new VAC since previous one was malfunctioning. Now he states he loves the VAC. Hoping to avoid surgery. A bit teary-eyed about that.   8/1/24: Here today with his partner Jose. Feng asked us to confirm that he won't die from this and seems somber and anxious about his condition.   Has been having soft stools closer to diarrhea consistency - stool cultures from Tuesday at Feng's PCP appointment indicated E. Coli. Feng starts a cipro course later today.   For dressings, they have been doing Vashe and packing with calcium alginate. Jose says Feng is having an allergy to the tape so they have been avoiding AMD.   Protein intake is \"good,\" Feng is being followed by a dietitian who has asked him to cut dairy and cheese. He drinks protein shakes once a day and drinks oat milk. Partner says he doesn't eat that much.   Feng bought and has been using a sling bottom wheelchair with a stiff foam board to add stability to the seat. Partner states Feng is mainly in bed all day and doesn't stay in his chair for long. They still have not received their group 2 mattress, but they have a \"hospital bed\" that uses a crank for elevation and is electric for knees and head.  They have [facility] coming 3x a week and partner Jose does dressings in between.   9/5: Feng here today with his partner Jose. Both are " looking tired. Feng apparently did not have anything for breakfast this morning and had an initial BP of 70/50 with a HR 53 upon arrival. He had a brief feeling of dizziness that resolved spontaneously. They took car service since they no longer have a car.   They have been doing VASHE soaks and dry AMD packings BID[facility], but otherwise done by Jose. While they do have some cleaning help and grocery delivery service, Jose does all of Feng's other cares like toileting cleanup, position changes, cooking/feeding, etc. Feng has regained better control over his stooling now that his antibiotics are done, but still uses diapers for accidents. He uses a urinal as well.   It sounds like they are starting with 4 hrs of PCA cares this afternoon, but I strongly urged them to consider an additional day or two per week, despite the cost,  to allow Jose some time for respite self-care.      9/26: Feng here with partner, Jose, and son, Sedrick. Had to go to [facility] a few days after we last saw him for one week due to urinary obstruction. Discussed with Feng my concerns about doing a bone biopsy and/or any further treatment on him due to his fragile condition. Partner reports they've been using barrier cream on the liza-wound skin, and 2-3x day PRN Vashe soaks + dry AMD packing. Sedrick wondering about pain management for Feng. Feng reports his wound only hurts right after dressing changes; no pain when sitting on wound or laying in bed.   Jose states they tried a PCA for two afternoons (Tuesdays and Thursdays) doing 4 hrs at at a time. Jose enjoyed getting a break and Feng is happy with these as well as long as he can watch [facility].          PHYSICAL EXAM:   5/9/24: Sensate. Palpable spiky sacral bone left of midline. Bruising present inside the wound. Excoriated liza-wound skin, and probable MASD. No gross necrosis noted.   8/1/24: Liza-wound skin okay. Lots of redundant gluteal  tissues. Wound is kind of smooth on surface, not necrotic, undermining is a bit bigger. Coccygeal bone area has some loose, spiky bone pieces trapped in the soft tissue with a possible distal coccyx trapped in the connective tissue, very close to the anus.   9/5: large coccygeal wound with large undermined pocket R parasacral. Still exposed sacral bone but less spiky. Tissues looking boggy and a bit bruised today. Periwound OK other than small scuff left buttock.     9/26: Wound relatively unchanged, has some adherent grayish slough in visible wound bed  with exposed coccygeal bone just inferior. Still has large undermined pocket right gluteal. Today has some linear bruising at 7:00 and 6:00 on desmond-wound skin. Very redundant buttock skin from weight loss.  Sounds like they were in contact with [employer] regarding mattress and promised a new one, but no one has come to the house to assess or take away current mattress.      Please see nursing notes for wound measurements, photos and vital signs.        PROCEDURE:   5/9/24: none  8/1/24: none  9/5: none    9/26: none       ASSESSMENT/ PLAN:   5/9/24: Schedule coccygeal I&D for bone culture. Call Feng ([phone]) or Jose ([phone]). Should see PAC. Will need to be under GA at [facility]. Estimated time = 45 minutes. May need pre-admission for heparin drip coverage.   Discontinue VAC therapy for now.   Continue 10 minute soaks with VASHE. Start dry AMD dressings and change those about every 12 hrs (BID) depending on drainage and PRN depending on stool soilage.   Use a barrier cream for the excoriated desmond-wound skin.   8/1/24: Try dry AMD packing. Bump up to 2 protein shakes a day if possible. Continue to look for I&D date for bone culture. He will require pre-admission for Warfarin conversion to heparin drip. Will need hospitalist to co-manage at [facility].   We will re-order a group 2 mattress for Feng today. Hopefully this will work with their hospital  "frame.   Patient will need a Group 2 mattress due to large, stage 4 pressure ulceration on the patient's pelvis that has failed to improve on a normal mattress despite regular wound cares and repositioning. A group 1 mattress is not adequate to offload these severe ulcerations. Patient has impaired sensation and is unable to reposition independently. Pillows and wedges have been used and is not adequate to offload the ulcerations.  9/5: OK to go home - repeat BP and HR normalizing. Continue VASHE and AMD BID. Urged Jose to actually speak with [employer] regarding \"sloping\" static air mattress that was delivered 1-2 weeks prior. May need to insist on home visit to assess mattress again, otherwise contact clinic to facilitate. Encouraged better eating to keep vital signs more stable. Also encouraged follow up with PCP Dr Jayme Lemos at [facility] since they were last seen in July by video.  Feng will need medical clearance even for just a bone biopsy since it will need to be done under GA, and will require pre-admit for heparin drip due to anticoagulation. Once he is scheduled will need to enlist hospitalist assistance with medical management.  Strongly urged Jose to accept respite care options.     9/26: Add Triad to base of wound and bruised desmond-wound skin edges. Continue dry AMD packing on top with dressing changes PRN depending on drainage. Will order some morphine gel and lido 4% topical for pain management at home and see if Feng's insurance will pay for it. Sedrick advocated for himself to be more included in discussions regarding Feng's care and gave us his phone number ([phone]).     Able to speak privately with patient's son who shared considerable concern for patient's overall decline in health, note that his father is in denial about his overall medical status. Poor PO intake, lost a considerable amt of weight over the past year, position changes from bed to chair very difficult for patient with " significant pain from wound and fatigue and vertigo. Apparently any outside help is often refused by patient. Sounds like relationship between patient and  is strained due to high demands on  for most of Feng's cares. Patient denies being bothered by other caregivers, but not true according to son.Will speak with PCP to discuss patient's overall medical status and possible need for alternative options for dispo. At this point I am very reticent to plan any intraoperative debridement under GA, let alone doing definitive soft tissue flap coverage. I will contact Sedrick once I have had an opportunity to talk with Dr Laguerre.       FOLLOW-UP:   Scheduled for ~1 month from now

## 2024-09-26 NOTE — DISCHARGE INSTRUCTIONS
09/26/2024   Feng Wynne   1945    A DME order was not completed because the supplies are ordered by home care or at a care facility     [facility] Phone: [phone] Fax: [phone] (1 time per week) and provide wound care supplies  Dressing changes outside of clinic are being performed by [facility]     Plan 09/26/2024:    Bone biopsy requires pre-admission to include heparin drip, stabilization, anesthesia; risk is discussed today 9/26/24; Dr Srinivasan is going to discuss with your primary MD, review documentation to determine patient readiness/appropriateness of intervention.    Pending scheduling-Dr Srinivasan  will call with a date for Bone biopsy on [facility]; will determine Antibiotic therapy. MD to manage Blood thinner with Heparin which will require hospitalization before and after the procedure.  Management of bone infection requires IV antibiotic treatment for weeks; may be TCU or home-what is most achievable or practical.    Need History & Physical from Primary care provider 30 days prior to surgery.-patient will complete before bone biopsy date    Order: Group 2 mattress  [facility] at [phone]-patient/spouse to call for assess/repair or replace- discussed again 09/26/24  Patient will need a Group 2 mattress due to large, stage 4 pressure ulceration on the patient's pelvis that has failed to improve on a normal mattress despite regular wound cares and repositioning. A group 1 mattress is not adequate to offload these severe ulcerations. Patient has impaired sensation and is unable to reposition independently. Pillows and wedges have been used and is not adequate to offload the ulcerations.  -Continue to take your antibiotics that your primary care prescribed for E.Coli- done  -Continue High Protein diet;VEGETARIAN Ensure Max, protein bar, and supplements: 2 shakes/ day.  -Avoid Recliner Chairs to prevent pressure on coccyx wound.  -No Bed Pans - utilize toilet  "for defecation if possible  -Offload with pillows - ok to sleep up on your sides; even with Group 2 mattress, regular repositioning is necessary.  -has indwelling catheter 9/26/24     Wound Dressing Change: Coccyx-  Take out old dressing, then apply lidocaine soaked gauze to wound bed and leave in place at least 5 minutes, remove the gauze, then:   Cleanse wound with Vashe moist gauze, leave in place for 10 minutes; remove the VASHE gauze, don't rinse, then:  Cleanse periwound skin with wound spray, pat dry, then:  -Apply Triad wound dressing paste to Wound, and, Surrounding skin of wound.   When removing triad, only remove the top layer that is soiled. Mineral oil or baby oil can be used to gently remove the soiled layer.   After the area is cleaned you can apply more Triad paste to areas that need it.  -If needed: Apply Morphine gel to remaining wound bed after application of Triad paste; can stay in place, no need to rinse.    -Apply a piece of 4\" AMD roll  into the wound bed and tuck into the undermining areas from 12-12 o'clock   Cover with dry absorbant pad and secure with minimal 2\" Medipore tape  Suggest Zetuvit plus silicone superabsorbant sacral shape as insurance covers these more frequently than Mepilex silicone border drsgs  Change 2 times a day and as needed for soilage     Repositioning:  Bed: Patient needs a Group 2 mattress for wound healing. Keep Head of bed at 30 degrees or less; Reposition MINIMALLY every 1-2 hours in bed to relieve pressure and promote perfusion to tissue turning side to side.  Chair: When up to the chair, do not sit for longer than one hour total before returning to bed for at least 60 minutes to relieve pressure and promote perfusion to the tissue.   Completely recline/tilt for 15 minutes each hour-not possible with manual chair  Sit on your pressure reducing chair cushion when up to the chair.  In bed Clean and smooth the bed surface.  Lift the head of the bed no more than " "30 .  Use draw-sheets or transfer boards to move patients.  Lift the head of the bed no more than 30 degrees.  Raise the foot of the bed slightly.  In a wheelchair: Keep upright (don't slump) - keep weight forward onto your thighs, not on your tailbone  Support the back with a pillow.  Use a foot extension.     Reduce shear and friction:  Prevent skin breakdown by reducing friction and shear.  Raise the leg portion of the bed first before raising the HOB; this will \"put the brakes\" on sliding down the bed when raising HOB.  Patients are less likely to slide down in bed if they re supported by pillows and the head of the bed isn t raised too high.  Raise HOB less than 30 degrees if possible; raise greater than 30 degrees for safety of swallowing when needed.            Main Provider: Barbara Srinivasan M.D. September 26, 2024    Call us at [phone] if you have any questions about your wounds, if you have redness or swelling around your wound, have a fever of 101 degrees Fahrenheit or greater or if you have any other problems or concerns. We answer the phone Monday through Friday 8 am to 4 pm, please leave a message as we check the voicemail frequently throughout the day. If you have a concern over the weekend, please leave a message and we will return your call Monday. If the need is urgent, go to the ER or urgent care.    If you had a positive experience please indicate that on your patient satisfaction survey form that [facility] will be sending you.    It was a pleasure meeting with you today.  Thank you for allowing me and my team the privilege of caring for you today.  YOU are the reason we are here, and I truly hope we provided you with the excellent service you deserve.  Please let us know if there is anything else we can do for you so that we can be sure you are leaving completely satisfied with your care experience.      If you have any billing related questions please call the [facility] " Business office at [phone]. The clinic staff does not handle billing related matters.    If you are scheduled to have a follow up appointment, you will receive a reminder call the day before your visit. On the appointment day please arrive 15 minutes prior to your appointment time. If you are unable to keep that appointment, please call the clinic to cancel or reschedule. If you are more than 10 minutes late or greater for your scheduled appointment time, the clinic policy is that you may be asked to reschedule.

## 2024-09-26 NOTE — PROGRESS NOTES
Patient Active Problem List   Diagnosis    Allergic rhinitis    Chronic atrial fibrillation (H)    S/P mitral valve replacement    Hyperlipidemia LDL goal <130    Lumbago    Knee pain    Rosacea    Proteinuria    Essential hypertension with goal blood pressure less than 140/90    Morbid obesity due to excess calories (H)    Long-term (current) use of anticoagulants [Z79.01]    Essential hypertension    Chronic right shoulder pain    Bradycardia    SOB (shortness of breath)    Anemia, unspecified type    Hypertensive heart disease with heart failure (H)    Positive occult stool blood test    Anemia due to blood loss, acute    Acute on chronic congestive heart failure, unspecified heart failure type (H)    Abnormal coagulation profile    Acute respiratory failure with hypoxia (H)    Chronic diastolic (congestive) heart failure (H)    Chronic kidney disease, stage 2 (mild)    Edema, unspecified    Muscle weakness (generalized)    Other abnormalities of gait and mobility    Other specified disorders of left ear    Pneumonia, unspecified organism    Retention of urine, unspecified    Ulcer of esophagus with bleeding    Unspecified abnormalities of gait and mobility    Unspecified Escherichia coli (E. 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)    Osteomyelitis (H)    Hyponatremia    Dark stools    Supratherapeutic INR    Wound of sacral region, initial encounter    Diarrhea, unspecified type    Generalized weakness    Acute kidney injury (H24)     Past Medical History:   Diagnosis Date    Acute on chronic congestive heart failure, unspecified heart failure type (H) 01/30/2024    Arthritis     Atrial fibrillation (H)     Coronary artery disease     Hypercholesteremia     Obesity     Unspecified essential hypertension      Labs:   Recent Labs   Lab Test 09/24/24  1328 09/20/24  1320 09/16/24  0621 09/15/24  0722   ALBUMIN   --   --   --  3.1*   HGB  --   --  7.7* 7.6*   INR 2.5*   < > 2.46* 2.74*   WBC  --   --  4.5 5.4    < > = values in this interval not displayed.     Nutrition requirements were discussed with patient today.  Vitals:  BP 90/60 (BP Location: Right arm, Patient Position: Semi-Calderon's, Cuff Size: Adult Regular)   Pulse 74   Temp 97.7  F (36.5  C)   Wound:   Wound Sacrum Ulceration (Active)       Wound Sacrum Pressure injury community acquired Unstagable (Active)   Wound Bed Bleeding;Drainage;Granulation tissue;Moist;Red 09/16/24 1241   Liza-wound Assessment Induration 09/15/24 0843   Estimated Circumference ( if not measured tennis ball sized 09/15/24 2200   Drainage Amount Small 09/16/24 1241   Drainage Color/Characteristics Serosanguineous 09/16/24 1241   Wound Care/Cleansing Barrier applied ;Wound cleanser 09/16/24 1241   Dressing Foam;Per Plan of Care 09/16/24 1241   Amount of Packing Added 1 09/16/24 1241   Amount of Packing Removed 1 09/16/24 1241   Packing removed by Nurse 09/16/24 1241   Dressing Status Clean, dry, intact 09/16/24 1241   Dressing Change Due 09/16/24 09/16/24 1241       Wound (used by OP [facility] only) 04/02/24 1036 coccyx pressure injury (Active)   Thickness/Stage Stage 4 09/26/24 1235   Base granulating;slough;exposed structure;pink;other (see comments);necrotic;maroon/purple 09/26/24 1235   Periwound intact 09/26/24 1235   Periwound Temperature warm 09/26/24 1235   Periwound Skin Turgor soft 09/26/24 1235   Edges open 09/26/24 1235   Length (cm) 6.5 09/26/24 1235   Width (cm) 5.7 09/26/24 1235   Depth (cm) 3.9 09/26/24 1235   Wound (cm^2) 37.05 cm^2 09/26/24 1235   Wound Volume (cm^3) 144.5 cm^3 09/26/24 1235   Wound healing % -363.13 09/26/24 1235   Tunneling [Depth (cm)/Location] 6 o'clock / 2.3cm 07/03/24 1150   Undermining [Depth (cm)/Location] 12-12 oclock/ 5.5 cm 09/26/24 1235   Drainage Characteristics/Odor serosanguineous 09/26/24 1235   Drainage Amount copious 09/26/24 1235   Care,  Wound non-select wound debridement performed. 09/26/24 1235      Photo:         Further instructions from your care team         09/26/2024   Feng Wynne   1945    A DME order was not completed because the supplies are ordered by home care or at a care facility     [facility] Phone: [phone] Fax: [phone] (1 time per week) and provide wound care supplies  Dressing changes outside of clinic are being performed by [facility]     Plan 09/26/2024:    Bone biopsy requires pre-admission to include heparin drip, stabilization, anesthesia; risk is discussed today 9/26/24; Dr Srinivasan is going to discuss with your primary MD, review documentation to determine patient readiness/appropriateness of intervention.    Pending scheduling-Dr Srinivasan  will call with a date for Bone biopsy on [facility]; will determine Antibiotic therapy. MD to manage Blood thinner with Heparin which will require hospitalization before and after the procedure.  Management of bone infection requires IV antibiotic treatment for weeks; may be TCU or home-what is most achievable or practical.    Need History & Physical from Primary care provider 30 days prior to surgery.-patient will complete before bone biopsy date    Order: Group 2 mattress  [facility] at [phone]-patient/spouse to call for assess/repair or replace- discussed again 09/26/24  Patient will need a Group 2 mattress due to large, stage 4 pressure ulceration on the patient's pelvis that has failed to improve on a normal mattress despite regular wound cares and repositioning. A group 1 mattress is not adequate to offload these severe ulcerations. Patient has impaired sensation and is unable to reposition independently. Pillows and wedges have been used and is not adequate to offload the ulcerations.  -Continue to take your antibiotics that your primary care prescribed for E.Coli- done  -Continue High Protein diet;VEGETARIAN Ensure Max, protein  "bar, and supplements: 2 shakes/ day.  -Avoid Recliner Chairs to prevent pressure on coccyx wound.  -No Bed Pans - utilize toilet for defecation if possible  -Offload with pillows - ok to sleep up on your sides; even with Group 2 mattress, regular repositioning is necessary.  -has indwelling catheter 9/26/24     Wound Dressing Change: Coccyx-  Take out old dressing, then apply lidocaine soaked gauze to wound bed and leave in place at least 5 minutes, remove the gauze, then:   Cleanse wound with Vashe moist gauze, leave in place for 10 minutes; remove the VASHE gauze, don't rinse, then:  Cleanse periwound skin with wound spray, pat dry, then:  -Apply Triad wound dressing paste to Wound, and, Surrounding skin of wound.   When removing triad, only remove the top layer that is soiled. Mineral oil or baby oil can be used to gently remove the soiled layer.   After the area is cleaned you can apply more Triad paste to areas that need it.  -If needed: Apply Morphine gel to remaining wound bed after application of Triad paste; can stay in place, no need to rinse.    -Apply a piece of 4\" AMD roll  into the wound bed and tuck into the undermining areas from 12-12 o'clock   Cover with dry absorbant pad and secure with minimal 2\" Medipore tape  Change 2 times a day and as needed for soilage     Repositioning:  Bed: Patient needs a Group 2 mattress for wound healing. Keep Head of bed at 30 degrees or less; Reposition MINIMALLY every 1-2 hours in bed to relieve pressure and promote perfusion to tissue turning side to side.  Chair: When up to the chair, do not sit for longer than one hour total before returning to bed for at least 60 minutes to relieve pressure and promote perfusion to the tissue.   Completely recline/tilt for 15 minutes each hour-not possible with manual chair  Sit on your pressure reducing chair cushion when up to the chair.  In bed Clean and smooth the bed surface.  Lift the head of the bed no more than 30 .  Use " "draw-sheets or transfer boards to move patients.  Lift the head of the bed no more than 30 degrees.  Raise the foot of the bed slightly.  In a wheelchair: Keep upright (don't slump) - keep weight forward onto your thighs, not on your tailbone  Support the back with a pillow.  Use a foot extension.     Reduce shear and friction:  Prevent skin breakdown by reducing friction and shear.  Raise the leg portion of the bed first before raising the HOB; this will \"put the brakes\" on sliding down the bed when raising HOB.  Patients are less likely to slide down in bed if they re supported by pillows and the head of the bed isn t raised too high.  Raise HOB less than 30 degrees if possible; raise greater than 30 degrees for safety of swallowing when needed.            Main Provider: Barbara Srinivasan M.D. September 26, 2024              " non affiliated

## 2024-09-26 NOTE — ADDENDUM NOTE
Encounter addended by: Mary Srinivasan MD on: 9/26/2024 5:36 PM   Actions taken: Clinical Note Signed

## 2024-09-27 NOTE — TELEPHONE ENCOUNTER
PRIOR AUTHORIZATION DENIED    Medication: LIDOCAINE HCL 4 % EX SOLN  Insurance Company: [employer] - Phone [phone] Fax [phone]  Denial Date: 9/27/2024  Denial Reason(s):       Appeal Information:     Patient Notified: No

## 2024-10-01 NOTE — PROGRESS NOTES
ANTICOAGULATION MANAGEMENT     Feng Wynne 79 year old male is on warfarin with therapeutic INR result. (Goal INR 2.5-3.5)    Recent labs: (last 7 days)     10/01/24  1158   INR 3.1*       ASSESSMENT     Source(s): Chart Review and Home Care/Facility Nurse     Warfarin doses taken: Warfarin taken as instructed  Diet: No new diet changes identified  Medication/supplement changes: None noted  New illness, injury, or hospitalization: No  Signs or symptoms of bleeding or clotting: No  Previous result: Therapeutic last visit; previously outside of goal range  Additional findings: None       PLAN     Recommended plan for no diet, medication or health factor changes affecting INR     Dosing Instructions: Continue your current warfarin dose with next INR in 1 week       Summary  As of 10/1/2024      Full warfarin instructions:  1.5 mg every Sun, Tue, Thu; 3 mg all other days   Next INR check:  10/8/2024               Telephone call with [facility] home care nurse who verbalizes understanding and agrees to plan    Orders given to  Homecare nurse/facility to recheck    Education provided: Please call back if any changes to your diet, medications or how you've been taking warfarin    Plan made per [facility] anticoagulation protocol    Samantha Parnell RN  10/1/2024  Anticoagulation Clinic  [facility] for routing messages: p ANTICOAG HOME MONITORING  [facility] patient phone line: [phone]        _______________________________________________________________________     Anticoagulation Episode Summary       Current INR goal:  2.5-3.5   TTR:  55.1% (8.8 mo)   Target end date:  Indefinite   Send INR reminders to:  ANTICOAG HOME MONITORING    Indications    Long-term (current) use of anticoagulants [Z79.01] [Z79.01]  S/P mitral valve replacement [Z95.2]  Chronic atrial fibrillation (H) [I48.20]             Comments:  [facility] Patient, okay  to manage by exception on 7/8/20             Anticoagulation Care Providers        Provider Role Specialty Phone number    Jayme Deng MD Referring Internal Medicine [phone]

## 2024-10-03 NOTE — TELEPHONE ENCOUNTER
Needs verbal orders for skilled nursing and wound care:  2 x week for 3 weeks  1 time a week for 5 weeks  [place] [id number]

## 2024-10-03 NOTE — TELEPHONE ENCOUNTER
Reviewed case details; prior AVS references home care visits as once weekly;   Per nurse's request, extended VO to provide 2x week for 3 weeks and 1x week for 5 weeks for skilled nursing and wound care; Dr Srinivasan follows patient for wound care;  Left clinic number for clarification if needed.

## 2024-10-04 NOTE — TELEPHONE ENCOUNTER
Return call and LVM with [facility] ADHIKARI, [facility] to provide verbal approval of recertification of wound care orders.

## 2024-10-04 NOTE — TELEPHONE ENCOUNTER
Reason for Call:  Medication or medication refill:    Do you use a [facility]?  Name of the pharmacy and phone number for the current request:       [id number] - [place], [place] - [address] AT [facility] [address]    Name of the medication requested: atenolol (TENORMIN) 50 MG tablet    Other request: pt. Needs a substitute medication for this medication.  Pharmacy has sent faxes but has not heard back.  Patient is now down to 3 pill and needs his replacement medication asap.    Can we leave a detailed message on this number? YES    Phone number patient can be reached at: Cell number on file:    Telephone Information:   Mobile [phone]       Best Time: ASAP    Call taken on 9/15/2017 at 2:43 PM by Veronique Anderson    .   Dr.Kavitha Chen.

## 2024-10-08 NOTE — TELEPHONE ENCOUNTER
GFR Estimate   Date Value Ref Range Status   09/16/2024 80 >60 mL/min/1.73m2 Final     Comment:     eGFR calculated using 2021 CKD-EPI equation.   10/16/2020 85 >60 mL/min/[1.73_m2] Final     Comment:     Non  GFR Calc  Starting 12/18/2018, serum creatinine based estimated GFR (eGFR) will be   calculated using the Chronic Kidney Disease Epidemiology Collaboration   (CKD-EPI) equation.

## 2024-10-08 NOTE — PROGRESS NOTES
ANTICOAGULATION MANAGEMENT     Feng Wynne 79 year old male is on warfarin with supratherapeutic INR result. (Goal INR 2.5-3.5)    Recent labs: (last 7 days)     10/08/24  1145   INR 5.6*       ASSESSMENT     Source(s): Chart Review and Home Care/Facility Nurse     Warfarin doses taken: Warfarin taken as instructed  Diet: Increased greens/vitamin K in diet; plans to resume previous intake  Medication/supplement changes: None noted  New illness, injury, or hospitalization: Yes: patient's partner reports that he believes patient's lower leg edema is worsening. Home care is unable to weigh patient as patient cannot ambulate (this is baseline. Patient denies shortness of breath. Patient also notes he had multiple episodes of diarrhea yesterday, none so far today.  Signs or symptoms of bleeding or clotting: No  Previous result: Therapeutic last 2(+) visits  Additional findings: Patient reports his bone marrow biopsy procedure has been put off and not rescheduled at this time. Patient will update [facility] once this is scheduled.        PLAN     Recommended plan for temporary change(s) affecting INR     Dosing Instructions: hold 1.5 doses then continue your current warfarin dose with next INR in 2 days       Summary  As of 10/8/2024      Full warfarin instructions:  10/8: Hold; 10/9: 1.5 mg; Otherwise 1.5 mg every Sun, Tue, Thu; 3 mg all other days   Next INR check:  10/10/2024               Telephone call with [facility] home care nurse who verbalizes understanding and agrees to plan    Orders given to  Homecare nurse/facility to recheck    Education provided: Please call back if any changes to your diet, medications or how you've been taking warfarin  Symptom monitoring: monitoring for bleeding signs and symptoms, monitoring for clotting signs and symptoms, monitoring for stroke signs and symptoms, when to seek medical attention/emergency care, and if you hit your head or have a bad fall seek emergency care    Plan made with [facility]  Pharmacist Petty Weston RN  10/8/2024  Anticoagulation Clinic  [facility] for routing messages: p ANTICOAG HOME MONITORING  [facility] patient phone line: [phone]        _______________________________________________________________________     Anticoagulation Episode Summary       Current INR goal:  2.5-3.5   TTR:  52.9% (8.8 mo)   Target end date:  Indefinite   Send INR reminders to:  ANTICOAG HOME MONITORING    Indications    Long-term (current) use of anticoagulants [Z79.01] [Z79.01]  S/P mitral valve replacement [Z95.2]  Chronic atrial fibrillation (H) [I48.20]             Comments:  [facility] Patient, okay  to manage by exception on 7/8/20             Anticoagulation Care Providers       Provider Role Specialty Phone number    Jayme Deng MD Referring Internal Medicine [phone]

## 2024-10-08 NOTE — TELEPHONE ENCOUNTER
Patient reports at this time, this procedure has been put off and not rescheduled. Patient will update [facility] if this gets rescheduled.    Pt made apt for 3/22 for in office.

## 2024-10-10 NOTE — PROGRESS NOTES
ANTICOAGULATION MANAGEMENT     Feng Wynne 79 year old male is on warfarin with supratherapeutic INR result. (Goal INR 2.5-3.5)    Recent labs: (last 7 days)     10/10/24  1306   INR 4.9*       ASSESSMENT     Source(s): Chart Review and Home Care/Facility Nurse     Warfarin doses taken: Held for elevated INR  recently which may be affecting INR  Diet: No new diet changes identified  Medication/supplement changes: None noted  New illness, injury, or hospitalization: Yes: lower leg edema has gotten worse since last week, Soft stools no diarrhea, wound drainage is very sanguinous  Signs or symptoms of bleeding or clotting: No  Previous result: Supratherapeutic  Additional findings:   Patient reports his bone marrow biopsy procedure has been put off and not rescheduled at this time. Patient will update [facility] once this is scheduled.   [facility] to consult, patient INR remains elevated with 1.5 holds.  Patient is being advised to be seen in clinic for ongoing decline.       PLAN     Recommended plan for temporary change(s) and ongoing change(s) affecting INR     Dosing Instructions: hold dose then decrease your warfarin dose (18.2% change) with next INR in 4 days       Summary  As of 10/10/2024      Full warfarin instructions:  10/10: Hold; Otherwise 3 mg every Mon, Thu; 1.5 mg all other days   Next INR check:  10/14/2024               Telephone call with [facility] home care nurse who verbalizes understanding and agrees to plan    Orders given to  Homecare nurse/facility to recheck    Education provided: Please call back if any changes to your diet, medications or how you've been taking warfarin    Plan made with [facility] Pharmacist Petty Mondragon RN  10/10/2024  Anticoagulation Clinic  [facility] for routing messages: mani [facility]  [facility] patient phone line: [phone]        _______________________________________________________________________     Anticoagulation Episode Summary       Current INR  goal:  2.5-3.5   TTR:  52.1% (8.8 mo)   Target end date:  Indefinite   Send INR reminders to:  ANTICOAG HOME MONITORING    Indications    Long-term (current) use of anticoagulants [Z79.01] [Z79.01]  S/P mitral valve replacement [Z95.2]  Chronic atrial fibrillation (H) [I48.20]             Comments:  [facility] Patient, okay  to manage by exception on 7/8/20             Anticoagulation Care Providers       Provider Role Specialty Phone number    Jayme Deng MD Referring Internal Medicine [phone]

## 2024-10-11 NOTE — TELEPHONE ENCOUNTER
Spoke with Sedrick that Dr. Agudelo is on vacation and stated to call back next week when she is back in the clinic.

## 2024-10-11 NOTE — TELEPHONE ENCOUNTER
Patient's son, Sedrick, called to follow up and ask for an update on the conversation Dr Srinivasan was going to have with the patient's primary doctor. Sedrick states that Dr Srinivasan said she would call him with the update but he has not heard yet.     [phone]

## 2024-10-14 NOTE — PROGRESS NOTES
ANTICOAGULATION MANAGEMENT     Feng Wynne 79 year old male is on warfarin with supratherapeutic INR result. (Goal INR 2.5-3.5)    Recent labs: (last 7 days)     10/14/24  1312   INR 7.7*       ASSESSMENT     Source(s): Chart Review and Home Care/Facility Nurse     Warfarin doses taken: Hold previous result and Warfarin taken as instructed  Diet: No new diet changes identified  Medication/supplement changes: None noted  New illness, injury, or hospitalization: No, leg wounds draining, pressure ulcer coccyx, stage 4   Signs or symptoms of bleeding or clotting: Yes: serous sanguinous from wounds  Previous result: Supratherapeutic  Additional findings:  [facility] RN discussed PCP visit, Patient refusing to be seen in clinic. Patient may require higher level of care than in home care giver can provide.        PLAN     Recommended plan for ongoing change(s) affecting INR     Dosing Instructions: hold 2 doses then continue your current warfarin dose with next INR in 2 days       Summary  As of 10/14/2024      Full warfarin instructions:  10/14: Hold; 10/15: Hold; Otherwise 3 mg every Mon, Thu; 1.5 mg all other days   Next INR check:  10/16/2024             Assessment completed with RN, RPH consulted.  Detailed voice message left for [facility] home care nurse with dosing instructions and follow up date.  Advised if patient is supra therapeutic on 11/6 a venous INR will be requested.     Orders given to  Homecare nurse/facility to recheck    Education provided: Please call back if any changes to your diet, medications or how you've been taking warfarin  Goal range and lab monitoring: goal range and significance of current result    Plan made with [facility] Pharmacist KIRSTIN Clinton plan reviewed.     Chikis Desir RN  10/14/2024  Anticoagulation Clinic  [employer] for routing messages: mani ANTICOAG HOME MONITORING  [facility] patient phone line: [phone]        _______________________________________________________________________      Anticoagulation Episode Summary       Current INR goal:  2.5-3.5   TTR:  50.6% (8.8 mo)   Target end date:  Indefinite   Send INR reminders to:  ANTICOAG HOME MONITORING    Indications    Long-term (current) use of anticoagulants [Z79.01] [Z79.01]  S/P mitral valve replacement [Z95.2]  Chronic atrial fibrillation (H) [I48.20]             Comments:  [facility] Patient, okay  to manage by exception on 7/8/20             Anticoagulation Care Providers       Provider Role Specialty Phone number    Jayme Deng MD Referring Internal Medicine [phone]

## 2024-10-15 NOTE — TELEPHONE ENCOUNTER
Return call to Whitley to discuss Patient wound issues. Informed her to contact PCP to discuss admission to a higher level of care for wound care.   She also expressed concerns for Patient compliancy. Informed that Dr Srinivasan will not perform FLAP surgery on a non-complaint Patient.   She will contact PCP and call back with issues as needed.

## 2024-10-15 NOTE — TELEPHONE ENCOUNTER
Whitley-[facility] called regarding the below concerns of the patients wound. States it has deteriorated since she has seen the patient states he is unable to perform his dressing changes twice daily and feels like the wound has gotten worse.    Whitley [phone]

## 2024-10-15 NOTE — TELEPHONE ENCOUNTER
I am covering for Dr. Deng as he is out of the office  He should follow up with wound care clinic     Dr.Nasima Orville MD

## 2024-10-15 NOTE — TELEPHONE ENCOUNTER
Return call and LVM that [facility] does not manage anything other than Wound care orders. If it is related to INR to contact Patient's PCP.  Also stated that Dr Srinivasan requested BID dressing changes due to amount of drainage and stooling.     Requested a call back to further discuss.

## 2024-10-15 NOTE — TELEPHONE ENCOUNTER
Left detailed vm of below but to call back if more questions.  Does have wound appt 10/24 now.  Liana Huynh, RN  [facility] RN Triage Team

## 2024-10-15 NOTE — TELEPHONE ENCOUNTER
ONOFRE Arreaga, Home Care    Requesting provider review;     Please see Discharge note 9/9/24:    Chronic sacral and coccyx wounds  -follows with wound clinic   -woc consulted     Home care has been completing wound care twice a week in addition to patient care daily dressing changes bid. Wound not healing. Home care RN is waiting to hear back from wound clinic and pt's son regarding updated plan of care.     Routing to PCP. Ok to schedule follow up appointment? Please review and advise.     Can we leave a detailed message on this number? YES  Phone number patient can be reached at: Other phone number:  ONOFRE Arreaga [phone]

## 2024-10-15 NOTE — TELEPHONE ENCOUNTER
"Erin with [facility] left a vm after clinic hours on 10/14/24 to report that while seeing the patient for wound care on 10/14/24, there was \"a lot of bloody drainage\". She states that she soaked 3-4 rounds of 2 packs of gauze. She also reports that the patient's INR was 7.7. She states that she let the clinic know and they held the dose yesterday, and will hold today's dose as well and recheck on Wednesday. Erin is requesting orders to see the patient tomorrow Wednesday 10/16/24 for the INR recheck.    Erin also mentioned that the patient's caregiver/partner expressed his struggle with doing twice daily wound care. She voiced understanding his struggle as she needed the caregivers help in her care of the patient as well. She is asking that be further discussed by the [facility].    Erin is requesting a call back to [phone]  "

## 2024-10-16 PROBLEM — E44.0 MODERATE PROTEIN-CALORIE MALNUTRITION (H): Status: ACTIVE | Noted: 2024-01-01

## 2024-10-16 PROBLEM — E87.6 HYPOKALEMIA: Status: ACTIVE | Noted: 2024-01-01

## 2024-10-16 PROBLEM — R53.81 OTHER MALAISE: Status: ACTIVE | Noted: 2024-01-01

## 2024-10-16 PROBLEM — K21.9 GASTRO-ESOPHAGEAL REFLUX DISEASE WITHOUT ESOPHAGITIS: Status: ACTIVE | Noted: 2024-01-01

## 2024-10-16 PROBLEM — I25.10 ATHEROSCLEROTIC HEART DISEASE OF NATIVE CORONARY ARTERY WITHOUT ANGINA PECTORIS: Status: ACTIVE | Noted: 2024-01-01

## 2024-10-16 PROBLEM — D72.819 DECREASED WHITE BLOOD CELL COUNT, UNSPECIFIED: Status: ACTIVE | Noted: 2024-01-01

## 2024-10-16 PROBLEM — Z95.2 PRESENCE OF PROSTHETIC HEART VALVE: Status: ACTIVE | Noted: 2024-01-01

## 2024-10-16 PROBLEM — I27.20 PULMONARY HYPERTENSION, UNSPECIFIED (H): Status: ACTIVE | Noted: 2024-01-01

## 2024-10-16 PROBLEM — K20.90 ESOPHAGITIS, UNSPECIFIED WITHOUT BLEEDING: Status: ACTIVE | Noted: 2024-01-01

## 2024-10-16 PROBLEM — R79.1 ELEVATED INR: Status: ACTIVE | Noted: 2024-01-01

## 2024-10-16 NOTE — TELEPHONE ENCOUNTER
[facility] Call Center    Phone Message    May a detailed message be left on voicemail: no     Reason for Call: Adore, from patients home care states she faxed over requested orders for us. Please review and fax back. Sending TE per caller request.    Action Taken: Other: UA - Urology    Travel Screening: Not Applicable     Date of Service:

## 2024-10-16 NOTE — TELEPHONE ENCOUNTER
----- Message from Klever Daniels sent at 10/16/2024  2:50 PM CDT -----  This is a markedly supra therapeutic INR  Please make sure this gets to the appropriate anticoagulation RN  Please inform me if they need MD direction on how to manage supra therapeutic INR

## 2024-10-16 NOTE — ED NOTES
Provided the following cares for patient:  - Ice water (ok'd by MD)  - Sulaiman Hugger for warmth/comfort  - IV Dilaudid for pain 5/10 in coccyx  - Reposition in bed for comfort    Patient's indwelling Stubbs is 16 Fr 30mL balloon. Per MD VORIVON, replace Stubbs due to sediment in tubing and chamber, and obtain UA from new catheter.

## 2024-10-16 NOTE — ED PROVIDER NOTES
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 His INR was found to be high today at 8.33.  His INR has been slightly trending up over the last 2 weeks despite holding warfarin on numerous occasions. He has had worsening coccyx pain over his ulcer. He has noticed increased drainage from the sore for one week. He denies other symptoms. He denies abdominal pain. No nausea or vomiting. He has his catheter changed 6 days ago. His spouse adds he has been eating less.     Independent Historian    as detailed above.    Review of External Notes   I personally reviewed notes from the patient's  progress note  dated today. This provided me with information regarding patient's recent clinical course.     I personally reviewed the patient's chart, including available medication list and available past medical history, past surgical history, family history, and social history.    Physical Exam     Patient Vitals for the past 24 hrs:   BP Temp Temp src Pulse Resp SpO2 Height Weight   10/16/24 2215 96/59 -- -- 60 -- 99 % -- --   10/16/24 2200 99/71 -- -- 67 14 98 % -- --   10/16/24 2154 99/71 -- -- -- -- 98 % -- --   10/16/24 2052 97/56 98.2  F (36.8  C) Oral 55 16 97 % -- --   10/16/24 2051 -- -- -- -- -- (!) 88 % -- --   10/16/24 2049 97/56 -- -- 55 14 90 % -- --   10/16/24 1747 100/58 98.6  F (37  C) Oral 52 18 92 % 1.829 m (6') 108.9 kg (240 lb)     Physical Exam  Vitals and nursing note reviewed. Exam conducted with a chaperone present.   Constitutional:       Appearance: He is ill-appearing (Chronically ill-appearing). He is not diaphoretic.   HENT:      Mouth/Throat:      Mouth: Mucous membranes are dry.    Cardiovascular:      Rate and Rhythm: Bradycardia present. Rhythm irregular.   Pulmonary:      Effort: Pulmonary effort is normal.      Breath sounds: Normal breath sounds.   Abdominal:      Palpations: Abdomen is soft.      Tenderness: There is no abdominal tenderness.   Skin:     General: Skin is warm and dry.      Comments: There is a large decubitus ulcer.  Do not appreciate significant surrounding erythema or purulent drainage.  It is malodorous.  It is deeply tunneling.  Pictures below.   Neurological:      Mental Status: He is alert and oriented to person, place, and time.                     Diagnostics     Lab Results   Labs Ordered and Resulted from Time of ED Arrival to Time of ED Departure   COMPREHENSIVE METABOLIC PANEL - Abnormal       Result Value    Sodium 130 (*)     Potassium 1.9 (*)     Carbon Dioxide (CO2) 38 (*)     Anion Gap 7      Urea Nitrogen 34.3 (*)     Creatinine 0.80      GFR Estimate >90      Calcium 8.4 (*)     Chloride 85 (*)     Glucose 105 (*)     Alkaline Phosphatase 86      AST 17      ALT <5      Protein Total 5.2 (*)     Albumin 2.4 (*)     Bilirubin Total 0.5     INR - Abnormal    INR 7.70 (*)    CBC WITH PLATELETS AND DIFFERENTIAL - Abnormal    WBC Count 8.8      RBC Count 2.72 (*)     Hemoglobin 8.0 (*)     Hematocrit 25.7 (*)     MCV 95      MCH 29.4      MCHC 31.1 (*)     RDW 15.9 (*)     Platelet Count 310      % Neutrophils 77      % Lymphocytes 16      % Monocytes 6      % Eosinophils 0      % Basophils 0      % Immature Granulocytes 1      NRBCs per 100 WBC 0      Absolute Neutrophils 6.8      Absolute Lymphocytes 1.4      Absolute Monocytes 0.6      Absolute Eosinophils 0.0      Absolute Basophils 0.0      Absolute Immature Granulocytes 0.0      Absolute NRBCs 0.0     MAGNESIUM - Abnormal    Magnesium 1.5 (*)    ISTAT GASES LACTATE VENOUS POCT - Abnormal    Lactic Acid POCT 1.1      Bicarbonate Venous POCT 44 (*)     O2 Sat, Venous POCT 87 (*)     pCO2 Venous POCT 53  (*)     pH Venous POCT 7.52 (*)     pO2 Venous POCT 49 (*)     Base Excess/Deficit (+/-) POCT 19.0 (*)    ROUTINE UA WITH MICROSCOPIC REFLEX TO CULTURE - Abnormal    Color Urine Yellow      Appearance Urine Slightly Cloudy (*)     Glucose Urine Negative      Bilirubin Urine Negative      Ketones Urine Negative      Specific Gravity Urine 1.012      Blood Urine Moderate (*)     pH Urine 5.5      Protein Albumin Urine 20 (*)     Urobilinogen Urine Normal      Nitrite Urine Negative      Leukocyte Esterase Urine Large (*)     Bacteria Urine Many (*)     Mucus Urine Present (*)     RBC Urine 12 (*)     WBC Urine 28 (*)     Squamous Epithelials Urine <1      Hyaline Casts Urine 1     MAGNESIUM - Normal    Magnesium 2.2     OSMOLALITY, RANDOM URINE - Normal    Osmolality Urine 296     SODIUM RANDOM URINE    Sodium Urine mmol/L <20     CHLORIDE RANDOM URINE   URINE CULTURE   BLOOD CULTURE     EKG   ECG taken at 2043, ECG read at 2050  Undetermined rhythm  Left axis deviation  Nonspecific intraventricular block  Cannot rule out inferior infarct (masked by fascicular block?), age undetermined   Rate 71 bpm. ID interval * ms. QRS duration 128 ms. QT/QTc 414/449 ms. P-R-T axes * -71 107.     Independent Interpretation - See ED Course Below    ED Course      Medications Administered   Medications   HYDROmorphone (PF) (DILAUDID) injection 0.5 mg (0.5 mg Intravenous $Given 10/16/24 1843)   lidocaine 1 % 0.1-1 mL (has no administration in time range)   lidocaine (LMX4) cream (has no administration in time range)   sodium chloride (PF) 0.9% PF flush 3 mL (has no administration in time range)   sodium chloride (PF) 0.9% PF flush 3 mL (has no administration in time range)   senna-docusate (SENOKOT-S/PERICOLACE) 8.6-50 MG per tablet 1 tablet (has no administration in time range)     Or   senna-docusate (SENOKOT-S/PERICOLACE) 8.6-50 MG per tablet 2 tablet (has no administration in time range)   calcium carbonate (TUMS) chewable tablet  1,000 mg (has no administration in time range)   acetaminophen (TYLENOL) tablet 650 mg (has no administration in time range)     Or   acetaminophen (TYLENOL) Suppository 650 mg (has no administration in time range)   ondansetron (ZOFRAN ODT) ODT tab 4 mg (has no administration in time range)     Or   ondansetron (ZOFRAN) injection 4 mg (has no administration in time range)   melatonin tablet 1 mg (has no administration in time range)   potassium chloride reagan ER (KLOR-CON M20) CR tablet 40 mEq (40 mEq Oral $Given 10/16/24 2116)   midodrine (PROAMATINE) tablet 10 mg (has no administration in time range)   pantoprazole (PROTONIX) EC tablet 40 mg (has no administration in time range)   HYDROmorphone (DILAUDID) tablet 2 mg (has no administration in time range)   dutasteride (AVODART) capsule 0.5 mg (has no administration in time range)   magnesium sulfate 2 g in 50 mL sterile water intermittent infusion (0 g Intravenous Stopped 10/16/24 2210)   potassium chloride 10 mEq in 100 mL sterile water infusion (0 mEq Intravenous Stopped 10/16/24 2110)   phytonadione (MEPHYTON/VITAMIN K) 1 MG/ML oral solution 1 mg (1 mg Oral $Given 10/16/24 2141)   cefTRIAXone (ROCEPHIN) 1 g vial to attach to  mL bag for ADULTS or NS 50 mL bag for PEDS (0 g Intravenous Stopped 10/16/24 2245)   morphine (PF) injection 2 mg (2 mg Intravenous $Given 10/16/24 2154)     Discussion of Management - See ED Course Below    ED Course   Independent Interpretation / Discussion of Management / Repeat Assessments  ED Course as of 10/16/24 2340   Wed Oct 16, 2024   1807 I obtained the patient's history and examined as noted above. Spouse is at bedside.    1929 Updated potassium 1.9.   2025 I consulted with Dr. Chinchilla, hospitalist, regarding the patient's history and presentation here in the emergency department who accepted the patient for admission.     Additional Documentation  None    Medical Decision Making / Diagnosis     CMS Diagnoses:  None    MIPS       Stubbs catheter was inserted because patient had an existing catheter.    MDM   Patient presenting with elevated INR and wound check.  Vital signs are reassuring.  There appears to be cloudy dark yellow urine in the patient's Stubbs catheter.  I did request this be changed.  For patient's INR, considered differential including ENRIQUE, obstructive uropathy, electrolyte derangement, occult infection, among others.  Workup was revealing of significant hypokalemia with potassium 1.9.  I ordered magnesium and potassium supplementation.  After changing Stubbs catheter, urine sample does appear to suggest UTI.  I did order ceftriaxone for the patient after obtaining blood culture.  Patient has no SIRS criteria to suggest sepsis.  With significant electrolyte derangement along with elevated INR, patient was admitted to hospitalist for further evaluation and management.    Disposition   The patient was admitted to the hospital.     Diagnosis     ICD-10-CM    1. Hypokalemia  E87.6       2. Elevated INR  R79.1       3. Stage 4 pressure injury of sacral region (H)  L89.154       4. Urinary tract infection associated with indwelling urethral catheter, initial encounter (H)  T83.511A     N39.0          Scribe Disclosure:  I, Estrellita Mensah, am serving as a scribe at 6:11 PM on 10/16/2024 to document services personally performed by Dillon Guzman MD based on my observations and the provider's statements to me.         Dillon Guzman MD  10/16/24 0366

## 2024-10-16 NOTE — ED NOTES
Bed: ED11  Expected date:   Expected time:   Means of arrival:   Comments:  [facility] 79m pressure sores lower back eta 10

## 2024-10-16 NOTE — TELEPHONE ENCOUNTER
Patient Returning Call    Reason for call:  returning INR call     Information relayed to patient:  Message sent to return call     Patient has additional questions:  No      Could we send this information to you in [facility] or would you prefer to receive a phone call?:   Patient would prefer a phone call   Okay to leave a detailed message?: Yes at Other phone number:  [phone]

## 2024-10-16 NOTE — ED TRIAGE NOTES
Patient brought in by EMS from home for complaints of pre-existing decubitus ulcer on buttocks with worsening pain and concern for infection due to drainage. INR was also elevated at 8.33 earlier today. Patient here with spouse.  Hx of Hypotension, CHF, Afib (on Warfarin), Valve replacement, Decubitus ulcers with infections, and chronic indwelling Stubbs (last changed on 10/10).  En route, , bradycardic in 50s, and SBP in 90s.

## 2024-10-16 NOTE — PROGRESS NOTES
8.0 on POC machine, home care bringing in venous for STAT result.     Confirmed patient has held the last 2 days.    Will await venous result for further dosing instructions.    Mee Toledo, RN, BSN, PHN  Anticoagulation Nurse  [phone]

## 2024-10-16 NOTE — PROGRESS NOTES
10/16/24   3:47 PM    Sedrick, son, called back - is going to call both Feng & Jose and call back ACC with update.     4:35 PM  10/16/24     Spoke with son, Sedrick, and both patient and spouse Jose, who have all agreed for EMS to come gor transportation of Feng to [facility] for further treatment and evaluation.    This RN called 911 and gave SBAR to paramedics.    Called patient and spouse to inform of EMS arrival and to not eat or drink anything until EMS arrives. Feng and Jose both agreed and will wait for transport.     larissa Dubose asking for a follow up call on discharge or update tomorrow if able.     All questions answered at this time by ACC RN.       Mee Toledo, RN, BSN, PHN  Anticoagulation Nurse  [phone]

## 2024-10-17 NOTE — CONSULTS
"CLINICAL NUTRITION SERVICES  -  ASSESSMENT NOTE      Recommendations:     Discussed importance of good po intake, emphasizing protein at each meal for wound healing  Will send a pineapple GelateinPLUS at lunch and a vanilla Magic Cup at dinner  (pt declines Ensure-type supplements and wound healing modulars)  Ordered a current wt  Ordered daily multivitamin w/minerals     Malnutrition:   % Weight Loss:  Difficult to assess actual wt loss - pt notes wt has fluctuated over the past several months 2' to fluid and eating less with hospitalizations.  Suspect pt has had true wt loss (~8% from 7 months ago)  % Intake:  No decreased intake noted  Subcutaneous Fat Loss:  (from 9/13 NFPE) Orbital region moderate-severe depletion, Upper arm region moderate depletion, and Thoracic region moderate-severe depletion   Muscle Loss: (from 9/13 NFPE) Temporal region moderate-severe depletion, Clavicle bone region moderate depletion, and Dorsal hand region moderate depletion   Fluid Retention:  None noted    Malnutrition Diagnosis: Moderate malnutrition  In Context of:  Chronic illness or disease        REASON FOR ASSESSMENT  Feng Wynne is a 79 year old male seen by Registered Dietitian for Provider Order - obesity, hypoalbumin       NUTRITION HISTORY  Chart reviewed  Pt known from previous admits  9/2024 RD visit:  - Pt strongly dislikes the Ensure, won't drink it. He eats 50% of his trays \"I always eat 50%\".   - He is vegetarian,reports he does not like milk but does eat eggs and cheese. He loves vanilla pudding. Has also been ordering cream soup, yogurt, cottage cheese.     Presenting via EMS with skin ulcer and pressure sore   His spouse adds he has been eating less     Visited with pt and his partner, Jose  Pt notes that he has been eating well at home  They follow a vegetarian diet - will eat eggs, cheese, dairy, beans for protein  Jose make pt a protein smoothie every morning - \"it is so good!\" (Has protein powder)  During " "the day pt will eat meals - loves to have cottage cheese    He dislikes supplement drinks  Remembers having [employer] on a previous admit - \"I just can't get that down\"        CURRENT NUTRITION ORDERS  Diet Order:     Regular  Room Service with Assist    Pt reports eating fresh fruit, omelet, cheese slices for breakfast  He is agreeable to trying the GelateinPLUS and Magic Cup supplements      NUTRITION FOCUSED PHYSICAL ASSESSMENT FOR DIAGNOSING MALNUTRITION)  Yes (brief visual, pt covered in blankets and didn't want to be moved)             Observed:    Muscle wasting (refer to documentation in Malnutrition section)    Obtained from Chart/Interdisciplinary Team:  There is a large decubitus ulcer. Do not appreciate significant surrounding erythema or purulent drainage. It is malodorous. It is deeply tunneling.     ANTHROPOMETRICS  Height: 6' 0\"  Weight: 240 lbs 0 oz  Body mass index is 32.55 kg/m .  Weight Status:  Obesity Grade I BMI 30-34.9  IBW: 80.9 kg  % IBW: 135%  Weight History:    Pt states that his wt in the 300s beginning of year was largely from fluid  Notes that wt of 259# was from diuresis and being on a liquid diet during admit  Suspect at least 20# wt loss (8%) in the past 7 months  10/16/24 : 108.9 kg (240 lb) - pt hasn't been properly weighed, but feels this is accurate reported wt  09/15/24 : 94 kg (207 lb 3.7 oz)   07/26/24 : 86 kg (189 lb 9.5 oz) - pt states this wt is not accurate  06/03/24 : 103.9 kg (229 lb)   05/30/24 : 103.7 kg (228 lb 9.6 oz)   05/21/24 : 103.7 kg (228 lb 9.6 oz)   05/16/24 : 103.7 kg (228 lb 9.6 oz)   05/14/24 : 103.7 kg (228 lb 9.6 oz)   05/10/24 : 103.7 kg (228 lb 9.6 oz)   05/09/24 : 103.7 kg (228 lb 9.6 oz)   03/04/24 117.8 kg (259 lb 11.2 oz)   03/01/24 117.5 kg (259 lb)   02/28/24 118.8 kg (262 lb)   02/26/24 121.6 kg (268 lb)   02/22/24 121.6 kg (268 lb)   02/19/24 121.6 kg (268 lb)   02/15/24 122 kg (269 lb)   02/15/24 122.4 kg (269 lb 13.5 oz)   01/29/24 (!) 155.3 " kg (342 lb 6.4 oz)  - pt states he had large fluid overload  01/26/24 143.8 kg (317 lb)   01/25/24 (!) 153 kg (337 lb 6.4 oz)     LABS  10/17: Na 136             K 2.9 (L)             Alb 2.6 (L - not a marker for malnutrition)    MEDICATIONS  Medications reviewed      ASSESSED NUTRITION NEEDS PER APPROVED PRACTICE GUIDELINES:    Dosing Weight 88 kg (adjusted wt)  Estimated Energy Needs: 8384-4407 kcals (25-30 Kcal/Kg)  Justification: overweight  Estimated Protein Needs: 105-130 grams protein (1.2-1.5 g pro/Kg)  Justification: wound healing  Estimated Fluid Needs: 9289-4999  mL (1 mL/Kcal)  Justification: maintenance    MALNUTRITION:  % Weight Loss:  Difficult to assess actual wt loss - pt notes wt has fluctuated over the past several months 2' to fluid and eating less with hospitalizations.  Suspect pt has had true wt loss (~8% from 7 months ago)  % Intake:  No decreased intake noted  Subcutaneous Fat Loss:  (from 9/13 NFPE) Orbital region moderate-severe depletion, Upper arm region moderate depletion, and Thoracic region moderate-severe depletion   Muscle Loss: (from 9/13 NFPE) Temporal region moderate-severe depletion, Clavicle bone region moderate depletion, and Dorsal hand region moderate depletion   Fluid Retention:  None noted    Malnutrition Diagnosis: Moderate malnutrition  In Context of:  Chronic illness or disease    NUTRITION DIAGNOSIS:  Increased nutrient needs (protein) related to higher need for healing as evidenced by pt with noted sacral decubitus ulcers       NUTRITION INTERVENTIONS  Recommendations / Nutrition Prescription  Regular diet  .      Implementation  Nutrition education: Discussed importance of good po intake, emphasizing protein at each meal for wound healing  Will send a pineapple GelateinPLUS at lunch and a vanilla Magic Cup at dinner  (pt declines Ensure-type supplements and wound healing modulars)  Ordered a current wt  Ordered daily multivitamin w/minerals  .      Nutrition  Goals  Pt to consume 75% meals BID-TID and 100% supplements  .      MONITORING AND EVALUATION:  Progress towards goals will be monitored and evaluated per protocol and Practice Guidelines

## 2024-10-17 NOTE — PROGRESS NOTES
[facility]    Medicine Progress Note - Hospitalist Service    Date of Admission:  10/16/2024    Assessment & Plan      Feng Wynne is a 79 year old male with significant past medical history for mechanical mitral valve replacement on chronic anticoagulation with warfarin, A-fib, HFpEF, coronary artery disease, hypertension, hyperlipidemia, sacral decubitus ulcers presenting for supratheraputic INR despite holding warfarin since 10/14/24     Nonbleeding supratheraputic INR  Mechanical mitral valve replacement on chronic anticoagulation with warfarin    Pharmacy consult for Vit K - was given 1mg IV x 1 10/16/24  INR improved this am to 4.37   Has been holding warfarin since 10/14  Concern that nutrition make be playing a role in INR levels    Continue to hold warfarin today  INR in the am  No s/s of active bleeding        Hgb unchanged on recheck this am    2. Acute on Chronic Hypokalemia  Hypomagnesemia  Hyponatremia  Metabolic Alkalosis    Sodium has normalized this am  Potassium still low but improved - continue with electrolyte supplementation  Magnesium has been supplemented and normalized    PTA torsemide and spironolactone on hold - continue to hold today    3. Chronic Afib  HFpEF  Chronic LE edema  CAD  HTN  HLD   Chronic Hypotension    --TTE showed EF 50-55 in 9/2024  --resumed midodrine tid  --Holding PTA diuretics, as above    Elevate legs while supine  ? ACE wraps if able to tolerate     4. Stage 4 sacral ulcer  --No indication for abx based on lack of infection signs from the ulcer  --WOCs pending     5.  Abnormal UA    Concern for possible UTI  No symptoms  Continue IV rocephin daily until UC finalized      6. Obesity  Hypoalbuminemia  --Nutrition consult requested and is pending     7. Chronic normocytic anemia, at baseline Hb 8  --continue to monitor     8. BPH  --avodart resumed     CODE status - FULL          PPE Used:  Mask, gloves          Diet: Combination Diet Regular  Diet Adult    DVT Prophylaxis: Warfarin/elevated INR  Stubbs Catheter: Not present  Lines: None     Cardiac Monitoring: None  Code Status: Full Code      Clinically Significant Risk Factors Present on Admission        # Hypokalemia: Lowest K = 1.9 mmol/L in last 2 days, will replace as needed  # Hyponatremia: Lowest Na = 130 mmol/L in last 2 days, will monitor as appropriate  # Hypochloremia: Lowest Cl = 85 mmol/L in last 2 days, will monitor as appropriate    # Hypomagnesemia: Lowest Mg = 1.5 mg/dL in last 2 days, will replace as needed   # Hypoalbuminemia: Lowest albumin = 2.4 g/dL at 10/16/2024  6:25 PM, will monitor as appropriate  # Drug Induced Coagulation Defect: home medication list includes an anticoagulant medication    # Hypertension: Noted on problem list  # Chronic heart failure with preserved ejection fraction: heart failure noted on problem list and last echo with EF >50%   # Anemia: based on hgb <11       # Obesity: Estimated body mass index is 32.55 kg/m  as calculated from the following:    Height as of this encounter: 1.829 m (6').    Weight as of this encounter: 108.9 kg (240 lb).       # Financial/Environmental Concerns:           Disposition Plan     Medically Ready for Discharge: anticipated in the next 1-2 days           Florencia Mcneill DO  Hospitalist Service  [facility]  Securely message with BigTwist (more info)  Text page via [employer] Paging/Directory   ______________________________________________________________________    Interval History     Seen with  at bedside.  No new concerns this am.  Worried about his son in [place].  No current HA, CP, SOB, F/C or N/V.  Spouse states that he has been doing dressing changes at home PTA.  Pt is mainly bed-bound at home.  Denies any clear melena, hematuria, hemoptysis.  Some concerns about memory for some time---states that he does  not remember being in the ED or events of yesterday.    Physical Exam   Vital  Signs: Temp: 98.1  F (36.7  C) Temp src: Oral BP: 94/60 Pulse: 65   Resp: 16 SpO2: 99 % O2 Device: Nasal cannula Oxygen Delivery: 1 LPM  Weight: 240 lbs 0 oz    GEN:  Alert, appears comfortable, no overt respiratory distress.  HEENT:  Normocephalic/atraumatic, no scleral icterus, no nasal discharge, mouth moist.  CV:  Regular rate and rhythm, somewhat distant heart sounds.  S1 + S2  LUNGS:  Clear to auscultation ant/lat bilaterally without rales/rhonchi/wheezing/retractions.  Symmetric chest rise on inhalation noted.  ABD:  Active bowel sounds, soft, non-tender/non-distended.  No clear rebound/guarding/rigidity.  EXT:  1+ pitting edema in feet bilaterally extending up to the pretibial area bilaterally.  No cyanosis.    SKIN:  Dry to touch, no new exanthems noted in the visualized areas.  NEURO:  speech clear, having occasional difficulty with word-finding and is frustrated.  No tremors at rest    Medical Decision Making       50 MINUTES SPENT BY ME on the date of service doing chart review, history, exam, documentation & further activities per the note.      Data   Medications   Current Facility-Administered Medications   Medication Dose Route Frequency Provider Last Rate Last Admin     Current Facility-Administered Medications   Medication Dose Route Frequency Provider Last Rate Last Admin    dutasteride (AVODART) capsule 0.5 mg  0.5 mg Oral Daily Jose Chinchilla MD        midodrine (PROAMATINE) tablet 10 mg  10 mg Oral TID w/meals Jose Chinchilla MD        pantoprazole (PROTONIX) EC tablet 40 mg  40 mg Oral BID AC Jose Chinchilla MD        potassium chloride 10 mEq in 100 mL sterile water infusion  10 mEq Intravenous Q1H Jose Chinchilla  mL/hr at 10/17/24 0715 10 mEq at 10/17/24 0715    sodium chloride (PF) 0.9% PF flush 3 mL  3 mL Intracatheter Q8H Jose Chinchilla MD   3 mL at 10/17/24 0214     Labs and Imaging results below reviewed today.  Recent Labs   Lab 10/17/24  0843 10/16/24  6813    WBC 8.3 8.8   HGB 8.0* 8.0*   HCT 26.6* 25.7*   MCV 97 95    310     Recent Labs   Lab 10/17/24  0843 10/16/24  1825    130*   POTASSIUM 2.9*  2.9* 1.9*   CHLORIDE 89* 85*   CO2 39* 38*   ANIONGAP 8 7   GLC 94 105*   BUN 34.5* 34.3*   CR 0.76 0.80   GFRESTIMATED >90 >90   JOSEPH 8.7* 8.4*     7-Day Micro Results       Collected Updated Procedure Result Status      10/16/2024 2215 10/17/2024 1216 Blood Culture Arm, Right [[id number]]   Blood from Arm, Right    Preliminary result Component Value   Culture No growth after 12 hours  [P]                10/16/2024 2040 10/17/2024 1413 Urine Culture [[id number]]   Urine, Catheter    Preliminary result Component Value   Culture Culture in progress  [P]                      Recent Labs   Lab 10/17/24  1009 10/17/24  0843 10/16/24  1825   INR 4.56* 4.37* 7.70*     Recent Labs   Lab 10/16/24  1834   LACT 1.1       No results found for this or any previous visit (from the past 24 hour(s)).

## 2024-10-17 NOTE — H&P
Assessment / Plan    Feng Wynne is a 79 year old male with significant past medical history for mechanical mitral valve replacement on chronic anticoagulation with warfarin, A-fib, HFpEF, coronary artery disease, hypertension, hyperlipidemia, sacral decubitus ulcers presenting for supratheraputic INR    --Hx: Patient completely asymptomatic, w/ no fevers/chills, Dyspnea/CP, any GI/ bleeding. Patient is at [facility] due to persistent theraputic INR despite holding warfarin for 2 days. No nausea/vomiting/diarrhea. Last BM was earlier today. Compliant w/ diuretics.   --Vitals/Exam: Vitally stable, LE edema, clean stage 4 sacral ulcer.   --EKG: afib, PVCs, LAD  --Labs: Na 130, K 1.9, Co2 38, Mg 1.5, Alb 2.4, pH 7.5, INR 7.7, Hb 8  --Imaging: N/A  --ER Course: mg, K repletion    Acute on Chronic Hypokalemia  Hypomagnesemia  Chronic Hyponatremia  Metabolic Alkalosis  Hyponatrmeia  --K 40meq q3 x4 doses. W/ protocol in place  --UA, urine chloride, urine osmoles, urine sodium  --Hold torsemide for now, can consider spironlactone going forward    Nonbleeding supratheraputic INR  Mechanical mitral valve replacement on chronic anticoagulation with warfarin  Chronic Afib  HFpEF  Chronic LE edema  CAD  HTN  HLD   Chronic Hypotension  --TTE showed EF 50-55 in 9/2024  --pharm consult for Vit K. Has been holding warfarin since 10/14  --resumed midodrine  --Hold torsemide for now    Stage 4 sacral ulcer  --No indication for abx based on lack of infection signs from the ulcer  --[facility]    Obesity  Hypoalbuminemia  --Nutrition consult    Chronic normocytic anemia, at baseline Hb 8  --continue to monitor    BPH  --avodart resumed    Supportive Care  --DVT ppx: Warfarin  --Diet: General  --Pain Control: tylenol  --Upper GI ppx: zofran  --Lower GI ppx: senna  -- ppx: none  --Lines/Catheters: IV  --TTE/Dopplers: None  --Contact/Seizure/Fall/Delerium Precautions: None  --PT/OT/Social/Wound/Palliative Consults: None  --Code Status:  Full    History of Present Illness  --Hx: Patient completely asymptomatic, w/ no fevers/chills, Dyspnea/CP, any GI/ bleeding. Patient is at [facility] due to persistent theraputic INR despite holding warfarin for 2 days. No nausea/vomiting/diarrhea. Last BM was earlier today. Compliant w/ diuretics.     ROS: 10 point ROS neg other than the symptoms noted above in the HPI.     Objective History  /58   Pulse 52   Temp 98.6  F (37  C) (Oral)   Resp 18   Ht 1.829 m (6')   Wt 108.9 kg (240 lb)   SpO2 92%   BMI 32.55 kg/m      Constitutional: Alert and oriented to person, place and time; no apparent distress.   HEENT: Normocephalic, no scleral icterus  Abdomen: soft, no distention/tenderness/guarding  Lungs: lungs clear to auscultation bilaterally  Heart: Irregular  Neuro:No focal strength/sensation deficits  Skin/Extremities: stage 4 sacral ulcer clean, LE edema  Psychiatric: appropriate affect, insight and judgment  Back: No midline tenderness, no CVA tenderness    I have personally spent 82 minutes total time today in preparing to see the patient (eg, review of tests), obtaining and/or reviewing separately obtained history, performing a medically appropriate examination and/or evaluation, counseling and educating the patient/family/caregiver, ordering medications, tests, or procedures, referring and communicating with other health care professionals, documenting clinical information in the electronic or other health record, independently interpreting results and communicating results to the patient/ family/caregiver and care coordination.

## 2024-10-17 NOTE — CONSULTS
"[facility] Nurse Inpatient Assessment     Consulted for: \"sacrum\"    Summary: POA chronic stage 4 pressure injury on sacrum, followed by Dr. Srinivasan outpatient. Skin tears on left hip and scattered dark ecchymosis with largest areas on the left hip, thighs, buttocks, and back. Patient's INR today at 10 am was 4.56, and INR was 8.33 on admission on 10/16 at approximately 1 pm.    Patient History (according to provider note(s):      \"79 year old male with significant past medical history for mechanical mitral valve replacement on chronic anticoagulation with warfarin, A-fib, HFpEF, coronary artery disease, hypertension, hyperlipidemia, sacral decubitus ulcers presenting for supratheraputic INR\"     Assessment:      Areas visualized during today's visit: Focused:    Pressure Injury Location: Sacrum      Overview of buttocks & posterior thighs      Overview of back      Last photo: 10/17  Wound type: Pressure Injury     Pressure Injury Stage: 4, present on admission        Wound history/plan of care: This wound has been present since January 2024. Patient has been seeing Dr. Srinivasan at [facility]. Patient states that Dr. Srinivasan has mentioned that the wound needs surgical debridement, but he is not currently strong enough for surgery. The wound currently has a strong foul odor and the base of the wound is mostly black.    Wound base: 80% Bone, Moist, Black, and Necrotic, Malodorous , 20% Fibrin, Moist, Pink, Smooth , and Pale     Palpation of the wound bed: firm      Drainage: copious     Description of drainage: serosanguinous and dark     Measurements (length x width x depth, in cm) 8.2  x 6.5  x  3.3 cm      Tunneling N/A     Undermining up to 6.7 cm from 12 to 12 o'clock, deepest at 2 o'clock  Periwound skin: Intact, Ecchymosis, Scar tissue, and Dark discoloration      Color: dusky and pink      Temperature: normal   Odor: strong and offensive  Pain: severe and during " dressing change  Pain intervention prior to dressing change: patient tolerated well, oral Tylenol given at patient's request, patient stated that pain significantly decreases when wound is not being touched, slow and gentle cares , and distraction  Treatment goal: Infection control/prevention, Decrease moisture, Maintain (prevention of deterioration), and Simplify plan of care  STATUS: initial assessment  Supplies ordered: gathered, at bedside, supplies stored on unit, discussed with RN, and discussed with patient     My PI Risk Assessment     Sensory Perception: 2 - Very Limited     Moisture: 2 - Very moist      Activity: 1 - Bedfast      Mobility: 2 - Very limited     Nutrition: 1 - Very poor     Friction/Shear: 1 - Problem     TOTAL: 9     Wound location: Left lateral hip      Last photo: Left lateral hip  Wound due to: Friction, Shear, and Skin Tear  Wound history/plan of care: POA large area of scattered dark ecchymosis with 2 open skin tears. Patient's  thinks that this happened recently and stated that patient lays on his left side a lot. Woc RN reinforced the importance of turning frequently to offload pressure points.    Wound base: 100% Moist and Red     Palpation of the wound bed: normal      Drainage: small     Description of drainage: serosanguinous     Measurements (length x width x depth, in cm): 2 open areas, largest is ~ 1.5 x 1.2 cm      Tunneling: N/A     Undermining: N/A  Periwound skin: Intact and Ecchymosis      Color: normal and consistent with surrounding tissue and eccyhmotic      Temperature: normal   Odor: none  Pain: moderate and during dressing change  Pain interventions prior to dressing change: patient tolerated well, slow and gentle cares , and distraction  Treatment goal: Infection control/prevention, Maintain (prevention of deterioration), and Protection  STATUS: initial assessment  Supplies ordered: gathered, at bedside, supplies stored on unit, discussed with RN, and  "discussed with patient        Treatment Plan:     Sacral wound(s): BID and prn  Cleanse wound with Vashe  Protect periwound with CriticAid barrier paste  Lightly pack wound (including all undermining) with approximately one half of a kerlix roll slightly moistened with Vashe   Cover with Abd pad and secure with tape    Left hip wounds: Every other day and prn  Cleanse wounds with Vashe  Cover with a Sacral Mepilex  Please also keep prominent portion of spine covered with Sacral Mepilex for protection  Follow PIP plan    Pressure Injury Prevention (PIP) Plan:  If patient is declining pressure injury prevention interventions: Explore reason why and address patient's concerns, Educate on pressure injury risk and prevention intervention(s), and If patient is still declining, document \"informed refusal\"   Mattress: Follow bed algorithm, add Low Air Loss (Air+) mattress pump if skin is very moist or constantly moist.   HOB: Maintain at or below 30 degrees, unless contraindicated  Repositioning in bed: Every 1-2 hours  and Raise foot of bed prior to raising head of bed, to reduce patient sliding down (shear)  Heels: Keep elevated off mattress and Pillows under calves  Protective Dressing:  Sacral Mepilex to left hip and upper back  Positioning Equipment:None  Chair positioning:  Limit sitting to a maximum of 30 minutes, but it is best to avoid sitting at all    If patient has a buttock pressure injury, or high risk for PI use chair cushion or SPS.  Moisture Management: Perineal cleansing /protection: Follow Incontinence Protocol, Avoid brief in bed, Clean and dry skin folds with bathing , and Moisturize dry skin  Under Devices: Inspect skin under all medical devices during skin inspection , Ensure tubes are stabilized without tension, and Ensure patient is not lying on medical devices or equipment when repositioned  Ask provider to discontinue device when no longer needed.     Orders: Written    RECOMMEND PRIMARY TEAM " ORDER: None, at this time  Education provided: importance of repositioning, plan of care, wound progress, Infection prevention , Moisture management, Hygiene, and Off-loading pressure  Discussed plan of care with: Patient, Family, and Nurse  WO nurse follow-up plan: weekly  Notify WOC if wound(s) deteriorate.  Nursing to notify the Provider(s) and re-consult the WOC Nurse if new skin concern.    DATA:     Current support surface: Standard  Standard gel mattress (Isoflex)  Containment of urine/stool: Incontinent pad in bed and Indwelling catheter  BMI: Body mass index is 32.55 kg/m .   Active diet order: Orders Placed This Encounter      Combination Diet Regular Diet Adult     Output: I/O last 3 completed shifts:  In: -   Out: 450 [Urine:450]     Labs:   Recent Labs   Lab 10/17/24  1009 10/17/24  0843   ALBUMIN  --  2.6*   HGB  --  8.0*   INR 4.56* 4.37*   WBC  --  8.3     Pressure injury risk assessment:   Sensory Perception: 2-->very limited  Moisture: 2-->very moist  Activity: 1-->bedfast  Mobility: 2-->very limited  Nutrition: 3-->adequate  Friction and Shear: 1-->problem  Roscoe Score: 11    Rose Segundo RN CWCN  Pager no longer is use, please contact through [employer] group: [facility] Home Nurse Melody

## 2024-10-17 NOTE — CONSULTS
Care Management Initial Consult    General Information  Assessment completed with: Patient, Spouse or significant other, Jose Toney  Type of CM/SW Visit: Initial Assessment    Primary Care Provider verified and updated as needed: Yes   Readmission within the last 30 days: current reason for admission unrelated to previous admission   Return Category: Progression of disease  Reason for Consult: other (see comments) (Elevated Risk)  Advance Care Planning: Advance Care Planning Reviewed: present on chart          Communication Assessment  Patient's communication style: spoken language (English or Bilingual)             Cognitive  Cognitive/Neuro/Behavioral: .WDL except  Level of Consciousness: intermittent confusion  Arousal Level: opens eyes spontaneously  Orientation: disoriented to, place  Mood/Behavior: calm, cooperative  Best Language: 0 - No aphasia  Speech: clear, spontaneous    Living Environment:   People in home: significant other  Jose  Current living Arrangements: apartment      Able to return to prior arrangements: yes       Family/Social Support:  Care provided by: self, spouse/significant other  Provides care for: no one, unable/limited ability to care for self  Marital Status:   Support system:   Jose       Description of Support System: Supportive    Support Assessment: Adequate family and caregiver support    Current Resources:   Patient receiving home care services: Yes  Skilled Home Care Services: Skilled Nursing     Community Resources: Home Care, PCA  Equipment currently used at home: raised toilet seat, grab bar, toilet, wheelchair, manual, shower chair, grab bar, tub/shower, other (see comments)  Supplies currently used at home: Compression Stockings, Wound Care Supplies    Employment/Financial:  Employment Status: retired        Financial Concerns: none   Referral to Financial Worker: No       Does the patient's insurance plan have a 3 day qualifying hospital stay waiver?   No    Lifestyle & Psychosocial Needs:  Social Determinants of Health     Food Insecurity: Low Risk  (9/15/2024)    Food Insecurity     Within the past 12 months, did you worry that your food would run out before you got money to buy more?: No     Within the past 12 months, did the food you bought just not last and you didn t have money to get more?: No   Depression: Not at risk (3/15/2024)    PHQ-2     PHQ-2 Score: 0   Housing Stability: Low Risk  (9/15/2024)    Housing Stability     Do you have housing? : Yes     Are you worried about losing your housing?: No   Tobacco Use: Medium Risk (7/1/2024)    Patient History     Smoking Tobacco Use: Former     Smokeless Tobacco Use: Never     Passive Exposure: Not on file   Financial Resource Strain: Low Risk  (9/15/2024)    Financial Resource Strain     Within the past 12 months, have you or your family members you live with been unable to get utilities (heat, electricity) when it was really needed?: No   Alcohol Use: Not on file   Transportation Needs: Low Risk  (9/15/2024)    Transportation Needs     Within the past 12 months, has lack of transportation kept you from medical appointments, getting your medicines, non-medical meetings or appointments, work, or from getting things that you need?: No   Physical Activity: Inactive (6/11/2024)    Exercise Vital Sign     Days of Exercise per Week: 0 days     Minutes of Exercise per Session: 0 min   Interpersonal Safety: Low Risk  (9/26/2024)    Interpersonal Safety     Do you feel physically and emotionally safe where you currently live?: Yes     Within the past 12 months, have you been hit, slapped, kicked or otherwise physically hurt by someone?: No     Within the past 12 months, have you been humiliated or emotionally abused in other ways by your partner or ex-partner?: No   Stress: Not on file   Social Connections: Not on file   Health Literacy: Not on file       Functional Status:  Prior to admission patient needed  "assistance:   Dependent ADLs:: Wheelchair-with assist, Bathing, Dressing, Grooming, Incontinence, Positioning, Transfers  Dependent IADLs:: Cleaning, Cooking, Laundry, Shopping, Medication Management, Transportation, Meal Preparation       Mental Health Status:          Chemical Dependency Status:                Values/Beliefs:  Spiritual, Cultural Beliefs, [employer] Practices, Values that affect care: no               Discussed  Partnership in Safe Discharge Planning  document with patient/family: No    Additional Information:  Per CM consult for elevated risk score chart was reviewed. Patient is a 79 year old male who presented at [facility] with \"supratheraputic INR\". Per H&P patient has history of \"mechanical mitral valve replacement on chronic anticoagulation with warfarin, A-fib, HFpEF, coronary artery disease, hypertension, hyperlipidemia, sacral decubitus ulcers\"     Writer met with patient at bedside, also at bedside was patients significant other Jose. Writer verified patients information on face sheet. Patient is currently bed bound and will transfer to a wheelchair for appointments. Patient reports that they currently receive services through [facility]. They have a nurse that comes once a week and they pay privately for a personal caregiver. Writer spoke about transitional care briefly, patient is not interested in that as he currently is total cares at home. Patient and spouse feel that they have a good system in place and they would like to return home with resumption of home care services.     ADD [id number]  Writer inquired with [facility] care to confirm patient is receiving services. Email back from home care provider stating they had concerns for care being provided to the patient. Writer try to confirm if they will still take the patient on and is waiting to hear back.       Next Steps: Follow for discharge. Home care?     MONICA FUNES [facility]  INPATIENT CARE COORDINATION "

## 2024-10-17 NOTE — ED NOTES
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 INR was also elevated at 8.33 earlier today. Patient here with spouse.  Hx of Hypotension, CHF, Afib (on Warfarin), Valve replacement, Decubitus ulcers with infections, and chronic indwelling Branch (last changed on 10/10).  Focused Assessment:  Pt is oriented but forgetful. Reports no pain in bottom at this time after receiving pain medications. Branch catheter exchanged 10/16/24 with a 16 Malian and 30cc's of water.     Treatments and/or interventions provided: Replacing IV electrolytes, IV, EKG, Pain medications, branch catheter exchanged.   Patient's response to treatments and/or interventions: Pt denies pain at this time.    To be done/followed up on inpatient unit:  Monitor electrolytes and wound care.    Does this patient have any cognitive concerns?: Short term memory loss    Activity level - Baseline/Home:  Total Care  Activity Level - Current:   Total Care    Patient's Preferred language: English   Needed?: No    Isolation: None  Infection: Not Applicable  Patient tested for COVID 19 prior to admission: NO  Bariatric?: No    Vital Signs:   Vitals:    10/16/24 1747   BP: 100/58   Pulse: 52   Resp: 18   Temp: 98.6  F (37  C)   TempSrc: Oral   SpO2: 92%   Weight: 108.9 kg  (240 lb)   Height: 1.829 m (6')       Cardiac Rhythm:     Was the PSS-3 completed:   Yes  What interventions are required if any?               Family Comments: Significant other would like an update on room number  OBS brochure/video discussed/provided to patient/family: N/A              Name of person given brochure if not patient:               Relationship to patient:     For the majority of the shift this patient's behavior was Green.   Behavioral interventions performed were none    ED NURSE PHONE NUMBER: [phone]

## 2024-10-17 NOTE — PHARMACY-ADMISSION MEDICATION HISTORY
Pharmacist Admission Medication History    Admission medication history is complete. The information provided in this note is only as accurate as the sources available at the time of the update.    Information Source(s): Family member, CareEverywhere/SureScripts, and    via in-person    Pertinent Information:     Changes made to PTA medication list:  Added: None  Deleted: None  Changed: None    Allergies reviewed with patient and updates made in EHR: no    Medication History Completed By: Akil Aliheyder, [facility] 10/16/2024 7:33 PM    PTA Med List   Medication Sig Last Dose    acetaminophen (TYLENOL) 500 MG tablet Take 2 tablets (1,000 mg) by mouth nightly as needed for mild pain. Unknown    albuterol (PROAIR HFA/PROVENTIL HFA/VENTOLIN HFA) 108 (90 Base) MCG/ACT inhaler Inhale 1-2 puffs into the lungs every 4 hours as needed for shortness of breath, wheezing or cough. More than a month    calcium carbonate (TUMS) 500 MG chewable tablet Take 1 tablet (500 mg) by mouth 2 times daily.     diclofenac (VOLTAREN) 1 % topical gel Apply 4 g topically 3 times daily as needed for moderate pain.     dutasteride (AVODART) 0.5 MG capsule Take 1 capsule (0.5 mg) by mouth daily. 10/15/2024    HYDROmorphone (DILAUDID) 4 MG tablet Take 0.5 tablets (2 mg) by mouth every 6 hours as needed for severe pain     midodrine (PROAMATINE) 10 MG tablet Take 1 tablet (10 mg) by mouth 3 times daily (with meals). 10/15/2024    morphine 0.1% in intrasite topical gel Apply 2 g topically 2 times daily as needed for pain (to be applied during wound dressing changes). prn    Multiple Vitamins-Minerals (MULTIVITAMIN ADULT) CHEW Take 2 chew tab by mouth daily     NONFORMULARY Take by mouth at bedtime. Marijuana edible at bedtime     nystatin (MYCOSTATIN) 838554 UNIT/GM external powder Apply topically 2 times daily as needed for other Groin folds     pantoprazole (PROTONIX) 40 MG EC tablet Take 1 tablet (40 mg) by mouth 2 times daily (before meals) 10/15/2024     potassium chloride reagan ER (KLOR-CON M20) 20 MEQ CR tablet Take 1 tablet (20 mEq) by mouth daily. Unknown    Probiotic CHEW Take 2 chew tab by mouth daily     torsemide (DEMADEX) 20 MG tablet Take 1 tablet by mouth daily. 10/15/2024    warfarin ANTICOAGULANT (COUMADIN) 3 MG tablet Take 1 tablet (3 mg) on Tuesdays, Thursdays, Saturdays and take 1.5 tablets (4.5 mg) all other days or as directed by the INR clinic (Patient taking differently: As of 10/16/24:  Hold doses on 10/16 and 10/17, then take 1 tablet (3 mg) on Mon and Thu, and 0.5 tablet (1.5 mg) all other days or as directed by the INR clinic) 2-3 days ago

## 2024-10-17 NOTE — TELEPHONE ENCOUNTER
Per patient & son request, [facility] RN followed up with son, Sedrick, this AM to inform of patients situation. Noted he was admitted and is being treated for a couple different things, UTI, Hypokalemia & pressure ulcer on coccyx.    Advised would follow up with son on discharge. Son reported that he spoke with patients spouse, Jose, last night about getting further care outside of the home as patient may need higher level care.   Jose & patient agreeable to this at discharge, per son.     Mee Toledo, RN, BSN, PHN  Anticoagulation Nurse  [phone]

## 2024-10-17 NOTE — PROGRESS NOTES
RECEIVING UNIT ED HANDOFF REVIEW    ED Nurse Handoff Report was reviewed by: Tracy Lamar RN on October 16, 2024 at 10:49 PM

## 2024-10-17 NOTE — PHARMACY-ANTICOAGULATION SERVICE
Clinical Pharmacy - Warfarin Dosing Consult     Pharmacy has been consulted to manage this patient s warfarin therapy.  Indication: Atrial Fibrillation;Mechanical Mitral Valve Replacement  Therapy Goal: INR 2.5-3.5  Warfarin Prior to Admission: Yes  Warfarin PTA Regimen: 10/14: Held dose; 10/15: Held dose. 3 mg every Mon, Thu; 1.5 mg all other days of the week  Recent documented change in oral intake/nutrition: Yes (eating less)    INR   Date Value Ref Range Status   10/17/2024 4.56 (H) 0.85 - 1.15 Final   10/17/2024 4.37 (H) 0.85 - 1.15 Final       Recommend no warfarin  today.  Pharmacy will monitor Feng Wynne daily and order warfarin doses to achieve specified goal.      Please contact pharmacy as soon as possible if the warfarin needs to be held for a procedure or if the warfarin goals change.

## 2024-10-18 NOTE — PROGRESS NOTES
[facility]    Medicine Progress Note - Hospitalist Service    Date of Admission:  10/16/2024    Assessment & Plan   Feng Wynne is a 79 year old male with significant past medical history for mechanical mitral valve replacement on chronic anticoagulation with warfarin, A-fib, HFpEF, coronary artery disease, hypertension, hyperlipidemia, Chronic sacral decubitus ulcers presenting for supratheraputic INR despite holding warfarin since 10/14/24     Nonbleeding supratheraputic INR  Mechanical mitral valve replacement on chronic anticoagulation with warfarin (INR goal 2.5-3.5)   Pt presented to the ED with elevated INRs. His INR was found to be high today at 8.33.  His INR has been slightly trending up over the last 2 weeks and not improving after holding warfarin since 10/14.   * Suspect supratherapeutic INR is 2/2 nutritional deficiencys. Pt appears to be severely malnourished with muscle wasting and cachexia, though does have a lot of redundant skin so does not appear objectively thin. Pt himself reports that he eats really well at home, but does follow a vegetarian diet. His partner however reports that he barely eats due to fear of gaining weight.   * Vit K - was given 1mg IV x 1 10/16/24  * 10/18 INR improved to 3.09   - Resume warfarin dosing per Pharm D   - INR in the am  - Nutrition consultation/discussion as noted below    Hypokalemia  Hypomagnesemia  Hyponatremia  Metabolic Alkalosis  Again suspect 2/2 poor po intake   - Continue Potassium and magnesium replacement protocol   - PTA torsemide and spironolactone on hold - continue to hold today    Moderate malnutrition in the context of chronic illness   Possible disordered eating, anorexia   Hypoalbuminemia  Pt reports eating very well at home and following a vegetarian diet. He does state he only eats 50% of his meals because he doesn't want to gain weight. His partner however reports that he barely eats anything at home again  because he fears gaining weight.   * He does not appear thin necessarily, but clearly has very poor muscle mass and lots of redundant loose skin.   * suspect his INR issues and electrolyte issues are related to poor po intake and malnutrition.   - nutrition consulted  - Discussed nutrition at length. Discussed trying to eat the majority of his meals and not restricting. Discussed the importance of protein for muscle building and wound healing.   - Also discussed trialing an appetite stimulant like remeron to see if this could boost appetite.    - Remeron 7.5mg at bedtime ordered     Generalized Weakness  Bedbound status   Pt is essentially bedbound at baseline. Per chart review and discussion with his partner, he has no organic neurologic disorder that has caused him to be unable to walk. He has just become so generally weak that he cannot get out of bed on his own or walk any longer. His partner assists him with all ADLS. He does have some home cares as well.   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   See WOC notes for photos. Pt has seen a plastic Dr. Srinivasan recently who reported being very hesitant to proceed with any intraoperative debridement or definitive soft tissue flap coverage due to patients overall frail and debiliated state.   - No indication for abx based on lack of infection signs from the ulcer  - WOC following   - Again disucssed importance of nutrition on wound healing and strength building      Chronic Afib  HFpEF  Chronic LE edema  CAD  HTN  HLD   Chronic Hypotension  - TTE showed EF 50-55 in 9/2024  - resumed midodrine tid  - Holding PTA diuretics, as above     Chronic indwelling branch catheter   Enterococcus Faecalis + urine culture  UA was abnormal on admission Concern for possible UTI  No symptoms or other signs/symptoms of infection  Urine culture positive for E. Fecalis, ? Colonization   * pt received 1 dose of IV ceftriaxone on 10/16   - Will consult ID for abx recommendations        Suspected Dementia   Pt seems quite confused on my examination. Very poor insight into chronic health issues. His partner reports that he is quite forgetful and does have some confusion at baseline, so this is not new. He has never been diagnosed with dementia in the past.   * Previous SLUM score is 23-25   - OT consult for repeat cognitive eval   - Recommend full outpatient eval for dementia         Chronic normocytic anemia, at baseline Hb 8  - continue to monitor     BPH  - avodart resumed    Large Ventral Hernia   Noted, soft, non-painful         Diet: Combination Diet Regular Diet Adult  Room Service  Snacks/Supplements Adult: Other; L: pineapple GelateinPLUS,  D: van Magic Cup; With Meals    DVT Prophylaxis: Warfarin  Stubbs Catheter: PRESENT, indication:    Lines: None     Cardiac Monitoring: None  Code Status: Full Code      Clinically Significant Risk Factors        # Hypokalemia: Lowest K = 1.9 mmol/L in last 2 days, will replace as needed  # Hyponatremia: Lowest Na = 130 mmol/L in last 2 days, will monitor as appropriate  # Hypochloremia: Lowest Cl = 85 mmol/L in last 2 days, will monitor as appropriate    # Hypomagnesemia: Lowest Mg = 1.5 mg/dL in last 2 days, will replace as needed   # Hypoalbuminemia: Lowest albumin = 2.4 g/dL at 10/16/2024  6:25 PM, will monitor as appropriate  # Coagulation Defect: INR = 3.09 (Ref range: 0.85 - 1.15) and/or PTT = N/A, will monitor for bleeding    # Hypertension: Noted on problem list  # Chronic heart failure with preserved ejection fraction: heart failure noted on problem list and last echo with EF >50%          # Obesity: Estimated body mass index is 32.55 kg/m  as calculated from the following:    Height as of this encounter: 1.829 m (6').    Weight as of this encounter: 108.9 kg (240 lb)., PRESENT ON ADMISSION  # Moderate Malnutrition: based on nutrition assessment, PRESENT ON ADMISSION   # Financial/Environmental Concerns: none         Disposition Plan      Medically Ready for Discharge: Anticipated in 2-4 Days      Yolanda Roach MD  Hospitalist Service  [facility]  Securely message with Ravi (more info)  Text page via [email or web address] Paging/Directory   ______________________________________________________________________    Interval History   NAEO. Pt reports feeling ok today. He denies pain or shortness of breath. Spent most of the visit discussion nutrition and dietary preferences and how that is likely playing into the high INR, Low electrolytes and poor wound healing, weakness.     Physical Exam   Vital Signs: Temp: 98  F (36.7  C) Temp src: Oral BP: 94/53 Pulse: 75   Resp: 16 SpO2: 99 % O2 Device: None (Room air)    Weight: 240 lbs 0 oz    Constitutional: Awake, alert, cooperative, no apparent distress.  Eyes: Conjunctiva and pupils examined and normal.  HEENT: Moist mucous membranes, normal dentition.  Respiratory: Clear to auscultation bilaterally, no crackles or wheezing.  Cardiovascular: Regular rate and rhythm, mechanical valve   GI: Soft, non-distended, non-tender, normal bowel sounds. Large ventral hernia. Non tender, soft.   Skin: No rashes, no cyanosis, no edema. Redundant lose skin   Musculoskeletal: Muscle wasting noted. No joint swelling, erythema or tenderness.  Neurologic: Cranial nerves 2-12 intact, normal strength and sensation.  Psychiatric: Alert, oriented to person, place and time, but confused and forgetful no obvious anxiety or depression.    Medical Decision Making       60 MINUTES SPENT BY ME on the date of service doing chart review, history, exam, documentation & further activities per the note.      Data     I have personally reviewed the following data over the past 24 hrs:    N/A  \   N/A   / N/A     131 (L) 88 (L) 31.6 (H) /  97   3.6; 3.7 36 (H) 0.66 (L) \     INR:  3.09 (H) PTT:  N/A   D-dimer:  N/A Fibrinogen:  N/A       Imaging results reviewed over the past 24 hrs:   No results found for this or any  previous visit (from the past 24 hour(s)).

## 2024-10-18 NOTE — PROGRESS NOTES
Orientation: A/Ox3-4. Forgetful.   Aggression Stop Light: green  Activity: A2-3 lift. Q2h turn/repo as pt allows.  Frequently refuses.  Diet/BS Checks: regular, tray set up. Good appetite. Whole pill 1 at a time with water.   Tele:  N/A  IV Access/Drains: PIV SL patent.   Pain Management: Denies pain at rest. Pain when turn/repo at the back. Refuses pain meds.   Abnormal VS/Results: VSS on RA.   Bowel/Bladder: Incont. Chronic Stubbs in place. No BM.   Skin/Wounds: scattered bruising. A few Mepilex to back CDI. Stage 5 PI golf ball size hollow to coccyx/sacrum - wound cleansed done. WOC RN assessed and did wound care on previous shift.   Consults: PT, OT, WOC, CM, nutrition.  D/C Disposition: pending.   Other Info:      - On High replacement protocol for K and Mg. Replaced K x1 (with 5 IV bags), ordered recheck of K @ 22:30

## 2024-10-19 NOTE — CONSULTS
[facility]    Infectious Disease Consultation     Date of Admission:  10/16/2024  Date of Consult (When I saw the patient): 10/19/24    Assessment & Plan   Feng Wynne is a 79 year old male who was admitted on 10/16/2024.     Impression: 1 79-year-old male admitted with somewhat vague symptoms and elevated INR without clear bleeding  2 multiple chronic medical problems including general failure to thrive, major ongoing sacral decubitus wound with probable osteomyelitis but not clear or major infection symptoms  3 chronic Stubbs catheter in place, current, current UA with abnormality and urine culture growing Enterococcus by definition this is asymptomatic bacteriuria no obvious associated infection and blood cultures negative  4 mitral valve replacement  5 malnutrition  6 penicillin allergy  7 recent Yersinia and enteropathogenic E. coli infection  8 chronic and recurrent anemia    REc 1 would not treat the catheter associated Enterococcus, not entirely benign especially with mitral valve but treatment not indicated  2 major chronic sacral decubitus wound plastic surgery is seen no plans for major intervention, has presumptive osteomyelitis and is actually gotten quite ill with it earlier this year but nonetheless antibiotics not indicated for that either        Jeremiah Bourne MD    Reason for Consult   Reason for consult: I was asked to evaluate this patient for UTI.    Primary Care Physician   Jayme Deng MD    Chief Complaint   Leg weakness    History is obtained from the patient and medical records    History of Present Illness   Feng Wynne is a 79 year old male who presents with primarily elevated INR without major bleeding.  Had some vague symptoms but not obvious infection symptoms.  Admission urine was abnormal with a catheter in place urine cultures growing Enterococcus.  Blood culture was done and is negative he is not really having ongoing sepsis symptoms or  obvious signs of infection.  Of note patient has a major chronic sacral decubitus wound is that included significant infection earlier this year that time he is mildly septic but not bacteremic we gave an extended oral course of antibiotics and has not needed antibiotics for that since.  He has been seen by plastic surgery and there is no plans for any major surgical intervention and the wound seems stable and not actively infected    Past Medical History   I have reviewed this patient's medical history and updated it with pertinent information if needed.   Past Medical History:   Diagnosis Date    Acute on chronic congestive heart failure, unspecified heart failure type (H) 01/30/2024    Arthritis     Atrial fibrillation (H)     Coronary artery disease     Hypercholesteremia     Obesity     Unspecified essential hypertension        Past Surgical History   I have reviewed this patient's surgical history and updated it with pertinent information if needed.  Past Surgical History:   Procedure Laterality Date    COLONOSCOPY N/A 1/15/2024    Procedure: Colonoscopy;  Surgeon: Osbaldo Olvera MD;  Location:  GI    ESOPHAGOSCOPY, GASTROSCOPY, DUODENOSCOPY (EGD), COMBINED N/A 1/15/2024    Procedure: Esophagoscopy, gastroscopy, duodenoscopy (EGD), combined;  Surgeon: Osbaldo Olvera MD;  Location:  GI    ESOPHAGOSCOPY, GASTROSCOPY, DUODENOSCOPY (EGD), COMBINED N/A 2/8/2024    Procedure: Esophagoscopy, gastroscopy, duodenoscopy (EGD), combined;  Surgeon: Osbaldo Olvera MD;  Location:  GI    IRRIGATION AND DEBRIDEMENT SACRAL WOUND, COMBINED N/A 3/22/2024    Procedure: IRRIGATION AND DEBRIDEMENT, WOUND, SACRAL REGION;  Surgeon: Phill Jimenez MD;  Location: [facility] OR    MITRAL VALVE REPLACEMENT  8-    Mitral valve replacement with 31-mm [employer]. Raffi mechanical valve.        Prior to Admission Medications   Prior to Admission Medications   Prescriptions Last Dose Informant Patient Reported?  Taking?   HYDROmorphone (DILAUDID) 4 MG tablet  Self No Yes   Sig: Take 0.5 tablets (2 mg) by mouth every 6 hours as needed for severe pain   Multiple Vitamins-Minerals (MULTIVITAMIN ADULT) CHEW  Self Yes Yes   Sig: Take 2 chew tab by mouth daily   NONFORMULARY   Yes Yes   Sig: Take by mouth at bedtime. Marijuana edible at bedtime   Probiotic CHEW  Self Yes Yes   Sig: Take 2 chew tab by mouth daily   acetaminophen (TYLENOL) 500 MG tablet Unknown  No Yes   Sig: Take 2 tablets (1,000 mg) by mouth nightly as needed for mild pain.   albuterol (PROAIR HFA/PROVENTIL HFA/VENTOLIN HFA) 108 (90 Base) MCG/ACT inhaler More than a month  No Yes   Sig: Inhale 1-2 puffs into the lungs every 4 hours as needed for shortness of breath, wheezing or cough.   bisacodyl (DULCOLAX) 10 MG suppository Not Taking  No No   Sig: Place 1 suppository (10 mg) rectally daily as needed for constipation.   Patient not taking: Reported on 10/16/2024   calcium carbonate (TUMS) 500 MG chewable tablet   No Yes   Sig: Take 1 tablet (500 mg) by mouth 2 times daily.   diclofenac (VOLTAREN) 1 % topical gel   No Yes   Sig: Apply 4 g topically 3 times daily as needed for moderate pain.   dutasteride (AVODART) 0.5 MG capsule 10/15/2024  No Yes   Sig: Take 1 capsule (0.5 mg) by mouth daily.   ferrous sulfate (FEROSUL) 325 (65 Fe) MG tablet Not Taking Self No No   Sig: Take 1 tablet (325 mg) by mouth daily (with breakfast)   Patient not taking: Reported on 10/16/2024   lidocaine (XYLOCAINE) 4 % external solution Not Taking  No No   Sig: Apply topically 2 times daily as needed for moderate pain (dressing change).   Patient not taking: Reported on 10/16/2024   midodrine (PROAMATINE) 10 MG tablet 10/15/2024  No Yes   Sig: Take 1 tablet (10 mg) by mouth 3 times daily (with meals).   morphine 0.1% in intrasite topical gel prn  No Yes   Sig: Apply 2 g topically 2 times daily as needed for pain (to be applied during wound dressing changes).   nystatin (MYCOSTATIN)  037265 UNIT/GM external powder  Nursing Home, Self Yes Yes   Sig: Apply topically 2 times daily as needed for other Groin folds   order for DME  Self No No   Sig: Equipment being ordered: COMPRESSION STOCKINGS, 20-30 mmHg   pantoprazole (PROTONIX) 40 MG EC tablet 10/15/2024 Self No Yes   Sig: Take 1 tablet (40 mg) by mouth 2 times daily (before meals)   polyethylene glycol (MIRALAX) 17 GM/Dose powder Not Taking  No No   Sig: Take 17 g by mouth daily.   Patient not taking: Reported on 10/16/2024   potassium chloride reagan ER (KLOR-CON M20) 20 MEQ CR tablet Unknown  No Yes   Sig: Take 1 tablet (20 mEq) by mouth daily.   torsemide (DEMADEX) 20 MG tablet 10/15/2024  No Yes   Sig: Take 1 tablet by mouth daily.   warfarin ANTICOAGULANT (COUMADIN) 3 MG tablet 2-3 days ago  No Yes   Sig: Take 1 tablet (3 mg) on Tuesdays, Thursdays, Saturdays and take 1.5 tablets (4.5 mg) all other days or as directed by the INR clinic   Patient taking differently: As of 10/16/24:  Hold doses on 10/16 and 10/17, then take 1 tablet (3 mg) on Mon and Thu, and 0.5 tablet (1.5 mg) all other days or as directed by the INR clinic      Facility-Administered Medications: None     Allergies   Allergies   Allergen Reactions    Amiodarone Shortness Of Breath    Pcn [Penicillins] Shortness Of Breath     Rxn occurred in childhood     Statins Muscle Pain (Myalgia) and Hives    Latex     Zetia [Ezetimibe] Muscle Pain (Myalgia)     Muscle weakness legs       Immunization History   Immunization History   Administered Date(s) Administered    COVID-19 12+ (Pfizer) 02/23/2021, 10/24/2023    COVID-19 Bivalent 12+ (Pfizer) 10/31/2022    COVID-19 MONOVALENT 12+ (Pfizer) 02/02/2021, 02/23/2021, 10/16/2021    Influenza (H1N1) 12/07/2009    Influenza (High Dose) Trivalent,PF (Fluzone) 11/17/2014, 11/09/2015, 10/04/2016, 09/28/2017, 10/01/2018, 10/11/2019, 10/14/2022, 09/21/2023    Influenza (IIV3) PF 11/12/2007, 10/20/2008, 09/25/2009, 10/04/2010, 10/21/2011,  11/02/2012, 09/23/2013    Influenza Vaccine 65+ (FLUAD) 09/26/2020, 09/15/2021, 11/02/2022, 09/21/2023    Influenza Vaccine 65+ (Fluzone HD) 10/10/2020    Pneumo Conj 13-V (2010&after) 09/14/2015    Pneumococcal 20 valent Conjugate (Prevnar 20) 03/01/2024    Pneumococcal 23 valent 04/08/2013    TDAP (Adacel,Boostrix) 03/01/2024    TDAP Vaccine (Adacel) 04/08/2013    Zoster recombinant adjuvanted (SHINGRIX) 05/23/2023, 09/07/2023    Zoster vaccine, live 04/08/2013       Social History   I have reviewed this patient's social history and updated it with pertinent information if needed. Feng MADIE Wynne  reports that he quit smoking about 26 years ago. His smoking use included cigarettes. He started smoking about 31 years ago. He has a 2.5 pack-year smoking history. He has never used smokeless tobacco. He reports current alcohol use. He reports that he does not use drugs.    Family History   I have reviewed this patient's family history and updated it with pertinent information if needed.   Family History   Problem Relation Age of Onset    Cerebrovascular Disease Father     Diabetes Paternal Grandmother     C.A.D. Brother         CABG X 3 at age 51       Review of Systems   The 10 point Review of Systems is negative for any major infection type symptoms    Physical Exam   Temp: 98.6  F (37  C) Temp src: Axillary BP: 92/56 Pulse: 78   Resp: 16 SpO2: 96 % O2 Device: None (Room air)    Vital Signs with Ranges  Temp:  [97.9  F (36.6  C)-98.6  F (37  C)] 98.6  F (37  C)  Pulse:  [63-78] 78  Resp:  [16-18] 16  BP: ()/(56-70) 92/56  SpO2:  [96 %-98 %] 96 %  240 lbs 0 oz  Body mass index is 32.55 kg/m .    GENERAL APPEARANCE:  awake  EYES: Eyes grossly normal to inspection  NECK: no adenopathy  RESP: lungs clear   CV: regular rates and rhythm  LYMPHATICS: normal ant/post cervical and supraclavicular nodes  ABDOMEN: soft, nontender  MS: extremities normal  SKIN: no suspicious lesions or rashes  Wound pictures seen,  "significant ongoing sacral decubitus wound      Data   All laboratory and imaging data in the past 24 hours reviewed  No results for input(s): \"CULT\" in the last 168 hours.  No lab results found.    Invalid input(s): \"UC\"       All cultures:  Recent Labs   Lab 10/16/24  2215 10/16/24  2040   CULTURE No growth after 2 days >100,000 CFU/mL Enterococcus faecalis*      Blood culture:  Results for orders placed or performed during the hospital encounter of 10/16/24   Blood Culture Arm, Right    Specimen: Arm, Right; Blood   Result Value Ref Range    Culture No growth after 2 days    Results for orders placed or performed in visit on 05/10/24   Blood Culture Peripheral Blood    Specimen: Peripheral Blood   Result Value Ref Range    Culture No Growth    Results for orders placed or performed in visit on 05/10/24   Blood Culture Hand, Right    Specimen: Hand, Right; Blood   Result Value Ref Range    Culture No Growth    Results for orders placed or performed during the hospital encounter of 03/18/24   Blood Culture Hand, Left    Specimen: Hand, Left; Blood   Result Value Ref Range    Culture No Growth    Blood Culture Arm, Left    Specimen: Arm, Left; Blood   Result Value Ref Range    Culture No Growth      *Note: Due to a large number of results and/or encounters for the requested time period, some results have not been displayed. A complete set of results can be found in Results Review.      Urine culture:  Results for orders placed or performed during the hospital encounter of 10/16/24   Urine Culture    Specimen: Urine, Catheter   Result Value Ref Range    Culture >100,000 CFU/mL Enterococcus faecalis (A)        Susceptibility    Enterococcus faecalis - VARGHESE     Ampicillin <=2 Susceptible ug/mL     Vancomycin 1 Susceptible ug/mL     Nitrofurantoin <=16 Susceptible ug/mL   Results for orders placed or performed during the hospital encounter of 09/09/24   Urine Culture    Specimen: Urine, Stubbs Catheter   Result Value Ref " Range    Culture No Growth    Urine Culture    Specimen: Urine, Catheter   Result Value Ref Range    Culture No Growth    Results for orders placed or performed in visit on 05/08/24   Urine Culture    Specimen: Urine, Clean Catch   Result Value Ref Range    Culture <10,000 CFU/mL Mixture of Urogenital Anna    Results for orders placed or performed during the hospital encounter of 01/09/24   Urine Culture    Specimen: Urine, Stubbs Catheter   Result Value Ref Range    Culture >100,000 CFU/mL Escherichia coli (A)        Susceptibility    Escherichia coli - VARGHESE     Ampicillin >=32 Resistant ug/mL     Ampicillin/ Sulbactam 4 Susceptible ug/mL     Piperacillin/Tazobactam <=4 Susceptible ug/mL     Cefazolin* <=4 Susceptible ug/mL      * Cefazolin VARGHESE breakpoints are for the treatment of uncomplicated urinary tract infections. For the treatment of systemic infections, please contact the laboratory for additional testing.     Cefoxitin <=4 Susceptible ug/mL     Ceftazidime <=1 Susceptible ug/mL     Ceftriaxone <=1 Susceptible ug/mL     Cefepime <=1 Susceptible ug/mL     Gentamicin <=1 Susceptible ug/mL     Tobramycin <=1 Susceptible ug/mL     Ciprofloxacin <=0.25 Susceptible ug/mL     Levofloxacin <=0.12 Susceptible ug/mL     Nitrofurantoin <=16 Susceptible ug/mL     Trimethoprim/Sulfamethoxazole >16/304 Resistant ug/mL     *Note: Due to a large number of results and/or encounters for the requested time period, some results have not been displayed. A complete set of results can be found in Results Review.

## 2024-10-19 NOTE — PROGRESS NOTES
[facility]    Medicine Progress Note - Hospitalist Service    Date of Admission:  10/16/2024    Assessment & Plan   Feng Wynne is a 79 year old male with significant past medical history for mechanical mitral valve replacement on chronic anticoagulation with warfarin, A-fib, HFpEF, coronary artery disease, hypertension, hyperlipidemia, Chronic sacral decubitus non-healing wound presenting for supratheraputic INR despite holding warfarin since 10/14/24    Nonbleeding supratheraputic INR  Mechanical mitral valve replacement on chronic anticoagulation with warfarin (INR goal 2.5-3.5)   Pt presented to the ED with elevated INRs. His INR was found to be high today at 8.33.  His INR has been slightly trending up over the last 2 weeks and not improving after holding warfarin since 10/14.   * Suspect supratherapeutic INR is 2/2 nutritional deficiencys. Pt appears to be severely malnourished with muscle wasting and cachexia, though does have a lot of redundant skin so does not appear objectively thin. Pt himself reports that he eats really well at home, but does follow a vegetarian diet. His partner however reports that he barely eats due to fear of gaining weight.   * Vit K - was given 1mg IV x 1 10/16/24  * 10/18 INR improved to 3.09   - Resume warfarin dosing per Pharm D   - INR in the am  - Nutrition consultation/discussion as noted below    Hypokalemia  Hypomagnesemia  Hyponatremia  Metabolic Alkalosis  Again suspect 2/2 poor po intake   - Continue Potassium and magnesium replacement protocol   - PTA torsemide and spironolactone on hold - continue to hold today    Moderate malnutrition in the context of chronic illness   Possible disordered eating, anorexia   Hypoalbuminemia  Pt reports eating very well at home and following a vegetarian diet. He does state he only eats 50% of his meals because he doesn't want to gain weight. His partner however reports that he barely eats anything at home  again because he fears gaining weight.   * He does not appear thin necessarily, but clearly has very poor muscle mass and lots of redundant loose skin.   * suspect his INR issues and electrolyte issues are related to poor po intake and malnutrition.   - nutrition consulted  - Discussed nutrition at length. Discussed trying to eat the majority of his meals and not restricting. Discussed the importance of protein for muscle building and wound healing.   - Also discussed trialing an appetite stimulant like remeron to see if this could boost appetite.    - Remeron 7.5mg at bedtime ordered on 10/18    Generalized Weakness  Bedbound status   Pt is essentially bedbound at baseline. Per chart review and discussion with his partner, he has no organic neurologic disorder that has caused him to be unable to walk. He has just become so generally weak that he cannot get out of bed on his own or walk any longer. His partner assists him with all ADLS. He does have some home cares as well.   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   See WOC notes for photos. Pt has seen a plastic surgeon Dr. Srinivasan recently who reported being very hesitant to proceed with any intraoperative debridement or definitive soft tissue flap coverage due to patients overall frail and debiliated state.   - No indication for abx based on lack of infection signs from the ulcer  - WOC following   - Again disucssed importance of nutrition on wound healing and strength building     * Apparently pt has [facility] home services who will not take the patient back. There are concerns about the care that is being provided to the patient. And actually may have filed a [facility] report. At this time, the patient's partner Jose is providing much of the care, but is clearly getting burnt out having difficulties providing all of the cares that the patient needs. Pt has no acute rehab needs. I briefly brought up the possibility of long term care, but pt seems quite  resistant. Will continue talks with Jose and the patient's son regarding dispo.      Chronic Afib  HFpEF  Chronic LE edema  CAD  HTN  HLD   Chronic Hypotension  - TTE showed EF 50-55 in 9/2024  - resumed midodrine tid  - Holding PTA diuretics, as above     Chronic indwelling branch catheter   Enterococcus Faecalis + urine culture  UA was abnormal on admission Concern for possible UTI  No symptoms or other signs/symptoms of infection  Urine culture positive for E. Fecalis, ? Colonization   * pt received 1 dose of IV ceftriaxone on 10/16   - Will consult ID for abx recommendations       Suspected Dementia/cognitive impairment  Pt seems quite confused on my examination. Very poor insight into chronic health issues. His partner reports that he is quite forgetful and does have some confusion at baseline, so this is not new. He has never been diagnosed with dementia in the past.   * 10/19 pt can't remember what he had for breakfast, tries to dodge the question.   * Previous SLUM score is 23-25   - OT consult for repeat cognitive eval   - Recommend full outpatient eval for dementia      Chronic normocytic anemia, at baseline Hb 8  - continue to monitor     BPH  - avodart resumed    Large Ventral Hernia   Noted, soft, non-painful         Diet: Combination Diet Regular Diet Adult  Room Service  Snacks/Supplements Adult: Other; L: pineapple GelateinPLUS,  D: van Magic Cup; With Meals    DVT Prophylaxis: Warfarin  Branch Catheter: PRESENT, indication:    Lines: None     Cardiac Monitoring: None  Code Status: Full Code      Clinically Significant Risk Factors        # Hypokalemia: Lowest K = 3 mmol/L in last 2 days, will replace as needed  # Hyponatremia: Lowest Na = 129 mmol/L in last 2 days, will monitor as appropriate  # Hypochloremia: Lowest Cl = 88 mmol/L in last 2 days, will monitor as appropriate      # Hypoalbuminemia: Lowest albumin = 2.4 g/dL at 10/16/2024  6:25 PM, will monitor as appropriate       # Hypertension:  Noted on problem list  # Chronic heart failure with preserved ejection fraction: heart failure noted on problem list and last echo with EF >50%          # Obesity: Estimated body mass index is 32.55 kg/m  as calculated from the following:    Height as of this encounter: 1.829 m (6').    Weight as of this encounter: 108.9 kg (240 lb)., PRESENT ON ADMISSION  # Moderate Malnutrition: based on nutrition assessment, PRESENT ON ADMISSION     # Financial/Environmental Concerns: none         Disposition Plan     Medically Ready for Discharge: Anticipated in 2-4 Days      Yolanda Roach MD  Hospitalist Service  [facility]  Securely message with [employer] (more info)  Text page via [facility] Paging/Directory   ______________________________________________________________________    Interval History   NAEO. Pt reports feeling ok today. He denies pain or shortness of breath. Having pain related to the wound with turns. Reports that he ate most of his breakfast but then couldn't actually tell me what he had for breakfast.     Physical Exam   Vital Signs: Temp: 98.6  F (37  C) Temp src: Axillary BP: 91/58 Pulse: 78   Resp: 16 SpO2: 96 % O2 Device: None (Room air)    Weight: 240 lbs 0 oz    Constitutional: Awake, alert, cooperative, no apparent distress.  Eyes: Conjunctiva and pupils examined and normal.  HEENT: Moist mucous membranes, normal dentition.  Respiratory: Clear to auscultation bilaterally, no crackles or wheezing.  Cardiovascular: Regular rate and rhythm, mechanical valve   GI: Soft, non-distended, non-tender, normal bowel sounds. Large ventral hernia. Non tender, soft.   Skin: No rashes, no cyanosis, no edema. Redundant lose skin   Musculoskeletal: Muscle wasting noted. No joint swelling, erythema or tenderness.  Neurologic: Cranial nerves 2-12 intact, normal strength and sensation.  Psychiatric: Alert, oriented to person, place and time, but confused and forgetful no obvious anxiety or  depression.    Medical Decision Making       60 MINUTES SPENT BY ME on the date of service doing chart review, history, exam, documentation & further activities per the note.      Data     I have personally reviewed the following data over the past 24 hrs:    7.2  \   7.8 (L)   / 333     129 (L) 88 (L) 25.6 (H) /  86   3.0 (L) 33 (H) 0.57 (L) \     INR:  3.10 (H) PTT:  N/A   D-dimer:  N/A Fibrinogen:  N/A       Imaging results reviewed over the past 24 hrs:   No results found for this or any previous visit (from the past 24 hour(s)).

## 2024-10-19 NOTE — PROGRESS NOTES
OT: Attempted cognitive evaluation per MD orders.  Pt undergoing nursing cares and finishing up with nursing.  Pt awake but fatigued, declined any activity stating he just wanted to sleep.  Was able to spend some time with pt's partner Jose.  Jose provided some background and understanding of family situation.  Stating pt's son Sedrick is flying in tomorrow and will be addressing future cares.  Jose stated Sedrick had medical power for cares.  Jose stating he has had to have increased personal care attendants to come in to help with daily cares.  Had been having someone come in 2x a week for four hours a day, had switched this to seven days for four hours.  Stated that Feng did try to follow some commands to help with cares but was so weak he could not really help.  Stated that Feng was against moving out of his apartment but that it was just too much work to try to provide the care that was needed.  This resistance from Feng about moving out of his apartment is similar to his reluctance to change his setting and care from when I saw him for OT 1-2 years ago at another [facility] admission.  Jose was in tears at times over this situation, appears overwhelmed and not able to carry on with daily cares at this time.  Provided support and reassurance to Jose about his efforts for the care of Feng.  Will try one more time for a cognitive eval on 10/20.  Will try early afternoon with Jose in attendance as this might help with Feng's cooperation for the evaluation.

## 2024-10-19 NOTE — PROGRESS NOTES
By definition has asymptomatic Enterococcus bacteruria with a catheter in place, no indication for antibiotic treatment, full note to follow

## 2024-10-20 NOTE — PROGRESS NOTES
Occupational Therapy Discharge Summary    Reason for therapy discharge:    No further expectations of functional progress.  Not able to participate meaningfully on any cognitive testing or orientation items.    Progress towards therapy goal(s). See goals on Care Plan in [facility] electronic health record for goal details.  Not able to participate meaningfully enough to set goals at this time.    Therapy recommendation(s):    Pt is minimally verbally responsive and not answering most questions.  Often flipping topics, providing partial responses, or perseverating on a question or a piece of information.  Talked to spouse Jose, who is pt's main caregiver.  Pt currently needing 24/7 care by Jose and home services.  Jose is becoming overwhelmed by the demands of this care and has stated to me he cannot do it any longer.  Feels that pt needs nursing home care.  Would recommend discharge to a place that could provide 24/7 cares.  Some attempts may be able to be made in this setting to see if pt will become more oriented and help caregivers with his cares, but so far pt has not demonstrated the willingness and ability to do so.  Does not have any current OT restorative needs or ability to participate with OT in restorative goals at this time.

## 2024-10-20 NOTE — PROGRESS NOTES
[facility]  Infectious Disease Progress Note          Assessment and Plan:   Date of Admission:  10/16/2024  Date of Consult (When I saw the patient): 10/19/24        Assessment & Plan  Feng Wynne is a 79 year old male who was admitted on 10/16/2024.      Impression: 1 79-year-old male admitted with somewhat vague symptoms and elevated INR without clear bleeding  2 multiple chronic medical problems including general failure to thrive, major ongoing sacral decubitus wound with probable osteomyelitis but not clear or major infection symptoms  3 chronic Stubbs catheter in place, current, current UA with abnormality and urine culture growing Enterococcus by definition this is asymptomatic bacteriuria no obvious associated infection and blood cultures negative  4 mitral valve replacement  5 malnutrition  6 penicillin allergy  7 recent Yersinia and enteropathogenic E. coli infection  8 chronic and recurrent anemia     REc 1 would not treat the catheter associated Enterococcus, not entirely benign especially with mitral valve but treatment not indicated  2 major chronic sacral decubitus wound plastic surgery is seen no plans for major intervention, has presumptive osteomyelitis and is actually gotten quite ill with it earlier this year but nonetheless antibiotics not indicated for that either  3 no sepsis, fever or suggestion of symptomatic infection, will sign off call if issues           Interval History:     no new complaints a looks about the same, primarily chronically ill looking              Medications:     Current Facility-Administered Medications   Medication Dose Route Frequency Provider Last Rate Last Admin    dutasteride (AVODART) capsule 0.5 mg  0.5 mg Oral Daily Jose Chinchilla MD   0.5 mg at 10/20/24 0800    midodrine (PROAMATINE) tablet 10 mg  10 mg Oral TID w/meals Jose Chinchilla MD   10 mg at 10/20/24 1251    mirtazapine (REMERON) tablet TABS 7.5 mg  7.5 mg Oral At  "Bedtime Yolanda Roach MD   7.5 mg at 10/18/24 2120    multivitamin w/minerals (THERA-VIT-M) tablet 1 tablet  1 tablet Oral Daily Jose Chinchilla MD   1 tablet at 10/20/24 0800    pantoprazole (PROTONIX) EC tablet 40 mg  40 mg Oral BID AC Jose Chinchilla MD   40 mg at 10/20/24 0800    sodium chloride (PF) 0.9% PF flush 3 mL  3 mL Intracatheter Q8H Jose Chinchilla MD   3 mL at 10/20/24 0800    warfarin ANTICOAGULANT (COUMADIN) half-tab 1.5 mg  1.5 mg Oral ONCE at 18:00 Jose Chinchilla MD        Warfarin Dose Required Daily - Pharmacist Managed  1 each Oral See Admin Instructions Florencia Mcneill, DO                      Physical Exam:   Blood pressure 96/62, pulse 84, temperature 98.8  F (37.1  C), temperature source Oral, resp. rate 16, height 1.829 m (6'), weight 108.9 kg (240 lb), SpO2 96%.  Wt Readings from Last 2 Encounters:   10/16/24 108.9 kg (240 lb)   09/15/24 94 kg (207 lb 3.7 oz)     Vital Signs with Ranges  Temp:  [98.8  F (37.1  C)-99.3  F (37.4  C)] 98.8  F (37.1  C)  Pulse:  [71-84] 84  Resp:  [16] 16  BP: ()/(56-62) 96/62  SpO2:  [95 %-97 %] 96 %    Constitutional: Awake, alert, cooperative, no apparent distress looks about the same     Lungs: Clear to auscultation bilaterally, no crackles or wheezing   Cardiovascular: Regular rate and rhythm, normal S1 and S2, and no murmur noted   Abdomen: Normal bowel sounds, soft, non-distended, non-tender   Skin: No rashes, no cyanosis, no edema   Other: Wound covered          Data:   All microbiology laboratory data reviewed.  Recent Labs   Lab Test 10/19/24  0847 10/17/24  0843 10/16/24  1825   WBC 7.2 8.3 8.8   HGB 7.8* 8.0* 8.0*   HCT 25.2* 26.6* 25.7*   MCV 97 97 95    337 310     Recent Labs   Lab Test 10/19/24  0847 10/18/24  0528 10/17/24  0843   CR 0.57* 0.66* 0.76     Recent Labs   Lab Test 03/18/24  1748   SED >140*     No lab results found.    Invalid input(s): \"UC\"     "

## 2024-10-20 NOTE — PROGRESS NOTES
10/20/24 1356   Appointment Info   Signing Clinician's Name / Credentials (OT) Michael Guajardo EdD, OTR/L   Rehab Comments (OT) initial evaluation   Living Environment   People in Home spouse   Current Living Arrangements apartment   Transportation Anticipated family or friend will provide   Living Environment Comments per chart notes pt's son is flying in to address discussion of pt's ongoing care.   Self-Care   Usual Activity Tolerance poor   Current Activity Tolerance poor   Regular Exercise No   Equipment Currently Used at Home raised toilet seat;grab bar, toilet;wheelchair, manual;shower chair;grab bar, tub/shower;other (see comments)   Fall history within last six months no   Activity/Exercise/Self-Care Comment Pt is currently bedbound.  Partner Jose has been helping with all cares along with home therapy.  Pt has EZ stand at home which he is using for any OOB activity.  Furture status of home care services not clear at this time.   General Information   Onset of Illness/Injury or Date of Surgery 10/18/24   Referring Physician Yolanda Roach   Patient/Family Therapy Goal Statement (OT) Pt has not stated a goal.  Pt's partner Jose has said to this writer that he cannont care for pt at this time, feels that pt needs nursing home placement.   Additional Occupational Profile Info/Pertinent History of Current Problem Feng Wynne is a 79 year old male with significant past medical history for mechanical mitral valve replacement on chronic anticoagulation with warfarin, A-fib, HFpEF, coronary artery disease, hypertension, hyperlipidemia, sacral decubitus ulcers presenting for supratheraputic INR despite holding warfarin since 10/14/24.  Evaluation ordered for a cognitive evaluation.   Existing Precautions/Restrictions fall   General Observations and Info pt laying in bed, flor Garcia present for part of session.  Notably upset as attempted to do evaluation, left the room part way through the session.  "  Cognitive Status Examination   Orientation Status person   Affect/Mental Status (Cognitive) confabulatory;flat/blunted affect   Follows Commands follows one-step commands;0-24% accuracy   Safety Deficit severe deficit;judgment;insight into deficits/self-awareness   Memory Deficit severe deficit;short-term memory;recall, recent events   Attention Deficit perseverates on recent task;perseverates on recent conversation   Executive Function Deficit judgment;insight/awareness of deficits;moderate deficit   Cognitive Status Comments See notes on attempted cogntive screening.   Cognitive Screens/Assessments   Cognitive Assessments Completed Other Cognitive Screen/Assessment;[facility] Mental Status Exam (UMS):  Total Score out of /30 0/30   SLUMS Interpretation Attempted to do SLUMS screen at pt's bedside.  Pt answering questions but distracted and perseverating, often would be trying to answer the previous question after I had moved on to the next question.  Tried the first 7 of 11 test items on the screen, pt not able to answer any question.  When asked what day of the week it was he answered \"[id number]\" and would not move on from that answer.  Pt stopping often complaining of generalized pain.  Not able to complete the screen to get a meaningful score.   Cognitive Assessment Test Interpretation Attempled some orientation questions.  Pt identified he was in [place] but did not identify the place.  Did say it was afternoon but could not go further than this.  When asked his address pt replied \"[address]\" and then with several other cues stated \"[address]\".  This does not match the address in the chart.  Much perseveration between questions with short or repeated phrases not related to the current question I was asking.  Pt does know his  Jose, called out to him x2 during session.   Visual Perception   Visual Impairment/Limitations corrective lenses full-time   Pain Assessment   Patient Currently " in Pain Yes, see Vital Sign flowsheet   Range of Motion Comprehensive   General Range of Motion other (see comments)   Comment, General Range of Motion pt declinerd any activity for AROM or PROM during session   Strength Comprehensive (MMT)   General Manual Muscle Testing (MMT) Assessment upper extremity strength deficits identified   Comment, General Manual Muscle Testing (MMT) Assessment declined any attempts at MMT but appears severely weak and deconditioned.   Coordination   Upper Extremity Coordination Left UE impaired;Right UE impaired   Bed Mobility   Comment (Bed Mobility) Pt declining any bed mobility on his own, dependent with Max A at this time.   Transfers   Transfers other (see comments)  (lift dependent of 2 by nursing at this time.)   Clinical Impression   Criteria for Skilled Therapeutic Interventions Met (OT) Evaluation only;No significant expected improvement in functional status   OT Diagnosis dependent with ADLS for daily cares, increased confusion and difficulty cooperating with caregivers   ADL comments/analysis Pt is refusing attempts of care at times, appears dependent for all basic cares and mobility.   Clinical Impression Comments Pt is currently totally dependent for all mobility and ADLS.  Perseverating on his pain, the questions being asked of him at times, and his current condition.  Attempted several methods to try cognitive testing and direction following/sequencing for ADLS.  Pt not able to repond effectively to the SLUMS and other orientation questions, appears significantly below in his previous score on the SLUMS as found in the chart.  Appears to be total care for basic ADLS, transfers, and self-cares.  Per  has been eating very little and refusing cares at this time.  Not able to participate meaningfully in funtional OT goals or respond to try and assist with daily cares.  Will discharge from IP OT at this time and complete orders.   OT Total Evaluation Time   OT Siddhartha  Low Complexity Minutes (73809) 25   OT Discharge Planning   OT Plan discharge from IP OT   OT Discharge Recommendation (DC Rec) Long term care facility   OT Rationale for DC Rec Pt not able to participate meaningfully with multiple attempts to conduct a cognitive evaluation.  Not able to do well enough to get any kind of score on cognitive screening tools or orientation/direction following questions. Extremely limited movement in bed wtih his UEs or bed mobilty.  Appears very weak, total cares for basic ADLS.   Not an OT rehab candidate at this time.  Will need 24 hour care by  and home services.   indicated that cares are more than he can handle, would recommend long term care setting with 24/7 care at this time.   OT Brief overview of current status Pt appears to be total cares with dependent for any bed mobility or OOB mobility.  Not appropriate for IP OT services at this time.   Total Session Time   Total Session Time (sum of timed and untimed services) 25

## 2024-10-20 NOTE — PROGRESS NOTES
[facility]    Medicine Progress Note - Hospitalist Service    Date of Admission:  10/16/2024    Assessment & Plan   Feng Wynne is a 79 year old male with significant past medical history for mechanical mitral valve replacement on chronic anticoagulation with warfarin, A-fib, HFpEF, coronary artery disease, hypertension, hyperlipidemia, Chronic sacral decubitus non-healing wound presenting for supratheraputic INR despite holding warfarin since 10/14/24.     Pt is severely debilitated. Per family he has had fairly significant decline over the past few months. He is clearly malnourished due to chronic poor po intake. He is very weak. He is completely bed bound and per PT is totally dependent for all mobility and ADLS. He also has very notable cognitive decline. OT attempted SLUM assessment but patient was unable to even participate with the test. Pt will need 24 hour care going forward. Prior to this hospitalization he had some home cares through [facility] service. Apparently they have concerns about the care being provided and will no longer take the patient on. Apparently a vulnerable adult report has been filed as well though this has not been confirmed. Ultimately given the patients significant care needs, pt will need long term care. Previously pt had been resistant to placement in LTC, however, given his significant cognitive decline, he is currently not decisional. His son Sedrick and Partner Jose are both on-board with finding long term care placement. Will need to touch base with social work to start this process on Monday 10/21.     Nonbleeding supratheraputic INR  Mechanical mitral valve replacement on chronic anticoagulation with warfarin (INR goal 2.5-3.5)   Pt presented to the ED with elevated INRs. His INR was found to be high today at 8.33.  His INR has been slightly trending up over the last 2 weeks and not improving after holding warfarin since 10/14.   *  Suspect supratherapeutic INR is 2/2 nutritional deficiencys. Pt appears to be severely malnourished with muscle wasting and cachexia, though does have a lot of redundant skin so does not appear objectively thin. Pt himself reports that he eats really well at home, but does follow a vegetarian diet. His partner however reports that he barely eats due to fear of gaining weight.   * Vit K - was given 1mg IV x 1 10/16/24  * 10/18 INR improved to 3.15   - Resume warfarin dosing per Pharm D   - INR in the am  - Nutrition consultation/discussion as noted below    Hypokalemia  Hypomagnesemia  Hyponatremia  Metabolic Alkalosis  Again suspect 2/2 poor po intake   - Continue Potassium and magnesium replacement protocol   - PTA torsemide and spironolactone on hold - continue to hold today    Moderate malnutrition in the context of chronic illness   Possible disordered eating, anorexia   Hypoalbuminemia  Pt reports eating very well at home and following a vegetarian diet. He does state he only eats 50% of his meals because he doesn't want to gain weight. His partner however reports that he barely eats anything at home again because he fears gaining weight.   * He does not appear thin necessarily, but clearly has very poor muscle mass and lots of redundant loose skin.   * suspect his INR issues and electrolyte issues are related to poor po intake and malnutrition.   - nutrition consulted  - Discussed nutrition at length. Discussed trying to eat the majority of his meals and not restricting. Discussed the importance of protein for muscle building and wound healing.   - Also discussed trialing an appetite stimulant like remeron to see if this could boost appetite.    - Remeron 7.5mg at bedtime ordered on 10/18    Generalized Weakness  Bedbound status   Pt is essentially bedbound at baseline. Per chart review and discussion with his partner, he has no organic neurologic disorder that has caused him to be unable to walk. He has just  become so generally weak that he cannot get out of bed on his own or walk any longer. His partner assists him with all ADLS. He does have some home cares as well.   Per PT assessment this stay pt is totally dependent for all mobility and ADLs.  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   See WOC notes for photos. Pt has seen a plastic surgeon Dr. Srinivasan recently who reported being very hesitant to proceed with any intraoperative debridement or definitive soft tissue flap coverage due to patients overall frail and debiliated state.   - No indication for abx based on lack of infection signs from the ulcer  - WOC following   - Again disucssed importance of nutrition on wound healing and strength building   - As noted above recommend LTC placement for on -going wound cares.   - Appreciate Social Work consultation     Acute on chronic hypotension   Pt with some chronic hypotension. On Midodrine PTA.   * BP 87/51   - Will give 500cc bolus of normal saline  - Continue PTA midodrine      Chronic Afib  HFpEF  Chronic LE edema  CAD  HTN  HLD   Chronic Hypotension  - TTE showed EF 50-55 in 9/2024  - resumed midodrine tid  - Holding PTA diuretics, as above     Chronic indwelling branch catheter   Enterococcus Faecalis + urine culture  UA was abnormal on admission Concern for possible UTI  No symptoms or other signs/symptoms of infection  Urine culture positive for E. Fecalis, ? Colonization   * pt received 1 dose of IV ceftriaxone on 10/16   - Will consult ID for abx recommendations- no anitbiotics recommended       Suspected Dementia/cognitive impairment  Pt seems quite confused on my examination. Very poor insight into chronic health issues. His partner reports that he is quite forgetful and does have some confusion at baseline, so this is not new. He has never been diagnosed with dementia in the past.   * 10/19 pt can't remember what he had for breakfast, tries to dodge the question.   * Previous SLUM score is 23-25    * OT re consulted this AM for repeat SLUMs. He was unable to even participate with the test. Suspect score is now much lower than previous.   - Pt is clearly not decisional and unable to make his own medical decisions.    - LTC placement recommended      Chronic normocytic anemia, at baseline Hb 8  - continue to monitor     BPH  - avodart resumed    Large Ventral Hernia   Noted, soft, non-painful         Diet: Combination Diet Regular Diet Adult  Room Service  Snacks/Supplements Adult: Other; L: pineapple GelateinPLUS,  D: van Magic Cup; With Meals    DVT Prophylaxis: Warfarin  Stubbs Catheter: PRESENT, indication:    Lines: None     Cardiac Monitoring: None  Code Status: Full Code      Clinically Significant Risk Factors        # Hypokalemia: Lowest K = 3 mmol/L in last 2 days, will replace as needed  # Hyponatremia: Lowest Na = 129 mmol/L in last 2 days, will monitor as appropriate  # Hypochloremia: Lowest Cl = 88 mmol/L in last 2 days, will monitor as appropriate      # Hypoalbuminemia: Lowest albumin = 2.4 g/dL at 10/16/2024  6:25 PM, will monitor as appropriate       # Hypertension: Noted on problem list  # Chronic heart failure with preserved ejection fraction: heart failure noted on problem list and last echo with EF >50%          # Obesity: Estimated body mass index is 32.55 kg/m  as calculated from the following:    Height as of this encounter: 1.829 m (6').    Weight as of this encounter: 108.9 kg (240 lb)., PRESENT ON ADMISSION  # Moderate Malnutrition: based on nutrition assessment, PRESENT ON ADMISSION     # Financial/Environmental Concerns: none         Disposition Plan     Medically Ready for Discharge: Anticipated in 2-4 Days      Yolanda Roach MD  Hospitalist Service  [facility]  Securely message with [employer] (more info)  Text page via [facility] Paging/Directory   ______________________________________________________________________    Interval History   NAEO. Pt is  quite sleepy today. He denies any concerns. Reports he has not eaten anything today, but his partner notes that he ate a good amount of breakfast and lunch.     Physical Exam   Vital Signs: Temp: 99.6  F (37.6  C) Temp src: Axillary BP: (!) 87/51 Pulse: 63   Resp: 16 SpO2: 93 % O2 Device: None (Room air)    Weight: 240 lbs 0 oz    Constitutional: sleepy but wakes to voice   Eyes: Conjunctiva and pupils examined and normal.  HEENT: dry mucous membranes, normal dentition.  Respiratory: Clear to auscultation bilaterally, no crackles or wheezing.  Cardiovascular: Regular rate and rhythm, mechanical valve   GI: Soft, non-distended, non-tender, normal bowel sounds. Large ventral hernia. Non tender, soft.   Skin: No rashes, no cyanosis, no edema. Redundant lose skin   Musculoskeletal: Muscle wasting noted. No joint swelling, erythema or tenderness.  Neurologic: Cranial nerves 2-12 intact, normal strength and sensation.  Psychiatric: Alert, oriented to person, very confused about situation, seems as though he is not following the conversation at all.     Medical Decision Making       60 MINUTES SPENT BY ME on the date of service doing chart review, history, exam, documentation & further activities per the note.      Data     I have personally reviewed the following data over the past 24 hrs:    N/A  \   N/A   / N/A     N/A N/A N/A /  N/A   3.6 N/A N/A \     INR:  3.15 (H) PTT:  N/A   D-dimer:  N/A Fibrinogen:  N/A       Imaging results reviewed over the past 24 hrs:   No results found for this or any previous visit (from the past 24 hour(s)).

## 2024-10-21 NOTE — PROGRESS NOTES
[facility]    Medicine Progress Note - Hospitalist Service    Date of Admission:  10/16/2024    Assessment & Plan   Feng Wynne is a 79 year old male with significant past medical history for mechanical mitral valve replacement on chronic anticoagulation with warfarin, A-fib, HFpEF, coronary artery disease, hypertension, hyperlipidemia, Chronic sacral decubitus non-healing wound presenting for supratheraputic INR despite holding warfarin since 10/14/24.     Pt is severely debilitated. Per family he has had fairly significant decline over the past few months. He is clearly malnourished due to chronic poor po intake. He is very weak. He is completely bed bound and per PT is totally dependent for all mobility and ADLS. He also has very notable cognitive decline. OT attempted SLUM assessment but patient was unable to even participate with the test. Pt will need 24 hour care going forward. Prior to this hospitalization he had some home cares through [facility] service. Apparently they have concerns about the care being provided and will no longer take the patient on. Apparently a vulnerable adult report has been filed as well though this has not been confirmed. Ultimately given the patients significant care needs, pt will need long term care. Previously pt had been resistant to placement in LTC, however, given his significant cognitive decline, he is currently not decisional. His son Sedrick and Partner Jose are both on-board with finding long term care placement.     Social work/care coordination consultation requested for discharge planning    Nonbleeding supratheraputic INR  Mechanical mitral valve replacement on chronic anticoagulation with warfarin (INR goal 2.5-3.5)   Pt presented to the ED with elevated INRs. His INR was found to be high today at 8.33.  His INR has been slightly trending up over the last 2 weeks and not improving after holding warfarin since 10/14.   * Suspect  supratherapeutic INR is 2/2 nutritional deficiencys. Pt appears to be severely malnourished with muscle wasting and cachexia, though does have a lot of redundant skin so does not appear objectively thin. Pt himself reports that he eats really well at home, but does follow a vegetarian diet. His partner however reports that he barely eats due to fear of gaining weight.   * Vit K - was given 1mg IV x 1 10/16/24  INR now improved to 3.23  - Resume warfarin dosing per Pharm D     - Nutrition consultation/discussion as noted below    Hypokalemia  Hypomagnesemia  Hyponatremia  Metabolic Alkalosis  Again suspect 2/2 poor po intake   - Continue Potassium and magnesium replacement protocol   - PTA torsemide and spironolactone on hold - continue to hold now due to poor oral intake    Moderate malnutrition in the context of chronic illness   Possible disordered eating, anorexia   Hypoalbuminemia  Pt reports eating very well at home and following a vegetarian diet. He does state he only eats 50% of his meals because he doesn't want to gain weight. His partner however reports that he barely eats anything at home again because he fears gaining weight.   * He does not appear thin necessarily, but clearly has very poor muscle mass and lots of redundant loose skin.   * suspect his INR issues and electrolyte issues are related to poor po intake and malnutrition.   - nutrition consulted  - Discussed nutrition at length. Discussed trying to eat the majority of his meals and not restricting. Discussed the importance of protein for muscle building and wound healing.   - Also discussed trialing an appetite stimulant like remeron to see if this could boost appetite.    - Remeron 7.5mg at bedtime ordered on 10/18    Generalized Weakness  Bedbound status   Pt is essentially bedbound at baseline. Per chart review and discussion with his partner, he has no organic neurologic disorder that has caused him to be unable to walk. He has just become  so generally weak that he cannot get out of bed on his own or walk any longer. His partner assists him with all ADLS. He does have some home cares as well.   Per PT assessment this stay pt is totally dependent for all mobility and ADLs.      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   See WOC notes for photos. Pt has seen a plastic surgeon Dr. Srinivasan recently who reported being very hesitant to proceed with any intraoperative debridement or definitive soft tissue flap coverage due to patients overall frail and debiliated state.   - No indication for abx based on lack of infection signs from the ulcer  - WOC following   - Again disucssed importance of nutrition on wound healing and strength building   - As noted above recommend LTC placement for on -going wound cares.   - Appreciate Social Work consultation     Patient's decubitus ulcer wound bed with necrotic tissue with black discoloration and eschar.  General surgery consultation requested for possible need for debridement    Acute on chronic hypotension   Pt with some chronic hypotension. On Midodrine PTA.   * BP 87/51   - Will give 500cc bolus of normal saline  - Continue PTA midodrine      Chronic Afib  HFpEF  Chronic LE edema  CAD  HTN  HLD   Chronic Hypotension  - TTE showed EF 50-55 in 9/2024  - resumed midodrine tid  - Holding PTA diuretics, as above     Chronic indwelling branch catheter   Enterococcus Faecalis + urine culture  UA was abnormal on admission Concern for possible UTI  No symptoms or other signs/symptoms of infection  Urine culture positive for E. Fecalis, ? Colonization   * pt received 1 dose of IV ceftriaxone on 10/16   ID consultation requested.  Enterococcus with chronic indwelling Branch catheter was thought to be colonization.  Antibiotics discontinued by ID      Suspected Dementia/cognitive impairment  Pt seems quite confused on my examination. Very poor insight into chronic health issues. His partner reports that he is quite  forgetful and does have some confusion at baseline, so this is not new. He has never been diagnosed with dementia in the past.   * 10/19 pt can't remember what he had for breakfast, tries to dodge the question.   * Previous SLUM score is 23-25   * OT re consulted this AM for repeat SLUMs. He was unable to even participate with the test. Suspect score is now much lower than previous.   - Pt is clearly not decisional and unable to make his own medical decisions.    - LTC placement recommended      Chronic normocytic anemia, at baseline Hb 8  - continue to monitor     BPH  - avodart resumed    Large Ventral Hernia   Noted, soft, non-painful         Diet: Combination Diet Regular Diet Adult  Room Service  Snacks/Supplements Adult: Other; L: pineapple GelateinPLUS,  D: van Magic Cup; With Meals    DVT Prophylaxis: Warfarin  Stubbs Catheter: PRESENT, indication:    Lines: None     Cardiac Monitoring: None  Code Status: Full Code      Clinically Significant Risk Factors               # Hypoalbuminemia: Lowest albumin = 2.4 g/dL at 10/16/2024  6:25 PM, will monitor as appropriate       # Hypertension: Noted on problem list  # Chronic heart failure with preserved ejection fraction: heart failure noted on problem list and last echo with EF >50%          # Overweight: Estimated body mass index is 26.19 kg/m  as calculated from the following:    Height as of this encounter: 1.829 m (6').    Weight as of this encounter: 87.6 kg (193 lb 2 oz).   # Moderate Malnutrition: based on nutrition assessment      # Financial/Environmental Concerns: none         Disposition Plan           Medically Ready for Discharge: Ready Now      Discussed with bedside RN and patient      Zita Burden MD  Hospitalist Service  [facility]  Securely message with Ravi (more info)  Text page via [facility] Paging/Directory   ______________________________________________________________________    Interval History   Patient is new to  me.  Chart reviewed    Patient resting comfortably in bed.  Denies any complaints.  Denies any pain.  Very forgetful.  Needs LTC placement.  General surgery consultation requested for possible need for debridement of the stage IV decubitus ulcer.  No other acute issues      Physical Exam   Vital Signs: Temp: 98.4  F (36.9  C) Temp src: Axillary BP: 104/64 Pulse: 67   Resp: 16 SpO2: 98 % O2 Device: None (Room air)    Weight: 193 lbs 1.97 oz    Constitutional: Alert awake, forgetful, resting comfortably in bed, not in acute distress  Eyes: Conjunctiva and pupils examined and normal.  HEENT: dry mucous membranes, normal dentition.  Respiratory: Clear to auscultation bilaterally, no crackles or wheezing.  Cardiovascular: Regular rate and rhythm, mechanical valve   GI: Soft, non-distended, non-tender, normal bowel sounds. Large ventral hernia. Non tender, soft.   Skin: No rashes, no cyanosis, no edema. Redundant lose skin   Musculoskeletal: Muscle wasting noted. No joint swelling, erythema or tenderness.  Neurologic: Cranial nerves 2-12 intact, normal strength and sensation.  Psychiatric: Alert, oriented to person, very confused about situation, seems as though he is not following the conversation at all.     Medical Decision Making       60 MINUTES SPENT BY ME on the date of service doing chart review, history, exam, documentation & further activities per the note.      Data     I have personally reviewed the following data over the past 24 hrs:    N/A  \   N/A   / N/A     N/A N/A N/A /  N/A   3.8 N/A N/A \     INR:  3.23 (H) PTT:  N/A   D-dimer:  N/A Fibrinogen:  N/A       Imaging results reviewed over the past 24 hrs:   No results found for this or any previous visit (from the past 24 hour(s)).

## 2024-10-21 NOTE — PROGRESS NOTES
Care Management Follow Up    Length of Stay (days): 5    Expected Discharge Date: 10/25/2024     Concerns to be Addressed: discharge planning     Patient plan of care discussed at interdisciplinary rounds: Yes    Anticipated Discharge Disposition: long term care with hospice              Anticipated Discharge Services:   Anticipated Discharge DME: None    Patient/family educated on Medicare website which has current facility and service quality ratings: no  Education Provided on the Discharge Plan: Yes  Patient/Family in Agreement with the Plan: yes    Referrals Placed by CM/SW: SNF, financial office  Private pay costs discussed: insurance costs out of pocket expenses    Discussed  Partnership in Safe Discharge Planning  document with patient/family: No     Handoff Completed: Yes, [facility] PCP: Internal handoff referral completed    Additional Information:  Had a lengthy conversation with patient, son Sedrick and partner Jose regarding discharge plans.  Patient and family have made the decision to have patient go to LTC with hospice.  They know that patient can not go home and they feel that patient would benefit from the extra support of hospice.  Patient does not have the finances for LTC so he will need to have MA.  Referral made to the financial office to begin the LTC MA application process.  Patient's son states that he would be the primary contact for the application.  Explained what will be needed including bank statements.  Patient and family have discussed SNF options and they are asking for referrals to be sent, in this order, to [facility], [facility],  [facility], [facility], [facility], and [facility].  Referrals sent via the discharge navigator.  Once the facility is determined will make a referral to the hospice agency after we find out their preferred providers.      [facility] ([facility])  Pending - Request Sent N/A [address],  [facility] [address] [phone] --   [facility] ([facility])  Pending - Request Sent N/A [address] [place] [address] [phone] --   [facility] ([facility])  Pending - Request Sent N/A [address] [facility] [place] [id number] [phone] --   Current Capacity last updated by Martha Granados MA on 9/4/2024 10:01 AM    No [facility] ([facility])  Pending - Request Sent N/A [address] [facility] [id number] [phone] --   [facility] ([facility])  Pending - Request Sent N/A [address] [facility] [id number] [phone] --   [facility] ([facility])  Pending - Request Sent N/A [address] [facility] [id number] [phone] --           Next Steps:  Follow up with the financial office re: MA application.  Confirm bed.  Complete the PAS.  Arrange for transport.      GILL Altamirano, [facility]    [phone]  [facility]

## 2024-10-21 NOTE — PROGRESS NOTES
ANTICOAGULATION     Feng yWnne is overdue for an INR check.     Patient is inpatient at [facility]. Will postpone reminder one week.     Natali Mondragon RN  10/21/2024  Anticoagulation Clinic  [facility] for routing messages: mani MARK  [facility] patient phone line: [phone]

## 2024-10-21 NOTE — CONSULTS
[facility]    Consult Note - General Surgery Service  Date of Admission:  10/16/2024  Consult Requested by: Zita Burden MD  Reason for Consult: sacral decubitus ulcer    Assessment & Plan: Surgery   Feng Wynne is a 79 year old male admitted on 10/16/2024. He does have an infected ulcer that likely requires debridement. Patient's son states he is DPOA. Patient does not appear to be able to make complex medical decisions. I discussed with son that given the proximity of the ulcer to his anus, the area is likely to continue to get infected. Our options are to debride the wound and give him a colostomy or to focus on limiting medical interventions and focus on comfort measures. A colostomy would likely require an open incision given his abdominal surgical history.    We discussed the risks and benefits of both options. Patient's son and partner were in the room for discussion. They would like some time to think about what they feel is best. I will come back tomorrow for further discussion.       Drains: None     Code Status: Full Code      Clinically Significant Risk Factors               # Hypoalbuminemia: Lowest albumin = 2.4 g/dL at 10/16/2024  6:25 PM, will monitor as appropriate     # Hypertension: Noted on problem list  # Chronic heart failure with preserved ejection fraction: heart failure noted on problem list and last echo with EF >50%          # Overweight: Estimated body mass index is 26.19 kg/m  as calculated from the following:    Height as of this encounter: 1.829 m (6').    Weight as of this encounter: 87.6 kg (193 lb 2 oz).   # Moderate Malnutrition: based on nutrition assessment    # Financial/Environmental Concerns: none         Will Alba MD  [facility]  Non-urgent messages: Securely message with [employer] (more info)  Text page via [email or web address] Paging/Directory     ______________________________________________________________________    Chief  Complaint   Infected ulcer    History obtained from patient's son and medical records    History of Present Illness   Feng Wynne is a 79 year old male who is on Coumadin for a fib who presents with a stage 4 pressure ulcer. He has had this wound for 9 months. It has been debrided in the past, but according to son keeps getting bigger and more infected. Patient is a poor historian, but he is weak and requires 24 hour care. He is unable to get up on his own.      Past Medical History    Past Medical History:   Diagnosis Date    Acute on chronic congestive heart failure, unspecified heart failure type (H) 01/30/2024    Arthritis     Atrial fibrillation (H)     Coronary artery disease     Hypercholesteremia     Obesity     Unspecified essential hypertension        Past Surgical History   Past Surgical History:   Procedure Laterality Date    COLONOSCOPY N/A 1/15/2024    Procedure: Colonoscopy;  Surgeon: Osbaldo Olvera MD;  Location:  GI    ESOPHAGOSCOPY, GASTROSCOPY, DUODENOSCOPY (EGD), COMBINED N/A 1/15/2024    Procedure: Esophagoscopy, gastroscopy, duodenoscopy (EGD), combined;  Surgeon: Osbaldo Olvera MD;  Location:  GI    ESOPHAGOSCOPY, GASTROSCOPY, DUODENOSCOPY (EGD), COMBINED N/A 2/8/2024    Procedure: Esophagoscopy, gastroscopy, duodenoscopy (EGD), combined;  Surgeon: Osbaldo Olvera MD;  Location:  GI    IRRIGATION AND DEBRIDEMENT SACRAL WOUND, COMBINED N/A 3/22/2024    Procedure: IRRIGATION AND DEBRIDEMENT, WOUND, SACRAL REGION;  Surgeon: Phill Jimenez MD;  Location: [facility] OR    MITRAL VALVE REPLACEMENT  8-    Mitral valve replacement with 31-mm [employer]. Raffi mechanical valve.        Prior to Admission Medications   Medications Prior to Admission   Medication Sig Dispense Refill Last Dose    acetaminophen (TYLENOL) 500 MG tablet Take 2 tablets (1,000 mg) by mouth nightly as needed for mild pain.   Unknown    albuterol (PROAIR HFA/PROVENTIL HFA/VENTOLIN HFA) 108 (90 Base)  MCG/ACT inhaler Inhale 1-2 puffs into the lungs every 4 hours as needed for shortness of breath, wheezing or cough. 18 g 3 More than a month    calcium carbonate (TUMS) 500 MG chewable tablet Take 1 tablet (500 mg) by mouth 2 times daily.       diclofenac (VOLTAREN) 1 % topical gel Apply 4 g topically 3 times daily as needed for moderate pain.       dutasteride (AVODART) 0.5 MG capsule Take 1 capsule (0.5 mg) by mouth daily. 30 capsule 0 10/15/2024    HYDROmorphone (DILAUDID) 4 MG tablet Take 0.5 tablets (2 mg) by mouth every 6 hours as needed for severe pain 30 tablet 0     [] midodrine (PROAMATINE) 10 MG tablet Take 1 tablet (10 mg) by mouth 3 times daily (with meals). 90 tablet 0 10/15/2024    morphine 0.1% in intrasite topical gel Apply 2 g topically 2 times daily as needed for pain (to be applied during wound dressing changes). 100 g 0 prn    Multiple Vitamins-Minerals (MULTIVITAMIN ADULT) CHEW Take 2 chew tab by mouth daily       NONFORMULARY Take by mouth at bedtime. Marijuana edible at bedtime       nystatin (MYCOSTATIN) 023377 UNIT/GM external powder Apply topically 2 times daily as needed for other Groin folds       pantoprazole (PROTONIX) 40 MG EC tablet Take 1 tablet (40 mg) by mouth 2 times daily (before meals) 60 tablet 1 10/15/2024    potassium chloride reagan ER (KLOR-CON M20) 20 MEQ CR tablet Take 1 tablet (20 mEq) by mouth daily. 30 tablet 0 Unknown    Probiotic CHEW Take 2 chew tab by mouth daily       torsemide (DEMADEX) 20 MG tablet Take 1 tablet by mouth daily. 90 tablet 2 10/15/2024    warfarin ANTICOAGULANT (COUMADIN) 3 MG tablet Take 1 tablet (3 mg) on , ,  and take 1.5 tablets (4.5 mg) all other days or as directed by the INR clinic (Patient taking differently: As of 10/16/24:  Hold doses on 10/16 and 10/17, then take 1 tablet (3 mg) on Mon and Thu, and 0.5 tablet (1.5 mg) all other days or as directed by the INR clinic) 180 tablet 0 2-3 days ago    bisacodyl  (DULCOLAX) 10 MG suppository Place 1 suppository (10 mg) rectally daily as needed for constipation. (Patient not taking: Reported on 10/16/2024)   Not Taking    ferrous sulfate (FEROSUL) 325 (65 Fe) MG tablet Take 1 tablet (325 mg) by mouth daily (with breakfast) (Patient not taking: Reported on 10/16/2024) 90 tablet 3 Not Taking    lidocaine (XYLOCAINE) 4 % external solution Apply topically 2 times daily as needed for moderate pain (dressing change). (Patient not taking: Reported on 10/16/2024) 100 mL 2 Not Taking    order for DME Equipment being ordered: COMPRESSION STOCKINGS, 20-30 mmHg 1 each 1     polyethylene glycol (MIRALAX) 17 GM/Dose powder Take 17 g by mouth daily. (Patient not taking: Reported on 10/16/2024) 510 g 0 Not Taking          Social History   I have reviewed this patient's social history and updated it with pertinent information if needed.  Social History     Tobacco Use    Smoking status: Former     Current packs/day: 0.00     Average packs/day: 0.5 packs/day for 5.0 years (2.5 ttl pk-yrs)     Types: Cigarettes     Start date:      Quit date:      Years since quittin.8    Smokeless tobacco: Never   Vaping Use    Vaping status: Never Used   Substance Use Topics    Alcohol use: Yes     Comment: 1 drink per month    Drug use: No         Family History   I have reviewed this patient's family history and updated it with pertinent information if needed.  Family History   Problem Relation Age of Onset    Cerebrovascular Disease Father     Diabetes Paternal Grandmother     C.A.D. Brother         CABG X 3 at age 51         Allergies   Allergies   Allergen Reactions    Amiodarone Shortness Of Breath    Pcn [Penicillins] Shortness Of Breath     Rxn occurred in childhood     Statins Muscle Pain (Myalgia) and Hives    Latex     Zetia [Ezetimibe] Muscle Pain (Myalgia)     Muscle weakness legs        Physical Exam   Vital Signs: Temp: 100.1  F (37.8  C) Temp src: Axillary BP: 91/53 Pulse: 67    Resp: 16 SpO2: 97 % O2 Device: None (Room air)    Weight: 193 lbs 1.97 oz  Intake/Output Summary (Last 24 hours) at 10/21/2024 1652  Last data filed at 10/21/2024 1645  Gross per 24 hour   Intake 486 ml   Output 740 ml   Net -254 ml     Gen: NAD  Neuro: awake, alert, but gets confused easily and hyperfixated on unrelated topics  Abd: multiple surgical scars with distorted abdominal wall; soft, nondistended, nontender  Sacrum: 10 x 8 cm in size purulent fluid, foul smell, liquid stool running up into the wound    Medical Decision Making       45 MINUTES SPENT BY ME on the date of service doing chart review, history, exam, documentation & further activities per the note.      Data     I have personally reviewed the following data over the past 24 hrs:    N/A  \   N/A   / N/A     N/A N/A N/A /  N/A   3.8 N/A N/A \     INR:  3.23 (H) PTT:  N/A   D-dimer:  N/A Fibrinogen:  N/A       Imaging results reviewed over the past 24 hrs (Images reviewed by me):     CT abd/pelvis:  IMPRESSION:   1.  Large amount of stool throughout the colon and within the rectum, consistent with constipation.  2.  Large ventral hernia containing multiple loops of gas and stool-filled colon, as well as a small amount of fluid. No convincing evidence for associated bowel obstruction.  3.  Moderate distention of the urinary bladder.  4.  Diffuse subcutaneous edema.

## 2024-10-22 NOTE — PROGRESS NOTES
Gen Surg Note:    I discussed with the patient's son and significant other regarding plans moving forward. They have decided they would rather proceed with comfort measures than move forward with additional surgery or colostomy placement. Will place a palliative care consult.    Will sign off. Please free free to contact again with any further questions or concerns.    Will Alba MD [facility]  Trauma/Emergency/Critical Care Surgery

## 2024-10-22 NOTE — PROGRESS NOTES
CLINICAL NUTRITION SERVICES - REASSESSMENT NOTE    Malnutrition:    % Weight Loss:  % Weight Loss:  > 10% in 6 months (severe malnutrition) - reported stable wt ~200 lbs -->16% wt loss in 6 months (10/22)  % Intake:  No decreased intake noted  Subcutaneous Fat Loss:  (from 9/13 NFPE) Orbital region moderate-severe depletion, Upper arm region moderate depletion, and Thoracic region moderate-severe depletion   Muscle Loss: (from 9/13 NFPE) Temporal region moderate-severe depletion, Clavicle bone region moderate depletion, and Dorsal hand region moderate depletion   Fluid Retention:  None noted     Malnutrition Diagnosis: Moderate malnutrition  In Context of:  Chronic illness or disease     EVALUATION OF PROGRESS TOWARD GOALS   Diet:  Regular, pineapple Gel+ @ lunch, vanilla Magic cup @ dinner, and Room Service Appropriate with assist      Intake/Tolerance:  Flowsheets show a fair appetite and 1-3 intakes/day of 25-75%.     ASSESSED NUTRITION NEEDS:  Dosing Weight 88 kg (adjusted wt)  Estimated Energy Needs: 4216-6593 kcals (25-30 Kcal/Kg)  Justification: overweight  Estimated Protein Needs: 105-130 grams protein (1.2-1.5 g pro/Kg)  Justification: wound healing    NEW FINDINGS:   General/Plan: attempted to see pt a few times throughout the day but they were busy receiving cares. May be discharging to hospice.      Weight: Previously (7/19/24) pt has reported a stable wt of around 200 lbs. There's been a 16% wt loss in 6 months  Date/Time Weight Weight Method   10/21/24 0617 87.6 kg (193 lb 2 oz) Bed scale   10/16/24 1747 108.9 kg (240 lb) --   Previous RD note,   Weight History:    Pt states that his wt in the 300s beginning of year was largely from fluid  Notes that wt of 259# was from diuresis and being on a liquid diet during admit  Suspect at least 20# wt loss (8%) in the past 7 months  10/16/24 : 108.9 kg (240 lb) - pt hasn't been properly weighed, but feels this is accurate reported wt  09/15/24 : 94 kg (207 lb  3.7 oz)   07/26/24 : 86 kg (189 lb 9.5 oz) - pt states this wt is not accurate  06/03/24 : 103.9 kg (229 lb)   05/30/24 : 103.7 kg (228 lb 9.6 oz)   05/21/24 : 103.7 kg (228 lb 9.6 oz)   05/16/24 : 103.7 kg (228 lb 9.6 oz)   05/14/24 : 103.7 kg (228 lb 9.6 oz)   05/10/24 : 103.7 kg (228 lb 9.6 oz)   05/09/24 : 103.7 kg (228 lb 9.6 oz)   03/04/24 117.8 kg (259 lb 11.2 oz)   03/01/24 117.5 kg (259 lb)   02/28/24 118.8 kg (262 lb)   02/26/24 121.6 kg (268 lb)   02/22/24 121.6 kg (268 lb)   02/19/24 121.6 kg (268 lb)   02/15/24 122 kg (269 lb)   02/15/24 122.4 kg (269 lb 13.5 oz)   01/29/24 (!) 155.3 kg (342 lb 6.4 oz)  - pt states he had large fluid overload  01/26/24 143.8 kg (317 lb)   01/25/24 (!) 153 kg (337 lb 6.4 oz)     GI: BM around x3-5 last week and x1-2 the past couple days    Skin: 10/17 WOCN, Summary: POA 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 Skin tears on left hip and scattered dark ecchymosis with largest areas on the left hip, thighs, buttocks, and back.     Meds: multivitamin with minerals,     Labs: Na 129 (L), BUN 25.6 (H), Cr 0.57 (L),      Previous Goals:   Pt to consume 75% meals BID-TID and 100% supplements  Evaluation: Not met    Previous Nutrition Diagnosis:   Increased nutrient needs (protein) related to higher need for healing as evidenced by pt with noted sacral decubitus ulcers   Evaluation: No change    CURRENT NUTRITION DIAGNOSIS  Increased nutrient needs (protein) related to higher need for healing as evidenced by pt with noted sacral decubitus ulcers     INTERVENTIONS  Recommendations / Nutrition Prescription  Continue regular diet and supplements as ordered    Implementation  General/Healthful diet     Goals  PO - >50% meal trays, and/or the equivalent  in nutrition supplements TID     MONITORING AND EVALUATION:  Progress towards goals will be monitored and evaluated per protocol and Practice Guideline    Mo Dennis RD, LD

## 2024-10-22 NOTE — PROGRESS NOTES
Care Management Follow Up    Length of Stay (days): 6    Expected Discharge Date: 10/25/2024     Concerns to be Addressed: discharge planning     Patient plan of care discussed at interdisciplinary rounds: Yes    Anticipated Discharge Disposition: LTC w/ hospice              Anticipated Discharge Services: PCA, Home Care  Anticipated Discharge DME: None    Patient/family educated on Medicare website which has current facility and service quality ratings: no  Education Provided on the Discharge Plan: Yes  Patient/Family in Agreement with the Plan: yes    Referrals Placed by CM/SW: Homecare  Private pay costs discussed: Not applicable    Discussed  Partnership in Safe Discharge Planning  document with patient/family: No     Handoff Completed: No, handoff not indicated or clinically appropriate    Additional Information:  SW received a vm from patients son indicating he is looking to complete a financial POA and had questions regarding this. SW spoke with son and patients partner and provided POA paperwork and mobile notary information.SW informed patient is over income/assets for MA. SW discussed patient not qualifying for MA and they are aware/in agreement with this and need to work out POA paperwork so they can pay for LTC placement. Son and spouse will work on getting POA paperwork finalized.     Next Steps: POA paperwork/LTC    KASSANDRA Cortez    [facility]

## 2024-10-23 NOTE — CONSULTS
"[facility]  Palliative Care Brief Note    Palliative medicine was consulted by the surgical team for \"comfort care\".    Chart reviewed and noted recent events:Family has already elected for comfort care and hospice in LTC. Based on chart review, no acute symptom concerns. No acute palliative care needs identified. Defer disposition management to /. If there are acute symptom concerns, would recommend ordering the comfort care order set. Shared this with Dr. Burden as well.     Palliative Care will not formally see Feng and family.       Carlin Lemon NP  Securely message with [employer] (more info)  Text page via [facility] Paging/Directory     "

## 2024-10-23 NOTE — PROGRESS NOTES
[facility]    Medicine Progress Note - Hospitalist Service    Date of Admission:  10/16/2024    Assessment & Plan   Feng Wynne is a 79 year old male with significant past medical history for mechanical mitral valve replacement on chronic anticoagulation with warfarin, A-fib, HFpEF, coronary artery disease, hypertension, hyperlipidemia, Chronic sacral decubitus non-healing wound presenting for supratheraputic INR despite holding warfarin since 10/14/24.     Pt is severely debilitated. Per family he has had fairly significant decline over the past few months. He is clearly malnourished due to chronic poor po intake. He is very weak. He is completely bed bound and per PT is totally dependent for all mobility and ADLS. He also has very notable cognitive decline. OT attempted SLUM assessment but patient was unable to even participate with the test. Pt will need 24 hour care going forward. Prior to this hospitalization he had some home cares through [facility] service. Apparently they have concerns about the care being provided and will no longer take the patient on. Apparently a vulnerable adult report has been filed as well though this has not been confirmed. Ultimately given the patients significant care needs, pt will need long term care. Previously pt had been resistant to placement in LTC, however, given his significant cognitive decline, he is currently not decisional. His son Sedrick and Partner Jose are both on-board with finding long term care placement.     Social work/care coordination consultation requested for discharge planning    Goals of care discussion was done with the patient's son Sedrick and partner Jose both of them agreed on hospice on discharge to long-term care facility. Daren status changed to DNR/DNI    Family planning on discharge to LTC with hospice.      Patient is medically stable for discharge    Nonbleeding supratheraputic INR  Mechanical mitral valve  replacement on chronic anticoagulation with warfarin (INR goal 2.5-3.5)   Pt presented to the ED with elevated INRs. His INR was found to be high today at 8.33.  His INR has been slightly trending up over the last 2 weeks and not improving after holding warfarin since 10/14.   * Suspect supratherapeutic INR is 2/2 nutritional deficiencys. Pt appears to be severely malnourished with muscle wasting and cachexia, though does have a lot of redundant skin so does not appear objectively thin. Pt himself reports that he eats really well at home, but does follow a vegetarian diet. His partner however reports that he barely eats due to fear of gaining weight.   * Vit K - was given 1mg IV x 1 10/16/24  INR now improved to 3.23  - Resume warfarin dosing per Pharm D   Patient has mechanical mitral valve so needs to continue Coumadin even on discharge on hospice while he is able to take oral medications    - Nutrition consultation/discussion as noted below    Hypokalemia  Hypomagnesemia  Hyponatremia  Metabolic Alkalosis  Again suspect 2/2 poor po intake   - Continue Potassium and magnesium replacement protocol   - PTA torsemide and spironolactone on hold - continue to hold now due to poor oral intake    Moderate malnutrition in the context of chronic illness   Possible disordered eating, anorexia   Hypoalbuminemia  Pt reports eating very well at home and following a vegetarian diet. He does state he only eats 50% of his meals because he doesn't want to gain weight. His partner however reports that he barely eats anything at home again because he fears gaining weight.   * He does not appear thin necessarily, but clearly has very poor muscle mass and lots of redundant loose skin.   * suspect his INR issues and electrolyte issues are related to poor po intake and malnutrition.   - nutrition consulted  - Discussed nutrition at length. Discussed trying to eat the majority of his meals and not restricting. Discussed the importance of  protein for muscle building and wound healing.   - Also discussed trialing an appetite stimulant like remeron to see if this could boost appetite.    - Remeron 7.5mg at bedtime ordered on 10/18    Generalized Weakness  Bedbound status   Pt is essentially bedbound at baseline. Per chart review and discussion with his partner, he has no organic neurologic disorder that has caused him to be unable to walk. He has just become so generally weak that he cannot get out of bed on his own or walk any longer. His partner assists him with all ADLS. He does have some home cares as well.   Per PT assessment this stay pt is totally dependent for all mobility and ADLs.      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   See WOC notes for photos. Pt has seen a plastic surgeon Dr. Srinivasan recently who reported being very hesitant to proceed with any intraoperative debridement or definitive soft tissue flap coverage due to patients overall frail and debiliated state.   - No indication for abx based on lack of infection signs from the ulcer  - WOC following   - Again disucssed importance of nutrition on wound healing and strength building   - As noted above recommend LTC placement for on -going wound cares.   - Appreciate Social Work consultation     Patient's decubitus ulcer wound bed with necrotic tissue with black discoloration and eschar.  General surgery consultation requested met with patient and family.  General surgery physician discussed about surgery and possible colostomy to help with healing of the stage IV decubitus ulcer versus comfort cares.  Family decided to defer surgery at this point and focus on comfort and hospice on discharge    Acute on chronic hypotension   Pt with some chronic hypotension. On Midodrine PTA.   * BP 87/51   - Will give 500cc bolus of normal saline  - Continue PTA midodrine      Chronic Afib  HFpEF  Chronic LE edema  CAD  HTN  HLD   Chronic Hypotension  - TTE showed EF 50-55 in 9/2024  -  resumed midodrine tid  - Holding PTA diuretics, as above     Chronic indwelling branch catheter   Enterococcus Faecalis + urine culture  UA was abnormal on admission Concern for possible UTI  No symptoms or other signs/symptoms of infection  Urine culture positive for E. Fecalis, ? Colonization   * pt received 1 dose of IV ceftriaxone on 10/16   ID consultation requested.  Enterococcus with chronic indwelling Branch catheter was thought to be colonization.  Antibiotics discontinued by ID      Suspected Dementia/cognitive impairment  Pt seems quite confused on my examination. Very poor insight into chronic health issues. His partner reports that he is quite forgetful and does have some confusion at baseline, so this is not new. He has never been diagnosed with dementia in the past.   * 10/19 pt can't remember what he had for breakfast, tries to dodge the question.   * Previous SLUM score is 23-25   * OT re consulted this AM for repeat SLUMs. He was unable to even participate with the test. Suspect score is now much lower than previous.   - Pt is clearly not decisional and unable to make his own medical decisions.    - LTC placement recommended      Chronic normocytic anemia, at baseline Hb 8  - continue to monitor     BPH  - avodart resumed    Large Ventral Hernia   Noted, soft, non-painful         Diet: Combination Diet Regular Diet Adult  Room Service  Snacks/Supplements Adult: Other; L: pineapple GelateinPLUS,  D: van Magic Cup; With Meals    DVT Prophylaxis: Warfarin  Branch Catheter: PRESENT, indication: Acute retention or obstruction  Lines: None     Cardiac Monitoring: None  Code Status: DNR/DNI code status changed to DNR/DNI after discussion with pts partner who is pts health care POA     Clinically Significant Risk Factors               # Hypoalbuminemia: Lowest albumin = 2.4 g/dL at 10/16/2024  6:25 PM, will monitor as appropriate       # Hypertension: Noted on problem list  # Chronic heart failure with  preserved ejection fraction: heart failure noted on problem list and last echo with EF >50%          # Overweight: Estimated body mass index is 26.19 kg/m  as calculated from the following:    Height as of this encounter: 1.829 m (6').    Weight as of this encounter: 87.6 kg (193 lb 2 oz).   # Moderate Malnutrition: based on nutrition assessment      # Financial/Environmental Concerns: none         Disposition Plan           Medically Ready for Discharge: Ready Now        Discussed with bedside RN and patient, his son and partner Jose were updated and all their questions were answered  Discussed with       Zita Burden MD  Hospitalist Service  [facility]  Securely message with luma-id (more info)  Text page via [facility] Paging/Directory   ______________________________________________________________________    Interval History     Chart reviewed    Patient resting comfortably in bed.  Denies any complaints.  Denies any pain.  No other acute issues      Physical Exam   Vital Signs: Temp: 97.6  F (36.4  C) Temp src: Oral BP: 100/63 Pulse: 76   Resp: 16 SpO2: 99 % O2 Device: None (Room air)    Weight: 193 lbs 1.97 oz    Constitutional: Alert awake, forgetful, resting comfortably in bed, not in acute distress  Eyes: Conjunctiva and pupils examined and normal.  HEENT: dry mucous membranes, normal dentition.  Respiratory: Clear to auscultation bilaterally, no crackles or wheezing.  Cardiovascular: Regular rate and rhythm, mechanical valve   GI: Soft, non-distended, non-tender, normal bowel sounds. Large ventral hernia. Non tender, soft.   Skin: No rashes, no cyanosis, no edema. Redundant lose skin   Musculoskeletal: Muscle wasting noted. No joint swelling, erythema or tenderness.  Neurologic: Cranial nerves 2-12 intact, normal strength and sensation.  Psychiatric: Alert, oriented to person, very confused about situation, seems as though he is not following the conversation at all.      Medical Decision Making       60 MINUTES SPENT BY ME on the date of service doing chart review, history, exam, documentation & further activities per the note.      Data     I have personally reviewed the following data over the past 24 hrs:    INR:  2.70 (H) PTT:  N/A   D-dimer:  N/A Fibrinogen:  N/A       Imaging results reviewed over the past 24 hrs:   No results found for this or any previous visit (from the past 24 hours).

## 2024-10-23 NOTE — PROGRESS NOTES
Care Management Follow Up    Length of Stay (days): 7    Expected Discharge Date: 10/25/2024     Concerns to be Addressed: discharge planning     Patient plan of care discussed at interdisciplinary rounds: Yes    Anticipated Discharge Disposition: LTC w/ hospice              Anticipated Discharge Services: PCA, Home Care  Anticipated Discharge DME: None    Patient/family educated on Medicare website which has current facility and service quality ratings: no  Education Provided on the Discharge Plan: Yes  Patient/Family in Agreement with the Plan: yes    Referrals Placed by CM/SW: Homecare  Private pay costs discussed: N/A    Discussed  Partnership in Safe Discharge Planning  document with patient/family: No     Handoff Completed: No, handoff not indicated or clinically appropriate    Additional Information:  DIONY followed up on pending referrals  [facility]: Reviewing  [facility]: Reviewing  [facility]: Left   [facility]: Reviewing  [facility]: Reviewing    Addendum: DIONY informed [facility] can offer patient a shared bed and down payment would be $3,000, DIONY informed [facility] is also able to offer a shared bed with a $5,000 down payment. DIONY discussed this with patients son Sedrick and informed they will need to think about what option they would like to move forward with. DIONY also informed they are currently waiting for the notary to come to complete the POA paperwork so they can access patients finances as this is needed prior to being able to pay for the down payment. Patients son expressed some concerns with patient being medically ready and DIONY explained that when hospice is chosen there typically isnt many reasons patients would need to remain in the hospital. Sedrick indicated he has questions regarding hospice and patients medical status and wanted to speak with palliative    DIONY spoke with hospitalist requesting they discuss patients medical status and hospice with  family    Family in agreement with plans to discharge to LTC w/ hospice pending POA paperwork for finances. Family working on getting access to patients funds to pay for down payment.     Next Steps: LTC bed/POA    KASSANDRA Cortez    [facility]

## 2024-10-24 NOTE — PROGRESS NOTES
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 Srinivasan outpatient. Skin tears on left hip and scattered dark ecchymosis with largest areas on the left hip, thighs, buttocks, and back. Patient's INR today at 10 am was 4.56, and INR was 8.33 on admission on 10/16 at approximately 1 pm.    Patient History (according to provider note(s):      \"79 year old male with significant past medical history for mechanical mitral valve replacement on chronic anticoagulation with warfarin, A-fib, HFpEF, coronary artery disease, hypertension, hyperlipidemia, sacral decubitus ulcers presenting for supratheraputic INR\"     Assessment:      Areas visualized during today's visit: Focused:    Pressure Injury Location: Sacrum      Last photo: 10/24  Wound type: Pressure Injury     Pressure Injury Stage: 4, present on admission        Wound history/plan of care: This wound has been present since January 2024. Patient has been seeing Dr. Srinivasan at the [facility]. Patient states that Dr. Srinivasan has mentioned that the wound needs surgical debridement, but he was not strong enough for surgery. He is now on comfort care and plans to be discharged to a facility and admitted to hospice.  On 10/17, the wound had a strong foul odor and the base of the wound was mostly black.The odor has resolved and there is a decreased amount of necrotic tissue, but the depth and undermining have significantly increased.    Wound base: 40% Bone, Moist, Brown, and Yellow, 60% Moist, Pink, and Smooth      Palpation of the wound bed: firm, bone visible     Drainage: moderate     Description of drainage: serosanguinous     Measurements (length x width x depth, in cm) ~ 7  x 6.5  x  6 cm      Tunneling N/A     Undermining up to 12 cm from 12 to 12 o'clock, deepest at 2 o'clock  Periwound skin: Intact and Scar tissue      Color: " dusky and pink      Temperature: normal   Odor: none  Pain: mild and during dressing change  Pain intervention prior to dressing change: patient tolerated well, slow and gentle cares , and distraction  Treatment goal: Infection control/prevention, Decrease moisture, Maintain (prevention of deterioration), and Simplify plan of care  STATUS: evolving  Supplies ordered: gathered, at bedside, supplies stored on unit, discussed with RN, and discussed with patient     My PI Risk Assessment     Sensory Perception: 2 - Very Limited     Moisture: 2 - Very moist      Activity: 1 - Bedfast      Mobility: 2 - Very limited     Nutrition: 1 - Very poor     Friction/Shear: 1 - Problem     TOTAL: 9     Wound location: Left lateral hip      Last photo: Left lateral hip  Wound due to: Friction, Shear, and Skin Tear  Wound history/plan of care: POA large area of scattered dark ecchymosis with 2 open skin tears.   Wound base: 100% Fibrin, Moist, and Red     Palpation of the wound bed: normal      Drainage: small     Description of drainage: serosanguinous     Measurements (length x width x depth, in cm): cluster of small open wounds is ~ 16 x 9 cm      Tunneling: N/A     Undermining: N/A  Periwound skin: Intact, Ecchymosis, and Scar tissue      Color: normal and consistent with surrounding tissue and eccyhmotic      Temperature: normal   Odor: none  Pain: mild and during dressing change  Pain interventions prior to dressing change: patient tolerated well, slow and gentle cares , and distraction  Treatment goal: Infection control/prevention, Maintain (prevention of deterioration), and Protection  STATUS: evolving  Supplies ordered: gathered, at bedside, supplies stored on unit, discussed with RN, and discussed with patient     Wound location: Mid right back      Last photo: 10/24  Wound due to: Friction, Superficial abrasions  Wound history/plan of care: Incidental finding of superficial skin loss. Dark tissue over wounds is thin and  "peeling off.  Wound base: Epidermis, Friable, Moist, and Pink     Palpation of the wound bed: normal      Drainage: small     Description of drainage: serosanguinous and bloody     Measurements (length x width x depth, in cm): area is ~ 8  x 6 cm      Tunneling: N/A     Undermining: N/A  Periwound skin: Intact      Color: normal and consistent with surrounding tissue      Temperature: normal   Odor: none  Pain: mild and during dressing change  Pain interventions prior to dressing change: patient tolerated well, slow and gentle cares , and distraction  Treatment goal: Infection control/prevention, Maintain (prevention of deterioration), Protection, and Simplify plan of care  STATUS: initial assessment  Supplies ordered: gathered, at bedside, supplies stored on unit, discussed with RN, and discussed with patient          Treatment Plan:     Sacral wound(s): BID and prn  Cleanse wound with Vashe  Protect periwound with CriticAid barrier paste  Lightly pack wound (including all undermining) with approximately one half of a kerlix roll slightly moistened with Vashe   Cover with Abd pad and secure with tape    Left hip and right mid back wounds: Every other day and prn  Cleanse wounds with Vashe  Cover left hip with a Sacral Mepilex  Cover right mid back with a 4x4 Mepilex  Please also keep prominent central portion of spine covered with Sacral Mepilex for protection  Follow PIP plan    Pressure Injury Prevention (PIP) Plan:  If patient is declining pressure injury prevention interventions: Explore reason why and address patient's concerns, Educate on pressure injury risk and prevention intervention(s), and If patient is still declining, document \"informed refusal\"   Mattress: Follow bed algorithm, add Low Air Loss (Air+) mattress pump if skin is very moist or constantly moist.   HOB: Maintain at or below 30 degrees, unless contraindicated  Repositioning in bed: Every 1-2 hours  and Raise foot of bed prior to raising head " of bed, to reduce patient sliding down (shear)  Heels: Keep elevated off mattress and Pillows under calves  Protective Dressing:  Sacral Mepilex to left hip and upper back  Positioning Equipment:None  Chair positioning:  Limit sitting to a maximum of 30 minutes, but it is best to avoid sitting at all    If patient has a buttock pressure injury, or high risk for PI use chair cushion or SPS.  Moisture Management: Perineal cleansing /protection: Follow Incontinence Protocol, Avoid brief in bed, Clean and dry skin folds with bathing , and Moisturize dry skin  Under Devices: Inspect skin under all medical devices during skin inspection , Ensure tubes are stabilized without tension, and Ensure patient is not lying on medical devices or equipment when repositioned  Ask provider to discontinue device when no longer needed.     Orders: Written    RECOMMEND PRIMARY TEAM ORDER: None, at this time  Education provided: importance of repositioning, plan of care, wound progress, Infection prevention , Moisture management, Hygiene, and Off-loading pressure  Discussed plan of care with: Patient, Family, and Nurse  WOC nurse follow-up plan: weekly  Notify WOC if wound(s) deteriorate.  Nursing to notify the Provider(s) and re-consult the WOC Nurse if new skin concern.    DATA:     Current support surface: Standard  Standard gel mattress (Isoflex)  Containment of urine/stool: Incontinent pad in bed and Indwelling catheter  BMI: Body mass index is 26.19 kg/m .   Active diet order: Orders Placed This Encounter      Combination Diet Regular Diet Adult      Diet     Output: I/O last 3 completed shifts:  In: 120 [P.O.:120]  Out: 400 [Urine:400]     Labs:   Recent Labs   Lab 10/24/24  0931 10/20/24  0744 10/19/24  0847   HGB  --   --  7.8*   INR 2.93*   < > 3.10*   WBC  --   --  7.2    < > = values in this interval not displayed.     Pressure injury risk assessment:   Sensory Perception: 2-->very limited  Moisture: 2-->very moist  Activity:  1-->bedfast  Mobility: 2-->very limited  Nutrition: 2-->probably inadequate  Friction and Shear: 1-->problem  Roscoe Score: 10    Rose Segundo RN CWCN  Pager no longer is use, please contact through [employer] group: WO Nurse Melody

## 2024-10-24 NOTE — PROGRESS NOTES
[facility]    Medicine Progress Note - Hospitalist Service    Date of Admission:  10/16/2024    Assessment & Plan     Feng Wynne is a 79 year old male with significant past medical history for mechanical mitral valve replacement on chronic anticoagulation with warfarin, A-fib, HFpEF, coronary artery disease, hypertension, hyperlipidemia, Chronic sacral decubitus non-healing wound presenting for supratheraputic INR despite holding warfarin since 10/14/24 and patient has been seen by multiple services during the hospital stay including PT, OT, ID, surgery and he needs 24/7 care and has underlying cognitive impairment and he is not decisional and son Sedrick and partner are on board with finding him long-term care and patient will be discharging on hospice      Nonbleeding supratheraputic INR  Mechanical mitral valve replacement on chronic anticoagulation with warfarin (INR goal 2.5-3.5)   Pt presented to the ED with elevated INRs. His INR was found to be high today at 8.33.  His INR has been slightly trending up over the last 2 weeks and not improving after holding warfarin since 10/14.   * Suspect supratherapeutic INR is 2/2 nutritional deficiencys. Pt appears to be severely malnourished with muscle wasting and cachexia, though does have a lot of redundant skin so does not appear objectively thin. Pt himself reports that he eats really well at home, but does follow a vegetarian diet. His partner however reports that he barely eats due to fear of gaining weight.   - Vit K - was given 1mg IV x 1 10/16/24 with improvement in INR  -Coumadin continued as per pharmacy  Patient has mechanical mitral valve so needs to continue Coumadin even on discharge on hospice while he is able to take oral medications      Hypokalemia-resolved  Hypomagnesemia-resolved  Hyponatremia  Metabolic Alkalosis  -suspect 2/2 poor po intake   - PTA torsemide and spironolactone on hold - continue to hold now due to poor  oral intake    Diarrhea  -As per nursing staff patient has been having diarrhea for the past many days and C. difficile was tested during this hospital stay and was negative  -Will try Imodium    Moderate malnutrition in the context of chronic illness   Possible disordered eating, anorexia   Hypoalbuminemia  -Decreased oral intake at home as he has been taking only 50% of the meals  -Nutrition has been consulted-and Remeron was started at bedtime for appetite stimulating properties    Generalized Weakness  Bedbound status   -Will need long-term placement    Stage 4 sacral ulcer 2/2 immobility (see above).   -See WOC notes for photos. Pt has seen a plastic surgeon Dr. Srinivasan recently who reported being very hesitant to proceed with any intraoperative debridement or definitive soft tissue flap coverage due to patients overall frail and debiliated state.   - No indication for abx based on lack of infection signs from the ulcer  - WOC and surgery were consulted  -Surgery has spoken with the family and given goals of care no plan for any surgical intervention and surgery has signed off    Acute on chronic hypotension   -Patient during the hospital stay had episode of hypotension and received IV fluid  - Continue PTA midodrine      Chronic Afib  HFpEF  Chronic LE edema  CAD  HTN  HLD   Chronic Hypotension  - TTE showed EF 50-55 in 9/2024  - resumed midodrine tid  - Holding PTA diuretics, as above     Chronic indwelling branch catheter   Enterococcus Faecalis + urine culture  -UA was abnormal on admission Concern for possible UTI  No symptoms or other signs/symptoms of infection  -Urine culture positive for E. Fecalis, ? Colonization   -ID saw the patient and Enterococcus with chronic indwelling Branch catheter was thought to be colonization.  Antibiotics discontinued by ID      Suspected Dementia/cognitive impairment   *-Previous SLUM score is 23-25   *OT again saw the patient on 10/20 and they recommended discharge to a  place where there is 24/7 care  - Pt is clearly not decisional and unable to make his own medical decisions.    - LTC placement recommended and family is in agreement with discharge with hospice     Chronic normocytic anemia, at baseline Hb 8  - continue to monitor     BPH  -Continue PTA avodar    Large Ventral Hernia   -Noted, soft, non-painful           Diet: Combination Diet Regular Diet Adult  Room Service  Snacks/Supplements Adult: Other; L: pineapple GelateinPLUS,  D: van Magic Cup; With Meals  Diet    DVT Prophylaxis: Warfarin  Stubbs Catheter: PRESENT, indication: Acute retention or obstruction  Lines: None     Cardiac Monitoring: None  Code Status: No CPR- Do NOT Intubate      Clinically Significant Risk Factors               # Hypoalbuminemia: Lowest albumin = 2.4 g/dL at 10/16/2024  6:25 PM, will monitor as appropriate     # Hypertension: Noted on problem list  # Chronic heart failure with preserved ejection fraction: heart failure noted on problem list and last echo with EF >50%          # Overweight: Estimated body mass index is 26.19 kg/m  as calculated from the following:    Height as of this encounter: 1.829 m (6').    Weight as of this encounter: 87.6 kg (193 lb 2 oz).   # Moderate Malnutrition: based on nutrition assessment    # Financial/Environmental Concerns: none         Disposition Plan     Medically Ready for Discharge: Ready Now             Cindy Rea MD  Hospitalist Service  [facility]  Securely message with [employer] (more info)  Text page via [employer] Paging/Directory     This note was completed in part using dictation via the Dragon voice recognition software. Some word and grammatical errors may occur and must be interpreted in the appropriate clinical context. If there are any questions pertaining to this issue, please contact me for further clarification.    ______________________________________________________________________    Interval History     -Reviewed  events and mentioned 5/10 pain but denied any intervention  -Having bowel movements and eating when he was seen   - I have known laurence from last visit and he also recognized me  - partner martinez also recognized me     Physical Exam   Vital Signs: Temp: 98  F (36.7  C) Temp src: Oral BP: 96/67 Pulse: 74   Resp: 16 SpO2: 95 % O2 Device: None (Room air)    Weight: 193 lbs 1.97 oz        General: Aox2-3 looks frail  Respiratory: He is on room air and does not seem to be in any distress  Cardiovascular: Regular rate , S1 and S2 normal with no murmer or rubs or gallops  Abdomen:   soft , non tender   Skin: Dressing over the sacral area  Neurologic: No focal deficit  Musculoskeletal: Normal Range of motion over upper and lower extremities bilaterally   Psychiatric: cooperative      Medical Decision Making       Time spent in care of patient is 40 minutes and I have reviewed his hospital stay and reviewed notes from the consulting team and discussed with nurse and patient and social work team      Data     I have personally reviewed the following data over the past 24 hrs:    INR:  N/A PTT:  N/A   D-dimer:  N/A Fibrinogen:  N/A       Imaging results reviewed over the past 24 hrs:   No results found for this or any previous visit (from the past 24 hours).

## 2024-10-24 NOTE — PROGRESS NOTES
"Care Management Follow Up    Length of Stay (days): 8    Expected Discharge Date: 10/25/2024     Concerns to be Addressed: discharge planning     Patient plan of care discussed at interdisciplinary rounds: Yes    Anticipated Discharge Disposition: Home Care     Anticipated Discharge Services: PCA, Home Care  Anticipated Discharge DME: None    Patient/family educated on Medicare website which has current facility and service quality ratings: no  Education Provided on the Discharge Plan: Yes  Patient/Family in Agreement with the Plan: yes    Referrals Placed by CM/SW: Homecare  Private pay costs discussed: private room/amenity fees and transportation costs    Discussed  Partnership in Safe Discharge Planning  document with patient/family: No     Handoff Completed: No, handoff not indicated or clinically appropriate    Additional Information:  DIONY received a missed call from [facility]. DIONY called them back at [phone] and [facility] requesting a call back. DIONY called pt's son, Sedrick, to ask about hospice preferences. Sedrick said he is working with Jose (pt's partner) and pt to decide. Sedrick told DIONY Garcia might be with pt. DIONY went to pt room and spoke with pt. Pt was confused and kept stating the hospice facilities were \"all full.\" DIONY told pt there are two options for hospice. Pt asked SW to come back later. DIONY then called Jose, pt's partner. Jose said they have not decided what facility they want yet, either [facility] or [facility]. Jose said he lives in [place] and wants to visit pt everyday but he does not drive, so the facility must be on the bus line. Jose said both are accessible by bus. Jose asked for DIONY to call back in the morning to check in.    Next Steps: Continue to plan for safe hospice discharge. Call Jose on 10/25 in the AM for facility preferences. F/U with [facility].     KASSANDRA Arvizu  [facility]  Inpatient Care Management   "

## 2024-10-25 NOTE — PROGRESS NOTES
Patient discharged at 3:55 PM to [facility].   Agency: [facility]. IV was discontinued. Pain at time of discharge was 0/10. Belongings returned to patient.  Discharge instructions and medications reviewed with patient.  Patient verbalized understanding and all questions were answered.  Prescriptions given to patient.  At time of discharge, patient condition was stable and left the unit via MH stretcher.

## 2024-10-25 NOTE — PROGRESS NOTES
Care Management Discharge Note    Discharge Date: 10/25/2024       Discharge Disposition: Home Care    Discharge Services: PCA, Home Care    Discharge DME: None    Discharge Transportation: family or friend will provide    Private pay costs discussed: transportation costs    Does the patient's insurance plan have a 3 day qualifying hospital stay waiver?  No    PAS Confirmation Code: [id number]  Patient/family educated on Medicare website which has current facility and service quality ratings: no    Education Provided on the Discharge Plan: Yes  Persons Notified of Discharge Plans: Patient/Partner/Son Sedrick  Patient/Family in Agreement with the Plan: yes    Handoff Referral Completed: No, handoff not indicated or clinically appropriate    Additional Information:  SW informed family would prefer to move forward with [facility] and [facility] . They are in agreement with costs associated and down payment of $5,000. SW discussed transport and they would like MH stretcher transport and are aware/in agreement with possible costs associated.  DIONY scheduled MH stretcher transport for [id number] to [facility]. DIONY completed PCS. SW received discharge orders and faxed to [facility] and [facility]. Family will have an intake with [facility] at 1800 today. Patient/family in agreement with discharge via MH stretcher to [facility] between [id number] with plans to sign on with [facility] at 1800 tonight. DIONY requested 3 day supply of comfort meds filled here and sent with.     KASSANDRA Cortez    [facility]

## 2024-10-25 NOTE — DISCHARGE INSTRUCTIONS
Wound care  PER UNIT ROUTINE        Comments: Left hip and right mid back wounds: Every other day and prn  1. Cleanse wounds with Vashe  2. Cover left hip with a Sacral Mepilex  3. Cover right mid back with a 4x4 Mepilex  4. Please also keep prominent central portion of spine covered with Sacral Mepilex for protection  5. Follow PIP plan          Wound care  PER UNIT ROUTINE        Comments: Sacral wound(s): BID and prn  1. Cleanse wound with Vashe  2. Protect periwound with CriticAid barrier paste  3. Lightly pack wound (including all undermining) with approximately one half of a kerlix roll slightly moistened with Vashe  4. Cover with Abd pad and secure with tape

## 2024-10-25 NOTE — PROGRESS NOTES
Care Management Follow Up    Length of Stay (days): 9    Expected Discharge Date: 10/25/2024     Concerns to be Addressed: discharge planning     Patient plan of care discussed at interdisciplinary rounds: Yes    Anticipated Discharge Disposition: Hospice              Anticipated Discharge Services: PCA, Home Care  Anticipated Discharge DME: None    Patient/family educated on Medicare website which has current facility and service quality ratings: no  Education Provided on the Discharge Plan: Yes  Patient/Family in Agreement with the Plan: yes    Referrals Placed by CM/SW: Homecare  Private pay costs discussed: Not applicable    Discussed  Partnership in Safe Discharge Planning  document with patient/family: No     Handoff Completed: No, handoff not indicated or clinically appropriate    Additional Information:  SW spoke with patients Partner Jose and informed they would like to move forward with the [facility] and are in agreement with the $5,000 deposit. SW messaged [facility] Liaison to update on this and inquired about timeline for admissions    Next Steps: Hospice Discharge.    KASSANDRA Cortez    [facility]

## 2024-10-25 NOTE — PROGRESS NOTES
Patient discharged at 4:32 PM to [facility] to [facility] care.   Agency: [facility] .  Discharged to home  IV was discontinued. Pain at time of discharge was 0/10. Belongings returned to patient.  Discharge instructions and medications reviewed with patient.  Patient verbalized understanding and all questions were answered.  Prescriptions given to patient.  At time of discharge, patient condition was stable and left the unit escorted by KHADAR skelton.

## 2024-10-25 NOTE — PLAN OF CARE
10/23/24-10/24/24 4397-1470    Orientation: A/O x2 (person and place); forgetful/intermittent confusion   Aggression Stop Light: Green  Activity: A2 with a lift; q2 repos  Diet/BS Checks: Reg with room service   Tele: N/A  IV Access/Drains: L PIV SL  Pain Management: Pt states pain at a 5/10; refuses pharmacological interventions  Abnormal VS/Results: VSS; RA  Bowel/Bladder: Incont of bowel, branch in place; 4 loose/watery stools  Skin/Wounds: PI located on sacrum/coccyx, abrasions on L hip; WC completed this shift.   Consults: Palliative, SW  D/C Disposition: Pending placement, LTC with hospice                
"Physical Therapy: Orders received. Chart reviewed and discussed with care team.? Physical Therapy not indicated.? Long conversation had with pt and pt's .  Details are very unclear despite a ~15 min conversation. Pt appears cognitively impaired as he jumps from one thought to another.   jumps in occasionally to clarify and contradict pt. Pt perseverating on having surgery for his wound.  He states before and after surgery he was told he should not get out of bed.  At current baseline, pt is bed bound at home since January 2024.  He and  cannot/will not elaborate on why he has chosen to be bed bound and what/when this has progressed.  Defer discharge recommendations to OT and medical team.  Because pt is adamant about not getting out of bed by orders of his \"surgeon\" he is unwilling to participate in PT and is at his baseline already. Hand off given to SW regarding inconsistencies of the conversation.?It seems the patient has other social situation factors he needs to fix.  In the mean time PT would recommend back to home with  with increased services or LTC to better care for pt.  Will complete orders.        "
10/21/-9371    Summary: Pt presented to the ED with elevated INRs.     Primary Diagnosis: Nonbleeding supratheraputic INR, Mechanical mitral valve replacement on chronic anticoagulation with warfarin, Stage 4 sacral ulcer, Moderate malnutrition in the context of chronic illness   Possible disordered eating, anorexia     Orientation: A&Ox2; disoriented to situation. Pt is very confused today    Aggression Stop Light: Green    Activity: A2-3 lift. T/R Q2H    Diet/BS Checks: Reg, fair appetite    Tele: NA    IV Access/Drains: L PIV SL    Pain Management: Denies    Abnormal VS/Results: VSS on RA, soft BP's, Temp-100.1-99.2    Bowel/Bladder: Incont B&B, loose BM this shift. Stubbs in place    Skin/Wounds: Scattered bruising, golf ball size hollow PI to sacrum/coccyx, skin tear and scraps on bottom and upper back, L hip, BLE 3+ edema    Consults: PT/OT, ID, WOC, SW, Surg, Palliative    D/C Disposition: LTC w/ hospice care. MA application pending.    Other Info: Dressing done twice at bottom. All other dressing changes done.   
10/23/-8857     Summary: Pt presented to the ED with elevated INRs.      Primary Diagnosis: Nonbleeding supratheraputic INR, Mechanical mitral valve replacement on chronic anticoagulation with warfarin, Stage 4 sacral ulcer, Moderate malnutrition in the context of chronic illness   Possible disordered eating, anorexia      Orientation: A&Ox3; disoriented to situation. Pt is very confused today     Aggression Stop Light: Green     Activity: A2-3 lift. T/R Q2H     Diet/BS Checks: Reg, fair appetite     Tele: NA     IV Access/Drains: L PIV SL     Pain Management: Denies, Dilaudid before dressing changes if requested     Abnormal VS/Results: VSS on RA, soft BP's     Bowel/Bladder: Incont B&B,  Stubbs in place     Skin/Wounds: Scattered bruising, golf ball size hollow PI to sacrum/coccyx, skin tear and scraps on bottom and upper back, L hip, BLE 3+ edema     Consults: PT/OT,  WOC, SW, Surg,      D/C Disposition: LTC w/ hospice care.     Other Info: Dressing done twice at bottom. All other dressing changes done.     
10/24/24 -- 2767-3790    Orientation: Disoriented to situation, forgetful, disorganized thinking   Aggression Stop Light: Green  Activity: A2 w/ lift, T/R q2h  Diet/BS Checks: Regular with room service. Poor appetite per report  Tele: N/A  IV Access/Drains: L PIV SL  Pain Management: Leg tenderness, refused any interventions  Abnormal VS/Results: VSS on RA except soft BPs  Bowel/Bladder: Incontinent of bowel, 2 BM's this shift, branch in place.   Skin/Wounds: Scattered bruising, stg 4 PI to sacrum/coccyx, skin tear and scraps on bottom and upper back, L hip, BLE 3+ edema  Consults: Palliative, SW  D/C Disposition: Pending placement, LTC with hospice     
8502-3579  Orientation: a&ox4  Aggression Stop Light:   Activity: a2 lift  Diet/BS Checks: regular  Tele:  n/a  IV Access/Drains: L PIV  Pain Management: denies  Abnormal VS/Results: VSS on 1L via nc. Soft BP  Bowel/Bladder: incont B/B. Stubbs in place  Skin/Wounds: stage 5 ulcer on sacrum/coccyx and abrasions on bottom  Consults: WOC  D/C Disposition: pending  Other Info:     -IV potassium replacement. Recheck at 10 am      
Goal Outcome Evaluation:         10/17/24 4582-3034      Orientation: A/Ox3-4. Forgetful.   Aggression Stop Light: green  Activity: A2-3 lift. Q2h turn/repo as pt allows.  Diet/BS Checks: regular, tray set up. Good appetite. Whole pill 1 at a time with water.   Tele:  N/A  IV Access/Drains: PIV SL patent.   Pain Management: Denies pain at rest. Pain when turn/repo at the back. Refused pain med.   Abnormal VS/Results: VSS on RA.   Bowel/Bladder: Incont. Chronic Stubbs in place. No BM.   Skin/Wounds: scattered bruising. A few Mepilex to back CDI. Stage 5 PI golf ball size hollow to coccyx/sacrum - wound cleansed done. WOC RN assessed and did wound care this shift.   Consults: PT, OT, WOC, CM, nutrition.  D/C Disposition: pending.   Other Info:     - On High replacement for K and Mg. Replaced K x1, ordered recheck K @ 21:00. Replaced Mg x1.                
Goal Outcome Evaluation:       Patient appears resting well, able to wake up easily. Disoriented to detail. Vitals are with in normal limits. Denies pain. Turn and repositioning. Patient differs turn and repo. Non healing sacral wound. Stubbs with good urine output. Plan for LTC placement.                  
Goal Outcome Evaluation:      Plan of Care Reviewed With: patient    Overall Patient Progress: no change    10/21/24 8772-4714    Summary: Pt presented to the ED with elevated INRs.     Primary Diagnosis: Nonbleeding supratheraputic INR, Mechanical mitral valve replacement on chronic anticoagulation with warfarin, Stage 4 sacral ulcer, Moderate malnutrition in the context of chronic illness   Possible disordered eating, anorexia     Orientation: A&Ox2; disoriented to situation. Intermittent confusion  Pain: Denied   Aggression Stop Light: Green  Activity: A2-3 lift. T/R Q2H  Diet/BS Checks: Reg,ular; Did not eat this shift  Tele: NA  IV Access/Drains: L PIV SL  Abnormal VS/Results: VSS on RA  Bowel/Bladder: Incont B&B; No BM this shift Stubbs in place  Skin/Wounds: Scattered bruising, golf ball size hollow PI to sacrum/coccyx, skin tear and scraps on bottom and upper back, L hip, BLE 3+ edema; Wound Cares down per order on day shift, bandage is CDI and no PRN wound care was performed; New Mepilexs added to abrasion on L Thigh  Consults: PT/OT, ID, WOC, SW, Surg, Palliative  D/C Disposition: LTC vs Home w/ home care  Other Info: Dressing changed by Daytime RN; Dressing CDI    
Goal Outcome Evaluation:      Plan of Care Reviewed With: patient, family    Overall Patient Progress: no changeOverall Patient Progress: no change                   Summary: Pt presented to the ED with elevated INRs, 8.83.     Primary Diagnosis: Nonbleeding supratheraputic INR, Mechanical mitral valve replacement on chronic anticoagulation with warfarin, Stage 4 sacral ulcer, Moderate malnutrition in the context of chronic illness   Possible disordered eating, anorexia      Orientation: A&Ox2; disoriented to time/situation. Intermittent confusion  Pain: rated 5/10, PO dilaudid given x 1 with some relief, 3/10.  Aggression Stop Light: Green  Activity: A2-3 lift. T/R Q2H  Diet/BS Checks: Regular; ate 50% of breakfast, refused lunch.  Tele: NA  IV Access/Drains: L PIV SL  Abnormal VS/Results: VSS on RA, lungs clear, BP runs soft.  Bowel/Bladder: Incont B&B; had 2 loose incontinent BM's this shift, Stubbs in place with clear yellow urine.  Skin/Wounds: Scattered bruising, large hollow PI to sacrum/coccyx, skin tear and scraps on bottom and upper back, L hip, BLE 3+ edema; large abdominal hernia. Wound Cares done at noon per POC.  Consults: PT/OT, ID, WOC, SW, Surg, Palliative  D/C Disposition: LTC vs Home w/ home care with hospice.  Other Info: INR 3.23, Hgb 7.8, Na+ 129.                  
Goal Outcome Evaluation:    10/18/24  8914-0419      Orientation: A/Ox3-4, int confusion to time, orientation fluctuates  Aggression Stop Light: green  Activity: A2/lift T/R  Diet/BS Checks: Regular  Tele:  none  IV Access/Drains: L PIV SL  Pain Management: noted discomfort during turns and repos-refused intervention  Abnormal VS/Results: VSS,RA  Bowel/Bladder: inc bowel-BM 2x, branch in place  Skin/Wounds: scattered bruising- sacral pressure injury-dressing changed 2x, bilat BLE edema  Consults: PT/OT/ID/WOC/SW  D/C Disposition: pending  Other Info: K/Mag protocol-awaiting blood draws  
Goal Outcome Evaluation:    10/19/24  7241-1044        Orientation: A/Ox3-4, int confusion to situation orientation fluctuates  Aggression Stop Light: green  Activity: A2/lift T/R   Diet/BS Checks: Regular, fair appetite  Tele:  none  IV Access/Drains: L PIV SL  Pain Management: noted discomfort during turns and repos-refused intervention  Abnormal VS/Results: VSS,exc HTN, RA  Bowel/Bladder: inc bowel-BM 1x-mucoid green watery, branch in place with adequate uo  Skin/Wounds: scattered bruising and abrasions, sacral pressure injury-dressing changed 1x, bilat BLE edema  Consults: PT/OT/ID/WOC/SW  D/C Disposition: pending improvement    Other Info: K 3.7-replaced 1x-recheck this am  Mag protocol-WDL,recheck this am  -Refuses turns/repos at times  - Negative Cdiff  
Goal Outcome Evaluation:    Orientation: A&Ox2, disoriented to time and situation  Aggression Stop Light: Green  Activity: A2 lift, Q 2H T&R  Diet/BS Checks: Regular   Tele:  n/a  IV Access/Drains: LPIV  SL   Pain Management: denied pain this shift  Abnormal VS/Results: VSS ex soft BP, on RA. INR 2.98  Bowel/Bladder: Stubbs in place, no bm this shift  Skin/Wounds: Sacral PI CDI, mepi's to hips CDI   Consults: Palliative/SW/WOC  D/C Disposition: pending         
Goal Outcome Evaluation:    Orientation: A&Ox3; disoriented to situation. Intermittent confusion  Aggression Stop Light: Green  Activity: A2-3 lift. T/Rq2  Diet/BS Checks: Reg  Tele: NA  IV Access/Drains: L PIV SL  Pain Management: Denies  Abnormal VS/Results: VSS on RA  Bowel/Bladder: Incont b/b. 1 loose BM this shift. Stubbs in place  Skin/Wounds: Scattered bruising, golf ball size hollow PI to sacrum/coccyx, skin tear and scraps on bottom, BLE 3+ edema  Consults: PT/OT, ID, WOC, SW  D/C Disposition: Pending  Other Info:  -enteric precautions, rule out C. diff     
Goal Outcome Evaluation:    Orientation: A/Ox2, disoriented to time, situation and place  Aggression Stop Light: green  Activity: A2/lift T/R, total care  Diet/BS Checks: Regular  Tele:  none  IV Access/Drains: L PIV SL  Pain Management: denies pain this shift  Abnormal VS/Results: VSS, RA  Bowel/Bladder: incontinent, 1 bm this shift, branch in place with adequate uo  Skin/Wounds: scattered bruising and abrasions, sacral pressure injury-dressing CDI, BLE edema  Consults:ID/WOC/SW following  D/C Disposition: pending improvement  Others: K/Mag protocol rechecks this morning  
Goal Outcome Evaluation:  10/20/24  2417-1736        Orientation: A/Ox2, disoriented to time and place  Aggression Stop Light: green  Activity: A2/lift T/R, total care  Diet/BS Checks: Regular, poor appetite  Tele:  none  IV Access/Drains: L PIV SL  Pain Management: c/o pain at the sacral area-Dilaudid PO PRN 1x  Abnormal VS/Results: VSS, RA  Bowel/Bladder: inc bowel-BM 1x-mucoid/watery, branch in place with adequate uo  Skin/Wounds: scattered bruising and abrasions, sacral pressure injury-dressing changed 1x, bilateral BLE edema  Consults:ID/WOC/SW  D/C Disposition: pending improvement  Others: K/Mag protocol-WDL-rechecks in am  
Goal Outcome Evaluation:  DATE & TIME:10/18/24, 1171-5470  Cognitive Concerns/ Orientation:A/Ox3-4, confused  BEHAVIOR & AGGRESSION TOOL COLOR:Green  ABNL VS/O2:VSS on RA  MOBILITY:Ax2 with lift, T/R  PAIN MANAGMENT:Denies  DIET: Regular  BOWEL/BLADDER:Incontinent, branch in place, Loose BM x1  DRAIN/DEVICES:PIV SL  TELEMETRY RHYTHM: n/a  SKIN:  Stage 5 PI golf ball size hollow to coccyx/sacrum  TESTS/PROCEDURES:K protocols- replaced, mag protocols (recheck AM)  D/C DATE: Pending    
Goal Outcome Evaluation:  Progressing    10/16/24 admit, elevated INR  1019/24, 7 a to 3 p    Orientation: Intermittently confused    Vitals/Tele: BP soft on Midodrine    IV Access/drains: RUPAL access SL    Diet: Regular, tolerating    Mobility: Bed bound baseline    GI/: Multiple loose stools, chronic branch    Wound/Skin: PI in sacrum, dressing change done    Consults: ID,PT, OT, WOC, SW    Discharge Plan: TBD    K and Mag replaced        See Flow sheets for assessment                           
Orientation: A&O X3, Intermittently confused  Aggression Stop Light: Green  Activity: Ax2-3 with lift, T&R q2h  Diet/BS Checks: Regular diet  Tele:  N/A  IV Access/Drains: L PIV SL  Pain Management: Denies pain this shift  Abnormal VS/Results: VSS on RA ex soft Bps. On Mag and K+ replacement, both replaced this shift, rechecks in AM  Bowel/Bladder: Incontinent of Bowel. Pt had 2 large loose stool this shift. Stubbs in place  Skin/Wounds: Scattered bruising, Golf size hollow PI to sacrum/coccyx, skin tear and scrapes to buttocks- wound cares done x3  this shift . +3 edema to LLE  Consults: WOC, SW  D/C Disposition: Pending  Other Info:   
Orientation: A&Ox2, disoriented to time and situation at times  Aggression Stop Light: Green  Activity: A2 lift, T&R  Diet/BS Checks: Regular   Tele:  n/a  IV Access/Drains: LPIV CDI SL   Pain Management: Prn PO dilaudid given x1  Abnormal VS/Results: VSS ex soft BP, on RA. INR 2.98  Bowel/Bladder: Stubbs CDI and patent, incontinent of BM  Skin/Wounds: Sacral PI changed, mepi's to hips CDI   Consults: Palliative/SW/WOC  D/C Disposition: pending     
Orientation: A&Ox3, disoriented to situation, intermittently consfused  Aggression Stop Light: Green  Activity: Ax2 with lift T&R q2h  Diet/BS Checks: Regular diet  Tele:  N/A  IV Access/Drains: L PIV SL  Pain Management: c/o pain in buttocks, PRN oral dilaudid given x1  Abnormal VS/Results: VSS on RA ex hypotension, On K+ and Mag replacement, K+ replaced x1 this shift, rechecks in for AM  Bowel/Bladder: Incontinent of B/B, pt had 2 loose BM this shift. Stubbs in place   Skin/Wounds:  Scattered bruising, Golf size hollow PI to sacrum/coccyx, skin tear and scrapes to buttocks- wound cares done, dressing CDI. +3 edema to BLE  Consults: ID, PT/OT, WOC  D/C Disposition: Plan to discharge to LTC- See MD note  Other Info:  Pt had an episode of hypotension this shift, 500ml bolus and scheduled midodrine given,recheck BP= 101/65  
Orientation: Disoriented to situation, forgetful, disorganized thinking   Aggression Stop Light: Green  Activity: A2 lift, T/R  Diet/BS Checks: Reg with room service   Tele: N/A  IV Access/Drains: L PIV SL  Pain Management: Chronic pain with movement   Abnormal VS/Results: Soft BP. INR 2.93  Bowel/Bladder: Incont of bowel, multiple BM's, branch in place. PRN imodium ordered and given   Skin/Wounds: Scattered bruising, stg 4  PI to sacrum/coccyx, skin tear and scraps on bottom and upper back, L hip, BLE 3+ edema  Consults: Palliative, SW  D/C Disposition: Pending placement, LTC with hospice            
Orientation: Disoriented to situation, forgetful, disorganized thinking   Aggression Stop Light: Green  Activity: A2 lift, T/R  Diet/BS Checks: Reg with room service   Tele: N/A  IV Access/Drains: L PIV SL  Pain Management: Denies  Abnormal VS/Results: Soft BP. INR 2.7  Bowel/Bladder: Incont of bowel, branch in place  Skin/Wounds: Scattered bruising, stg 4, golf ball-size PI to sacrum/coccyx, skin tear and scraps on bottom and upper back, L hip, BLE 3+ edema  Consults: Palliative, SW  D/C Disposition: Pending placement, LTC with hospice     Other Info:   -Code status changed to DNR/DNI tonight   
Primary Diagnosis: Nonbleeding supratheraputic INR, Mechanical mitral valve replacement on chronic anticoagulation with warfarin, Stage 4 sacral ulcer, Moderate malnutrition in the context of chronic illness   Possible disordered eating, anorexia   10/25/2024 5590-2990  Orientation: A&O 2, disoriented to situation/time, forgetful, disorganized thinking. Intermittent confusion   Aggression Stop Light: Green  Activity: A2 w/ lift, T/R q2h in bed  Diet/BS Checks: Regular with room service. Very poor appetite  Tele: N/A  IV Access/Drains: PIV removed for discharge  Pain Management: Leg tenderness, worse to touch, given dilaudid x1 prior to dressing changes  Abnormal VS/Results: VSS on RA   Bowel/Bladder: Incontinent of bowel, 1 smear BM this shift- less frequent stools on Imodium, branch in place (chronic)  Skin/Wounds: Scattered bruising, Stage 4 PI to sacrum/coccyx- dressing changed x1- needs to be done this evening; skin tear and scraps on bottom and upper back, L hip- dressings CDI need to be changed 10/26, BLE +2/3 edema  Consults: Palliative, SW  D/C Disposition: today to LTC with hospice [id number] via stretcher  Other Info: Significant other updated bedside. Discharge AVS reviewed with patient and significant other, verbalized understanding and had no further questions. Comfort medications filled in hospital and packet needs to be sent with transport on discharge. All belongings packed up, wound care supplies sent with pt belongings. Pt wanted to discharge in hospital gown           
Statement Selected

## 2024-10-25 NOTE — PROGRESS NOTES
"ANTICOAGULATION  MANAGEMENT: Discharge Review    [facility] chart reviewed for anticoagulation continuity of care    Hospital Admission on 10/16/24-10/25/24 for hypokalemia, elevated INR, stage 4 pressure ulcer, urinary tract infection.    Discharge disposition:  Assisted Living    [facility] (SNF)  Services: Skilled Nursing  [address] [place] [address] [phone]    Per 10/16/24 hospital encounter:   \"Nonbleeding supratheraputic INR  Mechanical mitral valve replacement on chronic anticoagulation with warfarin (INR goal 2.5-3.5)   -Pt presented to the ED with elevated INRs. His INR was found to be high today at 8.33.  His INR has been slightly trending up over the last 2 weeks and not improving after holding warfarin since 10/14.   - Suspect supratherapeutic INR is 2/2 nutritional deficiencys. Pt appears to be severely malnourished with muscle wasting and cachexia, though does have a lot of redundant skin so does not appear objectively thin. Pt himself reports that he eats really well at home, but does follow a vegetarian diet. His partner however reports that he barely eats due to fear of gaining weight.   - Vit K - was given 1mg IV x 1 10/16/24 with improvement in INR  -Coumadin continued as per pharmacy  -Patient has mechanical mitral valve so needs to continue Coumadin even on discharge on hospice while he is able to take oral medications\"    Results:    Recent labs: (last 7 days)     10/19/24  0847 10/20/24  0744 10/21/24  0739 10/22/24  0928 10/23/24  0912 10/24/24  0931 10/25/24  0926   INR 3.10* 3.15* 3.23* 2.98* 2.70* 2.93* 2.65*     Anticoagulation inpatient management:     Patient was reversed with 1 mg/mL of IV phytonadione on 10/16/24. Warfarin held 10/16/24 and 10/17/24. Patient resumed warfarin on 10/18/24. Overall, patient received less warfarin when compared to PTA maintenance dose.    Anticoagulation discharge instructions:     Warfarin dosing: home regimen " continued   Bridging: No   INR goal change: No      Medication changes affecting anticoagulation: Yes: Torsemide discontinued which can decrease INR. Remeron started which can increase INR.    Additional factors affecting anticoagulation: Yes: malnourishment may effect INR     PLAN     Agree with dosing adjustment on discharge    Left a detailed message for [facility] nursing staff- need to clarify if [facility] will continue to manage warfarin, or if in-house provider will be doing so    Anticoagulation Calendar updated    Gaviota Weston RN  10/25/2024  Anticoagulation Clinic  [facility] for routing messages: mani MARK  [facility] patient phone line: [phone]

## 2024-10-25 NOTE — DISCHARGE SUMMARY
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    Weight as of this encounter: 87.6 kg (193 lb 2 oz).  # Moderate Malnutrition: based on nutrition assessment      Follow-ups Needed After Discharge   Follow-up Appointments     Follow Up and recommended labs and tests      F/u with Hospice team as needed. INR checks every three days        {Additional follow-up instructions/to-do's for PCP        Unresulted Labs Ordered in the Past 30 Days of this Admission       No orders found from 9/16/2024 to 10/17/2024.        These results will be followed up by     Discharge Disposition   Discharged to home  Condition at discharge: Stable    Hospital Course     Feng Wynne is a 79 year old male with significant past medical history for mechanical mitral valve replacement on chronic anticoagulation with warfarin, A-fib, HFpEF, coronary artery disease, hypertension, hyperlipidemia, Chronic sacral decubitus non-healing wound presenting for supratheraputic INR despite holding warfarin since 10/14/24 and patient has been seen by multiple services during the hospital stay including PT, OT, ID, surgery and he needs 24/7 care and has underlying cognitive impairment and he is not decisional and  son Sedrick and partner are on board with finding him long-term care and patient will be discharging on hospice        Nonbleeding supratheraputic INR  Mechanical mitral valve replacement on chronic anticoagulation with warfarin (INR goal 2.5-3.5)   -Pt presented to the ED with elevated INRs. His INR was found to be high today at 8.33.  His INR has been slightly trending up over the last 2 weeks and not improving after holding warfarin since 10/14.   - Suspect supratherapeutic INR is 2/2 nutritional deficiencys. Pt appears to be severely malnourished with muscle wasting and cachexia, though does have a lot of redundant skin so does not appear objectively thin. Pt himself reports that he eats really well at home, but does follow a vegetarian diet. His partner however reports that he barely eats due to fear of gaining weight.   - Vit K - was given 1mg IV x 1 10/16/24 with improvement in INR  -Coumadin continued as per pharmacy  -Patient has mechanical mitral valve so needs to continue Coumadin even on discharge on hospice while he is able to take oral medications        Hypokalemia-resolved  Hypomagnesemia-resolved  Hyponatremia  Metabolic Alkalosis  -suspect 2/2 poor po intake   - PTA torsemide and spironolactone were held and discontinued at discharge given chronic hypotension     Diarrhea  -As per nursing staff patient has been having diarrhea for the past many days and C. 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   -See C notes for photos. Pt has seen a  plastic surgeon Dr. Srinivasan recently who reported being very hesitant to proceed with any intraoperative debridement or definitive soft tissue flap coverage due to patients overall frail and debiliated state.   - No indication for abx based on lack of infection signs from the ulcer  - WOC and surgery were consulted  -Surgery has spoken with the family and given goals of care no plan for any surgical intervention and surgery has signed off     Acute on chronic hypotension   -Patient during the hospital stay had episode of hypotension and received IV fluid  - Continue PTA midodrine      Chronic Afib  HFpEF  Chronic LE edema  CAD  HTN  HLD   Chronic Hypotension  - TTE showed EF 50-55 in 9/2024  -Diuretics held     Chronic indwelling branch catheter   Enterococcus Faecalis + urine culture  -UA was abnormal on admission Concern for possible UTI  No symptoms or other signs/symptoms of infection  -Urine culture positive for E. Fecalis, ? Colonization   -ID saw the patient and Enterococcus with chronic indwelling Branch catheter was thought to be colonization.    -Antibiotics discontinued by ID     Suspected Dementia/cognitive impairment   -Previous SLUM score is 23-25   -OT again saw the patient on 10/20 and they recommended discharge to a place where there is 24/7 care  - Pt is clearly not decisional and unable to make his own medical decisions.    - LTC placement recommended and family is in agreement with discharge with hospice     Chronic normocytic anemia, at baseline Hb 8  - continue to monitor     BPH  -Continue PTA avodar     Large Ventral Hernia   -Noted, soft, non-painful               Consultations This Hospital Stay   NUTRITION SERVICES ADULT IP CONSULT  PHARMACY TO DOSE PHYTONADIONE  WOUND OSTOMY CONTINENCE NURSE  IP CONSULT  CARE MANAGEMENT / SOCIAL WORK IP CONSULT  PHYSICAL THERAPY ADULT IP CONSULT  OCCUPATIONAL THERAPY ADULT IP CONSULT  PHARMACY TO DOSE WARFARIN  INFECTIOUS DISEASES IP CONSULT  OCCUPATIONAL  THERAPY ADULT IP CONSULT  SURGERY GENERAL IP CONSULT  CARE MANAGEMENT / SOCIAL WORK IP CONSULT  PALLIATIVE CARE ADULT IP CONSULT  PALLIATIVE CARE ADULT IP CONSULT    Code Status   No CPR- Do NOT Intubate    Time Spent on this Encounter   I, Cindy Rea MD, personally saw the patient today and spent greater than 30 minutes discharging this patient.       Cindy Rea MD  [place] [address] ONCOLOGY  [facility]  [facility] [address]  Phone: [phone]  ______________________________________________________________________    Physical Exam   Vital Signs: Temp: 98.7  F (37.1  C) Temp src: Oral BP: 96/62 Pulse: 74   Resp: 16 SpO2: 97 % O2 Device: None (Room air)    Weight: 193 lbs 1.97 oz    General: Aox2-3 looks frail  Respiratory: He is on room air and does not seem to be in any distress  Cardiovascular: Regular rate , S1 and S2 normal with no murmer or rubs or gallops  Abdomen:   soft , non tender   Skin: Dressing over the sacral area  Neurologic: No focal deficit  Musculoskeletal: Normal Range of motion over upper and lower extremities bilaterally   Psychiatric: cooperative           Primary Care Physician   Jayme Deng MD    Discharge Orders      Primary Care - Care Coordination Referral      Hospice Referral      General info for SNF    Length of Stay Estimate: Long Term Care  Condition at Discharge: Stable  Level of care:board and care  Rehabilitation Potential: Fair  Admission H&P remains valid and up-to-date: Yes  Recent Chemotherapy: N/A  Use Nursing Home Standing Orders: no. Pt will enroll with Hospice     Mantoux instructions    Give two-step Mantoux (PPD) Per Facility Policy Yes     Reason for your hospital stay    Failure to thrive and weakness with Stage 4 Decubitus ulcer. Discharging to LTC facility with Hospice     Stubbs catheter    To straight gravity drainage. Change catheter every 4 weeks and PRN for leaking or decreased urine output with signs of bladder  "distention. DO NOT change catheter without a specific Provider order IF diagnosis of benign prostatic hypertrophy (BPH), neurogenic bladder, or other urological conditions     Wound care    Sacral wound(s): BID and prn  1. Cleanse wound with Vashe  2. Protect periwound with CriticAid barrier paste  3. Lightly pack wound (including all undermining) with approximately one half of a kerlix roll slightly moistened with Vashe   4. Cover with Abd pad and secure with tape     Left hip wounds: Every other day and prn  1. Cleanse wounds with Vashe  2. Cover with a Sacral Mepilex  3. Please also keep prominent portion of spine covered with Sacral Mepilex for protection  4. Follow PIP plan     Pressure Injury Prevention (PIP) Plan:  If patient is declining pressure injury prevention interventions: Explore reason why and address patient's concerns, Educate on pressure injury risk and prevention intervention(s), and If patient is still declining, document \"informed refusal\"   Mattress: Follow bed algorithm, add Low Air Loss (Air+) mattress pump if skin is very moist or constantly moist.   HOB: Maintain at or below 30 degrees, unless contraindicated  Repositioning in bed: Every 1-2 hours  and Raise foot of bed prior to raising head of bed, to reduce patient sliding down (shear)  Heels: Keep elevated off mattress and Pillows under calves  Protective Dressing:  Sacral Mepilex to left hip and upper back  Positioning Equipment:None  Chair positioning:  Limit sitting to a maximum of 30 minutes, but it is best to avoid sitting at all    If patient has a buttock pressure injury, or high risk for PI use chair cushion or SPS.  Moisture Management: Perineal cleansing /protection: Follow Incontinence Protocol, Avoid brief in bed, Clean and dry skin folds with bathing , and Moisturize dry skin  Under Devices: Inspect skin under all medical devices during skin inspection , Ensure tubes are stabilized without tension, and Ensure patient is not " lying on medical devices or equipment when repositioned     Activity - Up with nursing assistance     Follow Up and recommended labs and tests    F/u with Hospice team as needed. INR checks every three days     Fall precautions     Diet    Follow this diet upon discharge: Current Diet:Orders Placed This Encounter      Room Service      Snacks/Supplements Adult: Other; L: pineapple GelateinPLUS,  D: van Magic Cup; With Meals      Combination Diet Regular Diet Adult       Discharge Follow-up Details      Primary Care - Care Coordination Referral      Reason for Referral: Care Transition    Transition: Inpatient to outpatient    Clinical Staff have discussed the Care Coordination Referral with the patient and/or caregiver: No    Hospice Referral      Preferred Location: Other - Use Comments    Non-internal location selection reason: Patient Preference/Choice    Scheduling Instructions: Please call to schedule your appointment    Class: External referral    Hospice Anticipated Date: Anticipated discharge date - Use comments (Homecare to being within 48 hours of discharge) Comment - 10/24/24    Reason for Referral: Hospice Diagnosis - Use Comments Comment - failure to thrive    Additional services needed: Other - Use Comments    Patient was last seen by provider when and what for?: 10/24/24    Hospice Factors - Use comments to elaborate: Functional decline    Responsible [place] certified Physician at time of discharge: PCP            Significant Results and Procedures   Most Recent 3 CBC's:  Recent Labs   Lab Test 10/19/24  0847 10/17/24  0843 10/16/24  1825   WBC 7.2 8.3 8.8   HGB 7.8* 8.0* 8.0*   MCV 97 97 95    337 310     Most Recent 3 BMP's:  Recent Labs   Lab Test 10/22/24  0928 10/21/24  0739 10/20/24  0744 10/19/24  2301 10/19/24  0847 10/18/24  0528 10/17/24  2328 10/17/24  0843   NA  --   --   --   --  129* 131*  --  136   POTASSIUM 3.5 3.8 3.6   < > 3.0* 3.6  3.7   < > 2.9*  2.9*   CHLORIDE  --   --    --   --  88* 88*  --  89*   CO2  --   --   --   --  33* 36*  --  39*   BUN  --   --   --   --  25.6* 31.6*  --  34.5*   CR  --   --   --   --  0.57* 0.66*  --  0.76   ANIONGAP  --   --   --   --  8 7  --  8   JOSEPH  --   --   --   --  8.4* 8.5*  --  8.7*   GLC  --   --   --   --  86 97  --  94    < > = values in this interval not displayed.     Most Recent 2 LFT's:  Recent Labs   Lab Test 10/17/24  0843 10/16/24  1825   AST 19 17   ALT 5 <5   ALKPHOS 106 86   BILITOTAL 0.6 0.5     Most Recent 3 INR's:  Recent Labs   Lab Test 10/25/24  0926 10/24/24  0931 10/23/24  0912   INR 2.65* 2.93* 2.70*     Most Recent INR's and Anticoagulation Dosing History:  Anticoagulation Dose History  More data exists         Latest Ref Rng & Units 10/19/2024 10/20/2024 10/21/2024 10/22/2024 10/23/2024 10/24/2024 10/25/2024   Recent Dosing and Labs   warfarin ANTICOAGULANT (COUMADIN) 1 MG tablet - - - - - 3 mg, $Given - -   warfarin ANTICOAGULANT (COUMADIN) 1.5 mg TABS half-tab - 1.5 mg, $Given 1.5 mg, $Given 1.5 mg, $Given 1.5 mg, $Given - - -   warfarin ANTICOAGULANT (COUMADIN) 2 MG tablet - - - - - - 2 mg, $Given -   INR 0.85 - 1.15 3.10  3.15  3.23  2.98  2.70  2.93  2.65       Details                 Most Recent 3 Creatinines:  Recent Labs   Lab Test 10/19/24  0847 10/18/24  0528 10/17/24  0843   CR 0.57* 0.66* 0.76     Most Recent 3 Hemoglobins:  Recent Labs   Lab Test 10/19/24  0847 10/17/24  0843 10/16/24  1825   HGB 7.8* 8.0* 8.0*     Most Recent 3 Troponin's:No lab results found.  Most Recent 3 BNP's:  Recent Labs   Lab Test 09/09/24  1455 01/30/24  1048 01/09/24  1157   NTBNPI 8,442* 1,253 2,149*     Most Recent D-dimer:No lab results found.  Most Recent Cholesterol Panel:  Recent Labs   Lab Test 01/09/24  1027   CHOL 53   LDL 22   HDL 25*   TRIG 32     7-Day Micro Results       Collected Updated Procedure Result Status      10/19/2024 2110 10/20/2024 0115 C. difficile Toxin B PCR with reflex to C. difficile Antigen and Toxins  A/B EIA [[id number]]    Stool from Per Rectum    Final result Component Value   C Difficile Toxin B by PCR Negative   A negative result does not exclude actual disease due to C. difficile and may be due to improper collection, handling and storage of the specimen or the number of organisms in the specimen is below the detection limit of the assay.                ,   Results for orders placed or performed during the hospital encounter of 09/09/24   Chest XR,  PA & LAT    Narrative    CHEST TWO VIEWS  9/9/2024 4:27 PM     HISTORY:  Crackles. Orthopnea.    COMPARISON: 2/7/2024.      Impression    IMPRESSION: Sternotomy and mitral valve prosthesis. Shallow  inspiration accentuates heart size and pulmonary vascularity. No  pleural effusions. Lungs clear.     SHAR SIDHU MD         SYSTEM ID:  [id number]   US Renal Complete Non-Vascular    Narrative    EXAM: US RENAL COMPLETE NON-VASCULAR  LOCATION: [facility]  DATE: 9/9/2024    INDICATION: Acute renal failure of unknown origin  COMPARISON: Today's 9/9/2024, 7:00 PM noncontrast CT AP  TECHNIQUE: Routine Bilateral Renal and Bladder Ultrasound.    FINDINGS:    RIGHT KIDNEY: Obscured by overlying bowel gas and not visualized.    LEFT KIDNEY: Possible mild hydronephrosis of the left kidney. Lower half of the left kidney is obscured by overlying bowel gas and not visualized    BLADDER: Moderate to marked bladder distention at 15 x 14 x 13 cm.      Impression    IMPRESSION:  1.  Moderate to marked bladder distention and possible mild hydronephrosis of the left kidney. Consider placement of a Stubbs bladder catheter.  2.  The entire right kidney and the lower half of the left kidney are obscured by overlying bowel gas.   CT Abdomen Pelvis w/o Contrast    Narrative    EXAM: CT ABDOMEN PELVIS W/O CONTRAST  LOCATION: [facility]  DATE: 9/9/2024    INDICATION: Nausea. Hernias. Acute kidney insufficiency.  COMPARISON:  None.  TECHNIQUE: CT scan of the abdomen and pelvis was performed without IV contrast. Multiplanar reformats were obtained. Dose reduction techniques were used.  CONTRAST: None.    FINDINGS:   LOWER CHEST: Sternotomy and mitral valve prosthesis. Consolidation at the left lung base posteriorly is likely atelectasis. Coronary artery calcification.    HEPATOBILIARY: No significant mass or bile duct dilatation. No calcified gallstones.     PANCREAS: Normal.    SPLEEN: Normal.    ADRENAL GLANDS: Normal.    KIDNEYS/BLADDER: The urinary bladder is moderately distended. No significant mass, stone, or hydronephrosis.    BOWEL: Large amount of stool throughout the colon and within the rectum, consistent with constipation. There is a large ventral hernia containing multiple loops of gas and stool-filled colon, as well as a small amount of fluid. Scattered colonic   diverticulosis. No small bowel dilatation is identified.    LYMPH NODES: No lymphadenopathy.    VASCULATURE: Unremarkable.    PELVIC ORGANS: No pelvic masses.    MUSCULOSKELETAL: Advanced degenerative changes right hip. Degenerative changes are also noted throughout the visualized thoracolumbar spine. There is diffuse subcutaneous edema.      Impression    IMPRESSION:   1.  Large amount of stool throughout the colon and within the rectum, consistent with constipation.  2.  Large ventral hernia containing multiple loops of gas and stool-filled colon, as well as a small amount of fluid. No convincing evidence for associated bowel obstruction.  3.  Moderate distention of the urinary bladder.  4.  Diffuse subcutaneous edema.     Echocardiogram Complete     Value    LVEF  50-55%    Narrative    [id number]  [id number]  [id number]  [id number]^SUNNY^ALEM^JOSE L     [facility]  Echocardiography Laboratory  [address] [place] [address]     Name: JESSE CABRAL MADIE  MRN: [id number]  : 1945  Study Date: 09/10/2024 07:58 AM  Age: 79 yrs  Gender:  Male  Patient Location: [facility]  Reason For Study: Heart Failure  Ordering Physician: ALEM CORREA  Referring Physician: ALEM CORREA  Performed By: Giovanny Coyle     BSA: 2.1 m2  Height: 74 in  Weight: 189 lb  HR: 71  BP: 92/53 mmHg  ______________________________________________________________________________  Procedure  Complete Echo Adult. Optison (NDC #6236-9554) given intravenously. Technically  difficult study.  ______________________________________________________________________________  Interpretation Summary     Status post mechanical mitral valve replacement with a 31-mm St. Raffi  mechanical valve for severe degenerative mitral valve regurgitation, 9/2008.  The mitral prosthesis is well-seated. No regurgitation.  Mean diastolic gradient 2 mmHg (heart rate 72 bpm, afib).  Normal left ventricular systolic function. Estimated LVEF 50-55%.  Normal right ventricular size with mildly decreased systolic function.  Trace tricuspid valve regurgitation.  Inferior vena cava not well-visualized due to challenging subcostal images.     On the last study dated 2/1/2024, moderately decreased RV function and severe  tricuspid valve regurgitation was reported.     ______________________________________________________________________________  Left Ventricle  The left ventricle is normal in size. There is normal left ventricular wall  thickness. Left ventricular systolic function is normal. The visual ejection  fraction is 50-55%. Diastolic function not assessed due to presence of  prosthetic mitral valve. Septal motion is consistent with post-operative  state.     Right Ventricle  The right ventricle is normal size. Mildly decreased right ventricular  systolic function.     Atria  The left atrium is severely dilated. The right atrium is moderately dilated.  Right atrium not well visualized.     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 Raffi) mechanical prosthesis. The prosthetic mitral  valve is well-seated.  This degree of valvular regurgitation is within normal  limits. Normal prosthetic mitral valve gradients. Mean diastolic gradient 2  mmHg (heart rate 72 bpm, afib).     Tricuspid Valve  The tricuspid valve is not well visualized, but is grossly normal. There is  trace tricuspid regurgitation. The right ventricular systolic pressure is  approximated at 20.2 mmHg plus the right atrial pressure. Right ventricle  systolic pressure estimate normal.     Aortic Valve  The aortic valve is trileaflet. No aortic regurgitation is present. No aortic  stenosis is present.     Pulmonic Valve  There is trace pulmonic valvular regurgitation.     Vessels  The aortic root is normal size. Inferior vena cava not well visualized for  estimation of right atrial pressure.     Pericardium  There is no pericardial effusion.     Rhythm  The rhythm was atrial fibrillation.  ______________________________________________________________________________  MMode/2D Measurements & Calculations  IVSd: 0.90 cm     LVIDd: 4.0 cm  LVIDs: 3.5 cm  LVPWd: 1.0 cm  FS: 12.5 %  LV mass(C)d: 118.2 grams  LV mass(C)dI: 55.7 grams/m2  Ao root diam: 3.8 cm  LVOT diam: 2.4 cm  LVOT area: 4.4 cm2  Ao root diam index Ht(cm/m): 2.0  Ao root diam index BSA (cm/m2): 1.8  RWT: 0.50     Doppler Measurements & Calculations  MV E max jose: 118.0 cm/sec  MV max P.1 mmHg  MV mean P.0 mmHg  MV V2 VTI: 45.6 cm  MVA(VTI): 1.3 cm2  MV dec slope: 333.0 cm/sec2  MV dec time: 0.37 sec  Ao V2 max: 142.0 cm/sec  Ao max P.0 mmHg  Ao V2 mean: 94.6 cm/sec  Ao mean P.0 mmHg  Ao V2 VTI: 26.1 cm  KEITH(I,D): 2.2 cm2  KEITH(V,D): 2.6 cm2  LV V1 max P.9 mmHg  LV V1 max: 85.4 cm/sec  LV V1 VTI: 13.1 cm  SV(LVOT): 57.6 ml  SI(LVOT): 27.2 ml/m2     PA acc time: 0.10 sec  TR max jose: 223.8 cm/sec  TR max P.2 mmHg  AV Jose Ratio (DI): 0.60  KEITH Index (cm2/m2): 1.0  E/E' av.3  Lateral E/e': 6.9  Medial E/e': 11.7  RV S Jose: 5.3 cm/sec      ______________________________________________________________________________  Report approved by: Dr Darryl Martinez 09/10/2024 10:04 AM           *Note: Due to a large number of results and/or encounters for the requested time period, some results have not been displayed. A complete set of results can be found in Results Review.       Discharge Medications   Current Discharge Medication List        START taking these medications    Details   haloperidol (HALDOL) 0.5 MG tablet Take 2 tablets (1 mg) by mouth every 4 hours as needed for agitation or other (N/V).  Qty: 10 tablet, Refills: 0    Associated Diagnoses: Stage IV pressure ulcer of sacral region (H); Moderate protein-calorie malnutrition (H)      loperamide (IMODIUM) 2 MG capsule Take 1 capsule (2 mg) by mouth 4 times daily as needed for diarrhea.    Associated Diagnoses: Diarrhea, unspecified type      LORazepam (ATIVAN) 0.5 MG tablet Take 1 tablet (0.5 mg) by mouth every 4 hours as needed for anxiety, agitation or nausea.  Qty: 8 tablet, Refills: 0    Associated Diagnoses: Stage IV pressure ulcer of sacral region (H); Moderate protein-calorie malnutrition (H)      mirtazapine (REMERON) 7.5 MG tablet Take 1 tablet (7.5 mg) by mouth at bedtime.  Qty: 30 tablet, Refills: 0    Associated Diagnoses: Moderate protein-calorie malnutrition (H)           CONTINUE these medications which have CHANGED    Details   acetaminophen (TYLENOL) 500 MG tablet Take 2 tablets (1,000 mg) by mouth every 6 hours as needed for mild pain or fever.    Associated Diagnoses: Osteomyelitis of sacrum (H)      HYDROmorphone (DILAUDID) 2 MG tablet Take 1 tablet (2 mg) by mouth every 4 hours as needed for moderate pain.  Qty: 10 tablet, Refills: 0    Associated Diagnoses: Stage IV pressure ulcer of sacral region (H); Moderate protein-calorie malnutrition (H)      midodrine (PROAMATINE) 10 MG tablet Take 1 tablet (10 mg) by mouth 3 times daily (with meals).    Associated Diagnoses:  Orthostatic hypotension           CONTINUE these medications which have NOT CHANGED    Details   dutasteride (AVODART) 0.5 MG capsule Take 1 capsule (0.5 mg) by mouth daily.  Qty: 30 capsule, Refills: 0    Associated Diagnoses: Urinary retention; Benign prostatic hyperplasia with urinary retention      pantoprazole (PROTONIX) 40 MG EC tablet Take 1 tablet (40 mg) by mouth 2 times daily (before meals)  Qty: 60 tablet, Refills: 1    Associated Diagnoses: Gastrointestinal hemorrhage, unspecified gastrointestinal hemorrhage type      warfarin ANTICOAGULANT (COUMADIN) 3 MG tablet Take 1 tablet (3 mg) on Tuesdays, Thursdays, Saturdays and take 1.5 tablets (4.5 mg) all other days or as directed by the INR clinic  Qty: 180 tablet, Refills: 0    Associated Diagnoses: Long term current use of anticoagulant therapy; S/P mitral valve replacement; Chronic atrial fibrillation (H)      order for DME Equipment being ordered: COMPRESSION STOCKINGS, 20-30 mmHg  Qty: 1 each, Refills: 1    Associated Diagnoses: Lower extremity edema           STOP taking these medications       albuterol (PROAIR HFA/PROVENTIL HFA/VENTOLIN HFA) 108 (90 Base) MCG/ACT inhaler Comments:   Reason for Stopping:         bisacodyl (DULCOLAX) 10 MG suppository Comments:   Reason for Stopping:         calcium carbonate (TUMS) 500 MG chewable tablet Comments:   Reason for Stopping:         diclofenac (VOLTAREN) 1 % topical gel Comments:   Reason for Stopping:         ferrous sulfate (FEROSUL) 325 (65 Fe) MG tablet Comments:   Reason for Stopping:         lidocaine (XYLOCAINE) 4 % external solution Comments:   Reason for Stopping:         morphine 0.1% in intrasite topical gel Comments:   Reason for Stopping:         Multiple Vitamins-Minerals (MULTIVITAMIN ADULT) CHEW Comments:   Reason for Stopping:         NONFORMULARY Comments:   Reason for Stopping:         nystatin (MYCOSTATIN) 117428 UNIT/GM external powder Comments:   Reason for Stopping:          polyethylene glycol (MIRALAX) 17 GM/Dose powder Comments:   Reason for Stopping:         potassium chloride reagan ER (KLOR-CON M20) 20 MEQ CR tablet Comments:   Reason for Stopping:         Probiotic CHEW Comments:   Reason for Stopping:         torsemide (DEMADEX) 20 MG tablet Comments:   Reason for Stopping:             Allergies   Allergies   Allergen Reactions    Amiodarone Shortness Of Breath    Pcn [Penicillins] Shortness Of Breath     Rxn occurred in childhood     Statins Muscle Pain (Myalgia) and Hives    Latex     Zetia [Ezetimibe] Muscle Pain (Myalgia)     Muscle weakness legs

## 2024-10-25 NOTE — PROGRESS NOTES
Care Management Follow Up    Length of Stay (days): 9    Expected Discharge Date: 10/25/2024     Concerns to be Addressed: discharge planning     Patient plan of care discussed at interdisciplinary rounds: Yes    Anticipated Discharge Disposition: Facility with hospice               Anticipated Discharge Services: Anticipated Discharge DME: None    Patient/family educated on Medicare website which has current facility and service quality ratings: no  Education Provided on the Discharge Plan: Yes  Patient/Family in Agreement with the Plan: yes    Referrals Placed by CM/SW: Hospice  Private pay costs discussed: Not applicable    Discussed  Partnership in Safe Discharge Planning  document with patient/family: No     Handoff Completed: No, handoff not indicated or clinically appropriate    Additional Information:  Pas completed for anticipated discharge.     PAS-RR    D: Per DHS regulation, SW completed and submitted PAS-RR to [place] [employer] Direct Connect via the Senior LinkAge Line.  PAS-RR confirmation # is : [id number]         P: Further questions may be directed to Senior LinkAge Line at #[phone], option #4 for PAS-RR staff.     Next Steps: Hospice discharge      GILL Ortiz, LICSW  Social Work- Inpatient Care Coordination  [facility]

## 2024-10-28 NOTE — PROGRESS NOTES
Clinic Care Coordination Contact  Care Coordination Clinician Chart Review    Situation: Patient chart reviewed by care coordinator.    Background: Clinic Care Coordination Referral received from inpatient care team for transition handoff communication following hospital admission.    Assessment: Upon chart review, patient is not a candidate for Primary Care Clinic Care Coordination enrollment due to reason stated below:  Patient enrolled into hospice.    Plan/Recommendations: Clinic Care Coordination Referral/order cancelled. RN/SW CC will perform no further monitoring/outreaches at this time and will remain available as needed. If new needs arise, a new Care Coordination Referral may be placed.    Petty Eaton RN Clinic Care Coordinator  [facility] Clinics: [place], [place] (on-site Wednesdays), [facility] (on-site Thursdays) & [facility].  [email or web address]  Phone: [phone]

## 2024-10-28 NOTE — PROGRESS NOTES
Writer spoke with nurse manager, Melvi, who confirms warfarin is currently being managed by an in-house provider at the [facility].

## 2024-10-28 NOTE — TELEPHONE ENCOUNTER
ANTICOAGULATION MANAGEMENT:  Medication Refill    Anticoagulation Summary  As of 10/25/2024      Warfarin maintenance plan:  3 mg (3 mg x 1) every Mon, Thu; 1.5 mg (3 mg x 0.5) all other days   Next INR check:  10/28/2024   Target end date:  Indefinite    Indications    Long-term (current) use of anticoagulants [Z79.01] [Z79.01]  S/P mitral valve replacement [Z95.2]  Chronic atrial fibrillation (H) [I48.20]                 Anticoagulation Care Providers       Provider Role Specialty Phone number    Jayme Deng MD Referring Internal Medicine [phone]            Refill Criteria    Visit with referring provider/group: Meets criteria: visit within referring provider group in the last 15 months on 3/15/24    [facility] referral last signed: 08/03/2024; within last year: Yes    Lab monitoring not exceeding 2 weeks overdue: Yes    Feng meets all criteria for refill. Rx instructions and quantity supplied updated to match patient's current dosing plan. Warfarin 90 day supply with 1 refill granted per [facility] protocol     Irena Whitaker RN  Anticoagulation Clinic

## 2024-11-01 NOTE — PROGRESS NOTES
Feng is a 79 year old who is being evaluated via a billable video visit.    How would you like to obtain your AVS? MyChart  If the video visit is dropped, the invitation should be resent by: Text to cell phone: [phone]  Will anyone else be joining your video visit? No          Subjective   Feng is a 79 year old, presenting for the following health issues:  Follow Up      Video Start Time: 8:15 AM     History of Present Illness       Reason for visit:  Follow up   He is taking medications regularly.     Non bleeding supratherpeutic INR   I spoke with Feng Wynne over the phone today to review recent hospitalization.  His partner is not present over the phone call initially.  He has been trying to improve his INR with improving diet.  He is trying to eat a healthier diet to keep INR stable.  He was advised to continue warfarin as per hospitalist and cardiology despite transitioning to hospice.  Malnutrition   He has moved to a long term care facility and has meals prepared.  He does not recall the name of the location he is in nor the city other than he is [facility].   He does have nursing care 24/7.   Stage 4 sacral ulcer   He believes his skin ulcer is doing well.  He has wound care and no plans for surgical intervention.  Goals of Care   I did speak to his power of  Jose Washburn and goal of care has shifted toward a palliative approach.  Jose is appreciative of hospice involvement.  At present hospice has been coming out once per week.  He was advised to continue warfarin for mechanical valve.  He is quite pleased with Feng's current condition and treatment       Review of Systems  Constitutional, HEENT, cardiovascular, pulmonary, GI, , musculoskeletal, neuro, skin, endocrine and psych systems are negative, except as otherwise noted.      Objective    Vitals - Patient Reported  Pain Score: No Pain (0)    Physical Exam   GENERAL: alert and no distress   RESP: No audible wheeze,  cough,    NEURO: + Absent orientation to place time and situation  PSYCH: Appropriate affect, tone, and pace of words    (Z51.5) Palliative care patient  (primary encounter diagnosis)  Comment: We discussed that he is in the appropriate care with hospice and that if any questions do arise in the future that I am available.  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  Continue with topical wound care as  Plan:     (Z95.2) S/P mitral valve replacement  Comment: Continue on warfarin as discussed in the hospital  Plan:          Telephone-Visit Details  End Time 8:42 AM   Type of service: Telephone visit    Originating Location (pt. Location): Home    Distant Location (provider location):  On-site  Platform used for Video Visit: Doximbrian  Signed Electronically by: Jayme Deng MD, MD

## 2024-11-06 NOTE — TELEPHONE ENCOUNTER
Carlee with [facility] left a  11/5 asking for confirmation that Dr Srinivasan will no longer be signing the patient's INR orders. [facility] has Dr Srinivasan listed as the patient's PCP and states that Dr Srinivasan has signed his INR orders in the past, but recently received a message that she will no longer sign them. Carlee is asking for a call back to [phone] ext [phone] to confirm.

## 2024-11-07 NOTE — PROGRESS NOTES
ANTICOAGULATION  MANAGEMENT    Feng Wynne is being discharged from the [facility] Anticoagulation [facility] Program ([facility]).    Reason for discharge: care has been transferred to in house provider at the [facility]    Anticoagulation episode resolved, [facility] referral closed, and Standing order discontinued    If patient needs warfarin management in the future, please send a new referral    Spoke with Jose to confirm the change in care and to thank him for the opportunity to work with him and Feng.    Ami Robertson RN

## 2024-11-12 ENCOUNTER — TELEPHONE (OUTPATIENT)
Dept: FAMILY MEDICINE | Facility: CLINIC | Age: 79
End: 2024-11-12
Payer: COMMERCIAL

## 2024-11-12 NOTE — TELEPHONE ENCOUNTER
Home Care is calling regarding an established patient with [facility].        6/27/2024     3:50 PM   Home Care Information   Date of Home Care episode start 6/19/2024   Current following provider Dr. Jayme Deng    Name/Phone Number Joshua Kinney PT,   Home Care agency [facility]     Requesting orders from: Jayme Deng  Provider is following patient: Yes  Is this a 60-day recertification request?  No    Orders Requested    Skilled Nursing  INR orders were sent to wound care provider on 10/9/24. Dr. Srinivasan was signing all of the faxed orders, but was not responsible for patient's INR.  Home care is asking if Dr. Deng will approve INR order for this patient. Home Care will fax for signature.   INR was completed on 10/8/24.    Information was gathered and will be sent to provider for review.  RN will contact Home Care with information after provider review.  Confirmed ok to leave a detailed message with call back.  Contact information confirmed and updated as needed.    Alexandra Bradford RN

## 2024-11-12 NOTE — TELEPHONE ENCOUNTER
Spoke to home care LPN informing them of the provider's approval of home care orders.     Demetrius Galaviz RN  [facility]

## 2024-11-19 ENCOUNTER — MEDICAL CORRESPONDENCE (OUTPATIENT)
Dept: HEALTH INFORMATION MANAGEMENT | Facility: CLINIC | Age: 79
End: 2024-11-19
Payer: COMMERCIAL
